# Patient Record
Sex: MALE | Race: WHITE | NOT HISPANIC OR LATINO | Employment: OTHER | URBAN - METROPOLITAN AREA
[De-identification: names, ages, dates, MRNs, and addresses within clinical notes are randomized per-mention and may not be internally consistent; named-entity substitution may affect disease eponyms.]

---

## 2017-08-25 ENCOUNTER — OFFICE VISIT (OUTPATIENT)
Dept: FAMILY MEDICINE | Facility: CLINIC | Age: 52
End: 2017-08-25
Payer: MEDICAID

## 2017-08-25 VITALS
RESPIRATION RATE: 20 BRPM | HEART RATE: 72 BPM | HEIGHT: 70 IN | DIASTOLIC BLOOD PRESSURE: 86 MMHG | WEIGHT: 283 LBS | TEMPERATURE: 97 F | BODY MASS INDEX: 40.52 KG/M2 | SYSTOLIC BLOOD PRESSURE: 134 MMHG

## 2017-08-25 DIAGNOSIS — L51.1: ICD-10-CM

## 2017-08-25 DIAGNOSIS — E11.610 TYPE 2 DIABETES MELLITUS WITH DIABETIC NEUROPATHIC ARTHROPATHY, WITHOUT LONG-TERM CURRENT USE OF INSULIN: ICD-10-CM

## 2017-08-25 DIAGNOSIS — I25.119 ATHEROSCLEROSIS OF NATIVE CORONARY ARTERY OF NATIVE HEART WITH ANGINA PECTORIS: ICD-10-CM

## 2017-08-25 DIAGNOSIS — E11.59 HYPERTENSION ASSOCIATED WITH DIABETES: ICD-10-CM

## 2017-08-25 DIAGNOSIS — N40.0 PROSTATE ENLARGEMENT: ICD-10-CM

## 2017-08-25 DIAGNOSIS — I15.2 HYPERTENSION ASSOCIATED WITH DIABETES: ICD-10-CM

## 2017-08-25 DIAGNOSIS — F41.1 GENERALIZED ANXIETY DISORDER: Primary | ICD-10-CM

## 2017-08-25 DIAGNOSIS — R53.83 FATIGUE, UNSPECIFIED TYPE: ICD-10-CM

## 2017-08-25 DIAGNOSIS — J44.9 CHRONIC OBSTRUCTIVE PULMONARY DISEASE, UNSPECIFIED COPD TYPE: ICD-10-CM

## 2017-08-25 DIAGNOSIS — E66.01 OBESITY, MORBID, BMI 40.0-49.9: ICD-10-CM

## 2017-08-25 DIAGNOSIS — Z00.00 WELL ADULT EXAM: ICD-10-CM

## 2017-08-25 DIAGNOSIS — G47.33 OSA (OBSTRUCTIVE SLEEP APNEA): ICD-10-CM

## 2017-08-25 PROCEDURE — 99386 PREV VISIT NEW AGE 40-64: CPT | Mod: ,,, | Performed by: FAMILY MEDICINE

## 2017-08-25 RX ORDER — METOPROLOL SUCCINATE 100 MG/1
100 TABLET, EXTENDED RELEASE ORAL DAILY
Status: ON HOLD | COMMUNITY
End: 2020-04-27 | Stop reason: HOSPADM

## 2017-08-25 RX ORDER — HYDROXYZINE HYDROCHLORIDE 25 MG/1
25 TABLET, FILM COATED ORAL NIGHTLY
Qty: 30 TABLET | Refills: 2 | Status: SHIPPED | OUTPATIENT
Start: 2017-08-25 | End: 2020-03-12

## 2017-08-25 NOTE — PROGRESS NOTES
Subjective:       Patient ID: Magdaleno Cunningham is a 51 y.o. male.    Chief Complaint: Annual Exam    Status post hospitalization at [facility] for atrial fibrillation ([place]). His long history of coronary artery disease with stents to native vessels unstable angina 2 MIs in the past onset diabetes hyperlipidemia.      Review of Systems   Constitutional: Positive for activity change (decreased), fatigue and unexpected weight change (intentional wt loss 130 #). Negative for appetite change, chills, diaphoresis and fever.   HENT: Negative for congestion, dental problem, drooling, ear discharge, ear pain, facial swelling, hearing loss, mouth sores, nosebleeds, postnasal drip, rhinorrhea, sinus pressure, sneezing, sore throat, tinnitus, trouble swallowing and voice change.    Eyes: Positive for photophobia and visual disturbance (wears bifocals, early cataract). Negative for pain, discharge, redness and itching.        Had fully I examined up to just 2 weeks ago. No retinopathy.   Respiratory: Positive for apnea (hx of fatigue, witnessed apnea, headaches,  ), chest tightness (rare ) and shortness of breath. Negative for cough, choking, wheezing and stridor.    Cardiovascular: Positive for chest pain (rare). Negative for palpitations and leg swelling.   Gastrointestinal: Positive for abdominal pain (intermitant). Negative for abdominal distention, anal bleeding, blood in stool, constipation, diarrhea, nausea, rectal pain and vomiting.   Endocrine: Negative for cold intolerance, heat intolerance, polydipsia, polyphagia and polyuria.   Genitourinary: Negative for decreased urine volume, difficulty urinating, discharge, dysuria, enuresis, flank pain, frequency, genital sores, hematuria, penile pain, penile swelling, scrotal swelling, testicular pain and urgency.   Musculoskeletal: Positive for arthralgias (finger stiffness) and back pain. Negative for gait problem, joint swelling, myalgias, neck pain and neck stiffness.    Skin: Negative for color change, pallor, rash and wound.   Allergic/Immunologic: Negative for environmental allergies, food allergies and immunocompromised state.   Neurological: Positive for light-headedness. Negative for dizziness, tremors, seizures, syncope, facial asymmetry, speech difficulty, weakness, numbness and headaches.   Hematological: Negative for adenopathy. Does not bruise/bleed easily.   Psychiatric/Behavioral: Negative for agitation, behavioral problems, confusion, decreased concentration, dysphoric mood, hallucinations, self-injury, sleep disturbance and suicidal ideas. The patient is not nervous/anxious and is not hyperactive.        Objective:      Physical Exam   Constitutional: He is oriented to person, place, and time. He appears well-developed and well-nourished.  Non-toxic appearance. He does not have a sickly appearance. He does not appear ill. No distress.   HENT:   Head: Normocephalic and atraumatic.   Right Ear: External ear normal.   Left Ear: External ear normal.   Nose: Nose normal.   Mouth/Throat: Oropharynx is clear and moist. No oropharyngeal exudate.   Eyes: Conjunctivae and EOM are normal. Pupils are equal, round, and reactive to light. Right eye exhibits no discharge. Left eye exhibits no discharge. No scleral icterus.   Neck: Normal range of motion. Neck supple. No JVD present. No tracheal deviation present. No thyromegaly present.   Cardiovascular: Normal rate, normal heart sounds and intact distal pulses.  Exam reveals no gallop and no friction rub.    No murmur heard.  Pulmonary/Chest: Effort normal. No stridor. No respiratory distress. He has wheezes (inspiratory). He has no rales. He exhibits no tenderness.   Abdominal: Soft. Bowel sounds are normal. He exhibits no distension and no mass. There is no tenderness. There is no rebound and no guarding. No hernia.   Genitourinary: Rectum normal, prostate normal, testes normal and penis normal. Rectal exam shows guaiac  negative stool. Cremasteric reflex is present. Right testis shows no mass, no swelling and no tenderness. Right testis is descended. Cremasteric reflex is not absent on the right side. Left testis shows no mass, no swelling and no tenderness. Left testis is descended. Cremasteric reflex is not absent on the left side. Circumcised. No phimosis, paraphimosis, hypospadias, penile erythema or penile tenderness. No discharge found.   Musculoskeletal: Normal range of motion. He exhibits no edema, tenderness or deformity.   Lymphadenopathy:     He has no cervical adenopathy.   Neurological: He is alert and oriented to person, place, and time. He has normal reflexes. He displays normal reflexes. No cranial nerve deficit. He exhibits normal muscle tone. Coordination normal.   Skin: Skin is warm and dry. No rash noted. He is not diaphoretic. No erythema. No pallor.   Psychiatric: He has a normal mood and affect. His behavior is normal. Judgment and thought content normal.   Nursing note and vitals reviewed.      Assessment:       1. Generalized anxiety disorder    2. Obesity, morbid, BMI 40.0-49.9    3. Denton-Perez erythema multiforme exudativum    4. ROSAMARIA (obstructive sleep apnea)    5. Chronic obstructive pulmonary disease, unspecified COPD type    6. Hypertension associated with diabetes    7. Type 2 diabetes mellitus with diabetic neuropathic arthropathy, without long-term current use of insulin    8. Atherosclerosis of native coronary artery of native heart with angina pectoris    9. Prostate enlargement    10. Well adult exam    11. Fatigue, unspecified type        Plan:       Magdaleno was seen today for annual exam.    Diagnoses and all orders for this visit:    Generalized anxiety disorder    Obesity, morbid, BMI 40.0-49.9  -     TSH; Future  -     T4; Future  -     TSH  -     T4    Denton-Perez erythema multiforme exudativum    ROSAMARIA (obstructive sleep apnea)  -     Polysomnogram (CPAP will be added if patient  meets diagnostic criteria.); Future  -     TSH; Future  -     T4; Future  -     TSH  -     T4    Chronic obstructive pulmonary disease, unspecified COPD type    Hypertension associated with diabetes  -     Ambulatory referral to Cardiology    Type 2 diabetes mellitus with diabetic neuropathic arthropathy, without long-term current use of insulin  -     Ambulatory referral to Cardiology  -     Microalbumin/creatinine urine ratio  -     Lipid panel; Future  -     Lipid panel    Atherosclerosis of native coronary artery of native heart with angina pectoris  -     Lipid panel; Future  -     Lipid panel    Prostate enlargement  -     PSA, Screening; Future  -     PSA, Screening    Well adult exam  -     Hemoglobin A1c; Future  -     Basic metabolic panel; Future  -     Hepatitis C antibody; Future  -     Hepatic function panel; Future  -     PSA, Screening; Future  -     Lipid panel; Future  -     Hemoglobin A1c  -     Basic metabolic panel  -     Hepatitis C antibody  -     Hepatic function panel  -     PSA, Screening  -     Lipid panel    Fatigue, unspecified type    Other orders  -     hydrOXYzine HCl (ATARAX) 25 MG tablet; Take 1 tablet (25 mg total) by mouth every evening.         Return in about 6 months (around 2/25/2018).

## 2018-11-24 ENCOUNTER — OUTSIDE PLACE OF SERVICE (OUTPATIENT)
Dept: UROLOGY | Facility: CLINIC | Age: 53
End: 2018-11-24
Payer: MEDICAID

## 2018-11-24 ENCOUNTER — TELEPHONE (OUTPATIENT)
Dept: UROLOGY | Facility: CLINIC | Age: 53
End: 2018-11-24

## 2018-11-24 DIAGNOSIS — N32.89 BLADDER MASS: Primary | ICD-10-CM

## 2018-11-24 PROCEDURE — 99204 OFFICE O/P NEW MOD 45 MIN: CPT | Mod: ,,, | Performed by: UROLOGY

## 2018-11-24 NOTE — TELEPHONE ENCOUNTER
"This pt was seen at [facility] today but note was written into [facility] for convenience and making future follow-up easier. It was copied over to [facility] notes.     [facility] Consult Note - [facility]  Staff: MD Lois    Referring provider and please cc:   PCP: Dr.[facility] Russo [facility]: [facility]     Chief Complaint: bladder mass    Subjective:       HPI: Magdaleno Cunningham is a 53 y.o. male presents with     Pt came to ER yesterday with c/o L flank pain, burning with urination, frequency, nocturia, urgency that had been present for about a week. +subjective fevers, +chills. ua was completely negative, culture was negative and he had not had any abx prior. He has never had a uti but he did have painless gross hematuria 4 to 5 years ago that lasted for a few days he thinks associated with trauma? It has not occurred since and was not associated with any kidney stones. On admission his wbc was normal at 9 but his lactic acid was elevated 4.6. He has received abx in patient. He is still having some dysuria when he voids.     A ctap was done which showed thickening of the left side of his bladder suspicious for malignancy. He does have a 50 to 60 pack year hx of smoking and still smokes. +frequency q 1 hour for the past month.     Otherwise denies typical bph symptoms.     He is on eliquis for afib since 2006. He has had a "few MI's" and has 4 stents, most recen placed 2 months ago. His cardiologist is , whom he sees regularly.     Urine history:   11/23/18 Ng, void: neg  12/2017 No cx, void: neg  3/2017  No cx, void: neg    ECOG Status: 0    REVIEW OF SYSTEMS:  General ROS: no fevers, no chills  Psychological ROS: no depression  Endocrine ROS: no heat or cold  Respiratory ROS: + SOB (he says this is unchanged)  Cardiovascular ROS: + CP (he says this is unchanged)  Gastrointestinal ROS: no abdominal pain, no constipation, no diarrhea, no BRBPR  Musculoskeletal ROS: no muscle " pain  Neurological ROS: no headaches  Dermatological ROS: no rashes  HEENT: noglasses, no sinus   ROS: per HPI     PMHx:  Past Medical History:   Diagnosis Date    Atrial fibrillation     COPD (chronic obstructive pulmonary disease)     Coronary artery disease     Diabetes mellitus     Erythema multiforme     AKA Denton Edmondson Syndrome    Hypertension     MI (myocardial infarction)     per pt he has had 4    diabetes since 2006  anxiety  Kidney stones: No  Cataracts:starting    PSHx:  Past Surgical History:   Procedure Laterality Date    CORONARY STENT PLACEMENT     none    Stents/Valves/Foreign Bodies: 4, last one placed 2018  Cardiac Evaluation: , sees regularly  Gastroenterologist: none  Pulmonologist: none, uses cpap    Fam Hx:   malignancies: No  . Gyn malignancies: sister had breast cancer. Parents  - mother a 63 from diabetes. Father  skin cancer that spread a 61  kidney stones: No     Soc Hx:  +tobacco.  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   Cardiovascular: No obvious extremity edema. Warm and well perfused.   GI: palpation of masses. No tenderness. No hepatosplenomegaly to palpation.  Musculoskeletal: normal range of motion of bilateral upper extremities. Normal muscle strength and tone.  Skin: no obvious rashes or lesions. No tightening of skin noted.  Neurologic: CN grossly normal. Normal sensation.   Psychiatric: awake, alert and oriented x 3. Mood and affect normal. Cooperative.    TTP on L flank, not on muscle    :  Inspection of anus normal  No  scrotal rashes, cysts or lesions  Epididymis normal in size bilaterally  Right testicle tender but no swelling or induration. Left testicle normal   Testes normal and size, equal size bilaterally, no masses  Urethral meatus normal without discharge  Penis is not circumcised, easily retractable foreskin    GENEVA: +bogginess, + tenderness. SV not palpable. Normal sphincter tone. No hemhorroids.  No bilateral inguinal hernias noted     LABS REVIEW:    PATHOLOGY REVIEW:  none    RADIOGRAPHIC REVIEW:  ctap w 11/23/18  The visualized lung bases appear clear bilaterally without evidence of mass, infiltrate, or significant pleural effusion. Lower heart appears within normal limits. There is evidence of prominent calcification involving the mitral annulus as identified in the uppermost field of view. Liver appears diminished in density diffusely allowing the intrahepatic vessel to stand out secondary to fatty metamorphosis. There is evidence of mass or cyst identified throughout the liver. The IVC and aorta. Within normal limits. There is no significant amount identified in the stomach. The spleen appears unremarkable. The pancreas maintains uniform attenuation. There is no significant adrenal enlargement. The kidneys perfuse in a symmetric fashion without of some mass or hydronephrosis. Is no significant adenopathy in the retroperitoneum. The bowel superior normal caliber without evidence of wall thickening. No significant mesenteric inflammatory changes. Scans through the lower abdomen are somewhat limited as there is evidence of prominent motion artifact limiting diagnostic evaluation. CT PELVIS: The urinary bladder is filled to capacity demonstrate evidence of a fairly prominent zone of wall thickening about the posterior lateral margin towards left that induces prominent inflammatory changes adjacent perivesicular fat. An underlying malignant process is not excludable as it follows the curve of the urinary. There is no  significant significant adenopathy of the is the pelvic cavity. There is evidence of bilateral groin nodes none of which appear to be of substantial size. Prostate gland appears generous in size. . Perirectal space appears larger while preserved.    cxr 11/23/18  Negative for dz.     Assessment:     Magdaleno Cunningham is a 53 y.o. male with possible bladder mass seen on ct. Negative urine.   Possible prostatitis.     Plan:     Send urine [facility] from today [facility]  Schedule cystoscopy as outpatient on dec 10th.   Urine sample 1 week prior in our clinic, as well as a psa, stat cr and a ctu to further evaluate upper tract dz since he is still c/o left flank pain and bladder mass/thickening is seen on the left side and ct with did not have delayed imaging.     Discharge on po abx x 21 days (doxycycline or cipro).     If I end up identifying tumor, which he is at high risk for bc of his h/o smoking, although he has no blood in his urine, then he would need clearance from  to stop his eliquis and plavix. He just had a stent placed on 9/2018. He would be ok to continue asa 81mg if necessary. He will make f/u on discharge and go ahead and start working on this.     For his procedure on dec 10th, he does not need to stop the blood thinners yet.      Khushboo Abreu MD

## 2018-11-26 ENCOUNTER — TELEPHONE (OUTPATIENT)
Dept: UROLOGY | Facility: CLINIC | Age: 53
End: 2018-11-26

## 2018-11-26 NOTE — TELEPHONE ENCOUNTER
----- Message from Khushboo Abreu MD sent at 11/24/2018  4:15 PM CST -----  Please have images pushed over from [facility] (ct scan)

## 2018-11-29 ENCOUNTER — TELEPHONE (OUTPATIENT)
Dept: UROLOGY | Facility: CLINIC | Age: 53
End: 2018-11-29

## 2018-11-29 NOTE — TELEPHONE ENCOUNTER
Please let pt know that we received his cytology and clearance should he need surgery asleep. However let him know that he does not need to stop blood thinners prior to his outpt cysto on dec 10th, only if we find something and have to put him to sleep    When he returns for urine sample, very important he is taught clean catch and have them repeat a urine cytology, send urine for ua and urine culture as well as fungal culture. Suggest he gives urine in the clinic              Urine cytology 11/25/18 [facility]  Few single degenerate aytpical urothelial cells  No papillary clusters indentified  Budding yeast forms and urine crystals/uric acid crystals    Clearance received from [facility] () on 11/27. Should he need surgery, he can hold asa or plavix for 5 to 7 days prior to procedure and eliquis 3d prior. intermideiate risk with 1-2%risk of death or stroke.

## 2018-12-03 ENCOUNTER — HOSPITAL ENCOUNTER (OUTPATIENT)
Dept: RADIOLOGY | Facility: HOSPITAL | Age: 53
Discharge: HOME | End: 2018-12-03
Attending: UROLOGY
Payer: MEDICAID

## 2018-12-03 DIAGNOSIS — N32.89 BLADDER MASS: ICD-10-CM

## 2018-12-03 PROCEDURE — 74178 CT ABD&PLV WO CNTR FLWD CNTR: CPT | Mod: TC

## 2018-12-03 PROCEDURE — 25500020 PHARM REV CODE 255

## 2018-12-03 PROCEDURE — 74178 CT ABD&PLV WO CNTR FLWD CNTR: CPT | Mod: 26,,, | Performed by: RADIOLOGY

## 2018-12-03 RX ORDER — SODIUM CHLORIDE 9 MG/ML
INJECTION, SOLUTION INTRAVENOUS
Status: DISPENSED
Start: 2018-12-03 | End: 2018-12-03

## 2018-12-03 RX ADMIN — IOHEXOL 100 ML: 350 INJECTION, SOLUTION INTRAVENOUS at 11:12

## 2018-12-03 NOTE — TELEPHONE ENCOUNTER
Yes, please have pt return to clinic for clean catch cytology, ua and culture as requested below    From my previous telephone note:   Please let pt know that we received his cytology and clearance should he need surgery asleep. However let him know that he does not need to stop blood thinners prior to his outpt cysto on dec 10th, only if we find something and have to put him to sleep     When he returns for urine sample, very important he is taught clean catch and have them repeat a urine cytology, send urine for ua and urine culture as well as fungal culture. Suggest he gives urine in the clinic

## 2018-12-03 NOTE — TELEPHONE ENCOUNTER
Spoke with patient informed him of recommendations. Patient verbally voiced understanding. Patient already gave urine sample this morning and urine cytology and fungal culture cannot be added patient will have to come to the clinic for another sample. Please advise

## 2018-12-04 NOTE — TELEPHONE ENCOUNTER
Spoke with patient he come tomorrow to the clinic for repeat urine sample. Patient verbally voiced understanding.

## 2018-12-05 ENCOUNTER — CLINICAL SUPPORT (OUTPATIENT)
Dept: UROLOGY | Facility: CLINIC | Age: 53
End: 2018-12-05
Payer: MEDICAID

## 2018-12-05 ENCOUNTER — APPOINTMENT (OUTPATIENT)
Dept: LAB | Facility: HOSPITAL | Age: 53
End: 2018-12-05
Attending: UROLOGY
Payer: MEDICAID

## 2018-12-05 DIAGNOSIS — R82.998 CELLS AND CASTS IN URINE: Primary | ICD-10-CM

## 2018-12-05 LAB
BACTERIA #/AREA URNS HPF: NORMAL /HPF
BILIRUB UR QL STRIP: NEGATIVE
CLARITY UR: CLEAR
COLOR UR: YELLOW
GLUCOSE UR QL STRIP: ABNORMAL
HGB UR QL STRIP: NEGATIVE
KETONES UR QL STRIP: NEGATIVE
LEUKOCYTE ESTERASE UR QL STRIP: NEGATIVE
MICROSCOPIC COMMENT: NORMAL
NITRITE UR QL STRIP: NEGATIVE
PH UR STRIP: 6 [PH] (ref 5–8)
PROT UR QL STRIP: NEGATIVE
RBC #/AREA URNS HPF: 1 /HPF (ref 0–4)
SP GR UR STRIP: 1.01 (ref 1–1.03)
URN SPEC COLLECT METH UR: ABNORMAL
UROBILINOGEN UR STRIP-ACNC: NEGATIVE EU/DL
WBC #/AREA URNS HPF: 1 /HPF (ref 0–5)
YEAST URNS QL MICRO: NORMAL

## 2018-12-05 PROCEDURE — 81000 URINALYSIS NONAUTO W/SCOPE: CPT

## 2018-12-05 PROCEDURE — 99211 OFF/OP EST MAY X REQ PHY/QHP: CPT | Mod: PBBFAC,PN

## 2018-12-05 PROCEDURE — 99499 UNLISTED E&M SERVICE: CPT | Mod: S$PBB,,, | Performed by: UROLOGY

## 2018-12-05 PROCEDURE — 87102 FUNGUS ISOLATION CULTURE: CPT

## 2018-12-05 PROCEDURE — 87086 URINE CULTURE/COLONY COUNT: CPT

## 2018-12-05 PROCEDURE — 88112 CYTOPATH CELL ENHANCE TECH: CPT | Performed by: PATHOLOGY

## 2018-12-05 PROCEDURE — 99999 PR PBB SHADOW E&M-EST. PATIENT-LVL I: CPT | Mod: PBBFAC,,,

## 2018-12-05 PROCEDURE — 87106 FUNGI IDENTIFICATION YEAST: CPT

## 2018-12-05 NOTE — PROGRESS NOTES
Per  patient in clinic today for a clean catch urine sample. Urine sent for u/a,urine culture, urine cytology and fungal culture.

## 2018-12-07 LAB
BACTERIA UR CULT: NORMAL
BACTERIA UR CULT: NORMAL

## 2018-12-10 ENCOUNTER — HOSPITAL ENCOUNTER (OUTPATIENT)
Facility: AMBULARY SURGERY CENTER | Age: 53
Discharge: HOME | End: 2018-12-10
Attending: UROLOGY | Admitting: UROLOGY
Payer: MEDICAID

## 2018-12-10 DIAGNOSIS — N32.89 BLADDER MASS: ICD-10-CM

## 2018-12-10 DIAGNOSIS — R10.9 FLANK PAIN: ICD-10-CM

## 2018-12-10 DIAGNOSIS — N40.0 BENIGN PROSTATIC HYPERPLASIA, UNSPECIFIED WHETHER LOWER URINARY TRACT SYMPTOMS PRESENT: Primary | ICD-10-CM

## 2018-12-10 LAB
BACTERIA SPEC CULT: NORMAL
BILIRUB SERPL-MCNC: NORMAL MG/DL
BLOOD URINE, POC: NORMAL
CASTS: NORMAL
COLOR, POC UA: NORMAL
CRYSTALS: NORMAL
GLUCOSE UR QL STRIP: 1000
KETONES UR QL STRIP: NORMAL
LEUKOCYTE ESTERASE URINE, POC: NORMAL
NITRITE, POC UA: NORMAL
PH, POC UA: 7
PROTEIN, POC: NORMAL
RBC CELLS COUNTED: NORMAL
SPECIFIC GRAVITY, POC UA: 1.01
UROBILINOGEN, POC UA: NORMAL
WHITE BLOOD CELLS: NORMAL

## 2018-12-10 PROCEDURE — 52000 CYSTOURETHROSCOPY: CPT | Mod: ,,, | Performed by: UROLOGY

## 2018-12-10 PROCEDURE — 76872 US TRANSRECTAL: CPT | Mod: 26,,, | Performed by: UROLOGY

## 2018-12-10 PROCEDURE — 52000 CYSTOURETHROSCOPY: CPT | Performed by: UROLOGY

## 2018-12-10 PROCEDURE — 76872 US TRANSRECTAL: CPT | Performed by: UROLOGY

## 2018-12-10 RX ORDER — LIDOCAINE HYDROCHLORIDE 20 MG/ML
JELLY TOPICAL
Status: DISCONTINUED | OUTPATIENT
Start: 2018-12-10 | End: 2018-12-10 | Stop reason: HOSPADM

## 2018-12-10 RX ORDER — TAMSULOSIN HYDROCHLORIDE 0.4 MG/1
0.4 CAPSULE ORAL
Qty: 90 CAPSULE | Refills: 3 | Status: SHIPPED | OUTPATIENT
Start: 2018-12-10 | End: 2019-10-16 | Stop reason: CLARIF

## 2018-12-10 RX ORDER — LIDOCAINE HYDROCHLORIDE 20 MG/ML
JELLY TOPICAL
Status: DISCONTINUED
Start: 2018-12-10 | End: 2018-12-10 | Stop reason: HOSPADM

## 2018-12-10 NOTE — PLAN OF CARE
Dr Abreu requested rectal ultrasound to be added after cystoscopy was in progress. Patient signed consent after cysto prior to ultrasound.

## 2018-12-10 NOTE — DISCHARGE INSTRUCTIONS
Take Flomax/Tamsulosin 0.4mg NIGHTLY . Side effects include Lightheadedness when standing- be careful when going from sitting to standing because this medicine can cause a drop in blood pressure, stand slowly and usually better to take medicine at night and retrograde ejaculation (you may notice decreased or no ejaculate with orgasm). Why? To help relax prostate so that you will urinate with an improved flow.    FOLLOW UP WITH UROLOGY IN 3 MONTHS TO SEE HOW YOU ARE URINATING    CALL THE HOSPITAL AND ASK TO MAKE AN APPOINTMENT WITH PHYSICAL MEDICINE, DR. ADRIANA VALDEZ FOR YOUR SIDE PAIN.       Cystoscopy    Cystoscopy is a procedure that lets your doctor look directly inside your urethra and bladder. It can be used to:  · Help diagnose a problem with your urethra, bladder, or kidneys.  · Take a sample (biopsy) of bladder or urethral tissue.  · Treat certain problems (such as removing kidney stones).  · Place a stent to bypass an obstruction.  · Take special X-rays of the kidneys.  Based on the findings, your doctor may recommend other tests or treatments.  What is a cystoscope?  A cystoscope is a telescope-like instrument that contains lenses and fiberoptics (small glass wires that make bright light). The cystoscope may be straight and rigid, or flexible to bend around curves in the urethra. The doctor may look directly into the cystoscope, or project the image onto a monitor.  Getting ready  · Ask your doctor if you should stop taking any medicines before the procedure.  · Ask whether you should avoid eating or drinking anything after midnight before the procedure.  · Follow any other instructions your doctor gives you.  Tell your doctor before the exam if you:  · Take any medicines, such as aspirin or blood thinners  · Have allergies to any medicines  · Are pregnant   The procedure  Cystoscopy is done in the doctors office, surgery center, or hospital. The doctor and a nurse are present during the procedure. It  takes only a few minutes, longer if a biopsy, X-ray, or treatment needs to be done.  During the procedure:  · You lie on an exam table on your back, knees bent and legs apart. You are covered with a drape.  · Your urethra and the area around it are washed. Anesthetic jelly may be applied to numb the urethra. Other pain medicine is usually not needed. In some cases, you may be offered a mild sedative to help you relax. If a more extensive procedure is to be done, such as a biopsy or kidney stone removal, general anesthesia may be needed.  · The cystoscope is inserted. A sterile fluid is put into the bladder to expand it. You may feel pressure from this fluid.  · When the procedure is done, the cystoscope is removed.  After the procedure  If you had a sedative, general anesthesia, or spinal anesthesia, you must have someone drive you home. Once youre home:  · Drink plenty of fluids.  · You may have burning or light bleeding when you urinate--this is normal.  · Medicines may be prescribed to ease any discomfort or prevent infection. Take these as directed.  · Call your doctor if you have heavy bleeding or blood clots, burning that lasts more than a day, a fever over 100°F  (38° C), or trouble urinating.  Date Last Reviewed: 1/1/2017 © 2000-2017 [employer]. [address] [place] [address], [place] [address]. All rights reserved. This information is not intended as a substitute for professional medical care. Always follow your healthcare professional's instructions.

## 2018-12-10 NOTE — H&P
"[facility] H&P  Note - [facility]  Staff: MD Lois     Referring provider and please cc:   PCP: Dr[facility] Russo [facility]      Chief Complaint: bladder mass     Subjective:       HPI: Magdaleno Cunningham is a 53 y.o. male presents with      Pt came to ER 11/23/18 with c/o L flank pain, burning with urination, frequency, nocturia, urgency that had been present for about a week. +subjective fevers, +chills. ua was completely negative, culture was negative and he had not had any abx prior. He has never had a uti but he did have painless gross hematuria 4 to 5 years ago that lasted for a few days he thinks associated with trauma? It has not occurred since and was not associated with any kidney stones. On admission his wbc was normal at 9 but his lactic acid was elevated 4.6. He has received abx in patient. He is still having some dysuria when he voids. 11/1     A ctap was done which showed thickening of the left side of his bladder suspicious for malignancy. He does have a 50 to 60 pack year hx of smoking and still smokes. +frequency q 1 hour for the past month.      Otherwise denies typical bph symptoms.      He is on eliquis for afib since 2006. He has had a "few MI's" and has 4 stents, most recen placed 2 months ago. His cardiologist is , whom he sees regularly.     I had him undergo a ctu on 11/24/18 which showed similar findings to ct from 11/23/18     ua void today: 1000 glucose  Urine history:   12/5/18 Multiple org, void: 4+glucose, 1 rbc/ 1 wbc, cytology: negative  12/3/18 Ng, void: tr prot/4+glucose  11/23/18          Ng, void: neg  12/2017           No cx, void: neg  3/2017             No cx, void: neg     ECOG Status: 0     REVIEW OF SYSTEMS:  General ROS: no fevers, no chills  Psychological ROS: no depression  Endocrine ROS: no heat or cold  Respiratory ROS: + SOB (he says this is unchanged)  Cardiovascular ROS: + CP (he says this is " unchanged)  Gastrointestinal ROS: no abdominal pain, no constipation, no diarrhea, no BRBPR  Musculoskeletal ROS: no muscle pain  Neurological ROS: no headaches  Dermatological ROS: no rashes  HEENT: noglasses, no sinus   ROS: per HPI     Past Medical History:   Diagnosis Date    Atrial fibrillation     COPD (chronic obstructive pulmonary disease)     Coronary artery disease     Diabetes mellitus     Erythema multiforme     AKA Denton Edmondson Syndrome    Hypertension     MI (myocardial infarction)     per pt he has had 4        diabetes since 2006  anxiety  Kidney stones: No  Cataracts:starting     Past Surgical History:   Procedure Laterality Date    CORONARY STENT PLACEMENT       `  none     Stents/Valves/Foreign Bodies: 4, last one placed 2018  Cardiac Evaluation: , sees regularly  Gastroenterologist: none  Pulmonologist: none, uses cpap     Fam Hx:   malignancies: No  . Gyn malignancies: sister had breast cancer. Parents  - mother a 63 from diabetes. Father  skin cancer that spread a 61  kidney stones: No      Soc Hx:  +tobacco.  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   Cardiovascular: No obvious extremity edema. Warm and well perfused.   GI: palpation of masses. No tenderness. No hepatosplenomegaly to palpation.  Musculoskeletal: normal range of motion of bilateral upper extremities. Normal muscle strength and tone.  Skin: no obvious rashes or lesions. No tightening of skin noted.  Neurologic: CN  grossly normal. Normal sensation.   Psychiatric: awake, alert and oriented x 3. Mood and affect normal. Cooperative.     TTP on L flank, not on muscle      11/24/18  Inspection of anus normal  No scrotal rashes, cysts or lesions  Epididymis normal in size bilaterally  Right testicle tender but no swelling or induration. Left testicle normal   Testes normal and size, equal size bilaterally, no masses  Urethral meatus normal without discharge  Penis is not circumcised, easily retractable foreskin    GENEVA: +bogginess, + tenderness. SV not palpable. Normal sphincter tone. No hemhorroids.  No bilateral inguinal hernias noted      LABS REVIEW:    BMP  Lab Results   Component Value Date     (L) 07/08/2016    K 4.6 07/08/2016     07/08/2016    CO2 22 (L) 07/08/2016    BUN 12 07/08/2016    CREATININE 1.2 12/03/2018    CALCIUM 9.0 07/08/2016    ANIONGAP 8 07/08/2016    ESTGFRAFRICA >60 12/03/2018    EGFRNONAA >60 12/03/2018          PATHOLOGY REVIEW:  URINE, VOIDED 12/5/18:  - Negative; benign and reactive urothelial cells, benign squamous cells and focal budding yeast and pseudohyphae  consistent with Candida species.  - No dysplastic or malignant cells are present     RADIOGRAPHIC REVIEW:  ctu 12/3/18  1. No obstructive uropathy.  No calcified nephroureterolithiasis.  2. Prostatomegaly necessitating correlation with PSA.  3. Deformity along the bladder base may relate to extrinsic compression from the prostate.  However, a sessile lesion is difficult to exclude in this location and direct visualization may be necessary.    ctap w 11/23/18  The visualized lung bases appear clear bilaterally without evidence of mass, infiltrate, or significant pleural effusion. Lower heart appears within normal limits. There is evidence of prominent calcification involving the mitral annulus as identified in the uppermost field of view. Liver appears diminished in density diffusely allowing the intrahepatic vessel to stand out  secondary to fatty metamorphosis. There is evidence of mass or cyst identified throughout the liver. The IVC and aorta. Within normal limits. There is no significant amount identified in the stomach. The spleen appears unremarkable. The pancreas maintains uniform attenuation. There is no significant adrenal enlargement. The kidneys perfuse in a symmetric fashion without of some mass or hydronephrosis. Is no significant adenopathy in the retroperitoneum. The bowel superior normal caliber without evidence of wall thickening. No significant mesenteric inflammatory changes. Scans through the lower abdomen are somewhat limited as there is evidence of prominent motion artifact limiting diagnostic evaluation. CT PELVIS: The urinary bladder is filled to capacity demonstrate evidence of a fairly prominent zone of wall thickening about the posterior lateral margin towards left that induces prominent inflammatory changes adjacent perivesicular fat. An underlying malignant process is not excludable as it follows the curve of the urinary. There is no significant significant adenopathy of the is the pelvic cavity. There is evidence of bilateral groin nodes none of which appear to be of substantial size. Prostate gland appears generous in size. . Perirectal space appears larger while preserved.     cxr 11/23/18  Negative for dz.      Assessment:      Magdaleno Cunningham is a 53 y.o. male with possible bladder mass seen on ct. Negative urine.   Possible prostatitis.      Plan:      [facility]  Schedule cystoscopy as outpatient on dec 10th.   Urine sample 1 week prior in our clinic, as well as a psa, stat cr and a ctu to further evaluate upper tract dz since he is still c/o left flank pain and bladder mass/thickening is seen on the left side and ct with did not have delayed imaging.      Discharge on po abx x 21 days (doxycycline or cipro).      If I end up identifying tumor, which he is at high risk  for bc of his h/o smoking, although he has no blood in his urine, then he would need clearance from  to stop his eliquis and plavix. He just had a stent placed on 9/2018. He would be ok to continue asa 81mg if necessary. He will make f/u on discharge and go ahead and start working on this.      This patient has been cleared for surgery in ambulatory surgical facility.

## 2018-12-10 NOTE — OP NOTE
[facility] Procedure Note  Date: 12/10/2018      TRANSRECTAL PROSTATIC ULTRASOUND and Cystoscopy    Procedures: Flexible cystourethroscopy and Transrectal Ultrasound    Pre Procedure Diagnosis: bladder mass seen on ct, bph    Post Procedure Diagnosis: same, see below for findings    Surgeon: Khushboo Abreu MD    Indications: Magdaleno Cunningham is a 53 y.o. male with BPH. Current PSA is .42 . Urine from today reviewed. H&P reviewed. The risks and benefits of both procedures were explained and consent was obtained.     Cytoscopic Specimen: none    Cystoscopy was performed   2% lidocaine urojet was used for local analgesia.  The genitalia was prepped and draped in the sterile fashion with betadine.    The flexible scope was advanced into the urethra and into the bladder.  Bilateral ureteral orifice were evaluated and noted to be normal with clear efflux.  The bladder was completely surveyed in a systematic fashion and the cytoscope was retroflexed.  Cystoscopy findings as listed below.         TRANSRECTAL ULTRASOUND was performed   The transrectal ultrasound probe was placed in the rectum. Measurements were taken and recorded and the probe was removed.   The patient tolerated the procedures well without complication.    Findings: (pictures were  uploaded into media)  Cystoscopic Urethra findings - in the prostatic urethra he had multiple polyps in his urethra and around the veru, appeared to be prostattitis but pt w/o complaints of prostatitis.  Cystoscopic Prostate findings - singificant b lobe hypertrophy, no median lobe  Cystoscopic Bladder findings - grade 2 trabeculations, no diverticulum  Other Cysoscopic findings: no bladder mass seen c/w what was seen on ct   No Bladder biopsy:  TRUS volume: 30G  Prostate Ultrasound findings:  · Seminal vesicles/Ejaculatory Ducts: Normal  · Outline/Symmetry of Prostate: WNL  · Central Gland/Transition Zone: Well demarcated  · Peripheral Zone: WNL    Prostate  Measurements:   · Height:  29.1 mm  · Width:  42.6 mm  · Length:  46.3 mm  · Volume: 30G cm3  · Intravesical protrusion: NONE  · PSAD: 0.01    Assessment: Magdaleno Cunningham is a 53 y.o. male with bladder mass seen on ct, not identified on cystoscopy, also not explained by prostate. Urethral polyps that appear b9, likely prostatitis, bph evident on trus but also causing sx.  Plan:  Bladder mass- none seen and no blood in urine  Frequency and diabetes- pt guy needs better control of diabetes, f/u with pcp. No blood in urine. Could also be bph. Continue to monitior for hematuria with h/o smoking.   bph -start flomax and take nightly  Flank pain that is non-urologic -referral to      F/u in 3 months with urology np to see how he is doing on flomax  F/u with me in 6 months. Consider bph treatment in future but recently had stent       Khushboo Abreu MD

## 2018-12-10 NOTE — DISCHARGE SUMMARY
[facility]  Urology  Discharge Note - Short Stay      Patient Name: Magdaleno Cunningham  MRN: [id number]  Discharge Date and Time:  12/10/2018 3:21 PM  Attending Physician: Khushboo Abreu,*   Discharging Provider: Khushboo Abreu MD  Primary Care Physician: Luis E Lu MD    Final Active Diagnoses:    Diagnosis Date Noted POA    Bladder mass [N32.89] 12/10/2018 Yes      Problems Resolved During this Admission:       Final Diagnoses: Same as principal problem.    Hospital Course: Patient was admitted for an outpatient procedure and tolerated the procedure well with no complications.*    Procedure(s) (LRB):  CYSTOSCOPY (N/A)  ULTRASOUND, PROSTATE, FOR VOLUME DETERMINATION     Indwelling Lines/Drains at time of discharge:   Lines/Drains/Airways          None          Discharged Condition: good    Disposition: home    Follow Up:      Patient Instructions:      Ambulatory Referral to Physical Medicine Rehab   Referral Priority: Routine Referral Type: Rehabilitation   Referral Reason: Specialty Services Required   Requested Specialty: Physical Medicine and Rehabilitation   Number of Visits Requested: 1       Medications:  Reconciled Home Medications:      Medication List      START taking these medications    tamsulosin 0.4 mg Cap  Commonly known as:  FLOMAX  Take 1 capsule (0.4 mg total) by mouth after dinner.        CONTINUE taking these medications    ELIQUIS 5 mg Tab  Generic drug:  apixaban  Take 5 mg by mouth 2 (two) times daily.     hydrOXYzine HCl 25 MG tablet  Commonly known as:  ATARAX  Take 1 tablet (25 mg total) by mouth every evening.     ibuprofen 800 MG tablet  Commonly known as:  ADVIL,MOTRIN  Take 1 tablet (800 mg total) by mouth 4 (four) times daily.     lisinopril 10 MG tablet  Take 1 tablet (10 mg total) by mouth once daily.     metFORMIN 500 MG tablet  Commonly known as:  GLUCOPHAGE  Take 500 mg by mouth 2 (two) times daily with meals.     metoprolol succinate 50  MG 24 hr tablet  Commonly known as:  TOPROL-XL  Take 50 mg by mouth once daily.     simvastatin 20 MG tablet  Commonly known as:  ZOCOR  Take 1 tablet (20 mg total) by mouth every evening.            Discharge Procedure Orders (must include Diet, Follow-up, Activity):   Discharge Procedure Orders (must include Diet, Follow-up, Activity)   Ambulatory Referral to Physical Medicine Rehab   Referral Priority: Routine Referral Type: Rehabilitation   Referral Reason: Specialty Services Required   Requested Specialty: Physical Medicine and Rehabilitation   Number of Visits Requested: 1            Khushboo Abreu MD  Urology  [facility]

## 2018-12-10 NOTE — PLAN OF CARE
Pt  ready to go home , stable.PO flds offered,pt refused.. Pt states pain 6/10..Pt devin burning/bledding when urinating. Ambulated to car accompanied .

## 2018-12-11 VITALS
BODY MASS INDEX: 40.52 KG/M2 | WEIGHT: 283 LBS | HEART RATE: 64 BPM | DIASTOLIC BLOOD PRESSURE: 78 MMHG | OXYGEN SATURATION: 97 % | HEIGHT: 70 IN | SYSTOLIC BLOOD PRESSURE: 130 MMHG | RESPIRATION RATE: 20 BRPM | TEMPERATURE: 97 F

## 2019-01-07 LAB — FUNGUS SPEC CULT: NORMAL

## 2019-08-04 ENCOUNTER — HOSPITAL ENCOUNTER (EMERGENCY)
Facility: HOSPITAL | Age: 54
Discharge: HOME | End: 2019-08-04
Attending: EMERGENCY MEDICINE
Payer: MEDICAID

## 2019-08-04 VITALS
DIASTOLIC BLOOD PRESSURE: 80 MMHG | OXYGEN SATURATION: 99 % | TEMPERATURE: 98 F | WEIGHT: 280 LBS | RESPIRATION RATE: 18 BRPM | SYSTOLIC BLOOD PRESSURE: 132 MMHG | HEART RATE: 73 BPM | HEIGHT: 71 IN | BODY MASS INDEX: 39.2 KG/M2

## 2019-08-04 DIAGNOSIS — R07.9 CHEST PAIN: ICD-10-CM

## 2019-08-04 DIAGNOSIS — K85.90 ACUTE PANCREATITIS, UNSPECIFIED COMPLICATION STATUS, UNSPECIFIED PANCREATITIS TYPE: Primary | ICD-10-CM

## 2019-08-04 LAB
ALBUMIN SERPL BCP-MCNC: 4 G/DL (ref 3.5–5.2)
ALP SERPL-CCNC: 57 U/L (ref 55–135)
ALT SERPL W/O P-5'-P-CCNC: 34 U/L (ref 10–44)
ANION GAP SERPL CALC-SCNC: 12 MMOL/L (ref 8–16)
AST SERPL-CCNC: 28 U/L (ref 10–40)
BACTERIA #/AREA URNS HPF: ABNORMAL /HPF
BASOPHILS # BLD AUTO: 0.08 K/UL (ref 0–0.2)
BASOPHILS NFR BLD: 0.8 % (ref 0–1.9)
BILIRUB SERPL-MCNC: 0.4 MG/DL (ref 0.1–1)
BILIRUB UR QL STRIP: NEGATIVE
BUN SERPL-MCNC: 10 MG/DL (ref 6–20)
CALCIUM SERPL-MCNC: 10.1 MG/DL (ref 8.7–10.5)
CHLORIDE SERPL-SCNC: 103 MMOL/L (ref 95–110)
CLARITY UR: CLEAR
CO2 SERPL-SCNC: 21 MMOL/L (ref 23–29)
COLOR UR: YELLOW
CREAT SERPL-MCNC: 1.2 MG/DL (ref 0.5–1.4)
DIFFERENTIAL METHOD: NORMAL
EOSINOPHIL # BLD AUTO: 0.3 K/UL (ref 0–0.5)
EOSINOPHIL NFR BLD: 3.5 % (ref 0–8)
ERYTHROCYTE [DISTWIDTH] IN BLOOD BY AUTOMATED COUNT: 12.4 % (ref 11.5–14.5)
EST. GFR  (AFRICAN AMERICAN): >60 ML/MIN/1.73 M^2
EST. GFR  (NON AFRICAN AMERICAN): >60 ML/MIN/1.73 M^2
GLUCOSE SERPL-MCNC: 147 MG/DL (ref 70–110)
GLUCOSE UR QL STRIP: ABNORMAL
HCT VFR BLD AUTO: 47.9 % (ref 40–54)
HGB BLD-MCNC: 16.3 G/DL (ref 14–18)
HGB UR QL STRIP: ABNORMAL
HYALINE CASTS #/AREA URNS LPF: 0 /LPF
IMM GRANULOCYTES # BLD AUTO: 0.02 K/UL (ref 0–0.04)
KETONES UR QL STRIP: NEGATIVE
LACTATE SERPL-SCNC: 1.7 MMOL/L (ref 0.5–2.2)
LEUKOCYTE ESTERASE UR QL STRIP: NEGATIVE
LIPASE SERPL-CCNC: 633 U/L (ref 4–60)
LYMPHOCYTES # BLD AUTO: 2.7 K/UL (ref 1–4.8)
LYMPHOCYTES NFR BLD: 28.4 % (ref 18–48)
MCH RBC QN AUTO: 30.2 PG (ref 27–31)
MCHC RBC AUTO-ENTMCNC: 34 G/DL (ref 32–36)
MCV RBC AUTO: 89 FL (ref 82–98)
MICROSCOPIC COMMENT: ABNORMAL
MONOCYTES # BLD AUTO: 0.6 K/UL (ref 0.3–1)
MONOCYTES NFR BLD: 6.5 % (ref 4–15)
NEUTROPHILS # BLD AUTO: 5.8 K/UL (ref 1.8–7.7)
NEUTROPHILS NFR BLD: 60.6 % (ref 38–73)
NITRITE UR QL STRIP: NEGATIVE
NRBC BLD-RTO: 0 /100 WBC
PH UR STRIP: 6 [PH] (ref 5–8)
PLATELET # BLD AUTO: 290 K/UL (ref 150–350)
PMV BLD AUTO: 9.5 FL (ref 9.2–12.9)
POTASSIUM SERPL-SCNC: 4.2 MMOL/L (ref 3.5–5.1)
PROT SERPL-MCNC: 8 G/DL (ref 6–8.4)
PROT UR QL STRIP: ABNORMAL
RBC # BLD AUTO: 5.4 M/UL (ref 4.6–6.2)
RBC #/AREA URNS HPF: 1 /HPF (ref 0–4)
SODIUM SERPL-SCNC: 136 MMOL/L (ref 136–145)
SP GR UR STRIP: >=1.03 (ref 1–1.03)
URN SPEC COLLECT METH UR: ABNORMAL
UROBILINOGEN UR STRIP-ACNC: ABNORMAL EU/DL
WBC # BLD AUTO: 9.55 K/UL (ref 3.9–12.7)
WBC #/AREA URNS HPF: 3 /HPF (ref 0–5)

## 2019-08-04 PROCEDURE — 83605 ASSAY OF LACTIC ACID: CPT

## 2019-08-04 PROCEDURE — 99285 EMERGENCY DEPT VISIT HI MDM: CPT | Mod: 25

## 2019-08-04 PROCEDURE — 83690 ASSAY OF LIPASE: CPT

## 2019-08-04 PROCEDURE — 85025 COMPLETE CBC W/AUTO DIFF WBC: CPT

## 2019-08-04 PROCEDURE — 63600175 PHARM REV CODE 636 W HCPCS: Performed by: EMERGENCY MEDICINE

## 2019-08-04 PROCEDURE — 87040 BLOOD CULTURE FOR BACTERIA: CPT | Mod: 59

## 2019-08-04 PROCEDURE — 96374 THER/PROPH/DIAG INJ IV PUSH: CPT | Mod: 59

## 2019-08-04 PROCEDURE — 80053 COMPREHEN METABOLIC PANEL: CPT

## 2019-08-04 PROCEDURE — 25500020 PHARM REV CODE 255: Performed by: EMERGENCY MEDICINE

## 2019-08-04 PROCEDURE — 93005 ELECTROCARDIOGRAM TRACING: CPT

## 2019-08-04 PROCEDURE — 36415 COLL VENOUS BLD VENIPUNCTURE: CPT

## 2019-08-04 PROCEDURE — 81000 URINALYSIS NONAUTO W/SCOPE: CPT

## 2019-08-04 PROCEDURE — 96375 TX/PRO/DX INJ NEW DRUG ADDON: CPT

## 2019-08-04 RX ORDER — ACETAMINOPHEN 10 MG/ML
1000 INJECTION, SOLUTION INTRAVENOUS
Status: COMPLETED | OUTPATIENT
Start: 2019-08-04 | End: 2019-08-04

## 2019-08-04 RX ORDER — MORPHINE SULFATE 2 MG/ML
2 INJECTION, SOLUTION INTRAMUSCULAR; INTRAVENOUS
Status: COMPLETED | OUTPATIENT
Start: 2019-08-04 | End: 2019-08-04

## 2019-08-04 RX ORDER — FENOFIBRATE 145 MG/1
145 TABLET, FILM COATED ORAL DAILY
COMMUNITY
End: 2019-08-04

## 2019-08-04 RX ORDER — NAPROXEN 500 MG/1
500 TABLET ORAL 2 TIMES DAILY PRN
Qty: 30 TABLET | Refills: 0 | Status: SHIPPED | OUTPATIENT
Start: 2019-08-04 | End: 2019-10-16 | Stop reason: CLARIF

## 2019-08-04 RX ORDER — HYDROCODONE BITARTRATE AND ACETAMINOPHEN 5; 325 MG/1; MG/1
1 TABLET ORAL EVERY 6 HOURS PRN
Qty: 12 TABLET | Refills: 0 | Status: SHIPPED | OUTPATIENT
Start: 2019-08-04 | End: 2019-10-16 | Stop reason: CLARIF

## 2019-08-04 RX ORDER — GABAPENTIN 600 MG/1
600 TABLET ORAL SEE ADMIN INSTRUCTIONS
COMMUNITY

## 2019-08-04 RX ORDER — CLONAZEPAM 1 MG/1
1 TABLET ORAL 3 TIMES DAILY PRN
COMMUNITY
End: 2022-08-30 | Stop reason: DRUGHIGH

## 2019-08-04 RX ORDER — ONDANSETRON 4 MG/1
4 TABLET, ORALLY DISINTEGRATING ORAL EVERY 8 HOURS PRN
Qty: 15 TABLET | Refills: 0 | Status: SHIPPED | OUTPATIENT
Start: 2019-08-04 | End: 2019-08-11

## 2019-08-04 RX ORDER — GLIMEPIRIDE 4 MG/1
4 TABLET ORAL
COMMUNITY
End: 2019-10-16 | Stop reason: CLARIF

## 2019-08-04 RX ORDER — NAPROXEN SODIUM 220 MG/1
81 TABLET, FILM COATED ORAL DAILY
Status: ON HOLD | COMMUNITY
End: 2023-12-15 | Stop reason: HOSPADM

## 2019-08-04 RX ORDER — PHENTERMINE HYDROCHLORIDE 37.5 MG/1
37.5 CAPSULE ORAL EVERY MORNING
COMMUNITY
End: 2019-10-16 | Stop reason: CLARIF

## 2019-08-04 RX ADMIN — ACETAMINOPHEN 1000 MG: 10 INJECTION, SOLUTION INTRAVENOUS at 08:08

## 2019-08-04 RX ADMIN — MORPHINE SULFATE 2 MG: 2 INJECTION, SOLUTION INTRAMUSCULAR; INTRAVENOUS at 07:08

## 2019-08-04 RX ADMIN — IOHEXOL 100 ML: 350 INJECTION, SOLUTION INTRAVENOUS at 09:08

## 2019-08-04 RX ADMIN — SODIUM CHLORIDE 1000 ML: 0.9 INJECTION, SOLUTION INTRAVENOUS at 07:08

## 2019-08-04 NOTE — ED PROVIDER NOTES
"Encounter Date: 8/4/2019    SCRIBE #1 NOTE: I, Sanjay Green, am scribing for, and in the presence of, Dr. Villa.       History     Chief Complaint   Patient presents with    Chest Pain     hurts to take a deep breath and SOB for 3 days     Time seen by provider: 6:46 PM on 08/04/2019      Magdaleno Cunningham is a 53 y.o. male with a PMHx of CAD, COPD, HTN, DM, a-fib, and MI who presents to the ED for abdominal pain that started 3 days ago. He states that this pain started suddenly while at rest and gradually worsened since onset. He locates the pain to the epigastric region and describes it as a sharper pain that radiates to the back. The patient admits that it is constant, but worsens with movement. Patient reports that he is having some SOB with this pain secondary to "It hurts when I breathe." He does admit that he has had decreased urinary frequency as well since the onset of the abdominal pain. He denies alcohol use, drug use, fever, chills, chest pain, cough, headache, vomiting, nausea, diarrhea, dysuria, or any other complaint at this time. The patient has a PSHx of coronary stent placement and cystoscopy.    The history is provided by the patient.     Review of patient's allergies indicates:   Allergen Reactions    Pesticide Dermatitis     Past Medical History:   Diagnosis Date    Atrial fibrillation     COPD (chronic obstructive pulmonary disease)     Coronary artery disease     Diabetes mellitus     Erythema multiforme     AKA Denton Edmondson Syndrome    Hypertension     MI (myocardial infarction)     per pt he has had 4      Past Surgical History:   Procedure Laterality Date    CORONARY STENT PLACEMENT      CYSTOSCOPY N/A 12/10/2018    Performed by [facility]o Abreu [facility] at [facility]    ULTRASOUND, PROSTATE, FOR VOLUME DETERMINATION  12/10/2018    Performed by Khushboo Abreu MD at [facility] OR     Family History   Problem Relation Age of Onset    Heart disease Mother     Diabetes " Mother     Hyperlipidemia Father      Social History     Tobacco Use    Smoking status: Current Every Day Smoker     Packs/day: 1.00     Years: 30.00     Pack years: 30.00     Types: Cigarettes    Smokeless tobacco: Former User    Tobacco comment: using electronic cigarette as well   Substance Use Topics    Alcohol use: No    Drug use: Yes     Types: Marijuana     Review of Systems   Constitutional: Negative for chills and fever.   HENT: Negative for congestion and sore throat.    Respiratory: Positive for shortness of breath.    Cardiovascular: Negative for chest pain.   Gastrointestinal: Positive for abdominal pain and nausea.   Genitourinary: Positive for frequency. Negative for dysuria.   Musculoskeletal: Negative for back pain and neck pain.   Skin: Negative for wound.   Neurological: Negative for headaches.   Hematological: Bruises/bleeds easily.       Physical Exam     Initial Vitals [08/04/19 1831]   BP Pulse Resp Temp SpO2   (!) 179/105 (!) 114 18 97.8 °F (36.6 °C) 99 %      MAP       --         Physical Exam    Nursing note and vitals reviewed.  Constitutional: He appears well-developed and well-nourished. He is not diaphoretic. He appears distressed.   HENT:   Head: Normocephalic and atraumatic.   Mouth/Throat: Oropharynx is clear and moist.   Eyes: Conjunctivae are normal. Pupils are equal, round, and reactive to light.   Neck: Normal range of motion. Neck supple.   Cardiovascular: Normal rate, regular rhythm, normal heart sounds and intact distal pulses. Exam reveals no gallop and no friction rub.    No murmur heard.  Pulmonary/Chest: Breath sounds normal. No respiratory distress. He has no wheezes. He has no rhonchi. He has no rales.   Decreased inspiratory secondary to pain   Abdominal: Soft. Bowel sounds are normal. He exhibits no distension. There is tenderness (diffuse). There is guarding and CVA tenderness.   Musculoskeletal: Normal range of motion. He exhibits no edema or tenderness.    Paravertebral tenderness.    Lymphadenopathy:     He has no cervical adenopathy.   Neurological: He is alert and oriented to person, place, and time. He has normal strength.   Skin: Skin is warm and dry.   Psychiatric: He has a normal mood and affect. Thought content normal.         ED Course   Procedures  Labs Reviewed   COMPREHENSIVE METABOLIC PANEL - Abnormal; Notable for the following components:       Result Value    CO2 21 (*)     Glucose 147 (*)     All other components within normal limits   LIPASE - Abnormal; Notable for the following components:    Lipase 633 (*)     All other components within normal limits   URINALYSIS, REFLEX TO URINE CULTURE - Abnormal; Notable for the following components:    Specific Gravity, UA >=1.030 (*)     Protein, UA 2+ (*)     Glucose, UA 2+ (*)     Occult Blood UA Trace (*)     Urobilinogen, UA 2.0-3.0 (*)     All other components within normal limits    Narrative:     Preferred Collection Type->Urine, Clean Catch   URINALYSIS MICROSCOPIC - Abnormal; Notable for the following components:    Bacteria Moderate (*)     All other components within normal limits    Narrative:     Preferred Collection Type->Urine, Clean Catch   CULTURE, BLOOD   CULTURE, BLOOD   CBC W/ AUTO DIFFERENTIAL   LACTIC ACID, PLASMA     EKG Readings: (Independently Interpreted)   Rhythm: Normal Sinus Rhythm. Heart Rate: 82. Ectopy: No Ectopy. Conduction: Normal. ST Segments: Normal ST Segments. T Waves: Normal. Clinical Impression: Normal Sinus Rhythm       Imaging Results          CT Abdomen Pelvis With Contrast (In process)                US Abdomen Limited (In process)                X-Ray Chest AP Portable (In process)                  Medical Decision Making:   History:   Old Medical Records: I decided to obtain old medical records.  Clinical Tests:   Lab Tests: Ordered and Reviewed  Radiological Study: Reviewed and Ordered  Medical Tests: Ordered and Reviewed            Scribe Attestation:   Scribe  #1: I performed the above scribed service and the documentation accurately describes the services I performed. I attest to the accuracy of the note.            ED Course as of Aug 04 2203   Sun Aug 04, 2019   1852 I, Dr. Dwaine Villa, personally performed the services described in this documentation.   All medical record entries made by the scribe were at my direction and in my presence.   I have reviewed the chart and agree that the record is accurate and complete.   Dwaine Villa MD.       [NP]   1918 This is an emergent evaluation of a 53 y.o.male patient with presentation of diffuse abd pain radiating to his back, assoc with decreased urinary output.     Initial differentials include but are not limited to: colitis, viscus perforation, cholelithiasis, cholecystitis, mm spasm, electrolyte abnormality, HENRIQUE, obstructive uropathy, dehydration    Plan: basic labs, lactic acid, lipase, UA, RUQ US, CXR, CT ab/pel, pain medicine, IVF     [NP]   2000 Lipase(!): 633 [NP]   2000 UROBILINOGEN UA(!): 2.0-3.0 [NP]   2000 NITRITE UA: Negative [NP]   2000 Occult Blood UA(!): Trace [NP]   2000 Glucose, UA(!): 2+ [NP]   2000 Protein, UA(!): 2+ [NP]   2000 Bacteria, UA(!): Moderate [NP]   2000 WBC, UA: 3 [NP]   2000 Sodium: 136 [NP]   2000 Potassium: 4.2 [NP]   2000 Chloride: 103 [NP]   2000 CO2(!): 21 [NP]   2000 Glucose(!): 147 [NP]   2000 Creatinine: 1.2 [NP]   2000 BUN, Bld: 10 [NP]   2000 BILIRUBIN TOTAL: 0.4 [NP]   2000 AST: 28 [NP]   2000 ALT: 34 [NP]   2000 Alkaline Phosphatase: 57 [NP]   2000 Patient has pancreatitis.  Will get right upper quadrant ultrasound.  Patient is pending CT abdomen/pelvis.    [NP]      ED Course User Index  [NP] Dwaine Villa MD     CT scan shows acute appendicitis without necrosis or hemorrhage.  There are no other acute intra-abdominal pathologies noted on CT scan per Radiology.  Ultrasound shows enlarged liver, however no other concerning findings.  Patient is on Dulaglutide, which can cause  pancreatitis. Advised to stop taking and f/u with PCP.  Also noted on med rec that fenofibrate is contra-indicated with pancreatitis. Advised d/c in AVS.  Given instructions on clear liquid diet and pain/nausea meds.  F/U with PCP in 1-2 weeks or return for any concerns.      Clinical Impression:       ICD-10-CM ICD-9-CM   1. Acute pancreatitis, unspecified complication status, unspecified pancreatitis type K85.90 577.0   2. Chest pain R07.9 786.50                                Dwaine Villa MD  08/04/19 4561

## 2019-08-10 LAB
BACTERIA BLD CULT: NORMAL
BACTERIA BLD CULT: NORMAL

## 2019-10-16 ENCOUNTER — HOSPITAL ENCOUNTER (EMERGENCY)
Facility: HOSPITAL | Age: 54
Discharge: HOME | End: 2019-10-16
Attending: EMERGENCY MEDICINE
Payer: MEDICAID

## 2019-10-16 VITALS
BODY MASS INDEX: 39.81 KG/M2 | RESPIRATION RATE: 17 BRPM | SYSTOLIC BLOOD PRESSURE: 149 MMHG | DIASTOLIC BLOOD PRESSURE: 77 MMHG | HEART RATE: 77 BPM | TEMPERATURE: 99 F | OXYGEN SATURATION: 97 % | HEIGHT: 71 IN | WEIGHT: 284.38 LBS

## 2019-10-16 DIAGNOSIS — T24.202A SECOND DEGREE BURN OF LEFT LEG, INITIAL ENCOUNTER: Primary | ICD-10-CM

## 2019-10-16 DIAGNOSIS — I10 UNCONTROLLED HYPERTENSION: ICD-10-CM

## 2019-10-16 DIAGNOSIS — L03.113 CELLULITIS OF RIGHT UPPER ARM: ICD-10-CM

## 2019-10-16 LAB — POCT GLUCOSE: 321 MG/DL (ref 70–110)

## 2019-10-16 PROCEDURE — 82962 GLUCOSE BLOOD TEST: CPT

## 2019-10-16 PROCEDURE — 99284 EMERGENCY DEPT VISIT MOD MDM: CPT | Mod: 25

## 2019-10-16 PROCEDURE — 25000003 PHARM REV CODE 250: Performed by: EMERGENCY MEDICINE

## 2019-10-16 RX ORDER — SILVER SULFADIAZINE 10 G/1000G
1 CREAM TOPICAL
Status: COMPLETED | OUTPATIENT
Start: 2019-10-16 | End: 2019-10-16

## 2019-10-16 RX ORDER — FENOFIBRATE 145 MG/1
145 TABLET, FILM COATED ORAL NIGHTLY
Status: ON HOLD | COMMUNITY
End: 2022-08-31

## 2019-10-16 RX ORDER — ROSUVASTATIN CALCIUM 20 MG/1
20 TABLET, COATED ORAL NIGHTLY
Status: ON HOLD | COMMUNITY
End: 2022-08-31

## 2019-10-16 RX ORDER — HYDROCODONE BITARTRATE AND ACETAMINOPHEN 5; 325 MG/1; MG/1
1 TABLET ORAL EVERY 6 HOURS PRN
Qty: 12 TABLET | Refills: 0 | Status: SHIPPED | OUTPATIENT
Start: 2019-10-16 | End: 2020-03-12

## 2019-10-16 RX ORDER — HYDROCODONE BITARTRATE AND ACETAMINOPHEN 5; 325 MG/1; MG/1
2 TABLET ORAL
Status: COMPLETED | OUTPATIENT
Start: 2019-10-16 | End: 2019-10-16

## 2019-10-16 RX ORDER — CLINDAMYCIN HYDROCHLORIDE 150 MG/1
300 CAPSULE ORAL
Status: COMPLETED | OUTPATIENT
Start: 2019-10-16 | End: 2019-10-16

## 2019-10-16 RX ORDER — SILVER SULFADIAZINE 10 G/1000G
CREAM TOPICAL 2 TIMES DAILY
Qty: 30 G | Refills: 0 | Status: SHIPPED | OUTPATIENT
Start: 2019-10-16 | End: 2020-03-12

## 2019-10-16 RX ORDER — CLINDAMYCIN HYDROCHLORIDE 150 MG/1
300 CAPSULE ORAL 4 TIMES DAILY
Qty: 56 CAPSULE | Refills: 0 | Status: SHIPPED | OUTPATIENT
Start: 2019-10-16 | End: 2019-10-23

## 2019-10-16 RX ADMIN — SILVER SULFADIAZINE 1 TUBE: 10 CREAM TOPICAL at 09:10

## 2019-10-16 RX ADMIN — CLINDAMYCIN HYDROCHLORIDE 300 MG: 150 CAPSULE ORAL at 09:10

## 2019-10-16 RX ADMIN — HYDROCODONE BITARTRATE AND ACETAMINOPHEN 2 TABLET: 5; 325 TABLET ORAL at 09:10
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
## 2019-10-17 NOTE — ED NOTES
Pt. Resting comfortably, NADN, rates pain as a 2 on a 0-10 scale, family x1 at bedside, will continue to monitor.

## 2019-10-21 ENCOUNTER — CLINICAL SUPPORT (OUTPATIENT)
Dept: URGENT CARE | Facility: CLINIC | Age: 54
End: 2019-10-21
Payer: MEDICAID

## 2019-10-21 VITALS
DIASTOLIC BLOOD PRESSURE: 83 MMHG | BODY MASS INDEX: 38.49 KG/M2 | HEART RATE: 75 BPM | OXYGEN SATURATION: 96 % | RESPIRATION RATE: 16 BRPM | TEMPERATURE: 97 F | SYSTOLIC BLOOD PRESSURE: 117 MMHG | WEIGHT: 276 LBS

## 2019-10-21 DIAGNOSIS — T24.232A PARTIAL THICKNESS BURN OF LEFT LOWER LEG, INITIAL ENCOUNTER: Primary | ICD-10-CM

## 2019-10-21 PROCEDURE — 99204 OFFICE O/P NEW MOD 45 MIN: CPT | Mod: S$GLB,,, | Performed by: NURSE PRACTITIONER

## 2019-10-21 PROCEDURE — 99204 PR OFFICE/OUTPT VISIT, NEW, LEVL IV, 45-59 MIN: ICD-10-PCS | Mod: S$GLB,,, | Performed by: NURSE PRACTITIONER

## 2019-10-21 RX ORDER — TRAMADOL HYDROCHLORIDE 50 MG/1
50 TABLET ORAL EVERY 6 HOURS
Qty: 20 TABLET | Refills: 0 | Status: SHIPPED | OUTPATIENT
Start: 2019-10-21 | End: 2019-10-26

## 2020-01-06 ENCOUNTER — CLINICAL SUPPORT (OUTPATIENT)
Dept: URGENT CARE | Facility: CLINIC | Age: 55
End: 2020-01-06
Payer: MEDICAID

## 2020-01-06 VITALS
WEIGHT: 266 LBS | SYSTOLIC BLOOD PRESSURE: 126 MMHG | DIASTOLIC BLOOD PRESSURE: 87 MMHG | HEIGHT: 71 IN | OXYGEN SATURATION: 97 % | HEART RATE: 125 BPM | RESPIRATION RATE: 16 BRPM | TEMPERATURE: 100 F | BODY MASS INDEX: 37.24 KG/M2

## 2020-01-06 DIAGNOSIS — E11.9 TYPE 2 DIABETES MELLITUS WITHOUT COMPLICATION, UNSPECIFIED WHETHER LONG TERM INSULIN USE: ICD-10-CM

## 2020-01-06 DIAGNOSIS — J11.1 INFLUENZA-LIKE ILLNESS: ICD-10-CM

## 2020-01-06 DIAGNOSIS — Z20.828 EXPOSURE TO INFLUENZA: ICD-10-CM

## 2020-01-06 DIAGNOSIS — R50.9 FEVER, UNSPECIFIED FEVER CAUSE: Primary | ICD-10-CM

## 2020-01-06 LAB
CTP QC/QA: YES
FLUAV AG NPH QL: NEGATIVE
FLUBV AG NPH QL: NEGATIVE
GLUCOSE SERPL-MCNC: 259 MG/DL (ref 70–110)

## 2020-01-06 PROCEDURE — 82962 GLUCOSE BLOOD TEST: CPT | Mod: ,,, | Performed by: NURSE PRACTITIONER

## 2020-01-06 PROCEDURE — 87804 INFLUENZA ASSAY W/OPTIC: CPT | Mod: QW,,, | Performed by: NURSE PRACTITIONER

## 2020-01-06 PROCEDURE — 99214 OFFICE O/P EST MOD 30 MIN: CPT | Mod: 25,S$GLB,, | Performed by: NURSE PRACTITIONER

## 2020-01-06 PROCEDURE — 99214 PR OFFICE/OUTPT VISIT, EST, LEVL IV, 30-39 MIN: ICD-10-PCS | Mod: 25,S$GLB,, | Performed by: NURSE PRACTITIONER

## 2020-01-06 PROCEDURE — 82962 POCT GLUCOSE, HAND-HELD DEVICE: ICD-10-PCS | Mod: ,,, | Performed by: NURSE PRACTITIONER

## 2020-01-06 PROCEDURE — 87804 POCT INFLUENZA A/B: ICD-10-PCS | Mod: QW,,, | Performed by: NURSE PRACTITIONER

## 2020-01-06 RX ORDER — ACETAMINOPHEN 500 MG
1000 TABLET ORAL
Status: COMPLETED | OUTPATIENT
Start: 2020-01-06 | End: 2020-01-06

## 2020-01-06 RX ORDER — OSELTAMIVIR PHOSPHATE 75 MG/1
75 CAPSULE ORAL 2 TIMES DAILY
Qty: 10 CAPSULE | Refills: 0 | Status: SHIPPED | OUTPATIENT
Start: 2020-01-06 | End: 2020-01-11

## 2020-01-06 RX ADMIN — Medication 1000 MG: at 02:01

## 2020-01-06 NOTE — PATIENT INSTRUCTIONS
The Flu (Influenza)     The virus that causes the flu spreads through the air in droplets when someone who has the flu coughs, sneezes, laughs, or talks.   The flu (influenza) is an infection that affects your respiratory tract. This tract is made up of your mouth, nose, and lungs, and the passages between them. Unlike a cold, the flu can make you very ill. And it can lead to pneumonia, a serious lung infection. The flu can have serious complications and even cause death.  Who is at risk for the flu?  Anyone can get the flu. But you are more likely to become infected if you:  · Have a weakened immune system  · Work in a healthcare setting where you may be exposed to flu germs  · Live or work with someone who has the flu  · Havent had an annual flu shot  How does the flu spread?  The flu is caused by a virus. The virus spreads through the air in droplets when someone who has the flu coughs, sneezes, laughs, or talks. You can become infected when you inhale these viruses directly. You can also become infected when you touch a surface on which the droplets have landed and then transfer the germs to your eyes, nose, or mouth. Touching used tissues, or sharing utensils, drinking glasses, or a toothbrush from an infected person can expose you to flu viruses, too.  What are the symptoms of the flu?  Flu symptoms tend to come on quickly and may last a few days to a few weeks. They include:  · Fever usually higher than 100.4°F  (38°C) and chills  · Sore throat and headache  · Dry cough  · Runny nose  · Tiredness and weakness  · Muscle aches  Who is at risk for flu complications?  For some people, the flu can be very serious. The risk for complications is greater for:  · Children younger than age 5  · Adults ages 65 and older  · People with a chronic illness such as diabetes or heart, kidney, or lung disease  · People who live in a nursing home or long-term care facility   How is the flu treated?  The flu usually gets  better after 7 days or so. In some cases, your healthcare provider may prescribe an antiviral medicine. This may help you get well a little sooner. For the medicine to help, you need to take it as soon as possible (ideally within 48 hours) after your symptoms start. If you develop pneumonia or other serious illness, you may need to stay in the hospital.  Easing flu symptoms  · Drink lots of fluids such as water, juice, and warm soup. A good rule is to drink enough so that you urinate your normal amount.  · Get plenty of rest.  · Ask your healthcare provider what to take for fever and pain.  · Call your provider if your fever is 100.4°F (38°C) or higher, or you become dizzy, lightheaded, or short of breath.  Taking steps to protect others  · Wash your hands often, especially after coughing or sneezing. Or clean your hands with an alcohol-based hand  containing at least 60% alcohol.  · Cough or sneeze into a tissue. Then throw the tissue away and wash your hands. If you dont have a tissue, cough and sneeze into your elbow.  · Stay home until at least 24 hours after you no longer have a fever or chills. Be sure the fever isnt being hidden by fever-reducing medicine.  · Dont share food, utensils, drinking glasses, or a toothbrush with others.  · Ask your healthcare provider if others in your household should get antiviral medicine to help them avoid infection.  How can the flu be prevented?  · One of the best ways to avoid the flu is to get a flu vaccine each year. The virus that causes the flu changes from year to year. For that reason, healthcare providers recommend getting the flu vaccine each year, as soon as it's available in your area. The vaccine is given as a shot. Your healthcare provider can tell you which vaccine is right for you. A nasal spray is also available but is not recommended for the 3622-0483 flu season. The CDC says this is because the nasal spray did not seem to protect against the flu  over the last several flu seasons. In the past, it was meant for people ages 2 to 49.  · Wash your hands often. Frequent handwashing is a proven way to help prevent infection.  · Carry an alcohol-based hand gel containing at least 60% alcohol. Use it when you can't use soap and water. Then wash your hands as soon as you can.  · Avoid touching your eyes, nose, and mouth.  · At home and work, clean phones, computer keyboards, and toys often with disinfectant wipes.  · If possible, avoid close contact with others who have the flu or symptoms of the flu.  Handwashing tips  Handwashing is one of the best ways to prevent many common infections. If you are caring for or visiting someone with the flu, wash your hands each time you enter and leave the room. Follow these steps:  · Use warm water and plenty of soap. Rub your hands together well.  · Clean the whole hand, including under your nails, between your fingers, and up the wrists.  · Wash for at least 15 seconds.  · Rinse, letting the water run down your fingers, not up your wrists.  · Dry your hands well. Use a paper towel to turn off the faucet and open the door.  Using alcohol-based hand   Alcohol-based hand  are also a good choice. Use them when you can't use soap and water. Follow these steps:  · Squeeze about a tablespoon of gel into the palm of one hand.  · Rub your hands together briskly, cleaning the backs of your hands, the palms, between your fingers, and up the wrists.  · Rub until the gel is gone and your hands are completely dry.  Preventing the flu in healthcare settings  The flu is a special concern for people in hospitals and long-term care facilities. To help prevent the spread of flu, many hospitals and nursing homes take these steps:  · Healthcare providers wash their hands or use an alcohol-based hand  before and after treating each patient.  · People with the flu have private rooms and bathrooms or share a room with someone  with the same infection.  · People who are at high risk for the flu but don't have it are encouraged to get the flu and pneumonia vaccines.  · All healthcare workers are encouraged or required to get flu shots.   Date Last Reviewed: 12/1/2016  © 2601-3209 [employer]. [address] [place] [address]. All rights reserved. This information is not intended as a substitute for professional medical care. Always follow your healthcare professional's instructions.

## 2020-01-06 NOTE — PROGRESS NOTES
"Subjective:       Patient ID: Magdaleno Cunningham is a 54 y.o. male.    Vitals:  height is 5' 11" (1.803 m) and weight is 120.7 kg (266 lb). His oral temperature is 100 °F (37.8 °C). His blood pressure is 126/87 and his pulse is 125 (abnormal). His respiration is 16 and oxygen saturation is 97%.     Chief Complaint: Cough    Patient complains of fever, body aches, and cough for 2 days. Reports his son was diagnosed with flu a few days ago.     Cough   This is a new problem. Episode onset: 01/04/2020. The problem has been gradually worsening. The cough is productive of sputum. Associated symptoms include chills, a fever, myalgias and nasal congestion. Pertinent negatives include no chest pain, headaches, rash, sore throat, shortness of breath or wheezing. Associated symptoms comments: dizziness. Treatments tried: Tylenol cold and flu. The treatment provided mild relief. His past medical history is significant for COPD.       Constitution: Positive for chills and fever. Negative for fatigue.   HENT: Positive for congestion. Negative for sore throat.    Neck: Negative for painful lymph nodes.   Cardiovascular: Negative for chest pain and leg swelling.   Eyes: Negative for double vision and blurred vision.   Respiratory: Positive for cough. Negative for shortness of breath and wheezing.    Gastrointestinal: Negative for abdominal pain, nausea, vomiting and diarrhea.   Endocrine: negative.   Genitourinary: Negative for dysuria, frequency and urgency.   Musculoskeletal: Positive for muscle ache. Negative for joint pain, joint swelling and muscle cramps.   Skin: Negative for color change, pale and rash.   Allergic/Immunologic: Negative for seasonal allergies.   Neurological: Negative for dizziness, history of vertigo, light-headedness, passing out and headaches.   Hematologic/Lymphatic: Negative for swollen lymph nodes, easy bruising/bleeding and history of blood clots. Does not bruise/bleed easily. "   Psychiatric/Behavioral: Negative for nervous/anxious, sleep disturbance and depression. The patient is not nervous/anxious.        Objective:      Physical Exam   Constitutional: He is oriented to person, place, and time. He appears well-developed and well-nourished. He is cooperative.  Non-toxic appearance. He does not have a sickly appearance. He does not appear ill. No distress.   HENT:   Head: Normocephalic and atraumatic.   Right Ear: Hearing, tympanic membrane, external ear and ear canal normal.   Left Ear: Hearing, tympanic membrane, external ear and ear canal normal.   Nose: Nose normal. No mucosal edema, rhinorrhea or nasal deformity. No epistaxis. Right sinus exhibits no maxillary sinus tenderness and no frontal sinus tenderness. Left sinus exhibits no maxillary sinus tenderness and no frontal sinus tenderness.   Mouth/Throat: Uvula is midline, oropharynx is clear and moist and mucous membranes are normal. No trismus in the jaw. Normal dentition. No uvula swelling. No posterior oropharyngeal erythema.   Eyes: Conjunctivae and lids are normal. Right eye exhibits no discharge. Left eye exhibits no discharge. No scleral icterus.   Neck: Trachea normal, normal range of motion, full passive range of motion without pain and phonation normal. Neck supple.   Cardiovascular: Regular rhythm, normal heart sounds, intact distal pulses and normal pulses. Tachycardia present.   Pulmonary/Chest: Effort normal and breath sounds normal. No respiratory distress.   Abdominal: Soft. Normal appearance and bowel sounds are normal. He exhibits no distension, no pulsatile midline mass and no mass. There is no tenderness.   Musculoskeletal: Normal range of motion. He exhibits no edema or deformity.   Neurological: He is alert and oriented to person, place, and time. He exhibits normal muscle tone. Coordination normal. GCS eye subscore is 4. GCS verbal subscore is 5. GCS motor subscore is 6.   Skin: Skin is warm, dry, intact, not  diaphoretic and not pale.   Psychiatric: He has a normal mood and affect. His speech is normal and behavior is normal. Judgment and thought content normal. Cognition and memory are normal.   Nursing note and vitals reviewed.        Assessment:       1. Fever, unspecified fever cause    2. Exposure to influenza    3. Influenza-like illness    4. Type 2 diabetes mellitus without complication, unspecified whether long term insulin use        Plan:       CBG = 259, patient reports he has not taken his medication in 2 days.    Will treat as influenza based on history, physical exam, exposure to and prevalence of influenza within the community. Advised to increase fluids, rest and take tylenol or motrin for fever. Follow up with PCP.      Fever, unspecified fever cause  -     POCT Influenza A/B    Exposure to influenza    Influenza-like illness    Type 2 diabetes mellitus without complication, unspecified whether long term insulin use  -     POCT Glucose, Hand-Held Device    Other orders  -     acetaminophen tablet 1,000 mg  -     oseltamivir (TAMIFLU) 75 MG capsule; Take 1 capsule (75 mg total) by mouth 2 (two) times daily. for 5 days  Dispense: 10 capsule; Refill: 0  -     dexchlorphen-phenylephrine-DM (POLYTUSSIN DM) 1-5-10 mg/5 mL Syrp; Take 5 mLs by mouth every 4 (four) hours as needed.  Dispense: 120 mL; Refill: 0

## 2020-03-12 ENCOUNTER — HOSPITAL ENCOUNTER (OUTPATIENT)
Dept: PREADMISSION TESTING | Facility: HOSPITAL | Age: 55
Discharge: HOME | End: 2020-03-12
Attending: INTERNAL MEDICINE
Payer: MEDICAID

## 2020-03-12 ENCOUNTER — HOSPITAL ENCOUNTER (OUTPATIENT)
Dept: RADIOLOGY | Facility: HOSPITAL | Age: 55
Discharge: HOME | End: 2020-03-12
Attending: INTERNAL MEDICINE
Payer: MEDICAID

## 2020-03-12 VITALS — BODY MASS INDEX: 38.49 KG/M2 | HEIGHT: 71 IN | WEIGHT: 274.94 LBS

## 2020-03-12 DIAGNOSIS — Z01.818 PRE-OP TESTING: Primary | ICD-10-CM

## 2020-03-12 DIAGNOSIS — I25.10 CORONARY ATHEROSCLEROSIS OF NATIVE CORONARY ARTERY: ICD-10-CM

## 2020-03-12 LAB
ALBUMIN SERPL BCP-MCNC: 3.7 G/DL (ref 3.5–5.2)
ALP SERPL-CCNC: 55 U/L (ref 55–135)
ALT SERPL W/O P-5'-P-CCNC: 31 U/L (ref 10–44)
ANION GAP SERPL CALC-SCNC: 10 MMOL/L (ref 8–16)
APTT PPP: 29.7 SEC (ref 23.6–33.3)
AST SERPL-CCNC: 28 U/L (ref 10–40)
BASOPHILS # BLD AUTO: 0.1 K/UL (ref 0–0.2)
BASOPHILS NFR BLD: 0.9 % (ref 0–1.9)
BILIRUB SERPL-MCNC: 0.7 MG/DL (ref 0.1–1)
BUN SERPL-MCNC: 16 MG/DL (ref 6–20)
CALCIUM SERPL-MCNC: 9.4 MG/DL (ref 8.7–10.5)
CHLORIDE SERPL-SCNC: 97 MMOL/L (ref 95–110)
CO2 SERPL-SCNC: 24 MMOL/L (ref 23–29)
CREAT SERPL-MCNC: 1.1 MG/DL (ref 0.5–1.4)
DIFFERENTIAL METHOD: NORMAL
EOSINOPHIL # BLD AUTO: 0.2 K/UL (ref 0–0.5)
EOSINOPHIL NFR BLD: 2.2 % (ref 0–8)
ERYTHROCYTE [DISTWIDTH] IN BLOOD BY AUTOMATED COUNT: 12.7 % (ref 11.5–14.5)
EST. GFR  (AFRICAN AMERICAN): >60 ML/MIN/1.73 M^2
EST. GFR  (NON AFRICAN AMERICAN): >60 ML/MIN/1.73 M^2
GLUCOSE SERPL-MCNC: 424 MG/DL (ref 70–110)
HCT VFR BLD AUTO: 46.8 % (ref 40–54)
HGB BLD-MCNC: 16 G/DL (ref 14–18)
IMM GRANULOCYTES # BLD AUTO: 0.03 K/UL (ref 0–0.04)
IMM GRANULOCYTES NFR BLD AUTO: 0.3 % (ref 0–0.5)
INR PPP: 1.1
LYMPHOCYTES # BLD AUTO: 3.8 K/UL (ref 1–4.8)
LYMPHOCYTES NFR BLD: 34.4 % (ref 18–48)
MAGNESIUM SERPL-MCNC: 1.6 MG/DL (ref 1.6–2.6)
MCH RBC QN AUTO: 30.9 PG (ref 27–31)
MCHC RBC AUTO-ENTMCNC: 34.2 G/DL (ref 32–36)
MCV RBC AUTO: 91 FL (ref 82–98)
MONOCYTES # BLD AUTO: 0.7 K/UL (ref 0.3–1)
MONOCYTES NFR BLD: 6 % (ref 4–15)
NEUTROPHILS # BLD AUTO: 6.2 K/UL (ref 1.8–7.7)
NEUTROPHILS NFR BLD: 56.2 % (ref 38–73)
NRBC BLD-RTO: 0 /100 WBC
PLATELET # BLD AUTO: 252 K/UL (ref 150–350)
PMV BLD AUTO: 10 FL (ref 9.2–12.9)
POTASSIUM SERPL-SCNC: 4.1 MMOL/L (ref 3.5–5.1)
PROT SERPL-MCNC: 7 G/DL (ref 6–8.4)
PROTHROMBIN TIME: 13.2 SEC (ref 10.6–14.8)
RBC # BLD AUTO: 5.17 M/UL (ref 4.6–6.2)
SODIUM SERPL-SCNC: 131 MMOL/L (ref 136–145)
WBC # BLD AUTO: 11.06 K/UL (ref 3.9–12.7)

## 2020-03-12 PROCEDURE — 83735 ASSAY OF MAGNESIUM: CPT

## 2020-03-12 PROCEDURE — 36415 COLL VENOUS BLD VENIPUNCTURE: CPT

## 2020-03-12 PROCEDURE — 85025 COMPLETE CBC W/AUTO DIFF WBC: CPT

## 2020-03-12 PROCEDURE — 71046 X-RAY EXAM CHEST 2 VIEWS: CPT | Mod: TC

## 2020-03-12 PROCEDURE — 80053 COMPREHEN METABOLIC PANEL: CPT

## 2020-03-12 PROCEDURE — 85730 THROMBOPLASTIN TIME PARTIAL: CPT

## 2020-03-12 PROCEDURE — 85610 PROTHROMBIN TIME: CPT

## 2020-03-12 RX ORDER — NITROGLYCERIN 0.4 MG/1
0.4 TABLET SUBLINGUAL EVERY 5 MIN PRN
COMMUNITY

## 2020-03-12 RX ORDER — HYDROCHLOROTHIAZIDE 12.5 MG/1
12.5 TABLET ORAL DAILY
Status: ON HOLD | COMMUNITY
End: 2022-08-31

## 2020-03-12 RX ORDER — TAMSULOSIN HYDROCHLORIDE 0.4 MG/1
0.4 CAPSULE ORAL NIGHTLY
COMMUNITY
End: 2020-04-22

## 2020-03-12 RX ORDER — LISINOPRIL 40 MG/1
40 TABLET ORAL DAILY
COMMUNITY
End: 2020-04-14

## 2020-03-12 RX ORDER — TEMAZEPAM 15 MG/1
15 CAPSULE ORAL NIGHTLY
COMMUNITY
End: 2020-03-17 | Stop reason: CLARIF

## 2020-03-12 RX ORDER — SOTALOL HYDROCHLORIDE 80 MG/1
40 TABLET ORAL 2 TIMES DAILY
COMMUNITY
End: 2020-04-14

## 2020-03-12 RX ORDER — RANOLAZINE 500 MG/1
500 TABLET, EXTENDED RELEASE ORAL 2 TIMES DAILY
Status: ON HOLD | COMMUNITY
End: 2020-03-18 | Stop reason: SDUPTHER

## 2020-03-12 NOTE — DISCHARGE INSTRUCTIONS
 Nothing to eat or drink after midnight the night before your procedure.   Do not take any medications the morning of your procedure.   Bring all your medications with you in the original pill bottles from pharmacy.   If you take blood thinners, ask your doctor if you should stop taking them.   Do not take metformin 24 hours prior to your procedure.   Do your Hibiclens wash the night before and morning of your procedure.   If you use a CPAP or BiPAP at home, please bring it with you the day of your procedure.   Make arrangements for someone you know to drive you home after your procedure. Taxi and Uber are not acceptable.     Tuesday 03/17/2020 06:30am

## 2020-03-17 ENCOUNTER — TELEPHONE (OUTPATIENT)
Dept: VASCULAR SURGERY | Facility: CLINIC | Age: 55
End: 2020-03-17

## 2020-03-17 ENCOUNTER — HOSPITAL ENCOUNTER (INPATIENT)
Facility: HOSPITAL | Age: 55
LOS: 1 days | Discharge: HOME | DRG: 303 | End: 2020-03-18
Attending: EMERGENCY MEDICINE | Admitting: INTERNAL MEDICINE
Payer: MEDICAID

## 2020-03-17 DIAGNOSIS — I20.0 UNSTABLE ANGINA PECTORIS: Primary | ICD-10-CM

## 2020-03-17 DIAGNOSIS — E11.9 TYPE 2 DIABETES MELLITUS WITHOUT COMPLICATION, WITHOUT LONG-TERM CURRENT USE OF INSULIN: ICD-10-CM

## 2020-03-17 DIAGNOSIS — R07.9 CHEST PAIN: ICD-10-CM

## 2020-03-17 PROBLEM — I25.10 CORONARY ARTERY DISEASE INVOLVING NATIVE CORONARY ARTERY OF NATIVE HEART WITHOUT ANGINA PECTORIS: Status: ACTIVE | Noted: 2020-03-17

## 2020-03-17 LAB
ALBUMIN SERPL BCP-MCNC: 3.6 G/DL (ref 3.5–5.2)
ALP SERPL-CCNC: 49 U/L (ref 55–135)
ALT SERPL W/O P-5'-P-CCNC: 32 U/L (ref 10–44)
ANION GAP SERPL CALC-SCNC: 8 MMOL/L (ref 8–16)
AST SERPL-CCNC: 23 U/L (ref 10–40)
BASOPHILS # BLD AUTO: 0.08 K/UL (ref 0–0.2)
BASOPHILS NFR BLD: 0.9 % (ref 0–1.9)
BILIRUB SERPL-MCNC: 0.6 MG/DL (ref 0.1–1)
BNP SERPL-MCNC: 27 PG/ML (ref 0–99)
BNP SERPL-MCNC: 27 PG/ML (ref 0–99)
BUN SERPL-MCNC: 12 MG/DL (ref 6–20)
CALCIUM SERPL-MCNC: 8.6 MG/DL (ref 8.7–10.5)
CHLORIDE SERPL-SCNC: 100 MMOL/L (ref 95–110)
CO2 SERPL-SCNC: 23 MMOL/L (ref 23–29)
CREAT SERPL-MCNC: 1 MG/DL (ref 0.5–1.4)
DIFFERENTIAL METHOD: NORMAL
EOSINOPHIL # BLD AUTO: 0.3 K/UL (ref 0–0.5)
EOSINOPHIL NFR BLD: 3 % (ref 0–8)
ERYTHROCYTE [DISTWIDTH] IN BLOOD BY AUTOMATED COUNT: 12.4 % (ref 11.5–14.5)
EST. GFR  (AFRICAN AMERICAN): >60 ML/MIN/1.73 M^2
EST. GFR  (NON AFRICAN AMERICAN): >60 ML/MIN/1.73 M^2
GLUCOSE SERPL-MCNC: 426 MG/DL (ref 70–110)
HCT VFR BLD AUTO: 45.9 % (ref 40–54)
HGB BLD-MCNC: 15.5 G/DL (ref 14–18)
IMM GRANULOCYTES # BLD AUTO: 0.04 K/UL (ref 0–0.04)
IMM GRANULOCYTES NFR BLD AUTO: 0.5 % (ref 0–0.5)
INR PPP: 1.1
LYMPHOCYTES # BLD AUTO: 3.8 K/UL (ref 1–4.8)
LYMPHOCYTES NFR BLD: 44.8 % (ref 18–48)
MAGNESIUM SERPL-MCNC: 1.8 MG/DL (ref 1.6–2.6)
MCH RBC QN AUTO: 30.8 PG (ref 27–31)
MCHC RBC AUTO-ENTMCNC: 33.8 G/DL (ref 32–36)
MCV RBC AUTO: 91 FL (ref 82–98)
MONOCYTES # BLD AUTO: 0.6 K/UL (ref 0.3–1)
MONOCYTES NFR BLD: 7 % (ref 4–15)
NEUTROPHILS # BLD AUTO: 3.8 K/UL (ref 1.8–7.7)
NEUTROPHILS NFR BLD: 43.8 % (ref 38–73)
NRBC BLD-RTO: 0 /100 WBC
PLATELET # BLD AUTO: 233 K/UL (ref 150–350)
PMV BLD AUTO: 10.1 FL (ref 9.2–12.9)
POTASSIUM SERPL-SCNC: 4.1 MMOL/L (ref 3.5–5.1)
PROT SERPL-MCNC: 6.8 G/DL (ref 6–8.4)
PROTHROMBIN TIME: 13.7 SEC (ref 10.6–14.8)
RBC # BLD AUTO: 5.03 M/UL (ref 4.6–6.2)
SODIUM SERPL-SCNC: 131 MMOL/L (ref 136–145)
TROPONIN I SERPL DL<=0.01 NG/ML-MCNC: <0.03 NG/ML
WBC # BLD AUTO: 8.58 K/UL (ref 3.9–12.7)

## 2020-03-17 PROCEDURE — 83735 ASSAY OF MAGNESIUM: CPT

## 2020-03-17 PROCEDURE — 83880 ASSAY OF NATRIURETIC PEPTIDE: CPT

## 2020-03-17 PROCEDURE — 85610 PROTHROMBIN TIME: CPT

## 2020-03-17 PROCEDURE — 80053 COMPREHEN METABOLIC PANEL: CPT

## 2020-03-17 PROCEDURE — 84484 ASSAY OF TROPONIN QUANT: CPT

## 2020-03-17 PROCEDURE — 99285 EMERGENCY DEPT VISIT HI MDM: CPT | Mod: 25

## 2020-03-17 PROCEDURE — 25000003 PHARM REV CODE 250: Performed by: EMERGENCY MEDICINE

## 2020-03-17 PROCEDURE — 21400001 HC TELEMETRY ROOM

## 2020-03-17 PROCEDURE — 93005 ELECTROCARDIOGRAM TRACING: CPT | Performed by: INTERNAL MEDICINE

## 2020-03-17 PROCEDURE — 85025 COMPLETE CBC W/AUTO DIFF WBC: CPT

## 2020-03-17 RX ORDER — ASPIRIN 325 MG
325 TABLET ORAL
Status: COMPLETED | OUTPATIENT
Start: 2020-03-17 | End: 2020-03-17

## 2020-03-17 RX ORDER — POTASSIUM CHLORIDE 7.45 MG/ML
20 INJECTION INTRAVENOUS
Status: DISCONTINUED | OUTPATIENT
Start: 2020-03-18 | End: 2020-03-18 | Stop reason: HOSPADM

## 2020-03-17 RX ORDER — POTASSIUM CHLORIDE 20 MEQ/1
20 TABLET, EXTENDED RELEASE ORAL
Status: DISCONTINUED | OUTPATIENT
Start: 2020-03-18 | End: 2020-03-18 | Stop reason: HOSPADM

## 2020-03-17 RX ORDER — LISINOPRIL 10 MG/1
10 TABLET ORAL DAILY
COMMUNITY
End: 2020-04-14

## 2020-03-17 RX ORDER — GLIMEPIRIDE 4 MG/1
4 TABLET ORAL 2 TIMES DAILY
Status: ON HOLD | COMMUNITY
End: 2022-08-31

## 2020-03-17 RX ORDER — PHENTERMINE HYDROCHLORIDE 37.5 MG/1
37.5 TABLET ORAL
COMMUNITY
End: 2020-03-17

## 2020-03-17 RX ORDER — MAGNESIUM SULFATE HEPTAHYDRATE 40 MG/ML
4 INJECTION, SOLUTION INTRAVENOUS
Status: DISCONTINUED | OUTPATIENT
Start: 2020-03-18 | End: 2020-03-18 | Stop reason: HOSPADM

## 2020-03-17 RX ORDER — POTASSIUM CHLORIDE 7.45 MG/ML
40 INJECTION INTRAVENOUS
Status: DISCONTINUED | OUTPATIENT
Start: 2020-03-18 | End: 2020-03-18 | Stop reason: HOSPADM

## 2020-03-17 RX ORDER — MAGNESIUM SULFATE HEPTAHYDRATE 40 MG/ML
2 INJECTION, SOLUTION INTRAVENOUS
Status: DISCONTINUED | OUTPATIENT
Start: 2020-03-18 | End: 2020-03-18 | Stop reason: HOSPADM

## 2020-03-17 RX ORDER — MAGNESIUM SULFATE 1 G/100ML
1 INJECTION INTRAVENOUS
Status: DISCONTINUED | OUTPATIENT
Start: 2020-03-18 | End: 2020-03-18 | Stop reason: HOSPADM

## 2020-03-17 RX ORDER — POTASSIUM CHLORIDE 20 MEQ/1
40 TABLET, EXTENDED RELEASE ORAL
Status: DISCONTINUED | OUTPATIENT
Start: 2020-03-18 | End: 2020-03-18 | Stop reason: HOSPADM

## 2020-03-17 RX ORDER — METOPROLOL SUCCINATE 100 MG/1
100 TABLET, EXTENDED RELEASE ORAL DAILY
Status: ON HOLD | COMMUNITY
End: 2020-03-18

## 2020-03-17 RX ORDER — LANOLIN ALCOHOL/MO/W.PET/CERES
800 CREAM (GRAM) TOPICAL
Status: DISCONTINUED | OUTPATIENT
Start: 2020-03-18 | End: 2020-03-18 | Stop reason: HOSPADM

## 2020-03-17 RX ADMIN — ASPIRIN 325 MG ORAL TABLET 325 MG: 325 PILL ORAL at 10:03

## 2020-03-17 RX ADMIN — NITROGLYCERIN 1 INCH: 20 OINTMENT TOPICAL at 10:03

## 2020-03-17 NOTE — TELEPHONE ENCOUNTER
----- Message from Wu Garber sent at 3/17/2020  1:14 PM CDT -----  Contact: pt  Type: Needs Medical Advice    Who Called: pt   Symptoms (please be specific):    How long has patient had these symptoms:    Pharmacy name and phone #:    Best Call Back Number: [phone]   Additional Information: pt say Dr. Walters and they did an angiagram  but they released him too earlier so he didn't see Dr Elmore at all.

## 2020-03-18 VITALS
OXYGEN SATURATION: 98 % | SYSTOLIC BLOOD PRESSURE: 154 MMHG | BODY MASS INDEX: 38.8 KG/M2 | RESPIRATION RATE: 22 BRPM | HEART RATE: 70 BPM | WEIGHT: 277.13 LBS | TEMPERATURE: 98 F | DIASTOLIC BLOOD PRESSURE: 86 MMHG | HEIGHT: 71 IN

## 2020-03-18 PROBLEM — I20.0 UNSTABLE ANGINA PECTORIS: Status: RESOLVED | Noted: 2020-03-17 | Resolved: 2020-03-18

## 2020-03-18 LAB
ANION GAP SERPL CALC-SCNC: 8 MMOL/L (ref 8–16)
BASOPHILS # BLD AUTO: 0.1 K/UL (ref 0–0.2)
BASOPHILS NFR BLD: 1 % (ref 0–1.9)
BUN SERPL-MCNC: 12 MG/DL (ref 6–20)
CALCIUM SERPL-MCNC: 8.5 MG/DL (ref 8.7–10.5)
CHLORIDE SERPL-SCNC: 104 MMOL/L (ref 95–110)
CO2 SERPL-SCNC: 24 MMOL/L (ref 23–29)
CREAT SERPL-MCNC: 0.9 MG/DL (ref 0.5–1.4)
DIFFERENTIAL METHOD: ABNORMAL
EOSINOPHIL # BLD AUTO: 0.3 K/UL (ref 0–0.5)
EOSINOPHIL NFR BLD: 3.3 % (ref 0–8)
ERYTHROCYTE [DISTWIDTH] IN BLOOD BY AUTOMATED COUNT: 12.5 % (ref 11.5–14.5)
EST. GFR  (AFRICAN AMERICAN): >60 ML/MIN/1.73 M^2
EST. GFR  (NON AFRICAN AMERICAN): >60 ML/MIN/1.73 M^2
ESTIMATED AVG GLUCOSE: 252 MG/DL (ref 68–131)
GLUCOSE SERPL-MCNC: 179 MG/DL (ref 70–110)
GLUCOSE SERPL-MCNC: 187 MG/DL (ref 70–110)
GLUCOSE SERPL-MCNC: 188 MG/DL (ref 70–110)
GLUCOSE SERPL-MCNC: 220 MG/DL (ref 70–110)
HBA1C MFR BLD HPLC: 10.4 % (ref 4.5–6.2)
HCT VFR BLD AUTO: 45.3 % (ref 40–54)
HGB BLD-MCNC: 15.2 G/DL (ref 14–18)
IMM GRANULOCYTES # BLD AUTO: 0.05 K/UL (ref 0–0.04)
IMM GRANULOCYTES NFR BLD AUTO: 0.5 % (ref 0–0.5)
LYMPHOCYTES # BLD AUTO: 4.5 K/UL (ref 1–4.8)
LYMPHOCYTES NFR BLD: 46 % (ref 18–48)
MCH RBC QN AUTO: 30.8 PG (ref 27–31)
MCHC RBC AUTO-ENTMCNC: 33.6 G/DL (ref 32–36)
MCV RBC AUTO: 92 FL (ref 82–98)
MONOCYTES # BLD AUTO: 0.6 K/UL (ref 0.3–1)
MONOCYTES NFR BLD: 6.6 % (ref 4–15)
NEUTROPHILS # BLD AUTO: 4.1 K/UL (ref 1.8–7.7)
NEUTROPHILS NFR BLD: 42.6 % (ref 38–73)
NRBC BLD-RTO: 0 /100 WBC
PLATELET # BLD AUTO: 233 K/UL (ref 150–350)
PMV BLD AUTO: 10.2 FL (ref 9.2–12.9)
POTASSIUM SERPL-SCNC: 3.8 MMOL/L (ref 3.5–5.1)
RBC # BLD AUTO: 4.94 M/UL (ref 4.6–6.2)
SODIUM SERPL-SCNC: 136 MMOL/L (ref 136–145)
TROPONIN I SERPL DL<=0.01 NG/ML-MCNC: <0.03 NG/ML
TROPONIN I SERPL DL<=0.01 NG/ML-MCNC: <0.03 NG/ML
WBC # BLD AUTO: 9.73 K/UL (ref 3.9–12.7)

## 2020-03-18 PROCEDURE — 84484 ASSAY OF TROPONIN QUANT: CPT

## 2020-03-18 PROCEDURE — 25000003 PHARM REV CODE 250: Performed by: INTERNAL MEDICINE

## 2020-03-18 PROCEDURE — 85025 COMPLETE CBC W/AUTO DIFF WBC: CPT

## 2020-03-18 PROCEDURE — C9113 INJ PANTOPRAZOLE SODIUM, VIA: HCPCS | Performed by: INTERNAL MEDICINE

## 2020-03-18 PROCEDURE — 83036 HEMOGLOBIN GLYCOSYLATED A1C: CPT

## 2020-03-18 PROCEDURE — 36415 COLL VENOUS BLD VENIPUNCTURE: CPT

## 2020-03-18 PROCEDURE — 80048 BASIC METABOLIC PNL TOTAL CA: CPT

## 2020-03-18 PROCEDURE — 63600175 PHARM REV CODE 636 W HCPCS: Performed by: EMERGENCY MEDICINE

## 2020-03-18 PROCEDURE — 63600175 PHARM REV CODE 636 W HCPCS: Performed by: INTERNAL MEDICINE

## 2020-03-18 RX ORDER — SODIUM CHLORIDE, SODIUM LACTATE, POTASSIUM CHLORIDE, CALCIUM CHLORIDE 600; 310; 30; 20 MG/100ML; MG/100ML; MG/100ML; MG/100ML
INJECTION, SOLUTION INTRAVENOUS CONTINUOUS
Status: DISCONTINUED | OUTPATIENT
Start: 2020-03-18 | End: 2020-03-18 | Stop reason: HOSPADM

## 2020-03-18 RX ORDER — RANOLAZINE 500 MG/1
1000 TABLET, EXTENDED RELEASE ORAL 2 TIMES DAILY
Status: DISCONTINUED | OUTPATIENT
Start: 2020-03-18 | End: 2020-03-18 | Stop reason: HOSPADM

## 2020-03-18 RX ORDER — TAMSULOSIN HYDROCHLORIDE 0.4 MG/1
0.4 CAPSULE ORAL DAILY
Status: DISCONTINUED | OUTPATIENT
Start: 2020-03-18 | End: 2020-03-18 | Stop reason: HOSPADM

## 2020-03-18 RX ORDER — SOTALOL HYDROCHLORIDE 80 MG/1
40 TABLET ORAL 2 TIMES DAILY
Status: DISCONTINUED | OUTPATIENT
Start: 2020-03-18 | End: 2020-03-18 | Stop reason: HOSPADM

## 2020-03-18 RX ORDER — MORPHINE SULFATE 4 MG/ML
4 INJECTION, SOLUTION INTRAMUSCULAR; INTRAVENOUS EVERY 4 HOURS PRN
Status: DISCONTINUED | OUTPATIENT
Start: 2020-03-18 | End: 2020-03-18 | Stop reason: HOSPADM

## 2020-03-18 RX ORDER — ROSUVASTATIN CALCIUM 20 MG/1
20 TABLET, COATED ORAL DAILY
Status: DISCONTINUED | OUTPATIENT
Start: 2020-03-18 | End: 2020-03-18 | Stop reason: HOSPADM

## 2020-03-18 RX ORDER — GABAPENTIN 300 MG/1
600 CAPSULE ORAL 3 TIMES DAILY
Status: DISCONTINUED | OUTPATIENT
Start: 2020-03-18 | End: 2020-03-18 | Stop reason: HOSPADM

## 2020-03-18 RX ORDER — ONDANSETRON 4 MG/1
8 TABLET, ORALLY DISINTEGRATING ORAL EVERY 8 HOURS PRN
Status: DISCONTINUED | OUTPATIENT
Start: 2020-03-18 | End: 2020-03-18 | Stop reason: HOSPADM

## 2020-03-18 RX ORDER — ISOSORBIDE MONONITRATE 30 MG/1
30 TABLET, EXTENDED RELEASE ORAL DAILY
Qty: 30 TABLET | Refills: 11 | Status: ON HOLD | OUTPATIENT
Start: 2020-03-19 | End: 2023-06-25 | Stop reason: HOSPADM

## 2020-03-18 RX ORDER — ACETAMINOPHEN 325 MG/1
650 TABLET ORAL EVERY 8 HOURS PRN
Status: DISCONTINUED | OUTPATIENT
Start: 2020-03-18 | End: 2020-03-18 | Stop reason: HOSPADM

## 2020-03-18 RX ORDER — ASPIRIN 81 MG/1
81 TABLET ORAL DAILY
Status: DISCONTINUED | OUTPATIENT
Start: 2020-03-18 | End: 2020-03-18 | Stop reason: HOSPADM

## 2020-03-18 RX ORDER — SODIUM CHLORIDE 0.9 % (FLUSH) 0.9 %
10 SYRINGE (ML) INJECTION
Status: DISCONTINUED | OUTPATIENT
Start: 2020-03-18 | End: 2020-03-18 | Stop reason: HOSPADM

## 2020-03-18 RX ORDER — LISINOPRIL 10 MG/1
10 TABLET ORAL DAILY
Status: DISCONTINUED | OUTPATIENT
Start: 2020-03-18 | End: 2020-03-18 | Stop reason: HOSPADM

## 2020-03-18 RX ORDER — ISOSORBIDE MONONITRATE 30 MG/1
30 TABLET, EXTENDED RELEASE ORAL DAILY
Status: DISCONTINUED | OUTPATIENT
Start: 2020-03-18 | End: 2020-03-18 | Stop reason: HOSPADM

## 2020-03-18 RX ORDER — RANOLAZINE 500 MG/1
500 TABLET, EXTENDED RELEASE ORAL 2 TIMES DAILY
Status: DISCONTINUED | OUTPATIENT
Start: 2020-03-18 | End: 2020-03-18

## 2020-03-18 RX ORDER — CLONAZEPAM 0.5 MG/1
0.5 TABLET ORAL 2 TIMES DAILY
Status: DISCONTINUED | OUTPATIENT
Start: 2020-03-18 | End: 2020-03-18 | Stop reason: HOSPADM

## 2020-03-18 RX ORDER — PANTOPRAZOLE SODIUM 40 MG/10ML
40 INJECTION, POWDER, LYOPHILIZED, FOR SOLUTION INTRAVENOUS DAILY
Status: DISCONTINUED | OUTPATIENT
Start: 2020-03-18 | End: 2020-03-18 | Stop reason: HOSPADM

## 2020-03-18 RX ORDER — METOPROLOL SUCCINATE 50 MG/1
100 TABLET, EXTENDED RELEASE ORAL DAILY
Status: DISCONTINUED | OUTPATIENT
Start: 2020-03-18 | End: 2020-03-18 | Stop reason: HOSPADM

## 2020-03-18 RX ORDER — RANOLAZINE 500 MG/1
1000 TABLET, EXTENDED RELEASE ORAL 2 TIMES DAILY
Qty: 60 TABLET | Refills: 5 | Status: SHIPPED | OUTPATIENT
Start: 2020-03-18 | End: 2020-04-22

## 2020-03-18 RX ADMIN — NITROGLYCERIN 0.5 INCH: 20 OINTMENT TOPICAL at 02:03

## 2020-03-18 RX ADMIN — POTASSIUM CHLORIDE 20 MEQ: 20 TABLET, EXTENDED RELEASE ORAL at 06:03

## 2020-03-18 RX ADMIN — PANTOPRAZOLE SODIUM 40 MG: 40 INJECTION, POWDER, FOR SOLUTION INTRAVENOUS at 09:03

## 2020-03-18 RX ADMIN — HUMAN INSULIN 8 UNITS: 100 INJECTION, SOLUTION SUBCUTANEOUS at 12:03

## 2020-03-18 RX ADMIN — SODIUM CHLORIDE, SODIUM LACTATE, POTASSIUM CHLORIDE, AND CALCIUM CHLORIDE: .6; .31; .03; .02 INJECTION, SOLUTION INTRAVENOUS at 02:03

## 2020-03-18 RX ADMIN — HUMAN INSULIN 2 UNITS: 100 INJECTION, SOLUTION SUBCUTANEOUS at 12:03

## 2020-03-18 RX ADMIN — NITROGLYCERIN 0.5 INCH: 20 OINTMENT TOPICAL at 06:03

## 2020-03-18 NOTE — SUBJECTIVE & OBJECTIVE
Past Medical History:   Diagnosis Date    Anticoagulant long-term use     Atrial fibrillation     BPH (benign prostatic hyperplasia)     Cataract     COPD (chronic obstructive pulmonary disease)     Coronary artery disease     CVD (cardiovascular disease)     Diabetes mellitus     Erythema multiforme     AKA Denton Edmondson Syndrome    Hypertension     MI (myocardial infarction)     per pt he has had 4     ROSAMARIA on CPAP     RLS (restless legs syndrome)     Sleep apnea        Past Surgical History:   Procedure Laterality Date    CORONARY STENT PLACEMENT      CYSTOSCOPY N/A 12/10/2018    Procedure: CYSTOSCOPY;  Surgeon: Khushboo Abreu MD;  Location: [facility];  Service: Urology;  Laterality: N/A;    ULTRASOUND OF PROSTATE FOR VOLUME DETERMINATION  12/10/2018    Procedure: ULTRASOUND, PROSTATE, FOR VOLUME DETERMINATION;  Surgeon: Khushboo Abreu MD;  Location: [facility] OR;  Service: Urology;;       Review of patient's allergies indicates:   Allergen Reactions    Pesticide Dermatitis and Rash       Current Facility-Administered Medications on File Prior to Encounter   Medication    [] 0.9%  NaCl infusion    [DISCONTINUED] 0.9%  NaCl infusion    [DISCONTINUED] diphenhydrAMINE injection    [DISCONTINUED] fentaNYL injection    [DISCONTINUED] heparin (porcine) injection    [DISCONTINUED] iohexoL (OMNIPAQUE 350) injection    [DISCONTINUED] lidocaine (PF) 10 mg/ml (1%) injection    [DISCONTINUED] magnesium oxide tablet 800 mg    [DISCONTINUED] midazolam (VERSED) 1 mg/mL injection    [DISCONTINUED] nitroGLYCERIN injection    [DISCONTINUED] verapamiL injection     Current Outpatient Medications on File Prior to Encounter   Medication Sig    apixaban (ELIQUIS) 5 mg Tab Take 5 mg by mouth 2 (two) times daily.    aspirin (ECOTRIN) 81 MG EC tablet Take 81 mg by mouth once daily.    clonazePAM (KLONOPIN) 1 MG tablet Take 1 mg by mouth 3 (three) times daily as needed for Anxiety.      empagliflozin (JARDIANCE) 10 mg tablet Take 10 mg by mouth once daily.    fenofibrate (TRICOR) 145 MG tablet Take 145 mg by mouth once daily.    gabapentin (NEURONTIN) 600 MG tablet Take 600 mg by mouth 3 (three) times daily.     glimepiride (AMARYL) 4 MG tablet Take 4 mg by mouth 2 (two) times daily.    hydroCHLOROthiazide (HYDRODIURIL) 12.5 MG Tab Take 12.5 mg by mouth once daily.    insulin glargine,hum.rec.anlog (LANTUS SUBQ) Inject 40 Units into the skin 2 (two) times daily.     lisinopriL 10 MG tablet Take 10 mg by mouth once daily.    metFORMIN (GLUCOPHAGE) 1000 MG tablet Take 1,000 mg by mouth 2 (two) times daily with meals.     metoprolol succinate (TOPROL-XL) 100 MG 24 hr tablet Take 100 mg by mouth once daily.    nitroGLYCERIN (NITROSTAT) 0.4 MG SL tablet Place 0.4 mg under the tongue every 5 (five) minutes as needed for Chest pain.    ranolazine (RANEXA) 500 MG Tb12 Take 500 mg by mouth 2 (two) times daily.    rosuvastatin (CRESTOR) 20 MG tablet Take 20 mg by mouth once daily.    sotaloL (SOTALOL AF) 80 MG tablet Take 40 mg by mouth 2 (two) times daily.    tamsulosin (FLOMAX) 0.4 mg Cap Take 0.4 mg by mouth once daily.    lisinopriL (PRINIVIL,ZESTRIL) 40 MG tablet Take 40 mg by mouth once daily.    metoprolol succinate (TOPROL-XL) 50 MG 24 hr tablet Take 50 mg by mouth once daily.    [DISCONTINUED] phentermine (ADIPEX-P) 37.5 mg tablet Take 37.5 mg by mouth before breakfast.    [DISCONTINUED] temazepam (RESTORIL) 15 mg Cap Take 15 mg by mouth every evening.    [DISCONTINUED] ticagrelor (BRILINTA) 90 mg tablet Take 90 mg by mouth 2 (two) times daily.     Family History     Problem Relation (Age of Onset)    Diabetes Mother    Heart disease Mother    Hyperlipidemia Father        Tobacco Use    Smoking status: Current Every Day Smoker     Packs/day: 1.00     Years: 30.00     Pack years: 30.00     Types: Cigarettes    Smokeless tobacco: Former User   Substance and Sexual Activity     Alcohol use: Not Currently    Drug use: Not Currently    Sexual activity: Not on file     Review of Systems   Constitutional: Positive for fatigue.   HENT: Negative.    Respiratory: Positive for shortness of breath. Negative for wheezing.    Cardiovascular: Positive for chest pain.   Gastrointestinal: Negative.    Genitourinary: Negative.    Musculoskeletal: Negative.    Neurological: Negative.    Psychiatric/Behavioral: Negative.      Objective:     Vital Signs (Most Recent):  Temp: 98 °F (36.7 °C) (03/17/20 2118)  Pulse: 78 (03/17/20 2118)  Resp: 16 (03/17/20 2118)  BP: (!) 165/87 (03/17/20 2120)  SpO2: 98 % (03/17/20 2118) Vital Signs (24h Range):  Temp:  [98 °F (36.7 °C)-98.2 °F (36.8 °C)] 98 °F (36.7 °C)  Pulse:  [64-79] 78  Resp:  [16-26] 16  SpO2:  [95 %-100 %] 98 %  BP: (112-165)/(65-87) 165/87     Weight: 125.2 kg (276 lb)  Body mass index is 38.49 kg/m².    Physical Exam   Constitutional: He is oriented to person, place, and time. No distress.   HENT:   Head: Normocephalic and atraumatic.   Eyes: Pupils are equal, round, and reactive to light. Conjunctivae and EOM are normal.   Neck: Normal range of motion. Neck supple.   Cardiovascular: Normal rate. Exam reveals gallop.   S 4   Pulmonary/Chest: Effort normal and breath sounds normal.   Abdominal: Soft. Bowel sounds are normal.   Musculoskeletal: Normal range of motion.   Neurological: He is alert and oriented to person, place, and time.   Skin: Skin is warm. He is diaphoretic.   Psychiatric:   Anxious         CRANIAL NERVES     CN III, IV, VI   Pupils are equal, round, and reactive to light.  Extraocular motions are normal.        Significant Labs:   BMP:   Recent Labs   Lab 03/17/20 2202   *   *   K 4.1      CO2 23   BUN 12   CREATININE 1.0   CALCIUM 8.6*   MG 1.8     CBC:   Recent Labs   Lab 03/17/20 2202   WBC 8.58   HGB 15.5   HCT 45.9        CMP:   Recent Labs   Lab 03/17/20  2202   *   K 4.1      CO2 23    *   BUN 12   CREATININE 1.0   CALCIUM 8.6*   PROT 6.8   ALBUMIN 3.6   BILITOT 0.6   ALKPHOS 49*   AST 23   ALT 32   ANIONGAP 8   EGFRNONAA >60.0     Cardiac Markers:   Recent Labs   Lab 03/17/20  2202   BNP 27  27     Magnesium:   Recent Labs   Lab 03/17/20  2202   MG 1.8     POCT Glucose: No results for input(s): POCTGLUCOSE in the last 48 hours.  TSH: No results for input(s): TSH in the last 4320 hours.    Significant Imaging: I have reviewed all pertinent imaging results/findings within the past 24 hours.

## 2020-03-18 NOTE — ED PROVIDER NOTES
Encounter Date: 3/17/2020       History     Chief Complaint   Patient presents with    Chest Pain     had angiogram today, pt states disharged but then called back and told not suppose to be discharged and needs bypass, dr. becker did angiogram and reji was suppose to be in charge of bypass, pt states cp all day, took nitro 30 minutes ago, no pain at this time     Year old male presented emergency department with chest tightness.  Patient said he had this same pain for the past 5 days.  Patient had this pain multiple times in the past.  Patient had cardiac stents in the past and this morning had a elective angiogram and he believes he was wrong fully discharged.  Patient was told that 1 of his stents was collapsed and then the nurse came and discharged him then he received a phone call saying Dr. Solitario was trying to talk to him after he was discharged.  He was told that he would need a CABG.  Patient states he still has a chest pain so came here after the phone call and the pain got worse.  Patient states nitroglycerin helps him.  Patient could not tell me if anything makes it worse.  Patient describes this pain as pressure in the middle of the chest.  Denies dysuria or hematuria.  Denies weakness or numbness.  Patient's pain does not radiate anywhere.        Review of patient's allergies indicates:   Allergen Reactions    Pesticide Dermatitis and Rash     Past Medical History:   Diagnosis Date    Anticoagulant long-term use     Atrial fibrillation     BPH (benign prostatic hyperplasia)     Cataract     COPD (chronic obstructive pulmonary disease)     Coronary artery disease     CVD (cardiovascular disease)     Diabetes mellitus     Erythema multiforme     AKA Denton Edmondson Syndrome    Hypertension     MI (myocardial infarction)     per pt he has had 4     ROSAMARIA on CPAP     RLS (restless legs syndrome)     Sleep apnea      Past Surgical History:   Procedure Laterality Date    CORONARY STENT  PLACEMENT      CYSTOSCOPY N/A 12/10/2018    Procedure: CYSTOSCOPY;  Surgeon: Khushboo Abreu MD;  Location: [facility] OR;  Service: Urology;  Laterality: N/A;    ULTRASOUND OF PROSTATE FOR VOLUME DETERMINATION  12/10/2018    Procedure: ULTRASOUND, PROSTATE, FOR VOLUME DETERMINATION;  Surgeon: Khushboo Abreu MD;  Location: [facility] OR;  Service: Urology;;     Family History   Problem Relation Age of Onset    Heart disease Mother     Diabetes Mother     Hyperlipidemia Father      Social History     Tobacco Use    Smoking status: Current Every Day Smoker     Packs/day: 1.00     Years: 30.00     Pack years: 30.00     Types: Cigarettes    Smokeless tobacco: Former User   Substance Use Topics    Alcohol use: Not Currently    Drug use: Not Currently     Review of Systems   Constitutional: Negative.    HENT: Negative.    Eyes: Negative.    Respiratory: Negative.    Cardiovascular: Positive for chest pain. Negative for palpitations and leg swelling.   Gastrointestinal: Negative.    Endocrine: Negative.    Genitourinary: Negative.    Musculoskeletal: Negative.    Skin: Negative.    Allergic/Immunologic: Negative.    Neurological: Negative.    Hematological: Negative.    Psychiatric/Behavioral: Negative.    All other systems reviewed and are negative.      Physical Exam     Initial Vitals   BP Pulse Resp Temp SpO2   03/17/20 2120 03/17/20 2118 03/17/20 2118 03/17/20 2118 03/17/20 2118   (!) 165/87 78 16 98 °F (36.7 °C) 98 %      MAP       --                Physical Exam    Nursing note and vitals reviewed.  Constitutional: He appears well-developed and well-nourished.   HENT:   Head: Normocephalic and atraumatic.   Nose: Nose normal.   Eyes: Conjunctivae and EOM are normal.   Neck: Normal range of motion. No tracheal deviation present.   Cardiovascular: Normal rate, regular rhythm, normal heart sounds and intact distal pulses. Exam reveals no friction rub.    No murmur heard.  Pulmonary/Chest: Breath sounds  normal. No accessory muscle usage. No respiratory distress. He has no wheezes. He has no rales.   Abdominal: Soft. He exhibits no distension. There is no tenderness.   Musculoskeletal: Normal range of motion.   Neurological: He is alert and oriented to person, place, and time. He has normal strength.   Skin: Skin is warm and dry. Capillary refill takes less than 2 seconds.   Psychiatric: He has a normal mood and affect. Thought content normal.         ED Course   Procedures  Labs Reviewed   CBC W/ AUTO DIFFERENTIAL   COMPREHENSIVE METABOLIC PANEL   B-TYPE NATRIURETIC PEPTIDE   TROPONIN I   PROTIME-INR   B-TYPE NATRIURETIC PEPTIDE   MAGNESIUM   TROPONIN I     EKG Readings: (Independently Interpreted)   Initial Reading: No STEMI. Rhythm: Normal Sinus Rhythm. Ectopy: No Ectopy. Conduction: Normal.       Imaging Results          X-Ray Chest PA And Lateral (In process)               X-Rays:   Independently Interpreted Readings:   Other Readings:  Chest x-ray does not show any acute findings    Medical Decision Making:   Differential Diagnosis:   54-year-old male presented emergency department with chest pain.  Patient had an angiogram today and was told that he would need angiogram and after he received the phone call started having chest pain.  Patient describes this chest pain as pressure-like pain which was ongoing for 5 days intermittently.  Given patient's coronary artery disease and active chest pain started on nitroglycerin as patient states nitroglycerin helps his pain.  Screening cardiac workup done.  Hospital Medicine consult for evaluation for admission and further management and cardiothoracic surgeon to be consulted in the hospital.  Clinical Tests:   Lab Tests: Reviewed  Radiological Study: Reviewed  Medical Tests: Reviewed                                 Clinical Impression:       ICD-10-CM ICD-9-CM   1. Unstable angina pectoris I20.0 411.1   2. Chest pain R07.9 786.50                                Ousmane  MD Agata  03/17/20 8319       Ousmane Parmar MD  03/17/20 3094

## 2020-03-18 NOTE — ASSESSMENT & PLAN NOTE
Uncontrolled  Noncompliant with diet states that he takes his medication    Plan moderate dose sliding scale and IV fluids

## 2020-03-18 NOTE — CONSULTS
[facility]   Cardiology Note    Consult Requested By: primary  Reason for Consult: chest pain    SUBJECTIVE:     History of Present Illness: 54 year old male w/ PMHx of CAD, HTN, HLP, P. A fib, Type 2 DM who underwent a LHC yesterday with Dr. Walters. Revealed 90% mid circ lesion. He was discharged to home. Plan was for Dr. Villanueva to evaluate films vs have evaluation by CTS for coronary artery bypass.   Last night pt developed crushing chest pain which was relieved by nitro. He states it feels like prior to previous heart attack.   On admit, EKG shows no new changes. Troponins are negative.   This morning he is chest pain free.     Review of patient's allergies indicates:   Allergen Reactions    Pesticide Dermatitis and Rash       Past Medical History:   Diagnosis Date    Anticoagulant long-term use     Atrial fibrillation     BPH (benign prostatic hyperplasia)     Cataract     COPD (chronic obstructive pulmonary disease)     Coronary artery disease     CVD (cardiovascular disease)     Diabetes mellitus     Erythema multiforme     AKA Denton Edmondson Syndrome    Hypertension     MI (myocardial infarction)     per pt he has had 4     ROSAMARIA on CPAP     RLS (restless legs syndrome)     Sleep apnea      Past Surgical History:   Procedure Laterality Date    CORONARY STENT PLACEMENT      CYSTOSCOPY N/A 12/10/2018    Procedure: CYSTOSCOPY;  Surgeon: Khushboo Abreu MD;  Location: [facility] OR;  Service: Urology;  Laterality: N/A;    LEFT HEART CATHETERIZATION Left 3/17/2020    Procedure: CATHETERIZATION, HEART, LEFT;  Surgeon: Tyree Walters MD;  Location: [facility] CATH/EP LAB;  Service: Cardiology;  Laterality: Left;    ULTRASOUND OF PROSTATE FOR VOLUME DETERMINATION  12/10/2018    Procedure: ULTRASOUND, PROSTATE, FOR VOLUME DETERMINATION;  Surgeon: Khushboo Abreu MD;  Location: [facility] OR;  Service: Urology;;     Family History   Problem Relation Age of Onset    Heart disease Mother      Diabetes Mother     Hyperlipidemia Father      Social History     Tobacco Use    Smoking status: Current Every Day Smoker     Packs/day: 1.00     Years: 30.00     Pack years: 30.00     Types: Cigarettes    Smokeless tobacco: Former User   Substance Use Topics    Alcohol use: Not Currently    Drug use: Not Currently       Review of Systems:  Review of Systems   Cardiovascular: Positive for chest pain.   All other systems reviewed and are negative.      OBJECTIVE:     Vital Signs (Most Recent)  Temp: 97.7 °F (36.5 °C) (03/18/20 0719)  Pulse: 68 (03/18/20 0719)  Resp: 20 (03/18/20 0719)  BP: 137/75 (03/18/20 0719)  SpO2: 97 % (03/18/20 0719)    Vital Signs Range (Last 24H):  Temp:  [97.7 °F (36.5 °C)-98 °F (36.7 °C)]   Pulse:  [63-81]   Resp:  [16-29]   BP: (110-165)/(59-87)   SpO2:  [93 %-100 %]     I & O (Last 24H):  No intake or output data in the 24 hours ending 03/18/20 0917    Current Diet:     Current Diet Order   Procedures    Diet NPO        Allergies:  Review of patient's allergies indicates:   Allergen Reactions    Pesticide Dermatitis and Rash       Meds:  Scheduled Meds:   aspirin  81 mg Oral Daily    clonazePAM  0.5 mg Oral BID    gabapentin  600 mg Oral TID    lisinopriL  10 mg Oral Daily    metoprolol succinate  100 mg Oral Daily    nitroGLYCERIN 2% TD oint  0.5 inch Topical (Top) Q6H    pantoprazole  40 mg Intravenous Daily    ranolazine  500 mg Oral BID    rosuvastatin  20 mg Oral Daily    sotaloL  40 mg Oral BID    tamsulosin  0.4 mg Oral Daily     Continuous Infusions:   lactated ringers 75 mL/hr at 03/18/20 0215     PRN Meds:acetaminophen, calcium chloride IVPB, calcium chloride IVPB, calcium chloride IVPB, dextrose 50%, dextrose 50%, insulin regular, magnesium oxide, magnesium sulfate IVPB, magnesium sulfate IVPB, magnesium sulfate IVPB, magnesium sulfate IVPB, morphine, ondansetron, potassium chloride in water, potassium chloride in water, potassium chloride in water,  potassium chloride in water, potassium chloride, potassium chloride, potassium chloride, potassium chloride, sodium chloride 0.9%, sodium phosphate IVPB, sodium phosphate IVPB, sodium phosphate IVPB, sodium phosphate IVPB, sodium phosphate IVPB    Oxygen/Ventilator Data (Last 24H):  (if applicable)        Hemodynamic Parameters (Last 24H):   (if applicable)        Laboratory and Radiology Data:  Recent Results (from the past 24 hour(s))   Cardiac catheterization    Collection Time: 03/17/20  9:18 AM   Result Value Ref Range    Cath EF Quantitative 55 %   CBC auto differential    Collection Time: 03/17/20 10:02 PM   Result Value Ref Range    WBC 8.58 3.90 - 12.70 K/uL    RBC 5.03 4.60 - 6.20 M/uL    Hemoglobin 15.5 14.0 - 18.0 g/dL    Hematocrit 45.9 40.0 - 54.0 %    Mean Corpuscular Volume 91 82 - 98 fL    Mean Corpuscular Hemoglobin 30.8 27.0 - 31.0 pg    Mean Corpuscular Hemoglobin Conc 33.8 32.0 - 36.0 g/dL    RDW 12.4 11.5 - 14.5 %    Platelets 233 150 - 350 K/uL    MPV 10.1 9.2 - 12.9 fL    Immature Granulocytes 0.5 0.0 - 0.5 %    Gran # (ANC) 3.8 1.8 - 7.7 K/uL    Immature Grans (Abs) 0.04 0.00 - 0.04 K/uL    Lymph # 3.8 1.0 - 4.8 K/uL    Mono # 0.6 0.3 - 1.0 K/uL    Eos # 0.3 0.0 - 0.5 K/uL    Baso # 0.08 0.00 - 0.20 K/uL    nRBC 0 0 /100 WBC    Gran% 43.8 38.0 - 73.0 %    Lymph% 44.8 18.0 - 48.0 %    Mono% 7.0 4.0 - 15.0 %    Eosinophil% 3.0 0.0 - 8.0 %    Basophil% 0.9 0.0 - 1.9 %    Differential Method Automated    Comprehensive metabolic panel    Collection Time: 03/17/20 10:02 PM   Result Value Ref Range    Sodium 131 (L) 136 - 145 mmol/L    Potassium 4.1 3.5 - 5.1 mmol/L    Chloride 100 95 - 110 mmol/L    CO2 23 23 - 29 mmol/L    Glucose 426 (H) 70 - 110 mg/dL    BUN, Bld 12 6 - 20 mg/dL    Creatinine 1.0 0.5 - 1.4 mg/dL    Calcium 8.6 (L) 8.7 - 10.5 mg/dL    Total Protein 6.8 6.0 - 8.4 g/dL    Albumin 3.6 3.5 - 5.2 g/dL    Total Bilirubin 0.6 0.1 - 1.0 mg/dL    Alkaline Phosphatase 49 (L) 55 - 135 U/L     AST 23 10 - 40 U/L    ALT 32 10 - 44 U/L    Anion Gap 8 8 - 16 mmol/L    eGFR if African American >60.0 >60 mL/min/1.73 m^2    eGFR if non African American >60.0 >60 mL/min/1.73 m^2   Brain natriuretic peptide    Collection Time: 03/17/20 10:02 PM   Result Value Ref Range    BNP 27 0 - 99 pg/mL   Troponin I    Collection Time: 03/17/20 10:02 PM   Result Value Ref Range    Troponin I <0.030 <=0.040 ng/mL   Protime-INR    Collection Time: 03/17/20 10:02 PM   Result Value Ref Range    PT 13.7 10.6 - 14.8 sec    INR 1.1    B-Type natriuretic peptide (BNP)    Collection Time: 03/17/20 10:02 PM   Result Value Ref Range    BNP 27 0 - 99 pg/mL   Magnesium    Collection Time: 03/17/20 10:02 PM   Result Value Ref Range    Magnesium 1.8 1.6 - 2.6 mg/dL   POCT glucose    Collection Time: 03/18/20  2:01 AM   Result Value Ref Range    POC Glucose 179 (H) 70 - 110   CBC auto differential    Collection Time: 03/18/20  3:23 AM   Result Value Ref Range    WBC 9.73 3.90 - 12.70 K/uL    RBC 4.94 4.60 - 6.20 M/uL    Hemoglobin 15.2 14.0 - 18.0 g/dL    Hematocrit 45.3 40.0 - 54.0 %    Mean Corpuscular Volume 92 82 - 98 fL    Mean Corpuscular Hemoglobin 30.8 27.0 - 31.0 pg    Mean Corpuscular Hemoglobin Conc 33.6 32.0 - 36.0 g/dL    RDW 12.5 11.5 - 14.5 %    Platelets 233 150 - 350 K/uL    MPV 10.2 9.2 - 12.9 fL    Immature Granulocytes 0.5 0.0 - 0.5 %    Gran # (ANC) 4.1 1.8 - 7.7 K/uL    Immature Grans (Abs) 0.05 (H) 0.00 - 0.04 K/uL    Lymph # 4.5 1.0 - 4.8 K/uL    Mono # 0.6 0.3 - 1.0 K/uL    Eos # 0.3 0.0 - 0.5 K/uL    Baso # 0.10 0.00 - 0.20 K/uL    nRBC 0 0 /100 WBC    Gran% 42.6 38.0 - 73.0 %    Lymph% 46.0 18.0 - 48.0 %    Mono% 6.6 4.0 - 15.0 %    Eosinophil% 3.3 0.0 - 8.0 %    Basophil% 1.0 0.0 - 1.9 %    Differential Method Automated    Basic metabolic panel    Collection Time: 03/18/20  3:23 AM   Result Value Ref Range    Sodium 136 136 - 145 mmol/L    Potassium 3.8 3.5 - 5.1 mmol/L    Chloride 104 95 - 110 mmol/L    CO2  24 23 - 29 mmol/L    Glucose 188 (H) 70 - 110 mg/dL    BUN, Bld 12 6 - 20 mg/dL    Creatinine 0.9 0.5 - 1.4 mg/dL    Calcium 8.5 (L) 8.7 - 10.5 mg/dL    Anion Gap 8 8 - 16 mmol/L    eGFR if African American >60.0 >60 mL/min/1.73 m^2    eGFR if non African American >60.0 >60 mL/min/1.73 m^2   Troponin I    Collection Time: 03/18/20  3:23 AM   Result Value Ref Range    Troponin I <0.030 <=0.040 ng/mL   Troponin I    Collection Time: 03/18/20  7:48 AM   Result Value Ref Range    Troponin I <0.030 <=0.040 ng/mL   POCT glucose    Collection Time: 03/18/20  8:07 AM   Result Value Ref Range    POC Glucose 220 (H) 70 - 110     Imaging Results          X-Ray Chest PA And Lateral (Final result)  Result time 03/18/20 06:17:24    Final result by Everton Camacho MD (03/18/20 06:17:24)                 Impression:      No acute cardiac or pulmonary process.      Electronically signed by: Everton Camacho MD  Date:    03/18/2020  Time:    06:17             Narrative:    CLINICAL HISTORY:  ([id number])53 y/o  (1965) M    Chest Pain;  AFib COPD, CAD, CBD, hypertension, mi    TECHNIQUE:  (A#[id number], exam time 3/17/2020 21:43)    XR CHEST PA AND LATERAL [id number]    COMPARISON:  Most recent from 03/12/2020    FINDINGS:  The lungs are clear. Costophrenic angles are seen without effusion. No pneumothorax is identified. The heart is normal in size. Atheromatous calcifications are seen at the aortic arch. Osseous structures appear within normal limits. The visualized upper abdomen is unremarkable.                                12-lead EKG interpretation:  (if applicable)      Current Cardiac Rhythm:   (if applicable)    Physical Exam:   Physical Exam   Constitutional: He is oriented to person, place, and time and well-developed, well-nourished, and in no distress.   HENT:   Head: Normocephalic and atraumatic.   Cardiovascular: Normal rate and regular rhythm.   Pulmonary/Chest: Effort normal and breath sounds normal.    Musculoskeletal: Normal range of motion. He exhibits no edema.   Neurological: He is alert and oriented to person, place, and time.   Skin: Skin is warm and dry.   Nursing note and vitals reviewed.      ASSESSMENT/PLAN:   Assessment:   Unstable angina  - EKG unremarkable, troponins are negative  CAD  [facility] yesterday- re stenosis of mid circ 90%  HTN  HLP  Type 2 DM    Plan:     Plan and films reviewed/discussed with Dr. Botello  Increase ranexa to 1000 mg BID  Imdur 30 mg   Pt will need laser atherectomy with Dr. Villanueva in near future

## 2020-03-18 NOTE — NURSING
Discharge instructions reviewed with pt. Questions answered. Copy of discharge instructions given to pt. Discharge home via ambulatory to vehicle.

## 2020-03-18 NOTE — H&P
[facility]  History & Physical    Patient Name: Magdaleno Cunningham  MRN: [id number]  Admission Date: 3/17/2020  Attending Physician: Kirill Patterson MD  Primary Care Provider: Antonio Del Cid MD         Patient information was obtained from patient and ER records.     Subjective:     Principal Problem:Unstable angina    Chief Complaint:   Chief Complaint   Patient presents with    Chest Pain     had angiogram today, pt states disharged but then called back and told not suppose to be discharged and needs bypass, dr. becker did angiogram and reji was suppose to be in charge of bypass, pt states cp all day, took nitro 30 minutes ago, no pain at this time        HPI:    Chest Pain       had angiogram today, pt states disharged but then called back and told not suppose to be discharged and needs bypass, dr. becker did angiogram and reji was suppose to be in charge of bypass, pt states cp all day, took nitro 30 minutes ago, no pain at this time      Year old male presented emergency department with chest tightness.  Patient said he had this same pain for the past 5 days.  Patient had this pain multiple times in the past.  Patient had cardiac stents in the past and this morning had a elective angiogram and he believes he was wrong fully discharged.  Patient was told that 1 of his stents was collapsed and then the nurse came and discharged him then he received a phone call saying Dr. Solitario was trying to talk to him after he was discharged.  He was told that he would need a CABG.  Patient states he still has a chest pain so came here after the phone call and the pain got worse.  Patient states nitroglycerin helps him.  Patient could not tell me if anything makes it worse.  Patient describes this pain as pressure in the middle of the chest.  Denies dysuria or hematuria.  Denies weakness or numbness.  Patient's pain does not radiate anywhere.        Pt is currently pain free. I spoke to  Dr Vidal who recommends nitropaste and consult to DR Lai CV surgery    Past Medical History:   Diagnosis Date    Anticoagulant long-term use     Atrial fibrillation     BPH (benign prostatic hyperplasia)     Cataract     COPD (chronic obstructive pulmonary disease)     Coronary artery disease     CVD (cardiovascular disease)     Diabetes mellitus     Erythema multiforme     AKA Denton Edmondson Syndrome    Hypertension     MI (myocardial infarction)     per pt he has had 4     ROSAMARIA on CPAP     RLS (restless legs syndrome)     Sleep apnea        Past Surgical History:   Procedure Laterality Date    CORONARY STENT PLACEMENT      CYSTOSCOPY N/A 12/10/2018    Procedure: CYSTOSCOPY;  Surgeon: Khushboo Abreu MD;  Location: [facility] OR;  Service: Urology;  Laterality: N/A;    ULTRASOUND OF PROSTATE FOR VOLUME DETERMINATION  12/10/2018    Procedure: ULTRASOUND, PROSTATE, FOR VOLUME DETERMINATION;  Surgeon: Khushboo Abreu MD;  Location: [facility] OR;  Service: Urology;;       Review of patient's allergies indicates:   Allergen Reactions    Pesticide Dermatitis and Rash       Current Facility-Administered Medications on File Prior to Encounter   Medication    [] 0.9%  NaCl infusion    [DISCONTINUED] 0.9%  NaCl infusion    [DISCONTINUED] diphenhydrAMINE injection    [DISCONTINUED] fentaNYL injection    [DISCONTINUED] heparin (porcine) injection    [DISCONTINUED] iohexoL (OMNIPAQUE 350) injection    [DISCONTINUED] lidocaine (PF) 10 mg/ml (1%) injection    [DISCONTINUED] magnesium oxide tablet 800 mg    [DISCONTINUED] midazolam (VERSED) 1 mg/mL injection    [DISCONTINUED] nitroGLYCERIN injection    [DISCONTINUED] verapamiL injection     Current Outpatient Medications on File Prior to Encounter   Medication Sig    apixaban (ELIQUIS) 5 mg Tab Take 5 mg by mouth 2 (two) times daily.    aspirin (ECOTRIN) 81 MG EC tablet Take 81 mg by mouth once daily.    clonazePAM (KLONOPIN) 1  MG tablet Take 1 mg by mouth 3 (three) times daily as needed for Anxiety.     empagliflozin (JARDIANCE) 10 mg tablet Take 10 mg by mouth once daily.    fenofibrate (TRICOR) 145 MG tablet Take 145 mg by mouth once daily.    gabapentin (NEURONTIN) 600 MG tablet Take 600 mg by mouth 3 (three) times daily.     glimepiride (AMARYL) 4 MG tablet Take 4 mg by mouth 2 (two) times daily.    hydroCHLOROthiazide (HYDRODIURIL) 12.5 MG Tab Take 12.5 mg by mouth once daily.    insulin glargine,hum.rec.anlog (LANTUS SUBQ) Inject 40 Units into the skin 2 (two) times daily.     lisinopriL 10 MG tablet Take 10 mg by mouth once daily.    metFORMIN (GLUCOPHAGE) 1000 MG tablet Take 1,000 mg by mouth 2 (two) times daily with meals.     metoprolol succinate (TOPROL-XL) 100 MG 24 hr tablet Take 100 mg by mouth once daily.    nitroGLYCERIN (NITROSTAT) 0.4 MG SL tablet Place 0.4 mg under the tongue every 5 (five) minutes as needed for Chest pain.    ranolazine (RANEXA) 500 MG Tb12 Take 500 mg by mouth 2 (two) times daily.    rosuvastatin (CRESTOR) 20 MG tablet Take 20 mg by mouth once daily.    sotaloL (SOTALOL AF) 80 MG tablet Take 40 mg by mouth 2 (two) times daily.    tamsulosin (FLOMAX) 0.4 mg Cap Take 0.4 mg by mouth once daily.    lisinopriL (PRINIVIL,ZESTRIL) 40 MG tablet Take 40 mg by mouth once daily.    metoprolol succinate (TOPROL-XL) 50 MG 24 hr tablet Take 50 mg by mouth once daily.    [DISCONTINUED] phentermine (ADIPEX-P) 37.5 mg tablet Take 37.5 mg by mouth before breakfast.    [DISCONTINUED] temazepam (RESTORIL) 15 mg Cap Take 15 mg by mouth every evening.    [DISCONTINUED] ticagrelor (BRILINTA) 90 mg tablet Take 90 mg by mouth 2 (two) times daily.     Family History     Problem Relation (Age of Onset)    Diabetes Mother    Heart disease Mother    Hyperlipidemia Father        Tobacco Use    Smoking status: Current Every Day Smoker     Packs/day: 1.00     Years: 30.00     Pack years: 30.00     Types:  Cigarettes    Smokeless tobacco: Former User   Substance and Sexual Activity    Alcohol use: Not Currently    Drug use: Not Currently    Sexual activity: Not on file     Review of Systems   Constitutional: Positive for fatigue.   HENT: Negative.    Respiratory: Positive for shortness of breath. Negative for wheezing.    Cardiovascular: Positive for chest pain.   Gastrointestinal: Negative.    Genitourinary: Negative.    Musculoskeletal: Negative.    Neurological: Negative.    Psychiatric/Behavioral: Negative.      Objective:     Vital Signs (Most Recent):  Temp: 98 °F (36.7 °C) (03/17/20 2118)  Pulse: 78 (03/17/20 2118)  Resp: 16 (03/17/20 2118)  BP: (!) 165/87 (03/17/20 2120)  SpO2: 98 % (03/17/20 2118) Vital Signs (24h Range):  Temp:  [98 °F (36.7 °C)-98.2 °F (36.8 °C)] 98 °F (36.7 °C)  Pulse:  [64-79] 78  Resp:  [16-26] 16  SpO2:  [95 %-100 %] 98 %  BP: (112-165)/(65-87) 165/87     Weight: 125.2 kg (276 lb)  Body mass index is 38.49 kg/m².    Physical Exam   Constitutional: He is oriented to person, place, and time. No distress.   HENT:   Head: Normocephalic and atraumatic.   Eyes: Pupils are equal, round, and reactive to light. Conjunctivae and EOM are normal.   Neck: Normal range of motion. Neck supple.   Cardiovascular: Normal rate. Exam reveals gallop.   S 4   Pulmonary/Chest: Effort normal and breath sounds normal.   Abdominal: Soft. Bowel sounds are normal.   Musculoskeletal: Normal range of motion.   Neurological: He is alert and oriented to person, place, and time.   Skin: Skin is warm. He is diaphoretic.   Psychiatric:   Anxious         CRANIAL NERVES     CN III, IV, VI   Pupils are equal, round, and reactive to light.  Extraocular motions are normal.        Significant Labs:   BMP:   Recent Labs   Lab 03/17/20 2202   *   *   K 4.1      CO2 23   BUN 12   CREATININE 1.0   CALCIUM 8.6*   MG 1.8     CBC:   Recent Labs   Lab 03/17/20 2202   WBC 8.58   HGB 15.5   HCT 45.9         CMP:   Recent Labs   Lab 03/17/20  2202   *   K 4.1      CO2 23   *   BUN 12   CREATININE 1.0   CALCIUM 8.6*   PROT 6.8   ALBUMIN 3.6   BILITOT 0.6   ALKPHOS 49*   AST 23   ALT 32   ANIONGAP 8   EGFRNONAA >60.0     Cardiac Markers:   Recent Labs   Lab 03/17/20  2202   BNP 27  27     Magnesium:   Recent Labs   Lab 03/17/20  2202   MG 1.8     POCT Glucose: No results for input(s): POCTGLUCOSE in the last 48 hours.  TSH: No results for input(s): TSH in the last 4320 hours.    Significant Imaging: I have reviewed all pertinent imaging results/findings within the past 24 hours.    Assessment/Plan:     * Unstable angina  Pt is pain free at present  Dr Vidal recommends nitropaste and AM consult to the group and DR Lai      ROSAMARIA (obstructive sleep apnea)  BIPAP as indicated      Tobacco abuse  Discussed referral to smoking cessation      Type 2 diabetes mellitus with diabetic arthropathy  Uncontrolled  Noncompliant with diet states that he takes his medication    Plan moderate dose sliding scale and IV fluids        VTE Risk Mitigation (From admission, onward)    None             Kirill Patterson MD  Department of Hospital Medicine   [facility]

## 2020-03-18 NOTE — HPI
Chest Pain       had angiogram today, pt states disharged but then called back and told not suppose to be discharged and needs bypass, dr. becker did angiogram and reji was suppose to be in charge of bypass, pt states cp all day, took nitro 30 minutes ago, no pain at this time      Year old male presented emergency department with chest tightness.  Patient said he had this same pain for the past 5 days.  Patient had this pain multiple times in the past.  Patient had cardiac stents in the past and this morning had a elective angiogram and he believes he was wrong fully discharged.  Patient was told that 1 of his stents was collapsed and then the nurse came and discharged him then he received a phone call saying Dr. Solitario was trying to talk to him after he was discharged.  He was told that he would need a CABG.  Patient states he still has a chest pain so came here after the phone call and the pain got worse.  Patient states nitroglycerin helps him.  Patient could not tell me if anything makes it worse.  Patient describes this pain as pressure in the middle of the chest.  Denies dysuria or hematuria.  Denies weakness or numbness.  Patient's pain does not radiate anywhere.        Pt is currently pain free. I spoke to Dr Vidal who recommends nitropaste and consult to DR Lai CV surgery

## 2020-03-18 NOTE — HOSPITAL COURSE
"Patient feels "Really good" this AM. He'd like to be discharged with outpatient follow-up. He has been seen by Cardiology, his medication adjust (as below). He will see his Cardiologist within a week.  VSS  Lungs: decreased entry without adventitious sounds  Heart: S1S2 reg  Abdo: truncal obesity, soft    "

## 2020-03-18 NOTE — DISCHARGE SUMMARY
"[facility]  Discharge Summary      Patient Name: Magdaleno Cunningham  MRN: [id number]  Admission Date: 3/17/2020  Hospital Length of Stay: 1 days  Discharge Date and Time:  03/18/2020 12:12 PM  Attending Physician: Kirill Blackwell MD   Discharging Provider: Kirill Blackwell MD  Primary Care Provider: Antonio Del Cid MD      HPI:    Chest Pain       had angiogram today, pt states disharged but then called back and told not suppose to be discharged and needs bypass, dr. becker did angiogram and reji was suppose to be in charge of bypass, pt states cp all day, took nitro 30 minutes ago, no pain at this time      Year old male presented emergency department with chest tightness.  Patient said he had this same pain for the past 5 days.  Patient had this pain multiple times in the past.  Patient had cardiac stents in the past and this morning had a elective angiogram and he believes he was wrong fully discharged.  Patient was told that 1 of his stents was collapsed and then the nurse came and discharged him then he received a phone call saying Dr. Solitario was trying to talk to him after he was discharged.  He was told that he would need a CABG.  Patient states he still has a chest pain so came here after the phone call and the pain got worse.  Patient states nitroglycerin helps him.  Patient could not tell me if anything makes it worse.  Patient describes this pain as pressure in the middle of the chest.  Denies dysuria or hematuria.  Denies weakness or numbness.  Patient's pain does not radiate anywhere.        Pt is currently pain free. I spoke to Dr Vidal who recommends nitropaste and consult to DR Lai CV surgery    * No surgery found *      Hospital Course:   Patient feels "Really good" this AM. He'd like to be discharged with outpatient follow-up. He has been seen by Cardiology, his medication adjust (as below). He will see his Cardiologist within a week.  VSS  Lungs: " decreased entry without adventitious sounds  Heart: S1S2 reg  Abdo: truncal obesity, soft       Consults:   Consults (From admission, onward)        Status Ordering Provider     Inpatient consult to Cardiology  Once     Provider:  Tyree Walters MD    Completed MATTHEW PATTERSON     Inpatient consult to Cardiothoracic Surgery  Once     Provider:  Doug Solitario MD    Acknowledged MATTHEW PATTERSON     Inpatient consult to Hospitalist  Once     Provider:  Matthew Patterson MD    Acknowledged MATTHEW PATTERSON          Smoker 1-2 packs per day since 14 years old  As above      ROSAMARIA (obstructive sleep apnea)  BIPAP as indicated      Tobacco abuse  Smoking cessation encouraged strongly      Type 2 diabetes mellitus with diabetic arthropathy  Continue pre-admit regimen  ADA diet      COPD (chronic obstructive pulmonary disease)  Continue pre-admit regimen          Atherosclerosis of native coronary artery of native heart with angina pectoris  Increase Ranexa to 1000 mg bid, start Imdur 30 mg q day; close follow-up by Cardiology        Final Active Diagnoses:    Diagnosis Date Noted POA    Smoker 1-2 packs per day since 14 years old [F17.200] 07/06/2016 Yes    ROSAMARIA (obstructive sleep apnea) [G47.33] 10/28/2014 Yes    Tobacco abuse [Z72.0] 10/28/2014 Yes    Atherosclerosis of native coronary artery of native heart with angina pectoris [I25.119]  Yes    COPD (chronic obstructive pulmonary disease) [J44.9]  Yes    Type 2 diabetes mellitus with diabetic arthropathy [E11.618]  Yes      Problems Resolved During this Admission:    Diagnosis Date Noted Date Resolved POA    PRINCIPAL PROBLEM:  Unstable angina [I20.0] 10/28/2014 03/18/2020 Yes    Unstable angina pectoris [I20.0] 03/17/2020 03/18/2020 Yes       Discharged Condition: good    Disposition: Home or Self Care    Follow Up:  Follow-up Information     Paul Botello MD In 1 week.    Specialty:  Cardiology  Why:  post discharge follow-up  Contact  information:  [address]  [address]  [place] [address]  [phone]                 Patient Instructions:      Diet Cardiac     Diet diabetic     Activity as tolerated       Significant Diagnostic Studies: Labs:   BMP:   Recent Labs   Lab 03/17/20 2202 03/18/20  0323   * 188*   * 136   K 4.1 3.8    104   CO2 23 24   BUN 12 12   CREATININE 1.0 0.9   CALCIUM 8.6* 8.5*   MG 1.8  --     and CBC   Recent Labs   Lab 03/17/20 2202 03/18/20  0323   WBC 8.58 9.73   HGB 15.5 15.2   HCT 45.9 45.3    233       Pending Diagnostic Studies:     None         Medications:  Reconciled Home Medications:      Medication List      START taking these medications    isosorbide mononitrate 30 MG 24 hr tablet  Commonly known as:  IMDUR  Take 1 tablet (30 mg total) by mouth once daily.  Start taking on:  March 19, 2020        CHANGE how you take these medications    ranolazine 500 MG Tb12  Commonly known as:  RANEXA  Take 2 tablets (1,000 mg total) by mouth 2 (two) times daily.  What changed:  how much to take        CONTINUE taking these medications    aspirin 81 MG EC tablet  Commonly known as:  ECOTRIN  Take 81 mg by mouth once daily.     clonazePAM 1 MG tablet  Commonly known as:  KLONOPIN  Take 1 mg by mouth 3 (three) times daily as needed for Anxiety.     ELIQUIS 5 mg Tab  Generic drug:  apixaban  Take 5 mg by mouth 2 (two) times daily.     FLOMAX 0.4 mg Cap  Generic drug:  tamsulosin  Take 0.4 mg by mouth once daily.     gabapentin 600 MG tablet  Commonly known as:  NEURONTIN  Take 600 mg by mouth 3 (three) times daily.     glimepiride 4 MG tablet  Commonly known as:  AMARYL  Take 4 mg by mouth 2 (two) times daily.     hydroCHLOROthiazide 12.5 MG Tab  Commonly known as:  HYDRODIURIL  Take 12.5 mg by mouth once daily.     JARDIANCE 10 mg tablet  Generic drug:  empagliflozin  Take 10 mg by mouth once daily.     LANTUS SUBQ  Inject 40 Units into the skin 2 (two) times daily.     * lisinopriL 40 MG  tablet  Commonly known as:  PRINIVIL,ZESTRIL  Take 40 mg by mouth once daily.     * lisinopriL 10 MG tablet  Take 10 mg by mouth once daily.     metFORMIN 1000 MG tablet  Commonly known as:  GLUCOPHAGE  Take 1,000 mg by mouth 2 (two) times daily with meals.     * metoprolol succinate 50 MG 24 hr tablet  Commonly known as:  TOPROL-XL  Take 50 mg by mouth once daily.     * metoprolol succinate 100 MG 24 hr tablet  Commonly known as:  TOPROL-XL  Take 100 mg by mouth once daily.     nitroGLYCERIN 0.4 MG SL tablet  Commonly known as:  NITROSTAT  Place 0.4 mg under the tongue every 5 (five) minutes as needed for Chest pain.     rosuvastatin 20 MG tablet  Commonly known as:  CRESTOR  Take 20 mg by mouth once daily.     sotalol AF 80 MG tablet  Generic drug:  sotaloL  Take 40 mg by mouth 2 (two) times daily.     TRICOR 145 MG tablet  Generic drug:  fenofibrate  Take 145 mg by mouth once daily.         * This list has 4 medication(s) that are the same as other medications prescribed for you. Read the directions carefully, and ask your doctor or other care provider to review them with you.            STOP taking these medications    phentermine 37.5 mg tablet  Commonly known as:  ADIPEX-P            Indwelling Lines/Drains at time of discharge:   Lines/Drains/Airways     None                 Time spent on the discharge of patient: 32 minutes  Patient was seen and examined on the date of discharge and determined to be suitable for discharge.         Kirill Blackwell MD  Department of Hospital Medicine  [facility]

## 2020-03-18 NOTE — ASSESSMENT & PLAN NOTE
Pt is pain free at present  Dr Vidal recommends nitropaste and AM consult to the group and DR Lai

## 2020-03-18 NOTE — PLAN OF CARE
03/18/20 1213   Final Note   Assessment Type Final Discharge Note   Anticipated Discharge Disposition Home   Post-Acute Status   Discharge Delays None known at this time

## 2020-03-23 ENCOUNTER — TELEPHONE (OUTPATIENT)
Dept: VASCULAR SURGERY | Facility: CLINIC | Age: 55
End: 2020-03-23

## 2020-03-23 NOTE — TELEPHONE ENCOUNTER
----- Message from Priscilla Eli sent at 3/23/2020  9:22 AM CDT -----  Contact: patient  Type:  Sooner Apoointment Request  Caller is requesting a sooner appointment.  Caller declined first available appointment listed below.  Caller will not accept being placed on the waitlist and is requesting a message be sent to doctor.  Name of Caller: Patient  When is the first available appointment?  ?  Symptoms:  F/u hospital within 1 week  Best Call Back Number:  [phone]  Additional Information:  Please call to advise and schedule.  Thanks!

## 2020-03-25 ENCOUNTER — OFFICE VISIT (OUTPATIENT)
Dept: VASCULAR SURGERY | Facility: CLINIC | Age: 55
End: 2020-03-25
Payer: MEDICAID

## 2020-03-25 VITALS
HEART RATE: 68 BPM | DIASTOLIC BLOOD PRESSURE: 86 MMHG | WEIGHT: 276.88 LBS | SYSTOLIC BLOOD PRESSURE: 140 MMHG | HEIGHT: 71 IN | BODY MASS INDEX: 38.76 KG/M2

## 2020-03-25 DIAGNOSIS — I25.10 CORONARY ATHEROSCLEROSIS OF NATIVE CORONARY ARTERY: ICD-10-CM

## 2020-03-25 DIAGNOSIS — I48.0 PAROXYSMAL ATRIAL FIBRILLATION: ICD-10-CM

## 2020-03-25 DIAGNOSIS — I48.20 ATRIAL FIBRILLATION, CHRONIC: ICD-10-CM

## 2020-03-25 DIAGNOSIS — I48.19 PERSISTENT ATRIAL FIBRILLATION: ICD-10-CM

## 2020-03-25 DIAGNOSIS — I25.110 ATHEROSCLEROSIS OF NATIVE CORONARY ARTERY OF NATIVE HEART WITH UNSTABLE ANGINA PECTORIS: Primary | ICD-10-CM

## 2020-03-25 DIAGNOSIS — I25.118 CORONARY ARTERY DISEASE OF NATIVE ARTERY OF NATIVE HEART WITH STABLE ANGINA PECTORIS: ICD-10-CM

## 2020-03-25 PROBLEM — I48.91 ATRIAL FIBRILLATION: Status: ACTIVE | Noted: 2020-03-25

## 2020-03-25 PROCEDURE — 99999 PR PBB SHADOW E&M-EST. PATIENT-LVL III: ICD-10-PCS | Mod: PBBFAC,,, | Performed by: THORACIC SURGERY (CARDIOTHORACIC VASCULAR SURGERY)

## 2020-03-25 PROCEDURE — 99024 POSTOP FOLLOW-UP VISIT: CPT | Mod: ,,, | Performed by: THORACIC SURGERY (CARDIOTHORACIC VASCULAR SURGERY)

## 2020-03-25 PROCEDURE — 99999 PR PBB SHADOW E&M-EST. PATIENT-LVL III: CPT | Mod: PBBFAC,,, | Performed by: THORACIC SURGERY (CARDIOTHORACIC VASCULAR SURGERY)

## 2020-03-25 PROCEDURE — 99024 PR POST-OP FOLLOW-UP VISIT: ICD-10-PCS | Mod: ,,, | Performed by: THORACIC SURGERY (CARDIOTHORACIC VASCULAR SURGERY)

## 2020-03-25 PROCEDURE — 99213 OFFICE O/P EST LOW 20 MIN: CPT | Mod: PBBFAC,PO | Performed by: THORACIC SURGERY (CARDIOTHORACIC VASCULAR SURGERY)

## 2020-03-25 RX ORDER — INSULIN GLARGINE 100 [IU]/ML
80 INJECTION, SOLUTION SUBCUTANEOUS 2 TIMES DAILY
Status: ON HOLD | COMMUNITY
Start: 2020-03-06 | End: 2024-01-04

## 2020-03-25 RX ORDER — CHLORHEXIDINE GLUCONATE ORAL RINSE 1.2 MG/ML
10 SOLUTION DENTAL
Status: CANCELLED | OUTPATIENT
Start: 2020-03-25

## 2020-03-25 RX ORDER — MUPIROCIN 20 MG/G
OINTMENT TOPICAL
Status: CANCELLED | OUTPATIENT
Start: 2020-03-25

## 2020-03-25 RX ORDER — BLOOD SUGAR DIAGNOSTIC
STRIP MISCELLANEOUS
Status: ON HOLD | COMMUNITY
Start: 2020-01-06 | End: 2022-09-02 | Stop reason: HOSPADM

## 2020-03-25 RX ORDER — LIDOCAINE HYDROCHLORIDE 10 MG/ML
1 INJECTION, SOLUTION EPIDURAL; INFILTRATION; INTRACAUDAL; PERINEURAL ONCE
Status: CANCELLED | OUTPATIENT
Start: 2020-03-25 | End: 2020-03-25

## 2020-03-25 NOTE — PROGRESS NOTES
This patient has severe coronary artery disease.  He had recent heart catheterization and coronary artery angiography showing significant circumflex coronary artery disease.  He has had a number of angiograms and previous stenting procedures.  He was referred for consideration of coronary artery bypass grafting.  In addition he has had episodic atrial fibrillation.  His past medical history is pertinent for hypertension and diabetes.  He has had no recent surgeries.  He has anginal on a regular basis.  He has a family history of heart disease and high cholesterol.  Review of systems is pertinent for the regular angina that occurs on a daily basis.      On physical exam vital signs are stable.  Pupils are equal and round and reactive to light.   Neck is supple.    Chest has equal breath sounds.    Heart is in a regular rate and rhythm.  Abdomen is benign.   Perfusion to the legs and feet is satisfactory.      At this point the patient has multi-vessel coronary disease and accelerated angina.  Recommendation is for coronary artery bypass grafting.  The patient does take Eliquis and this needs to be discontinued prior to the procedure.  I explained the procedure and risk and the patient seems to be understanding and agreeable.  I also mention that we would add a modified Maze procedure to the surgery.

## 2020-03-31 ENCOUNTER — TELEPHONE (OUTPATIENT)
Dept: VASCULAR SURGERY | Facility: CLINIC | Age: 55
End: 2020-03-31

## 2020-03-31 NOTE — TELEPHONE ENCOUNTER
----- Message from Raya Mederos sent at 3/31/2020  3:49 PM CDT -----   Type: Needs Medical Advice    Who Called:  pt  Best Call Back Number: [phone]  Additional Information:  Pt is calling about   Going to preop  // calling to  Discuss

## 2020-04-07 PROBLEM — I25.10 CORONARY ATHEROSCLEROSIS OF NATIVE CORONARY ARTERY: Status: ACTIVE | Noted: 2020-04-07

## 2020-04-13 ENCOUNTER — TELEPHONE (OUTPATIENT)
Dept: VASCULAR SURGERY | Facility: CLINIC | Age: 55
End: 2020-04-13

## 2020-04-13 NOTE — TELEPHONE ENCOUNTER
----- Message from Morgan George sent at 4/13/2020  9:07 AM CDT -----  Contact: same  Patient called in and stated he had his open heart surgery at [facility] on 4/7/2020 & patient stated he only has 8 pain pills left & is in a lot of pain.      Patient also stated he was to come back in for a post op in 2 weeks (system would not allow us to schedule).      [email or web address]  [address] [place], [place] [address] [address]  Phone: [phone] Fax: [phone]    Patient call back number is [phone]

## 2020-04-14 ENCOUNTER — TELEPHONE (OUTPATIENT)
Dept: VASCULAR SURGERY | Facility: CLINIC | Age: 55
End: 2020-04-14

## 2020-04-14 ENCOUNTER — OFFICE VISIT (OUTPATIENT)
Dept: VASCULAR SURGERY | Facility: CLINIC | Age: 55
End: 2020-04-14
Payer: MEDICAID

## 2020-04-14 VITALS — DIASTOLIC BLOOD PRESSURE: 95 MMHG | HEART RATE: 80 BPM | SYSTOLIC BLOOD PRESSURE: 142 MMHG

## 2020-04-14 DIAGNOSIS — Z95.1 S/P CABG (CORONARY ARTERY BYPASS GRAFT): ICD-10-CM

## 2020-04-14 PROCEDURE — 99999 PR PBB SHADOW E&M-EST. PATIENT-LVL II: ICD-10-PCS | Mod: PBBFAC,,, | Performed by: THORACIC SURGERY (CARDIOTHORACIC VASCULAR SURGERY)

## 2020-04-14 PROCEDURE — 99024 POSTOP FOLLOW-UP VISIT: CPT | Mod: ,,, | Performed by: THORACIC SURGERY (CARDIOTHORACIC VASCULAR SURGERY)

## 2020-04-14 PROCEDURE — 99999 PR PBB SHADOW E&M-EST. PATIENT-LVL II: CPT | Mod: PBBFAC,,, | Performed by: THORACIC SURGERY (CARDIOTHORACIC VASCULAR SURGERY)

## 2020-04-14 PROCEDURE — 99024 PR POST-OP FOLLOW-UP VISIT: ICD-10-PCS | Mod: ,,, | Performed by: THORACIC SURGERY (CARDIOTHORACIC VASCULAR SURGERY)

## 2020-04-14 PROCEDURE — 99212 OFFICE O/P EST SF 10 MIN: CPT | Mod: PBBFAC,PO | Performed by: THORACIC SURGERY (CARDIOTHORACIC VASCULAR SURGERY)

## 2020-04-14 RX ORDER — KETOROLAC TROMETHAMINE 10 MG/1
10 TABLET, FILM COATED ORAL EVERY 6 HOURS
Qty: 20 TABLET | Refills: 0
Start: 2020-04-14 | End: 2020-04-19

## 2020-04-14 RX ORDER — FUROSEMIDE 40 MG/1
40 TABLET ORAL DAILY
Qty: 5 TABLET | Refills: 0
Start: 2020-04-14 | End: 2020-06-05 | Stop reason: ALTCHOICE

## 2020-04-14 RX ORDER — OXYCODONE AND ACETAMINOPHEN 10; 325 MG/1; MG/1
1 TABLET ORAL EVERY 4 HOURS PRN
Qty: 28 TABLET | Refills: 0
Start: 2020-04-14 | End: 2020-04-22

## 2020-04-14 NOTE — TELEPHONE ENCOUNTER
----- Message from Iris Yao sent at 4/14/2020  8:55 AM CDT -----  Contact: Phyllis lee/ [facility]  Type: Needs Medical Advice  Who Called:  Phyllis    Best Call Back Number: [phone]  Additional Information: Pls call Phyllis regarding pt's meds. He has an appt for 9:15 and they had a question before his visit

## 2020-04-14 NOTE — TELEPHONE ENCOUNTER
Ok to increase Lisinopril to 20 mg until f/u appt with cardiology (Selena) on 4/30 given to Home health per Dr. Solitario.

## 2020-04-14 NOTE — PROGRESS NOTES
This patient is status post coronary artery bypass grafting.  He comes back to the office today in follow-up.  He complains of chest pain and difficulty taking deep breaths.  His surgical bypass was done within the last 7 days.  His medicines are noted and are part of the epic record.  His problem list was reviewed.    On exam vital signs are stable.  His surgical wounds are clean and dry.  His chest tube stitches were removed.  At this point I have encouraged him to continue routine postoperative activity with spirometry and walking on a regular basis.  I have refilled his prescription for Percocet.  I have also given him a prescription for Toradol.  He can see me on an as-needed basis.  But I would anticipate gradual improvement.

## 2020-04-22 ENCOUNTER — HOSPITAL ENCOUNTER (INPATIENT)
Facility: HOSPITAL | Age: 55
LOS: 5 days | Discharge: HOME | DRG: 862 | End: 2020-04-27
Attending: EMERGENCY MEDICINE | Admitting: STUDENT IN AN ORGANIZED HEALTH CARE EDUCATION/TRAINING PROGRAM
Payer: MEDICAID

## 2020-04-22 ENCOUNTER — CLINICAL SUPPORT (OUTPATIENT)
Dept: CARDIOLOGY | Facility: HOSPITAL | Age: 55
DRG: 862 | End: 2020-04-22
Attending: EMERGENCY MEDICINE
Payer: MEDICAID

## 2020-04-22 DIAGNOSIS — Z95.1 S/P CABG (CORONARY ARTERY BYPASS GRAFT): ICD-10-CM

## 2020-04-22 DIAGNOSIS — R78.81 BACTEREMIA DUE TO GRAM-POSITIVE BACTERIA: Primary | ICD-10-CM

## 2020-04-22 DIAGNOSIS — R07.9 CHEST PAIN: ICD-10-CM

## 2020-04-22 DIAGNOSIS — R94.31 ABNORMAL EKG: ICD-10-CM

## 2020-04-22 DIAGNOSIS — R94.31 ABNORMAL ECG: ICD-10-CM

## 2020-04-22 LAB
ALBUMIN SERPL BCP-MCNC: 3.6 G/DL (ref 3.5–5.2)
ALP SERPL-CCNC: 54 U/L (ref 55–135)
ALT SERPL W/O P-5'-P-CCNC: 18 U/L (ref 10–44)
ANION GAP SERPL CALC-SCNC: 10 MMOL/L (ref 8–16)
ANION GAP SERPL CALC-SCNC: 11 MMOL/L (ref 8–16)
AORTIC ROOT ANNULUS: 3.6 CM
AORTIC VALVE CUSP SEPERATION: 2.32 CM
AST SERPL-CCNC: 22 U/L (ref 10–40)
AV INDEX (PROSTH): 1.03
AV MEAN GRADIENT: 5 MMHG
AV PEAK GRADIENT: 8 MMHG
AV VALVE AREA: 3.39 CM2
AV VELOCITY RATIO: 87.58
BACTERIA #/AREA URNS HPF: NEGATIVE /HPF
BASOPHILS # BLD AUTO: 0.1 K/UL (ref 0–0.2)
BASOPHILS NFR BLD: 0.6 % (ref 0–1.9)
BILIRUB SERPL-MCNC: 0.7 MG/DL (ref 0.1–1)
BILIRUB UR QL STRIP: NEGATIVE
BNP SERPL-MCNC: 80 PG/ML (ref 0–99)
BSA FOR ECHO PROCEDURE: 2.48 M2
BUN SERPL-MCNC: 14 MG/DL (ref 6–20)
BUN SERPL-MCNC: 22 MG/DL (ref 6–20)
CALCIUM SERPL-MCNC: 9.1 MG/DL (ref 8.7–10.5)
CALCIUM SERPL-MCNC: 9.1 MG/DL (ref 8.7–10.5)
CHLORIDE SERPL-SCNC: 93 MMOL/L (ref 95–110)
CHLORIDE SERPL-SCNC: 96 MMOL/L (ref 95–110)
CK SERPL-CCNC: 76 U/L (ref 20–200)
CLARITY UR: CLEAR
CO2 SERPL-SCNC: 25 MMOL/L (ref 23–29)
CO2 SERPL-SCNC: 26 MMOL/L (ref 23–29)
COLOR UR: YELLOW
CREAT SERPL-MCNC: 1.3 MG/DL (ref 0.5–1.4)
CREAT SERPL-MCNC: 1.7 MG/DL (ref 0.5–1.4)
CRP SERPL-MCNC: 1.99 MG/DL (ref 0–0.75)
CV ECHO LV RWT: 0.41 CM
DIFFERENTIAL METHOD: ABNORMAL
DOP CALC AO PEAK VEL: 1.39 M/S
DOP CALC AO VTI: 20.18 CM
DOP CALC LVOT AREA: 3.3 CM2
DOP CALC LVOT DIAMETER: 2.05 CM
DOP CALC LVOT PEAK VEL: 121.73 M/S
DOP CALC LVOT STROKE VOLUME: 68.45 CM3
DOP CALCLVOT PEAK VEL VTI: 20.75 CM
E WAVE DECELERATION TIME: 190.62 MSEC
E/A RATIO: 1.12
E/E' RATIO: 7.6 M/S
ECHO LV POSTERIOR WALL: 1.08 CM (ref 0.6–1.1)
EOSINOPHIL # BLD AUTO: 0.4 K/UL (ref 0–0.5)
EOSINOPHIL NFR BLD: 2.5 % (ref 0–8)
ERYTHROCYTE [DISTWIDTH] IN BLOOD BY AUTOMATED COUNT: 12.5 % (ref 11.5–14.5)
EST. GFR  (AFRICAN AMERICAN): 52.4 ML/MIN/1.73 M^2
EST. GFR  (AFRICAN AMERICAN): >60 ML/MIN/1.73 M^2
EST. GFR  (NON AFRICAN AMERICAN): 45.4 ML/MIN/1.73 M^2
EST. GFR  (NON AFRICAN AMERICAN): >60 ML/MIN/1.73 M^2
FERRITIN SERPL-MCNC: 229 NG/ML (ref 20–300)
FRACTIONAL SHORTENING: 24 % (ref 28–44)
GLUCOSE SERPL-MCNC: 104 MG/DL (ref 70–110)
GLUCOSE SERPL-MCNC: 120 MG/DL (ref 70–110)
GLUCOSE SERPL-MCNC: 192 MG/DL (ref 70–110)
GLUCOSE SERPL-MCNC: 313 MG/DL (ref 70–110)
GLUCOSE SERPL-MCNC: 357 MG/DL (ref 70–110)
GLUCOSE UR QL STRIP: ABNORMAL
HCT VFR BLD AUTO: 40.3 % (ref 40–54)
HGB BLD-MCNC: 13.2 G/DL (ref 14–18)
HGB UR QL STRIP: NEGATIVE
HYALINE CASTS #/AREA URNS LPF: 0 /LPF
IMM GRANULOCYTES # BLD AUTO: 0.1 K/UL (ref 0–0.04)
IMM GRANULOCYTES NFR BLD AUTO: 0.6 % (ref 0–0.5)
INTERVENTRICULAR SEPTUM: 1.08 CM (ref 0.6–1.1)
KETONES UR QL STRIP: NEGATIVE
LACTATE SERPL-SCNC: 2.1 MMOL/L (ref 0.5–1.9)
LDH SERPL L TO P-CCNC: 227 U/L (ref 110–260)
LEFT ATRIUM SIZE: 3.72 CM
LEFT INTERNAL DIMENSION IN SYSTOLE: 3.96 CM (ref 2.1–4)
LEFT VENTRICLE DIASTOLIC VOLUME INDEX: 80.6 ML/M2
LEFT VENTRICLE DIASTOLIC VOLUME: 193.1 ML
LEFT VENTRICLE MASS INDEX: 91 G/M2
LEFT VENTRICLE SYSTOLIC VOLUME INDEX: 37.1 ML/M2
LEFT VENTRICLE SYSTOLIC VOLUME: 88.8 ML
LEFT VENTRICULAR INTERNAL DIMENSION IN DIASTOLE: 5.24 CM (ref 3.5–6)
LEFT VENTRICULAR MASS: 218.04 G
LEUKOCYTE ESTERASE UR QL STRIP: NEGATIVE
LV LATERAL E/E' RATIO: 6.91 M/S
LV SEPTAL E/E' RATIO: 8.44 M/S
LYMPHOCYTES # BLD AUTO: 2.9 K/UL (ref 1–4.8)
LYMPHOCYTES NFR BLD: 18.4 % (ref 18–48)
MCH RBC QN AUTO: 30.3 PG (ref 27–31)
MCHC RBC AUTO-ENTMCNC: 32.8 G/DL (ref 32–36)
MCV RBC AUTO: 92 FL (ref 82–98)
MICROSCOPIC COMMENT: ABNORMAL
MONOCYTES # BLD AUTO: 0.7 K/UL (ref 0.3–1)
MONOCYTES NFR BLD: 4.5 % (ref 4–15)
MV PEAK A VEL: 0.68 M/S
MV PEAK E VEL: 0.76 M/S
NEUTROPHILS # BLD AUTO: 11.4 K/UL (ref 1.8–7.7)
NEUTROPHILS NFR BLD: 73.4 % (ref 38–73)
NITRITE UR QL STRIP: NEGATIVE
NRBC BLD-RTO: 0 /100 WBC
PH UR STRIP: 6 [PH] (ref 5–8)
PISA TR MAX VEL: 2.49 M/S
PLATELET # BLD AUTO: 553 K/UL (ref 150–350)
PMV BLD AUTO: 9.5 FL (ref 9.2–12.9)
POTASSIUM SERPL-SCNC: 4.1 MMOL/L (ref 3.5–5.1)
POTASSIUM SERPL-SCNC: 5.5 MMOL/L (ref 3.5–5.1)
PROCALCITONIN SERPL IA-MCNC: 0.09 NG/ML (ref 0–0.5)
PROCALCITONIN SERPL IA-MCNC: 0.1 NG/ML (ref 0–0.5)
PROT SERPL-MCNC: 8.1 G/DL (ref 6–8.4)
PROT UR QL STRIP: ABNORMAL
PV PEAK VELOCITY: 88 CM/S
RA PRESSURE: 8 MMHG
RBC # BLD AUTO: 4.36 M/UL (ref 4.6–6.2)
RBC #/AREA URNS HPF: 1 /HPF (ref 0–4)
SARS-COV-2 RDRP RESP QL NAA+PROBE: NEGATIVE
SODIUM SERPL-SCNC: 129 MMOL/L (ref 136–145)
SODIUM SERPL-SCNC: 132 MMOL/L (ref 136–145)
SP GR UR STRIP: >1.03 (ref 1–1.03)
SQUAMOUS #/AREA URNS HPF: 1 /HPF
TDI LATERAL: 0.11 M/S
TDI SEPTAL: 0.09 M/S
TDI: 0.1 M/S
TR MAX PG: 25 MMHG
TROPONIN I SERPL DL<=0.01 NG/ML-MCNC: <0.03 NG/ML
TV REST PULMONARY ARTERY PRESSURE: 33 MMHG
URN SPEC COLLECT METH UR: ABNORMAL
UROBILINOGEN UR STRIP-ACNC: NEGATIVE EU/DL
WBC # BLD AUTO: 15.51 K/UL (ref 3.9–12.7)
WBC #/AREA URNS HPF: 9 /HPF (ref 0–5)
YEAST URNS QL MICRO: ABNORMAL

## 2020-04-22 PROCEDURE — 25000003 PHARM REV CODE 250: Performed by: EMERGENCY MEDICINE

## 2020-04-22 PROCEDURE — G0378 HOSPITAL OBSERVATION PER HR: HCPCS

## 2020-04-22 PROCEDURE — 81001 URINALYSIS AUTO W/SCOPE: CPT

## 2020-04-22 PROCEDURE — 84484 ASSAY OF TROPONIN QUANT: CPT

## 2020-04-22 PROCEDURE — 80053 COMPREHEN METABOLIC PANEL: CPT

## 2020-04-22 PROCEDURE — 82962 GLUCOSE BLOOD TEST: CPT

## 2020-04-22 PROCEDURE — 87077 CULTURE AEROBIC IDENTIFY: CPT

## 2020-04-22 PROCEDURE — 87186 SC STD MICRODIL/AGAR DIL: CPT

## 2020-04-22 PROCEDURE — 83880 ASSAY OF NATRIURETIC PEPTIDE: CPT

## 2020-04-22 PROCEDURE — 93005 ELECTROCARDIOGRAM TRACING: CPT | Performed by: INTERNAL MEDICINE

## 2020-04-22 PROCEDURE — 36415 COLL VENOUS BLD VENIPUNCTURE: CPT

## 2020-04-22 PROCEDURE — 25000003 PHARM REV CODE 250: Performed by: STUDENT IN AN ORGANIZED HEALTH CARE EDUCATION/TRAINING PROGRAM

## 2020-04-22 PROCEDURE — 96375 TX/PRO/DX INJ NEW DRUG ADDON: CPT

## 2020-04-22 PROCEDURE — 84484 ASSAY OF TROPONIN QUANT: CPT | Mod: 91

## 2020-04-22 PROCEDURE — 82728 ASSAY OF FERRITIN: CPT

## 2020-04-22 PROCEDURE — 87147 CULTURE TYPE IMMUNOLOGIC: CPT

## 2020-04-22 PROCEDURE — 25000003 PHARM REV CODE 250: Performed by: FAMILY MEDICINE

## 2020-04-22 PROCEDURE — 82550 ASSAY OF CK (CPK): CPT

## 2020-04-22 PROCEDURE — U0002 COVID-19 LAB TEST NON-CDC: HCPCS

## 2020-04-22 PROCEDURE — 99285 EMERGENCY DEPT VISIT HI MDM: CPT | Mod: 25

## 2020-04-22 PROCEDURE — 83615 LACTATE (LD) (LDH) ENZYME: CPT

## 2020-04-22 PROCEDURE — 84145 PROCALCITONIN (PCT): CPT

## 2020-04-22 PROCEDURE — 80048 BASIC METABOLIC PNL TOTAL CA: CPT

## 2020-04-22 PROCEDURE — 63600175 PHARM REV CODE 636 W HCPCS: Performed by: EMERGENCY MEDICINE

## 2020-04-22 PROCEDURE — 85025 COMPLETE CBC W/AUTO DIFF WBC: CPT

## 2020-04-22 PROCEDURE — 63600175 PHARM REV CODE 636 W HCPCS: Performed by: STUDENT IN AN ORGANIZED HEALTH CARE EDUCATION/TRAINING PROGRAM

## 2020-04-22 PROCEDURE — 96376 TX/PRO/DX INJ SAME DRUG ADON: CPT

## 2020-04-22 PROCEDURE — 21400001 HC TELEMETRY ROOM

## 2020-04-22 PROCEDURE — 83605 ASSAY OF LACTIC ACID: CPT

## 2020-04-22 PROCEDURE — 87040 BLOOD CULTURE FOR BACTERIA: CPT

## 2020-04-22 PROCEDURE — 25500020 PHARM REV CODE 255: Performed by: EMERGENCY MEDICINE

## 2020-04-22 PROCEDURE — 96374 THER/PROPH/DIAG INJ IV PUSH: CPT

## 2020-04-22 PROCEDURE — 93306 TTE W/DOPPLER COMPLETE: CPT

## 2020-04-22 PROCEDURE — 86140 C-REACTIVE PROTEIN: CPT

## 2020-04-22 RX ORDER — ROSUVASTATIN CALCIUM 20 MG/1
20 TABLET, COATED ORAL NIGHTLY
Status: DISCONTINUED | OUTPATIENT
Start: 2020-04-22 | End: 2020-04-27 | Stop reason: HOSPADM

## 2020-04-22 RX ORDER — SODIUM,POTASSIUM PHOSPHATES 280-250MG
2 POWDER IN PACKET (EA) ORAL
Status: DISCONTINUED | OUTPATIENT
Start: 2020-04-22 | End: 2020-04-27 | Stop reason: HOSPADM

## 2020-04-22 RX ORDER — METHYLPREDNISOLONE SOD SUCC 125 MG
125 VIAL (EA) INJECTION EVERY 6 HOURS
Status: COMPLETED | OUTPATIENT
Start: 2020-04-22 | End: 2020-04-23

## 2020-04-22 RX ORDER — INSULIN ASPART 100 [IU]/ML
1-10 INJECTION, SOLUTION INTRAVENOUS; SUBCUTANEOUS
Status: DISCONTINUED | OUTPATIENT
Start: 2020-04-22 | End: 2020-04-27 | Stop reason: HOSPADM

## 2020-04-22 RX ORDER — SODIUM CHLORIDE 0.9 % (FLUSH) 0.9 %
10 SYRINGE (ML) INJECTION
Status: DISCONTINUED | OUTPATIENT
Start: 2020-04-22 | End: 2020-04-27 | Stop reason: HOSPADM

## 2020-04-22 RX ORDER — MORPHINE SULFATE 4 MG/ML
4 INJECTION, SOLUTION INTRAMUSCULAR; INTRAVENOUS EVERY 4 HOURS PRN
Status: DISCONTINUED | OUTPATIENT
Start: 2020-04-22 | End: 2020-04-22

## 2020-04-22 RX ORDER — ASPIRIN 81 MG/1
81 TABLET ORAL DAILY
Status: DISCONTINUED | OUTPATIENT
Start: 2020-04-23 | End: 2020-04-27 | Stop reason: HOSPADM

## 2020-04-22 RX ORDER — MORPHINE SULFATE 2 MG/ML
2 INJECTION, SOLUTION INTRAMUSCULAR; INTRAVENOUS
Status: DISCONTINUED | OUTPATIENT
Start: 2020-04-22 | End: 2020-04-22

## 2020-04-22 RX ORDER — HYDROCHLOROTHIAZIDE 12.5 MG/1
12.5 TABLET ORAL DAILY
Status: DISCONTINUED | OUTPATIENT
Start: 2020-04-23 | End: 2020-04-23

## 2020-04-22 RX ORDER — POTASSIUM CHLORIDE 20 MEQ/15ML
40 SOLUTION ORAL
Status: DISCONTINUED | OUTPATIENT
Start: 2020-04-22 | End: 2020-04-27 | Stop reason: HOSPADM

## 2020-04-22 RX ORDER — RANOLAZINE 500 MG/1
1000 TABLET, EXTENDED RELEASE ORAL 2 TIMES DAILY
Status: DISCONTINUED | OUTPATIENT
Start: 2020-04-22 | End: 2020-04-23

## 2020-04-22 RX ORDER — MORPHINE SULFATE 2 MG/ML
2 INJECTION, SOLUTION INTRAMUSCULAR; INTRAVENOUS
Status: COMPLETED | OUTPATIENT
Start: 2020-04-22 | End: 2020-04-22

## 2020-04-22 RX ORDER — NITROGLYCERIN 0.4 MG/1
0.4 TABLET SUBLINGUAL EVERY 5 MIN PRN
Status: DISCONTINUED | OUTPATIENT
Start: 2020-04-22 | End: 2020-04-27 | Stop reason: HOSPADM

## 2020-04-22 RX ORDER — ISOSORBIDE MONONITRATE 30 MG/1
30 TABLET, EXTENDED RELEASE ORAL DAILY
Status: DISCONTINUED | OUTPATIENT
Start: 2020-04-23 | End: 2020-04-27 | Stop reason: HOSPADM

## 2020-04-22 RX ORDER — IBUPROFEN 200 MG
16 TABLET ORAL
Status: DISCONTINUED | OUTPATIENT
Start: 2020-04-22 | End: 2020-04-27 | Stop reason: HOSPADM

## 2020-04-22 RX ORDER — LANOLIN ALCOHOL/MO/W.PET/CERES
800 CREAM (GRAM) TOPICAL
Status: DISCONTINUED | OUTPATIENT
Start: 2020-04-22 | End: 2020-04-27 | Stop reason: HOSPADM

## 2020-04-22 RX ORDER — CLONAZEPAM 1 MG/1
1 TABLET ORAL 3 TIMES DAILY PRN
Status: DISCONTINUED | OUTPATIENT
Start: 2020-04-22 | End: 2020-04-27 | Stop reason: HOSPADM

## 2020-04-22 RX ORDER — IBUPROFEN 200 MG
24 TABLET ORAL
Status: DISCONTINUED | OUTPATIENT
Start: 2020-04-22 | End: 2020-04-27 | Stop reason: HOSPADM

## 2020-04-22 RX ORDER — METOPROLOL SUCCINATE 50 MG/1
50 TABLET, EXTENDED RELEASE ORAL DAILY
Status: DISCONTINUED | OUTPATIENT
Start: 2020-04-23 | End: 2020-04-27 | Stop reason: HOSPADM

## 2020-04-22 RX ORDER — GABAPENTIN 300 MG/1
300 CAPSULE ORAL 3 TIMES DAILY
Status: DISCONTINUED | OUTPATIENT
Start: 2020-04-22 | End: 2020-04-27 | Stop reason: HOSPADM

## 2020-04-22 RX ORDER — ONDANSETRON 2 MG/ML
4 INJECTION INTRAMUSCULAR; INTRAVENOUS
Status: DISCONTINUED | OUTPATIENT
Start: 2020-04-22 | End: 2020-04-22

## 2020-04-22 RX ORDER — ACETAMINOPHEN 325 MG/1
650 TABLET ORAL EVERY 4 HOURS PRN
Status: DISCONTINUED | OUTPATIENT
Start: 2020-04-22 | End: 2020-04-27 | Stop reason: HOSPADM

## 2020-04-22 RX ORDER — METHYLPREDNISOLONE SOD SUCC 125 MG
125 VIAL (EA) INJECTION
Status: COMPLETED | OUTPATIENT
Start: 2020-04-22 | End: 2020-04-22

## 2020-04-22 RX ORDER — TAMSULOSIN HYDROCHLORIDE 0.4 MG/1
0.4 CAPSULE ORAL NIGHTLY
Status: DISCONTINUED | OUTPATIENT
Start: 2020-04-22 | End: 2020-04-27 | Stop reason: HOSPADM

## 2020-04-22 RX ORDER — OXYCODONE AND ACETAMINOPHEN 10; 325 MG/1; MG/1
1 TABLET ORAL EVERY 4 HOURS PRN
Status: DISCONTINUED | OUTPATIENT
Start: 2020-04-22 | End: 2020-04-27 | Stop reason: HOSPADM

## 2020-04-22 RX ORDER — LISINOPRIL 20 MG/1
20 TABLET ORAL DAILY
Status: DISCONTINUED | OUTPATIENT
Start: 2020-04-23 | End: 2020-04-23

## 2020-04-22 RX ORDER — TALC
6 POWDER (GRAM) TOPICAL NIGHTLY PRN
Status: DISCONTINUED | OUTPATIENT
Start: 2020-04-22 | End: 2020-04-27 | Stop reason: HOSPADM

## 2020-04-22 RX ORDER — ONDANSETRON 2 MG/ML
4 INJECTION INTRAMUSCULAR; INTRAVENOUS
Status: COMPLETED | OUTPATIENT
Start: 2020-04-22 | End: 2020-04-22

## 2020-04-22 RX ORDER — GLUCAGON 1 MG
1 KIT INJECTION
Status: DISCONTINUED | OUTPATIENT
Start: 2020-04-22 | End: 2020-04-27 | Stop reason: HOSPADM

## 2020-04-22 RX ORDER — ACETAMINOPHEN 325 MG/1
650 TABLET ORAL EVERY 8 HOURS PRN
Status: DISCONTINUED | OUTPATIENT
Start: 2020-04-22 | End: 2020-04-27 | Stop reason: HOSPADM

## 2020-04-22 RX ORDER — MORPHINE SULFATE 2 MG/ML
2 INJECTION, SOLUTION INTRAMUSCULAR; INTRAVENOUS ONCE
Status: COMPLETED | OUTPATIENT
Start: 2020-04-22 | End: 2020-04-22

## 2020-04-22 RX ORDER — AMOXICILLIN 250 MG
1 CAPSULE ORAL 2 TIMES DAILY
Status: DISCONTINUED | OUTPATIENT
Start: 2020-04-22 | End: 2020-04-27 | Stop reason: HOSPADM

## 2020-04-22 RX ORDER — ONDANSETRON 4 MG/1
8 TABLET, ORALLY DISINTEGRATING ORAL EVERY 8 HOURS PRN
Status: DISCONTINUED | OUTPATIENT
Start: 2020-04-22 | End: 2020-04-27 | Stop reason: HOSPADM

## 2020-04-22 RX ORDER — MORPHINE SULFATE 2 MG/ML
2 INJECTION, SOLUTION INTRAMUSCULAR; INTRAVENOUS EVERY 4 HOURS PRN
Status: DISCONTINUED | OUTPATIENT
Start: 2020-04-22 | End: 2020-04-27 | Stop reason: HOSPADM

## 2020-04-22 RX ADMIN — GABAPENTIN 300 MG: 300 CAPSULE ORAL at 09:04

## 2020-04-22 RX ADMIN — OXYCODONE HYDROCHLORIDE AND ACETAMINOPHEN 1 TABLET: 10; 325 TABLET ORAL at 01:04

## 2020-04-22 RX ADMIN — MORPHINE SULFATE 2 MG: 2 INJECTION, SOLUTION INTRAMUSCULAR; INTRAVENOUS at 08:04

## 2020-04-22 RX ADMIN — MORPHINE SULFATE 2 MG: 2 INJECTION, SOLUTION INTRAMUSCULAR; INTRAVENOUS at 07:04

## 2020-04-22 RX ADMIN — ROSUVASTATIN CALCIUM 20 MG: 20 TABLET, FILM COATED ORAL at 09:04

## 2020-04-22 RX ADMIN — ONDANSETRON 4 MG: 2 INJECTION INTRAMUSCULAR; INTRAVENOUS at 07:04

## 2020-04-22 RX ADMIN — IBUPROFEN 600 MG: 400 TABLET, FILM COATED ORAL at 10:04

## 2020-04-22 RX ADMIN — APIXABAN 5 MG: 5 TABLET, FILM COATED ORAL at 09:04

## 2020-04-22 RX ADMIN — METHYLPREDNISOLONE SODIUM SUCCINATE 125 MG: 125 INJECTION, POWDER, FOR SOLUTION INTRAMUSCULAR; INTRAVENOUS at 05:04

## 2020-04-22 RX ADMIN — METHYLPREDNISOLONE SODIUM SUCCINATE 125 MG: 125 INJECTION, POWDER, FOR SOLUTION INTRAMUSCULAR; INTRAVENOUS at 10:04

## 2020-04-22 RX ADMIN — MORPHINE SULFATE 2 MG: 2 INJECTION, SOLUTION INTRAMUSCULAR; INTRAVENOUS at 05:04

## 2020-04-22 RX ADMIN — ACETAMINOPHEN 650 MG: 325 TABLET ORAL at 09:04

## 2020-04-22 RX ADMIN — OXYCODONE HYDROCHLORIDE AND ACETAMINOPHEN 1 TABLET: 10; 325 TABLET ORAL at 09:04

## 2020-04-22 RX ADMIN — SENNOSIDES AND DOCUSATE SODIUM 1 TABLET: 8.6; 5 TABLET ORAL at 01:04

## 2020-04-22 RX ADMIN — RANOLAZINE 1000 MG: 500 TABLET, EXTENDED RELEASE ORAL at 01:04

## 2020-04-22 RX ADMIN — IOHEXOL 100 ML: 350 INJECTION, SOLUTION INTRAVENOUS at 09:04

## 2020-04-22 RX ADMIN — CLONAZEPAM 1 MG: 1 TABLET ORAL at 01:04

## 2020-04-22 RX ADMIN — NITROGLYCERIN 0.5 INCH: 20 OINTMENT TOPICAL at 08:04

## 2020-04-22 RX ADMIN — INSULIN ASPART 5 UNITS: 100 INJECTION, SOLUTION INTRAVENOUS; SUBCUTANEOUS at 09:04

## 2020-04-22 RX ADMIN — TAMSULOSIN HYDROCHLORIDE 0.4 MG: 0.4 CAPSULE ORAL at 09:04

## 2020-04-22 RX ADMIN — MORPHINE SULFATE 2 MG: 2 INJECTION, SOLUTION INTRAMUSCULAR; INTRAVENOUS at 10:04

## 2020-04-22 RX ADMIN — CEFTRIAXONE 1 G: 1 INJECTION, SOLUTION INTRAVENOUS at 10:04

## 2020-04-22 RX ADMIN — RANOLAZINE 1000 MG: 500 TABLET, EXTENDED RELEASE ORAL at 09:04

## 2020-04-22 RX ADMIN — GABAPENTIN 300 MG: 300 CAPSULE ORAL at 02:04

## 2020-04-22 NOTE — ED TRIAGE NOTES
Pt states he began having chest pain last night that continued today. Pt states moving makes the pain worse but it is otherwise constant

## 2020-04-22 NOTE — HPI
55 yo M with PMH of CAD s/p 2vCABG (4/8/20 [place])  c/o bilateral chest pain that started late last night worse with movement and deep breath. He denies associated sob, nausea/vomiting or diaphoresis. He reports chronic cough 2/2 hx of tobacco use (he states that he quit earlier in the month). Denies fever, chills, sick contacts. States that he has been social distancing and self-quarantining.   He states that has been doing well post-op and walked about 2 miles yesterday afternoon without cp or sob.       Spoke with ER physician Dr. Blackwell, who reports that he Spoke with Cardiology (Selena and Rosmery) who ordered Urgent ECHO showing no wall motion abnormality which made them less concerned for acute stemi. 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     In ED:   CTA negative for PE, small effusions vs atelectasis  WBC 15, LA wnl, PCT wnl  Meds: IV morphine 2 mg x 3, Solumedrol, zofran, rocephin

## 2020-04-22 NOTE — H&P
[facility]  History & Physical    Patient Name: Magdaleno Cunningham  MRN: [id number]  Admission Date: 4/22/2020  Attending Physician: Emily Borrego DO   Primary Care Provider: Antonio Del Cid MD         Patient information was obtained from patient, past medical records and ER records.     Subjective:     Principal Problem:Chest pain    Chief Complaint:   Chief Complaint   Patient presents with    Chest Pain        HPI: 53 yo M with PMH of CAD s/p 2vCABG (4/8/20 [place])  c/o bilateral chest pain that started late last night worse with movement and deep breath. He denies associated sob, nausea/vomiting or diaphoresis. He reports chronic cough 2/2 hx of tobacco use (he states that he quit earlier in the month). Denies fever, chills, sick contacts. States that he has been social distancing and self-quarantining.   He states that has been doing well post-op and walked about 2 miles yesterday afternoon without cp or sob.       Spoke with ER physician Dr. Blackwell, who reports that he Spoke with Cardiology (Olga) who ordered Urgent ECHO showing no wall motion abnormality which made them less concerned for acute stemi. 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     In ED:   CTA negative for PE, small effusions vs atelectasis  WBC 15, LA wnl, PCT wnl  Meds: IV morphine 2 mg x 3, Solumedrol, zofran, rocephin     Past Medical History:   Diagnosis Date    Anticoagulant long-term use     Atrial fibrillation     BPH (benign prostatic hyperplasia)     Cataract     COPD (chronic obstructive pulmonary disease)     Coronary artery disease     stents    CVD (cardiovascular disease)     Diabetes mellitus     Erythema multiforme     AKA Denton Edmondson Syndrome    Hypertension     MI (myocardial infarction)     per pt he has had 4     ROSAMARIA on CPAP     RLS (restless legs syndrome)        Past Surgical History:   Procedure Laterality Date    CORONARY  ARTERY BYPASS GRAFT (CABG) N/A 4/7/2020    Procedure: CORONARY ARTERY BYPASS GRAFT (CABG)- Excision of left atrial appendage;  Surgeon: Doug Solitario MD;  Location: [facility] OR;  Service: Cardiothoracic;  Laterality: N/A;    CORONARY STENT PLACEMENT      WILSON MAZE PROCEDURE N/A 4/7/2020    Procedure: WILSON MAZE PROCEDURE- Attempted;  Surgeon: Doug Solitario MD;  Location: [facility] OR;  Service: Cardiothoracic;  Laterality: N/A;    CYSTOSCOPY N/A 12/10/2018    Procedure: CYSTOSCOPY;  Surgeon: Khushboo Abreu MD;  Location: [facility] OR;  Service: Urology;  Laterality: N/A;    ENDOSCOPIC HARVEST OF VEIN Left 4/7/2020    Procedure: HARVEST-VEIN-ENDOVASCULAR;  Surgeon: Doug Solitario MD;  Location: [facility] OR;  Service: Cardiothoracic;  Laterality: Left;    LEFT HEART CATHETERIZATION Left 3/17/2020    Procedure: CATHETERIZATION, HEART, LEFT;  Surgeon: Tyree Walters MD;  Location: [facility] CATH[facility]EP LAB;  Service: Cardiology;  Laterality: Left;    ULTRASOUND OF PROSTATE FOR VOLUME DETERMINATION  12/10/2018    Procedure: ULTRASOUND, PROSTATE, FOR VOLUME DETERMINATION;  Surgeon: Khushboo Abreu MD;  Location: [facility] OR;  Service: Urology;;       Review of patient's allergies indicates:   Allergen Reactions    Pesticide Dermatitis and Rash       No current facility-administered medications on file prior to encounter.      Current Outpatient Medications on File Prior to Encounter   Medication Sig    apixaban (ELIQUIS) 5 mg Tab Take 5 mg by mouth 2 (two) times daily.    aspirin 81 MG Chew Take 81 mg by mouth once daily.     chlorhexidine (PERIDEX) 0.12 % solution Use as directed 10 mLs in the mouth or throat 2 (two) times daily.    clonazePAM (KLONOPIN) 1 MG tablet Take 1 mg by mouth 3 (three) times daily as needed for Anxiety.     empagliflozin (JARDIANCE) 10 mg tablet Take 10 mg by mouth once daily.    fenofibrate (TRICOR) 145 MG tablet Take 145 mg by mouth every evening.     gabapentin  (NEURONTIN) 600 MG tablet Take 600 mg by mouth 3 (three) times daily.     glimepiride (AMARYL) 4 MG tablet Take 4 mg by mouth 2 (two) times daily.    hydroCHLOROthiazide (HYDRODIURIL) 12.5 MG Tab Take 12.5 mg by mouth once daily.    isosorbide mononitrate (IMDUR) 30 MG 24 hr tablet Take 1 tablet (30 mg total) by mouth once daily.    LANTUS SOLOSTAR U-100 INSULIN glargine 100 units/mL (3mL) SubQ pen Inject 30 Units into the skin 2 (two) times daily.     lisinopriL 10 MG tablet Take 10 mg by mouth once daily.     metFORMIN (GLUCOPHAGE) 1000 MG tablet Take 1,000 mg by mouth 2 (two) times daily with meals.     metoprolol succinate (TOPROL-XL) 100 MG 24 hr tablet Take 100 mg by mouth once daily.     rosuvastatin (CRESTOR) 20 MG tablet Take 20 mg by mouth every evening.     sotaloL (BETAPACE) 80 MG tablet Take 80 mg by mouth 2 (two) times daily.    ACCU-CHEK GUIDE Strp USE 1 STRIP TO CHECK BLOOD SUGAR TWICE DAILY    furosemide (LASIX) 40 MG tablet Take 1 tablet (40 mg total) by mouth once daily. for 5 days    nitroGLYCERIN (NITROSTAT) 0.4 MG SL tablet Place 0.4 mg under the tongue every 5 (five) minutes as needed for Chest pain.    [DISCONTINUED] oxyCODONE-acetaminophen (PERCOCET)  mg per tablet Take 1 tablet by mouth every 4 (four) hours as needed.    [DISCONTINUED] ranolazine (RANEXA) 500 MG Tb12 Take 2 tablets (1,000 mg total) by mouth 2 (two) times daily.    [DISCONTINUED] tamsulosin (FLOMAX) 0.4 mg Cap Take 0.4 mg by mouth every evening.      Family History     Problem Relation (Age of Onset)    Diabetes Mother    Heart disease Mother    Hyperlipidemia Father        Tobacco Use    Smoking status: Former Smoker     Packs/day: 1.00     Years: 30.00     Pack years: 30.00     Types: Cigarettes     Last attempt to quit: 2020     Years since quittin.0    Smokeless tobacco: Never Used    Tobacco comment: since 10/2019 down to 3 cig per day   Substance and Sexual Activity    Alcohol use: Not  Currently    Drug use: Not Currently    Sexual activity: Not on file     Review of Systems   Constitutional: Negative for activity change, chills, fatigue and fever.   HENT: Negative for congestion, rhinorrhea and sore throat.    Eyes: Negative for visual disturbance.   Respiratory: Positive for cough. Negative for chest tightness and shortness of breath.    Cardiovascular: Positive for chest pain. Negative for palpitations and leg swelling.   Gastrointestinal: Negative for abdominal pain, constipation, diarrhea, nausea and vomiting.   Genitourinary: Negative for dysuria and hematuria.   Musculoskeletal: Negative for arthralgias and myalgias.   Skin: Negative for rash.   Neurological: Negative for dizziness, light-headedness and headaches.   Psychiatric/Behavioral: Negative for agitation. The patient is not nervous/anxious.         Objective:     Vital Signs (Most Recent):  Temp: 98.1 °F (36.7 °C) (04/22/20 0732)  Pulse: 89 (04/22/20 1131)  Resp: (!) 31 (04/22/20 1131)  BP: 110/65 (04/22/20 1130)  SpO2: 95 % (04/22/20 1131) Vital Signs (24h Range):  Temp:  [98.1 °F (36.7 °C)] 98.1 °F (36.7 °C)  Pulse:  [78-89] 89  Resp:  [24-31] 31  SpO2:  [95 %-100 %] 95 %  BP: (110-193)/() 110/65     Weight: 122.5 kg (270 lb)  Body mass index is 37.66 kg/m².    Physical Exam   Constitutional: He is oriented to person, place, and time. He appears well-developed and well-nourished. No distress.   Obvious discomfort    HENT:   Head: Normocephalic and atraumatic.   Mouth/Throat: Oropharynx is clear and moist.   Eyes: Pupils are equal, round, and reactive to light. Conjunctivae and EOM are normal.   Neck: Normal range of motion. Neck supple. No JVD present.   Cardiovascular: Normal rate, regular rhythm, normal heart sounds and intact distal pulses.   Chest pain not reproducible    Pulmonary/Chest: Effort normal and breath sounds normal. No respiratory distress.   Abdominal: Soft. Bowel sounds are normal. He exhibits no  distension. There is no tenderness. There is no guarding.   Genitourinary:   Genitourinary Comments: No ritter   Musculoskeletal: Normal range of motion. He exhibits no edema, tenderness or deformity.   Neurological: He is alert and oriented to person, place, and time. He has normal reflexes. No cranial nerve deficit.   Skin: Skin is warm and dry. He is not diaphoretic.   Psychiatric: He has a normal mood and affect. His behavior is normal. Judgment and thought content normal.   Nursing note and vitals reviewed.       Significant Labs:   CBC:   Recent Labs   Lab 04/22/20  0746   WBC 15.51*   HGB 13.2*   HCT 40.3   *     CMP:   Recent Labs   Lab 04/22/20  0746   *   K 4.1   CL 96   CO2 26   *   BUN 14   CREATININE 1.3   CALCIUM 9.1   PROT 8.1   ALBUMIN 3.6   BILITOT 0.7   ALKPHOS 54*   AST 22   ALT 18   ANIONGAP 10   EGFRNONAA >60.0     Cardiac Markers:   Recent Labs   Lab 04/22/20  0746   BNP 80     Recent Labs   Lab 04/22/20  0746   TROPONINI <0.030     PCT 0.09     All pertinent labs within the past 24 hours have been reviewed.    Significant Imaging: I have reviewed all pertinent imaging results/findings within the past 24 hours.   ECHO        Normal left ventricular systolic function. The estimated ejection fraction is 55%.  · Normal LV diastolic function.  · Mild tricuspid regurgitation.  · Intermediate central venous pressure (8 mmHg).  · The estimated PA systolic pressure is 33 mmHg.      CTA chest non-coronary  Impression:       1. No evidence of pulmonary embolism to the segmental arterial level. If there is continued clinical concern, consider further evaluation with lower extremity doppler.    2. Cardiomegaly with prominence of the pulmonary vasculature suggestive of congestion.    Atelectasis in lung bases with small left pleural effusion       CXR  Impression:       Interval removal of right IJ catheter, with persistent mid and lower lung zone patchy alveolar opacities either  reflecting atelectasis, infectious or inflammatory consolidation, or some combination thereof.           Assessment/Plan:     Active Hospital Problems    Diagnosis  POA    *Chest pain [R07.9]  Yes    S/P CABG (coronary artery bypass graft) [Z95.1]  Not Applicable    Coronary atherosclerosis of native coronary artery [I25.10]  Yes    Generalized anxiety disorder [F41.1]  Yes    COPD (chronic obstructive pulmonary disease) [J44.9]  Yes    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     Chest pain  CAD S/p 2vCABG (POD 14)  Chest pain  - DDX includes myocardial ischemia, pericarditis, chest wall pain, pleural inflammation and pulmonary infectious causes.  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 Rocephin started in ED. Will dc now that pct wnl.  - Solu-Medrol started in ED will continue  - sliding scale for electrolytes prn  - npo pending cardiology eval.        VTE Risk Mitigation (From admission, onward)         Ordered     apixaban tablet 5 mg  2 times daily      04/22/20 1218     IP VTE HIGH RISK PATIENT  Once      04/22/20 1218                  Emily Borrego DO  Department of Hospital Medicine   [facility]

## 2020-04-22 NOTE — SUBJECTIVE & OBJECTIVE
Past Medical History:   Diagnosis Date    Anticoagulant long-term use     Atrial fibrillation     BPH (benign prostatic hyperplasia)     Cataract     COPD (chronic obstructive pulmonary disease)     Coronary artery disease     stents    CVD (cardiovascular disease)     Diabetes mellitus     Erythema multiforme     AKA Denton Edmondson Syndrome    Hypertension     MI (myocardial infarction)     per pt he has had 4     ROSAMARIA on CPAP     RLS (restless legs syndrome)        Past Surgical History:   Procedure Laterality Date    CORONARY ARTERY BYPASS GRAFT (CABG) N/A 4/7/2020    Procedure: CORONARY ARTERY BYPASS GRAFT (CABG)- Excision of left atrial appendage;  Surgeon: Doug Solitario MD;  Location: [facility];  Service: Cardiothoracic;  Laterality: N/A;    CORONARY STENT PLACEMENT      WILSON MAZE PROCEDURE N/A 4/7/2020    Procedure: WILSON MAZE PROCEDURE- Attempted;  Surgeon: Doug Solitario MD;  Location: [facility] OR;  Service: Cardiothoracic;  Laterality: N/A;    CYSTOSCOPY N/A 12/10/2018    Procedure: CYSTOSCOPY;  Surgeon: Khushboo Abreu MD;  Location: [facility];  Service: Urology;  Laterality: N/A;    ENDOSCOPIC HARVEST OF VEIN Left 4/7/2020    Procedure: HARVEST-VEIN-ENDOVASCULAR;  Surgeon: Doug Solitario MD;  Location: [facility] OR;  Service: Cardiothoracic;  Laterality: Left;    LEFT HEART CATHETERIZATION Left 3/17/2020    Procedure: CATHETERIZATION, HEART, LEFT;  Surgeon: Tyree Walters MD;  Location: [facility] CATH/EP LAB;  Service: Cardiology;  Laterality: Left;    ULTRASOUND OF PROSTATE FOR VOLUME DETERMINATION  12/10/2018    Procedure: ULTRASOUND, PROSTATE, FOR VOLUME DETERMINATION;  Surgeon: Khushboo Abreu MD;  Location: [facility];  Service: Urology;;       Review of patient's allergies indicates:   Allergen Reactions    Pesticide Dermatitis and Rash       No current facility-administered medications on file prior to encounter.      Current Outpatient Medications on File  Prior to Encounter   Medication Sig    apixaban (ELIQUIS) 5 mg Tab Take 5 mg by mouth 2 (two) times daily.    aspirin 81 MG Chew Take 81 mg by mouth once daily.     chlorhexidine (PERIDEX) 0.12 % solution Use as directed 10 mLs in the mouth or throat 2 (two) times daily.    clonazePAM (KLONOPIN) 1 MG tablet Take 1 mg by mouth 3 (three) times daily as needed for Anxiety.     empagliflozin (JARDIANCE) 10 mg tablet Take 10 mg by mouth once daily.    fenofibrate (TRICOR) 145 MG tablet Take 145 mg by mouth every evening.     gabapentin (NEURONTIN) 600 MG tablet Take 600 mg by mouth 3 (three) times daily.     glimepiride (AMARYL) 4 MG tablet Take 4 mg by mouth 2 (two) times daily.    hydroCHLOROthiazide (HYDRODIURIL) 12.5 MG Tab Take 12.5 mg by mouth once daily.    isosorbide mononitrate (IMDUR) 30 MG 24 hr tablet Take 1 tablet (30 mg total) by mouth once daily.    LANTUS SOLOSTAR U-100 INSULIN glargine 100 units/mL (3mL) SubQ pen Inject 30 Units into the skin 2 (two) times daily.     lisinopriL 10 MG tablet Take 10 mg by mouth once daily.     metFORMIN (GLUCOPHAGE) 1000 MG tablet Take 1,000 mg by mouth 2 (two) times daily with meals.     metoprolol succinate (TOPROL-XL) 100 MG 24 hr tablet Take 100 mg by mouth once daily.     rosuvastatin (CRESTOR) 20 MG tablet Take 20 mg by mouth every evening.     sotaloL (BETAPACE) 80 MG tablet Take 80 mg by mouth 2 (two) times daily.    ACCU-CHEK GUIDE Strp USE 1 STRIP TO CHECK BLOOD SUGAR TWICE DAILY    furosemide (LASIX) 40 MG tablet Take 1 tablet (40 mg total) by mouth once daily. for 5 days    nitroGLYCERIN (NITROSTAT) 0.4 MG SL tablet Place 0.4 mg under the tongue every 5 (five) minutes as needed for Chest pain.    [DISCONTINUED] oxyCODONE-acetaminophen (PERCOCET)  mg per tablet Take 1 tablet by mouth every 4 (four) hours as needed.    [DISCONTINUED] ranolazine (RANEXA) 500 MG Tb12 Take 2 tablets (1,000 mg total) by mouth 2 (two) times daily.     [DISCONTINUED] tamsulosin (FLOMAX) 0.4 mg Cap Take 0.4 mg by mouth every evening.      Family History     Problem Relation (Age of Onset)    Diabetes Mother    Heart disease Mother    Hyperlipidemia Father        Tobacco Use    Smoking status: Former Smoker     Packs/day: 1.00     Years: 30.00     Pack years: 30.00     Types: Cigarettes     Last attempt to quit: 2020     Years since quittin.0    Smokeless tobacco: Never Used    Tobacco comment: since 10/2019 down to 3 cig per day   Substance and Sexual Activity    Alcohol use: Not Currently    Drug use: Not Currently    Sexual activity: Not on file     Review of Systems   Constitutional: Negative for activity change, chills, fatigue and fever.   HENT: Negative for congestion, rhinorrhea and sore throat.    Eyes: Negative for visual disturbance.   Respiratory: Positive for cough. Negative for chest tightness and shortness of breath.    Cardiovascular: Positive for chest pain. Negative for palpitations and leg swelling.   Gastrointestinal: Negative for abdominal pain, constipation, diarrhea, nausea and vomiting.   Genitourinary: Negative for dysuria and hematuria.   Musculoskeletal: Negative for arthralgias and myalgias.   Skin: Negative for rash.   Neurological: Negative for dizziness, light-headedness and headaches.   Psychiatric/Behavioral: Negative for agitation. The patient is not nervous/anxious.         Objective:     Vital Signs (Most Recent):  Temp: 98.1 °F (36.7 °C) (20 0732)  Pulse: 89 (20 1131)  Resp: (!) 31 (20 1131)  BP: 110/65 (20 1130)  SpO2: 95 % (20 1131) Vital Signs (24h Range):  Temp:  [98.1 °F (36.7 °C)] 98.1 °F (36.7 °C)  Pulse:  [78-89] 89  Resp:  [24-31] 31  SpO2:  [95 %-100 %] 95 %  BP: (110-193)/() 110/65     Weight: 122.5 kg (270 lb)  Body mass index is 37.66 kg/m².    Physical Exam   Constitutional: He is oriented to person, place, and time. He appears well-developed and well-nourished. No  distress.   Obvious discomfort    HENT:   Head: Normocephalic and atraumatic.   Mouth/Throat: Oropharynx is clear and moist.   Eyes: Pupils are equal, round, and reactive to light. Conjunctivae and EOM are normal.   Neck: Normal range of motion. Neck supple. No JVD present.   Cardiovascular: Normal rate, regular rhythm, normal heart sounds and intact distal pulses.   Chest pain not reproducible    Pulmonary/Chest: Effort normal and breath sounds normal. No respiratory distress.   Abdominal: Soft. Bowel sounds are normal. He exhibits no distension. There is no tenderness. There is no guarding.   Genitourinary:   Genitourinary Comments: No ritter   Musculoskeletal: Normal range of motion. He exhibits no edema, tenderness or deformity.   Neurological: He is alert and oriented to person, place, and time. He has normal reflexes. No cranial nerve deficit.   Skin: Skin is warm and dry. He is not diaphoretic.   Psychiatric: He has a normal mood and affect. His behavior is normal. Judgment and thought content normal.   Nursing note and vitals reviewed.       Significant Labs:   CBC:   Recent Labs   Lab 04/22/20  0746   WBC 15.51*   HGB 13.2*   HCT 40.3   *     CMP:   Recent Labs   Lab 04/22/20  0746   *   K 4.1   CL 96   CO2 26   *   BUN 14   CREATININE 1.3   CALCIUM 9.1   PROT 8.1   ALBUMIN 3.6   BILITOT 0.7   ALKPHOS 54*   AST 22   ALT 18   ANIONGAP 10   EGFRNONAA >60.0     Cardiac Markers:   Recent Labs   Lab 04/22/20  0746   BNP 80     Recent Labs   Lab 04/22/20  0746   TROPONINI <0.030     PCT 0.09     All pertinent labs within the past 24 hours have been reviewed.    Significant Imaging: I have reviewed all pertinent imaging results/findings within the past 24 hours.   ECHO        Normal left ventricular systolic function. The estimated ejection fraction is 55%.  · Normal LV diastolic function.  · Mild tricuspid regurgitation.  · Intermediate central venous pressure (8 mmHg).  · The estimated PA  systolic pressure is 33 mmHg.      CTA chest non-coronary  Impression:       1. No evidence of pulmonary embolism to the segmental arterial level. If there is continued clinical concern, consider further evaluation with lower extremity doppler.    2. Cardiomegaly with prominence of the pulmonary vasculature suggestive of congestion.    Atelectasis in lung bases with small left pleural effusion       CXR  Impression:       Interval removal of right IJ catheter, with persistent mid and lower lung zone patchy alveolar opacities either reflecting atelectasis, infectious or inflammatory consolidation, or some combination thereof.

## 2020-04-22 NOTE — ED PROVIDER NOTES
Encounter Date: 4/22/2020       History     Chief Complaint   Patient presents with    Chest Pain     Patient here with reported chest pain acute onset this morning patient has a history of coronary artery disease status post CABG 2 weeks ago patient reports that he was healing well until he woke this morning with left-sided chest discomfort radiating across his chest into his back pain is much worse with inspiration or laying flat he denies any fever does report chills last night states he was unable to get warm he denies any dysuria urgency or frequency denies any  nausea vomiting patient states that he quit smoking at that time and has had a persistent cough that is nonproductive he denies any changes to his medication after surgery he did take his morning meds and EMS gave him nitroglycerin and aspirin prior to arrival        Review of patient's allergies indicates:   Allergen Reactions    Pesticide Dermatitis and Rash     Past Medical History:   Diagnosis Date    Anticoagulant long-term use     Atrial fibrillation     BPH (benign prostatic hyperplasia)     Cataract     COPD (chronic obstructive pulmonary disease)     Coronary artery disease     stents    CVD (cardiovascular disease)     Diabetes mellitus     Erythema multiforme     AKA Denton Edmondson Syndrome    Hypertension     MI (myocardial infarction)     per pt he has had 4     ROSAMARIA on CPAP     RLS (restless legs syndrome)      Past Surgical History:   Procedure Laterality Date    CORONARY ARTERY BYPASS GRAFT (CABG) N/A 4/7/2020    Procedure: CORONARY ARTERY BYPASS GRAFT (CABG)- Excision of left atrial appendage;  Surgeon: Doug Solitario MD;  Location: [facility] OR;  Service: Cardiothoracic;  Laterality: N/A;    CORONARY STENT PLACEMENT      WILSON MAZE PROCEDURE N/A 4/7/2020    Procedure: WILSON MAZE PROCEDURE- Attempted;  Surgeon: Doug Solitario MD;  Location: [facility] OR;  Service: Cardiothoracic;  Laterality: N/A;    CYSTOSCOPY N/A  12/10/2018    Procedure: CYSTOSCOPY;  Surgeon: Khushboo Abreu MD;  [facility];  Service: Urology;  Laterality: N/A;    ENDOSCOPIC HARVEST OF VEIN Left 4/7/2020    Procedure: HARVEST-VEIN-ENDOVASCULAR;  Surgeon: Doug Solitario MD;  Location: [facility] OR;  Service: Cardiothoracic;  Laterality: Left;    LEFT HEART CATHETERIZATION Left 3/17/2020    Procedure: CATHETERIZATION, HEART, LEFT;  Surgeon: Tyree Walters MD;  Location: [facility] CATH/EP LAB;  Service: Cardiology;  Laterality: Left;    ULTRASOUND OF PROSTATE FOR VOLUME DETERMINATION  12/10/2018    Procedure: ULTRASOUND, PROSTATE, FOR VOLUME DETERMINATION;  Surgeon: Khushboo Abreu MD;  [facility];  Service: Urology;;     Family History   Problem Relation Age of Onset    Heart disease Mother     Diabetes Mother     Hyperlipidemia Father      Social History     Tobacco Use    Smoking status: Current Every Day Smoker     Packs/day: 1.00     Years: 30.00     Pack years: 30.00     Types: Cigarettes    Smokeless tobacco: Never Used    Tobacco comment: since 10/2019 down to 3 cig per day   Substance Use Topics    Alcohol use: Not Currently    Drug use: Not Currently     Review of Systems   Constitutional: Positive for chills. Negative for diaphoresis and fever.   HENT: Negative.    Eyes: Negative.    Respiratory: Positive for cough and shortness of breath.    Cardiovascular: Positive for chest pain. Negative for palpitations and leg swelling.   Gastrointestinal: Negative.    Endocrine: Negative.    Genitourinary: Negative.    Musculoskeletal: Negative.    Skin: Negative.    Allergic/Immunologic: Negative.    Neurological: Negative.    Hematological: Negative.    Psychiatric/Behavioral: Negative.        Physical Exam     Initial Vitals [04/22/20 0732]   BP Pulse Resp Temp SpO2   119/84 78 (!) 24 98.1 °F (36.7 °C) 97 %      MAP       --         Physical Exam    Constitutional: He appears well-developed and well-nourished. He  does not appear ill.   HENT:   Head: Normocephalic and atraumatic.   Eyes: EOM are normal. Pupils are equal, round, and reactive to light.   Neck: Normal range of motion. Neck supple.   Cardiovascular: Normal rate, regular rhythm and normal pulses.   Pulses:       Radial pulses are 2+ on the right side, and 2+ on the left side.        Dorsalis pedis pulses are 2+ on the right side, and 2+ on the left side.   Pulmonary/Chest: Tachypnea noted. He is in respiratory distress. He has decreased breath sounds in the right lower field and the left lower field. He has rhonchi in the right lower field and the left lower field.   Abdominal: Soft. Bowel sounds are normal. There is tenderness in the right upper quadrant. There is no CVA tenderness.   Musculoskeletal: Normal range of motion. He exhibits no edema or tenderness.   Neurological: He is alert and oriented to person, place, and time. He has normal strength. GCS score is 15. GCS eye subscore is 4. GCS verbal subscore is 5. GCS motor subscore is 6.   Skin: Skin is warm and dry. Capillary refill takes less than 2 seconds.   Psychiatric: He has a normal mood and affect. His behavior is normal.         ED Course   Procedures  Labs Reviewed   CULTURE, BLOOD   CULTURE, BLOOD   CBC W/ AUTO DIFFERENTIAL   COMPREHENSIVE METABOLIC PANEL   C-REACTIVE PROTEIN   FERRITIN   LACTATE DEHYDROGENASE   CK   LACTIC ACID, PLASMA   TROPONIN I   SARS-COV-2 RNA AMPLIFICATION, QUAL   B-TYPE NATRIURETIC PEPTIDE   PROCALCITONIN          Imaging Results          X-Ray Chest AP Portable (In process)                  Medical Decision Making:   ED Management:  I discussed Patient's care with Dr. Walters at arrival patient's EKG shows a minimal elevation in 3 and AVF with diffuse nonspecific changes throughout other leads patient's symptoms are very pleuritic given his recent CABG he felt is unlikely that his symptoms were related to coronary artery disease an echocardiogram was obtained and  interpreted by Dr. Botello as showing no evidence of wall motion abnormality or pericardial effusion given these findings and felt treatment for possible infection versus inflammatory response was indicated I have given patient a dose of Solu-Medrol and a dose of Rocephin in the emergency department I have discussed the case with Dr. Borrego who will evaluate patient for admission                                 Clinical Impression:       ICD-10-CM ICD-9-CM   1. Suspected Covid-19 Virus Infection R68.89    2. Chest pain R07.9 786.50                                Roel Blackwell MD  04/22/20 1018       Roel Blackwell MD  04/22/20 1054

## 2020-04-23 PROBLEM — R78.81 BACTEREMIA DUE TO GRAM-POSITIVE BACTERIA: Status: ACTIVE | Noted: 2020-04-23

## 2020-04-23 PROBLEM — N17.9 AKI (ACUTE KIDNEY INJURY): Status: ACTIVE | Noted: 2020-04-23

## 2020-04-23 LAB
ANION GAP SERPL CALC-SCNC: 11 MMOL/L (ref 8–16)
BASOPHILS # BLD AUTO: 0.03 K/UL (ref 0–0.2)
BASOPHILS NFR BLD: 0.2 % (ref 0–1.9)
BUN SERPL-MCNC: 32 MG/DL (ref 6–20)
CALCIUM SERPL-MCNC: 8.5 MG/DL (ref 8.7–10.5)
CHLORIDE SERPL-SCNC: 96 MMOL/L (ref 95–110)
CO2 SERPL-SCNC: 22 MMOL/L (ref 23–29)
CREAT SERPL-MCNC: 1.7 MG/DL (ref 0.5–1.4)
DIFFERENTIAL METHOD: ABNORMAL
EOSINOPHIL # BLD AUTO: 0 K/UL (ref 0–0.5)
EOSINOPHIL NFR BLD: 0 % (ref 0–8)
ERYTHROCYTE [DISTWIDTH] IN BLOOD BY AUTOMATED COUNT: 12.8 % (ref 11.5–14.5)
EST. GFR  (AFRICAN AMERICAN): 52.4 ML/MIN/1.73 M^2
EST. GFR  (NON AFRICAN AMERICAN): 45.4 ML/MIN/1.73 M^2
GLUCOSE SERPL-MCNC: 354 MG/DL (ref 70–110)
GLUCOSE SERPL-MCNC: 383 MG/DL (ref 70–110)
GLUCOSE SERPL-MCNC: 384 MG/DL (ref 70–110)
GLUCOSE SERPL-MCNC: 387 MG/DL (ref 70–110)
GLUCOSE SERPL-MCNC: 419 MG/DL (ref 70–110)
HCT VFR BLD AUTO: 33.5 % (ref 40–54)
HGB BLD-MCNC: 11 G/DL (ref 14–18)
IMM GRANULOCYTES # BLD AUTO: 0.11 K/UL (ref 0–0.04)
IMM GRANULOCYTES NFR BLD AUTO: 0.6 % (ref 0–0.5)
LACTATE SERPL-SCNC: 2.4 MMOL/L (ref 0.5–1.9)
LACTATE SERPL-SCNC: 2.6 MMOL/L (ref 0.5–1.9)
LYMPHOCYTES # BLD AUTO: 2 K/UL (ref 1–4.8)
LYMPHOCYTES NFR BLD: 11.1 % (ref 18–48)
MAGNESIUM SERPL-MCNC: 1.9 MG/DL (ref 1.6–2.6)
MAGNESIUM SERPL-MCNC: 1.9 MG/DL (ref 1.6–2.6)
MCH RBC QN AUTO: 30.6 PG (ref 27–31)
MCHC RBC AUTO-ENTMCNC: 32.8 G/DL (ref 32–36)
MCV RBC AUTO: 93 FL (ref 82–98)
MONOCYTES # BLD AUTO: 0.4 K/UL (ref 0.3–1)
MONOCYTES NFR BLD: 2.3 % (ref 4–15)
NEUTROPHILS # BLD AUTO: 15.6 K/UL (ref 1.8–7.7)
NEUTROPHILS NFR BLD: 85.8 % (ref 38–73)
NRBC BLD-RTO: 0 /100 WBC
PHOSPHATE SERPL-MCNC: 6.8 MG/DL (ref 2.7–4.5)
PLATELET # BLD AUTO: 443 K/UL (ref 150–350)
PMV BLD AUTO: 9.3 FL (ref 9.2–12.9)
POTASSIUM SERPL-SCNC: 4.6 MMOL/L (ref 3.5–5.1)
RBC # BLD AUTO: 3.59 M/UL (ref 4.6–6.2)
SODIUM SERPL-SCNC: 129 MMOL/L (ref 136–145)
TROPONIN I SERPL DL<=0.01 NG/ML-MCNC: <0.03 NG/ML
WBC # BLD AUTO: 18.16 K/UL (ref 3.9–12.7)

## 2020-04-23 PROCEDURE — 82962 GLUCOSE BLOOD TEST: CPT

## 2020-04-23 PROCEDURE — 94761 N-INVAS EAR/PLS OXIMETRY MLT: CPT

## 2020-04-23 PROCEDURE — 87147 CULTURE TYPE IMMUNOLOGIC: CPT

## 2020-04-23 PROCEDURE — 84484 ASSAY OF TROPONIN QUANT: CPT

## 2020-04-23 PROCEDURE — 80048 BASIC METABOLIC PNL TOTAL CA: CPT

## 2020-04-23 PROCEDURE — 83605 ASSAY OF LACTIC ACID: CPT | Mod: 91

## 2020-04-23 PROCEDURE — 36415 COLL VENOUS BLD VENIPUNCTURE: CPT

## 2020-04-23 PROCEDURE — 93005 ELECTROCARDIOGRAM TRACING: CPT | Performed by: INTERNAL MEDICINE

## 2020-04-23 PROCEDURE — 87040 BLOOD CULTURE FOR BACTERIA: CPT

## 2020-04-23 PROCEDURE — 84100 ASSAY OF PHOSPHORUS: CPT

## 2020-04-23 PROCEDURE — 25000003 PHARM REV CODE 250: Performed by: STUDENT IN AN ORGANIZED HEALTH CARE EDUCATION/TRAINING PROGRAM

## 2020-04-23 PROCEDURE — 85025 COMPLETE CBC W/AUTO DIFF WBC: CPT

## 2020-04-23 PROCEDURE — 63600175 PHARM REV CODE 636 W HCPCS: Performed by: FAMILY MEDICINE

## 2020-04-23 PROCEDURE — 63600175 PHARM REV CODE 636 W HCPCS: Performed by: STUDENT IN AN ORGANIZED HEALTH CARE EDUCATION/TRAINING PROGRAM

## 2020-04-23 PROCEDURE — 21400001 HC TELEMETRY ROOM

## 2020-04-23 PROCEDURE — 21000000 HC CCU ICU ROOM CHARGE

## 2020-04-23 PROCEDURE — C9399 UNCLASSIFIED DRUGS OR BIOLOG: HCPCS | Performed by: STUDENT IN AN ORGANIZED HEALTH CARE EDUCATION/TRAINING PROGRAM

## 2020-04-23 PROCEDURE — 83735 ASSAY OF MAGNESIUM: CPT

## 2020-04-23 RX ORDER — SODIUM CHLORIDE 9 MG/ML
INJECTION, SOLUTION INTRAVENOUS CONTINUOUS
Status: ACTIVE | OUTPATIENT
Start: 2020-04-23 | End: 2020-04-24

## 2020-04-23 RX ORDER — SODIUM CHLORIDE 9 MG/ML
INJECTION, SOLUTION INTRAVENOUS CONTINUOUS
Status: DISCONTINUED | OUTPATIENT
Start: 2020-04-23 | End: 2020-04-23

## 2020-04-23 RX ADMIN — METHYLPREDNISOLONE SODIUM SUCCINATE 125 MG: 125 INJECTION, POWDER, FOR SOLUTION INTRAMUSCULAR; INTRAVENOUS at 01:04

## 2020-04-23 RX ADMIN — METHYLPREDNISOLONE SODIUM SUCCINATE 125 MG: 125 INJECTION, POWDER, FOR SOLUTION INTRAMUSCULAR; INTRAVENOUS at 05:04

## 2020-04-23 RX ADMIN — OXYCODONE HYDROCHLORIDE AND ACETAMINOPHEN 1 TABLET: 10; 325 TABLET ORAL at 05:04

## 2020-04-23 RX ADMIN — VANCOMYCIN HYDROCHLORIDE 2000 MG: 1 INJECTION, POWDER, LYOPHILIZED, FOR SOLUTION INTRAVENOUS at 08:04

## 2020-04-23 RX ADMIN — METOPROLOL SUCCINATE 50 MG: 50 TABLET, FILM COATED, EXTENDED RELEASE ORAL at 08:04

## 2020-04-23 RX ADMIN — INSULIN ASPART 10 UNITS: 100 INJECTION, SOLUTION INTRAVENOUS; SUBCUTANEOUS at 04:04

## 2020-04-23 RX ADMIN — OXYCODONE HYDROCHLORIDE AND ACETAMINOPHEN 1 TABLET: 10; 325 TABLET ORAL at 03:04

## 2020-04-23 RX ADMIN — CEFTRIAXONE 1 G: 1 INJECTION, SOLUTION INTRAVENOUS at 03:04

## 2020-04-23 RX ADMIN — APIXABAN 5 MG: 5 TABLET, FILM COATED ORAL at 08:04

## 2020-04-23 RX ADMIN — ISOSORBIDE MONONITRATE 30 MG: 30 TABLET, EXTENDED RELEASE ORAL at 08:04

## 2020-04-23 RX ADMIN — SENNOSIDES AND DOCUSATE SODIUM 1 TABLET: 8.6; 5 TABLET ORAL at 08:04

## 2020-04-23 RX ADMIN — CLONAZEPAM 1 MG: 1 TABLET ORAL at 06:04

## 2020-04-23 RX ADMIN — GABAPENTIN 300 MG: 300 CAPSULE ORAL at 08:04

## 2020-04-23 RX ADMIN — ASPIRIN 81 MG: 81 TABLET, DELAYED RELEASE ORAL at 08:04

## 2020-04-23 RX ADMIN — SODIUM CHLORIDE: 0.9 INJECTION, SOLUTION INTRAVENOUS at 02:04

## 2020-04-23 RX ADMIN — INSULIN DETEMIR 15 UNITS: 100 INJECTION, SOLUTION SUBCUTANEOUS at 12:04

## 2020-04-23 RX ADMIN — INSULIN DETEMIR 25 UNITS: 100 INJECTION, SOLUTION SUBCUTANEOUS at 08:04

## 2020-04-23 RX ADMIN — VANCOMYCIN HYDROCHLORIDE 2000 MG: 1 INJECTION, POWDER, LYOPHILIZED, FOR SOLUTION INTRAVENOUS at 02:04

## 2020-04-23 RX ADMIN — ROSUVASTATIN CALCIUM 20 MG: 20 TABLET, FILM COATED ORAL at 08:04

## 2020-04-23 RX ADMIN — OXYCODONE HYDROCHLORIDE AND ACETAMINOPHEN 1 TABLET: 10; 325 TABLET ORAL at 08:04

## 2020-04-23 RX ADMIN — TAMSULOSIN HYDROCHLORIDE 0.4 MG: 0.4 CAPSULE ORAL at 08:04

## 2020-04-23 RX ADMIN — INSULIN ASPART 10 UNITS: 100 INJECTION, SOLUTION INTRAVENOUS; SUBCUTANEOUS at 08:04

## 2020-04-23 RX ADMIN — INSULIN ASPART 5 UNITS: 100 INJECTION, SOLUTION INTRAVENOUS; SUBCUTANEOUS at 08:04

## 2020-04-23 RX ADMIN — OXYCODONE HYDROCHLORIDE AND ACETAMINOPHEN 1 TABLET: 10; 325 TABLET ORAL at 01:04

## 2020-04-23 RX ADMIN — CLONAZEPAM 1 MG: 1 TABLET ORAL at 08:04

## 2020-04-23 RX ADMIN — GABAPENTIN 300 MG: 300 CAPSULE ORAL at 03:04

## 2020-04-23 NOTE — PROGRESS NOTES
[facility] Medicine  Progress Note    Patient Name: Magdaleno Cunningham  MRN: [id number]  Patient Class: IP- Inpatient   Admission Date: 4/22/2020  Length of Stay: 0 days  Attending Physician: Emily Borrego DO  Primary Care Provider: Antonio Del Cid MD        Subjective:     Principal Problem:Chest pain  Chief Complaint   Patient presents with    Chest Pain     Interval History: afvss, wbc mildy elevated to 18.16 today (steriods). LA elevated to 2.6. bcx x 2 GPC and vanc started overnight.   Patient seen and examined. States that his chest pain has resolved. Denies palpations, sob, cough, fever, chills, n/v/d/c.     Review of Systems   As above  Objective:     Vital Signs (Most Recent):  Temp: 97.3 °F (36.3 °C) (04/23/20 1141)  Pulse: 63 (04/23/20 1141)  Resp: 15 (04/23/20 1141)  BP: (!) 113/58 (04/23/20 1141)  SpO2: (!) 92 % (04/23/20 1141) Vital Signs (24h Range):  Temp:  [97.3 °F (36.3 °C)-98.9 °F (37.2 °C)] 97.3 °F (36.3 °C)  Pulse:  [54-79] 63  Resp:  [10-20] 15  SpO2:  [89 %-94 %] 92 %  BP: ()/(55-69) 113/58     Weight: 120.7 kg (266 lb 1.5 oz)  Body mass index is 37.11 kg/m².    Intake/Output Summary (Last 24 hours) at 4/23/2020 1349  Last data filed at 4/23/2020 1200  Gross per 24 hour   Intake 1460 ml   Output 1575 ml   Net -115 ml      Physical Exam   Constitutional: He is oriented to person, place, and time. He appears well-developed and well-nourished. No distress.   HENT:   Head: Normocephalic and atraumatic.   Mouth/Throat: Oropharynx is clear and moist.   Eyes: Pupils are equal, round, and reactive to light. Conjunctivae and EOM are normal.   Neck: Normal range of motion. Neck supple. No JVD present.   Cardiovascular: Normal rate, regular rhythm, normal heart sounds and intact distal pulses.   Chest pain not reproducible    Pulmonary/Chest: Effort normal and breath sounds normal. No respiratory distress.   Abdominal: Soft. Bowel sounds are normal. He exhibits no  distension. There is no tenderness. There is no guarding.   Genitourinary:   Genitourinary Comments: No ritter   Musculoskeletal: Normal range of motion. He exhibits no edema, tenderness or deformity.   Neurological: He is alert and oriented to person, place, and time. He has normal reflexes. No cranial nerve deficit.   Skin: Skin is warm and dry. He is not diaphoretic.   Psychiatric: He has a normal mood and affect. His behavior is normal. Judgment and thought content normal.   Nursing note and vitals reviewed.      Significant Labs:   Blood Culture:   Recent Labs   Lab 04/22/20 0746 04/22/20  0934   LABBLOO No Growth to date  Gram stain aer bottle: Gram positive cocci   Results called to and read back by:Aleksandar Hoffman RN CARD A.  04/23/2020  01:55 TGC  Gram stain angeliuqe bottle: Gram positive cocci  Positive results previously called to Aleksandar Hoffman RN CARD A.  04/23/2020  01:55 TGC No Growth to date  Gram stain aer bottle: Gram positive cocci   Results called to and read back by:Aleksandar Hoffman RN CARD A.  04/23/2020  01:58 TGC  Gram stain angelique bottle: Gram positive cocci   Positive results previously called to Aleksandar Hoffman RN CARD A.   04/23/2020  01:59 TGC     CBC:   Recent Labs   Lab 04/22/20 0746 04/23/20  0136   WBC 15.51* 18.16*   HGB 13.2* 11.0*   HCT 40.3 33.5*   * 443*     CMP:   Recent Labs   Lab 04/22/20 0746 04/22/20 1951 04/23/20  0136   * 129* 129*   K 4.1 5.5* 4.6   CL 96 93* 96   CO2 26 25 22*   * 313* 354*   BUN 14 22* 32*   CREATININE 1.3 1.7* 1.7*   CALCIUM 9.1 9.1 8.5*   PROT 8.1  --   --    ALBUMIN 3.6  --   --    BILITOT 0.7  --   --    ALKPHOS 54*  --   --    AST 22  --   --    ALT 18  --   --    ANIONGAP 10 11 11   EGFRNONAA >60.0 45.4* 45.4*     Lactic Acid:   Recent Labs   Lab 04/22/20 1951 04/23/20  1101   LACTATE 2.1* 2.6*     Magnesium:   Recent Labs   Lab 04/23/20  0136   MG 1.9  1.9     All pertinent labs within the past 24 hours have been  reviewed.    Significant Imaging: I have reviewed all pertinent imaging results/findings within the past 24 hours.   Cxr:   IMPRESSION:  Interval increase in opacification of the left lung base. (atelectasis versus pneumonia).      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     Chest pain  CAD S/p 2vCABG (POD 14)  Chest pain  - Morphine, oxygen, nitroglycerine SL PRN  - Cardiac monitor  - c/w home: Asa, BB, Statin, imdur, ranolzine, apixaban  - holding home ace and hctz 2/2 henrique  - Trending trop x 3 wnl  - ECHO: no wall motion abnormalities or effusions  - Cardiology consulted in ED:  - cxr: Interval increase in opacification of the left lung base. (atelectasis versus pneumonia)  - s/p Solu-Medrol   - sliding scale for electrolytes prn      GPC Bacteremia  - blood cx x 2 from 4/22 :  + GPC   - Vanc per pharm dosing started on 4/22  - IVF gentle rate    ?Pneumonia vs Atelectasis   - covid negative  - cxr: Interval increase in opacification of the left lung base. (atelectasis versus pneumonia)  - IS  - IV abx: vanc and rocephin    HENRIQUE  -Baseline cr 1.0  -today Bun/cr 32/1.7  -IVF  -Will continue to monitor labs     IDDM2  - bg elevated today  - detemir 15 units bid  - high dose ssi  - accuchecks   - diabetic diet       VTE Risk Mitigation (From admission, onward)         Ordered     apixaban tablet 5 mg  2 times daily      04/22/20  1218     IP VTE HIGH RISK PATIENT  Once      04/22/20 1218                Emily Borrego DO  Department of Hospital Medicine   [facility]

## 2020-04-23 NOTE — CONSULTS
"[facility]   Cardiology Note    Consult Requested By: ER/Hospitalist   Reason for Consult: chest pain  Consults (From admission, onward)        Status Ordering Provider     Inpatient consult to Cardiology  Once     Provider:  Tyree Walters MD    Acknowledged EDMOND BORREGO     Inpatient consult to Hospitalist  Once     Provider:  Edmond Borrego DO    Acknowledged EDMOND BORREGO     Pharmacy to dose Vancomycin consult  Once     Provider:  (Not yet assigned)    Acknowledged BALTAZAR SHAW          SUBJECTIVE:     History of Present Illness: Pt presented to the er after c/o sudden onset of chills and no fever. He also reported substernal chest pain similar to discomfort following bypass. Pain was worse with deep inspiration and laying flat. In the ER, Cardiology was consulted and echo was ordered to r/o pericardial effusion. Trop negative. Na 129 and K 5.5 repeat 4.6. Cr 1.7 which is new and not baseline. Pt is on Lisinopril and HCTZ. bp low normal. He also drinking  2-2.5 gallons of water/day.   Since arrival and steroids, he is feeling "great"! No further chest pain, mild leukocytosis, likely 2/2 steroids, on broad spectrum antibiotics, incisions are w/o erythema or drainage. Afebrile. Cultures one bottle Gram+ cocci. Cxr better than yesterday, mild left pleural effusion Lactate 2.1 CR 1.99 Covid negative     Review of patient's allergies indicates:   Allergen Reactions    Pesticide Dermatitis and Rash       Past Medical History:   Diagnosis Date    Anticoagulant long-term use     Atrial fibrillation     BPH (benign prostatic hyperplasia)     Cataract     COPD (chronic obstructive pulmonary disease)     Coronary artery disease     stents    CVD (cardiovascular disease)     Diabetes mellitus     Erythema multiforme     AKA Denton Edmondson Syndrome    Hypertension     MI (myocardial infarction)     per pt he has had 4     ROSAMARIA on CPAP     RLS (restless legs syndrome)      Past " Surgical History:   Procedure Laterality Date    CORONARY ARTERY BYPASS GRAFT (CABG) N/A 2020    Procedure: CORONARY ARTERY BYPASS GRAFT (CABG)- Excision of left atrial appendage;  Surgeon: Doug Solitario MD;  Location: [facility];  Service: Cardiothoracic;  Laterality: N/A;    CORONARY STENT PLACEMENT      WILSON MAZE PROCEDURE N/A 2020    Procedure: WILSON MAZE PROCEDURE- Attempted;  Surgeon: Doug Solitario MD;  Location: [facility] OR;  Service: Cardiothoracic;  Laterality: N/A;    CYSTOSCOPY N/A 12/10/2018    Procedure: CYSTOSCOPY;  Surgeon: Khushboo Abreu MD;  Location: [facility];  Service: Urology;  Laterality: N/A;    ENDOSCOPIC HARVEST OF VEIN Left 2020    Procedure: HARVEST-VEIN-ENDOVASCULAR;  Surgeon: Doug Solitario MD;  Location: [facility];  Service: Cardiothoracic;  Laterality: Left;    LEFT HEART CATHETERIZATION Left 3/17/2020    Procedure: CATHETERIZATION, HEART, LEFT;  Surgeon: Tyree Walters MD;  Location: [facility] CATH/EP LAB;  Service: Cardiology;  Laterality: Left;    ULTRASOUND OF PROSTATE FOR VOLUME DETERMINATION  12/10/2018    Procedure: ULTRASOUND, PROSTATE, FOR VOLUME DETERMINATION;  Surgeon: Khushboo Abreu MD;  Location: [facility];  Service: Urology;;     Family History   Problem Relation Age of Onset    Heart disease Mother     Diabetes Mother     Hyperlipidemia Father      Social History     Tobacco Use    Smoking status: Former Smoker     Packs/day: 1.00     Years: 30.00     Pack years: 30.00     Types: Cigarettes     Last attempt to quit: 2020     Years since quittin.0    Smokeless tobacco: Never Used    Tobacco comment: since 10/2019 down to 3 cig per day   Substance Use Topics    Alcohol use: Not Currently    Drug use: Not Currently       Review of Systems:  Review of Systems   Constitutional: Positive for chills.   Cardiovascular: Positive for chest pain.   All other systems reviewed and are negative.      OBJECTIVE:     Vital Signs  (Most Recent)  Temp: 97.6 °F (36.4 °C) (04/23/20 0309)  Pulse: (!) 57 (04/23/20 0600)  Resp: 12 (04/23/20 0600)  BP: 105/65 (04/23/20 0600)  SpO2: (!) 93 % (04/23/20 0600)    Vital Signs Range (Last 24H):  Temp:  [97.6 °F (36.4 °C)-98.9 °F (37.2 °C)]   Pulse:  [54-89]   Resp:  [10-31]   BP: ()/()   SpO2:  [89 %-100 %]     I & O (Last 24H):    Intake/Output Summary (Last 24 hours) at 4/23/2020 0743  Last data filed at 4/23/2020 0452  Gross per 24 hour   Intake 500 ml   Output 1000 ml   Net -500 ml       Current Diet:     Current Diet Order   Procedures    Diet NPO        Allergies:  Review of patient's allergies indicates:   Allergen Reactions    Pesticide Dermatitis and Rash       Meds:  Scheduled Meds:   apixaban  5 mg Oral BID    aspirin  81 mg Oral Daily    gabapentin  300 mg Oral TID    isosorbide mononitrate  30 mg Oral Daily    metoprolol succinate  50 mg Oral Daily    rosuvastatin  20 mg Oral QHS    senna-docusate 8.6-50 mg  1 tablet Oral BID    tamsulosin  0.4 mg Oral QHS    vancomycin (VANCOCIN) IVPB  2,000 mg Intravenous Q18H     Continuous Infusions:  PRN Meds:acetaminophen, acetaminophen, clonazePAM, dextrose 50%, dextrose 50%, glucagon (human recombinant), glucose, glucose, insulin aspart U-100, magnesium oxide, magnesium oxide, melatonin, morphine, nitroGLYCERIN, ondansetron, oxyCODONE-acetaminophen, potassium chloride 10%, potassium chloride 10%, potassium, sodium phosphates, potassium, sodium phosphates, potassium, sodium phosphates, promethazine (PHENERGAN) IVPB, sodium chloride 0.9%, Pharmacy to dose Vancomycin consult **AND** vancomycin - pharmacy to dose    Oxygen/Ventilator Data (Last 24H):  (if applicable)        Hemodynamic Parameters (Last 24H):   (if applicable)        Laboratory and Radiology Data:  Recent Results (from the past 24 hour(s))   CBC auto differential    Collection Time: 04/22/20  7:46 AM   Result Value Ref Range    WBC 15.51 (H) 3.90 - 12.70 K/uL    RBC  4.36 (L) 4.60 - 6.20 M/uL    Hemoglobin 13.2 (L) 14.0 - 18.0 g/dL    Hematocrit 40.3 40.0 - 54.0 %    Mean Corpuscular Volume 92 82 - 98 fL    Mean Corpuscular Hemoglobin 30.3 27.0 - 31.0 pg    Mean Corpuscular Hemoglobin Conc 32.8 32.0 - 36.0 g/dL    RDW 12.5 11.5 - 14.5 %    Platelets 553 (H) 150 - 350 K/uL    MPV 9.5 9.2 - 12.9 fL    Immature Granulocytes 0.6 (H) 0.0 - 0.5 %    Gran # (ANC) 11.4 (H) 1.8 - 7.7 K/uL    Immature Grans (Abs) 0.10 (H) 0.00 - 0.04 K/uL    Lymph # 2.9 1.0 - 4.8 K/uL    Mono # 0.7 0.3 - 1.0 K/uL    Eos # 0.4 0.0 - 0.5 K/uL    Baso # 0.10 0.00 - 0.20 K/uL    nRBC 0 0 /100 WBC    Gran% 73.4 (H) 38.0 - 73.0 %    Lymph% 18.4 18.0 - 48.0 %    Mono% 4.5 4.0 - 15.0 %    Eosinophil% 2.5 0.0 - 8.0 %    Basophil% 0.6 0.0 - 1.9 %    Differential Method Automated    Comprehensive metabolic panel    Collection Time: 04/22/20  7:46 AM   Result Value Ref Range    Sodium 132 (L) 136 - 145 mmol/L    Potassium 4.1 3.5 - 5.1 mmol/L    Chloride 96 95 - 110 mmol/L    CO2 26 23 - 29 mmol/L    Glucose 192 (H) 70 - 110 mg/dL    BUN, Bld 14 6 - 20 mg/dL    Creatinine 1.3 0.5 - 1.4 mg/dL    Calcium 9.1 8.7 - 10.5 mg/dL    Total Protein 8.1 6.0 - 8.4 g/dL    Albumin 3.6 3.5 - 5.2 g/dL    Total Bilirubin 0.7 0.1 - 1.0 mg/dL    Alkaline Phosphatase 54 (L) 55 - 135 U/L    AST 22 10 - 40 U/L    ALT 18 10 - 44 U/L    Anion Gap 10 8 - 16 mmol/L    eGFR if African American >60.0 >60 mL/min/1.73 m^2    eGFR if non African American >60.0 >60 mL/min/1.73 m^2   C-Reactive Protein    Collection Time: 04/22/20  7:46 AM   Result Value Ref Range    CRP 1.99 (H) 0.00 - 0.75 mg/dL   Ferritin    Collection Time: 04/22/20  7:46 AM   Result Value Ref Range    Ferritin 229 20.0 - 300.0 ng/mL   Lactate Dehydrogenase    Collection Time: 04/22/20  7:46 AM   Result Value Ref Range     110 - 260 U/L   CK    Collection Time: 04/22/20  7:46 AM   Result Value Ref Range    CPK 76 20 - 200 U/L   Troponin I    Collection Time: 04/22/20   7:46 AM   Result Value Ref Range    Troponin I <0.030 <=0.040 ng/mL   COVID-19 Rapid Screening    Collection Time: 04/22/20  7:46 AM   Result Value Ref Range    SARS-CoV-2 RNA, Amplification, Qual Negative Negative   Brain Natriuretic Peptide    Collection Time: 04/22/20  7:46 AM   Result Value Ref Range    BNP 80 0 - 99 pg/mL   Procalcitonin    Collection Time: 04/22/20  7:46 AM   Result Value Ref Range    Procalcitonin 0.09 0.00 - 0.50 ng/mL   Blood culture x two cultures. Draw prior to antibiotics.    Collection Time: 04/22/20  7:46 AM   Result Value Ref Range    Blood Culture, Routine No Growth to date     Blood Culture, Routine Gram stain aer bottle: Gram positive cocci      Blood Culture, Routine       Results called to and read back by:Aleksandar Hoffman RN CARD A.    Blood Culture, Routine 04/23/2020  01:55 TGC     Blood Culture, Routine Gram stain angelique bottle: Gram positive cocci     Blood Culture, Routine       Positive results previously called to Aleksandar Hoffman RN CARD A.    Blood Culture, Routine 04/23/2020  01:55 TGC    Echo Color Flow Doppler? Yes    Collection Time: 04/22/20  8:24 AM   Result Value Ref Range    BSA 2.48 m2    TDI SEPTAL 0.09 m/s    LV LATERAL E/E' RATIO 6.91 m/s    LV SEPTAL E/E' RATIO 8.44 m/s    Right Atrial Pressure (from IVC) 8 mmHg    AORTIC VALVE CUSP SEPERATION 2.32 cm    TDI LATERAL 0.11 m/s    PV PEAK VELOCITY 88.00 cm/s    LVIDD 5.24 3.5 - 6.0 cm    IVS 1.08 0.6 - 1.1 cm    PW 1.08 0.6 - 1.1 cm    Ao root annulus 3.60 cm    LVIDS 3.96 2.1 - 4.0 cm    FS 24 28 - 44 %    LV mass 218.04 g    LA size 3.72 cm    Left Ventricle Relative Wall Thickness 0.41 cm    AV mean gradient 5 mmHg    AV valve area 3.39 cm2    AV Velocity Ratio 87.58     AV index (prosthetic) 1.03     E/A ratio 1.12     Mean e' 0.10 m/s    E wave decelartion time 190.62 msec    LVOT diameter 2.05 cm    LVOT area 3.3 cm2    LVOT peak carolyn 121.73 m/s    LVOT peak VTI 20.75 cm    Ao peak carolyn 1.39 m/s    Ao VTI 20.18 cm     LVOT stroke volume 68.45 cm3    AV peak gradient 8 mmHg    TV rest pulmonary artery pressure 33 mmHg    E/E' ratio 7.60 m/s    MV Peak E Abisai 0.76 m/s    TR Max Abisai 2.49 m/s    MV Peak A Abisai 0.68 m/s    LV Systolic Volume 88.80 mL    LV Systolic Volume Index 37.1 mL/m2    LV Diastolic Volume 193.10 mL    LV Diastolic Volume Index 80.60 mL/m2    LV Mass Index 91 g/m2    Triscuspid Valve Regurgitation Peak Gradient 25 mmHg   Blood culture x two cultures. Draw prior to antibiotics.    Collection Time: 04/22/20  9:34 AM   Result Value Ref Range    Blood Culture, Routine No Growth to date     Blood Culture, Routine Gram stain aer bottle: Gram positive cocci      Blood Culture, Routine       Results called to and read back by:Aleksandar Hoffman RN CARD A.    Blood Culture, Routine 04/23/2020  01:58 TGC     Blood Culture, Routine Gram stain angelique bottle: Gram positive cocci      Blood Culture, Routine       Positive results previously called to Aleksandar Hoffman RN CARD A.     Blood Culture, Routine 04/23/2020  01:59 TGC    Urinalysis, Reflex to Urine Culture Urine, Clean Catch    Collection Time: 04/22/20  1:30 PM   Result Value Ref Range    Specimen UA Urine, Clean Catch     Color, UA Yellow Yellow, Straw, Iva    Appearance, UA Clear Clear    pH, UA 6.0 5.0 - 8.0    Specific Gravity, UA >1.030 (A) 1.005 - 1.030    Protein, UA Trace (A) Negative    Glucose, UA 4+ (A) Negative    Ketones, UA Negative Negative    Bilirubin (UA) Negative Negative    Occult Blood UA Negative Negative    Nitrite, UA Negative Negative    Urobilinogen, UA Negative Negative EU/dL    Leukocytes, UA Negative Negative   Urinalysis Microscopic    Collection Time: 04/22/20  1:30 PM   Result Value Ref Range    RBC, UA 1 0 - 4 /hpf    WBC, UA 9 (H) 0 - 5 /hpf    Bacteria Negative None-Occ /hpf    Yeast, UA None None    Squam Epithel, UA 1 /hpf    Hyaline Casts, UA 0 0-1/lpf /lpf    Microscopic Comment SEE COMMENT    Troponin I    Collection Time: 04/22/20   1:59 PM   Result Value Ref Range    Troponin I <0.030 <=0.040 ng/mL   POCT glucose    Collection Time: 04/22/20  2:39 PM   Result Value Ref Range    POC Glucose 104 70 - 110   POCT glucose    Collection Time: 04/22/20  4:34 PM   Result Value Ref Range    POC Glucose 120 (H) 70 - 110   Troponin I    Collection Time: 04/22/20  7:51 PM   Result Value Ref Range    Troponin I <0.030 <=0.040 ng/mL   Basic metabolic panel    Collection Time: 04/22/20  7:51 PM   Result Value Ref Range    Sodium 129 (L) 136 - 145 mmol/L    Potassium 5.5 (H) 3.5 - 5.1 mmol/L    Chloride 93 (L) 95 - 110 mmol/L    CO2 25 23 - 29 mmol/L    Glucose 313 (H) 70 - 110 mg/dL    BUN, Bld 22 (H) 6 - 20 mg/dL    Creatinine 1.7 (H) 0.5 - 1.4 mg/dL    Calcium 9.1 8.7 - 10.5 mg/dL    Anion Gap 11 8 - 16 mmol/L    eGFR if African American 52.4 (A) >60 mL/min/1.73 m^2    eGFR if non African American 45.4 (A) >60 mL/min/1.73 m^2   Lactic acid, plasma    Collection Time: 04/22/20  7:51 PM   Result Value Ref Range    Lactate (Lactic Acid) 2.1 (HH) 0.5 - 1.9 mmol/L   POCT glucose    Collection Time: 04/22/20  8:13 PM   Result Value Ref Range    POC Glucose 357 (H) 70 - 110   Troponin I    Collection Time: 04/23/20  1:36 AM   Result Value Ref Range    Troponin I <0.030 <=0.040 ng/mL   Basic Metabolic Panel (BMP)    Collection Time: 04/23/20  1:36 AM   Result Value Ref Range    Sodium 129 (L) 136 - 145 mmol/L    Potassium 4.6 3.5 - 5.1 mmol/L    Chloride 96 95 - 110 mmol/L    CO2 22 (L) 23 - 29 mmol/L    Glucose 354 (H) 70 - 110 mg/dL    BUN, Bld 32 (H) 6 - 20 mg/dL    Creatinine 1.7 (H) 0.5 - 1.4 mg/dL    Calcium 8.5 (L) 8.7 - 10.5 mg/dL    Anion Gap 11 8 - 16 mmol/L    eGFR if African American 52.4 (A) >60 mL/min/1.73 m^2    eGFR if non African American 45.4 (A) >60 mL/min/1.73 m^2   Magnesium    Collection Time: 04/23/20  1:36 AM   Result Value Ref Range    Magnesium 1.9 1.6 - 2.6 mg/dL   Phosphorus    Collection Time: 04/23/20  1:36 AM   Result Value Ref Range     Phosphorus 6.8 (H) 2.7 - 4.5 mg/dL   CBC with Automated Differential    Collection Time: 04/23/20  1:36 AM   Result Value Ref Range    WBC 18.16 (H) 3.90 - 12.70 K/uL    RBC 3.59 (L) 4.60 - 6.20 M/uL    Hemoglobin 11.0 (L) 14.0 - 18.0 g/dL    Hematocrit 33.5 (L) 40.0 - 54.0 %    Mean Corpuscular Volume 93 82 - 98 fL    Mean Corpuscular Hemoglobin 30.6 27.0 - 31.0 pg    Mean Corpuscular Hemoglobin Conc 32.8 32.0 - 36.0 g/dL    RDW 12.8 11.5 - 14.5 %    Platelets 443 (H) 150 - 350 K/uL    MPV 9.3 9.2 - 12.9 fL    Immature Granulocytes 0.6 (H) 0.0 - 0.5 %    Gran # (ANC) 15.6 (H) 1.8 - 7.7 K/uL    Immature Grans (Abs) 0.11 (H) 0.00 - 0.04 K/uL    Lymph # 2.0 1.0 - 4.8 K/uL    Mono # 0.4 0.3 - 1.0 K/uL    Eos # 0.0 0.0 - 0.5 K/uL    Baso # 0.03 0.00 - 0.20 K/uL    nRBC 0 0 /100 WBC    Gran% 85.8 (H) 38.0 - 73.0 %    Lymph% 11.1 (L) 18.0 - 48.0 %    Mono% 2.3 (L) 4.0 - 15.0 %    Eosinophil% 0.0 0.0 - 8.0 %    Basophil% 0.2 0.0 - 1.9 %    Differential Method Automated    Magnesium    Collection Time: 04/23/20  1:36 AM   Result Value Ref Range    Magnesium 1.9 1.6 - 2.6 mg/dL   POCT glucose    Collection Time: 04/23/20  5:21 AM   Result Value Ref Range    POC Glucose 419 (H) 70 - 110     Imaging Results          CTA Chest Non-Coronary - PE Study (Final result)  Result time 04/22/20 09:28:24    Final result by Ailyn Singh MD (04/22/20 09:28:24)                 Impression:      1. No evidence of pulmonary embolism to the segmental arterial level. If there is continued clinical concern, consider further evaluation with lower extremity doppler.    2. Cardiomegaly with prominence of the pulmonary vasculature suggestive of congestion.    Atelectasis in lung bases with small left pleural effusion      Electronically signed by: Ailyn Singh MD  Date:    04/22/2020  Time:    09:28             Narrative:      CMS MANDATED QUALITY DATA - CT RADIATION - 436    All CT scans at this facility utilize dose modulation,  iterative reconstruction, and/or weight based dosing when appropriate to reduce radiation dose to as low as reasonably achievable.    CLINICAL HISTORY:  ([id number] y/o  (1965) M    Dyspnea, cardiac origin suspected;    TECHNIQUE:  (A#[id number], exam time 4/22/2020 9:21)    CTA CHEST NON CORONARY [id number]    Axial CT examination of the chest with attention to the pulmonary arteries was performed using contiguous axial images from the diaphragms to the lung apices following the intravenous administration of non-ionic contrast material. Images were reviewed using lung, mediastinal, and bone windows. The study was performed with thin sections with subsequent 3-D reformats/MIP/reconstructions in multiple planes.    COMPARISON:  Chest x-ray dated 04/22/2020    FINDINGS:  CTA PE evaluation: This is a moderate quality study for the evaluation of pulmonary embolism High quality The pulmonary arteries are normal in appearance without pulmonary emboli noted up to the segmental level, noting the limitations of CT technique for identifying small or isolated subsegmental emboli.    CT Chest:    Visualized neck: Within normal limits.    Lungs: There is dependent atelectasis in the bilateral lower lobes.  The lungs are otherwise clear.    Airway: The trachea and central bronchial tree appear normal.    Pleura: There is a small left pleural effusion.  There is no pneumothorax.    Cardiovascular: The heart is enlarged.  The patient has had a prior CABG.  There is prominence of the pulmonary vasculature suggestive of pulmonary congestion.    Soft tissues: The peripheral soft tissues appear normal.    Musculoskeletal: The visualized osseous structures appear normal.  There are no suspicious osseous lesions.    Esophagus: The esophagus appears grossly normal.    Upper Abdomen: The visualized upper abdominal organs appear normal.                               X-Ray Chest AP Portable (Final result)  Result time 04/22/20 07:51:47     Final result by Andrae Bennett MD (04/22/20 07:51:47)                 Impression:      Interval removal of right IJ catheter, with persistent mid and lower lung zone patchy alveolar opacities either reflecting atelectasis, infectious or inflammatory consolidation, or some combination thereof.      Electronically signed by: Andrae Bennett MD  Date:    04/22/2020  Time:    07:51             Narrative:    EXAMINATION:  XR CHEST AP PORTABLE    CLINICAL HISTORY:  Suspected Covid-19 Virus Infection;    FINDINGS:  Portable chest at 746 compared with 04/10/2020 shows normal cardiomediastinal silhouette with postsurgical changes of CABG. Prior right IJ catheter is been removed.    Patchy mid and lower lung zone airspace opacities bilaterally persist, without significant change since the prior exam.  Lung volumes remain low.  Central pulmonary vasculature is unchanged, remaining within normal limits.  No pleural effusion or pneumothorax.  No new osseous abnormality.                                12-lead EKG interpretation:  (if applicable)  SR with TWI anterior lateral and septal wall   ST inferior wall which is new from 3/2020    Current Cardiac Rhythm:   (if applicable) sr     Physical Exam:   Physical Exam   Constitutional: He is oriented to person, place, and time. He appears well-developed and well-nourished.   HENT:   Head: Normocephalic and atraumatic.   Cardiovascular: Normal rate and regular rhythm.   Murmur heard.  No rub   Pulmonary/Chest: Effort normal. He has decreased breath sounds in the left lower field. He has no wheezes. He has no rales. He exhibits no tenderness.   midsternal scar noted    Abdominal: Soft.   Musculoskeletal: He exhibits edema.   Neurological: He is alert and oriented to person, place, and time.   Skin: Skin is warm and dry. Capillary refill takes less than 2 seconds.       ASSESSMENT/PLAN:   Assessment:   1, s/p 2 vessel CABG --atypical chest pain --Pericarditis Positive blood culture Gram +  Cocci   2. Hyponatremia-iatrogenic --  3. HENRIQUE   4. DM   5. Left pleural effusion    Plan:     Ok to feed today   Hold HCTZ and Lisinopril 2/2 HENRIQUE     Defer antibiotics to hospital med    Recheck ecg

## 2020-04-23 NOTE — PLAN OF CARE
Vital signs, cardiac and lab monitoring. CXR in am. Possible discharge in am . MD consult in am. Increase activity as tolerated. Bed alarm on. Watch for falls. Watch for sign and symptoms of bleeding. Accuchecks per order.Breathing treatments and adjust oxygen as needed. Strict I & O. Daily weights.

## 2020-04-23 NOTE — SUBJECTIVE & OBJECTIVE
Interval History: afvss, wbc mildy elevated to 18.16 today (steriods). LA elevated to 2.6. bcx x 2 GPC and vanc started overnight.   Patient seen and examined. States that his chest pain has resolved. Denies palpations, sob, cough, fever, chills, n/v/d/c.     Review of Systems   As above  Objective:     Vital Signs (Most Recent):  Temp: 97.3 °F (36.3 °C) (04/23/20 1141)  Pulse: 63 (04/23/20 1141)  Resp: 15 (04/23/20 1141)  BP: (!) 113/58 (04/23/20 1141)  SpO2: (!) 92 % (04/23/20 1141) Vital Signs (24h Range):  Temp:  [97.3 °F (36.3 °C)-98.9 °F (37.2 °C)] 97.3 °F (36.3 °C)  Pulse:  [54-79] 63  Resp:  [10-20] 15  SpO2:  [89 %-94 %] 92 %  BP: ()/(55-69) 113/58     Weight: 120.7 kg (266 lb 1.5 oz)  Body mass index is 37.11 kg/m².    Intake/Output Summary (Last 24 hours) at 4/23/2020 1349  Last data filed at 4/23/2020 1200  Gross per 24 hour   Intake 1460 ml   Output 1575 ml   Net -115 ml      Physical Exam   Constitutional: He is oriented to person, place, and time. He appears well-developed and well-nourished. No distress.   HENT:   Head: Normocephalic and atraumatic.   Mouth/Throat: Oropharynx is clear and moist.   Eyes: Pupils are equal, round, and reactive to light. Conjunctivae and EOM are normal.   Neck: Normal range of motion. Neck supple. No JVD present.   Cardiovascular: Normal rate, regular rhythm, normal heart sounds and intact distal pulses.   Chest pain not reproducible    Pulmonary/Chest: Effort normal and breath sounds normal. No respiratory distress.   Abdominal: Soft. Bowel sounds are normal. He exhibits no distension. There is no tenderness. There is no guarding.   Genitourinary:   Genitourinary Comments: No ritter   Musculoskeletal: Normal range of motion. He exhibits no edema, tenderness or deformity.   Neurological: He is alert and oriented to person, place, and time. He has normal reflexes. No cranial nerve deficit.   Skin: Skin is warm and dry. He is not diaphoretic.   Psychiatric: He has a  normal mood and affect. His behavior is normal. Judgment and thought content normal.   Nursing note and vitals reviewed.      Significant Labs:   Blood Culture:   Recent Labs   Lab 04/22/20  0746 04/22/20  0934   LABBLOO No Growth to date  Gram stain aer bottle: Gram positive cocci   Results called to and read back by:Aleksandar Hoffman RN CARD A.  04/23/2020  01:55 TGC  Gram stain angelique bottle: Gram positive cocci  Positive results previously called to Aleksandar Hoffman RN CARD A.  04/23/2020  01:55 TGC No Growth to date  Gram stain aer bottle: Gram positive cocci   Results called to and read back by:Aleksandar Hoffman RN CARD A.  04/23/2020  01:58 TGC  Gram stain angelique bottle: Gram positive cocci   Positive results previously called to Aleksandar Hoffman RN CARD A.   04/23/2020  01:59 TGC     CBC:   Recent Labs   Lab 04/22/20 0746 04/23/20  0136   WBC 15.51* 18.16*   HGB 13.2* 11.0*   HCT 40.3 33.5*   * 443*     CMP:   Recent Labs   Lab 04/22/20 0746 04/22/20 1951 04/23/20  0136   * 129* 129*   K 4.1 5.5* 4.6   CL 96 93* 96   CO2 26 25 22*   * 313* 354*   BUN 14 22* 32*   CREATININE 1.3 1.7* 1.7*   CALCIUM 9.1 9.1 8.5*   PROT 8.1  --   --    ALBUMIN 3.6  --   --    BILITOT 0.7  --   --    ALKPHOS 54*  --   --    AST 22  --   --    ALT 18  --   --    ANIONGAP 10 11 11   EGFRNONAA >60.0 45.4* 45.4*     Lactic Acid:   Recent Labs   Lab 04/22/20 1951 04/23/20  1101   LACTATE 2.1* 2.6*     Magnesium:   Recent Labs   Lab 04/23/20  0136   MG 1.9  1.9     All pertinent labs within the past 24 hours have been reviewed.    Significant Imaging: I have reviewed all pertinent imaging results/findings within the past 24 hours.   Cxr:   IMPRESSION:  Interval increase in opacification of the left lung base. (atelectasis versus pneumonia).

## 2020-04-23 NOTE — PROGRESS NOTES
Pharmacokinetic Initial Assessment: IV Vancomycin    Assessment/Plan:    Initiate intravenous vancomycin with loading dose of 2000 mg once followed by a maintenance dose of vancomycin 2000 mg IV every 18 hours  Desired empiric serum trough concentration is 15 to 20 mcg/mL  Draw vancomycin trough level 30 min prior to fourth dose on 04/25 at approximately 0800   Pharmacy will continue to follow and monitor vancomycin.      Please contact pharmacy at extension [phone] with any questions regarding this assessment.     Thank you for the consult,   Tigre Dhillon       Patient brief summary:  Magdaleno Cunningham is a 54 y.o. male initiated on antimicrobial therapy with IV Vancomycin for treatment of suspected bacteremia    Drug Allergies:   Review of patient's allergies indicates:   Allergen Reactions    Pesticide Dermatitis and Rash       Actual Body Weight:   120.7 kg    Renal Function:   Estimated Creatinine Clearance: 67.2 mL/min (A) (based on SCr of 1.7 mg/dL (H)).,     CBC (last 72 hours):  Recent Labs   Lab Result Units 04/22/20  0746 04/23/20  0136   WBC K/uL 15.51* 18.16*   Hemoglobin g/dL 13.2* 11.0*   Hematocrit % 40.3 33.5*   Platelets K/uL 553* 443*   Gran% % 73.4* 85.8*   Lymph% % 18.4 11.1*   Mono% % 4.5 2.3*   Eosinophil% % 2.5 0.0   Basophil% % 0.6 0.2   Differential Method  Automated Automated       Metabolic Panel (last 72 hours):  Recent Labs   Lab Result Units 04/22/20  0746 04/22/20  1330 04/22/20  1951 04/23/20  0136   Sodium mmol/L 132*  --  129* 129*   Potassium mmol/L 4.1  --  5.5* 4.6   Chloride mmol/L 96  --  93* 96   CO2 mmol/L 26  --  25 22*   Glucose mg/dL 192*  --  313* 354*   Glucose, UA   --  4+*  --   --    BUN, Bld mg/dL 14  --  22* 32*   Creatinine mg/dL 1.3  --  1.7* 1.7*   Albumin g/dL 3.6  --   --   --    Total Bilirubin mg/dL 0.7  --   --   --    Alkaline Phosphatase U/L 54*  --   --   --    AST U/L 22  --   --   --    ALT U/L 18  --   --   --    Magnesium mg/dL  --   --   --  1.9  1.9    Phosphorus mg/dL  --   --   --  6.8*       Drug levels (last 3 results):  No results for input(s): VANCOMYCINRA, VANCOMYCINPE, VANCOMYCINTR in the last 72 hours.    Microbiologic Results:  Microbiology Results (last 7 days)       Procedure Component Value Units Date/Time    Blood culture x two cultures. Draw prior to antibiotics. [[id number]] Collected:  04/22/20 0934    Order Status:  Completed Specimen:  Blood from Peripheral, Antecubital, Left Updated:  04/23/20 0159     Blood Culture, Routine No Growth to date      Gram stain aer bottle: Gram positive cocci       Results called to and read back by:Aleksandar Hoffman RN CARD A.      04/23/2020  01:58 TGC      Gram stain angelique bottle: Gram positive cocci       Positive results previously called to Aleksandar Hoffman RN CARD A.       04/23/2020  01:59 TGC    Narrative:       Aerobic and anaerobic    Blood culture x two cultures. Draw prior to antibiotics. [[id number]] Collected:  04/22/20 0746    Order Status:  Completed Specimen:  Blood from Peripheral, Antecubital, Right Updated:  04/23/20 0156     Blood Culture, Routine No Growth to date      Gram stain aer bottle: Gram positive cocci       Results called to and read back by:Aleksandar Hoffman RN CARD A.      04/23/2020  01:55 TGC      Gram stain angelique bottle: Gram positive cocci      Positive results previously called to Aleksandar Hoffman RN CARD A.      04/23/2020  01:55 TGC    Narrative:       Aerobic and anaerobic

## 2020-04-24 LAB
ALBUMIN SERPL BCP-MCNC: 3.2 G/DL (ref 3.5–5.2)
ALP SERPL-CCNC: 55 U/L (ref 55–135)
ALT SERPL W/O P-5'-P-CCNC: 14 U/L (ref 10–44)
ANION GAP SERPL CALC-SCNC: 11 MMOL/L (ref 8–16)
AST SERPL-CCNC: 14 U/L (ref 10–40)
BASOPHILS # BLD AUTO: 0.02 K/UL (ref 0–0.2)
BASOPHILS NFR BLD: 0.1 % (ref 0–1.9)
BILIRUB SERPL-MCNC: 0.6 MG/DL (ref 0.1–1)
BUN SERPL-MCNC: 36 MG/DL (ref 6–20)
CALCIUM SERPL-MCNC: 8.9 MG/DL (ref 8.7–10.5)
CHLORIDE SERPL-SCNC: 95 MMOL/L (ref 95–110)
CO2 SERPL-SCNC: 26 MMOL/L (ref 23–29)
CREAT SERPL-MCNC: 1.3 MG/DL (ref 0.5–1.4)
DIFFERENTIAL METHOD: ABNORMAL
EOSINOPHIL # BLD AUTO: 0 K/UL (ref 0–0.5)
EOSINOPHIL NFR BLD: 0 % (ref 0–8)
ERYTHROCYTE [DISTWIDTH] IN BLOOD BY AUTOMATED COUNT: 12.5 % (ref 11.5–14.5)
EST. GFR  (AFRICAN AMERICAN): >60 ML/MIN/1.73 M^2
EST. GFR  (NON AFRICAN AMERICAN): >60 ML/MIN/1.73 M^2
GLUCOSE SERPL-MCNC: 166 MG/DL (ref 70–110)
GLUCOSE SERPL-MCNC: 175 MG/DL (ref 70–110)
GLUCOSE SERPL-MCNC: 195 MG/DL (ref 70–110)
GLUCOSE SERPL-MCNC: 196 MG/DL (ref 70–110)
GLUCOSE SERPL-MCNC: 221 MG/DL (ref 70–110)
HCT VFR BLD AUTO: 34 % (ref 40–54)
HGB BLD-MCNC: 11.1 G/DL (ref 14–18)
IMM GRANULOCYTES # BLD AUTO: 0.23 K/UL (ref 0–0.04)
IMM GRANULOCYTES NFR BLD AUTO: 1.1 % (ref 0–0.5)
LACTATE SERPL-SCNC: 1.4 MMOL/L (ref 0.5–1.9)
LYMPHOCYTES # BLD AUTO: 2.2 K/UL (ref 1–4.8)
LYMPHOCYTES NFR BLD: 10.8 % (ref 18–48)
MAGNESIUM SERPL-MCNC: 2.2 MG/DL (ref 1.6–2.6)
MCH RBC QN AUTO: 30 PG (ref 27–31)
MCHC RBC AUTO-ENTMCNC: 32.6 G/DL (ref 32–36)
MCV RBC AUTO: 92 FL (ref 82–98)
MONOCYTES # BLD AUTO: 1.1 K/UL (ref 0.3–1)
MONOCYTES NFR BLD: 5.5 % (ref 4–15)
NEUTROPHILS # BLD AUTO: 17 K/UL (ref 1.8–7.7)
NEUTROPHILS NFR BLD: 82.5 % (ref 38–73)
NRBC BLD-RTO: 0 /100 WBC
PHOSPHATE SERPL-MCNC: 3.5 MG/DL (ref 2.7–4.5)
PLATELET # BLD AUTO: 492 K/UL (ref 150–350)
PMV BLD AUTO: 9.5 FL (ref 9.2–12.9)
POTASSIUM SERPL-SCNC: 4.2 MMOL/L (ref 3.5–5.1)
PROT SERPL-MCNC: 7.5 G/DL (ref 6–8.4)
RBC # BLD AUTO: 3.7 M/UL (ref 4.6–6.2)
SODIUM SERPL-SCNC: 132 MMOL/L (ref 136–145)
WBC # BLD AUTO: 20.55 K/UL (ref 3.9–12.7)

## 2020-04-24 PROCEDURE — 21000000 HC CCU ICU ROOM CHARGE

## 2020-04-24 PROCEDURE — 63600175 PHARM REV CODE 636 W HCPCS: Performed by: FAMILY MEDICINE

## 2020-04-24 PROCEDURE — 85025 COMPLETE CBC W/AUTO DIFF WBC: CPT

## 2020-04-24 PROCEDURE — 83735 ASSAY OF MAGNESIUM: CPT

## 2020-04-24 PROCEDURE — 87040 BLOOD CULTURE FOR BACTERIA: CPT

## 2020-04-24 PROCEDURE — 63600175 PHARM REV CODE 636 W HCPCS: Performed by: STUDENT IN AN ORGANIZED HEALTH CARE EDUCATION/TRAINING PROGRAM

## 2020-04-24 PROCEDURE — 84100 ASSAY OF PHOSPHORUS: CPT

## 2020-04-24 PROCEDURE — 21400001 HC TELEMETRY ROOM

## 2020-04-24 PROCEDURE — 25000003 PHARM REV CODE 250: Performed by: STUDENT IN AN ORGANIZED HEALTH CARE EDUCATION/TRAINING PROGRAM

## 2020-04-24 PROCEDURE — 87147 CULTURE TYPE IMMUNOLOGIC: CPT

## 2020-04-24 PROCEDURE — 83605 ASSAY OF LACTIC ACID: CPT

## 2020-04-24 PROCEDURE — 94761 N-INVAS EAR/PLS OXIMETRY MLT: CPT

## 2020-04-24 PROCEDURE — 80053 COMPREHEN METABOLIC PANEL: CPT

## 2020-04-24 PROCEDURE — 36415 COLL VENOUS BLD VENIPUNCTURE: CPT

## 2020-04-24 PROCEDURE — 93005 ELECTROCARDIOGRAM TRACING: CPT | Performed by: INTERNAL MEDICINE

## 2020-04-24 PROCEDURE — 99900035 HC TECH TIME PER 15 MIN (STAT)

## 2020-04-24 PROCEDURE — 82962 GLUCOSE BLOOD TEST: CPT

## 2020-04-24 RX ADMIN — MELATONIN 6 MG: at 08:04

## 2020-04-24 RX ADMIN — SENNOSIDES AND DOCUSATE SODIUM 1 TABLET: 8.6; 5 TABLET ORAL at 08:04

## 2020-04-24 RX ADMIN — INSULIN ASPART 2 UNITS: 100 INJECTION, SOLUTION INTRAVENOUS; SUBCUTANEOUS at 08:04

## 2020-04-24 RX ADMIN — TAMSULOSIN HYDROCHLORIDE 0.4 MG: 0.4 CAPSULE ORAL at 09:04

## 2020-04-24 RX ADMIN — APIXABAN 5 MG: 5 TABLET, FILM COATED ORAL at 09:04

## 2020-04-24 RX ADMIN — APIXABAN 5 MG: 5 TABLET, FILM COATED ORAL at 08:04

## 2020-04-24 RX ADMIN — CEFTRIAXONE 1 G: 1 INJECTION, SOLUTION INTRAVENOUS at 05:04

## 2020-04-24 RX ADMIN — ASPIRIN 81 MG: 81 TABLET, DELAYED RELEASE ORAL at 08:04

## 2020-04-24 RX ADMIN — ROSUVASTATIN CALCIUM 20 MG: 20 TABLET, FILM COATED ORAL at 09:04

## 2020-04-24 RX ADMIN — OXYCODONE HYDROCHLORIDE AND ACETAMINOPHEN 1 TABLET: 10; 325 TABLET ORAL at 04:04

## 2020-04-24 RX ADMIN — INSULIN DETEMIR 25 UNITS: 100 INJECTION, SOLUTION SUBCUTANEOUS at 08:04

## 2020-04-24 RX ADMIN — GABAPENTIN 300 MG: 300 CAPSULE ORAL at 09:04

## 2020-04-24 RX ADMIN — VANCOMYCIN HYDROCHLORIDE 2000 MG: 1 INJECTION, POWDER, LYOPHILIZED, FOR SOLUTION INTRAVENOUS at 05:04

## 2020-04-24 RX ADMIN — INSULIN ASPART 4 UNITS: 100 INJECTION, SOLUTION INTRAVENOUS; SUBCUTANEOUS at 12:04

## 2020-04-24 RX ADMIN — OXYCODONE HYDROCHLORIDE AND ACETAMINOPHEN 1 TABLET: 10; 325 TABLET ORAL at 12:04

## 2020-04-24 RX ADMIN — ISOSORBIDE MONONITRATE 30 MG: 30 TABLET, EXTENDED RELEASE ORAL at 08:04

## 2020-04-24 RX ADMIN — CLONAZEPAM 1 MG: 1 TABLET ORAL at 08:04

## 2020-04-24 RX ADMIN — OXYCODONE HYDROCHLORIDE AND ACETAMINOPHEN 1 TABLET: 10; 325 TABLET ORAL at 05:04

## 2020-04-24 RX ADMIN — OXYCODONE HYDROCHLORIDE AND ACETAMINOPHEN 1 TABLET: 10; 325 TABLET ORAL at 09:04

## 2020-04-24 RX ADMIN — CEFTRIAXONE 1 G: 1 INJECTION, SOLUTION INTRAVENOUS at 04:04

## 2020-04-24 RX ADMIN — OXYCODONE HYDROCHLORIDE AND ACETAMINOPHEN 1 TABLET: 10; 325 TABLET ORAL at 08:04

## 2020-04-24 RX ADMIN — METOPROLOL SUCCINATE 50 MG: 50 TABLET, FILM COATED, EXTENDED RELEASE ORAL at 08:04

## 2020-04-24 NOTE — PROGRESS NOTES
Consult Note  Infectious Disease    Reason for Consult:      HPI: Magdaleno Cunningham is a  anxious appearing 54 y.o. male with past medical history of CAD, status post CABG x2, hospitalized from 04/07--04/10/2020, postop day 14, IDDM 2, came on 04/22/2020 with complaints of shortness of breath and chest pain, he was found to have  Staph aureus bacteremia hence ID consult.    I came and saw patient.  He has been afebrile since admission.  He continues to have chest pain at the surgical site, 5/10, worse with movement, worse with breathing in deep, some of it is expected but some looks aggravated by his anxiety.  Pain medications help.  Nurses had confined him in bed as he is receiving opiates.  He cannot handle it.  He seems to be claustrophobic.      Antibiotics (From admission, onward)    Start     Stop Route Frequency Ordered    04/23/20 1500  cefTRIAXone (ROCEPHIN) 1 g in dextrose 5 % 50 mL IVPB      -- IV Every 12 hours (non-standard times) 04/23/20 1400    04/23/20 0325  vancomycin - pharmacy to dose  (vancomycin IVPB)      -- IV pharmacy to manage frequency 04/23/20 0226    04/23/20 0300  vancomycin (VANCOCIN) 2,000 mg in dextrose 5 % 500 mL IVPB      -- IV Every 18 hours 04/23/20 0235        Antifungals (From admission, onward)    None        Antivirals (From admission, onward)    None          Review of patient's allergies indicates:   Allergen Reactions    Pesticide Dermatitis and Rash     Past Medical History:   Diagnosis Date    Anticoagulant long-term use     Atrial fibrillation     BPH (benign prostatic hyperplasia)     Cataract     COPD (chronic obstructive pulmonary disease)     Coronary artery disease     stents    CVD (cardiovascular disease)     Diabetes mellitus     Erythema multiforme     AKA Denton Edmondson Syndrome    Hypertension     MI (myocardial infarction)     per pt he has had 4     ROSAMARIA on CPAP     RLS (restless legs syndrome)      Past Surgical History:   Procedure  Laterality Date    CORONARY ARTERY BYPASS GRAFT (CABG) N/A 2020    Procedure: CORONARY ARTERY BYPASS GRAFT (CABG)- Excision of left atrial appendage;  Surgeon: Doug Solitario MD;  Location: [facility] OR;  Service: Cardiothoracic;  Laterality: N/A;    CORONARY STENT PLACEMENT      WILSON MAZE PROCEDURE N/A 2020    Procedure: WILSON MAZE PROCEDURE- Attempted;  Surgeon: Doug Solitario MD;  Location: [facility] OR;  Service: Cardiothoracic;  Laterality: N/A;    CYSTOSCOPY N/A 12/10/2018    Procedure: CYSTOSCOPY;  Surgeon: Khushboo Abreu MD;  Location: [facility] OR;  Service: Urology;  Laterality: N/A;    ENDOSCOPIC HARVEST OF VEIN Left 2020    Procedure: HARVEST-VEIN-ENDOVASCULAR;  Surgeon: Doug Solitario MD;  Location: [facility] OR;  Service: Cardiothoracic;  Laterality: Left;    LEFT HEART CATHETERIZATION Left 3/17/2020    Procedure: CATHETERIZATION, HEART, LEFT;  Surgeon: Tyree Walters MD;  Location: [facility] CATH/EP LAB;  Service: Cardiology;  Laterality: Left;    ULTRASOUND OF PROSTATE FOR VOLUME DETERMINATION  12/10/2018    Procedure: ULTRASOUND, PROSTATE, FOR VOLUME DETERMINATION;  Surgeon: Khushboo Abreu MD;  Location: [facility];  Service: Urology;;     Social History     Socioeconomic History    Marital status:      Spouse name: Not on file    Number of children: Not on file    Years of education: Not on file    Highest education level: Not on file   Occupational History    Not on file   Social Needs    Financial resource strain: Not on file    Food insecurity:     Worry: Not on file     Inability: Not on file    Transportation needs:     Medical: Not on file     Non-medical: Not on file   Tobacco Use    Smoking status: Former Smoker     Packs/day: 1.00     Years: 30.00     Pack years: 30.00     Types: Cigarettes     Last attempt to quit: 2020     Years since quittin.0    Smokeless tobacco: Never Used    Tobacco comment: since 10/2019 down to 3 cig per day    Substance and Sexual Activity    Alcohol use: Not Currently    Drug use: Not Currently    Sexual activity: Not on file   Lifestyle    Physical activity:     Days per week: Not on file     Minutes per session: Not on file    Stress: Not on file   Relationships    Social connections:     Talks on phone: Not on file     Gets together: Not on file     Attends Church service: Not on file     Active member of club or organization: Not on file     Attends meetings of clubs or organizations: Not on file     Relationship status: Not on file   Other Topics Concern    Not on file   Social History Narrative    Not on file     Family History   Problem Relation Age of Onset    Heart disease Mother     Diabetes Mother     Hyperlipidemia Father        Pertinent medications noted:     Review of Systems:   No chills, fever, sweats, weight loss  No change in vision, loss of vision or diplopia  No sinus congestion, purulent nasal discharge, post nasal drip or facial pain  No pain in mouth or throat. No problems with teeth, gums.  Positive for chest pain at surgical site as above mention.  Positive minimal cough, sputum production,   Denies shortness of breath, dyspnea on exertion, pleurisy, hemoptysis at this time  No nausea, vomiting, diarrhea, constipation, blood in stool, or focal abd pain,  No dysphagia, odynophagia  No dysuria, hematuria, strangury, retention, incontinence, nocturia, prostatism,   Nopenile discharge, genital ulcers, risk for STD  No swelling of joints, redness of joints, injuries, or new focal pain  No unusual headaches, dizziness, vertigo, numbness, paresthesias, neuropathy, falls  No anxiety, depression, substance abuse, sleep disturbance  No diabetes, thyroid, hypogonadal conditions  No bleeding, lymphadenopathy, anemia, malignancy, unusual bruising  No new rashes, lesions, or wounds  No TB exposure  No recent/prior steroids  Outdoor activities:  Travel:   Implants:  None  Antibiotic History:   Perioperative    EXAM & DIAGNOSTICS REVIEWED:   Vitals:     Temp:  [97.4 °F (36.3 °C)-97.9 °F (36.6 °C)]   Temp: 97.9 °F (36.6 °C) (04/24/20 1149)  Pulse: 71 (04/24/20 1149)  Resp: 15 (04/24/20 1149)  BP: 115/74 (04/24/20 1149)  SpO2: 95 % (04/24/20 1149)    Intake/Output Summary (Last 24 hours) at 4/24/2020 1436  Last data filed at 4/24/2020 0758  Gross per 24 hour   Intake 600 ml   Output 3200 ml   Net -2600 ml       General:  In NAD. Alert and attentive, cooperative, comfortable  Eyes:  Anicteric, PERRL, EOMI  ENT:  No ulcers, exudates, thrush, nares patent, some caries present  Neck:  supple, no masses or adenopathy appreciated  Lungs: Minimally decreased on the left with some fine crackles but everything is expected postop CABG  Heart:  RRR, no gallop/murmur/rub noted  Abd:  Soft, NT, ND, normal BS, no masses or organomegaly appreciated.  :  Voids/Cruz, urine clear, no flank tenderness  Musc:  Joints without effusion, swelling, erythema, synovitis, muscle wasting.   Skin:  No rashes. No palmar or plantar lesions. No subungual petechiae  Wound: Looks like it is healing great.  I cannot express any pus or drainage  Neuro: Alert, attentive, speech fluent, face symmetric, moves all extremities, no focal weakness. Ambulatory  Psych:  Calm, cooperative  Lymphatic:     No cervical, supraclavicular, axillary, or inguinal nodes  Extrem: No edema, erythema, phlebitis, cellulitis, warm and well perfused  VAD:       Isolation:      Lines/Tubes/Drains:    General Labs reviewed:  Recent Labs   Lab 04/22/20  0746 04/23/20  0136 04/24/20  0515   WBC 15.51* 18.16* 20.55*   HGB 13.2* 11.0* 11.1*   HCT 40.3 33.5* 34.0*   * 443* 492*       Recent Labs   Lab 04/22/20  0746 04/22/20  1951 04/23/20  0136 04/24/20  0515   * 129* 129* 132*   K 4.1 5.5* 4.6 4.2   CL 96 93* 96 95   CO2 26 25 22* 26   BUN 14 22* 32* 36*   CREATININE 1.3 1.7* 1.7* 1.3   CALCIUM 9.1 9.1 8.5* 8.9   PROT 8.1  --   --  7.5   BILITOT 0.7  --    --  0.6   ALKPHOS 54*  --   --  55   ALT 18  --   --  14   AST 22  --   --  14     Micro:  Microbiology Results (last 7 days)     Procedure Component Value Units Date/Time    Blood culture [[id number]] Collected:  04/24/20 0515    Order Status:  Completed Specimen:  Blood Updated:  04/24/20 1317     Blood Culture, Routine No Growth to date    Blood culture [[id number]] Collected:  04/23/20 1101    Order Status:  Completed Specimen:  Blood Updated:  04/24/20 1232     Blood Culture, Routine No Growth to date      No Growth to date    Blood culture x two cultures. Draw prior to antibiotics. [[id number]]  (Abnormal) Collected:  04/22/20 0934    Order Status:  Completed Specimen:  Blood from Peripheral, Antecubital, Left Updated:  04/24/20 0757     Blood Culture, Routine Gram stain aer bottle: Gram positive cocci       Results called to and read back by:Aleksandar Hoffman RN CARD A.      04/23/2020  01:58 TGC      Gram stain angelique bottle: Gram positive cocci       Positive results previously called to Aleksandar Hoffman RN CARD A.       04/23/2020  01:59 TGC      STAPHYLOCOCCUS AUREUS  For susceptibility see order #[id number]      Narrative:       Aerobic and anaerobic    Blood culture x two cultures. Draw prior to antibiotics. [[id number]]  (Abnormal) Collected:  04/22/20 0746    Order Status:  Completed Specimen:  Blood from Peripheral, Antecubital, Right Updated:  04/24/20 0753     Blood Culture, Routine Gram stain aer bottle: Gram positive cocci       Results called to and read back by:Aleksandar Hoffman RN CARD A.      04/23/2020  01:55 TGC      Gram stain angelique bottle: Gram positive cocci      Positive results previously called to Aleksandar Hoffman RN CARD A.      04/23/2020  01:55 TGC      STAPHYLOCOCCUS AUREUS  Susceptibility pending      Narrative:       Aerobic and anaerobic        Imaging Reviewed:   CXRNo significant change of bilateral lung opacities.   CT 04/22/20201. No evidence of pulmonary embolism to the segmental arterial level.  If there is continued clinical concern, consider further evaluation with lower extremity doppler.. Cardiomegaly with prominence of the pulmonary vasculature suggestive of congestion.  Cardiology:  Echocardiogram on 04/22/2020  · Normal left ventricular systolic function. The estimated ejection fraction is 55%.  · Normal LV diastolic function.  · Mild tricuspid regurgitation.  · Intermediate central venous pressure (8 mmHg).  · The estimated PA systolic pressure is 33 mmHg.  IMPRESSION & PLAN     Staphylococcus aureus bacteremia on 04/22/2020  Leukocytosis  UTI with mild pyuria at 9.  Hyponatremia, hyperglycemia, obesity, protein malnutrition albumin 3.2, COVID is negative on 04/20 2nd,    Recommendations:  Continue vancomycin and ceftriaxone  Repeat blood cultures and follow.  Will this be MRSA are MSSA?   Echocardiogram has no mention of vegetations.  If Staph aureus persist will consider Cam

## 2020-04-24 NOTE — PROGRESS NOTES
"[facility]    Cardiology Progress Note    Subjective:     Spoke w/ nursing and patient via phone.     Nursing states pt c/o chest pain left side this AM requiring oxycodone. Spoke to pt, pt states this pain is primarily located around incision site and feels muscular, it is worse w/ certain movements in the bed and is worse w/ palpation. He states he "knows what heart pain feels like and this is not his heart". States this pain has been constant since hospital admission and eases with oxycodone.   He is requesting to go home.   He states his SOB is much improved.   Reviewed labs, white count is rising, kidney function is improved, K improved.   He has remained a febrile.       Objective:  Vital Signs (Most Recent)  Temp: 97.4 °F (36.3 °C) (04/24/20 0757)  Pulse: 78 (04/24/20 0807)  Resp: 14 (04/24/20 0757)  BP: 125/71 (04/24/20 0807)  SpO2: (!) 94 % (04/24/20 0757)    Vital Signs Range (Last 24H):  Temp:  [97.3 °F (36.3 °C)-97.8 °F (36.6 °C)]   Pulse:  [63-85]   Resp:  [13-37]   BP: ()/(56-71)   SpO2:  [91 %-96 %]     I & O (Last 24H):    Intake/Output Summary (Last 24 hours) at 4/24/2020 0845  Last data filed at 4/24/2020 0758  Gross per 24 hour   Intake 1560 ml   Output 3775 ml   Net -2215 ml       Current Diet:     Current Diet Order   Procedures    Diet Cardiac [facility]; Consistent Carbohydrate; Fluid - 1500mL     ADA 2000     Order Specific Question:   Indicate patient location for additional diet options:     Answer:   [facility]     Order Specific Question:   Additional Diet Options:     Answer:   Consistent Carbohydrate     Order Specific Question:   Fluid restriction:     Answer:   Fluid - 1500mL        Allergies:  Review of patient's allergies indicates:   Allergen Reactions    Pesticide Dermatitis and Rash       Meds:  Scheduled Meds:   apixaban  5 mg Oral BID    aspirin  81 mg Oral Daily    cefTRIAXone (ROCEPHIN) IVPB  1 g Intravenous Q12H    gabapentin  300 mg Oral TID    insulin detemir " U-100  25 Units Subcutaneous BID    isosorbide mononitrate  30 mg Oral Daily    metoprolol succinate  50 mg Oral Daily    rosuvastatin  20 mg Oral QHS    senna-docusate 8.6-50 mg  1 tablet Oral BID    tamsulosin  0.4 mg Oral QHS    vancomycin (VANCOCIN) IVPB  2,000 mg Intravenous Q18H     Continuous Infusions:  PRN Meds:acetaminophen, acetaminophen, clonazePAM, dextrose 50%, dextrose 50%, glucagon (human recombinant), glucose, glucose, insulin aspart U-100, magnesium oxide, magnesium oxide, melatonin, morphine, nitroGLYCERIN, ondansetron, oxyCODONE-acetaminophen, potassium chloride 10%, potassium chloride 10%, potassium, sodium phosphates, potassium, sodium phosphates, potassium, sodium phosphates, promethazine (PHENERGAN) IVPB, sodium chloride 0.9%, Pharmacy to dose Vancomycin consult **AND** vancomycin - pharmacy to dose    Lab Results :  Recent Results (from the past 24 hour(s))   Lactic acid, plasma    Collection Time: 04/23/20 11:01 AM   Result Value Ref Range    Lactate (Lactic Acid) 2.6 (HH) 0.5 - 1.9 mmol/L   Blood culture    Collection Time: 04/23/20 11:01 AM   Result Value Ref Range    Blood Culture, Routine No Growth to date    POCT glucose    Collection Time: 04/23/20 12:09 PM   Result Value Ref Range    POC Glucose 383 (H) 70 - 110   Lactic acid, plasma    Collection Time: 04/23/20  3:43 PM   Result Value Ref Range    Lactate (Lactic Acid) 2.4 (HH) 0.5 - 1.9 mmol/L   POCT glucose    Collection Time: 04/23/20  4:11 PM   Result Value Ref Range    POC Glucose 387 (H) 70 - 110   POCT glucose    Collection Time: 04/23/20  8:38 PM   Result Value Ref Range    POC Glucose 384 (H) 70 - 110   Magnesium    Collection Time: 04/24/20  5:15 AM   Result Value Ref Range    Magnesium 2.2 1.6 - 2.6 mg/dL   CBC auto differential    Collection Time: 04/24/20  5:15 AM   Result Value Ref Range    WBC 20.55 (H) 3.90 - 12.70 K/uL    RBC 3.70 (L) 4.60 - 6.20 M/uL    Hemoglobin 11.1 (L) 14.0 - 18.0 g/dL    Hematocrit 34.0  (L) 40.0 - 54.0 %    Mean Corpuscular Volume 92 82 - 98 fL    Mean Corpuscular Hemoglobin 30.0 27.0 - 31.0 pg    Mean Corpuscular Hemoglobin Conc 32.6 32.0 - 36.0 g/dL    RDW 12.5 11.5 - 14.5 %    Platelets 492 (H) 150 - 350 K/uL    MPV 9.5 9.2 - 12.9 fL    Immature Granulocytes 1.1 (H) 0.0 - 0.5 %    Gran # (ANC) 17.0 (H) 1.8 - 7.7 K/uL    Immature Grans (Abs) 0.23 (H) 0.00 - 0.04 K/uL    Lymph # 2.2 1.0 - 4.8 K/uL    Mono # 1.1 (H) 0.3 - 1.0 K/uL    Eos # 0.0 0.0 - 0.5 K/uL    Baso # 0.02 0.00 - 0.20 K/uL    nRBC 0 0 /100 WBC    Gran% 82.5 (H) 38.0 - 73.0 %    Lymph% 10.8 (L) 18.0 - 48.0 %    Mono% 5.5 4.0 - 15.0 %    Eosinophil% 0.0 0.0 - 8.0 %    Basophil% 0.1 0.0 - 1.9 %    Differential Method Automated    Comprehensive metabolic panel    Collection Time: 04/24/20  5:15 AM   Result Value Ref Range    Sodium 132 (L) 136 - 145 mmol/L    Potassium 4.2 3.5 - 5.1 mmol/L    Chloride 95 95 - 110 mmol/L    CO2 26 23 - 29 mmol/L    Glucose 195 (H) 70 - 110 mg/dL    BUN, Bld 36 (H) 6 - 20 mg/dL    Creatinine 1.3 0.5 - 1.4 mg/dL    Calcium 8.9 8.7 - 10.5 mg/dL    Total Protein 7.5 6.0 - 8.4 g/dL    Albumin 3.2 (L) 3.5 - 5.2 g/dL    Total Bilirubin 0.6 0.1 - 1.0 mg/dL    Alkaline Phosphatase 55 55 - 135 U/L    AST 14 10 - 40 U/L    ALT 14 10 - 44 U/L    Anion Gap 11 8 - 16 mmol/L    eGFR if African American >60.0 >60 mL/min/1.73 m^2    eGFR if non African American >60.0 >60 mL/min/1.73 m^2   Phosphorus    Collection Time: 04/24/20  5:15 AM   Result Value Ref Range    Phosphorus 3.5 2.7 - 4.5 mg/dL   POCT glucose    Collection Time: 04/24/20  5:19 AM   Result Value Ref Range    POC Glucose 196 (H) 70 - 110       Diagnostic Results:  Imaging Results          CTA Chest Non-Coronary - PE Study (Final result)  Result time 04/22/20 09:28:24    Final result by Ailyn Singh MD (04/22/20 09:28:24)                 Impression:      1. No evidence of pulmonary embolism to the segmental arterial level. If there is continued  clinical concern, consider further evaluation with lower extremity doppler.    2. Cardiomegaly with prominence of the pulmonary vasculature suggestive of congestion.    Atelectasis in lung bases with small left pleural effusion      Electronically signed by: Ailyn Singh MD  Date:    04/22/2020  Time:    09:28             Narrative:      CMS MANDATED QUALITY DATA - CT RADIATION - 436    All CT scans at this facility utilize dose modulation, iterative reconstruction, and/or weight based dosing when appropriate to reduce radiation dose to as low as reasonably achievable.    CLINICAL HISTORY:  ([id number])55 y/o  (1965) M    Dyspnea, cardiac origin suspected;    TECHNIQUE:  (A#[id number], exam time 4/22/2020 9:21)    CTA CHEST NON CORONARY [id number]    Axial CT examination of the chest with attention to the pulmonary arteries was performed using contiguous axial images from the diaphragms to the lung apices following the intravenous administration of non-ionic contrast material. Images were reviewed using lung, mediastinal, and bone windows. The study was performed with thin sections with subsequent 3-D reformats/MIP/reconstructions in multiple planes.    COMPARISON:  Chest x-ray dated 04/22/2020    FINDINGS:  CTA PE evaluation: This is a moderate quality study for the evaluation of pulmonary embolism High quality The pulmonary arteries are normal in appearance without pulmonary emboli noted up to the segmental level, noting the limitations of CT technique for identifying small or isolated subsegmental emboli.    CT Chest:    Visualized neck: Within normal limits.    Lungs: There is dependent atelectasis in the bilateral lower lobes.  The lungs are otherwise clear.    Airway: The trachea and central bronchial tree appear normal.    Pleura: There is a small left pleural effusion.  There is no pneumothorax.    Cardiovascular: The heart is enlarged.  The patient has had a prior CABG.  There is prominence of the  pulmonary vasculature suggestive of pulmonary congestion.    Soft tissues: The peripheral soft tissues appear normal.    Musculoskeletal: The visualized osseous structures appear normal.  There are no suspicious osseous lesions.    Esophagus: The esophagus appears grossly normal.    Upper Abdomen: The visualized upper abdominal organs appear normal.                               X-Ray Chest AP Portable (Final result)  Result time 04/22/20 07:51:47    Final result by Andrae Bennett MD (04/22/20 07:51:47)                 Impression:      Interval removal of right IJ catheter, with persistent mid and lower lung zone patchy alveolar opacities either reflecting atelectasis, infectious or inflammatory consolidation, or some combination thereof.      Electronically signed by: Andrae Bennett MD  Date:    04/22/2020  Time:    07:51             Narrative:    EXAMINATION:  XR CHEST AP PORTABLE    CLINICAL HISTORY:  Suspected Covid-19 Virus Infection;    FINDINGS:  Portable chest at 746 compared with 04/10/2020 shows normal cardiomediastinal silhouette with postsurgical changes of CABG. Prior right IJ catheter is been removed.    Patchy mid and lower lung zone airspace opacities bilaterally persist, without significant change since the prior exam.  Lung volumes remain low.  Central pulmonary vasculature is unchanged, remaining within normal limits.  No pleural effusion or pneumothorax.  No new osseous abnormality.                                Recent Cardiac Rhythm   (if applicable)      Physical Exam:  Objective    Current Consults:  IP CONSULT TO HOSPITAL MEDICINE  IP CONSULT TO CARDIOLOGY  PHARMACY TO DOSE VANCOMYCIN CONSULT    Assessment/Plan:  Assessment:   1, s/p 2 vessel CABG --atypical chest pain --Pericarditis Positive blood culture Gram + Cocci   2. Hyponatremia-iatrogenic --  3. HENRIQUE   4. DM   5. Left pleural effusion    Plan:   Repeat EKG today  Otherwise no new cardiac recommendations.   Abx tx plan per hospital  medicine.

## 2020-04-24 NOTE — PLAN OF CARE
04/24/20 1029   Discharge Assessment   Assessment Type Discharge Planning Assessment   Confirmed/corrected address and phone number on facesheet? Yes   Assessment information obtained from? Patient   Expected Length of Stay (days) 3   Communicated expected length of stay with patient/caregiver yes   Prior to hospitilization cognitive status: Alert/Oriented   Prior to hospitalization functional status: Assistive Equipment   Current cognitive status: Alert/Oriented   Current Functional Status: Assistive Equipment   Lives With child(tonya), dependent   Able to Return to Prior Arrangements yes   Is patient able to care for self after discharge? Unable to determine at this time (comments)   Who are your caregiver(s) and their phone number(s)? sister   Patient's perception of discharge disposition home or selfcare   Readmission Within the Last 30 Days no previous admission in last 30 days   Patient currently being followed by outpatient case management? No   If yes, name of outpatient case management following: other (comments)   Patient currently receives any other outside agency services? Yes   Name and contact number of agency or person providing outside services umknown   Is it the patient/care giver preference to resume care with the current outside agency? Yes   Equipment Currently Used at Home none   Do you have any problems affording any of your prescribed medications? No   Is the patient taking medications as prescribed? yes   Does the patient have transportation home? Yes   Transportation Anticipated family or friend will provide   Dialysis Name and Scheduled days na   Does the patient receive services at the Coumadin Clinic? No   Discharge Plan A Home Health   Discharge Plan B Home Health   DME Needed Upon Discharge  none   Patient/Family in Agreement with Plan yes     Patient provided information for assessment.

## 2020-04-24 NOTE — PLAN OF CARE
Vital signs, cardiac and lab monitoring. IV hydration and IV antibiotics. CXR in am. Possible discharge in am . MD consult in am. Increase activity as tolerated. Bed alarm on. Watch for falls. Watch for sign and symptoms of bleeding. Blood administration. EKG in am. Npo after midnight. Procedure in am. Accuchecks per order.Breathing treatments and adjust oxygen as needed. Strict I & O. Daily weights.

## 2020-04-24 NOTE — PROGRESS NOTES
"[facility]  Progress Note    Patient Name: Magdaleno Cunningham  MRN: [id number]  Patient Class: IP- Inpatient   Admission Date: 4/22/2020  Length of Stay: 1 days  Attending Physician: Emily Borrego DO  Primary Care Provider: Antonio Del Cid MD        Subjective:     Principal Problem:Chest pain  Chief Complaint   Patient presents with    Chest Pain     Interval history: afvss, wbc increased today (completed course of solumedrol yesterday).  Patient seen and examined. Chest pain improved (reports occasional chest wall muscle aches). Denies any palpations, cough, SOB, N/V/D, headaches, fever or chills. Patient is very eager to return home.       Objective:  /71   Pulse 78   Temp 97.4 °F (36.3 °C) (Oral)   Resp 14   Ht 5' 11" (1.803 m)   Wt 120.7 kg (266 lb 1.5 oz)   SpO2 (!) 94%   BMI 37.11 kg/m²   Temp:  [97.3 °F (36.3 °C)-97.8 °F (36.6 °C)] 97.4 °F (36.3 °C)  Pulse:  [63-85] 78  Resp:  [13-37] 14  SpO2:  [91 %-96 %] 94 %  BP: ()/(56-71) 125/71    Intake/Output Summary (Last 24 hours) at 4/24/2020 1005  Last data filed at 4/24/2020 0758  Gross per 24 hour   Intake 1080 ml   Output 3775 ml   Net -2695 ml     Physical Exam   Constitutional:  Awake, alert and oriented x 3, NAD  HENT: ncat, mmm, perrla, eomi, neck normal rom and supple   Cardiovascular: RRR no mrg, well healing sternotomy incision  Pulmonary/Chest: Effort normal, CTA b/l no wheezes, rales, rhonchi   Abdominal: Soft. +bs, ntnd  Musculoskeletal: Normal range of motion.  no edema, tenderness or deformity.   Neurological:  AAOx3, no focal deficits noted, CN II-XII grossly intact,   Skin: Skin is warm and dry. not diaphoretic.   Psychiatric:  normal mood and affect.  behavior is normal  Nursing note and vitals reviewed.    LABS:   CBC  Recent Labs   Lab 04/22/20  0746 04/23/20  0136 04/24/20  0515   WBC 15.51* 18.16* 20.55*   RBC 4.36* 3.59* 3.70*   HGB 13.2* 11.0* 11.1*   HCT 40.3 33.5* 34.0*   * " 443* 492*   MCV 92 93 92   MCH 30.3 30.6 30.0   MCHC 32.8 32.8 32.6     BMP  Recent Labs   Lab 04/22/20 1951 04/23/20  0136 04/24/20  0515   * 129* 132*   K 5.5* 4.6 4.2   CO2 25 22* 26   CL 93* 96 95   BUN 22* 32* 36*   CREATININE 1.7* 1.7* 1.3   * 354* 195*       Recent Labs   Lab 04/22/20 1951 04/23/20 0136 04/24/20  0515   CALCIUM 9.1 8.5* 8.9   MG  --  1.9  1.9 2.2   PHOS  --  6.8* 3.5     LFT  Recent Labs   Lab 04/22/20  0746 04/24/20  0515   PROT 8.1 7.5   ALBUMIN 3.6 3.2*   BILITOT 0.7 0.6   AST 22 14   ALKPHOS 54* 55   ALT 18 14     Imaging: CXR ordered    Micro:  4/22 blood cultures x2 staph aureus susceptibility pending  4/23 blood cultures no growth today  4/24 blood cultures pending    Assessment/Plan:      Active Hospital Problems    Diagnosis  POA    *Chest pain [R07.9]  Yes    Bacteremia due to Gram-positive bacteria [R78.81]  Yes    HENRIQUE (acute kidney injury) [N17.9]  Yes    S/P CABG (coronary artery bypass graft) [Z95.1]  Not Applicable    Coronary atherosclerosis of native coronary artery [I25.10]  Yes    Generalized anxiety disorder [F41.1]  Yes    COPD (chronic obstructive pulmonary disease) [J44.9]  Yes    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     Chest pain  CAD S/p 2vCABG (POD 16)  Chest pain  - Morphine, oxygen, nitroglycerine SL PRN  - Cardiac monitor  - c/w home: Asa, BB, Statin, imdur, ranolzine, apixaban  - holding home ace and hctz 2/2 henrique  - Trending trop x 3 wnl  - ECHO: no wall motion abnormalities or effusions  - Cardiology consulted in ED:  - cxr: Interval increase in opacification of the left lung base. (atelectasis versus pneumonia)  - s/p Solu-Medrol   - sliding scale for electrolytes prn      GPC Bacteremia  - blood cx x 2 from 4/22 :  Staph aureus:  ID and susceptibility pending  - repeat blood cx  4/23 ngtd  - repeat blood cx 4/24 pending  - Vanc per pharm dosing started on 4/22  - IVF gentle rate  - wbc elevated s/p Solu-Medrol  - will consult ID for outpatient medication recommendations    ?Pneumonia vs Atelectasis   - covid negative  - cxr: Interval increase in opacification of the left lung base. (atelectasis versus pneumonia)  - repeat cxr ordered  - IS  - IV abx: vanc and rocephin    HENRIQUE- improving  -Baseline cr 1.0  -today Bun/cr 361.3  -IVF  -Will continue to monitor labs     IDDM2  - bg improved today  - detemir 25 units bid  - moderate dose ssi  - accuchecks   - diabetic diet       VTE Risk Mitigation (From admission, onward)         Ordered     apixaban tablet 5 mg  2 times daily      04/22/20 1218     IP VTE HIGH RISK PATIENT  Once      04/22/20 1218                Emily Borrego DO  Department of Hospital Medicine   [facility]

## 2020-04-24 NOTE — PHYSICIAN QUERY
PT Name: Magdaleno Cunningham  MR #: [id number]    Physician Query Form - Respiratory Condition Clarification      CDS/: Arron Love               Contact information: [phone]    This form is a permanent document in the medical record.    Query Date: April 24, 2020    By submitting this query, we are merely seeking further clarification of documentation. Please utilize your independent clinical judgment when addressing the question(s) below.    The Medical record contains the following   Supporting Clinical Findings Location in Medical Record   X SpO2 97% on RA on presentation    Sp02 89% on 2 lpm on 4/23 at 03:00    SpO2 91% on 4 lpm on 4/23 at 03:09    Max respiratory rate of 31 on 4/22    Max respiratory rate of 37 on 4/23 Epic vitals per nursing   X Respiratory: Positive for cough and shortness of breath.      4/22 Dr. Blackwell ED note   X Interval History: afvss, wbc mildy elevated to 18.16 today (steriods). LA elevated to 2.6. bcx x 2 GPC and vanc started overnight.   Patient seen and examined. States that his chest pain has resolved. Denies palpations, sob, cough, fever, chills, n/v/d/c.     Pulmonary/Chest: Effort normal and breath sounds normal. No respiratory distress.    COPD    ?Pneumonia vs Atelectasis   - covid negative  - cxr: Interval increase in opacification of the left lung base. (atelectasis versus pneumonia)  - IS  - IV abx: vanc and rocephin    - cxr: Interval increase in opacification of the left lung base. (atelectasis versus pneumonia)  - s/p Solu-Medrol  4/23 Dr. Borrego   X No rub   Pulmonary/Chest: Effort normal. He has decreased breath sounds in the left lower field. He has no wheezes. He has no rales    s/p 2 vessel CABG --atypical chest pain --Pericarditis Positive blood culture Gram + Cocci   Left pleural effusion 4/23 Dr. Walters     Provider, please specify diagnosis or diagnoses associated with above clinical findings.   [   ] Acute Respiratory Failure with Hypoxemia   [   ]  Chronic Respiratory Failure with Hypoxemia   [   ] Acute on Chronic Respiratory Failure with Hypoxemia   [   ] Other Chronic Respiratory Failure   [  x ] Acute Respiratory Insufficiency   [   ] Acute Respiratory Distress   [   ] Hypoxia Only   [   ] Other (please specify): _________________________________   [   ]  Clinically Undetermined       Please document in your progress notes daily for the duration of treatment until resolved and include in your discharge summary.

## 2020-04-24 NOTE — NURSING
Gave patient the ordered IS. Instructed the patient oh how to use it and when. Patient states he understands.   IS = 1500

## 2020-04-25 LAB
ALBUMIN SERPL BCP-MCNC: 2.7 G/DL (ref 3.5–5.2)
ALP SERPL-CCNC: 49 U/L (ref 55–135)
ALT SERPL W/O P-5'-P-CCNC: 14 U/L (ref 10–44)
ANION GAP SERPL CALC-SCNC: 9 MMOL/L (ref 8–16)
AST SERPL-CCNC: 16 U/L (ref 10–40)
BACTERIA BLD CULT: ABNORMAL
BASOPHILS # BLD AUTO: 0.03 K/UL (ref 0–0.2)
BASOPHILS NFR BLD: 0.3 % (ref 0–1.9)
BILIRUB SERPL-MCNC: 0.4 MG/DL (ref 0.1–1)
BUN SERPL-MCNC: 27 MG/DL (ref 6–20)
CALCIUM SERPL-MCNC: 8.3 MG/DL (ref 8.7–10.5)
CHLORIDE SERPL-SCNC: 100 MMOL/L (ref 95–110)
CO2 SERPL-SCNC: 26 MMOL/L (ref 23–29)
CREAT SERPL-MCNC: 1 MG/DL (ref 0.5–1.4)
DIFFERENTIAL METHOD: ABNORMAL
EOSINOPHIL # BLD AUTO: 0.1 K/UL (ref 0–0.5)
EOSINOPHIL NFR BLD: 0.9 % (ref 0–8)
ERYTHROCYTE [DISTWIDTH] IN BLOOD BY AUTOMATED COUNT: 12.8 % (ref 11.5–14.5)
EST. GFR  (AFRICAN AMERICAN): >60 ML/MIN/1.73 M^2
EST. GFR  (NON AFRICAN AMERICAN): >60 ML/MIN/1.73 M^2
GLUCOSE SERPL-MCNC: 115 MG/DL (ref 70–110)
GLUCOSE SERPL-MCNC: 140 MG/DL (ref 70–110)
GLUCOSE SERPL-MCNC: 166 MG/DL (ref 70–110)
GLUCOSE SERPL-MCNC: 174 MG/DL (ref 70–110)
GLUCOSE SERPL-MCNC: 224 MG/DL (ref 70–110)
HCT VFR BLD AUTO: 32.1 % (ref 40–54)
HGB BLD-MCNC: 10.7 G/DL (ref 14–18)
IMM GRANULOCYTES # BLD AUTO: 0.03 K/UL (ref 0–0.04)
IMM GRANULOCYTES NFR BLD AUTO: 0.3 % (ref 0–0.5)
LYMPHOCYTES # BLD AUTO: 3.1 K/UL (ref 1–4.8)
LYMPHOCYTES NFR BLD: 27.8 % (ref 18–48)
MAGNESIUM SERPL-MCNC: 1.7 MG/DL (ref 1.6–2.6)
MCH RBC QN AUTO: 30.4 PG (ref 27–31)
MCHC RBC AUTO-ENTMCNC: 33.3 G/DL (ref 32–36)
MCV RBC AUTO: 91 FL (ref 82–98)
MONOCYTES # BLD AUTO: 0.7 K/UL (ref 0.3–1)
MONOCYTES NFR BLD: 5.9 % (ref 4–15)
NEUTROPHILS # BLD AUTO: 7.2 K/UL (ref 1.8–7.7)
NEUTROPHILS NFR BLD: 64.8 % (ref 38–73)
NRBC BLD-RTO: 0 /100 WBC
PHOSPHATE SERPL-MCNC: 2.5 MG/DL (ref 2.7–4.5)
PLATELET # BLD AUTO: 494 K/UL (ref 150–350)
PMV BLD AUTO: 9.3 FL (ref 9.2–12.9)
POTASSIUM SERPL-SCNC: 4.3 MMOL/L (ref 3.5–5.1)
PROT SERPL-MCNC: 6.6 G/DL (ref 6–8.4)
RBC # BLD AUTO: 3.52 M/UL (ref 4.6–6.2)
SODIUM SERPL-SCNC: 135 MMOL/L (ref 136–145)
WBC # BLD AUTO: 11.11 K/UL (ref 3.9–12.7)

## 2020-04-25 PROCEDURE — 21400001 HC TELEMETRY ROOM

## 2020-04-25 PROCEDURE — 25000003 PHARM REV CODE 250: Performed by: STUDENT IN AN ORGANIZED HEALTH CARE EDUCATION/TRAINING PROGRAM

## 2020-04-25 PROCEDURE — 80053 COMPREHEN METABOLIC PANEL: CPT

## 2020-04-25 PROCEDURE — 21000000 HC CCU ICU ROOM CHARGE

## 2020-04-25 PROCEDURE — 94761 N-INVAS EAR/PLS OXIMETRY MLT: CPT

## 2020-04-25 PROCEDURE — 63600175 PHARM REV CODE 636 W HCPCS: Performed by: INTERNAL MEDICINE

## 2020-04-25 PROCEDURE — 36415 COLL VENOUS BLD VENIPUNCTURE: CPT

## 2020-04-25 PROCEDURE — 85025 COMPLETE CBC W/AUTO DIFF WBC: CPT

## 2020-04-25 PROCEDURE — 83735 ASSAY OF MAGNESIUM: CPT

## 2020-04-25 PROCEDURE — 94799 UNLISTED PULMONARY SVC/PX: CPT

## 2020-04-25 PROCEDURE — 63600175 PHARM REV CODE 636 W HCPCS: Performed by: FAMILY MEDICINE

## 2020-04-25 PROCEDURE — 63600175 PHARM REV CODE 636 W HCPCS: Performed by: STUDENT IN AN ORGANIZED HEALTH CARE EDUCATION/TRAINING PROGRAM

## 2020-04-25 PROCEDURE — 82962 GLUCOSE BLOOD TEST: CPT

## 2020-04-25 PROCEDURE — 87040 BLOOD CULTURE FOR BACTERIA: CPT

## 2020-04-25 PROCEDURE — 99900035 HC TECH TIME PER 15 MIN (STAT)

## 2020-04-25 PROCEDURE — 84100 ASSAY OF PHOSPHORUS: CPT

## 2020-04-25 RX ORDER — CEFAZOLIN SODIUM 2 G/50ML
2 SOLUTION INTRAVENOUS
Status: DISCONTINUED | OUTPATIENT
Start: 2020-04-25 | End: 2020-04-27 | Stop reason: HOSPADM

## 2020-04-25 RX ORDER — LANOLIN ALCOHOL/MO/W.PET/CERES
400 CREAM (GRAM) TOPICAL ONCE
Status: COMPLETED | OUTPATIENT
Start: 2020-04-25 | End: 2020-04-25

## 2020-04-25 RX ADMIN — INSULIN DETEMIR 25 UNITS: 100 INJECTION, SOLUTION SUBCUTANEOUS at 08:04

## 2020-04-25 RX ADMIN — OXYCODONE HYDROCHLORIDE AND ACETAMINOPHEN 1 TABLET: 10; 325 TABLET ORAL at 07:04

## 2020-04-25 RX ADMIN — ROSUVASTATIN CALCIUM 20 MG: 20 TABLET, FILM COATED ORAL at 07:04

## 2020-04-25 RX ADMIN — VANCOMYCIN HYDROCHLORIDE 2000 MG: 1 INJECTION, POWDER, LYOPHILIZED, FOR SOLUTION INTRAVENOUS at 05:04

## 2020-04-25 RX ADMIN — OXYCODONE HYDROCHLORIDE AND ACETAMINOPHEN 1 TABLET: 10; 325 TABLET ORAL at 08:04

## 2020-04-25 RX ADMIN — TAMSULOSIN HYDROCHLORIDE 0.4 MG: 0.4 CAPSULE ORAL at 07:04

## 2020-04-25 RX ADMIN — METOPROLOL SUCCINATE 50 MG: 50 TABLET, FILM COATED, EXTENDED RELEASE ORAL at 08:04

## 2020-04-25 RX ADMIN — GABAPENTIN 300 MG: 300 CAPSULE ORAL at 07:04

## 2020-04-25 RX ADMIN — ASPIRIN 81 MG: 81 TABLET, DELAYED RELEASE ORAL at 08:04

## 2020-04-25 RX ADMIN — CEFAZOLIN SODIUM 2 G: 2 SOLUTION INTRAVENOUS at 01:04

## 2020-04-25 RX ADMIN — ISOSORBIDE MONONITRATE 30 MG: 30 TABLET, EXTENDED RELEASE ORAL at 08:04

## 2020-04-25 RX ADMIN — OXYCODONE HYDROCHLORIDE AND ACETAMINOPHEN 1 TABLET: 10; 325 TABLET ORAL at 01:04

## 2020-04-25 RX ADMIN — INSULIN DETEMIR 25 UNITS: 100 INJECTION, SOLUTION SUBCUTANEOUS at 09:04

## 2020-04-25 RX ADMIN — CLONAZEPAM 1 MG: 1 TABLET ORAL at 07:04

## 2020-04-25 RX ADMIN — CEFAZOLIN SODIUM 2 G: 2 SOLUTION INTRAVENOUS at 11:04

## 2020-04-25 RX ADMIN — CEFTRIAXONE 1 G: 1 INJECTION, SOLUTION INTRAVENOUS at 03:04

## 2020-04-25 RX ADMIN — SENNOSIDES AND DOCUSATE SODIUM 1 TABLET: 8.6; 5 TABLET ORAL at 07:04

## 2020-04-25 RX ADMIN — CLONAZEPAM 1 MG: 1 TABLET ORAL at 08:04

## 2020-04-25 RX ADMIN — MELATONIN 6 MG: at 07:04

## 2020-04-25 RX ADMIN — INSULIN ASPART 2 UNITS: 100 INJECTION, SOLUTION INTRAVENOUS; SUBCUTANEOUS at 09:04

## 2020-04-25 RX ADMIN — OXYCODONE HYDROCHLORIDE AND ACETAMINOPHEN 1 TABLET: 10; 325 TABLET ORAL at 04:04

## 2020-04-25 RX ADMIN — MAGNESIUM OXIDE 800 MG: 400 TABLET ORAL at 06:04

## 2020-04-25 RX ADMIN — GABAPENTIN 300 MG: 300 CAPSULE ORAL at 01:04

## 2020-04-25 RX ADMIN — GABAPENTIN 300 MG: 300 CAPSULE ORAL at 08:04

## 2020-04-25 RX ADMIN — MAGNESIUM OXIDE 400 MG: 400 TABLET ORAL at 09:04

## 2020-04-25 RX ADMIN — APIXABAN 5 MG: 5 TABLET, FILM COATED ORAL at 07:04

## 2020-04-25 RX ADMIN — APIXABAN 5 MG: 5 TABLET, FILM COATED ORAL at 08:04

## 2020-04-25 NOTE — NURSING
04/24/20 , 1725 dose of vancomycin was hung by the day shift but not administered, Jake in pharmacy changed orders to hang dose now and the trough will be rescheduled.

## 2020-04-25 NOTE — PROGRESS NOTES
"[facility]  Progress Note    Patient Name: Magdaleno Cunningham  MRN: [id number]  Patient Class: IP- Inpatient   Admission Date: 4/22/2020  Length of Stay: 2 days  Attending Physician: Emily Borrego DO  Primary Care Provider: Antonoi Del Cid MD        Subjective:     Principal Problem:Chest pain  Chief Complaint   Patient presents with    Chest Pain     Interval history: afvss, wbc wnl. Repeat bcx from 423 & 4/24 + GPC. ID consulted.   Patient seen and examined.  He states that he feels well. Denies any CP, SOB, N/V/D, headaches, fever or chills.         Objective:  /73   Pulse 86   Temp 98.3 °F (36.8 °C) (Oral)   Resp 15   Ht 5' 11" (1.803 m)   Wt 119.5 kg (263 lb 7.2 oz)   SpO2 95%   BMI 36.74 kg/m²   Temp:  [97.5 °F (36.4 °C)-98.3 °F (36.8 °C)] 98.3 °F (36.8 °C)  Pulse:  [70-86] 86  Resp:  [15-19] 15  SpO2:  [95 %-96 %] 95 %  BP: (113-124)/(55-74) 124/73    Intake/Output Summary (Last 24 hours) at 4/25/2020 1050  Last data filed at 4/25/2020 0900  Gross per 24 hour   Intake 410 ml   Output --   Net 410 ml     Physical Exam   Constitutional:  Awake, alert and oriented x 3, NAD  HENT: ncat, mmm, perrla, eomi, neck normal rom and supple   Cardiovascular: RRR no mrg, well healing sternotomy incision  Pulmonary/Chest: Effort normal, CTA b/l no wheezes, rales, rhonchi   Abdominal: Soft. +bs, ntnd  Musculoskeletal: Normal range of motion.  no edema, tenderness or deformity.   Neurological:  AAOx3, no focal deficits noted, CN II-XII grossly intact,   Skin: Skin is warm and dry. not diaphoretic.   Psychiatric:  normal mood and affect.  behavior is normal  Nursing note and vitals reviewed.    LABS:   CBC  Recent Labs   Lab 04/23/20  0136 04/24/20  0515 04/25/20  0356   WBC 18.16* 20.55* 11.11   RBC 3.59* 3.70* 3.52*   HGB 11.0* 11.1* 10.7*   HCT 33.5* 34.0* 32.1*   * 492* 494*   MCV 93 92 91   MCH 30.6 30.0 30.4   MCHC 32.8 32.6 33.3     BMP  Recent Labs   Lab " 04/23/20  0136 04/24/20  0515 04/25/20  0356   * 132* 135*   K 4.6 4.2 4.3   CO2 22* 26 26   CL 96 95 100   BUN 32* 36* 27*   CREATININE 1.7* 1.3 1.0   * 195* 115*       Recent Labs   Lab 04/23/20  0136 04/24/20  0515 04/25/20  0356   CALCIUM 8.5* 8.9 8.3*   MG 1.9  1.9 2.2 1.7   PHOS 6.8* 3.5 2.5*     LFT  Recent Labs   Lab 04/22/20  0746 04/24/20  0515 04/25/20  0356   PROT 8.1 7.5 6.6   ALBUMIN 3.6 3.2* 2.7*   BILITOT 0.7 0.6 0.4   AST 22 14 16   ALKPHOS 54* 55 49*   ALT 18 14 14     Imaging:   CXR 4/24:   No significant change of bilateral lung opacities.      Micro:  4/22 blood cultures x2  Staphylococcus aureus   Antibiotic Interpretation Value   Ceftriaxone Sensitive <=8 mcg/mL   Clindamycin Sensitive <=0.5 mcg/mL   Erythromycin Sensitive <=0.5 mcg/mL   Oxacillin Sensitive <=0.25 mcg/mL   Penicillin Resistant 2 mcg/mL   Trimeth/Sulfa Sensitive <=0.5/9.5 mcg/mL   Tetracycline Sensitive <=4 mcg/mL   Vancomycin Sensitive 1 mcg/mL       4/23 blood cultures GPC  4/24 blood cultures GPC  4/25 blood cultures ordered     Assessment/Plan:      Active Hospital Problems    Diagnosis  POA    *Chest pain [R07.9]  Yes    Bacteremia due to Gram-positive bacteria [R78.81]  Yes    HENRIQUE (acute kidney injury) [N17.9]  Yes    S/P CABG (coronary artery bypass graft) [Z95.1]  Not Applicable    Coronary atherosclerosis of native coronary artery [I25.10]  Yes    Generalized anxiety disorder [F41.1]  Yes    COPD (chronic obstructive pulmonary disease) [J44.9]  Yes    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     Chest pain  CAD S/p 2vCABG (POD 17)  Chest pain  - Morphine, oxygen, nitroglycerine SL PRN  - Cardiac monitor  - c/w home: Asa, BB, Statin, imdur, ranolzine, apixaban  - holding home ace and hctz 2/2 henrique  - Trending trop x 3 wnl  - ECHO: no wall motion  abnormalities or effusions  - Cardiology consulted in ED  - cxr: Interval increase in opacification of the left lung base. (atelectasis versus pneumonia)  - s/p Solu-Medrol   - sliding scale for electrolytes prn      Staph Aureus Bacteremia  - blood cx x 2 from 4/22 :  Staph aureus:  - repeat blood cx 4/23 GPC  - repeat blood cx 4/24 GPC  - blood cx ordered today   - Vanc per pharm dosing started on 4/22  - IVF gentle rate  - wbc wnl  - ID consulted  - TTE no mention of vegetations  - given repeat cx positive: patient will likely need ILAN to eval for vegatations  - will also consult CTS (given recent cabg)    Pneumonia vs Atelectasis   - covid negative  - cxr: Interval increase in opacification of the left lung base. (atelectasis versus pneumonia)  - IS  - IV abx: vanc and rocephin    HENRIQUE- improving  -Baseline cr 1.0  -today Bun/cr 27/1.0  -IVF  -Will continue to monitor labs     IDDM2  - bg improved 115  - detemir 25 units bid  - moderate dose ssi  - accuchecks   - diabetic diet       VTE Risk Mitigation (From admission, onward)         Ordered     apixaban tablet 5 mg  2 times daily      04/22/20 1218     IP VTE HIGH RISK PATIENT  Once      04/22/20 1218                Emily Borrego DO  Department of Hospital Medicine   [facility]

## 2020-04-25 NOTE — PLAN OF CARE
Vital signs, cardiac and lab monitoring. IV antibiotics. CXR in am.  Increase activity as tolerated. Bed alarm on. Watch for falls. Watch for sign and symptoms of bleeding.  Accuchecks per order.Breathing treatments and adjust oxygen as needed. Strict I & O. Daily weights.

## 2020-04-25 NOTE — PROGRESS NOTES
Progress Note  Infectious Disease    Reason for Consult:      HPI: Magdaleno Cunningham is a  anxious appearing 54 y.o. male with past medical history of CAD, status post CABG x2, hospitalized from 04/07--04/10/2020, postop day 14, IDDM 2, came on 04/22/2020 with complaints of shortness of breath and chest pain, he was found to have  Staph aureus bacteremia hence ID consult.    I came and saw patient.  He has been afebrile since admission.  He continues to have chest pain at the surgical site, 5/10, worse with movement, worse with breathing in deep, some of it is expected but some looks aggravated by his anxiety.  Pain medications help.  Nurses had confined him in bed as he is receiving opiates.  He cannot handle it.  He seems to be claustrophobic.    04/25/2020.  No new complaints.  Expected her right anterior chest pain, 1/10, worse with moving, better with opiates, improved compared to when he came in.  No fevers.  He is eager and ready to go home.  I explained that he cannot go home today.    Antibiotics (From admission, onward)    Start     Stop Route Frequency Ordered    04/25/20 2330  vancomycin (VANCOCIN) 2,000 mg in dextrose 5 % 500 mL IVPB      -- IV Every 18 hours 04/25/20 0525    04/23/20 1500  cefTRIAXone (ROCEPHIN) 1 g in dextrose 5 % 50 mL IVPB      -- IV Every 12 hours (non-standard times) 04/23/20 1400    04/23/20 0325  vancomycin - pharmacy to dose  (vancomycin IVPB)      -- IV pharmacy to manage frequency 04/23/20 0226        Antifungals (From admission, onward)    None        Antivirals (From admission, onward)    None        EXAM & DIAGNOSTICS REVIEWED:   Vitals:     Temp:  [97.5 °F (36.4 °C)-98.3 °F (36.8 °C)]   Temp: 98.3 °F (36.8 °C) (04/25/20 0730)  Pulse: 86 (04/25/20 0753)  Resp: 15 (04/25/20 0753)  BP: 124/73 (04/25/20 0730)  SpO2: 95 % (04/25/20 0753)    Intake/Output Summary (Last 24 hours) at 4/25/2020 1204  Last data filed at 4/25/2020 0900  Gross per 24 hour   Intake 410 ml   Output  --   Net 410 ml       General:  In NAD. Alert and attentive, cooperative, comfortable  Eyes:  Anicteric, PERRL, EOMI  ENT:  No ulcers, exudates, thrush, nares patent, some caries present  Neck:  supple, no masses or adenopathy appreciated  Lungs: Minimally decreased on the left with some fine crackles but everything is expected postop CABG  Heart:  RRR, no gallop/murmur/rub noted  Abd:  Soft, NT, ND, normal BS, no masses or organomegaly appreciated.  :  Voids urine clear, no flank tenderness  Musc:  Joints without effusion, swelling, erythema, synovitis, muscle wasting.   Skin:  No rashes. No palmar or plantar lesions. No subungual petechiae  Wound: Looks like it is healing. I cannot express any pus or drainage  Neuro: Alert, attentive, speech fluent, face symmetric, moves all extremities, no focal weakness. Ambulatory  Psych:  Calm, cooperative  Lymphatic:     No cervical, supraclavicular, axillary, or inguinal nodes  Extrem: No edema, erythema, phlebitis, cellulitis, warm and well perfused  VAD:       Isolation:      Lines/Tubes/Drains:    General Labs reviewed:  Recent Labs   Lab 04/23/20  0136 04/24/20  0515 04/25/20  0356   WBC 18.16* 20.55* 11.11   HGB 11.0* 11.1* 10.7*   HCT 33.5* 34.0* 32.1*   * 492* 494*       Recent Labs   Lab 04/22/20  0746  04/23/20  0136 04/24/20  0515 04/25/20  0356   *   < > 129* 132* 135*   K 4.1   < > 4.6 4.2 4.3   CL 96   < > 96 95 100   CO2 26   < > 22* 26 26   BUN 14   < > 32* 36* 27*   CREATININE 1.3   < > 1.7* 1.3 1.0   CALCIUM 9.1   < > 8.5* 8.9 8.3*   PROT 8.1  --   --  7.5 6.6   BILITOT 0.7  --   --  0.6 0.4   ALKPHOS 54*  --   --  55 49*   ALT 18  --   --  14 14   AST 22  --   --  14 16    < > = values in this interval not displayed.     Micro:  Microbiology Results (last 7 days)     Procedure Component Value Units Date/Time    Blood culture [[id number]] Collected:  04/25/20 1142    Order Status:  Sent Specimen:  Blood Updated:  04/25/20 1154    Blood culture  [[id number]] Collected:  04/24/20 0515    Order Status:  Completed Specimen:  Blood Updated:  04/25/20 1014     Blood Culture, Routine Gram stain aer bottle: Gram positive cocci      Positive results previously called    Blood culture x two cultures. Draw prior to antibiotics. [[id number]]  (Abnormal) Collected:  04/22/20 0934    Order Status:  Completed Specimen:  Blood from Peripheral, Antecubital, Left Updated:  04/25/20 0655     Blood Culture, Routine Gram stain aer bottle: Gram positive cocci       Results called to and read back by:Aleksandar Hoffman RN CARD A.      04/23/2020  01:58 TGC      Gram stain angelique bottle: Gram positive cocci       Positive results previously called to Aleksandar Hoffman RN CARD A.       04/23/2020  01:59 TGC      STAPHYLOCOCCUS AUREUS  For susceptibility see order #[id number]      Narrative:       Aerobic and anaerobic    Blood culture x two cultures. Draw prior to antibiotics. [[id number]]  (Abnormal)  (Susceptibility) Collected:  04/22/20 0746    Order Status:  Completed Specimen:  Blood from Peripheral, Antecubital, Right Updated:  04/25/20 0645     Blood Culture, Routine Gram stain aer bottle: Gram positive cocci       Results called to and read back by:Aleksandar Hoffman RN CARD A.      04/23/2020  01:55 TGC      Gram stain angelique bottle: Gram positive cocci      Positive results previously called to Aleksandar Hoffman RN CARD A.      04/23/2020  01:55 TGC      STAPHYLOCOCCUS AUREUS    Narrative:       Aerobic and anaerobic    Blood culture [[id number]] Collected:  04/23/20 1101    Order Status:  Completed Specimen:  Blood Updated:  04/25/20 0229     Blood Culture, Routine Gram stain aer bottle: Gram positive cocci      Results called to and read back by:Shahla Valdes RN-A CARD;  04/25/2020        02:29 CJD        Imaging Reviewed:   CXRNo significant change of bilateral lung opacities.   CT 04/22/20201. No evidence of pulmonary embolism to the segmental arterial level. If there is continued clinical  concern, consider further evaluation with lower extremity doppler.. Cardiomegaly with prominence of the pulmonary vasculature suggestive of congestion.    Cardiology:  Echocardiogram on 04/22/2020  · Normal left ventricular systolic function. The estimated ejection fraction is 55%.  · Normal LV diastolic function.  · Mild tricuspid regurgitation.  · Intermediate central venous pressure (8 mmHg).  · The estimated PA systolic pressure is 33 mmHg.  IMPRESSION & PLAN     Staphylococcus aureus, MSSA bacteremia on 04/22/2020. 24th gpc; Bcx 25th are negative so far. Echocardiogram has no mention of vegetations.  Persistence of chest pain at CABG surgical site, paired with MSSA bacteremia, paired with recent cardiac surgery on 04/07/2020, makes me concerned about possible mediastinitis, sternal infection  Leukocytosis, improved  UTI with mild pyuria at 9.  History of CAD, CABG on 04/07/2020, anxiety, obesity, protein malnutrition albumin 3.2, COVID is negative on 04/20 2nd,    Recommendations:  Change abx to cefazolin 2 g IV q.8 hours.  Can use continuous infusion.  Wait for repeat blood cultures to be negative for 24-48 hours.  Place PICC line on Monday.   8 weeks of IV antibiotics.  Weekly labs x 8 while on IV antibiotics CRP, ESR, CMP, CBC with differential.  He needs to follow in my  ID  clinic within 1 week of discharge.

## 2020-04-25 NOTE — PLAN OF CARE
04/24/20 1950   PRE-TX-O2   O2 Device (Oxygen Therapy) room air   SpO2 96 %   Pulse Oximetry Type Continuous   $ Pulse Oximetry - Multiple Charge Pulse Oximetry - Multiple   Pulse 72   Respiratory Evaluation   $ Care Plan Tech Time 15 min   Evaluation For New Orders

## 2020-04-25 NOTE — PLAN OF CARE
This note also relates to the following rows which could not be included:  Pulse - Cannot attach notes to unvalidated device data       04/25/20 0753   PRE-TX-O2   O2 Device (Oxygen Therapy) room air   SpO2 95 %   Pulse Oximetry Type Continuous   $ Pulse Oximetry - Multiple Charge Pulse Oximetry - Multiple   Resp 15

## 2020-04-25 NOTE — PROGRESS NOTES
Therapy with Vancomycin complete and / or consult / order discontinued by Dr. Lomax on 4/25/20 @ 12:30 pm.  Pharmacy will sign off, please re-consult as needed.  Thank you for allowing us to participate in this patient's care.  Karmen Oreilly 4/25/2020 12:33 PM  Dept of Pharmacy  Ext [phone]

## 2020-04-26 LAB
ALBUMIN SERPL BCP-MCNC: 3.1 G/DL (ref 3.5–5.2)
ALP SERPL-CCNC: 51 U/L (ref 55–135)
ALT SERPL W/O P-5'-P-CCNC: 15 U/L (ref 10–44)
ANION GAP SERPL CALC-SCNC: 8 MMOL/L (ref 8–16)
AST SERPL-CCNC: 15 U/L (ref 10–40)
BASOPHILS # BLD AUTO: 0.04 K/UL (ref 0–0.2)
BASOPHILS NFR BLD: 0.4 % (ref 0–1.9)
BILIRUB SERPL-MCNC: 0.4 MG/DL (ref 0.1–1)
BUN SERPL-MCNC: 17 MG/DL (ref 6–20)
CALCIUM SERPL-MCNC: 8.8 MG/DL (ref 8.7–10.5)
CHLORIDE SERPL-SCNC: 101 MMOL/L (ref 95–110)
CO2 SERPL-SCNC: 25 MMOL/L (ref 23–29)
CREAT SERPL-MCNC: 0.9 MG/DL (ref 0.5–1.4)
CRP SERPL-MCNC: 5.47 MG/DL (ref 0–0.75)
DIFFERENTIAL METHOD: ABNORMAL
EOSINOPHIL # BLD AUTO: 0.2 K/UL (ref 0–0.5)
EOSINOPHIL NFR BLD: 2 % (ref 0–8)
ERYTHROCYTE [DISTWIDTH] IN BLOOD BY AUTOMATED COUNT: 12.7 % (ref 11.5–14.5)
EST. GFR  (AFRICAN AMERICAN): >60 ML/MIN/1.73 M^2
EST. GFR  (NON AFRICAN AMERICAN): >60 ML/MIN/1.73 M^2
GLUCOSE SERPL-MCNC: 143 MG/DL (ref 70–110)
GLUCOSE SERPL-MCNC: 163 MG/DL (ref 70–110)
GLUCOSE SERPL-MCNC: 186 MG/DL (ref 70–110)
GLUCOSE SERPL-MCNC: 202 MG/DL (ref 70–110)
GLUCOSE SERPL-MCNC: 282 MG/DL (ref 70–110)
HCT VFR BLD AUTO: 35.1 % (ref 40–54)
HGB BLD-MCNC: 11.4 G/DL (ref 14–18)
IMM GRANULOCYTES # BLD AUTO: 0.08 K/UL (ref 0–0.04)
IMM GRANULOCYTES NFR BLD AUTO: 0.8 % (ref 0–0.5)
LYMPHOCYTES # BLD AUTO: 2.8 K/UL (ref 1–4.8)
LYMPHOCYTES NFR BLD: 26.2 % (ref 18–48)
MAGNESIUM SERPL-MCNC: 1.8 MG/DL (ref 1.6–2.6)
MCH RBC QN AUTO: 30.2 PG (ref 27–31)
MCHC RBC AUTO-ENTMCNC: 32.5 G/DL (ref 32–36)
MCV RBC AUTO: 93 FL (ref 82–98)
MONOCYTES # BLD AUTO: 0.7 K/UL (ref 0.3–1)
MONOCYTES NFR BLD: 6.4 % (ref 4–15)
NEUTROPHILS # BLD AUTO: 6.9 K/UL (ref 1.8–7.7)
NEUTROPHILS NFR BLD: 64.2 % (ref 38–73)
NRBC BLD-RTO: 0 /100 WBC
PHOSPHATE SERPL-MCNC: 3.4 MG/DL (ref 2.7–4.5)
PLATELET # BLD AUTO: 533 K/UL (ref 150–350)
PMV BLD AUTO: 9 FL (ref 9.2–12.9)
POTASSIUM SERPL-SCNC: 4.6 MMOL/L (ref 3.5–5.1)
PROT SERPL-MCNC: 7.1 G/DL (ref 6–8.4)
RBC # BLD AUTO: 3.78 M/UL (ref 4.6–6.2)
SODIUM SERPL-SCNC: 134 MMOL/L (ref 136–145)
WBC # BLD AUTO: 10.65 K/UL (ref 3.9–12.7)

## 2020-04-26 PROCEDURE — 63600175 PHARM REV CODE 636 W HCPCS: Performed by: STUDENT IN AN ORGANIZED HEALTH CARE EDUCATION/TRAINING PROGRAM

## 2020-04-26 PROCEDURE — 94799 UNLISTED PULMONARY SVC/PX: CPT

## 2020-04-26 PROCEDURE — 84100 ASSAY OF PHOSPHORUS: CPT

## 2020-04-26 PROCEDURE — 63600175 PHARM REV CODE 636 W HCPCS: Performed by: INTERNAL MEDICINE

## 2020-04-26 PROCEDURE — 99900035 HC TECH TIME PER 15 MIN (STAT)

## 2020-04-26 PROCEDURE — 36415 COLL VENOUS BLD VENIPUNCTURE: CPT

## 2020-04-26 PROCEDURE — 25000003 PHARM REV CODE 250: Performed by: STUDENT IN AN ORGANIZED HEALTH CARE EDUCATION/TRAINING PROGRAM

## 2020-04-26 PROCEDURE — 94761 N-INVAS EAR/PLS OXIMETRY MLT: CPT

## 2020-04-26 PROCEDURE — 85025 COMPLETE CBC W/AUTO DIFF WBC: CPT

## 2020-04-26 PROCEDURE — 21000000 HC CCU ICU ROOM CHARGE

## 2020-04-26 PROCEDURE — C9399 UNCLASSIFIED DRUGS OR BIOLOG: HCPCS | Performed by: STUDENT IN AN ORGANIZED HEALTH CARE EDUCATION/TRAINING PROGRAM

## 2020-04-26 PROCEDURE — 80053 COMPREHEN METABOLIC PANEL: CPT

## 2020-04-26 PROCEDURE — 83735 ASSAY OF MAGNESIUM: CPT

## 2020-04-26 PROCEDURE — 86140 C-REACTIVE PROTEIN: CPT

## 2020-04-26 RX ADMIN — CEFAZOLIN SODIUM 2 G: 2 SOLUTION INTRAVENOUS at 06:04

## 2020-04-26 RX ADMIN — MORPHINE SULFATE 2 MG: 2 INJECTION, SOLUTION INTRAMUSCULAR; INTRAVENOUS at 09:04

## 2020-04-26 RX ADMIN — INSULIN ASPART 4 UNITS: 100 INJECTION, SOLUTION INTRAVENOUS; SUBCUTANEOUS at 09:04

## 2020-04-26 RX ADMIN — INSULIN DETEMIR 25 UNITS: 100 INJECTION, SOLUTION SUBCUTANEOUS at 08:04

## 2020-04-26 RX ADMIN — MORPHINE SULFATE 2 MG: 2 INJECTION, SOLUTION INTRAMUSCULAR; INTRAVENOUS at 03:04

## 2020-04-26 RX ADMIN — MELATONIN 6 MG: at 11:04

## 2020-04-26 RX ADMIN — OXYCODONE HYDROCHLORIDE AND ACETAMINOPHEN 1 TABLET: 10; 325 TABLET ORAL at 05:04

## 2020-04-26 RX ADMIN — ROSUVASTATIN CALCIUM 20 MG: 20 TABLET, FILM COATED ORAL at 08:04

## 2020-04-26 RX ADMIN — INSULIN DETEMIR 25 UNITS: 100 INJECTION, SOLUTION SUBCUTANEOUS at 09:04

## 2020-04-26 RX ADMIN — GABAPENTIN 300 MG: 300 CAPSULE ORAL at 08:04

## 2020-04-26 RX ADMIN — METOPROLOL SUCCINATE 50 MG: 50 TABLET, FILM COATED, EXTENDED RELEASE ORAL at 09:04

## 2020-04-26 RX ADMIN — ISOSORBIDE MONONITRATE 30 MG: 30 TABLET, EXTENDED RELEASE ORAL at 09:04

## 2020-04-26 RX ADMIN — SENNOSIDES AND DOCUSATE SODIUM 1 TABLET: 8.6; 5 TABLET ORAL at 09:04

## 2020-04-26 RX ADMIN — GABAPENTIN 300 MG: 300 CAPSULE ORAL at 09:04

## 2020-04-26 RX ADMIN — APIXABAN 5 MG: 5 TABLET, FILM COATED ORAL at 08:04

## 2020-04-26 RX ADMIN — APIXABAN 5 MG: 5 TABLET, FILM COATED ORAL at 09:04

## 2020-04-26 RX ADMIN — MORPHINE SULFATE 2 MG: 2 INJECTION, SOLUTION INTRAMUSCULAR; INTRAVENOUS at 08:04

## 2020-04-26 RX ADMIN — TAMSULOSIN HYDROCHLORIDE 0.4 MG: 0.4 CAPSULE ORAL at 08:04

## 2020-04-26 RX ADMIN — OXYCODONE HYDROCHLORIDE AND ACETAMINOPHEN 1 TABLET: 10; 325 TABLET ORAL at 11:04

## 2020-04-26 RX ADMIN — CEFAZOLIN SODIUM 2 G: 2 SOLUTION INTRAVENOUS at 03:04

## 2020-04-26 RX ADMIN — GABAPENTIN 300 MG: 300 CAPSULE ORAL at 03:04

## 2020-04-26 RX ADMIN — INSULIN ASPART 4 UNITS: 100 INJECTION, SOLUTION INTRAVENOUS; SUBCUTANEOUS at 05:04

## 2020-04-26 RX ADMIN — ASPIRIN 81 MG: 81 TABLET, DELAYED RELEASE ORAL at 09:04

## 2020-04-26 RX ADMIN — OXYCODONE HYDROCHLORIDE AND ACETAMINOPHEN 1 TABLET: 10; 325 TABLET ORAL at 06:04

## 2020-04-26 RX ADMIN — CEFAZOLIN SODIUM 2 G: 2 SOLUTION INTRAVENOUS at 09:04

## 2020-04-26 NOTE — PROGRESS NOTES
Progress Note  Infectious Disease    Reason for Consult:      HPI: Magdaleno Cunningham is a  anxious appearing 54 y.o. male with past medical history of CAD, status post CABG x2, hospitalized from 04/07--04/10/2020, postop day 14, IDDM 2, came on 04/22/2020 with complaints of shortness of breath and chest pain, he was found to have  Staph aureus bacteremia hence ID consult.    I came and saw patient.  He has been afebrile since admission.  He continues to have chest pain at the surgical site, 5/10, worse with movement, worse with breathing in deep, some of it is expected but some looks aggravated by his anxiety.  Pain medications help.  Nurses had confined him in bed as he is receiving opiates.  He cannot handle it.  He seems to be claustrophobic.    04/25/2020.  No new complaints.  Expected her right anterior chest pain, 1/10, worse with moving, better with opiates, improved compared to when he came in.  No fevers.  He is eager and ready to go home.  I explained that he cannot go home today.    04/26/2020.  Afebrile.  WBC is markedly improved since admission Patient started having some drainage from the anterior lower chest.  Blood cultures on 22 nd were positive.  Another blood culture of 24th turned positive with MSSA.  He continues to have some pain to anterior chest, 1/10, worse with moving, better with rest.      Antibiotics (From admission, onward)    Start     Stop Route Frequency Ordered    04/25/20 1500  cefazolin (ANCEF) 2 gram in dextrose 5% 50 mL IVPB (premix)      -- IV Every 8 hours (non-standard times) 04/25/20 1209        Antifungals (From admission, onward)    None        Antivirals (From admission, onward)    None        EXAM & DIAGNOSTICS REVIEWED:   Vitals:     Temp:  [97.8 °F (36.6 °C)-98.4 °F (36.9 °C)]   Temp: 97.8 °F (36.6 °C) (04/26/20 0730)  Pulse: 75 (04/26/20 0854)  Resp: 16 (04/26/20 0854)  BP: 114/70 (04/26/20 0730)  SpO2: 97 % (04/26/20 0854)  No intake or output data in the 24 hours  ending 04/26/20 1232    General:  In NAD. Alert and attentive, cooperative, comfortable  Eyes:  Anicteric, PERRL, EOMI  ENT:  No ulcers, exudates, thrush, nares patent, some caries present  Neck:  supple, no masses or adenopathy appreciated  Lungs: Minimally decreased on the left with some fine crackles but is expected postop CABG  Heart:  RRR, no gallop/murmur/rub noted  Abd:  Soft, NT, ND, normal BS, no masses or organomegaly appreciated.  :  Voids urine clear, no flank tenderness  Musc:  Joints without effusion, swelling, erythema, synovitis, muscle wasting.   Skin:  No rashes. No palmar or plantar lesions. No subungual petechiae  Wound: Looks like it is healing.  Today we can express some liquid from the lower surgical wound, turbid.  Neuro: Alert, attentive, speech fluent, face symmetric, moves all extremities, no focal weakness. Ambulatory  Psych:  Calm, cooperative  Lymphatic:     No cervical, supraclavicular, axillary, or inguinal nodes  Extrem: No edema, erythema, phlebitis, cellulitis, warm and well perfused  VAD:       Isolation:      Lines/Tubes/Drains:    General Labs reviewed:  Recent Labs   Lab 04/24/20  0515 04/25/20  0356 04/26/20  0435   WBC 20.55* 11.11 10.65   HGB 11.1* 10.7* 11.4*   HCT 34.0* 32.1* 35.1*   * 494* 533*       Recent Labs   Lab 04/24/20  0515 04/25/20  0356 04/26/20  0435   * 135* 134*   K 4.2 4.3 4.6   CL 95 100 101   CO2 26 26 25   BUN 36* 27* 17   CREATININE 1.3 1.0 0.9   CALCIUM 8.9 8.3* 8.8   PROT 7.5 6.6 7.1   BILITOT 0.6 0.4 0.4   ALKPHOS 55 49* 51*   ALT 14 14 15   AST 14 16 15     Lab Results   Component Value Date    CRP 5.47 (H) 04/26/2020     Micro:  Microbiology Results (last 7 days)     Procedure Component Value Units Date/Time    Blood culture [[id number]] Collected:  04/25/20 1142    Order Status:  Completed Specimen:  Blood Updated:  04/26/20 1232     Blood Culture, Routine No Growth to date      No Growth to date    Blood culture [[id number]]   (Abnormal) Collected:  04/24/20 0515    Order Status:  Completed Specimen:  Blood Updated:  04/26/20 0736     Blood Culture, Routine Gram stain aer bottle: Gram positive cocci      Positive results previously called      STAPHYLOCOCCUS AUREUS  Refer to culture # [id number] for sensitiviy      Blood culture [[id number]]  (Abnormal) Collected:  04/23/20 1101    Order Status:  Completed Specimen:  Blood Updated:  04/26/20 0735     Blood Culture, Routine Gram stain aer bottle: Gram positive cocci      Results called to and read back by:Shahla Valdes RN-A CARD;  04/25/2020        02:29 CJD      STAPHYLOCOCCUS AUREUS  Refer to culture # [id number] for sensitiviy      Blood culture x two cultures. Draw prior to antibiotics. [[id number]]  (Abnormal) Collected:  04/22/20 0934    Order Status:  Completed Specimen:  Blood from Peripheral, Antecubital, Left Updated:  04/25/20 0655     Blood Culture, Routine Gram stain aer bottle: Gram positive cocci       Results called to and read back by:Aleksandar Hoffman RN CARD A.      04/23/2020  01:58 TGC      Gram stain angelique bottle: Gram positive cocci       Positive results previously called to Aleksandar Hoffman RN CARD A.       04/23/2020  01:59 TGC      STAPHYLOCOCCUS AUREUS  For susceptibility see order #[id number]      Narrative:       Aerobic and anaerobic    Blood culture x two cultures. Draw prior to antibiotics. [[id number]]  (Abnormal)  (Susceptibility) Collected:  04/22/20 0746    Order Status:  Completed Specimen:  Blood from Peripheral, Antecubital, Right Updated:  04/25/20 0645     Blood Culture, Routine Gram stain aer bottle: Gram positive cocci       Results called to and read back by:Aleksandar Hoffman RN CARD A.      04/23/2020  01:55 TGC      Gram stain angelique bottle: Gram positive cocci      Positive results previously called to Aleksandar Hoffman RN CARD A.      04/23/2020  01:55 TGC      STAPHYLOCOCCUS AUREUS    Narrative:       Aerobic and anaerobic        Imaging Reviewed:   CXRNo  significant change of bilateral lung opacities.   CT 04/22/20201. No evidence of pulmonary embolism to the segmental arterial level. If there is continued clinical concern, consider further evaluation with lower extremity doppler.. Cardiomegaly with prominence of the pulmonary vasculature suggestive of congestion.    Cardiology:  Echocardiogram on 04/22/2020  · Normal left ventricular systolic function. The estimated ejection fraction is 55%.  · Normal LV diastolic function.  · Mild tricuspid regurgitation.  · Intermediate central venous pressure (8 mmHg).  · The estimated PA systolic pressure is 33 mmHg.  IMPRESSION & PLAN     Persistent MSSA bacteremia on 04/22/2020, 04/24/2020.  Echocardiogram has no mention of vegetations.  Sternal infection  Leukocytosis, improved  CRP trend 1.99--5.47  UTI with mild pyuria at 9.  History of CAD, CABG on 04/07/2020, anxiety, obesity, protein malnutrition albumin 3.2, COVID is negative on 04/20 2nd,    Recommendations:  Agree with cardiovascular surgeon evaluation, her regarding possible surgical wound, sternum I and D??  cefazolin 2 g IV q.8 hours.  Can use continuous infusion.   Place PICC line on Monday only if blood cultures stay negative.   + 8 weeks of IV antibiotics.  Weekly labs x 8 while on IV antibiotics CRP, ESR, CMP, CBC with differential.  He needs to follow in my  ID  clinic within 1 week of discharge.  I wrote order in chart for  to start working.    I discussed with patient possible placement for him to receive IV antibiotics.  He absolutely refused it.  He would like to go home and receive IV antibiotics at home.  He has family members were nurses and respiratory therapist, he trust them and they will help him get antibiotics.

## 2020-04-26 NOTE — PLAN OF CARE
This note also relates to the following rows which could not be included:  Pulse - Cannot attach notes to unvalidated device data       04/26/20 0854   PRE-TX-O2   O2 Device (Oxygen Therapy) room air   SpO2 97 %   Pulse Oximetry Type Continuous   $ Pulse Oximetry - Multiple Charge Pulse Oximetry - Multiple   Resp 16

## 2020-04-26 NOTE — PLAN OF CARE
"This note also relates to the following rows which could not be included:  Pulse - Cannot attach notes to unvalidated device data       04/25/20 2117   Patient Assessment/Suction   Level of Consciousness (AVPU) alert   Respiratory Effort Normal;Unlabored   Expansion/Accessory Muscles/Retractions no use of accessory muscles   PRE-TX-O2   O2 Device (Oxygen Therapy) room air   Pulse Oximetry Type Continuous   $ Pulse Oximetry - Multiple Charge Pulse Oximetry - Multiple   Resp 20   Incentive Spirometer   $ Incentive Spirometer Charges done with encouragement   Administration (IS) mouthpiece   Number of Repetitions (IS) 10   Level Incentive Spirometer (mL) 1500   Patient Tolerance (IS) good   continue to encourage incentive spirometer/pt. Would not cough "states he does not want his chest to open/c/o drainage from wound.  "

## 2020-04-26 NOTE — PROGRESS NOTES
"[facility] Medicine  Progress Note    Patient Name: Magdaleno Cunningham  MRN: [id number]  Patient Class: IP- Inpatient   Admission Date: 4/22/2020  Length of Stay: 3 days  Attending Physician: Emily Borrego DO  Primary Care Provider: Antonio Del Cid MD        Subjective:     Principal Problem:Chest pain  Chief Complaint   Patient presents with    Chest Pain     Interval history: afvss, wbc wnl. bcx 4/22, 423 & 4/24 MSSA. bcx 4/25 ngtd.  Per nursing, patient was noted to have pus-like discharge from sternotomy incision requiring multiple dressing changes.     Patient seen and examined.  He states that he feels ok. He reports drainage from incision. Denies any CP, SOB, N/V/D, headaches, fever or chills.     Objective:  /70 (BP Location: Right arm, Patient Position: Sitting)   Pulse 75   Temp 97.8 °F (36.6 °C) (Oral)   Resp 16   Ht 5' 11" (1.803 m)   Wt 119.5 kg (263 lb 7.2 oz)   SpO2 97%   BMI 36.74 kg/m²   Temp:  [97.8 °F (36.6 °C)-98.4 °F (36.9 °C)] 97.8 °F (36.6 °C)  Pulse:  [74-81] 75  Resp:  [16-20] 16  SpO2:  [95 %-97 %] 97 %  BP: (106-132)/(57-75) 114/70  No intake or output data in the 24 hours ending 04/26/20 0946  Physical Exam   Constitutional:  Awake, alert and oriented x 3, NAD  HENT: ncat, mmm, perrla, eomi, neck normal rom and supple   Cardiovascular: RRR no mrg, dressing over lower portion of incision c/d/i. Mild erythema noted surrounding incision site.   Pulmonary/Chest: Effort normal, CTA b/l no wheezes, rales, rhonchi   Abdominal: Soft. +bs, ntnd  Musculoskeletal: Normal range of motion.  no edema, tenderness or deformity.   Neurological:  AAOx3, no focal deficits noted,   Psychiatric:  normal mood and affect.  behavior is normal  Nursing note and vitals reviewed.    LABS:   CBC  Recent Labs   Lab 04/24/20  0515 04/25/20  0356 04/26/20  0435   WBC 20.55* 11.11 10.65   RBC 3.70* 3.52* 3.78*   HGB 11.1* 10.7* 11.4*   HCT 34.0* 32.1* 35.1*   * 494* 533* "   MCV 92 91 93   MCH 30.0 30.4 30.2   MCHC 32.6 33.3 32.5     BMP  Recent Labs   Lab 04/24/20  0515 04/25/20  0356 04/26/20  0435   * 135* 134*   K 4.2 4.3 4.6   CO2 26 26 25   CL 95 100 101   BUN 36* 27* 17   CREATININE 1.3 1.0 0.9   * 115* 143*       Recent Labs   Lab 04/24/20  0515 04/25/20  0356 04/26/20  0435   CALCIUM 8.9 8.3* 8.8   MG 2.2 1.7 1.8   PHOS 3.5 2.5* 3.4     LFT  Recent Labs   Lab 04/24/20 0515 04/25/20 0356 04/26/20  0435   PROT 7.5 6.6 7.1   ALBUMIN 3.2* 2.7* 3.1*   BILITOT 0.6 0.4 0.4   AST 14 16 15   ALKPHOS 55 49* 51*   ALT 14 14 15     Imaging:   CXR 4/24:   No significant change of bilateral lung opacities.      Micro:  4/22 blood cultures x2  4/23 blood cultures  4/24 blood cultures   Staphylococcus aureus   Antibiotic Interpretation Value   Ceftriaxone Sensitive <=8 mcg/mL   Clindamycin Sensitive <=0.5 mcg/mL   Erythromycin Sensitive <=0.5 mcg/mL   Oxacillin Sensitive <=0.25 mcg/mL   Penicillin Resistant 2 mcg/mL   Trimeth/Sulfa Sensitive <=0.5/9.5 mcg/mL   Tetracycline Sensitive <=4 mcg/mL   Vancomycin Sensitive 1 mcg/mL     4/25 blood cultures ngtd  4/27 blood cultures ordered to AM draw    Assessment/Plan:      Active Hospital Problems    Diagnosis  POA    *Chest pain [R07.9]  Yes    Bacteremia due to Gram-positive bacteria [R78.81]  Yes    HENRIQUE (acute kidney injury) [N17.9]  Yes    S/P CABG (coronary artery bypass graft) [Z95.1]  Not Applicable    Coronary atherosclerosis of native coronary artery [I25.10]  Yes    Generalized anxiety disorder [F41.1]  Yes    COPD (chronic obstructive pulmonary disease) [J44.9]  Yes    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     Chest pain - resolved  CAD S/p 2vCABG (POD 18)  - Morphine, oxygen, nitroglycerine SL PRN  - c/w home: Asa, BB, Statin, imdur, ranolzine, apixaban  -  holding home ace and hctz 2/2 henrique  - telemetry  - ECHO: no wall motion abnormalities or effusions  - s/p Solu-Medrol   - sliding scale for electrolytes prn  - Cardiology consulted   - CTS consulted  - pus-like discharge from incision site today      Staph Aureus Bacteremia  - blood cx x 2 from 4/22, 4/23, & 4/24 :  MSSA  - blood cx 4/25 ngtd  - repeat bcx from tomorrow am ordered    - TTE no mention of vegetations  - s/p IV vanc (4/23-4/25)  - IV ancef per ID recs started 4/25  - ID consulted, recs noted   - given repeat cx positive: patient will likely need ILAN to eval for vegatations  - CTS consulted given recent cabg    Pneumonia vs Atelectasis   - covid negative  - cxr: Interval increase in opacification of the left lung base. (atelectasis versus pneumonia)  - IS  - IV abx: ancef    HENRIQUE- resolved  -Baseline cr 1.0  -today Bun/cr 17/0.9  -IVF  -Will continue to monitor labs     IDDM2  - bg 143  - detemir 25 units bid  - moderate dose ssi  - accuchecks   - diabetic diet       VTE Risk Mitigation (From admission, onward)         Ordered     apixaban tablet 5 mg  2 times daily      04/22/20 1218     IP VTE HIGH RISK PATIENT  Once      04/22/20 1218                Emily Borrego DO  Department of Hospital Medicine   [facility]

## 2020-04-26 NOTE — CONSULTS
Contacted Kimberly with [employer] , they will start working on this and inform when patient is ready for discharge.

## 2020-04-27 VITALS
BODY MASS INDEX: 36.67 KG/M2 | WEIGHT: 261.94 LBS | OXYGEN SATURATION: 95 % | SYSTOLIC BLOOD PRESSURE: 121 MMHG | HEIGHT: 71 IN | DIASTOLIC BLOOD PRESSURE: 63 MMHG | TEMPERATURE: 99 F | HEART RATE: 89 BPM | RESPIRATION RATE: 18 BRPM

## 2020-04-27 LAB
ALBUMIN SERPL BCP-MCNC: 3.2 G/DL (ref 3.5–5.2)
ALP SERPL-CCNC: 57 U/L (ref 55–135)
ALT SERPL W/O P-5'-P-CCNC: 18 U/L (ref 10–44)
ANION GAP SERPL CALC-SCNC: 7 MMOL/L (ref 8–16)
AST SERPL-CCNC: 25 U/L (ref 10–40)
BACTERIA BLD CULT: ABNORMAL
BASOPHILS # BLD AUTO: 0.06 K/UL (ref 0–0.2)
BASOPHILS NFR BLD: 0.6 % (ref 0–1.9)
BILIRUB SERPL-MCNC: 0.5 MG/DL (ref 0.1–1)
BUN SERPL-MCNC: 16 MG/DL (ref 6–20)
CALCIUM SERPL-MCNC: 9 MG/DL (ref 8.7–10.5)
CHLORIDE SERPL-SCNC: 98 MMOL/L (ref 95–110)
CO2 SERPL-SCNC: 26 MMOL/L (ref 23–29)
CREAT SERPL-MCNC: 0.9 MG/DL (ref 0.5–1.4)
DIFFERENTIAL METHOD: ABNORMAL
EOSINOPHIL # BLD AUTO: 0.4 K/UL (ref 0–0.5)
EOSINOPHIL NFR BLD: 4.1 % (ref 0–8)
ERYTHROCYTE [DISTWIDTH] IN BLOOD BY AUTOMATED COUNT: 12.8 % (ref 11.5–14.5)
EST. GFR  (AFRICAN AMERICAN): >60 ML/MIN/1.73 M^2
EST. GFR  (NON AFRICAN AMERICAN): >60 ML/MIN/1.73 M^2
GLUCOSE SERPL-MCNC: 124 MG/DL (ref 70–110)
GLUCOSE SERPL-MCNC: 126 MG/DL (ref 70–110)
GLUCOSE SERPL-MCNC: 197 MG/DL (ref 70–110)
GLUCOSE SERPL-MCNC: 217 MG/DL (ref 70–110)
HCT VFR BLD AUTO: 37.5 % (ref 40–54)
HGB BLD-MCNC: 12 G/DL (ref 14–18)
IMM GRANULOCYTES # BLD AUTO: 0.13 K/UL (ref 0–0.04)
IMM GRANULOCYTES NFR BLD AUTO: 1.3 % (ref 0–0.5)
LYMPHOCYTES # BLD AUTO: 3 K/UL (ref 1–4.8)
LYMPHOCYTES NFR BLD: 29.6 % (ref 18–48)
MAGNESIUM SERPL-MCNC: 1.8 MG/DL (ref 1.6–2.6)
MCH RBC QN AUTO: 29.7 PG (ref 27–31)
MCHC RBC AUTO-ENTMCNC: 32 G/DL (ref 32–36)
MCV RBC AUTO: 93 FL (ref 82–98)
MONOCYTES # BLD AUTO: 0.7 K/UL (ref 0.3–1)
MONOCYTES NFR BLD: 6.6 % (ref 4–15)
NEUTROPHILS # BLD AUTO: 5.9 K/UL (ref 1.8–7.7)
NEUTROPHILS NFR BLD: 57.8 % (ref 38–73)
NRBC BLD-RTO: 0 /100 WBC
PHOSPHATE SERPL-MCNC: 3.5 MG/DL (ref 2.7–4.5)
PLATELET # BLD AUTO: 556 K/UL (ref 150–350)
PMV BLD AUTO: 8.7 FL (ref 9.2–12.9)
POTASSIUM SERPL-SCNC: 3.7 MMOL/L (ref 3.5–5.1)
PROT SERPL-MCNC: 7.6 G/DL (ref 6–8.4)
RBC # BLD AUTO: 4.04 M/UL (ref 4.6–6.2)
SODIUM SERPL-SCNC: 131 MMOL/L (ref 136–145)
WBC # BLD AUTO: 10.12 K/UL (ref 3.9–12.7)

## 2020-04-27 PROCEDURE — 80053 COMPREHEN METABOLIC PANEL: CPT

## 2020-04-27 PROCEDURE — 84100 ASSAY OF PHOSPHORUS: CPT

## 2020-04-27 PROCEDURE — 83735 ASSAY OF MAGNESIUM: CPT

## 2020-04-27 PROCEDURE — 63700000 PHARM REV CODE 250 ALT 637 W/O HCPCS: Performed by: STUDENT IN AN ORGANIZED HEALTH CARE EDUCATION/TRAINING PROGRAM

## 2020-04-27 PROCEDURE — 63600175 PHARM REV CODE 636 W HCPCS: Performed by: STUDENT IN AN ORGANIZED HEALTH CARE EDUCATION/TRAINING PROGRAM

## 2020-04-27 PROCEDURE — 94761 N-INVAS EAR/PLS OXIMETRY MLT: CPT

## 2020-04-27 PROCEDURE — 85025 COMPLETE CBC W/AUTO DIFF WBC: CPT

## 2020-04-27 PROCEDURE — 25000003 PHARM REV CODE 250: Performed by: STUDENT IN AN ORGANIZED HEALTH CARE EDUCATION/TRAINING PROGRAM

## 2020-04-27 PROCEDURE — 63600175 PHARM REV CODE 636 W HCPCS: Performed by: INTERNAL MEDICINE

## 2020-04-27 PROCEDURE — 36415 COLL VENOUS BLD VENIPUNCTURE: CPT

## 2020-04-27 PROCEDURE — 82962 GLUCOSE BLOOD TEST: CPT

## 2020-04-27 PROCEDURE — 87040 BLOOD CULTURE FOR BACTERIA: CPT

## 2020-04-27 PROCEDURE — 36569 INSJ PICC 5 YR+ W/O IMAGING: CPT

## 2020-04-27 RX ORDER — TAMSULOSIN HYDROCHLORIDE 0.4 MG/1
0.4 CAPSULE ORAL NIGHTLY
Qty: 30 CAPSULE | Refills: 0 | Status: ON HOLD | OUTPATIENT
Start: 2020-04-27 | End: 2022-08-31

## 2020-04-27 RX ORDER — METOPROLOL SUCCINATE 100 MG/1
100 TABLET, EXTENDED RELEASE ORAL DAILY
Qty: 30 TABLET | Refills: 11 | Status: SHIPPED | OUTPATIENT
Start: 2020-04-28 | End: 2022-09-02

## 2020-04-27 RX ORDER — METOPROLOL SUCCINATE 50 MG/1
50 TABLET, EXTENDED RELEASE ORAL DAILY
Qty: 30 TABLET | Refills: 11 | Status: SHIPPED | OUTPATIENT
Start: 2020-04-28 | End: 2020-04-27

## 2020-04-27 RX ORDER — CEFAZOLIN SODIUM 2 G/50ML
2000 SOLUTION INTRAVENOUS EVERY 8 HOURS
Qty: 4500 ML | Refills: 1
Start: 2020-04-27 | End: 2020-05-05

## 2020-04-27 RX ADMIN — ASPIRIN 81 MG: 81 TABLET, DELAYED RELEASE ORAL at 09:04

## 2020-04-27 RX ADMIN — INSULIN DETEMIR 25 UNITS: 100 INJECTION, SOLUTION SUBCUTANEOUS at 09:04

## 2020-04-27 RX ADMIN — MORPHINE SULFATE 2 MG: 2 INJECTION, SOLUTION INTRAMUSCULAR; INTRAVENOUS at 12:04

## 2020-04-27 RX ADMIN — METOPROLOL SUCCINATE 50 MG: 50 TABLET, FILM COATED, EXTENDED RELEASE ORAL at 09:04

## 2020-04-27 RX ADMIN — INSULIN ASPART 2 UNITS: 100 INJECTION, SOLUTION INTRAVENOUS; SUBCUTANEOUS at 12:04

## 2020-04-27 RX ADMIN — CEFAZOLIN SODIUM 2 G: 2 SOLUTION INTRAVENOUS at 06:04

## 2020-04-27 RX ADMIN — CLONAZEPAM 1 MG: 1 TABLET ORAL at 09:04

## 2020-04-27 RX ADMIN — ISOSORBIDE MONONITRATE 30 MG: 30 TABLET, EXTENDED RELEASE ORAL at 09:04

## 2020-04-27 RX ADMIN — POTASSIUM CHLORIDE 40 MEQ: 20 SOLUTION ORAL at 12:04

## 2020-04-27 RX ADMIN — CEFAZOLIN SODIUM 2 G: 2 SOLUTION INTRAVENOUS at 03:04

## 2020-04-27 RX ADMIN — OXYCODONE HYDROCHLORIDE AND ACETAMINOPHEN 1 TABLET: 10; 325 TABLET ORAL at 09:04

## 2020-04-27 RX ADMIN — APIXABAN 5 MG: 5 TABLET, FILM COATED ORAL at 09:04

## 2020-04-27 RX ADMIN — OXYCODONE HYDROCHLORIDE AND ACETAMINOPHEN 1 TABLET: 10; 325 TABLET ORAL at 04:04

## 2020-04-27 NOTE — DISCHARGE SUMMARY
[facility] Medicine  Discharge Summary      Patient Name: Magdaleno Cunningham  MRN: [id number]  Admission Date: 4/22/2020  Hospital Length of Stay: 4 days  Discharge Date and Time:  04/27/2020 2:14 PM  Attending Physician: Emily Borrego DO   Discharging Provider: Emily Borrego DO  Primary Care Provider: Antonio Del Cid MD      HPI:   53 yo M with PMH of CAD s/p 2vCABG (4/8/20 [place])  c/o bilateral chest pain that started late last night worse with movement and deep breath. He denies associated sob, nausea/vomiting or diaphoresis. He reports chronic cough 2/2 hx of tobacco use (he states that he quit earlier in the month). Denies fever, chills, sick contacts. States that he has been social distancing and self-quarantining.   He states that has been doing well post-op and walked about 2 miles yesterday afternoon without cp or sob.       Spoke with ER physician Dr. Blackwell, who reports that he Spoke with Cardiology (Olga) who ordered Urgent ECHO showing no wall motion abnormality which made them less concerned for acute stemi. 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     In ED:   CTA negative for PE, small effusions vs atelectasis  WBC 15, LA wnl, PCT wnl  Meds: IV morphine 2 mg x 3, Solumedrol, zofran, rocephin     * No surgery found *      Hospital Course:   Patient was admitted for chest pain with 3-troponins, negative CTA, chest x-ray unremarkable and Cardiology consulted.  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  Chest pain resolved.  However blood cultures were positive for MSSA bacteremia (+ bcx on 4/22,23,24).  Blood cultures starting 425-.  Id was consulted for antibiotic regimen and CT surgery was consulted given that patient had recent CABG performed.  CT surgery believed infection was superficial and no need for OR I&D.  Id recommended 8 weeks of IV antibiotics.  PICC line was placed and home  "infusion orders placed.  Patient will have close follow-up with Infectious Disease and weekly CBC, CMP, ESR, CRP.  /63 (BP Location: Right arm, Patient Position: Sitting)   Pulse 89   Temp 98.5 °F (36.9 °C) (Oral)   Resp 18   Ht 5' 11" (1.803 m)   Wt 118.8 kg (261 lb 14.5 oz)   SpO2 95%   BMI 36.53 kg/m²   Gen: nad, sitting up in bed side chair  CV: rrr no mrg, sternotomy incision lower portion dressed bandages clean and dry mild erythema no discharge, drainage or fluctuance noted  Resp: CTA B/l  AB: +bs soft round, ntnd  Skin: dry, warm      Consults:   Consults (From admission, onward)        Status Ordering Provider     Inpatient consult to Cardiology  Once     Provider:  Tyree Walters MD    Completed EDMOND BORREGO     Inpatient consult to Cardiothoracic Surgery  Once     Provider:  Doug Solitario MD    Completed EDMOND BORREGO     Inpatient consult to Hospitalist  Once     Provider:  Edmond Borrego DO    Acknowledged EDMOND BORREGO     Inpatient consult to Infectious Diseases  Once     Provider:  Jessy Lomax MD    Acknowledged EDMOND BORREGO     [facility]  Once     Provider:  (Not yet assigned)    Acknowledged EDMOND BORREGO     Inpatient consult to Social Work/Case Management  Once     Provider:  (Not yet assigned)    JESSY Wyatt new Assessment & Plan notes have been filed under this hospital service since the last note was generated.  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 Date: 06/26/21   Order Comments: Weekly x 8 weeks     CBC auto differential   Standing Status: Future Standing Exp. Date: 06/26/21   Order Comments: Weekly x 8 weeks     C-reactive protein   Standing Status: Future Standing Exp. Date: 06/26/21   Order Comments: Weekly x 8 weeks     Sedimentation rate   Standing Status: Future Standing Exp. Date: 06/26/21   Order Comments: Weekly x 8 weeks     Diet Cardiac     Diet diabetic     Notify your health care provider if you experience any of the following:  temperature >100.4     Notify your health care provider if you experience any of the following:  persistent nausea and vomiting or diarrhea     Notify your health care provider if you experience any of the following:  redness, tenderness, or signs of infection (pain, swelling, redness, odor or green/yellow discharge around incision site)     Notify your health care provider if you experience any of the following:  difficulty breathing or increased cough     SUBSEQUENT HOME HEALTH ORDERS   Order Comments: Subsequent Home Health Orders    Current Medications:  Current Facility-Administered Medications:  acetaminophen tablet 650 mg, 650 mg, Oral, Q4H PRN, Emily Borrego DO, 650 mg at 04/22/20 2105  acetaminophen tablet 650 mg, 650 mg, Oral, Q8H PRN, Emily Borrego, DO  apixaban tablet 5 mg, 5 mg, Oral, BID, Emily Borrego DO, 5 mg at 04/27/20 0910  aspirin EC tablet 81 mg, 81 mg, Oral, Daily, Emily Borrego DO, 81 mg at 04/27/20 0910  cefazolin (ANCEF) 2 gram in dextrose 5% 50 mL IVPB (premix), 2 g, Intravenous, Q8H,  Jessy Lomax MD, Last Rate: 100 mL/hr at 04/27/20 0631, 2 g at 04/27/20 0631  clonazePAM tablet 1 mg, 1 mg, Oral, TID PRN, Emily Borrego, DO, 1 mg at 04/27/20 0910  dextrose 50% injection 12.5 g, 12.5 g, Intravenous, PRN, Emilymarla Borrego, DO  dextrose 50% injection 25 g, 25 g, Intravenous, PRN, Emily Borrego, DO  gabapentin capsule 300 mg, 300 mg, Oral, TID, Emily Borrego, DO, 300 mg at 04/26/20 2005  glucagon (human recombinant) injection 1 mg, 1 mg, Intramuscular, PRN, Emily Borrego, DO  glucose chewable tablet 16 g, 16 g, Oral, PRN, Emily Borrego, DO  glucose chewable tablet 24 g, 24 g, Oral, PRN, Emily Borrego, DO  insulin aspart U-100 pen 1-10 Units, 1-10 Units, Subcutaneous, QID (AC + HS) PRN, Emily Borrego, DO, 2 Units at 04/27/20 1250  insulin detemir U-100 pen 25 Units, 25 Units, Subcutaneous, BID, Emily Borrego, DO, 25 Units at 04/27/20 0918  isosorbide mononitrate 24 hr tablet 30 mg, 30 mg, Oral, Daily, Emily Borrego, DO, 30 mg at 04/27/20 0910  magnesium oxide tablet 800 mg, 800 mg, Oral, PRN, Emily Borrego, DO, 800 mg at 04/25/20 0604  magnesium oxide tablet 800 mg, 800 mg, Oral, PRN, Emily Borrego, DO  melatonin tablet 6 mg, 6 mg, Oral, Nightly PRN, Emily Borrego, DO, 6 mg at 04/26/20 2302  metoprolol succinate (TOPROL-XL) 24 hr tablet 50 mg, 50 mg, Oral, Daily, Emily Borrego, DO, 50 mg at 04/27/20 0910  morphine injection 2 mg, 2 mg, Intravenous, Q4H PRN, Emily Borrego, DO, 2 mg at 04/27/20 1244  nitroGLYCERIN SL tablet 0.4 mg, 0.4 mg, Sublingual, Q5 Min PRN, Emily Borrego,   ondansetron disintegrating tablet 8 mg, 8 mg, Oral, Q8H PRN, Emily Borrego DO  oxyCODONE-acetaminophen  mg per tablet 1 tablet, 1 tablet, Oral, Q4H PRN, Emily Borrego DO, 1 tablet at 04/27/20 0918  potassium chloride 10% oral solution 40 mEq, 40 mEq, Oral, PRN, Emily Borrego DO, 40 mEq at 04/27/20 1232  potassium chloride 10% oral solution 40 mEq, 40 mEq, Oral, PRN, Emily Borrego DO  potassium, sodium  phosphates 280-160-250 mg packet 2 packet, 2 packet, Oral, PRN, Emily A. Borrego, DO  potassium, sodium phosphates 280-160-250 mg packet 2 packet, 2 packet, Oral, PRN, Emily A. Borrego, DO  potassium, sodium phosphates 280-160-250 mg packet 2 packet, 2 packet, Oral, PRN, Emily A. Borrego, DO  promethazine (PHENERGAN) 6.25 mg in dextrose 5 % 50 mL IVPB, 6.25 mg, Intravenous, Q6H PRN, Emily A. Borrego, DO  rosuvastatin tablet 20 mg, 20 mg, Oral, QHS, Emily A. Borrego, DO, 20 mg at 04/26/20 2005  senna-docusate 8.6-50 mg per tablet 1 tablet, 1 tablet, Oral, BID, Emily A. Borrego, DO, 1 tablet at 04/26/20 2100  sodium chloride 0.9% flush 10 mL, 10 mL, Intravenous, PRN, Emily A. Borrego, DO  tamsulosin 24 hr capsule 0.4 mg, 0.4 mg, Oral, QHS, Emily A. Borrego, DO, 0.4 mg at 04/26/20 2005        Nursing:   Wound Care Orders:  yes:  Surgical Wound:  Location:  [place]       Diet:   cardiac diet    Activities:   activity as tolerated    Labs:  SN to perform labs:  CBC: Weekly; 8 week(s), CMP: Weekly; 8 week(s), ESR: Weekly; 8 week(s) and CRP: Weekly; 8 week(s)    Referrals/ Consults  Aide to provide assistance with personal care, ADLs, and vital signs.    Home Health Aide:  Nursing Daily and Home Health Aide Daily     Order Specific Question Answer Comments   What Home Health Agency is the patient currently using? [facility]      Activity as tolerated       Significant Diagnostic Studies: Labs: All labs within the past 24 hours have been reviewed  Micro:  4/22 blood cultures x2  4/23 blood cultures  4/24 blood cultures   Staphylococcus aureus         Antibiotic Interpretation Value   Ceftriaxone Sensitive <=8 mcg/mL   Clindamycin Sensitive <=0.5 mcg/mL   Erythromycin Sensitive <=0.5 mcg/mL   Oxacillin Sensitive <=0.25 mcg/mL   Penicillin Resistant 2 mcg/mL   Trimeth/Sulfa Sensitive <=0.5/9.5 mcg/mL   Tetracycline Sensitive <=4 mcg/mL   Vancomycin Sensitive 1 mcg/mL      4/25 blood cultures ngtd  4/27 blood cultures  pending  4/27 wound cx: pending          Pending Diagnostic Studies:     Procedure Component Value Units Date/Time    Lactic Acid, Plasma [[id number]] Collected:  04/22/20 0746    Order Status:  Sent Lab Status:  In process Updated:  04/22/20 4787    Specimen:  Blood          Medications:  Reconciled Home Medications:      Medication List      START taking these medications    ceFAZolin 2 g/50mL Dextrose IVPB 2 gram/50 mL Pgbk  Commonly known as:  ANCEF  Inject 50 mLs (2,000 mg total) into the vein every 8 (eight) hours.        CONTINUE taking these medications    ACCU-CHEK GUIDE Strp  Generic drug:  blood sugar diagnostic  USE 1 STRIP TO CHECK BLOOD SUGAR TWICE DAILY     aspirin 81 MG Chew  Take 81 mg by mouth once daily.     chlorhexidine 0.12 % solution  Commonly known as:  PERIDEX  Use as directed 10 mLs in the mouth or throat 2 (two) times daily.     clonazePAM 1 MG tablet  Commonly known as:  KLONOPIN  Take 1 mg by mouth 3 (three) times daily as needed for Anxiety.     ELIQUIS 5 mg Tab  Generic drug:  apixaban  Take 5 mg by mouth 2 (two) times daily.     furosemide 40 MG tablet  Commonly known as:  LASIX  Take 1 tablet (40 mg total) by mouth once daily. for 5 days     gabapentin 600 MG tablet  Commonly known as:  NEURONTIN  Take 600 mg by mouth 3 (three) times daily.     glimepiride 4 MG tablet  Commonly known as:  AMARYL  Take 4 mg by mouth 2 (two) times daily.     hydroCHLOROthiazide 12.5 MG Tab  Commonly known as:  HYDRODIURIL  Take 12.5 mg by mouth once daily.     isosorbide mononitrate 30 MG 24 hr tablet  Commonly known as:  IMDUR  Take 1 tablet (30 mg total) by mouth once daily.     JARDIANCE 10 mg tablet  Generic drug:  empagliflozin  Take 10 mg by mouth once daily.     LANTUS SOLOSTAR U-100 INSULIN glargine 100 units/mL (3mL) SubQ pen  Generic drug:  insulin  Inject 30 Units into the skin 2 (two) times daily.     lisinopriL 10 MG tablet  Take 10 mg by mouth once daily.     metFORMIN 1000 MG  tablet  Commonly known as:  GLUCOPHAGE  Take 1,000 mg by mouth 2 (two) times daily with meals.     metoprolol succinate 100 MG 24 hr tablet  Commonly known as:  TOPROL-XL  Take 1 tablet (100 mg total) by mouth once daily.  Start taking on:  April 28, 2020     nitroGLYCERIN 0.4 MG SL tablet  Commonly known as:  NITROSTAT  Place 0.4 mg under the tongue every 5 (five) minutes as needed for Chest pain.     rosuvastatin 20 MG tablet  Commonly known as:  CRESTOR  Take 20 mg by mouth every evening.     tamsulosin 0.4 mg Cap  Commonly known as:  FLOMAX  Take 1 capsule (0.4 mg total) by mouth every evening.     TRICOR 145 MG tablet  Generic drug:  fenofibrate  Take 145 mg by mouth every evening.        STOP taking these medications    sotaloL 80 MG tablet  Commonly known as:  BETAPACE            Indwelling Lines/Drains at time of discharge:   Lines/Drains/Airways     picc                 Time spent on the discharge of patient: 34 minutes  Patient was seen and examined on the date of discharge and determined to be suitable for discharge.         Emily Borrego DO  Department of Hospital Medicine  [facility]

## 2020-04-27 NOTE — NURSING
Discharge instructions given to pt. Removed 1PIV, tip intact and cardiac monitor. Pt left with belongings; phone, , 2 bags, walker, shoes, shorts, and shirt. Pt refused wheelchair assistance and left via walker to friends vehicle without incident to return home.

## 2020-04-27 NOTE — PROGRESS NOTES
"[facility] Medicine  Progress Note    Patient Name: Magdaleno Cunningham  MRN: [id number]  Patient Class: IP- Inpatient   Admission Date: 4/22/2020  Length of Stay: 4 days  Attending Physician: Emily Borrego DO  Primary Care Provider: Antonio Del Cid MD        Subjective:     Principal Problem:Chest pain  Chief Complaint   Patient presents with    Chest Pain     Interval history: afvss, wbc wnl. bcx 4/22, 423 & 4/24 MSSA. bcx 4/25 ngtd. bcx and wound cx 4/27 pending  Seen by CTS, Dr. Solitario, no plans for OR for now, medical management.     Patient seen and examined.  He states that he feels "real good". Denies any CP, SOB, N/V/D, headaches, fever or chills.     Objective:  /69 (BP Location: Right arm, Patient Position: Sitting)   Pulse 71   Temp 98 °F (36.7 °C) (Oral)   Resp 20   Ht 5' 11" (1.803 m)   Wt 118.8 kg (261 lb 14.5 oz)   SpO2 98%   BMI 36.53 kg/m²   Temp:  [97.9 °F (36.6 °C)-98.5 °F (36.9 °C)] 98 °F (36.7 °C)  Pulse:  [] 71  Resp:  [16-20] 20  SpO2:  [95 %-98 %] 98 %  BP: ()/(61-70) 127/69    Intake/Output Summary (Last 24 hours) at 4/27/2020 0838  Last data filed at 4/27/2020 0332  Gross per 24 hour   Intake 1180 ml   Output --   Net 1180 ml     Physical Exam   Constitutional:  Awake, alert and oriented x 3, NAD  HENT: ncat, mmm, perrla, eomi, neck normal rom and supple   Cardiovascular: RRR no mrg, dressing over lower portion of incision c/d/i. Mild erythema noted surrounding incision site.   Pulmonary/Chest: Effort normal, CTA b/l no wheezes, rales, rhonchi   Abdominal: Soft. +bs, ntnd  Musculoskeletal: Normal range of motion.  no edema, tenderness or deformity.   Neurological:  AAOx3, no focal deficits noted,   Psychiatric:  normal mood and affect.  behavior is normal  Nursing note and vitals reviewed.    LABS:   CBC  Recent Labs   Lab 04/25/20  0356 04/26/20  0435 04/27/20  0645   WBC 11.11 10.65 10.12   RBC 3.52* 3.78* 4.04*   HGB 10.7* 11.4* 12.0* "   HCT 32.1* 35.1* 37.5*   * 533* 556*   MCV 91 93 93   MCH 30.4 30.2 29.7   MCHC 33.3 32.5 32.0     BMP  Recent Labs   Lab 04/25/20  0356 04/26/20  0435 04/27/20  0645   * 134* 131*   K 4.3 4.6 3.7   CO2 26 25 26    101 98   BUN 27* 17 16   CREATININE 1.0 0.9 0.9   * 143* 126*       Recent Labs   Lab 04/25/20  0356 04/26/20  0435 04/27/20  0645   CALCIUM 8.3* 8.8 9.0   MG 1.7 1.8 1.8   PHOS 2.5* 3.4 3.5     LFT  Recent Labs   Lab 04/25/20 0356 04/26/20  0435 04/27/20  0645   PROT 6.6 7.1 7.6   ALBUMIN 2.7* 3.1* 3.2*   BILITOT 0.4 0.4 0.5   AST 16 15 25   ALKPHOS 49* 51* 57   ALT 14 15 18     Imaging:   XR CHEST 1 VIEW    CLINICAL HISTORY:  Chest pain, staph aureus bacteremia postop.    FINDINGS:  Portable chest radiograph at 07:19 hours compared to 04/24/2020 shows median sternotomy wires and mediastinal surgical clips, with the cardiac silhouette enlarged, stable in size.  The pulmonary vasculature is stable.    The lungs are mildly hypoinflated, with scattered linear and bandlike opacities in both lower lungs reflecting atelectasis, not significantly changed.  There is no new pleural or parenchymal abnormality.   Impression:       No significant interval change.         Micro:  4/22 blood cultures x2  4/23 blood cultures  4/24 blood cultures   Staphylococcus aureus   Antibiotic Interpretation Value   Ceftriaxone Sensitive <=8 mcg/mL   Clindamycin Sensitive <=0.5 mcg/mL   Erythromycin Sensitive <=0.5 mcg/mL   Oxacillin Sensitive <=0.25 mcg/mL   Penicillin Resistant 2 mcg/mL   Trimeth/Sulfa Sensitive <=0.5/9.5 mcg/mL   Tetracycline Sensitive <=4 mcg/mL   Vancomycin Sensitive 1 mcg/mL     4/25 blood cultures ngtd  4/27 blood cultures pending  4/27 wound cx: pending    Assessment/Plan:      Active Hospital Problems    Diagnosis  POA    *Chest pain [R07.9]  Yes    Bacteremia due to Gram-positive bacteria [R78.81]  Yes    HENRIQUE (acute kidney injury) [N17.9]  Yes    S/P CABG (coronary artery  bypass graft) [Z95.1]  Not Applicable    Coronary atherosclerosis of native coronary artery [I25.10]  Yes    Generalized anxiety disorder [F41.1]  Yes    COPD (chronic obstructive pulmonary disease) [J44.9]  Yes    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     CAD S/p 2vCABG (POD 19)  Essential HTN  - Morphine, oxygen, nitroglycerine SL PRN  - c/w home: Asa, BB, Statin, imdur, ranolzine, apixaban  - held home ace and hctz will be reintroduced now that villa has resolved.   - telemetry  - ECHO: no wall motion abnormalities or effusions  - s/p Solu-Medrol   - sliding scale for electrolytes prn  - Cardiology consulted, recs appreciated.    - CTS consulted, recs appreciated      Staph Aureus Bacteremia  - blood cx x 2 from 4/22, 4/23, & 4/24 :  MSSA  - blood cx 4/25 ngtd  - repeat blood and wound cx obtained today.   - TTE no mention of vegetations  - s/p IV vanc (4/23-4/25)  - IV ancef per ID recs started 4/25  - ID consulted, recs noted   - CTS consulted given recent cabg, recommends continued medical management and no OR at this time.   - picc line ordered  - will discuss with ID dc planning and length of outpatient abx.     Pneumonia resolved   Atelectasis   - covid negative  - cxr: Interval increase in opacification of the left lung base. (atelectasis versus pneumonia)  - IS  - IV abx: ancef     IDDM2  - well controlled today  - bg 126  - detemir 25 units bid  - moderate dose ssi  - accuchecks   - diabetic diet       VTE Risk Mitigation (From admission, onward)         Ordered     apixaban tablet 5 mg  2 times daily      04/22/20 1218     IP VTE HIGH RISK PATIENT  Once      04/22/20 1218                Emily Borrego DO  Department of Hospital Medicine   [facility]

## 2020-04-27 NOTE — PROGRESS NOTES
Progress Note  Infectious Disease    Reason for Consult:      HPI: Magdaleno Cunningham is a  anxious appearing 54 y.o. male with past medical history of CAD, status post CABG x2, hospitalized from 04/07--04/10/2020, postop day 14, IDDM 2, came on 04/22/2020 with complaints of shortness of breath and chest pain, he was found to have  Staph aureus bacteremia hence ID consult.    I came and saw patient.  He has been afebrile since admission.  He continues to have chest pain at the surgical site, 5/10, worse with movement, worse with breathing in deep, some of it is expected but some looks aggravated by his anxiety.  Pain medications help.  Nurses had confined him in bed as he is receiving opiates.  He cannot handle it.  He seems to be claustrophobic.    04/25/2020.  No new complaints.  Expected her right anterior chest pain, 1/10, worse with moving, better with opiates, improved compared to when he came in.  No fevers.  He is eager and ready to go home.  I explained that he cannot go home today.    04/26/2020.  Afebrile.  WBC is markedly improved since admission Patient started having some drainage from the anterior lower chest.  Blood cultures on 22 nd were positive.  Another blood culture of 24th turned positive with MSSA.  He continues to have some pain to anterior chest, 1/10, worse with moving, better with rest.    4/27: afebrile. Blood cultures from 4/25 are negative so far. Nothing further could be expressed from incision. Was bacteremic for 3 days. A1c 10% in mid march.     Antibiotics (From admission, onward)    Start     Stop Route Frequency Ordered    04/25/20 1500  cefazolin (ANCEF) 2 gram in dextrose 5% 50 mL IVPB (premix)      -- IV Every 8 hours (non-standard times) 04/25/20 1209        Antifungals (From admission, onward)    None        Antivirals (From admission, onward)    None        EXAM & DIAGNOSTICS REVIEWED:   Vitals:     Temp:  [97.9 °F (36.6 °C)-98.5 °F (36.9 °C)]   Temp: 98.4 °F (36.9 °C)  (04/27/20 1204)  Pulse: 92 (04/27/20 1204)  Resp: (!) 21 (04/27/20 1204)  BP: 128/86 (04/27/20 1204)  SpO2: 98 % (04/27/20 1204)    Intake/Output Summary (Last 24 hours) at 4/27/2020 1438  Last data filed at 4/27/2020 1306  Gross per 24 hour   Intake 820 ml   Output --   Net 820 ml       General:  In NAD. Alert and attentive, cooperative, comfortable  Eyes:  Anicteric  EOMI  ENT:  No ulcers, exudates, thrush, nares patent, some caries present  Neck:  Supple,   Lungs: Minimally decreased on the left with some fine crackles   Heart:  RRR, no gallop/murmur/rub noted  Abd:  Soft, NT, ND, normal BS, no masses or organomegaly appreciated.  :  Voids    Musc:  Joints without effusion, swelling, erythema, synovitis, muscle wasting.   Skin:  No rashes. No palmar or plantar lesions. No subungual petechiae  Wound: Looks like it is healing.  Today I cannot express anything from distal incision  Neuro: Alert, attentive, speech fluent, face symmetric, moves all extremities, no focal weakness. Ambulatory  Psych:  Calm, cooperative  Lymphatic:        Extrem: No edema, erythema, phlebitis, cellulitis, warm and well perfused  VAD:       Isolation:      Lines/Tubes/Drains:    General Labs reviewed:  Recent Labs   Lab 04/25/20  0356 04/26/20  0435 04/27/20  0645   WBC 11.11 10.65 10.12   HGB 10.7* 11.4* 12.0*   HCT 32.1* 35.1* 37.5*   * 533* 556*       Recent Labs   Lab 04/25/20  0356 04/26/20  0435 04/27/20  0645   * 134* 131*   K 4.3 4.6 3.7    101 98   CO2 26 25 26   BUN 27* 17 16   CREATININE 1.0 0.9 0.9   CALCIUM 8.3* 8.8 9.0   PROT 6.6 7.1 7.6   BILITOT 0.4 0.4 0.5   ALKPHOS 49* 51* 57   ALT 14 15 18   AST 16 15 25     Lab Results   Component Value Date    CRP 5.47 (H) 04/26/2020     Micro:  Microbiology Results (last 7 days)     Procedure Component Value Units Date/Time    Blood culture [[id number]] Collected:  04/25/20 1142    Order Status:  Completed Specimen:  Blood Updated:  04/27/20 1232     Blood  Culture, Routine No Growth to date      No Growth to date      No Growth to date    Blood culture [[id number]]  (Abnormal) Collected:  04/24/20 0515    Order Status:  Completed Specimen:  Blood Updated:  04/27/20 0813     Blood Culture, Routine Gram stain aer bottle: Gram positive cocci      Positive results previously called      STAPHYLOCOCCUS AUREUS  For susceptibility see order # [id number]       Comment: Previous comment was modified by [facility] at 08:13 on 04/27/2020 Refer to   culture # [id number] for sensitiviy         Blood culture [[id number]]  (Abnormal) Collected:  04/23/20 1101    Order Status:  Completed Specimen:  Blood Updated:  04/27/20 0812     Blood Culture, Routine Gram stain aer bottle: Gram positive cocci      Results called to and read back by:Shahla Valdes RN-A CARD;  04/25/2020        02:29 CJD      STAPHYLOCOCCUS AUREUS  For susceptibility see order # [id number]       Comment: Previous comment was modified by [facility] at 08:11 on 04/27/2020 Refer to   culture # [id number] for sensitiviy         Blood culture [[id number]] Collected:  04/27/20 0645    Order Status:  Sent Specimen:  Blood Updated:  04/27/20 0712    Culture, Anaerobe [[id number]]     Order Status:  No result Specimen:  Abscess     Aerobic culture [[id number]]     Order Status:  No result Specimen:  Abscess     Blood culture [[id number]]     Order Status:  Canceled Specimen:  Blood     Blood culture x two cultures. Draw prior to antibiotics. [[id number]]  (Abnormal) Collected:  04/22/20 0934    Order Status:  Completed Specimen:  Blood from Peripheral, Antecubital, Left Updated:  04/25/20 0655     Blood Culture, Routine Gram stain aer bottle: Gram positive cocci       Results called to and read back by:Aleksandar Hoffman RN CARD A.      04/23/2020  01:58 TGC      Gram stain angelique bottle: Gram positive cocci       Positive results previously called to Aleksandar Hoffman RN CARD A.       04/23/2020  01:59 TGC      STAPHYLOCOCCUS AUREUS  For susceptibility see  order #[id number]      Narrative:       Aerobic and anaerobic    Blood culture x two cultures. Draw prior to antibiotics. [[id number]]  (Abnormal)  (Susceptibility) Collected:  04/22/20 0746    Order Status:  Completed Specimen:  Blood from Peripheral, Antecubital, Right Updated:  04/25/20 0645     Blood Culture, Routine Gram stain aer bottle: Gram positive cocci       Results called to and read back by:Aleksandar Hoffman RN CARD A.      04/23/2020  01:55 TGC      Gram stain angelique bottle: Gram positive cocci      Positive results previously called to Aleksandar Hoffman RN CARD A.      04/23/2020  01:55 TGC      STAPHYLOCOCCUS AUREUS    Narrative:       Aerobic and anaerobic        Imaging Reviewed:   CXRNo significant change of bilateral lung opacities.   CT 04/22/20201. No evidence of pulmonary embolism to the segmental arterial level. If there is continued clinical concern, consider further evaluation with lower extremity doppler.. Cardiomegaly with prominence of the pulmonary vasculature suggestive of congestion.    Cardiology:  Echocardiogram on 04/22/2020  · Normal left ventricular systolic function. The estimated ejection fraction is 55%.  · Normal LV diastolic function.  · Mild tricuspid regurgitation.  · Intermediate central venous pressure (8 mmHg).  · The estimated PA systolic pressure is 33 mmHg.  eft Ventricle Normal ejection fraction at 55%. Normal wall thickness observed. Normal left ventricular diastolic function.   Aortic Valve Aortic valve sclerosis is moderate.   Mitral Valve Trace regurgitation.   Tricuspid Valve Mild regurgitation. The estimated PA systolic pressure is 33 mmHg.   IVC/SVC Intermediate central venous pressure (8 mm Hg).   Pericardium No pericardial effusion.     ·   IMPRESSION & PLAN     Persistent MSSA bacteremia on 04/22/2020, 4/23/20, 04/24/2020.  Echocardiogram has no mention of vegetations.  Sternal infection  Leukocytosis, improved  CRP trend 1.99--5.47  UTI with mild pyuria at  "9.  History of CAD, CABG on 04/07/2020, anxiety, obesity, protein malnutrition albumin 3.2, COVID is negative on 04/20 2nd,    Recommendations:  Per Dr. Lomax:   cefazolin 2 g IV q.8 hours.  Can use continuous infusion.   Place PICC line    + 8 weeks of IV antibiotics.  Weekly labs x 8 while on IV antibiotics CRP, ESR, CMP, CBC with differential.  He needs to follow in her ID  clinic within 1 week of discharge.  she wrote order in chart for  to start working.    Per Dr. Lomax" I discussed with patient possible placement for him to receive IV antibiotics.  He absolutely refused it.  He would like to go home and receive IV antibiotics at home.  He has family members were nurses and respiratory therapist, he trust them and they will help him get antibiotics."    D/w dr. Borrego  "

## 2020-04-27 NOTE — CONSULTS
This patient had recent coronary artery bypass grafting.  He comes back to the hospital now with chest pain and drainage from the lower aspect of the sternotomy incision.  He is afebrile.  He is in no distress.  His past history is pertinent for diabetes and COPD from smoking.  His medicines are noted and are part of the epic record.  His problem list was reviewed.  He has had no other recent hospitalizations other than the coronary artery bypass procedure.  On exam he is up in a chair in no distress.  Pupils are equal and round reactive to light.  Neck is supple.  Chest is equal breath sounds.  On the lower aspect of the sternotomy incision he has seropurulent drainage on the dressing.  The dressing was put on last night and I changed it today.  There appears to be no obvious sternal instability.  The sternal incision for the most part is completely intact.  I detect no erythema or tenderness along the upper and mid aspect of the incision.  Abdomen is benign.  Perfusion to the legs and feet seems to be satisfactory.  Laboratory work is noted.  At this point I would recommend ongoing antibiotic therapy and observation.  Based on response to therapy further recommendations will be made.  I do not think he needs to go to the operating room at this time because this may be a superficial infection treatable with antibiotic therapy.

## 2020-04-27 NOTE — PROGRESS NOTES
"[facility]  Adult Nutrition   Progress Note (Initial Assessment)     SUMMARY     Recommendations/Interventions:    Recommendation/Intervention: 1. Continue current diet as tolerated, encourage intake. 2. Added Jamel BID to aid in incision healing.  Goals: 1. Diet to be restarted and patient to meet at least 75% of estimated needs via PO intake of meals.  Nutrition Goal Status: new    Dietitian Rounds Brief:  · Seen 2' LOS. Appetite and intake good. Patient is s/p CABG. Admitted due to incision infection. Feels good today. Awaiting PICC placement for IV antibiotics.  · Per MD: "I discussed with patient possible placement for him to receive IV antibiotics.  He absolutely refused it.  He would like to go home and receive IV antibiotics at home.  He has family members were nurses and respiratory therapist, he trust them and they will help him get antibiotics."    Reason for Assessment  Reason For Assessment: length of stay  Diagnosis: surgery/postoperative complications  Relevant Medical History: A-Fib, BPH, COPD, CAD, CVD, DM, HTN, MI, s/p CABG  Interdisciplinary Rounds: attended    Nutrition Risk Screen  Nutrition Risk Screen: no indicators present    Nutrition/Diet History  Food Allergies: NKFA  Factors Affecting Nutritional Intake: None identified at this time    Anthropometrics  Temp: 98 °F (36.7 °C)  Height Method: Stated  Height: 5' 11" (180.3 cm)  Height (inches): 71 in  Weight Method: Bed Scale  Weight: 118.8 kg (261 lb 14.5 oz)  Weight (lb): 261.91 lb  Ideal Body Weight (IBW), Male: 172 lb  % Ideal Body Weight, Male (lb): 154.71 %  BMI (Calculated): 36.5  BMI Grade: 35 - 39.9 - obesity - grade II     Weight History:  Wt Readings from Last 10 Encounters:   04/27/20 118.8 kg (261 lb 14.5 oz)   04/20/20 122.5 kg (270 lb)   04/15/20 126.1 kg (278 lb)   04/13/20 126.3 kg (278 lb 8 oz)   04/11/20 125.9 kg (277 lb 8 oz)   04/09/20 125.3 kg (276 lb 3.8 oz)   03/25/20 125.6 kg (276 lb 14.4 oz)   03/18/20 " 125.7 kg (277 lb 1.9 oz)   03/12/20 124.7 kg (274 lb 14.6 oz)   01/06/20 120.7 kg (266 lb)     Lab/Procedures/Meds: Pertinent Labs Reviewed  Clinical Chemistry:  Recent Labs   Lab 04/25/20  0356 04/26/20  0435 04/27/20  0645   * 134* 131*   K 4.3 4.6 3.7    101 98   CO2 26 25 26   * 143* 126*   BUN 27* 17 16   CREATININE 1.0 0.9 0.9   CALCIUM 8.3* 8.8 9.0   PROT 6.6 7.1 7.6   ALBUMIN 2.7* 3.1* 3.2*   BILITOT 0.4 0.4 0.5   ALKPHOS 49* 51* 57   AST 16 15 25   ALT 14 15 18   ANIONGAP 9 8 7*   ESTGFRAFRICA >60.0 >60.0 >60.0   EGFRNONAA >60.0 >60.0 >60.0   MG 1.7 1.8 1.8   PHOS 2.5* 3.4 3.5     CBC:   Recent Labs   Lab 04/27/20  0645   WBC 10.12   RBC 4.04*   HGB 12.0*   HCT 37.5*   *   MCV 93   MCH 29.7   MCHC 32.0     Cardiac Profile:  Recent Labs   Lab 04/22/20  0746 04/22/20  1359 04/22/20  1951 04/23/20  0136   BNP 80  --   --   --    CPK 76  --   --   --    TROPONINI <0.030 <0.030 <0.030 <0.030     Inflammatory Labs:  Recent Labs   Lab 04/22/20  0746 04/26/20  0435   CRP 1.99* 5.47*     Medications: Pertinent Medications reviewed  Scheduled Meds:   apixaban  5 mg Oral BID    aspirin  81 mg Oral Daily    ceFAZolin (ANCEF) IVPB  2 g Intravenous Q8H    gabapentin  300 mg Oral TID    insulin detemir U-100  25 Units Subcutaneous BID    isosorbide mononitrate  30 mg Oral Daily    metoprolol succinate  50 mg Oral Daily    rosuvastatin  20 mg Oral QHS    senna-docusate 8.6-50 mg  1 tablet Oral BID    tamsulosin  0.4 mg Oral QHS     Continuous Infusions:  PRN Meds:.acetaminophen, acetaminophen, clonazePAM, dextrose 50%, dextrose 50%, glucagon (human recombinant), glucose, glucose, insulin aspart U-100, magnesium oxide, magnesium oxide, melatonin, morphine, nitroGLYCERIN, ondansetron, oxyCODONE-acetaminophen, potassium chloride 10%, potassium chloride 10%, potassium, sodium phosphates, potassium, sodium phosphates, potassium, sodium phosphates, promethazine (PHENERGAN) IVPB, sodium  chloride 0.9%    Estimated/Assessed Needs    Weight Used For Calorie Calculations: 118.8 kg (261 lb 14.5 oz)  Energy Calorie Requirements (kcal): 8560-4464 kcals/day (20-25 kcals/kg )  Energy Need Method: Kcal/kg  Protein Requirements: 117-156 g/day (1.5-2.0 g/kg per IBW and aid in wound healing)  Weight Used For Protein Calculations: 78 kg (172 lb)    Nutrition Prescription Ordered    Current Diet Order: NPO    Evaluation of Received Nutrient/Fluid Intake    Energy Calories Required: not meeting needs  Protein Required: not meeting needs  Fluid Required: meeting needs  Tolerance: (NPO)  % Intake of Estimated Energy Needs: 75 - 100 %  % Meal Intake: 75 - 100 %    Intake/Output Summary (Last 24 hours) at 4/27/2020 1136  Last data filed at 4/27/2020 0332  Gross per 24 hour   Intake 820 ml   Output --   Net 820 ml      Nutrition Risk    Level of Risk/Frequency of Follow-up: moderate - high   Monitor and Evaluation    Food and Nutrient Intake: food and beverage intake, energy intake  Food and Nutrient Adminstration: diet order  Physical Activity and Function: nutrition-related ADLs and IADLs, factors affecting access to physical activity  Anthropometric Measurements: weight change, body mass index, weight  Biochemical Data, Medical Tests and Procedures: electrolyte and renal panel, inflammatory profile, lipid profile, gastrointestinal profile, glucose/endocrine profile  Nutrition-Focused Physical Findings: overall appearance     Nutrition Follow-Up    RD Follow-up?: Yes  Iliana Vitale RD 04/27/2020 11:44 AM

## 2020-04-27 NOTE — PLAN OF CARE
Pt being discharged.    Problem: Wound  Goal: Optimal Wound Healing  Outcome: Met     Problem: Adult Inpatient Plan of Care  Goal: Plan of Care Review  Outcome: Met  Goal: Patient-Specific Goal (Individualization)  Outcome: Met  Goal: Absence of Hospital-Acquired Illness or Injury  Outcome: Met  Goal: Optimal Comfort and Wellbeing  Outcome: Met  Goal: Readiness for Transition of Care  Outcome: Met  Goal: Rounds/Family Conference  Outcome: Met     Problem: Diabetes Comorbidity  Goal: Blood Glucose Level Within Desired Range  Outcome: Met     Problem: Skin Injury Risk Increased  Goal: Skin Health and Integrity  Outcome: Met     Problem: Chest Pain  Goal: Resolution of Chest Pain Symptoms  Outcome: Met     Problem: Fall Injury Risk  Goal: Absence of Fall and Fall-Related Injury  Outcome: Met     Problem: Electrolyte Imbalance (Acute Kidney Injury/Impairment)  Goal: Serum Electrolyte Balance  Outcome: Met     Problem: Fluid Imbalance (Acute Kidney Injury/Impairment)  Goal: Optimal Fluid Balance  Outcome: Met     Problem: Hematologic Alteration (Acute Kidney Injury/Impairment)  Goal: Hemoglobin, Hematocrit and Platelets Within Normal Range  Outcome: Met     Problem: Oral Intake Inadequate (Acute Kidney Injury/Impairment)  Goal: Optimal Nutrition Intake  Outcome: Met     Problem: Renal Function Impairment (Acute Kidney Injury/Impairment)  Goal: Effective Renal Function  Outcome: Met     Problem: Oral Intake Inadequate  Goal: Improved Oral Intake  Outcome: Met     Problem: Infection  Goal: Infection Symptom Resolution  Outcome: Met

## 2020-04-27 NOTE — PLAN OF CARE
04/26/20 2015   Patient Assessment/Suction   Level of Consciousness (AVPU) alert   Respiratory Effort Normal;Unlabored   Expansion/Accessory Muscles/Retractions no use of accessory muscles   All Lung Fields Breath Sounds   (few scattered crackles)   Cough Frequency infrequent   Cough Type nonproductive   PRE-TX-O2   Pulse 79   Incentive Spirometer   $ Incentive Spirometer Charges done with encouragement   Administration (IS) mouthpiece   Number of Repetitions (IS) 10   Level Incentive Spirometer (mL) 1500   Patient Tolerance (IS) good   Respiratory Evaluation   $ Care Plan Tech Time 15 min   Evaluation For   (care plan)   continue to encourage incentive and cough/cough is much stronger and nonproductive today

## 2020-04-27 NOTE — PLAN OF CARE
Problem: Wound  Goal: Optimal Wound Healing  Outcome: Ongoing, Progressing  Intervention: Promote Effective Wound Healing  Flowsheets (Taken 4/27/2020 1144)  Oral Nutrition Promotion: -- (vitamin and mineral dense nutrition supplement)    Added Jamel BID to aid in incision healing.

## 2020-04-27 NOTE — PLAN OF CARE
04/27/20 1421   Final Note   Assessment Type Final Discharge Note   Anticipated Discharge Disposition Home-Health   Post-Acute Status   Post-Acute Authorization Home Health   Home Health Status Set-up Complete   Discharge Delays None known at this time     SW met with pt at bedside to discuss home health. Pt wished to resume his home health with [facility]. Pt choice form signed on this date at 1403. SW faxed over d/c orders and summary. Kimberly with [employer] [phone] has been notified of pt's d/c. No further SW/CM needs.

## 2020-04-27 NOTE — NURSING
Patient refused culture to midsternal wound. Stated that the dressing had just been reapplied and why cant it be cultured in the am during the I and D procedure

## 2020-04-28 NOTE — CARE UPDATE
"Readmission Prevention      DX s/p CABG at [facility]. PNA. Other: MI hx, CAD, CVD, DM, HTN,     Pt was discharged before encounter.  Pt was sent CABG article, PNA article, [place] CS &R list,  covid resources, dc recommendations, smoking cessation information, article on "Heart attack, cardiac arrest, heart failure-what is the difference?" Pt is given contact information to connect with PC for additional assistance as needed.    Sandra JARRELL, RICHARD    Transitions of Care Program  4/28/2020, 3:11 pm      PC contact called pt.  Pt has HH still visiting, wound is healing much better, pt has had no difficulty with medications and has no concerns to voice at this time.    Sandra JARRELL, RICHARD    Transitions of Care Program  5/12/2020, 2:32 pm  "

## 2020-04-29 ENCOUNTER — TELEPHONE (OUTPATIENT)
Dept: SMOKING CESSATION | Facility: CLINIC | Age: 55
End: 2020-04-29

## 2020-04-29 ENCOUNTER — TELEPHONE (OUTPATIENT)
Dept: INFECTIOUS DISEASES | Facility: CLINIC | Age: 55
End: 2020-04-29

## 2020-04-29 NOTE — TELEPHONE ENCOUNTER
Says  said you wanted to change his Ancef to continuous flow.  Is this true?  I did not get a message from you and she says the  is who called her.    LORI Painting

## 2020-04-29 NOTE — TELEPHONE ENCOUNTER
Called patient for follow up referral to help patient stop smoking. Patient stated he has already stopped smoking and will not need our services.

## 2020-04-30 LAB — BACTERIA BLD CULT: NORMAL

## 2020-05-02 LAB — BACTERIA BLD CULT: NORMAL

## 2020-05-04 ENCOUNTER — LAB VISIT (OUTPATIENT)
Dept: LAB | Facility: HOSPITAL | Age: 55
End: 2020-05-04
Attending: INTERNAL MEDICINE
Payer: MEDICAID

## 2020-05-04 DIAGNOSIS — E11.9 DIABETES MELLITUS WITHOUT COMPLICATION: Primary | ICD-10-CM

## 2020-05-04 LAB
ANION GAP SERPL CALC-SCNC: 11 MMOL/L (ref 8–16)
BASOPHILS # BLD AUTO: 0.09 K/UL (ref 0–0.2)
BASOPHILS NFR BLD: 0.8 % (ref 0–1.9)
BUN SERPL-MCNC: 15 MG/DL (ref 6–20)
CALCIUM SERPL-MCNC: 9.3 MG/DL (ref 8.7–10.5)
CHLORIDE SERPL-SCNC: 100 MMOL/L (ref 95–110)
CO2 SERPL-SCNC: 25 MMOL/L (ref 23–29)
CREAT SERPL-MCNC: 1.1 MG/DL (ref 0.5–1.4)
CRP SERPL-MCNC: 11.1 MG/L (ref 0–8.2)
DIFFERENTIAL METHOD: ABNORMAL
EOSINOPHIL # BLD AUTO: 0.5 K/UL (ref 0–0.5)
EOSINOPHIL NFR BLD: 4.4 % (ref 0–8)
ERYTHROCYTE [DISTWIDTH] IN BLOOD BY AUTOMATED COUNT: 12.9 % (ref 11.5–14.5)
ERYTHROCYTE [SEDIMENTATION RATE] IN BLOOD BY WESTERGREN METHOD: 106 MM/HR (ref 0–23)
EST. GFR  (AFRICAN AMERICAN): >60 ML/MIN/1.73 M^2
EST. GFR  (NON AFRICAN AMERICAN): >60 ML/MIN/1.73 M^2
GLUCOSE SERPL-MCNC: 100 MG/DL (ref 70–110)
HCT VFR BLD AUTO: 36.6 % (ref 40–54)
HGB BLD-MCNC: 12 G/DL (ref 14–18)
IMM GRANULOCYTES # BLD AUTO: 0.03 K/UL (ref 0–0.04)
IMM GRANULOCYTES NFR BLD AUTO: 0.3 % (ref 0–0.5)
LYMPHOCYTES # BLD AUTO: 3.5 K/UL (ref 1–4.8)
LYMPHOCYTES NFR BLD: 32.5 % (ref 18–48)
MCH RBC QN AUTO: 30.8 PG (ref 27–31)
MCHC RBC AUTO-ENTMCNC: 32.8 G/DL (ref 32–36)
MCV RBC AUTO: 94 FL (ref 82–98)
MONOCYTES # BLD AUTO: 0.7 K/UL (ref 0.3–1)
MONOCYTES NFR BLD: 6.7 % (ref 4–15)
NEUTROPHILS # BLD AUTO: 6 K/UL (ref 1.8–7.7)
NEUTROPHILS NFR BLD: 55.3 % (ref 38–73)
NRBC BLD-RTO: 0 /100 WBC
PLATELET # BLD AUTO: 329 K/UL (ref 150–350)
PMV BLD AUTO: 9.4 FL (ref 9.2–12.9)
POTASSIUM SERPL-SCNC: 3.9 MMOL/L (ref 3.5–5.1)
RBC # BLD AUTO: 3.9 M/UL (ref 4.6–6.2)
SODIUM SERPL-SCNC: 136 MMOL/L (ref 136–145)
WBC # BLD AUTO: 10.9 K/UL (ref 3.9–12.7)

## 2020-05-04 PROCEDURE — 85025 COMPLETE CBC W/AUTO DIFF WBC: CPT

## 2020-05-04 PROCEDURE — 80048 BASIC METABOLIC PNL TOTAL CA: CPT

## 2020-05-04 PROCEDURE — 85652 RBC SED RATE AUTOMATED: CPT

## 2020-05-04 PROCEDURE — 36415 COLL VENOUS BLD VENIPUNCTURE: CPT

## 2020-05-04 PROCEDURE — 86140 C-REACTIVE PROTEIN: CPT

## 2020-05-05 ENCOUNTER — OFFICE VISIT (OUTPATIENT)
Dept: VASCULAR SURGERY | Facility: CLINIC | Age: 55
End: 2020-05-05
Payer: MEDICAID

## 2020-05-05 ENCOUNTER — HOSPITAL ENCOUNTER (OUTPATIENT)
Dept: RADIOLOGY | Facility: HOSPITAL | Age: 55
Discharge: HOME | End: 2020-05-05
Attending: THORACIC SURGERY (CARDIOTHORACIC VASCULAR SURGERY)
Payer: MEDICAID

## 2020-05-05 ENCOUNTER — TELEPHONE (OUTPATIENT)
Dept: VASCULAR SURGERY | Facility: CLINIC | Age: 55
End: 2020-05-05

## 2020-05-05 DIAGNOSIS — T14.8XXA PAIN ASSOCIATED WITH WOUND: ICD-10-CM

## 2020-05-05 DIAGNOSIS — Z51.89 VISIT FOR WOUND CHECK: Primary | ICD-10-CM

## 2020-05-05 DIAGNOSIS — R06.02 SOB (SHORTNESS OF BREATH): Primary | ICD-10-CM

## 2020-05-05 DIAGNOSIS — Z95.1 S/P CABG (CORONARY ARTERY BYPASS GRAFT): Primary | ICD-10-CM

## 2020-05-05 DIAGNOSIS — R06.02 SOB (SHORTNESS OF BREATH): ICD-10-CM

## 2020-05-05 DIAGNOSIS — R52 PAIN ASSOCIATED WITH WOUND: ICD-10-CM

## 2020-05-05 PROCEDURE — 71046 X-RAY EXAM CHEST 2 VIEWS: CPT | Mod: TC,FY,PO

## 2020-05-05 PROCEDURE — 71046 XR CHEST PA AND LATERAL: ICD-10-PCS | Mod: 26,,, | Performed by: RADIOLOGY

## 2020-05-05 PROCEDURE — 99999 PR PBB SHADOW E&M-EST. PATIENT-LVL I: CPT | Mod: PBBFAC,,, | Performed by: THORACIC SURGERY (CARDIOTHORACIC VASCULAR SURGERY)

## 2020-05-05 PROCEDURE — 99211 OFF/OP EST MAY X REQ PHY/QHP: CPT | Mod: PBBFAC,PO | Performed by: THORACIC SURGERY (CARDIOTHORACIC VASCULAR SURGERY)

## 2020-05-05 PROCEDURE — 71046 X-RAY EXAM CHEST 2 VIEWS: CPT | Mod: 26,,, | Performed by: RADIOLOGY

## 2020-05-05 PROCEDURE — 99999 PR PBB SHADOW E&M-EST. PATIENT-LVL I: ICD-10-PCS | Mod: PBBFAC,,, | Performed by: THORACIC SURGERY (CARDIOTHORACIC VASCULAR SURGERY)

## 2020-05-05 PROCEDURE — 99024 POSTOP FOLLOW-UP VISIT: CPT | Mod: ,,, | Performed by: THORACIC SURGERY (CARDIOTHORACIC VASCULAR SURGERY)

## 2020-05-05 PROCEDURE — 99024 PR POST-OP FOLLOW-UP VISIT: ICD-10-PCS | Mod: ,,, | Performed by: THORACIC SURGERY (CARDIOTHORACIC VASCULAR SURGERY)

## 2020-05-05 RX ORDER — HYDROMORPHONE HYDROCHLORIDE 4 MG/1
4 TABLET ORAL
Qty: 30 TABLET | Refills: 0
Start: 2020-05-05 | End: 2020-06-05 | Stop reason: ALTCHOICE

## 2020-05-05 NOTE — TELEPHONE ENCOUNTER
----- Message from Bella Koromajl sent at 5/5/2020  2:16 PM CDT -----  Contact: [facility]  Type:  Pharmacy Calling to Clarify an RX    Name of Caller:  [facility]  [facility] Name:  [facility]  Prescription Name:  HYDROmorphone (DILAUDID) 4 MG tablet  What do they need to clarify?:  Patient just had script of Perocet  filled 9 day supply on 4/30 quantity of 50  Best Call Back Number:  [phone]  Additional Information:  na

## 2020-05-05 NOTE — PROGRESS NOTES
This patient has had recent coronary artery bypass grafting.  He developed a subcutaneous wound infection treated initially with intravenous antibiotics while in the hospital and now he received the same antibiotic as an outpatient with the help of home health.  He comes back to the office today in follow-up having increased chest pain particularly on the left side of the chest wall.  I asked him has this become worse over the last week or 2 and he states that it is by the same.  He does not have any sternal drainage.  Medicines noted and are part epic record.  His problem list was reviewed.    On exam vital signs are stable.  His sternal wound is intact with no drainage.  He has a slight amount of erythema on the mid aspect of the sternotomy incision but I do not detect any abscess or fluctuance.  Given the degree of discomfort I have ordered a CT scan of the chest.  He should continue his antibiotics.  I would like to see him in 1 week and based on the results of the CT scan make further recommendations.

## 2020-05-06 ENCOUNTER — OFFICE VISIT (OUTPATIENT)
Dept: INFECTIOUS DISEASES | Facility: CLINIC | Age: 55
End: 2020-05-06
Payer: MEDICAID

## 2020-05-06 VITALS
SYSTOLIC BLOOD PRESSURE: 108 MMHG | BODY MASS INDEX: 36.68 KG/M2 | WEIGHT: 262 LBS | OXYGEN SATURATION: 96 % | DIASTOLIC BLOOD PRESSURE: 65 MMHG | HEART RATE: 87 BPM | TEMPERATURE: 98 F | HEIGHT: 71 IN

## 2020-05-06 DIAGNOSIS — B95.61 BACTEREMIA DUE TO METHICILLIN SUSCEPTIBLE STAPHYLOCOCCUS AUREUS (MSSA): Primary | ICD-10-CM

## 2020-05-06 DIAGNOSIS — E11.610 TYPE 2 DIABETES MELLITUS WITH DIABETIC NEUROPATHIC ARTHROPATHY, WITH LONG-TERM CURRENT USE OF INSULIN: ICD-10-CM

## 2020-05-06 DIAGNOSIS — Z79.4 TYPE 2 DIABETES MELLITUS WITH DIABETIC NEUROPATHIC ARTHROPATHY, WITH LONG-TERM CURRENT USE OF INSULIN: ICD-10-CM

## 2020-05-06 DIAGNOSIS — R78.81 BACTEREMIA DUE TO METHICILLIN SUSCEPTIBLE STAPHYLOCOCCUS AUREUS (MSSA): Primary | ICD-10-CM

## 2020-05-06 DIAGNOSIS — E66.01 OBESITY, MORBID, BMI 40.0-49.9: ICD-10-CM

## 2020-05-06 PROCEDURE — 99215 PR OFFICE/OUTPT VISIT, EST, LEVL V, 40-54 MIN: ICD-10-PCS | Mod: S$GLB,,, | Performed by: INTERNAL MEDICINE

## 2020-05-06 PROCEDURE — 99215 OFFICE O/P EST HI 40 MIN: CPT | Mod: S$GLB,,, | Performed by: INTERNAL MEDICINE

## 2020-05-06 NOTE — PROGRESS NOTES
Reason for consult:   Subjective:     Magdaleno Cunningham is a  54 y.o. male with past medical history of CAD, status post CABG x2, hospitalized from 04/07--04/10/2020,  IDDM 2, HTN, D LP, ROSAMARIA, COPD, smoker, medical noncompliance, MEERA, BPH, readmitted on 04/22--04/274/2020 with complaints of shortness of breath and chest pain at surgical site.  He was found to have   MSSA bacteremia on 04/22, 04/23, 04/24.  ECHO showed no vegetation.  CT chest done on admission had ruled out PE and did not find any fluid collection, abscess.  But personal review I could see some stranding at surgical area.  While hospitalized he started producing some fluid from the lower surgical wound which increased for a day then disappeared.  By the time I ordered a culture the fluid had resolved.      Patient comes for a follow-up.  He has no fever no chills.  Appetite is normal.  He continues to have sternal chest pain, improved with Percocet 10 that his cardiologist Dr. Walters had prescribed.  He saw cardiovascular surgeon, Dr. Solitario, who recommended a CT scan of the chest.  He had told the patient that he may have a spot are 2 that may need aspiration, but no needed interventions at this time.  Also cardiovascular surgeon had given him Dilaudid 4 mg as needed.  Patient states that his pain is better controlled by Percocet and Dilaudid.  Anyway, in general he feels that the pain is improved.    His diabetes is better controlled.  Prior to his heart surgery, glucose was ranging between 300-400.  Glucose has been ranging between 125 and 1757.  He is working with primary care physician in managing his diabetes.  He is looking forward to checking hemoglobin A1c again and see how well he is doing.    He has some left thigh pain at the harvest area.  He had complained of it in the past as well.  The area is decreased in size and he never had erythema on the skin.  No drainage.    No issues with left PICC line.  Home Health dresses it.  His  "son who "likes to clean and bleach everything" has been administering antibiotics as instructed by home health.  Patient's sister is a respiratory therapist and has been supervising some of the treatment.  His aunt is a nurse and she has been helping at times.    Patient has not been walking as much as he did write after CABG.  I advised him that he can continue to walk; please do not run or do any strenuous activity at this time while having a PICC line.        Review of patient's allergies indicates:   Allergen Reactions    Pesticide Dermatitis and Rash     Past Medical History:   Diagnosis Date    Anticoagulant long-term use     Atrial fibrillation     BPH (benign prostatic hyperplasia)     Cataract     COPD (chronic obstructive pulmonary disease)     Coronary artery disease     stents    CVD (cardiovascular disease)     Diabetes mellitus     Erythema multiforme     AKA Denton Edmondson Syndrome    Hypertension     MI (myocardial infarction)     per pt he has had 4     ROSAMARIA on CPAP     RLS (restless legs syndrome)      Past Surgical History:   Procedure Laterality Date    CORONARY ARTERY BYPASS GRAFT (CABG) N/A 4/7/2020    Procedure: CORONARY ARTERY BYPASS GRAFT (CABG)- Excision of left atrial appendage;  Surgeon: Doug Solitario MD;  Location: [facility] OR;  Service: Cardiothoracic;  Laterality: N/A;    CORONARY STENT PLACEMENT      WILSON MAZE PROCEDURE N/A 4/7/2020    Procedure: WILSON MAZE PROCEDURE- Attempted;  Surgeon: Doug Solitario MD;  Location: [facility] OR;  Service: Cardiothoracic;  Laterality: N/A;    CYSTOSCOPY N/A 12/10/2018    Procedure: CYSTOSCOPY;  Surgeon: Khushboo Abreu MD;  Location: [facility] OR;  Service: Urology;  Laterality: N/A;    ENDOSCOPIC HARVEST OF VEIN Left 4/7/2020    Procedure: HARVEST-VEIN-ENDOVASCULAR;  Surgeon: Doug Solitario MD;  Location: [facility] OR;  Service: Cardiothoracic;  Laterality: Left;    LEFT HEART CATHETERIZATION Left 3/17/2020    Procedure: " CATHETERIZATION, HEART, LEFT;  Surgeon: Tyree Walters MD;  Location: [facility] CATH/EP LAB;  Service: Cardiology;  Laterality: Left;    ULTRASOUND OF PROSTATE FOR VOLUME DETERMINATION  12/10/2018    Procedure: ULTRASOUND, PROSTATE, FOR VOLUME DETERMINATION;  Surgeon: Khushboo Abreu MD;  Location: [facility] OR;  Service: Urology;;     Social History     Tobacco Use    Smoking status: Former Smoker     Packs/day: 1.00     Years: 30.00     Pack years: 30.00     Types: Cigarettes     Last attempt to quit: 2020     Years since quittin.0    Smokeless tobacco: Never Used    Tobacco comment: since 10/2019 down to 3 cig per day   Substance Use Topics    Alcohol use: Not Currently     Social History     Occupational History    Not on file     Hunting:  No  Fishing:no  Pets:no  Exposure to sick contacts:no  Exposure to TB:noTravel: no    Family History   Problem Relation Age of Onset    Heart disease Mother     Diabetes Mother     Hyperlipidemia Father        Review of Systems   Constitutional: Negative for activity change, appetite change, chills, fatigue and fever.   HENT: Negative for congestion, dental problem, ear discharge, ear pain, hearing loss, mouth sores, postnasal drip, rhinorrhea, sinus pressure, sinus pain, sneezing, sore throat, tinnitus, trouble swallowing and voice change.    Eyes: Negative for photophobia, discharge and redness.   Respiratory: Negative for cough, chest tightness, shortness of breath, wheezing and stridor.    Cardiovascular: Negative for chest pain and leg swelling.   Gastrointestinal: Negative for abdominal distention, abdominal pain, anal bleeding, blood in stool, constipation, diarrhea, nausea, rectal pain and vomiting.   Genitourinary: Negative for difficulty urinating, dysuria, flank pain, frequency, genital sores, hematuria and urgency.   Musculoskeletal: Negative for arthralgias, back pain, joint swelling, myalgias, neck pain and neck stiffness.        Chest pain at  "surgical area.  Worse by pressing, worse with movement, better with rest, better with opiates, no associated fevers.   Skin: Negative for color change, rash and wound.   Allergic/Immunologic: Negative for immunocompromised state.   Neurological: Negative for dizziness, seizures and facial asymmetry.        Decreased sensation/numbness surrounding surgical wound which is expected.   Hematological: Negative for adenopathy. Does not bruise/bleed easily.   Psychiatric/Behavioral: Negative for agitation, confusion, decreased concentration and dysphoric mood. The patient is not nervous/anxious.         Pertinent medications noted:     Objective:      Blood pressure 108/65, pulse 87, temperature 98.2 °F (36.8 °C), temperature source Oral, height 5' 11" (1.803 m), weight 118.8 kg (262 lb), SpO2 96 %.  Body mass index is 36.54 kg/m².  Physical Exam   Constitutional: He is oriented to person, place, and time. He appears well-nourished. No distress.   HENT:   Head: Atraumatic.   Right Ear: External ear normal.   Left Ear: External ear normal.   Nose: Nose normal.   Mouth/Throat: Oropharynx is clear and moist.   Eyes: Pupils are equal, round, and reactive to light. Conjunctivae and EOM are normal. No scleral icterus.   Neck: Normal range of motion. Neck supple. No JVD present.   Cardiovascular: Normal rate, regular rhythm and normal heart sounds.   Pulmonary/Chest: Effort normal and breath sounds normal. He has no wheezes. He has no rales. He exhibits no tenderness.   Abdominal: Soft. Bowel sounds are normal. He exhibits no distension and no mass. There is no tenderness. There is no rebound and no guarding.   Musculoskeletal: Normal range of motion.   Lymphadenopathy:     He has no cervical adenopathy.   Neurological: He is alert and oriented to person, place, and time. No cranial nerve deficit.   Skin: Skin is warm and dry. No rash noted. He is not diaphoretic. No erythema.   Anterior chest surgical wound is clear of " infection.  The lower part has a crust.  I cleaned it off.  There is minimal slough left behind.  This area is barely  3 mm.   Psychiatric: He has a normal mood and affect. His behavior is normal. Thought content normal.       VAD:  Left arm PICC    General Labs reviewed:  Lab Results   Component Value Date    WBC 10.90 05/04/2020    RBC 3.90 (L) 05/04/2020    HGB 12.0 (L) 05/04/2020    HCT 36.6 (L) 05/04/2020    MCV 94 05/04/2020    MCH 30.8 05/04/2020    MCHC 32.8 05/04/2020    RDW 12.9 05/04/2020     05/04/2020    MPV 9.4 05/04/2020    GRAN 6.0 05/04/2020    GRAN 55.3 05/04/2020    LYMPH 3.5 05/04/2020    LYMPH 32.5 05/04/2020    MONO 0.7 05/04/2020    MONO 6.7 05/04/2020    EOS 0.5 05/04/2020    BASO 0.09 05/04/2020    EOSINOPHIL 4.4 05/04/2020    BASOPHIL 0.8 05/04/2020     CMP  Sodium   Date Value Ref Range Status   05/04/2020 136 136 - 145 mmol/L Final     Potassium   Date Value Ref Range Status   05/04/2020 3.9 3.5 - 5.1 mmol/L Final     Chloride   Date Value Ref Range Status   05/04/2020 100 95 - 110 mmol/L Final     CO2   Date Value Ref Range Status   05/04/2020 25 23 - 29 mmol/L Final     Glucose   Date Value Ref Range Status   05/04/2020 100 70 - 110 mg/dL Final     BUN, Bld   Date Value Ref Range Status   05/04/2020 15 6 - 20 mg/dL Final     Creatinine   Date Value Ref Range Status   05/04/2020 1.1 0.5 - 1.4 mg/dL Final     Calcium   Date Value Ref Range Status   05/04/2020 9.3 8.7 - 10.5 mg/dL Final     Total Protein   Date Value Ref Range Status   04/27/2020 7.6 6.0 - 8.4 g/dL Final     Albumin   Date Value Ref Range Status   04/27/2020 3.2 (L) 3.5 - 5.2 g/dL Final     Total Bilirubin   Date Value Ref Range Status   04/27/2020 0.5 0.1 - 1.0 mg/dL Final     Comment:     For infants and newborns, interpretation of results should be based  on gestational age, weight and in agreement with clinical  observations.  Premature Infant recommended reference ranges:  Up to 24 hours.............<8.0  mg/dL  Up to 48 hours............<12.0 mg/dL  3-5 days..................<15.0 mg/dL  6-29 days.................<15.0 mg/dL       Alkaline Phosphatase   Date Value Ref Range Status   04/27/2020 57 55 - 135 U/L Final     AST   Date Value Ref Range Status   04/27/2020 25 10 - 40 U/L Final     ALT   Date Value Ref Range Status   04/27/2020 18 10 - 44 U/L Final     Anion Gap   Date Value Ref Range Status   05/04/2020 11 8 - 16 mmol/L Final     eGFR if    Date Value Ref Range Status   05/04/2020 >60 >60 mL/min/1.73 m^2 Final     eGFR if non    Date Value Ref Range Status   05/04/2020 >60 >60 mL/min/1.73 m^2 Final     Comment:     Calculation used to obtain the estimated glomerular filtration  rate (eGFR) is the CKD-EPI equation.         Ref Range & Units 2d ago 10d ago 2wk ago 3yr ago   CRP 0.0 - 8.2 mg/L 11.1High  NSLB 5.47High  R 1.99High  R 8.2        Micro:  Microbiology Results (last 7 days)     ** No results found for the last 168 hours. **        Blood Culture, [id number]/2020 STAPHYLOCOCCUS AUREUSAbnormal     Resulting Agency [facility]   Susceptibility      Staphylococcus aureus     CULTURE, BLOOD     Ceftriaxone <=8 mcg/mL Sensitive     Clindamycin <=0.5 mcg/mL Sensitive     Erythromycin <=0.5 mcg/mL Sensitive     Oxacillin <=0.25 mcg/mL Sensitive     Penicillin 2 mcg/mL Resistant     Tetracycline <=4 mcg/mL Sensitive     Trimeth/Sulfa <=0.5/9.5 m... Sensitive     Vancomycin 1 mcg/mL Sensitive           Imaging Reviewed:  CT chest    Assessment:       1. Bacteremia due to methicillin susceptible Staphylococcus aureus (MSSA)    2. Type 2 diabetes mellitus with diabetic neuropathic arthropathy, with long-term current use of insulin           Plan:       Bacteremia due to methicillin susceptible Staphylococcus aureus (MSSA)  -     Echo Color Flow Doppler? Yes; Future    Concerning for sternal infection/mediastinitis.  Even if it was not visible on initial CT   -  Will treat with 8 weeks  of cefazolin  6 gram continuous infusion 04/27/2020--06/22/2020.   - Follow markers of inflammation     Type 2 diabetes mellitus  with long-term current use of insulin  -     Hemoglobin A1C; Future; Expected date: 05/06/2020    Left thigh superficial pain, over postsurgical area of vein harvest.  Markedly improved.  Continue to monitor.    PMHx  Of CAD, status post CABG x2,04/07/2020, hospitalized from 04/07--04/10/2020,  IDDM 2, HTN, D LP, ROSAMARIA, COPD, smoker, medical noncompliance, MEERA, BPH,    Follow up in about 3 weeks (around 5/27/2020).  To review ECHO, markers of inflammation, CT scan of the chest ordered by [facility]      This note was created using Dragon voice recognition software that occasionally misinterpreted phrases or words.

## 2020-05-11 ENCOUNTER — LAB VISIT (OUTPATIENT)
Dept: LAB | Facility: HOSPITAL | Age: 55
End: 2020-05-11
Attending: INTERNAL MEDICINE
Payer: MEDICAID

## 2020-05-11 DIAGNOSIS — R78.81 BACTEREMIA: Primary | ICD-10-CM

## 2020-05-11 LAB
ANION GAP SERPL CALC-SCNC: 17 MMOL/L (ref 8–16)
BASOPHILS # BLD AUTO: 0.07 K/UL (ref 0–0.2)
BASOPHILS NFR BLD: 0.9 % (ref 0–1.9)
BUN SERPL-MCNC: 12 MG/DL (ref 6–20)
CALCIUM SERPL-MCNC: 9.6 MG/DL (ref 8.7–10.5)
CHLORIDE SERPL-SCNC: 100 MMOL/L (ref 95–110)
CO2 SERPL-SCNC: 20 MMOL/L (ref 23–29)
CREAT SERPL-MCNC: 1.2 MG/DL (ref 0.5–1.4)
CRP SERPL-MCNC: 5.1 MG/L (ref 0–8.2)
DIFFERENTIAL METHOD: ABNORMAL
EOSINOPHIL # BLD AUTO: 0.3 K/UL (ref 0–0.5)
EOSINOPHIL NFR BLD: 4.6 % (ref 0–8)
ERYTHROCYTE [DISTWIDTH] IN BLOOD BY AUTOMATED COUNT: 13.2 % (ref 11.5–14.5)
ERYTHROCYTE [SEDIMENTATION RATE] IN BLOOD BY WESTERGREN METHOD: 89 MM/HR (ref 0–10)
EST. GFR  (AFRICAN AMERICAN): >60 ML/MIN/1.73 M^2
EST. GFR  (NON AFRICAN AMERICAN): >60 ML/MIN/1.73 M^2
GLUCOSE SERPL-MCNC: 204 MG/DL (ref 70–110)
HCT VFR BLD AUTO: 41.7 % (ref 40–54)
HGB BLD-MCNC: 13.4 G/DL (ref 14–18)
IMM GRANULOCYTES # BLD AUTO: 0.02 K/UL (ref 0–0.04)
IMM GRANULOCYTES NFR BLD AUTO: 0.3 % (ref 0–0.5)
LYMPHOCYTES # BLD AUTO: 2.9 K/UL (ref 1–4.8)
LYMPHOCYTES NFR BLD: 39.2 % (ref 18–48)
MCH RBC QN AUTO: 30 PG (ref 27–31)
MCHC RBC AUTO-ENTMCNC: 32.1 G/DL (ref 32–36)
MCV RBC AUTO: 93 FL (ref 82–98)
MONOCYTES # BLD AUTO: 0.4 K/UL (ref 0.3–1)
MONOCYTES NFR BLD: 5.8 % (ref 4–15)
NEUTROPHILS # BLD AUTO: 3.7 K/UL (ref 1.8–7.7)
NEUTROPHILS NFR BLD: 49.2 % (ref 38–73)
NRBC BLD-RTO: 0 /100 WBC
PLATELET # BLD AUTO: 313 K/UL (ref 150–350)
PMV BLD AUTO: 10.1 FL (ref 9.2–12.9)
POTASSIUM SERPL-SCNC: 3.8 MMOL/L (ref 3.5–5.1)
RBC # BLD AUTO: 4.47 M/UL (ref 4.6–6.2)
SODIUM SERPL-SCNC: 137 MMOL/L (ref 136–145)
WBC # BLD AUTO: 7.43 K/UL (ref 3.9–12.7)

## 2020-05-11 PROCEDURE — 85651 RBC SED RATE NONAUTOMATED: CPT

## 2020-05-11 PROCEDURE — 80048 BASIC METABOLIC PNL TOTAL CA: CPT

## 2020-05-11 PROCEDURE — 85025 COMPLETE CBC W/AUTO DIFF WBC: CPT

## 2020-05-11 PROCEDURE — 36415 COLL VENOUS BLD VENIPUNCTURE: CPT

## 2020-05-11 PROCEDURE — 86140 C-REACTIVE PROTEIN: CPT

## 2020-05-13 ENCOUNTER — OFFICE VISIT (OUTPATIENT)
Dept: VASCULAR SURGERY | Facility: CLINIC | Age: 55
End: 2020-05-13
Payer: MEDICAID

## 2020-05-13 ENCOUNTER — HOSPITAL ENCOUNTER (OUTPATIENT)
Dept: RADIOLOGY | Facility: HOSPITAL | Age: 55
Discharge: HOME | End: 2020-05-13
Attending: THORACIC SURGERY (CARDIOTHORACIC VASCULAR SURGERY)
Payer: MEDICAID

## 2020-05-13 VITALS
DIASTOLIC BLOOD PRESSURE: 80 MMHG | HEIGHT: 71 IN | SYSTOLIC BLOOD PRESSURE: 142 MMHG | BODY MASS INDEX: 36.12 KG/M2 | HEART RATE: 107 BPM

## 2020-05-13 DIAGNOSIS — Z48.89 ENCOUNTER FOR POSTOPERATIVE WOUND CHECK: Primary | ICD-10-CM

## 2020-05-13 DIAGNOSIS — Z95.1 S/P CABG (CORONARY ARTERY BYPASS GRAFT): ICD-10-CM

## 2020-05-13 PROCEDURE — 99024 PR POST-OP FOLLOW-UP VISIT: ICD-10-PCS | Mod: ,,, | Performed by: THORACIC SURGERY (CARDIOTHORACIC VASCULAR SURGERY)

## 2020-05-13 PROCEDURE — 71260 CT THORAX DX C+: CPT | Mod: 26,,, | Performed by: RADIOLOGY

## 2020-05-13 PROCEDURE — 99999 PR PBB SHADOW E&M-EST. PATIENT-LVL IV: ICD-10-PCS | Mod: PBBFAC,,, | Performed by: THORACIC SURGERY (CARDIOTHORACIC VASCULAR SURGERY)

## 2020-05-13 PROCEDURE — 99024 POSTOP FOLLOW-UP VISIT: CPT | Mod: ,,, | Performed by: THORACIC SURGERY (CARDIOTHORACIC VASCULAR SURGERY)

## 2020-05-13 PROCEDURE — 99214 OFFICE O/P EST MOD 30 MIN: CPT | Mod: PBBFAC,25 | Performed by: THORACIC SURGERY (CARDIOTHORACIC VASCULAR SURGERY)

## 2020-05-13 PROCEDURE — 99999 PR PBB SHADOW E&M-EST. PATIENT-LVL IV: CPT | Mod: PBBFAC,,, | Performed by: THORACIC SURGERY (CARDIOTHORACIC VASCULAR SURGERY)

## 2020-05-13 PROCEDURE — 71260 CT CHEST WITH CONTRAST: ICD-10-PCS | Mod: 26,,, | Performed by: RADIOLOGY

## 2020-05-13 PROCEDURE — 25500020 PHARM REV CODE 255

## 2020-05-13 PROCEDURE — 71260 CT THORAX DX C+: CPT | Mod: TC

## 2020-05-13 RX ORDER — PEN NEEDLE, DIABETIC 29 G X1/2"
NEEDLE, DISPOSABLE MISCELLANEOUS
COMMUNITY
Start: 2020-04-01 | End: 2024-03-25

## 2020-05-13 RX ORDER — OXYCODONE AND ACETAMINOPHEN 10; 325 MG/1; MG/1
TABLET ORAL
COMMUNITY
Start: 2020-04-30 | End: 2020-05-20 | Stop reason: SDUPTHER

## 2020-05-13 RX ADMIN — IOHEXOL 75 ML: 350 INJECTION, SOLUTION INTRAVENOUS at 02:05

## 2020-05-13 NOTE — PROGRESS NOTES
This patient is status post coronary artery bypass grafting.  He comes back to the office today in follow-up.  He still has incisional pain at the lower aspect of the sternotomy incision.  He still is on intravenous antibiotics.  He  Complains of chest pain which is persistent since his last visit.  I did see him while he was admitted into the hospital approximately 2 weeks ago with drainage from the lower aspect of the sternotomy incision.  At this point he was placed on intravenous antibiotics.  Since then and since his discharge he has not had any more drainage.  On exam vital signs are stable.  On examination of his sternotomy incision he is extremely sensitive to touch over the lower aspect of the sternotomy incision on the left side.  I do not see any evidence of erythema or fluctuance.  But I am concerned that he persists with the pain and extreme tenderness.  The CT scan which we needed approval for has been approved by Medicaid.  This will be done today and based on the results of this further recommendations can be made.

## 2020-05-19 ENCOUNTER — CLINICAL SUPPORT (OUTPATIENT)
Dept: CARDIOLOGY | Facility: HOSPITAL | Age: 55
End: 2020-05-19
Attending: INTERNAL MEDICINE
Payer: MEDICAID

## 2020-05-19 ENCOUNTER — APPOINTMENT (OUTPATIENT)
Dept: LAB | Facility: HOSPITAL | Age: 55
End: 2020-05-19
Attending: INTERNAL MEDICINE
Payer: MEDICAID

## 2020-05-19 ENCOUNTER — TELEPHONE (OUTPATIENT)
Dept: INFECTIOUS DISEASES | Facility: CLINIC | Age: 55
End: 2020-05-19

## 2020-05-19 DIAGNOSIS — B95.61 BACTEREMIA DUE TO METHICILLIN SUSCEPTIBLE STAPHYLOCOCCUS AUREUS (MSSA): ICD-10-CM

## 2020-05-19 DIAGNOSIS — R78.81 BACTEREMIA DUE TO METHICILLIN SUSCEPTIBLE STAPHYLOCOCCUS AUREUS (MSSA): ICD-10-CM

## 2020-05-19 LAB
ALBUMIN SERPL BCP-MCNC: 3.3 G/DL (ref 3.5–5.2)
ALP SERPL-CCNC: 71 U/L (ref 55–135)
ALT SERPL W/O P-5'-P-CCNC: 15 U/L (ref 10–44)
ANION GAP SERPL CALC-SCNC: 9 MMOL/L (ref 8–16)
AST SERPL-CCNC: 19 U/L (ref 10–40)
BASOPHILS # BLD AUTO: 0.08 K/UL (ref 0–0.2)
BASOPHILS NFR BLD: 1 % (ref 0–1.9)
BILIRUB SERPL-MCNC: 0.2 MG/DL (ref 0.1–1)
BUN SERPL-MCNC: 11 MG/DL (ref 6–20)
CALCIUM SERPL-MCNC: 9.4 MG/DL (ref 8.7–10.5)
CHLORIDE SERPL-SCNC: 104 MMOL/L (ref 95–110)
CO2 SERPL-SCNC: 24 MMOL/L (ref 23–29)
CREAT SERPL-MCNC: 1 MG/DL (ref 0.5–1.4)
CRP SERPL-MCNC: 1.9 MG/L (ref 0–8.2)
DIFFERENTIAL METHOD: ABNORMAL
EOSINOPHIL # BLD AUTO: 0.3 K/UL (ref 0–0.5)
EOSINOPHIL NFR BLD: 4.1 % (ref 0–8)
ERYTHROCYTE [DISTWIDTH] IN BLOOD BY AUTOMATED COUNT: 13.5 % (ref 11.5–14.5)
ERYTHROCYTE [SEDIMENTATION RATE] IN BLOOD BY WESTERGREN METHOD: 55 MM/HR (ref 0–10)
EST. GFR  (AFRICAN AMERICAN): >60 ML/MIN/1.73 M^2
EST. GFR  (NON AFRICAN AMERICAN): >60 ML/MIN/1.73 M^2
GLUCOSE SERPL-MCNC: 221 MG/DL (ref 70–110)
HCT VFR BLD AUTO: 41.5 % (ref 40–54)
HGB BLD-MCNC: 13.3 G/DL (ref 14–18)
IMM GRANULOCYTES # BLD AUTO: 0.02 K/UL (ref 0–0.04)
IMM GRANULOCYTES NFR BLD AUTO: 0.3 % (ref 0–0.5)
LYMPHOCYTES # BLD AUTO: 2.8 K/UL (ref 1–4.8)
LYMPHOCYTES NFR BLD: 36.1 % (ref 18–48)
MCH RBC QN AUTO: 29.6 PG (ref 27–31)
MCHC RBC AUTO-ENTMCNC: 32 G/DL (ref 32–36)
MCV RBC AUTO: 92 FL (ref 82–98)
MONOCYTES # BLD AUTO: 0.5 K/UL (ref 0.3–1)
MONOCYTES NFR BLD: 6.4 % (ref 4–15)
NEUTROPHILS # BLD AUTO: 4.1 K/UL (ref 1.8–7.7)
NEUTROPHILS NFR BLD: 52.1 % (ref 38–73)
NRBC BLD-RTO: 0 /100 WBC
PLATELET # BLD AUTO: 331 K/UL (ref 150–350)
PMV BLD AUTO: 9.4 FL (ref 9.2–12.9)
POTASSIUM SERPL-SCNC: 3.7 MMOL/L (ref 3.5–5.1)
PROT SERPL-MCNC: 7.5 G/DL (ref 6–8.4)
RBC # BLD AUTO: 4.49 M/UL (ref 4.6–6.2)
SODIUM SERPL-SCNC: 137 MMOL/L (ref 136–145)
WBC # BLD AUTO: 7.84 K/UL (ref 3.9–12.7)

## 2020-05-19 PROCEDURE — 86140 C-REACTIVE PROTEIN: CPT

## 2020-05-19 PROCEDURE — 80053 COMPREHEN METABOLIC PANEL: CPT

## 2020-05-19 PROCEDURE — 93306 TTE W/DOPPLER COMPLETE: CPT

## 2020-05-19 PROCEDURE — 85651 RBC SED RATE NONAUTOMATED: CPT

## 2020-05-19 PROCEDURE — 36415 COLL VENOUS BLD VENIPUNCTURE: CPT

## 2020-05-19 PROCEDURE — 85025 COMPLETE CBC W/AUTO DIFF WBC: CPT

## 2020-05-19 NOTE — TELEPHONE ENCOUNTER
Says she went to do his labs and PICC line dressing change and he was not at home.  What would you like her to do?    LORI Painting

## 2020-05-20 ENCOUNTER — OFFICE VISIT (OUTPATIENT)
Dept: VASCULAR SURGERY | Facility: CLINIC | Age: 55
End: 2020-05-20
Payer: MEDICAID

## 2020-05-20 DIAGNOSIS — Z48.89 ENCOUNTER FOR POSTOPERATIVE WOUND CHECK: Primary | ICD-10-CM

## 2020-05-20 LAB
AORTIC ROOT ANNULUS: 4.23 CM
AORTIC VALVE CUSP SEPERATION: 2.46 CM
AV INDEX (PROSTH): 1.28
AV MEAN GRADIENT: 3 MMHG
AV PEAK GRADIENT: 5 MMHG
AV VALVE AREA: 4.05 CM2
AV VELOCITY RATIO: 88.61
CV ECHO LV RWT: 0.64 CM
DOP CALC AO PEAK VEL: 1.12 M/S
DOP CALC AO VTI: 16.74 CM
DOP CALC LVOT AREA: 3.2 CM2
DOP CALC LVOT DIAMETER: 2.01 CM
DOP CALC LVOT PEAK VEL: 99.24 M/S
DOP CALC LVOT STROKE VOLUME: 67.84 CM3
DOP CALCLVOT PEAK VEL VTI: 21.39 CM
E WAVE DECELERATION TIME: 169.9 MSEC
E/A RATIO: 1.03
E/E' RATIO: 7.37 M/S
ECHO LV POSTERIOR WALL: 1.24 CM (ref 0.6–1.1)
FRACTIONAL SHORTENING: 33 % (ref 28–44)
INTERVENTRICULAR SEPTUM: 2.02 CM (ref 0.6–1.1)
LEFT ATRIUM SIZE: 3.51 CM
LEFT INTERNAL DIMENSION IN SYSTOLE: 2.58 CM (ref 2.1–4)
LEFT VENTRICULAR INTERNAL DIMENSION IN DIASTOLE: 3.86 CM (ref 3.5–6)
LEFT VENTRICULAR MASS: 253.06 G
LV LATERAL E/E' RATIO: 5.38 M/S
LV SEPTAL E/E' RATIO: 11.67 M/S
MV PEAK A VEL: 0.68 M/S
MV PEAK E VEL: 0.7 M/S
PV PEAK VELOCITY: 80.67 CM/S
RA PRESSURE: 3 MMHG
RIGHT VENTRICULAR END-DIASTOLIC DIMENSION: 397 CM
TDI LATERAL: 0.13 M/S
TDI SEPTAL: 0.06 M/S
TDI: 0.1 M/S

## 2020-05-20 PROCEDURE — 99999 PR PBB SHADOW E&M-EST. PATIENT-LVL II: CPT | Mod: PBBFAC,,, | Performed by: THORACIC SURGERY (CARDIOTHORACIC VASCULAR SURGERY)

## 2020-05-20 PROCEDURE — 99212 OFFICE O/P EST SF 10 MIN: CPT | Mod: PBBFAC,PO | Performed by: THORACIC SURGERY (CARDIOTHORACIC VASCULAR SURGERY)

## 2020-05-20 PROCEDURE — 99024 PR POST-OP FOLLOW-UP VISIT: ICD-10-PCS | Mod: ,,, | Performed by: THORACIC SURGERY (CARDIOTHORACIC VASCULAR SURGERY)

## 2020-05-20 PROCEDURE — 99024 POSTOP FOLLOW-UP VISIT: CPT | Mod: ,,, | Performed by: THORACIC SURGERY (CARDIOTHORACIC VASCULAR SURGERY)

## 2020-05-20 PROCEDURE — 99999 PR PBB SHADOW E&M-EST. PATIENT-LVL II: ICD-10-PCS | Mod: PBBFAC,,, | Performed by: THORACIC SURGERY (CARDIOTHORACIC VASCULAR SURGERY)

## 2020-05-20 RX ORDER — OXYCODONE AND ACETAMINOPHEN 10; 325 MG/1; MG/1
1 TABLET ORAL EVERY 4 HOURS PRN
Qty: 28 TABLET | Refills: 0 | Status: SHIPPED | OUTPATIENT
Start: 2020-05-20 | End: 2020-05-27 | Stop reason: SDUPTHER

## 2020-05-20 NOTE — PROGRESS NOTES
This patient is status post coronary artery bypass grafting.  He developed postoperative drainage from the lower aspect of the sternotomy incision and was treated with intravenous antibiotics which he continues to take as an outpatient.  He had a CT scan last week showing inflammatory change at the lower aspect of the sternotomy incision but no landon abscess.  He comes back to the office today in follow-up.  He states that there is some improvement and appears that he is able to ambulate without difficulty and is afebrile.  On examination of his sternum the incision itself is intact.  This no evidence of erythema or apparent abscess.  He has an area of fullness just adjacent to the incision on the left side at its distal 3rd.  However this is largely unchanged since his last visit and appears to be somewhat improved.  At this point I would recommend ongoing antibiotic therapy.  He will come back to the office next week for a wound check.  I explained to him that if there is no improvement in certainly if this is tender area persist at some point might have to be explored.

## 2020-05-26 ENCOUNTER — LAB VISIT (OUTPATIENT)
Dept: LAB | Facility: HOSPITAL | Age: 55
End: 2020-05-26
Attending: INTERNAL MEDICINE
Payer: MEDICAID

## 2020-05-26 DIAGNOSIS — R78.81 BACTEREMIA: Primary | ICD-10-CM

## 2020-05-26 LAB
ANION GAP SERPL CALC-SCNC: 12 MMOL/L (ref 8–16)
BASOPHILS # BLD AUTO: 0.07 K/UL (ref 0–0.2)
BASOPHILS NFR BLD: 0.8 % (ref 0–1.9)
BUN SERPL-MCNC: 11 MG/DL (ref 6–20)
CALCIUM SERPL-MCNC: 8.6 MG/DL (ref 8.7–10.5)
CHLORIDE SERPL-SCNC: 105 MMOL/L (ref 95–110)
CO2 SERPL-SCNC: 20 MMOL/L (ref 23–29)
CREAT SERPL-MCNC: 1 MG/DL (ref 0.5–1.4)
CRP SERPL-MCNC: 1.7 MG/L (ref 0–8.2)
DIFFERENTIAL METHOD: ABNORMAL
EOSINOPHIL # BLD AUTO: 0.3 K/UL (ref 0–0.5)
EOSINOPHIL NFR BLD: 3 % (ref 0–8)
ERYTHROCYTE [DISTWIDTH] IN BLOOD BY AUTOMATED COUNT: 14.3 % (ref 11.5–14.5)
ERYTHROCYTE [SEDIMENTATION RATE] IN BLOOD BY WESTERGREN METHOD: 38 MM/HR (ref 0–10)
EST. GFR  (AFRICAN AMERICAN): >60 ML/MIN/1.73 M^2
EST. GFR  (NON AFRICAN AMERICAN): >60 ML/MIN/1.73 M^2
GLUCOSE SERPL-MCNC: 236 MG/DL (ref 70–110)
HCT VFR BLD AUTO: 39.8 % (ref 40–54)
HGB BLD-MCNC: 12.6 G/DL (ref 14–18)
IMM GRANULOCYTES # BLD AUTO: 0.04 K/UL (ref 0–0.04)
IMM GRANULOCYTES NFR BLD AUTO: 0.5 % (ref 0–0.5)
LYMPHOCYTES # BLD AUTO: 3.7 K/UL (ref 1–4.8)
LYMPHOCYTES NFR BLD: 44.8 % (ref 18–48)
MCH RBC QN AUTO: 29.1 PG (ref 27–31)
MCHC RBC AUTO-ENTMCNC: 31.7 G/DL (ref 32–36)
MCV RBC AUTO: 92 FL (ref 82–98)
MONOCYTES # BLD AUTO: 0.5 K/UL (ref 0.3–1)
MONOCYTES NFR BLD: 5.5 % (ref 4–15)
NEUTROPHILS # BLD AUTO: 3.7 K/UL (ref 1.8–7.7)
NEUTROPHILS NFR BLD: 45.4 % (ref 38–73)
NRBC BLD-RTO: 0 /100 WBC
PLATELET # BLD AUTO: 286 K/UL (ref 150–350)
PMV BLD AUTO: 9.6 FL (ref 9.2–12.9)
POTASSIUM SERPL-SCNC: 3.7 MMOL/L (ref 3.5–5.1)
RBC # BLD AUTO: 4.33 M/UL (ref 4.6–6.2)
SODIUM SERPL-SCNC: 137 MMOL/L (ref 136–145)
WBC # BLD AUTO: 8.24 K/UL (ref 3.9–12.7)

## 2020-05-26 PROCEDURE — 85651 RBC SED RATE NONAUTOMATED: CPT

## 2020-05-26 PROCEDURE — 85025 COMPLETE CBC W/AUTO DIFF WBC: CPT

## 2020-05-26 PROCEDURE — 36415 COLL VENOUS BLD VENIPUNCTURE: CPT

## 2020-05-26 PROCEDURE — 80048 BASIC METABOLIC PNL TOTAL CA: CPT

## 2020-05-26 PROCEDURE — 86140 C-REACTIVE PROTEIN: CPT

## 2020-05-27 ENCOUNTER — OFFICE VISIT (OUTPATIENT)
Dept: INFECTIOUS DISEASES | Facility: CLINIC | Age: 55
End: 2020-05-27
Payer: MEDICAID

## 2020-05-27 ENCOUNTER — TELEPHONE (OUTPATIENT)
Dept: INFECTIOUS DISEASES | Facility: CLINIC | Age: 55
End: 2020-05-27

## 2020-05-27 VITALS
BODY MASS INDEX: 37.66 KG/M2 | HEIGHT: 71 IN | HEART RATE: 84 BPM | OXYGEN SATURATION: 100 % | WEIGHT: 269 LBS | DIASTOLIC BLOOD PRESSURE: 93 MMHG | TEMPERATURE: 98 F | SYSTOLIC BLOOD PRESSURE: 124 MMHG

## 2020-05-27 DIAGNOSIS — M86.9 OSTEOMYELITIS OF STERNUM: Primary | ICD-10-CM

## 2020-05-27 PROCEDURE — 99215 PR OFFICE/OUTPT VISIT, EST, LEVL V, 40-54 MIN: ICD-10-PCS | Mod: S$GLB,,, | Performed by: INTERNAL MEDICINE

## 2020-05-27 PROCEDURE — 99215 OFFICE O/P EST HI 40 MIN: CPT | Mod: S$GLB,,, | Performed by: INTERNAL MEDICINE

## 2020-05-27 RX ORDER — OXYCODONE AND ACETAMINOPHEN 10; 325 MG/1; MG/1
1 TABLET ORAL EVERY 4 HOURS PRN
Qty: 28 TABLET | Refills: 0 | Status: SHIPPED | OUTPATIENT
Start: 2020-05-27 | End: 2020-06-03 | Stop reason: SDUPTHER

## 2020-05-27 NOTE — TELEPHONE ENCOUNTER
Dr. Mcgregor, Please review the CT chest. I feel patient may need an InD and cultures. What do you think ?

## 2020-05-27 NOTE — PROGRESS NOTES
Subjective:       Patient ID: Magdaleno Cunningham is a 54 y.o. male.    Chief Complaint:: Bacteremia due to MSSA    HPI     Magdaleno Cunningham is a  54 y.o. male with past medical history of CAD, status post CABG x2, hospitalized from 04/07--04/10/2020,  IDDM 2, HTN, D LP, ROSAMARIA, COPD, smoker, medical noncompliance, MEERA, BPH, readmitted on 04/22--04/274/2020 with complaints of shortness of breath and chest pain at surgical site.  He was found to have   MSSA bacteremia on 04/22, 04/23, 04/24.  ECHO showed no vegetation.  CT chest done on admission had ruled out PE and did not find any fluid collection, abscess.  But personal review I could see some stranding at surgical area.  While hospitalized he started producing some fluid from the lower surgical wound which increased for a day then disappeared.  By the time I ordered a culture the fluid had resolved.        05/06/2020Patient comes for a follow-up.  He has no fever no chills.  Appetite is normal.  He continues to have sternal chest pain, improved with Percocet 10 that his cardiologist Dr. Walters had prescribed.  He saw cardiovascular surgeon, Dr. Solitario, who recommended a CT scan of the chest.  He had told the patient that he may have a spot are 2 that may need aspiration, but no needed interventions at this time.  Also cardiovascular surgeon had given him Dilaudid 4 mg as needed.  Patient states that his pain is better controlled by Percocet and Dilaudid.  Anyway, in general he feels that the pain is improved.     His diabetes is better controlled.  Prior to his heart surgery, glucose was ranging between 300-400.  Glucose has been ranging between 125 and 175-177.  He is working with primary care physician in managing his diabetes.  He is looking forward to checking hemoglobin A1c again and see how well he is doing.     He has some left thigh pain at the harvest area.  He had complained of it in the past as well.  The area is decreased in size and he never had  "erythema on the skin.  No drainage.     No issues with left PICC line.  Home Health dresses it.  His son who "likes to clean and bleach everything" has been administering antibiotics as instructed by home health.  Patient's sister is a respiratory therapist and has been supervising some of the treatment.  His aunt is a nurse and she has been helping at times.     Patient has not been walking as much as he did write after CABG.  I advised him that he can continue to walk; please do not run or do any strenuous activity at this time while having a PICC line.     05/27/2020 Patient comes for a follow-up.   He continues to have pain to anterior lower chest radiating to the left, worse with certain position and pressing, 7/10 controlled with opiates. He tries to alternate with NSAIDS. No drainage. No signs and symptoms of systemic infection. Markers of inflammation have improved.  [facility], who did not recomend any interventions. I asked patient to discuss this severe surgical scar  pain with [facility], so they can rule out malunion.      Review of Systems   Constitutional: Negative for activity change, appetite change, chills, fatigue and fever.   HENT: Negative for congestion, dental problem, ear discharge, ear pain, hearing loss, mouth sores, postnasal drip, rhinorrhea, sinus pressure, sinus pain, sneezing, sore throat, tinnitus, trouble swallowing and voice change.    Eyes: Negative for photophobia, discharge and redness.   Respiratory: Negative for cough, chest tightness, shortness of breath, wheezing and stridor.    Cardiovascular: Negative for chest pain and leg swelling.   Gastrointestinal: Negative for abdominal distention, abdominal pain, anal bleeding, blood in stool, constipation, diarrhea, nausea, rectal pain and vomiting.   Genitourinary: Negative for difficulty urinating, dysuria, flank pain, frequency, genital sores, hematuria and urgency.   Musculoskeletal: Negative for arthralgias, back pain, joint swelling, " "myalgias, neck pain and neck stiffness.   Skin: Negative for color change, rash and wound.   Allergic/Immunologic: Negative for immunocompromised state.   Neurological: Negative for dizziness, seizures and facial asymmetry.   Hematological: Negative for adenopathy. Does not bruise/bleed easily.   Psychiatric/Behavioral: Negative for agitation, confusion, decreased concentration and dysphoric mood. The patient is not nervous/anxious.           Objective:      Blood pressure (!) 124/93, pulse 84, temperature 98.4 °F (36.9 °C), temperature source Oral, height 5' 11" (1.803 m), weight 122 kg (269 lb), SpO2 100 %.  Body mass index is 37.52 kg/m².  Physical Exam   Constitutional: He is oriented to person, place, and time. He appears well-nourished. No distress.   HENT:   Head: Atraumatic.   Right Ear: External ear normal.   Left Ear: External ear normal.   Nose: Nose normal.   Mouth/Throat: Oropharynx is clear and moist.   Eyes: Pupils are equal, round, and reactive to light. Conjunctivae and EOM are normal. No scleral icterus.   Neck: Normal range of motion. Neck supple. No JVD present.   Cardiovascular: Normal rate, regular rhythm and normal heart sounds.   Pulmonary/Chest: Effort normal and breath sounds normal. He has no wheezes. He has no rales. He exhibits no tenderness.   Abdominal: Soft. Bowel sounds are normal. He exhibits no distension and no mass. There is no tenderness. There is no rebound and no guarding.   Musculoskeletal: Normal range of motion.   Lymphadenopathy:     He has no cervical adenopathy.   Neurological: He is alert and oriented to person, place, and time. No cranial nerve deficit.   Skin: Skin is warm and dry. No rash noted. He is not diaphoretic. No erythema.   Psychiatric: He has a normal mood and affect. His behavior is normal. Thought content normal.       VAD: Left PICC oK             Ref Range & Units 1d ago 8d ago 2wk ago   CRP 0.0 - 8.2 mg/L 1.7   1.9   5.1   Resulting Agency  [facility] " [facility]          Ref Range & Units 3wk ago   5/4/20 1mo ago   4/26/20 1mo ago   4/22/20   CRP 0.0 - 8.2 mg/L 11.1   5.47  R   1.99  R   Resulting Agency  [facility]         Lab Results   Component Value Date    CRP 1.7 05/26/2020     Lab Results   Component Value Date    WBC 8.24 05/26/2020    HGB 12.6 (L) 05/26/2020    HCT 39.8 (L) 05/26/2020    MCV 92 05/26/2020     05/26/2020       BMP  Lab Results   Component Value Date     05/26/2020    K 3.7 05/26/2020     05/26/2020    CO2 20 (L) 05/26/2020    BUN 11 05/26/2020    CREATININE 1.0 05/26/2020    CALCIUM 8.6 (L) 05/26/2020    ANIONGAP 12 05/26/2020    ESTGFRAFRICA >60 05/26/2020    EGFRNONAA >60 05/26/2020     CT chest 05/13  1. Prior sternotomy with concern for infection along the distal margin, including early osteomyelitis.  No discrete abscess.  2. Unchanged right pulmonary nodule.  In a low risk patient, no further follow-up is recommended.  In a high-risk patient, 12 month follow-up CT could be considered.  This report was flagged in Epic as abnormal.    ECHO repeat 05/19/2020  · Mild concentric left ventricular hypertrophy.  · Low normal left ventricular systolic function. The estimated ejection fraction is 50-55%.  · Indeterminate left ventricular diastolic function.  · Normal right ventricular systolic function.  · TDS           Assessment:       1. Osteomyelitis of sternum           Plan:       Osteomyelitis of sternum, mediastinitis.  Bacteremia due to methicillin susceptible Staphylococcus aureus (MSSA)   - repeat ECHO = no mention of vegetation   - CT is now showing findings of infection/osteomyelitis, will ask [facility]    - Continue 8 weeks of cefazolin  6 gram continuous infusion  04/27/2020 - 06/22/2020.  -     oxyCODONE-acetaminophen (PERCOCET)  mg per tablet; Take 1 tablet by mouth every 4 (four) hours as needed for Pain.  Dispense: 28 tablet; Refill: 0     Type 2 diabetes mellitus  with long-term current use of  insulin  -     Hemoglobin A1C=10 in march. Patient claims he is controlling  glc bettter      Left thigh superficial pain, over postsurgical area of vein harvest.resolved     PMHx  Of CAD, status post CABG x2,04/07/2020, hospitalized from 04/07--04/10/2020,  IDDM 2, HTN, D LP, ROSAMARIA, COPD, smoker, medical noncompliance, MEERA, BPH,       Follow up in 27 days (on 6/23/2020) for osteomyelitis.                    This note was created using Dragon voice recognition software that occasionally misinterpreted phrases or words.

## 2020-06-02 ENCOUNTER — LAB VISIT (OUTPATIENT)
Dept: LAB | Facility: HOSPITAL | Age: 55
End: 2020-06-02
Attending: INTERNAL MEDICINE
Payer: MEDICAID

## 2020-06-02 DIAGNOSIS — R82.71 BACTERIURIA: Primary | ICD-10-CM

## 2020-06-02 LAB
ANION GAP SERPL CALC-SCNC: 8 MMOL/L (ref 8–16)
BASOPHILS # BLD AUTO: 0.08 K/UL (ref 0–0.2)
BASOPHILS NFR BLD: 1 % (ref 0–1.9)
BUN SERPL-MCNC: 9 MG/DL (ref 6–20)
CALCIUM SERPL-MCNC: 8.5 MG/DL (ref 8.7–10.5)
CHLORIDE SERPL-SCNC: 103 MMOL/L (ref 95–110)
CO2 SERPL-SCNC: 25 MMOL/L (ref 23–29)
CREAT SERPL-MCNC: 0.9 MG/DL (ref 0.5–1.4)
CRP SERPL-MCNC: 4.4 MG/L (ref 0–8.2)
DIFFERENTIAL METHOD: ABNORMAL
EOSINOPHIL # BLD AUTO: 0.2 K/UL (ref 0–0.5)
EOSINOPHIL NFR BLD: 3 % (ref 0–8)
ERYTHROCYTE [DISTWIDTH] IN BLOOD BY AUTOMATED COUNT: 14.4 % (ref 11.5–14.5)
ERYTHROCYTE [SEDIMENTATION RATE] IN BLOOD BY WESTERGREN METHOD: 20 MM/HR (ref 0–10)
EST. GFR  (AFRICAN AMERICAN): >60 ML/MIN/1.73 M^2
EST. GFR  (NON AFRICAN AMERICAN): >60 ML/MIN/1.73 M^2
GLUCOSE SERPL-MCNC: 220 MG/DL (ref 70–110)
HCT VFR BLD AUTO: 42 % (ref 40–54)
HGB BLD-MCNC: 13.5 G/DL (ref 14–18)
IMM GRANULOCYTES # BLD AUTO: 0.02 K/UL (ref 0–0.04)
IMM GRANULOCYTES NFR BLD AUTO: 0.3 % (ref 0–0.5)
LYMPHOCYTES # BLD AUTO: 3.1 K/UL (ref 1–4.8)
LYMPHOCYTES NFR BLD: 40.6 % (ref 18–48)
MCH RBC QN AUTO: 29.9 PG (ref 27–31)
MCHC RBC AUTO-ENTMCNC: 32.1 G/DL (ref 32–36)
MCV RBC AUTO: 93 FL (ref 82–98)
MONOCYTES # BLD AUTO: 0.4 K/UL (ref 0.3–1)
MONOCYTES NFR BLD: 5.7 % (ref 4–15)
NEUTROPHILS # BLD AUTO: 3.8 K/UL (ref 1.8–7.7)
NEUTROPHILS NFR BLD: 49.4 % (ref 38–73)
NRBC BLD-RTO: 0 /100 WBC
PLATELET # BLD AUTO: 250 K/UL (ref 150–350)
PMV BLD AUTO: 9.7 FL (ref 9.2–12.9)
POTASSIUM SERPL-SCNC: 3.7 MMOL/L (ref 3.5–5.1)
RBC # BLD AUTO: 4.51 M/UL (ref 4.6–6.2)
SODIUM SERPL-SCNC: 136 MMOL/L (ref 136–145)
WBC # BLD AUTO: 7.71 K/UL (ref 3.9–12.7)

## 2020-06-02 PROCEDURE — 80048 BASIC METABOLIC PNL TOTAL CA: CPT

## 2020-06-02 PROCEDURE — 85025 COMPLETE CBC W/AUTO DIFF WBC: CPT

## 2020-06-02 PROCEDURE — 85651 RBC SED RATE NONAUTOMATED: CPT

## 2020-06-02 PROCEDURE — 36415 COLL VENOUS BLD VENIPUNCTURE: CPT

## 2020-06-02 PROCEDURE — 86140 C-REACTIVE PROTEIN: CPT

## 2020-06-03 ENCOUNTER — OFFICE VISIT (OUTPATIENT)
Dept: VASCULAR SURGERY | Facility: CLINIC | Age: 55
End: 2020-06-03
Payer: MEDICAID

## 2020-06-03 ENCOUNTER — TELEPHONE (OUTPATIENT)
Dept: VASCULAR SURGERY | Facility: CLINIC | Age: 55
End: 2020-06-03

## 2020-06-03 VITALS
DIASTOLIC BLOOD PRESSURE: 88 MMHG | HEIGHT: 71 IN | HEART RATE: 96 BPM | SYSTOLIC BLOOD PRESSURE: 140 MMHG | BODY MASS INDEX: 37.52 KG/M2

## 2020-06-03 DIAGNOSIS — S21.101A STERNAL WOUND INFECTION: Primary | ICD-10-CM

## 2020-06-03 DIAGNOSIS — M86.9 OSTEOMYELITIS OF STERNUM: ICD-10-CM

## 2020-06-03 DIAGNOSIS — L08.9 STERNAL WOUND INFECTION: Primary | ICD-10-CM

## 2020-06-03 DIAGNOSIS — Z48.89 ENCOUNTER FOR POSTOPERATIVE WOUND CHECK: Primary | ICD-10-CM

## 2020-06-03 DIAGNOSIS — R07.82 INTERCOSTAL PAIN: ICD-10-CM

## 2020-06-03 PROCEDURE — 99024 PR POST-OP FOLLOW-UP VISIT: ICD-10-PCS | Mod: ,,, | Performed by: THORACIC SURGERY (CARDIOTHORACIC VASCULAR SURGERY)

## 2020-06-03 PROCEDURE — 99999 PR PBB SHADOW E&M-EST. PATIENT-LVL III: CPT | Mod: PBBFAC,,, | Performed by: THORACIC SURGERY (CARDIOTHORACIC VASCULAR SURGERY)

## 2020-06-03 PROCEDURE — 99999 PR PBB SHADOW E&M-EST. PATIENT-LVL III: ICD-10-PCS | Mod: PBBFAC,,, | Performed by: THORACIC SURGERY (CARDIOTHORACIC VASCULAR SURGERY)

## 2020-06-03 PROCEDURE — 99024 POSTOP FOLLOW-UP VISIT: CPT | Mod: ,,, | Performed by: THORACIC SURGERY (CARDIOTHORACIC VASCULAR SURGERY)

## 2020-06-03 PROCEDURE — 99213 OFFICE O/P EST LOW 20 MIN: CPT | Mod: PBBFAC,PO | Performed by: THORACIC SURGERY (CARDIOTHORACIC VASCULAR SURGERY)

## 2020-06-03 RX ORDER — OXYCODONE AND ACETAMINOPHEN 10; 325 MG/1; MG/1
1 TABLET ORAL EVERY 4 HOURS PRN
Qty: 28 TABLET | Refills: 0 | Status: SHIPPED | OUTPATIENT
Start: 2020-06-03 | End: 2020-06-07 | Stop reason: SDUPTHER

## 2020-06-03 NOTE — TELEPHONE ENCOUNTER
----- Message from Shahrzad Hart sent at 6/3/2020  2:46 PM CDT -----  Contact: Khushboo  Type: Needs Medical Advice  Who Called:  Khushboo  Symptoms (please be specific):    How long has patient had these symptoms:    Pharmacy name and phone #:  [facility]  Best Call Back Number:   Additional Information: requesting diagnosis code for percocet call back to advise

## 2020-06-03 NOTE — PROGRESS NOTES
This patient is status post coronary artery bypass grafting.  He has persistent pain along the course of his chest incision.  He was readmitted for brief time after the original surgery with drainage from the wound which had been treated with antibiotics.  The wound healed and has not drained since his discharge.  However the pain has persisted and has not improved primarily at the lower aspect of the sternotomy incision and on the left side.  Medicines noted and are part of the epic record.  His problem list was reviewed.  He has no other pertinent family social history at this time.  On exam vital signs are stable.  Has been afebrile.  His pupils are equal and round reactive to light.  Neck is supple.  Chest is equal breath sounds.  He has hypersensitivity along the course of the chest incision particularly at its lower aspect and on the left side adjacent to the incision.  There is no erythema.  The tenderness is quite severe.  Recommendation at this point is for exploration and debridement of the soft tissue and sternum if involved with an infectious process.  The patient seems to be understanding.

## 2020-06-04 DIAGNOSIS — S21.101A STERNAL WOUND INFECTION: Primary | ICD-10-CM

## 2020-06-04 DIAGNOSIS — T81.89XA PROTRUDING STERNAL WIRES: ICD-10-CM

## 2020-06-04 DIAGNOSIS — L08.9 STERNAL WOUND INFECTION: Primary | ICD-10-CM

## 2020-06-04 RX ORDER — LIDOCAINE HYDROCHLORIDE 10 MG/ML
1 INJECTION, SOLUTION EPIDURAL; INFILTRATION; INTRACAUDAL; PERINEURAL ONCE
Status: DISCONTINUED | OUTPATIENT
Start: 2020-06-04 | End: 2020-06-08 | Stop reason: CLARIF

## 2020-06-05 ENCOUNTER — HOSPITAL ENCOUNTER (OUTPATIENT)
Dept: PREADMISSION TESTING | Facility: HOSPITAL | Age: 55
Discharge: HOME | DRG: 909 | End: 2020-06-05
Attending: THORACIC SURGERY (CARDIOTHORACIC VASCULAR SURGERY)
Payer: MEDICAID

## 2020-06-05 VITALS
RESPIRATION RATE: 18 BRPM | HEIGHT: 71 IN | WEIGHT: 262 LBS | TEMPERATURE: 99 F | OXYGEN SATURATION: 98 % | SYSTOLIC BLOOD PRESSURE: 118 MMHG | HEART RATE: 81 BPM | BODY MASS INDEX: 36.68 KG/M2 | DIASTOLIC BLOOD PRESSURE: 75 MMHG

## 2020-06-05 DIAGNOSIS — Z01.818 PRE-OP TESTING: Primary | ICD-10-CM

## 2020-06-05 PROCEDURE — U0003 INFECTIOUS AGENT DETECTION BY NUCLEIC ACID (DNA OR RNA); SEVERE ACUTE RESPIRATORY SYNDROME CORONAVIRUS 2 (SARS-COV-2) (CORONAVIRUS DISEASE [COVID-19]), AMPLIFIED PROBE TECHNIQUE, MAKING USE OF HIGH THROUGHPUT TECHNOLOGIES AS DESCRIBED BY CMS-2020-01-R: HCPCS

## 2020-06-05 NOTE — DISCHARGE INSTRUCTIONS
To confirm, Your doctor has instructed you that surgery is scheduled for:   Monday, June 8, 2020    Pre-Op will call the afternoon prior to surgery between 4:00 and 6:00 PM with the final arrival time.      Please arrive thru entrance on [address]     Do not eat or drink anything after midnight the night before your surgery - THIS INCLUDES  WATER, GUM, MINTS AND CANDY.  YOU MAY BRUSH YOUR TEETH BUT DO NOT SWALLOW     TAKE ONLY THESE MEDICATIONS WITH A SMALL SIP OF WATER THE MORNING OF YOUR PROCEDURE:  GABAPENTIN-ISOSORBIDE-METOPROLOL      ONLY if you are diabetic, check your sugar in the morning before your procedure.       Do not take any diabetic medicines or insulin the morning of surgery .     PLEASE NOTE:  The surgery schedule has many variables which may affect the time of your surgery case.  Family members should be available if your surgery time changes.  Plan to be here the day of your procedure between 4-6 hours.      DO NOT TAKE THESE MEDICATIONS 5-7 DAYS PRIOR to your procedure or per your surgeon's request: ASPIRIN, ALEVE, ADVIL, IBUPROFEN,  TIFFANIE SELTZER, BC , FISH OIL , VITAMIN E, HERBALS  (May take Tylenol)      ONLY if you are prescribed any types of blood thinners such as:  Aspirin, Coumadin, Plavix, Pradaxa, Xarelto, Aggrenox, Effient, Eliquis, Savasya, Brilinta, or any other, ask your surgeon whether you should stop taking them and how long before surgery you should stop.  You may also need to verify with the prescribing physician if it is ok to stop your medication.                                                        IMPORTANT INSTRUCTIONS      · Do not smoke, vape or drink alcoholic beverages 24 hours prior to your procedure.  · Shower the night before AND the morning of your procedure with a Chlorhexidine wash such as Hibiclens or Dial antibacterial soap from the neck down.   ·  Do not get it on your face or in your eyes.  You may use your own shampoo and face wash. This helps your skin to  be as bacteria free as possible.   ·  DO NOT remove hair from the surgery site.  Do not shave the incision site unless you are given specific instructions to do so.    · Sleep in a bed with clean sheets.  Do not sleep with a pet in the bed.    · Please leave all jewelry, piercing's and valuables at home.   · If your doctor has scheduled you for an overnight stay, bring a small overnight bag with any personal items you need.    · Make arrangements in advance for transportation home by a responsible adult.      · You must make arrangements for transportation, TAXI'S, UBER'S OR LYFTS ARE NOT ALLOWED.        If you have any questions about these instructions, call Pre-Op Admit  Nursing at [phone] or the Pre-Op Day Surgery Unit at [phone].

## 2020-06-06 LAB — SARS-COV-2 RNA RESP QL NAA+PROBE: NOT DETECTED

## 2020-06-08 ENCOUNTER — ANESTHESIA EVENT (OUTPATIENT)
Dept: SURGERY | Facility: HOSPITAL | Age: 55
DRG: 909 | End: 2020-06-08
Payer: MEDICAID

## 2020-06-08 ENCOUNTER — ANESTHESIA (OUTPATIENT)
Dept: SURGERY | Facility: HOSPITAL | Age: 55
DRG: 909 | End: 2020-06-08
Payer: MEDICAID

## 2020-06-08 ENCOUNTER — HOSPITAL ENCOUNTER (INPATIENT)
Facility: HOSPITAL | Age: 55
LOS: 1 days | Discharge: HOME | DRG: 909 | End: 2020-06-08
Attending: THORACIC SURGERY (CARDIOTHORACIC VASCULAR SURGERY) | Admitting: THORACIC SURGERY (CARDIOTHORACIC VASCULAR SURGERY)
Payer: MEDICAID

## 2020-06-08 VITALS
OXYGEN SATURATION: 94 % | TEMPERATURE: 98 F | SYSTOLIC BLOOD PRESSURE: 149 MMHG | RESPIRATION RATE: 20 BRPM | HEIGHT: 71 IN | WEIGHT: 264 LBS | HEART RATE: 65 BPM | BODY MASS INDEX: 36.96 KG/M2 | DIASTOLIC BLOOD PRESSURE: 88 MMHG

## 2020-06-08 DIAGNOSIS — Z01.818 PRE-OP TESTING: ICD-10-CM

## 2020-06-08 DIAGNOSIS — S21.101A STERNAL WOUND INFECTION: ICD-10-CM

## 2020-06-08 DIAGNOSIS — L08.9 STERNAL WOUND INFECTION: ICD-10-CM

## 2020-06-08 DIAGNOSIS — T81.89XA PROTRUDING STERNAL WIRES: ICD-10-CM

## 2020-06-08 LAB
GLUCOSE SERPL-MCNC: 107 MG/DL (ref 70–110)
GLUCOSE SERPL-MCNC: 169 MG/DL (ref 70–110)
MRSA SCREEN BY PCR: NEGATIVE

## 2020-06-08 PROCEDURE — 87070 CULTURE OTHR SPECIMN AEROBIC: CPT | Mod: 59

## 2020-06-08 PROCEDURE — 25000003 PHARM REV CODE 250: Performed by: STUDENT IN AN ORGANIZED HEALTH CARE EDUCATION/TRAINING PROGRAM

## 2020-06-08 PROCEDURE — 36000706: Performed by: THORACIC SURGERY (CARDIOTHORACIC VASCULAR SURGERY)

## 2020-06-08 PROCEDURE — S0028 INJECTION, FAMOTIDINE, 20 MG: HCPCS | Performed by: NURSE ANESTHETIST, CERTIFIED REGISTERED

## 2020-06-08 PROCEDURE — 71000039 HC RECOVERY, EACH ADD'L HOUR: Performed by: THORACIC SURGERY (CARDIOTHORACIC VASCULAR SURGERY)

## 2020-06-08 PROCEDURE — 37000009 HC ANESTHESIA EA ADD 15 MINS: Performed by: THORACIC SURGERY (CARDIOTHORACIC VASCULAR SURGERY)

## 2020-06-08 PROCEDURE — 27202105 HC BIS BILATERAL SENSOR: Performed by: ANESTHESIOLOGY

## 2020-06-08 PROCEDURE — 21627 STERNAL DEBRIDEMENT: CPT | Mod: 51,,, | Performed by: THORACIC SURGERY (CARDIOTHORACIC VASCULAR SURGERY)

## 2020-06-08 PROCEDURE — 63600175 PHARM REV CODE 636 W HCPCS: Performed by: NURSE ANESTHETIST, CERTIFIED REGISTERED

## 2020-06-08 PROCEDURE — 25000003 PHARM REV CODE 250: Performed by: NURSE ANESTHETIST, CERTIFIED REGISTERED

## 2020-06-08 PROCEDURE — 27000671 HC TUBING MICROBORE EXT: Performed by: ANESTHESIOLOGY

## 2020-06-08 PROCEDURE — 15734 PR MUSCLE-SKIN FLAP,TRUNK: ICD-10-PCS | Mod: ,,, | Performed by: THORACIC SURGERY (CARDIOTHORACIC VASCULAR SURGERY)

## 2020-06-08 PROCEDURE — 15734 MUSCLE-SKIN GRAFT TRUNK: CPT | Mod: ,,, | Performed by: THORACIC SURGERY (CARDIOTHORACIC VASCULAR SURGERY)

## 2020-06-08 PROCEDURE — 27201107 HC STYLET, STANDARD: Performed by: ANESTHESIOLOGY

## 2020-06-08 PROCEDURE — 71000015 HC POSTOP RECOV 1ST HR: Performed by: THORACIC SURGERY (CARDIOTHORACIC VASCULAR SURGERY)

## 2020-06-08 PROCEDURE — 27000653 HC CATH, IV CATHLN: Performed by: ANESTHESIOLOGY

## 2020-06-08 PROCEDURE — 37000008 HC ANESTHESIA 1ST 15 MINUTES: Performed by: THORACIC SURGERY (CARDIOTHORACIC VASCULAR SURGERY)

## 2020-06-08 PROCEDURE — 87641 MR-STAPH DNA AMP PROBE: CPT

## 2020-06-08 PROCEDURE — 87070 CULTURE OTHR SPECIMN AEROBIC: CPT

## 2020-06-08 PROCEDURE — 27000673 HC TUBING BLOOD Y: Performed by: ANESTHESIOLOGY

## 2020-06-08 PROCEDURE — 87075 CULTR BACTERIA EXCEPT BLOOD: CPT | Mod: 59

## 2020-06-08 PROCEDURE — 25000003 PHARM REV CODE 250: Performed by: ANESTHESIOLOGY

## 2020-06-08 PROCEDURE — 21627 PR STERNAL DEBRIDEMENT: ICD-10-PCS | Mod: 51,,, | Performed by: THORACIC SURGERY (CARDIOTHORACIC VASCULAR SURGERY)

## 2020-06-08 PROCEDURE — 87205 SMEAR GRAM STAIN: CPT

## 2020-06-08 PROCEDURE — 27201423 OPTIME MED/SURG SUP & DEVICES STERILE SUPPLY: Performed by: THORACIC SURGERY (CARDIOTHORACIC VASCULAR SURGERY)

## 2020-06-08 PROCEDURE — 36000707: Performed by: THORACIC SURGERY (CARDIOTHORACIC VASCULAR SURGERY)

## 2020-06-08 PROCEDURE — 71000033 HC RECOVERY, INTIAL HOUR: Performed by: THORACIC SURGERY (CARDIOTHORACIC VASCULAR SURGERY)

## 2020-06-08 PROCEDURE — 12000002 HC ACUTE/MED SURGE SEMI-PRIVATE ROOM

## 2020-06-08 PROCEDURE — 63600175 PHARM REV CODE 636 W HCPCS: Performed by: STUDENT IN AN ORGANIZED HEALTH CARE EDUCATION/TRAINING PROGRAM

## 2020-06-08 RX ORDER — PROPOFOL 10 MG/ML
VIAL (ML) INTRAVENOUS
Status: DISCONTINUED | OUTPATIENT
Start: 2020-06-08 | End: 2020-06-08

## 2020-06-08 RX ORDER — MIDAZOLAM HYDROCHLORIDE 5 MG/ML
INJECTION INTRAMUSCULAR; INTRAVENOUS
Status: DISCONTINUED | OUTPATIENT
Start: 2020-06-08 | End: 2020-06-08

## 2020-06-08 RX ORDER — SUCCINYLCHOLINE CHLORIDE 20 MG/ML
INJECTION INTRAMUSCULAR; INTRAVENOUS
Status: DISCONTINUED | OUTPATIENT
Start: 2020-06-08 | End: 2020-06-08

## 2020-06-08 RX ORDER — ONDANSETRON 2 MG/ML
INJECTION INTRAMUSCULAR; INTRAVENOUS
Status: DISCONTINUED | OUTPATIENT
Start: 2020-06-08 | End: 2020-06-08

## 2020-06-08 RX ORDER — ROCURONIUM BROMIDE 10 MG/ML
INJECTION, SOLUTION INTRAVENOUS
Status: DISCONTINUED | OUTPATIENT
Start: 2020-06-08 | End: 2020-06-08

## 2020-06-08 RX ORDER — LIDOCAINE HYDROCHLORIDE 20 MG/ML
INJECTION, SOLUTION EPIDURAL; INFILTRATION; INTRACAUDAL; PERINEURAL
Status: DISCONTINUED | OUTPATIENT
Start: 2020-06-08 | End: 2020-06-08

## 2020-06-08 RX ORDER — DIPHENHYDRAMINE HYDROCHLORIDE 50 MG/ML
12.5 INJECTION INTRAMUSCULAR; INTRAVENOUS
Status: DISCONTINUED | OUTPATIENT
Start: 2020-06-08 | End: 2020-06-08 | Stop reason: HOSPADM

## 2020-06-08 RX ORDER — FAMOTIDINE 10 MG/ML
INJECTION INTRAVENOUS
Status: DISCONTINUED | OUTPATIENT
Start: 2020-06-08 | End: 2020-06-08

## 2020-06-08 RX ORDER — HYDROMORPHONE HYDROCHLORIDE 1 MG/ML
0.2 INJECTION, SOLUTION INTRAMUSCULAR; INTRAVENOUS; SUBCUTANEOUS
Status: DISCONTINUED | OUTPATIENT
Start: 2020-06-08 | End: 2020-06-08 | Stop reason: HOSPADM

## 2020-06-08 RX ORDER — FENTANYL CITRATE 50 UG/ML
INJECTION, SOLUTION INTRAMUSCULAR; INTRAVENOUS
Status: DISCONTINUED | OUTPATIENT
Start: 2020-06-08 | End: 2020-06-08

## 2020-06-08 RX ORDER — CEFAZOLIN SODIUM 2 G/50ML
2 SOLUTION INTRAVENOUS
Status: DISCONTINUED | OUTPATIENT
Start: 2020-06-08 | End: 2020-06-08 | Stop reason: HOSPADM

## 2020-06-08 RX ORDER — ONDANSETRON 2 MG/ML
4 INJECTION INTRAMUSCULAR; INTRAVENOUS DAILY PRN
Status: DISCONTINUED | OUTPATIENT
Start: 2020-06-08 | End: 2020-06-08 | Stop reason: HOSPADM

## 2020-06-08 RX ORDER — SODIUM CHLORIDE 0.9 % (FLUSH) 0.9 %
10 SYRINGE (ML) INJECTION
Status: DISCONTINUED | OUTPATIENT
Start: 2020-06-08 | End: 2020-06-08 | Stop reason: HOSPADM

## 2020-06-08 RX ORDER — MEPERIDINE HYDROCHLORIDE 50 MG/ML
12.5 INJECTION INTRAMUSCULAR; INTRAVENOUS; SUBCUTANEOUS EVERY 10 MIN PRN
Status: DISCONTINUED | OUTPATIENT
Start: 2020-06-08 | End: 2020-06-08 | Stop reason: HOSPADM

## 2020-06-08 RX ORDER — OXYCODONE AND ACETAMINOPHEN 10; 325 MG/1; MG/1
1 TABLET ORAL 4 TIMES DAILY PRN
Qty: 28 TABLET | Refills: 0
Start: 2020-06-08 | End: 2021-07-07

## 2020-06-08 RX ORDER — SODIUM CHLORIDE, SODIUM LACTATE, POTASSIUM CHLORIDE, CALCIUM CHLORIDE 600; 310; 30; 20 MG/100ML; MG/100ML; MG/100ML; MG/100ML
INJECTION, SOLUTION INTRAVENOUS CONTINUOUS PRN
Status: DISCONTINUED | OUTPATIENT
Start: 2020-06-08 | End: 2020-06-08

## 2020-06-08 RX ORDER — ACETAMINOPHEN 500 MG
1000 TABLET ORAL
Status: COMPLETED | OUTPATIENT
Start: 2020-06-08 | End: 2020-06-08

## 2020-06-08 RX ORDER — GABAPENTIN 100 MG/1
100 CAPSULE ORAL ONCE
Status: DISCONTINUED | OUTPATIENT
Start: 2020-06-08 | End: 2020-06-08 | Stop reason: HOSPADM

## 2020-06-08 RX ORDER — OXYCODONE HYDROCHLORIDE 5 MG/1
5 TABLET ORAL
Status: DISCONTINUED | OUTPATIENT
Start: 2020-06-08 | End: 2020-06-08 | Stop reason: HOSPADM

## 2020-06-08 RX ORDER — GABAPENTIN 100 MG/1
100 CAPSULE ORAL ONCE
Status: DISCONTINUED | OUTPATIENT
Start: 2020-06-08 | End: 2020-06-08

## 2020-06-08 RX ADMIN — HYDROMORPHONE HYDROCHLORIDE 0.2 MG: 1 INJECTION, SOLUTION INTRAMUSCULAR; INTRAVENOUS; SUBCUTANEOUS at 03:06

## 2020-06-08 RX ADMIN — MIDAZOLAM HYDROCHLORIDE 0.5 MG: 5 INJECTION, SOLUTION INTRAMUSCULAR; INTRAVENOUS at 09:06

## 2020-06-08 RX ADMIN — ROCURONIUM BROMIDE 35 MG: 10 INJECTION, SOLUTION INTRAVENOUS at 01:06

## 2020-06-08 RX ADMIN — FENTANYL CITRATE 50 MCG: 50 INJECTION INTRAMUSCULAR; INTRAVENOUS at 01:06

## 2020-06-08 RX ADMIN — ROCURONIUM BROMIDE 10 MG: 10 INJECTION, SOLUTION INTRAVENOUS at 02:06

## 2020-06-08 RX ADMIN — FENTANYL CITRATE 50 MCG: 50 INJECTION INTRAMUSCULAR; INTRAVENOUS at 08:06

## 2020-06-08 RX ADMIN — OXYCODONE HYDROCHLORIDE 5 MG: 5 TABLET ORAL at 02:06

## 2020-06-08 RX ADMIN — SUGAMMADEX 200 MG: 100 INJECTION, SOLUTION INTRAVENOUS at 02:06

## 2020-06-08 RX ADMIN — ONDANSETRON 4 MG: 2 INJECTION INTRAMUSCULAR; INTRAVENOUS at 01:06

## 2020-06-08 RX ADMIN — LIDOCAINE HYDROCHLORIDE 100 MG: 20 INJECTION, SOLUTION EPIDURAL; INFILTRATION; INTRACAUDAL; PERINEURAL at 01:06

## 2020-06-08 RX ADMIN — PROPOFOL 150 MG: 10 INJECTION, EMULSION INTRAVENOUS at 01:06

## 2020-06-08 RX ADMIN — SODIUM CHLORIDE, SODIUM LACTATE, POTASSIUM CHLORIDE, AND CALCIUM CHLORIDE: .6; .31; .03; .02 INJECTION, SOLUTION INTRAVENOUS at 08:06

## 2020-06-08 RX ADMIN — HYDROMORPHONE HYDROCHLORIDE 0.2 MG: 1 INJECTION, SOLUTION INTRAMUSCULAR; INTRAVENOUS; SUBCUTANEOUS at 02:06

## 2020-06-08 RX ADMIN — ROCURONIUM BROMIDE 5 MG: 10 INJECTION, SOLUTION INTRAVENOUS at 01:06

## 2020-06-08 RX ADMIN — FAMOTIDINE 20 MG: 10 INJECTION, SOLUTION INTRAVENOUS at 08:06

## 2020-06-08 RX ADMIN — SUCCINYLCHOLINE CHLORIDE 160 MG: 20 INJECTION, SOLUTION INTRAMUSCULAR; INTRAVENOUS at 01:06

## 2020-06-08 RX ADMIN — ACETAMINOPHEN 1000 MG: 500 TABLET, FILM COATED ORAL at 09:06

## 2020-06-08 RX ADMIN — MIDAZOLAM HYDROCHLORIDE 1.5 MG: 5 INJECTION, SOLUTION INTRAMUSCULAR; INTRAVENOUS at 01:06

## 2020-06-08 NOTE — ANESTHESIA PROCEDURE NOTES
Intubation  Performed by: Bienvenido James CRNA  Authorized by: Magdy Garcia MD     Intubation:     Induction:  Intravenous    Intubated:  Postinduction    Attempts:  1    Attempted By:  CRNA    Method of Intubation:  Direct    Blade:  Coates 2    Laryngeal View Grade: Grade IIA - cords partially seen      Difficult Airway Encountered?: No      Complications:  None    Airway Device:  Oral endotracheal tube    Airway Device Size:  7.5    Style/Cuff Inflation:  Cuffed    Inflation Amount (mL):  6    Tube secured:  22    Secured at:  The lips    Placement Verified By:  Capnometry    Complicating Factors:  None    Findings Post-Intubation:  BS equal bilateral and atraumatic/condition of teeth unchanged

## 2020-06-08 NOTE — DISCHARGE INSTRUCTIONS
Remove dressing in 48 hours then ok to shower, no tub baths/ swimming pools  Notify MD for any signs/symtoms of infection- redness, warmth, drainage, or fever >100.4

## 2020-06-08 NOTE — ANESTHESIA PREPROCEDURE EVALUATION
06/08/2020  Magdaleno Cunningham is a 54 y.o., male.  Patient Active Problem List   Diagnosis    Atherosclerosis of native coronary artery of native heart with angina pectoris    COPD (chronic obstructive pulmonary disease)    Type 2 diabetes mellitus with diabetic arthropathy    Hypertension associated with diabetes    Tobacco abuse    ROSAMARIA (obstructive sleep apnea)    Diabetic ulcer of left foot associated with type 2 diabetes mellitus    Smoker 1-2 packs per day since 14 years old    Type 2 diabetes mellitus without complication    Medical non-compliance    Left foot pain    Obesity, Class II, BMI 35-39.9, with comorbidity    Generalized anxiety disorder    Denton-Perez erythema multiforme exudativum    Allergic reaction to chemical substance    Obesity, morbid, BMI 40.0-49.9    Prostate enlargement    Fatigue    Bladder mass    Coronary artery disease involving native coronary artery of native heart without angina pectoris    Coronary artery disease of native artery of native heart with stable angina pectoris    Atrial fibrillation    Coronary atherosclerosis of native coronary artery    S/P CABG (coronary artery bypass graft)    Chest pain    Bacteremia due to Gram-positive bacteria    HENRIQUE (acute kidney injury)    Protruding sternal wires       Past Surgical History:   Procedure Laterality Date    CORONARY ARTERY BYPASS GRAFT (CABG) N/A 4/7/2020    Procedure: CORONARY ARTERY BYPASS GRAFT (CABG)- Excision of left atrial appendage;  Surgeon: Doug Solitario MD;  Location: [facility] OR;  Service: Cardiothoracic;  Laterality: N/A;    CORONARY STENT PLACEMENT      WILSON MAZE PROCEDURE N/A 4/7/2020    Procedure: WILSON MAZE PROCEDURE- Attempted;  Surgeon: Doug Solitario MD;  [facility] OR;  Service: Cardiothoracic;  Laterality: N/A;    CYSTOSCOPY N/A 12/10/2018     Procedure: CYSTOSCOPY;  Surgeon: Khushboo Abreu MD;  Location: [facility] OR;  Service: Urology;  Laterality: N/A;    ENDOSCOPIC HARVEST OF VEIN Left 4/7/2020    Procedure: HARVEST-VEIN-ENDOVASCULAR;  Surgeon: Doug Solitario MD;  Location: [facility] OR;  Service: Cardiothoracic;  Laterality: Left;    LEFT HEART CATHETERIZATION Left 3/17/2020    Procedure: CATHETERIZATION, HEART, LEFT;  Surgeon: Tyree Walters MD;  Location: [facility] CATH/EP LAB;  Service: Cardiology;  Laterality: Left;    ULTRASOUND OF PROSTATE FOR VOLUME DETERMINATION  12/10/2018    Procedure: ULTRASOUND, PROSTATE, FOR VOLUME DETERMINATION;  Surgeon: Khushboo Abreu MD;  Location: [facility] OR;  Service: Urology;;        Tobacco Use:  The patient  reports that he quit smoking about 2 months ago. His smoking use included cigarettes. He has a 30.00 pack-year smoking history. He has never used smokeless tobacco.     Results for orders placed or performed during the hospital encounter of 06/06/20   EKG 12-lead    Collection Time: 06/06/20  7:31 PM    Narrative    Test Reason : R07.9,    Vent. Rate : 080 BPM     Atrial Rate : 080 BPM     P-R Int : 164 ms          QRS Dur : 092 ms      QT Int : 410 ms       P-R-T Axes : 037 -03 095 degrees     QTc Int : 472 ms    Normal sinus rhythm  Possible Left atrial enlargement  Nonspecific ST and T wave abnormality  Prolonged QT  Abnormal ECG  When compared with ECG of 24-APR-2020 10:06,  T wave inversion no longer evident in Anterior leads  Confirmed by Kenny Grimes MD ([id number]) on 6/7/2020 6:37:58 PM    Referred By: [email or web address]   SELF           Confirmed By:Kenny Grimes MD             Lab Results   Component Value Date    WBC 8.38 06/06/2020    HGB 13.9 (L) 06/06/2020    HCT 41.7 06/06/2020    MCV 89 06/06/2020     06/06/2020     BMP  Lab Results   Component Value Date     (L) 06/06/2020    K 3.6 06/06/2020     06/06/2020    CO2 21 (L) 06/06/2020    BUN 12 06/06/2020    CREATININE 1.0  06/06/2020    CALCIUM 8.9 06/06/2020    ANIONGAP 11 06/06/2020    ESTGFRAFRICA >60.0 06/06/2020    EGFRNONAA >60.0 06/06/2020     Dx: Bacteremia due to methicillin susceptible Staphylococcus aureus (MSSA) [R78.81 (ICD-10-CM)]   Comments: MSSA bacteremia c 3 days: 04/22-04/23--04/24. First echo = No vegetation---please repeat and make sure no vegetation are showing up   Staff     Performing Sonographer   Kaity Perez    Vitals     Height Weight BMI (Calculated) BSA (Calculated - sq m) BP             Conclusion     · Mild concentric left ventricular hypertrophy.  · Low normal left ventricular systolic function. The estimated ejection fraction is 50-55%.  · Indeterminate left ventricular diastolic function.  · Normal right ventricular systolic function.  · TDS            Pre-op Assessment    I have reviewed the Patient Summary Reports.     I have reviewed the Nursing Notes. I have reviewed the NPO Status.   I have reviewed the Medications.     Review of Systems  Anesthesia Hx:  No problems with previous Anesthesia  History of prior surgery of interest to airway management or planning: heart surgery. Denies Family Hx of Anesthesia complications.   Denies Personal Hx of Anesthesia complications.   Social:  No Alcohol Use, Former Smoker    Hematology/Oncology:  Hematology Normal   Oncology Normal   Hematology Comments: ELiquis Therapy    EENT/Dental:  EENT/Dental Normal Eyes: Visual Impairment Catarract    Cardiovascular:   Hypertension Past MI CAD  Dysrhythmias atrial fibrillation Angina ECG has been reviewed. 3/17/2020  CATHETERIZATION, HEART, LEFT  Conclusion        · The left ventricular systolic function is normal.  · LV end diastolic pressure is normal.  · The ejection fraction is calculated to be 55%.  · Prox Cx to Mid Cx lesion , 90% stenosed.  · Single vessel coronary artery disease.     Pulmonary:   COPD Sleep Apnea, CPAP    Education provided regarding risk of obstructive sleep apnea     Renal/:   Chronic  Renal Disease BPH    Neurological:   Restless Leg Syndrome    Endocrine:   Diabetes, poorly controlled, type 2, using insulin    Dermatological:   Denton-Perez erythema multiforme exudativum   Psych:   Psychiatric History anxiety          Physical Exam  General:  Well nourished, Obesity    Airway/Jaw/Neck:  Airway Findings: Mouth Opening: Normal Tongue: Normal  General Airway Assessment: Adult  Mallampati: III  Improves to II with phonation.  TM Distance: Normal, at least 6 cm  Jaw/Neck Findings:  Neck ROM: Normal ROM     Eyes/Ears/Nose:  Eyes/Ears/Nose Findings:    Dental:  Dental Findings: In tact   Chest/Lungs:  Chest/Lungs Findings: Normal Respiratory Rate, Clear to auscultation     Heart/Vascular:  Heart Findings: Rate: Normal  Rhythm: Regular Rhythm        Mental Status:  Mental Status Findings:  Cooperative, Alert and Oriented         Anesthesia Plan  Type of Anesthesia, risks & benefits discussed:  Anesthesia Type:  general  Patient's Preference: General  Intra-op Monitoring Plan: standard ASA monitors  Intra-op Monitoring Plan Comments:   Post Op Pain Control Plan: multimodal analgesia, IV/PO Opioids PRN and per primary service following discharge from PACU  Post Op Pain Control Plan Comments:   Induction:   IV  Beta Blocker:  Patient is on a Beta-Blocker and has received one dose within the past 24 hours (No further documentation required).       Informed Consent: Patient understands risks and agrees with Anesthesia plan.  Questions answered. Anesthesia consent signed with patient.  ASA Score: 4     Day of Surgery Review of History & Physical:    H&P update referred to the surgeon.     Anesthesia Plan Notes: The patient has been educated and understands the risk for dental injury      GETA use LTA  Zofran 4mg iv  Pepcid 20mg iv   Pre Op Meds: [id number] po & Neurontin 100mg po   ROSAMARIA Precautions extubate in sitting position with oral airway in place         Ready For Surgery From Anesthesia  Perspective.

## 2020-06-08 NOTE — INTERVAL H&P NOTE
The patient has been examined and the H&P has been reviewed:    I concur with the findings and no changes have occurred since H&P was written.    Anesthesia/Surgery risks, benefits and alternative options discussed and understood by patient/family.          Active Hospital Problems    Diagnosis  POA    Protruding sternal wires [T81.89XA]  Yes      Resolved Hospital Problems   No resolved problems to display.

## 2020-06-08 NOTE — ANESTHESIA POSTPROCEDURE EVALUATION
Anesthesia Post Evaluation    Patient: Magdaleno Cunningham    Procedure(s) Performed: Procedure(s) (LRB):  REMOVAL, STERNAL WIRE (N/A)    Final Anesthesia Type: general    Patient location during evaluation: PACU  Patient participation: Yes- Able to Participate  Level of consciousness: awake and alert  Post-procedure vital signs: reviewed and stable  Pain management: adequate  Airway patency: patent  ROSAMARIA mitigation strategies: Multimodal analgesia, Extubation while patient is awake, Verification of full reversal of neuromuscular block and Extubation and recovery carried out in lateral, semiupright, or other nonsupine position  PONV status at discharge: No PONV  Anesthetic complications: no      Cardiovascular status: hemodynamically stable  Respiratory status: unassisted, spontaneous ventilation and room air  Hydration status: euvolemic  Follow-up not needed.          Vitals Value Taken Time   /82 6/8/2020  4:00 PM   Temp 36.6 °C (97.9 °F) 6/8/2020  4:00 PM   Pulse 74 6/8/2020  4:01 PM   Resp 17 6/8/2020  4:00 PM   SpO2 95 % 6/8/2020  4:01 PM   Vitals shown include unvalidated device data.      Event Time     Out of Recovery 16:05:46          Pain/Breonna Score: Pain Rating Prior to Med Admin: 4 (6/8/2020  3:48 PM)  Breonna Score: 10 (6/8/2020  4:00 PM)

## 2020-06-08 NOTE — TRANSFER OF CARE
"Anesthesia Transfer of Care Note    Patient: Magdaleno Cunningham    Procedure(s) Performed: Procedure(s) (LRB):  REMOVAL, STERNAL WIRE (N/A)    Patient location: PACU    Anesthesia Type: general    Transport from OR: Transported from OR on 2-3 L/min O2 by NC with adequate spontaneous ventilation    Post pain: adequate analgesia    Post assessment: no apparent anesthetic complications    Post vital signs: stable    Level of consciousness: awake    Nausea/Vomiting: no nausea/vomiting    Complications: none    Transfer of care protocol was followed      Last vitals:   Visit Vitals  BP (!) 152/96 (BP Location: Right arm, Patient Position: Lying)   Pulse 80   Temp 36.7 °C (98.1 °F) (Oral)   Resp 16   Ht 5' 11" (1.803 m)   Wt 119.7 kg (264 lb)   SpO2 99%   BMI 36.82 kg/m²     "

## 2020-06-08 NOTE — OP NOTE
This is Dr. Solitario dictating with date of service being 8th June 2020.  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  Postoperative diagnosis:  Same.  Operation:  Exploration of lower aspect of sternotomy incision with removal of 3 sternal wires and debridement of the lower sternum with pectoralis major advancement flap coverage of the lower sternum.  Operation in detail:  The patient was prepped and draped in usual sterile fashion.  The lower aspect of the sternotomy incision was reopened longitudinally down into the subcutaneous tissue.  The cautery was used to incise down to the chest wall.  There were is 3 visible sternal wires that were visualized and then removed.  The sternal edges at the very lower aspect of the sternotomy incision had  but the upper portion of the sternum appeared to be intact without any evidence of infection or now union or instability.  The subcutaneous tissue and tissue down to the level of the sternum was debrided sharply.  The sternal edges were freshened particularly at the xiphoid process which was excised with the cautery.  Tissue which was excised was sent for culture.  However there was no evident pus or abscess adjacent to are above the sternum.  At this point the pectoralis major muscles were mobilized on either side by using the cautery to elevate the muscle off of the sternum and adjacent cartilage is for approximately 3-4 cm laterally on both sides.  This allowed the muscle and fascia to be reapproximated in the middle with interrupted 0 Monocryl suture.  Once the muscle and fascial were reapproximated over the sternum the skin and subcutaneous tissue were closed with a combination of interrupted 2 0 vertical mattress nylon sutures and staples.  Overall patient tolerated the procedure well and there appeared to be no major obvious intraoperative complications.  Estimated blood loss was minimal.  The  patient brought to the recovery area in satisfactory condition.

## 2020-06-09 ENCOUNTER — LAB VISIT (OUTPATIENT)
Dept: LAB | Facility: HOSPITAL | Age: 55
End: 2020-06-09
Attending: INTERNAL MEDICINE
Payer: MEDICAID

## 2020-06-09 DIAGNOSIS — R78.81 BACTEREMIA: Primary | ICD-10-CM

## 2020-06-09 LAB
ANION GAP SERPL CALC-SCNC: 10 MMOL/L (ref 8–16)
BASOPHILS # BLD AUTO: 0.07 K/UL (ref 0–0.2)
BASOPHILS NFR BLD: 0.7 % (ref 0–1.9)
BUN SERPL-MCNC: 5 MG/DL (ref 6–20)
CALCIUM SERPL-MCNC: 9 MG/DL (ref 8.7–10.5)
CHLORIDE SERPL-SCNC: 100 MMOL/L (ref 95–110)
CO2 SERPL-SCNC: 25 MMOL/L (ref 23–29)
CREAT SERPL-MCNC: 0.9 MG/DL (ref 0.5–1.4)
CRP SERPL-MCNC: 26.6 MG/L (ref 0–8.2)
DIFFERENTIAL METHOD: NORMAL
EOSINOPHIL # BLD AUTO: 0.2 K/UL (ref 0–0.5)
EOSINOPHIL NFR BLD: 1.8 % (ref 0–8)
ERYTHROCYTE [DISTWIDTH] IN BLOOD BY AUTOMATED COUNT: 14.2 % (ref 11.5–14.5)
ERYTHROCYTE [SEDIMENTATION RATE] IN BLOOD BY WESTERGREN METHOD: 19 MM/HR (ref 0–10)
EST. GFR  (AFRICAN AMERICAN): >60 ML/MIN/1.73 M^2
EST. GFR  (NON AFRICAN AMERICAN): >60 ML/MIN/1.73 M^2
GLUCOSE SERPL-MCNC: 188 MG/DL (ref 70–110)
HCT VFR BLD AUTO: 43.8 % (ref 40–54)
HGB BLD-MCNC: 14.1 G/DL (ref 14–18)
IMM GRANULOCYTES # BLD AUTO: 0.03 K/UL (ref 0–0.04)
IMM GRANULOCYTES NFR BLD AUTO: 0.3 % (ref 0–0.5)
LYMPHOCYTES # BLD AUTO: 2.9 K/UL (ref 1–4.8)
LYMPHOCYTES NFR BLD: 28 % (ref 18–48)
MCH RBC QN AUTO: 29.8 PG (ref 27–31)
MCHC RBC AUTO-ENTMCNC: 32.2 G/DL (ref 32–36)
MCV RBC AUTO: 93 FL (ref 82–98)
MONOCYTES # BLD AUTO: 0.6 K/UL (ref 0.3–1)
MONOCYTES NFR BLD: 5.9 % (ref 4–15)
NEUTROPHILS # BLD AUTO: 6.5 K/UL (ref 1.8–7.7)
NEUTROPHILS NFR BLD: 63.3 % (ref 38–73)
NRBC BLD-RTO: 0 /100 WBC
PLATELET # BLD AUTO: 266 K/UL (ref 150–350)
PMV BLD AUTO: 9.5 FL (ref 9.2–12.9)
POTASSIUM SERPL-SCNC: 3.9 MMOL/L (ref 3.5–5.1)
RBC # BLD AUTO: 4.73 M/UL (ref 4.6–6.2)
SODIUM SERPL-SCNC: 135 MMOL/L (ref 136–145)
WBC # BLD AUTO: 10.23 K/UL (ref 3.9–12.7)

## 2020-06-09 PROCEDURE — 80048 BASIC METABOLIC PNL TOTAL CA: CPT

## 2020-06-09 PROCEDURE — 86140 C-REACTIVE PROTEIN: CPT

## 2020-06-09 PROCEDURE — 85025 COMPLETE CBC W/AUTO DIFF WBC: CPT

## 2020-06-09 PROCEDURE — 36415 COLL VENOUS BLD VENIPUNCTURE: CPT

## 2020-06-09 PROCEDURE — 85651 RBC SED RATE NONAUTOMATED: CPT

## 2020-06-10 ENCOUNTER — TELEPHONE (OUTPATIENT)
Dept: VASCULAR SURGERY | Facility: CLINIC | Age: 55
End: 2020-06-10

## 2020-06-10 ENCOUNTER — TELEPHONE (OUTPATIENT)
Dept: INFECTIOUS DISEASES | Facility: CLINIC | Age: 55
End: 2020-06-10

## 2020-06-10 NOTE — TELEPHONE ENCOUNTER
CRP reviewed.  It is elevated.  I reviewed chart.  Patient had a surgical intervention by cardiovascular surgeon, that explains the elevation in CRP.

## 2020-06-10 NOTE — TELEPHONE ENCOUNTER
----- Message from Priscilla Eli sent at 6/10/2020  2:52 PM CDT -----  Contact: Patient  Type: Needs Medical Advice  Who Called:  patient  Best Call Back Number: [phone]  Additional Information: Patient needs f/u apt. 2 wks .  Please call to advise. Thanks!

## 2020-06-10 NOTE — TELEPHONE ENCOUNTER
----- Message from Priscilla Eli sent at 6/10/2020  3:47 PM CDT -----  [facility]  Type: Needs Medical Advice  Who Called:  barbara  Zoltan Call Back Number: [phone]  Additional Information: Barbara states there are no orders to cover sutures and she has more questions for the nurse.  Please call to advise.  Thanks!

## 2020-06-11 NOTE — TELEPHONE ENCOUNTER
toradol 10 mg #5 tab - one tab a day for 5 days and Medrol dose pack called to [facility] per Dr. Solitario.

## 2020-06-12 ENCOUNTER — TELEPHONE (OUTPATIENT)
Dept: VASCULAR SURGERY | Facility: CLINIC | Age: 55
End: 2020-06-12

## 2020-06-12 LAB
BACTERIA SPEC AEROBE CULT: NO GROWTH
BACTERIA SPEC ANAEROBE CULT: NORMAL
BACTERIA SPEC ANAEROBE CULT: NORMAL
BACTERIA TISS AEROBE CULT: NO GROWTH
GRAM STN SPEC: NORMAL
GRAM STN SPEC: NORMAL

## 2020-06-12 NOTE — TELEPHONE ENCOUNTER
----- Message from Priscilla Eli sent at 6/11/2020  4:32 PM CDT -----  Contact: pharmacy  Type: Needs Medical Advice  Who Called:  pharmacy  Best Call Back Number:   [facility] [place] [facility] [address]  [address] [id number]  Phone: [phone] Fax: [phone]  Additional Information: patient needs clarification for Toroidal sent message to osiel.  Thanks!

## 2020-06-16 ENCOUNTER — LAB VISIT (OUTPATIENT)
Dept: LAB | Facility: HOSPITAL | Age: 55
End: 2020-06-16
Attending: INTERNAL MEDICINE
Payer: MEDICAID

## 2020-06-16 DIAGNOSIS — R78.81 BACTEREMIA: Primary | ICD-10-CM

## 2020-06-16 LAB
ANION GAP SERPL CALC-SCNC: 9 MMOL/L (ref 8–16)
BASOPHILS # BLD AUTO: 0.1 K/UL (ref 0–0.2)
BASOPHILS NFR BLD: 0.9 % (ref 0–1.9)
BUN SERPL-MCNC: 15 MG/DL (ref 6–20)
CALCIUM SERPL-MCNC: 8.8 MG/DL (ref 8.7–10.5)
CHLORIDE SERPL-SCNC: 97 MMOL/L (ref 95–110)
CO2 SERPL-SCNC: 27 MMOL/L (ref 23–29)
CREAT SERPL-MCNC: 1 MG/DL (ref 0.5–1.4)
CRP SERPL-MCNC: 2.6 MG/L (ref 0–8.2)
DIFFERENTIAL METHOD: ABNORMAL
EOSINOPHIL # BLD AUTO: 0.2 K/UL (ref 0–0.5)
EOSINOPHIL NFR BLD: 1.4 % (ref 0–8)
ERYTHROCYTE [DISTWIDTH] IN BLOOD BY AUTOMATED COUNT: 14.2 % (ref 11.5–14.5)
ERYTHROCYTE [SEDIMENTATION RATE] IN BLOOD BY WESTERGREN METHOD: 12 MM/HR (ref 0–10)
EST. GFR  (AFRICAN AMERICAN): >60 ML/MIN/1.73 M^2
EST. GFR  (NON AFRICAN AMERICAN): >60 ML/MIN/1.73 M^2
GLUCOSE SERPL-MCNC: 194 MG/DL (ref 70–110)
HCT VFR BLD AUTO: 47 % (ref 40–54)
HGB BLD-MCNC: 15.3 G/DL (ref 14–18)
IMM GRANULOCYTES # BLD AUTO: 0.04 K/UL (ref 0–0.04)
IMM GRANULOCYTES NFR BLD AUTO: 0.4 % (ref 0–0.5)
LYMPHOCYTES # BLD AUTO: 3.4 K/UL (ref 1–4.8)
LYMPHOCYTES NFR BLD: 30.7 % (ref 18–48)
MCH RBC QN AUTO: 30.1 PG (ref 27–31)
MCHC RBC AUTO-ENTMCNC: 32.6 G/DL (ref 32–36)
MCV RBC AUTO: 92 FL (ref 82–98)
MONOCYTES # BLD AUTO: 0.6 K/UL (ref 0.3–1)
MONOCYTES NFR BLD: 5.1 % (ref 4–15)
NEUTROPHILS # BLD AUTO: 6.8 K/UL (ref 1.8–7.7)
NEUTROPHILS NFR BLD: 61.5 % (ref 38–73)
NRBC BLD-RTO: 0 /100 WBC
PLATELET # BLD AUTO: 374 K/UL (ref 150–350)
PLATELET BLD QL SMEAR: ABNORMAL
PMV BLD AUTO: 9.7 FL (ref 9.2–12.9)
POTASSIUM SERPL-SCNC: 4.2 MMOL/L (ref 3.5–5.1)
RBC # BLD AUTO: 5.09 M/UL (ref 4.6–6.2)
SODIUM SERPL-SCNC: 133 MMOL/L (ref 136–145)
WBC # BLD AUTO: 10.97 K/UL (ref 3.9–12.7)

## 2020-06-16 PROCEDURE — 86140 C-REACTIVE PROTEIN: CPT

## 2020-06-16 PROCEDURE — 85025 COMPLETE CBC W/AUTO DIFF WBC: CPT

## 2020-06-16 PROCEDURE — 80048 BASIC METABOLIC PNL TOTAL CA: CPT

## 2020-06-16 PROCEDURE — 85651 RBC SED RATE NONAUTOMATED: CPT

## 2020-06-16 PROCEDURE — 36415 COLL VENOUS BLD VENIPUNCTURE: CPT

## 2020-06-17 ENCOUNTER — OFFICE VISIT (OUTPATIENT)
Dept: VASCULAR SURGERY | Facility: CLINIC | Age: 55
End: 2020-06-17
Payer: MEDICAID

## 2020-06-17 DIAGNOSIS — Z48.89 ENCOUNTER FOR POSTOPERATIVE WOUND CHECK: Primary | ICD-10-CM

## 2020-06-17 PROCEDURE — 99024 POSTOP FOLLOW-UP VISIT: CPT | Mod: ,,, | Performed by: THORACIC SURGERY (CARDIOTHORACIC VASCULAR SURGERY)

## 2020-06-17 PROCEDURE — 99024 PR POST-OP FOLLOW-UP VISIT: ICD-10-PCS | Mod: ,,, | Performed by: THORACIC SURGERY (CARDIOTHORACIC VASCULAR SURGERY)

## 2020-06-17 NOTE — PROGRESS NOTES
Subjective:       Patient ID: Magdaleno Cunningham is a 54 y.o. male patient of  Dr. Solitario who underwent sternal exploration and removal of 3 sternal wires on 6/8/20 at [facility]. He had a CABG on 4/7/20 and during his recovery called 911 for recurring chest pain. He was readmitted on 4/22/20 and treated for bacteremia secondary to operative site infection. During exploration there was no evidence of infection, non-union or instability. Debridement and advancement of muscle and fascia was done to reapproximate deep tissue closure. He is here today for first post-op visit. He is complaining of soreness around the incision. He denies complaints of chest pain or shortness of breath. He is requesting a refill on Percocet due to incisional pain. He also has a PICC line in place and receiving Ancef        Objective:     GEN: obese white male displaying moderate anxiety secondary to pain  HEART: RRR. No murmur  CHEST: Lungs clear in all fields. Sternal incision is well approximated. Mild erythema surrounding the retention sutures. No drainage. Skin staples in place. Sternum is stable.     Assessment:       S/P sternal exploration and removal of sternal wires    Plan:       1.Removed the retention sutures but left in skin staples for another week.   2. Refilled Oxycodone 10/325mg #28 with no refill.  3. Recommended supplement with Ibuprofen for pain control  4. Return to clinic in one week for staple removal

## 2020-06-22 ENCOUNTER — TELEPHONE (OUTPATIENT)
Dept: INFECTIOUS DISEASES | Facility: CLINIC | Age: 55
End: 2020-06-22

## 2020-06-22 ENCOUNTER — LAB VISIT (OUTPATIENT)
Dept: LAB | Facility: HOSPITAL | Age: 55
End: 2020-06-22
Attending: INTERNAL MEDICINE
Payer: MEDICAID

## 2020-06-22 DIAGNOSIS — R78.81 BACTEREMIA: Primary | ICD-10-CM

## 2020-06-22 LAB
ANION GAP SERPL CALC-SCNC: 8 MMOL/L (ref 8–16)
BASOPHILS # BLD AUTO: 0.1 K/UL (ref 0–0.2)
BASOPHILS NFR BLD: 1.1 % (ref 0–1.9)
BUN SERPL-MCNC: 16 MG/DL (ref 6–20)
CALCIUM SERPL-MCNC: 8.8 MG/DL (ref 8.7–10.5)
CHLORIDE SERPL-SCNC: 101 MMOL/L (ref 95–110)
CO2 SERPL-SCNC: 26 MMOL/L (ref 23–29)
CREAT SERPL-MCNC: 1 MG/DL (ref 0.5–1.4)
CRP SERPL-MCNC: 2.7 MG/L (ref 0–8.2)
DIFFERENTIAL METHOD: ABNORMAL
EOSINOPHIL # BLD AUTO: 0.2 K/UL (ref 0–0.5)
EOSINOPHIL NFR BLD: 2 % (ref 0–8)
ERYTHROCYTE [DISTWIDTH] IN BLOOD BY AUTOMATED COUNT: 14 % (ref 11.5–14.5)
ERYTHROCYTE [SEDIMENTATION RATE] IN BLOOD BY WESTERGREN METHOD: 16 MM/HR (ref 0–10)
EST. GFR  (AFRICAN AMERICAN): >60 ML/MIN/1.73 M^2
EST. GFR  (NON AFRICAN AMERICAN): >60 ML/MIN/1.73 M^2
GLUCOSE SERPL-MCNC: 151 MG/DL (ref 70–110)
HCT VFR BLD AUTO: 42.7 % (ref 40–54)
HGB BLD-MCNC: 14.2 G/DL (ref 14–18)
IMM GRANULOCYTES # BLD AUTO: 0.05 K/UL (ref 0–0.04)
IMM GRANULOCYTES NFR BLD AUTO: 0.5 % (ref 0–0.5)
LYMPHOCYTES # BLD AUTO: 3.3 K/UL (ref 1–4.8)
LYMPHOCYTES NFR BLD: 34.7 % (ref 18–48)
MCH RBC QN AUTO: 29.8 PG (ref 27–31)
MCHC RBC AUTO-ENTMCNC: 33.3 G/DL (ref 32–36)
MCV RBC AUTO: 90 FL (ref 82–98)
MONOCYTES # BLD AUTO: 0.6 K/UL (ref 0.3–1)
MONOCYTES NFR BLD: 6 % (ref 4–15)
NEUTROPHILS # BLD AUTO: 5.2 K/UL (ref 1.8–7.7)
NEUTROPHILS NFR BLD: 55.7 % (ref 38–73)
NRBC BLD-RTO: 0 /100 WBC
PLATELET # BLD AUTO: 312 K/UL (ref 150–350)
PMV BLD AUTO: 9.9 FL (ref 9.2–12.9)
POTASSIUM SERPL-SCNC: 4.3 MMOL/L (ref 3.5–5.1)
RBC # BLD AUTO: 4.76 M/UL (ref 4.6–6.2)
SODIUM SERPL-SCNC: 135 MMOL/L (ref 136–145)
WBC # BLD AUTO: 9.39 K/UL (ref 3.9–12.7)

## 2020-06-22 PROCEDURE — 86140 C-REACTIVE PROTEIN: CPT

## 2020-06-22 PROCEDURE — 85025 COMPLETE CBC W/AUTO DIFF WBC: CPT

## 2020-06-22 PROCEDURE — 36415 COLL VENOUS BLD VENIPUNCTURE: CPT

## 2020-06-22 PROCEDURE — 85651 RBC SED RATE NONAUTOMATED: CPT

## 2020-06-22 PROCEDURE — 80048 BASIC METABOLIC PNL TOTAL CA: CPT

## 2020-06-23 ENCOUNTER — OFFICE VISIT (OUTPATIENT)
Dept: INFECTIOUS DISEASES | Facility: CLINIC | Age: 55
End: 2020-06-23
Payer: MEDICAID

## 2020-06-23 VITALS
OXYGEN SATURATION: 97 % | DIASTOLIC BLOOD PRESSURE: 98 MMHG | BODY MASS INDEX: 37.38 KG/M2 | SYSTOLIC BLOOD PRESSURE: 142 MMHG | HEART RATE: 81 BPM | TEMPERATURE: 97 F | WEIGHT: 268 LBS

## 2020-06-23 DIAGNOSIS — M86.9 OSTEOMYELITIS OF STERNUM: Primary | ICD-10-CM

## 2020-06-23 DIAGNOSIS — B95.61 BACTEREMIA DUE TO METHICILLIN SUSCEPTIBLE STAPHYLOCOCCUS AUREUS (MSSA): ICD-10-CM

## 2020-06-23 DIAGNOSIS — R78.81 BACTEREMIA DUE TO METHICILLIN SUSCEPTIBLE STAPHYLOCOCCUS AUREUS (MSSA): ICD-10-CM

## 2020-06-23 PROCEDURE — 99215 PR OFFICE/OUTPT VISIT, EST, LEVL V, 40-54 MIN: ICD-10-PCS | Mod: S$GLB,,, | Performed by: INTERNAL MEDICINE

## 2020-06-23 PROCEDURE — 99215 OFFICE O/P EST HI 40 MIN: CPT | Mod: S$GLB,,, | Performed by: INTERNAL MEDICINE

## 2020-06-23 NOTE — PROGRESS NOTES
"  Reason for consult:   Subjective:      Patient ID: Magdaleno Cunningham is a 54 y.o. male.    Chief Complaint:: Follow-up (bacteremia to get picc line removed )    In-hospital information 54 y.o. male with past medical history of CAD, status post CABG x2, hospitalized from 04/07--04/10/2020,  IDDM 2, HTN, D LP, ROSAMARIA, COPD, smoker, medical noncompliance, MEERA, BPH, readmitted on 04/22--04/274/2020 with complaints of shortness of breath and chest pain at surgical site.  He was found to have   MSSA bacteremia on 04/22, 04/23, 04/24.  ECHO showed no vegetation.  CT chest done on admission had ruled out PE and did not find any fluid collection, abscess.  But, by personal review of CT, I could see some stranding at surgical area.  While hospitalized he started producing some fluid from the lower surgical wound which increased for a day then disappeared.  By the time I ordered a culture the fluid had resolved.   Clinic follow-up on 06/23/2020  I saw patient on 05/06/2020.  At the time I had repeated and EC H of oa which showed no endocarditis.  Cardiovascular surgeon meanwhile took the patient to OR on 06/08/2020 for "Chronic sternal wound pain with suspicion of sternal malunion and possible infection" intraoperatively there was no past.  After the surgery CRP was elevated, which is expected.  Cultures are negative.    Patient has been doing great.  No fever no chills.  Anterior chest pain is markedly improved.  He has staples in place and has an appointment with surgeon today.  He is looking forward to having those staples taken out.       Review of Systems   Constitutional: Negative for activity change, appetite change, chills, fatigue and fever.   HENT: Negative for congestion, dental problem, ear discharge, ear pain, hearing loss, mouth sores, postnasal drip, rhinorrhea, sinus pressure, sinus pain, sneezing, sore throat, tinnitus, trouble swallowing and voice change.    Eyes: Negative for photophobia, discharge and " redness.   Respiratory: Negative for cough, chest tightness, shortness of breath, wheezing and stridor.    Cardiovascular: Negative for chest pain and leg swelling.   Gastrointestinal: Negative for abdominal distention, abdominal pain, anal bleeding, blood in stool, constipation, diarrhea, nausea, rectal pain and vomiting.   Genitourinary: Negative for difficulty urinating, dysuria, flank pain, frequency, genital sores, hematuria and urgency.   Musculoskeletal: Negative for arthralgias, back pain, joint swelling, myalgias, neck pain and neck stiffness.   Skin: Negative for color change, rash and wound.   Allergic/Immunologic: Negative for immunocompromised state.   Neurological: Negative for dizziness, seizures and facial asymmetry.   Hematological: Negative for adenopathy. Does not bruise/bleed easily.   Psychiatric/Behavioral: Negative for agitation, confusion, decreased concentration and dysphoric mood. The patient is not nervous/anxious.         Pertinent medications noted:     Objective:      Blood pressure (!) 142/98, pulse 81, temperature 97.1 °F (36.2 °C), weight 121.6 kg (268 lb), SpO2 97 %.  Body mass index is 37.38 kg/m².  Physical Exam  Constitutional:       General: He is not in acute distress.     Appearance: He is not diaphoretic.   HENT:      Head: Atraumatic.      Right Ear: External ear normal.      Left Ear: External ear normal.      Nose: Nose normal.   Eyes:      General: No scleral icterus.     Conjunctiva/sclera: Conjunctivae normal.      Pupils: Pupils are equal, round, and reactive to light.   Neck:      Musculoskeletal: Normal range of motion and neck supple.      Vascular: No JVD.   Cardiovascular:      Rate and Rhythm: Normal rate and regular rhythm.      Heart sounds: Normal heart sounds.   Pulmonary:      Effort: Pulmonary effort is normal.      Breath sounds: Normal breath sounds. No wheezing or rales.   Chest:      Chest wall: No tenderness.   Abdominal:      General: Bowel sounds are  normal. There is no distension.      Palpations: Abdomen is soft. There is no mass.      Tenderness: There is no abdominal tenderness. There is no guarding or rebound.   Musculoskeletal: Normal range of motion.   Lymphadenopathy:      Cervical: No cervical adenopathy.   Skin:     General: Skin is warm and dry.      Findings: No erythema or rash.      Comments: Anterior chest wall is healing well.  Staples in place.   Neurological:      Mental Status: He is alert and oriented to person, place, and time.      Cranial Nerves: No cranial nerve deficit.   Psychiatric:         Behavior: Behavior normal.         Thought Content: Thought content normal.         VAD:   Left upper arm PICC line is discontinued by myself today on 06/23/2020    General Labs reviewed:  Lab Results   Component Value Date    WBC 9.39 06/22/2020    RBC 4.76 06/22/2020    HGB 14.2 06/22/2020    HCT 42.7 06/22/2020    MCV 90 06/22/2020    MCH 29.8 06/22/2020    MCHC 33.3 06/22/2020    RDW 14.0 06/22/2020     06/22/2020    MPV 9.9 06/22/2020    GRAN 5.2 06/22/2020    GRAN 55.7 06/22/2020    LYMPH 3.3 06/22/2020    LYMPH 34.7 06/22/2020    MONO 0.6 06/22/2020    MONO 6.0 06/22/2020    EOS 0.2 06/22/2020    BASO 0.10 06/22/2020    EOSINOPHIL 2.0 06/22/2020    BASOPHIL 1.1 06/22/2020     CMP  Sodium   Date Value Ref Range Status   06/22/2020 135 (L) 136 - 145 mmol/L Final     Potassium   Date Value Ref Range Status   06/22/2020 4.3 3.5 - 5.1 mmol/L Final     Chloride   Date Value Ref Range Status   06/22/2020 101 95 - 110 mmol/L Final     CO2   Date Value Ref Range Status   06/22/2020 26 23 - 29 mmol/L Final     Glucose   Date Value Ref Range Status   06/22/2020 151 (H) 70 - 110 mg/dL Final     BUN, Bld   Date Value Ref Range Status   06/22/2020 16 6 - 20 mg/dL Final     Creatinine   Date Value Ref Range Status   06/22/2020 1.0 0.5 - 1.4 mg/dL Final     Calcium   Date Value Ref Range Status   06/22/2020 8.8 8.7 - 10.5 mg/dL Final     Total Protein    Date Value Ref Range Status   06/06/2020 6.9 6.0 - 8.4 g/dL Final     Albumin   Date Value Ref Range Status   06/06/2020 3.4 (L) 3.5 - 5.2 g/dL Final     Total Bilirubin   Date Value Ref Range Status   06/06/2020 0.4 0.1 - 1.0 mg/dL Final     Comment:     For infants and newborns, interpretation of results should be based  on gestational age, weight and in agreement with clinical  observations.  Premature Infant recommended reference ranges:  Up to 24 hours.............<8.0 mg/dL  Up to 48 hours............<12.0 mg/dL  3-5 days..................<15.0 mg/dL  6-29 days.................<15.0 mg/dL       Alkaline Phosphatase   Date Value Ref Range Status   06/06/2020 57 55 - 135 U/L Final     AST   Date Value Ref Range Status   06/06/2020 17 10 - 40 U/L Final     ALT   Date Value Ref Range Status   06/06/2020 13 10 - 44 U/L Final     Anion Gap   Date Value Ref Range Status   06/22/2020 8 8 - 16 mmol/L Final     eGFR if    Date Value Ref Range Status   06/22/2020 >60 >60 mL/min/1.73 m^2 Final     eGFR if non    Date Value Ref Range Status   06/22/2020 >60 >60 mL/min/1.73 m^2 Final     Comment:     Calculation used to obtain the estimated glomerular filtration  rate (eGFR) is the CKD-EPI equation.          Micro:  Microbiology Results (last 7 days)     ** No results found for the last 168 hours. **        C-REACTIVE PROTEIN  Order: [id number]  Status:  Final result   Visible to patient:  No (not released) Next appt:  None Dx:  Bacteremia   Ref Range & Units 1d ago 7d ago 2wk ago 3wk ago 4wk ago   CRP 0.0 - 8.2 mg/L 2.7  2.6  26.6High   4.4  1.7    Resulting Agency  [facility] NS[facility]               Imaging Reviewed:  As above    Assessment:       1. Osteomyelitis of sternum    2. Bacteremia due to methicillin susceptible Staphylococcus aureus (MSSA)           Plan:       Osteomyelitis of sternum    Bacteremia due to methicillin susceptible Staphylococcus aureus (MSSA)      No  follow-ups on file.      Patient has completed treatment  Discontinue PICC line   Discontinue cefazolin.  Follow-up with PCP about other medical conditions.      This note was created using Dragon voice recognition software that occasionally misinterpreted phrases or words.

## 2020-06-24 ENCOUNTER — HOSPITAL ENCOUNTER (OUTPATIENT)
Dept: RADIOLOGY | Facility: HOSPITAL | Age: 55
Discharge: HOME | End: 2020-06-24
Attending: INTERNAL MEDICINE
Payer: MEDICAID

## 2020-06-24 DIAGNOSIS — J18.9 UNRESOLVED PNEUMONIA: Primary | ICD-10-CM

## 2020-06-24 DIAGNOSIS — J18.9 UNRESOLVED PNEUMONIA: ICD-10-CM

## 2020-06-24 PROCEDURE — 71046 X-RAY EXAM CHEST 2 VIEWS: CPT | Mod: TC

## 2020-10-19 ENCOUNTER — HOSPITAL ENCOUNTER (OUTPATIENT)
Dept: RADIOLOGY | Facility: HOSPITAL | Age: 55
Discharge: HOME | End: 2020-10-19
Attending: INTERNAL MEDICINE
Payer: MEDICAID

## 2020-10-19 DIAGNOSIS — R07.89 OTHER CHEST PAIN: Primary | ICD-10-CM

## 2020-10-19 DIAGNOSIS — R07.89 OTHER CHEST PAIN: ICD-10-CM

## 2020-10-19 PROCEDURE — 71046 X-RAY EXAM CHEST 2 VIEWS: CPT | Mod: TC

## 2021-07-03 ENCOUNTER — HOSPITAL ENCOUNTER (OUTPATIENT)
Facility: HOSPITAL | Age: 56
Discharge: HOME | End: 2021-07-04
Attending: EMERGENCY MEDICINE | Admitting: INTERNAL MEDICINE
Payer: MEDICAID

## 2021-07-03 DIAGNOSIS — R07.9 CHEST PAIN: ICD-10-CM

## 2021-07-03 PROCEDURE — 63600175 PHARM REV CODE 636 W HCPCS: Performed by: EMERGENCY MEDICINE

## 2021-07-03 PROCEDURE — 80053 COMPREHEN METABOLIC PANEL: CPT | Performed by: EMERGENCY MEDICINE

## 2021-07-03 PROCEDURE — 93010 EKG 12-LEAD: ICD-10-PCS | Mod: ,,, | Performed by: GENERAL PRACTICE

## 2021-07-03 PROCEDURE — 99285 EMERGENCY DEPT VISIT HI MDM: CPT | Mod: 25

## 2021-07-03 PROCEDURE — 93005 ELECTROCARDIOGRAM TRACING: CPT

## 2021-07-03 PROCEDURE — 93010 ELECTROCARDIOGRAM REPORT: CPT | Mod: ,,, | Performed by: GENERAL PRACTICE

## 2021-07-03 PROCEDURE — 84484 ASSAY OF TROPONIN QUANT: CPT | Performed by: EMERGENCY MEDICINE

## 2021-07-03 PROCEDURE — 36415 COLL VENOUS BLD VENIPUNCTURE: CPT | Performed by: EMERGENCY MEDICINE

## 2021-07-03 PROCEDURE — 85025 COMPLETE CBC W/AUTO DIFF WBC: CPT | Performed by: EMERGENCY MEDICINE

## 2021-07-03 PROCEDURE — 83880 ASSAY OF NATRIURETIC PEPTIDE: CPT | Performed by: EMERGENCY MEDICINE

## 2021-07-03 PROCEDURE — 96374 THER/PROPH/DIAG INJ IV PUSH: CPT

## 2021-07-03 PROCEDURE — 25000003 PHARM REV CODE 250: Performed by: EMERGENCY MEDICINE

## 2021-07-03 PROCEDURE — U0002 COVID-19 LAB TEST NON-CDC: HCPCS | Performed by: EMERGENCY MEDICINE

## 2021-07-03 RX ORDER — MORPHINE SULFATE 2 MG/ML
2 INJECTION, SOLUTION INTRAMUSCULAR; INTRAVENOUS
Status: COMPLETED | OUTPATIENT
Start: 2021-07-03 | End: 2021-07-03

## 2021-07-03 RX ORDER — ASPIRIN 325 MG
325 TABLET ORAL
Status: COMPLETED | OUTPATIENT
Start: 2021-07-03 | End: 2021-07-03

## 2021-07-03 RX ADMIN — ASPIRIN 325 MG ORAL TABLET 325 MG: 325 PILL ORAL at 11:07

## 2021-07-03 RX ADMIN — MORPHINE SULFATE 2 MG: 2 INJECTION, SOLUTION INTRAMUSCULAR; INTRAVENOUS at 11:07

## 2021-07-04 VITALS
DIASTOLIC BLOOD PRESSURE: 73 MMHG | BODY MASS INDEX: 41.76 KG/M2 | RESPIRATION RATE: 16 BRPM | WEIGHT: 298.31 LBS | HEIGHT: 71 IN | HEART RATE: 60 BPM | TEMPERATURE: 98 F | SYSTOLIC BLOOD PRESSURE: 108 MMHG | OXYGEN SATURATION: 96 %

## 2021-07-04 LAB
ALBUMIN SERPL BCP-MCNC: 3.5 G/DL (ref 3.5–5.2)
ALBUMIN SERPL BCP-MCNC: 3.5 G/DL (ref 3.5–5.2)
ALP SERPL-CCNC: 51 U/L (ref 55–135)
ALP SERPL-CCNC: 52 U/L (ref 55–135)
ALT SERPL W/O P-5'-P-CCNC: 32 U/L (ref 10–44)
ALT SERPL W/O P-5'-P-CCNC: 33 U/L (ref 10–44)
ANION GAP SERPL CALC-SCNC: 13 MMOL/L (ref 8–16)
ANION GAP SERPL CALC-SCNC: 8 MMOL/L (ref 8–16)
AST SERPL-CCNC: 22 U/L (ref 10–40)
AST SERPL-CCNC: 22 U/L (ref 10–40)
BACTERIA #/AREA URNS HPF: ABNORMAL /HPF
BASOPHILS # BLD AUTO: 0.07 K/UL (ref 0–0.2)
BASOPHILS # BLD AUTO: 0.08 K/UL (ref 0–0.2)
BASOPHILS NFR BLD: 0.7 % (ref 0–1.9)
BASOPHILS NFR BLD: 0.7 % (ref 0–1.9)
BILIRUB SERPL-MCNC: 0.4 MG/DL (ref 0.1–1)
BILIRUB SERPL-MCNC: 0.4 MG/DL (ref 0.1–1)
BILIRUB UR QL STRIP: NEGATIVE
BNP SERPL-MCNC: 12 PG/ML (ref 0–99)
BUN SERPL-MCNC: 13 MG/DL (ref 6–20)
BUN SERPL-MCNC: 14 MG/DL (ref 6–20)
CALCIUM SERPL-MCNC: 8.7 MG/DL (ref 8.7–10.5)
CALCIUM SERPL-MCNC: 9.1 MG/DL (ref 8.7–10.5)
CHLORIDE SERPL-SCNC: 102 MMOL/L (ref 95–110)
CHLORIDE SERPL-SCNC: 102 MMOL/L (ref 95–110)
CLARITY UR: CLEAR
CO2 SERPL-SCNC: 19 MMOL/L (ref 23–29)
CO2 SERPL-SCNC: 24 MMOL/L (ref 23–29)
COLOR UR: YELLOW
CREAT SERPL-MCNC: 1.1 MG/DL (ref 0.5–1.4)
CREAT SERPL-MCNC: 1.2 MG/DL (ref 0.5–1.4)
CRP SERPL-MCNC: 2.6 MG/L (ref 0–8.2)
DIFFERENTIAL METHOD: ABNORMAL
DIFFERENTIAL METHOD: ABNORMAL
EOSINOPHIL # BLD AUTO: 0.3 K/UL (ref 0–0.5)
EOSINOPHIL # BLD AUTO: 0.3 K/UL (ref 0–0.5)
EOSINOPHIL NFR BLD: 2.9 % (ref 0–8)
EOSINOPHIL NFR BLD: 3.1 % (ref 0–8)
ERYTHROCYTE [DISTWIDTH] IN BLOOD BY AUTOMATED COUNT: 12.5 % (ref 11.5–14.5)
ERYTHROCYTE [DISTWIDTH] IN BLOOD BY AUTOMATED COUNT: 12.6 % (ref 11.5–14.5)
ERYTHROCYTE [SEDIMENTATION RATE] IN BLOOD BY WESTERGREN METHOD: 8 MM/HR (ref 0–10)
EST. GFR  (AFRICAN AMERICAN): >60 ML/MIN/1.73 M^2
EST. GFR  (AFRICAN AMERICAN): >60 ML/MIN/1.73 M^2
EST. GFR  (NON AFRICAN AMERICAN): >60 ML/MIN/1.73 M^2
EST. GFR  (NON AFRICAN AMERICAN): >60 ML/MIN/1.73 M^2
GLUCOSE SERPL-MCNC: 137 MG/DL (ref 70–110)
GLUCOSE SERPL-MCNC: 243 MG/DL (ref 70–110)
GLUCOSE UR QL STRIP: ABNORMAL
HCT VFR BLD AUTO: 44.5 % (ref 40–54)
HCT VFR BLD AUTO: 47.4 % (ref 40–54)
HGB BLD-MCNC: 15.5 G/DL (ref 14–18)
HGB BLD-MCNC: 15.7 G/DL (ref 14–18)
HGB UR QL STRIP: ABNORMAL
IMM GRANULOCYTES # BLD AUTO: 0.06 K/UL (ref 0–0.04)
IMM GRANULOCYTES # BLD AUTO: 0.06 K/UL (ref 0–0.04)
IMM GRANULOCYTES NFR BLD AUTO: 0.5 % (ref 0–0.5)
IMM GRANULOCYTES NFR BLD AUTO: 0.6 % (ref 0–0.5)
KETONES UR QL STRIP: NEGATIVE
LEUKOCYTE ESTERASE UR QL STRIP: NEGATIVE
LYMPHOCYTES # BLD AUTO: 4.6 K/UL (ref 1–4.8)
LYMPHOCYTES # BLD AUTO: 5.4 K/UL (ref 1–4.8)
LYMPHOCYTES NFR BLD: 45 % (ref 18–48)
LYMPHOCYTES NFR BLD: 48.5 % (ref 18–48)
MAGNESIUM SERPL-MCNC: 1.8 MG/DL (ref 1.6–2.6)
MCH RBC QN AUTO: 31.8 PG (ref 27–31)
MCH RBC QN AUTO: 33.1 PG (ref 27–31)
MCHC RBC AUTO-ENTMCNC: 33.1 G/DL (ref 32–36)
MCHC RBC AUTO-ENTMCNC: 34.8 G/DL (ref 32–36)
MCV RBC AUTO: 95 FL (ref 82–98)
MCV RBC AUTO: 96 FL (ref 82–98)
MICROSCOPIC COMMENT: ABNORMAL
MONOCYTES # BLD AUTO: 0.7 K/UL (ref 0.3–1)
MONOCYTES # BLD AUTO: 0.8 K/UL (ref 0.3–1)
MONOCYTES NFR BLD: 6.5 % (ref 4–15)
MONOCYTES NFR BLD: 7.5 % (ref 4–15)
NEUTROPHILS # BLD AUTO: 4.5 K/UL (ref 1.8–7.7)
NEUTROPHILS # BLD AUTO: 4.5 K/UL (ref 1.8–7.7)
NEUTROPHILS NFR BLD: 40.7 % (ref 38–73)
NEUTROPHILS NFR BLD: 43.3 % (ref 38–73)
NITRITE UR QL STRIP: NEGATIVE
NRBC BLD-RTO: 0 /100 WBC
NRBC BLD-RTO: 0 /100 WBC
PH UR STRIP: 6 [PH] (ref 5–8)
PLATELET # BLD AUTO: 191 K/UL (ref 150–450)
PLATELET # BLD AUTO: 202 K/UL (ref 150–450)
PMV BLD AUTO: 10.1 FL (ref 9.2–12.9)
PMV BLD AUTO: 10.2 FL (ref 9.2–12.9)
POCT GLUCOSE: 146 MG/DL (ref 70–110)
POCT GLUCOSE: 182 MG/DL (ref 70–110)
POTASSIUM SERPL-SCNC: 3.6 MMOL/L (ref 3.5–5.1)
POTASSIUM SERPL-SCNC: 3.7 MMOL/L (ref 3.5–5.1)
PROT SERPL-MCNC: 6.4 G/DL (ref 6–8.4)
PROT SERPL-MCNC: 6.5 G/DL (ref 6–8.4)
PROT UR QL STRIP: NEGATIVE
RBC # BLD AUTO: 4.68 M/UL (ref 4.6–6.2)
RBC # BLD AUTO: 4.94 M/UL (ref 4.6–6.2)
RBC #/AREA URNS HPF: 2 /HPF (ref 0–4)
SARS-COV-2 RDRP RESP QL NAA+PROBE: NEGATIVE
SODIUM SERPL-SCNC: 134 MMOL/L (ref 136–145)
SODIUM SERPL-SCNC: 134 MMOL/L (ref 136–145)
SP GR UR STRIP: 1.02 (ref 1–1.03)
SQUAMOUS #/AREA URNS HPF: 10 /HPF
TROPONIN I SERPL DL<=0.01 NG/ML-MCNC: <0.006 NG/ML (ref 0–0.03)
TSH SERPL DL<=0.005 MIU/L-ACNC: 1.29 UIU/ML (ref 0.4–4)
URN SPEC COLLECT METH UR: ABNORMAL
UROBILINOGEN UR STRIP-ACNC: NEGATIVE EU/DL
WBC # BLD AUTO: 10.3 K/UL (ref 3.9–12.7)
WBC # BLD AUTO: 11.03 K/UL (ref 3.9–12.7)
WBC #/AREA URNS HPF: 12 /HPF (ref 0–5)
YEAST URNS QL MICRO: ABNORMAL

## 2021-07-04 PROCEDURE — 85025 COMPLETE CBC W/AUTO DIFF WBC: CPT | Performed by: NURSE PRACTITIONER

## 2021-07-04 PROCEDURE — 86140 C-REACTIVE PROTEIN: CPT | Performed by: NURSE PRACTITIONER

## 2021-07-04 PROCEDURE — 25000003 PHARM REV CODE 250: Performed by: NURSE PRACTITIONER

## 2021-07-04 PROCEDURE — 84443 ASSAY THYROID STIM HORMONE: CPT | Performed by: NURSE PRACTITIONER

## 2021-07-04 PROCEDURE — 85651 RBC SED RATE NONAUTOMATED: CPT | Performed by: INTERNAL MEDICINE

## 2021-07-04 PROCEDURE — 80053 COMPREHEN METABOLIC PANEL: CPT | Performed by: NURSE PRACTITIONER

## 2021-07-04 PROCEDURE — 25500020 PHARM REV CODE 255

## 2021-07-04 PROCEDURE — 36415 COLL VENOUS BLD VENIPUNCTURE: CPT | Performed by: NURSE PRACTITIONER

## 2021-07-04 PROCEDURE — 25000003 PHARM REV CODE 250: Performed by: INTERNAL MEDICINE

## 2021-07-04 PROCEDURE — G0378 HOSPITAL OBSERVATION PER HR: HCPCS

## 2021-07-04 PROCEDURE — 99900035 HC TECH TIME PER 15 MIN (STAT)

## 2021-07-04 PROCEDURE — 81000 URINALYSIS NONAUTO W/SCOPE: CPT | Performed by: NURSE PRACTITIONER

## 2021-07-04 PROCEDURE — 94761 N-INVAS EAR/PLS OXIMETRY MLT: CPT

## 2021-07-04 PROCEDURE — 84484 ASSAY OF TROPONIN QUANT: CPT | Performed by: EMERGENCY MEDICINE

## 2021-07-04 PROCEDURE — 36415 COLL VENOUS BLD VENIPUNCTURE: CPT | Performed by: INTERNAL MEDICINE

## 2021-07-04 PROCEDURE — 84484 ASSAY OF TROPONIN QUANT: CPT | Mod: 91 | Performed by: NURSE PRACTITIONER

## 2021-07-04 PROCEDURE — 87086 URINE CULTURE/COLONY COUNT: CPT | Performed by: NURSE PRACTITIONER

## 2021-07-04 PROCEDURE — 83735 ASSAY OF MAGNESIUM: CPT | Performed by: NURSE PRACTITIONER

## 2021-07-04 RX ORDER — ONDANSETRON 2 MG/ML
4 INJECTION INTRAMUSCULAR; INTRAVENOUS EVERY 8 HOURS PRN
Status: DISCONTINUED | OUTPATIENT
Start: 2021-07-04 | End: 2021-07-04 | Stop reason: HOSPADM

## 2021-07-04 RX ORDER — GLUCAGON 1 MG
1 KIT INJECTION
Status: DISCONTINUED | OUTPATIENT
Start: 2021-07-04 | End: 2021-07-04 | Stop reason: HOSPADM

## 2021-07-04 RX ORDER — TAMSULOSIN HYDROCHLORIDE 0.4 MG/1
0.4 CAPSULE ORAL NIGHTLY
Status: DISCONTINUED | OUTPATIENT
Start: 2021-07-04 | End: 2021-07-04

## 2021-07-04 RX ORDER — LANOLIN ALCOHOL/MO/W.PET/CERES
800 CREAM (GRAM) TOPICAL
Status: DISCONTINUED | OUTPATIENT
Start: 2021-07-04 | End: 2021-07-04 | Stop reason: HOSPADM

## 2021-07-04 RX ORDER — GABAPENTIN 300 MG/1
600 CAPSULE ORAL 3 TIMES DAILY
Status: DISCONTINUED | OUTPATIENT
Start: 2021-07-04 | End: 2021-07-04 | Stop reason: HOSPADM

## 2021-07-04 RX ORDER — PRAVASTATIN SODIUM 40 MG/1
80 TABLET ORAL NIGHTLY
Status: DISCONTINUED | OUTPATIENT
Start: 2021-07-04 | End: 2021-07-04 | Stop reason: HOSPADM

## 2021-07-04 RX ORDER — PRAVASTATIN SODIUM 40 MG/1
80 TABLET ORAL NIGHTLY
Status: DISCONTINUED | OUTPATIENT
Start: 2021-07-04 | End: 2021-07-04

## 2021-07-04 RX ORDER — LISINOPRIL 10 MG/1
10 TABLET ORAL DAILY
Status: DISCONTINUED | OUTPATIENT
Start: 2021-07-04 | End: 2021-07-04 | Stop reason: HOSPADM

## 2021-07-04 RX ORDER — ISOSORBIDE MONONITRATE 30 MG/1
30 TABLET, EXTENDED RELEASE ORAL DAILY
Status: DISCONTINUED | OUTPATIENT
Start: 2021-07-04 | End: 2021-07-04 | Stop reason: HOSPADM

## 2021-07-04 RX ORDER — IBUPROFEN 200 MG
16 TABLET ORAL
Status: DISCONTINUED | OUTPATIENT
Start: 2021-07-04 | End: 2021-07-04 | Stop reason: HOSPADM

## 2021-07-04 RX ORDER — TAMSULOSIN HYDROCHLORIDE 0.4 MG/1
0.4 CAPSULE ORAL NIGHTLY
Status: DISCONTINUED | OUTPATIENT
Start: 2021-07-04 | End: 2021-07-04 | Stop reason: HOSPADM

## 2021-07-04 RX ORDER — AMOXICILLIN 250 MG
1 CAPSULE ORAL 2 TIMES DAILY
Status: DISCONTINUED | OUTPATIENT
Start: 2021-07-04 | End: 2021-07-04 | Stop reason: HOSPADM

## 2021-07-04 RX ORDER — HYDROCODONE BITARTRATE AND ACETAMINOPHEN 5; 325 MG/1; MG/1
1 TABLET ORAL EVERY 6 HOURS PRN
Qty: 15 TABLET | Refills: 0 | Status: ON HOLD | OUTPATIENT
Start: 2021-07-04 | End: 2022-08-31

## 2021-07-04 RX ORDER — SODIUM CHLORIDE 0.9 % (FLUSH) 0.9 %
10 SYRINGE (ML) INJECTION
Status: DISCONTINUED | OUTPATIENT
Start: 2021-07-04 | End: 2021-07-04 | Stop reason: HOSPADM

## 2021-07-04 RX ORDER — TALC
6 POWDER (GRAM) TOPICAL NIGHTLY PRN
Status: DISCONTINUED | OUTPATIENT
Start: 2021-07-04 | End: 2021-07-04 | Stop reason: HOSPADM

## 2021-07-04 RX ORDER — HYDROCODONE BITARTRATE AND ACETAMINOPHEN 10; 325 MG/1; MG/1
1 TABLET ORAL EVERY 6 HOURS PRN
Status: DISCONTINUED | OUTPATIENT
Start: 2021-07-04 | End: 2021-07-04 | Stop reason: HOSPADM

## 2021-07-04 RX ORDER — ACETAMINOPHEN 325 MG/1
650 TABLET ORAL EVERY 4 HOURS PRN
Status: DISCONTINUED | OUTPATIENT
Start: 2021-07-04 | End: 2021-07-04 | Stop reason: HOSPADM

## 2021-07-04 RX ORDER — HYDROCHLOROTHIAZIDE 12.5 MG/1
12.5 TABLET ORAL DAILY
Status: DISCONTINUED | OUTPATIENT
Start: 2021-07-04 | End: 2021-07-04 | Stop reason: HOSPADM

## 2021-07-04 RX ORDER — NAPROXEN SODIUM 220 MG/1
81 TABLET, FILM COATED ORAL DAILY
Status: DISCONTINUED | OUTPATIENT
Start: 2021-07-04 | End: 2021-07-04 | Stop reason: HOSPADM

## 2021-07-04 RX ORDER — FENOFIBRATE 145 MG/1
145 TABLET, FILM COATED ORAL NIGHTLY
Status: DISCONTINUED | OUTPATIENT
Start: 2021-07-04 | End: 2021-07-04 | Stop reason: SDUPTHER

## 2021-07-04 RX ORDER — INSULIN ASPART 100 [IU]/ML
1-10 INJECTION, SOLUTION INTRAVENOUS; SUBCUTANEOUS
Status: DISCONTINUED | OUTPATIENT
Start: 2021-07-04 | End: 2021-07-04 | Stop reason: HOSPADM

## 2021-07-04 RX ORDER — IBUPROFEN 200 MG
24 TABLET ORAL
Status: DISCONTINUED | OUTPATIENT
Start: 2021-07-04 | End: 2021-07-04 | Stop reason: HOSPADM

## 2021-07-04 RX ORDER — HYDROCODONE BITARTRATE AND ACETAMINOPHEN 5; 325 MG/1; MG/1
1 TABLET ORAL EVERY 6 HOURS PRN
Status: DISCONTINUED | OUTPATIENT
Start: 2021-07-04 | End: 2021-07-04 | Stop reason: HOSPADM

## 2021-07-04 RX ORDER — CLONAZEPAM 0.5 MG/1
1 TABLET ORAL 2 TIMES DAILY PRN
Status: DISCONTINUED | OUTPATIENT
Start: 2021-07-04 | End: 2021-07-04 | Stop reason: HOSPADM

## 2021-07-04 RX ORDER — IPRATROPIUM BROMIDE AND ALBUTEROL SULFATE 2.5; .5 MG/3ML; MG/3ML
3 SOLUTION RESPIRATORY (INHALATION) EVERY 6 HOURS PRN
Status: DISCONTINUED | OUTPATIENT
Start: 2021-07-04 | End: 2021-07-04 | Stop reason: HOSPADM

## 2021-07-04 RX ORDER — FENOFIBRATE 145 MG/1
145 TABLET, FILM COATED ORAL NIGHTLY
Status: DISCONTINUED | OUTPATIENT
Start: 2021-07-04 | End: 2021-07-04 | Stop reason: HOSPADM

## 2021-07-04 RX ADMIN — IOHEXOL 100 ML: 350 INJECTION, SOLUTION INTRAVENOUS at 01:07

## 2021-07-04 RX ADMIN — HYDROCHLOROTHIAZIDE 12.5 MG: 12.5 TABLET ORAL at 09:07

## 2021-07-04 RX ADMIN — GABAPENTIN 600 MG: 300 CAPSULE ORAL at 09:07

## 2021-07-04 RX ADMIN — HYDROCODONE BITARTATE AND ACETAMINOPHEN 1 TABLET: 5; 325 TABLET ORAL at 03:07

## 2021-07-04 RX ADMIN — ASPIRIN 81 MG CHEWABLE TABLET 81 MG: 81 TABLET CHEWABLE at 09:07

## 2021-07-04 RX ADMIN — Medication 6 MG: at 03:07

## 2021-07-04 RX ADMIN — LISINOPRIL 10 MG: 10 TABLET ORAL at 09:07

## 2021-07-04 RX ADMIN — CLONAZEPAM 1 MG: 0.5 TABLET ORAL at 12:07

## 2021-07-04 RX ADMIN — GABAPENTIN 600 MG: 300 CAPSULE ORAL at 04:07

## 2021-07-04 RX ADMIN — ISOSORBIDE MONONITRATE 30 MG: 30 TABLET, EXTENDED RELEASE ORAL at 09:07

## 2021-07-04 RX ADMIN — APIXABAN 5 MG: 2.5 TABLET, FILM COATED ORAL at 09:07

## 2021-07-05 LAB
BACTERIA UR CULT: NORMAL
BACTERIA UR CULT: NORMAL

## 2021-07-06 ENCOUNTER — TELEPHONE (OUTPATIENT)
Dept: VASCULAR SURGERY | Facility: CLINIC | Age: 56
End: 2021-07-06

## 2021-07-07 ENCOUNTER — OFFICE VISIT (OUTPATIENT)
Dept: VASCULAR SURGERY | Facility: CLINIC | Age: 56
End: 2021-07-07
Payer: MEDICAID

## 2021-07-07 VITALS
BODY MASS INDEX: 41.6 KG/M2 | SYSTOLIC BLOOD PRESSURE: 142 MMHG | HEIGHT: 71 IN | DIASTOLIC BLOOD PRESSURE: 88 MMHG | HEART RATE: 70 BPM

## 2021-07-07 DIAGNOSIS — R07.89 PAIN, CHEST WALL: Primary | ICD-10-CM

## 2021-07-07 PROCEDURE — 99024 POSTOP FOLLOW-UP VISIT: CPT | Mod: ,,, | Performed by: THORACIC SURGERY (CARDIOTHORACIC VASCULAR SURGERY)

## 2021-07-07 PROCEDURE — 99214 OFFICE O/P EST MOD 30 MIN: CPT | Mod: PBBFAC,PO | Performed by: THORACIC SURGERY (CARDIOTHORACIC VASCULAR SURGERY)

## 2021-07-07 PROCEDURE — 99999 PR PBB SHADOW E&M-EST. PATIENT-LVL IV: ICD-10-PCS | Mod: PBBFAC,,, | Performed by: THORACIC SURGERY (CARDIOTHORACIC VASCULAR SURGERY)

## 2021-07-07 PROCEDURE — 99999 PR PBB SHADOW E&M-EST. PATIENT-LVL IV: CPT | Mod: PBBFAC,,, | Performed by: THORACIC SURGERY (CARDIOTHORACIC VASCULAR SURGERY)

## 2021-07-07 PROCEDURE — 99024 PR POST-OP FOLLOW-UP VISIT: ICD-10-PCS | Mod: ,,, | Performed by: THORACIC SURGERY (CARDIOTHORACIC VASCULAR SURGERY)

## 2021-07-07 RX ORDER — SIMVASTATIN 10 MG/1
10 TABLET, FILM COATED ORAL DAILY
COMMUNITY
Start: 2021-03-08 | End: 2022-01-09

## 2021-07-07 RX ORDER — FUROSEMIDE 20 MG/1
20 TABLET ORAL DAILY PRN
Status: ON HOLD | COMMUNITY
Start: 2021-06-05 | End: 2022-09-02 | Stop reason: HOSPADM

## 2021-07-07 RX ORDER — OXYCODONE AND ACETAMINOPHEN 10; 325 MG/1; MG/1
1 TABLET ORAL EVERY 6 HOURS PRN
Qty: 45 TABLET | Refills: 0
Start: 2021-07-07 | End: 2022-01-09

## 2021-07-07 RX ORDER — VALACYCLOVIR HYDROCHLORIDE 500 MG/1
500 TABLET, FILM COATED ORAL 3 TIMES DAILY
Status: ON HOLD | COMMUNITY
Start: 2021-02-13 | End: 2022-01-11 | Stop reason: HOSPADM

## 2021-07-07 RX ORDER — UMECLIDINIUM BROMIDE AND VILANTEROL TRIFENATATE 62.5; 25 UG/1; UG/1
1 POWDER RESPIRATORY (INHALATION) DAILY
Status: ON HOLD | COMMUNITY
Start: 2021-05-28 | End: 2022-08-31

## 2021-07-07 RX ORDER — EMPAGLIFLOZIN 25 MG/1
25 TABLET, FILM COATED ORAL DAILY
Status: ON HOLD | COMMUNITY
Start: 2021-06-25 | End: 2024-01-04

## 2021-07-07 RX ORDER — RANOLAZINE 500 MG/1
500 TABLET, EXTENDED RELEASE ORAL 2 TIMES DAILY
Status: ON HOLD | COMMUNITY
Start: 2021-06-25 | End: 2022-08-31

## 2021-07-07 RX ORDER — GABAPENTIN 800 MG/1
TABLET ORAL
COMMUNITY
Start: 2021-06-28 | End: 2022-01-09

## 2021-07-07 RX ORDER — ICOSAPENT ETHYL 1000 MG/1
2 CAPSULE ORAL 2 TIMES DAILY
Status: ON HOLD | COMMUNITY
Start: 2021-06-24 | End: 2022-09-02 | Stop reason: HOSPADM

## 2021-07-07 RX ORDER — SOTALOL HYDROCHLORIDE 80 MG/1
80 TABLET ORAL 2 TIMES DAILY
Status: ON HOLD | COMMUNITY
Start: 2021-06-28 | End: 2022-01-11 | Stop reason: HOSPADM

## 2021-08-12 DIAGNOSIS — E11.9 DIABETES MELLITUS WITHOUT COMPLICATION: Primary | ICD-10-CM

## 2021-08-18 ENCOUNTER — HOSPITAL ENCOUNTER (OUTPATIENT)
Dept: RADIOLOGY | Facility: HOSPITAL | Age: 56
Discharge: HOME | End: 2021-08-18
Attending: PODIATRIST
Payer: MEDICAID

## 2021-08-18 DIAGNOSIS — E11.9 DIABETES MELLITUS WITHOUT COMPLICATION: ICD-10-CM

## 2021-08-18 PROCEDURE — 73630 X-RAY EXAM OF FOOT: CPT | Mod: TC,50

## 2022-01-09 ENCOUNTER — HOSPITAL ENCOUNTER (OUTPATIENT)
Facility: HOSPITAL | Age: 57
Discharge: HOME | End: 2022-01-11
Attending: EMERGENCY MEDICINE | Admitting: STUDENT IN AN ORGANIZED HEALTH CARE EDUCATION/TRAINING PROGRAM
Payer: MEDICAID

## 2022-01-09 DIAGNOSIS — R73.09 ELEVATED GLUCOSE: ICD-10-CM

## 2022-01-09 DIAGNOSIS — Z95.1 S/P CABG (CORONARY ARTERY BYPASS GRAFT): ICD-10-CM

## 2022-01-09 DIAGNOSIS — R07.9 CHEST PAIN: Primary | ICD-10-CM

## 2022-01-09 DIAGNOSIS — G45.9 TIA (TRANSIENT ISCHEMIC ATTACK): ICD-10-CM

## 2022-01-09 DIAGNOSIS — I25.118 CORONARY ARTERY DISEASE OF NATIVE ARTERY OF NATIVE HEART WITH STABLE ANGINA PECTORIS: ICD-10-CM

## 2022-01-09 DIAGNOSIS — I25.10 ATHEROSCLEROSIS OF NATIVE CORONARY ARTERY OF NATIVE HEART WITHOUT ANGINA PECTORIS: ICD-10-CM

## 2022-01-09 DIAGNOSIS — R20.0 RIGHT SIDED NUMBNESS: ICD-10-CM

## 2022-01-09 PROBLEM — R78.81 BACTEREMIA DUE TO GRAM-POSITIVE BACTERIA: Status: RESOLVED | Noted: 2020-04-23 | Resolved: 2022-01-09

## 2022-01-09 PROBLEM — B35.1 ONYCHOMYCOSIS: Status: ACTIVE | Noted: 2021-11-11

## 2022-01-09 PROBLEM — G62.9 POLYNEUROPATHY: Status: ACTIVE | Noted: 2022-01-09

## 2022-01-09 PROBLEM — E66.9 OBESITY (BMI 30-39.9): Status: ACTIVE | Noted: 2022-01-09

## 2022-01-09 PROBLEM — L60.0 INGROWING TOENAIL: Status: ACTIVE | Noted: 2021-11-11

## 2022-01-09 PROBLEM — R53.83 FATIGUE: Status: RESOLVED | Noted: 2017-08-25 | Resolved: 2022-01-09

## 2022-01-09 PROBLEM — N17.9 AKI (ACUTE KIDNEY INJURY): Status: RESOLVED | Noted: 2020-04-23 | Resolved: 2022-01-09

## 2022-01-09 PROBLEM — E66.01 OBESITY, MORBID, BMI 40.0-49.9: Status: RESOLVED | Noted: 2017-08-25 | Resolved: 2022-01-09

## 2022-01-09 LAB
ALBUMIN SERPL BCP-MCNC: 3.8 G/DL (ref 3.5–5.2)
ALP SERPL-CCNC: 58 U/L (ref 55–135)
ALT SERPL W/O P-5'-P-CCNC: 56 U/L (ref 10–44)
ANION GAP SERPL CALC-SCNC: 17 MMOL/L (ref 8–16)
AST SERPL-CCNC: 43 U/L (ref 10–40)
B-OH-BUTYR BLD STRIP-SCNC: 0.2 MMOL/L (ref 0–0.5)
BASOPHILS # BLD AUTO: 0.1 K/UL (ref 0–0.2)
BASOPHILS NFR BLD: 0.9 % (ref 0–1.9)
BILIRUB SERPL-MCNC: 0.3 MG/DL (ref 0.1–1)
BNP SERPL-MCNC: <10 PG/ML (ref 0–99)
BUN SERPL-MCNC: 16 MG/DL (ref 6–20)
CALCIUM SERPL-MCNC: 9.5 MG/DL (ref 8.7–10.5)
CHLORIDE SERPL-SCNC: 99 MMOL/L (ref 95–110)
CO2 SERPL-SCNC: 15 MMOL/L (ref 23–29)
CREAT SERPL-MCNC: 1.4 MG/DL (ref 0.5–1.4)
DIFFERENTIAL METHOD: ABNORMAL
EOSINOPHIL # BLD AUTO: 0.2 K/UL (ref 0–0.5)
EOSINOPHIL NFR BLD: 2.1 % (ref 0–8)
ERYTHROCYTE [DISTWIDTH] IN BLOOD BY AUTOMATED COUNT: 12.9 % (ref 11.5–14.5)
EST. GFR  (AFRICAN AMERICAN): >60 ML/MIN/1.73 M^2
EST. GFR  (NON AFRICAN AMERICAN): 57 ML/MIN/1.73 M^2
GLUCOSE SERPL-MCNC: 467 MG/DL (ref 70–110)
HCT VFR BLD AUTO: 46.9 % (ref 40–54)
HGB BLD-MCNC: 16.2 G/DL (ref 14–18)
IMM GRANULOCYTES # BLD AUTO: 0.04 K/UL (ref 0–0.04)
IMM GRANULOCYTES NFR BLD AUTO: 0.4 % (ref 0–0.5)
LACTATE SERPL-SCNC: 1.8 MMOL/L (ref 0.5–2.2)
LYMPHOCYTES # BLD AUTO: 4.1 K/UL (ref 1–4.8)
LYMPHOCYTES NFR BLD: 38.4 % (ref 18–48)
MCH RBC QN AUTO: 32.3 PG (ref 27–31)
MCHC RBC AUTO-ENTMCNC: 34.5 G/DL (ref 32–36)
MCV RBC AUTO: 94 FL (ref 82–98)
MONOCYTES # BLD AUTO: 0.7 K/UL (ref 0.3–1)
MONOCYTES NFR BLD: 6.4 % (ref 4–15)
NEUTROPHILS # BLD AUTO: 5.5 K/UL (ref 1.8–7.7)
NEUTROPHILS NFR BLD: 51.8 % (ref 38–73)
NRBC BLD-RTO: 0 /100 WBC
PLATELET # BLD AUTO: 223 K/UL (ref 150–450)
PMV BLD AUTO: 10.3 FL (ref 9.2–12.9)
POCT GLUCOSE: 236 MG/DL (ref 70–110)
POCT GLUCOSE: 367 MG/DL (ref 70–110)
POTASSIUM SERPL-SCNC: 4.9 MMOL/L (ref 3.5–5.1)
PROT SERPL-MCNC: 7.4 G/DL (ref 6–8.4)
RBC # BLD AUTO: 5.01 M/UL (ref 4.6–6.2)
SARS-COV-2 RDRP RESP QL NAA+PROBE: NEGATIVE
SODIUM SERPL-SCNC: 131 MMOL/L (ref 136–145)
TROPONIN I SERPL DL<=0.01 NG/ML-MCNC: 0.01 NG/ML (ref 0–0.03)
WBC # BLD AUTO: 10.55 K/UL (ref 3.9–12.7)

## 2022-01-09 PROCEDURE — 84484 ASSAY OF TROPONIN QUANT: CPT | Mod: 91 | Performed by: STUDENT IN AN ORGANIZED HEALTH CARE EDUCATION/TRAINING PROGRAM

## 2022-01-09 PROCEDURE — 93005 ELECTROCARDIOGRAM TRACING: CPT

## 2022-01-09 PROCEDURE — C9399 UNCLASSIFIED DRUGS OR BIOLOG: HCPCS | Performed by: STUDENT IN AN ORGANIZED HEALTH CARE EDUCATION/TRAINING PROGRAM

## 2022-01-09 PROCEDURE — 99285 EMERGENCY DEPT VISIT HI MDM: CPT | Mod: 25

## 2022-01-09 PROCEDURE — 94660 CPAP INITIATION&MGMT: CPT

## 2022-01-09 PROCEDURE — 84484 ASSAY OF TROPONIN QUANT: CPT | Performed by: PHYSICIAN ASSISTANT

## 2022-01-09 PROCEDURE — U0002 COVID-19 LAB TEST NON-CDC: HCPCS | Performed by: PHYSICIAN ASSISTANT

## 2022-01-09 PROCEDURE — 25000003 PHARM REV CODE 250: Performed by: STUDENT IN AN ORGANIZED HEALTH CARE EDUCATION/TRAINING PROGRAM

## 2022-01-09 PROCEDURE — 96374 THER/PROPH/DIAG INJ IV PUSH: CPT

## 2022-01-09 PROCEDURE — S4991 NICOTINE PATCH NONLEGEND: HCPCS | Performed by: STUDENT IN AN ORGANIZED HEALTH CARE EDUCATION/TRAINING PROGRAM

## 2022-01-09 PROCEDURE — 93010 ELECTROCARDIOGRAM REPORT: CPT | Mod: ,,, | Performed by: INTERNAL MEDICINE

## 2022-01-09 PROCEDURE — G0378 HOSPITAL OBSERVATION PER HR: HCPCS

## 2022-01-09 PROCEDURE — 83880 ASSAY OF NATRIURETIC PEPTIDE: CPT | Performed by: STUDENT IN AN ORGANIZED HEALTH CARE EDUCATION/TRAINING PROGRAM

## 2022-01-09 PROCEDURE — 85025 COMPLETE CBC W/AUTO DIFF WBC: CPT | Performed by: PHYSICIAN ASSISTANT

## 2022-01-09 PROCEDURE — 83605 ASSAY OF LACTIC ACID: CPT | Performed by: STUDENT IN AN ORGANIZED HEALTH CARE EDUCATION/TRAINING PROGRAM

## 2022-01-09 PROCEDURE — 25000003 PHARM REV CODE 250: Performed by: PHYSICIAN ASSISTANT

## 2022-01-09 PROCEDURE — 80053 COMPREHEN METABOLIC PANEL: CPT | Performed by: PHYSICIAN ASSISTANT

## 2022-01-09 PROCEDURE — 82010 KETONE BODYS QUAN: CPT | Performed by: PHYSICIAN ASSISTANT

## 2022-01-09 PROCEDURE — 99900035 HC TECH TIME PER 15 MIN (STAT)

## 2022-01-09 PROCEDURE — 63600175 PHARM REV CODE 636 W HCPCS: Performed by: PHYSICIAN ASSISTANT

## 2022-01-09 PROCEDURE — 94761 N-INVAS EAR/PLS OXIMETRY MLT: CPT

## 2022-01-09 PROCEDURE — 82962 GLUCOSE BLOOD TEST: CPT

## 2022-01-09 PROCEDURE — 93010 EKG 12-LEAD: ICD-10-PCS | Mod: ,,, | Performed by: INTERNAL MEDICINE

## 2022-01-09 PROCEDURE — 36415 COLL VENOUS BLD VENIPUNCTURE: CPT | Performed by: STUDENT IN AN ORGANIZED HEALTH CARE EDUCATION/TRAINING PROGRAM

## 2022-01-09 PROCEDURE — 96372 THER/PROPH/DIAG INJ SC/IM: CPT | Mod: 59 | Performed by: EMERGENCY MEDICINE

## 2022-01-09 PROCEDURE — 36415 COLL VENOUS BLD VENIPUNCTURE: CPT | Performed by: PHYSICIAN ASSISTANT

## 2022-01-09 PROCEDURE — 27000190 HC CPAP FULL FACE MASK W/VALVE

## 2022-01-09 PROCEDURE — 63600175 PHARM REV CODE 636 W HCPCS: Performed by: STUDENT IN AN ORGANIZED HEALTH CARE EDUCATION/TRAINING PROGRAM

## 2022-01-09 RX ORDER — ESZOPICLONE 1 MG/1
TABLET, FILM COATED ORAL
Status: ON HOLD | COMMUNITY
End: 2022-08-31

## 2022-01-09 RX ORDER — NAPROXEN SODIUM 220 MG/1
81 TABLET, FILM COATED ORAL DAILY
Status: DISCONTINUED | OUTPATIENT
Start: 2022-01-10 | End: 2022-01-11 | Stop reason: HOSPADM

## 2022-01-09 RX ORDER — SODIUM CHLORIDE 0.9 % (FLUSH) 0.9 %
10 SYRINGE (ML) INJECTION
Status: DISCONTINUED | OUTPATIENT
Start: 2022-01-09 | End: 2022-01-11 | Stop reason: HOSPADM

## 2022-01-09 RX ORDER — GLUCAGON 1 MG
1 KIT INJECTION
Status: DISCONTINUED | OUTPATIENT
Start: 2022-01-09 | End: 2022-01-11 | Stop reason: HOSPADM

## 2022-01-09 RX ORDER — IBUPROFEN 200 MG
16 TABLET ORAL
Status: DISCONTINUED | OUTPATIENT
Start: 2022-01-09 | End: 2022-01-11 | Stop reason: HOSPADM

## 2022-01-09 RX ORDER — CLONAZEPAM 0.5 MG/1
1 TABLET ORAL 3 TIMES DAILY PRN
Status: DISCONTINUED | OUTPATIENT
Start: 2022-01-09 | End: 2022-01-11 | Stop reason: HOSPADM

## 2022-01-09 RX ORDER — FLUTICASONE FUROATE AND VILANTEROL 100; 25 UG/1; UG/1
1 POWDER RESPIRATORY (INHALATION) DAILY
Status: DISCONTINUED | OUTPATIENT
Start: 2022-01-10 | End: 2022-01-11 | Stop reason: HOSPADM

## 2022-01-09 RX ORDER — MECOBAL/LEVOMEFOLAT CA/B6 PHOS 2-3-35 MG
1 TABLET ORAL 2 TIMES DAILY
Status: ON HOLD | COMMUNITY
Start: 2021-11-22 | End: 2022-08-31

## 2022-01-09 RX ORDER — SODIUM,POTASSIUM PHOSPHATES 280-250MG
2 POWDER IN PACKET (EA) ORAL
Status: DISCONTINUED | OUTPATIENT
Start: 2022-01-09 | End: 2022-01-11 | Stop reason: HOSPADM

## 2022-01-09 RX ORDER — IBUPROFEN 200 MG
1 TABLET ORAL DAILY
Status: DISCONTINUED | OUTPATIENT
Start: 2022-01-09 | End: 2022-01-11 | Stop reason: HOSPADM

## 2022-01-09 RX ORDER — HYDROCODONE BITARTRATE AND ACETAMINOPHEN 5; 325 MG/1; MG/1
1 TABLET ORAL EVERY 6 HOURS PRN
Status: DISCONTINUED | OUTPATIENT
Start: 2022-01-09 | End: 2022-01-11 | Stop reason: HOSPADM

## 2022-01-09 RX ORDER — INFLUENZA A VIRUS A/NEBRASKA/14/2019 (H1N1) ANTIGEN (MDCK CELL DERIVED, PROPIOLACTONE INACTIVATED), INFLUENZA A VIRUS A/DELAWARE/39/2019 (H3N2) ANTIGEN (MDCK CELL DERIVED, PROPIOLACTONE INACTIVATED), INFLUENZA B VIRUS B/SINGAPORE/INFTT-16-0610/2016 ANTIGEN (MDCK CELL DERIVED, PROPIOLACTONE INACTIVATED), INFLUENZA B VIRUS B/DARWIN/7/2019 ANTIGEN (MDCK CELL DERIVED, PROPIOLACTONE INACTIVATED) 15; 15; 15; 15 UG/.5ML; UG/.5ML; UG/.5ML; UG/.5ML
INJECTION, SUSPENSION INTRAMUSCULAR
Status: ON HOLD | COMMUNITY
End: 2022-09-02 | Stop reason: HOSPADM

## 2022-01-09 RX ORDER — IBUPROFEN 200 MG
24 TABLET ORAL
Status: DISCONTINUED | OUTPATIENT
Start: 2022-01-09 | End: 2022-01-11 | Stop reason: HOSPADM

## 2022-01-09 RX ORDER — ATORVASTATIN CALCIUM 40 MG/1
40 TABLET, FILM COATED ORAL DAILY
Status: DISCONTINUED | OUTPATIENT
Start: 2022-01-10 | End: 2022-01-11

## 2022-01-09 RX ORDER — INSULIN ASPART 100 [IU]/ML
1-10 INJECTION, SOLUTION INTRAVENOUS; SUBCUTANEOUS
Status: DISCONTINUED | OUTPATIENT
Start: 2022-01-09 | End: 2022-01-11 | Stop reason: HOSPADM

## 2022-01-09 RX ORDER — FENOFIBRATE 145 MG/1
145 TABLET, FILM COATED ORAL NIGHTLY
Status: DISCONTINUED | OUTPATIENT
Start: 2022-01-09 | End: 2022-01-11 | Stop reason: HOSPADM

## 2022-01-09 RX ORDER — METHOCARBAMOL 500 MG/1
500 TABLET, FILM COATED ORAL NIGHTLY PRN
Status: ON HOLD | COMMUNITY
Start: 2021-12-11 | End: 2022-09-02 | Stop reason: HOSPADM

## 2022-01-09 RX ORDER — LISINOPRIL 10 MG/1
10 TABLET ORAL DAILY
Status: DISCONTINUED | OUTPATIENT
Start: 2022-01-10 | End: 2022-01-11 | Stop reason: HOSPADM

## 2022-01-09 RX ORDER — AMITRIPTYLINE HYDROCHLORIDE 25 MG/1
TABLET, FILM COATED ORAL
Status: ON HOLD | COMMUNITY
End: 2022-08-31

## 2022-01-09 RX ORDER — ISOSORBIDE MONONITRATE 30 MG/1
30 TABLET, EXTENDED RELEASE ORAL DAILY
Status: DISCONTINUED | OUTPATIENT
Start: 2022-01-10 | End: 2022-01-11 | Stop reason: HOSPADM

## 2022-01-09 RX ORDER — SODIUM CHLORIDE 9 MG/ML
INJECTION, SOLUTION INTRAVENOUS
Status: COMPLETED | OUTPATIENT
Start: 2022-01-09 | End: 2022-01-09

## 2022-01-09 RX ORDER — RANOLAZINE 500 MG/1
500 TABLET, EXTENDED RELEASE ORAL 2 TIMES DAILY
Status: DISCONTINUED | OUTPATIENT
Start: 2022-01-09 | End: 2022-01-11 | Stop reason: HOSPADM

## 2022-01-09 RX ORDER — IPRATROPIUM BROMIDE AND ALBUTEROL SULFATE 2.5; .5 MG/3ML; MG/3ML
3 SOLUTION RESPIRATORY (INHALATION) EVERY 6 HOURS PRN
Status: DISCONTINUED | OUTPATIENT
Start: 2022-01-09 | End: 2022-01-11 | Stop reason: HOSPADM

## 2022-01-09 RX ORDER — HYDROCHLOROTHIAZIDE 12.5 MG/1
12.5 TABLET ORAL DAILY
Status: DISCONTINUED | OUTPATIENT
Start: 2022-01-10 | End: 2022-01-11 | Stop reason: HOSPADM

## 2022-01-09 RX ORDER — TAMSULOSIN HYDROCHLORIDE 0.4 MG/1
0.4 CAPSULE ORAL NIGHTLY
Status: DISCONTINUED | OUTPATIENT
Start: 2022-01-09 | End: 2022-01-11 | Stop reason: HOSPADM

## 2022-01-09 RX ORDER — NALOXONE HCL 0.4 MG/ML
0.02 VIAL (ML) INJECTION
Status: DISCONTINUED | OUTPATIENT
Start: 2022-01-09 | End: 2022-01-11 | Stop reason: HOSPADM

## 2022-01-09 RX ORDER — AMITRIPTYLINE HYDROCHLORIDE 25 MG/1
25 TABLET, FILM COATED ORAL NIGHTLY PRN
Status: DISCONTINUED | OUTPATIENT
Start: 2022-01-09 | End: 2022-01-11 | Stop reason: HOSPADM

## 2022-01-09 RX ORDER — NITROGLYCERIN 0.4 MG/1
0.4 TABLET SUBLINGUAL EVERY 5 MIN PRN
Status: DISCONTINUED | OUTPATIENT
Start: 2022-01-09 | End: 2022-01-11 | Stop reason: HOSPADM

## 2022-01-09 RX ORDER — METOPROLOL SUCCINATE 50 MG/1
100 TABLET, EXTENDED RELEASE ORAL DAILY
Status: DISCONTINUED | OUTPATIENT
Start: 2022-01-10 | End: 2022-01-11 | Stop reason: HOSPADM

## 2022-01-09 RX ORDER — GABAPENTIN 300 MG/1
600 CAPSULE ORAL 3 TIMES DAILY
Status: DISCONTINUED | OUTPATIENT
Start: 2022-01-09 | End: 2022-01-11 | Stop reason: HOSPADM

## 2022-01-09 RX ORDER — SOTALOL HYDROCHLORIDE 80 MG/1
80 TABLET ORAL 2 TIMES DAILY
Status: CANCELLED | OUTPATIENT
Start: 2022-01-09

## 2022-01-09 RX ORDER — ACETAMINOPHEN 325 MG/1
650 TABLET ORAL EVERY 8 HOURS PRN
Status: DISCONTINUED | OUTPATIENT
Start: 2022-01-09 | End: 2022-01-11 | Stop reason: HOSPADM

## 2022-01-09 RX ORDER — TALC
6 POWDER (GRAM) TOPICAL NIGHTLY PRN
Status: DISCONTINUED | OUTPATIENT
Start: 2022-01-09 | End: 2022-01-11 | Stop reason: HOSPADM

## 2022-01-09 RX ORDER — PNEUMOCOCCAL VACCINE POLYVALENT 25; 25; 25; 25; 25; 25; 25; 25; 25; 25; 25; 25; 25; 25; 25; 25; 25; 25; 25; 25; 25; 25; 25 UG/.5ML; UG/.5ML; UG/.5ML; UG/.5ML; UG/.5ML; UG/.5ML; UG/.5ML; UG/.5ML; UG/.5ML; UG/.5ML; UG/.5ML; UG/.5ML; UG/.5ML; UG/.5ML; UG/.5ML; UG/.5ML; UG/.5ML; UG/.5ML; UG/.5ML; UG/.5ML; UG/.5ML; UG/.5ML; UG/.5ML
INJECTION, SOLUTION INTRAMUSCULAR; SUBCUTANEOUS
Status: ON HOLD | COMMUNITY
End: 2022-09-02 | Stop reason: HOSPADM

## 2022-01-09 RX ADMIN — Medication 1 PATCH: at 05:01

## 2022-01-09 RX ADMIN — APIXABAN 5 MG: 2.5 TABLET, FILM COATED ORAL at 08:01

## 2022-01-09 RX ADMIN — INSULIN HUMAN 10 UNITS: 100 INJECTION, SOLUTION PARENTERAL at 02:01

## 2022-01-09 RX ADMIN — SODIUM CHLORIDE: 0.9 INJECTION, SOLUTION INTRAVENOUS at 02:01

## 2022-01-09 RX ADMIN — RANOLAZINE 500 MG: 500 TABLET, FILM COATED, EXTENDED RELEASE ORAL at 08:01

## 2022-01-09 RX ADMIN — GABAPENTIN 600 MG: 300 CAPSULE ORAL at 08:01

## 2022-01-09 RX ADMIN — FENOFIBRATE 145 MG: 145 TABLET, FILM COATED ORAL at 08:01

## 2022-01-09 RX ADMIN — INSULIN DETEMIR 80 UNITS: 100 INJECTION, SOLUTION SUBCUTANEOUS at 08:01

## 2022-01-09 RX ADMIN — INSULIN ASPART 5 UNITS: 100 INJECTION, SOLUTION INTRAVENOUS; SUBCUTANEOUS at 08:01

## 2022-01-09 NOTE — SUBJECTIVE & OBJECTIVE
Past Medical History:   Diagnosis Date    Anticoagulant long-term use     Atrial fibrillation 2018    BPH (benign prostatic hyperplasia)     Cataract     COPD (chronic obstructive pulmonary disease)     Coronary artery disease     stents    CVD (cardiovascular disease)     Diabetes mellitus     Erythema multiforme     AKA Denton Edmondson Syndrome    Hypertension     Infected incision     MI (myocardial infarction)     per pt he has had 4     ROSAMARIA on CPAP     RLS (restless legs syndrome)        Past Surgical History:   Procedure Laterality Date    CORONARY ARTERY BYPASS GRAFT (CABG) N/A 4/7/2020    Procedure: CORONARY ARTERY BYPASS GRAFT (CABG)- Excision of left atrial appendage;  Surgeon: Doug Solitario MD;  Location: [facility] OR;  Service: Cardiothoracic;  Laterality: N/A;    CORONARY STENT PLACEMENT      WILSON MAZE PROCEDURE N/A 4/7/2020    Procedure: WILSON MAZE PROCEDURE- Attempted;  Surgeon: Doug Solitario MD;  Location: [facility] OR;  Service: Cardiothoracic;  Laterality: N/A;    CYSTOSCOPY N/A 12/10/2018    Procedure: CYSTOSCOPY;  Surgeon: Khushboo Abreu MD;  Location: [facility] OR;  Service: Urology;  Laterality: N/A;    ENDOSCOPIC HARVEST OF VEIN Left 4/7/2020    Procedure: HARVEST-VEIN-ENDOVASCULAR;  Surgeon: Doug Solitario MD;  Location: [facility] OR;  Service: Cardiothoracic;  Laterality: Left;    LEFT HEART CATHETERIZATION Left 3/17/2020    Procedure: CATHETERIZATION, HEART, LEFT;  Surgeon: Tyree Walters MD;  Location: [facility] CATH/EP LAB;  Service: Cardiology;  Laterality: Left;    STERNAL WIRES REMOVAL N/A 6/8/2020    Procedure: REMOVAL, STERNAL WIRE;  Surgeon: Doug Solitario MD;  Location: [facility] OR;  Service: Peripheral Vascular;  Laterality: N/A;    ULTRASOUND OF PROSTATE FOR VOLUME DETERMINATION  12/10/2018    Procedure: ULTRASOUND, PROSTATE, FOR VOLUME DETERMINATION;  Surgeon: Khushboo Abreu MD;  Location: [facility] OR;  Service: Urology;;       Review of  patient's allergies indicates:   Allergen Reactions    Pesticide Dermatitis and Rash       No current facility-administered medications on file prior to encounter.     Current Outpatient Medications on File Prior to Encounter   Medication Sig    ACCU-CHEK GUIDE Strp USE 1 STRIP TO CHECK BLOOD SUGAR TWICE DAILY    amitriptyline (ELAVIL) 25 MG tablet amitriptyline 25 mg tablet   TAKE 1 TO 2 TABLETS BY MOUTH AT BEDTIME AS NEEDED NEUROPATHIC PAIN RELIEF AND SLEEP AID    ANORO ELLIPTA 62.5-25 mcg/actuation DsDv Inhale 1 puff into the lungs once daily.    apixaban (ELIQUIS) 5 mg Tab Take 5 mg by mouth 2 (two) times daily.    aspirin 81 MG Chew Take 81 mg by mouth once daily.     clonazePAM (KLONOPIN) 1 MG tablet Take 1 mg by mouth 3 (three) times daily as needed for Anxiety.     COVID-19 vacc,mRNA,Pfizer,,PF, (COMIRNATY, PF,) 30 mcg/0.3 mL SusR Pfizer-Composeright COVID-19 Vaccine (PF) 30 mcg/0.3 mL IM susp (purple)    eszopiclone (LUNESTA) 1 MG Tab eszopiclone 1 mg tablet   TAKE ONE TABLET BY MOUTH AT BEDTIME AS NEEDED. STOP restoril    fenofibrate (TRICOR) 145 MG tablet Take 145 mg by mouth every evening.     flu vac qs 2020-21,4 yr up, CD (FLUCELVAX QUAD 3385-3295) 60 mcg (15 mcg x 4)/0.5 mL Susp Flucelvax Quad [id number] 60 mcg (15 mcg x 4)/0.5 mL intramuscular susp    FOLTANX 3-35-2 mg Tab Take 1 tablet by mouth 2 (two) times daily.    furosemide (LASIX) 20 MG tablet Take 20 mg by mouth daily as needed.    gabapentin (NEURONTIN) 600 MG tablet Take 600 mg by mouth 3 (three) times daily.     glimepiride (AMARYL) 4 MG tablet Take 4 mg by mouth 2 (two) times daily.    hydroCHLOROthiazide (HYDRODIURIL) 12.5 MG Tab Take 12.5 mg by mouth once daily.    HYDROcodone-acetaminophen (NORCO) 5-325 mg per tablet Take 1 tablet by mouth every 6 (six) hours as needed for Pain.    icosapent ethyL (VASCEPA) 1 gram Cap Take 2 capsules by mouth 2 (two) times daily.    isosorbide mononitrate (IMDUR) 30 MG 24 hr tablet Take 1  "tablet (30 mg total) by mouth once daily.    JARDIANCE 25 mg tablet Take 25 mg by mouth once daily.    LANTUS SOLOSTAR U-100 INSULIN glargine 100 units/mL (3mL) SubQ pen Inject 80 Units into the skin 2 (two) times daily. Changed to 80 units BID 2months ago    lisinopriL 10 MG tablet Take 10 mg by mouth once daily.     metFORMIN (GLUCOPHAGE) 1000 MG tablet Take 1,000 mg by mouth 2 (two) times daily with meals.     methocarbamoL (ROBAXIN) 500 MG Tab Take 500 mg by mouth nightly as needed.    metoprolol succinate (TOPROL-XL) 100 MG 24 hr tablet Take 1 tablet (100 mg total) by mouth once daily.    nitroGLYCERIN (NITROSTAT) 0.4 MG SL tablet Place 0.4 mg under the tongue every 5 (five) minutes as needed for Chest pain.    pen needle, diabetic 31 gauge x 1/4" Ndle USE 1 TWICE DAILY FOR BLOOD SUGAR GREATER THAN 200    pneumococcal 23-lily ps (PNEUMOVAX-23) 25 mcg/0.5 mL vaccine Pneumovax-23 25 mcg/0.5 mL injection syringe    ranolazine (RANEXA) 500 MG Tb12 Take 500 mg by mouth 2 (two) times daily.    rosuvastatin (CRESTOR) 20 MG tablet Take 20 mg by mouth every evening.     sotaloL (BETAPACE) 80 MG tablet Take 80 mg by mouth 2 (two) times daily.    tamsulosin (FLOMAX) 0.4 mg Cap Take 1 capsule (0.4 mg total) by mouth every evening.    valACYclovir (VALTREX) 500 MG tablet Take 500 mg by mouth 3 (three) times daily.    [DISCONTINUED] gabapentin (NEURONTIN) 800 MG tablet Take by mouth.    [DISCONTINUED] oxyCODONE-acetaminophen (PERCOCET)  mg per tablet Take 1 tablet by mouth every 6 (six) hours as needed for Pain.    [DISCONTINUED] simvastatin (ZOCOR) 10 MG tablet Take 10 mg by mouth once daily.     Family History     Problem Relation (Age of Onset)    Cancer Father    Diabetes Mother    Heart disease Mother    Hyperlipidemia Father        Tobacco Use    Smoking status: Current Every Day Smoker     Packs/day: 0.30     Years: 30.00     Pack years: 9.00     Types: Cigarettes     Last attempt to quit: " 2020     Years since quittin.7    Smokeless tobacco: Never Used    Tobacco comment: just under a pack a day   Substance and Sexual Activity    Alcohol use: Not Currently    Drug use: Yes     Frequency: 7.0 times per week     Types: Marijuana     Comment: nightly to help him sleep last done at 2200on 7/3/21    Sexual activity: Not Currently     Review of Systems   Constitutional: Positive for activity change. Negative for appetite change and diaphoresis.   HENT: Negative.    Eyes: Negative.    Respiratory: Negative for cough and shortness of breath.    Cardiovascular: Positive for chest pain. Negative for palpitations and leg swelling.   Gastrointestinal: Negative.    Endocrine: Negative.    Genitourinary: Negative.    Musculoskeletal: Negative.    Skin: Negative.    Allergic/Immunologic: Negative.    Neurological: Positive for numbness.   Hematological: Negative.    Psychiatric/Behavioral: Negative.      Objective:     Vital Signs (Most Recent):  Temp: 97.8 °F (36.6 °C) (22 1215)  Pulse: 78 (22 1505)  Resp: 20 (22 1215)  BP: 115/72 (22 1505)  SpO2: 98 % (22 1505) Vital Signs (24h Range):  Temp:  [97.8 °F (36.6 °C)] 97.8 °F (36.6 °C)  Pulse:  [72-89] 78  Resp:  [20] 20  SpO2:  [95 %-99 %] 98 %  BP: (113-145)/(56-85) 115/72     Weight: 127 kg (280 lb)  Body mass index is 39.05 kg/m².    Physical Exam  Vitals and nursing note reviewed.   Constitutional:       Appearance: He is obese. He is ill-appearing.   HENT:      Head: Normocephalic and atraumatic.      Right Ear: External ear normal.      Left Ear: External ear normal.      Nose: Nose normal.      Mouth/Throat:      Mouth: Mucous membranes are moist.      Pharynx: Oropharynx is clear.   Eyes:      Extraocular Movements: Extraocular movements intact.      Conjunctiva/sclera: Conjunctivae normal.   Neck:      Vascular: No carotid bruit.   Cardiovascular:      Rate and Rhythm: Normal rate and regular rhythm.      Pulses:  Normal pulses.      Heart sounds: Normal heart sounds.   Pulmonary:      Effort: Pulmonary effort is normal. No respiratory distress.      Breath sounds: Normal breath sounds.   Abdominal:      General: Bowel sounds are normal. There is no distension.      Tenderness: There is no abdominal tenderness.   Musculoskeletal:         General: Normal range of motion.      Cervical back: Normal range of motion and neck supple.      Right lower leg: No edema.      Left lower leg: No edema.   Skin:     General: Skin is warm and dry.      Comments: Tattoos. Mid-sternal scar.   Neurological:      General: No focal deficit present.      Mental Status: He is oriented to person, place, and time. Mental status is at baseline.   Psychiatric:         Mood and Affect: Mood normal.         Behavior: Behavior normal.             Significant Labs: All pertinent labs within the past 24 hours have been reviewed.    Significant Imaging: I have reviewed all pertinent imaging results/findings within the past 24 hours.

## 2022-01-09 NOTE — H&P
"[facility]  [facility] Medicine  History & Physical    Patient Name: Magdaleno Cunningham  MRN: [id number]  Patient Class: OP- Observation  Admission Date: 1/9/2022  Attending Physician: Andrea Sims MD  Primary Care Provider: Antonio Del Cid MD         Patient information was obtained from patient, past medical records and ER records.     Subjective:     Principal Problem:<principal problem not specified>    Chief Complaint:   Chief Complaint   Patient presents with    Chest Pain     X 1 week  / SL NTG " all week "  rt. Arm numbness     Weakness        HPI: Magdaleno Cunningham is a 56 year old male with a past medical history of CAD s/p CABG, obesity, DM, HTN, HLD, ROSAMARIA, COPd, Afib, tobacco use who presents with multiple days of mid-sternal intermittent chest pain occurring at rest radiating to the left arm now associated with right upper and lower extremity numbness for one day. He denies chest pain at this time and states that the numbness is improving. He states that he has been out of Eliquis for multiple days. He states he is still smoking over a pack per day. The chest pain has not been relieved with nitroglycerin. He was not stroke activated in the ED, yet Tele-Neurology was contacted. Hospital Medicine was consulted for admission.      Past Medical History:   Diagnosis Date    Anticoagulant long-term use     Atrial fibrillation 2018    BPH (benign prostatic hyperplasia)     Cataract     COPD (chronic obstructive pulmonary disease)     Coronary artery disease     stents    CVD (cardiovascular disease)     Diabetes mellitus     Erythema multiforme     AKA Denton Edmondson Syndrome    Hypertension     Infected incision     MI (myocardial infarction)     per pt he has had 4     ROSAMARIA on CPAP     RLS (restless legs syndrome)        Past Surgical History:   Procedure Laterality Date    CORONARY ARTERY BYPASS GRAFT (CABG) N/A 4/7/2020    Procedure: CORONARY ARTERY BYPASS GRAFT (CABG)- " Excision of left atrial appendage;  Surgeon: Doug Solitario MD;  Location: [facility] OR;  Service: Cardiothoracic;  Laterality: N/A;    CORONARY STENT PLACEMENT      WILSON MAZE PROCEDURE N/A 4/7/2020    Procedure: WILSON MAZE PROCEDURE- Attempted;  Surgeon: Doug Solitario MD;  Location: [facility] OR;  Service: Cardiothoracic;  Laterality: N/A;    CYSTOSCOPY N/A 12/10/2018    Procedure: CYSTOSCOPY;  Surgeon: Khushboo Abreu MD;  Location: [facility];  Service: Urology;  Laterality: N/A;    ENDOSCOPIC HARVEST OF VEIN Left 4/7/2020    Procedure: HARVEST-VEIN-ENDOVASCULAR;  Surgeon: Doug Solitario MD;  Location: [facility] OR;  Service: Cardiothoracic;  Laterality: Left;    LEFT HEART CATHETERIZATION Left 3/17/2020    Procedure: CATHETERIZATION, HEART, LEFT;  Surgeon: Tyree Walters MD;  Location: [facility] CATH/EP LAB;  Service: Cardiology;  Laterality: Left;    STERNAL WIRES REMOVAL N/A 6/8/2020    Procedure: REMOVAL, STERNAL WIRE;  Surgeon: Doug Solitario MD;  Location: [facility] OR;  Service: Peripheral Vascular;  Laterality: N/A;    ULTRASOUND OF PROSTATE FOR VOLUME DETERMINATION  12/10/2018    Procedure: ULTRASOUND, PROSTATE, FOR VOLUME DETERMINATION;  Surgeon: Khushboo Abreu MD;  Location: [facility];  Service: Urology;;       Review of patient's allergies indicates:   Allergen Reactions    Pesticide Dermatitis and Rash       No current facility-administered medications on file prior to encounter.     Current Outpatient Medications on File Prior to Encounter   Medication Sig    ACCU-CHEK GUIDE Strp USE 1 STRIP TO CHECK BLOOD SUGAR TWICE DAILY    amitriptyline (ELAVIL) 25 MG tablet amitriptyline 25 mg tablet   TAKE 1 TO 2 TABLETS BY MOUTH AT BEDTIME AS NEEDED NEUROPATHIC PAIN RELIEF AND SLEEP AID    ANORO ELLIPTA 62.5-25 mcg/actuation DsDv Inhale 1 puff into the lungs once daily.    apixaban (ELIQUIS) 5 mg Tab Take 5 mg by mouth 2 (two) times daily.    aspirin 81 MG Chew Take 81 mg by mouth  once daily.     clonazePAM (KLONOPIN) 1 MG tablet Take 1 mg by mouth 3 (three) times daily as needed for Anxiety.     COVID-19 vacc,mRNA,Pfizer,,PF, (COMIRNATY, PF,) 30 mcg/0.3 mL SusR Pfizer-Fewzion COVID-19 Vaccine (PF) 30 mcg/0.3 mL IM susp (purple)    eszopiclone (LUNESTA) 1 MG Tab eszopiclone 1 mg tablet   TAKE ONE TABLET BY MOUTH AT BEDTIME AS NEEDED. STOP restoril    fenofibrate (TRICOR) 145 MG tablet Take 145 mg by mouth every evening.     flu vac qs 2020-21,4 yr up, CD (FLUCELVAX QUAD 6838-1081) 60 mcg (15 mcg x 4)/0.5 mL Susp Flucelvax Quad 6485-8236 60 mcg (15 mcg x 4)/0.5 mL intramuscular susp    FOLTANX 3-35-2 mg Tab Take 1 tablet by mouth 2 (two) times daily.    furosemide (LASIX) 20 MG tablet Take 20 mg by mouth daily as needed.    gabapentin (NEURONTIN) 600 MG tablet Take 600 mg by mouth 3 (three) times daily.     glimepiride (AMARYL) 4 MG tablet Take 4 mg by mouth 2 (two) times daily.    hydroCHLOROthiazide (HYDRODIURIL) 12.5 MG Tab Take 12.5 mg by mouth once daily.    HYDROcodone-acetaminophen (NORCO) 5-325 mg per tablet Take 1 tablet by mouth every 6 (six) hours as needed for Pain.    icosapent ethyL (VASCEPA) 1 gram Cap Take 2 capsules by mouth 2 (two) times daily.    isosorbide mononitrate (IMDUR) 30 MG 24 hr tablet Take 1 tablet (30 mg total) by mouth once daily.    JARDIANCE 25 mg tablet Take 25 mg by mouth once daily.    LANTUS SOLOSTAR U-100 INSULIN glargine 100 units/mL (3mL) SubQ pen Inject 80 Units into the skin 2 (two) times daily. Changed to 80 units BID 2months ago    lisinopriL 10 MG tablet Take 10 mg by mouth once daily.     metFORMIN (GLUCOPHAGE) 1000 MG tablet Take 1,000 mg by mouth 2 (two) times daily with meals.     methocarbamoL (ROBAXIN) 500 MG Tab Take 500 mg by mouth nightly as needed.    metoprolol succinate (TOPROL-XL) 100 MG 24 hr tablet Take 1 tablet (100 mg total) by mouth once daily.    nitroGLYCERIN (NITROSTAT) 0.4 MG SL tablet Place 0.4 mg under  "the tongue every 5 (five) minutes as needed for Chest pain.    pen needle, diabetic 31 gauge x 1/4" Ndle USE 1 TWICE DAILY FOR BLOOD SUGAR GREATER THAN 200    pneumococcal 23-lily ps (PNEUMOVAX-23) 25 mcg/0.5 mL vaccine Pneumovax-23 25 mcg/0.5 mL injection syringe    ranolazine (RANEXA) 500 MG Tb12 Take 500 mg by mouth 2 (two) times daily.    rosuvastatin (CRESTOR) 20 MG tablet Take 20 mg by mouth every evening.     sotaloL (BETAPACE) 80 MG tablet Take 80 mg by mouth 2 (two) times daily.    tamsulosin (FLOMAX) 0.4 mg Cap Take 1 capsule (0.4 mg total) by mouth every evening.    valACYclovir (VALTREX) 500 MG tablet Take 500 mg by mouth 3 (three) times daily.    [DISCONTINUED] gabapentin (NEURONTIN) 800 MG tablet Take by mouth.    [DISCONTINUED] oxyCODONE-acetaminophen (PERCOCET)  mg per tablet Take 1 tablet by mouth every 6 (six) hours as needed for Pain.    [DISCONTINUED] simvastatin (ZOCOR) 10 MG tablet Take 10 mg by mouth once daily.     Family History     Problem Relation (Age of Onset)    Cancer Father    Diabetes Mother    Heart disease Mother    Hyperlipidemia Father        Tobacco Use    Smoking status: Current Every Day Smoker     Packs/day: 0.30     Years: 30.00     Pack years: 9.00     Types: Cigarettes     Last attempt to quit: 2020     Years since quittin.7    Smokeless tobacco: Never Used    Tobacco comment: just under a pack a day   Substance and Sexual Activity    Alcohol use: Not Currently    Drug use: Yes     Frequency: 7.0 times per week     Types: Marijuana     Comment: nightly to help him sleep last done at 2200on 7/3/21    Sexual activity: Not Currently     Review of Systems   Constitutional: Positive for activity change. Negative for appetite change and diaphoresis.   HENT: Negative.    Eyes: Negative.    Respiratory: Negative for cough and shortness of breath.    Cardiovascular: Positive for chest pain. Negative for palpitations and leg swelling.   Gastrointestinal: " Negative.    Endocrine: Negative.    Genitourinary: Negative.    Musculoskeletal: Negative.    Skin: Negative.    Allergic/Immunologic: Negative.    Neurological: Positive for numbness.   Hematological: Negative.    Psychiatric/Behavioral: Negative.      Objective:     Vital Signs (Most Recent):  Temp: 97.8 °F (36.6 °C) (01/09/22 1215)  Pulse: 78 (01/09/22 1505)  Resp: 20 (01/09/22 1215)  BP: 115/72 (01/09/22 1505)  SpO2: 98 % (01/09/22 1505) Vital Signs (24h Range):  Temp:  [97.8 °F (36.6 °C)] 97.8 °F (36.6 °C)  Pulse:  [72-89] 78  Resp:  [20] 20  SpO2:  [95 %-99 %] 98 %  BP: (113-145)/(56-85) 115/72     Weight: 127 kg (280 lb)  Body mass index is 39.05 kg/m².    Physical Exam  Vitals and nursing note reviewed.   Constitutional:       Appearance: He is obese. He is ill-appearing.   HENT:      Head: Normocephalic and atraumatic.      Right Ear: External ear normal.      Left Ear: External ear normal.      Nose: Nose normal.      Mouth/Throat:      Mouth: Mucous membranes are moist.      Pharynx: Oropharynx is clear.   Eyes:      Extraocular Movements: Extraocular movements intact.      Conjunctiva/sclera: Conjunctivae normal.   Neck:      Vascular: No carotid bruit.   Cardiovascular:      Rate and Rhythm: Normal rate and regular rhythm.      Pulses: Normal pulses.      Heart sounds: Normal heart sounds.   Pulmonary:      Effort: Pulmonary effort is normal. No respiratory distress.      Breath sounds: Normal breath sounds.   Abdominal:      General: Bowel sounds are normal. There is no distension.      Tenderness: There is no abdominal tenderness.   Musculoskeletal:         General: Normal range of motion.      Cervical back: Normal range of motion and neck supple.      Right lower leg: No edema.      Left lower leg: No edema.   Skin:     General: Skin is warm and dry.      Comments: Tattoos. Mid-sternal scar.   Neurological:      General: No focal deficit present.      Mental Status: He is oriented to person, place,  and time. Mental status is at baseline.   Psychiatric:         Mood and Affect: Mood normal.         Behavior: Behavior normal.             Significant Labs: All pertinent labs within the past 24 hours have been reviewed.    Significant Imaging: I have reviewed all pertinent imaging results/findings within the past 24 hours.    Assessment/Plan:     Obesity (BMI 30-39.9)  Body mass index is 39.05 kg/m². Morbid obesity complicates all aspects of disease management from diagnostic modalities to treatment. Weight loss encouraged and health benefits explained to patient.        Polyneuropathy  -Gabapentin      Chest pain  HEART score of 4.  -Cardiology consulted  -Trend troponin  -Telemetry  -Continue ASA and Eliquis  -Lisinopril and metoprolol  -Statin      Atrial fibrillation  -Metoprolol  -Eliquis  -Telemetry      Coronary artery disease of native artery of native heart with stable angina pectoris  -ASA and statin  -Telemetry      Prostate enlargement  -Flomax      Generalized anxiety disorder  -Klonopin PRN      Smoker 1-2 packs per day since 14 years old  Dangers of cigarette smoking were reviewed with patient in detail for 10 minutes and patient was encouraged to quit. Nicotine replacement options were discussed.        ROSAMARIA (obstructive sleep apnea)  -CPAP QHs    Hypertension associated with diabetes  -Lisinopril  -Metoprolol  -Imdur      Type 2 diabetes mellitus with diabetic arthropathy  -Detemir 80 BID  -SSI  -AC HS glucose checks  -Hold home medications  -Diabetic diet  -Hypoglycemic precautions      COPD (chronic obstructive pulmonary disease)  -Duoneb PRN  -Breo        VTE Risk Mitigation (From admission, onward)    None             Andrea Sims MD  Department of Hospital Medicine   [facility] - Emergency Dept

## 2022-01-09 NOTE — ED NOTES
"Placed call for stroke telemedicine via [facility] center ([phone]); "Dr. Hightower will be consulted" per same.  "

## 2022-01-09 NOTE — HPI
Magdaleno Cunningham is a 56 year old male with a past medical history of CAD s/p CABG, obesity, DM, HTN, HLD, ROSAMARIA, COPd, Afib, tobacco use who presents with multiple days of mid-sternal intermittent chest pain occurring at rest radiating to the left arm now associated with right upper and lower extremity numbness for one day. He denies chest pain at this time and states that the numbness is improving. He states that he has been out of Eliquis for multiple days. He states he is still smoking over a pack per day. The chest pain has not been relieved with nitroglycerin. He was not stroke activated in the ED, yet Tele-Neurology was contacted. Hospital Medicine was consulted for admission.

## 2022-01-09 NOTE — ASSESSMENT & PLAN NOTE
HEART score of 4.  -Cardiology consulted  -Trend troponin  -Telemetry  -Continue ASA and Eliquis  -Lisinopril and metoprolol  -Statin

## 2022-01-09 NOTE — PLAN OF CARE
"56 year old male with PMHx of COPD, DM, HTN, A-Fib, MI, CVD, CAD, and ROSAMARIA on CPAP presents with right arm numbness since 3 am. He is out of TPA window and symptoms not consistent with large vessel occlusion. We decided to cancel stroke alert as he is not candidate of acute intervention.     Per ER note"He states the CP starts in the right side of the chest and radiates down the right arm. "  CT brain did not show any acute intracranial occlusion.   Neurology consult.   He is already on Eliquis.         Current Facility-Administered Medications:     nicotine 21 mg/24 hr 1 patch, 1 patch, Transdermal, Daily, Andrea Sims MD    Current Outpatient Medications:     ACCU-CHEK GUIDE Strp, USE 1 STRIP TO CHECK BLOOD SUGAR TWICE DAILY, Disp: , Rfl:     amitriptyline (ELAVIL) 25 MG tablet, amitriptyline 25 mg tablet  TAKE 1 TO 2 TABLETS BY MOUTH AT BEDTIME AS NEEDED NEUROPATHIC PAIN RELIEF AND SLEEP AID, Disp: , Rfl:     ANORO ELLIPTA 62.5-25 mcg/actuation DsDv, Inhale 1 puff into the lungs once daily., Disp: , Rfl:     apixaban (ELIQUIS) 5 mg Tab, Take 5 mg by mouth 2 (two) times daily., Disp: , Rfl:     aspirin 81 MG Chew, Take 81 mg by mouth once daily. , Disp: , Rfl:     clonazePAM (KLONOPIN) 1 MG tablet, Take 1 mg by mouth 3 (three) times daily as needed for Anxiety. , Disp: , Rfl:     COVID-19 vacc,mRNA,Pfizer,,PF, (COMIRNATY, PF,) 30 mcg/0.3 mL SusR, Pfizer-BioNTech COVID-19 Vaccine (PF) 30 mcg/0.3 mL IM susp (purple), Disp: , Rfl:     eszopiclone (LUNESTA) 1 MG Tab, eszopiclone 1 mg tablet  TAKE ONE TABLET BY MOUTH AT BEDTIME AS NEEDED. STOP restoril, Disp: , Rfl:     fenofibrate (TRICOR) 145 MG tablet, Take 145 mg by mouth every evening. , Disp: , Rfl:     flu vac qs 2020-21,4 yr up, CD (FLUCELVAX QUAD 9110-4808) 60 mcg (15 mcg x 4)/0.5 mL Susp, Flucelvax Quad 1147-1449 60 mcg (15 mcg x 4)/0.5 mL intramuscular susp, Disp: , Rfl:     FOLTANX 3-35-2 mg Tab, Take 1 tablet by mouth 2 (two) times daily., " "Disp: , Rfl:     furosemide (LASIX) 20 MG tablet, Take 20 mg by mouth daily as needed., Disp: , Rfl:     gabapentin (NEURONTIN) 600 MG tablet, Take 600 mg by mouth 3 (three) times daily. , Disp: , Rfl:     glimepiride (AMARYL) 4 MG tablet, Take 4 mg by mouth 2 (two) times daily., Disp: , Rfl:     hydroCHLOROthiazide (HYDRODIURIL) 12.5 MG Tab, Take 12.5 mg by mouth once daily., Disp: , Rfl:     HYDROcodone-acetaminophen (NORCO) 5-325 mg per tablet, Take 1 tablet by mouth every 6 (six) hours as needed for Pain., Disp: 15 tablet, Rfl: 0    icosapent ethyL (VASCEPA) 1 gram Cap, Take 2 capsules by mouth 2 (two) times daily., Disp: , Rfl:     isosorbide mononitrate (IMDUR) 30 MG 24 hr tablet, Take 1 tablet (30 mg total) by mouth once daily., Disp: 30 tablet, Rfl: 11    JARDIANCE 25 mg tablet, Take 25 mg by mouth once daily., Disp: , Rfl:     LANTUS SOLOSTAR U-100 INSULIN glargine 100 units/mL (3mL) SubQ pen, Inject 80 Units into the skin 2 (two) times daily. Changed to 80 units BID 2months ago, Disp: , Rfl:     lisinopriL 10 MG tablet, Take 10 mg by mouth once daily. , Disp: , Rfl:     metFORMIN (GLUCOPHAGE) 1000 MG tablet, Take 1,000 mg by mouth 2 (two) times daily with meals. , Disp: , Rfl:     methocarbamoL (ROBAXIN) 500 MG Tab, Take 500 mg by mouth nightly as needed., Disp: , Rfl:     metoprolol succinate (TOPROL-XL) 100 MG 24 hr tablet, Take 1 tablet (100 mg total) by mouth once daily., Disp: 30 tablet, Rfl: 11    nitroGLYCERIN (NITROSTAT) 0.4 MG SL tablet, Place 0.4 mg under the tongue every 5 (five) minutes as needed for Chest pain., Disp: , Rfl:     pen needle, diabetic 31 gauge x 1/4" Ndle, USE 1 TWICE DAILY FOR BLOOD SUGAR GREATER THAN 200, Disp: , Rfl:     pneumococcal 23-lily ps (PNEUMOVAX-23) 25 mcg/0.5 mL vaccine, Pneumovax-23 25 mcg/0.5 mL injection syringe, Disp: , Rfl:     ranolazine (RANEXA) 500 MG Tb12, Take 500 mg by mouth 2 (two) times daily., Disp: , Rfl:     rosuvastatin (CRESTOR) " 20 MG tablet, Take 20 mg by mouth every evening. , Disp: , Rfl:     sotaloL (BETAPACE) 80 MG tablet, Take 80 mg by mouth 2 (two) times daily., Disp: , Rfl:     tamsulosin (FLOMAX) 0.4 mg Cap, Take 1 capsule (0.4 mg total) by mouth every evening., Disp: 30 capsule, Rfl: 0    valACYclovir (VALTREX) 500 MG tablet, Take 500 mg by mouth 3 (three) times daily., Disp: , Rfl:     Sherif Hightower MD  Tele-stroke Neurologist

## 2022-01-09 NOTE — ED PROVIDER NOTES
"Encounter Date: 1/9/2022    SCRIBE #1 NOTE: Damion ROGERS, liz scribing for, and in the presence of, Jennifer San PA-C .       History     Chief Complaint   Patient presents with    Chest Pain     X 1 week  / SL NTG " all week "  rt. Arm numbness     Weakness     Time seen by provider: 12:24 PM on 01/09/2022    Magdaleno Cunningham is a 56 y.o. male who presents to the ED with an onset of chest pain for one week. He states he has been taking a dose of NTG daily for the past week due to constant CP which is not present at this time. Patient notes he last took NTG this morning with mild relief of pain. He states the CP starts in the right side of the chest and radiates down the right arm. right arm numbness since this morning (past 8 hours). Patient states his right arm feels "asleep." CP is "sharp" and worsens upon exertion. Patient also states he has been short of breath for the past week. The patient denies any other symptoms at this time. PMHx of COPD, DM, HTN, A-Fib, MI, CVD, CAD, and ROSAMARIA on CPAP. PSHx of CABG. Patient smokes "a couple" packs of cigarettes daily.       The history is provided by the patient.     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    Hypertension     Infected incision     MI (myocardial infarction)     per pt he has had 4     ROSAMARIA on CPAP     RLS (restless legs syndrome)      Past Surgical History:   Procedure Laterality Date    CORONARY ARTERY BYPASS GRAFT (CABG) N/A 4/7/2020    Procedure: CORONARY ARTERY BYPASS GRAFT (CABG)- Excision of left atrial appendage;  " Surgeon: Doug Solitario MD;  Location: [facility] OR;  Service: Cardiothoracic;  Laterality: N/A;    CORONARY STENT PLACEMENT      WILSON MAZE PROCEDURE N/A 2020    Procedure: WILSON MAZE PROCEDURE- Attempted;  Surgeon: Doug Solitario MD;  Location: [facility] OR;  Service: Cardiothoracic;  Laterality: N/A;    CYSTOSCOPY N/A 12/10/2018    Procedure: CYSTOSCOPY;  Surgeon: Khushboo Abreu MD;  Location: [facility];  Service: Urology;  Laterality: N/A;    ENDOSCOPIC HARVEST OF VEIN Left 2020    Procedure: HARVEST-VEIN-ENDOVASCULAR;  Surgeon: Doug Solitario MD;  Location: [facility] OR;  Service: Cardiothoracic;  Laterality: Left;    LEFT HEART CATHETERIZATION Left 3/17/2020    Procedure: CATHETERIZATION, HEART, LEFT;  Surgeon: Tyree Walters MD;  Location: [facility] CATH/EP LAB;  Service: Cardiology;  Laterality: Left;    STERNAL WIRES REMOVAL N/A 2020    Procedure: REMOVAL, STERNAL WIRE;  Surgeon: Doug Solitario MD;  Location: [facility] OR;  Service: Peripheral Vascular;  Laterality: N/A;    ULTRASOUND OF PROSTATE FOR VOLUME DETERMINATION  12/10/2018    Procedure: ULTRASOUND, PROSTATE, FOR VOLUME DETERMINATION;  Surgeon: Khushboo Abreu MD;  Location: [facility];  Service: Urology;;     Family History   Problem Relation Age of Onset    Heart disease Mother     Diabetes Mother     Hyperlipidemia Father     Cancer Father      Social History     Tobacco Use    Smoking status: Current Every Day Smoker     Packs/day: 0.30     Years: 30.00     Pack years: 9.00     Types: Cigarettes     Last attempt to quit: 2020     Years since quittin.7    Smokeless tobacco: Never Used    Tobacco comment: just under a pack a day   Substance Use Topics    Alcohol use: Not Currently    Drug use: Yes     Frequency: 7.0 times per week     Types: Marijuana     Comment: nightly to help him sleep last done at 2200on 7/3/21     Review of Systems   Constitutional: Negative for activity change, appetite  change, chills and fever.   HENT: Negative for congestion, rhinorrhea and sore throat.    Eyes: Negative for redness and visual disturbance.   Respiratory: Positive for shortness of breath. Negative for cough and chest tightness.    Cardiovascular: Positive for chest pain.   Gastrointestinal: Negative for abdominal pain, diarrhea, nausea and vomiting.   Genitourinary: Negative for dysuria and frequency.   Musculoskeletal: Positive for myalgias. Negative for back pain, neck pain and neck stiffness.   Skin: Negative for rash.   Neurological: Positive for numbness. Negative for dizziness, syncope and headaches.       Physical Exam     Initial Vitals [01/09/22 1215]   BP Pulse Resp Temp SpO2   (!) 145/85 89 20 97.8 °F (36.6 °C) 99 %      MAP       --         Physical Exam    Nursing note and vitals reviewed.  Constitutional: He appears well-developed and well-nourished. He is cooperative.  Non-toxic appearance. He does not have a sickly appearance.   HENT:   Head: Normocephalic and atraumatic.   Right Ear: External ear normal.   Left Ear: External ear normal.   Nose: Nose normal.   Eyes: Conjunctivae and lids are normal. Pupils are equal, round, and reactive to light.   Cardiovascular: Normal rate, regular rhythm and normal heart sounds. Exam reveals no gallop and no friction rub.    No murmur heard.  Pulmonary/Chest: Effort normal and breath sounds normal. He has no wheezes. He has no rhonchi. He has no rales. He exhibits tenderness.   Tenderness to chest wall      Neurological: He is alert and oriented to person, place, and time. He has normal strength. No cranial nerve deficit.   Subjective decreased sensation to right upper and lower extremity. No facial droop. No weakness.    Skin: Skin is warm, dry and intact. No rash noted.         ED Course   Procedures  Labs Reviewed   CBC W/ AUTO DIFFERENTIAL - Abnormal; Notable for the following components:       Result Value    MCH 32.3 (*)     All other components within  normal limits   COMPREHENSIVE METABOLIC PANEL - Abnormal; Notable for the following components:    Sodium 131 (*)     CO2 15 (*)     Glucose 467 (*)     AST 43 (*)     ALT 56 (*)     Anion Gap 17 (*)     eGFR if non  57 (*)     All other components within normal limits    Narrative:      Glu critical result(s) called and verbal readback obtained from   Hector Lucas by TC2 01/09/2022 13:45   POCT GLUCOSE - Abnormal; Notable for the following components:    POCT Glucose 236 (*)     All other components within normal limits   TROPONIN I   BETA - HYDROXYBUTYRATE, SERUM   SARS-COV-2 RNA AMPLIFICATION, QUAL          Imaging Results          CT Head Without Contrast (Final result)  Result time 01/09/22 14:42:21    Final result by Elias Lua MD (01/09/22 14:42:21)                 Impression:      Mild cortical atrophy without acute intracranial abnormality.      Electronically signed by: Elias Lua  Date:    01/09/2022  Time:    14:42             Narrative:    EXAMINATION:  CT HEAD WITHOUT CONTRAST    CLINICAL HISTORY:  Neuro deficit, acute, stroke suspected;    TECHNIQUE:  Low dose axial images were obtained through the head.  Coronal and sagittal reformations were also performed. Contrast was not administered.    COMPARISON:  None.    FINDINGS:  There is no acute hemorrhage or infarction.  There is mild cortical atrophy.  There is a normal pattern of gray-white matter differentiation.    No extra-axial fluid collections.  Ventricles are normal in size, shape and configuration.  The basal cisterns are patent.    The imaged paranasal sinuses and ethmoid air cells are well aerated.    The mastoid air cells and middle ears are normally pneumatized.                               X-Ray Chest PA And Lateral (Final result)  Result time 01/09/22 12:55:56    Final result by Elias Lua MD (01/09/22 12:55:56)                 Impression:      1. No acute chest disease.  2. Prior  CABG.      Electronically signed by: Elias Lua  Date:    01/09/2022  Time:    12:55             Narrative:    EXAMINATION:  XR CHEST PA AND LATERAL    CLINICAL HISTORY:  Chest Pain;    TECHNIQUE:  PA and lateral views of the chest were performed.    COMPARISON:  07/03/2021.    FINDINGS:  The lungs are clear, with normal appearance of pulmonary vasculature and no pleural effusion or pneumothorax.    The cardiac silhouette is normal in size.  Prior CABG.  The hilar and mediastinal contours are unremarkable.    Bones are intact.                                 Medications   amitriptyline tablet 25 mg (has no administration in time range)   albuterol-ipratropium 2.5 mg-0.5 mg/3 mL nebulizer solution 3 mL (has no administration in time range)   fluticasone furoate-vilanteroL 100-25 mcg/dose diskus inhaler 1 puff (has no administration in time range)   apixaban tablet 5 mg (5 mg Oral Given 1/9/22 2029)   aspirin chewable tablet 81 mg (has no administration in time range)   clonazePAM tablet 1 mg (has no administration in time range)   fenofibrate tablet 145 mg (145 mg Oral Given 1/9/22 2029)   gabapentin capsule 600 mg (600 mg Oral Given 1/9/22 2029)   hydroCHLOROthiazide tablet 12.5 mg (has no administration in time range)   HYDROcodone-acetaminophen 5-325 mg per tablet 1 tablet (has no administration in time range)   isosorbide mononitrate 24 hr tablet 30 mg (has no administration in time range)   insulin detemir U-100 pen 80 Units (80 Units Subcutaneous Given 1/9/22 2036)   nitroGLYCERIN SL tablet 0.4 mg (has no administration in time range)   metoprolol succinate (TOPROL-XL) 24 hr tablet 100 mg (has no administration in time range)   lisinopriL tablet 10 mg (has no administration in time range)   ranolazine 12 hr tablet 500 mg (500 mg Oral Given 1/9/22 2029)   atorvastatin tablet 40 mg (has no administration in time range)   tamsulosin 24 hr capsule 0.4 mg (0.4 mg Oral Not Given 1/9/22 2029)   sodium chloride  0.9% flush 10 mL (has no administration in time range)   potassium bicarbonate disintegrating tablet 50 mEq (has no administration in time range)   potassium bicarbonate disintegrating tablet 35 mEq (has no administration in time range)   potassium bicarbonate disintegrating tablet 60 mEq (has no administration in time range)   potassium, sodium phosphates 280-160-250 mg packet 2 packet (has no administration in time range)   potassium, sodium phosphates 280-160-250 mg packet 2 packet (has no administration in time range)   potassium, sodium phosphates 280-160-250 mg packet 2 packet (has no administration in time range)   glucose chewable tablet 16 g (has no administration in time range)   glucose chewable tablet 24 g (has no administration in time range)   dextrose 50% injection 12.5 g (has no administration in time range)   dextrose 50% injection 25 g (has no administration in time range)   glucagon (human recombinant) injection 1 mg (has no administration in time range)   acetaminophen tablet 650 mg (has no administration in time range)   melatonin tablet 6 mg (has no administration in time range)   naloxone 0.4 mg/mL injection 0.02 mg (has no administration in time range)   insulin aspart U-100 pen 1-10 Units (5 Units Subcutaneous Given 1/9/22 2036)   nicotine 21 mg/24 hr 1 patch (1 patch Transdermal Patch Applied 1/9/22 1734)   0.9%  NaCl infusion ( Intravenous New Bag 1/9/22 1401)   insulin regular injection 10 Units (10 Units Intravenous Given 1/9/22 1403)     Medical Decision Making:   History:   Old Medical Records: I decided to obtain old medical records.  Clinical Tests:   Lab Tests: Reviewed and Ordered  Radiological Study: Ordered and Reviewed  Medical Tests: Ordered and Reviewed       APC / Resident Notes:   Emergent evaluation of a 56 year old male who presents with chest pain radiating down right arm with right sided body numbness. He reports chronic chest wall tenderness to palpation since his CABG  "2 years ago. This is different than his current chest pain. He reports mild shortness of breath. No LE swelling. EKG shows no acute changes. Labs, including troponin reviewed. He is noted to have elevated glucose. Patient reports taking 80 units of his long acting insulin this AM and 20 units of the short acting. He reports BS yesterday was "High like 250". CT head is unremarkable. Dr. Lora spoke with neurology who recommends further work up by inpatient team as deemed necessary. Patient was given IV inuslin. He will be placed in observation with further management by hospital medicine. Case discussed with Dr. Bethany Meza Attestation:   Scribe #1: I performed the above scribed service and the documentation accurately describes the services I performed. I attest to the accuracy of the note.              I, Jennifer San PA-C, personally performed the services described in this documentation. All medical record entries made by the scribe were at my direction and in my presence.  I have reviewed the chart and agree that the record reflects my personal performance and is accurate and complete. Jennifer San PA-C.  1:51 PM 01/09/2022      Clinical Impression:   Final diagnoses:  [R07.9] Chest pain (Primary)  [R20.0] Right sided numbness  [R73.09] Elevated glucose          ED Disposition Condition    Observation               Jennifer San PA-C  01/09/22 2039    "

## 2022-01-09 NOTE — ASSESSMENT & PLAN NOTE
Body mass index is 39.05 kg/m². Morbid obesity complicates all aspects of disease management from diagnostic modalities to treatment. Weight loss encouraged and health benefits explained to patient.

## 2022-01-09 NOTE — ASSESSMENT & PLAN NOTE
-Detemir 80 BID  -SSI  -AC HS glucose checks  -Hold home medications  -Diabetic diet  -Hypoglycemic precautions

## 2022-01-10 PROBLEM — R20.2 NUMBNESS AND TINGLING OF RIGHT ARM AND LEG: Status: ACTIVE | Noted: 2022-01-10

## 2022-01-10 PROBLEM — R20.0 NUMBNESS AND TINGLING OF RIGHT ARM AND LEG: Status: ACTIVE | Noted: 2022-01-10

## 2022-01-10 LAB
ALBUMIN SERPL BCP-MCNC: 3.2 G/DL (ref 3.5–5.2)
ALP SERPL-CCNC: 51 U/L (ref 55–135)
ALT SERPL W/O P-5'-P-CCNC: 45 U/L (ref 10–44)
ANION GAP SERPL CALC-SCNC: 13 MMOL/L (ref 8–16)
AST SERPL-CCNC: 26 U/L (ref 10–40)
BASOPHILS # BLD AUTO: 0.12 K/UL (ref 0–0.2)
BASOPHILS NFR BLD: 1.1 % (ref 0–1.9)
BILIRUB SERPL-MCNC: 0.4 MG/DL (ref 0.1–1)
BUN SERPL-MCNC: 16 MG/DL (ref 6–20)
CALCIUM SERPL-MCNC: 8.9 MG/DL (ref 8.7–10.5)
CHLORIDE SERPL-SCNC: 103 MMOL/L (ref 95–110)
CHOLEST SERPL-MCNC: 113 MG/DL (ref 120–199)
CHOLEST/HDLC SERPL: 4.7 {RATIO} (ref 2–5)
CO2 SERPL-SCNC: 19 MMOL/L (ref 23–29)
CREAT SERPL-MCNC: 1.1 MG/DL (ref 0.5–1.4)
DIFFERENTIAL METHOD: ABNORMAL
EOSINOPHIL # BLD AUTO: 0.3 K/UL (ref 0–0.5)
EOSINOPHIL NFR BLD: 2.4 % (ref 0–8)
ERYTHROCYTE [DISTWIDTH] IN BLOOD BY AUTOMATED COUNT: 12.7 % (ref 11.5–14.5)
EST. GFR  (AFRICAN AMERICAN): >60 ML/MIN/1.73 M^2
EST. GFR  (NON AFRICAN AMERICAN): >60 ML/MIN/1.73 M^2
GLUCOSE SERPL-MCNC: 165 MG/DL (ref 70–110)
HCT VFR BLD AUTO: 43.4 % (ref 40–54)
HDLC SERPL-MCNC: 24 MG/DL (ref 40–75)
HDLC SERPL: 21.2 % (ref 20–50)
HGB BLD-MCNC: 15 G/DL (ref 14–18)
IMM GRANULOCYTES # BLD AUTO: 0.07 K/UL (ref 0–0.04)
IMM GRANULOCYTES NFR BLD AUTO: 0.7 % (ref 0–0.5)
LDLC SERPL CALC-MCNC: 23.2 MG/DL (ref 63–159)
LYMPHOCYTES # BLD AUTO: 4.8 K/UL (ref 1–4.8)
LYMPHOCYTES NFR BLD: 45.5 % (ref 18–48)
MAGNESIUM SERPL-MCNC: 2 MG/DL (ref 1.6–2.6)
MCH RBC QN AUTO: 31.6 PG (ref 27–31)
MCHC RBC AUTO-ENTMCNC: 34.6 G/DL (ref 32–36)
MCV RBC AUTO: 91 FL (ref 82–98)
MONOCYTES # BLD AUTO: 0.7 K/UL (ref 0.3–1)
MONOCYTES NFR BLD: 6.4 % (ref 4–15)
NEUTROPHILS # BLD AUTO: 4.7 K/UL (ref 1.8–7.7)
NEUTROPHILS NFR BLD: 43.9 % (ref 38–73)
NONHDLC SERPL-MCNC: 89 MG/DL
NRBC BLD-RTO: 0 /100 WBC
PHOSPHATE SERPL-MCNC: 4.1 MG/DL (ref 2.7–4.5)
PLATELET # BLD AUTO: 201 K/UL (ref 150–450)
PMV BLD AUTO: 10.1 FL (ref 9.2–12.9)
POCT GLUCOSE: 167 MG/DL (ref 70–110)
POCT GLUCOSE: 189 MG/DL (ref 70–110)
POCT GLUCOSE: 285 MG/DL (ref 70–110)
POCT GLUCOSE: 370 MG/DL (ref 70–110)
POTASSIUM SERPL-SCNC: 3.9 MMOL/L (ref 3.5–5.1)
PROT SERPL-MCNC: 6.3 G/DL (ref 6–8.4)
RBC # BLD AUTO: 4.75 M/UL (ref 4.6–6.2)
SODIUM SERPL-SCNC: 135 MMOL/L (ref 136–145)
TRIGL SERPL-MCNC: 329 MG/DL (ref 30–150)
WBC # BLD AUTO: 10.59 K/UL (ref 3.9–12.7)

## 2022-01-10 PROCEDURE — G0378 HOSPITAL OBSERVATION PER HR: HCPCS

## 2022-01-10 PROCEDURE — 83735 ASSAY OF MAGNESIUM: CPT | Performed by: STUDENT IN AN ORGANIZED HEALTH CARE EDUCATION/TRAINING PROGRAM

## 2022-01-10 PROCEDURE — 84100 ASSAY OF PHOSPHORUS: CPT | Performed by: STUDENT IN AN ORGANIZED HEALTH CARE EDUCATION/TRAINING PROGRAM

## 2022-01-10 PROCEDURE — 94761 N-INVAS EAR/PLS OXIMETRY MLT: CPT

## 2022-01-10 PROCEDURE — 25000242 PHARM REV CODE 250 ALT 637 W/ HCPCS: Performed by: STUDENT IN AN ORGANIZED HEALTH CARE EDUCATION/TRAINING PROGRAM

## 2022-01-10 PROCEDURE — 25000003 PHARM REV CODE 250: Performed by: STUDENT IN AN ORGANIZED HEALTH CARE EDUCATION/TRAINING PROGRAM

## 2022-01-10 PROCEDURE — 63600175 PHARM REV CODE 636 W HCPCS: Performed by: STUDENT IN AN ORGANIZED HEALTH CARE EDUCATION/TRAINING PROGRAM

## 2022-01-10 PROCEDURE — 96372 THER/PROPH/DIAG INJ SC/IM: CPT | Mod: 59 | Performed by: EMERGENCY MEDICINE

## 2022-01-10 PROCEDURE — C9399 UNCLASSIFIED DRUGS OR BIOLOG: HCPCS | Performed by: STUDENT IN AN ORGANIZED HEALTH CARE EDUCATION/TRAINING PROGRAM

## 2022-01-10 PROCEDURE — 85025 COMPLETE CBC W/AUTO DIFF WBC: CPT | Performed by: STUDENT IN AN ORGANIZED HEALTH CARE EDUCATION/TRAINING PROGRAM

## 2022-01-10 PROCEDURE — 99214 PR OFFICE/OUTPT VISIT, EST, LEVL IV, 30-39 MIN: ICD-10-PCS | Mod: ,,, | Performed by: INTERNAL MEDICINE

## 2022-01-10 PROCEDURE — 36415 COLL VENOUS BLD VENIPUNCTURE: CPT | Performed by: INTERNAL MEDICINE

## 2022-01-10 PROCEDURE — 99900035 HC TECH TIME PER 15 MIN (STAT)

## 2022-01-10 PROCEDURE — 80061 LIPID PANEL: CPT | Performed by: INTERNAL MEDICINE

## 2022-01-10 PROCEDURE — 80053 COMPREHEN METABOLIC PANEL: CPT | Performed by: STUDENT IN AN ORGANIZED HEALTH CARE EDUCATION/TRAINING PROGRAM

## 2022-01-10 PROCEDURE — 99214 OFFICE O/P EST MOD 30 MIN: CPT | Mod: ,,, | Performed by: INTERNAL MEDICINE

## 2022-01-10 PROCEDURE — 94660 CPAP INITIATION&MGMT: CPT

## 2022-01-10 PROCEDURE — S4991 NICOTINE PATCH NONLEGEND: HCPCS | Performed by: STUDENT IN AN ORGANIZED HEALTH CARE EDUCATION/TRAINING PROGRAM

## 2022-01-10 PROCEDURE — 94640 AIRWAY INHALATION TREATMENT: CPT

## 2022-01-10 PROCEDURE — 25500020 PHARM REV CODE 255

## 2022-01-10 PROCEDURE — 36415 COLL VENOUS BLD VENIPUNCTURE: CPT | Performed by: STUDENT IN AN ORGANIZED HEALTH CARE EDUCATION/TRAINING PROGRAM

## 2022-01-10 RX ADMIN — RANOLAZINE 500 MG: 500 TABLET, FILM COATED, EXTENDED RELEASE ORAL at 08:01

## 2022-01-10 RX ADMIN — INSULIN DETEMIR 80 UNITS: 100 INJECTION, SOLUTION SUBCUTANEOUS at 08:01

## 2022-01-10 RX ADMIN — APIXABAN 5 MG: 2.5 TABLET, FILM COATED ORAL at 08:01

## 2022-01-10 RX ADMIN — FLUTICASONE FUROATE AND VILANTEROL TRIFENATATE 1 PUFF: 100; 25 POWDER RESPIRATORY (INHALATION) at 07:01

## 2022-01-10 RX ADMIN — GABAPENTIN 600 MG: 300 CAPSULE ORAL at 08:01

## 2022-01-10 RX ADMIN — FENOFIBRATE 145 MG: 145 TABLET, FILM COATED ORAL at 08:01

## 2022-01-10 RX ADMIN — IOHEXOL 100 ML: 350 INJECTION, SOLUTION INTRAVENOUS at 03:01

## 2022-01-10 RX ADMIN — ATORVASTATIN CALCIUM 40 MG: 40 TABLET, FILM COATED ORAL at 08:01

## 2022-01-10 RX ADMIN — Medication 1 PATCH: at 08:01

## 2022-01-10 RX ADMIN — TAMSULOSIN HYDROCHLORIDE 0.4 MG: 0.4 CAPSULE ORAL at 08:01

## 2022-01-10 RX ADMIN — MELATONIN TAB 3 MG 6 MG: 3 TAB at 08:01

## 2022-01-10 RX ADMIN — ASPIRIN 81 MG CHEWABLE TABLET 81 MG: 81 TABLET CHEWABLE at 08:01

## 2022-01-10 RX ADMIN — INSULIN ASPART 6 UNITS: 100 INJECTION, SOLUTION INTRAVENOUS; SUBCUTANEOUS at 04:01

## 2022-01-10 RX ADMIN — HYDROCHLOROTHIAZIDE 12.5 MG: 12.5 TABLET ORAL at 08:01

## 2022-01-10 RX ADMIN — GABAPENTIN 600 MG: 300 CAPSULE ORAL at 02:01

## 2022-01-10 RX ADMIN — INSULIN ASPART 2 UNITS: 100 INJECTION, SOLUTION INTRAVENOUS; SUBCUTANEOUS at 06:01

## 2022-01-10 RX ADMIN — ISOSORBIDE MONONITRATE 30 MG: 30 TABLET, EXTENDED RELEASE ORAL at 08:01

## 2022-01-10 RX ADMIN — METOPROLOL SUCCINATE 100 MG: 50 TABLET, EXTENDED RELEASE ORAL at 08:01

## 2022-01-10 RX ADMIN — LISINOPRIL 10 MG: 10 TABLET ORAL at 08:01

## 2022-01-10 RX ADMIN — INSULIN ASPART 5 UNITS: 100 INJECTION, SOLUTION INTRAVENOUS; SUBCUTANEOUS at 08:01

## 2022-01-10 NOTE — CONSULTS
[facility]  Department of Cardiology  Consult Note      PATIENT NAME: Magdaleno Cunningham  MRN: [id number]  TODAY'S DATE: 01/10/2022  ADMIT DATE: 1/9/2022                          CONSULT REQUESTED BY: Andrea Sims MD    SUBJECTIVE     PRINCIPAL PROBLEM: <principal problem not specified>      REASON FOR CONSULT:  Chest pain     HPI:  [age] year old male with a past medical history of CAD status post CABG in 2020 by  who presented due to week-long history of chest pain with activity relieved with rest and nitro.  This is exactly how his previous heart attacks presented in the past.  He had his 1st heart attack in 2006 after a month long of feeling similar chest pain relieved with nitro, this was resulted with PCI.  He is normally followed by Dr. Walters outpatient.  He also noticed right arm and leg numbness and weakness, and is currently waiting for MRI results.  He is currently chest pain free.  He has been smoking since 13 years old and would finally like to quit.  His troponins and EKG have been negative.  He has not had any cardiac testing since before his bypass surgery.  He has a history of InStent restenosis 2 years post PCI which then led to his bypass surgery.     From H and P:  Magdaleno Cunningham is a 56 year old male with a past medical history of CAD s/p CABG, obesity, DM, HTN, HLD, ROSAMARIA, COPd, Afib, tobacco use who presents with multiple days of mid-sternal intermittent chest pain occurring at rest radiating to the left arm now associated with right upper and lower extremity numbness for one day. He denies chest pain at this time and states that the numbness is improving. He states that he has been out of Eliquis for multiple days. He states he is still smoking over a pack per day. The chest pain has not been relieved with nitroglycerin. He was not stroke activated in the ED, yet Tele-Neurology was contacted. Hospital Medicine was consulted for admission.         Review of  patient's allergies indicates:   Allergen Reactions    Pesticide Dermatitis and Rash       Past Medical History:   Diagnosis Date    Anticoagulant long-term use     Atrial fibrillation 2018    BPH (benign prostatic hyperplasia)     Cataract     COPD (chronic obstructive pulmonary disease)     Coronary artery disease     stents    CVD (cardiovascular disease)     Diabetes mellitus     Erythema multiforme     AKA Denton Edmondson Syndrome    Hypertension     Infected incision     MI (myocardial infarction)     per pt he has had 4     ROSAMARIA on CPAP     RLS (restless legs syndrome)      Past Surgical History:   Procedure Laterality Date    CORONARY ARTERY BYPASS GRAFT (CABG) N/A 4/7/2020    Procedure: CORONARY ARTERY BYPASS GRAFT (CABG)- Excision of left atrial appendage;  Surgeon: Doug Solitario MD;  Location: [facility] OR;  Service: Cardiothoracic;  Laterality: N/A;    CORONARY STENT PLACEMENT      WILSON MAZE PROCEDURE N/A 4/7/2020    Procedure: WILSON MAZE PROCEDURE- Attempted;  Surgeon: Doug Solitario MD;  Location: [facility] OR;  Service: Cardiothoracic;  Laterality: N/A;    CYSTOSCOPY N/A 12/10/2018    Procedure: CYSTOSCOPY;  Surgeon: Khushboo Abreu MD;  Location: [facility] OR;  Service: Urology;  Laterality: N/A;    ENDOSCOPIC HARVEST OF VEIN Left 4/7/2020    Procedure: HARVEST-VEIN-ENDOVASCULAR;  Surgeon: Doug Solitario MD;  Location: [facility] OR;  Service: Cardiothoracic;  Laterality: Left;    LEFT HEART CATHETERIZATION Left 3/17/2020    Procedure: CATHETERIZATION, HEART, LEFT;  Surgeon: Tyree Walters MD;  Location: [facility] CATH/EP LAB;  Service: Cardiology;  Laterality: Left;    STERNAL WIRES REMOVAL N/A 6/8/2020    Procedure: REMOVAL, STERNAL WIRE;  Surgeon: Doug Solitario MD;  Location: [facility] OR;  Service: Peripheral Vascular;  Laterality: N/A;    ULTRASOUND OF PROSTATE FOR VOLUME DETERMINATION  12/10/2018    Procedure: ULTRASOUND, PROSTATE, FOR VOLUME DETERMINATION;  Surgeon:  Khushboo Abreu MD;  Location: [facility] OR;  Service: Urology;;     Social History     Tobacco Use    Smoking status: Current Every Day Smoker     Packs/day: 0.30     Years: 30.00     Pack years: 9.00     Types: Cigarettes     Last attempt to quit: 2020     Years since quittin.7    Smokeless tobacco: Never Used    Tobacco comment: just under a pack a day   Substance Use Topics    Alcohol use: Not Currently    Drug use: Yes     Frequency: 7.0 times per week     Types: Marijuana     Comment: nightly to help him sleep last done at 2200on 7/3/21        REVIEW OF SYSTEMS  CONSTITUTIONAL: Negative for chills, fatigue and fever.   EYES: No double vision, No blurred vision  NEURO: No headaches, No dizziness  RESPIRATORY: Negative for cough, shortness of breath and wheezing.    CARDIOVASCULAR: Negative for chest pain. Negative for palpitations and leg swelling.   GI: Negative for abdominal pain, No melena, diarrhea, nausea and vomiting.   : Negative for dysuria and frequency, Negative for hematuria  SKIN: Negative for bruising, Negative for edema or discoloration noted.   PSYCHIATRIC: Negative for depression, Negative for anxiety, Negative for memory loss  MUSCULOSKELETAL: Negative for neck pain, Negative for muscle weakness, Negative for back pain     OBJECTIVE     VITAL SIGNS (Most Recent)  Temp: 96.3 °F (35.7 °C) (01/10/22 1139)  Pulse: 76 (01/10/22 1139)  Resp: 16 (01/10/22 1139)  BP: 112/66 (01/10/22 1139)  SpO2: 97 % (01/10/22 1139)    VENTILATION STATUS  Resp: 16 (01/10/22 1139)  SpO2: 97 % (01/10/22 1139)  Oxygen Concentration (%):  [0.21] 0.21    I & O (Last 24H):No intake or output data in the 24 hours ending 01/10/22 1147    WEIGHTS  Wt Readings from Last 3 Encounters:   22 1740 127 kg (280 lb)   22 1215 127 kg (280 lb)   21 0211 135.3 kg (298 lb 4.5 oz)   21 2257 127 kg (280 lb)   20 0935 120.2 kg (265 lb)       PHYSICAL EXAM  GENERAL: well built, well nourished,  well-developed in no apparent distress alert and oriented.   HEENT: Normocephalic. Pupils normal and conjunctivae normal.  Mucous membranes normal, no cyanosis or icterus, trachea central,no pallor or icterus is noted..   NECK: No JVD. No bruit..   CARDIAC: Regular rate and rhythm. S1 is normal.S2 is normal.No gallops, clicks or murmurs noted at this time.  CHEST ANATOMY: sternotomy scar with keloid, sensitive to touch   LUNGS: Clear to auscultation. No wheezing or rhonchi..   ABDOMEN: Soft no masses or organomegaly.  No abdomen pulsations or bruits.  Normal bowel sounds. No pulsations and no masses felt, No guarding or rebound.   URINARY: No ritter catheter   EXTREMITIES: No cyanosis, clubbing or edema noted at this time., no calf tenderness bilaterally.   PERIPHERAL VASCULAR SYSTEM: Good palpable distal pulses.   CENTRAL NERVOUS SYSTEM: No focal motor or sensory deficits noted.   SKIN: Skin without lesions, moist, well perfused.   MUSCLE STRENGTH & TONE: No noteable weakness, atrophy or abnormal movement.     HOME MEDICATIONS:  No current facility-administered medications on file prior to encounter.     Current Outpatient Medications on File Prior to Encounter   Medication Sig Dispense Refill    ACCU-CHEK GUIDE Strp USE 1 STRIP TO CHECK BLOOD SUGAR TWICE DAILY      amitriptyline (ELAVIL) 25 MG tablet amitriptyline 25 mg tablet   TAKE 1 TO 2 TABLETS BY MOUTH AT BEDTIME AS NEEDED NEUROPATHIC PAIN RELIEF AND SLEEP AID      ANORO ELLIPTA 62.5-25 mcg/actuation DsDv Inhale 1 puff into the lungs once daily.      apixaban (ELIQUIS) 5 mg Tab Take 5 mg by mouth 2 (two) times daily.      aspirin 81 MG Chew Take 81 mg by mouth once daily.       clonazePAM (KLONOPIN) 1 MG tablet Take 1 mg by mouth 3 (three) times daily as needed for Anxiety.       COVID-19 vacc,mRNA,Pfizer,,PF, (COMIRNATY, PF,) 30 mcg/0.3 mL SusR Pfizer-BioNTech COVID-19 Vaccine (PF) 30 mcg/0.3 mL IM susp (purple)      eszopiclone (LUNESTA) 1 MG Tab  "eszopiclone 1 mg tablet   TAKE ONE TABLET BY MOUTH AT BEDTIME AS NEEDED. STOP restoril      fenofibrate (TRICOR) 145 MG tablet Take 145 mg by mouth every evening.       flu vac qs 2020-21,4 yr up, CD (FLUCELVAX QUAD 0025-2440) 60 mcg (15 mcg x 4)/0.5 mL Susp Flucelvax Quad 6809-6242 60 mcg (15 mcg x 4)/0.5 mL intramuscular susp      FOLTANX 3-35-2 mg Tab Take 1 tablet by mouth 2 (two) times daily.      furosemide (LASIX) 20 MG tablet Take 20 mg by mouth daily as needed.      gabapentin (NEURONTIN) 600 MG tablet Take 600 mg by mouth 3 (three) times daily.       glimepiride (AMARYL) 4 MG tablet Take 4 mg by mouth 2 (two) times daily.      hydroCHLOROthiazide (HYDRODIURIL) 12.5 MG Tab Take 12.5 mg by mouth once daily.      HYDROcodone-acetaminophen (NORCO) 5-325 mg per tablet Take 1 tablet by mouth every 6 (six) hours as needed for Pain. 15 tablet 0    icosapent ethyL (VASCEPA) 1 gram Cap Take 2 capsules by mouth 2 (two) times daily.      isosorbide mononitrate (IMDUR) 30 MG 24 hr tablet Take 1 tablet (30 mg total) by mouth once daily. 30 tablet 11    JARDIANCE 25 mg tablet Take 25 mg by mouth once daily.      LANTUS SOLOSTAR U-100 INSULIN glargine 100 units/mL (3mL) SubQ pen Inject 80 Units into the skin 2 (two) times daily. Changed to 80 units BID 2months ago      lisinopriL 10 MG tablet Take 10 mg by mouth once daily.       metFORMIN (GLUCOPHAGE) 1000 MG tablet Take 1,000 mg by mouth 2 (two) times daily with meals.       methocarbamoL (ROBAXIN) 500 MG Tab Take 500 mg by mouth nightly as needed.      metoprolol succinate (TOPROL-XL) 100 MG 24 hr tablet Take 1 tablet (100 mg total) by mouth once daily. 30 tablet 11    nitroGLYCERIN (NITROSTAT) 0.4 MG SL tablet Place 0.4 mg under the tongue every 5 (five) minutes as needed for Chest pain.      pen needle, diabetic 31 gauge x 1/4" Ndle USE 1 TWICE DAILY FOR BLOOD SUGAR GREATER THAN 200      pneumococcal 23-lily ps (PNEUMOVAX-23) 25 mcg/0.5 mL vaccine " Pneumovax-23 25 mcg/0.5 mL injection syringe      ranolazine (RANEXA) 500 MG Tb12 Take 500 mg by mouth 2 (two) times daily.      rosuvastatin (CRESTOR) 20 MG tablet Take 20 mg by mouth every evening.       sotaloL (BETAPACE) 80 MG tablet Take 80 mg by mouth 2 (two) times daily.      tamsulosin (FLOMAX) 0.4 mg Cap Take 1 capsule (0.4 mg total) by mouth every evening. 30 capsule 0    valACYclovir (VALTREX) 500 MG tablet Take 500 mg by mouth 3 (three) times daily.         SCHEDULED MEDS:   apixaban  5 mg Oral BID    aspirin  81 mg Oral Daily    atorvastatin  40 mg Oral Daily    fenofibrate  145 mg Oral QHS    fluticasone furoate-vilanteroL  1 puff Inhalation Daily    gabapentin  600 mg Oral TID    hydroCHLOROthiazide  12.5 mg Oral Daily    insulin detemir U-100  80 Units Subcutaneous BID    isosorbide mononitrate  30 mg Oral Daily    lisinopriL  10 mg Oral Daily    metoprolol succinate  100 mg Oral Daily    nicotine  1 patch Transdermal Daily    ranolazine  500 mg Oral BID    tamsulosin  0.4 mg Oral QHS       CONTINUOUS INFUSIONS:    PRN MEDS:acetaminophen, albuterol-ipratropium, amitriptyline, clonazePAM, dextrose 50%, dextrose 50%, glucagon (human recombinant), glucose, glucose, HYDROcodone-acetaminophen, insulin aspart U-100, melatonin, naloxone, nitroGLYCERIN, potassium bicarbonate, potassium bicarbonate, potassium bicarbonate, potassium, sodium phosphates, potassium, sodium phosphates, potassium, sodium phosphates, sodium chloride 0.9%    LABS AND DIAGNOSTICS     CBC LAST 3 DAYS  Recent Labs   Lab 01/09/22  1130 01/10/22  0437   WBC 10.55 10.59   RBC 5.01 4.75   HGB 16.2 15.0   HCT 46.9 43.4   MCV 94 91   MCH 32.3* 31.6*   MCHC 34.5 34.6   RDW 12.9 12.7    201   MPV 10.3 10.1   GRAN 51.8  5.5 43.9  4.7   LYMPH 38.4  4.1 45.5  4.8   MONO 6.4  0.7 6.4  0.7   BASO 0.10 0.12   NRBC 0 0       COAGULATION LAST 3 DAYS  No results for input(s): LABPT, INR, APTT in the last 168  hours.    CHEMISTRY LAST 3 DAYS  Recent Labs   Lab 01/09/22  1130 01/10/22  0437   * 135*   K 4.9 3.9   CL 99 103   CO2 15* 19*   ANIONGAP 17* 13   BUN 16 16   CREATININE 1.4 1.1   * 165*   CALCIUM 9.5 8.9   MG  --  2.0   ALBUMIN 3.8 3.2*   PROT 7.4 6.3   ALKPHOS 58 51*   ALT 56* 45*   AST 43* 26   BILITOT 0.3 0.4       CARDIAC PROFILE LAST 3 DAYS  Recent Labs   Lab 01/09/22  1130 01/09/22  1738 01/09/22  1739 01/09/22  2137   BNP  --   --  <10  --    TROPONINI 0.007 0.011  --  0.008       ENDOCRINE LAST 3 DAYS  No results for input(s): TSH, PROCAL in the last 168 hours.    LAST ARTERIAL BLOOD GAS  ABG  No results for input(s): PH, PO2, PCO2, HCO3, BE in the last 168 hours.    LAST 7 DAYS MICROBIOLOGY   Microbiology Results (last 7 days)     ** No results found for the last 168 hours. **          MOST RECENT IMAGING  CT Head Without Contrast  Narrative: EXAMINATION:  CT HEAD WITHOUT CONTRAST    CLINICAL HISTORY:  Neuro deficit, acute, stroke suspected;    TECHNIQUE:  Low dose axial images were obtained through the head.  Coronal and sagittal reformations were also performed. Contrast was not administered.    COMPARISON:  None.    FINDINGS:  There is no acute hemorrhage or infarction.  There is mild cortical atrophy.  There is a normal pattern of gray-white matter differentiation.    No extra-axial fluid collections.  Ventricles are normal in size, shape and configuration.  The basal cisterns are patent.    The imaged paranasal sinuses and ethmoid air cells are well aerated.    The mastoid air cells and middle ears are normally pneumatized.  Impression: Mild cortical atrophy without acute intracranial abnormality.    Electronically signed by: Elias Lua  Date:    01/09/2022  Time:    14:42  X-Ray Chest PA And Lateral  Narrative: EXAMINATION:  XR CHEST PA AND LATERAL    CLINICAL HISTORY:  Chest Pain;    TECHNIQUE:  PA and lateral views of the chest were  performed.    COMPARISON:  07/03/2021.    FINDINGS:  The lungs are clear, with normal appearance of pulmonary vasculature and no pleural effusion or pneumothorax.    The cardiac silhouette is normal in size.  Prior CABG.  The hilar and mediastinal contours are unremarkable.    Bones are intact.  Impression: 1. No acute chest disease.  2. Prior CABG.    Electronically signed by: Elias Lua  Date:    01/09/2022  Time:    12:55      ECHOCARDIOGRAM RESULTS (last 5)  Results for orders placed in visit on 05/19/20    Echo Color Flow Doppler? Yes    Interpretation Summary  · Mild concentric left ventricular hypertrophy.  · Low normal left ventricular systolic function. The estimated ejection fraction is 50-55%.  · Indeterminate left ventricular diastolic function.  · Normal right ventricular systolic function.  · TDS      Results for orders placed during the hospital encounter of 04/22/20    Echo Color Flow Doppler? Yes    Interpretation Summary  · Normal left ventricular systolic function. The estimated ejection fraction is 55%.  · Normal LV diastolic function.  · Mild tricuspid regurgitation.  · Intermediate central venous pressure (8 mmHg).  · The estimated PA systolic pressure is 33 mmHg.      CURRENT/PREVIOUS VISIT EKG  Results for orders placed or performed during the hospital encounter of 01/09/22   EKG 12-lead    Collection Time: 01/09/22 12:29 PM    Narrative    Test Reason : R07.9,    Vent. Rate : 083 BPM     Atrial Rate : 083 BPM     P-R Int : 164 ms          QRS Dur : 108 ms      QT Int : 402 ms       P-R-T Axes : 040 -22 073 degrees     QTc Int : 472 ms    Normal sinus rhythm  Possible Left atrial enlargement  Borderline Abnormal ECG    Referred By: [email or web address]   SELF           Confirmed By:            ASSESSMENT/PLAN:     Active Hospital Problems    Diagnosis    Numbness and tingling of right arm and leg    Polyneuropathy    Obesity (BMI 30-39.9)    Chest pain    Atrial fibrillation    Coronary artery  disease of native artery of native heart with stable angina pectoris    Prostate enlargement    Generalized anxiety disorder    Smoker 1-2 packs per day since 14 years old    ROSAMARIA (obstructive sleep apnea)    Type 2 diabetes mellitus with diabetic arthropathy    Hypertension associated with diabetes    COPD (chronic obstructive pulmonary disease)       ASSESSMENT & PLAN:   1. CAD s/p CABG in 2020   - currently chest pain free, troponins have been negative   - NPO after midnight for MPI in AM   - continue Imdur    2. Atiral Fib   - currently in NSR   - continue eliquis and Toprol 100 mg daily     3. Tobacco abuse  - patient wants to quit smoking  - will see if we can start cessation while in patient     4. Essential HTN   - well controlled  - continue HCTZ, lisinopril      Mansi Drew PA-C  Department of Cardiology  Date of Service: 01/10/2022        I have personally interviewed and examined the patient, I have reviewed the Physician Assistant's history and physical, assessment, and plan. I agree with the findings and plan.      REYNOLD Hardy M.D.  Department of Cardiology  Date of Service: 01/10/2022  11:47 AM

## 2022-01-10 NOTE — PROGRESS NOTES
[facility] Hospital Medicine  Progress Note    Patient Name: Magdaleno Cunningham  MRN: [id number]  Patient Class: OP- Observation   Admission Date: 1/9/2022  Length of Stay: 0 days  Attending Physician: Andrea Sims MD  Primary Care Provider: Antonio Del Cid MD        Subjective:     Principal Problem:<principal problem not specified>        HPI:  Magdaleno Cunningham is a 56 year old male with a past medical history of CAD s/p CABG, obesity, DM, HTN, HLD, ROSAMARIA, COPd, Afib, tobacco use who presents with multiple days of mid-sternal intermittent chest pain occurring at rest radiating to the left arm now associated with right upper and lower extremity numbness for one day. He denies chest pain at this time and states that the numbness is improving. He states that he has been out of Eliquis for multiple days. He states he is still smoking over a pack per day. The chest pain has not been relieved with nitroglycerin. He was not stroke activated in the ED, yet Tele-Neurology was contacted. Hospital Medicine was consulted for admission.      Overview/Hospital Course:  Magdaleno Cunningham is a 56 year old male with a past medical history of CAD s/p CABG, obesity, DM, HTN, HLD, ROSAMARIA, COPd, Afib, tobacco use who presents with multiple days of mid-sternal intermittent chest pain occurring at rest radiating to the left arm now associated with right upper and lower extremity numbness for one day. Cardiology was consulted for inpatient stress test evaluation and Neurology was consulted for TIA evaluation.       Interval History: NAEON; Cardiology and Neurology consulted.    Review of Systems   Constitutional: Positive for activity change. Negative for appetite change and diaphoresis.   HENT: Negative.    Eyes: Negative.    Respiratory: Negative for cough and shortness of breath.    Cardiovascular: Positive for chest pain. Negative for palpitations and leg swelling.   Gastrointestinal: Negative.    Endocrine:  Negative.    Genitourinary: Negative.    Musculoskeletal: Negative.    Skin: Negative.    Allergic/Immunologic: Negative.    Neurological: Positive for numbness.   Hematological: Negative.    Psychiatric/Behavioral: Negative.      Objective:     Vital Signs (Most Recent):  Temp: 97.5 °F (36.4 °C) (01/10/22 0718)  Pulse: 76 (01/10/22 0725)  Resp: 20 (01/10/22 0725)  BP: 126/62 (01/10/22 0718)  SpO2: 97 % (01/10/22 0725) Vital Signs (24h Range):  Temp:  [96.3 °F (35.7 °C)-97.8 °F (36.6 °C)] 97.5 °F (36.4 °C)  Pulse:  [70-89] 76  Resp:  [14-20] 20  SpO2:  [95 %-99 %] 97 %  BP: (111-145)/(55-85) 126/62     Weight: 127 kg (280 lb)  Body mass index is 39.05 kg/m².  No intake or output data in the 24 hours ending 01/10/22 0743   Physical Exam  Vitals and nursing note reviewed.   Constitutional:       Appearance: He is obese. He is ill-appearing.   HENT:      Head: Normocephalic and atraumatic.      Right Ear: External ear normal.      Left Ear: External ear normal.      Nose: Nose normal.      Mouth/Throat:      Mouth: Mucous membranes are moist.      Pharynx: Oropharynx is clear.   Eyes:      Extraocular Movements: Extraocular movements intact.      Conjunctiva/sclera: Conjunctivae normal.   Neck:      Vascular: No carotid bruit.   Cardiovascular:      Rate and Rhythm: Normal rate and regular rhythm.      Pulses: Normal pulses.      Heart sounds: Normal heart sounds.   Pulmonary:      Effort: Pulmonary effort is normal. No respiratory distress.      Breath sounds: Normal breath sounds.   Abdominal:      General: Bowel sounds are normal. There is no distension.      Tenderness: There is no abdominal tenderness.   Musculoskeletal:         General: Normal range of motion.      Cervical back: Normal range of motion and neck supple.      Right lower leg: No edema.      Left lower leg: No edema.   Skin:     General: Skin is warm and dry.      Comments: Tattoos. Mid-sternal scar.   Neurological:      General: No focal deficit  present.      Mental Status: He is oriented to person, place, and time. Mental status is at baseline.   Psychiatric:         Mood and Affect: Mood normal.         Behavior: Behavior normal.         Significant Labs: All pertinent labs within the past 24 hours have been reviewed.    Significant Imaging: I have reviewed all pertinent imaging results/findings within the past 24 hours.      Assessment/Plan:      Numbness and tingling of right arm and leg  -Neurology consulted  -Will order MRI of brain  -Continue Eliquis and ASA      Obesity (BMI 30-39.9)  Body mass index is 39.05 kg/m². Morbid obesity complicates all aspects of disease management from diagnostic modalities to treatment. Weight loss encouraged and health benefits explained to patient.        Polyneuropathy  -Gabapentin      Chest pain  HEART score of 4.  -Cardiology consulted  -Trend troponin  -Telemetry  -Continue ASA and Eliquis  -Lisinopril and metoprolol  -Statin      Atrial fibrillation  -Metoprolol  -Eliquis  -Telemetry      Coronary artery disease of native artery of native heart with stable angina pectoris  -ASA and statin  -Telemetry      Prostate enlargement  -Flomax      Generalized anxiety disorder  -Klonopin PRN      Smoker 1-2 packs per day since 14 years old  Dangers of cigarette smoking were reviewed with patient in detail for 10 minutes and patient was encouraged to quit. Nicotine replacement options were discussed.        ROSAMARIA (obstructive sleep apnea)  -CPAP QHS    Hypertension associated with diabetes  -Lisinopril  -Metoprolol  -Imdur      Type 2 diabetes mellitus with diabetic arthropathy  -Detemir 80 BID  -SSI  -AC HS glucose checks  -Hold home medications  -Diabetic diet  -Hypoglycemic precautions      COPD (chronic obstructive pulmonary disease)  -Duoneb PRN  -Breo        VTE Risk Mitigation (From admission, onward)         Ordered     apixaban tablet 5 mg  2 times daily         01/09/22 1659     IP VTE HIGH RISK PATIENT  Once          01/09/22 1659     Place sequential compression device  Until discontinued         01/09/22 1659     Reason for No Pharmacological VTE Prophylaxis  Once        Question:  Reasons:  Answer:  Already adequately anticoagulated on oral Anticoagulants    01/09/22 1659                Discharge Planning   PEDRO: 1/11/2022     Code Status: Full Code   Is the patient medically ready for discharge?:     Reason for patient still in hospital (select all that apply): Patient trending condition, Laboratory test, Treatment, Imaging and Consult recommendations                     Andrea Sims MD  Department of Hospital Medicine   [facility]

## 2022-01-10 NOTE — CONSULTS
[facility]  Neurology  Consult Note        PATIENT NAME: Magdaleno Cunningham  MRN: [id number]  CSN: [id number]      TODAY'S DATE: 01/10/2022  ADMIT DATE: 1/9/2022                            CONSULTING PROVIDER: Thanh Mcmillan MD  CONSULT REQUESTED BY: Andrea Sims MD      Reason for consult: R/o CVA     CHIEF COMPLAINT:  Chest pain, right-sided numbness/weakness  History obtained from chart review and the patient.    HPI per EMR: Magdaleno Cunningham is a 56 y.o. male with a history of CAD s/p CABG, obesity, DM, HTN, HLD, ROSAMARIA, COPd, Afib, tobacco use who presents with multiple days of mid-sternal intermittent chest pain occurring at rest radiating to the left arm now associated with right upper and lower extremity numbness for one day. He denies chest pain at this time and states that the numbness is improving. He states that he has been out of Eliquis for multiple days. He states he is still smoking over a pack per day. The chest pain has not been relieved with nitroglycerin. He was not stroke activated in the ED, yet Tele-Neurology was contacted. Hospital Medicine was consulted for admission.    Neurology consult:  Patient was seen and examined by me this morning.  He states that he has been having chest pain with intermittent right upper and lower extremity numbness/weakness for a day.  He states he has been taking nitroglycerin for his chest pain.  Patient states that he has a history of AFib and is on Eliquis however he has ran out of this medication for the past 5 days.  He also endorses of headache mostly frontal, ranging from 5-8/10 in intensity last 1-2 weeks.  He denies any associated nausea, vomiting however endorses of intermittent blurred vision with the headache.  He denies any dizziness, lightheadedness, gait instability her bowel bladder abnormalities.    Currently he states that he does not have a headache.  He states his right-sided numbness and weakness have been improving  as well.    PREVIOUS MEDICAL HISTORY:  Past Medical History:   Diagnosis Date    Anticoagulant long-term use     Atrial fibrillation 2018    BPH (benign prostatic hyperplasia)     Cataract     COPD (chronic obstructive pulmonary disease)     Coronary artery disease     stents    CVD (cardiovascular disease)     Diabetes mellitus     Erythema multiforme     AKA Denton Edmondson Syndrome    Hypertension     Infected incision     MI (myocardial infarction)     per pt he has had 4     ROSAMARIA on CPAP     RLS (restless legs syndrome)      PREVIOUS SURGICAL HISTORY:  Past Surgical History:   Procedure Laterality Date    CORONARY ARTERY BYPASS GRAFT (CABG) N/A 4/7/2020    Procedure: CORONARY ARTERY BYPASS GRAFT (CABG)- Excision of left atrial appendage;  Surgeon: Doug Solitario MD;  Location: [facility] OR;  Service: Cardiothoracic;  Laterality: N/A;    CORONARY STENT PLACEMENT      WILSON MAZE PROCEDURE N/A 4/7/2020    Procedure: WILSON MAZE PROCEDURE- Attempted;  Surgeon: Doug Solitario MD;  Location: [facility] OR;  Service: Cardiothoracic;  Laterality: N/A;    CYSTOSCOPY N/A 12/10/2018    Procedure: CYSTOSCOPY;  Surgeon: Khushboo Abreu MD;  [facility];  Service: Urology;  Laterality: N/A;    ENDOSCOPIC HARVEST OF VEIN Left 4/7/2020    Procedure: HARVEST-VEIN-ENDOVASCULAR;  Surgeon: Doug Solitario MD;  Location: [facility] OR;  Service: Cardiothoracic;  Laterality: Left;    LEFT HEART CATHETERIZATION Left 3/17/2020    Procedure: CATHETERIZATION, HEART, LEFT;  Surgeon: Tyree Walters MD;  Location: [facility] CATH/EP LAB;  Service: Cardiology;  Laterality: Left;    STERNAL WIRES REMOVAL N/A 6/8/2020    Procedure: REMOVAL, STERNAL WIRE;  Surgeon: Doug Solitario MD;  Location: [facility] OR;  Service: Peripheral Vascular;  Laterality: N/A;    ULTRASOUND OF PROSTATE FOR VOLUME DETERMINATION  12/10/2018    Procedure: ULTRASOUND, PROSTATE, FOR VOLUME DETERMINATION;  Surgeon: Khushboo Abreu  MD;  [facility];  Service: Urology;;     FAMILY MEDICAL HISTORY:  Family History   Problem Relation Age of Onset    Heart disease Mother     Diabetes Mother     Hyperlipidemia Father     Cancer Father      SOCIAL HISTORY:  Social History     Tobacco Use    Smoking status: Current Every Day Smoker     Packs/day: 0.30     Years: 30.00     Pack years: 9.00     Types: Cigarettes     Last attempt to quit: 2020     Years since quittin.7    Smokeless tobacco: Never Used    Tobacco comment: just under a pack a day   Substance Use Topics    Alcohol use: Not Currently    Drug use: Yes     Frequency: 7.0 times per week     Types: Marijuana     Comment: nightly to help him sleep last done at 2200on 7/3/21     ALLERGIES:  Review of patient's allergies indicates:   Allergen Reactions    Pesticide Dermatitis and Rash     HOME MEDICATIONS:  Prior to Admission medications    Medication Sig Start Date End Date Taking? Authorizing Provider   ACCU-CHEK GUIDE Strp USE 1 STRIP TO CHECK BLOOD SUGAR TWICE DAILY 20   Historical Provider   amitriptyline (ELAVIL) 25 MG tablet amitriptyline 25 mg tablet   TAKE 1 TO 2 TABLETS BY MOUTH AT BEDTIME AS NEEDED NEUROPATHIC PAIN RELIEF AND SLEEP AID    Historical Provider   ANORO ELLIPTA 62.5-25 mcg/actuation DsDv Inhale 1 puff into the lungs once daily. 21   Historical Provider   apixaban (ELIQUIS) 5 mg Tab Take 5 mg by mouth 2 (two) times daily.    Historical Provider   aspirin 81 MG Chew Take 81 mg by mouth once daily.     Historical Provider   clonazePAM (KLONOPIN) 1 MG tablet Take 1 mg by mouth 3 (three) times daily as needed for Anxiety.     Historical Provider   COVID-19 vacc,mRNA,Pfizer,,PF, (COMIRNATY, PF,) 30 mcg/0.3 mL SusR Pfizer-BioNTech COVID-19 Vaccine (PF) 30 mcg/0.3 mL IM susp (purple)    Historical Provider   eszopiclone (LUNESTA) 1 MG Tab eszopiclone 1 mg tablet   TAKE ONE TABLET BY MOUTH AT BEDTIME AS NEEDED. STOP restoril    Historical Provider    fenofibrate (TRICOR) 145 MG tablet Take 145 mg by mouth every evening.     Historical Provider   flu vac qs 2020-21,4 yr up, CD (FLUCELVAX QUAD [id number]) 60 mcg (15 mcg x 4)/0.5 mL Susp Flucelvax Quad [id number] 60 mcg (15 mcg x 4)/0.5 mL intramuscular susp    Historical Provider   FOLTANX 3-35-2 mg Tab Take 1 tablet by mouth 2 (two) times daily. 11/22/21   Historical Provider   furosemide (LASIX) 20 MG tablet Take 20 mg by mouth daily as needed. 6/5/21   Historical Provider   gabapentin (NEURONTIN) 600 MG tablet Take 600 mg by mouth 3 (three) times daily.     Historical Provider   glimepiride (AMARYL) 4 MG tablet Take 4 mg by mouth 2 (two) times daily.    Historical Provider   hydroCHLOROthiazide (HYDRODIURIL) 12.5 MG Tab Take 12.5 mg by mouth once daily.    Historical Provider   HYDROcodone-acetaminophen (NORCO) 5-325 mg per tablet Take 1 tablet by mouth every 6 (six) hours as needed for Pain. 7/4/21   Rosie Randhawa NP   icosapent ethyL (VASCEPA) 1 gram Cap Take 2 capsules by mouth 2 (two) times daily. 6/24/21   Historical Provider   isosorbide mononitrate (IMDUR) 30 MG 24 hr tablet Take 1 tablet (30 mg total) by mouth once daily. 3/19/20 3/19/21  Kirill Blackwell MD   JARDIANCE 25 mg tablet Take 25 mg by mouth once daily. 6/25/21   Historical Provider   LANTUS SOLOSTAR U-100 INSULIN glargine 100 units/mL (3mL) SubQ pen Inject 80 Units into the skin 2 (two) times daily. Changed to 80 units BID 2months ago 3/6/20   Historical Provider   lisinopriL 10 MG tablet Take 10 mg by mouth once daily.  4/14/20   Doug Solitario MD   metFORMIN (GLUCOPHAGE) 1000 MG tablet Take 1,000 mg by mouth 2 (two) times daily with meals.     Historical Provider   methocarbamoL (ROBAXIN) 500 MG Tab Take 500 mg by mouth nightly as needed. 12/11/21   Historical Provider   metoprolol succinate (TOPROL-XL) 100 MG 24 hr tablet Take 1 tablet (100 mg total) by mouth once daily. 4/28/20 4/28/21  Emily Borrego,    nitroGLYCERIN  "(NITROSTAT) 0.4 MG SL tablet Place 0.4 mg under the tongue every 5 (five) minutes as needed for Chest pain.    Historical Provider   pen needle, diabetic 31 gauge x 1/4" Ndle USE 1 TWICE DAILY FOR BLOOD SUGAR GREATER THAN 200 4/1/20   Historical Provider   pneumococcal 23-lily ps (PNEUMOVAX-23) 25 mcg/0.5 mL vaccine Pneumovax-23 25 mcg/0.5 mL injection syringe    Historical Provider   ranolazine (RANEXA) 500 MG Tb12 Take 500 mg by mouth 2 (two) times daily. 6/25/21   Historical Provider   rosuvastatin (CRESTOR) 20 MG tablet Take 20 mg by mouth every evening.     Historical Provider   sotaloL (BETAPACE) 80 MG tablet Take 80 mg by mouth 2 (two) times daily. 6/28/21   Historical Provider   tamsulosin (FLOMAX) 0.4 mg Cap Take 1 capsule (0.4 mg total) by mouth every evening. 4/27/20   Emily Borrego DO   valACYclovir (VALTREX) 500 MG tablet Take 500 mg by mouth 3 (three) times daily. 2/13/21   Historical Provider     CURRENT SCHEDULED MEDICATIONS:   apixaban  5 mg Oral BID    aspirin  81 mg Oral Daily    atorvastatin  40 mg Oral Daily    fenofibrate  145 mg Oral QHS    fluticasone furoate-vilanteroL  1 puff Inhalation Daily    gabapentin  600 mg Oral TID    hydroCHLOROthiazide  12.5 mg Oral Daily    insulin detemir U-100  80 Units Subcutaneous BID    isosorbide mononitrate  30 mg Oral Daily    lisinopriL  10 mg Oral Daily    metoprolol succinate  100 mg Oral Daily    nicotine  1 patch Transdermal Daily    ranolazine  500 mg Oral BID    tamsulosin  0.4 mg Oral QHS     CURRENT INFUSIONS:    CURRENT PRN MEDICATIONS:  acetaminophen, albuterol-ipratropium, amitriptyline, clonazePAM, dextrose 50%, dextrose 50%, glucagon (human recombinant), glucose, glucose, HYDROcodone-acetaminophen, insulin aspart U-100, melatonin, naloxone, nitroGLYCERIN, potassium bicarbonate, potassium bicarbonate, potassium bicarbonate, potassium, sodium phosphates, potassium, sodium phosphates, potassium, sodium phosphates, sodium chloride " "0.9%    REVIEW OF SYSTEMS:  Please refer to the HPI for all pertinent positive and negative findings. A comprehensive review of all other systems was negative.       PHYSICAL EXAM:  Patient Vitals for the past 24 hrs:   BP Temp Temp src Pulse Resp SpO2 Height Weight   01/10/22 1139 112/66 96.3 °F (35.7 °C) -- 76 16 97 % -- --   01/10/22 0725 -- -- -- 76 20 97 % -- --   01/10/22 0718 126/62 97.5 °F (36.4 °C) -- 73 16 97 % -- --   01/10/22 0400 (!) 111/55 97.8 °F (36.6 °C) Oral 71 18 97 % -- --   01/09/22 2320 137/74 96.3 °F (35.7 °C) Oral 73 18 98 % -- --   01/09/22 2144 -- -- -- 74 -- 97 % -- --   01/09/22 1941 118/60 96.4 °F (35.8 °C) Oral 70 18 97 % -- --   01/09/22 1844 -- -- -- 82 -- 97 % -- --   01/09/22 1740 -- -- -- -- -- -- 5' 11" (1.803 m) 127 kg (280 lb)   01/09/22 1644 (!) 128/58 97.1 °F (36.2 °C) Oral 73 14 96 % -- --   01/09/22 1532 124/62 -- -- 70 -- 95 % -- --   01/09/22 1505 115/72 -- -- 78 -- 98 % -- --   01/09/22 1432 118/63 -- -- 74 -- 97 % -- --   01/09/22 1405 124/61 -- -- 72 -- 98 % -- --       GENERAL APPEARANCE: Alert, well-developed, well-nourished male in no acute distress.  HEENT: Normocephalic and atraumatic. PERRL. Oropharynx unremarkable.  PULM: Normal respiratory effort. No accessory muscle use.  CV: RRR.  ABDOMEN: Soft, nontender.  EXTREMITIES: No obvious signs of vascular compromise. Pulses present. No cyanosis, clubbing or edema.  SKIN: Clear; no rashes, lesions or skin breaks in exposed areas.    NEURO:  MENTAL STATUS: Patient awake and oriented to time, place, and person, recent/remote memory normal, attention span/concentration normal, speech fluent without paraphasic errors, good comprehension with appropriate thought content and fund of knowledge appropriate for patient's level of education.  Affect euthymic.    CRANIAL NERVES:  CN I: Not tested.  CN II: Fundoscopic exam deferred.  CN III, IV, VI: Pupils equal, round and reactive to light.  Extraocular movements full and " intact.  CN V: Facial sensation normal.  CN VII: Facial asymmetry absent.  CN VIII: Hearing grossly normal and equal bilaterally.  No skew deviation or pathologic nystagmus.  CN IX, X: Palate elevates symmetrically. Speech/articulation is clear without dysarthria.  CN XI: Shoulder shrug and chin rotation equal with good strength.  CN XII: Tongue protrusion midline.    MOTOR:  Bulk normal. Tone normal and symmetric throughout.  Abnormal movements absent.  Tremor: none present.  Strength 5/5 throughout except/unless specified below:.  4+/5 in right lower extremity  REFLEXES:  DTRs 2+ throughout.  Plantar response equivocal bilaterally.  SENSATION:grossly intact throughout.  COORDINATION: normal finger-to-nose.  STATION: normal.  GAIT: normal.      NIHSS:  1a      Level of Consciousness (alert, drowsy, etc.):   0=alert; keenly responsive  1b.     Level of Consciousness Questions (month, age): 0= able to answer both questions  1c.     Level of Consciousness Commands (open, close eyes, make fist, let go):  0=Answers both tasks correctly  2.      Best Gaze (eyes open - patient follows examiner's finger or face):      0=normal  3.      Visual (introduce visual stimulus/threat to patient's visual field quadrants):  0=No visual loss  4.      Facial Palsy (show teeth, raise eyebrows and squeeze eyes shut):        0=Normal symmetric movement  5a.     Motor Arm - Left (elevate extremity 90 degrees and score drift/movement):       0=No drift, limb holds 90 (or 45) degrees for full 10 seconds  5b.     Motor Arm - Right (elevate extremity 90 degrees and score drift/movement):      0=No drift, limb holds 90 (or 45) degrees for full 10 seconds  6a.     Motor Leg - Left (elevate extremity 30 degrees and score drift/movement):       0=No drift, limb holds 90 (or 45) degrees for full 10 seconds  6b      Motor Leg - Right (elevate extremity 30 degrees and score drift/movement):      0=No drift, limb holds 90 (or 45) degrees for full 10  seconds  7.      Limb Ataxia (finger-nose, heel down shin):      0=Absent  8.      Sensory (pin prick to face, arm, trunk, and leg - compare side to side):        0=Normal; no sensory loss  9.      Best Language (name items, describe a picture and read sentences):      0=No aphasia, normal  10.     Dysarthria (evaluate speech clarity by patient repeating listed words): 0=Normal  11.     Extinction and Inattention (use prior testing to identify neglect or double simultaneous stimuli testing):      0=No abnormality          NIH Stroke Scale Total:         0      Labs:  Recent Labs   Lab 01/09/22  1130 01/10/22  0437   * 135*   K 4.9 3.9   CL 99 103   CO2 15* 19*   BUN 16 16   CREATININE 1.4 1.1   * 165*   CALCIUM 9.5 8.9   PHOS  --  4.1   MG  --  2.0     Recent Labs   Lab 01/09/22  1130 01/10/22  0437   WBC 10.55 10.59   HGB 16.2 15.0   HCT 46.9 43.4    201     Recent Labs   Lab 01/09/22  1130 01/10/22  0437   ALBUMIN 3.8 3.2*   PROT 7.4 6.3   BILITOT 0.3 0.4   ALKPHOS 58 51*   ALT 56* 45*   AST 43* 26     Lab Results   Component Value Date    INR 1.3 04/07/2020     Lab Results   Component Value Date    TRIG 192 (H) 10/28/2014    HDL 33 (L) 10/28/2014    CHOLHDL 20.0 10/28/2014     Lab Results   Component Value Date    HGBA1C 10.4 (H) 03/18/2020     No results found for: PROTEINCSF, GLUCCSF, WBCCSF    Imaging:  I have reviewed and interpreted the pertinent imaging and lab results.      MRI Brain Without Contrast  Narrative: EXAMINATION:  MRI BRAIN WITHOUT CONTRAST    CLINICAL HISTORY:  Transient ischemic attack (TIA);    TECHNIQUE:  Multiplanar multisequence MR imaging of the brain was performed without contrast.    COMPARISON:  CT scan dated January 9, 2022    FINDINGS:  There a few nonspecific foci of FLAIR hyperintensity in the corona radiata and centrum semiovale regions.  These are of uncertain significance foci such as these are not infrequently seen in asymptomatic individuals.  The largest  is perhaps 5 mm in the left frontal region.  This also could relate to early microvascular changes.  The brain and ventricles otherwise appear normal.  There is no evidence of mass effect or midline shift.  No abnormal extra-axial collections are seen.  There is no evidence of restricted diffusion.  The midline structures are unremarkable.  Flow voids are seen in the expected locations of the carotid and vertebrobasilar systems.  Impression: A few nonspecific foci of T2 and FLAIR hyperintensity are noted in the corona radiata and centrum semiovale regions.  These are nonspecific but early microvascular changes cannot be excluded.    Otherwise unremarkable MRI of the brain without gadolinium demonstrating no evidence of restricted diffusion, mass effect or abnormal extra-axial collection    Electronically signed by: Virgilio Barry MD  Date:    01/10/2022  Time:    12:10         ASSESSMENT & PLAN:    Chest pain  Right-sided numbness and weakness  R/o CVA  Atrial fibrillation      Workup  · CTH: No acute intracranial abnormality   · CTA head and neck:  Pending  · MRI brain:  No acute intracranial abnormality.  · ECHO:  pending   · LDL: pending   · HbA1c: pending     Plan  · Admitted for further stroke workup  · Continue with home medications including Eliquis, Aspirin 81 mg and Crestor 20mg  · Permissive BP to 220 systolic for 48 hrs from symptom onset and after that normalize BP  · Pending CT angiogram head and neck and echocardiogram.  · Chest pain workup per primary team and Cardiology  · PT OT  · Speech therapy  · DVT prophylaxis with chemo/SCD prophylaxis  · Extensively discussed lifestyle modifications as prophylactic measures for stroke prevention including, adequate blood pressure management, healthy diet and regular exercise.      Thank you kindly for including us in the care of this patient. Please do not hesitate to contact us with any questions.      Critical Care:  54 minutes of critical care time has been  spent evaluating with the patient. Time includes chart review not limited to diagnostic imaging, labs, and vitals, patient assessment, discussion with family and nursing, current order evaluations, and new order entries.       Thanh Mcmillan MD  Neurology/vascular Neurology  Date of Service: 01/10/2022  1:47 PM        Please note: This note was transcribed using voice recognition software. Because of this technology there are often uinintended grammatical, spelling, and other transcription errors. Please disregard these errors.

## 2022-01-10 NOTE — HOSPITAL COURSE
Magdaleno Cunningham is a 56 year old male with a past medical history of CAD s/p CABG, obesity, DM, HTN, HLD, ROSAMARIA, COPD, Afib, tobacco use who presented with multiple days of mid-sternal intermittent chest pain occurring at rest radiating to the left arm now associated with right upper and lower extremity numbness for one day. Cardiology was consulted for inpatient stress test evaluation and Neurology was consulted for TIA evaluation. MRI brain and CTA head and neck showed no acute pathology. Inpatient stress test was also negative. He was discharged 1/11/2022 and will follow up with PCP and Neurology. He was counseled to stop smoking and was prescribed Chantix on discharge.

## 2022-01-10 NOTE — PLAN OF CARE
[facility]  Initial Discharge Assessment       Primary Care Provider: Antonio Del Cid MD    Admission Diagnosis: Elevated glucose [R73.09]  Chest pain [R07.9]  Right sided numbness [R20.0]    Admission Date: 1/9/2022  Expected Discharge Date:      Pt confirmed home address and that he lives with his son Magdaleno Lindsay. Pt denied HH and has a home CPAP. Pt changed PCP to Dr. Mclaughlin, updated in Epic. Pts pharmacy of choice is [facility] . Pt has Medicaid insurance coverage. Pt has the assistance from his sister Marilu [phone]. Pts discharge plan is home with family. CM following.     Discharge Barriers Identified: None    Payor: MEDICAID / Plan: [facility]) / Product Type: Managed Medicaid /     Extended Emergency Contact Information  Primary Emergency Contact: MARILU BAY  Mobile Phone: [phone]  Relation: Sister    Discharge Plan A: Home with family  Discharge Plan B: Home      [place] [id number] [facility] [address] [facility] [facility]  [address] [facility] [address]  Phone: [phone] Fax: [phone]    [facility] [address] [facility] [address]  Phone: [phone] Fax: [phone]    [facility] [address]  [address] [facility] [address]  Phone: [phone] Fax: [phone]      Initial Assessment (most recent)     Adult Discharge Assessment - 01/10/22 0925        Discharge Assessment    Assessment Type Discharge Planning Assessment     Confirmed/corrected address, phone number and insurance Yes     Confirmed Demographics Correct on Facesheet     Source of Information patient     Does patient/caregiver understand observation status Yes     Communicated PEDRO with patient/caregiver Yes     Reason For Admission Elevated glucose     Lives With child(tonya), adult     Facility Arrived From: home      Do you expect to return to your current living situation? Yes     Do you have help at home or someone to help you manage your care at home? Yes     Who are your caregiver(s) and their phone number(s)? yong Lindsay     Prior to hospitilization cognitive status: Alert/Oriented     Current cognitive status: Alert/Oriented     Walking or Climbing Stairs Difficulty none     Dressing/Bathing Difficulty none     Home Layout Able to live on 1st floor     Equipment Currently Used at Home CPAP     Readmission within 30 days? No     Patient currently being followed by outpatient case management? No     Do you currently have service(s) that help you manage your care at home? No     Do you take prescription medications? Yes     Do you have prescription coverage? Yes     Do you have any problems affording any of your prescribed medications? No     Is the patient taking medications as prescribed? yes     Who is going to help you get home at discharge? Stefano Dolan     How do you get to doctors appointments? car, drives self     Are you on dialysis? No     Do you take coumadin? No     Discharge Plan A Home with family     Discharge Plan B Home     DME Needed Upon Discharge  none     Discharge Plan discussed with: Patient     Discharge Barriers Identified None        Relationship/Environment    Name(s) of Who Lives With Patient Stefano Dolan

## 2022-01-10 NOTE — CARE UPDATE
01/10/22 0725   Patient Assessment/Suction   Level of Consciousness (AVPU) alert   Respiratory Effort Normal;Unlabored   Expansion/Accessory Muscles/Retractions expansion symmetric;no retractions;no use of accessory muscles   All Lung Fields Breath Sounds clear;diminished   Rhythm/Pattern, Respiratory depth regular;pattern regular;unlabored   Cough Frequency infrequent   Cough Type good;nonproductive   PRE-TX-O2   O2 Device (Oxygen Therapy) room air   SpO2 97 %   Pulse Oximetry Type Intermittent   $ Pulse Oximetry - Multiple Charge Pulse Oximetry - Multiple   Pulse 76   Resp 20   Positioning Sitting in chair   Inhaler   $ Inhaler Charges MDI (Metered Dose Inahler) Treatment   Daily Review of Necessity (Inhaler) completed   Respiratory Treatment Status (Inhaler) given   Treatment Route (Inhaler) mouthpiece   Patient Position (Inhaler) HOB elevated   Post Treatment Assessment (Inhaler) breath sounds unchanged   Signs of Intolerance (Inhaler) none   Breath Sounds Post-Respiratory Treatment   Throughout All Fields Post-Treatment All Fields   Throughout All Fields Post-Treatment no change   Post-treatment Heart Rate (beats/min) 78   Post-treatment Resp Rate (breaths/min) 18   Skin Integrity   $ Wound Care Tech Time 15 min   Area Observed Bridge of nose   Skin Appearance without discoloration   Preset CPAP/BiPAP Settings   Mode Of Delivery Standby   Will continue to monitor

## 2022-01-11 VITALS
BODY MASS INDEX: 39.2 KG/M2 | DIASTOLIC BLOOD PRESSURE: 59 MMHG | SYSTOLIC BLOOD PRESSURE: 120 MMHG | HEIGHT: 71 IN | OXYGEN SATURATION: 97 % | HEART RATE: 80 BPM | TEMPERATURE: 97 F | RESPIRATION RATE: 18 BRPM | WEIGHT: 280 LBS

## 2022-01-11 PROBLEM — R20.0 NUMBNESS AND TINGLING OF RIGHT ARM AND LEG: Status: RESOLVED | Noted: 2022-01-10 | Resolved: 2022-01-11

## 2022-01-11 PROBLEM — R07.9 CHEST PAIN: Status: RESOLVED | Noted: 2020-04-22 | Resolved: 2022-01-11

## 2022-01-11 PROBLEM — R20.2 NUMBNESS AND TINGLING OF RIGHT ARM AND LEG: Status: RESOLVED | Noted: 2022-01-10 | Resolved: 2022-01-11

## 2022-01-11 LAB
ALBUMIN SERPL BCP-MCNC: 3.2 G/DL (ref 3.5–5.2)
ALP SERPL-CCNC: 49 U/L (ref 55–135)
ALT SERPL W/O P-5'-P-CCNC: 38 U/L (ref 10–44)
ANION GAP SERPL CALC-SCNC: 11 MMOL/L (ref 8–16)
AORTIC ROOT ANNULUS: 3.76 CM
AORTIC VALVE CUSP SEPERATION: 2.17 CM
AST SERPL-CCNC: 27 U/L (ref 10–40)
AV INDEX (PROSTH): 0.73
AV MEAN GRADIENT: 3 MMHG
AV PEAK GRADIENT: 5 MMHG
AV VALVE AREA: 4.07 CM2
AV VELOCITY RATIO: 0.64
BASOPHILS # BLD AUTO: 0.11 K/UL (ref 0–0.2)
BASOPHILS NFR BLD: 1.2 % (ref 0–1.9)
BILIRUB SERPL-MCNC: 0.3 MG/DL (ref 0.1–1)
BSA FOR ECHO PROCEDURE: 2.52 M2
BUN SERPL-MCNC: 15 MG/DL (ref 6–20)
CALCIUM SERPL-MCNC: 8.7 MG/DL (ref 8.7–10.5)
CHLORIDE SERPL-SCNC: 102 MMOL/L (ref 95–110)
CO2 SERPL-SCNC: 21 MMOL/L (ref 23–29)
CREAT SERPL-MCNC: 1.2 MG/DL (ref 0.5–1.4)
CV ECHO LV RWT: 0.48 CM
CV PHARM DOSE: 0.4 MG
CV STRESS BASE HR: 66 BPM
DIASTOLIC BLOOD PRESSURE: 72 MMHG
DIFFERENTIAL METHOD: ABNORMAL
DOP CALC AO PEAK VEL: 1.16 M/S
DOP CALC AO VTI: 24.46 CM
DOP CALC LVOT AREA: 5.6 CM2
DOP CALC LVOT DIAMETER: 2.67 CM
DOP CALC LVOT PEAK VEL: 0.74 M/S
DOP CALC LVOT STROKE VOLUME: 99.61 CM3
DOP CALC MV VTI: 23.54 CM
DOP CALCLVOT PEAK VEL VTI: 17.8 CM
E WAVE DECELERATION TIME: 155.04 MSEC
E/A RATIO: 1.3
E/E' RATIO: 7 M/S
ECHO LV POSTERIOR WALL: 1.14 CM (ref 0.6–1.1)
EJECTION FRACTION: 54 %
EOSINOPHIL # BLD AUTO: 0.2 K/UL (ref 0–0.5)
EOSINOPHIL NFR BLD: 2.1 % (ref 0–8)
ERYTHROCYTE [DISTWIDTH] IN BLOOD BY AUTOMATED COUNT: 12.8 % (ref 11.5–14.5)
EST. GFR  (AFRICAN AMERICAN): >60 ML/MIN/1.73 M^2
EST. GFR  (NON AFRICAN AMERICAN): >60 ML/MIN/1.73 M^2
ESTIMATED AVG GLUCOSE: 232 MG/DL (ref 68–131)
FRACTIONAL SHORTENING: 28 % (ref 28–44)
GLUCOSE SERPL-MCNC: 226 MG/DL (ref 70–110)
HBA1C MFR BLD: 9.7 % (ref 4–5.6)
HCT VFR BLD AUTO: 41 % (ref 40–54)
HGB BLD-MCNC: 14.3 G/DL (ref 14–18)
IMM GRANULOCYTES # BLD AUTO: 0.05 K/UL (ref 0–0.04)
IMM GRANULOCYTES NFR BLD AUTO: 0.5 % (ref 0–0.5)
INTERVENTRICULAR SEPTUM: 1.12 CM (ref 0.6–1.1)
IVRT: 114.18 MSEC
LA MAJOR: 4.9 CM
LA WIDTH: 3.56 CM
LEFT ATRIUM SIZE: 4.51 CM
LEFT ATRIUM VOLUME INDEX MOD: 22.2 ML/M2
LEFT ATRIUM VOLUME MOD: 53.86 CM3
LEFT INTERNAL DIMENSION IN SYSTOLE: 3.44 CM (ref 2.1–4)
LEFT VENTRICLE DIASTOLIC VOLUME INDEX: 44.2 ML/M2
LEFT VENTRICLE DIASTOLIC VOLUME: 107.41 ML
LEFT VENTRICLE MASS INDEX: 83 G/M2
LEFT VENTRICLE SYSTOLIC VOLUME INDEX: 20.1 ML/M2
LEFT VENTRICLE SYSTOLIC VOLUME: 48.95 ML
LEFT VENTRICULAR INTERNAL DIMENSION IN DIASTOLE: 4.8 CM (ref 3.5–6)
LEFT VENTRICULAR MASS: 201.36 G
LV LATERAL E/E' RATIO: 5.69 M/S
LV SEPTAL E/E' RATIO: 9.1 M/S
LYMPHOCYTES # BLD AUTO: 3.4 K/UL (ref 1–4.8)
LYMPHOCYTES NFR BLD: 37.4 % (ref 18–48)
MAGNESIUM SERPL-MCNC: 2.1 MG/DL (ref 1.6–2.6)
MCH RBC QN AUTO: 31.9 PG (ref 27–31)
MCHC RBC AUTO-ENTMCNC: 34.9 G/DL (ref 32–36)
MCV RBC AUTO: 92 FL (ref 82–98)
MONOCYTES # BLD AUTO: 0.6 K/UL (ref 0.3–1)
MONOCYTES NFR BLD: 6.4 % (ref 4–15)
MV A" WAVE DURATION": 11.04 MSEC
MV MEAN GRADIENT: 1 MMHG
MV PEAK A VEL: 0.7 M/S
MV PEAK E VEL: 0.91 M/S
MV PEAK GRADIENT: 5 MMHG
MV STENOSIS PRESSURE HALF TIME: 44.96 MS
MV VALVE AREA BY CONTINUITY EQUATION: 4.23 CM2
MV VALVE AREA P 1/2 METHOD: 4.89 CM2
NEUTROPHILS # BLD AUTO: 4.8 K/UL (ref 1.8–7.7)
NEUTROPHILS NFR BLD: 52.4 % (ref 38–73)
NRBC BLD-RTO: 0 /100 WBC
OHS CV CPX 85 PERCENT MAX PREDICTED HEART RATE MALE: 141
OHS CV CPX MAX PREDICTED HEART RATE: 166
OHS CV CPX PATIENT IS FEMALE: 0
OHS CV CPX PATIENT IS MALE: 1
OHS CV CPX PEAK DIASTOLIC BLOOD PRESSURE: 71 MMHG
OHS CV CPX PEAK HEAR RATE: 88 BPM
OHS CV CPX PEAK RATE PRESSURE PRODUCT: NORMAL
OHS CV CPX PEAK SYSTOLIC BLOOD PRESSURE: 128 MMHG
OHS CV CPX PERCENT MAX PREDICTED HEART RATE ACHIEVED: 53
OHS CV CPX RATE PRESSURE PRODUCT PRESENTING: 7656
PHOSPHATE SERPL-MCNC: 4 MG/DL (ref 2.7–4.5)
PISA MRMAX VEL: 0.05 M/S
PISA TR MAX VEL: 2.64 M/S
PLATELET # BLD AUTO: 193 K/UL (ref 150–450)
PMV BLD AUTO: 9.8 FL (ref 9.2–12.9)
POCT GLUCOSE: 297 MG/DL (ref 70–110)
POTASSIUM SERPL-SCNC: 4 MMOL/L (ref 3.5–5.1)
PROT SERPL-MCNC: 6.1 G/DL (ref 6–8.4)
PULM VEIN S/D RATIO: 0.77
PV PEAK D VEL: 0.56 M/S
PV PEAK S VEL: 0.43 M/S
PV PEAK VELOCITY: 0.65 CM/S
RA MAJOR: 4.53 CM
RA PRESSURE: 3 MMHG
RA WIDTH: 3 CM
RBC # BLD AUTO: 4.48 M/UL (ref 4.6–6.2)
RIGHT VENTRICULAR END-DIASTOLIC DIMENSION: 4.11 CM
RV TISSUE DOPPLER FREE WALL SYSTOLIC VELOCITY 1 (APICAL 4 CHAMBER VIEW): 9.65 CM/S
SODIUM SERPL-SCNC: 134 MMOL/L (ref 136–145)
SYSTOLIC BLOOD PRESSURE: 116 MMHG
TDI LATERAL: 0.16 M/S
TDI SEPTAL: 0.1 M/S
TDI: 0.13 M/S
TR MAX PG: 28 MMHG
TRICUSPID ANNULAR PLANE SYSTOLIC EXCURSION: 2.59 CM
TV REST PULMONARY ARTERY PRESSURE: 31 MMHG
WBC # BLD AUTO: 9.2 K/UL (ref 3.9–12.7)

## 2022-01-11 PROCEDURE — 83036 HEMOGLOBIN GLYCOSYLATED A1C: CPT | Performed by: INTERNAL MEDICINE

## 2022-01-11 PROCEDURE — 63600175 PHARM REV CODE 636 W HCPCS: Performed by: STUDENT IN AN ORGANIZED HEALTH CARE EDUCATION/TRAINING PROGRAM

## 2022-01-11 PROCEDURE — 96372 THER/PROPH/DIAG INJ SC/IM: CPT | Mod: 59 | Performed by: EMERGENCY MEDICINE

## 2022-01-11 PROCEDURE — G0378 HOSPITAL OBSERVATION PER HR: HCPCS

## 2022-01-11 PROCEDURE — 99226 PR SUBSEQUENT OBSERVATION CARE,LEVEL III: ICD-10-PCS | Mod: 25,,, | Performed by: INTERNAL MEDICINE

## 2022-01-11 PROCEDURE — 36415 COLL VENOUS BLD VENIPUNCTURE: CPT | Performed by: STUDENT IN AN ORGANIZED HEALTH CARE EDUCATION/TRAINING PROGRAM

## 2022-01-11 PROCEDURE — 25000003 PHARM REV CODE 250: Performed by: STUDENT IN AN ORGANIZED HEALTH CARE EDUCATION/TRAINING PROGRAM

## 2022-01-11 PROCEDURE — 94640 AIRWAY INHALATION TREATMENT: CPT

## 2022-01-11 PROCEDURE — 84100 ASSAY OF PHOSPHORUS: CPT | Performed by: STUDENT IN AN ORGANIZED HEALTH CARE EDUCATION/TRAINING PROGRAM

## 2022-01-11 PROCEDURE — 94761 N-INVAS EAR/PLS OXIMETRY MLT: CPT

## 2022-01-11 PROCEDURE — 99900035 HC TECH TIME PER 15 MIN (STAT)

## 2022-01-11 PROCEDURE — 99226 PR SUBSEQUENT OBSERVATION CARE,LEVEL III: CPT | Mod: 25,,, | Performed by: INTERNAL MEDICINE

## 2022-01-11 PROCEDURE — 85025 COMPLETE CBC W/AUTO DIFF WBC: CPT | Performed by: STUDENT IN AN ORGANIZED HEALTH CARE EDUCATION/TRAINING PROGRAM

## 2022-01-11 PROCEDURE — 80053 COMPREHEN METABOLIC PANEL: CPT | Performed by: STUDENT IN AN ORGANIZED HEALTH CARE EDUCATION/TRAINING PROGRAM

## 2022-01-11 PROCEDURE — S4991 NICOTINE PATCH NONLEGEND: HCPCS | Performed by: STUDENT IN AN ORGANIZED HEALTH CARE EDUCATION/TRAINING PROGRAM

## 2022-01-11 PROCEDURE — 83735 ASSAY OF MAGNESIUM: CPT | Performed by: STUDENT IN AN ORGANIZED HEALTH CARE EDUCATION/TRAINING PROGRAM

## 2022-01-11 RX ORDER — VARENICLINE TARTRATE 0.5 (11)-1
KIT ORAL
Qty: 1 EACH | Refills: 0 | Status: ON HOLD | OUTPATIENT
Start: 2022-01-11 | End: 2022-08-31

## 2022-01-11 RX ORDER — ATORVASTATIN CALCIUM 20 MG/1
20 TABLET, FILM COATED ORAL DAILY
Status: DISCONTINUED | OUTPATIENT
Start: 2022-01-12 | End: 2022-01-11 | Stop reason: HOSPADM

## 2022-01-11 RX ORDER — REGADENOSON 0.08 MG/ML
0.4 INJECTION, SOLUTION INTRAVENOUS ONCE
Status: COMPLETED | OUTPATIENT
Start: 2022-01-11 | End: 2022-01-11

## 2022-01-11 RX ADMIN — ISOSORBIDE MONONITRATE 30 MG: 30 TABLET, EXTENDED RELEASE ORAL at 09:01

## 2022-01-11 RX ADMIN — APIXABAN 5 MG: 2.5 TABLET, FILM COATED ORAL at 09:01

## 2022-01-11 RX ADMIN — Medication 1 PATCH: at 09:01

## 2022-01-11 RX ADMIN — FLUTICASONE FUROATE AND VILANTEROL TRIFENATATE 1 PUFF: 100; 25 POWDER RESPIRATORY (INHALATION) at 07:01

## 2022-01-11 RX ADMIN — GABAPENTIN 600 MG: 300 CAPSULE ORAL at 03:01

## 2022-01-11 RX ADMIN — METOPROLOL SUCCINATE 100 MG: 50 TABLET, EXTENDED RELEASE ORAL at 09:01

## 2022-01-11 RX ADMIN — HYDROCHLOROTHIAZIDE 12.5 MG: 12.5 TABLET ORAL at 09:01

## 2022-01-11 RX ADMIN — REGADENOSON 0.4 MG: 0.08 INJECTION, SOLUTION INTRAVENOUS at 08:01

## 2022-01-11 RX ADMIN — RANOLAZINE 500 MG: 500 TABLET, FILM COATED, EXTENDED RELEASE ORAL at 09:01

## 2022-01-11 RX ADMIN — INSULIN DETEMIR 80 UNITS: 100 INJECTION, SOLUTION SUBCUTANEOUS at 09:01

## 2022-01-11 RX ADMIN — INSULIN ASPART 6 UNITS: 100 INJECTION, SOLUTION INTRAVENOUS; SUBCUTANEOUS at 11:01

## 2022-01-11 RX ADMIN — ATORVASTATIN CALCIUM 40 MG: 40 TABLET, FILM COATED ORAL at 09:01

## 2022-01-11 RX ADMIN — GABAPENTIN 600 MG: 300 CAPSULE ORAL at 09:01

## 2022-01-11 RX ADMIN — ASPIRIN 81 MG CHEWABLE TABLET 81 MG: 81 TABLET CHEWABLE at 09:01

## 2022-01-11 RX ADMIN — LISINOPRIL 10 MG: 10 TABLET ORAL at 09:01

## 2022-01-11 NOTE — ASSESSMENT & PLAN NOTE
Improved. MRI and CTA head and neck unremarkable for acute pathology.  -Neurology consulted  -ASA, statin and fibrate

## 2022-01-11 NOTE — ASSESSMENT & PLAN NOTE
Dangers of cigarette smoking were reviewed with patient in detail for 10 minutes and patient was encouraged to quit. Nicotine replacement options were discussed.  -Chantix

## 2022-01-11 NOTE — PROGRESS NOTES
[facility]  Department of Cardiology  Consult Note      PATIENT NAME: Magdaleno Cunningham  MRN: [id number]  TODAY'S DATE: 01/11/2022  ADMIT DATE: 1/9/2022                          CONSULT REQUESTED BY: Andrea Sims MD    SUBJECTIVE     PRINCIPAL PROBLEM: <principal problem not specified>      REASON FOR CONSULT:  Chest pain     Will history 01/11/2022:  Chest pain is more localized in the parasternal border it is a is reproducible from chest wall.  Patient is completed Lexiscan Myoview    HPI:  [age] year old male with a past medical history of CAD status post CABG in 2020 by  who presented due to week-long history of chest pain with activity relieved with rest and nitro.  This is exactly how his previous heart attacks presented in the past.  He had his 1st heart attack in 2006 after a month long of feeling similar chest pain relieved with nitro, this was resulted with PCI.  He is normally followed by Dr. Walters outpatient.  He also noticed right arm and leg numbness and weakness, and is currently waiting for MRI results.  He is currently chest pain free.  He has been smoking since 13 years old and would finally like to quit.  His troponins and EKG have been negative.  He has not had any cardiac testing since before his bypass surgery.  He has a history of InStent restenosis 2 years post PCI which then led to his bypass surgery.     From H and P:  Magdaleno Cunningham is a 56 year old male with a past medical history of CAD s/p CABG, obesity, DM, HTN, HLD, ROSAMARIA, COPd, Afib, tobacco use who presents with multiple days of mid-sternal intermittent chest pain occurring at rest radiating to the left arm now associated with right upper and lower extremity numbness for one day. He denies chest pain at this time and states that the numbness is improving. He states that he has been out of Eliquis for multiple days. He states he is still smoking over a pack per day. The chest pain has not been  relieved with nitroglycerin. He was not stroke activated in the ED, yet Tele-Neurology was contacted. Hospital Medicine was consulted for admission.         Review of patient's allergies indicates:   Allergen Reactions    Pesticide Dermatitis and Rash       Past Medical History:   Diagnosis Date    Anticoagulant long-term use     Atrial fibrillation 2018    BPH (benign prostatic hyperplasia)     Cataract     COPD (chronic obstructive pulmonary disease)     Coronary artery disease     stents    CVD (cardiovascular disease)     Diabetes mellitus     Erythema multiforme     AKA Denton Edmondson Syndrome    Hypertension     Infected incision     MI (myocardial infarction)     per pt he has had 4     ROSAMARIA on CPAP     RLS (restless legs syndrome)      Past Surgical History:   Procedure Laterality Date    CORONARY ARTERY BYPASS GRAFT (CABG) N/A 4/7/2020    Procedure: CORONARY ARTERY BYPASS GRAFT (CABG)- Excision of left atrial appendage;  Surgeon: Doug Solitario MD;  Location: [facility] OR;  Service: Cardiothoracic;  Laterality: N/A;    CORONARY STENT PLACEMENT      WILSON MAZE PROCEDURE N/A 4/7/2020    Procedure: WILSON MAZE PROCEDURE- Attempted;  Surgeon: Doug Solitario MD;  Location: [facility] OR;  Service: Cardiothoracic;  Laterality: N/A;    CYSTOSCOPY N/A 12/10/2018    Procedure: CYSTOSCOPY;  Surgeon: Khushboo Abreu MD;  Location: [facility] OR;  Service: Urology;  Laterality: N/A;    ENDOSCOPIC HARVEST OF VEIN Left 4/7/2020    Procedure: HARVEST-VEIN-ENDOVASCULAR;  Surgeon: Doug Solitario MD;  Location: [facility] OR;  Service: Cardiothoracic;  Laterality: Left;    LEFT HEART CATHETERIZATION Left 3/17/2020    Procedure: CATHETERIZATION, HEART, LEFT;  Surgeon: Tyree Walters MD;  Location: [facility] CATH/EP LAB;  Service: Cardiology;  Laterality: Left;    STERNAL WIRES REMOVAL N/A 6/8/2020    Procedure: REMOVAL, STERNAL WIRE;  Surgeon: Doug Solitario MD;  Location: [facility] OR;  Service:  Peripheral Vascular;  Laterality: N/A;    ULTRASOUND OF PROSTATE FOR VOLUME DETERMINATION  12/10/2018    Procedure: ULTRASOUND, PROSTATE, FOR VOLUME DETERMINATION;  Surgeon: Khushboo Abreu MD;  Location: [facility] OR;  Service: Urology;;     Social History     Tobacco Use    Smoking status: Current Every Day Smoker     Packs/day: 0.30     Years: 30.00     Pack years: 9.00     Types: Cigarettes     Last attempt to quit: 2020     Years since quittin.7    Smokeless tobacco: Never Used    Tobacco comment: just under a pack a day   Substance Use Topics    Alcohol use: Not Currently    Drug use: Yes     Frequency: 7.0 times per week     Types: Marijuana     Comment: nightly to help him sleep last done at 2200on 7/3/21        Review of Systems     Constitutional: Negative for chills, fatigue and fever.   Eyes: No double vision, No blurred vision  Neuro: No headaches, No dizziness  Respiratory: Negative for cough, shortness of breath and wheezing.    Cardiovascular: Positive for chest pain. Negative for palpitations and leg swelling.   Gastrointestinal: Negative for abdominal pain, No melena, diarrhea, nausea and vomiting.   Genitourinary: Negative for dysuria and frequency, Negative for hematuria  Skin: Negative for bruising, Negative for edema or discoloration noted.         OBJECTIVE     VITAL SIGNS (Most Recent)  Temp: 97.4 °F (36.3 °C) (22 0408)  Pulse: 68 (22 0809)  Resp: 18 (22 0755)  BP: 116/72 (22 0809)  SpO2: 97 % (22 0755)    VENTILATION STATUS  Resp: 18 (22 0755)  SpO2: 97 % (22 0755)       I & O (Last 24H):No intake or output data in the 24 hours ending 22 0850    WEIGHTS  Wt Readings from Last 3 Encounters:   22 1740 127 kg (280 lb)   22 1215 127 kg (280 lb)   21 0211 135.3 kg (298 lb 4.5 oz)   21 2257 127 kg (280 lb)   20 0935 120.2 kg (265 lb)     Physical examination:      Constitutional:  Well-built  well-nourished in no apparent distress, alert and oriented    Neck: no carotid bruit, no JVD, no masses    Lungs: diminished breath sounds bibasilar, rhonchi bilaterally  Chest Wall: no tenderness  CARDIAC:  regular rate and rhythm, S1, S2 normal, no murmur, click, rub or gallop  Abdomen: soft, non-tender; bowel sounds normal; no masses,  no organomegaly, no guarding or rebound noted  Extremities: Extremities normal, atraumatic, no cyanosis, clubbing, or edema  Skin: Skin color, texture, turgor normal. No rashes or lesions  Neuro: Alert and responsive.  Moving all extremities      HOME MEDICATIONS:  No current facility-administered medications on file prior to encounter.     Current Outpatient Medications on File Prior to Encounter   Medication Sig Dispense Refill    ACCU-CHEK GUIDE Strp USE 1 STRIP TO CHECK BLOOD SUGAR TWICE DAILY      amitriptyline (ELAVIL) 25 MG tablet amitriptyline 25 mg tablet   TAKE 1 TO 2 TABLETS BY MOUTH AT BEDTIME AS NEEDED NEUROPATHIC PAIN RELIEF AND SLEEP AID      ANORO ELLIPTA 62.5-25 mcg/actuation DsDv Inhale 1 puff into the lungs once daily.      apixaban (ELIQUIS) 5 mg Tab Take 5 mg by mouth 2 (two) times daily.      aspirin 81 MG Chew Take 81 mg by mouth once daily.       clonazePAM (KLONOPIN) 1 MG tablet Take 1 mg by mouth 3 (three) times daily as needed for Anxiety.       COVID-19 vacc,mRNA,Pfizer,,PF, (COMIRNATY, PF,) 30 mcg/0.3 mL SusR Pfizer[employer] COVID-19 Vaccine (PF) 30 mcg/0.3 mL IM susp (purple)      eszopiclone (LUNESTA) 1 MG Tab eszopiclone 1 mg tablet   TAKE ONE TABLET BY MOUTH AT BEDTIME AS NEEDED. STOP restoril      fenofibrate (TRICOR) 145 MG tablet Take 145 mg by mouth every evening.       flu vac qs 2020-21,4 yr up, CD (FLUCELVAX QUAD [id number]) 60 mcg (15 mcg x 4)/0.5 mL Susp Flucelvax Quad [id number] 60 mcg (15 mcg x 4)/0.5 mL intramuscular susp      FOLTANX 3-35-2 mg Tab Take 1 tablet by mouth 2 (two) times daily.      furosemide (LASIX) 20 MG tablet  "Take 20 mg by mouth daily as needed.      gabapentin (NEURONTIN) 600 MG tablet Take 600 mg by mouth 3 (three) times daily.       glimepiride (AMARYL) 4 MG tablet Take 4 mg by mouth 2 (two) times daily.      hydroCHLOROthiazide (HYDRODIURIL) 12.5 MG Tab Take 12.5 mg by mouth once daily.      HYDROcodone-acetaminophen (NORCO) 5-325 mg per tablet Take 1 tablet by mouth every 6 (six) hours as needed for Pain. 15 tablet 0    icosapent ethyL (VASCEPA) 1 gram Cap Take 2 capsules by mouth 2 (two) times daily.      isosorbide mononitrate (IMDUR) 30 MG 24 hr tablet Take 1 tablet (30 mg total) by mouth once daily. 30 tablet 11    JARDIANCE 25 mg tablet Take 25 mg by mouth once daily.      LANTUS SOLOSTAR U-100 INSULIN glargine 100 units/mL (3mL) SubQ pen Inject 80 Units into the skin 2 (two) times daily. Changed to 80 units BID 2months ago      lisinopriL 10 MG tablet Take 10 mg by mouth once daily.       metFORMIN (GLUCOPHAGE) 1000 MG tablet Take 1,000 mg by mouth 2 (two) times daily with meals.       methocarbamoL (ROBAXIN) 500 MG Tab Take 500 mg by mouth nightly as needed.      metoprolol succinate (TOPROL-XL) 100 MG 24 hr tablet Take 1 tablet (100 mg total) by mouth once daily. 30 tablet 11    nitroGLYCERIN (NITROSTAT) 0.4 MG SL tablet Place 0.4 mg under the tongue every 5 (five) minutes as needed for Chest pain.      pen needle, diabetic 31 gauge x 1/4" Ndle USE 1 TWICE DAILY FOR BLOOD SUGAR GREATER THAN 200      pneumococcal 23-lily ps (PNEUMOVAX-23) 25 mcg/0.5 mL vaccine Pneumovax-23 25 mcg/0.5 mL injection syringe      ranolazine (RANEXA) 500 MG Tb12 Take 500 mg by mouth 2 (two) times daily.      rosuvastatin (CRESTOR) 20 MG tablet Take 20 mg by mouth every evening.       sotaloL (BETAPACE) 80 MG tablet Take 80 mg by mouth 2 (two) times daily.      tamsulosin (FLOMAX) 0.4 mg Cap Take 1 capsule (0.4 mg total) by mouth every evening. 30 capsule 0    valACYclovir (VALTREX) 500 MG tablet Take 500 mg by " mouth 3 (three) times daily.         SCHEDULED MEDS:   apixaban  5 mg Oral BID    aspirin  81 mg Oral Daily    atorvastatin  40 mg Oral Daily    fenofibrate  145 mg Oral QHS    fluticasone furoate-vilanteroL  1 puff Inhalation Daily    gabapentin  600 mg Oral TID    hydroCHLOROthiazide  12.5 mg Oral Daily    insulin detemir U-100  80 Units Subcutaneous BID    isosorbide mononitrate  30 mg Oral Daily    lisinopriL  10 mg Oral Daily    metoprolol succinate  100 mg Oral Daily    nicotine  1 patch Transdermal Daily    ranolazine  500 mg Oral BID    tamsulosin  0.4 mg Oral QHS       CONTINUOUS INFUSIONS:    PRN MEDS:acetaminophen, albuterol-ipratropium, amitriptyline, clonazePAM, dextrose 50%, dextrose 50%, glucagon (human recombinant), glucose, glucose, HYDROcodone-acetaminophen, insulin aspart U-100, melatonin, naloxone, nitroGLYCERIN, potassium bicarbonate, potassium bicarbonate, potassium bicarbonate, potassium, sodium phosphates, potassium, sodium phosphates, potassium, sodium phosphates, sodium chloride 0.9%    LABS AND DIAGNOSTICS     CBC LAST 3 DAYS  Recent Labs   Lab 01/09/22  1130 01/09/22  1130 01/10/22  0437 01/11/22  0523   WBC 10.55  --  10.59 9.20   RBC 5.01  --  4.75 4.48*   HGB 16.2  --  15.0 14.3   HCT 46.9  --  43.4 41.0   MCV 94  --  91 92   MCH 32.3*  --  31.6* 31.9*   MCHC 34.5  --  34.6 34.9   RDW 12.9  --  12.7 12.8     --  201 193   MPV 10.3  --  10.1 9.8   GRAN 51.8  5.5   < > 43.9  4.7 52.4  4.8   LYMPH 38.4  4.1   < > 45.5  4.8 37.4  3.4   MONO 6.4  0.7   < > 6.4  0.7 6.4  0.6   BASO 0.10  --  0.12 0.11   NRBC 0  --  0 0    < > = values in this interval not displayed.       COAGULATION LAST 3 DAYS  No results for input(s): LABPT, INR, APTT in the last 168 hours.    CHEMISTRY LAST 3 DAYS  Recent Labs   Lab 01/09/22  1130 01/10/22  0437 01/11/22  0523   * 135* 134*   K 4.9 3.9 4.0   CL 99 103 102   CO2 15* 19* 21*   ANIONGAP 17* 13 11   BUN 16 16 15    CREATININE 1.4 1.1 1.2   * 165* 226*   CALCIUM 9.5 8.9 8.7   MG  --  2.0 2.1   ALBUMIN 3.8 3.2* 3.2*   PROT 7.4 6.3 6.1   ALKPHOS 58 51* 49*   ALT 56* 45* 38   AST 43* 26 27   BILITOT 0.3 0.4 0.3       CARDIAC PROFILE LAST 3 DAYS  Recent Labs   Lab 01/09/22  1130 01/09/22  1738 01/09/22  1739 01/09/22  2137   BNP  --   --  <10  --    TROPONINI 0.007 0.011  --  0.008       ENDOCRINE LAST 3 DAYS  No results for input(s): TSH, PROCAL in the last 168 hours.    LAST ARTERIAL BLOOD GAS  ABG  No results for input(s): PH, PO2, PCO2, HCO3, BE in the last 168 hours.    LAST 7 DAYS MICROBIOLOGY   Microbiology Results (last 7 days)     ** No results found for the last 168 hours. **          MOST RECENT IMAGING  CTA Head and Neck (xpd)  Narrative: EXAMINATION:  CTA HEAD AND NECK (XPD)    CLINICAL HISTORY:  Transient ischemic attack (TIA);    TECHNIQUE:  Non contrast low dose axial images were obtained through the head.  CT angiogram was performed from the level of the clarisse to the top of the head following the IV administration of 100mL of Omnipaque 350.   Sagittal and coronal reconstructions and maximum intensity projection reconstructions were performed. Arterial stenosis percentages are based on NASCET measurement criteria.    COMPARISON:  MRI of the brain performed the same day    FINDINGS:  Noncontrast images through the brain demonstrate no evidence of acute hemorrhage.    Neck: There is some ground-glass opacity in the left upper lung field.  Whether this relates to areas of atelectasis or airspace disease is uncertain.  Partially visualized is evidence of prior median sternotomy with coronary artery calcifications.  Calcification is seen in the aortic arch and there is some tortuosity to the right brachiocephalic artery with no significant narrowing of the right subclavian artery.  The right vertebral artery appears dominant and is visualized to the level the basilar artery.  The right common carotid artery  demonstrates no significant stenosis and there is some plaque formation in the right carotid bifurcation with mild narrowing of the proximal right internal carotid artery measuring less than 25% of the diameter measurements are according to an AS CT criteria.  Wrist or tortuosity the distal cervical internal carotid artery and there is plaque formation in the right supraclinoid artery.    The left common carotid artery origin demonstrates no significant stenosis.  The left carotid bifurcation demonstrates moderate plaque formation with a residual lumen of approximately 5.5 mm suggesting a less than 25% luminal diameter narrowing.  There is tortuosity to the more distal left internal carotid artery but no evidence of a significant stenosis.  The left subclavian artery origin is unremarkable there is no evidence of significant left subclavian artery stenosis.  Left vertebral artery is slightly smaller than the right vertebral artery is seen basilar artery incidental finding neck is cervical spondylosis.    Head: There is a 40-50% luminal diameter narrowing of the right cavernous carotid artery with lesser plaque and narrowing seen in the right supraclinoid artery.  Mild plaque formation is seen in the right A1 and M1 segments with no evidence of significant focal stenosis or patent aneurysm in the right anterior cerebral or middle cerebral artery distributions.  The left distal internal carotid artery extends into prominent calcific plaque formation within the cavernous and immediate supraclinoid artery with and a approximately 40% diameter narrowing.  There is additional plaque with mild narrowing in the supraclinoid carotid artery with mild plaque formation is seen in the A1 and M1 segments on the left as well as within the middle and anterior cerebral artery branches.  There is no evidence of a hemodynamically significant stenosis or patient years and.    The posterior circulation demonstrates mild to moderate  areas of atherosclerosis and approximately 50% luminal diameter narrowing of the distal right vertebral artery.  The right anterior inferior cerebellar artery as well as the right superior cerebral artery appear patent.  The P1 segment is is small on right and a small right posterior communicating artery is visualized.  The right posterior cerebral circulation is unremarkable.  The distal left vertebral artery demonstrates a mild 20-30% luminal diameter narrowing.  The left anterior inferior cerebellar artery is seen.  There is mild atherosclerosis in the basilar artery which appears patent.  The left superior cerebellar artery and left posterior communicating artery are patent.  Left P1 segment is small and a small left posterior communicating artery is visualized.  The left posterior cerebral circulation is unremarkable.  Delayed images demonstrate no evidence of abnormal intracranial enhancement.  Impression: Cervical and intracranial atherosclerosis with the most significant luminal diameter narrowing being demonstrated in the distal right vertebral artery measuring approximately 50% of the luminal diameter as well as the right cavernous carotid artery with a narrowing in the 40-50% range.    More mild diffuse intracranial atherosclerosis and evidence of mild carotid bifurcation stenosis.    No evidence of patent aneurysm.    There is    Electronically signed by: Virgilio Barry MD  Date:    01/10/2022  Time:    16:26  MRI Brain Without Contrast  Narrative: EXAMINATION:  MRI BRAIN WITHOUT CONTRAST    CLINICAL HISTORY:  Transient ischemic attack (TIA);    TECHNIQUE:  Multiplanar multisequence MR imaging of the brain was performed without contrast.    COMPARISON:  CT scan dated January 9, 2022    FINDINGS:  There a few nonspecific foci of FLAIR hyperintensity in the corona radiata and centrum semiovale regions.  These are of uncertain significance foci such as these are not infrequently seen in asymptomatic  individuals.  The largest is perhaps 5 mm in the left frontal region.  This also could relate to early microvascular changes.  The brain and ventricles otherwise appear normal.  There is no evidence of mass effect or midline shift.  No abnormal extra-axial collections are seen.  There is no evidence of restricted diffusion.  The midline structures are unremarkable.  Flow voids are seen in the expected locations of the carotid and vertebrobasilar systems.  Impression: A few nonspecific foci of T2 and FLAIR hyperintensity are noted in the corona radiata and centrum semiovale regions.  These are nonspecific but early microvascular changes cannot be excluded.    Otherwise unremarkable MRI of the brain without gadolinium demonstrating no evidence of restricted diffusion, mass effect or abnormal extra-axial collection    Electronically signed by: Virgilio Barry MD  Date:    01/10/2022  Time:    12:10    [phone] 1/11/2022 Routine     Narrative & Impression  EXAMINATION:  NM MYOCARDIAL PERFUSION SPECT MULTI PHARM     CLINICAL HISTORY:  CAD monitoring, CABG > 5yrs or PCI > 2 yrs;  Atherosclerotic heart disease of native coronary artery without angina pectoris     TECHNIQUE:  SPECT images in short, vertical and horizontal long axis were acquired 30 minutes after the injection of 12.5 mCi of Tc-99m tetrofosmin at rest and 33 mCi during a cardiac stress. The clinical stress and ECG portion of the study is to be read separately.     COMPARISON:  None.     FINDINGS:  The quality of the study is somewhat compromised by GI activity adjacent to the inferior wall.     Stress SPECT images demonstrate no reversible defects.     The gated post-stress images reveal normal wall motion and normal wall thickness with an estimated LVEF of 56 %. The LV cavity is not dilated with an end-diastolic volume of 135 ml and an end-systolic volume of 60 ml.     Impression:     1.  Scintigraphically negative for ischemia or infarct.  2. Global  left ventricular systolic function is at the lower limit normal range with an LV ejection fraction of 56 % and no evidence of LV dilatation. Wall motion is normal.        Electronically signed by: Amari Gillette  Date:                                            01/11/2022  Time:                                           12:38             Exam Ended: 01/11/22            Pharmacologic stress test did not show any EKG changes to suggest ischemia.  Stable hemodynamics noted during the study    ECHOCARDIOGRAM RESULTS (last 5)  Results for orders placed in visit on 05/19/20    Echo Color Flow Doppler? Yes    Interpretation Summary  · Mild concentric left ventricular hypertrophy.  · Low normal left ventricular systolic function. The estimated ejection fraction is 50-55%.  · Indeterminate left ventricular diastolic function.  · Normal right ventricular systolic function.  · TDS      Results for orders placed during the hospital encounter of 04/22/20    Echo Color Flow Doppler? Yes    Interpretation Summary  · Normal left ventricular systolic function. The estimated ejection fraction is 55%.  · Normal LV diastolic function.  · Mild tricuspid regurgitation.  · Intermediate central venous pressure (8 mmHg).  · The estimated PA systolic pressure is 33 mmHg.      CURRENT/PREVIOUS VISIT EKG  Results for orders placed or performed during the hospital encounter of 01/09/22   EKG 12-lead    Collection Time: 01/09/22 12:29 PM    Narrative    Test Reason : R07.9,    Vent. Rate : 083 BPM     Atrial Rate : 083 BPM     P-R Int : 164 ms          QRS Dur : 108 ms      QT Int : 402 ms       P-R-T Axes : 040 -22 073 degrees     QTc Int : 472 ms    Normal sinus rhythm  Possible Left atrial enlargement  Borderline Abnormal ECG    Referred By: [email or web address]   SELF           Confirmed By:            ASSESSMENT/PLAN:     Active Hospital Problems    Diagnosis    Numbness and tingling of right arm and leg    Polyneuropathy    Obesity (BMI 30-39.9)     Chest pain    Atrial fibrillation    Coronary artery disease of native artery of native heart with stable angina pectoris    Prostate enlargement    Generalized anxiety disorder    Smoker 1-2 packs per day since 14 years old    ROSAMARIA (obstructive sleep apnea)    Type 2 diabetes mellitus with diabetic arthropathy    Hypertension associated with diabetes    COPD (chronic obstructive pulmonary disease)       ASSESSMENT & PLAN:   1. CAD s/p CABG in 2020   - currently chest pain free, troponins have been negative   - NPO after midnight for MPI in AM   - continue Imdur    2. Atiral Fib   - currently in NSR   - continue eliquis and Toprol 100 mg daily     3. Tobacco abuse  - patient wants to quit smoking  - will see if we can start cessation while in patient     4. Essential HTN   - well controlled  - continue HCTZ, lisinopril    5. Chest wall pain  -  considered a Lidoderm patch for symptom relief.  If patient remains stable he can be discharged with outpatient follow-up    Qamar Grimes MD  Department of Cardiology  Date of Service: 01/11/2022

## 2022-01-11 NOTE — CARE UPDATE
01/11/22 0755   Patient Assessment/Suction   Level of Consciousness (AVPU) alert   Respiratory Effort Normal;Unlabored   Expansion/Accessory Muscles/Retractions no use of accessory muscles;no retractions;expansion symmetric   All Lung Fields Breath Sounds clear;equal bilaterally   Rhythm/Pattern, Respiratory unlabored;pattern regular;depth regular   Cough Frequency no cough   PRE-TX-O2   O2 Device (Oxygen Therapy) room air   SpO2 97 %   Pulse Oximetry Type Intermittent   $ Pulse Oximetry - Multiple Charge Pulse Oximetry - Multiple   Pulse 70   Resp 18   Positioning HOB elevated 45 degrees   Aerosol Therapy   $ Aerosol Therapy Charges PRN treatment not required   Respiratory Treatment Status (SVN) PRN treatment not required   Inhaler   $ Inhaler Charges MDI (Metered Dose Inahler) Treatment   Respiratory Treatment Status (Inhaler) given   Treatment Route (Inhaler) mouthpiece   Patient Position (Inhaler) HOB elevated   Post Treatment Assessment (Inhaler) breath sounds unchanged   Signs of Intolerance (Inhaler) none   Breath Sounds Post-Respiratory Treatment   Throughout All Fields Post-Treatment All Fields   Throughout All Fields Post-Treatment no change   Post-treatment Heart Rate (beats/min) 70   Post-treatment Resp Rate (breaths/min) 18

## 2022-01-11 NOTE — SUBJECTIVE & OBJECTIVE
Interval History: inpatient stress test pending.    Review of Systems   Constitutional: Negative for activity change, appetite change and diaphoresis.   HENT: Negative.    Eyes: Negative.    Respiratory: Negative for cough and shortness of breath.    Cardiovascular: Negative for chest pain, palpitations and leg swelling.   Gastrointestinal: Negative.    Endocrine: Negative.    Genitourinary: Negative.    Musculoskeletal: Negative.    Skin: Negative.    Allergic/Immunologic: Negative.    Neurological: Negative for numbness.   Hematological: Negative.    Psychiatric/Behavioral: Negative.      Objective:     Vital Signs (Most Recent):  Temp: 97.4 °F (36.3 °C) (01/11/22 0408)  Pulse: 66 (01/11/22 0408)  Resp: 18 (01/11/22 0408)  BP: (!) 94/54 (01/11/22 0408)  SpO2: 95 % (01/11/22 0408) Vital Signs (24h Range):  Temp:  [96.3 °F (35.7 °C)-98.5 °F (36.9 °C)] 97.4 °F (36.3 °C)  Pulse:  [66-81] 66  Resp:  [16-20] 18  SpO2:  [95 %-97 %] 95 %  BP: ()/(54-66) 94/54     Weight: 127 kg (280 lb)  Body mass index is 39.05 kg/m².  No intake or output data in the 24 hours ending 01/11/22 0748   Physical Exam  Vitals and nursing note reviewed.   Constitutional:       Appearance: He is obese. He is ill-appearing.   HENT:      Head: Normocephalic and atraumatic.      Right Ear: External ear normal.      Left Ear: External ear normal.      Nose: Nose normal.      Mouth/Throat:      Mouth: Mucous membranes are moist.      Pharynx: Oropharynx is clear.   Eyes:      Extraocular Movements: Extraocular movements intact.      Conjunctiva/sclera: Conjunctivae normal.   Neck:      Vascular: No carotid bruit.   Cardiovascular:      Rate and Rhythm: Normal rate and regular rhythm.      Pulses: Normal pulses.      Heart sounds: Normal heart sounds.   Pulmonary:      Effort: Pulmonary effort is normal. No respiratory distress.      Breath sounds: Normal breath sounds.   Abdominal:      General: Bowel sounds are normal. There is no distension.       Tenderness: There is no abdominal tenderness.   Musculoskeletal:         General: Normal range of motion.      Cervical back: Normal range of motion and neck supple.      Right lower leg: No edema.      Left lower leg: No edema.   Skin:     General: Skin is warm and dry.      Comments: Tattoos. Mid-sternal scar.   Neurological:      General: No focal deficit present.      Mental Status: He is oriented to person, place, and time. Mental status is at baseline.   Psychiatric:         Mood and Affect: Mood normal.         Behavior: Behavior normal.         Significant Labs: All pertinent labs within the past 24 hours have been reviewed.    Significant Imaging: I have reviewed all pertinent imaging results/findings within the past 24 hours.

## 2022-01-11 NOTE — PLAN OF CARE
Patient is clear for discharge from case management standpoint.  PCP follow up scheduled.  CM faxed face sheet to [facility] at [phone], Indra Wheeler () for neuro follow up in 1 week.         01/11/22 1357   Final Note   Assessment Type Final Discharge Note   Anticipated Discharge Disposition Home   What phone number can be called within the next 1-3 days to see how you are doing after discharge? [phone]   Hospital Resources/Appts/Education Provided Appointments scheduled and added to AVS   Post-Acute Status   Discharge Delays None known at this time

## 2022-01-11 NOTE — PROGRESS NOTES
[facility] Hospital Medicine  Progress Note    Patient Name: Magdaleno Cunningham  MRN: [id number]  Patient Class: OP- Observation   Admission Date: 1/9/2022  Length of Stay: 0 days  Attending Physician: Andrea Sims MD  Primary Care Provider: Venkata Mclaughlin MD        Subjective:     Principal Problem:<principal problem not specified>        HPI:  Magdaleno Cunningham is a 56 year old male with a past medical history of CAD s/p CABG, obesity, DM, HTN, HLD, ROSAMARIA, COPd, Afib, tobacco use who presents with multiple days of mid-sternal intermittent chest pain occurring at rest radiating to the left arm now associated with right upper and lower extremity numbness for one day. He denies chest pain at this time and states that the numbness is improving. He states that he has been out of Eliquis for multiple days. He states he is still smoking over a pack per day. The chest pain has not been relieved with nitroglycerin. He was not stroke activated in the ED, yet Tele-Neurology was contacted. Hospital Medicine was consulted for admission.      Overview/Hospital Course:  Magdaleno Cunningham is a 56 year old male with a past medical history of CAD s/p CABG, obesity, DM, HTN, HLD, ROSAMARIA, COPD, Afib, tobacco use who presents with multiple days of mid-sternal intermittent chest pain occurring at rest radiating to the left arm now associated with right upper and lower extremity numbness for one day. Cardiology was consulted for inpatient stress test evaluation and Neurology was consulted for TIA evaluation. MRI brain CT head and neck showed no acute pathology. Inpatient stress test is currently pending. TTE has been ordered as well.      Interval History: inpatient stress test pending.    Review of Systems   Constitutional: Negative for activity change, appetite change and diaphoresis.   HENT: Negative.    Eyes: Negative.    Respiratory: Negative for cough and shortness of breath.    Cardiovascular: Negative for  chest pain, palpitations and leg swelling.   Gastrointestinal: Negative.    Endocrine: Negative.    Genitourinary: Negative.    Musculoskeletal: Negative.    Skin: Negative.    Allergic/Immunologic: Negative.    Neurological: Negative for numbness.   Hematological: Negative.    Psychiatric/Behavioral: Negative.      Objective:     Vital Signs (Most Recent):  Temp: 97.4 °F (36.3 °C) (01/11/22 0408)  Pulse: 66 (01/11/22 0408)  Resp: 18 (01/11/22 0408)  BP: (!) 94/54 (01/11/22 0408)  SpO2: 95 % (01/11/22 0408) Vital Signs (24h Range):  Temp:  [96.3 °F (35.7 °C)-98.5 °F (36.9 °C)] 97.4 °F (36.3 °C)  Pulse:  [66-81] 66  Resp:  [16-20] 18  SpO2:  [95 %-97 %] 95 %  BP: ()/(54-66) 94/54     Weight: 127 kg (280 lb)  Body mass index is 39.05 kg/m².  No intake or output data in the 24 hours ending 01/11/22 0748   Physical Exam  Vitals and nursing note reviewed.   Constitutional:       Appearance: He is obese. He is ill-appearing.   HENT:      Head: Normocephalic and atraumatic.      Right Ear: External ear normal.      Left Ear: External ear normal.      Nose: Nose normal.      Mouth/Throat:      Mouth: Mucous membranes are moist.      Pharynx: Oropharynx is clear.   Eyes:      Extraocular Movements: Extraocular movements intact.      Conjunctiva/sclera: Conjunctivae normal.   Neck:      Vascular: No carotid bruit.   Cardiovascular:      Rate and Rhythm: Normal rate and regular rhythm.      Pulses: Normal pulses.      Heart sounds: Normal heart sounds.   Pulmonary:      Effort: Pulmonary effort is normal. No respiratory distress.      Breath sounds: Normal breath sounds.   Abdominal:      General: Bowel sounds are normal. There is no distension.      Tenderness: There is no abdominal tenderness.   Musculoskeletal:         General: Normal range of motion.      Cervical back: Normal range of motion and neck supple.      Right lower leg: No edema.      Left lower leg: No edema.   Skin:     General: Skin is warm and dry.       Comments: Tattoos. Mid-sternal scar.   Neurological:      General: No focal deficit present.      Mental Status: He is oriented to person, place, and time. Mental status is at baseline.   Psychiatric:         Mood and Affect: Mood normal.         Behavior: Behavior normal.         Significant Labs: All pertinent labs within the past 24 hours have been reviewed.    Significant Imaging: I have reviewed all pertinent imaging results/findings within the past 24 hours.      Assessment/Plan:      Numbness and tingling of right arm and leg  Improved. MRI and CTA head and neck unremarkable for acute pathology.  -Neurology consulted  -ASA, statin and fibrate      Obesity (BMI 30-39.9)  Body mass index is 39.05 kg/m². Morbid obesity complicates all aspects of disease management from diagnostic modalities to treatment. Weight loss encouraged and health benefits explained to patient.        Polyneuropathy  -Gabapentin      Chest pain  HEART score of 4. Troponin and EKG unremarkable.  -Cardiology consulted  -Follow up stress test  -Telemetry  -Continue ASA and Eliquis  -Lisinopril and metoprolol  -Statin      Atrial fibrillation  -Metoprolol  -Eliquis  -Telemetry      Coronary artery disease of native artery of native heart with stable angina pectoris  -ASA and statin  -Telemetry      Prostate enlargement  -Flomax      Generalized anxiety disorder  -Klonopin PRN      Smoker 1-2 packs per day since 14 years old  Dangers of cigarette smoking were reviewed with patient in detail for 10 minutes and patient was encouraged to quit. Nicotine replacement options were discussed.        ROSAMARIA (obstructive sleep apnea)  -CPAP QHS    Hypertension associated with diabetes  -Lisinopril  -Metoprolol  -Imdur      Type 2 diabetes mellitus with diabetic arthropathy  -Detemir 80 BID  -SSI  -AC HS glucose checks  -Hold home medications  -Diabetic diet  -Hypoglycemic precautions      COPD (chronic obstructive pulmonary disease)  -Duoneb  PRN  -Breo        VTE Risk Mitigation (From admission, onward)         Ordered     apixaban tablet 5 mg  2 times daily         01/09/22 1659     IP VTE HIGH RISK PATIENT  Once         01/09/22 1659     Place sequential compression device  Until discontinued         01/09/22 1659     Reason for No Pharmacological VTE Prophylaxis  Once        Question:  Reasons:  Answer:  Already adequately anticoagulated on oral Anticoagulants    01/09/22 1659                Discharge Planning   PEDRO: 1/11/2022     Code Status: Full Code   Is the patient medically ready for discharge?:     Reason for patient still in hospital (select all that apply): Imaging, Consult recommendations and Pending disposition  Discharge Plan A: Home with family                  Andrea Sims MD  Department of Hospital Medicine   [facility]

## 2022-01-11 NOTE — PLAN OF CARE
Patient alert and oriented resting in bed. NAD. Denies pain or SOB. VSS. Urinal at bedside. Normal SR. Room air. Bed alarm active. Plan of care reviewed with patient. Verbalizes understanding.Call light in reach. Pt free from fall or injury. Will monitor.

## 2022-01-11 NOTE — PROGRESS NOTES
[facility]  Progress Note  Date: 2022 1:28 PM            Patient Name: Magdaleno Cunningham   MRN: [id number]   : 1965    AGE: 56 y.o.    LOS: 0 days Hospital Day: 3  Admit date: 2022 12:18 PM         HPI per EMR: Magdaleno Cunningham is a 56 y.o. male with a history of CAD s/p CABG, obesity, DM, HTN, HLD, ROSAMARIA, COPd, Afib, tobacco use who presents with multiple days of mid-sternal intermittent chest pain occurring at rest radiating to the left arm now associated with right upper and lower extremity numbness for one day. He denies chest pain at this time and states that the numbness is improving. He states that he has been out of Eliquis for multiple days. He states he is still smoking over a pack per day. The chest pain has not been relieved with nitroglycerin. He was not stroke activated in the ED, yet Tele-Neurology was contacted. Hospital Medicine was consulted for admission.     Neurology consult:  Patient was seen and examined by me this morning.  He states that he has been having chest pain with intermittent right upper and lower extremity numbness/weakness for a day.  He states he has been taking nitroglycerin for his chest pain.  Patient states that he has a history of AFib and is on Eliquis however he has ran out of this medication for the past 5 days.  He also endorses of headache mostly frontal, ranging from 5-8/10 in intensity last 1-2 weeks.  He denies any associated nausea, vomiting however endorses of intermittent blurred vision with the headache.  He denies any dizziness, lightheadedness, gait instability her bowel bladder abnormalities.     Currently he states that he does not have a headache.  He states his right-sided numbness and weakness have been improving as well.    2022: No acute events overnight. Patient was seen and examined by me this morning. Neuro exam is normal today. He states that his symptoms have significantly improved.       "  Vitals:  Patient Vitals for the past 24 hrs:   BP Temp Temp src Pulse Resp SpO2 Height Weight   01/11/22 1151 (!) 120/59 97 °F (36.1 °C) Oral 80 18 97 % -- --   01/11/22 0809 116/72 98.5 °F (36.9 °C) -- 68 -- -- 5' 11" (1.803 m) 127 kg (280 lb)   01/11/22 0755 -- -- -- 70 18 97 % -- --   01/11/22 0753 -- -- -- 70 18 97 % -- --   01/11/22 0408 (!) 94/54 97.4 °F (36.3 °C) Oral 66 18 95 % -- --   01/10/22 2352 (!) 117/59 98.4 °F (36.9 °C) Oral 70 20 95 % -- --   01/10/22 1921 120/60 97 °F (36.1 °C) Oral 70 16 96 % -- --   01/10/22 1858 -- -- -- 81 -- 97 % -- --   01/10/22 1616 (!) 98/56 98.5 °F (36.9 °C) -- 71 16 97 % -- --     PHYSICAL EXAM:     GENERAL APPEARANCE: Well-developed, well-nourished male in no acute distress.  HEENT: Normocephalic and atraumatic. PERRL. Oropharynx unremarkable.  PULM: Comfortable on room air.  CV: RRR.  ABDOMEN: Soft, nontender.  EXTREMITIES: No signs of vascular compromise. Pulses present. No cyanosis, clubbing or edema.  SKIN: Clear; no rashes, lesions or skin breaks in exposed areas.      NEURO:   MENTAL STATUS: Patient awake and oriented to time, place, and person. Affect normal.  CRANIAL NERVES II-XII: Pupils equal, round and reactive to light. Extraocular movements full and intact. No facial asymmetry.  MOTOR: Normal bulk. Tone normal and symmetrical throughout.  No abnormal movements. No tremor.   Strength 5/5 throughout unless specified below.  REFLEXES: DTRs 2+; normal and symmetric throughout.   SENSATION: Sensation grossly intact to fine touch.  COORDINATION: Finger-to-nose normal for age and symmetric.  STATION: Romberg deferred.  GAIT: Deferred.    CURRENT SCHEDULED MEDICATIONS:   apixaban  5 mg Oral BID    aspirin  81 mg Oral Daily    atorvastatin  40 mg Oral Daily    fenofibrate  145 mg Oral QHS    fluticasone furoate-vilanteroL  1 puff Inhalation Daily    gabapentin  600 mg Oral TID    hydroCHLOROthiazide  12.5 mg Oral Daily    insulin detemir U-100  80 Units " Subcutaneous BID    isosorbide mononitrate  30 mg Oral Daily    lisinopriL  10 mg Oral Daily    metoprolol succinate  100 mg Oral Daily    nicotine  1 patch Transdermal Daily    ranolazine  500 mg Oral BID    tamsulosin  0.4 mg Oral QHS     CURRENT INFUSIONS:    DATA:  Recent Labs   Lab 01/09/22  1130 01/10/22  0437 01/11/22  0523   * 135* 134*   K 4.9 3.9 4.0   CL 99 103 102   CO2 15* 19* 21*   BUN 16 16 15   CREATININE 1.4 1.1 1.2   * 165* 226*   CALCIUM 9.5 8.9 8.7   PHOS  --  4.1 4.0   MG  --  2.0 2.1   AST 43* 26 27   ALT 56* 45* 38     Recent Labs   Lab 01/09/22  1130 01/10/22  0437 01/11/22  0523   WBC 10.55 10.59 9.20   HGB 16.2 15.0 14.3   HCT 46.9 43.4 41.0    201 193     No results found for: PROTEINCSF, GLUCCSF, WBCCSF, RBCCSF  Hemoglobin A1C   Date Value Ref Range Status   03/18/2020 10.4 (H) 4.5 - 6.2 % Final     Comment:     According to ADA guidelines, hemoglobin A1C <7.0% represents  optimal control in non-pregnant diabetic patients.  Different  metrics may apply to specific populations.   Standards of Medical Care in Diabetes - 2016.  For the purpose of screening for the presence of diabetes:  <5.7%     Consistent with the absence of diabetes  5.7-6.4%  Consistent with increasing risk for diabetes   (prediabetes)  >or=6.5%  Consistent with diabetes  Currently no consensus exists for use of hemoglobin A1C  for diagnosis of diabetes for children.     07/07/2016 5.9 4.5 - 6.2 % Final     Comment:     According to ADA guidelines, hemoglobin A1C <7.0% represents  optimal control in non-pregnant diabetic patients.  Different  metrics may apply to specific populations.   Standards of Medical Care in Diabetes - 2016.  For the purpose of screening for the presence of diabetes:  <5.7%     Consistent with the absence of diabetes  5.7-6.4%  Consistent with increasing risk for diabetes   (prediabetes)  >or=6.5%  Consistent with diabetes  Currently no consensus exists for use of  hemoglobin A1C  for diagnosis of diabetes for children.     10/28/2014 5.9 4.5 - 6.2 % Final            I have personally reviewed and interpreted the pertinent imaging and lab results.  Imaging Results          CT Head Without Contrast (Final result)  Result time 01/09/22 14:42:21    Final result by Elias Lua MD (01/09/22 14:42:21)                 Impression:      Mild cortical atrophy without acute intracranial abnormality.      Electronically signed by: Elias Lua  Date:    01/09/2022  Time:    14:42             Narrative:    EXAMINATION:  CT HEAD WITHOUT CONTRAST    CLINICAL HISTORY:  Neuro deficit, acute, stroke suspected;    TECHNIQUE:  Low dose axial images were obtained through the head.  Coronal and sagittal reformations were also performed. Contrast was not administered.    COMPARISON:  None.    FINDINGS:  There is no acute hemorrhage or infarction.  There is mild cortical atrophy.  There is a normal pattern of gray-white matter differentiation.    No extra-axial fluid collections.  Ventricles are normal in size, shape and configuration.  The basal cisterns are patent.    The imaged paranasal sinuses and ethmoid air cells are well aerated.    The mastoid air cells and middle ears are normally pneumatized.                               X-Ray Chest PA And Lateral (Final result)  Result time 01/09/22 12:55:56    Final result by Elias Lua MD (01/09/22 12:55:56)                 Impression:      1. No acute chest disease.  2. Prior CABG.      Electronically signed by: Elias Lua  Date:    01/09/2022  Time:    12:55             Narrative:    EXAMINATION:  XR CHEST PA AND LATERAL    CLINICAL HISTORY:  Chest Pain;    TECHNIQUE:  PA and lateral views of the chest were performed.    COMPARISON:  07/03/2021.    FINDINGS:  The lungs are clear, with normal appearance of pulmonary vasculature and no pleural effusion or pneumothorax.    The cardiac silhouette is normal in size.  Prior CABG.  The  hilar and mediastinal contours are unremarkable.    Bones are intact.                                        ASSESSMENT AND PLAN:    Chest pain  Right-sided numbness and weakness  R/o CVA  Atrial fibrillation        Workup  · CTH: No acute intracranial abnormality   · CTA head and neck:  No ELVO. Cervical and intracranial atherosclerosis with the most significant luminal diameter narrowing being demonstrated in the distal right vertebral artery measuring approximately 50% of the luminal diameter as well as the right cavernous carotid artery with a narrowing in the 40-50% range  · MRI brain:  No acute intracranial abnormality.  · ECHO:  pending read  · LDL: 23.2   · HbA1c: pending      Plan  · Continue with home medications including Eliquis, Aspirin 81 mg and Lipitor 20mg( LDL already at goal)  · Permissive BP to 220 systolic for 48 hrs from symptom onset and after that normalize BP  · Chest pain workup per primary team and Cardiology. Stress test done  · PT OT and  Speech therapy evaluate and treat  · DVT prophylaxis with chemo/SCD prophylaxis  · Extensively discussed lifestyle modifications as prophylactic measures for stroke prevention including      37 minutes of care time has been spent evaluating with the patient. Time includes chart review not limited to diagnostic imaging, labs, and vitals, patient assessment, discussion with family and nursing, current order evaluations, and new order entries.      Thanh Mcmillan MD  Neurology/vascular Neurology  Date of Service: 01/11/2022  1:28 PM    Please note: This note was transcribed using voice recognition software. Because of this technology there are often uinintended grammatical, spelling, and other transcription errors. Please disregard these errors.

## 2022-01-11 NOTE — DISCHARGE SUMMARY
[facility] Hospital Medicine  Discharge Summary      Patient Name: Magdaleno Cunningham  MRN: [id number]  Patient Class: OP- Observation  Admission Date: 1/9/2022  Hospital Length of Stay: 0 days  Discharge Date and Time: No discharge date for patient encounter.  Attending Physician: Andrea Sims MD   Discharging Provider: Andrea Sims MD  Primary Care Provider: Venkata Mclaughlin MD      HPI:   Magdaleno Cunningham is a 56 year old male with a past medical history of CAD s/p CABG, obesity, DM, HTN, HLD, ROSAMARIA, COPd, Afib, tobacco use who presents with multiple days of mid-sternal intermittent chest pain occurring at rest radiating to the left arm now associated with right upper and lower extremity numbness for one day. He denies chest pain at this time and states that the numbness is improving. He states that he has been out of Eliquis for multiple days. He states he is still smoking over a pack per day. The chest pain has not been relieved with nitroglycerin. He was not stroke activated in the ED, yet Tele-Neurology was contacted. Hospital Medicine was consulted for admission.      * No surgery found *      Hospital Course:   Magdaleno Cunningham is a 56 year old male with a past medical history of CAD s/p CABG, obesity, DM, HTN, HLD, ROSAMARIA, COPD, Afib, tobacco use who presented with multiple days of mid-sternal intermittent chest pain occurring at rest radiating to the left arm now associated with right upper and lower extremity numbness for one day. Cardiology was consulted for inpatient stress test evaluation and Neurology was consulted for TIA evaluation. MRI brain and CTA head and neck showed no acute pathology. Inpatient stress test was also negative. He was discharged 1/11/2022 and will follow up with PCP and Neurology. He was counseled to stop smoking and was prescribed Chantix on discharge.       Goals of Care Treatment Preferences:  Code Status: Full Code      Consults:   Consults (From  admission, onward)        Status Ordering Provider     Inpatient consult to Neurology  Once        Provider:  Thanh Mcmillan MD    Completed NABIL PAYNE     Inpatient consult to Cardiology  Once        Provider:  Ermias Hardy MD    Completed NABIL PAYNE.          * Coronary artery disease of native artery of native heart with stable angina pectoris  -ASA and statin  -Telemetry      Obesity (BMI 30-39.9)  Body mass index is 39.05 kg/m². Morbid obesity complicates all aspects of disease management from diagnostic modalities to treatment. Weight loss encouraged and health benefits explained to patient.        Polyneuropathy  -Gabapentin      Atrial fibrillation  -Metoprolol  -Eliquis  -Telemetry      Prostate enlargement  -Flomax      Generalized anxiety disorder  -Klonopin PRN      Smoker 1-2 packs per day since 14 years old  Dangers of cigarette smoking were reviewed with patient in detail for 10 minutes and patient was encouraged to quit. Nicotine replacement options were discussed.  -Chantix    ROSAMARIA (obstructive sleep apnea)  -CPAP QHS    Hypertension associated with diabetes  -Lisinopril  -Metoprolol  -Imdur      Type 2 diabetes mellitus with diabetic arthropathy  -Detemir 80 BID  -SSI  -AC HS glucose checks  -Hold home medications  -Diabetic diet  -Hypoglycemic precautions      COPD (chronic obstructive pulmonary disease)  -Duoneb PRN  -Breo        Final Active Diagnoses:    Diagnosis Date Noted POA    PRINCIPAL PROBLEM:  Coronary artery disease of native artery of native heart with stable angina pectoris [I25.118] 03/25/2020 Yes    Polyneuropathy [G62.9] 01/09/2022 Yes    Obesity (BMI 30-39.9) [E66.9] 01/09/2022 Yes    Atrial fibrillation [I48.91] 03/25/2020 Yes    Prostate enlargement [N40.0] 08/25/2017 Yes    Generalized anxiety disorder [F41.1] 07/07/2016 Yes    Smoker 1-2 packs per day since 14 years old [F17.200] 07/06/2016 Yes    ROSAMARIA (obstructive sleep apnea) [G47.33] 10/28/2014 Yes     Type 2 diabetes mellitus with diabetic arthropathy [E11.618]  Yes    Hypertension associated with diabetes [E11.59, I15.2]  Yes    COPD (chronic obstructive pulmonary disease) [J44.9]  Yes      Problems Resolved During this Admission:    Diagnosis Date Noted Date Resolved POA    Numbness and tingling of right arm and leg [R20.0, R20.2] 01/10/2022 01/11/2022 Yes    Chest pain [R07.9] 04/22/2020 01/11/2022 Yes       Discharged Condition: stable    Disposition: Home or Self Care    Follow Up:   Follow-up Information     Venkata Mclaughlin MD In 2 weeks.    Specialty: Internal Medicine  Contact information:  [address] [place] [address] [address] [place] [id number]  [phone]             Thanh Mcmillan MD In 1 week.    Specialty: Neurology  Contact information:  [address]  [place] [id number]  [phone]                       Patient Instructions:      Diet Cardiac     Diet diabetic     Notify your health care provider if you experience any of the following:  temperature >100.4     Notify your health care provider if you experience any of the following:  persistent nausea and vomiting or diarrhea     Notify your health care provider if you experience any of the following:  redness, tenderness, or signs of infection (pain, swelling, redness, odor or green/yellow discharge around incision site)     Notify your health care provider if you experience any of the following:  difficulty breathing or increased cough     Notify your health care provider if you experience any of the following:  severe persistent headache     Notify your health care provider if you experience any of the following:  persistent dizziness, light-headedness, or visual disturbances     Notify your health care provider if you experience any of the following:  increased confusion or weakness     Activity as tolerated       Significant Diagnostic Studies: Labs:   CMP   Recent Labs   Lab 01/10/22  0437 01/11/22  0523   * 134*   K 3.9 4.0     102   CO2 19* 21*   * 226*   BUN 16 15   CREATININE 1.1 1.2   CALCIUM 8.9 8.7   PROT 6.3 6.1   ALBUMIN 3.2* 3.2*   BILITOT 0.4 0.3   ALKPHOS 51* 49*   AST 26 27   ALT 45* 38   ANIONGAP 13 11   ESTGFRAFRICA >60 >60   EGFRNONAA >60 >60   , CBC   Recent Labs   Lab 01/10/22  0437 01/10/22  0437 01/11/22  0523   WBC 10.59  --  9.20   HGB 15.0  --  14.3   HCT 43.4   < > 41.0     --  193    < > = values in this interval not displayed.   , Lipid Panel   Lab Results   Component Value Date    CHOL 113 (L) 01/10/2022    HDL 24 (L) 01/10/2022    LDLCALC 23.2 (L) 01/10/2022    TRIG 329 (H) 01/10/2022    CHOLHDL 21.2 01/10/2022   , Troponin   Recent Labs   Lab 01/09/22  1130 01/09/22  1738 01/09/22  2137   TROPONINI 0.007 0.011 0.008   , A1C: No results for input(s): HGBA1C in the last 4320 hours. and All labs within the past 24 hours have been reviewed    Pending Diagnostic Studies:     Procedure Component Value Units Date/Time    Echo [[id number]]  (Abnormal) Resulted: 01/11/22 0947    Order Status: Sent Lab Status: In process Updated: 01/11/22 0947     AV mean gradient 3 mmHg      Ao peak abisai 1.16 m/s      Ao VTI 24.46 cm      IVRT 114.18 msec      IVS 1.12 cm      LA size 4.51 cm      Left Atrium Major Axis 4.90 cm      LVIDd 4.80 cm      LVIDs 3.44 cm      LVOT diameter 2.67 cm      LVOT peak VTI 17.80 cm      Posterior Wall 1.14 cm      MV Peak A Abisai 0.70 m/s      E wave deceleration time 155.04 msec      MV Peak E Abisai 0.91 m/s      PV Peak D Abisai 0.56 m/s      PV Peak S Abisai 0.43 m/s      RA Major Axis 4.53 cm      RA Width 3.00 cm      RVDD 4.11 cm      TAPSE 2.59 cm      TR Max Abisai 2.64 m/s      LA WIDTH 3.56 cm      Ao root annulus 3.76 cm      AORTIC VALVE CUSP SEPERATION 2.17 cm      PV PEAK VELOCITY 0.65 cm/s      MV stenosis pressure 1/2 time 44.96 ms      LV Diastolic Volume 107.41 mL      LV Systolic Volume 48.95 mL      LVOT peak abisai 0.74 m/s      TDI LATERAL 0.16 m/s      TDI SEPTAL 0.10 m/s      Mr  "max carolyn 0.05 m/s      LA volume (mod) 53.86 cm3      MV "A" wave duration 11.04 msec      MV mean gradient 1 mmHg      MV peak gradient 5 mmHg      RV S' 9.65 cm/s      MV VTI 23.54 cm      LV LATERAL E/E' RATIO 5.69 m/s      LV SEPTAL E/E' RATIO 9.10 m/s      FS 28 %      LV mass 201.36 g      Left Ventricle Relative Wall Thickness 0.48 cm      AV valve area 4.07 cm2      AV Velocity Ratio 0.64     AV index (prosthetic) 0.73     MV valve area p 1/2 method 4.89 cm2      MV valve area by continuity eq 4.23 cm2      E/A ratio 1.30     Mean e' 0.13 m/s      Pulm vein S/D ratio 0.77     LVOT area 5.6 cm2      LVOT stroke volume 99.61 cm3      AV peak gradient 5 mmHg      E/E' ratio 7.00 m/s      LV Systolic Volume Index 20.1 mL/m2      LV Diastolic Volume Index 44.20 mL/m2      LV Mass Index 83 g/m2      Triscuspid Valve Regurgitation Peak Gradient 28 mmHg      LA Volume Index (Mod) 22.2 mL/m2      BSA 2.52 m2     Narrative:      · Concentric remodeling and       Impression:          Hemoglobin A1c [[id number]] Collected: 01/10/22 2837    Order Status: Sent Lab Status: In process Updated: 01/10/22 1432    Specimen: Blood          Medications:  Reconciled Home Medications:      Medication List      START taking these medications    CHANTIX STARTING MONTH BOX 0.5 mg (11)- 1 mg (42) tablet  Generic drug: varenicline  Take one 0.5mg tab by mouth once daily X3 days,then increase to one 0.5mg tab twice daily X4 days,then increase to one 1mg tab twice daily        CHANGE how you take these medications    gabapentin 600 MG tablet  Commonly known as: NEURONTIN  Take 600 mg by mouth 3 (three) times daily.  What changed: Another medication with the same name was removed. Continue taking this medication, and follow the directions you see here.        CONTINUE taking these medications    ACCU-CHEK GUIDE TEST STRIPS Strp  Generic drug: blood sugar diagnostic  USE 1 STRIP TO CHECK BLOOD SUGAR TWICE DAILY     amitriptyline 25 MG " tablet  Commonly known as: ELAVIL  amitriptyline 25 mg tablet   TAKE 1 TO 2 TABLETS BY MOUTH AT BEDTIME AS NEEDED NEUROPATHIC PAIN RELIEF AND SLEEP AID     ANORO ELLIPTA 62.5-25 mcg/actuation Dsdv  Generic drug: umeclidinium-vilanteroL  Inhale 1 puff into the lungs once daily.     apixaban 5 mg Tab  Commonly known as: ELIQUIS  Take 5 mg by mouth 2 (two) times daily.     aspirin 81 MG Chew  Take 81 mg by mouth once daily.     clonazePAM 1 MG tablet  Commonly known as: KlonoPIN  Take 1 mg by mouth 3 (three) times daily as needed for Anxiety.     COMIRNATY (PF) 30 mcg/0.3 mL Susr  Generic drug: COVID-19 vacc,mRNA(Pfizer)(PF)  Pfizer-BioNTech COVID-19 Vaccine (PF) 30 mcg/0.3 mL IM susp (purple)     eszopiclone 1 MG Tab  Commonly known as: LUNESTA  eszopiclone 1 mg tablet   TAKE ONE TABLET BY MOUTH AT BEDTIME AS NEEDED. STOP restoril     fenofibrate 145 MG tablet  Commonly known as: TRICOR  Take 145 mg by mouth every evening.     FLUCELVAX QUAD 5804-5795 60 mcg (15 mcg x 4)/0.5 mL Susp  Generic drug: flu vac qs 2020-21(4 yr up) CD  Flucelvax Quad 1101-5484 60 mcg (15 mcg x 4)/0.5 mL intramuscular susp     FOLTANX 3-35-2 mg Tab  Generic drug: mecobal-levomefolat Ca-B6 phos  Take 1 tablet by mouth 2 (two) times daily.     furosemide 20 MG tablet  Commonly known as: LASIX  Take 20 mg by mouth daily as needed.     glimepiride 4 MG tablet  Commonly known as: AMARYL  Take 4 mg by mouth 2 (two) times daily.     hydroCHLOROthiazide 12.5 MG Tab  Commonly known as: HYDRODIURIL  Take 12.5 mg by mouth once daily.     HYDROcodone-acetaminophen 5-325 mg per tablet  Commonly known as: NORCO  Take 1 tablet by mouth every 6 (six) hours as needed for Pain.     icosapent ethyL 1 gram Cap  Commonly known as: VASCEPA  Take 2 capsules by mouth 2 (two) times daily.     isosorbide mononitrate 30 MG 24 hr tablet  Commonly known as: IMDUR  Take 1 tablet (30 mg total) by mouth once daily.     JARDIANCE 25 mg tablet  Generic drug:  "empagliflozin  Take 25 mg by mouth once daily.     LANTUS SOLOSTAR U-100 INSULIN glargine 100 units/mL (3mL) SubQ pen  Generic drug: insulin  Inject 80 Units into the skin 2 (two) times daily. Changed to 80 units BID 2months ago     lisinopriL 10 MG tablet  Take 10 mg by mouth once daily.     metFORMIN 1000 MG tablet  Commonly known as: GLUCOPHAGE  Take 1,000 mg by mouth 2 (two) times daily with meals.     methocarbamoL 500 MG Tab  Commonly known as: ROBAXIN  Take 500 mg by mouth nightly as needed.     metoprolol succinate 100 MG 24 hr tablet  Commonly known as: TOPROL-XL  Take 1 tablet (100 mg total) by mouth once daily.     nitroGLYCERIN 0.4 MG SL tablet  Commonly known as: NITROSTAT  Place 0.4 mg under the tongue every 5 (five) minutes as needed for Chest pain.     pen needle, diabetic 31 gauge x 1/4" Ndle  USE 1 TWICE DAILY FOR BLOOD SUGAR GREATER THAN 200     PNEUMOVAX-23 25 mcg/0.5 mL vaccine  Generic drug: pneumococcal 23-lily ps  Pneumovax-23 25 mcg/0.5 mL injection syringe     ranolazine 500 MG Tb12  Commonly known as: RANEXA  Take 500 mg by mouth 2 (two) times daily.     rosuvastatin 20 MG tablet  Commonly known as: CRESTOR  Take 20 mg by mouth every evening.     tamsulosin 0.4 mg Cap  Commonly known as: FLOMAX  Take 1 capsule (0.4 mg total) by mouth every evening.        STOP taking these medications    oxyCODONE-acetaminophen  mg per tablet  Commonly known as: PERCOCET     simvastatin 10 MG tablet  Commonly known as: ZOCOR     sotaloL 80 MG tablet  Commonly known as: BETAPACE     valACYclovir 500 MG tablet  Commonly known as: VALTREX            Indwelling Lines/Drains at time of discharge:   Lines/Drains/Airways     None                 Time spent on the discharge of patient: 35 minutes         Andrea Sims MD  Department of Hospital Medicine  [facility]  "

## 2022-01-11 NOTE — ASSESSMENT & PLAN NOTE
HEART score of 4. Troponin and EKG unremarkable.  -Cardiology consulted  -Follow up stress test  -Telemetry  -Continue ASA and Eliquis  -Lisinopril and metoprolol  -Statin

## 2022-01-11 NOTE — PROGRESS NOTES
Pain is reproducible with palpation.   Awaiting myoview but I believe this is musculoskeletal pain. If negative he can be discharged home from cardiac standpoint

## 2022-01-11 NOTE — NURSING
IV removed. Tele removed. Orders reviewed. Prescription sent to pharmacy. Pt ride here. Pt ambulated to car. Pt d/c home

## 2022-01-20 ENCOUNTER — TELEPHONE (OUTPATIENT)
Dept: MEDSURG UNIT | Facility: HOSPITAL | Age: 57
End: 2022-01-20
Payer: MEDICAID

## 2022-08-30 ENCOUNTER — HOSPITAL ENCOUNTER (INPATIENT)
Facility: HOSPITAL | Age: 57
LOS: 3 days | Discharge: HOME | DRG: 247 | End: 2022-09-02
Attending: EMERGENCY MEDICINE | Admitting: INTERNAL MEDICINE
Payer: MEDICAID

## 2022-08-30 DIAGNOSIS — R10.9 ABDOMINAL PAIN: ICD-10-CM

## 2022-08-30 DIAGNOSIS — I21.4 NSTEMI (NON-ST ELEVATED MYOCARDIAL INFARCTION): ICD-10-CM

## 2022-08-30 DIAGNOSIS — R07.9 CHEST PAIN: ICD-10-CM

## 2022-08-30 DIAGNOSIS — R07.89 OTHER CHEST PAIN: ICD-10-CM

## 2022-08-30 LAB
ALBUMIN SERPL BCP-MCNC: 4 G/DL (ref 3.5–5.2)
ALBUMIN SERPL BCP-MCNC: 4 G/DL (ref 3.5–5.2)
ALP SERPL-CCNC: 53 U/L (ref 55–135)
ALP SERPL-CCNC: 53 U/L (ref 55–135)
ALT SERPL W/O P-5'-P-CCNC: 45 U/L (ref 10–44)
ALT SERPL W/O P-5'-P-CCNC: 45 U/L (ref 10–44)
ANION GAP SERPL CALC-SCNC: 16 MMOL/L (ref 8–16)
ANION GAP SERPL CALC-SCNC: 16 MMOL/L (ref 8–16)
AST SERPL-CCNC: 39 U/L (ref 10–40)
AST SERPL-CCNC: 39 U/L (ref 10–40)
BASOPHILS # BLD AUTO: 0.1 K/UL (ref 0–0.2)
BASOPHILS # BLD AUTO: 0.1 K/UL (ref 0–0.2)
BASOPHILS NFR BLD: 0.8 % (ref 0–1.9)
BASOPHILS NFR BLD: 0.8 % (ref 0–1.9)
BILIRUB SERPL-MCNC: 0.6 MG/DL (ref 0.1–1)
BILIRUB SERPL-MCNC: 0.6 MG/DL (ref 0.1–1)
BNP SERPL-MCNC: 18 PG/ML (ref 0–99)
BNP SERPL-MCNC: 18 PG/ML (ref 0–99)
BUN SERPL-MCNC: 16 MG/DL (ref 6–20)
BUN SERPL-MCNC: 16 MG/DL (ref 6–20)
CALCIUM SERPL-MCNC: 9.6 MG/DL (ref 8.7–10.5)
CALCIUM SERPL-MCNC: 9.6 MG/DL (ref 8.7–10.5)
CHLORIDE SERPL-SCNC: 98 MMOL/L (ref 95–110)
CHLORIDE SERPL-SCNC: 98 MMOL/L (ref 95–110)
CO2 SERPL-SCNC: 24 MMOL/L (ref 23–29)
CO2 SERPL-SCNC: 24 MMOL/L (ref 23–29)
CREAT SERPL-MCNC: 1.3 MG/DL (ref 0.5–1.4)
CREAT SERPL-MCNC: 1.3 MG/DL (ref 0.5–1.4)
DIFFERENTIAL METHOD: ABNORMAL
DIFFERENTIAL METHOD: ABNORMAL
EOSINOPHIL # BLD AUTO: 0.4 K/UL (ref 0–0.5)
EOSINOPHIL # BLD AUTO: 0.4 K/UL (ref 0–0.5)
EOSINOPHIL NFR BLD: 2.9 % (ref 0–8)
EOSINOPHIL NFR BLD: 2.9 % (ref 0–8)
ERYTHROCYTE [DISTWIDTH] IN BLOOD BY AUTOMATED COUNT: 12.7 % (ref 11.5–14.5)
ERYTHROCYTE [DISTWIDTH] IN BLOOD BY AUTOMATED COUNT: 12.7 % (ref 11.5–14.5)
EST. GFR  (NO RACE VARIABLE): >60 ML/MIN/1.73 M^2
EST. GFR  (NO RACE VARIABLE): >60 ML/MIN/1.73 M^2
GLUCOSE SERPL-MCNC: 275 MG/DL (ref 70–110)
GLUCOSE SERPL-MCNC: 275 MG/DL (ref 70–110)
HCT VFR BLD AUTO: 51.9 % (ref 40–54)
HCT VFR BLD AUTO: 51.9 % (ref 40–54)
HGB BLD-MCNC: 17.7 G/DL (ref 14–18)
HGB BLD-MCNC: 17.7 G/DL (ref 14–18)
IMM GRANULOCYTES # BLD AUTO: 0.05 K/UL (ref 0–0.04)
IMM GRANULOCYTES # BLD AUTO: 0.05 K/UL (ref 0–0.04)
IMM GRANULOCYTES NFR BLD AUTO: 0.4 % (ref 0–0.5)
IMM GRANULOCYTES NFR BLD AUTO: 0.4 % (ref 0–0.5)
INR PPP: 1.1
LYMPHOCYTES # BLD AUTO: 5.1 K/UL (ref 1–4.8)
LYMPHOCYTES # BLD AUTO: 5.1 K/UL (ref 1–4.8)
LYMPHOCYTES NFR BLD: 41.6 % (ref 18–48)
LYMPHOCYTES NFR BLD: 41.6 % (ref 18–48)
MAGNESIUM SERPL-MCNC: 1.8 MG/DL (ref 1.6–2.6)
MCH RBC QN AUTO: 31.8 PG (ref 27–31)
MCH RBC QN AUTO: 31.8 PG (ref 27–31)
MCHC RBC AUTO-ENTMCNC: 34.1 G/DL (ref 32–36)
MCHC RBC AUTO-ENTMCNC: 34.1 G/DL (ref 32–36)
MCV RBC AUTO: 93 FL (ref 82–98)
MCV RBC AUTO: 93 FL (ref 82–98)
MONOCYTES # BLD AUTO: 0.7 K/UL (ref 0.3–1)
MONOCYTES # BLD AUTO: 0.7 K/UL (ref 0.3–1)
MONOCYTES NFR BLD: 5.3 % (ref 4–15)
MONOCYTES NFR BLD: 5.3 % (ref 4–15)
NEUTROPHILS # BLD AUTO: 6 K/UL (ref 1.8–7.7)
NEUTROPHILS # BLD AUTO: 6 K/UL (ref 1.8–7.7)
NEUTROPHILS NFR BLD: 49 % (ref 38–73)
NEUTROPHILS NFR BLD: 49 % (ref 38–73)
NRBC BLD-RTO: 0 /100 WBC
NRBC BLD-RTO: 0 /100 WBC
PLATELET # BLD AUTO: 244 K/UL (ref 150–450)
PLATELET # BLD AUTO: 244 K/UL (ref 150–450)
PMV BLD AUTO: 9.8 FL (ref 9.2–12.9)
PMV BLD AUTO: 9.8 FL (ref 9.2–12.9)
POTASSIUM SERPL-SCNC: 4.7 MMOL/L (ref 3.5–5.1)
POTASSIUM SERPL-SCNC: 4.7 MMOL/L (ref 3.5–5.1)
PROT SERPL-MCNC: 7.4 G/DL (ref 6–8.4)
PROT SERPL-MCNC: 7.4 G/DL (ref 6–8.4)
PROTHROMBIN TIME: 13.2 SEC (ref 11.4–13.7)
RBC # BLD AUTO: 5.57 M/UL (ref 4.6–6.2)
RBC # BLD AUTO: 5.57 M/UL (ref 4.6–6.2)
SARS-COV-2 RDRP RESP QL NAA+PROBE: NEGATIVE
SODIUM SERPL-SCNC: 138 MMOL/L (ref 136–145)
SODIUM SERPL-SCNC: 138 MMOL/L (ref 136–145)
TROPONIN I SERPL DL<=0.01 NG/ML-MCNC: <0.03 NG/ML
TROPONIN I SERPL DL<=0.01 NG/ML-MCNC: <0.03 NG/ML
WBC # BLD AUTO: 12.25 K/UL (ref 3.9–12.7)
WBC # BLD AUTO: 12.25 K/UL (ref 3.9–12.7)

## 2022-08-30 PROCEDURE — 25000003 PHARM REV CODE 250: Performed by: EMERGENCY MEDICINE

## 2022-08-30 PROCEDURE — 63600175 PHARM REV CODE 636 W HCPCS: Performed by: EMERGENCY MEDICINE

## 2022-08-30 PROCEDURE — U0002 COVID-19 LAB TEST NON-CDC: HCPCS | Performed by: EMERGENCY MEDICINE

## 2022-08-30 PROCEDURE — 83735 ASSAY OF MAGNESIUM: CPT | Performed by: EMERGENCY MEDICINE

## 2022-08-30 PROCEDURE — 83880 ASSAY OF NATRIURETIC PEPTIDE: CPT | Performed by: NURSE PRACTITIONER

## 2022-08-30 PROCEDURE — 93005 ELECTROCARDIOGRAM TRACING: CPT | Performed by: INTERNAL MEDICINE

## 2022-08-30 PROCEDURE — 21400001 HC TELEMETRY ROOM

## 2022-08-30 PROCEDURE — 85610 PROTHROMBIN TIME: CPT | Performed by: NURSE PRACTITIONER

## 2022-08-30 PROCEDURE — 99285 EMERGENCY DEPT VISIT HI MDM: CPT | Mod: 25

## 2022-08-30 PROCEDURE — 93010 ELECTROCARDIOGRAM REPORT: CPT | Mod: ,,, | Performed by: INTERNAL MEDICINE

## 2022-08-30 PROCEDURE — 80053 COMPREHEN METABOLIC PANEL: CPT | Performed by: NURSE PRACTITIONER

## 2022-08-30 PROCEDURE — 96374 THER/PROPH/DIAG INJ IV PUSH: CPT

## 2022-08-30 PROCEDURE — 84484 ASSAY OF TROPONIN QUANT: CPT | Performed by: NURSE PRACTITIONER

## 2022-08-30 PROCEDURE — 85025 COMPLETE CBC W/AUTO DIFF WBC: CPT | Performed by: NURSE PRACTITIONER

## 2022-08-30 PROCEDURE — 93010 EKG 12-LEAD: ICD-10-PCS | Mod: ,,, | Performed by: INTERNAL MEDICINE

## 2022-08-30 RX ORDER — PIOGLITAZONEHYDROCHLORIDE 15 MG/1
15 TABLET ORAL DAILY
COMMUNITY
Start: 2022-08-02 | End: 2023-12-14

## 2022-08-30 RX ORDER — OXYCODONE AND ACETAMINOPHEN 10; 325 MG/1; MG/1
1 TABLET ORAL 2 TIMES DAILY PRN
COMMUNITY
Start: 2022-08-13

## 2022-08-30 RX ORDER — GABAPENTIN 800 MG/1
800 TABLET ORAL NIGHTLY
Status: ON HOLD | COMMUNITY
Start: 2022-08-19 | End: 2022-09-02 | Stop reason: HOSPADM

## 2022-08-30 RX ORDER — INSULIN LISPRO 100 [IU]/ML
30 INJECTION, SOLUTION INTRAVENOUS; SUBCUTANEOUS
Status: ON HOLD | COMMUNITY
Start: 2022-05-26 | End: 2022-08-31

## 2022-08-30 RX ORDER — SIMVASTATIN 10 MG/1
10 TABLET, FILM COATED ORAL DAILY
Status: ON HOLD | COMMUNITY
Start: 2022-08-17 | End: 2024-01-04 | Stop reason: HOSPADM

## 2022-08-30 RX ORDER — LISINOPRIL 20 MG/1
10 TABLET ORAL 2 TIMES DAILY
Status: ON HOLD | COMMUNITY
Start: 2022-08-02 | End: 2023-09-11 | Stop reason: HOSPADM

## 2022-08-30 RX ORDER — CYCLOBENZAPRINE HCL 10 MG
10 TABLET ORAL NIGHTLY
Status: ON HOLD | COMMUNITY
Start: 2022-08-15 | End: 2024-01-04 | Stop reason: HOSPADM

## 2022-08-30 RX ORDER — CLONAZEPAM 1 MG/1
1 TABLET ORAL 2 TIMES DAILY
Status: ON HOLD | COMMUNITY
End: 2024-01-04 | Stop reason: HOSPADM

## 2022-08-30 RX ORDER — MORPHINE SULFATE 2 MG/ML
2 INJECTION, SOLUTION INTRAMUSCULAR; INTRAVENOUS
Status: COMPLETED | OUTPATIENT
Start: 2022-08-30 | End: 2022-08-30

## 2022-08-30 RX ORDER — ASPIRIN 325 MG
325 TABLET ORAL
Status: COMPLETED | OUTPATIENT
Start: 2022-08-30 | End: 2022-08-30

## 2022-08-30 RX ORDER — NAPROXEN SODIUM 220 MG/1
324 TABLET, FILM COATED ORAL ONCE
Status: DISCONTINUED | OUTPATIENT
Start: 2022-08-30 | End: 2022-09-02 | Stop reason: HOSPADM

## 2022-08-30 RX ORDER — METOPROLOL SUCCINATE 100 MG/1
100 TABLET, EXTENDED RELEASE ORAL 2 TIMES DAILY
Status: ON HOLD | COMMUNITY
End: 2024-01-04 | Stop reason: HOSPADM

## 2022-08-30 RX ORDER — RIMEGEPANT SULFATE 75 MG/75MG
1 TABLET, ORALLY DISINTEGRATING ORAL
COMMUNITY
Start: 2022-03-24 | End: 2023-12-14

## 2022-08-30 RX ADMIN — MORPHINE SULFATE 2 MG: 2 INJECTION, SOLUTION INTRAMUSCULAR; INTRAVENOUS at 10:08

## 2022-08-30 RX ADMIN — ASPIRIN 325 MG ORAL TABLET 325 MG: 325 PILL ORAL at 08:08

## 2022-08-30 NOTE — Clinical Note
180 ml of contrast were injected throughout the case. 30 mL of contrast was the total wasted during the case. 210 mL was the total amount used during the case.

## 2022-08-30 NOTE — Clinical Note
The catheter was inserted into the and was inserted over the wire into the mid   circumflex. The catheter was unable to access the area..Unable to cross lesion

## 2022-08-30 NOTE — Clinical Note
The catheter was inserted into the proximal   circumflex. The catheter was unable to access the area..UNABLE TO ADVANCE STENT

## 2022-08-30 NOTE — Clinical Note
MD aware eliquis was taken yesterday morning and lovenox was given last night prior to beginning procedure, elects to continue with [facility]

## 2022-08-30 NOTE — Clinical Note
The site was marked. The groin was prepped. The site was prepped with ChloraPrep. The site was clipped. The patient was draped. The patient was positioned supine. The patient was secured using safety straps.

## 2022-08-30 NOTE — Clinical Note
The catheter was repositioned into the ostial SVG graft. The angiography was performed via power injection. The injected amount was 6 mL contrast at 3 mL/s. The PSI from the power injection was 300.

## 2022-08-30 NOTE — Clinical Note
The catheter was inserted into the and was inserted over the wire into the ostium   left main. An angiography was performed of the left coronary arteries. Multiple views were taken. The angiography was performed via power injection. The injected amount was 8 mL contrast at 4 mL/s. The PSI from the power injection was 400.

## 2022-08-30 NOTE — Clinical Note
The catheter was inserted into the and was inserted over the wire into the left ventricle ostium   right coronary artery. Hemodynamics were performed.  and Pullback was recorded.  An angiography was performed of the right coronary arteries. The angiography was performed via power injection. The injected amount was 6 mL contrast at 3 mL/s. The PSI from the power injection was 300.

## 2022-08-30 NOTE — Clinical Note
The closure device was deployed in the right femoral artery. Render Post-Care Instructions In Note?: no Extraction Method: 18 gauge needle and comedo extractor Render Number Of Lesions Treated: yes Detail Level: Detailed Post-Care Instructions: I reviewed with the patient in detail post-care instructions. Patient is to keep the treatment areas dry overnight, and then apply bacitracin twice daily until healed. Patient may apply hydrogen peroxide soaks to remove any crusting. Prep Text (Optional): Prior to removal the treatment areas were prepped in the usual fashion. Acne Type: Milial cysts Consent was obtained and risks were reviewed including but not limited to scarring, infection, bleeding, scabbing, incomplete removal, and allergy to anesthesia.

## 2022-08-30 NOTE — Clinical Note
6FR ANGIOSEAL INSERTED; UNABLE TO DEPLOY; DEVICE REMOVED; 6FR PINNACLE SHEATH INSERTED RIGHT FEMORAL ARTERY

## 2022-08-30 NOTE — Clinical Note
The catheter was repositioned into the left subclavian vein and left subclavian artery. An angiography was performed of the graft. The angiography was performed via power injection.

## 2022-08-30 NOTE — Clinical Note
The catheter was inserted into the and was inserted over the wire into the ostium   left main. An angiography was performed of the left coronary arteries. The angiography was performed via power injection. The injected amount was 8 mL contrast at 4 mL/s. The PSI from the power injection was 400.

## 2022-08-30 NOTE — Clinical Note
The catheter was inserted into the and was inserted over the wire into the ostial LIMA graft. An angiography was performed of the graft. Multiple views were taken. The angiography was performed via power injection. The injected amount was 8 mL contrast at 4 mL/s. The PSI from the power injection was 400.

## 2022-08-31 ENCOUNTER — HOSPITAL ENCOUNTER (OUTPATIENT)
Dept: RADIOLOGY | Facility: HOSPITAL | Age: 57
Discharge: HOME | DRG: 247 | End: 2022-08-31
Attending: INTERNAL MEDICINE
Payer: MEDICAID

## 2022-08-31 ENCOUNTER — CLINICAL SUPPORT (OUTPATIENT)
Dept: CARDIOLOGY | Facility: HOSPITAL | Age: 57
DRG: 247 | End: 2022-08-31
Attending: INTERNAL MEDICINE
Payer: MEDICAID

## 2022-08-31 PROBLEM — R10.11 RUQ PAIN: Status: ACTIVE | Noted: 2022-08-31

## 2022-08-31 PROBLEM — R07.89 OTHER CHEST PAIN: Status: ACTIVE | Noted: 2020-04-22

## 2022-08-31 LAB
CV PHARM DOSE: 0.4 MG
CV STRESS BASE HR: 78 BPM
DIASTOLIC BLOOD PRESSURE: 83 MMHG
ESTIMATED AVG GLUCOSE: 206 MG/DL (ref 68–131)
GLUCOSE SERPL-MCNC: 196 MG/DL (ref 70–110)
GLUCOSE SERPL-MCNC: 264 MG/DL (ref 70–110)
GLUCOSE SERPL-MCNC: 429 MG/DL (ref 70–110)
HBA1C MFR BLD: 8.8 % (ref 4.5–6.2)
OHS CV CPX 1 MINUTE RECOVERY HEART RATE: 121 BPM
OHS CV CPX 85 PERCENT MAX PREDICTED HEART RATE MALE: 141
OHS CV CPX MAX PREDICTED HEART RATE: 166
OHS CV CPX PATIENT IS FEMALE: 0
OHS CV CPX PATIENT IS MALE: 1
OHS CV CPX PEAK DIASTOLIC BLOOD PRESSURE: 86 MMHG
OHS CV CPX PEAK HEAR RATE: 124 BPM
OHS CV CPX PEAK RATE PRESSURE PRODUCT: NORMAL
OHS CV CPX PEAK SYSTOLIC BLOOD PRESSURE: 129 MMHG
OHS CV CPX PERCENT MAX PREDICTED HEART RATE ACHIEVED: 75
OHS CV CPX RATE PRESSURE PRODUCT PRESENTING: 8268
SYSTOLIC BLOOD PRESSURE: 106 MMHG
TROPONIN I SERPL DL<=0.01 NG/ML-MCNC: <0.03 NG/ML

## 2022-08-31 PROCEDURE — 96372 THER/PROPH/DIAG INJ SC/IM: CPT | Performed by: INTERNAL MEDICINE

## 2022-08-31 PROCEDURE — 96372 THER/PROPH/DIAG INJ SC/IM: CPT | Performed by: STUDENT IN AN ORGANIZED HEALTH CARE EDUCATION/TRAINING PROGRAM

## 2022-08-31 PROCEDURE — G0378 HOSPITAL OBSERVATION PER HR: HCPCS

## 2022-08-31 PROCEDURE — 63600175 PHARM REV CODE 636 W HCPCS: Performed by: INTERNAL MEDICINE

## 2022-08-31 PROCEDURE — 63600175 PHARM REV CODE 636 W HCPCS: Performed by: STUDENT IN AN ORGANIZED HEALTH CARE EDUCATION/TRAINING PROGRAM

## 2022-08-31 PROCEDURE — 25000003 PHARM REV CODE 250: Performed by: STUDENT IN AN ORGANIZED HEALTH CARE EDUCATION/TRAINING PROGRAM

## 2022-08-31 PROCEDURE — 93018 CV STRESS TEST I&R ONLY: CPT | Mod: ,,, | Performed by: INTERNAL MEDICINE

## 2022-08-31 PROCEDURE — 21400001 HC TELEMETRY ROOM

## 2022-08-31 PROCEDURE — A9502 TC99M TETROFOSMIN: HCPCS

## 2022-08-31 PROCEDURE — 93018 NUCLEAR STRESS TEST (CUPID ONLY): ICD-10-PCS | Mod: ,,, | Performed by: INTERNAL MEDICINE

## 2022-08-31 PROCEDURE — 36415 COLL VENOUS BLD VENIPUNCTURE: CPT | Performed by: INTERNAL MEDICINE

## 2022-08-31 PROCEDURE — 93017 CV STRESS TEST TRACING ONLY: CPT

## 2022-08-31 PROCEDURE — 84484 ASSAY OF TROPONIN QUANT: CPT | Mod: 91 | Performed by: INTERNAL MEDICINE

## 2022-08-31 PROCEDURE — 36415 COLL VENOUS BLD VENIPUNCTURE: CPT | Performed by: STUDENT IN AN ORGANIZED HEALTH CARE EDUCATION/TRAINING PROGRAM

## 2022-08-31 PROCEDURE — 93005 ELECTROCARDIOGRAM TRACING: CPT | Performed by: INTERNAL MEDICINE

## 2022-08-31 PROCEDURE — 25000003 PHARM REV CODE 250: Performed by: INTERNAL MEDICINE

## 2022-08-31 PROCEDURE — 83036 HEMOGLOBIN GLYCOSYLATED A1C: CPT | Performed by: STUDENT IN AN ORGANIZED HEALTH CARE EDUCATION/TRAINING PROGRAM

## 2022-08-31 PROCEDURE — 93016 NUCLEAR STRESS TEST (CUPID ONLY): ICD-10-PCS | Mod: ,,, | Performed by: INTERNAL MEDICINE

## 2022-08-31 PROCEDURE — 93010 ELECTROCARDIOGRAM REPORT: CPT | Mod: ,,, | Performed by: INTERNAL MEDICINE

## 2022-08-31 PROCEDURE — 84484 ASSAY OF TROPONIN QUANT: CPT | Performed by: INTERNAL MEDICINE

## 2022-08-31 PROCEDURE — 93016 CV STRESS TEST SUPVJ ONLY: CPT | Mod: ,,, | Performed by: INTERNAL MEDICINE

## 2022-08-31 PROCEDURE — 93010 EKG 12-LEAD: ICD-10-PCS | Mod: ,,, | Performed by: INTERNAL MEDICINE

## 2022-08-31 PROCEDURE — 96376 TX/PRO/DX INJ SAME DRUG ADON: CPT

## 2022-08-31 RX ORDER — GLUCAGON 1 MG
1 KIT INJECTION
Status: DISCONTINUED | OUTPATIENT
Start: 2022-08-31 | End: 2022-09-02 | Stop reason: HOSPADM

## 2022-08-31 RX ORDER — MORPHINE SULFATE 4 MG/ML
4 INJECTION, SOLUTION INTRAMUSCULAR; INTRAVENOUS
Status: DISCONTINUED | OUTPATIENT
Start: 2022-08-31 | End: 2022-09-02 | Stop reason: HOSPADM

## 2022-08-31 RX ORDER — NITROGLYCERIN 0.4 MG/1
0.4 TABLET SUBLINGUAL EVERY 5 MIN PRN
Status: DISCONTINUED | OUTPATIENT
Start: 2022-08-31 | End: 2022-09-02 | Stop reason: HOSPADM

## 2022-08-31 RX ORDER — CLONAZEPAM 0.5 MG/1
1 TABLET ORAL 2 TIMES DAILY
Status: DISCONTINUED | OUTPATIENT
Start: 2022-08-31 | End: 2022-09-02 | Stop reason: HOSPADM

## 2022-08-31 RX ORDER — CYCLOBENZAPRINE HCL 5 MG
10 TABLET ORAL DAILY
Status: DISCONTINUED | OUTPATIENT
Start: 2022-08-31 | End: 2022-09-02 | Stop reason: HOSPADM

## 2022-08-31 RX ORDER — INSULIN ASPART 100 [IU]/ML
1-10 INJECTION, SOLUTION INTRAVENOUS; SUBCUTANEOUS
Status: DISCONTINUED | OUTPATIENT
Start: 2022-08-31 | End: 2022-09-02 | Stop reason: HOSPADM

## 2022-08-31 RX ORDER — ATORVASTATIN CALCIUM 40 MG/1
40 TABLET, FILM COATED ORAL NIGHTLY
Status: DISCONTINUED | OUTPATIENT
Start: 2022-08-31 | End: 2022-09-02 | Stop reason: HOSPADM

## 2022-08-31 RX ORDER — METOPROLOL SUCCINATE 50 MG/1
100 TABLET, EXTENDED RELEASE ORAL 2 TIMES DAILY
Status: DISCONTINUED | OUTPATIENT
Start: 2022-08-31 | End: 2022-09-02 | Stop reason: HOSPADM

## 2022-08-31 RX ORDER — LISINOPRIL 10 MG/1
10 TABLET ORAL 2 TIMES DAILY
Status: DISCONTINUED | OUTPATIENT
Start: 2022-08-31 | End: 2022-09-02 | Stop reason: HOSPADM

## 2022-08-31 RX ORDER — OXYCODONE AND ACETAMINOPHEN 10; 325 MG/1; MG/1
1 TABLET ORAL 2 TIMES DAILY PRN
Status: DISCONTINUED | OUTPATIENT
Start: 2022-08-31 | End: 2022-09-02 | Stop reason: HOSPADM

## 2022-08-31 RX ORDER — GABAPENTIN 300 MG/1
600 CAPSULE ORAL 2 TIMES DAILY
Status: DISCONTINUED | OUTPATIENT
Start: 2022-08-31 | End: 2022-09-02 | Stop reason: HOSPADM

## 2022-08-31 RX ORDER — IBUPROFEN 200 MG
24 TABLET ORAL
Status: DISCONTINUED | OUTPATIENT
Start: 2022-08-31 | End: 2022-09-02 | Stop reason: HOSPADM

## 2022-08-31 RX ORDER — ACETAMINOPHEN 325 MG/1
650 TABLET ORAL EVERY 8 HOURS PRN
Status: DISCONTINUED | OUTPATIENT
Start: 2022-08-31 | End: 2022-09-02 | Stop reason: HOSPADM

## 2022-08-31 RX ORDER — METHOCARBAMOL 500 MG/1
500 TABLET, FILM COATED ORAL NIGHTLY PRN
Status: DISCONTINUED | OUTPATIENT
Start: 2022-08-31 | End: 2022-09-02 | Stop reason: HOSPADM

## 2022-08-31 RX ORDER — REGADENOSON 0.08 MG/ML
0.4 INJECTION, SOLUTION INTRAVENOUS
Status: COMPLETED | OUTPATIENT
Start: 2022-08-31 | End: 2022-08-31

## 2022-08-31 RX ORDER — TALC
6 POWDER (GRAM) TOPICAL NIGHTLY PRN
Status: DISCONTINUED | OUTPATIENT
Start: 2022-08-31 | End: 2022-09-02 | Stop reason: HOSPADM

## 2022-08-31 RX ORDER — IBUPROFEN 200 MG
16 TABLET ORAL
Status: DISCONTINUED | OUTPATIENT
Start: 2022-08-31 | End: 2022-09-02 | Stop reason: HOSPADM

## 2022-08-31 RX ORDER — ENOXAPARIN SODIUM 100 MG/ML
40 INJECTION SUBCUTANEOUS EVERY 24 HOURS
Status: DISCONTINUED | OUTPATIENT
Start: 2022-08-31 | End: 2022-09-01

## 2022-08-31 RX ORDER — MORPHINE SULFATE 2 MG/ML
2 INJECTION, SOLUTION INTRAMUSCULAR; INTRAVENOUS
Status: DISCONTINUED | OUTPATIENT
Start: 2022-08-31 | End: 2022-09-02 | Stop reason: HOSPADM

## 2022-08-31 RX ORDER — NAPROXEN SODIUM 220 MG/1
81 TABLET, FILM COATED ORAL DAILY
Status: DISCONTINUED | OUTPATIENT
Start: 2022-08-31 | End: 2022-09-02 | Stop reason: HOSPADM

## 2022-08-31 RX ADMIN — GABAPENTIN 600 MG: 300 CAPSULE ORAL at 11:08

## 2022-08-31 RX ADMIN — ENOXAPARIN SODIUM 40 MG: 40 INJECTION SUBCUTANEOUS at 05:08

## 2022-08-31 RX ADMIN — ATORVASTATIN CALCIUM 40 MG: 40 TABLET, FILM COATED ORAL at 09:08

## 2022-08-31 RX ADMIN — ASPIRIN 81 MG CHEWABLE TABLET 81 MG: 81 TABLET CHEWABLE at 11:08

## 2022-08-31 RX ADMIN — CYCLOBENZAPRINE HYDROCHLORIDE 10 MG: 5 TABLET, FILM COATED ORAL at 11:08

## 2022-08-31 RX ADMIN — REGADENOSON 0.4 MG: 0.08 INJECTION, SOLUTION INTRAVENOUS at 10:08

## 2022-08-31 RX ADMIN — INSULIN ASPART 6 UNITS: 100 INJECTION, SOLUTION INTRAVENOUS; SUBCUTANEOUS at 05:08

## 2022-08-31 RX ADMIN — INSULIN ASPART 2 UNITS: 100 INJECTION, SOLUTION INTRAVENOUS; SUBCUTANEOUS at 11:08

## 2022-08-31 RX ADMIN — CLONAZEPAM 1 MG: 0.5 TABLET ORAL at 09:08

## 2022-08-31 RX ADMIN — INSULIN DETEMIR 40 UNITS: 100 INJECTION, SOLUTION SUBCUTANEOUS at 09:08

## 2022-08-31 RX ADMIN — LISINOPRIL 10 MG: 10 TABLET ORAL at 09:08

## 2022-08-31 RX ADMIN — MORPHINE SULFATE 4 MG: 4 INJECTION, SOLUTION INTRAMUSCULAR; INTRAVENOUS at 04:08

## 2022-08-31 RX ADMIN — CLONAZEPAM 1 MG: 0.5 TABLET ORAL at 11:08

## 2022-08-31 RX ADMIN — METOPROLOL SUCCINATE 100 MG: 50 TABLET, FILM COATED, EXTENDED RELEASE ORAL at 11:08

## 2022-08-31 RX ADMIN — LISINOPRIL 10 MG: 10 TABLET ORAL at 11:08

## 2022-08-31 RX ADMIN — GABAPENTIN 600 MG: 300 CAPSULE ORAL at 09:08

## 2022-08-31 RX ADMIN — INSULIN ASPART 5 UNITS: 100 INJECTION, SOLUTION INTRAVENOUS; SUBCUTANEOUS at 09:08

## 2022-08-31 RX ADMIN — INSULIN DETEMIR 40 UNITS: 100 INJECTION, SOLUTION SUBCUTANEOUS at 11:08

## 2022-08-31 NOTE — ED NOTES
Pt resting eyes closed, supine with hob 35degrees. Side rails up x2, bed locked and low, call light in reach

## 2022-08-31 NOTE — ASSESSMENT & PLAN NOTE
Worsening chest pain  STress test needs to be done over 2 days  Cardiology consulted  Patient feels he really needs angio as this is how he has presented in the past and stress has been negative in the past.   Statin, ASA,   Holding eliquis for possible cath pending stress

## 2022-08-31 NOTE — ED PROVIDER NOTES
Encounter Date: 8/30/2022       History     Chief Complaint   Patient presents with    Chest Pain     CP x1 month, taking 2 Nitros per day. Became SOB 2 days ago. Left sided CP, non-radiating, Nitro makes the pain better. Not doing anything exerting. Took 81mg ASA today. Uses CPAP at night.      Patient presents the emergency department with reported onset of chest pain this evening patient reports history of coronary disease status post CABG 2 years ago states over the last month he has been having escalating episodes of chest pain this evening he took 2 sublingual nitroglycerines with resolution of the chest pain he states most recently is been evaluated for abdominal pain was found to have an abnormality in his gallbladder states he had ultrasound performed last week for further evaluation this abnormality but he has not received the results at this time he is having no chest pain he does report some continued abdominal pain in his upper abdomen patient states the chest pain is typical of his previous heart pain associated with coronary disease      Review of patient's allergies indicates:   Allergen Reactions    Pesticide Dermatitis and Rash     Past Medical History:   Diagnosis Date    Anticoagulant long-term use     Atrial fibrillation 2018    BPH (benign prostatic hyperplasia)     Cataract     COPD (chronic obstructive pulmonary disease)     Coronary artery disease     stents    CVD (cardiovascular disease)     Diabetes mellitus     Erythema multiforme     AKA Denton Edmondson Syndrome    Hypertension     Infected incision     MI (myocardial infarction)     per pt he has had 4     ROSAMARIA on CPAP     RLS (restless legs syndrome)      Past Surgical History:   Procedure Laterality Date    CORONARY ARTERY BYPASS GRAFT (CABG) N/A 4/7/2020    Procedure: CORONARY ARTERY BYPASS GRAFT (CABG)- Excision of left atrial appendage;  Surgeon: Doug Solitario MD;  Location: [facility];  Service: Cardiothoracic;  Laterality:  N/A;    CORONARY STENT PLACEMENT      WILSON MAZE PROCEDURE N/A 2020    Procedure: WILSON MAZE PROCEDURE- Attempted;  Surgeon: Doug Solitario MD;  Location: [facility] OR;  Service: Cardiothoracic;  Laterality: N/A;    CYSTOSCOPY N/A 12/10/2018    Procedure: CYSTOSCOPY;  Surgeon: Khushboo Abreu MD;  Location: [facility] OR;  Service: Urology;  Laterality: N/A;    ENDOSCOPIC HARVEST OF VEIN Left 2020    Procedure: HARVEST-VEIN-ENDOVASCULAR;  Surgeon: Doug Solitario MD;  Location: [facility] OR;  Service: Cardiothoracic;  Laterality: Left;    LEFT HEART CATHETERIZATION Left 3/17/2020    Procedure: CATHETERIZATION, HEART, LEFT;  Surgeon: Tyree Walters MD;  Location: [facility] CATH/EP LAB;  Service: Cardiology;  Laterality: Left;    STERNAL WIRES REMOVAL N/A 2020    Procedure: REMOVAL, STERNAL WIRE;  Surgeon: Doug Solitario MD;  Location: [facility] OR;  Service: Peripheral Vascular;  Laterality: N/A;    ULTRASOUND OF PROSTATE FOR VOLUME DETERMINATION  12/10/2018    Procedure: ULTRASOUND, PROSTATE, FOR VOLUME DETERMINATION;  Surgeon: Khushboo Abreu MD;  Location: [facility] OR;  Service: Urology;;     Family History   Problem Relation Age of Onset    Heart disease Mother     Diabetes Mother     Hyperlipidemia Father     Cancer Father      Social History     Tobacco Use    Smoking status: Every Day     Packs/day: 0.30     Years: 30.00     Pack years: 9.00     Types: Cigarettes     Last attempt to quit: 2020     Years since quittin.4    Smokeless tobacco: Never    Tobacco comments:     just under a pack a day   Substance Use Topics    Alcohol use: Not Currently    Drug use: Yes     Frequency: 1.0 times per week     Types: Marijuana     Review of Systems   Constitutional:  Positive for appetite change and diaphoresis. Negative for chills and fever.   Respiratory:  Positive for shortness of breath. Negative for cough.    Cardiovascular:  Positive for chest pain. Negative for leg swelling.    Gastrointestinal:  Positive for abdominal pain and nausea.   All other systems reviewed and are negative.    Physical Exam     Initial Vitals [08/30/22 1926]   BP Pulse Resp Temp SpO2   (!) 162/83 70 (!) 24 98.3 °F (36.8 °C) 98 %      MAP       --         Physical Exam    Constitutional: He appears well-developed and well-nourished. He is Obese . No distress.   HENT:   Head: Normocephalic and atraumatic.   Right Ear: External ear normal.   Left Ear: External ear normal.   Mouth/Throat: Oropharynx is clear and moist.   Eyes: Pupils are equal, round, and reactive to light.   Neck: Neck supple.   Normal range of motion.  Cardiovascular:  Normal rate, regular rhythm, S1 normal, S2 normal and intact distal pulses.           Abdominal: Abdomen is soft. Bowel sounds are normal. There is abdominal tenderness in the right upper quadrant, epigastric area and left upper quadrant.   Musculoskeletal:         General: Normal range of motion.      Cervical back: Normal range of motion and neck supple.     Neurological: He is alert and oriented to person, place, and time. GCS eye subscore is 4. GCS verbal subscore is 5. GCS motor subscore is 6.   Skin: Skin is warm and dry. No rash noted.   Psychiatric: He has a normal mood and affect. His behavior is normal.       ED Course   Procedures  Labs Reviewed   CBC W/ AUTO DIFFERENTIAL - Abnormal; Notable for the following components:       Result Value    MCH 31.8 (*)     Immature Grans (Abs) 0.05 (*)     Lymph # 5.1 (*)     All other components within normal limits   COMPREHENSIVE METABOLIC PANEL - Abnormal; Notable for the following components:    Glucose 275 (*)     Alkaline Phosphatase 53 (*)     ALT 45 (*)     All other components within normal limits   CBC W/ AUTO DIFFERENTIAL - Abnormal; Notable for the following components:    MCH 31.8 (*)     Immature Grans (Abs) 0.05 (*)     Lymph # 5.1 (*)     All other components within normal limits   COMPREHENSIVE METABOLIC PANEL -  Abnormal; Notable for the following components:    Glucose 275 (*)     Alkaline Phosphatase 53 (*)     ALT 45 (*)     All other components within normal limits   B-TYPE NATRIURETIC PEPTIDE   TROPONIN I   PROTIME-INR   TROPONIN I   B-TYPE NATRIURETIC PEPTIDE   SARS-COV-2 RNA AMPLIFICATION, QUAL   MAGNESIUM          Imaging Results              US Abdomen Limited (Gallbladder) (Final result)  Result time 08/30/22 23:23:58      Final result by Sadi Garcia MD (08/30/22 23:23:58)                   Narrative:    EXAM: Right upper quadrant abdominal ultrasound.  INDICATION: Abdominal pain, history of abnormal gallbladder ultrasound  COMPARISON: None available.    FINDINGS:  Pancreas: Pancreas is not well-visualized on this exam.    Aorta: No aorta is not well-visualized on this exam.    Liver: The liver is diffusely echogenic with poor visualization of portal triads. Liver is enlarged measuring approximately 21.1 cm in craniocaudal dimension. No visualized focal hepatic mass.    Gallbladder: The gallbladder is contracted limiting evaluation. No gallbladder wall thickening or other abnormality. The sonographer reports a negative Bradley sign.    Biliary: No intrahepatic biliary ductal dilatation. The common duct is nondilated, measuring 3 mm at the tamela hepatis.    Right kidney: The right kidney measures 12.7 cm without hydronephrosis or shadowing calculi.    IMPRESSION:    Hepatic steatosis and hepatomegaly.    The gallbladder is contracted limiting evaluation, however no abnormality is identified.    Electronically signed by:  Sadi Garcia MD  8/30/2022 11:23 PM CDT Workstation: [id number]                                     X-Ray Chest AP Portable (In process)                      Medications   aspirin chewable tablet 324 mg (324 mg Oral Not Given 8/30/22 2045)   aspirin tablet 325 mg (325 mg Oral Given 8/30/22 2004)   morphine injection 2 mg (2 mg Intravenous Given 8/30/22 2217)     Medical Decision Making:    ED Management:  Laboratory evaluation reviewed ultrasound of the gallbladder reveals no significant abnormalities but the gallbladder was contracted limiting the study discussed patient's findings with the hospitalist who will evaluate patient in the ER                    Clinical Impression:   Final diagnoses:  [R07.9] Chest pain  [R10.9] Abdominal pain        ED Disposition Condition    Admit                 Roel Blackwell MD  08/31/22 0011

## 2022-08-31 NOTE — ASSESSMENT & PLAN NOTE
Patient's FSGs are controlled on current medication regimen.  Last A1c reviewed-   Lab Results   Component Value Date    HGBA1C 8.8 (H) 08/31/2022     Most recent fingerstick glucose reviewed- No results for input(s): POCTGLUCOSE in the last 24 hours.  Current correctional scale  Medium  Maintain anti-hyperglycemic dose as follows-   Antihyperglycemics (From admission, onward)    Start     Stop Route Frequency Ordered    08/31/22 0930  insulin detemir U-100 pen 40 Units         -- SubQ 2 times daily 08/31/22 0822 08/31/22 0922  insulin aspart U-100 pen 1-10 Units         -- SubQ Before meals & nightly PRN 08/31/22 0822        Hold Oral hypoglycemics while patient is in the hospital.

## 2022-08-31 NOTE — PROGRESS NOTES
"[facility] Medicine  Progress Note    Patient Name: Magdaleno Cunningham  MRN: [id number]  Patient Class: OP- Observation   Admission Date: 8/30/2022  Length of Stay: 0 days  Attending Physician: Aquiles Murray MD  Primary Care Provider: Venkata Mclaughlin MD        Subjective:     Principal Problem:<principal problem not specified>        HPI:  56 year old male with history of PAF, CAD, COPD, ROSAMARIA, BPH and has had 3/3 COVID vaccinations presented to ED complaining 1 month history of worsening central chest discomfort: aching tightness, band like across center of chest should to shoulder "Just like when I needed my bypass". Associated with SOB. Ranks 8-9/10 worsening. No orthopnea, pnd or pedal edema. Also has had RUQ pain. No N/V. No change in BM    In ED: US: contracted GB. Labs reviewed and noted below: normal CBC; normal electrolytes and renal function transaminases and bilirubin normal alk phos mildly elevated; BNP and troponin are normal. CXR reviewed: NAPD. EKG reviewed: NSR no acute segments.    Discussed with ED MD: because of patient's strong CAD history will order myoview. HIDA scan may be done later if myoview is normal as outpatient. Discussed with patient who expressed understanding. Serial biomarkers. Continue home regimen. Patient would like his Cardiologist to attend      Overview/Hospital Course:  No notes on file    Interval History: No acute events overnight, he is highly concerned about his symptoms and feels he needs an angiogram. The second part of the stress test will be done tomorrow. He has no active chest pain, no nausea, arm pain, or neck pain.     Review of Systems   All other systems reviewed and are negative.  Objective:     Vital Signs (Most Recent):  Temp: 98.2 °F (36.8 °C) (08/31/22 0740)  Pulse: 73 (08/31/22 1103)  Resp: 18 (08/31/22 0740)  BP: 131/83 (08/31/22 1103)  SpO2: 97 % (08/31/22 0740) Vital Signs (24h Range):  Temp:  [98.1 °F (36.7 °C)-98.5 °F (36.9 " °C)] 98.2 °F (36.8 °C)  Pulse:  [58-73] 73  Resp:  [14-24] 18  SpO2:  [95 %-98 %] 97 %  BP: (111-162)/(60-87) 131/83     Weight: 123.6 kg (272 lb 9.6 oz)  Body mass index is 38.02 kg/m².    Intake/Output Summary (Last 24 hours) at 8/31/2022 1616  Last data filed at 8/31/2022 1300  Gross per 24 hour   Intake 420 ml   Output --   Net 420 ml      Physical Exam  Vitals reviewed.   Constitutional:       Appearance: Normal appearance.   HENT:      Head: Normocephalic and atraumatic.      Nose: Nose normal.      Mouth/Throat:      Mouth: Mucous membranes are moist.   Eyes:      Pupils: Pupils are equal, round, and reactive to light.   Cardiovascular:      Rate and Rhythm: Normal rate and regular rhythm.      Pulses: Normal pulses.      Heart sounds: Normal heart sounds. No murmur heard.    No friction rub. No gallop.   Pulmonary:      Effort: Pulmonary effort is normal. No respiratory distress.      Breath sounds: Normal breath sounds.   Abdominal:      General: Abdomen is flat. Bowel sounds are normal. There is no distension.      Palpations: Abdomen is soft.      Tenderness: There is no abdominal tenderness.   Musculoskeletal:         General: No swelling. Normal range of motion.      Cervical back: Normal range of motion and neck supple.   Skin:     General: Skin is warm and dry.   Neurological:      General: No focal deficit present.      Mental Status: He is alert and oriented to person, place, and time.   Psychiatric:         Mood and Affect: Mood normal.         Behavior: Behavior normal.         Thought Content: Thought content normal.         Judgment: Judgment normal.       Significant Labs: All pertinent labs within the past 24 hours have been reviewed.    Significant Imaging: I have reviewed all pertinent imaging results/findings within the past 24 hours.      Assessment/Plan:      RUQ pain    because of patient's strong CAD history will order myoview. HIDA scan may be done later if myoview is normal as  outpatient. Discussed with patient who expressed understanding. Serial biomarkers. Continue home regimen. Patient would like his Cardiologist to attend    Other chest pain  because of patient's strong CAD history will order myoview. HIDA scan may be done later if myoview is normal as outpatient. Discussed with patient who expressed understanding. Serial biomarkers. Continue home regimen. Patient would like his Cardiologist to attend      Atrial fibrillation  Continue home regimen  Holding his eliquis given possible cath      Generalized anxiety disorder  Continue home regimen      Type 2 diabetes mellitus with diabetic arthropathy  Patient's FSGs are controlled on current medication regimen.  Last A1c reviewed-   Lab Results   Component Value Date    HGBA1C 8.8 (H) 08/31/2022     Most recent fingerstick glucose reviewed- No results for input(s): POCTGLUCOSE in the last 24 hours.  Current correctional scale  Medium  Maintain anti-hyperglycemic dose as follows-   Antihyperglycemics (From admission, onward)    Start     Stop Route Frequency Ordered    08/31/22 0930  insulin detemir U-100 pen 40 Units         -- SubQ 2 times daily 08/31/22 0822    08/31/22 0922  insulin aspart U-100 pen 1-10 Units         -- SubQ Before meals & nightly PRN 08/31/22 0822        Hold Oral hypoglycemics while patient is in the hospital.    Atherosclerosis of native coronary artery of native heart with angina pectoris  Worsening chest pain  STress test needs to be done over 2 days  Cardiology consulted  Patient feels he really needs angio as this is how he has presented in the past and stress has been negative in the past.   Statin, ASA,   Holding eliquis for possible cath pending stress        VTE Risk Mitigation (From admission, onward)         Ordered     enoxaparin injection 40 mg  Daily         08/31/22 0510     IP VTE HIGH RISK PATIENT  Once         08/31/22 0510     Place sequential compression device  Until discontinued          08/31/22 0510                Discharge Planning   PEDRO: 8/31/2022     Code Status: Full Code   Is the patient medically ready for discharge?:     Reason for patient still in hospital (select all that apply): Treatment  Discharge Plan A: Home                  Aquiles Murray MD  Department of Hospital Medicine   [facility]

## 2022-08-31 NOTE — CONSULTS
[facility]   Cardiology Note    Consult Requested By   Reason for Consult: chest pain    SUBJECTIVE:     History of Present Illness: The pt is 57 y/o M with pmh CAD s/p bypass 4/2020, Paf, HTN, HLP, DM, ROSAMARIA, and BPH. The pt presented to ER with c/o chest pain that has progressively worsened over the last month. The pt states he does get some relief from nitro and has been taking 1-2 nitro per day for the past few weeks. He did have some associated SOB yesterday with worsening chest pain. He states this pain is mid sternal/RUQ. The pt denies n/v/d.     This am, the pt is sitting in chair with no c/o chest pain at this time. He denies SOB, dizziness, weakness, or palpitations.       Review of patient's allergies indicates:   Allergen Reactions    Pesticide Dermatitis and Rash       Past Medical History:   Diagnosis Date    Anticoagulant long-term use     Atrial fibrillation 2018    BPH (benign prostatic hyperplasia)     Cataract     COPD (chronic obstructive pulmonary disease)     Coronary artery disease     stents    CVD (cardiovascular disease)     Diabetes mellitus     Erythema multiforme     AKA Denton Edmondson Syndrome    Hypertension     Infected incision     MI (myocardial infarction)     per pt he has had 4     ROSAMARIA on CPAP     RLS (restless legs syndrome)      Past Surgical History:   Procedure Laterality Date    CORONARY ARTERY BYPASS GRAFT (CABG) N/A 4/7/2020    Procedure: CORONARY ARTERY BYPASS GRAFT (CABG)- Excision of left atrial appendage;  Surgeon: Doug Solitario MD;  Location: [facility] OR;  Service: Cardiothoracic;  Laterality: N/A;    CORONARY STENT PLACEMENT      WILSON MAZE PROCEDURE N/A 4/7/2020    Procedure: WILSON MAZE PROCEDURE- Attempted;  Surgeon: Doug Solitario MD;  Location: [facility] OR;  Service: Cardiothoracic;  Laterality: N/A;    CYSTOSCOPY N/A 12/10/2018    Procedure: CYSTOSCOPY;  Surgeon: Khushboo Abreu MD;  Location: [facility] OR;  Service: Urology;  Laterality:  N/A;    ENDOSCOPIC HARVEST OF VEIN Left 2020    Procedure: HARVEST-VEIN-ENDOVASCULAR;  Surgeon: Doug Solitario MD;  Location: [facility] OR;  Service: Cardiothoracic;  Laterality: Left;    LEFT HEART CATHETERIZATION Left 3/17/2020    Procedure: CATHETERIZATION, HEART, LEFT;  Surgeon: Tyree Walters MD;  Location: [facility] CATH/EP LAB;  Service: Cardiology;  Laterality: Left;    STERNAL WIRES REMOVAL N/A 2020    Procedure: REMOVAL, STERNAL WIRE;  Surgeon: Doug Solitario MD;  Location: [facility] OR;  Service: Peripheral Vascular;  Laterality: N/A;    ULTRASOUND OF PROSTATE FOR VOLUME DETERMINATION  12/10/2018    Procedure: ULTRASOUND, PROSTATE, FOR VOLUME DETERMINATION;  Surgeon: Khushboo Abreu MD;  Location: [facility] OR;  Service: Urology;;     Family History   Problem Relation Age of Onset    Heart disease Mother     Diabetes Mother     Hyperlipidemia Father     Cancer Father      Social History     Tobacco Use    Smoking status: Every Day     Packs/day: 0.30     Years: 30.00     Pack years: 9.00     Types: Cigarettes     Last attempt to quit: 2020     Years since quittin.4    Smokeless tobacco: Never    Tobacco comments:     just under a pack a day   Substance Use Topics    Alcohol use: Not Currently    Drug use: Yes     Frequency: 1.0 times per week     Types: Marijuana       Review of Systems:  Review of Systems   Constitutional:  Negative for chills, diaphoresis, fever, malaise/fatigue and weight loss.   Respiratory:  Positive for shortness of breath.    Cardiovascular:  Negative for chest pain, palpitations, orthopnea, claudication, leg swelling and PND.   Gastrointestinal:  Negative for abdominal pain, heartburn, nausea and vomiting.   Neurological:  Negative for weakness.     OBJECTIVE:     Vital Signs (Most Recent)  Temp: 98.2 °F (36.8 °C) (22)  Pulse: 73 (22)  Resp: 18 (22)  BP: 131/83 (22)  SpO2: 97 % (22)    Vital Signs Range  (Last 24H):  Temp:  [98.1 °F (36.7 °C)-98.5 °F (36.9 °C)]   Pulse:  [58-73]   Resp:  [14-24]   BP: (111-162)/(60-87)   SpO2:  [95 %-98 %]     I & O (Last 24H):    Intake/Output Summary (Last 24 hours) at 8/31/2022 0841  Last data filed at 8/31/2022 0748  Gross per 24 hour   Intake 300 ml   Output --   Net 300 ml       Current Diet:     Current Diet Order   Procedures    Diet NPO        Allergies:  Review of patient's allergies indicates:   Allergen Reactions    Pesticide Dermatitis and Rash       Meds:  Scheduled Meds:   aspirin  324 mg Oral Once    aspirin  81 mg Oral Daily    atorvastatin  40 mg Oral QHS    clonazePAM  1 mg Oral BID    cyclobenzaprine  10 mg Oral Daily    enoxaparin  40 mg Subcutaneous Daily    gabapentin  600 mg Oral BID    insulin detemir U-100  40 Units Subcutaneous BID    lisinopriL  10 mg Oral BID    metoprolol succinate  100 mg Oral BID     Continuous Infusions:  PRN Meds:acetaminophen, dextrose 50%, dextrose 50%, glucagon (human recombinant), glucose, glucose, insulin aspart U-100, melatonin, methocarbamoL, morphine, morphine, nitroGLYCERIN    Oxygen/Ventilator Data (Last 24H):  (if applicable)        Hemodynamic Parameters (Last 24H):   (if applicable)        Laboratory and Radiology Data:  Recent Results (from the past 24 hour(s))   Protime-INR    Collection Time: 08/30/22  7:31 PM   Result Value Ref Range    PT 13.2 11.4 - 13.7 sec    INR 1.1    CBC auto differential    Collection Time: 08/30/22  7:32 PM   Result Value Ref Range    WBC 12.25 3.90 - 12.70 K/uL    RBC 5.57 4.60 - 6.20 M/uL    Hemoglobin 17.7 14.0 - 18.0 g/dL    Hematocrit 51.9 40.0 - 54.0 %    MCV 93 82 - 98 fL    MCH 31.8 (H) 27.0 - 31.0 pg    MCHC 34.1 32.0 - 36.0 g/dL    RDW 12.7 11.5 - 14.5 %    Platelets 244 150 - 450 K/uL    MPV 9.8 9.2 - 12.9 fL    Immature Granulocytes 0.4 0.0 - 0.5 %    Gran # (ANC) 6.0 1.8 - 7.7 K/uL    Immature Grans (Abs) 0.05 (H) 0.00 - 0.04 K/uL    Lymph # 5.1 (H) 1.0 - 4.8 K/uL    Mono # 0.7  0.3 - 1.0 K/uL    Eos # 0.4 0.0 - 0.5 K/uL    Baso # 0.10 0.00 - 0.20 K/uL    nRBC 0 0 /100 WBC    Gran % 49.0 38.0 - 73.0 %    Lymph % 41.6 18.0 - 48.0 %    Mono % 5.3 4.0 - 15.0 %    Eosinophil % 2.9 0.0 - 8.0 %    Basophil % 0.8 0.0 - 1.9 %    Differential Method Automated    Comprehensive metabolic panel    Collection Time: 08/30/22  7:32 PM   Result Value Ref Range    Sodium 138 136 - 145 mmol/L    Potassium 4.7 3.5 - 5.1 mmol/L    Chloride 98 95 - 110 mmol/L    CO2 24 23 - 29 mmol/L    Glucose 275 (H) 70 - 110 mg/dL    BUN 16 6 - 20 mg/dL    Creatinine 1.3 0.5 - 1.4 mg/dL    Calcium 9.6 8.7 - 10.5 mg/dL    Total Protein 7.4 6.0 - 8.4 g/dL    Albumin 4.0 3.5 - 5.2 g/dL    Total Bilirubin 0.6 0.1 - 1.0 mg/dL    Alkaline Phosphatase 53 (L) 55 - 135 U/L    AST 39 10 - 40 U/L    ALT 45 (H) 10 - 44 U/L    Anion Gap 16 8 - 16 mmol/L    eGFR >60.0 >60 mL/min/1.73 m^2   Brain natriuretic peptide    Collection Time: 08/30/22  7:32 PM   Result Value Ref Range    BNP 18 0 - 99 pg/mL   Troponin I    Collection Time: 08/30/22  7:32 PM   Result Value Ref Range    Troponin I <0.030 <=0.040 ng/mL   CBC auto differential    Collection Time: 08/30/22  7:32 PM   Result Value Ref Range    WBC 12.25 3.90 - 12.70 K/uL    RBC 5.57 4.60 - 6.20 M/uL    Hemoglobin 17.7 14.0 - 18.0 g/dL    Hematocrit 51.9 40.0 - 54.0 %    MCV 93 82 - 98 fL    MCH 31.8 (H) 27.0 - 31.0 pg    MCHC 34.1 32.0 - 36.0 g/dL    RDW 12.7 11.5 - 14.5 %    Platelets 244 150 - 450 K/uL    MPV 9.8 9.2 - 12.9 fL    Immature Granulocytes 0.4 0.0 - 0.5 %    Gran # (ANC) 6.0 1.8 - 7.7 K/uL    Immature Grans (Abs) 0.05 (H) 0.00 - 0.04 K/uL    Lymph # 5.1 (H) 1.0 - 4.8 K/uL    Mono # 0.7 0.3 - 1.0 K/uL    Eos # 0.4 0.0 - 0.5 K/uL    Baso # 0.10 0.00 - 0.20 K/uL    nRBC 0 0 /100 WBC    Gran % 49.0 38.0 - 73.0 %    Lymph % 41.6 18.0 - 48.0 %    Mono % 5.3 4.0 - 15.0 %    Eosinophil % 2.9 0.0 - 8.0 %    Basophil % 0.8 0.0 - 1.9 %    Differential Method Automated     Comprehensive metabolic panel    Collection Time: 08/30/22  7:32 PM   Result Value Ref Range    Sodium 138 136 - 145 mmol/L    Potassium 4.7 3.5 - 5.1 mmol/L    Chloride 98 95 - 110 mmol/L    CO2 24 23 - 29 mmol/L    Glucose 275 (H) 70 - 110 mg/dL    BUN 16 6 - 20 mg/dL    Creatinine 1.3 0.5 - 1.4 mg/dL    Calcium 9.6 8.7 - 10.5 mg/dL    Total Protein 7.4 6.0 - 8.4 g/dL    Albumin 4.0 3.5 - 5.2 g/dL    Total Bilirubin 0.6 0.1 - 1.0 mg/dL    Alkaline Phosphatase 53 (L) 55 - 135 U/L    AST 39 10 - 40 U/L    ALT 45 (H) 10 - 44 U/L    Anion Gap 16 8 - 16 mmol/L    eGFR >60.0 >60 mL/min/1.73 m^2   Troponin I #1    Collection Time: 08/30/22  7:32 PM   Result Value Ref Range    Troponin I <0.030 <=0.040 ng/mL   B-Type natriuretic peptide (BNP)    Collection Time: 08/30/22  7:32 PM   Result Value Ref Range    BNP 18 0 - 99 pg/mL   Magnesium    Collection Time: 08/30/22  7:32 PM   Result Value Ref Range    Magnesium 1.8 1.6 - 2.6 mg/dL   COVID-19 Rapid Screening    Collection Time: 08/30/22  7:35 PM   Result Value Ref Range    SARS-CoV-2 RNA, Amplification, Qual Negative Negative   Troponin I    Collection Time: 08/31/22  5:26 AM   Result Value Ref Range    Troponin I <0.030 <=0.040 ng/mL     Imaging Results              US Abdomen Limited (Gallbladder) (Final result)  Result time 08/30/22 23:23:58      Final result by Sadi Garcia MD (08/30/22 23:23:58)                   Narrative:    EXAM: Right upper quadrant abdominal ultrasound.  INDICATION: Abdominal pain, history of abnormal gallbladder ultrasound  COMPARISON: None available.    FINDINGS:  Pancreas: Pancreas is not well-visualized on this exam.    Aorta: No aorta is not well-visualized on this exam.    Liver: The liver is diffusely echogenic with poor visualization of portal triads. Liver is enlarged measuring approximately 21.1 cm in craniocaudal dimension. No visualized focal hepatic mass.    Gallbladder: The gallbladder is contracted limiting evaluation.  No gallbladder wall thickening or other abnormality. The sonographer reports a negative Bradley sign.    Biliary: No intrahepatic biliary ductal dilatation. The common duct is nondilated, measuring 3 mm at the tamela hepatis.    Right kidney: The right kidney measures 12.7 cm without hydronephrosis or shadowing calculi.    IMPRESSION:    Hepatic steatosis and hepatomegaly.    The gallbladder is contracted limiting evaluation, however no abnormality is identified.    Electronically signed by:  Sadi Garcia MD  8/30/2022 11:23 PM CDT Workstation: [id number]                                     X-Ray Chest AP Portable (Final result)  Result time 08/31/22 07:48:01      Final result by Hermes Borrego MD (08/31/22 07:48:01)                   Narrative:    Reason: Chest Pain    FINDINGS:    Portable chest radiograph with comparison chest x-ray January 9, 2022 show normal cardiomediastinal silhouette.  Lungs are clear. Pulmonary vasculature is normal. No acute osseous abnormality.    IMPRESSION:    No acute cardiopulmonary abnormality.    Electronically signed by:  Hermes Borrego DO  8/31/2022 7:48 AM CDT Workstation: [id number]                                    12-lead EKG interpretation:  SR 75 normal axis WNL     Current Cardiac Rhythm:   SR     Physical Exam:   Physical Exam  Cardiovascular:      Rate and Rhythm: Normal rate and regular rhythm.      Heart sounds: Murmur heard.      Comments: 1/6 ELVIA   Pulmonary:      Effort: Pulmonary effort is normal. No respiratory distress.      Breath sounds: Normal breath sounds.   Abdominal:      General: Abdomen is flat. Bowel sounds are normal.      Palpations: Abdomen is soft.   Musculoskeletal:         General: Normal range of motion.      Cervical back: Normal range of motion.   Skin:     General: Skin is warm and dry.   Neurological:      Mental Status: He is alert and oriented to person, place, and time.   Psychiatric:         Mood and Affect: Mood normal.          Behavior: Behavior normal.       ASSESSMENT/PLAN:     The pt has not had an ischemic w/up since bypass in 2020. 8/2021 echo , EF normal mild LVH   H/H 17.7/51.9  Creatinine 1.3  BNP 18      Chest pain--improved--trop neg, no acute EKG changes  Stress test today  VSS  Cont ASA, statin   Continue Isosorbide    RUQ pain--abd u/s fatty liver, hepatomegaly, contracted gallbladder  Outpt w/up    pAF--SR at this time, continue Eliquis/metoprolol     DM--continue home regimen    HTN--continue lisinopril        If stress test is neg, pt may be discharged home , and follow up next week with  to discuss further intervention.     Thank you for your consult

## 2022-08-31 NOTE — PLAN OF CARE
Attempted to complete discharge planning assessment; however, patient was not in room.  Will re-attempt at later time.

## 2022-08-31 NOTE — PLAN OF CARE
[facility]  Initial Discharge Assessment       Primary Care Provider: Venkata Mclaughlin MD    Admission Diagnosis: Chest pain [R07.9]    Admission Date: 8/30/2022  Expected Discharge Date: 8/31/2022    Discharge Barriers Identified: None    Payor: MEDICAID / Plan: [employer] [place] / Product Type: Managed Medicaid /     Extended Emergency Contact Information  Primary Emergency Contact: TIAGO BAY  Mobile Phone: [phone]  Relation: Sister  Secondary Emergency Contact: Magdaleno Chi  Home Phone: [phone]  Mobile Phone: [phone]  Relation: Son   needed? No    Discharge Plan A: Home  Discharge Plan B: Home      [facility] [address] [place], [place] - [address]  [address]  [facility] [address]  Phone: [phone] Fax: [phone]    [facility] - [place], [place] - [id number] [facility] [address]  Phone: [phone] Fax: [phone]    [facility] - [facility] [address] [facility]  [address] [facility] [address]  Phone: [phone] Fax: [phone]      Initial Assessment (most recent)       Adult Discharge Assessment - 08/31/22 1358          Discharge Assessment    Assessment Type Discharge Planning Assessment     Source of Information health record     Prior to hospitilization cognitive status: Alert/Oriented     Current cognitive status: Alert/Oriented     Walking or Climbing Stairs Difficulty none     Dressing/Bathing Difficulty none     Equipment Currently Used at Home none     Readmission within 30 days? No     Patient currently being followed by outpatient case management? No     Do you currently have service(s) that help you manage your care at home? No     Do you take prescription medications? Yes     Do you have prescription coverage? Yes     Coverage [place] Medicaid, [employer]     Do you have any problems affording  any of your prescribed medications? No     Discharge Plan A Home     Discharge Plan B Home     DME Needed Upon Discharge  none     Discharge Barriers Identified None

## 2022-08-31 NOTE — ASSESSMENT & PLAN NOTE
because of patient's strong CAD history will order myoview. HIDA scan may be done later if myoview is normal as outpatient. Discussed with patient who expressed understanding. Serial biomarkers. Continue home regimen. Patient would like his Cardiologist to attend

## 2022-08-31 NOTE — HPI
"56 year old male with history of PAF, CAD, COPD, ROSAMARIA, BPH and has had 3/3 COVID vaccinations presented to ED complaining 1 month history of worsening central chest discomfort: aching tightness, band like across center of chest should to shoulder "Just like when I needed my bypass". Associated with SOB. Ranks 8-9/10 worsening. No orthopnea, pnd or pedal edema. Also has had RUQ pain. No N/V. No change in BM    In ED: US: contracted GB. Labs reviewed and noted below: normal CBC; normal electrolytes and renal function transaminases and bilirubin normal alk phos mildly elevated; BNP and troponin are normal. CXR reviewed: NAPD. EKG reviewed: NSR no acute segments.    Discussed with ED MD: because of patient's strong CAD history will order myoview. HIDA scan may be done later if myoview is normal as outpatient. Discussed with patient who expressed understanding. Serial biomarkers. Continue home regimen. Patient would like his Cardiologist to attend  "

## 2022-08-31 NOTE — H&P
"[facility] - Emergency Dept  Hospital Medicine  History & Physical    Patient Name: Magdaleno Cunningham  MRN: [id number]  Patient Class: OP- Observation  Admission Date: 8/30/2022  Attending Physician: Kirill Blackwell MD  Primary Care Provider: Venkata Mclaughlin MD         Patient information was obtained from patient, past medical records, ER records and ED MD.     Subjective:     Principal Problem:<principal problem not specified>    Chief Complaint:   Chief Complaint   Patient presents with    Chest Pain     CP x1 month, taking 2 Nitros per day. Became SOB 2 days ago. Left sided CP, non-radiating, Nitro makes the pain better. Not doing anything exerting. Took 81mg ASA today. Uses CPAP at night.         HPI: 56 year old male with history of PAF, CAD, COPD, ROSAMARIA, BPH and has had 3/3 COVID vaccinations presented to ED complaining 1 month history of worsening central chest discomfort: aching tightness, band like across center of chest should to shoulder "Just like when I needed my bypass". Associated with SOB. Ranks 8-9/10 worsening. No orthopnea, pnd or pedal edema. Also has had RUQ pain. No N/V. No change in BM    In ED: US: contracted GB. Labs reviewed and noted below: normal CBC; normal electrolytes and renal function transaminases and bilirubin normal alk phos mildly elevated; BNP and troponin are normal. CXR reviewed: NAPD. EKG reviewed: NSR no acute segments.    Discussed with ED MD: because of patient's strong CAD history will order myoview. HIDA scan may be done later if myoview is normal as outpatient. Discussed with patient who expressed understanding. Serial biomarkers. Continue home regimen. Patient would like his Cardiologist to attend      Past Medical History:   Diagnosis Date    Anticoagulant long-term use     Atrial fibrillation 2018    BPH (benign prostatic hyperplasia)     Cataract     COPD (chronic obstructive pulmonary disease)     Coronary artery disease     stents    CVD " (cardiovascular disease)     Diabetes mellitus     Erythema multiforme     AKA Denton Edmondson Syndrome    Hypertension     Infected incision     MI (myocardial infarction)     per pt he has had 4     ROSAMARIA on CPAP     RLS (restless legs syndrome)        Past Surgical History:   Procedure Laterality Date    CORONARY ARTERY BYPASS GRAFT (CABG) N/A 4/7/2020    Procedure: CORONARY ARTERY BYPASS GRAFT (CABG)- Excision of left atrial appendage;  Surgeon: Doug Solitario MD;  Location: [facility] OR;  Service: Cardiothoracic;  Laterality: N/A;    CORONARY STENT PLACEMENT      WILSON MAZE PROCEDURE N/A 4/7/2020    Procedure: WILSON MAZE PROCEDURE- Attempted;  Surgeon: Doug Solitario MD;  Location: [facility] OR;  Service: Cardiothoracic;  Laterality: N/A;    CYSTOSCOPY N/A 12/10/2018    Procedure: CYSTOSCOPY;  Surgeon: Khushboo Abreu MD;  Location: [facility] OR;  Service: Urology;  Laterality: N/A;    ENDOSCOPIC HARVEST OF VEIN Left 4/7/2020    Procedure: HARVEST-VEIN-ENDOVASCULAR;  Surgeon: Doug Solitario MD;  Location: [facility] OR;  Service: Cardiothoracic;  Laterality: Left;    LEFT HEART CATHETERIZATION Left 3/17/2020    Procedure: CATHETERIZATION, HEART, LEFT;  Surgeon: Tyree Walters MD;  Location: [facility] CATH/EP LAB;  Service: Cardiology;  Laterality: Left;    STERNAL WIRES REMOVAL N/A 6/8/2020    Procedure: REMOVAL, STERNAL WIRE;  Surgeon: Doug Solitario MD;  Location: [facility] OR;  Service: Peripheral Vascular;  Laterality: N/A;    ULTRASOUND OF PROSTATE FOR VOLUME DETERMINATION  12/10/2018    Procedure: ULTRASOUND, PROSTATE, FOR VOLUME DETERMINATION;  Surgeon: Khushboo Abreu MD;  Location: [facility];  Service: Urology;;       Review of patient's allergies indicates:   Allergen Reactions    Pesticide Dermatitis and Rash       No current facility-administered medications on file prior to encounter.     Current Outpatient Medications on File Prior to Encounter   Medication Sig    apixaban  (ELIQUIS) 5 mg Tab Take 5 mg by mouth 2 (two) times daily.    aspirin 81 MG Chew Take 81 mg by mouth once daily.     clonazePAM (KLONOPIN) 1 MG tablet Take 1 mg by mouth 2 (two) times a day.    cyclobenzaprine (FLEXERIL) 10 MG tablet Take 10 mg by mouth once daily.    furosemide (LASIX) 20 MG tablet Take 20 mg by mouth daily as needed.    gabapentin (NEURONTIN) 600 MG tablet Take 600 mg by mouth 3 (three) times daily.     gabapentin (NEURONTIN) 800 MG tablet Take 800 mg by mouth every evening.    icosapent ethyL (VASCEPA) 1 gram Cap Take 2 capsules by mouth 2 (two) times daily.    isosorbide mononitrate (IMDUR) 30 MG 24 hr tablet Take 1 tablet (30 mg total) by mouth once daily.    JARDIANCE 25 mg tablet Take 25 mg by mouth once daily.    LANTUS SOLOSTAR U-100 INSULIN glargine 100 units/mL (3mL) SubQ pen Inject 80 Units into the skin 2 (two) times daily. Changed to 80 units BID 2months ago    lisinopriL (PRINIVIL,ZESTRIL) 20 MG tablet Take 10 mg by mouth 2 (two) times daily.    metFORMIN (GLUCOPHAGE) 1000 MG tablet Take 1,000 mg by mouth 2 (two) times daily with meals.     methocarbamoL (ROBAXIN) 500 MG Tab Take 500 mg by mouth nightly as needed.    metoprolol succinate (TOPROL-XL) 100 MG 24 hr tablet Take 100 mg by mouth 2 (two) times daily.    nitroGLYCERIN (NITROSTAT) 0.4 MG SL tablet Place 0.4 mg under the tongue every 5 (five) minutes as needed for Chest pain.    oxyCODONE-acetaminophen (PERCOCET)  mg per tablet Take 1-2 tablets by mouth daily as needed.    pioglitazone (ACTOS) 15 MG tablet Take 15 mg by mouth once daily.    rimegepant (NURTEC) 75 mg odt Take 1 tablet by mouth as needed.    simvastatin (ZOCOR) 10 MG tablet Take 10 mg by mouth once daily.    ACCU-CHEK GUIDE Strp USE 1 STRIP TO CHECK BLOOD SUGAR TWICE DAILY    amitriptyline (ELAVIL) 25 MG tablet amitriptyline 25 mg tablet   TAKE 1 TO 2 TABLETS BY MOUTH AT BEDTIME AS NEEDED NEUROPATHIC PAIN RELIEF AND SLEEP AID    ANORO  "ELLIPTA 62.5-25 mcg/actuation DsDv Inhale 1 puff into the lungs once daily.    COVID-19 vacc,mRNA,Pfizer,,PF, (COMIRNATY, PF,) 30 mcg/0.3 mL SusR Pfizer-BioNTech COVID-19 Vaccine (PF) 30 mcg/0.3 mL IM susp (purple)    eszopiclone (LUNESTA) 1 MG Tab eszopiclone 1 mg tablet   TAKE ONE TABLET BY MOUTH AT BEDTIME AS NEEDED. STOP restoril    fenofibrate (TRICOR) 145 MG tablet Take 145 mg by mouth every evening.     flu vac qs 2020-21,4 yr up, CD (FLUCELVAX QUAD 2469-7988) 60 mcg (15 mcg x 4)/0.5 mL Susp Flucelvax Quad 7770-9845 60 mcg (15 mcg x 4)/0.5 mL intramuscular susp    FOLTANX 3-35-2 mg Tab Take 1 tablet by mouth 2 (two) times daily.    glimepiride (AMARYL) 4 MG tablet Take 4 mg by mouth 2 (two) times daily.    hydroCHLOROthiazide (HYDRODIURIL) 12.5 MG Tab Take 12.5 mg by mouth once daily.    HYDROcodone-acetaminophen (NORCO) 5-325 mg per tablet Take 1 tablet by mouth every 6 (six) hours as needed for Pain.    insulin lispro 100 unit/mL pen Inject 30 Units into the skin 3 (three) times daily before meals.    lisinopriL 10 MG tablet Take 10 mg by mouth once daily.     pen needle, diabetic 31 gauge x 1/4" Ndle USE 1 TWICE DAILY FOR BLOOD SUGAR GREATER THAN 200    pneumococcal 23-lily ps (PNEUMOVAX-23) 25 mcg/0.5 mL vaccine Pneumovax-23 25 mcg/0.5 mL injection syringe    ranolazine (RANEXA) 500 MG Tb12 Take 500 mg by mouth 2 (two) times daily.    rosuvastatin (CRESTOR) 20 MG tablet Take 20 mg by mouth every evening.     tamsulosin (FLOMAX) 0.4 mg Cap Take 1 capsule (0.4 mg total) by mouth every evening.    varenicline (CHANTIX STARTING MONTH BOX) 0.5 mg (11)- 1 mg (42) tablet Take one 0.5mg tab by mouth once daily X3 days,then increase to one 0.5mg tab twice daily X4 days,then increase to one 1mg tab twice daily     Family History       Problem Relation (Age of Onset)    Cancer Father    Diabetes Mother    Heart disease Mother    Hyperlipidemia Father          Tobacco Use    Smoking status: Every Day "     Packs/day: 0.30     Years: 30.00     Pack years: 9.00     Types: Cigarettes     Last attempt to quit: 2020     Years since quittin.4    Smokeless tobacco: Never    Tobacco comments:     just under a pack a day   Substance and Sexual Activity    Alcohol use: Not Currently    Drug use: Yes     Frequency: 1.0 times per week     Types: Marijuana    Sexual activity: Not Currently     Review of Systems   Constitutional:  Positive for fatigue.   HENT: Negative.     Eyes: Negative.    Respiratory:  Positive for chest tightness and shortness of breath.    Cardiovascular:  Positive for chest pain.   Gastrointestinal: Negative.    Endocrine: Negative.    Genitourinary: Negative.    Musculoskeletal: Negative.    Skin: Negative.    Allergic/Immunologic: Negative.    Neurological: Negative.    Hematological: Negative.    All other systems reviewed and are negative.  Objective:     Vital Signs (Most Recent):  Temp: 98.3 °F (36.8 °C) (22)  Pulse: 70 (22 0030)  Resp: 18 (22)  BP: 114/69 (220)  SpO2: 98 % (220) Vital Signs (24h Range):  Temp:  [98.3 °F (36.8 °C)] 98.3 °F (36.8 °C)  Pulse:  [60-71] 70  Resp:  [14-24] 18  SpO2:  [95 %-98 %] 98 %  BP: (114-162)/(62-83) 114/69     Weight: 127.5 kg (281 lb)  Body mass index is 39.19 kg/m².    Physical Exam  Vitals and nursing note reviewed.   Constitutional:       Appearance: He is well-developed. He is obese.   HENT:      Head: Normocephalic and atraumatic.      Right Ear: External ear normal.      Left Ear: External ear normal.      Nose: Nose normal.   Eyes:      Conjunctiva/sclera: Conjunctivae normal.      Pupils: Pupils are equal, round, and reactive to light.   Cardiovascular:      Rate and Rhythm: Normal rate and regular rhythm.      Heart sounds: Normal heart sounds.   Pulmonary:      Effort: Pulmonary effort is normal.      Breath sounds: Normal breath sounds.   Abdominal:      General: Bowel sounds are normal.       Palpations: Abdomen is soft.   Musculoskeletal:         General: Normal range of motion.      Cervical back: Normal range of motion and neck supple.   Skin:     General: Skin is warm and dry.      Capillary Refill: Capillary refill takes less than 2 seconds.   Neurological:      Mental Status: He is alert and oriented to person, place, and time.   Psychiatric:         Behavior: Behavior normal.         Thought Content: Thought content normal.         Judgment: Judgment normal.         CRANIAL NERVES     CN III, IV, VI   Pupils are equal, round, and reactive to light.     Significant Labs: All pertinent labs within the past 24 hours have been reviewed.  CBC:   Recent Labs   Lab 08/30/22 1932   WBC 12.25  12.25   HGB 17.7  17.7   HCT 51.9  51.9     244     CMP:   Recent Labs   Lab 08/30/22 1932     138   K 4.7  4.7   CL 98  98   CO2 24  24   *  275*   BUN 16  16   CREATININE 1.3  1.3   CALCIUM 9.6  9.6   PROT 7.4  7.4   ALBUMIN 4.0  4.0   BILITOT 0.6  0.6   ALKPHOS 53*  53*   AST 39  39   ALT 45*  45*   ANIONGAP 16  16     Cardiac Markers:   Recent Labs   Lab 08/30/22 1932   BNP 18  18     Troponin:   Recent Labs   Lab 08/30/22 1932   TROPONINI <0.030  <0.030       Significant Imaging: I have reviewed all pertinent imaging results/findings within the past 24 hours.    Assessment/Plan:     RUQ pain    because of patient's strong CAD history will order myoview. HIDA scan may be done later if myoview is normal as outpatient. Discussed with patient who expressed understanding. Serial biomarkers. Continue home regimen. Patient would like his Cardiologist to attend    Other chest pain  because of patient's strong CAD history will order myoview. HIDA scan may be done later if myoview is normal as outpatient. Discussed with patient who expressed understanding. Serial biomarkers. Continue home regimen. Patient would like his Cardiologist to attend        VTE Risk Mitigation (From  admission, onward)    None             Kirill Blackwell MD  Department of Hospital Medicine   [facility] - Emergency Dept

## 2022-08-31 NOTE — SUBJECTIVE & OBJECTIVE
Past Medical History:   Diagnosis Date    Anticoagulant long-term use     Atrial fibrillation 2018    BPH (benign prostatic hyperplasia)     Cataract     COPD (chronic obstructive pulmonary disease)     Coronary artery disease     stents    CVD (cardiovascular disease)     Diabetes mellitus     Erythema multiforme     AKA Denton Edmondson Syndrome    Hypertension     Infected incision     MI (myocardial infarction)     per pt he has had 4     ROSAMARIA on CPAP     RLS (restless legs syndrome)        Past Surgical History:   Procedure Laterality Date    CORONARY ARTERY BYPASS GRAFT (CABG) N/A 4/7/2020    Procedure: CORONARY ARTERY BYPASS GRAFT (CABG)- Excision of left atrial appendage;  Surgeon: Doug Solitario MD;  Location: [facility] OR;  Service: Cardiothoracic;  Laterality: N/A;    CORONARY STENT PLACEMENT      WILSON MAZE PROCEDURE N/A 4/7/2020    Procedure: WILSON MAZE PROCEDURE- Attempted;  Surgeon: Doug Solitario MD;  Location: [facility] OR;  Service: Cardiothoracic;  Laterality: N/A;    CYSTOSCOPY N/A 12/10/2018    Procedure: CYSTOSCOPY;  Surgeon: Khushboo Abreu MD;  Location: [facility] OR;  Service: Urology;  Laterality: N/A;    ENDOSCOPIC HARVEST OF VEIN Left 4/7/2020    Procedure: HARVEST-VEIN-ENDOVASCULAR;  Surgeon: Doug Solitario MD;  Location: [facility] OR;  Service: Cardiothoracic;  Laterality: Left;    LEFT HEART CATHETERIZATION Left 3/17/2020    Procedure: CATHETERIZATION, HEART, LEFT;  Surgeon: Tyree Walters MD;  Location: [facility] CATH/EP LAB;  Service: Cardiology;  Laterality: Left;    STERNAL WIRES REMOVAL N/A 6/8/2020    Procedure: REMOVAL, STERNAL WIRE;  Surgeon: Doug Solitario MD;  Location: [facility] OR;  Service: Peripheral Vascular;  Laterality: N/A;    ULTRASOUND OF PROSTATE FOR VOLUME DETERMINATION  12/10/2018    Procedure: ULTRASOUND, PROSTATE, FOR VOLUME DETERMINATION;  Surgeon: Khushboo Abreu MD;  Location: [facility] OR;  Service: Urology;;       Review of patient's allergies  indicates:   Allergen Reactions    Pesticide Dermatitis and Rash       No current facility-administered medications on file prior to encounter.     Current Outpatient Medications on File Prior to Encounter   Medication Sig    apixaban (ELIQUIS) 5 mg Tab Take 5 mg by mouth 2 (two) times daily.    aspirin 81 MG Chew Take 81 mg by mouth once daily.     clonazePAM (KLONOPIN) 1 MG tablet Take 1 mg by mouth 2 (two) times a day.    cyclobenzaprine (FLEXERIL) 10 MG tablet Take 10 mg by mouth once daily.    furosemide (LASIX) 20 MG tablet Take 20 mg by mouth daily as needed.    gabapentin (NEURONTIN) 600 MG tablet Take 600 mg by mouth 3 (three) times daily.     gabapentin (NEURONTIN) 800 MG tablet Take 800 mg by mouth every evening.    icosapent ethyL (VASCEPA) 1 gram Cap Take 2 capsules by mouth 2 (two) times daily.    isosorbide mononitrate (IMDUR) 30 MG 24 hr tablet Take 1 tablet (30 mg total) by mouth once daily.    JARDIANCE 25 mg tablet Take 25 mg by mouth once daily.    LANTUS SOLOSTAR U-100 INSULIN glargine 100 units/mL (3mL) SubQ pen Inject 80 Units into the skin 2 (two) times daily. Changed to 80 units BID 2months ago    lisinopriL (PRINIVIL,ZESTRIL) 20 MG tablet Take 10 mg by mouth 2 (two) times daily.    metFORMIN (GLUCOPHAGE) 1000 MG tablet Take 1,000 mg by mouth 2 (two) times daily with meals.     methocarbamoL (ROBAXIN) 500 MG Tab Take 500 mg by mouth nightly as needed.    metoprolol succinate (TOPROL-XL) 100 MG 24 hr tablet Take 100 mg by mouth 2 (two) times daily.    nitroGLYCERIN (NITROSTAT) 0.4 MG SL tablet Place 0.4 mg under the tongue every 5 (five) minutes as needed for Chest pain.    oxyCODONE-acetaminophen (PERCOCET)  mg per tablet Take 1-2 tablets by mouth daily as needed.    pioglitazone (ACTOS) 15 MG tablet Take 15 mg by mouth once daily.    rimegepant (NURTEC) 75 mg odt Take 1 tablet by mouth as needed.    simvastatin (ZOCOR) 10 MG tablet Take 10 mg by mouth once daily.    ACCU-CHEK GUIDE  "Strp USE 1 STRIP TO CHECK BLOOD SUGAR TWICE DAILY    amitriptyline (ELAVIL) 25 MG tablet amitriptyline 25 mg tablet   TAKE 1 TO 2 TABLETS BY MOUTH AT BEDTIME AS NEEDED NEUROPATHIC PAIN RELIEF AND SLEEP AID    ANORO ELLIPTA 62.5-25 mcg/actuation DsDv Inhale 1 puff into the lungs once daily.    COVID-19 vacc,mRNA,Pfizer,,PF, (COMIRNATY, PF,) 30 mcg/0.3 mL SusR Pfizer-BioNTech COVID-19 Vaccine (PF) 30 mcg/0.3 mL IM susp (purple)    eszopiclone (LUNESTA) 1 MG Tab eszopiclone 1 mg tablet   TAKE ONE TABLET BY MOUTH AT BEDTIME AS NEEDED. STOP restoril    fenofibrate (TRICOR) 145 MG tablet Take 145 mg by mouth every evening.     flu vac qs 2020-21,4 yr up, CD (FLUCELVAX QUAD 1290-9129) 60 mcg (15 mcg x 4)/0.5 mL Susp Flucelvax Quad 7602-1702 60 mcg (15 mcg x 4)/0.5 mL intramuscular susp    FOLTANX 3-35-2 mg Tab Take 1 tablet by mouth 2 (two) times daily.    glimepiride (AMARYL) 4 MG tablet Take 4 mg by mouth 2 (two) times daily.    hydroCHLOROthiazide (HYDRODIURIL) 12.5 MG Tab Take 12.5 mg by mouth once daily.    HYDROcodone-acetaminophen (NORCO) 5-325 mg per tablet Take 1 tablet by mouth every 6 (six) hours as needed for Pain.    insulin lispro 100 unit/mL pen Inject 30 Units into the skin 3 (three) times daily before meals.    lisinopriL 10 MG tablet Take 10 mg by mouth once daily.     pen needle, diabetic 31 gauge x 1/4" Ndle USE 1 TWICE DAILY FOR BLOOD SUGAR GREATER THAN 200    pneumococcal 23-lily ps (PNEUMOVAX-23) 25 mcg/0.5 mL vaccine Pneumovax-23 25 mcg/0.5 mL injection syringe    ranolazine (RANEXA) 500 MG Tb12 Take 500 mg by mouth 2 (two) times daily.    rosuvastatin (CRESTOR) 20 MG tablet Take 20 mg by mouth every evening.     tamsulosin (FLOMAX) 0.4 mg Cap Take 1 capsule (0.4 mg total) by mouth every evening.    varenicline (CHANTIX STARTING MONTH BOX) 0.5 mg (11)- 1 mg (42) tablet Take one 0.5mg tab by mouth once daily X3 days,then increase to one 0.5mg tab twice daily X4 days,then increase to one 1mg tab twice " daily     Family History       Problem Relation (Age of Onset)    Cancer Father    Diabetes Mother    Heart disease Mother    Hyperlipidemia Father          Tobacco Use    Smoking status: Every Day     Packs/day: 0.30     Years: 30.00     Pack years: 9.00     Types: Cigarettes     Last attempt to quit: 2020     Years since quittin.4    Smokeless tobacco: Never    Tobacco comments:     just under a pack a day   Substance and Sexual Activity    Alcohol use: Not Currently    Drug use: Yes     Frequency: 1.0 times per week     Types: Marijuana    Sexual activity: Not Currently     Review of Systems   Constitutional:  Positive for fatigue.   HENT: Negative.     Eyes: Negative.    Respiratory:  Positive for chest tightness and shortness of breath.    Cardiovascular:  Positive for chest pain.   Gastrointestinal: Negative.    Endocrine: Negative.    Genitourinary: Negative.    Musculoskeletal: Negative.    Skin: Negative.    Allergic/Immunologic: Negative.    Neurological: Negative.    Hematological: Negative.    All other systems reviewed and are negative.  Objective:     Vital Signs (Most Recent):  Temp: 98.3 °F (36.8 °C) (22 1926)  Pulse: 70 (22 0030)  Resp: 18 (22 0030)  BP: 114/69 (22 0030)  SpO2: 98 % (22 0030) Vital Signs (24h Range):  Temp:  [98.3 °F (36.8 °C)] 98.3 °F (36.8 °C)  Pulse:  [60-71] 70  Resp:  [14-24] 18  SpO2:  [95 %-98 %] 98 %  BP: (114-162)/(62-83) 114/69     Weight: 127.5 kg (281 lb)  Body mass index is 39.19 kg/m².    Physical Exam  Vitals and nursing note reviewed.   Constitutional:       Appearance: He is well-developed. He is obese.   HENT:      Head: Normocephalic and atraumatic.      Right Ear: External ear normal.      Left Ear: External ear normal.      Nose: Nose normal.   Eyes:      Conjunctiva/sclera: Conjunctivae normal.      Pupils: Pupils are equal, round, and reactive to light.   Cardiovascular:      Rate and Rhythm: Normal rate and regular  rhythm.      Heart sounds: Normal heart sounds.   Pulmonary:      Effort: Pulmonary effort is normal.      Breath sounds: Normal breath sounds.   Abdominal:      General: Bowel sounds are normal.      Palpations: Abdomen is soft.   Musculoskeletal:         General: Normal range of motion.      Cervical back: Normal range of motion and neck supple.   Skin:     General: Skin is warm and dry.      Capillary Refill: Capillary refill takes less than 2 seconds.   Neurological:      Mental Status: He is alert and oriented to person, place, and time.   Psychiatric:         Behavior: Behavior normal.         Thought Content: Thought content normal.         Judgment: Judgment normal.         CRANIAL NERVES     CN III, IV, VI   Pupils are equal, round, and reactive to light.     Significant Labs: All pertinent labs within the past 24 hours have been reviewed.  CBC:   Recent Labs   Lab 08/30/22 1932   WBC 12.25  12.25   HGB 17.7  17.7   HCT 51.9  51.9     244     CMP:   Recent Labs   Lab 08/30/22 1932     138   K 4.7  4.7   CL 98  98   CO2 24  24   *  275*   BUN 16  16   CREATININE 1.3  1.3   CALCIUM 9.6  9.6   PROT 7.4  7.4   ALBUMIN 4.0  4.0   BILITOT 0.6  0.6   ALKPHOS 53*  53*   AST 39  39   ALT 45*  45*   ANIONGAP 16  16     Cardiac Markers:   Recent Labs   Lab 08/30/22 1932   BNP 18  18     Troponin:   Recent Labs   Lab 08/30/22 1932   TROPONINI <0.030  <0.030       Significant Imaging: I have reviewed all pertinent imaging results/findings within the past 24 hours.

## 2022-08-31 NOTE — PROGRESS NOTES
@  14:45 PER RADIOLOGIST, FURTHER EVALUATION IS NECESSARY, RESTING INJ. AND IMAGES WILL BE OBTAINED TOMORROW, 09/01/2022.      NO RESTRICTIONS FROM NUCLEAR MEDICNINE.

## 2022-08-31 NOTE — SUBJECTIVE & OBJECTIVE
Interval History: No acute events overnight, he is highly concerned about his symptoms and feels he needs an angiogram. The second part of the stress test will be done tomorrow. He has no active chest pain, no nausea, arm pain, or neck pain.     Review of Systems   All other systems reviewed and are negative.  Objective:     Vital Signs (Most Recent):  Temp: 98.2 °F (36.8 °C) (08/31/22 0740)  Pulse: 73 (08/31/22 1103)  Resp: 18 (08/31/22 0740)  BP: 131/83 (08/31/22 1103)  SpO2: 97 % (08/31/22 0740) Vital Signs (24h Range):  Temp:  [98.1 °F (36.7 °C)-98.5 °F (36.9 °C)] 98.2 °F (36.8 °C)  Pulse:  [58-73] 73  Resp:  [14-24] 18  SpO2:  [95 %-98 %] 97 %  BP: (111-162)/(60-87) 131/83     Weight: 123.6 kg (272 lb 9.6 oz)  Body mass index is 38.02 kg/m².    Intake/Output Summary (Last 24 hours) at 8/31/2022 1616  Last data filed at 8/31/2022 1300  Gross per 24 hour   Intake 420 ml   Output --   Net 420 ml      Physical Exam  Vitals reviewed.   Constitutional:       Appearance: Normal appearance.   HENT:      Head: Normocephalic and atraumatic.      Nose: Nose normal.      Mouth/Throat:      Mouth: Mucous membranes are moist.   Eyes:      Pupils: Pupils are equal, round, and reactive to light.   Cardiovascular:      Rate and Rhythm: Normal rate and regular rhythm.      Pulses: Normal pulses.      Heart sounds: Normal heart sounds. No murmur heard.    No friction rub. No gallop.   Pulmonary:      Effort: Pulmonary effort is normal. No respiratory distress.      Breath sounds: Normal breath sounds.   Abdominal:      General: Abdomen is flat. Bowel sounds are normal. There is no distension.      Palpations: Abdomen is soft.      Tenderness: There is no abdominal tenderness.   Musculoskeletal:         General: No swelling. Normal range of motion.      Cervical back: Normal range of motion and neck supple.   Skin:     General: Skin is warm and dry.   Neurological:      General: No focal deficit present.      Mental Status: He is  alert and oriented to person, place, and time.   Psychiatric:         Mood and Affect: Mood normal.         Behavior: Behavior normal.         Thought Content: Thought content normal.         Judgment: Judgment normal.       Significant Labs: All pertinent labs within the past 24 hours have been reviewed.    Significant Imaging: I have reviewed all pertinent imaging results/findings within the past 24 hours.

## 2022-08-31 NOTE — PROGRESS NOTES
@  10:22 PATIENT INJECTED WITH MYOVIEW FOR STRESS IMAGES.    LEXISCAN STRESS    RELEASE NPO STATUS FROM [facility] MEDICINE.

## 2022-09-01 ENCOUNTER — HOSPITAL ENCOUNTER (OUTPATIENT)
Dept: RADIOLOGY | Facility: HOSPITAL | Age: 57
Discharge: HOME | DRG: 247 | End: 2022-09-01
Attending: INTERNAL MEDICINE
Payer: MEDICAID

## 2022-09-01 LAB
ANION GAP SERPL CALC-SCNC: 12 MMOL/L (ref 8–16)
BASOPHILS # BLD AUTO: 0.08 K/UL (ref 0–0.2)
BASOPHILS NFR BLD: 0.8 % (ref 0–1.9)
BUN SERPL-MCNC: 20 MG/DL (ref 6–20)
CALCIUM SERPL-MCNC: 8.9 MG/DL (ref 8.7–10.5)
CHLORIDE SERPL-SCNC: 100 MMOL/L (ref 95–110)
CO2 SERPL-SCNC: 22 MMOL/L (ref 23–29)
CREAT SERPL-MCNC: 1.1 MG/DL (ref 0.5–1.4)
DIFFERENTIAL METHOD: ABNORMAL
EOSINOPHIL # BLD AUTO: 0.3 K/UL (ref 0–0.5)
EOSINOPHIL NFR BLD: 3.4 % (ref 0–8)
ERYTHROCYTE [DISTWIDTH] IN BLOOD BY AUTOMATED COUNT: 12.6 % (ref 11.5–14.5)
EST. GFR  (NO RACE VARIABLE): >60 ML/MIN/1.73 M^2
GLUCOSE SERPL-MCNC: 153 MG/DL (ref 70–110)
GLUCOSE SERPL-MCNC: 199 MG/DL (ref 70–110)
GLUCOSE SERPL-MCNC: 247 MG/DL (ref 70–110)
GLUCOSE SERPL-MCNC: 251 MG/DL (ref 70–110)
GLUCOSE SERPL-MCNC: 346 MG/DL (ref 70–110)
HCT VFR BLD AUTO: 46.9 % (ref 40–54)
HGB BLD-MCNC: 16.2 G/DL (ref 14–18)
IMM GRANULOCYTES # BLD AUTO: 0.06 K/UL (ref 0–0.04)
IMM GRANULOCYTES NFR BLD AUTO: 0.6 % (ref 0–0.5)
LYMPHOCYTES # BLD AUTO: 3.7 K/UL (ref 1–4.8)
LYMPHOCYTES NFR BLD: 38.7 % (ref 18–48)
MCH RBC QN AUTO: 32 PG (ref 27–31)
MCHC RBC AUTO-ENTMCNC: 34.5 G/DL (ref 32–36)
MCV RBC AUTO: 93 FL (ref 82–98)
MONOCYTES # BLD AUTO: 0.6 K/UL (ref 0.3–1)
MONOCYTES NFR BLD: 6.5 % (ref 4–15)
NEUTROPHILS # BLD AUTO: 4.7 K/UL (ref 1.8–7.7)
NEUTROPHILS NFR BLD: 50 % (ref 38–73)
NRBC BLD-RTO: 0 /100 WBC
PLATELET # BLD AUTO: 187 K/UL (ref 150–450)
PMV BLD AUTO: 9.7 FL (ref 9.2–12.9)
POC ACTIVATED CLOTTING TIME K: 248 SEC (ref 74–137)
POTASSIUM SERPL-SCNC: 3.9 MMOL/L (ref 3.5–5.1)
RBC # BLD AUTO: 5.07 M/UL (ref 4.6–6.2)
SAMPLE: ABNORMAL
SODIUM SERPL-SCNC: 134 MMOL/L (ref 136–145)
TROPONIN I SERPL DL<=0.01 NG/ML-MCNC: 0.35 NG/ML
TROPONIN I SERPL DL<=0.01 NG/ML-MCNC: <0.03 NG/ML
WBC # BLD AUTO: 9.49 K/UL (ref 3.9–12.7)

## 2022-09-01 PROCEDURE — 63600175 PHARM REV CODE 636 W HCPCS: Performed by: INTERNAL MEDICINE

## 2022-09-01 PROCEDURE — C1725 CATH, TRANSLUMIN NON-LASER: HCPCS | Performed by: INTERNAL MEDICINE

## 2022-09-01 PROCEDURE — 36415 COLL VENOUS BLD VENIPUNCTURE: CPT | Performed by: INTERNAL MEDICINE

## 2022-09-01 PROCEDURE — 82962 GLUCOSE BLOOD TEST: CPT

## 2022-09-01 PROCEDURE — 80048 BASIC METABOLIC PNL TOTAL CA: CPT | Performed by: INTERNAL MEDICINE

## 2022-09-01 PROCEDURE — 85025 COMPLETE CBC W/AUTO DIFF WBC: CPT | Performed by: INTERNAL MEDICINE

## 2022-09-01 PROCEDURE — 25000003 PHARM REV CODE 250: Performed by: STUDENT IN AN ORGANIZED HEALTH CARE EDUCATION/TRAINING PROGRAM

## 2022-09-01 PROCEDURE — 25500020 PHARM REV CODE 255: Performed by: INTERNAL MEDICINE

## 2022-09-01 PROCEDURE — 84484 ASSAY OF TROPONIN QUANT: CPT | Mod: 91 | Performed by: INTERNAL MEDICINE

## 2022-09-01 PROCEDURE — 92978 ENDOLUMINL IVUS OCT C 1ST: CPT | Mod: LC | Performed by: INTERNAL MEDICINE

## 2022-09-01 PROCEDURE — 93010 ELECTROCARDIOGRAM REPORT: CPT | Mod: ,,, | Performed by: INTERNAL MEDICINE

## 2022-09-01 PROCEDURE — C1887 CATHETER, GUIDING: HCPCS | Performed by: INTERNAL MEDICINE

## 2022-09-01 PROCEDURE — C1760 CLOSURE DEV, VASC: HCPCS | Performed by: INTERNAL MEDICINE

## 2022-09-01 PROCEDURE — 20000000 HC ICU ROOM

## 2022-09-01 PROCEDURE — 25000003 PHARM REV CODE 250: Performed by: INTERNAL MEDICINE

## 2022-09-01 PROCEDURE — C9600 PERC DRUG-EL COR STENT SING: HCPCS | Mod: LC | Performed by: INTERNAL MEDICINE

## 2022-09-01 PROCEDURE — 93459 L HRT ART/GRFT ANGIO: CPT | Mod: XU | Performed by: INTERNAL MEDICINE

## 2022-09-01 PROCEDURE — 99153 MOD SED SAME PHYS/QHP EA: CPT | Performed by: INTERNAL MEDICINE

## 2022-09-01 PROCEDURE — C1874 STENT, COATED/COV W/DEL SYS: HCPCS | Performed by: INTERNAL MEDICINE

## 2022-09-01 PROCEDURE — 25000242 PHARM REV CODE 250 ALT 637 W/ HCPCS: Performed by: INTERNAL MEDICINE

## 2022-09-01 PROCEDURE — 93010 EKG 12-LEAD: ICD-10-PCS | Mod: ,,, | Performed by: INTERNAL MEDICINE

## 2022-09-01 PROCEDURE — 25000242 PHARM REV CODE 250 ALT 637 W/ HCPCS: Performed by: STUDENT IN AN ORGANIZED HEALTH CARE EDUCATION/TRAINING PROGRAM

## 2022-09-01 PROCEDURE — C1894 INTRO/SHEATH, NON-LASER: HCPCS | Performed by: INTERNAL MEDICINE

## 2022-09-01 PROCEDURE — C1753 CATH, INTRAVAS ULTRASOUND: HCPCS | Performed by: INTERNAL MEDICINE

## 2022-09-01 PROCEDURE — 27201423 OPTIME MED/SURG SUP & DEVICES STERILE SUPPLY: Performed by: INTERNAL MEDICINE

## 2022-09-01 PROCEDURE — 93005 ELECTROCARDIOGRAM TRACING: CPT | Performed by: INTERNAL MEDICINE

## 2022-09-01 PROCEDURE — 99152 MOD SED SAME PHYS/QHP 5/>YRS: CPT | Performed by: INTERNAL MEDICINE

## 2022-09-01 PROCEDURE — 25000003 PHARM REV CODE 250

## 2022-09-01 PROCEDURE — C1769 GUIDE WIRE: HCPCS | Performed by: INTERNAL MEDICINE

## 2022-09-01 DEVICE — STENT RONYX30022UX RESOLUTE ONYX 3.00X22
Type: IMPLANTABLE DEVICE | Site: CHEST | Status: FUNCTIONAL
Brand: RESOLUTE ONYX™

## 2022-09-01 DEVICE — IMPLANTABLE DEVICE: Type: IMPLANTABLE DEVICE | Site: CHEST | Status: FUNCTIONAL

## 2022-09-01 RX ORDER — SODIUM CHLORIDE 9 MG/ML
INJECTION, SOLUTION INTRAVENOUS CONTINUOUS
Status: CANCELLED | OUTPATIENT
Start: 2022-09-01 | End: 2022-09-01

## 2022-09-01 RX ORDER — HEPARIN SODIUM 10000 [USP'U]/ML
INJECTION, SOLUTION INTRAVENOUS; SUBCUTANEOUS
Status: DISCONTINUED | OUTPATIENT
Start: 2022-09-01 | End: 2022-09-01 | Stop reason: HOSPADM

## 2022-09-01 RX ORDER — DIPHENHYDRAMINE HCL 25 MG
50 CAPSULE ORAL ONCE
Status: CANCELLED | OUTPATIENT
Start: 2022-09-01 | End: 2022-09-01

## 2022-09-01 RX ORDER — NITROGLYCERIN 20 MG/100ML
INJECTION INTRAVENOUS
Status: DISCONTINUED | OUTPATIENT
Start: 2022-09-01 | End: 2022-09-01

## 2022-09-01 RX ORDER — IODIXANOL 320 MG/ML
INJECTION, SOLUTION INTRAVASCULAR
Status: DISCONTINUED | OUTPATIENT
Start: 2022-09-01 | End: 2022-09-01 | Stop reason: HOSPADM

## 2022-09-01 RX ORDER — LIDOCAINE HYDROCHLORIDE 10 MG/ML
INJECTION, SOLUTION EPIDURAL; INFILTRATION; INTRACAUDAL; PERINEURAL
Status: DISCONTINUED | OUTPATIENT
Start: 2022-09-01 | End: 2022-09-01 | Stop reason: HOSPADM

## 2022-09-01 RX ORDER — SODIUM CHLORIDE 9 MG/ML
75 INJECTION, SOLUTION INTRAVENOUS CONTINUOUS
Status: ACTIVE | OUTPATIENT
Start: 2022-09-01 | End: 2022-09-01

## 2022-09-01 RX ORDER — FENTANYL CITRATE 50 UG/ML
INJECTION, SOLUTION INTRAMUSCULAR; INTRAVENOUS
Status: DISCONTINUED | OUTPATIENT
Start: 2022-09-01 | End: 2022-09-01 | Stop reason: HOSPADM

## 2022-09-01 RX ORDER — MIDAZOLAM HYDROCHLORIDE 1 MG/ML
INJECTION INTRAMUSCULAR; INTRAVENOUS
Status: DISCONTINUED | OUTPATIENT
Start: 2022-09-01 | End: 2022-09-01 | Stop reason: HOSPADM

## 2022-09-01 RX ORDER — MUPIROCIN 20 MG/G
OINTMENT TOPICAL 2 TIMES DAILY
Status: DISCONTINUED | OUTPATIENT
Start: 2022-09-01 | End: 2022-09-02 | Stop reason: HOSPADM

## 2022-09-01 RX ORDER — CHLORHEXIDINE GLUCONATE ORAL RINSE 1.2 MG/ML
15 SOLUTION DENTAL 2 TIMES DAILY
Status: DISCONTINUED | OUTPATIENT
Start: 2022-09-01 | End: 2022-09-02 | Stop reason: HOSPADM

## 2022-09-01 RX ADMIN — METOPROLOL SUCCINATE 100 MG: 50 TABLET, FILM COATED, EXTENDED RELEASE ORAL at 12:09

## 2022-09-01 RX ADMIN — INSULIN ASPART 4 UNITS: 100 INJECTION, SOLUTION INTRAVENOUS; SUBCUTANEOUS at 08:09

## 2022-09-01 RX ADMIN — INSULIN ASPART 2 UNITS: 100 INJECTION, SOLUTION INTRAVENOUS; SUBCUTANEOUS at 12:09

## 2022-09-01 RX ADMIN — CLONAZEPAM 1 MG: 0.5 TABLET ORAL at 12:09

## 2022-09-01 RX ADMIN — GABAPENTIN 600 MG: 300 CAPSULE ORAL at 12:09

## 2022-09-01 RX ADMIN — SODIUM CHLORIDE 75 ML/HR: 0.9 INJECTION, SOLUTION INTRAVENOUS at 11:09

## 2022-09-01 RX ADMIN — CYCLOBENZAPRINE HYDROCHLORIDE 10 MG: 5 TABLET, FILM COATED ORAL at 12:09

## 2022-09-01 RX ADMIN — ATORVASTATIN CALCIUM 40 MG: 40 TABLET, FILM COATED ORAL at 08:09

## 2022-09-01 RX ADMIN — NITROGLYCERIN 0.4 MG: 0.4 TABLET SUBLINGUAL at 01:09

## 2022-09-01 RX ADMIN — LISINOPRIL 10 MG: 10 TABLET ORAL at 12:09

## 2022-09-01 RX ADMIN — CHLORHEXIDINE GLUCONATE 15 ML: 1.2 RINSE ORAL at 09:09

## 2022-09-01 RX ADMIN — OXYCODONE HYDROCHLORIDE AND ACETAMINOPHEN 1 TABLET: 10; 325 TABLET ORAL at 03:09

## 2022-09-01 RX ADMIN — MUPIROCIN 1 G: 20 OINTMENT TOPICAL at 08:09

## 2022-09-01 RX ADMIN — OXYCODONE HYDROCHLORIDE AND ACETAMINOPHEN 1 TABLET: 10; 325 TABLET ORAL at 08:09

## 2022-09-01 RX ADMIN — INSULIN DETEMIR 40 UNITS: 100 INJECTION, SOLUTION SUBCUTANEOUS at 08:09

## 2022-09-01 RX ADMIN — ASPIRIN 81 MG CHEWABLE TABLET 81 MG: 81 TABLET CHEWABLE at 09:09

## 2022-09-01 RX ADMIN — CLONAZEPAM 1 MG: 0.5 TABLET ORAL at 08:09

## 2022-09-01 RX ADMIN — METOPROLOL SUCCINATE 100 MG: 50 TABLET, FILM COATED, EXTENDED RELEASE ORAL at 08:09

## 2022-09-01 RX ADMIN — LISINOPRIL 10 MG: 10 TABLET ORAL at 08:09

## 2022-09-01 RX ADMIN — GABAPENTIN 600 MG: 300 CAPSULE ORAL at 08:09

## 2022-09-01 NOTE — NURSING
Patient complained of chest pain 5/10, one sublingual nitro given, pressure 105/73, pulse 85 , map 86, o2 95%, pain now 2/10, nasal cannula applied at 2L. Patient resting.

## 2022-09-01 NOTE — PROGRESS NOTES
@  07:00 PATIENT INJECTED WITH MYOVIEW  IV FOR RESTING IMAGES.  G.I.        @  07:30 OBTAINED RESTING IMAGES.      @  08:00  NM MYOPERFUSION 2-DAY PROTOCOL COMPLETED.    REMAIN NPO STATUS--AS ORDERED BY PHYSICIAN

## 2022-09-01 NOTE — PROGRESS NOTES
[facility]    Cardiology Progress Note    Subjective:  Patient seen and examined this AM.  Yesterday evening patient had an episode of mid sternal 5/20 chest pain. EKG was obtained and there were slight ST changes  in the anterior leads but no significant ST elevation. Troponins remain negative. Earlier this morning he had another episode of midsternal chest pain. Stress test results this morning show large reversible defect in the anterolateral wall suspicious for ischemia.   Vitals reviewed.  Labs reviewed.  Tele reviewed. SR.       Objective:  Vital Signs (Most Recent)  Temp: 98.3 °F (36.8 °C) (09/01/22 0300)  Pulse: 68 (09/01/22 0300)  Resp: 18 (09/01/22 0300)  BP: 106/60 (09/01/22 0300)  SpO2: 97 % (09/01/22 0300)    Vital Signs Range (Last 24H):  Temp:  [98.3 °F (36.8 °C)-98.8 °F (37.1 °C)]   Pulse:  [68-73]   Resp:  [18]   BP: (105-131)/(60-83)   SpO2:  [95 %-97 %]     I & O (Last 24H):    Intake/Output Summary (Last 24 hours) at 9/1/2022 0858  Last data filed at 8/31/2022 1300  Gross per 24 hour   Intake 120 ml   Output --   Net 120 ml       Current Diet:     Current Diet Order   Procedures    Diet diabetic [facility]; 1800 Calorie; Cardiac (Low Na/Chol); Standard Tray     Order Specific Question:   Indicate patient location for additional diet options:     Answer:   [facility]     Order Specific Question:   Total calories:     Answer:   1800 Calorie     Order Specific Question:   Additional Diet Options:     Answer:   Cardiac (Low Na/Chol)     Order Specific Question:   Tray type:     Answer:   Standard Tray        Allergies:  Review of patient's allergies indicates:   Allergen Reactions    Pesticide Dermatitis and Rash       Meds:  Scheduled Meds:   aspirin  324 mg Oral Once    aspirin  81 mg Oral Daily    atorvastatin  40 mg Oral QHS    clonazePAM  1 mg Oral BID    cyclobenzaprine  10 mg Oral Daily    enoxaparin  40 mg Subcutaneous Daily    gabapentin  600 mg Oral BID    insulin detemir U-100  40 Units  Subcutaneous BID    lisinopriL  10 mg Oral BID    metoprolol succinate  100 mg Oral BID     Continuous Infusions:  PRN Meds:acetaminophen, dextrose 50%, dextrose 50%, glucagon (human recombinant), glucose, glucose, insulin aspart U-100, melatonin, methocarbamoL, morphine, morphine, nitroGLYCERIN, oxyCODONE-acetaminophen    Lab Results :  Recent Results (from the past 24 hour(s))   Nuclear Stress Test    Collection Time: 08/31/22 10:43 AM   Result Value Ref Range    85% Max Predicted      Max Predicted      [facility] CV CPX PATIENT IS MALE 1.0     [facility] CV CPX PATIENT IS FEMALE 0.0     HR at rest 78 bpm    Systolic blood pressure 106 mmHg    Diastolic blood pressure 83 mmHg    RPP 8,268     Peak .0 bpm    Peak Systolic  mmHg    Peak Diatolic BP 86 mmHg    Peak RPP 15,996     % Max HR Achieved 75     1 Minute Recovery  bpm    dose 0.4 mg   POCT glucose    Collection Time: 08/31/22 10:45 AM   Result Value Ref Range    POC Glucose 196 (H) 70 - 110   Troponin I    Collection Time: 08/31/22 12:47 PM   Result Value Ref Range    Troponin I <0.030 <=0.040 ng/mL   POCT glucose    Collection Time: 08/31/22  4:12 PM   Result Value Ref Range    POC Glucose 264 (H) 70 - 110   Troponin I    Collection Time: 08/31/22  7:40 PM   Result Value Ref Range    Troponin I <0.030 <=0.040 ng/mL   POCT glucose    Collection Time: 08/31/22  9:11 PM   Result Value Ref Range    POC Glucose 429 (H) 70 - 110   CBC auto differential    Collection Time: 09/01/22  4:19 AM   Result Value Ref Range    WBC 9.49 3.90 - 12.70 K/uL    RBC 5.07 4.60 - 6.20 M/uL    Hemoglobin 16.2 14.0 - 18.0 g/dL    Hematocrit 46.9 40.0 - 54.0 %    MCV 93 82 - 98 fL    MCH 32.0 (H) 27.0 - 31.0 pg    MCHC 34.5 32.0 - 36.0 g/dL    RDW 12.6 11.5 - 14.5 %    Platelets 187 150 - 450 K/uL    MPV 9.7 9.2 - 12.9 fL    Immature Granulocytes 0.6 (H) 0.0 - 0.5 %    Gran # (ANC) 4.7 1.8 - 7.7 K/uL    Immature Grans (Abs) 0.06 (H) 0.00 - 0.04 K/uL    Lymph # 3.7  1.0 - 4.8 K/uL    Mono # 0.6 0.3 - 1.0 K/uL    Eos # 0.3 0.0 - 0.5 K/uL    Baso # 0.08 0.00 - 0.20 K/uL    nRBC 0 0 /100 WBC    Gran % 50.0 38.0 - 73.0 %    Lymph % 38.7 18.0 - 48.0 %    Mono % 6.5 4.0 - 15.0 %    Eosinophil % 3.4 0.0 - 8.0 %    Basophil % 0.8 0.0 - 1.9 %    Differential Method Automated    Basic metabolic panel    Collection Time: 09/01/22  4:19 AM   Result Value Ref Range    Sodium 134 (L) 136 - 145 mmol/L    Potassium 3.9 3.5 - 5.1 mmol/L    Chloride 100 95 - 110 mmol/L    CO2 22 (L) 23 - 29 mmol/L    Glucose 251 (H) 70 - 110 mg/dL    BUN 20 6 - 20 mg/dL    Creatinine 1.1 0.5 - 1.4 mg/dL    Calcium 8.9 8.7 - 10.5 mg/dL    Anion Gap 12 8 - 16 mmol/L    eGFR >60.0 >60 mL/min/1.73 m^2   POCT glucose    Collection Time: 09/01/22  5:26 AM   Result Value Ref Range    POC Glucose 247 (H) 70 - 110       Diagnostic Results:  Imaging Results              US Abdomen Limited (Gallbladder) (Final result)  Result time 08/30/22 23:23:58      Final result by Sadi Garcia MD (08/30/22 23:23:58)                   Narrative:    EXAM: Right upper quadrant abdominal ultrasound.  INDICATION: Abdominal pain, history of abnormal gallbladder ultrasound  COMPARISON: None available.    FINDINGS:  Pancreas: Pancreas is not well-visualized on this exam.    Aorta: No aorta is not well-visualized on this exam.    Liver: The liver is diffusely echogenic with poor visualization of portal triads. Liver is enlarged measuring approximately 21.1 cm in craniocaudal dimension. No visualized focal hepatic mass.    Gallbladder: The gallbladder is contracted limiting evaluation. No gallbladder wall thickening or other abnormality. The sonographer reports a negative Bradley sign.    Biliary: No intrahepatic biliary ductal dilatation. The common duct is nondilated, measuring 3 mm at the tamela hepatis.    Right kidney: The right kidney measures 12.7 cm without hydronephrosis or shadowing calculi.    IMPRESSION:    Hepatic steatosis  "and hepatomegaly.    The gallbladder is contracted limiting evaluation, however no abnormality is identified.    Electronically signed by:  Sadi Garcia MD  8/30/2022 11:23 PM CDT Workstation: [id number]                                     X-Ray Chest AP Portable (Final result)  Result time 08/31/22 07:48:01      Final result by Hermes Borrego MD (08/31/22 07:48:01)                   Narrative:    Reason: Chest Pain    FINDINGS:    Portable chest radiograph with comparison chest x-ray January 9, 2022 show normal cardiomediastinal silhouette.  Lungs are clear. Pulmonary vasculature is normal. No acute osseous abnormality.    IMPRESSION:    No acute cardiopulmonary abnormality.    Electronically signed by:  Hermes Borrego DO  8/31/2022 7:48 AM CDT Workstation: [id number]                                    Recent Cardiac Rhythm   (if applicable)      Physical Exam:  Objective:  General Appearance:  Comfortable, well-appearing and in no acute distress.    Vital signs: (most recent): Blood pressure 106/60, pulse 68, temperature 98.3 °F (36.8 °C), temperature source Oral, resp. rate 18, height 5' 11" (1.803 m), weight 123.6 kg (272 lb 9.6 oz), SpO2 97 %.  Vital signs are normal.    Output: Producing urine.    Lungs:  Normal effort and normal respiratory rate.  Breath sounds clear to auscultation.  He is not in respiratory distress.    Heart: Normal rate.  Regular rhythm.  No murmur.   Extremities: There is no dependent edema.    Neurological: Patient is alert and oriented to person, place and time.    Skin:  Warm and dry.  No rash.     Current Consults:  IP CONSULT TO HOSPITAL MEDICINE  IP CONSULT TO CARDIOLOGY    Assessment/Plan:  Assessment:   Angina - stress test was abnormal today and patient continues to experience chest pain  CAD s/p CABG x2 2020 s/p LAD and CX stents  HTN  Paroxysmal Atrial Fibrillation - SR, on eliquis and toprol   DM  HLP      Plan:    Plan for left heart cath today  "

## 2022-09-01 NOTE — NURSING
Pt verbalized midsternal chest pain rated 5/10 sharp pressure non-radiating at 1630. EKG obtained 1839, morphine administered at 1646, Dr. Walters notified via telephone at 1654. MD verbal order to trend troponin's, monitor EKG changes, pt currently denies further chest pain at this time, appears relaxed, safety maintained, pt instructed to notify staff if chest pain /discomfort re-occurs.

## 2022-09-01 NOTE — PROGRESS NOTES
"[facility] Medicine Progress Note  Patient Name: Magdaleno Cunningham MRN: [id number]   Patient Class: OP- Observation  Length of Stay: 0   Admission Date: 8/30/2022  7:22 PM Attending Physician: Sherif Villalobos MD   Primary Care Provider: Venkata Mclaughlin MD Face-to-Face encounter date: 09/01/2022   Chief Complaint: Chest Pain (CP x1 month, taking 2 Nitros per day. Became SOB 2 days ago. Left sided CP, non-radiating, Nitro makes the pain better. Not doing anything exerting. Took 81mg ASA today. Uses CPAP at night. )      Subjective:    Interval History   Patient is doing well after [facility]  Denies chest pain, shortness of breath, palpitations, abdominal pain, nausea/vomiting.   No concerns/issues overnight reported by the patient or the nursing staff.  Reviewed the labs and discussed the plan of care.   No family present at bedside.     Review of Systems   All other Review of Systems were found to be negative expect for that mentioned already in HPI.     Objective:   Physical Exam  /70   Pulse (!) 58   Temp 98.6 °F (37 °C) (Oral)   Resp 17   Ht 5' 11" (1.803 m)   Wt 123.6 kg (272 lb 9.6 oz)   SpO2 (!) 94%   BMI 38.02 kg/m²   Constitutional: No distress.   HENT: Atraumatic.   Cardiovascular: Normal rate, regular rhythm and normal heart sounds.   Pulmonary/Chest: Effort normal. Clear to auscultation bilaterally. No wheezes.   Abdominal: Soft. Bowel sounds are normal. Exhibits no distension and no mass. No tenderness  Neurological: Alert.   Skin: Skin is warm and dry.     Labs and Imaging    Significant Labs: All pertinent labs within the past 24 hours have been reviewed.    Significant Imaging: I have reviewed all pertinent imaging results/findings within the past 24 hours.    I have reviewed the Vitals, labs and imaging as above.     Assessment & Plan:   Magdaleno Cunningham is a 56 y.o. male admitted for    Coronary artery disease s/p [facility] - Successful stenting of the midportion of " the circumflex system with ballooning of the proximal and distal portion  DM type II  Essential hypertension  Atrial fibrillation  Obesity  COPD  Tobacco abuse    Plan  Cardiology following  Continue monitoring in ICU  Aspirin, statin, ticagrelor   Possible discharge in 24-48 hours    Core measures:  - Code status: full code  - Diet: Cardiac  VTE Risk Mitigation (From admission, onward)           Ordered     enoxaparin injection 40 mg  Daily         08/31/22 0510     IP VTE HIGH RISK PATIENT  Once         08/31/22 0510     Place sequential compression device  Until discontinued         08/31/22 0510                    Discharge Planning:   Discharge Planning   PEDRO: 09/03    Code Status: Full Code   Is the patient medically ready for discharge?: No    Reason for patient still in hospital (select all that apply): Patient trending condition  Discharge Plan A: Home with assistance from family    Above encounter included review of the medical records, interviewing and examining the patient face-to-face, discussion with family and other health care providers, ordering and interpreting lab/test results and formulating a plan of care.     Medical Decision Making:  [] Low Complexity  [] Moderate Complexity  [x] High Complexity    Sherif Villalobos MD  [facility] Hospitalist  09/01/2022

## 2022-09-02 VITALS
BODY MASS INDEX: 38.17 KG/M2 | OXYGEN SATURATION: 96 % | HEIGHT: 71 IN | SYSTOLIC BLOOD PRESSURE: 121 MMHG | HEART RATE: 58 BPM | DIASTOLIC BLOOD PRESSURE: 66 MMHG | TEMPERATURE: 98 F | WEIGHT: 272.63 LBS | RESPIRATION RATE: 16 BRPM

## 2022-09-02 PROBLEM — R07.9 CHEST PAIN: Status: ACTIVE | Noted: 2022-09-02

## 2022-09-02 LAB
ANION GAP SERPL CALC-SCNC: 8 MMOL/L (ref 8–16)
BASOPHILS # BLD AUTO: 0.05 K/UL (ref 0–0.2)
BASOPHILS NFR BLD: 0.6 % (ref 0–1.9)
BUN SERPL-MCNC: 15 MG/DL (ref 6–20)
CALCIUM SERPL-MCNC: 8.5 MG/DL (ref 8.7–10.5)
CHLORIDE SERPL-SCNC: 103 MMOL/L (ref 95–110)
CO2 SERPL-SCNC: 24 MMOL/L (ref 23–29)
CREAT SERPL-MCNC: 1 MG/DL (ref 0.5–1.4)
DIFFERENTIAL METHOD: ABNORMAL
EOSINOPHIL # BLD AUTO: 0.3 K/UL (ref 0–0.5)
EOSINOPHIL NFR BLD: 3.7 % (ref 0–8)
ERYTHROCYTE [DISTWIDTH] IN BLOOD BY AUTOMATED COUNT: 12.4 % (ref 11.5–14.5)
EST. GFR  (NO RACE VARIABLE): >60 ML/MIN/1.73 M^2
GLUCOSE SERPL-MCNC: 213 MG/DL (ref 70–110)
GLUCOSE SERPL-MCNC: 244 MG/DL (ref 70–110)
GLUCOSE SERPL-MCNC: 359 MG/DL (ref 70–110)
HCT VFR BLD AUTO: 47.5 % (ref 40–54)
HGB BLD-MCNC: 16.3 G/DL (ref 14–18)
IMM GRANULOCYTES # BLD AUTO: 0.05 K/UL (ref 0–0.04)
IMM GRANULOCYTES NFR BLD AUTO: 0.6 % (ref 0–0.5)
LYMPHOCYTES # BLD AUTO: 3.4 K/UL (ref 1–4.8)
LYMPHOCYTES NFR BLD: 42.3 % (ref 18–48)
MAGNESIUM SERPL-MCNC: 1.9 MG/DL (ref 1.6–2.6)
MCH RBC QN AUTO: 31.7 PG (ref 27–31)
MCHC RBC AUTO-ENTMCNC: 34.3 G/DL (ref 32–36)
MCV RBC AUTO: 92 FL (ref 82–98)
MONOCYTES # BLD AUTO: 0.5 K/UL (ref 0.3–1)
MONOCYTES NFR BLD: 5.7 % (ref 4–15)
NEUTROPHILS # BLD AUTO: 3.8 K/UL (ref 1.8–7.7)
NEUTROPHILS NFR BLD: 47.1 % (ref 38–73)
NRBC BLD-RTO: 0 /100 WBC
PLATELET # BLD AUTO: 198 K/UL (ref 150–450)
PMV BLD AUTO: 9.6 FL (ref 9.2–12.9)
POTASSIUM SERPL-SCNC: 4.1 MMOL/L (ref 3.5–5.1)
RBC # BLD AUTO: 5.15 M/UL (ref 4.6–6.2)
SODIUM SERPL-SCNC: 135 MMOL/L (ref 136–145)
WBC # BLD AUTO: 8.06 K/UL (ref 3.9–12.7)

## 2022-09-02 PROCEDURE — 36415 COLL VENOUS BLD VENIPUNCTURE: CPT | Performed by: INTERNAL MEDICINE

## 2022-09-02 PROCEDURE — 25000003 PHARM REV CODE 250

## 2022-09-02 PROCEDURE — 80048 BASIC METABOLIC PNL TOTAL CA: CPT | Performed by: INTERNAL MEDICINE

## 2022-09-02 PROCEDURE — 25000003 PHARM REV CODE 250: Performed by: INTERNAL MEDICINE

## 2022-09-02 PROCEDURE — 83735 ASSAY OF MAGNESIUM: CPT | Performed by: INTERNAL MEDICINE

## 2022-09-02 PROCEDURE — 85025 COMPLETE CBC W/AUTO DIFF WBC: CPT | Performed by: INTERNAL MEDICINE

## 2022-09-02 RX ORDER — TAMSULOSIN HYDROCHLORIDE 0.4 MG/1
0.4 CAPSULE ORAL NIGHTLY
Qty: 30 CAPSULE | Refills: 0 | Status: SHIPPED | OUTPATIENT
Start: 2022-09-02 | End: 2023-09-08

## 2022-09-02 RX ADMIN — CHLORHEXIDINE GLUCONATE 15 ML: 1.2 RINSE ORAL at 09:09

## 2022-09-02 RX ADMIN — ASPIRIN 81 MG CHEWABLE TABLET 81 MG: 81 TABLET CHEWABLE at 09:09

## 2022-09-02 RX ADMIN — METOPROLOL SUCCINATE 100 MG: 50 TABLET, FILM COATED, EXTENDED RELEASE ORAL at 09:09

## 2022-09-02 RX ADMIN — OXYCODONE HYDROCHLORIDE AND ACETAMINOPHEN 1 TABLET: 10; 325 TABLET ORAL at 02:09

## 2022-09-02 RX ADMIN — INSULIN ASPART 4 UNITS: 100 INJECTION, SOLUTION INTRAVENOUS; SUBCUTANEOUS at 12:09

## 2022-09-02 RX ADMIN — CLONAZEPAM 1 MG: 0.5 TABLET ORAL at 09:09

## 2022-09-02 RX ADMIN — TICAGRELOR 90 MG: 90 TABLET ORAL at 09:09

## 2022-09-02 RX ADMIN — LISINOPRIL 10 MG: 10 TABLET ORAL at 09:09

## 2022-09-02 RX ADMIN — GABAPENTIN 600 MG: 300 CAPSULE ORAL at 09:09

## 2022-09-02 RX ADMIN — INSULIN DETEMIR 40 UNITS: 100 INJECTION, SOLUTION SUBCUTANEOUS at 09:09

## 2022-09-02 RX ADMIN — INSULIN ASPART 10 UNITS: 100 INJECTION, SOLUTION INTRAVENOUS; SUBCUTANEOUS at 09:09

## 2022-09-02 NOTE — PROGRESS NOTES
[facility]    Cardiology Progress Note    Subjective:  Patient seen and examined this AM.  Yesterday evening patient had an episode of mid sternal 5/20 chest pain. EKG was obtained and there were slight ST changes  in the anterior leads but no significant ST elevation. Troponins remain negative. Earlier this morning he had another episode of midsternal chest pain. Stress test results this morning show large reversible defect in the anterolateral wall suspicious for ischemia.   Vitals reviewed.  Labs reviewed.  Tele reviewed. SR.     9/2/2022 mild trop bump expected post [facility]  Denies chest pain, pressure, SOB  Uneventful night  Labs reviewed H/H stable creat 1  Blood pressure controlled      Objective:  Vital Signs (Most Recent)  Temp: 98 °F (36.7 °C) (09/02/22 0701)  Pulse: 62 (09/02/22 0900)  Resp: 16 (09/02/22 0900)  BP: 113/75 (09/02/22 0900)  SpO2: 97 % (09/02/22 0900)    Vital Signs Range (Last 24H):  Temp:  [97.9 °F (36.6 °C)-98.6 °F (37 °C)]   Pulse:  [56-78]   Resp:  [11-28]   BP: ()/()   SpO2:  [86 %-99 %]     I & O (Last 24H):    Intake/Output Summary (Last 24 hours) at 9/2/2022 1000  Last data filed at 9/2/2022 0952  Gross per 24 hour   Intake 1887.22 ml   Output 3325 ml   Net -1437.78 ml         Current Diet:     Current Diet Order   Procedures    Diet diabetic [facility]; 1800 Calorie; Cardiac (Low Na/Chol); Standard Tray     Order Specific Question:   Indicate patient location for additional diet options:     Answer:   [facility]     Order Specific Question:   Total calories:     Answer:   1800 Calorie     Order Specific Question:   Additional Diet Options:     Answer:   Cardiac (Low Na/Chol)     Order Specific Question:   Tray type:     Answer:   Standard Tray        Allergies:  Review of patient's allergies indicates:   Allergen Reactions    Pesticide Dermatitis and Rash       Meds:  Scheduled Meds:   aspirin  324 mg Oral Once    aspirin  81 mg Oral Daily    atorvastatin  40 mg Oral QHS     chlorhexidine  15 mL Mouth/Throat BID    clonazePAM  1 mg Oral BID    cyclobenzaprine  10 mg Oral Daily    gabapentin  600 mg Oral BID    insulin detemir U-100  40 Units Subcutaneous BID    lisinopriL  10 mg Oral BID    metoprolol succinate  100 mg Oral BID    mupirocin   Nasal BID    ticagrelor  90 mg Oral BID     Continuous Infusions:  PRN Meds:acetaminophen, dextrose 50%, dextrose 50%, glucagon (human recombinant), glucose, glucose, insulin aspart U-100, melatonin, methocarbamoL, morphine, morphine, nitroGLYCERIN, oxyCODONE-acetaminophen    Lab Results :  Recent Results (from the past 24 hour(s))   ISTAT ACT-K    Collection Time: 09/01/22 10:09 AM   Result Value Ref Range    POC ACTIVATED CLOTTING TIME K 248 (H) 74 - 137 sec    Sample ARTERIAL    Troponin I    Collection Time: 09/01/22 11:20 AM   Result Value Ref Range    Troponin I <0.030 <=0.040 ng/mL   POCT glucose    Collection Time: 09/01/22 12:39 PM   Result Value Ref Range    POC Glucose 199 (H) 70 - 110   POCT glucose    Collection Time: 09/01/22  5:04 PM   Result Value Ref Range    POC Glucose 153 (H) 70 - 110   Troponin I    Collection Time: 09/01/22  6:04 PM   Result Value Ref Range    Troponin I 0.350 (H) <=0.040 ng/mL   POCT glucose    Collection Time: 09/01/22  8:16 PM   Result Value Ref Range    POC Glucose 346 (H) 70 - 110   CBC auto differential    Collection Time: 09/02/22  4:48 AM   Result Value Ref Range    WBC 8.06 3.90 - 12.70 K/uL    RBC 5.15 4.60 - 6.20 M/uL    Hemoglobin 16.3 14.0 - 18.0 g/dL    Hematocrit 47.5 40.0 - 54.0 %    MCV 92 82 - 98 fL    MCH 31.7 (H) 27.0 - 31.0 pg    MCHC 34.3 32.0 - 36.0 g/dL    RDW 12.4 11.5 - 14.5 %    Platelets 198 150 - 450 K/uL    MPV 9.6 9.2 - 12.9 fL    Immature Granulocytes 0.6 (H) 0.0 - 0.5 %    Gran # (ANC) 3.8 1.8 - 7.7 K/uL    Immature Grans (Abs) 0.05 (H) 0.00 - 0.04 K/uL    Lymph # 3.4 1.0 - 4.8 K/uL    Mono # 0.5 0.3 - 1.0 K/uL    Eos # 0.3 0.0 - 0.5 K/uL    Baso # 0.05 0.00 - 0.20 K/uL    nRBC  0 0 /100 WBC    Gran % 47.1 38.0 - 73.0 %    Lymph % 42.3 18.0 - 48.0 %    Mono % 5.7 4.0 - 15.0 %    Eosinophil % 3.7 0.0 - 8.0 %    Basophil % 0.6 0.0 - 1.9 %    Differential Method Automated    Basic metabolic panel    Collection Time: 09/02/22  4:48 AM   Result Value Ref Range    Sodium 135 (L) 136 - 145 mmol/L    Potassium 4.1 3.5 - 5.1 mmol/L    Chloride 103 95 - 110 mmol/L    CO2 24 23 - 29 mmol/L    Glucose 213 (H) 70 - 110 mg/dL    BUN 15 6 - 20 mg/dL    Creatinine 1.0 0.5 - 1.4 mg/dL    Calcium 8.5 (L) 8.7 - 10.5 mg/dL    Anion Gap 8 8 - 16 mmol/L    eGFR >60.0 >60 mL/min/1.73 m^2   Magnesium    Collection Time: 09/02/22  4:48 AM   Result Value Ref Range    Magnesium 1.9 1.6 - 2.6 mg/dL   POCT glucose    Collection Time: 09/02/22  9:37 AM   Result Value Ref Range    POC Glucose 359 (H) 70 - 110       Diagnostic Results:  Imaging Results              US Abdomen Limited (Gallbladder) (Final result)  Result time 08/30/22 23:23:58      Final result by Sadi Garcia MD (08/30/22 23:23:58)                   Narrative:    EXAM: Right upper quadrant abdominal ultrasound.  INDICATION: Abdominal pain, history of abnormal gallbladder ultrasound  COMPARISON: None available.    FINDINGS:  Pancreas: Pancreas is not well-visualized on this exam.    Aorta: No aorta is not well-visualized on this exam.    Liver: The liver is diffusely echogenic with poor visualization of portal triads. Liver is enlarged measuring approximately 21.1 cm in craniocaudal dimension. No visualized focal hepatic mass.    Gallbladder: The gallbladder is contracted limiting evaluation. No gallbladder wall thickening or other abnormality. The sonographer reports a negative Bradley sign.    Biliary: No intrahepatic biliary ductal dilatation. The common duct is nondilated, measuring 3 mm at the tamela hepatis.    Right kidney: The right kidney measures 12.7 cm without hydronephrosis or shadowing calculi.    IMPRESSION:    Hepatic steatosis and  "hepatomegaly.    The gallbladder is contracted limiting evaluation, however no abnormality is identified.    Electronically signed by:  Sadi Garcia MD  8/30/2022 11:23 PM CDT Workstation: [id number]                                     X-Ray Chest AP Portable (Final result)  Result time 08/31/22 07:48:01      Final result by Hermes Borrego MD (08/31/22 07:48:01)                   Narrative:    Reason: Chest Pain    FINDINGS:    Portable chest radiograph with comparison chest x-ray January 9, 2022 show normal cardiomediastinal silhouette.  Lungs are clear. Pulmonary vasculature is normal. No acute osseous abnormality.    IMPRESSION:    No acute cardiopulmonary abnormality.    Electronically signed by:  Hermes Borrego DO  8/31/2022 7:48 AM CDT Workstation: [id number]                                    Recent Cardiac Rhythm   (if applicable)      Physical Exam:  Objective:  General Appearance:  Comfortable, well-appearing and in no acute distress.    Vital signs: (most recent): Blood pressure 113/75, pulse 62, temperature 98 °F (36.7 °C), temperature source Oral, resp. rate 16, height 5' 11" (1.803 m), weight 123.6 kg (272 lb 9.6 oz), SpO2 97 %.  Vital signs are normal.    Output: Producing urine.    Lungs:  Normal effort and normal respiratory rate.  Breath sounds clear to auscultation.  He is not in respiratory distress.    Heart: Normal rate.  Regular rhythm.  No murmur.   Extremities: There is no dependent edema.    Neurological: Patient is alert and oriented to person, place and time.    Skin:  Warm and dry.  No rash.     Current Consults:  IP CONSULT TO HOSPITAL MEDICINE  IP CONSULT TO CARDIOLOGY  IP CONSULT TO REGISTERED DIETITIAN/NUTRITIONIST    Assessment/Plan:  Assessment:   Angina - stress test was abnormal today and patient continues to experience chest pain  CAD s/p CABG x2 2020 s/p LAD and CX stents  HTN  Paroxysmal Atrial Fibrillation - SR, on eliquis and toprol   DM  HLP      Plan:    [facility] done " yesterday with Dr Botello with intervention  The estimated blood loss was none.    50% mid LAD stenosis    Proximal mid and distal areas of in stent stenosis of the circumflex with a 95% in stent stenosis of the midportion    Successful stenting of the midportion of the circumflex system with ballooning of the proximal and distal portion.    Nonfunctioning SVG to the OM    Functioning LIMA to the LAD which is a very small graft to the LAD    Of note there was a distal in stent stenosis that still had significant residual that was not amenable at the time to stenting.  This can be addressed at a later time    This was an extremely difficult case with difficulty in passing balloons and stents     Mild trop bump post LHC  CV  stable ok to discharge from CV standpoint  ASA, Brilinta, Statin, ACEi, BB  Followup with Dr botello next week

## 2022-09-02 NOTE — PLAN OF CARE
09/02/22 1142   Final Note   Assessment Type Final Discharge Note   Anticipated Discharge Disposition Home   What phone number can be called within the next 1-3 days to see how you are doing after discharge? [phone]   Hospital Resources/Appts/Education Provided Appointments scheduled and added to AVS   Post-Acute Status   Coverage [place] Medicaid   Discharge Delays None known at this time     Pt is discharging home and has no identified CM needs. Pt is clear to discharge from a CM standpoint.

## 2022-09-02 NOTE — DISCHARGE SUMMARY
[facility]  Discharge Summary  Patient Name: Magdaleno Cunningham MRN: [id number]   Patient Class: IP- Inpatient  Length of Stay: 1   Admission Date: 8/30/2022  7:22 PM Attending Physician: Sherif Villalobos MD   Primary Care Provider: Venkata Mclaughlin MD Face-to-Face encounter date: 09/02/2022   Chief Complaint: Chest Pain   Date of Discharge: 9/2/2022  Discharge Disposition:Home or Self Care  Home or Self Care  Condition: Stable       Reason for Hospitalization   Angina  CAD s/p CABG x2 2020 s/p LAD and CX stents  HTN  Paroxysmal Atrial Fibrillation - SR, on eliquis and toprol   DM  HLP    Patient Active Problem List   Diagnosis    Atherosclerosis of native coronary artery of native heart with angina pectoris    COPD (chronic obstructive pulmonary disease)    Type 2 diabetes mellitus with diabetic arthropathy    Hypertension associated with diabetes    ROSAMARIA (obstructive sleep apnea)    Diabetic ulcer of left foot associated with type 2 diabetes mellitus    Smoker 1-2 packs per day since 14 years old    Medical non-compliance    Left foot pain    Generalized anxiety disorder    Denton-Perez erythema multiforme exudativum    Allergic reaction to chemical substance    Prostate enlargement    Bladder mass    Coronary artery disease of native artery of native heart with stable angina pectoris    Atrial fibrillation    S/P CABG (coronary artery bypass graft)    Other chest pain    Protruding sternal wires    Ingrowing toenail    Onychomycosis    Polyneuropathy    Obesity (BMI 30-39.9)    RUQ pain    Chest pain       Brief History of Present Illness    Magdaleno Cunningham is a 56 y.o. male who  has a past medical history of Anticoagulant long-term use, Atrial fibrillation (2018), BPH (benign prostatic hyperplasia), Cataract, COPD (chronic obstructive pulmonary disease), Coronary artery disease, CVD (cardiovascular disease), Diabetes mellitus, Erythema multiforme, Hypertension, Infected incision, MI  (myocardial infarction), ROSAMARIA on CPAP, and RLS (restless legs syndrome).. The patient presented to [facility] on 8/30/2022 with a primary complaint of Chest Pain (CP x1 month, taking 2 Nitros per day. Became SOB 2 days ago. Left sided CP, non-radiating, Nitro makes the pain better. Not doing anything exerting. Took 81mg ASA today. Uses CPAP at night. )  .     For the full HPI please refer to the History & Physical from this admission.    Hospital Course By Problem with Pertinent Findings     Patient was observed in the hospital and monitored with EKG and serial troponin.  Patient then had stress test which did show reversible ischemia. He underwent LHC. Result as followed      50% mid LAD stenosis    Proximal mid and distal areas of in stent stenosis of the circumflex with a 95% in stent stenosis of the midportion    Successful stenting of the midportion of the circumflex system with ballooning of the proximal and distal portion.    Nonfunctioning SVG to the OM    Functioning LIMA to the LAD which is a very small graft to the LAD    Of note there was a distal in stent stenosis that still had significant residual that was not amenable at the time to stenting.  This can be addressed at a later time    This was an extremely difficult case with difficulty in passing balloons and stents     Intervention     The patient had 3 areas of in stent stenosis in the circumflex with a nonfunctioning SVG to this area.  Initially I attempted to balloon the proximal portion with a 3 0 balloon, and in the midportion.  I then attempted to pass an IVS however I could not get it past the proximal portion.  A 325 balloon was then attempted and multiple inflations were performed..  I then was able to pass a 3 0 x 22 stent after multiple attempts and using several caryl wires.  Upon inflation of this I did lose the branch of the distal circumflex..  I then attempted to stent the distal portion however this would not pass at  "this point the procedure was terminated with the midportion of the in stent stenosis open and free-flowing with good flow to the distal portion.  This was an extremely difficult case with 3 areas of in stent stenosis.  If the patient continues to have pain we can reassess the distal in stent stenosis and tried to aggressively fix this  The procedure log was documented by Documenter: RT Aditya and verified by Paul Botello MD.        The patient was discharged in the care of family, with discharge instructions were reviewed in written and verbal form. All pertinent questions were discussed and prescriptions were provided. The patient will follow up in 1 week or sooner as needed with the PCP and Cardiologist as discussed, and the patient is on board with the plan.      Patient was seen and examined on the date of discharge and determined to be suitable for discharge.    Physical Exam  /66   Pulse (!) 58   Temp 98 °F (36.7 °C) (Oral)   Resp 16   Ht 5' 11" (1.803 m)   Wt 123.6 kg (272 lb 9.6 oz)   SpO2 96%   BMI 38.02 kg/m²   Vitals reviewed.    Constitutional: No distress.   HENT: Atraumatic.   Cardiovascular: Normal rate, regular rhythm and normal heart sounds.   Pulmonary/Chest: Effort normal. Clear to auscultation bilaterally. No wheezes.   Abdominal: Soft. Bowel sounds are normal. Exhibits no distension and no mass. No tenderness  Neurological: Alert.   Skin: Skin is warm and dry.     Following labs were Reviewed   Recent Labs   Lab 09/02/22  0448   WBC 8.06   HGB 16.3   HCT 47.5      CALCIUM 8.5*   *   K 4.1   CO2 24      BUN 15   CREATININE 1.0     No results found for: POCTGLUCOSE     All labs within the past 24 hours have been reviewed    Microbiology Results (last 7 days)       ** No results found for the last 168 hours. **          NM Myocardial Perfusion Spect Multi Exer   Final Result      US Abdomen Limited (Gallbladder)   Final Result      X-Ray Chest AP Portable "   Final Result          No results found for this or any previous visit.      Consultants and Procedures   Consultants:  Consults (From admission, onward)          Status Ordering Provider     Inpatient consult to Registered Dietitian/Nutritionist  Once        Provider:  (Not yet assigned)    Acknowledged ABBE JOSHUA     Inpatient consult to Cardiology  Once        Provider:  Tyree Walters MD    Completed MATTHEW BLACKWELL     Inpatient consult to Hospitalist  Once        Provider:  Matthew Blackwell MD    Acknowledged MARNIE BARROSO            Procedures: [facility] see baove    Discharge Information:   Diet:  Resume cardiac diet    Physical Activity:  Activity as tolerated    Instructions:  1. Take all medications as prescribed  2. Keep all follow-up appointments  3. Return to the hospital or call your primary care physicians if any worsening symptoms such as chest pain, shortness of breath occur.    Follow-Up Appointments:  Please call your primary care physician to schedule an appointment in 1 week time.       Follow-up Information       Venkata Mclaughlin MD Follow up on 9/8/2022.    Specialty: Internal Medicine  Why: Hospital follow up at 10:30am  Contact information:  [address] [place] [address] Dr [address]ravin [place] [id number]  [phone]               Venkata Mcalughlin MD Follow up in 1 week(s).    Specialty: Internal Medicine  Contact information:  [address] [place] [address] Dr Pravin [place] [id number]  [phone]                               Pending laboratory work/Tests to be performed/followed by the Primary care Physician: none    The patient was discharged in the care of her parents//wife/family/caregiver, with discharge instructions were reviewed in written and verbal form. All pertinent questions were discussed and prescriptions were provided. The importance of making follow up appointments and compliance of medications has been stressed repeatedly. The patient will follow up in 1 week or sooner as  "needed with the PCP, and the patient is on board with the plan. Upon discharge, patient needs to be on following medications.    Discharge Medications:     Medication List        START taking these medications      ticagrelor 90 mg tablet  Commonly known as: BRILINTA  Take 1 tablet (90 mg total) by mouth 2 (two) times daily.            CHANGE how you take these medications      gabapentin 600 MG tablet  Commonly known as: NEURONTIN  What changed: Another medication with the same name was removed. Continue taking this medication, and follow the directions you see here.     lisinopriL 20 MG tablet  Commonly known as: PRINIVIL,ZESTRIL  What changed: Another medication with the same name was removed. Continue taking this medication, and follow the directions you see here.     metoprolol succinate 100 MG 24 hr tablet  Commonly known as: TOPROL-XL  What changed: Another medication with the same name was removed. Continue taking this medication, and follow the directions you see here.            CONTINUE taking these medications      apixaban 5 mg Tab  Commonly known as: ELIQUIS     aspirin 81 MG Chew     clonazePAM 1 MG tablet  Commonly known as: KlonoPIN     cyclobenzaprine 10 MG tablet  Commonly known as: FLEXERIL     isosorbide mononitrate 30 MG 24 hr tablet  Commonly known as: IMDUR  Take 1 tablet (30 mg total) by mouth once daily.     JARDIANCE 25 mg tablet  Generic drug: empagliflozin     LANTUS SOLOSTAR U-100 INSULIN glargine 100 units/mL SubQ pen  Generic drug: insulin     metFORMIN 1000 MG tablet  Commonly known as: GLUCOPHAGE     nitroGLYCERIN 0.4 MG SL tablet  Commonly known as: NITROSTAT     NURTEC 75 mg odt  Generic drug: rimegepant     oxyCODONE-acetaminophen  mg per tablet  Commonly known as: PERCOCET     pen needle, diabetic 31 gauge x 1/4" Ndle     pioglitazone 15 MG tablet  Commonly known as: ACTOS     simvastatin 10 MG tablet  Commonly known as: ZOCOR     tamsulosin 0.4 mg Cap  Commonly known as: " FLOMAX  Take 1 capsule (0.4 mg total) by mouth every evening.            STOP taking these medications      ACCU-CHEK GUIDE TEST STRIPS Strp  Generic drug: blood sugar diagnostic     amitriptyline 25 MG tablet  Commonly known as: ELAVIL     ANORO ELLIPTA 62.5-25 mcg/actuation Dsdv  Generic drug: umeclidinium-vilanteroL     CHANTIX STARTING MONTH BOX 0.5 mg (11)- 1 mg (42) tablet  Generic drug: varenicline     COMIRNATY (PF) 30 mcg/0.3 mL Susr  Generic drug: COVID-19 vacc,mRNA(Pfizer)(PF)     eszopiclone 1 MG Tab  Commonly known as: LUNESTA     fenofibrate 145 MG tablet  Commonly known as: TRICOR     FLUCELVAX QUAD 0479-7176 60 mcg (15 mcg x 4)/0.5 mL Susp  Generic drug: flu vac qs 2020-21(4 yr up) CD     FOLTANX 3-35-2 mg Tab  Generic drug: mecobal-levomefolat Ca-B6 phos     furosemide 20 MG tablet  Commonly known as: LASIX     glimepiride 4 MG tablet  Commonly known as: AMARYL     hydroCHLOROthiazide 12.5 MG Tab  Commonly known as: HYDRODIURIL     HYDROcodone-acetaminophen 5-325 mg per tablet  Commonly known as: NORCO     icosapent ethyL 1 gram Cap  Commonly known as: VASCEPA     insulin lispro 100 unit/mL pen     methocarbamoL 500 MG Tab  Commonly known as: ROBAXIN     PNEUMOVAX-23 25 mcg/0.5 mL vaccine  Generic drug: pneumococcal 23-lily ps     ranolazine 500 MG Tb12  Commonly known as: RANEXA     rosuvastatin 20 MG tablet  Commonly known as: CRESTOR               Where to Get Your Medications        These medications were sent to [facility] [place] - [address] [facility]  [address] [place] [address]      Phone: [phone]   tamsulosin 0.4 mg Cap  ticagrelor 90 mg tablet           I spent 45 minutes preparing the discharge including reviewing records from previous encounters, preparation of discharge summary, assessing and final examination of the patient, discharge medicine reconciliation, discussing plan of care, follow up and education and prescriptions.       Sherif Villalobos  [facility]  Hospitalist  09/02/2022

## 2022-09-02 NOTE — CONSULTS
Met w/ pt at bedside. Educated on carb choices and serving sizes of 1 choice. Pairing carbohydrates with a fat, fiber, or protein to prevent increase in blood glucose. Choosing whole grains over refined grains. Opting for diet soda versus regular. Pt receptive to education + handout provided.

## 2022-09-02 NOTE — PLAN OF CARE
The patient is sitting at edge of bed, watching television. Denies complaints at this time and asking about his discharge. He is in good spirits. Ate 100% of breakfast.    Voiding clear yellow urine per urinal.    He is saturating well on room air. No c/o chest pain or shortness of breath. The cath site to right groin is clean dry and intact, no induration or pain.    Clinical alarms reviewed at start of shift, monitor strip printed and placed in chart - remains in sinus rhythm.

## 2023-04-22 ENCOUNTER — HOSPITAL ENCOUNTER (EMERGENCY)
Facility: HOSPITAL | Age: 58
Discharge: HOME | End: 2023-04-22
Attending: EMERGENCY MEDICINE
Payer: MEDICAID

## 2023-04-22 VITALS
SYSTOLIC BLOOD PRESSURE: 136 MMHG | RESPIRATION RATE: 20 BRPM | HEART RATE: 89 BPM | WEIGHT: 270 LBS | OXYGEN SATURATION: 97 % | DIASTOLIC BLOOD PRESSURE: 74 MMHG | BODY MASS INDEX: 37.8 KG/M2 | HEIGHT: 71 IN

## 2023-04-22 DIAGNOSIS — R51.9 NONINTRACTABLE HEADACHE, UNSPECIFIED CHRONICITY PATTERN, UNSPECIFIED HEADACHE TYPE: ICD-10-CM

## 2023-04-22 DIAGNOSIS — H60.501 ACUTE OTITIS EXTERNA OF RIGHT EAR, UNSPECIFIED TYPE: Primary | ICD-10-CM

## 2023-04-22 DIAGNOSIS — J32.9 SINUSITIS, UNSPECIFIED CHRONICITY, UNSPECIFIED LOCATION: ICD-10-CM

## 2023-04-22 PROCEDURE — 99283 EMERGENCY DEPT VISIT LOW MDM: CPT | Mod: 25

## 2023-04-22 PROCEDURE — 25000003 PHARM REV CODE 250: Performed by: PHYSICIAN ASSISTANT

## 2023-04-22 RX ORDER — BUTALBITAL, ACETAMINOPHEN AND CAFFEINE 50; 325; 40 MG/1; MG/1; MG/1
1 TABLET ORAL
Status: COMPLETED | OUTPATIENT
Start: 2023-04-22 | End: 2023-04-22

## 2023-04-22 RX ORDER — CETIRIZINE HYDROCHLORIDE 10 MG/1
10 TABLET ORAL DAILY
Qty: 30 TABLET | Refills: 0 | Status: SHIPPED | OUTPATIENT
Start: 2023-04-22 | End: 2023-04-22 | Stop reason: SDUPTHER

## 2023-04-22 RX ORDER — CETIRIZINE HYDROCHLORIDE 10 MG/1
10 TABLET ORAL DAILY
Qty: 30 TABLET | Refills: 0 | Status: ON HOLD | OUTPATIENT
Start: 2023-04-22 | End: 2023-12-15 | Stop reason: HOSPADM

## 2023-04-22 RX ORDER — NEOMYCIN SULFATE, POLYMYXIN B SULFATE AND HYDROCORTISONE 10; 3.5; 1 MG/ML; MG/ML; [USP'U]/ML
3 SUSPENSION/ DROPS AURICULAR (OTIC) 3 TIMES DAILY
Qty: 5 ML | Refills: 0 | Status: SHIPPED | OUTPATIENT
Start: 2023-04-22 | End: 2023-04-29

## 2023-04-22 RX ORDER — KETOROLAC TROMETHAMINE 30 MG/ML
30 INJECTION, SOLUTION INTRAMUSCULAR; INTRAVENOUS
Status: DISCONTINUED | OUTPATIENT
Start: 2023-04-22 | End: 2023-04-22

## 2023-04-22 RX ORDER — CETIRIZINE HYDROCHLORIDE 10 MG/1
10 TABLET ORAL
Status: COMPLETED | OUTPATIENT
Start: 2023-04-22 | End: 2023-04-22

## 2023-04-22 RX ADMIN — BUTALBITAL, ACETAMINOPHEN, AND CAFFEINE 1 TABLET: 50; 325; 40 TABLET ORAL at 02:04

## 2023-04-22 RX ADMIN — CETIRIZINE HYDROCHLORIDE 10 MG: 10 TABLET, FILM COATED ORAL at 02:04

## 2023-04-23 NOTE — ED PROVIDER NOTES
Encounter Date: 4/22/2023       History     Chief Complaint   Patient presents with    Otalgia     Rt. Ear / drainage from rt. Ear     Headache     X 4 days      Magdaleno Cunningham is a 57 y.o. male presenting for evaluation of bloody drainage from right ear for the past few days with associated sinus congestion/pressure and headache.  No fever, no chills.  He hasn't taken any medication for his symptoms.  No numbness, tingling or weakness.  No nausea or vomiting.  He has a past medical history of Anticoagulant long-term use, Atrial fibrillation (2018), BPH (benign prostatic hyperplasia), Cataract, COPD (chronic obstructive pulmonary disease), Coronary artery disease, CVD (cardiovascular disease), Diabetes mellitus, Erythema multiforme, Hypertension, Infected incision, MI (myocardial infarction), ROSAMARIA on CPAP, and RLS (restless legs syndrome).        Review of patient's allergies indicates:   Allergen Reactions    Pesticide Dermatitis and Rash     Past Medical History:   Diagnosis Date    Anticoagulant long-term use     Atrial fibrillation 2018    BPH (benign prostatic hyperplasia)     Cataract     COPD (chronic obstructive pulmonary disease)     Coronary artery disease     stents    CVD (cardiovascular disease)     Diabetes mellitus     Erythema multiforme     AKA Denton Edmondson Syndrome    Hypertension     Infected incision     MI (myocardial infarction)     per pt he has had 4     ROSAMARIA on CPAP     RLS (restless legs syndrome)      Past Surgical History:   Procedure Laterality Date    CORONARY ANGIOGRAPHY INCLUDING BYPASS GRAFTS WITH CATHETERIZATION OF LEFT HEART N/A 9/1/2022    Procedure: ANGIOGRAM, CORONARY, INCLUDING BYPASS GRAFT, WITH LEFT HEART CATHETERIZATION;  Surgeon: Paul Botello MD;  Location: [facility] CATH/EP LAB;  Service: Cardiology;  Laterality: N/A;    CORONARY ARTERY BYPASS GRAFT (CABG) N/A 4/7/2020    Procedure: CORONARY ARTERY BYPASS GRAFT (CABG)- Excision of left atrial appendage;  Surgeon:  Doug Solitario MD;  Location: [facility] OR;  Service: Cardiothoracic;  Laterality: N/A;    CORONARY STENT PLACEMENT      WILSON MAZE PROCEDURE N/A 4/7/2020    Procedure: WILSON MAZE PROCEDURE- Attempted;  Surgeon: Doug Solitario MD;  Location: [facility] OR;  Service: Cardiothoracic;  Laterality: N/A;    CYSTOSCOPY N/A 12/10/2018    Procedure: CYSTOSCOPY;  Surgeon: Khushboo Abreu MD;  Location: [facility];  Service: Urology;  Laterality: N/A;    ENDOSCOPIC HARVEST OF VEIN Left 4/7/2020    Procedure: HARVEST-VEIN-ENDOVASCULAR;  Surgeon: Doug Solitario MD;  Location: [facility] OR;  Service: Cardiothoracic;  Laterality: Left;    LEFT HEART CATHETERIZATION Left 3/17/2020    Procedure: CATHETERIZATION, HEART, LEFT;  Surgeon: Tyree Walters MD;  Location: [facility] CATH/EP LAB;  Service: Cardiology;  Laterality: Left;    STERNAL WIRES REMOVAL N/A 6/8/2020    Procedure: REMOVAL, STERNAL WIRE;  Surgeon: Doug Solitario MD;  Location: [facility] OR;  Service: Peripheral Vascular;  Laterality: N/A;    ULTRASOUND OF PROSTATE FOR VOLUME DETERMINATION  12/10/2018    Procedure: ULTRASOUND, PROSTATE, FOR VOLUME DETERMINATION;  Surgeon: Khushboo Abreu MD;  Location: [facility];  Service: Urology;;     Family History   Problem Relation Age of Onset    Heart disease Mother     Diabetes Mother     Hyperlipidemia Father     Cancer Father      Social History     Tobacco Use    Smoking status: Every Day     Packs/day: 0.30     Years: 30.00     Pack years: 9.00     Types: Cigarettes     Last attempt to quit: 4/4/2020     Years since quitting: 3.0    Smokeless tobacco: Never    Tobacco comments:     just under a pack a day   Substance Use Topics    Alcohol use: Not Currently    Drug use: Yes     Frequency: 1.0 times per week     Types: Marijuana     Review of Systems   Constitutional:  Negative for activity change, appetite change, fatigue and fever.   HENT:  Positive for congestion, ear discharge, ear pain and sinus pressure.  Negative for rhinorrhea and sore throat.    Eyes:  Negative for visual disturbance.   Respiratory:  Negative for cough, chest tightness, shortness of breath and wheezing.    Cardiovascular:  Negative for chest pain and palpitations.   Gastrointestinal:  Negative for diarrhea, nausea and vomiting.   Musculoskeletal:  Negative for arthralgias and myalgias.   Skin:  Negative for rash.   Neurological:  Positive for headaches. Negative for weakness and light-headedness.     Physical Exam     Initial Vitals [04/22/23 1350]   BP Pulse Resp Temp SpO2   136/74 89 20 -- 97 %      MAP       --         Physical Exam    Nursing note and vitals reviewed.  Constitutional: He appears well-developed and well-nourished. He is not diaphoretic. No distress.   HENT:   Head: Normocephalic and atraumatic.   Left Ear: External ear normal.   Nose: Nose normal.   Mouth/Throat: Oropharynx is clear and moist.   Mild erythema, swelling and scabbing noted to right ear canal without exudate.  No erythema, bulging or purulence noted to bilateral TMs.     Eyes: Conjunctivae and EOM are normal. Pupils are equal, round, and reactive to light.   Neck: Neck supple.   Normal range of motion.  Cardiovascular:  Normal rate, regular rhythm, normal heart sounds and intact distal pulses.     Exam reveals no gallop and no friction rub.       No murmur heard.  Pulmonary/Chest: Breath sounds normal. No respiratory distress. He has no wheezes. He has no rhonchi. He has no rales.   Musculoskeletal:         General: No tenderness or edema. Normal range of motion.      Cervical back: Normal range of motion and neck supple.     Neurological: He is alert and oriented to person, place, and time. He has normal strength. No cranial nerve deficit or sensory deficit. GCS score is 15. GCS eye subscore is 4. GCS verbal subscore is 5. GCS motor subscore is 6.   No focal neurological deficits noted.  Cranial nerves III-XII grossly intact.  5/5 strength and sensation to  bilateral upper and lower extremities.    Skin: Skin is warm and dry. No rash and no abscess noted. No erythema.   Psychiatric: He has a normal mood and affect.       ED Course   Procedures  Labs Reviewed - No data to display       Imaging Results    None          Medications   cetirizine tablet 10 mg (10 mg Oral Given 4/22/23 1422)   butalbital-acetaminophen-caffeine -40 mg per tablet 1 tablet (1 tablet Oral Given 4/22/23 1421)     Medical Decision Making:   History:   Old Medical Records: I decided to obtain old medical records.  Old Records Summarized: records from clinic visits and records from previous admission(s).  Differential Diagnosis:   Otitis externa   Otitis media   Sinusitis   Headache   ED Management:  Pt emergently evaluated here in the ED.  Will treat for right otitis externa with antibiotic otic drops.  The headache and sinus pressure is likely related to sinusitis.  He is given a dose of Fioricet here in the emergency department and [facility].  He will be discharged home with similar prescriptions.  He voices understanding and is agreeable to the plan.  He is given specific return precautions.  Low suspicion for acute intracranial process and no need for CT imaging at this time.  Low suspicion for bacterial infection and no need for oral antibiotics here today.                        Clinical Impression:   Final diagnoses:  [H60.501] Acute otitis externa of right ear, unspecified type (Primary)  [J32.9] Sinusitis, unspecified chronicity, unspecified location  [R51.9] Nonintractable headache, unspecified chronicity pattern, unspecified headache type        ED Disposition Condition    Discharge Stable          ED Prescriptions       Medication Sig Dispense Start Date End Date Auth. Provider    neomycin-polymyxin-hydrocortisone (CORTISPORIN) 3.5-10,000-1 mg/mL-unit/mL-% otic suspension Place 3 drops into the right ear 3 (three) times daily. for 7 days 5 mL 4/22/2023 4/29/2023 Dasha Rothman,  MADELIN    cetirizine (ZYRTEC) 10 MG tablet  (Status: Discontinued) Take 1 tablet (10 mg total) by mouth once daily. 30 tablet 4/22/2023 4/22/2023 Dasha Rothman PA-C    cetirizine (ZYRTEC) 10 MG tablet Take 1 tablet (10 mg total) by mouth once daily. 30 tablet 4/22/2023 4/21/2024 Dasha Rothman PA-C          Follow-up Information       Follow up With Specialties Details Why Contact Info    [facility] Emergency Dept Emergency Medicine  As needed, If symptoms worsen [address] [place] [address] [id number] [phone]    Venkata Mclaughlin MD Internal Medicine  As needed [address] [place] [address] Dr [address] [place] [id number]  [phone]               Dasha Rothman PA-C  04/22/23 1928

## 2023-06-07 ENCOUNTER — HOSPITAL ENCOUNTER (EMERGENCY)
Facility: HOSPITAL | Age: 58
Discharge: HOME | End: 2023-06-08
Attending: STUDENT IN AN ORGANIZED HEALTH CARE EDUCATION/TRAINING PROGRAM
Payer: MEDICAID

## 2023-06-07 DIAGNOSIS — M79.605 LEFT LEG PAIN: ICD-10-CM

## 2023-06-07 DIAGNOSIS — M54.41 ACUTE MIDLINE LOW BACK PAIN WITH BILATERAL SCIATICA: ICD-10-CM

## 2023-06-07 DIAGNOSIS — S22.32XA CLOSED FRACTURE OF ONE RIB OF LEFT SIDE, INITIAL ENCOUNTER: ICD-10-CM

## 2023-06-07 DIAGNOSIS — M54.9 UPPER BACK PAIN: ICD-10-CM
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
## 2023-06-07 DIAGNOSIS — M54.42 ACUTE MIDLINE LOW BACK PAIN WITH BILATERAL SCIATICA: ICD-10-CM

## 2023-06-07 LAB
ALBUMIN SERPL BCP-MCNC: 4 G/DL (ref 3.5–5.2)
ALP SERPL-CCNC: 47 U/L (ref 55–135)
ALT SERPL W/O P-5'-P-CCNC: 36 U/L (ref 10–44)
ANION GAP SERPL CALC-SCNC: 9 MMOL/L (ref 8–16)
AST SERPL-CCNC: 39 U/L (ref 10–40)
BASOPHILS # BLD AUTO: 0.09 K/UL (ref 0–0.2)
BASOPHILS NFR BLD: 0.9 % (ref 0–1.9)
BILIRUB SERPL-MCNC: 0.6 MG/DL (ref 0.1–1)
BUN SERPL-MCNC: 14 MG/DL (ref 6–20)
CALCIUM SERPL-MCNC: 9.3 MG/DL (ref 8.7–10.5)
CHLORIDE SERPL-SCNC: 101 MMOL/L (ref 95–110)
CO2 SERPL-SCNC: 23 MMOL/L (ref 23–29)
CREAT SERPL-MCNC: 1.5 MG/DL (ref 0.5–1.4)
CREAT SERPL-MCNC: 1.6 MG/DL (ref 0.5–1.4)
DIFFERENTIAL METHOD: ABNORMAL
EOSINOPHIL # BLD AUTO: 0.3 K/UL (ref 0–0.5)
EOSINOPHIL NFR BLD: 3.4 % (ref 0–8)
ERYTHROCYTE [DISTWIDTH] IN BLOOD BY AUTOMATED COUNT: 13.1 % (ref 11.5–14.5)
EST. GFR  (NO RACE VARIABLE): 54.6 ML/MIN/1.73 M^2
GLUCOSE SERPL-MCNC: 347 MG/DL (ref 70–110)
HCT VFR BLD AUTO: 45 % (ref 40–54)
HGB BLD-MCNC: 15.4 G/DL (ref 14–18)
IMM GRANULOCYTES # BLD AUTO: 0.04 K/UL (ref 0–0.04)
IMM GRANULOCYTES NFR BLD AUTO: 0.4 % (ref 0–0.5)
LYMPHOCYTES # BLD AUTO: 4 K/UL (ref 1–4.8)
LYMPHOCYTES NFR BLD: 41.5 % (ref 18–48)
MCH RBC QN AUTO: 31.8 PG (ref 27–31)
MCHC RBC AUTO-ENTMCNC: 34.2 G/DL (ref 32–36)
MCV RBC AUTO: 93 FL (ref 82–98)
MONOCYTES # BLD AUTO: 0.7 K/UL (ref 0.3–1)
MONOCYTES NFR BLD: 7.2 % (ref 4–15)
NEUTROPHILS # BLD AUTO: 4.4 K/UL (ref 1.8–7.7)
NEUTROPHILS NFR BLD: 46.6 % (ref 38–73)
NRBC BLD-RTO: 0 /100 WBC
PLATELET # BLD AUTO: 202 K/UL (ref 150–450)
PMV BLD AUTO: 10 FL (ref 9.2–12.9)
POTASSIUM SERPL-SCNC: 3.9 MMOL/L (ref 3.5–5.1)
PROT SERPL-MCNC: 7.2 G/DL (ref 6–8.4)
RBC # BLD AUTO: 4.84 M/UL (ref 4.6–6.2)
SAMPLE: ABNORMAL
SODIUM SERPL-SCNC: 133 MMOL/L (ref 136–145)
WBC # BLD AUTO: 9.54 K/UL (ref 3.9–12.7)

## 2023-06-07 PROCEDURE — 80053 COMPREHEN METABOLIC PANEL: CPT | Performed by: STUDENT IN AN ORGANIZED HEALTH CARE EDUCATION/TRAINING PROGRAM

## 2023-06-07 PROCEDURE — 25500020 PHARM REV CODE 255: Performed by: STUDENT IN AN ORGANIZED HEALTH CARE EDUCATION/TRAINING PROGRAM

## 2023-06-07 PROCEDURE — 99285 EMERGENCY DEPT VISIT HI MDM: CPT | Mod: 25

## 2023-06-07 PROCEDURE — 85025 COMPLETE CBC W/AUTO DIFF WBC: CPT | Performed by: STUDENT IN AN ORGANIZED HEALTH CARE EDUCATION/TRAINING PROGRAM

## 2023-06-07 PROCEDURE — 63600175 PHARM REV CODE 636 W HCPCS: Performed by: STUDENT IN AN ORGANIZED HEALTH CARE EDUCATION/TRAINING PROGRAM

## 2023-06-07 PROCEDURE — 96374 THER/PROPH/DIAG INJ IV PUSH: CPT

## 2023-06-07 PROCEDURE — 96375 TX/PRO/DX INJ NEW DRUG ADDON: CPT | Mod: 59

## 2023-06-07 PROCEDURE — 25000003 PHARM REV CODE 250: Performed by: STUDENT IN AN ORGANIZED HEALTH CARE EDUCATION/TRAINING PROGRAM

## 2023-06-07 RX ORDER — ONDANSETRON 2 MG/ML
4 INJECTION INTRAMUSCULAR; INTRAVENOUS
Status: COMPLETED | OUTPATIENT
Start: 2023-06-07 | End: 2023-06-07

## 2023-06-07 RX ORDER — HYDROMORPHONE HYDROCHLORIDE 1 MG/ML
1 INJECTION, SOLUTION INTRAMUSCULAR; INTRAVENOUS; SUBCUTANEOUS
Status: COMPLETED | OUTPATIENT
Start: 2023-06-07 | End: 2023-06-07

## 2023-06-07 RX ADMIN — HYDROMORPHONE HYDROCHLORIDE 1 MG: 1 INJECTION, SOLUTION INTRAMUSCULAR; INTRAVENOUS; SUBCUTANEOUS at 10:06

## 2023-06-07 RX ADMIN — IOHEXOL 100 ML: 350 INJECTION, SOLUTION INTRAVENOUS at 11:06

## 2023-06-07 RX ADMIN — SODIUM CHLORIDE 1000 ML: 0.9 INJECTION, SOLUTION INTRAVENOUS at 11:06

## 2023-06-07 RX ADMIN — ONDANSETRON 4 MG: 2 INJECTION INTRAMUSCULAR; INTRAVENOUS at 10:06

## 2023-06-08 VITALS
DIASTOLIC BLOOD PRESSURE: 58 MMHG | TEMPERATURE: 98 F | WEIGHT: 264 LBS | RESPIRATION RATE: 17 BRPM | BODY MASS INDEX: 36.82 KG/M2 | HEART RATE: 75 BPM | SYSTOLIC BLOOD PRESSURE: 122 MMHG | OXYGEN SATURATION: 96 %

## 2023-06-08 RX ORDER — HYDROCODONE BITARTRATE AND ACETAMINOPHEN 5; 325 MG/1; MG/1
1 TABLET ORAL
Status: DISCONTINUED | OUTPATIENT
Start: 2023-06-08 | End: 2023-06-08 | Stop reason: HOSPADM

## 2023-06-08 RX ORDER — HYDROCODONE BITARTRATE AND ACETAMINOPHEN 5; 325 MG/1; MG/1
1 TABLET ORAL EVERY 6 HOURS PRN
Qty: 6 TABLET | Refills: 0 | Status: ON HOLD | OUTPATIENT
Start: 2023-06-08 | End: 2023-12-15 | Stop reason: HOSPADM
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
## 2023-06-08 NOTE — DISCHARGE INSTRUCTIONS

## 2023-06-23 ENCOUNTER — HOSPITAL ENCOUNTER (OUTPATIENT)
Facility: HOSPITAL | Age: 58
Discharge: HOME | End: 2023-06-25
Attending: EMERGENCY MEDICINE | Admitting: HOSPITALIST
Payer: MEDICAID

## 2023-06-23 DIAGNOSIS — R07.9 CHEST PAIN, UNSPECIFIED TYPE: Primary | ICD-10-CM

## 2023-06-23 DIAGNOSIS — L02.412 ABSCESS OF LEFT AXILLA: ICD-10-CM

## 2023-06-23 DIAGNOSIS — I25.10 CAD (CORONARY ARTERY DISEASE): ICD-10-CM

## 2023-06-23 DIAGNOSIS — R07.9 CHEST PAIN: ICD-10-CM

## 2023-06-23 LAB
ALBUMIN SERPL BCP-MCNC: 3.8 G/DL (ref 3.5–5.2)
ALP SERPL-CCNC: 56 U/L (ref 55–135)
ALT SERPL W/O P-5'-P-CCNC: 29 U/L (ref 10–44)
ANION GAP SERPL CALC-SCNC: 11 MMOL/L (ref 8–16)
AST SERPL-CCNC: 23 U/L (ref 10–40)
BASOPHILS # BLD AUTO: 0.08 K/UL (ref 0–0.2)
BASOPHILS NFR BLD: 0.7 % (ref 0–1.9)
BILIRUB SERPL-MCNC: 0.8 MG/DL (ref 0.1–1)
BNP SERPL-MCNC: 33 PG/ML (ref 0–99)
BUN SERPL-MCNC: 10 MG/DL (ref 6–20)
CALCIUM SERPL-MCNC: 9.2 MG/DL (ref 8.7–10.5)
CHLORIDE SERPL-SCNC: 99 MMOL/L (ref 95–110)
CO2 SERPL-SCNC: 25 MMOL/L (ref 23–29)
CREAT SERPL-MCNC: 1.1 MG/DL (ref 0.5–1.4)
DIFFERENTIAL METHOD: ABNORMAL
EOSINOPHIL # BLD AUTO: 0.2 K/UL (ref 0–0.5)
EOSINOPHIL NFR BLD: 1.7 % (ref 0–8)
ERYTHROCYTE [DISTWIDTH] IN BLOOD BY AUTOMATED COUNT: 13.2 % (ref 11.5–14.5)
EST. GFR  (NO RACE VARIABLE): >60 ML/MIN/1.73 M^2
GLUCOSE SERPL-MCNC: 238 MG/DL (ref 70–110)
HCT VFR BLD AUTO: 47.5 % (ref 40–54)
HGB BLD-MCNC: 16.4 G/DL (ref 14–18)
IMM GRANULOCYTES # BLD AUTO: 0.05 K/UL (ref 0–0.04)
IMM GRANULOCYTES NFR BLD AUTO: 0.4 % (ref 0–0.5)
LYMPHOCYTES # BLD AUTO: 2.7 K/UL (ref 1–4.8)
LYMPHOCYTES NFR BLD: 23.5 % (ref 18–48)
MAGNESIUM SERPL-MCNC: 1.6 MG/DL (ref 1.6–2.6)
MCH RBC QN AUTO: 32 PG (ref 27–31)
MCHC RBC AUTO-ENTMCNC: 34.5 G/DL (ref 32–36)
MCV RBC AUTO: 93 FL (ref 82–98)
MONOCYTES # BLD AUTO: 0.7 K/UL (ref 0.3–1)
MONOCYTES NFR BLD: 6.1 % (ref 4–15)
NEUTROPHILS # BLD AUTO: 7.9 K/UL (ref 1.8–7.7)
NEUTROPHILS NFR BLD: 67.6 % (ref 38–73)
NRBC BLD-RTO: 0 /100 WBC
PLATELET # BLD AUTO: 193 K/UL (ref 150–450)
PMV BLD AUTO: 10 FL (ref 9.2–12.9)
POTASSIUM SERPL-SCNC: 3.8 MMOL/L (ref 3.5–5.1)
PROT SERPL-MCNC: 7.1 G/DL (ref 6–8.4)
RBC # BLD AUTO: 5.13 M/UL (ref 4.6–6.2)
SODIUM SERPL-SCNC: 135 MMOL/L (ref 136–145)
TROPONIN I SERPL HS-MCNC: 8.8 PG/ML (ref 0–14.9)
TROPONIN I SERPL HS-MCNC: 8.9 PG/ML (ref 0–14.9)
WBC # BLD AUTO: 11.64 K/UL (ref 3.9–12.7)

## 2023-06-23 PROCEDURE — 85025 COMPLETE CBC W/AUTO DIFF WBC: CPT | Performed by: NURSE PRACTITIONER

## 2023-06-23 PROCEDURE — G0378 HOSPITAL OBSERVATION PER HR: HCPCS

## 2023-06-23 PROCEDURE — 93010 EKG 12-LEAD: ICD-10-PCS | Mod: ,,, | Performed by: SPECIALIST

## 2023-06-23 PROCEDURE — 83880 ASSAY OF NATRIURETIC PEPTIDE: CPT | Performed by: NURSE PRACTITIONER

## 2023-06-23 PROCEDURE — 93010 ELECTROCARDIOGRAM REPORT: CPT | Mod: ,,, | Performed by: SPECIALIST

## 2023-06-23 PROCEDURE — 84484 ASSAY OF TROPONIN QUANT: CPT | Performed by: NURSE PRACTITIONER

## 2023-06-23 PROCEDURE — 93005 ELECTROCARDIOGRAM TRACING: CPT | Performed by: SPECIALIST

## 2023-06-23 PROCEDURE — 99285 EMERGENCY DEPT VISIT HI MDM: CPT | Mod: 25

## 2023-06-23 PROCEDURE — 80053 COMPREHEN METABOLIC PANEL: CPT | Performed by: NURSE PRACTITIONER

## 2023-06-23 PROCEDURE — 83735 ASSAY OF MAGNESIUM: CPT | Performed by: NURSE PRACTITIONER

## 2023-06-23 RX ORDER — GLUCAGON 1 MG
1 KIT INJECTION
Status: DISCONTINUED | OUTPATIENT
Start: 2023-06-23 | End: 2023-06-25 | Stop reason: HOSPADM

## 2023-06-23 RX ORDER — DOXYCYCLINE 100 MG/1
100 CAPSULE ORAL EVERY 12 HOURS
Status: DISCONTINUED | OUTPATIENT
Start: 2023-06-24 | End: 2023-06-25 | Stop reason: HOSPADM

## 2023-06-23 RX ORDER — IBUPROFEN 200 MG
24 TABLET ORAL
Status: DISCONTINUED | OUTPATIENT
Start: 2023-06-23 | End: 2023-06-25 | Stop reason: HOSPADM

## 2023-06-23 RX ORDER — IBUPROFEN 200 MG
16 TABLET ORAL
Status: DISCONTINUED | OUTPATIENT
Start: 2023-06-23 | End: 2023-06-25 | Stop reason: HOSPADM

## 2023-06-24 PROBLEM — E66.812 CLASS 2 OBESITY DUE TO EXCESS CALORIES WITH BODY MASS INDEX (BMI) OF 38.0 TO 38.9 IN ADULT: Status: ACTIVE | Noted: 2022-01-09

## 2023-06-24 PROBLEM — I25.10 CORONARY ARTERY DISEASE INVOLVING NATIVE CORONARY ARTERY OF NATIVE HEART: Status: ACTIVE | Noted: 2020-03-25

## 2023-06-24 PROBLEM — E66.09 CLASS 2 OBESITY DUE TO EXCESS CALORIES WITH BODY MASS INDEX (BMI) OF 38.0 TO 38.9 IN ADULT: Status: ACTIVE | Noted: 2022-01-09

## 2023-06-24 LAB
GLUCOSE SERPL-MCNC: 207 MG/DL (ref 70–110)
GLUCOSE SERPL-MCNC: 243 MG/DL (ref 70–110)
GLUCOSE SERPL-MCNC: 303 MG/DL (ref 70–110)
GLUCOSE SERPL-MCNC: 348 MG/DL (ref 70–110)
GLUCOSE SERPL-MCNC: 405 MG/DL (ref 70–110)

## 2023-06-24 PROCEDURE — 25000003 PHARM REV CODE 250: Performed by: NURSE PRACTITIONER

## 2023-06-24 PROCEDURE — 93005 ELECTROCARDIOGRAM TRACING: CPT | Performed by: SPECIALIST

## 2023-06-24 PROCEDURE — 25000003 PHARM REV CODE 250: Performed by: HOSPITALIST

## 2023-06-24 PROCEDURE — G0378 HOSPITAL OBSERVATION PER HR: HCPCS

## 2023-06-24 PROCEDURE — 93010 ELECTROCARDIOGRAM REPORT: CPT | Mod: ,,, | Performed by: SPECIALIST

## 2023-06-24 PROCEDURE — 96372 THER/PROPH/DIAG INJ SC/IM: CPT | Performed by: HOSPITALIST

## 2023-06-24 PROCEDURE — 25000003 PHARM REV CODE 250: Performed by: INTERNAL MEDICINE

## 2023-06-24 PROCEDURE — 82962 GLUCOSE BLOOD TEST: CPT

## 2023-06-24 PROCEDURE — 94761 N-INVAS EAR/PLS OXIMETRY MLT: CPT

## 2023-06-24 PROCEDURE — 93010 EKG 12-LEAD: ICD-10-PCS | Mod: ,,, | Performed by: SPECIALIST

## 2023-06-24 PROCEDURE — 63600175 PHARM REV CODE 636 W HCPCS: Performed by: HOSPITALIST

## 2023-06-24 RX ORDER — ISOSORBIDE MONONITRATE 60 MG/1
60 TABLET, EXTENDED RELEASE ORAL DAILY
Status: DISCONTINUED | OUTPATIENT
Start: 2023-06-25 | End: 2023-06-25 | Stop reason: HOSPADM

## 2023-06-24 RX ORDER — PRAVASTATIN SODIUM 20 MG/1
20 TABLET ORAL NIGHTLY
Status: DISCONTINUED | OUTPATIENT
Start: 2023-06-24 | End: 2023-06-25 | Stop reason: HOSPADM

## 2023-06-24 RX ORDER — SODIUM CHLORIDE 0.9 % (FLUSH) 0.9 %
10 SYRINGE (ML) INJECTION
Status: DISCONTINUED | OUTPATIENT
Start: 2023-06-24 | End: 2023-06-25 | Stop reason: HOSPADM

## 2023-06-24 RX ORDER — POLYETHYLENE GLYCOL 3350 17 G/17G
17 POWDER, FOR SOLUTION ORAL DAILY
Status: DISCONTINUED | OUTPATIENT
Start: 2023-06-24 | End: 2023-06-25 | Stop reason: HOSPADM

## 2023-06-24 RX ORDER — ACETAMINOPHEN 325 MG/1
650 TABLET ORAL EVERY 4 HOURS PRN
Status: DISCONTINUED | OUTPATIENT
Start: 2023-06-24 | End: 2023-06-25 | Stop reason: HOSPADM

## 2023-06-24 RX ORDER — ONDANSETRON 4 MG/1
8 TABLET, ORALLY DISINTEGRATING ORAL EVERY 8 HOURS PRN
Status: DISCONTINUED | OUTPATIENT
Start: 2023-06-24 | End: 2023-06-25 | Stop reason: HOSPADM

## 2023-06-24 RX ORDER — CETIRIZINE HYDROCHLORIDE 10 MG/1
10 TABLET ORAL DAILY
Status: DISCONTINUED | OUTPATIENT
Start: 2023-06-24 | End: 2023-06-25 | Stop reason: HOSPADM

## 2023-06-24 RX ORDER — HYDROCODONE BITARTRATE AND ACETAMINOPHEN 5; 325 MG/1; MG/1
1 TABLET ORAL EVERY 4 HOURS PRN
Status: DISCONTINUED | OUTPATIENT
Start: 2023-06-24 | End: 2023-06-25 | Stop reason: HOSPADM

## 2023-06-24 RX ORDER — TALC
6 POWDER (GRAM) TOPICAL NIGHTLY PRN
Status: DISCONTINUED | OUTPATIENT
Start: 2023-06-24 | End: 2023-06-25 | Stop reason: HOSPADM

## 2023-06-24 RX ORDER — METOPROLOL SUCCINATE 50 MG/1
100 TABLET, EXTENDED RELEASE ORAL 2 TIMES DAILY
Status: DISCONTINUED | OUTPATIENT
Start: 2023-06-24 | End: 2023-06-25 | Stop reason: HOSPADM

## 2023-06-24 RX ORDER — ONDANSETRON 2 MG/ML
4 INJECTION INTRAMUSCULAR; INTRAVENOUS EVERY 8 HOURS PRN
Status: DISCONTINUED | OUTPATIENT
Start: 2023-06-24 | End: 2023-06-25 | Stop reason: HOSPADM

## 2023-06-24 RX ORDER — INSULIN ASPART 100 [IU]/ML
1-10 INJECTION, SOLUTION INTRAVENOUS; SUBCUTANEOUS
Status: DISCONTINUED | OUTPATIENT
Start: 2023-06-24 | End: 2023-06-25 | Stop reason: HOSPADM

## 2023-06-24 RX ORDER — RANOLAZINE 500 MG/1
500 TABLET, EXTENDED RELEASE ORAL 2 TIMES DAILY
Status: DISCONTINUED | OUTPATIENT
Start: 2023-06-24 | End: 2023-06-25 | Stop reason: HOSPADM

## 2023-06-24 RX ORDER — LISINOPRIL 10 MG/1
10 TABLET ORAL 2 TIMES DAILY
Status: DISCONTINUED | OUTPATIENT
Start: 2023-06-24 | End: 2023-06-25 | Stop reason: HOSPADM

## 2023-06-24 RX ORDER — NAPROXEN SODIUM 220 MG/1
81 TABLET, FILM COATED ORAL DAILY
Status: DISCONTINUED | OUTPATIENT
Start: 2023-06-24 | End: 2023-06-25 | Stop reason: HOSPADM

## 2023-06-24 RX ORDER — HYDROCODONE BITARTRATE AND ACETAMINOPHEN 10; 325 MG/1; MG/1
1 TABLET ORAL EVERY 4 HOURS PRN
Status: DISCONTINUED | OUTPATIENT
Start: 2023-06-24 | End: 2023-06-25 | Stop reason: HOSPADM

## 2023-06-24 RX ORDER — LOPERAMIDE HYDROCHLORIDE 2 MG/1
2 CAPSULE ORAL
Status: DISCONTINUED | OUTPATIENT
Start: 2023-06-24 | End: 2023-06-25 | Stop reason: HOSPADM

## 2023-06-24 RX ORDER — GABAPENTIN 300 MG/1
600 CAPSULE ORAL 3 TIMES DAILY
Status: DISCONTINUED | OUTPATIENT
Start: 2023-06-24 | End: 2023-06-25 | Stop reason: HOSPADM

## 2023-06-24 RX ORDER — ISOSORBIDE MONONITRATE 30 MG/1
30 TABLET, EXTENDED RELEASE ORAL DAILY
Status: DISCONTINUED | OUTPATIENT
Start: 2023-06-24 | End: 2023-06-24

## 2023-06-24 RX ADMIN — LISINOPRIL 10 MG: 10 TABLET ORAL at 08:06

## 2023-06-24 RX ADMIN — RANOLAZINE 500 MG: 500 TABLET, EXTENDED RELEASE ORAL at 10:06

## 2023-06-24 RX ADMIN — LISINOPRIL 10 MG: 10 TABLET ORAL at 10:06

## 2023-06-24 RX ADMIN — PRAVASTATIN SODIUM 20 MG: 20 TABLET ORAL at 10:06

## 2023-06-24 RX ADMIN — NITROGLYCERIN 1 INCH: 20 OINTMENT TOPICAL at 02:06

## 2023-06-24 RX ADMIN — ONDANSETRON 8 MG: 4 TABLET, ORALLY DISINTEGRATING ORAL at 10:06

## 2023-06-24 RX ADMIN — CETIRIZINE HYDROCHLORIDE 10 MG: 10 TABLET, FILM COATED ORAL at 09:06

## 2023-06-24 RX ADMIN — DOXYCYCLINE HYCLATE 100 MG: 100 CAPSULE ORAL at 10:06

## 2023-06-24 RX ADMIN — DOXYCYCLINE HYCLATE 100 MG: 100 CAPSULE ORAL at 09:06

## 2023-06-24 RX ADMIN — Medication 6 MG: at 10:06

## 2023-06-24 RX ADMIN — INSULIN DETEMIR 40 UNITS: 100 INJECTION, SOLUTION SUBCUTANEOUS at 10:06

## 2023-06-24 RX ADMIN — APIXABAN 5 MG: 5 TABLET, FILM COATED ORAL at 10:06

## 2023-06-24 RX ADMIN — GABAPENTIN 600 MG: 300 CAPSULE ORAL at 08:06

## 2023-06-24 RX ADMIN — METOPROLOL SUCCINATE 100 MG: 50 TABLET, FILM COATED, EXTENDED RELEASE ORAL at 10:06

## 2023-06-24 RX ADMIN — NITROGLYCERIN 1 INCH: 20 OINTMENT TOPICAL at 07:06

## 2023-06-24 RX ADMIN — APIXABAN 5 MG: 5 TABLET, FILM COATED ORAL at 08:06

## 2023-06-24 RX ADMIN — ISOSORBIDE MONONITRATE 30 MG: 30 TABLET, EXTENDED RELEASE ORAL at 09:06

## 2023-06-24 RX ADMIN — GABAPENTIN 600 MG: 300 CAPSULE ORAL at 02:06

## 2023-06-24 RX ADMIN — GABAPENTIN 600 MG: 300 CAPSULE ORAL at 10:06

## 2023-06-24 RX ADMIN — METOPROLOL SUCCINATE 100 MG: 50 TABLET, FILM COATED, EXTENDED RELEASE ORAL at 08:06

## 2023-06-24 RX ADMIN — INSULIN ASPART 10 UNITS: 100 INJECTION, SOLUTION INTRAVENOUS; SUBCUTANEOUS at 04:06

## 2023-06-24 RX ADMIN — HYDROCODONE BITARTRATE AND ACETAMINOPHEN 1 TABLET: 10; 325 TABLET ORAL at 10:06

## 2023-06-24 RX ADMIN — INSULIN ASPART 4 UNITS: 100 INJECTION, SOLUTION INTRAVENOUS; SUBCUTANEOUS at 10:06

## 2023-06-24 RX ADMIN — INSULIN DETEMIR 40 UNITS: 100 INJECTION, SOLUTION SUBCUTANEOUS at 02:06

## 2023-06-24 NOTE — SUBJECTIVE & OBJECTIVE
Past Medical History:   Diagnosis Date    Anticoagulant long-term use     Atrial fibrillation 2018    BPH (benign prostatic hyperplasia)     Cataract     COPD (chronic obstructive pulmonary disease)     Coronary artery disease     stents    CVD (cardiovascular disease)     Diabetes mellitus     Erythema multiforme     AKA Denton Edmondson Syndrome    Hypertension     Infected incision     MI (myocardial infarction)     per pt he has had 4     ROSAMARIA on CPAP     RLS (restless legs syndrome)        Past Surgical History:   Procedure Laterality Date    CORONARY ANGIOGRAPHY INCLUDING BYPASS GRAFTS WITH CATHETERIZATION OF LEFT HEART N/A 9/1/2022    Procedure: ANGIOGRAM, CORONARY, INCLUDING BYPASS GRAFT, WITH LEFT HEART CATHETERIZATION;  Surgeon: Paul Botello MD;  Location: [facility] CATH/EP LAB;  Service: Cardiology;  Laterality: N/A;    CORONARY ARTERY BYPASS GRAFT (CABG) N/A 4/7/2020    Procedure: CORONARY ARTERY BYPASS GRAFT (CABG)- Excision of left atrial appendage;  Surgeon: Doug Solitario MD;  Location: [facility];  Service: Cardiothoracic;  Laterality: N/A;    CORONARY STENT PLACEMENT      WILSON MAZE PROCEDURE N/A 4/7/2020    Procedure: WILSON MAZE PROCEDURE- Attempted;  Surgeon: Doug Solitario MD;  Location: [facility];  Service: Cardiothoracic;  Laterality: N/A;    CYSTOSCOPY N/A 12/10/2018    Procedure: CYSTOSCOPY;  Surgeon: Khushboo Abreu MD;  Location: [facility] OR;  Service: Urology;  Laterality: N/A;    ENDOSCOPIC HARVEST OF VEIN Left 4/7/2020    Procedure: HARVEST-VEIN-ENDOVASCULAR;  Surgeon: Doug Solitario MD;  Location: [facility] OR;  Service: Cardiothoracic;  Laterality: Left;    LEFT HEART CATHETERIZATION Left 3/17/2020    Procedure: CATHETERIZATION, HEART, LEFT;  Surgeon: Tyree Walters MD;  Location: [facility] CATH/EP LAB;  Service: Cardiology;  Laterality: Left;    STERNAL WIRES REMOVAL N/A 6/8/2020    Procedure: REMOVAL, STERNAL WIRE;  Surgeon: Doug Solitario MD;  Location: [facility] OR;  Service:  "Peripheral Vascular;  Laterality: N/A;    ULTRASOUND OF PROSTATE FOR VOLUME DETERMINATION  12/10/2018    Procedure: ULTRASOUND, PROSTATE, FOR VOLUME DETERMINATION;  Surgeon: Khushboo Abreu MD;  Location: [facility] OR;  Service: Urology;;       Review of patient's allergies indicates:   Allergen Reactions    Pesticide Dermatitis and Rash       No current facility-administered medications on file prior to encounter.     Current Outpatient Medications on File Prior to Encounter   Medication Sig    apixaban (ELIQUIS) 5 mg Tab Take 5 mg by mouth 2 (two) times daily.    aspirin 81 MG Chew Take 81 mg by mouth once daily.     cetirizine (ZYRTEC) 10 MG tablet Take 1 tablet (10 mg total) by mouth once daily.    clonazePAM (KLONOPIN) 1 MG tablet Take 1 mg by mouth 2 (two) times a day.    cyclobenzaprine (FLEXERIL) 10 MG tablet Take 10 mg by mouth once daily.    gabapentin (NEURONTIN) 600 MG tablet Take 600 mg by mouth 3 (three) times daily.     HYDROcodone-acetaminophen (NORCO) 5-325 mg per tablet Take 1 tablet by mouth every 6 (six) hours as needed for Pain.    isosorbide mononitrate (IMDUR) 30 MG 24 hr tablet Take 1 tablet (30 mg total) by mouth once daily.    JARDIANCE 25 mg tablet Take 25 mg by mouth once daily.    LANTUS SOLOSTAR U-100 INSULIN glargine 100 units/mL (3mL) SubQ pen Inject 80 Units into the skin 2 (two) times daily. Changed to 80 units BID 2months ago    lisinopriL (PRINIVIL,ZESTRIL) 20 MG tablet Take 10 mg by mouth 2 (two) times daily.    metFORMIN (GLUCOPHAGE) 1000 MG tablet Take 1,000 mg by mouth 2 (two) times daily with meals.     metoprolol succinate (TOPROL-XL) 100 MG 24 hr tablet Take 100 mg by mouth 2 (two) times daily.    nitroGLYCERIN (NITROSTAT) 0.4 MG SL tablet Place 0.4 mg under the tongue every 5 (five) minutes as needed for Chest pain.    oxyCODONE-acetaminophen (PERCOCET)  mg per tablet Take 1-2 tablets by mouth daily as needed.    pen needle, diabetic 31 gauge x 1/4" Ndle USE 1 " TWICE DAILY FOR BLOOD SUGAR GREATER THAN 200    pioglitazone (ACTOS) 15 MG tablet Take 15 mg by mouth once daily.    rimegepant (NURTEC) 75 mg odt Take 1 tablet by mouth as needed.    simvastatin (ZOCOR) 10 MG tablet Take 10 mg by mouth once daily.    tamsulosin (FLOMAX) 0.4 mg Cap Take 1 capsule (0.4 mg total) by mouth every evening.    ticagrelor (BRILINTA) 90 mg tablet Take 1 tablet (90 mg total) by mouth 2 (two) times daily.     Family History       Problem Relation (Age of Onset)    Cancer Father    Diabetes Mother    Heart disease Mother    Hyperlipidemia Father          Tobacco Use    Smoking status: Every Day     Packs/day: 0.30     Years: 30.00     Pack years: 9.00     Types: Cigarettes     Last attempt to quit: 4/4/2020     Years since quitting: 3.2    Smokeless tobacco: Never    Tobacco comments:     just under a pack a day   Substance and Sexual Activity    Alcohol use: Not Currently    Drug use: Yes     Frequency: 1.0 times per week     Types: Marijuana    Sexual activity: Not Currently     Review of Systems   HENT:  Negative for congestion and sore throat.    Eyes:  Negative for pain, discharge, redness and itching.   Respiratory:  Positive for shortness of breath. Negative for cough and wheezing.    Cardiovascular:  Positive for chest pain. Negative for palpitations and leg swelling.   Gastrointestinal:  Negative for abdominal distention and abdominal pain.   Genitourinary:  Negative for decreased urine volume, difficulty urinating, dysuria, flank pain, frequency, hematuria and urgency.   Musculoskeletal:  Negative for back pain, gait problem, joint swelling, myalgias and neck pain.   Skin:  Negative for rash.        Left axilla abscess   Neurological:  Negative for dizziness, syncope, speech difficulty, weakness, light-headedness, numbness and headaches.   Hematological:  Does not bruise/bleed easily.   Psychiatric/Behavioral:  Negative for agitation, confusion, hallucinations, self-injury, sleep  disturbance and suicidal ideas. The patient is not nervous/anxious and is not hyperactive.    Objective:     Vital Signs (Most Recent):  Temp: 99.3 °F (37.4 °C) (06/23/23 1823)  Pulse: 78 (06/23/23 2233)  Resp: 17 (06/23/23 2213)  BP: 128/67 (06/23/23 2233)  SpO2: 95 % (06/23/23 2233) Vital Signs (24h Range):  Temp:  [99.3 °F (37.4 °C)] 99.3 °F (37.4 °C)  Pulse:  [78-92] 78  Resp:  [17-19] 17  SpO2:  [95 %-97 %] 95 %  BP: (128-158)/(67-92) 128/67     Weight: 117.9 kg (260 lb)  Body mass index is 38.4 kg/m².     Physical Exam  Vitals and nursing note reviewed.   Constitutional:       General: He is not in acute distress.     Appearance: Normal appearance. He is not toxic-appearing.   HENT:      Head: Normocephalic.      Right Ear: Tympanic membrane, ear canal and external ear normal. There is no impacted cerumen.      Left Ear: Tympanic membrane, ear canal and external ear normal. There is no impacted cerumen.      Nose: Nose normal. No congestion or rhinorrhea.      Mouth/Throat:      Mouth: Mucous membranes are moist.      Pharynx: Oropharynx is clear. No oropharyngeal exudate or posterior oropharyngeal erythema.   Eyes:      General: No scleral icterus.        Right eye: No discharge.         Left eye: No discharge.      Extraocular Movements: Extraocular movements intact.      Conjunctiva/sclera: Conjunctivae normal.      Pupils: Pupils are equal, round, and reactive to light.   Neck:      Vascular: No carotid bruit.   Cardiovascular:      Rate and Rhythm: Normal rate and regular rhythm.      Pulses: Normal pulses.      Heart sounds: No murmur heard.    No friction rub. No gallop.   Pulmonary:      Effort: Pulmonary effort is normal. No respiratory distress.      Breath sounds: Normal breath sounds. No stridor. No wheezing, rhonchi or rales.   Chest:      Chest wall: No tenderness.   Abdominal:      General: Abdomen is flat. Bowel sounds are normal. There is no distension.      Palpations: Abdomen is soft. There  is no mass.      Tenderness: There is no abdominal tenderness. There is no right CVA tenderness, left CVA tenderness, guarding or rebound.      Hernia: No hernia is present.   Genitourinary:     Rectum: Normal.   Musculoskeletal:         General: No swelling, tenderness, deformity or signs of injury. Normal range of motion.      Cervical back: Normal range of motion and neck supple. No rigidity or tenderness.      Right lower leg: No edema.      Left lower leg: No edema.   Lymphadenopathy:      Cervical: No cervical adenopathy.   Skin:     General: Skin is warm and dry.      Capillary Refill: Capillary refill takes 2 to 3 seconds.      Coloration: Skin is not jaundiced or pale.      Findings: No bruising, erythema, lesion or rash.      Comments: Left axilla abscess self adamaris and nurse express more pus.    Neurological:      General: No focal deficit present.      Mental Status: He is alert and oriented to person, place, and time. Mental status is at baseline.      Cranial Nerves: No cranial nerve deficit.      Sensory: No sensory deficit.      Motor: No weakness.   Psychiatric:         Mood and Affect: Mood normal.         Behavior: Behavior normal.         Thought Content: Thought content normal.         Judgment: Judgment normal.            CRANIAL NERVES     CN III, IV, VI   Pupils are equal, round, and reactive to light.     Significant Labs: All pertinent labs within the past 24 hours have been reviewed.  Recent Lab Results         06/23/23 2052        Albumin 3.8       Alkaline Phosphatase 56       ALT 29       Anion Gap 11       AST 23       Baso # 0.08       Basophil % 0.7       BILIRUBIN TOTAL 0.8  Comment: For infants and newborns, interpretation of results should be based  on gestational age, weight and in agreement with clinical  observations.    Premature Infant recommended reference ranges:  Up to 24 hours.............<8.0 mg/dL  Up to 48 hours............<12.0 mg/dL  3-5 days..................<15.0  mg/dL  6-29 days.................<15.0 mg/dL         BNP 33  Comment: Values of less than 100 pg/ml are consistent with non-CHF populations.       BUN 10       Calcium 9.2       Chloride 99       CO2 25       Creatinine 1.1       Differential Method Automated       eGFR >60.0       Eos # 0.2       Eosinophil % 1.7       Glucose 238       Gran # (ANC) 7.9       Gran % 67.6       Hematocrit 47.5       Hemoglobin 16.4       Immature Grans (Abs) 0.05  Comment: Mild elevation in immature granulocytes is non specific and   can be seen in a variety of conditions including stress response,   acute inflammation, trauma and pregnancy. Correlation with other   laboratory and clinical findings is essential.         Immature Granulocytes 0.4       Lymph # 2.7       Lymph % 23.5       Magnesium 1.6       MCH 32.0       MCHC 34.5       MCV 93       Mono # 0.7       Mono % 6.1       MPV 10.0       nRBC 0       Platelets 193       Potassium 3.8       PROTEIN TOTAL 7.1       RBC 5.13       RDW 13.2       Sodium 135       Troponin I High Sensitivity 8.9  Comment: Troponin results differ between methods. Do not use   results between Troponin methods interchangeably.    Alkaline Phospatase levels above 400 U/L may   cause false positive results.    Access hsTnI should not be used for patients taking   Asfotase ilana (Strensiq).         WBC 11.64               Significant Imaging: I have reviewed all pertinent imaging results/findings within the past 24 hours.

## 2023-06-24 NOTE — PLAN OF CARE
[facility]  Initial Discharge Assessment       Primary Care Provider: Venkata Mclaughlin MD    Admission Diagnosis: Chest pain [R07.9]    Admission Date: 6/23/2023  Expected Discharge Date: TBD       Payor: MEDICAID / [facility] / Product Type: Managed Medicaid /     Extended Emergency Contact Information  Primary Emergency Contact: TIAGO BAY  Mobile Phone: [phone]  Relation: Sister  Secondary Emergency Contact: Magdaleno Chi  Home Phone: [phone]  Mobile Phone: [phone]  Relation: Son   needed? No    Discharge Plan A: Home  Discharge Plan B: Home      [facility] [address] [place], [place] - [address]  [address] [facility] [place] [facility] [place] [address]  Phone: [phone] Fax: [phone]    [facility]. [facility] [address] [address]  Phone: [phone] Fax: [phone]    [facility] [address]  [address]  [facility] [address]  Phone: [phone] Fax: [phone]      Initial Assessment (most recent)       Adult Discharge Assessment - 06/24/23 1534          Discharge Assessment    Assessment Type Discharge Planning Assessment     Confirmed/corrected address, phone number and insurance Yes     Confirmed Demographics Correct on Facesheet     Source of Information health record;patient     Communicated PEDRO with patient/caregiver Date not available/Unable to determine     Reason For Admission Chest pain     People in Home alone     Facility Arrived From: home     Do you expect to return to your current living situation? Yes     Do you have help at home or someone to help you manage your care at home? No     Prior to hospitilization cognitive status: Alert/Oriented     Current cognitive status: Alert/Oriented     Equipment Currently Used at Home none     Readmission within 30 days? No      Patient currently being followed by outpatient case management? No     Do you currently have service(s) that help you manage your care at home? No     Do you take prescription medications? Yes     Do you have prescription coverage? Yes     Coverage Payor:  MEDICAID - [facility] ([facility])     Do you have any problems affording any of your prescribed medications? No     Is the patient taking medications as prescribed? yes     Are you on dialysis? No     Do you take coumadin? No     Discharge Plan A Home     Discharge Plan B Home     DME Needed Upon Discharge  none

## 2023-06-24 NOTE — NURSING
Pt running a low grade fever, denies aches and pains. Stated he does not feel like he has a fever. Refused tylenol. MD notified.
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
## 2023-06-24 NOTE — ED PROVIDER NOTES
"Encounter Date: 6/23/2023       History     Chief Complaint   Patient presents with    Abscess    Chest Pain     Pt has abscess under his left arm pit, pt states he had to "eat" nitro Wednesday, Thursday and two today. He is concerned the staff from the abscess is affecting his heart since he hasn't had chest pains since his double bypass 3 years ago.      Magdaleno Cunningham is a 57 year old male with pmh atrial fibrillation, BPH, COPD, CAD, CVD, DM, HTN, MI, CABG presenting to the ED with a 3 day history of intermittent chest pain. He states the pain has occurred both at rest and with exertion and has been relieved with nitroglycerin each time. He had two episodes today with associated shortness of breath. He also reports an abscess to the left axilla that has been draining purulent fluid. He has had no fever, leg swelling.     Review of patient's allergies indicates:   Allergen Reactions    Pesticide Dermatitis and Rash     Past Medical History:   Diagnosis Date    Anticoagulant long-term use     Atrial fibrillation 2018    BPH (benign prostatic hyperplasia)     Cataract     COPD (chronic obstructive pulmonary disease)     Coronary artery disease     stents    CVD (cardiovascular disease)     Diabetes mellitus     Erythema multiforme     AKA Denton Edmondson Syndrome    Hypertension     Infected incision     MI (myocardial infarction)     per pt he has had 4     ROSAMARIA on CPAP     RLS (restless legs syndrome)      Past Surgical History:   Procedure Laterality Date    CORONARY ANGIOGRAPHY INCLUDING BYPASS GRAFTS WITH CATHETERIZATION OF LEFT HEART N/A 9/1/2022    Procedure: ANGIOGRAM, CORONARY, INCLUDING BYPASS GRAFT, WITH LEFT HEART CATHETERIZATION;  Surgeon: Paul Botello MD;  Location: [facility] CATH/EP LAB;  Service: Cardiology;  Laterality: N/A;    CORONARY ARTERY BYPASS GRAFT (CABG) N/A 4/7/2020    Procedure: CORONARY ARTERY BYPASS GRAFT (CABG)- Excision of left atrial appendage;  Surgeon: Doug Solitario MD; "  Location: [facility] OR;  Service: Cardiothoracic;  Laterality: N/A;    CORONARY STENT PLACEMENT      WILSON MAZE PROCEDURE N/A 4/7/2020    Procedure: WILSON MAZE PROCEDURE- Attempted;  Surgeon: Doug Solitario MD;  Location: [facility] OR;  Service: Cardiothoracic;  Laterality: N/A;    CYSTOSCOPY N/A 12/10/2018    Procedure: CYSTOSCOPY;  Surgeon: Khushboo Abreu MD;  Location: [facility] OR;  Service: Urology;  Laterality: N/A;    ENDOSCOPIC HARVEST OF VEIN Left 4/7/2020    Procedure: HARVEST-VEIN-ENDOVASCULAR;  Surgeon: Doug Solitario MD;  Location: [facility] OR;  Service: Cardiothoracic;  Laterality: Left;    LEFT HEART CATHETERIZATION Left 3/17/2020    Procedure: CATHETERIZATION, HEART, LEFT;  Surgeon: Tyree Walters MD;  Location: [facility] CATH/EP LAB;  Service: Cardiology;  Laterality: Left;    STERNAL WIRES REMOVAL N/A 6/8/2020    Procedure: REMOVAL, STERNAL WIRE;  Surgeon: Doug Solitario MD;  Location: [facility] OR;  Service: Peripheral Vascular;  Laterality: N/A;    ULTRASOUND OF PROSTATE FOR VOLUME DETERMINATION  12/10/2018    Procedure: ULTRASOUND, PROSTATE, FOR VOLUME DETERMINATION;  Surgeon: Khushboo Abreu MD;  Location: [facility];  Service: Urology;;     Family History   Problem Relation Age of Onset    Heart disease Mother     Diabetes Mother     Hyperlipidemia Father     Cancer Father      Social History     Tobacco Use    Smoking status: Every Day     Packs/day: 0.30     Years: 30.00     Pack years: 9.00     Types: Cigarettes     Last attempt to quit: 4/4/2020     Years since quitting: 3.2    Smokeless tobacco: Never    Tobacco comments:     just under a pack a day   Substance Use Topics    Alcohol use: Not Currently    Drug use: Yes     Frequency: 1.0 times per week     Types: Marijuana     Review of Systems   Constitutional:  Negative for fever.   HENT:  Negative for sore throat.    Respiratory:  Positive for shortness of breath.    Cardiovascular:  Positive for chest pain. Negative for leg  swelling.   Gastrointestinal:  Negative for nausea.   Genitourinary:  Negative for dysuria.   Musculoskeletal:  Negative for back pain.   Skin:  Positive for wound (left axilla). Negative for rash.   Neurological:  Negative for weakness.   Hematological:  Does not bruise/bleed easily.     Physical Exam     Initial Vitals [06/23/23 1823]   BP Pulse Resp Temp SpO2   (!) 158/92 92 18 99.3 °F (37.4 °C) 97 %      MAP       --         Physical Exam    Nursing note and vitals reviewed.  Constitutional: He appears well-developed and well-nourished. He is not diaphoretic. No distress.   HENT:   Head: Normocephalic and atraumatic.   Mouth/Throat: Oropharynx is clear and moist.   Eyes: Conjunctivae are normal.   Neck: Neck supple.   Cardiovascular:  Normal rate, regular rhythm, normal heart sounds and intact distal pulses.     Exam reveals no gallop and no friction rub.       No murmur heard.  Pulmonary/Chest: Breath sounds normal. He has no wheezes. He has no rhonchi. He has no rales.   Healed midsternal scar    Abdominal: Abdomen is soft. He exhibits no distension. There is no abdominal tenderness.   Musculoskeletal:         General: Normal range of motion.      Cervical back: Neck supple.     Neurological: He is alert and oriented to person, place, and time.   Skin: Abscess noted. No rash noted. No erythema.            ED Course   Procedures  Labs Reviewed   CBC W/ AUTO DIFFERENTIAL - Abnormal; Notable for the following components:       Result Value    MCH 32.0 (*)     Gran # (ANC) 7.9 (*)     Immature Grans (Abs) 0.05 (*)     All other components within normal limits   COMPREHENSIVE METABOLIC PANEL - Abnormal; Notable for the following components:    Sodium 135 (*)     Glucose 238 (*)     All other components within normal limits   MAGNESIUM   TROPONIN I HIGH SENSITIVITY   B-TYPE NATRIURETIC PEPTIDE   TROPONIN I HIGH SENSITIVITY          Imaging Results              X-Ray Chest AP Portable (In process)                       Medications - No data to display     Additional MDM:   Heart Score:    History:          Highly suspicious.  ECG:             Normal  Age:               45-65 years  Risk factors: >= 3 risk factors or history of atherosclerotic disease  Troponin:       Less than or equal to normal limit  Final Score: 5    APC / Resident Notes:   MDM    Patient presents for emergent evaluation of acute chest pain that poses a threat to life and/or bodily function.    In the ED patient found to have acute chest pain, left axillary abscess.    I ordered labs and personally reviewed them.  Labs significant for the followin23 20:52  WBC: 11.64  RBC: 5.13  Hemoglobin: 16.4  Hematocrit: 47.5  23 20:52  Sodium: 135 (L)  Potassium: 3.8  Chloride: 99  CO2: 25  Anion Gap: 11  BUN: 10  Creatinine: 1.1  eGFR: >60.0  Glucose: 238 (H)  Calcium: 9.2  Magnesium: 1.6  Alkaline Phosphatase: 56  PROTEIN TOTAL: 7.1  Albumin: 3.8  BILIRUBIN TOTAL: 0.8  AST: 23  ALT: 29  BNP: 33  Troponin I High Sensitivity: 8.9      I ordered EKG and personally reviewed it.  EKG significant for normal sinus rhythm. Possible left atrial enlargement, right axis deviation, cannot rule out anterior infarct.        Admit MDM  I discussed the patient presentation labs, ekg, X-rays, CT findings with the consultant for hospital medicine (speciality).    Patient was managed in the ED with PO doxycycline for abscess.      The patient has a heart score of 5 with prior history of CABG. He is a high risk chest pain patient that will require admission for further cardiac workup to r/o ACS. He will be unabl to have outpatient stress test in the next 48 hours due to ED evaluation on Friday night.   He also has an abscess to the left axilla that was spontaneously draining on my initial evaluation. I was able to compress the abscess with large amount of purulent drainage. Will give doxycycline since he has had some elevated creatinines in the past and will defer to  [facility] medicine to continue or alter.                       Clinical Impression:   Final diagnoses:  [R07.9] Chest pain, unspecified type (Primary)  [L02.412] Abscess of left axilla               Whitney Harris NP  06/23/23 1674

## 2023-06-24 NOTE — ASSESSMENT & PLAN NOTE
Chest pain X 3 days  Continuous cardiac monitoring  O2 sat monitoring  O2 BNC as needed to maintain O2 sats >92  Chest pain relieved by Sl NTG   Hx CABG, coronary stents  Consult CV   NTG paste  Labs reviewed: BNP, Trop, Mg, CBC, CMP  EKG pending

## 2023-06-24 NOTE — HPI
58 yo presents with c/o chest pain. Reports having chest pain X 3 days. Reports SOB is associated with chest pain. Pain does not radiate. No nausea, vomiting, or diaphoresis associated with pain. Patient reports he took 1 SL NTG on Wednesday, 1 on Thursday, and 2 on today. No peripheral edema. Patients reports no chest pain or SOB at this time. Patient reports he has had 4 coronary stents, 3 angioplasty, and CABG. Patient c/o abscess to left axilla and concerned about staph.

## 2023-06-25 ENCOUNTER — CLINICAL SUPPORT (OUTPATIENT)
Dept: CARDIOLOGY | Facility: HOSPITAL | Age: 58
End: 2023-06-25
Attending: INTERNAL MEDICINE
Payer: MEDICAID

## 2023-06-25 VITALS — HEIGHT: 69 IN | WEIGHT: 261 LBS | BODY MASS INDEX: 38.66 KG/M2

## 2023-06-25 VITALS
WEIGHT: 261 LBS | SYSTOLIC BLOOD PRESSURE: 107 MMHG | OXYGEN SATURATION: 97 % | BODY MASS INDEX: 38.66 KG/M2 | TEMPERATURE: 98 F | HEART RATE: 70 BPM | HEIGHT: 69 IN | RESPIRATION RATE: 16 BRPM | DIASTOLIC BLOOD PRESSURE: 58 MMHG

## 2023-06-25 PROBLEM — R07.9 CHEST PAIN: Status: RESOLVED | Noted: 2022-09-02 | Resolved: 2023-06-25

## 2023-06-25 LAB
AORTIC ROOT ANNULUS: 3.7 CM
AORTIC VALVE CUSP SEPERATION: 2.2 CM
AV INDEX (PROSTH): 0.72
AV MEAN GRADIENT: 5 MMHG
AV PEAK GRADIENT: 9 MMHG
AV VALVE AREA: 3 CM2
AV VELOCITY RATIO: 0.76
BSA FOR ECHO PROCEDURE: 2.4 M2
CV ECHO LV RWT: 0.48 CM
DOP CALC AO PEAK VEL: 1.53 M/S
DOP CALC AO VTI: 30.3 CM
DOP CALC LVOT AREA: 4.2 CM2
DOP CALC LVOT DIAMETER: 2.3 CM
DOP CALC LVOT PEAK VEL: 1.17 M/S
DOP CALC LVOT STROKE VOLUME: 90.94 CM3
DOP CALC MV VTI: 30.8 CM
DOP CALCLVOT PEAK VEL VTI: 21.9 CM
E WAVE DECELERATION TIME: 206 MSEC
E/A RATIO: 0.81
E/E' RATIO: 6.25 M/S
ECHO LV POSTERIOR WALL: 1.26 CM (ref 0.6–1.1)
EJECTION FRACTION: 60 %
FRACTIONAL SHORTENING: 26 % (ref 28–44)
GLUCOSE SERPL-MCNC: 302 MG/DL (ref 70–110)
GLUCOSE SERPL-MCNC: 317 MG/DL (ref 70–110)
INTERVENTRICULAR SEPTUM: 0.99 CM (ref 0.6–1.1)
LA MAJOR: 4.6 CM
LA MINOR: 3.6 CM
LA WIDTH: 3 CM
LEFT ATRIUM SIZE: 4.4 CM
LEFT ATRIUM VOLUME INDEX MOD: 20 ML/M2
LEFT ATRIUM VOLUME INDEX: 19.6 ML/M2
LEFT ATRIUM VOLUME MOD: 46.2 CM3
LEFT ATRIUM VOLUME: 45.32 CM3
LEFT INTERNAL DIMENSION IN SYSTOLE: 3.89 CM (ref 2.1–4)
LEFT VENTRICLE DIASTOLIC VOLUME INDEX: 85.71 ML/M2
LEFT VENTRICLE DIASTOLIC VOLUME: 198 ML
LEFT VENTRICLE MASS INDEX: 100 G/M2
LEFT VENTRICLE SYSTOLIC VOLUME INDEX: 40.5 ML/M2
LEFT VENTRICLE SYSTOLIC VOLUME: 93.6 ML
LEFT VENTRICULAR INTERNAL DIMENSION IN DIASTOLE: 5.26 CM (ref 3.5–6)
LEFT VENTRICULAR MASS: 231.92 G
LV LATERAL E/E' RATIO: 5 M/S
LV SEPTAL E/E' RATIO: 8.33 M/S
LVOT MG: 3 MMHG
LVOT MV: 0.76 CM/S
MV MEAN GRADIENT: 2 MMHG
MV PEAK A VEL: 0.93 M/S
MV PEAK E VEL: 0.75 M/S
MV PEAK GRADIENT: 5 MMHG
MV STENOSIS PRESSURE HALF TIME: 60 MS
MV VALVE AREA BY CONTINUITY EQUATION: 2.95 CM2
MV VALVE AREA P 1/2 METHOD: 3.67 CM2
OHS LV EJECTION FRACTION SIMPSONS BIPLANE MOD: 5 %
PISA MRMAX VEL: 4.79 M/S
PISA TR MAX VEL: 1.99 M/S
PV MV: 0.77 M/S
PV PEAK VELOCITY: 0.95 CM/S
RA MAJOR: 4.17 CM
RA WIDTH: 2.75 CM
RIGHT VENTRICULAR END-DIASTOLIC DIMENSION: 2.61 CM
RIGHT VENTRICULAR LENGTH IN DIASTOLE (APICAL 4-CHAMBER VIEW): 64.4 CM
RV MID DIAMA: 1.91 CM
TDI LATERAL: 0.15 M/S
TDI SEPTAL: 0.09 M/S
TDI: 0.12 M/S
TR MAX PG: 16 MMHG

## 2023-06-25 PROCEDURE — 25000003 PHARM REV CODE 250: Performed by: NURSE PRACTITIONER

## 2023-06-25 PROCEDURE — G0378 HOSPITAL OBSERVATION PER HR: HCPCS

## 2023-06-25 PROCEDURE — 94761 N-INVAS EAR/PLS OXIMETRY MLT: CPT

## 2023-06-25 PROCEDURE — 93306 TTE W/DOPPLER COMPLETE: CPT

## 2023-06-25 PROCEDURE — 25000003 PHARM REV CODE 250: Performed by: INTERNAL MEDICINE

## 2023-06-25 RX ORDER — METHOCARBAMOL 500 MG/1
500 TABLET, FILM COATED ORAL NIGHTLY
COMMUNITY
Start: 2023-06-19

## 2023-06-25 RX ORDER — UMECLIDINIUM BROMIDE AND VILANTEROL TRIFENATATE 62.5; 25 UG/1; UG/1
1 POWDER RESPIRATORY (INHALATION) DAILY
Status: ON HOLD | COMMUNITY
End: 2023-06-25 | Stop reason: SDUPTHER

## 2023-06-25 RX ORDER — DOXYCYCLINE 100 MG/1
100 CAPSULE ORAL EVERY 12 HOURS
Qty: 10 CAPSULE | Refills: 0 | Status: SHIPPED | OUTPATIENT
Start: 2023-06-25 | End: 2023-06-30

## 2023-06-25 RX ORDER — EMPAGLIFLOZIN AND METFORMIN HYDROCHLORIDE 12.5; 1 MG/1; MG/1
2 TABLET ORAL DAILY
Status: ON HOLD | COMMUNITY
End: 2023-06-25 | Stop reason: HOSPADM

## 2023-06-25 RX ORDER — RANOLAZINE 500 MG/1
500 TABLET, EXTENDED RELEASE ORAL 2 TIMES DAILY
Qty: 60 TABLET | Refills: 2 | Status: ON HOLD | OUTPATIENT
Start: 2023-06-25 | End: 2023-12-15 | Stop reason: HOSPADM

## 2023-06-25 RX ORDER — ISOSORBIDE MONONITRATE 60 MG/1
60 TABLET, EXTENDED RELEASE ORAL DAILY
Qty: 30 TABLET | Refills: 2 | Status: ON HOLD | OUTPATIENT
Start: 2023-06-26 | End: 2023-12-15 | Stop reason: HOSPADM

## 2023-06-25 RX ORDER — FUROSEMIDE 20 MG/1
20 TABLET ORAL 2 TIMES DAILY
COMMUNITY
End: 2023-12-14

## 2023-06-25 RX ORDER — UMECLIDINIUM BROMIDE AND VILANTEROL TRIFENATATE 62.5; 25 UG/1; UG/1
1 POWDER RESPIRATORY (INHALATION) DAILY
Qty: 60 EACH | Refills: 2 | Status: SHIPPED | OUTPATIENT
Start: 2023-06-25 | End: 2023-09-08

## 2023-06-25 RX ORDER — PANTOPRAZOLE SODIUM 40 MG/1
1 TABLET, DELAYED RELEASE ORAL 2 TIMES DAILY
COMMUNITY
Start: 2023-05-05 | End: 2023-12-14

## 2023-06-25 RX ADMIN — CETIRIZINE HYDROCHLORIDE 10 MG: 10 TABLET, FILM COATED ORAL at 08:06

## 2023-06-25 RX ADMIN — ASPIRIN 81 MG 81 MG: 81 TABLET ORAL at 08:06

## 2023-06-25 RX ADMIN — ISOSORBIDE MONONITRATE 60 MG: 60 TABLET, EXTENDED RELEASE ORAL at 08:06

## 2023-06-25 RX ADMIN — GABAPENTIN 600 MG: 300 CAPSULE ORAL at 08:06

## 2023-06-25 RX ADMIN — APIXABAN 5 MG: 5 TABLET, FILM COATED ORAL at 08:06

## 2023-06-25 RX ADMIN — INSULIN ASPART 8 UNITS: 100 INJECTION, SOLUTION INTRAVENOUS; SUBCUTANEOUS at 12:06

## 2023-06-25 RX ADMIN — INSULIN DETEMIR 40 UNITS: 100 INJECTION, SOLUTION SUBCUTANEOUS at 08:06

## 2023-06-25 RX ADMIN — METOPROLOL SUCCINATE 100 MG: 50 TABLET, FILM COATED, EXTENDED RELEASE ORAL at 08:06

## 2023-06-25 RX ADMIN — DOXYCYCLINE HYCLATE 100 MG: 100 CAPSULE ORAL at 08:06

## 2023-06-25 RX ADMIN — RANOLAZINE 500 MG: 500 TABLET, EXTENDED RELEASE ORAL at 08:06

## 2023-06-25 RX ADMIN — INSULIN ASPART 8 UNITS: 100 INJECTION, SOLUTION INTRAVENOUS; SUBCUTANEOUS at 08:06

## 2023-06-25 RX ADMIN — LISINOPRIL 10 MG: 10 TABLET ORAL at 09:06

## 2023-06-25 NOTE — ASSESSMENT & PLAN NOTE
Chronic, controlled.  Latest blood pressure and vitals reviewed-     Temp:  [98.2 °F (36.8 °C)-99.6 °F (37.6 °C)]   Pulse:  [78-97]   Resp:  [17-24]   BP: (104-145)/(56-88)   SpO2:  [95 %-98 %] .   Home meds for hypertension were reviewed and noted below.   Hypertension Medications             isosorbide mononitrate (IMDUR) 30 MG 24 hr tablet Take 1 tablet (30 mg total) by mouth once daily.    lisinopriL (PRINIVIL,ZESTRIL) 20 MG tablet Take 10 mg by mouth 2 (two) times daily.    metoprolol succinate (TOPROL-XL) 100 MG 24 hr tablet Take 100 mg by mouth 2 (two) times daily.          While in the hospital, will manage blood pressure as follows; Continue home antihypertensive regimen

## 2023-06-25 NOTE — HOSPITAL COURSE
The abscess in the left axilla was spontaneously draining.  It did not have to be punctured with scalpel.  He was put on doxycycline.  Regarding the chest pain: troponin high-sensitivity levels were below 10.  Dr. Allan De Jesus in cardiology saw the patient and recommended echocardiogram to assess function and to look for wall-motion abnormalities.  He advised against going forward with stress testing, the rationale being that a coronary angiogram in the setting of an active skin infection could be dangerous.  The echocardiogram was done, showing good systolic function and lack of any wall-motion abnormalities.  He did not have further chest pain.  In accordance with Dr. De Jesus's instructions, patient was discharged home and told to follow up in his office for stress testing after resolution of the infection.  Additionally, Ranexa was prescribed.  Patient was kept on beta blocker, ACE inhibitor, and ISMN, as well as aspirin and lipid-lowering therapy.  He expressed a desire to quit smoking, and, in the past, he had tried nicotine replacement therapy and cessation programs.  However, he had relapsed after all that.  He declined referral to cessation clinic.  Doxycycline was prescribed for the abscess.    Physical Exam  Constitutional:       General: He is not in acute distress.     Appearance: He is obese. He is not diaphoretic.   HENT:      Mouth/Throat:      Mouth: Mucous membranes are moist.   Eyes:      General: No scleral icterus.        Right eye: No discharge.         Left eye: No discharge.   Neck:      Vascular: No JVD.   Cardiovascular:      Rate and Rhythm: Normal rate and regular rhythm.   Pulmonary:      Effort: Pulmonary effort is normal.      Breath sounds: Normal breath sounds.

## 2023-06-25 NOTE — ASSESSMENT & PLAN NOTE
Cardiologist consulting with us about patient's chest discomfort.  Patient will get echocardiogram.  Cardiologist recommends against coronary angiography at this time while there's a skin infection.  The plan is outpatient stress testing after the infection is gone.

## 2023-06-25 NOTE — PROGRESS NOTES
[facility] Medicine  Progress Note    Patient Name: Magdaleno Cunningham  MRN: [id number]  Patient Class: OP- Observation   Admission Date: 6/23/2023  Length of Stay: 0 days  Attending Physician: Cheikh Curtis MD  Primary Care Provider: Venkata Mclaughlin MD        Subjective:     Principal Problem:Chest pain        HPI:  56 yo presents with c/o chest pain. Reports having chest pain X 3 days. Reports SOB is associated with chest pain. Pain does not radiate. No nausea, vomiting, or diaphoresis associated with pain. Patient reports he took 1 SL NTG on Wednesday, 1 on Thursday, and 2 on today. No peripheral edema. Patients reports no chest pain or SOB at this time. Patient reports he has had 4 coronary stents, 3 angioplasty, and CABG. Patient c/o abscess to left axilla and concerned about staph.       Overview/Hospital Course:  No notes on file    Interval History:  No chest pain currently.  Cardiologist saw him today and recommended echocardiogram.  Patient is on antibiotic for the axillary abscess.    Review of Systems   Constitutional:  Negative for chills and fever.   Respiratory:  Negative for cough, shortness of breath and wheezing.    Cardiovascular:  Negative for chest pain and leg swelling.   Gastrointestinal:  Negative for abdominal pain and nausea.   Objective:     Vital Signs (Most Recent):  Temp: 98.8 °F (37.1 °C) (06/24/23 1935)  Pulse: 82 (06/24/23 1935)  Resp: 18 (06/24/23 1935)  BP: (!) 124/58 (06/24/23 1935)  SpO2: 98 % (06/24/23 1935) Vital Signs (24h Range):  Temp:  [98.2 °F (36.8 °C)-99.6 °F (37.6 °C)] 98.8 °F (37.1 °C)  Pulse:  [78-97] 82  Resp:  [17-24] 18  SpO2:  [95 %-98 %] 98 %  BP: (104-145)/(56-88) 124/58     Weight: 117.9 kg (260 lb)  Body mass index is 38.4 kg/m².    Intake/Output Summary (Last 24 hours) at 6/24/2023 2042  Last data filed at 6/24/2023 1841  Gross per 24 hour   Intake 480 ml   Output --   Net 480 ml         Physical Exam  Vitals reviewed.    Constitutional:       General: He is not in acute distress.     Appearance: He is obese. He is not diaphoretic.   HENT:      Mouth/Throat:      Mouth: Mucous membranes are moist.   Eyes:      General: No scleral icterus.        Right eye: No discharge.         Left eye: No discharge.   Neck:      Vascular: No JVD.   Cardiovascular:      Rate and Rhythm: Normal rate and regular rhythm.   Pulmonary:      Effort: Pulmonary effort is normal.      Breath sounds: Normal breath sounds.   Abdominal:      General: Bowel sounds are normal. There is no distension.      Palpations: Abdomen is soft.      Tenderness: There is no abdominal tenderness.   Skin:     General: Skin is warm.      Findings: Abscess (left axilla.   spontaneously draining.) present. No rash.   Neurological:      Mental Status: He is alert.           Significant Labs: All pertinent labs within the past 24 hours have been reviewed.    Significant Imaging: I have reviewed all pertinent imaging results/findings within the past 24 hours.      Assessment/Plan:      * Chest pain  Cardiologist consulting with us about patient's chest discomfort.  Patient will get echocardiogram.  Cardiologist recommends against coronary angiography at this time while there's a skin infection.  The plan is outpatient stress testing after the infection is gone.  For now, continue beta blocker and long-acting nitrate, aspirin, and statin.  Ranexa started today.        Class 2 obesity due to excess calories with body mass index (BMI) of 38.0 to 38.9 in adult  Body mass index is 38.4 kg/m². Morbid obesity complicates all aspects of disease management from diagnostic modalities to treatment. Weight loss encouraged and health benefits explained to patient.     Coronary artery disease involving native coronary artery of native heart  Cardiologist consulting with us about patient's chest discomfort.  Patient will get echocardiogram.  Cardiologist recommends against coronary angiography  at this time while there's a skin infection.  The plan is outpatient stress testing after the infection is gone.      Hypertension associated with diabetes  Chronic, controlled.  Latest blood pressure and vitals reviewed-     Temp:  [98.2 °F (36.8 °C)-99.6 °F (37.6 °C)]   Pulse:  [78-97]   Resp:  [17-24]   BP: (104-145)/(56-88)   SpO2:  [95 %-98 %] .   Home meds for hypertension were reviewed and noted below.   Hypertension Medications             isosorbide mononitrate (IMDUR) 30 MG 24 hr tablet Take 1 tablet (30 mg total) by mouth once daily.    lisinopriL (PRINIVIL,ZESTRIL) 20 MG tablet Take 10 mg by mouth 2 (two) times daily.    metoprolol succinate (TOPROL-XL) 100 MG 24 hr tablet Take 100 mg by mouth 2 (two) times daily.          While in the hospital, will manage blood pressure as follows; Continue home antihypertensive regimen        Type 2 diabetes mellitus with diabetic arthropathy  Patient's FSGs are uncontrolled due to hyperglycemia on current medication regimen.  Last A1c reviewed-   Lab Results   Component Value Date    HGBA1C 8.8 (H) 08/31/2022      Latest Reference Range & Units 06/24/23 05:40 06/24/23 08:00 06/24/23 10:59 06/24/23 16:18 06/24/23 20:08   POC Glucose 70 - 110  207 (H) 243 (H) 303 (H) 405 (H) 348 (H)     Current correctional scale  Medium  Maintain anti-hyperglycemic dose as follows-   Antihyperglycemics (From admission, onward)    Start     Stop Route Frequency Ordered    06/24/23 1317  insulin aspart U-100 pen 1-10 Units         -- SubQ Before meals & nightly PRN 06/24/23 1217    06/24/23 1230  insulin detemir U-100 (Levemir) pen 40 Units         -- SubQ 2 times daily 06/24/23 1217        Hold Oral hypoglycemics while patient is in the hospital.      VTE Risk Mitigation (From admission, onward)         Ordered     apixaban tablet 5 mg  2 times daily         06/24/23 0626     IP VTE HIGH RISK PATIENT  Once         06/24/23 0626                Discharge Planning   PEDRO:      Code Status:  Full Code   Is the patient medically ready for discharge?:     Reason for patient still in hospital (select all that apply): Patient trending condition, Imaging and Consult recommendations  Discharge Plan A: Home                  Cheikh Curtis MD  Department of Hospital Medicine   formerly [facility]

## 2023-06-25 NOTE — SUBJECTIVE & OBJECTIVE
Interval History:  No chest pain currently.  Cardiologist saw him today and recommended echocardiogram.  Patient is on antibiotic for the axillary abscess.    Review of Systems   Constitutional:  Negative for chills and fever.   Respiratory:  Negative for cough, shortness of breath and wheezing.    Cardiovascular:  Negative for chest pain and leg swelling.   Gastrointestinal:  Negative for abdominal pain and nausea.   Objective:     Vital Signs (Most Recent):  Temp: 98.8 °F (37.1 °C) (06/24/23 1935)  Pulse: 82 (06/24/23 1935)  Resp: 18 (06/24/23 1935)  BP: (!) 124/58 (06/24/23 1935)  SpO2: 98 % (06/24/23 1935) Vital Signs (24h Range):  Temp:  [98.2 °F (36.8 °C)-99.6 °F (37.6 °C)] 98.8 °F (37.1 °C)  Pulse:  [78-97] 82  Resp:  [17-24] 18  SpO2:  [95 %-98 %] 98 %  BP: (104-145)/(56-88) 124/58     Weight: 117.9 kg (260 lb)  Body mass index is 38.4 kg/m².    Intake/Output Summary (Last 24 hours) at 6/24/2023 2042  Last data filed at 6/24/2023 1841  Gross per 24 hour   Intake 480 ml   Output --   Net 480 ml         Physical Exam  Vitals reviewed.   Constitutional:       General: He is not in acute distress.     Appearance: He is obese. He is not diaphoretic.   HENT:      Mouth/Throat:      Mouth: Mucous membranes are moist.   Eyes:      General: No scleral icterus.        Right eye: No discharge.         Left eye: No discharge.   Neck:      Vascular: No JVD.   Cardiovascular:      Rate and Rhythm: Normal rate and regular rhythm.   Pulmonary:      Effort: Pulmonary effort is normal.      Breath sounds: Normal breath sounds.   Abdominal:      General: Bowel sounds are normal. There is no distension.      Palpations: Abdomen is soft.      Tenderness: There is no abdominal tenderness.   Skin:     General: Skin is warm.      Findings: Abscess (left axilla.   spontaneously draining.) present. No rash.   Neurological:      Mental Status: He is alert.           Significant Labs: All pertinent labs within the past 24 hours have  been reviewed.    Significant Imaging: I have reviewed all pertinent imaging results/findings within the past 24 hours.

## 2023-06-25 NOTE — ASSESSMENT & PLAN NOTE
Patient's FSGs are uncontrolled due to hyperglycemia on current medication regimen.  Last A1c reviewed-   Lab Results   Component Value Date    HGBA1C 8.8 (H) 08/31/2022      Latest Reference Range & Units 06/24/23 05:40 06/24/23 08:00 06/24/23 10:59 06/24/23 16:18 06/24/23 20:08   POC Glucose 70 - 110  207 (H) 243 (H) 303 (H) 405 (H) 348 (H)     Current correctional scale  Medium  Maintain anti-hyperglycemic dose as follows-   Antihyperglycemics (From admission, onward)    Start     Stop Route Frequency Ordered    06/24/23 1317  insulin aspart U-100 pen 1-10 Units         -- SubQ Before meals & nightly PRN 06/24/23 1217    06/24/23 1230  insulin detemir U-100 (Levemir) pen 40 Units         -- SubQ 2 times daily 06/24/23 1217        Hold Oral hypoglycemics while patient is in the hospital.

## 2023-06-25 NOTE — DISCHARGE SUMMARY
[facility] Medicine  Discharge Summary      Patient Name: Magdaleno Cunningham  MRN: [id number]  TARA: [id number]  Patient Class: OP- Observation  Admission Date: 6/23/2023  Hospital Length of Stay: 0 days  Discharge Date and Time: 6/25/2023  3:01 PM  Attending Physician: No att. providers found   Discharging Provider: Cheikh Curtis MD  Primary Care Provider: Venkata Mclaughlin MD    Primary Care Team: Networked reference to record PCT     HPI:   58 yo presents with c/o chest pain. Reports having chest pain X 3 days. Reports SOB is associated with chest pain. Pain does not radiate. No nausea, vomiting, or diaphoresis associated with pain. Patient reports he took 1 SL NTG on Wednesday, 1 on Thursday, and 2 on today. No peripheral edema. Patients reports no chest pain or SOB at this time. Patient reports he has had 4 coronary stents, 3 angioplasty, and CABG. Patient c/o abscess to left axilla and concerned about staph.       * No surgery found *      Hospital Course:   The abscess in the left axilla was spontaneously draining.  It did not have to be punctured with scalpel.  He was put on doxycycline.  Regarding the chest pain: troponin high-sensitivity levels were below 10.  Dr. Allan De Jesus in cardiology saw the patient and recommended echocardiogram to assess function and to look for wall-motion abnormalities.  He advised against going forward with stress testing, the rationale being that a coronary angiogram in the setting of an active skin infection could be dangerous.  The echocardiogram was done, showing good systolic function and lack of any wall-motion abnormalities.  He did not have further chest pain.  In accordance with Dr. De Jesus's instructions, patient was discharged home and told to follow up in his office for stress testing after resolution of the infection.  Additionally, Ranexa was prescribed.  Patient was kept on beta blocker, ACE inhibitor, and ISMN, as well as aspirin and  lipid-lowering therapy.  He expressed a desire to quit smoking, and, in the past, he had tried nicotine replacement therapy and cessation programs.  However, he had relapsed after all that.  He declined referral to cessation clinic.  Doxycycline was prescribed for the abscess.    Physical Exam  Constitutional:       General: He is not in acute distress.     Appearance: He is obese. He is not diaphoretic.   HENT:      Mouth/Throat:      Mouth: Mucous membranes are moist.   Eyes:      General: No scleral icterus.        Right eye: No discharge.         Left eye: No discharge.   Neck:      Vascular: No JVD.   Cardiovascular:      Rate and Rhythm: Normal rate and regular rhythm.   Pulmonary:      Effort: Pulmonary effort is normal.      Breath sounds: Normal breath sounds.        Goals of Care Treatment Preferences:  Code Status: Full Code      Consults:   Consults (From admission, onward)        Status Ordering Provider     IP consult to case management  Once        Provider:  (Not yet assigned)    Completed COLLIN DAMON     Inpatient consult to Cardiology  Once        Provider:  (Not yet assigned)    Completed JEFF SOLER            Final Active Diagnoses:    Diagnosis Date Noted POA    Class 2 obesity due to excess calories with body mass index (BMI) of 38.0 to 38.9 in adult [E66.09, Z68.38] 01/09/2022 Not Applicable    Coronary artery disease involving native coronary artery of native heart [I25.10] 03/25/2020 Yes    Hypertension associated with diabetes [E11.59, I15.2]  Yes    Type 2 diabetes mellitus with diabetic arthropathy [E11.618]  Yes      Problems Resolved During this Admission:    Diagnosis Date Noted Date Resolved POA    PRINCIPAL PROBLEM:  Chest pain [R07.9] 09/02/2022 06/25/2023 Yes       Discharged Condition: good    Disposition: Home or Self Care    Follow Up:   Follow-up Information     Allan De Jesus Jr, MD. Schedule an appointment as soon as possible for a visit.    Specialty:  Cardiology  Contact information:  [address]  [address]  [facility]  [place] [id number]  [phone]                       Patient Instructions:      Diet Adult Regular     Order Specific Question Answer Comments   Additional restrictions: Diabetic 1800    Additional restrictions: Low Chol/Sat Fat      Activity as tolerated       Significant Diagnostic Studies:   No major abnormalities.    Pending Diagnostic Studies:     None         Medications:  Reconciled Home Medications:      Medication List      START taking these medications    doxycycline 100 MG Cap  Commonly known as: VIBRAMYCIN  Take 1 capsule (100 mg total) by mouth every 12 (twelve) hours. for 5 days     ranolazine 500 MG Tb12  Commonly known as: RANEXA  Take 1 tablet (500 mg total) by mouth 2 (two) times daily.        CHANGE how you take these medications    isosorbide mononitrate 60 MG 24 hr tablet  Commonly known as: IMDUR  Take 1 tablet (60 mg total) by mouth once daily.  Start taking on: June 26, 2023  What changed:   · medication strength  · how much to take        CONTINUE taking these medications    ANORO ELLIPTA 62.5-25 mcg/actuation Dsdv  Generic drug: umeclidinium-vilanteroL  Inhale 1 puff into the lungs once daily.     apixaban 5 mg Tab  Commonly known as: ELIQUIS  Take 5 mg by mouth 2 (two) times daily.     aspirin 81 MG Chew  Take 81 mg by mouth once daily.     cetirizine 10 MG tablet  Commonly known as: ZYRTEC  Take 1 tablet (10 mg total) by mouth once daily.     clonazePAM 1 MG tablet  Commonly known as: KlonoPIN  Take 1 mg by mouth 2 (two) times daily.     cyclobenzaprine 10 MG tablet  Commonly known as: FLEXERIL  Take 10 mg by mouth once daily.     furosemide 20 MG tablet  Commonly known as: LASIX  Take 20 mg by mouth 2 (two) times daily.     gabapentin 600 MG tablet  Commonly known as: NEURONTIN  Take 600 mg by mouth 3 (three) times daily.     HYDROcodone-acetaminophen 5-325 mg per tablet  Commonly known as:  "NORCO  Take 1 tablet by mouth every 6 (six) hours as needed for Pain.     JARDIANCE 25 mg tablet  Generic drug: empagliflozin  Take 25 mg by mouth once daily.     LANTUS SOLOSTAR U-100 INSULIN glargine 100 units/mL SubQ pen  Generic drug: insulin  Inject 80 Units into the skin 2 (two) times daily. Changed to 80 units BID 2months ago     lisinopriL 20 MG tablet  Commonly known as: PRINIVIL,ZESTRIL  Take 10 mg by mouth 2 (two) times daily.     metFORMIN 1000 MG tablet  Commonly known as: GLUCOPHAGE  Take 1,000 mg by mouth 2 (two) times daily with meals.     methocarbamoL 500 MG Tab  Commonly known as: ROBAXIN  Take 500 mg by mouth every evening.     metoprolol succinate 100 MG 24 hr tablet  Commonly known as: TOPROL-XL  Take 100 mg by mouth 2 (two) times daily.     nitroGLYCERIN 0.4 MG SL tablet  Commonly known as: NITROSTAT  Place 0.4 mg under the tongue every 5 (five) minutes as needed for Chest pain.     NURTEC 75 mg odt  Generic drug: rimegepant  Take 1 tablet by mouth as needed.     oxyCODONE-acetaminophen  mg per tablet  Commonly known as: PERCOCET  Take 1-2 tablets by mouth daily as needed.     pantoprazole 40 MG tablet  Commonly known as: PROTONIX  Take 1 tablet by mouth 2 (two) times a day.     pen needle, diabetic 31 gauge x 1/4" Ndle  USE 1 TWICE DAILY FOR BLOOD SUGAR GREATER THAN 200     pioglitazone 15 MG tablet  Commonly known as: ACTOS  Take 15 mg by mouth once daily.     simvastatin 10 MG tablet  Commonly known as: ZOCOR  Take 10 mg by mouth once daily.     tamsulosin 0.4 mg Cap  Commonly known as: FLOMAX  Take 1 capsule (0.4 mg total) by mouth every evening.        STOP taking these medications    SYNJARDY 12.5-1,000 mg Tab  Generic drug: empagliflozin-metformin            Indwelling Lines/Drains at time of discharge:   Lines/Drains/Airways     None                 Time spent on the discharge of patient: 25 minutes         Cheikh Curtis MD  Department of Hospital Medicine  [facility] " [facility]

## 2023-06-25 NOTE — PLAN OF CARE
06/24/23 2134   Patient Assessment/Suction   Level of Consciousness (AVPU) alert   Respiratory Effort Unlabored   PRE-TX-O2   Device (Oxygen Therapy) room air   SpO2 (!) 94 %   Pulse Oximetry Type Intermittent   $ Pulse Oximetry - Multiple Charge Pulse Oximetry - Multiple   Pulse 78   Resp 18

## 2023-06-25 NOTE — ASSESSMENT & PLAN NOTE
Cardiologist consulting with us about patient's chest discomfort.  Patient will get echocardiogram.  Cardiologist recommends against coronary angiography at this time while there's a skin infection.  The plan is outpatient stress testing after the infection is gone.  For now, continue beta blocker and long-acting nitrate, aspirin, and statin.  Ranexa started today.

## 2023-06-25 NOTE — PLAN OF CARE
Patient to be discharged home with no needs.       06/25/23 1435   Final Note   Assessment Type Final Discharge Note   Anticipated Discharge Disposition Home   What phone number can be called within the next 1-3 days to see how you are doing after discharge? [phone]

## 2023-06-25 NOTE — PHARMACY MED REC
"      Admission Medication History     The home medication history was taken by Lorraine Valle.    You may go to "Admission" then "Reconcile Home Medications" tabs to review and/or act upon these items.     The home medication list has been updated by the Pharmacy department.   Please read ALL comments highlighted in yellow.   Please address this information as you see fit.    Feel free to contact us if you have any questions or require assistance.        Medications listed below were obtained from: Patient/family and Analytic software- [employer]  No current facility-administered medications on file prior to encounter.     Current Outpatient Medications on File Prior to Encounter   Medication Sig Dispense Refill    aspirin 81 MG Chew Take 81 mg by mouth once daily.       cetirizine (ZYRTEC) 10 MG tablet Take 1 tablet (10 mg total) by mouth once daily. 30 tablet 0    clonazePAM (KLONOPIN) 1 MG tablet Take 1 mg by mouth 2 (two) times daily.      cyclobenzaprine (FLEXERIL) 10 MG tablet Take 10 mg by mouth once daily.      gabapentin (NEURONTIN) 600 MG tablet Take 600 mg by mouth 3 (three) times daily.       HYDROcodone-acetaminophen (NORCO) 5-325 mg per tablet Take 1 tablet by mouth every 6 (six) hours as needed for Pain. 6 tablet 0    JARDIANCE 25 mg tablet Take 25 mg by mouth once daily.      LANTUS SOLOSTAR U-100 INSULIN glargine 100 units/mL (3mL) SubQ pen Inject 80 Units into the skin 2 (two) times daily. Changed to 80 units BID 2months ago      lisinopriL (PRINIVIL,ZESTRIL) 20 MG tablet Take 10 mg by mouth 2 (two) times daily.      metFORMIN (GLUCOPHAGE) 1000 MG tablet Take 1,000 mg by mouth 2 (two) times daily with meals.       methocarbamoL (ROBAXIN) 500 MG Tab Take 500 mg by mouth every evening.      metoprolol succinate (TOPROL-XL) 100 MG 24 hr tablet Take 100 mg by mouth 2 (two) times daily.      nitroGLYCERIN (NITROSTAT) 0.4 MG SL tablet Place 0.4 mg under the tongue every 5 (five) minutes as needed for " "Chest pain.      pioglitazone (ACTOS) 15 MG tablet Take 15 mg by mouth once daily.      tamsulosin (FLOMAX) 0.4 mg Cap Take 1 capsule (0.4 mg total) by mouth every evening. 30 capsule 0    umeclidinium-vilanteroL (ANORO ELLIPTA) 62.5-25 mcg/actuation DsDv Inhale 1 puff into the lungs once daily.      apixaban (ELIQUIS) 5 mg Tab Take 5 mg by mouth 2 (two) times daily.      empagliflozin-metformin (SYNJARDY) 12.5-1,000 mg Tab Take 2 tablets by mouth once daily.      furosemide (LASIX) 20 MG tablet Take 20 mg by mouth 2 (two) times daily.      isosorbide mononitrate (IMDUR) 30 MG 24 hr tablet Take 1 tablet (30 mg total) by mouth once daily. 30 tablet 11    oxyCODONE-acetaminophen (PERCOCET)  mg per tablet Take 1-2 tablets by mouth daily as needed.      pantoprazole (PROTONIX) 40 MG tablet Take 1 tablet by mouth 2 (two) times a day.      pen needle, diabetic 31 gauge x 1/4" Ndle USE 1 TWICE DAILY FOR BLOOD SUGAR GREATER THAN 200      rimegepant (NURTEC) 75 mg odt Take 1 tablet by mouth as needed.      simvastatin (ZOCOR) 10 MG tablet Take 10 mg by mouth once daily.      [DISCONTINUED] ticagrelor (BRILINTA) 90 mg tablet Take 1 tablet (90 mg total) by mouth 2 (two) times daily. 60 tablet 0       Potential issues to be addressed PRIOR TO DISCHARGE  Patient reported not taking the following medications: (Nasvf72il and Brilinta 90m ). These medications remain on the home medication list. Please address accordingly.     Lorraine Valle  [id number]            .        "

## 2023-08-31 ENCOUNTER — HOSPITAL ENCOUNTER (OUTPATIENT)
Facility: HOSPITAL | Age: 58
Discharge: HOME | End: 2023-09-03
Attending: EMERGENCY MEDICINE | Admitting: STUDENT IN AN ORGANIZED HEALTH CARE EDUCATION/TRAINING PROGRAM
Payer: MEDICAID

## 2023-08-31 DIAGNOSIS — A41.9 SEPSIS WITHOUT ACUTE ORGAN DYSFUNCTION, DUE TO UNSPECIFIED ORGANISM: ICD-10-CM

## 2023-08-31 DIAGNOSIS — A41.9 SEPSIS: ICD-10-CM

## 2023-08-31 DIAGNOSIS — L03.317 ABSCESS AND CELLULITIS OF GLUTEAL REGION: Primary | ICD-10-CM

## 2023-08-31 DIAGNOSIS — L02.31 ABSCESS AND CELLULITIS OF GLUTEAL REGION: Primary | ICD-10-CM

## 2023-08-31 DIAGNOSIS — Z01.818 PRE-OP EXAM: ICD-10-CM

## 2023-08-31 DIAGNOSIS — R07.9 CHEST PAIN: ICD-10-CM

## 2023-08-31 LAB
ALBUMIN SERPL BCP-MCNC: 3.4 G/DL (ref 3.5–5.2)
ALP SERPL-CCNC: 76 U/L (ref 55–135)
ALT SERPL W/O P-5'-P-CCNC: 20 U/L (ref 10–44)
ANION GAP SERPL CALC-SCNC: 16 MMOL/L (ref 8–16)
AST SERPL-CCNC: 19 U/L (ref 10–40)
BACTERIA #/AREA URNS HPF: ABNORMAL /HPF
BASOPHILS # BLD AUTO: 0.11 K/UL (ref 0–0.2)
BASOPHILS NFR BLD: 0.7 % (ref 0–1.9)
BILIRUB SERPL-MCNC: 0.7 MG/DL (ref 0.1–1)
BILIRUB UR QL STRIP: NEGATIVE
BUN SERPL-MCNC: 16 MG/DL (ref 6–20)
CALCIUM SERPL-MCNC: 9.8 MG/DL (ref 8.7–10.5)
CHLORIDE SERPL-SCNC: 94 MMOL/L (ref 95–110)
CLARITY UR: CLEAR
CO2 SERPL-SCNC: 19 MMOL/L (ref 23–29)
COLOR UR: YELLOW
CREAT SERPL-MCNC: 1.7 MG/DL (ref 0.5–1.4)
DIFFERENTIAL METHOD: ABNORMAL
EOSINOPHIL # BLD AUTO: 0.2 K/UL (ref 0–0.5)
EOSINOPHIL NFR BLD: 1.2 % (ref 0–8)
ERYTHROCYTE [DISTWIDTH] IN BLOOD BY AUTOMATED COUNT: 12.5 % (ref 11.5–14.5)
EST. GFR  (NO RACE VARIABLE): 47 ML/MIN/1.73 M^2
GLUCOSE SERPL-MCNC: 289 MG/DL (ref 70–110)
GLUCOSE UR QL STRIP: ABNORMAL
HCT VFR BLD AUTO: 45.9 % (ref 40–54)
HGB BLD-MCNC: 16 G/DL (ref 14–18)
HGB UR QL STRIP: ABNORMAL
IMM GRANULOCYTES # BLD AUTO: 0.07 K/UL (ref 0–0.04)
IMM GRANULOCYTES NFR BLD AUTO: 0.5 % (ref 0–0.5)
KETONES UR QL STRIP: ABNORMAL
LACTATE SERPL-SCNC: 2.2 MMOL/L (ref 0.5–2.2)
LACTATE SERPL-SCNC: 2.4 MMOL/L (ref 0.5–2.2)
LEUKOCYTE ESTERASE UR QL STRIP: NEGATIVE
LYMPHOCYTES # BLD AUTO: 2.5 K/UL (ref 1–4.8)
LYMPHOCYTES NFR BLD: 16.1 % (ref 18–48)
MCH RBC QN AUTO: 31.4 PG (ref 27–31)
MCHC RBC AUTO-ENTMCNC: 34.9 G/DL (ref 32–36)
MCV RBC AUTO: 90 FL (ref 82–98)
MICROSCOPIC COMMENT: ABNORMAL
MONOCYTES # BLD AUTO: 1 K/UL (ref 0.3–1)
MONOCYTES NFR BLD: 6.2 % (ref 4–15)
NEUTROPHILS # BLD AUTO: 11.6 K/UL (ref 1.8–7.7)
NEUTROPHILS NFR BLD: 75.3 % (ref 38–73)
NITRITE UR QL STRIP: NEGATIVE
NRBC BLD-RTO: 0 /100 WBC
PH UR STRIP: 6 [PH] (ref 5–8)
PLATELET # BLD AUTO: 242 K/UL (ref 150–450)
PMV BLD AUTO: 9.5 FL (ref 9.2–12.9)
POTASSIUM SERPL-SCNC: 4.1 MMOL/L (ref 3.5–5.1)
PROT SERPL-MCNC: 8.2 G/DL (ref 6–8.4)
PROT UR QL STRIP: ABNORMAL
RBC # BLD AUTO: 5.1 M/UL (ref 4.6–6.2)
RBC #/AREA URNS HPF: 3 /HPF (ref 0–4)
SARS-COV-2 RDRP RESP QL NAA+PROBE: NEGATIVE
SODIUM SERPL-SCNC: 129 MMOL/L (ref 136–145)
SP GR UR STRIP: >1.03 (ref 1–1.03)
URN SPEC COLLECT METH UR: ABNORMAL
UROBILINOGEN UR STRIP-ACNC: ABNORMAL EU/DL
WBC # BLD AUTO: 15.35 K/UL (ref 3.9–12.7)
WBC #/AREA URNS HPF: 1 /HPF (ref 0–5)
YEAST URNS QL MICRO: ABNORMAL

## 2023-08-31 PROCEDURE — G0378 HOSPITAL OBSERVATION PER HR: HCPCS

## 2023-08-31 PROCEDURE — 25000003 PHARM REV CODE 250: Performed by: EMERGENCY MEDICINE

## 2023-08-31 PROCEDURE — 63600175 PHARM REV CODE 636 W HCPCS: Performed by: EMERGENCY MEDICINE

## 2023-08-31 PROCEDURE — 96361 HYDRATE IV INFUSION ADD-ON: CPT

## 2023-08-31 PROCEDURE — 87040 BLOOD CULTURE FOR BACTERIA: CPT | Mod: 59 | Performed by: NURSE PRACTITIONER

## 2023-08-31 PROCEDURE — 36415 COLL VENOUS BLD VENIPUNCTURE: CPT | Performed by: PHYSICIAN ASSISTANT

## 2023-08-31 PROCEDURE — 96375 TX/PRO/DX INJ NEW DRUG ADDON: CPT

## 2023-08-31 PROCEDURE — 83605 ASSAY OF LACTIC ACID: CPT | Performed by: PHYSICIAN ASSISTANT

## 2023-08-31 PROCEDURE — 80053 COMPREHEN METABOLIC PANEL: CPT | Performed by: PHYSICIAN ASSISTANT

## 2023-08-31 PROCEDURE — 36415 COLL VENOUS BLD VENIPUNCTURE: CPT | Performed by: EMERGENCY MEDICINE

## 2023-08-31 PROCEDURE — 96375 TX/PRO/DX INJ NEW DRUG ADDON: CPT | Mod: 59

## 2023-08-31 PROCEDURE — 93010 ELECTROCARDIOGRAM REPORT: CPT | Mod: ,,, | Performed by: GENERAL PRACTICE

## 2023-08-31 PROCEDURE — 81000 URINALYSIS NONAUTO W/SCOPE: CPT | Performed by: PHYSICIAN ASSISTANT

## 2023-08-31 PROCEDURE — 99285 EMERGENCY DEPT VISIT HI MDM: CPT | Mod: 25

## 2023-08-31 PROCEDURE — 93010 EKG 12-LEAD: ICD-10-PCS | Mod: ,,, | Performed by: GENERAL PRACTICE

## 2023-08-31 PROCEDURE — 83605 ASSAY OF LACTIC ACID: CPT | Mod: 91 | Performed by: EMERGENCY MEDICINE

## 2023-08-31 PROCEDURE — 93005 ELECTROCARDIOGRAM TRACING: CPT

## 2023-08-31 PROCEDURE — 25000003 PHARM REV CODE 250: Performed by: NURSE PRACTITIONER

## 2023-08-31 PROCEDURE — 25500020 PHARM REV CODE 255

## 2023-08-31 PROCEDURE — 85025 COMPLETE CBC W/AUTO DIFF WBC: CPT | Performed by: PHYSICIAN ASSISTANT

## 2023-08-31 PROCEDURE — 96365 THER/PROPH/DIAG IV INF INIT: CPT | Mod: 59

## 2023-08-31 PROCEDURE — U0002 COVID-19 LAB TEST NON-CDC: HCPCS | Performed by: PHYSICIAN ASSISTANT

## 2023-08-31 RX ORDER — PANTOPRAZOLE SODIUM 40 MG/1
40 TABLET, DELAYED RELEASE ORAL 2 TIMES DAILY
Status: DISCONTINUED | OUTPATIENT
Start: 2023-08-31 | End: 2023-09-03 | Stop reason: HOSPADM

## 2023-08-31 RX ORDER — ONDANSETRON 2 MG/ML
4 INJECTION INTRAMUSCULAR; INTRAVENOUS EVERY 6 HOURS PRN
Status: DISCONTINUED | OUTPATIENT
Start: 2023-09-01 | End: 2023-09-03 | Stop reason: HOSPADM

## 2023-08-31 RX ORDER — ISOSORBIDE MONONITRATE 60 MG/1
60 TABLET, EXTENDED RELEASE ORAL DAILY
Status: DISCONTINUED | OUTPATIENT
Start: 2023-09-01 | End: 2023-09-03 | Stop reason: HOSPADM

## 2023-08-31 RX ORDER — IBUPROFEN 200 MG
16 TABLET ORAL
Status: DISCONTINUED | OUTPATIENT
Start: 2023-09-01 | End: 2023-09-03 | Stop reason: HOSPADM

## 2023-08-31 RX ORDER — METOPROLOL SUCCINATE 50 MG/1
100 TABLET, EXTENDED RELEASE ORAL 2 TIMES DAILY
Status: DISCONTINUED | OUTPATIENT
Start: 2023-09-01 | End: 2023-09-01

## 2023-08-31 RX ORDER — GLUCAGON 1 MG
1 KIT INJECTION
Status: DISCONTINUED | OUTPATIENT
Start: 2023-09-01 | End: 2023-09-03 | Stop reason: HOSPADM

## 2023-08-31 RX ORDER — RANOLAZINE 500 MG/1
500 TABLET, EXTENDED RELEASE ORAL 2 TIMES DAILY
Status: DISCONTINUED | OUTPATIENT
Start: 2023-09-01 | End: 2023-09-03 | Stop reason: HOSPADM

## 2023-08-31 RX ORDER — LANOLIN ALCOHOL/MO/W.PET/CERES
800 CREAM (GRAM) TOPICAL
Status: DISCONTINUED | OUTPATIENT
Start: 2023-09-01 | End: 2023-09-03 | Stop reason: HOSPADM

## 2023-08-31 RX ORDER — POLYETHYLENE GLYCOL 3350 17 G/17G
17 POWDER, FOR SOLUTION ORAL 2 TIMES DAILY PRN
Status: DISCONTINUED | OUTPATIENT
Start: 2023-09-01 | End: 2023-09-03 | Stop reason: HOSPADM

## 2023-08-31 RX ORDER — INSULIN ASPART 100 [IU]/ML
0-10 INJECTION, SOLUTION INTRAVENOUS; SUBCUTANEOUS
Status: DISCONTINUED | OUTPATIENT
Start: 2023-09-01 | End: 2023-09-03 | Stop reason: HOSPADM

## 2023-08-31 RX ORDER — OXYCODONE AND ACETAMINOPHEN 10; 325 MG/1; MG/1
1 TABLET ORAL EVERY 4 HOURS PRN
Status: DISCONTINUED | OUTPATIENT
Start: 2023-08-31 | End: 2023-09-03 | Stop reason: HOSPADM

## 2023-08-31 RX ORDER — TAMSULOSIN HYDROCHLORIDE 0.4 MG/1
0.4 CAPSULE ORAL NIGHTLY
Status: DISCONTINUED | OUTPATIENT
Start: 2023-09-01 | End: 2023-09-03 | Stop reason: HOSPADM

## 2023-08-31 RX ORDER — MAG HYDROX/ALUMINUM HYD/SIMETH 200-200-20
30 SUSPENSION, ORAL (FINAL DOSE FORM) ORAL 4 TIMES DAILY PRN
Status: DISCONTINUED | OUTPATIENT
Start: 2023-09-01 | End: 2023-09-03 | Stop reason: HOSPADM

## 2023-08-31 RX ORDER — TALC
6 POWDER (GRAM) TOPICAL NIGHTLY PRN
Status: DISCONTINUED | OUTPATIENT
Start: 2023-09-01 | End: 2023-09-03 | Stop reason: HOSPADM

## 2023-08-31 RX ORDER — NITROGLYCERIN 0.4 MG/1
0.4 TABLET SUBLINGUAL EVERY 5 MIN PRN
Status: DISCONTINUED | OUTPATIENT
Start: 2023-08-31 | End: 2023-09-03 | Stop reason: HOSPADM

## 2023-08-31 RX ORDER — GABAPENTIN 300 MG/1
600 CAPSULE ORAL 2 TIMES DAILY
Status: DISCONTINUED | OUTPATIENT
Start: 2023-09-01 | End: 2023-09-01

## 2023-08-31 RX ORDER — ACETAMINOPHEN 325 MG/1
650 TABLET ORAL EVERY 8 HOURS PRN
Status: DISCONTINUED | OUTPATIENT
Start: 2023-09-01 | End: 2023-09-03 | Stop reason: HOSPADM

## 2023-08-31 RX ORDER — HYDROMORPHONE HYDROCHLORIDE 1 MG/ML
0.5 INJECTION, SOLUTION INTRAMUSCULAR; INTRAVENOUS; SUBCUTANEOUS EVERY 4 HOURS PRN
Status: DISCONTINUED | OUTPATIENT
Start: 2023-09-01 | End: 2023-09-03 | Stop reason: HOSPADM

## 2023-08-31 RX ORDER — IBUPROFEN 200 MG
24 TABLET ORAL
Status: DISCONTINUED | OUTPATIENT
Start: 2023-09-01 | End: 2023-09-03 | Stop reason: HOSPADM

## 2023-08-31 RX ORDER — PRAVASTATIN SODIUM 10 MG/1
20 TABLET ORAL DAILY
Status: DISCONTINUED | OUTPATIENT
Start: 2023-09-01 | End: 2023-09-03 | Stop reason: HOSPADM

## 2023-08-31 RX ORDER — MORPHINE SULFATE 4 MG/ML
4 INJECTION, SOLUTION INTRAMUSCULAR; INTRAVENOUS
Status: COMPLETED | OUTPATIENT
Start: 2023-08-31 | End: 2023-08-31

## 2023-08-31 RX ORDER — HYDROMORPHONE HYDROCHLORIDE 1 MG/ML
1 INJECTION, SOLUTION INTRAMUSCULAR; INTRAVENOUS; SUBCUTANEOUS
Status: COMPLETED | OUTPATIENT
Start: 2023-08-31 | End: 2023-08-31

## 2023-08-31 RX ORDER — IPRATROPIUM BROMIDE AND ALBUTEROL SULFATE 2.5; .5 MG/3ML; MG/3ML
3 SOLUTION RESPIRATORY (INHALATION) EVERY 6 HOURS PRN
Status: DISCONTINUED | OUTPATIENT
Start: 2023-09-01 | End: 2023-09-03 | Stop reason: HOSPADM

## 2023-08-31 RX ORDER — CLONAZEPAM 0.5 MG/1
1 TABLET ORAL 2 TIMES DAILY PRN
Status: DISCONTINUED | OUTPATIENT
Start: 2023-08-31 | End: 2023-09-03 | Stop reason: HOSPADM

## 2023-08-31 RX ORDER — AMOXICILLIN 250 MG
1 CAPSULE ORAL 2 TIMES DAILY
Status: DISCONTINUED | OUTPATIENT
Start: 2023-08-31 | End: 2023-09-03 | Stop reason: HOSPADM

## 2023-08-31 RX ORDER — METHOCARBAMOL 500 MG/1
500 TABLET, FILM COATED ORAL NIGHTLY
Status: DISCONTINUED | OUTPATIENT
Start: 2023-09-01 | End: 2023-09-01

## 2023-08-31 RX ORDER — CETIRIZINE HYDROCHLORIDE 10 MG/1
10 TABLET ORAL DAILY
Status: DISCONTINUED | OUTPATIENT
Start: 2023-09-01 | End: 2023-09-03 | Stop reason: HOSPADM

## 2023-08-31 RX ORDER — SODIUM CHLORIDE 0.9 % (FLUSH) 0.9 %
10 SYRINGE (ML) INJECTION EVERY 12 HOURS PRN
Status: DISCONTINUED | OUTPATIENT
Start: 2023-09-01 | End: 2023-09-03 | Stop reason: HOSPADM

## 2023-08-31 RX ORDER — NALOXONE HCL 0.4 MG/ML
0.02 VIAL (ML) INJECTION
Status: DISCONTINUED | OUTPATIENT
Start: 2023-09-01 | End: 2023-09-03 | Stop reason: HOSPADM

## 2023-08-31 RX ADMIN — SODIUM CHLORIDE, POTASSIUM CHLORIDE, SODIUM LACTATE AND CALCIUM CHLORIDE 2000 ML: 600; 310; 30; 20 INJECTION, SOLUTION INTRAVENOUS at 08:08

## 2023-08-31 RX ADMIN — IOHEXOL 100 ML: 350 INJECTION, SOLUTION INTRAVENOUS at 09:08

## 2023-08-31 RX ADMIN — HYDROMORPHONE HYDROCHLORIDE 1 MG: 0.5 INJECTION, SOLUTION INTRAMUSCULAR; INTRAVENOUS; SUBCUTANEOUS at 11:08

## 2023-08-31 RX ADMIN — MORPHINE SULFATE 4 MG: 4 INJECTION, SOLUTION INTRAMUSCULAR; INTRAVENOUS at 08:08

## 2023-08-31 RX ADMIN — SENNOSIDES AND DOCUSATE SODIUM 1 TABLET: 8.6; 5 TABLET ORAL at 11:08

## 2023-08-31 RX ADMIN — PANTOPRAZOLE SODIUM 40 MG: 40 TABLET, DELAYED RELEASE ORAL at 11:08

## 2023-08-31 RX ADMIN — PIPERACILLIN AND TAZOBACTAM 4.5 G: 4; .5 INJECTION, POWDER, LYOPHILIZED, FOR SOLUTION INTRAVENOUS; PARENTERAL at 08:08

## 2023-08-31 NOTE — FIRST PROVIDER EVALUATION
Emergency Department TeleTriage Encounter Note      CHIEF COMPLAINT    Chief Complaint   Patient presents with    Abscess     Abscess near rectum says he thinks there are 2 of them, states he has a fever and headaches body aches and was exposed to covid        VITAL SIGNS   Initial Vitals   BP Pulse Resp Temp SpO2   08/31/23 1707 08/31/23 1707 08/31/23 1707 08/31/23 1709 08/31/23 1707   124/62 110 20 99 °F (37.2 °C) (!) 94 %      MAP       --                   ALLERGIES    Review of patient's allergies indicates:   Allergen Reactions    Pesticide Dermatitis and Rash       PROVIDER TRIAGE NOTE  This is a teletriage evaluation of a 57 y.o. male presenting to the ED complaining of abscess. Patient reports abscess between scrotum and rectum for 2-3 days. Another abscess developed today. He reports fever. He also reports loss of taste. He denies testicular pain or swelling.    Patient is alert and oriented. He speaks in complete sentences. He is sitting upright in the chair in no distress.     Initial orders will be placed and care will be transferred to an alternate provider when patient is roomed for a full evaluation. Any additional orders and the final disposition will be determined by that provider.         ORDERS  Labs Reviewed   CBC W/ AUTO DIFFERENTIAL   COMPREHENSIVE METABOLIC PANEL   SARS-COV-2 RNA AMPLIFICATION, QUAL   URINALYSIS, REFLEX TO URINE CULTURE   LACTIC ACID, PLASMA       ED Orders (720h ago, onward)      Start Ordered     Status Ordering Provider    08/31/23 1722 08/31/23 1721  Insert Saline lock IV  Once         Ordered CHET GREENBERG    08/31/23 1722 08/31/23 1721  COVID-19 Rapid Screening  STAT         Ordered CHET GREENBERG    08/31/23 1722 08/31/23 1721  Urinalysis, Reflex to Urine Culture Urine, Clean Catch  STAT         Ordered CHET GREENBERG    08/31/23 1722 08/31/23 1721  Lactic acid, plasma  STAT         Pending Collection CHET GREENBERG    08/31/23 1721 08/31/23 1721  CBC auto  differential  STAT         Pending Collection CHET GREENBERG.    08/31/23 1721 08/31/23 1721  Comprehensive metabolic panel  STAT         Pending Collection CHET GREENBERG.              Virtual Visit Note: The provider triage portion of this emergency department evaluation and documentation was performed via [employer], a HIPAA-compliant telemedicine application, in concert with a tele-presenter in the room. A face to face patient evaluation with one of my colleagues will occur once the patient is placed in an emergency department room.      DISCLAIMER: This note was prepared with [employer] voice recognition transcription software. Garbled syntax, mangled pronouns, and other bizarre constructions may be attributed to that software system.

## 2023-09-01 ENCOUNTER — ANESTHESIA (OUTPATIENT)
Dept: SURGERY | Facility: HOSPITAL | Age: 58
End: 2023-09-01
Payer: MEDICAID

## 2023-09-01 ENCOUNTER — ANESTHESIA EVENT (OUTPATIENT)
Dept: SURGERY | Facility: HOSPITAL | Age: 58
End: 2023-09-01
Payer: MEDICAID

## 2023-09-01 PROBLEM — L03.317 ABSCESS AND CELLULITIS OF GLUTEAL REGION: Status: ACTIVE | Noted: 2023-09-01

## 2023-09-01 PROBLEM — E78.5 HLD (HYPERLIPIDEMIA): Status: ACTIVE | Noted: 2023-09-01

## 2023-09-01 PROBLEM — A41.9 SEPSIS: Status: ACTIVE | Noted: 2023-09-01

## 2023-09-01 PROBLEM — L02.31 ABSCESS AND CELLULITIS OF GLUTEAL REGION: Status: ACTIVE | Noted: 2023-09-01

## 2023-09-01 PROBLEM — R07.89 OTHER CHEST PAIN: Status: RESOLVED | Noted: 2020-04-22 | Resolved: 2023-09-01

## 2023-09-01 PROBLEM — E66.812 CLASS 2 OBESITY DUE TO EXCESS CALORIES WITH BODY MASS INDEX (BMI) OF 38.0 TO 38.9 IN ADULT: Status: RESOLVED | Noted: 2022-01-09 | Resolved: 2023-09-01

## 2023-09-01 PROBLEM — N17.9 AKI (ACUTE KIDNEY INJURY): Status: RESOLVED | Noted: 2020-04-23 | Resolved: 2023-09-01

## 2023-09-01 PROBLEM — E66.09 CLASS 2 OBESITY DUE TO EXCESS CALORIES WITH BODY MASS INDEX (BMI) OF 38.0 TO 38.9 IN ADULT: Status: RESOLVED | Noted: 2022-01-09 | Resolved: 2023-09-01

## 2023-09-01 PROBLEM — E87.1 HYPONATREMIA: Status: ACTIVE | Noted: 2023-09-01

## 2023-09-01 PROBLEM — R10.11 RUQ PAIN: Status: RESOLVED | Noted: 2022-08-31 | Resolved: 2023-09-01

## 2023-09-01 PROBLEM — E66.9 OBESITY (BMI 30-39.9): Status: ACTIVE | Noted: 2023-09-01

## 2023-09-01 LAB
ALBUMIN SERPL BCP-MCNC: 2.8 G/DL (ref 3.5–5.2)
ALP SERPL-CCNC: 67 U/L (ref 55–135)
ALT SERPL W/O P-5'-P-CCNC: 16 U/L (ref 10–44)
ANION GAP SERPL CALC-SCNC: 13 MMOL/L (ref 8–16)
AST SERPL-CCNC: 17 U/L (ref 10–40)
BASOPHILS # BLD AUTO: 0.09 K/UL (ref 0–0.2)
BASOPHILS NFR BLD: 0.6 % (ref 0–1.9)
BILIRUB SERPL-MCNC: 0.8 MG/DL (ref 0.1–1)
BNP SERPL-MCNC: 43 PG/ML (ref 0–99)
BUN SERPL-MCNC: 13 MG/DL (ref 6–20)
CALCIUM SERPL-MCNC: 8.7 MG/DL (ref 8.7–10.5)
CHLORIDE SERPL-SCNC: 92 MMOL/L (ref 95–110)
CO2 SERPL-SCNC: 22 MMOL/L (ref 23–29)
CREAT SERPL-MCNC: 1.4 MG/DL (ref 0.5–1.4)
DIFFERENTIAL METHOD: ABNORMAL
EOSINOPHIL # BLD AUTO: 0.1 K/UL (ref 0–0.5)
EOSINOPHIL NFR BLD: 0.5 % (ref 0–8)
ERYTHROCYTE [DISTWIDTH] IN BLOOD BY AUTOMATED COUNT: 12.4 % (ref 11.5–14.5)
EST. GFR  (NO RACE VARIABLE): 59 ML/MIN/1.73 M^2
GLUCOSE SERPL-MCNC: 211 MG/DL (ref 70–110)
HCT VFR BLD AUTO: 39.4 % (ref 40–54)
HGB BLD-MCNC: 13.4 G/DL (ref 14–18)
IMM GRANULOCYTES # BLD AUTO: 0.08 K/UL (ref 0–0.04)
IMM GRANULOCYTES NFR BLD AUTO: 0.5 % (ref 0–0.5)
LACTATE SERPL-SCNC: 1.6 MMOL/L (ref 0.5–2.2)
LYMPHOCYTES # BLD AUTO: 2.2 K/UL (ref 1–4.8)
LYMPHOCYTES NFR BLD: 14.6 % (ref 18–48)
MAGNESIUM SERPL-MCNC: 1.7 MG/DL (ref 1.6–2.6)
MCH RBC QN AUTO: 30.7 PG (ref 27–31)
MCHC RBC AUTO-ENTMCNC: 34 G/DL (ref 32–36)
MCV RBC AUTO: 90 FL (ref 82–98)
MONOCYTES # BLD AUTO: 1.4 K/UL (ref 0.3–1)
MONOCYTES NFR BLD: 8.9 % (ref 4–15)
NEUTROPHILS # BLD AUTO: 11.5 K/UL (ref 1.8–7.7)
NEUTROPHILS NFR BLD: 74.9 % (ref 38–73)
NRBC BLD-RTO: 0 /100 WBC
OSMOLALITY SERPL: 274 MOSM/KG (ref 280–300)
PLATELET # BLD AUTO: 193 K/UL (ref 150–450)
PMV BLD AUTO: 9.3 FL (ref 9.2–12.9)
POCT GLUCOSE: 129 MG/DL (ref 70–110)
POCT GLUCOSE: 168 MG/DL (ref 70–110)
POCT GLUCOSE: 229 MG/DL (ref 70–110)
POCT GLUCOSE: 236 MG/DL (ref 70–110)
POTASSIUM SERPL-SCNC: 4.4 MMOL/L (ref 3.5–5.1)
PROT SERPL-MCNC: 6.7 G/DL (ref 6–8.4)
RBC # BLD AUTO: 4.36 M/UL (ref 4.6–6.2)
SODIUM SERPL-SCNC: 127 MMOL/L (ref 136–145)
TSH SERPL DL<=0.005 MIU/L-ACNC: 0.57 UIU/ML (ref 0.4–4)
VANCOMYCIN SERPL-MCNC: 5.8 UG/ML
WBC # BLD AUTO: 15.36 K/UL (ref 3.9–12.7)

## 2023-09-01 PROCEDURE — 37000009 HC ANESTHESIA EA ADD 15 MINS: Performed by: SURGERY

## 2023-09-01 PROCEDURE — 83605 ASSAY OF LACTIC ACID: CPT | Performed by: STUDENT IN AN ORGANIZED HEALTH CARE EDUCATION/TRAINING PROGRAM

## 2023-09-01 PROCEDURE — 99223 PR INITIAL HOSPITAL CARE,LEVL III: ICD-10-PCS | Mod: ,,, | Performed by: SURGERY

## 2023-09-01 PROCEDURE — 25000003 PHARM REV CODE 250: Performed by: ANESTHESIOLOGY

## 2023-09-01 PROCEDURE — 71000039 HC RECOVERY, EACH ADD'L HOUR: Performed by: SURGERY

## 2023-09-01 PROCEDURE — 94799 UNLISTED PULMONARY SVC/PX: CPT

## 2023-09-01 PROCEDURE — 46040 I&D ISCHIORCT&/PERIRCT ABSC: CPT | Mod: ,,, | Performed by: SURGERY

## 2023-09-01 PROCEDURE — 25000003 PHARM REV CODE 250: Performed by: NURSE PRACTITIONER

## 2023-09-01 PROCEDURE — 25000003 PHARM REV CODE 250: Performed by: STUDENT IN AN ORGANIZED HEALTH CARE EDUCATION/TRAINING PROGRAM

## 2023-09-01 PROCEDURE — 87147 CULTURE TYPE IMMUNOLOGIC: CPT | Mod: 59 | Performed by: SURGERY

## 2023-09-01 PROCEDURE — 94761 N-INVAS EAR/PLS OXIMETRY MLT: CPT

## 2023-09-01 PROCEDURE — 99223 1ST HOSP IP/OBS HIGH 75: CPT | Mod: ,,, | Performed by: SURGERY

## 2023-09-01 PROCEDURE — 83880 ASSAY OF NATRIURETIC PEPTIDE: CPT | Performed by: NURSE PRACTITIONER

## 2023-09-01 PROCEDURE — 63600175 PHARM REV CODE 636 W HCPCS: Performed by: SURGERY

## 2023-09-01 PROCEDURE — 63600175 PHARM REV CODE 636 W HCPCS: Performed by: STUDENT IN AN ORGANIZED HEALTH CARE EDUCATION/TRAINING PROGRAM

## 2023-09-01 PROCEDURE — 80053 COMPREHEN METABOLIC PANEL: CPT | Performed by: NURSE PRACTITIONER

## 2023-09-01 PROCEDURE — 63600175 PHARM REV CODE 636 W HCPCS: Performed by: NURSE PRACTITIONER

## 2023-09-01 PROCEDURE — 25000003 PHARM REV CODE 250: Performed by: SURGERY

## 2023-09-01 PROCEDURE — 46040 PR I&D PERIRECTAL ABSCESS: ICD-10-PCS | Mod: ,,, | Performed by: SURGERY

## 2023-09-01 PROCEDURE — 83930 ASSAY OF BLOOD OSMOLALITY: CPT | Performed by: STUDENT IN AN ORGANIZED HEALTH CARE EDUCATION/TRAINING PROGRAM

## 2023-09-01 PROCEDURE — 80202 ASSAY OF VANCOMYCIN: CPT | Performed by: SURGERY

## 2023-09-01 PROCEDURE — 36415 COLL VENOUS BLD VENIPUNCTURE: CPT | Performed by: SURGERY

## 2023-09-01 PROCEDURE — 36000704 HC OR TIME LEV I 1ST 15 MIN: Performed by: SURGERY

## 2023-09-01 PROCEDURE — 84443 ASSAY THYROID STIM HORMONE: CPT | Performed by: STUDENT IN AN ORGANIZED HEALTH CARE EDUCATION/TRAINING PROGRAM

## 2023-09-01 PROCEDURE — D9220A PRA ANESTHESIA: Mod: ANES,,, | Performed by: ANESTHESIOLOGY

## 2023-09-01 PROCEDURE — 96367 TX/PROPH/DG ADDL SEQ IV INF: CPT | Mod: 59

## 2023-09-01 PROCEDURE — 96376 TX/PRO/DX INJ SAME DRUG ADON: CPT | Mod: 59

## 2023-09-01 PROCEDURE — 36415 COLL VENOUS BLD VENIPUNCTURE: CPT | Performed by: STUDENT IN AN ORGANIZED HEALTH CARE EDUCATION/TRAINING PROGRAM

## 2023-09-01 PROCEDURE — G0378 HOSPITAL OBSERVATION PER HR: HCPCS

## 2023-09-01 PROCEDURE — D9220A PRA ANESTHESIA: Mod: CRNA,,, | Performed by: NURSE ANESTHETIST, CERTIFIED REGISTERED

## 2023-09-01 PROCEDURE — 87070 CULTURE OTHR SPECIMN AEROBIC: CPT | Performed by: SURGERY

## 2023-09-01 PROCEDURE — 85025 COMPLETE CBC W/AUTO DIFF WBC: CPT | Performed by: NURSE PRACTITIONER

## 2023-09-01 PROCEDURE — 71000033 HC RECOVERY, INTIAL HOUR: Performed by: SURGERY

## 2023-09-01 PROCEDURE — 63600175 PHARM REV CODE 636 W HCPCS: Performed by: NURSE ANESTHETIST, CERTIFIED REGISTERED

## 2023-09-01 PROCEDURE — D9220A PRA ANESTHESIA: ICD-10-PCS | Mod: CRNA,,, | Performed by: NURSE ANESTHETIST, CERTIFIED REGISTERED

## 2023-09-01 PROCEDURE — 96366 THER/PROPH/DIAG IV INF ADDON: CPT | Mod: 59

## 2023-09-01 PROCEDURE — 37000008 HC ANESTHESIA 1ST 15 MINUTES: Performed by: SURGERY

## 2023-09-01 PROCEDURE — 99406 BEHAV CHNG SMOKING 3-10 MIN: CPT

## 2023-09-01 PROCEDURE — D9220A PRA ANESTHESIA: ICD-10-PCS | Mod: ANES,,, | Performed by: ANESTHESIOLOGY

## 2023-09-01 PROCEDURE — 25000003 PHARM REV CODE 250: Performed by: NURSE ANESTHETIST, CERTIFIED REGISTERED

## 2023-09-01 PROCEDURE — 36000705 HC OR TIME LEV I EA ADD 15 MIN: Performed by: SURGERY

## 2023-09-01 PROCEDURE — 99900035 HC TECH TIME PER 15 MIN (STAT)

## 2023-09-01 PROCEDURE — 87075 CULTR BACTERIA EXCEPT BLOOD: CPT | Performed by: SURGERY

## 2023-09-01 PROCEDURE — 83735 ASSAY OF MAGNESIUM: CPT | Performed by: NURSE PRACTITIONER

## 2023-09-01 PROCEDURE — 11000001 HC ACUTE MED/SURG PRIVATE ROOM

## 2023-09-01 RX ORDER — ONDANSETRON HYDROCHLORIDE 2 MG/ML
INJECTION, SOLUTION INTRAMUSCULAR; INTRAVENOUS
Status: DISCONTINUED | OUTPATIENT
Start: 2023-09-01 | End: 2023-09-01

## 2023-09-01 RX ORDER — METOCLOPRAMIDE HYDROCHLORIDE 5 MG/ML
10 INJECTION INTRAMUSCULAR; INTRAVENOUS EVERY 10 MIN PRN
Status: DISCONTINUED | OUTPATIENT
Start: 2023-09-01 | End: 2023-09-01 | Stop reason: HOSPADM

## 2023-09-01 RX ORDER — KETAMINE HYDROCHLORIDE 10 MG/ML
INJECTION, SOLUTION INTRAMUSCULAR; INTRAVENOUS
Status: DISCONTINUED | OUTPATIENT
Start: 2023-09-01 | End: 2023-09-01

## 2023-09-01 RX ORDER — PHENYLEPHRINE HYDROCHLORIDE 10 MG/ML
INJECTION INTRAVENOUS
Status: DISCONTINUED | OUTPATIENT
Start: 2023-09-01 | End: 2023-09-01

## 2023-09-01 RX ORDER — METOPROLOL TARTRATE 50 MG/1
100 TABLET ORAL 2 TIMES DAILY
Status: DISCONTINUED | OUTPATIENT
Start: 2023-09-01 | End: 2023-09-03 | Stop reason: HOSPADM

## 2023-09-01 RX ORDER — ROCURONIUM BROMIDE 10 MG/ML
INJECTION, SOLUTION INTRAVENOUS
Status: DISCONTINUED | OUTPATIENT
Start: 2023-09-01 | End: 2023-09-01

## 2023-09-01 RX ORDER — FENTANYL CITRATE 50 UG/ML
INJECTION, SOLUTION INTRAMUSCULAR; INTRAVENOUS
Status: DISCONTINUED | OUTPATIENT
Start: 2023-09-01 | End: 2023-09-01

## 2023-09-01 RX ORDER — LISINOPRIL 10 MG/1
20 TABLET ORAL DAILY
Status: DISCONTINUED | OUTPATIENT
Start: 2023-09-01 | End: 2023-09-03 | Stop reason: HOSPADM

## 2023-09-01 RX ORDER — LIDOCAINE HYDROCHLORIDE 20 MG/ML
INJECTION INTRAVENOUS
Status: DISCONTINUED | OUTPATIENT
Start: 2023-09-01 | End: 2023-09-01

## 2023-09-01 RX ORDER — BUPIVACAINE HYDROCHLORIDE 2.5 MG/ML
INJECTION, SOLUTION EPIDURAL; INFILTRATION; INTRACAUDAL
Status: DISCONTINUED | OUTPATIENT
Start: 2023-09-01 | End: 2023-09-01 | Stop reason: HOSPADM

## 2023-09-01 RX ORDER — NAPROXEN SODIUM 220 MG/1
81 TABLET, FILM COATED ORAL DAILY
Status: DISCONTINUED | OUTPATIENT
Start: 2023-09-01 | End: 2023-09-03 | Stop reason: HOSPADM

## 2023-09-01 RX ORDER — MIDAZOLAM HYDROCHLORIDE 1 MG/ML
INJECTION INTRAMUSCULAR; INTRAVENOUS
Status: DISCONTINUED | OUTPATIENT
Start: 2023-09-01 | End: 2023-09-01

## 2023-09-01 RX ORDER — MORPHINE SULFATE 2 MG/ML
4 INJECTION, SOLUTION INTRAMUSCULAR; INTRAVENOUS EVERY 4 HOURS PRN
Status: DISCONTINUED | OUTPATIENT
Start: 2023-09-01 | End: 2023-09-03 | Stop reason: HOSPADM

## 2023-09-01 RX ORDER — PROPOFOL 10 MG/ML
VIAL (ML) INTRAVENOUS
Status: DISCONTINUED | OUTPATIENT
Start: 2023-09-01 | End: 2023-09-01

## 2023-09-01 RX ORDER — FENTANYL CITRATE 50 UG/ML
25 INJECTION, SOLUTION INTRAMUSCULAR; INTRAVENOUS EVERY 5 MIN PRN
Status: DISCONTINUED | OUTPATIENT
Start: 2023-09-01 | End: 2023-09-01 | Stop reason: HOSPADM

## 2023-09-01 RX ORDER — GABAPENTIN 300 MG/1
600 CAPSULE ORAL 3 TIMES DAILY
Status: DISCONTINUED | OUTPATIENT
Start: 2023-09-01 | End: 2023-09-03 | Stop reason: HOSPADM

## 2023-09-01 RX ORDER — OXYCODONE HYDROCHLORIDE 5 MG/1
5 TABLET ORAL ONCE AS NEEDED
Status: COMPLETED | OUTPATIENT
Start: 2023-09-01 | End: 2023-09-01

## 2023-09-01 RX ORDER — ACETAMINOPHEN 10 MG/ML
INJECTION, SOLUTION INTRAVENOUS
Status: DISCONTINUED | OUTPATIENT
Start: 2023-09-01 | End: 2023-09-01

## 2023-09-01 RX ORDER — DEXAMETHASONE SODIUM PHOSPHATE 4 MG/ML
INJECTION, SOLUTION INTRA-ARTICULAR; INTRALESIONAL; INTRAMUSCULAR; INTRAVENOUS; SOFT TISSUE
Status: DISCONTINUED | OUTPATIENT
Start: 2023-09-01 | End: 2023-09-01

## 2023-09-01 RX ORDER — SUCCINYLCHOLINE CHLORIDE 20 MG/ML
INJECTION INTRAMUSCULAR; INTRAVENOUS
Status: DISCONTINUED | OUTPATIENT
Start: 2023-09-01 | End: 2023-09-01

## 2023-09-01 RX ORDER — METHOCARBAMOL 500 MG/1
500 TABLET, FILM COATED ORAL NIGHTLY PRN
Status: DISCONTINUED | OUTPATIENT
Start: 2023-09-01 | End: 2023-09-03 | Stop reason: HOSPADM

## 2023-09-01 RX ADMIN — PHENYLEPHRINE HYDROCHLORIDE 100 MCG: 10 INJECTION INTRAVENOUS at 12:09

## 2023-09-01 RX ADMIN — OXYCODONE AND ACETAMINOPHEN 1 TABLET: 10; 325 TABLET ORAL at 06:09

## 2023-09-01 RX ADMIN — PANTOPRAZOLE SODIUM 40 MG: 40 TABLET, DELAYED RELEASE ORAL at 08:09

## 2023-09-01 RX ADMIN — APIXABAN 5 MG: 2.5 TABLET, FILM COATED ORAL at 08:09

## 2023-09-01 RX ADMIN — Medication 800 MG: at 05:09

## 2023-09-01 RX ADMIN — CETIRIZINE HYDROCHLORIDE 10 MG: 10 TABLET, FILM COATED ORAL at 08:09

## 2023-09-01 RX ADMIN — DEXTROSE 4.5 G: 50 INJECTION, SOLUTION INTRAVENOUS at 12:09

## 2023-09-01 RX ADMIN — METOPROLOL TARTRATE 100 MG: 50 TABLET, FILM COATED ORAL at 08:09

## 2023-09-01 RX ADMIN — FENTANYL CITRATE 100 MCG: 50 INJECTION, SOLUTION INTRAMUSCULAR; INTRAVENOUS at 12:09

## 2023-09-01 RX ADMIN — ACETAMINOPHEN 1000 MG: 10 INJECTION, SOLUTION INTRAVENOUS at 12:09

## 2023-09-01 RX ADMIN — OXYCODONE HYDROCHLORIDE 5 MG: 5 TABLET ORAL at 01:09

## 2023-09-01 RX ADMIN — MORPHINE SULFATE 4 MG: 2 INJECTION, SOLUTION INTRAMUSCULAR; INTRAVENOUS at 07:09

## 2023-09-01 RX ADMIN — PRAVASTATIN SODIUM 20 MG: 10 TABLET ORAL at 08:09

## 2023-09-01 RX ADMIN — LIDOCAINE HYDROCHLORIDE 75 MG: 20 INJECTION, SOLUTION INTRAVENOUS at 12:09

## 2023-09-01 RX ADMIN — INSULIN DETEMIR 80 UNITS: 100 INJECTION, SOLUTION SUBCUTANEOUS at 08:09

## 2023-09-01 RX ADMIN — SODIUM CHLORIDE, SODIUM GLUCONATE, SODIUM ACETATE, POTASSIUM CHLORIDE, MAGNESIUM CHLORIDE, SODIUM PHOSPHATE, DIBASIC, AND POTASSIUM PHOSPHATE: .53; .5; .37; .037; .03; .012; .00082 INJECTION, SOLUTION INTRAVENOUS at 12:09

## 2023-09-01 RX ADMIN — SODIUM CHLORIDE, SODIUM GLUCONATE, SODIUM ACETATE, POTASSIUM CHLORIDE, MAGNESIUM CHLORIDE, SODIUM PHOSPHATE, DIBASIC, AND POTASSIUM PHOSPHATE: .53; .5; .37; .037; .03; .012; .00082 INJECTION, SOLUTION INTRAVENOUS at 11:09

## 2023-09-01 RX ADMIN — MIDAZOLAM HYDROCHLORIDE 2 MG: 1 INJECTION, SOLUTION INTRAMUSCULAR; INTRAVENOUS at 11:09

## 2023-09-01 RX ADMIN — KETAMINE HYDROCHLORIDE 25 MG: 10 INJECTION, SOLUTION INTRAMUSCULAR; INTRAVENOUS at 12:09

## 2023-09-01 RX ADMIN — SODIUM CHLORIDE 1000 ML: 0.9 INJECTION, SOLUTION INTRAVENOUS at 11:09

## 2023-09-01 RX ADMIN — SENNOSIDES AND DOCUSATE SODIUM 1 TABLET: 8.6; 5 TABLET ORAL at 08:09

## 2023-09-01 RX ADMIN — ROCURONIUM BROMIDE 5 MG: 10 INJECTION, SOLUTION INTRAVENOUS at 12:09

## 2023-09-01 RX ADMIN — ISOSORBIDE MONONITRATE 60 MG: 60 TABLET, EXTENDED RELEASE ORAL at 08:09

## 2023-09-01 RX ADMIN — PROPOFOL 150 MG: 10 INJECTION, EMULSION INTRAVENOUS at 12:09

## 2023-09-01 RX ADMIN — INSULIN ASPART 2 UNITS: 100 INJECTION, SOLUTION INTRAVENOUS; SUBCUTANEOUS at 08:09

## 2023-09-01 RX ADMIN — RANOLAZINE 500 MG: 500 TABLET, EXTENDED RELEASE ORAL at 08:09

## 2023-09-01 RX ADMIN — SUCCINYLCHOLINE CHLORIDE 120 MG: 20 INJECTION, SOLUTION INTRAMUSCULAR; INTRAVENOUS at 12:09

## 2023-09-01 RX ADMIN — VANCOMYCIN HYDROCHLORIDE 1250 MG: 1.25 INJECTION, POWDER, LYOPHILIZED, FOR SOLUTION INTRAVENOUS at 05:09

## 2023-09-01 RX ADMIN — GABAPENTIN 600 MG: 300 CAPSULE ORAL at 03:09

## 2023-09-01 RX ADMIN — GABAPENTIN 600 MG: 300 CAPSULE ORAL at 08:09

## 2023-09-01 RX ADMIN — ONDANSETRON 4 MG: 2 INJECTION INTRAMUSCULAR; INTRAVENOUS at 12:09

## 2023-09-01 RX ADMIN — METOPROLOL SUCCINATE 100 MG: 50 TABLET, EXTENDED RELEASE ORAL at 08:09

## 2023-09-01 RX ADMIN — VANCOMYCIN HYDROCHLORIDE 2000 MG: 1 INJECTION, POWDER, LYOPHILIZED, FOR SOLUTION INTRAVENOUS at 12:09

## 2023-09-01 RX ADMIN — CLONAZEPAM 1 MG: 0.5 TABLET ORAL at 08:09

## 2023-09-01 RX ADMIN — TAMSULOSIN HYDROCHLORIDE 0.4 MG: 0.4 CAPSULE ORAL at 08:09

## 2023-09-01 RX ADMIN — DEXAMETHASONE SODIUM PHOSPHATE 4 MG: 4 INJECTION, SOLUTION INTRA-ARTICULAR; INTRALESIONAL; INTRAMUSCULAR; INTRAVENOUS; SOFT TISSUE at 12:09

## 2023-09-01 RX ADMIN — PIPERACILLIN AND TAZOBACTAM 4.5 G: 4; .5 INJECTION, POWDER, LYOPHILIZED, FOR SOLUTION INTRAVENOUS; PARENTERAL at 03:09

## 2023-09-01 RX ADMIN — ACETAMINOPHEN 650 MG: 325 TABLET, FILM COATED ORAL at 03:09

## 2023-09-01 NOTE — RESPIRATORY THERAPY
02 saturation 97% on room air.  PRN tx not required at this time.  Waiting on family to bring patient's inhaler from home.

## 2023-09-01 NOTE — ASSESSMENT & PLAN NOTE
Admit to med/tele   Consult general surgery for probable I&D in AM - called per ED MD  NPO   Hold stacy      This patient does have evidence of infective focus  My overall impression is sepsis.  Source: Skin and Soft Tissue (location gluteal folds)  Antibiotics given-   Antibiotics (72h ago, onward)    Start     Stop Route Frequency Ordered    09/01/23 0430  piperacillin-tazobactam (ZOSYN) 4.5 g in dextrose 5 % in water (D5W) 100 mL IVPB (MB+)         -- IV Every 8 hours (non-standard times) 08/31/23 2316    09/01/23 0014  vancomycin - pharmacy to dose  (vancomycin IVPB (PEDS and ADULTS))        See Hyperspace for full Linked Orders Report.    -- IV pharmacy to manage frequency 08/31/23 2316        Latest lactate reviewed-  Recent Labs   Lab 08/31/23  1738 08/31/23  2247   LACTATE 2.2 2.4*     Organ dysfunction indicated by Acute kidney injury    Fluid challenge Not needed - patient is not hypotensive      Post- resuscitation assessment No - Post resuscitation assessment not needed       Will Not start Pressors- Levophed for MAP of 65  Source control achieved by: abx and probable I&D in AM

## 2023-09-01 NOTE — ASSESSMENT & PLAN NOTE
Patient with Paroxysmal (<7 days) atrial fibrillation which is controlled currently with Beta Blocker. Patient is currently in sinus rhythm.[id number] Score: 2. HASBLED Score: 1. Anticoagulation not indicated due to surgical contraindication, resume home eliquis when able.

## 2023-09-01 NOTE — SUBJECTIVE & OBJECTIVE
"No current facility-administered medications on file prior to encounter.     Current Outpatient Medications on File Prior to Encounter   Medication Sig    apixaban (ELIQUIS) 5 mg Tab Take 5 mg by mouth 2 (two) times daily.    aspirin 81 MG Chew Take 81 mg by mouth once daily.     cetirizine (ZYRTEC) 10 MG tablet Take 1 tablet (10 mg total) by mouth once daily.    clonazePAM (KLONOPIN) 1 MG tablet Take 1 mg by mouth 2 (two) times daily.    cyclobenzaprine (FLEXERIL) 10 MG tablet Take 10 mg by mouth once daily.    furosemide (LASIX) 20 MG tablet Take 20 mg by mouth 2 (two) times daily.    gabapentin (NEURONTIN) 600 MG tablet Take 600 mg by mouth 3 (three) times daily.     HYDROcodone-acetaminophen (NORCO) 5-325 mg per tablet Take 1 tablet by mouth every 6 (six) hours as needed for Pain.    isosorbide mononitrate (IMDUR) 60 MG 24 hr tablet Take 1 tablet (60 mg total) by mouth once daily.    JARDIANCE 25 mg tablet Take 25 mg by mouth once daily.    LANTUS SOLOSTAR U-100 INSULIN glargine 100 units/mL (3mL) SubQ pen Inject 80 Units into the skin 2 (two) times daily. Changed to 80 units BID 2months ago    lisinopriL (PRINIVIL,ZESTRIL) 20 MG tablet Take 10 mg by mouth 2 (two) times daily.    metFORMIN (GLUCOPHAGE) 1000 MG tablet Take 1,000 mg by mouth 2 (two) times daily with meals.     methocarbamoL (ROBAXIN) 500 MG Tab Take 500 mg by mouth every evening.    metoprolol succinate (TOPROL-XL) 100 MG 24 hr tablet Take 100 mg by mouth 2 (two) times daily.    nitroGLYCERIN (NITROSTAT) 0.4 MG SL tablet Place 0.4 mg under the tongue every 5 (five) minutes as needed for Chest pain.    oxyCODONE-acetaminophen (PERCOCET)  mg per tablet Take 1-2 tablets by mouth daily as needed.    pantoprazole (PROTONIX) 40 MG tablet Take 1 tablet by mouth 2 (two) times a day.    pen needle, diabetic 31 gauge x 1/4" Ndle USE 1 TWICE DAILY FOR BLOOD SUGAR GREATER THAN 200    pioglitazone (ACTOS) 15 MG tablet Take 15 mg by mouth once daily.    " ranolazine (RANEXA) 500 MG Tb12 Take 1 tablet (500 mg total) by mouth 2 (two) times daily.    rimegepant (NURTEC) 75 mg odt Take 1 tablet by mouth as needed.    simvastatin (ZOCOR) 10 MG tablet Take 10 mg by mouth once daily.    tamsulosin (FLOMAX) 0.4 mg Cap Take 1 capsule (0.4 mg total) by mouth every evening.    umeclidinium-vilanteroL (ANORO ELLIPTA) 62.5-25 mcg/actuation DsDv Inhale 1 puff into the lungs once daily.       Review of patient's allergies indicates:   Allergen Reactions    Pesticide Dermatitis and Rash       Past Medical History:   Diagnosis Date    Anticoagulant long-term use     Atrial fibrillation 2018    BPH (benign prostatic hyperplasia)     Cataract     COPD (chronic obstructive pulmonary disease)     Coronary artery disease     stents    CVD (cardiovascular disease)     Diabetes mellitus     Erythema multiforme     AKA Denton Edmondson Syndrome    Hypertension     Infected incision     MI (myocardial infarction)     per pt he has had 4     ROSAMARIA on CPAP     RLS (restless legs syndrome)      Past Surgical History:   Procedure Laterality Date    CORONARY ANGIOGRAPHY INCLUDING BYPASS GRAFTS WITH CATHETERIZATION OF LEFT HEART N/A 9/1/2022    Procedure: ANGIOGRAM, CORONARY, INCLUDING BYPASS GRAFT, WITH LEFT HEART CATHETERIZATION;  Surgeon: Paul Botello MD;  Location: [facility] CATH/EP LAB;  Service: Cardiology;  Laterality: N/A;    CORONARY ARTERY BYPASS GRAFT (CABG) N/A 4/7/2020    Procedure: CORONARY ARTERY BYPASS GRAFT (CABG)- Excision of left atrial appendage;  Surgeon: Doug Solitario MD;  Location: [facility] OR;  Service: Cardiothoracic;  Laterality: N/A;    CORONARY STENT PLACEMENT      WILSON MAZE PROCEDURE N/A 4/7/2020    Procedure: WILSON MAZE PROCEDURE- Attempted;  Surgeon: Doug Solitario MD;  Location: [facility] OR;  Service: Cardiothoracic;  Laterality: N/A;    CYSTOSCOPY N/A 12/10/2018    Procedure: CYSTOSCOPY;  Surgeon: Khushboo Abreu MD;  Location: [facility] OR;  Service: Urology;   Laterality: N/A;    ENDOSCOPIC HARVEST OF VEIN Left 4/7/2020    Procedure: HARVEST-VEIN-ENDOVASCULAR;  Surgeon: Doug Solitario MD;  Location: [facility] OR;  Service: Cardiothoracic;  Laterality: Left;    LEFT HEART CATHETERIZATION Left 3/17/2020    Procedure: CATHETERIZATION, HEART, LEFT;  Surgeon: Tyree Walters MD;  Location: [facility] CATH/EP LAB;  Service: Cardiology;  Laterality: Left;    STERNAL WIRES REMOVAL N/A 6/8/2020    Procedure: REMOVAL, STERNAL WIRE;  Surgeon: Doug Solitario MD;  Location: [facility] OR;  Service: Peripheral Vascular;  Laterality: N/A;    ULTRASOUND OF PROSTATE FOR VOLUME DETERMINATION  12/10/2018    Procedure: ULTRASOUND, PROSTATE, FOR VOLUME DETERMINATION;  Surgeon: Khushboo Abreu MD;  Location: [facility] OR;  Service: Urology;;     Family History       Problem Relation (Age of Onset)    Cancer Father    Diabetes Mother    Heart disease Mother    Hyperlipidemia Father          Tobacco Use    Smoking status: Every Day     Current packs/day: 0.00     Average packs/day: 0.3 packs/day for 30.0 years (9.0 ttl pk-yrs)     Types: Cigarettes     Start date: 4/4/1990     Last attempt to quit: 4/4/2020     Years since quitting: 3.4    Smokeless tobacco: Never    Tobacco comments:     just under a pack a day   Substance and Sexual Activity    Alcohol use: Not Currently    Drug use: Yes     Frequency: 1.0 times per week     Types: Marijuana    Sexual activity: Not Currently     Review of Systems   Constitutional:  Positive for chills, diaphoresis and fever. Negative for fatigue.   HENT:  Negative for congestion, ear pain, sore throat and trouble swallowing.    Eyes:  Negative for pain, discharge and visual disturbance.   Respiratory:  Negative for cough, chest tightness, shortness of breath and wheezing.    Cardiovascular:  Negative for chest pain, palpitations and leg swelling.   Gastrointestinal:  Positive for rectal pain. Negative for abdominal distention, abdominal pain, blood in stool,  constipation, diarrhea, nausea and vomiting.   Endocrine: Negative for polydipsia, polyphagia and polyuria.   Genitourinary:  Negative for dysuria, flank pain, frequency and urgency.   Musculoskeletal:  Negative for back pain, joint swelling, neck pain and neck stiffness.   Skin:  Positive for wound. Negative for rash.   Allergic/Immunologic: Negative for immunocompromised state.   Neurological:  Positive for weakness and headaches. Negative for dizziness, syncope, speech difficulty, light-headedness and numbness.   Hematological:  Negative for adenopathy.   Psychiatric/Behavioral:  Negative for confusion and suicidal ideas. The patient is not nervous/anxious.    All other systems reviewed and are negative.    Objective:     Vital Signs (Most Recent):  Temp: 98.5 °F (36.9 °C) (09/01/23 1004)  Pulse: 75 (09/01/23 1010)  Resp: 16 (09/01/23 1004)  BP: (!) 102/59 (09/01/23 1010)  SpO2: 96 % (09/01/23 1010) Vital Signs (24h Range):  Temp:  [98.2 °F (36.8 °C)-100.6 °F (38.1 °C)] 98.5 °F (36.9 °C)  Pulse:  [] 75  Resp:  [14-20] 16  SpO2:  [93 %-97 %] 96 %  BP: ()/(50-85) 102/59     Weight: 117.9 kg (260 lb)  Body mass index is 36.26 kg/m².     Physical Exam  Vitals and nursing note reviewed.   Constitutional:       Appearance: He is well-developed. He is obese.   HENT:      Head: Normocephalic and atraumatic.   Eyes:      Conjunctiva/sclera: Conjunctivae normal.      Pupils: Pupils are equal, round, and reactive to light.   Cardiovascular:      Rate and Rhythm: Normal rate and regular rhythm.      Heart sounds: Murmur heard.   Pulmonary:      Effort: Pulmonary effort is normal.      Breath sounds: Normal breath sounds.   Abdominal:      General: Bowel sounds are normal.      Palpations: Abdomen is soft.   Genitourinary:     Comments: Area of induration and erythema to the rt and left gluteal folds. Rt worse than left. See photos  Musculoskeletal:         General: Normal range of motion.      Cervical back:  Normal range of motion and neck supple.   Skin:     General: Skin is warm and dry.      Capillary Refill: Capillary refill takes less than 2 seconds.      Findings: Erythema present.   Neurological:      Mental Status: He is alert and oriented to person, place, and time.   Psychiatric:         Behavior: Behavior normal.         Thought Content: Thought content normal.         Judgment: Judgment normal.            I have reviewed all pertinent lab results within the past 24 hours.  CBC:   Recent Labs   Lab 09/01/23 0251   WBC 15.36*   RBC 4.36*   HGB 13.4*   HCT 39.4*      MCV 90   MCH 30.7   MCHC 34.0     BMP:   Recent Labs   Lab 09/01/23 0251   *   *   K 4.4   CL 92*   CO2 22*   BUN 13   CREATININE 1.4   CALCIUM 8.7   MG 1.7       Significant Diagnostics:  I have reviewed all pertinent imaging results/findings within the past 24 hours.  CT: I have reviewed all pertinent results/findings within the past 24 hours and my personal findings are:  kuldeep anal abscess which appears to have some fluid slightly left of midline

## 2023-09-01 NOTE — PROGRESS NOTES
"Patient is on Zosyn 3.375 g IV Q6h over 30 minutes (intermittent infusion). Due to the patient's current diagnosis, zosyn via extended infusion provides better clinical outcomes regarding decreased mortality and decreased length of stay in comparison to intermittent infusion. Zosyn dose will be adjusted to Zosyn 4.5 g IV Q8h over 4 hours. Per pharmacy protocol, Zosyn can be adjusted automatically. Providers can override adjustment by re-ordering intermittent infusion with "dispense as written" in the administration instructions.     Virgilio Chavira  [phone]    "

## 2023-09-01 NOTE — HOSPITAL COURSE
Magdaleno Cunningham is a 57 year old male with a past medical history of obesity, DM, HTN, CAD, COPD, Afib, ROSAMARIA, BPH, tobacco use, THC use and HLD who presented with severe sepsis secondary to a perineal abscess. He was placed on vancomycin.  Wound and blood cultures were collected.. Surgery was consulted and took the patient for I and D 9/1. His course was complicated by HENRIQUE and hyponatremia. The HENRIQUE improved with IV fluids.  Hyponatremia improved.  Wound cultures grew Streptococcus group F .  He was discharged home on a course of Augmentin after being cleared by surgeon.  He will follow-up with Dr. Spears.  Patient expressed understanding of discharge plan and felt ready for discharge home.

## 2023-09-01 NOTE — ANESTHESIA PROCEDURE NOTES
Intubation    Date/Time: 9/1/2023 12:01 PM    Performed by: Sanjay Diaz CRNA  Authorized by: Sunil Alvarez MD    Intubation:     Induction:  Intravenous    Intubated:  Postinduction    Mask Ventilation:  Not attempted    Attempts:  1    Attempted By:  CRNA    Method of Intubation:  Video laryngoscopy    Blade:  Huynh 3    Laryngeal View Grade: Grade I - full view of cords      Difficult Airway Encountered?: No      Complications:  None    Airway Device:  Oral endotracheal tube    Airway Device Size:  7.5    Style/Cuff Inflation:  Cuffed (inflated to minimal occlusive pressure)    Tube secured:  22    Secured at:  The lips    Placement Verified By:  Capnometry    Complicating Factors:  None    Findings Post-Intubation:  BS equal bilateral

## 2023-09-01 NOTE — CONSULTS
"Pharmacokinetic Initial Assessment: IV Vancomycin    Assessment/Plan:    Initiate intravenous vancomycin with dose of 2000 mg once with subsequent doses when random concentrations are less than 20 mcg/mL  Desired empiric serum trough concentration is 15 to 20 mcg/mL  Draw vancomycin random level on 09/01/23 at 1630.  Pharmacy will continue to follow and monitor vancomycin.      Please contact pharmacy at extension [phone] with any questions regarding this assessment.     Thank you for the consult,   Virgilio Murrayuyen       Patient brief summary:  Magdaleno Cunningham is a 57 y.o. male initiated on antimicrobial therapy with IV Vancomycin for treatment of suspected skin & soft tissue infection    Drug Allergies:   Review of patient's allergies indicates:   Allergen Reactions    Pesticide Dermatitis and Rash       Actual Body Weight:   117.9kg    Renal Function:   Estimated Creatinine Clearance: 64.1 mL/min (A) (based on SCr of 1.7 mg/dL (H)).,     CBC (last 72 hours):  Recent Labs   Lab Result Units 08/31/23  1738   WBC K/uL 15.35*   Hemoglobin g/dL 16.0   Hematocrit % 45.9   Platelets K/uL 242   Gran % % 75.3*   Lymph % % 16.1*   Mono % % 6.2   Eosinophil % % 1.2   Basophil % % 0.7   Differential Method  Automated       Metabolic Panel (last 72 hours):  Recent Labs   Lab Result Units 08/31/23  1738 08/31/23  1813   Sodium mmol/L 129*  --    Potassium mmol/L 4.1  --    Chloride mmol/L 94*  --    CO2 mmol/L 19*  --    Glucose mg/dL 289*  --    Glucose, UA   --  4+*   BUN mg/dL 16  --    Creatinine mg/dL 1.7*  --    Albumin g/dL 3.4*  --    Total Bilirubin mg/dL 0.7  --    Alkaline Phosphatase U/L 76  --    AST U/L 19  --    ALT U/L 20  --        Drug levels (last 3 results):  No results for input(s): "VANCOMYCINRA", "VANCORANDOM", "VANCOMYCINPE", "VANCOPEAK", "VANCOMYCINTR", "VANCOTROUGH" in the last 72 hours.    Microbiologic Results:  Microbiology Results (last 7 days)       Procedure Component Value Units Date/Time    " Blood culture (site 1) [[id number]] Collected: 08/31/23 2322    Order Status: Sent Specimen: Blood from Antecubital, Left Arm Updated: 08/31/23 2326    Blood culture (site 2) [[id number]] Collected: 08/31/23 2322    Order Status: Sent Specimen: Blood from Antecubital, Right Arm Updated: 08/31/23 2326

## 2023-09-01 NOTE — PROGRESS NOTES
"[facility]  Progress Note    Patient Name: Magdaleno Cunningham  MRN: [id number]  Patient Class: IP- Inpatient   Admission Date: 8/31/2023  Length of Stay: 0 days  Attending Physician: Andrea Sims MD  Primary Care Provider: Venkata Mclaughlin MD        Subjective:     Principal Problem:Sepsis        HPI:  Mr. Cunningham is a 57 year old male with a history of IDDM2, obesity BMI 37, afib on eliquis, COPD, ROSAMARIA on cpap, HTN, CAD s/p stents and CABG, and MSSA bacteremia  who presents today with complaints of abscess on his gluteal folds right worse than left. It is severe. It is associated with subjective fever, sweats/chills, fatigue, weakness, and pain. He denies chest pain, SOB, N/V/D, dizziness, or LOC. Symptoms began about 4 days ago on the right, he tried soaking it and "popping" it. Symptoms continued to worsen and he noted pain and induration on the left gluteal fold prompting his presentation. In the ED, , temp 99, WBC 15K, lactate 2.4, creatinine 1.7 with baseline 1.1-1.3, corrected sodium for glucose 132. CT abd/pelvis: " Induration in the gluteal fold just posterior and lateral to the anal rectal verge with small subcutaneous fluid collection in the perineum to the left of midline measuring 3.7 x 1.9 x 1.7 cm compatible with small subcutaneous abscess. No subcutaneous gas to suggest Chrissie's gangrene". Last dose of eliquis was 8/31/23 AM. ED MD discussed cased with general surgery, Dr. Spears who agrees to see patient in AM. Hospital medicine is consulted for admission for further work up and treatment.       Overview/Hospital Course:  Magdaleno Cunningham is a 57 year old female with a past medical history of obesity, DM, HTN, CAD, COPD, Afib, ROSAMARIA, BPH, tobacco use, THC use and HLD who presented with severe sepsis secondary to a perineal abscess. He is on vancomycin. Wound and blood cultures are pending. Surgery was consulted and took the patient for I and D " "9/1. His course has been complicated by HENRIQUE and hyponatremia. The HENRIQUE improved with IV fluids. Workup of the hyponatremia is pending.       Interval History: see "Hospital Course"    Review of Systems   Skin:  Positive for rash and wound.     Objective:     Vital Signs (Most Recent):  Temp: 97.8 °F (36.6 °C) (09/01/23 1441)  Pulse: 75 (09/01/23 1441)  Resp: 15 (09/01/23 1441)  BP: (!) 92/49 (09/01/23 1441)  SpO2: 97 % (09/01/23 1441) Vital Signs (24h Range):  Temp:  [97.2 °F (36.2 °C)-100.6 °F (38.1 °C)] 97.8 °F (36.6 °C)  Pulse:  [] 75  Resp:  [12-21] 15  SpO2:  [93 %-100 %] 97 %  BP: ()/(43-85) 92/49     Weight: 117.9 kg (260 lb)  Body mass index is 36.26 kg/m².    Intake/Output Summary (Last 24 hours) at 9/1/2023 1548  Last data filed at 9/1/2023 1446  Gross per 24 hour   Intake 2596.91 ml   Output 1550 ml   Net 1046.91 ml         Physical Exam  Vitals and nursing note reviewed.   Constitutional:       Appearance: He is obese.   HENT:      Head: Normocephalic and atraumatic.      Right Ear: External ear normal.      Left Ear: External ear normal.      Nose: Nose normal.      Mouth/Throat:      Mouth: Mucous membranes are moist.      Pharynx: Oropharynx is clear.   Eyes:      Extraocular Movements: Extraocular movements intact.      Conjunctiva/sclera: Conjunctivae normal.   Cardiovascular:      Rate and Rhythm: Normal rate and regular rhythm.      Pulses: Normal pulses.      Heart sounds: Normal heart sounds.   Pulmonary:      Breath sounds: Normal breath sounds.   Abdominal:      General: Bowel sounds are normal. There is no distension.      Palpations: Abdomen is soft.      Tenderness: There is no abdominal tenderness.   Genitourinary:     Comments: Wound packed.  Musculoskeletal:         General: Normal range of motion.      Cervical back: Normal range of motion and neck supple.      Right lower leg: No edema.      Left lower leg: No edema.   Skin:     General: Skin is warm and dry.   Neurological: "      Comments: Lethargic post-operatively.             Significant Labs: All pertinent labs within the past 24 hours have been reviewed.    Significant Imaging: I have reviewed all pertinent imaging results/findings within the past 24 hours.      Assessment/Plan:      * Sepsis  -Vancomycin   -Follow up wound and blood cultures    Hyponatremia  Worsened with NS.  -Follow up workup  -Trend sodium    Abscess and cellulitis of gluteal region  -Vancomycin  -Follow up wound and blood culture      Coronary artery disease involving native coronary artery of native heart  -Continue ASA and statin      HLD (hyperlipidemia)  -Continue statin and ASA    Obesity (BMI 30-39.9)  Body mass index is 36.26 kg/m². Morbid obesity complicates all aspects of disease management from diagnostic modalities to treatment.          Atrial fibrillation  Patient with Paroxysmal (<7 days) atrial fibrillation which is controlled currently with Beta Blocker. Patient is currently in sinus rhythm.[id number] Score: 2. HASBLED Score: 1. Anticoagulation not indicated due to surgical contraindication, resume home eliquis when able.    Prostate enlargement  -Continue Flomax      Smoker 1-2 packs per day since 14 years old  -Patient to be counseled on cessation      ROSAMARIA (obstructive sleep apnea)  -CPAP QHS    Hypertension associated with diabetes  -Continue lisinopril, Imdur and metoprolol  -Continue to monitor    Type 2 diabetes mellitus with diabetic arthropathy  Patient's FSGs are uncontrolled due to hyperglycemia on current medication regimen.  Last A1c reviewed-   Lab Results   Component Value Date    HGBA1C 8.8 (H) 08/31/2022     Most recent fingerstick glucose reviewed-   Recent Labs   Lab 09/01/23  0758 09/01/23  1322   POCTGLUCOSE 168* 129*     Current correctional scale  Medium  Maintain anti-hyperglycemic dose as follows-   Antihyperglycemics (From admission, onward)    Start     Stop Route Frequency Ordered    09/01/23 0900  insulin detemir  U-100 (Levemir) pen 80 Units         -- SubQ 2 times daily 08/31/23 2311 09/01/23 0007  insulin aspart U-100 pen 0-10 Units         -- SubQ Before meals & nightly PRN 08/31/23 2311        Hold Oral hypoglycemics while patient is in the hospital.    COPD (chronic obstructive pulmonary disease)  -Continue home inhalers      VTE Risk Mitigation (From admission, onward)         Ordered     apixaban tablet 5 mg  2 times daily         09/01/23 1151     IP VTE HIGH RISK PATIENT  Once         08/31/23 2311     Place sequential compression device  Until discontinued         08/31/23 2311                Discharge Planning   PEDRO: 9/3/2023     Code Status: Full Code   Is the patient medically ready for discharge?:     Reason for patient still in hospital (select all that apply): Patient new problem, Patient trending condition, Laboratory test and Treatment                     Andrea Sims MD  Department of Hospital Medicine   [facility]/Surg

## 2023-09-01 NOTE — ASSESSMENT & PLAN NOTE
"Patient's FSGs are uncontrolled due to hyperglycemia on current medication regimen.  Last A1c reviewed-   Lab Results   Component Value Date    HGBA1C 8.8 (H) 08/31/2022     Most recent fingerstick glucose reviewed- No results for input(s): "POCTGLUCOSE" in the last 24 hours.  Current correctional scale  Medium  Maintain anti-hyperglycemic dose as follows-   Antihyperglycemics (From admission, onward)    Start     Stop Route Frequency Ordered    09/01/23 0900  insulin detemir U-100 (Levemir) pen 80 Units         -- SubQ 2 times daily 08/31/23 2311 09/01/23 0007  insulin aspart U-100 pen 0-10 Units         -- SubQ Before meals & nightly PRN 08/31/23 2311        Hold Oral hypoglycemics while patient is in the hospital.  "

## 2023-09-01 NOTE — PROGRESS NOTES
Pharmacokinetic Assessment Follow Up: IV Vancomycin    Vancomycin serum concentration assessment(s):    The random level was drawn correctly and can be used to guide therapy at this time. The measurement is below the desired definitive target range of 15 to 20 mcg/mL.    Vancomycin Regimen Plan:    Change regimen to Vancomycin 1250 mg IV every 12 hours with next serum trough concentration measured at approximately prior to 3rd dose on 9/2/23    Drug levels (last 3 results):  Recent Labs   Lab Result Units 09/01/23  1646   Vancomycin, Random ug/mL 5.8       Pharmacy will continue to follow and monitor vancomycin.    Please contact pharmacy at extension [phone] for questions regarding this assessment.    Thank you for the consult,   Shwetha Frausto       Patient brief summary:  Magdaleno Cunningham is a 57 y.o. male initiated on antimicrobial therapy with IV Vancomycin for treatment of skin & soft tissue infection      Drug Allergies:   Review of patient's allergies indicates:   Allergen Reactions    Pesticide Dermatitis and Rash       Actual Body Weight:   117.9 kg    Renal Function:   Estimated Creatinine Clearance: 77.8 mL/min (based on SCr of 1.4 mg/dL).,     Dialysis Method (if applicable):  N/A    CBC (last 72 hours):  Recent Labs   Lab Result Units 08/31/23 1738 09/01/23  0251   WBC K/uL 15.35* 15.36*   Hemoglobin g/dL 16.0 13.4*   Hematocrit % 45.9 39.4*   Platelets K/uL 242 193   Gran % % 75.3* 74.9*   Lymph % % 16.1* 14.6*   Mono % % 6.2 8.9   Eosinophil % % 1.2 0.5   Basophil % % 0.7 0.6   Differential Method  Automated Automated       Metabolic Panel (last 72 hours):  Recent Labs   Lab Result Units 08/31/23 1738 08/31/23  1813 09/01/23  0251   Sodium mmol/L 129*  --  127*   Potassium mmol/L 4.1  --  4.4   Chloride mmol/L 94*  --  92*   CO2 mmol/L 19*  --  22*   Glucose mg/dL 289*  --  211*   Glucose, UA   --  4+*  --    BUN mg/dL 16  --  13   Creatinine mg/dL 1.7*  --  1.4   Albumin g/dL 3.4*  --  2.8*    Total Bilirubin mg/dL 0.7  --  0.8   Alkaline Phosphatase U/L 76  --  67   AST U/L 19  --  17   ALT U/L 20  --  16   Magnesium mg/dL  --   --  1.7       Vancomycin Administrations:  vancomycin given in the last 96 hours                     vancomycin (VANCOCIN) 2,000 mg in dextrose 5 % (D5W) 500 mL IVPB (mg) 2,000 mg New Bag 09/01/23 0021                    Microbiologic Results:  Microbiology Results (last 7 days)       Procedure Component Value Units Date/Time    Blood culture (site 1) [[id number]] Collected: 08/31/23 2322    Order Status: Completed Specimen: Blood from Antecubital, Left Arm Updated: 09/01/23 1517     Blood Culture, Routine No Growth to date    Narrative:      Site # 1, aerobic and anaerobic    Blood culture (site 2) [[id number]] Collected: 08/31/23 2322    Order Status: Completed Specimen: Blood from Antecubital, Right Arm Updated: 09/01/23 1517     Blood Culture, Routine No Growth to date    Narrative:      Site # 2, aerobic only    Culture, Anaerobe [[id number]] Collected: 09/01/23 1300    Order Status: Sent Specimen: Abscess from Perianal Updated: 09/01/23 1348    Aerobic culture [[id number]] Collected: 09/01/23 1300    Order Status: Sent Specimen: Abscess from Perianal Updated: 09/01/23 1347    Culture, Anaerobe [[id number]] Collected: 09/01/23 1254    Order Status: Sent Specimen: Abscess from Perianal Updated: 09/01/23 1347    Aerobic culture [[id number]] Collected: 09/01/23 1254    Order Status: Sent Specimen: Abscess from Perianal Updated: 09/01/23 1347

## 2023-09-01 NOTE — SUBJECTIVE & OBJECTIVE
"Interval History: see "Hospital Course"    Review of Systems   Skin:  Positive for rash and wound.     Objective:     Vital Signs (Most Recent):  Temp: 97.8 °F (36.6 °C) (09/01/23 1441)  Pulse: 75 (09/01/23 1441)  Resp: 15 (09/01/23 1441)  BP: (!) 92/49 (09/01/23 1441)  SpO2: 97 % (09/01/23 1441) Vital Signs (24h Range):  Temp:  [97.2 °F (36.2 °C)-100.6 °F (38.1 °C)] 97.8 °F (36.6 °C)  Pulse:  [] 75  Resp:  [12-21] 15  SpO2:  [93 %-100 %] 97 %  BP: ()/(43-85) 92/49     Weight: 117.9 kg (260 lb)  Body mass index is 36.26 kg/m².    Intake/Output Summary (Last 24 hours) at 9/1/2023 1548  Last data filed at 9/1/2023 1446  Gross per 24 hour   Intake 2596.91 ml   Output 1550 ml   Net 1046.91 ml         Physical Exam  Vitals and nursing note reviewed.   Constitutional:       Appearance: He is obese.   HENT:      Head: Normocephalic and atraumatic.      Right Ear: External ear normal.      Left Ear: External ear normal.      Nose: Nose normal.      Mouth/Throat:      Mouth: Mucous membranes are moist.      Pharynx: Oropharynx is clear.   Eyes:      Extraocular Movements: Extraocular movements intact.      Conjunctiva/sclera: Conjunctivae normal.   Cardiovascular:      Rate and Rhythm: Normal rate and regular rhythm.      Pulses: Normal pulses.      Heart sounds: Normal heart sounds.   Pulmonary:      Breath sounds: Normal breath sounds.   Abdominal:      General: Bowel sounds are normal. There is no distension.      Palpations: Abdomen is soft.      Tenderness: There is no abdominal tenderness.   Genitourinary:     Comments: Wound packed.  Musculoskeletal:         General: Normal range of motion.      Cervical back: Normal range of motion and neck supple.      Right lower leg: No edema.      Left lower leg: No edema.   Skin:     General: Skin is warm and dry.   Neurological:      Comments: Lethargic post-operatively.             Significant Labs: All pertinent labs within the past 24 hours have been " reviewed.    Significant Imaging: I have reviewed all pertinent imaging results/findings within the past 24 hours.

## 2023-09-01 NOTE — PLAN OF CARE
VSS. NAD. Resp E/U. Calm and cooperative. Speech appropriate. Tolerating clear liquids. Denies nausea. Pain controlled. IV patent. No swelling or redness. Packing intact. Due to void. Tele connected and monitoring room notified. Family notified of patient status. All safety measures taken. Bed in low position. Bedrails up x2. Bed locked. Cleared by Anesthesia.

## 2023-09-01 NOTE — PLAN OF CARE
POC reviewed with pt, verbalized understanding. Pt AAO x 4, able to make needs known. Meds given per MAR. Safety maintained with side rails up x3, bed locked and in lowest positioned, call light in reach. Pt educated to call for assistance with ambulation, verbalized understanding. Pt remains free of falls. No further needs expressed at this time. Will continue to monitor.     Problem: Adult Inpatient Plan of Care  Goal: Plan of Care Review  Outcome: Ongoing, Progressing  Goal: Patient-Specific Goal (Individualized)  Outcome: Ongoing, Progressing  Goal: Absence of Hospital-Acquired Illness or Injury  Outcome: Ongoing, Progressing  Goal: Optimal Comfort and Wellbeing  Outcome: Ongoing, Progressing  Goal: Readiness for Transition of Care  Outcome: Ongoing, Progressing     Problem: Diabetes Comorbidity  Goal: Blood Glucose Level Within Targeted Range  Outcome: Ongoing, Progressing     Problem: Skin or Soft Tissue Infection  Goal: Absence of Infection Signs and Symptoms  Outcome: Ongoing, Progressing     Problem: Adjustment to Illness (Sepsis/Septic Shock)  Goal: Optimal Coping  Outcome: Ongoing, Progressing     Problem: Bleeding (Sepsis/Septic Shock)  Goal: Absence of Bleeding  Outcome: Ongoing, Progressing     Problem: Glycemic Control Impaired (Sepsis/Septic Shock)  Goal: Blood Glucose Level Within Desired Range  Outcome: Ongoing, Progressing     Problem: Infection Progression (Sepsis/Septic Shock)  Goal: Absence of Infection Signs and Symptoms  Outcome: Ongoing, Progressing     Problem: Nutrition Impaired (Sepsis/Septic Shock)  Goal: Optimal Nutrition Intake  Outcome: Ongoing, Progressing     Problem: Fluid and Electrolyte Imbalance (Acute Kidney Injury/Impairment)  Goal: Fluid and Electrolyte Balance  Outcome: Ongoing, Progressing     Problem: Oral Intake Inadequate (Acute Kidney Injury/Impairment)  Goal: Optimal Nutrition Intake  Outcome: Ongoing, Progressing     Problem: Renal Function Impairment (Acute Kidney  Injury/Impairment)  Goal: Effective Renal Function  Outcome: Ongoing, Progressing

## 2023-09-01 NOTE — ASSESSMENT & PLAN NOTE
Patient with acute kidney injury/acute renal failure likely due to pre-renal azotemia due to IVVD HENRIQUE is currently stable. Baseline creatinine 1.1-1.3 - Labs reviewed- Renal function/electrolytes with Estimated Creatinine Clearance: 77.8 mL/min (based on SCr of 1.4 mg/dL). according to latest data. Monitor urine output and serial BMP and adjust therapy as needed. Avoid nephrotoxins and renally dose meds for GFR listed above.    Daily wt/accurate I&O  Hold nephrotoxic meds  Renally dose all meds  Monitor chemistries daily

## 2023-09-01 NOTE — HPI
"Mr. Cunningham is a 57 year old male with a history of IDDM2, obesity BMI 37, afib on eliquis, COPD, ROSAMARIA on cpap, HTN, CAD s/p stents and CABG, and MSSA bacteremia  who presents today with complaints of abscess on his gluteal folds right worse than left. It is severe. It is associated with subjective fever, sweats/chills, fatigue, weakness, and pain. He denies chest pain, SOB, N/V/D, dizziness, or LOC. Symptoms began about 4 days ago on the right, he tried soaking it and "popping" it. Symptoms continued to worsen and he noted pain and induration on the left gluteal fold prompting his presentation. In the ED, , temp 99, WBC 15K, lactate 2.4, creatinine 1.7 with baseline 1.1-1.3, corrected sodium for glucose 132. CT abd/pelvis: " Induration in the gluteal fold just posterior and lateral to the anal rectal verge with small subcutaneous fluid collection in the perineum to the left of midline measuring 3.7 x 1.9 x 1.7 cm compatible with small subcutaneous abscess. No subcutaneous gas to suggest Chrissie's gangrene". Last dose of eliquis was 8/31/23 AM. ED MD discussed cased with general surgery, Dr. Spears who agrees to see patient in AM. Hospital medicine is consulted for admission for further work up and treatment.   "

## 2023-09-01 NOTE — ASSESSMENT & PLAN NOTE
Patient with Paroxysmal (<7 days) atrial fibrillation which is controlled currently with Beta Blocker. Patient is currently in sinus rhythm.[id number] Score: 2. HASBLED Score: 1. Anticoagulation not indicated due to surgical contraindication, resume home eliquis when able.

## 2023-09-01 NOTE — ASSESSMENT & PLAN NOTE
Patient with acute kidney injury/acute renal failure likely due to pre-renal azotemia due to IVVD HENRIQUE is currently stable. Baseline creatinine 1.1-1.3 - Labs reviewed- Renal function/electrolytes with Estimated Creatinine Clearance: 65.6 mL/min (A) (based on SCr of 1.7 mg/dL (H)). according to latest data. Monitor urine output and serial BMP and adjust therapy as needed. Avoid nephrotoxins and renally dose meds for GFR listed above.    Daily wt/accurate I&O  Hold nephrotoxic meds  Renally dose all meds  Monitor chemistries daily

## 2023-09-01 NOTE — TRANSFER OF CARE
"Anesthesia Transfer of Care Note    Patient: Magdaleno Cunningham    Procedure(s) Performed: Procedure(s) (LRB):  INCISION, ABSCESS, PERIRECTAL (Bilateral)    Patient location: PACU    Anesthesia Type: general    Transport from OR: Transported from OR on 2-3 L/min O2 by NC with adequate spontaneous ventilation    Post pain: adequate analgesia    Post assessment: no apparent anesthetic complications and tolerated procedure well    Post vital signs: stable    Level of consciousness: awake, alert and oriented    Nausea/Vomiting: no nausea/vomiting    Complications: none    Transfer of care protocol was followed      Last vitals:   Visit Vitals  BP (!) 102/59 (BP Location: Left arm, Patient Position: Lying)   Pulse 75   Temp 36.9 °C (98.5 °F) (Oral)   Resp 16   Ht 5' 11" (1.803 m)   Wt 117.9 kg (260 lb)   SpO2 96%   BMI 36.26 kg/m²     "

## 2023-09-01 NOTE — HPI
57-year-old male with past medical history AFib on anticoagulation, COPD, CAD, diabetes, hypertension presenting with perineal abscess.  Patient states symptoms started approximately 3 days ago, and he also developed pain on the contralateral buttock today.  No fevers, no difficulty defecating, no vomiting, no other symptoms at this time    At my interview pain remains.

## 2023-09-01 NOTE — CONSULTS
[facility]  General Surgery  Consult Note    Patient Name: Magdaleno Cunningham  MRN: [id number]  Code Status: Full Code  Admission Date: 8/31/2023  Hospital Length of Stay: 0 days  Attending Physician: Andrea Sims MD  Primary Care Provider: Venkata Mclaughlin MD    Patient information was obtained from patient and ER records.     Inpatient consult to General surgery  Consult performed by: Brayan Spears MD  Consult ordered by: Andrea Sims MD  Reason for consult: kuldeep anal abscess  Assessment/Recommendations: I and D        Subjective:     Principal Problem: Sepsis    History of Present Illness:    57-year-old male with past medical history AFib on anticoagulation, COPD, CAD, diabetes, hypertension presenting with perineal abscess.  Patient states symptoms started approximately 3 days ago, and he also developed pain on the contralateral buttock today.  No fevers, no difficulty defecating, no vomiting, no other symptoms at this time    At my interview pain remains.        No current facility-administered medications on file prior to encounter.     Current Outpatient Medications on File Prior to Encounter   Medication Sig    apixaban (ELIQUIS) 5 mg Tab Take 5 mg by mouth 2 (two) times daily.    aspirin 81 MG Chew Take 81 mg by mouth once daily.     cetirizine (ZYRTEC) 10 MG tablet Take 1 tablet (10 mg total) by mouth once daily.    clonazePAM (KLONOPIN) 1 MG tablet Take 1 mg by mouth 2 (two) times daily.    cyclobenzaprine (FLEXERIL) 10 MG tablet Take 10 mg by mouth once daily.    furosemide (LASIX) 20 MG tablet Take 20 mg by mouth 2 (two) times daily.    gabapentin (NEURONTIN) 600 MG tablet Take 600 mg by mouth 3 (three) times daily.     HYDROcodone-acetaminophen (NORCO) 5-325 mg per tablet Take 1 tablet by mouth every 6 (six) hours as needed for Pain.    isosorbide mononitrate (IMDUR) 60 MG 24 hr tablet Take 1 tablet (60 mg total) by mouth once daily.    JARDIANCE 25  "mg tablet Take 25 mg by mouth once daily.    LANTUS SOLOSTAR U-100 INSULIN glargine 100 units/mL (3mL) SubQ pen Inject 80 Units into the skin 2 (two) times daily. Changed to 80 units BID 2months ago    lisinopriL (PRINIVIL,ZESTRIL) 20 MG tablet Take 10 mg by mouth 2 (two) times daily.    metFORMIN (GLUCOPHAGE) 1000 MG tablet Take 1,000 mg by mouth 2 (two) times daily with meals.     methocarbamoL (ROBAXIN) 500 MG Tab Take 500 mg by mouth every evening.    metoprolol succinate (TOPROL-XL) 100 MG 24 hr tablet Take 100 mg by mouth 2 (two) times daily.    nitroGLYCERIN (NITROSTAT) 0.4 MG SL tablet Place 0.4 mg under the tongue every 5 (five) minutes as needed for Chest pain.    oxyCODONE-acetaminophen (PERCOCET)  mg per tablet Take 1-2 tablets by mouth daily as needed.    pantoprazole (PROTONIX) 40 MG tablet Take 1 tablet by mouth 2 (two) times a day.    pen needle, diabetic 31 gauge x 1/4" Ndle USE 1 TWICE DAILY FOR BLOOD SUGAR GREATER THAN 200    pioglitazone (ACTOS) 15 MG tablet Take 15 mg by mouth once daily.    ranolazine (RANEXA) 500 MG Tb12 Take 1 tablet (500 mg total) by mouth 2 (two) times daily.    rimegepant (NURTEC) 75 mg odt Take 1 tablet by mouth as needed.    simvastatin (ZOCOR) 10 MG tablet Take 10 mg by mouth once daily.    tamsulosin (FLOMAX) 0.4 mg Cap Take 1 capsule (0.4 mg total) by mouth every evening.    umeclidinium-vilanteroL (ANORO ELLIPTA) 62.5-25 mcg/actuation DsDv Inhale 1 puff into the lungs once daily.       Review of patient's allergies indicates:   Allergen Reactions    Pesticide Dermatitis and Rash       Past Medical History:   Diagnosis Date    Anticoagulant long-term use     Atrial fibrillation 2018    BPH (benign prostatic hyperplasia)     Cataract     COPD (chronic obstructive pulmonary disease)     Coronary artery disease     stents    CVD (cardiovascular disease)     Diabetes mellitus     Erythema multiforme     AKA Denton Edmondson Syndrome    " Hypertension     Infected incision     MI (myocardial infarction)     per pt he has had 4     ROSAMARIA on CPAP     RLS (restless legs syndrome)      Past Surgical History:   Procedure Laterality Date    CORONARY ANGIOGRAPHY INCLUDING BYPASS GRAFTS WITH CATHETERIZATION OF LEFT HEART N/A 9/1/2022    Procedure: ANGIOGRAM, CORONARY, INCLUDING BYPASS GRAFT, WITH LEFT HEART CATHETERIZATION;  Surgeon: Paul Botello MD;  Location: [facility] CATH/EP LAB;  Service: Cardiology;  Laterality: N/A;    CORONARY ARTERY BYPASS GRAFT (CABG) N/A 4/7/2020    Procedure: CORONARY ARTERY BYPASS GRAFT (CABG)- Excision of left atrial appendage;  Surgeon: Doug Solitario MD;  Location: [facility];  Service: Cardiothoracic;  Laterality: N/A;    CORONARY STENT PLACEMENT      WILSON MAZE PROCEDURE N/A 4/7/2020    Procedure: WILSON MAZE PROCEDURE- Attempted;  Surgeon: Doug Solitario MD;  Location: [facility];  Service: Cardiothoracic;  Laterality: N/A;    CYSTOSCOPY N/A 12/10/2018    Procedure: CYSTOSCOPY;  Surgeon: Khushboo Abreu MD;  Location: [facility];  Service: Urology;  Laterality: N/A;    ENDOSCOPIC HARVEST OF VEIN Left 4/7/2020    Procedure: HARVEST-VEIN-ENDOVASCULAR;  Surgeon: Doug Solitario MD;  Location: [facility];  Service: Cardiothoracic;  Laterality: Left;    LEFT HEART CATHETERIZATION Left 3/17/2020    Procedure: CATHETERIZATION, HEART, LEFT;  Surgeon: Tyree Walters MD;  Location: [facility] CATH/EP LAB;  Service: Cardiology;  Laterality: Left;    STERNAL WIRES REMOVAL N/A 6/8/2020    Procedure: REMOVAL, STERNAL WIRE;  Surgeon: Doug Solitario MD;  [facility] [facility];  Service: Peripheral Vascular;  Laterality: N/A;    ULTRASOUND OF PROSTATE FOR VOLUME DETERMINATION  12/10/2018    Procedure: ULTRASOUND, PROSTATE, FOR VOLUME DETERMINATION;  Surgeon: Khushboo Abreu MD;  Location: [facility];  Service: Urology;;     Family History       Problem Relation (Age of Onset)    Cancer Father    Diabetes Mother     Heart disease Mother    Hyperlipidemia Father          Tobacco Use    Smoking status: Every Day     Current packs/day: 0.00     Average packs/day: 0.3 packs/day for 30.0 years (9.0 ttl pk-yrs)     Types: Cigarettes     Start date: 4/4/1990     Last attempt to quit: 4/4/2020     Years since quitting: 3.4    Smokeless tobacco: Never    Tobacco comments:     just under a pack a day   Substance and Sexual Activity    Alcohol use: Not Currently    Drug use: Yes     Frequency: 1.0 times per week     Types: Marijuana    Sexual activity: Not Currently     Review of Systems   Constitutional:  Positive for chills, diaphoresis and fever. Negative for fatigue.   HENT:  Negative for congestion, ear pain, sore throat and trouble swallowing.    Eyes:  Negative for pain, discharge and visual disturbance.   Respiratory:  Negative for cough, chest tightness, shortness of breath and wheezing.    Cardiovascular:  Negative for chest pain, palpitations and leg swelling.   Gastrointestinal:  Positive for rectal pain. Negative for abdominal distention, abdominal pain, blood in stool, constipation, diarrhea, nausea and vomiting.   Endocrine: Negative for polydipsia, polyphagia and polyuria.   Genitourinary:  Negative for dysuria, flank pain, frequency and urgency.   Musculoskeletal:  Negative for back pain, joint swelling, neck pain and neck stiffness.   Skin:  Positive for wound. Negative for rash.   Allergic/Immunologic: Negative for immunocompromised state.   Neurological:  Positive for weakness and headaches. Negative for dizziness, syncope, speech difficulty, light-headedness and numbness.   Hematological:  Negative for adenopathy.   Psychiatric/Behavioral:  Negative for confusion and suicidal ideas. The patient is not nervous/anxious.    All other systems reviewed and are negative.    Objective:     Vital Signs (Most Recent):  Temp: 98.5 °F (36.9 °C) (09/01/23 1004)  Pulse: 75 (09/01/23 1010)  Resp: 16 (09/01/23 1004)  BP: (!)  102/59 (09/01/23 1010)  SpO2: 96 % (09/01/23 1010) Vital Signs (24h Range):  Temp:  [98.2 °F (36.8 °C)-100.6 °F (38.1 °C)] 98.5 °F (36.9 °C)  Pulse:  [] 75  Resp:  [14-20] 16  SpO2:  [93 %-97 %] 96 %  BP: ()/(50-85) 102/59     Weight: 117.9 kg (260 lb)  Body mass index is 36.26 kg/m².     Physical Exam  Vitals and nursing note reviewed.   Constitutional:       Appearance: He is well-developed. He is obese.   HENT:      Head: Normocephalic and atraumatic.   Eyes:      Conjunctiva/sclera: Conjunctivae normal.      Pupils: Pupils are equal, round, and reactive to light.   Cardiovascular:      Rate and Rhythm: Normal rate and regular rhythm.      Heart sounds: Murmur heard.   Pulmonary:      Effort: Pulmonary effort is normal.      Breath sounds: Normal breath sounds.   Abdominal:      General: Bowel sounds are normal.      Palpations: Abdomen is soft.   Genitourinary:     Comments: Area of induration and erythema to the rt and left gluteal folds. Rt worse than left. See photos  Musculoskeletal:         General: Normal range of motion.      Cervical back: Normal range of motion and neck supple.   Skin:     General: Skin is warm and dry.      Capillary Refill: Capillary refill takes less than 2 seconds.      Findings: Erythema present.   Neurological:      Mental Status: He is alert and oriented to person, place, and time.   Psychiatric:         Behavior: Behavior normal.         Thought Content: Thought content normal.         Judgment: Judgment normal.            I have reviewed all pertinent lab results within the past 24 hours.  CBC:   Recent Labs   Lab 09/01/23  0251   WBC 15.36*   RBC 4.36*   HGB 13.4*   HCT 39.4*      MCV 90   MCH 30.7   MCHC 34.0     BMP:   Recent Labs   Lab 09/01/23  0251   *   *   K 4.4   CL 92*   CO2 22*   BUN 13   CREATININE 1.4   CALCIUM 8.7   MG 1.7       Significant Diagnostics:  I have reviewed all pertinent imaging results/findings within the past 24  hours.  CT: I have reviewed all pertinent results/findings within the past 24 hours and my personal findings are:  kuldeep anal abscess which appears to have some fluid slightly left of midline      Assessment/Plan:     Abscess and cellulitis of gluteal region  I and D  Continue antibiotics  Will need wound care  Spoke with er physician about the case      VTE Risk Mitigation (From admission, onward)         Ordered     IP VTE HIGH RISK PATIENT  Once         08/31/23 2311     Place sequential compression device  Until discontinued         08/31/23 2311                Thank you for your consult. I will follow-up with patient. Please contact us if you have any additional questions.    Brayan Spears MD  General Surgery  [facility]

## 2023-09-01 NOTE — H&P
"[facility]  History & Physical    Patient Name: Magdaleno Cunningham  MRN: [id number]  Patient Class: OP- Observation  Admission Date: 8/31/2023  Attending Physician: Dr. Sims  Primary Care Provider: Venkata Mclaughlin MD         Patient information was obtained from patient, past medical records and ER records.     Subjective:     Principal Problem:Sepsis    Chief Complaint:   Chief Complaint   Patient presents with    Abscess     Abscess near rectum says he thinks there are 2 of them, states he has a fever and headaches body aches and was exposed to covid         HPI: Mr. Cunningham is a 57 year old male with a history of IDDM2, obesity BMI 37, afib on eliquis, COPD, ROSAMARIA on cpap, HTN, CAD s/p stents and CABG, and MSSA bacteremia  who presents today with complaints of abscess on his gluteal folds right worse than left. It is severe. It is associated with subjective fever, sweats/chills, fatigue, weakness, and pain. He denies chest pain, SOB, N/V/D, dizziness, or LOC. Symptoms began about 4 days ago on the right, he tried soaking it and "popping" it. Symptoms continued to worsen and he noted pain and induration on the left gluteal fold prompting his presentation. In the ED, , temp 99, WBC 15K, lactate 2.4, creatinine 1.7 with baseline 1.1-1.3, corrected sodium for glucose 132. CT abd/pelvis: " Induration in the gluteal fold just posterior and lateral to the anal rectal verge with small subcutaneous fluid collection in the perineum to the left of midline measuring 3.7 x 1.9 x 1.7 cm compatible with small subcutaneous abscess. No subcutaneous gas to suggest Chrissie's gangrene". Last dose of eliquis was 8/31/23 AM. ED MD discussed cased with general surgery, Dr. Spears who agrees to see patient in AM. Hospital medicine is consulted for admission for further work up and treatment.       Past Medical History:   Diagnosis Date    Anticoagulant long-term use     Atrial " fibrillation 2018    BPH (benign prostatic hyperplasia)     Cataract     COPD (chronic obstructive pulmonary disease)     Coronary artery disease     stents    CVD (cardiovascular disease)     Diabetes mellitus     Erythema multiforme     AKA Denton Edmondson Syndrome    Hypertension     Infected incision     MI (myocardial infarction)     per pt he has had 4     ROSAMARIA on CPAP     RLS (restless legs syndrome)        Past Surgical History:   Procedure Laterality Date    CORONARY ANGIOGRAPHY INCLUDING BYPASS GRAFTS WITH CATHETERIZATION OF LEFT HEART N/A 9/1/2022    Procedure: ANGIOGRAM, CORONARY, INCLUDING BYPASS GRAFT, WITH LEFT HEART CATHETERIZATION;  Surgeon: Paul Botello MD;  Location: [facility] CATH/EP LAB;  Service: Cardiology;  Laterality: N/A;    CORONARY ARTERY BYPASS GRAFT (CABG) N/A 4/7/2020    Procedure: CORONARY ARTERY BYPASS GRAFT (CABG)- Excision of left atrial appendage;  Surgeon: Doug Solitario MD;  Location: [facility] OR;  Service: Cardiothoracic;  Laterality: N/A;    CORONARY STENT PLACEMENT      WILSON MAZE PROCEDURE N/A 4/7/2020    Procedure: WILSON MAZE PROCEDURE- Attempted;  Surgeon: Doug Solitario MD;  Location: [facility] OR;  Service: Cardiothoracic;  Laterality: N/A;    CYSTOSCOPY N/A 12/10/2018    Procedure: CYSTOSCOPY;  Surgeon: Khushboo Abreu MD;  Location: [facility] OR;  Service: Urology;  Laterality: N/A;    ENDOSCOPIC HARVEST OF VEIN Left 4/7/2020    Procedure: HARVEST-VEIN-ENDOVASCULAR;  Surgeon: Doug Solitario MD;  Location: [facility] OR;  Service: Cardiothoracic;  Laterality: Left;    LEFT HEART CATHETERIZATION Left 3/17/2020    Procedure: CATHETERIZATION, HEART, LEFT;  Surgeon: Tyree Walters MD;  Location: [facility] CATH/EP LAB;  Service: Cardiology;  Laterality: Left;    STERNAL WIRES REMOVAL N/A 6/8/2020    Procedure: REMOVAL, STERNAL WIRE;  Surgeon: Doug Solitario MD;  Location: [facility] OR;  Service: Peripheral Vascular;  Laterality: N/A;    ULTRASOUND OF  PROSTATE FOR VOLUME DETERMINATION  12/10/2018    Procedure: ULTRASOUND, PROSTATE, FOR VOLUME DETERMINATION;  Surgeon: Khushboo Abreu MD;  [facility];  Service: Urology;;       Review of patient's allergies indicates:   Allergen Reactions    Pesticide Dermatitis and Rash       No current facility-administered medications on file prior to encounter.     Current Outpatient Medications on File Prior to Encounter   Medication Sig    apixaban (ELIQUIS) 5 mg Tab Take 5 mg by mouth 2 (two) times daily.    aspirin 81 MG Chew Take 81 mg by mouth once daily.     cetirizine (ZYRTEC) 10 MG tablet Take 1 tablet (10 mg total) by mouth once daily.    clonazePAM (KLONOPIN) 1 MG tablet Take 1 mg by mouth 2 (two) times daily.    cyclobenzaprine (FLEXERIL) 10 MG tablet Take 10 mg by mouth once daily.    furosemide (LASIX) 20 MG tablet Take 20 mg by mouth 2 (two) times daily.    gabapentin (NEURONTIN) 600 MG tablet Take 600 mg by mouth 3 (three) times daily.     HYDROcodone-acetaminophen (NORCO) 5-325 mg per tablet Take 1 tablet by mouth every 6 (six) hours as needed for Pain.    isosorbide mononitrate (IMDUR) 60 MG 24 hr tablet Take 1 tablet (60 mg total) by mouth once daily.    JARDIANCE 25 mg tablet Take 25 mg by mouth once daily.    LANTUS SOLOSTAR U-100 INSULIN glargine 100 units/mL (3mL) SubQ pen Inject 80 Units into the skin 2 (two) times daily. Changed to 80 units BID 2months ago    lisinopriL (PRINIVIL,ZESTRIL) 20 MG tablet Take 10 mg by mouth 2 (two) times daily.    metFORMIN (GLUCOPHAGE) 1000 MG tablet Take 1,000 mg by mouth 2 (two) times daily with meals.     methocarbamoL (ROBAXIN) 500 MG Tab Take 500 mg by mouth every evening.    metoprolol succinate (TOPROL-XL) 100 MG 24 hr tablet Take 100 mg by mouth 2 (two) times daily.    nitroGLYCERIN (NITROSTAT) 0.4 MG SL tablet Place 0.4 mg under the tongue every 5 (five) minutes as needed for Chest pain.    oxyCODONE-acetaminophen (PERCOCET)  " mg per tablet Take 1-2 tablets by mouth daily as needed.    pantoprazole (PROTONIX) 40 MG tablet Take 1 tablet by mouth 2 (two) times a day.    pen needle, diabetic 31 gauge x 1/4" Ndle USE 1 TWICE DAILY FOR BLOOD SUGAR GREATER THAN 200    pioglitazone (ACTOS) 15 MG tablet Take 15 mg by mouth once daily.    ranolazine (RANEXA) 500 MG Tb12 Take 1 tablet (500 mg total) by mouth 2 (two) times daily.    rimegepant (NURTEC) 75 mg odt Take 1 tablet by mouth as needed.    simvastatin (ZOCOR) 10 MG tablet Take 10 mg by mouth once daily.    tamsulosin (FLOMAX) 0.4 mg Cap Take 1 capsule (0.4 mg total) by mouth every evening.    umeclidinium-vilanteroL (ANORO ELLIPTA) 62.5-25 mcg/actuation DsDv Inhale 1 puff into the lungs once daily.     Family History       Problem Relation (Age of Onset)    Cancer Father    Diabetes Mother    Heart disease Mother    Hyperlipidemia Father          Tobacco Use    Smoking status: Every Day     Current packs/day: 0.00     Average packs/day: 0.3 packs/day for 30.0 years (9.0 ttl pk-yrs)     Types: Cigarettes     Start date: 4/4/1990     Last attempt to quit: 4/4/2020     Years since quitting: 3.4    Smokeless tobacco: Never    Tobacco comments:     just under a pack a day   Substance and Sexual Activity    Alcohol use: Not Currently    Drug use: Yes     Frequency: 1.0 times per week     Types: Marijuana    Sexual activity: Not Currently     Review of Systems   Constitutional:  Positive for chills, diaphoresis and fever. Negative for fatigue.   HENT:  Negative for congestion, ear pain, sore throat and trouble swallowing.    Eyes:  Negative for pain, discharge and visual disturbance.   Respiratory:  Negative for cough, chest tightness, shortness of breath and wheezing.    Cardiovascular:  Negative for chest pain, palpitations and leg swelling.   Gastrointestinal:  Positive for rectal pain. Negative for abdominal distention, abdominal pain, blood in stool, constipation, " diarrhea, nausea and vomiting.   Endocrine: Negative for polydipsia, polyphagia and polyuria.   Genitourinary:  Negative for dysuria, flank pain, frequency and urgency.   Musculoskeletal:  Negative for back pain, joint swelling, neck pain and neck stiffness.   Skin:  Positive for wound. Negative for rash.   Allergic/Immunologic: Negative for immunocompromised state.   Neurological:  Positive for weakness and headaches. Negative for dizziness, syncope, speech difficulty, light-headedness and numbness.   Hematological:  Negative for adenopathy.   Psychiatric/Behavioral:  Negative for confusion and suicidal ideas. The patient is not nervous/anxious.    All other systems reviewed and are negative.    Objective:     Vital Signs (Most Recent):  Temp: (!) 100.6 °F (38.1 °C) (09/01/23 0036)  Pulse: (!) 111 (09/01/23 0104)  Resp: 20 (09/01/23 0104)  BP: (!) 175/85 (09/01/23 0036)  SpO2: 96 % (09/01/23 0104) Vital Signs (24h Range):  Temp:  [99 °F (37.2 °C)-100.6 °F (38.1 °C)] 100.6 °F (38.1 °C)  Pulse:  [] 111  Resp:  [14-20] 20  SpO2:  [93 %-97 %] 96 %  BP: (124-175)/(62-85) 175/85     Weight: 123 kg (271 lb 2.7 oz)  Body mass index is 37.82 kg/m².     Physical Exam  Vitals and nursing note reviewed.   Constitutional:       Appearance: He is well-developed. He is obese.   HENT:      Head: Normocephalic and atraumatic.   Eyes:      Conjunctiva/sclera: Conjunctivae normal.      Pupils: Pupils are equal, round, and reactive to light.   Cardiovascular:      Rate and Rhythm: Normal rate and regular rhythm.      Heart sounds: Murmur heard.   Pulmonary:      Effort: Pulmonary effort is normal.      Breath sounds: Normal breath sounds.   Abdominal:      General: Bowel sounds are normal.      Palpations: Abdomen is soft.   Genitourinary:     Comments: Area of induration and erythema to the rt and left gluteal folds. Rt worse than left. See photos  Musculoskeletal:         General: Normal range of motion.      Cervical back:  Normal range of motion and neck supple.   Skin:     General: Skin is warm and dry.      Capillary Refill: Capillary refill takes less than 2 seconds.      Findings: Erythema present.   Neurological:      Mental Status: He is alert and oriented to person, place, and time.   Psychiatric:         Behavior: Behavior normal.         Thought Content: Thought content normal.         Judgment: Judgment normal.              CRANIAL NERVES     CN III, IV, VI   Pupils are equal, round, and reactive to light.       Significant Labs: All pertinent labs within the past 24 hours have been reviewed.  CBC:   Recent Labs   Lab 08/31/23  1738   WBC 15.35*   HGB 16.0   HCT 45.9        CMP:   Recent Labs   Lab 08/31/23  1738   *   K 4.1   CL 94*   CO2 19*   *   BUN 16   CREATININE 1.7*   CALCIUM 9.8   PROT 8.2   ALBUMIN 3.4*   BILITOT 0.7   ALKPHOS 76   AST 19   ALT 20   ANIONGAP 16     Lactic Acid:   Recent Labs   Lab 08/31/23  1738 08/31/23  2247   LACTATE 2.2 2.4*     Urine Studies:   Recent Labs   Lab 08/31/23  1813   COLORU Yellow   APPEARANCEUA Clear   PHUR 6.0   SPECGRAV >1.030*   PROTEINUA Trace*   GLUCUA 4+*   KETONESU Trace*   BILIRUBINUA Negative   OCCULTUA Trace*   NITRITE Negative   UROBILINOGEN 2.0-3.0*   LEUKOCYTESUR Negative   RBCUA 3   WBCUA 1   BACTERIA None       Significant Imaging: I have reviewed all pertinent imaging results/findings within the past 24 hours.  EKG: I have reviewed all pertinent results/findings within the past 24 hours and my personal findings are: NSR, nonspecific ST and T wave abnormalities, not acute ischemic changes  X-Ray Chest 1 View    Result Date: 9/1/2023  EXAMINATION: XR CHEST 1 VIEW CLINICAL HISTORY: pre op; TECHNIQUE: Single frontal view of the chest was performed. COMPARISON: None FINDINGS: The heart mediastinal contours appear stable in size and prominence of the perihilar lung markings and vasculature raises the question of early cardiac decompensation pulmonary  edema.  Interstitial pneumonitis or pneumonia could have a similar appearance.  Configuration with median sternotomy.     See above Electronically signed by: Christopher Bales Date:    09/01/2023 Time:    00:58    CT Pelvis With Contrast    Result Date: 8/31/2023  EXAMINATION: CT PELVIS WITH  CONTRAST CLINICAL HISTORY: Anal/rectal abscess; TECHNIQUE: Axial CT images of the pelvis were obtained following the administration of 100 cc of Omnipaque 350 contrast via right antecubital vein. COMPARISON: CT abdomen and pelvis, 08/04/2019. FINDINGS: There is a area of induration in the Patito rectal gluteal fold region bilaterally.  A somewhat septated fluid collection on the left gluteal fold measures 3.7 x 1.9 by 1.7 cm.  No extension into the rectal or anal wall.  Pelvic floor and GI  tract appear unremarkable. Bony structures are intact.  The abdominal wall is intact.  Spondylosis in the lower lumbar spine.  No lytic or blastic changes evident. Anterior inguinal and scrotal region appears normal.  No subcu gas.     Induration in the gluteal fold just posterior and lateral to the anal rectal verge with small subcutaneous fluid collection in the perineum to the left of midline measuring 3.7 x 1.9 x 1.7 cm compatible with small subcutaneous abscess. No communication with the anal rectal verge. No subcutaneous gas to suggest Chrissie's gangrene. Electronically signed by: Christopher Bales Date:    08/31/2023 Time:    22:24               Assessment/Plan:     * Sepsis  Admit to med/tele   Consult general surgery for probable I&D in AM - called per ED MD  NPO   Hold eliquis      This patient does have evidence of infective focus  My overall impression is sepsis.  Source: Skin and Soft Tissue (location gluteal folds)  Antibiotics given-   Antibiotics (72h ago, onward)    Start     Stop Route Frequency Ordered    09/01/23 0430  piperacillin-tazobactam (ZOSYN) 4.5 g in dextrose 5 % in water (D5W) 100 mL IVPB (MB+)         -- IV Every  8 hours (non-standard times) 08/31/23 2316    09/01/23 0014  vancomycin - pharmacy to dose  (vancomycin IVPB (PEDS and ADULTS))        See Hyperspace for full Linked Orders Report.    -- IV pharmacy to manage frequency 08/31/23 2316        Latest lactate reviewed-  Recent Labs   Lab 08/31/23  1738 08/31/23  2247   LACTATE 2.2 2.4*     Organ dysfunction indicated by Acute kidney injury    Fluid challenge Not needed - patient is not hypotensive      Post- resuscitation assessment No - Post resuscitation assessment not needed       Will Not start Pressors- Levophed for MAP of 65  Source control achieved by: abx and probable I&D in AM    Abscess and cellulitis of gluteal region  Zosyn/vanc  gen surgery consulted for probable I&D  Pain control  NPO      HENRIQUE (acute kidney injury)  Patient with acute kidney injury/acute renal failure likely due to pre-renal azotemia due to IVVD HENRIQUE is currently stable. Baseline creatinine 1.1-1.3 - Labs reviewed- Renal function/electrolytes with Estimated Creatinine Clearance: 65.6 mL/min (A) (based on SCr of 1.7 mg/dL (H)). according to latest data. Monitor urine output and serial BMP and adjust therapy as needed. Avoid nephrotoxins and renally dose meds for GFR listed above.    Daily wt/accurate I&O  Hold nephrotoxic meds  Renally dose all meds  Monitor chemistries daily    Atrial fibrillation  Patient with Paroxysmal (<7 days) atrial fibrillation which is controlled currently with Beta Blocker. Patient is currently in sinus rhythm.[id number] Score: 2. HASBLED Score: 1. Anticoagulation not indicated due to surgical contraindication, resume home eliquis when able.    ROSAMARIA (obstructive sleep apnea)  cpap qHS per home settings    Hypertension associated with diabetes  Continue appropriate home antihypertensives  Holding lisinopril for HENRIQUE    Type 2 diabetes mellitus with diabetic arthropathy  Patient's FSGs are uncontrolled due to hyperglycemia on current medication regimen.  Last A1c  "reviewed-   Lab Results   Component Value Date    HGBA1C 8.8 (H) 08/31/2022     Most recent fingerstick glucose reviewed- No results for input(s): "POCTGLUCOSE" in the last 24 hours.  Current correctional scale  Medium  Maintain anti-hyperglycemic dose as follows-   Antihyperglycemics (From admission, onward)    Start     Stop Route Frequency Ordered    09/01/23 0900  insulin detemir U-100 (Levemir) pen 80 Units         -- SubQ 2 times daily 08/31/23 2311 09/01/23 0007  insulin aspart U-100 pen 0-10 Units         -- SubQ Before meals & nightly PRN 08/31/23 2311        Hold Oral hypoglycemics while patient is in the hospital.    COPD (chronic obstructive pulmonary disease)  Continue home controller, PRN RENETTA      VTE Risk Mitigation (From admission, onward)         Ordered     IP VTE HIGH RISK PATIENT  Once         08/31/23 2311     Place sequential compression device  Until discontinued         08/31/23 2311                    Susu Khan NP  Department of Hospital Medicine  [facility]ly [facility] - Med/Surg  "

## 2023-09-01 NOTE — CARE UPDATE
09/01/23 0810   Tobacco Cessation Intervention   Do you use any type of tobacco product? Yes   Are you interested in quitting use of tobacco products? Not interested   Are you interested in Nicotine Replacement for symptom relief? No   $ Smoking Cessation Charges Smoking Cessation - Intermediate (CTTS)     Patient not interested in smoking cessation at this time,  nicotine patch not needed.

## 2023-09-01 NOTE — ASSESSMENT & PLAN NOTE
Body mass index is 36.26 kg/m². Morbid obesity complicates all aspects of disease management from diagnostic modalities to treatment.

## 2023-09-01 NOTE — ANESTHESIA POSTPROCEDURE EVALUATION
Anesthesia Post Evaluation    Patient: Magdaleno Cunningham    Procedure(s) Performed: Procedure(s) (LRB):  INCISION, ABSCESS, PERIRECTAL (Bilateral)    Final Anesthesia Type: general      Patient location during evaluation: PACU  Patient participation: Yes- Able to Participate  Level of consciousness: awake and alert and oriented  Post-procedure vital signs: reviewed and stable  Pain management: adequate  Airway patency: patent  ROSAMARIA mitigation strategies: Multimodal analgesia and Extubation while patient is awake  PONV status at discharge: No PONV  Anesthetic complications: no      Cardiovascular status: blood pressure returned to baseline  Respiratory status: unassisted, spontaneous ventilation and room air  Hydration status: euvolemic  Follow-up not needed.          Vitals Value Taken Time   BP 94/52 09/01/23 1320   Temp 36.6 09/01/23 1323   Pulse 82 09/01/23 1322   Resp 22 09/01/23 1322   SpO2 94 % 09/01/23 1322   Vitals shown include unvalidated device data.      No case tracking events are documented in the log.      Pain/Breonna Score: Pain Rating Prior to Med Admin: 4 (9/1/2023  3:15 AM)  Pain Rating Post Med Admin: 3 (9/1/2023  4:15 AM)

## 2023-09-01 NOTE — ASSESSMENT & PLAN NOTE
Patient's FSGs are uncontrolled due to hyperglycemia on current medication regimen.  Last A1c reviewed-   Lab Results   Component Value Date    HGBA1C 8.8 (H) 08/31/2022     Most recent fingerstick glucose reviewed-   Recent Labs   Lab 09/01/23  0758 09/01/23  1322   POCTGLUCOSE 168* 129*     Current correctional scale  Medium  Maintain anti-hyperglycemic dose as follows-   Antihyperglycemics (From admission, onward)    Start     Stop Route Frequency Ordered    09/01/23 0900  insulin detemir U-100 (Levemir) pen 80 Units         -- SubQ 2 times daily 08/31/23 2311    09/01/23 0007  insulin aspart U-100 pen 0-10 Units         -- SubQ Before meals & nightly PRN 08/31/23 2311        Hold Oral hypoglycemics while patient is in the hospital.

## 2023-09-01 NOTE — ANESTHESIA PREPROCEDURE EVALUATION
09/01/2023  Magdaleno Cunningham is a 57 y.o., male.      Pre-op Assessment    I have reviewed the NPO Status.   I have reviewed the Medications.     Review of Systems  Anesthesia Hx:  No problems with previous Anesthesia    Cardiovascular:   Hypertension Past MI CAD  CABG/stent  Angina ECG has been reviewed.    Pulmonary:   COPD Sleep Apnea Non compliant with CPAP   Education provided regarding risk of obstructive sleep apnea     Renal/:   Chronic Renal Disease    Neurological:   Neuromuscular Disease,   Peripheral Neuropathy    Endocrine:   Diabetes, type 2  Obesity / BMI > 30  Dermatological:   Patito-rectal abscess now with sepsis   Psych:   Psychiatric History          Physical Exam  General: Well nourished    Airway:  Mallampati: II   Mouth Opening: Normal  TM Distance: Normal  Tongue: Normal  Neck ROM: Normal ROM    Dental:  Intact    Chest/Lungs:  Clear to auscultation, Normal Respiratory Rate        Anesthesia Plan  Type of Anesthesia, risks & benefits discussed:    Anesthesia Type: Gen ETT  Intra-op Monitoring Plan: Standard ASA Monitors  Post Op Pain Control Plan: multimodal analgesia and IV/PO Opioids PRN  Induction:  IV  Airway Plan: Direct, Post-Induction  Informed Consent: Informed consent signed with the Patient and all parties understand the risks and agree with anesthesia plan.  All questions answered.   ASA Score: 4  Anesthesia Plan Notes: Receiving one liter fluid bolus In pre-op    Ready For Surgery From Anesthesia Perspective.     .

## 2023-09-01 NOTE — ASSESSMENT & PLAN NOTE
Exercise for a Healthier Heart     Exercise with a friend. When activity is fun, you're more likely to stick with it.   You may wonder how you can improve the health of your heart. If you’re thinking about exercise, you’re on the right track. You don’t need to become an athlete. But you do need a certain amount of brisk exercise to help strengthen your heart. If you have been diagnosed with a heart condition, your healthcare provider may advise exercise to help stabilize your condition. To help make exercise a habit, choose safe, fun activities.   Before you start  Check with your healthcare provider before starting an exercise program. This is especially important if you have not been active for a while. It's also important if you have a long-term (chronic) health problem such as heart disease, diabetes, or obesity. Or if you are at high risk for having these problems.   Why exercise?  Exercising regularly offers many healthy rewards. It can help you do all of the following:  Improve your blood cholesterol level to help prevent further heart trouble  Lower your blood pressure to help prevent a stroke or heart attack  Control diabetes, or reduce your risk of getting this disease  Improve your heart and lung function  Reach and stay at a healthy weight  Make your muscles stronger so you can stay active  Prevent falls and fractures by slowing the loss of bone mass (osteoporosis)  Manage stress better  Reduce your blood pressure  Improve your sense of self and your body image  Exercise tips    Ease into your routine. Set small goals. Then build on them. If you are not sure what your activity level should be, talk with your healthcare provider first before starting an exercise routine.  Exercise on most days. Aim for a total of 150 minutes (2 hours and 30 minutes) or more of moderate-intensity aerobic activity each week. Or 75 minutes (1 hour and 15 minutes) or more of vigorous-intensity aerobic activity each week. Or  -Continue lisinopril, Imdur and metoprolol  -Continue to monitor   try for a combination of both. Moderate activity means that you breathe heavier and your heart rate increases but you can still talk. Think about doing 40 minutes of moderate exercise, 3 to 4 times a week. For best results, activity should last for about 40 minutes to lower blood pressure and cholesterol. It's OK to work up to the 40-minute period over time. Examples of moderate-intensity activity are walking 1 mile in 15 minutes. Or doing 30 to 45 minutes of yard work.  Step up your daily activity level.  Along with your exercise program, try being more active the whole day. Walk instead of drive. Or park further away so that you take more steps each day. Do more household tasks or yard work. You may not be able to meet the advised mount of physical activity. But doing some moderate- or vigorous-intensity aerobic activity can help reduce your risk for heart disease. Your healthcare provider can help you figure out what is best for you.  Choose 1 or more activities you enjoy.  Walking is one of the easiest things you can do. You can also try swimming, riding a bike, dancing, or taking an exercise class.    When to call your healthcare provider  Call your healthcare provider if you have any of these:   Chest pain or feel dizzy or lightheaded  Burning, tightness, pressure, or heaviness in your chest, neck, shoulders, back, or arms  Abnormal shortness of breath  More joint or muscle pain  A very fast or irregular heartbeat (palpitations)  [employer] last reviewed this educational content on 7/1/2019  © 4154-3601 [employer]. [address] [place] [address], [place] [address]. All rights reserved. This information is not intended as a substitute for professional medical care. Always follow your healthcare professional's instructions.

## 2023-09-01 NOTE — OP NOTE
Incision and drain of perineal abscess  Complex, deep, extending to kuldeep rectal area     Procedure Note    Date of procedure:   09/01/2023    Indications:  57-year-old male who presents with abscess to the peritoneal area.  Appears to be perianal abscess.  Redness and pain much worse and is here for I and D. of note wound has opened and started draining the pain and redness persists.    Pre-operative Diagnosis:  Perianal abscess    Post-operative Diagnosis: Same+ perirectal abscess    Surgeon: Brayan Spears MD    Assistants: none    Anesthesia: General endotracheal anesthesia    ASA Class: 4    Procedure Details   The patient was seen in the Holding Room. The risks, benefits, complications, treatment options, and expected outcomes were discussed with the patient. The possibilities of reaction to medication, pulmonary aspiration, perforation of viscus, bleeding, recurrent infection, the need for additional procedures, failure to diagnose a condition, and creating a complication requiring transfusion or operation were discussed with the patient. The patient concurred with the proposed plan, giving informed consent. The site of surgery properly noted/marked. The patient was taken to Operating Room 7 identified as Magdaleno Cunningham and the procedure verified as incision and drainage of perianal abscess. A Time Out was held and the above information confirmed.    Full general anesthesia was induced with orotracheal intubation. The patient was prepped and draped in a supine position. Appropriate antibiotics were given intravenously. Arms were out, legs in stirrups.    There was extensive redness cellulitis around the anus.  This was mainly on the anterior side of the anus near the scrotum.  The erythema did not involve the scrotum.  There was already an open draining area at the upper left perianal area.  This was opened further using a scalpel.  This exposed an underlying abscess that tracked approximally 2 cm to  the surrounding buttock subcutaneous tissue.  This also tracked superiorly to the perirectal tissues.  This was bluntly dissected.  Further tracking across the midline was found to the right perianal tissues.  A radial incision was created over this area as well.  There was murky appearing fluid tracking superiorly to of the perirectal tissues.  This was bluntly opened and cleared.  This was the extent of the tracking of the perianal abscess.  The wounds were cultured.  No further cavities were found.  The area was then irrigated and hemostasis was achieved with the cautery.  Packing was placed into the wounds. Dressings were placed.      Instrument, sponge, and needle counts were correct prior to wound closure and at the conclusion of the case.     Findings:  kuldeep anal and kuldeep rectal abscess    Estimated Blood Loss: 50.0 cc    Drains: packing kuldeep anal area    Total IV Fluids: see anesthesia    Specimens: abscess cavity left and right    Implants: none    Complications:  None; patient tolerated the procedure well.    Disposition: PACU - hemodynamically stable.    Condition: stable    Attending Attestation: I was present and scrubbed for the entire procedure.

## 2023-09-01 NOTE — CARE UPDATE
09/01/23 0104   Patient Assessment/Suction   Level of Consciousness (AVPU) alert   Respiratory Effort Unlabored   PRE-TX-O2   Device (Oxygen Therapy) room air   SpO2 96 %   Pulse Oximetry Type Intermittent   $ Pulse Oximetry - Multiple Charge Pulse Oximetry - Multiple   Pulse (!) 111   Resp 20   Aerosol Therapy   $ Aerosol Therapy Charges PRN treatment not required

## 2023-09-01 NOTE — ED PROVIDER NOTES
Encounter Date: 8/31/2023       History     Chief Complaint   Patient presents with    Abscess     Abscess near rectum says he thinks there are 2 of them, states he has a fever and headaches body aches and was exposed to covid      57-year-old male with past medical history AFib on anticoagulation, COPD, CAD, diabetes, hypertension presenting with perineal abscess.  Patient states symptoms started approximately 3 days ago, and he also developed pain on the contralateral buttock today.  No fevers, no difficulty defecating, no vomiting, no other symptoms at this time.    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    Hypertension     Infected incision     MI (myocardial infarction)     per pt he has had 4     ROSAMARIA on CPAP     RLS (restless legs syndrome)      Past Surgical History:   Procedure Laterality Date    CORONARY ANGIOGRAPHY INCLUDING BYPASS GRAFTS WITH CATHETERIZATION OF LEFT HEART N/A 9/1/2022    Procedure: ANGIOGRAM, CORONARY, INCLUDING BYPASS GRAFT, WITH LEFT HEART CATHETERIZATION;  Surgeon: Paul Botello MD;  Location: [facility] CATH/EP LAB;  Service: Cardiology;  Laterality: N/A;    CORONARY ARTERY BYPASS GRAFT (CABG) N/A 4/7/2020    Procedure: CORONARY ARTERY BYPASS GRAFT (CABG)- Excision of left atrial appendage;  Surgeon: Doug Solitario MD;  Location: [facility] OR;  Service: Cardiothoracic;  Laterality: N/A;    CORONARY STENT PLACEMENT      WILSON MAZE PROCEDURE N/A 4/7/2020    Procedure: WILSON MAZE PROCEDURE- Attempted;  Surgeon: Doug Solitario MD;  Location: [facility] OR;  Service:  Cardiothoracic;  Laterality: N/A;    CYSTOSCOPY N/A 12/10/2018    Procedure: CYSTOSCOPY;  Surgeon: Khushboo Abreu MD;  [facility];  Service: Urology;  Laterality: N/A;    ENDOSCOPIC HARVEST OF VEIN Left 4/7/2020    Procedure: HARVEST-VEIN-ENDOVASCULAR;  Surgeon: Doug Solitario MD;  Location: [facility] OR;  Service: Cardiothoracic;  Laterality: Left;    LEFT HEART CATHETERIZATION Left 3/17/2020    Procedure: CATHETERIZATION, HEART, LEFT;  Surgeon: Tyree Walters MD;  Location: [facility] CATH/EP LAB;  Service: Cardiology;  Laterality: Left;    STERNAL WIRES REMOVAL N/A 6/8/2020    Procedure: REMOVAL, STERNAL WIRE;  Surgeon: Doug Solitario MD;  Location: [facility] OR;  Service: Peripheral Vascular;  Laterality: N/A;    ULTRASOUND OF PROSTATE FOR VOLUME DETERMINATION  12/10/2018    Procedure: ULTRASOUND, PROSTATE, FOR VOLUME DETERMINATION;  Surgeon: Khushboo Abreu MD;  [facility];  Service: Urology;;     Family History   Problem Relation Age of Onset    Heart disease Mother     Diabetes Mother     Hyperlipidemia Father     Cancer Father      Social History     Tobacco Use    Smoking status: Every Day     Current packs/day: 0.00     Average packs/day: 0.3 packs/day for 30.0 years (9.0 ttl pk-yrs)     Types: Cigarettes     Start date: 4/4/1990     Last attempt to quit: 4/4/2020     Years since quitting: 3.4    Smokeless tobacco: Never    Tobacco comments:     just under a pack a day   Substance Use Topics    Alcohol use: Not Currently    Drug use: Yes     Frequency: 1.0 times per week     Types: Marijuana     Review of Systems   All other systems reviewed and are negative.      Physical Exam     Initial Vitals   BP Pulse Resp Temp SpO2   08/31/23 1707 08/31/23 1707 08/31/23 1707 08/31/23 1709 08/31/23 1707   124/62 110 20 99 °F (37.2 °C) (!) 94 %      MAP       --                Physical Exam    Vitals reviewed.  Constitutional: No distress.   Chronically ill-appearing,  obese, uncomfortable appearing   HENT:   Head: Normocephalic.   Nose: Nose normal.   Cardiovascular:            Tachycardic, regular   Pulmonary/Chest: No stridor. No respiratory distress.   Abdominal: He exhibits no distension.   Genitourinary:    Genitourinary Comments: Tender area of fluctuance and induration to left perineal area, no induration next to rectal area, induration on contralateral buttock, no fluctuance       Neurological: He is alert.   Skin: Skin is warm and dry.   Psychiatric: He has a normal mood and affect.       ED Course   Procedures  Labs Reviewed   CBC W/ AUTO DIFFERENTIAL - Abnormal; Notable for the following components:       Result Value    WBC 15.35 (*)     MCH 31.4 (*)     Gran # (ANC) 11.6 (*)     Immature Grans (Abs) 0.07 (*)     Gran % 75.3 (*)     Lymph % 16.1 (*)     All other components within normal limits   COMPREHENSIVE METABOLIC PANEL - Abnormal; Notable for the following components:    Sodium 129 (*)     Chloride 94 (*)     CO2 19 (*)     Glucose 289 (*)     Creatinine 1.7 (*)     Albumin 3.4 (*)     eGFR 47 (*)     All other components within normal limits   URINALYSIS, REFLEX TO URINE CULTURE - Abnormal; Notable for the following components:    Specific Gravity, UA >1.030 (*)     Protein, UA Trace (*)     Glucose, UA 4+ (*)     Ketones, UA Trace (*)     Occult Blood UA Trace (*)     Urobilinogen, UA 2.0-3.0 (*)     All other components within normal limits    Narrative:     Specimen Source->Urine   URINALYSIS MICROSCOPIC - Abnormal; Notable for the following components:    Yeast, UA Rare (*)     All other components within normal limits    Narrative:     Specimen Source->Urine   LACTIC ACID, PLASMA - Abnormal; Notable for the following components:    Lactate (Lactic Acid) 2.4 (*)     All other components within normal limits   CULTURE, BLOOD   CULTURE, BLOOD   SARS-COV-2 RNA AMPLIFICATION, QUAL   LACTIC ACID, PLASMA          Imaging Results              X-Ray Chest 1 View  (Final result)  Result time 09/01/23 00:58:11      Final result by Christopher Bales MD (09/01/23 00:58:11)                   Impression:      See above      Electronically signed by: Christopher Bales  Date:    09/01/2023  Time:    00:58               Narrative:    EXAMINATION:  XR CHEST 1 VIEW    CLINICAL HISTORY:  pre op;    TECHNIQUE:  Single frontal view of the chest was performed.    COMPARISON:  None    FINDINGS:  The heart mediastinal contours appear stable in size and prominence of the perihilar lung markings and vasculature raises the question of early cardiac decompensation pulmonary edema.  Interstitial pneumonitis or pneumonia could have a similar appearance.  Configuration with median sternotomy.                                       CT Pelvis With Contrast (Final result)  Result time 08/31/23 22:24:56      Final result by Christopher Bales MD (08/31/23 22:24:56)                   Impression:      Induration in the gluteal fold just posterior and lateral to the anal rectal verge with small subcutaneous fluid collection in the perineum to the left of midline measuring 3.7 x 1.9 x 1.7 cm compatible with small subcutaneous abscess.    No communication with the anal rectal verge.    No subcutaneous gas to suggest Chirssie's gangrene.      Electronically signed by: Christopher Bales  Date:    08/31/2023  Time:    22:24               Narrative:    EXAMINATION:  CT PELVIS WITH  CONTRAST    CLINICAL HISTORY:  Anal/rectal abscess;    TECHNIQUE:  Axial CT images of the pelvis were obtained following the administration of 100 cc of Omnipaque 350 contrast via right antecubital vein.    COMPARISON:  CT abdomen and pelvis, 08/04/2019.    FINDINGS:  There is a area of induration in the Patito rectal gluteal fold region bilaterally.  A somewhat septated fluid collection on the left gluteal fold measures 3.7 x 1.9 by 1.7 cm.  No extension into the rectal or anal wall.  Pelvic floor and GI  tract appear  unremarkable.    Bony structures are intact.  The abdominal wall is intact.  Spondylosis in the lower lumbar spine.  No lytic or blastic changes evident.    Anterior inguinal and scrotal region appears normal.  No subcu gas.                                       Medications   cetirizine tablet 10 mg (has no administration in time range)   clonazePAM tablet 1 mg (has no administration in time range)   gabapentin capsule 600 mg (has no administration in time range)   isosorbide mononitrate 24 hr tablet 60 mg (has no administration in time range)   insulin detemir U-100 (Levemir) pen 80 Units (has no administration in time range)   methocarbamoL tablet 500 mg (has no administration in time range)   metoprolol succinate (TOPROL-XL) 24 hr tablet 100 mg (has no administration in time range)   pantoprazole EC tablet 40 mg (40 mg Oral Given 8/31/23 2315)   oxyCODONE-acetaminophen  mg per tablet 1 tablet (has no administration in time range)   nitroGLYCERIN SL tablet 0.4 mg (has no administration in time range)   ranolazine 12 hr tablet 500 mg (has no administration in time range)   pravastatin tablet 20 mg (has no administration in time range)   tamsulosin 24 hr capsule 0.4 mg (has no administration in time range)   umeclidinium-vilanteroL 62.5-25 mcg/actuation DsDv 1 puff (has no administration in time range)   sodium chloride 0.9% flush 10 mL (has no administration in time range)   melatonin tablet 6 mg (has no administration in time range)   ondansetron injection 4 mg (has no administration in time range)   polyethylene glycol packet 17 g (has no administration in time range)   senna-docusate 8.6-50 mg per tablet 1 tablet (1 tablet Oral Given 8/31/23 2315)   acetaminophen tablet 650 mg (has no administration in time range)   aluminum-magnesium hydroxide-simethicone 200-200-20 mg/5 mL suspension 30 mL (has no administration in time range)   naloxone 0.4 mg/mL injection 0.02 mg (has no administration in time range)    potassium bicarbonate disintegrating tablet 50 mEq (has no administration in time range)   potassium bicarbonate disintegrating tablet 35 mEq (has no administration in time range)   potassium bicarbonate disintegrating tablet 60 mEq (has no administration in time range)   magnesium oxide tablet 800 mg (has no administration in time range)   magnesium oxide tablet 800 mg (has no administration in time range)   glucose chewable tablet 16 g (has no administration in time range)   glucose chewable tablet 24 g (has no administration in time range)   glucagon (human recombinant) injection 1 mg (has no administration in time range)   HYDROmorphone injection 0.5 mg (has no administration in time range)   insulin aspart U-100 pen 0-10 Units (has no administration in time range)   albuterol-ipratropium 2.5 mg-0.5 mg/3 mL nebulizer solution 3 mL (has no administration in time range)   dextrose 10% bolus 125 mL 125 mL (has no administration in time range)   dextrose 10% bolus 250 mL 250 mL (has no administration in time range)   vancomycin - pharmacy to dose (has no administration in time range)   piperacillin-tazobactam (ZOSYN) 4.5 g in dextrose 5 % in water (D5W) 100 mL IVPB (MB+) (has no administration in time range)   vancomycin (VANCOCIN) 2,000 mg in dextrose 5 % (D5W) 500 mL IVPB (2,000 mg Intravenous New Bag 9/1/23 0021)   lactated ringers bolus 2,000 mL (0 mLs Intravenous Stopped 8/31/23 2230)   piperacillin-tazobactam (ZOSYN) 4.5 g in dextrose 5 % in water (D5W) 100 mL IVPB (MB+) (0 g Intravenous Stopped 8/31/23 2108)   morphine injection 4 mg (4 mg Intravenous Given 8/31/23 2039)   iohexoL (OMNIPAQUE 350) 350 mg iodine/mL injection (100 mLs Intravenous Given 8/31/23 2148)   HYDROmorphone injection 1 mg (1 mg Intravenous Given 8/31/23 2326)     Medical Decision Making  A/P:  Patient meets sepsis criteria with fever, tachycardia, HENRIQUE on labs, elevated white blood cell count.  CT scan without evidence of perirectal  abscess.  Discussed case with Dr. Spears of surgery, agreed that patient should be evaluated by their team prior to any attempts at drainage considering the sensitive area, patient's extreme discomfort, and anticoagulation.  Discussed case with hospitalist team, patient given IV fluids, pain control, antibiotics, will admit to medicine for further management.  Do not suspect Chrissie's gangrene or other deep space infection at this time.    Amount and/or Complexity of Data Reviewed  Independent Historian: friend  External Data Reviewed: labs and notes.  Labs: ordered. Decision-making details documented in ED Course.  Radiology: ordered. Decision-making details documented in ED Course.  Discussion of management or test interpretation with external provider(s): See above    Risk  Prescription drug management.  Parenteral controlled substances.  Decision regarding hospitalization.  Minor surgery with identified risk factors.    Critical Care  Total time providing critical care: 30 minutes                             Clinical Impression:   Final diagnoses:  [A41.9] Sepsis        ED Disposition Condition    Observation                 Lan Arthur MD  09/01/23 0133

## 2023-09-01 NOTE — SUBJECTIVE & OBJECTIVE
Past Medical History:   Diagnosis Date    Anticoagulant long-term use     Atrial fibrillation 2018    BPH (benign prostatic hyperplasia)     Cataract     COPD (chronic obstructive pulmonary disease)     Coronary artery disease     stents    CVD (cardiovascular disease)     Diabetes mellitus     Erythema multiforme     AKA Denton Edmondson Syndrome    Hypertension     Infected incision     MI (myocardial infarction)     per pt he has had 4     ROSAMARIA on CPAP     RLS (restless legs syndrome)        Past Surgical History:   Procedure Laterality Date    CORONARY ANGIOGRAPHY INCLUDING BYPASS GRAFTS WITH CATHETERIZATION OF LEFT HEART N/A 9/1/2022    Procedure: ANGIOGRAM, CORONARY, INCLUDING BYPASS GRAFT, WITH LEFT HEART CATHETERIZATION;  Surgeon: Palu Botello MD;  Location: [facility] CATH/EP LAB;  Service: Cardiology;  Laterality: N/A;    CORONARY ARTERY BYPASS GRAFT (CABG) N/A 4/7/2020    Procedure: CORONARY ARTERY BYPASS GRAFT (CABG)- Excision of left atrial appendage;  Surgeon: Doug Solitario MD;  Location: [facility];  Service: Cardiothoracic;  Laterality: N/A;    CORONARY STENT PLACEMENT      WILSON MAZE PROCEDURE N/A 4/7/2020    Procedure: WILSON MAZE PROCEDURE- Attempted;  Surgeon: Doug Solitario MD;  Location: [facility];  Service: Cardiothoracic;  Laterality: N/A;    CYSTOSCOPY N/A 12/10/2018    Procedure: CYSTOSCOPY;  Surgeon: Khushboo Abreu MD;  Location: [facility] OR;  Service: Urology;  Laterality: N/A;    ENDOSCOPIC HARVEST OF VEIN Left 4/7/2020    Procedure: HARVEST-VEIN-ENDOVASCULAR;  Surgeon: Doug Solitario MD;  Location: [facility] OR;  Service: Cardiothoracic;  Laterality: Left;    LEFT HEART CATHETERIZATION Left 3/17/2020    Procedure: CATHETERIZATION, HEART, LEFT;  Surgeon: Tyree Walters MD;  Location: [facility] CATH/EP LAB;  Service: Cardiology;  Laterality: Left;    STERNAL WIRES REMOVAL N/A 6/8/2020    Procedure: REMOVAL, STERNAL WIRE;  Surgeon: Doug Solitario MD;  Location: [facility] OR;  Service:  Peripheral Vascular;  Laterality: N/A;    ULTRASOUND OF PROSTATE FOR VOLUME DETERMINATION  12/10/2018    Procedure: ULTRASOUND, PROSTATE, FOR VOLUME DETERMINATION;  Surgeon: Khushboo Abreu MD;  Location: [facility] OR;  Service: Urology;;       Review of patient's allergies indicates:   Allergen Reactions    Pesticide Dermatitis and Rash       No current facility-administered medications on file prior to encounter.     Current Outpatient Medications on File Prior to Encounter   Medication Sig    apixaban (ELIQUIS) 5 mg Tab Take 5 mg by mouth 2 (two) times daily.    aspirin 81 MG Chew Take 81 mg by mouth once daily.     cetirizine (ZYRTEC) 10 MG tablet Take 1 tablet (10 mg total) by mouth once daily.    clonazePAM (KLONOPIN) 1 MG tablet Take 1 mg by mouth 2 (two) times daily.    cyclobenzaprine (FLEXERIL) 10 MG tablet Take 10 mg by mouth once daily.    furosemide (LASIX) 20 MG tablet Take 20 mg by mouth 2 (two) times daily.    gabapentin (NEURONTIN) 600 MG tablet Take 600 mg by mouth 3 (three) times daily.     HYDROcodone-acetaminophen (NORCO) 5-325 mg per tablet Take 1 tablet by mouth every 6 (six) hours as needed for Pain.    isosorbide mononitrate (IMDUR) 60 MG 24 hr tablet Take 1 tablet (60 mg total) by mouth once daily.    JARDIANCE 25 mg tablet Take 25 mg by mouth once daily.    LANTUS SOLOSTAR U-100 INSULIN glargine 100 units/mL (3mL) SubQ pen Inject 80 Units into the skin 2 (two) times daily. Changed to 80 units BID 2months ago    lisinopriL (PRINIVIL,ZESTRIL) 20 MG tablet Take 10 mg by mouth 2 (two) times daily.    metFORMIN (GLUCOPHAGE) 1000 MG tablet Take 1,000 mg by mouth 2 (two) times daily with meals.     methocarbamoL (ROBAXIN) 500 MG Tab Take 500 mg by mouth every evening.    metoprolol succinate (TOPROL-XL) 100 MG 24 hr tablet Take 100 mg by mouth 2 (two) times daily.    nitroGLYCERIN (NITROSTAT) 0.4 MG SL tablet Place 0.4 mg under the tongue every 5 (five) minutes as needed for Chest pain.     "oxyCODONE-acetaminophen (PERCOCET)  mg per tablet Take 1-2 tablets by mouth daily as needed.    pantoprazole (PROTONIX) 40 MG tablet Take 1 tablet by mouth 2 (two) times a day.    pen needle, diabetic 31 gauge x 1/4" Ndle USE 1 TWICE DAILY FOR BLOOD SUGAR GREATER THAN 200    pioglitazone (ACTOS) 15 MG tablet Take 15 mg by mouth once daily.    ranolazine (RANEXA) 500 MG Tb12 Take 1 tablet (500 mg total) by mouth 2 (two) times daily.    rimegepant (NURTEC) 75 mg odt Take 1 tablet by mouth as needed.    simvastatin (ZOCOR) 10 MG tablet Take 10 mg by mouth once daily.    tamsulosin (FLOMAX) 0.4 mg Cap Take 1 capsule (0.4 mg total) by mouth every evening.    umeclidinium-vilanteroL (ANORO ELLIPTA) 62.5-25 mcg/actuation DsDv Inhale 1 puff into the lungs once daily.     Family History       Problem Relation (Age of Onset)    Cancer Father    Diabetes Mother    Heart disease Mother    Hyperlipidemia Father          Tobacco Use    Smoking status: Every Day     Current packs/day: 0.00     Average packs/day: 0.3 packs/day for 30.0 years (9.0 ttl pk-yrs)     Types: Cigarettes     Start date: 4/4/1990     Last attempt to quit: 4/4/2020     Years since quitting: 3.4    Smokeless tobacco: Never    Tobacco comments:     just under a pack a day   Substance and Sexual Activity    Alcohol use: Not Currently    Drug use: Yes     Frequency: 1.0 times per week     Types: Marijuana    Sexual activity: Not Currently     Review of Systems   Constitutional:  Positive for chills, diaphoresis and fever. Negative for fatigue.   HENT:  Negative for congestion, ear pain, sore throat and trouble swallowing.    Eyes:  Negative for pain, discharge and visual disturbance.   Respiratory:  Negative for cough, chest tightness, shortness of breath and wheezing.    Cardiovascular:  Negative for chest pain, palpitations and leg swelling.   Gastrointestinal:  Positive for rectal pain. Negative for abdominal distention, abdominal pain, blood in stool, " constipation, diarrhea, nausea and vomiting.   Endocrine: Negative for polydipsia, polyphagia and polyuria.   Genitourinary:  Negative for dysuria, flank pain, frequency and urgency.   Musculoskeletal:  Negative for back pain, joint swelling, neck pain and neck stiffness.   Skin:  Positive for wound. Negative for rash.   Allergic/Immunologic: Negative for immunocompromised state.   Neurological:  Positive for weakness and headaches. Negative for dizziness, syncope, speech difficulty, light-headedness and numbness.   Hematological:  Negative for adenopathy.   Psychiatric/Behavioral:  Negative for confusion and suicidal ideas. The patient is not nervous/anxious.    All other systems reviewed and are negative.    Objective:     Vital Signs (Most Recent):  Temp: (!) 100.6 °F (38.1 °C) (09/01/23 0036)  Pulse: (!) 111 (09/01/23 0104)  Resp: 20 (09/01/23 0104)  BP: (!) 175/85 (09/01/23 0036)  SpO2: 96 % (09/01/23 0104) Vital Signs (24h Range):  Temp:  [99 °F (37.2 °C)-100.6 °F (38.1 °C)] 100.6 °F (38.1 °C)  Pulse:  [] 111  Resp:  [14-20] 20  SpO2:  [93 %-97 %] 96 %  BP: (124-175)/(62-85) 175/85     Weight: 123 kg (271 lb 2.7 oz)  Body mass index is 37.82 kg/m².     Physical Exam  Vitals and nursing note reviewed.   Constitutional:       Appearance: He is well-developed. He is obese.   HENT:      Head: Normocephalic and atraumatic.   Eyes:      Conjunctiva/sclera: Conjunctivae normal.      Pupils: Pupils are equal, round, and reactive to light.   Cardiovascular:      Rate and Rhythm: Normal rate and regular rhythm.      Heart sounds: Murmur heard.   Pulmonary:      Effort: Pulmonary effort is normal.      Breath sounds: Normal breath sounds.   Abdominal:      General: Bowel sounds are normal.      Palpations: Abdomen is soft.   Genitourinary:     Comments: Area of induration and erythema to the rt and left gluteal folds. Rt worse than left. See photos  Musculoskeletal:         General: Normal range of motion.       Cervical back: Normal range of motion and neck supple.   Skin:     General: Skin is warm and dry.      Capillary Refill: Capillary refill takes less than 2 seconds.      Findings: Erythema present.   Neurological:      Mental Status: He is alert and oriented to person, place, and time.   Psychiatric:         Behavior: Behavior normal.         Thought Content: Thought content normal.         Judgment: Judgment normal.              CRANIAL NERVES     CN III, IV, VI   Pupils are equal, round, and reactive to light.       Significant Labs: All pertinent labs within the past 24 hours have been reviewed.  CBC:   Recent Labs   Lab 08/31/23  1738   WBC 15.35*   HGB 16.0   HCT 45.9        CMP:   Recent Labs   Lab 08/31/23  1738   *   K 4.1   CL 94*   CO2 19*   *   BUN 16   CREATININE 1.7*   CALCIUM 9.8   PROT 8.2   ALBUMIN 3.4*   BILITOT 0.7   ALKPHOS 76   AST 19   ALT 20   ANIONGAP 16     Lactic Acid:   Recent Labs   Lab 08/31/23  1738 08/31/23  2247   LACTATE 2.2 2.4*     Urine Studies:   Recent Labs   Lab 08/31/23  1813   COLORU Yellow   APPEARANCEUA Clear   PHUR 6.0   SPECGRAV >1.030*   PROTEINUA Trace*   GLUCUA 4+*   KETONESU Trace*   BILIRUBINUA Negative   OCCULTUA Trace*   NITRITE Negative   UROBILINOGEN 2.0-3.0*   LEUKOCYTESUR Negative   RBCUA 3   WBCUA 1   BACTERIA None       Significant Imaging: I have reviewed all pertinent imaging results/findings within the past 24 hours.  EKG: I have reviewed all pertinent results/findings within the past 24 hours and my personal findings are: NSR, nonspecific ST and T wave abnormalities, not acute ischemic changes  X-Ray Chest 1 View    Result Date: 9/1/2023  EXAMINATION: XR CHEST 1 VIEW CLINICAL HISTORY: pre op; TECHNIQUE: Single frontal view of the chest was performed. COMPARISON: None FINDINGS: The heart mediastinal contours appear stable in size and prominence of the perihilar lung markings and vasculature raises the question of early cardiac  decompensation pulmonary edema.  Interstitial pneumonitis or pneumonia could have a similar appearance.  Configuration with median sternotomy.     See above Electronically signed by: Christopher Bales Date:    09/01/2023 Time:    00:58    CT Pelvis With Contrast    Result Date: 8/31/2023  EXAMINATION: CT PELVIS WITH  CONTRAST CLINICAL HISTORY: Anal/rectal abscess; TECHNIQUE: Axial CT images of the pelvis were obtained following the administration of 100 cc of Omnipaque 350 contrast via right antecubital vein. COMPARISON: CT abdomen and pelvis, 08/04/2019. FINDINGS: There is a area of induration in the Patito rectal gluteal fold region bilaterally.  A somewhat septated fluid collection on the left gluteal fold measures 3.7 x 1.9 by 1.7 cm.  No extension into the rectal or anal wall.  Pelvic floor and GI  tract appear unremarkable. Bony structures are intact.  The abdominal wall is intact.  Spondylosis in the lower lumbar spine.  No lytic or blastic changes evident. Anterior inguinal and scrotal region appears normal.  No subcu gas.     Induration in the gluteal fold just posterior and lateral to the anal rectal verge with small subcutaneous fluid collection in the perineum to the left of midline measuring 3.7 x 1.9 x 1.7 cm compatible with small subcutaneous abscess. No communication with the anal rectal verge. No subcutaneous gas to suggest Chrissie's gangrene. Electronically signed by: Christopher Bales Date:    08/31/2023 Time:    22:24

## 2023-09-01 NOTE — PLAN OF CARE
Problem: Adult Inpatient Plan of Care  Goal: Plan of Care Review  Outcome: Ongoing, Progressing     Problem: Adult Inpatient Plan of Care  Goal: Patient-Specific Goal (Individualized)  Outcome: Ongoing, Progressing     Problem: Adult Inpatient Plan of Care  Goal: Absence of Hospital-Acquired Illness or Injury  Outcome: Ongoing, Progressing     Problem: Adult Inpatient Plan of Care  Goal: Optimal Comfort and Wellbeing  Outcome: Ongoing, Progressing     Problem: Diabetes Comorbidity  Goal: Blood Glucose Level Within Targeted Range  Outcome: Ongoing, Progressing     Problem: Glycemic Control Impaired (Sepsis/Septic Shock)  Goal: Blood Glucose Level Within Desired Range  Outcome: Ongoing, Progressing     Problem: Skin Injury Risk Increased  Goal: Skin Health and Integrity  Outcome: Ongoing, Progressing

## 2023-09-01 NOTE — PLAN OF CARE
Pt in surgery, will follow up for DC assessment.      09/01/23 4734   Discharge Assessment   Assessment Type Discharge Planning Assessment

## 2023-09-02 LAB
ALBUMIN SERPL BCP-MCNC: 2.5 G/DL (ref 3.5–5.2)
ALP SERPL-CCNC: 61 U/L (ref 55–135)
ALT SERPL W/O P-5'-P-CCNC: 16 U/L (ref 10–44)
ANION GAP SERPL CALC-SCNC: 10 MMOL/L (ref 8–16)
AST SERPL-CCNC: 18 U/L (ref 10–40)
BASOPHILS # BLD AUTO: 0.05 K/UL (ref 0–0.2)
BASOPHILS NFR BLD: 0.4 % (ref 0–1.9)
BILIRUB SERPL-MCNC: 0.3 MG/DL (ref 0.1–1)
BUN SERPL-MCNC: 14 MG/DL (ref 6–20)
CALCIUM SERPL-MCNC: 8 MG/DL (ref 8.7–10.5)
CHLORIDE SERPL-SCNC: 98 MMOL/L (ref 95–110)
CO2 SERPL-SCNC: 22 MMOL/L (ref 23–29)
CREAT SERPL-MCNC: 1.1 MG/DL (ref 0.5–1.4)
CREAT UR-MCNC: 46.9 MG/DL (ref 23–375)
DIFFERENTIAL METHOD: ABNORMAL
EOSINOPHIL # BLD AUTO: 0.1 K/UL (ref 0–0.5)
EOSINOPHIL NFR BLD: 0.9 % (ref 0–8)
ERYTHROCYTE [DISTWIDTH] IN BLOOD BY AUTOMATED COUNT: 12.4 % (ref 11.5–14.5)
EST. GFR  (NO RACE VARIABLE): >60 ML/MIN/1.73 M^2
GLUCOSE SERPL-MCNC: 237 MG/DL (ref 70–110)
HCT VFR BLD AUTO: 35.8 % (ref 40–54)
HGB BLD-MCNC: 11.9 G/DL (ref 14–18)
IMM GRANULOCYTES # BLD AUTO: 0.1 K/UL (ref 0–0.04)
IMM GRANULOCYTES NFR BLD AUTO: 0.8 % (ref 0–0.5)
LYMPHOCYTES # BLD AUTO: 2 K/UL (ref 1–4.8)
LYMPHOCYTES NFR BLD: 15.7 % (ref 18–48)
MAGNESIUM SERPL-MCNC: 2.2 MG/DL (ref 1.6–2.6)
MCH RBC QN AUTO: 30.4 PG (ref 27–31)
MCHC RBC AUTO-ENTMCNC: 33.2 G/DL (ref 32–36)
MCV RBC AUTO: 91 FL (ref 82–98)
MONOCYTES # BLD AUTO: 0.7 K/UL (ref 0.3–1)
MONOCYTES NFR BLD: 5.2 % (ref 4–15)
NEUTROPHILS # BLD AUTO: 9.6 K/UL (ref 1.8–7.7)
NEUTROPHILS NFR BLD: 77 % (ref 38–73)
NRBC BLD-RTO: 0 /100 WBC
OSMOLALITY UR: 440 MOSM/KG (ref 50–1200)
PHOSPHATE SERPL-MCNC: 2.5 MG/DL (ref 2.7–4.5)
PLATELET # BLD AUTO: 207 K/UL (ref 150–450)
PMV BLD AUTO: 9.7 FL (ref 9.2–12.9)
POCT GLUCOSE: 207 MG/DL (ref 70–110)
POCT GLUCOSE: 217 MG/DL (ref 70–110)
POCT GLUCOSE: 240 MG/DL (ref 70–110)
POCT GLUCOSE: 245 MG/DL (ref 70–110)
POTASSIUM SERPL-SCNC: 4 MMOL/L (ref 3.5–5.1)
PROT SERPL-MCNC: 6.2 G/DL (ref 6–8.4)
RBC # BLD AUTO: 3.92 M/UL (ref 4.6–6.2)
SODIUM SERPL-SCNC: 130 MMOL/L (ref 136–145)
SODIUM UR-SCNC: 20 MMOL/L (ref 20–250)
VANCOMYCIN TROUGH SERPL-MCNC: 11.2 UG/ML (ref 10–22)
WBC # BLD AUTO: 12.52 K/UL (ref 3.9–12.7)

## 2023-09-02 PROCEDURE — 84300 ASSAY OF URINE SODIUM: CPT | Performed by: SURGERY

## 2023-09-02 PROCEDURE — 25000003 PHARM REV CODE 250: Performed by: STUDENT IN AN ORGANIZED HEALTH CARE EDUCATION/TRAINING PROGRAM

## 2023-09-02 PROCEDURE — 83935 ASSAY OF URINE OSMOLALITY: CPT | Performed by: SURGERY

## 2023-09-02 PROCEDURE — 63600175 PHARM REV CODE 636 W HCPCS: Performed by: SURGERY

## 2023-09-02 PROCEDURE — 36415 COLL VENOUS BLD VENIPUNCTURE: CPT | Performed by: SURGERY

## 2023-09-02 PROCEDURE — 80053 COMPREHEN METABOLIC PANEL: CPT | Performed by: SURGERY

## 2023-09-02 PROCEDURE — 82570 ASSAY OF URINE CREATININE: CPT | Performed by: SURGERY

## 2023-09-02 PROCEDURE — 84100 ASSAY OF PHOSPHORUS: CPT | Performed by: SURGERY

## 2023-09-02 PROCEDURE — 96366 THER/PROPH/DIAG IV INF ADDON: CPT

## 2023-09-02 PROCEDURE — 25000003 PHARM REV CODE 250: Performed by: SURGERY

## 2023-09-02 PROCEDURE — 25000003 PHARM REV CODE 250: Performed by: HOSPITALIST

## 2023-09-02 PROCEDURE — 85025 COMPLETE CBC W/AUTO DIFF WBC: CPT | Performed by: SURGERY

## 2023-09-02 PROCEDURE — 83735 ASSAY OF MAGNESIUM: CPT | Performed by: SURGERY

## 2023-09-02 PROCEDURE — G0378 HOSPITAL OBSERVATION PER HR: HCPCS

## 2023-09-02 PROCEDURE — 94761 N-INVAS EAR/PLS OXIMETRY MLT: CPT

## 2023-09-02 PROCEDURE — 36415 COLL VENOUS BLD VENIPUNCTURE: CPT | Performed by: STUDENT IN AN ORGANIZED HEALTH CARE EDUCATION/TRAINING PROGRAM

## 2023-09-02 PROCEDURE — 63600175 PHARM REV CODE 636 W HCPCS: Performed by: STUDENT IN AN ORGANIZED HEALTH CARE EDUCATION/TRAINING PROGRAM

## 2023-09-02 PROCEDURE — 96376 TX/PRO/DX INJ SAME DRUG ADON: CPT

## 2023-09-02 PROCEDURE — 11000001 HC ACUTE MED/SURG PRIVATE ROOM

## 2023-09-02 PROCEDURE — 63600175 PHARM REV CODE 636 W HCPCS: Performed by: HOSPITALIST

## 2023-09-02 PROCEDURE — 94799 UNLISTED PULMONARY SVC/PX: CPT

## 2023-09-02 PROCEDURE — 99900035 HC TECH TIME PER 15 MIN (STAT)

## 2023-09-02 PROCEDURE — 80202 ASSAY OF VANCOMYCIN: CPT | Performed by: STUDENT IN AN ORGANIZED HEALTH CARE EDUCATION/TRAINING PROGRAM

## 2023-09-02 RX ORDER — SODIUM,POTASSIUM PHOSPHATES 280-250MG
2 POWDER IN PACKET (EA) ORAL
Status: DISCONTINUED | OUTPATIENT
Start: 2023-09-02 | End: 2023-09-03 | Stop reason: HOSPADM

## 2023-09-02 RX ADMIN — INSULIN ASPART 4 UNITS: 100 INJECTION, SOLUTION INTRAVENOUS; SUBCUTANEOUS at 08:09

## 2023-09-02 RX ADMIN — POTASSIUM & SODIUM PHOSPHATES POWDER PACK 280-160-250 MG 2 PACKET: 280-160-250 PACK at 11:09

## 2023-09-02 RX ADMIN — SENNOSIDES AND DOCUSATE SODIUM 1 TABLET: 8.6; 5 TABLET ORAL at 08:09

## 2023-09-02 RX ADMIN — RANOLAZINE 500 MG: 500 TABLET, EXTENDED RELEASE ORAL at 08:09

## 2023-09-02 RX ADMIN — ISOSORBIDE MONONITRATE 60 MG: 60 TABLET, EXTENDED RELEASE ORAL at 08:09

## 2023-09-02 RX ADMIN — INSULIN DETEMIR 80 UNITS: 100 INJECTION, SOLUTION SUBCUTANEOUS at 08:09

## 2023-09-02 RX ADMIN — POTASSIUM & SODIUM PHOSPHATES POWDER PACK 280-160-250 MG 2 PACKET: 280-160-250 PACK at 02:09

## 2023-09-02 RX ADMIN — GABAPENTIN 600 MG: 300 CAPSULE ORAL at 08:09

## 2023-09-02 RX ADMIN — PRAVASTATIN SODIUM 20 MG: 10 TABLET ORAL at 08:09

## 2023-09-02 RX ADMIN — VANCOMYCIN HYDROCHLORIDE 1250 MG: 1.25 INJECTION, POWDER, LYOPHILIZED, FOR SOLUTION INTRAVENOUS at 06:09

## 2023-09-02 RX ADMIN — CETIRIZINE HYDROCHLORIDE 10 MG: 10 TABLET, FILM COATED ORAL at 08:09

## 2023-09-02 RX ADMIN — OXYCODONE AND ACETAMINOPHEN 1 TABLET: 10; 325 TABLET ORAL at 02:09

## 2023-09-02 RX ADMIN — MELATONIN TAB 3 MG 6 MG: 3 TAB at 10:09

## 2023-09-02 RX ADMIN — LISINOPRIL 20 MG: 10 TABLET ORAL at 08:09

## 2023-09-02 RX ADMIN — INSULIN ASPART 2 UNITS: 100 INJECTION, SOLUTION INTRAVENOUS; SUBCUTANEOUS at 08:09

## 2023-09-02 RX ADMIN — MORPHINE SULFATE 4 MG: 2 INJECTION, SOLUTION INTRAMUSCULAR; INTRAVENOUS at 03:09

## 2023-09-02 RX ADMIN — VANCOMYCIN HYDROCHLORIDE 1500 MG: 1.5 INJECTION, POWDER, LYOPHILIZED, FOR SOLUTION INTRAVENOUS at 08:09

## 2023-09-02 RX ADMIN — HYDROMORPHONE HYDROCHLORIDE 0.5 MG: 0.5 INJECTION, SOLUTION INTRAMUSCULAR; INTRAVENOUS; SUBCUTANEOUS at 10:09

## 2023-09-02 RX ADMIN — ASPIRIN 81 MG CHEWABLE TABLET 81 MG: 81 TABLET CHEWABLE at 08:09

## 2023-09-02 RX ADMIN — APIXABAN 5 MG: 2.5 TABLET, FILM COATED ORAL at 08:09

## 2023-09-02 RX ADMIN — PANTOPRAZOLE SODIUM 40 MG: 40 TABLET, DELAYED RELEASE ORAL at 08:09

## 2023-09-02 RX ADMIN — CLONAZEPAM 1 MG: 0.5 TABLET ORAL at 02:09

## 2023-09-02 RX ADMIN — INSULIN ASPART 4 UNITS: 100 INJECTION, SOLUTION INTRAVENOUS; SUBCUTANEOUS at 11:09

## 2023-09-02 RX ADMIN — INSULIN ASPART 4 UNITS: 100 INJECTION, SOLUTION INTRAVENOUS; SUBCUTANEOUS at 05:09

## 2023-09-02 RX ADMIN — GABAPENTIN 600 MG: 300 CAPSULE ORAL at 02:09

## 2023-09-02 RX ADMIN — TAMSULOSIN HYDROCHLORIDE 0.4 MG: 0.4 CAPSULE ORAL at 08:09

## 2023-09-02 RX ADMIN — METOPROLOL TARTRATE 100 MG: 50 TABLET, FILM COATED ORAL at 08:09

## 2023-09-02 RX ADMIN — HYDROMORPHONE HYDROCHLORIDE 0.5 MG: 0.5 INJECTION, SOLUTION INTRAMUSCULAR; INTRAVENOUS; SUBCUTANEOUS at 11:09

## 2023-09-02 RX ADMIN — HYDROMORPHONE HYDROCHLORIDE 0.5 MG: 0.5 INJECTION, SOLUTION INTRAMUSCULAR; INTRAVENOUS; SUBCUTANEOUS at 12:09

## 2023-09-02 NOTE — SUBJECTIVE & OBJECTIVE
Interval History:  Patient seen and examined.  Underwent I&D yesterday with Dr. Spears.  Packing is in place.  Reports pain is controlled at this time.  Review of Systems   Skin:  Positive for wound.     Objective:     Vital Signs (Most Recent):  Temp: 97.8 °F (36.6 °C) (09/02/23 0734)  Pulse: 70 (09/02/23 0734)  Resp: 16 (09/02/23 1122)  BP: (!) 121/59 (09/02/23 0734)  SpO2: (!) 93 % (09/02/23 0734) Vital Signs (24h Range):  Temp:  [96.8 °F (36 °C)-97.8 °F (36.6 °C)] 97.8 °F (36.6 °C)  Pulse:  [70-90] 70  Resp:  [12-21] 16  SpO2:  [93 %-100 %] 93 %  BP: ()/(43-60) 121/59     Weight: 117.9 kg (260 lb)  Body mass index is 36.26 kg/m².    Intake/Output Summary (Last 24 hours) at 9/2/2023 1125  Last data filed at 9/1/2023 1721  Gross per 24 hour   Intake 2240.19 ml   Output 0 ml   Net 2240.19 ml           Physical Exam  Vitals and nursing note reviewed.   Constitutional:       Appearance: He is obese.   HENT:      Head: Normocephalic and atraumatic.      Right Ear: External ear normal.      Left Ear: External ear normal.      Nose: Nose normal.      Mouth/Throat:      Mouth: Mucous membranes are moist.      Pharynx: Oropharynx is clear.   Eyes:      Extraocular Movements: Extraocular movements intact.      Conjunctiva/sclera: Conjunctivae normal.   Cardiovascular:      Rate and Rhythm: Normal rate and regular rhythm.      Pulses: Normal pulses.      Heart sounds: Normal heart sounds.   Pulmonary:      Breath sounds: Normal breath sounds.   Abdominal:      General: Bowel sounds are normal. There is no distension.      Palpations: Abdomen is soft.      Tenderness: There is no abdominal tenderness.   Genitourinary:     Comments: Wound packed.  Musculoskeletal:         General: Normal range of motion.      Cervical back: Normal range of motion and neck supple.      Right lower leg: No edema.      Left lower leg: No edema.   Skin:     General: Skin is warm and dry.   Neurological:      General: No focal deficit  present.      Mental Status: Mental status is at baseline.             Significant Labs: All pertinent labs within the past 24 hours have been reviewed.    Significant Imaging: I have reviewed all pertinent imaging results/findings within the past 24 hours.

## 2023-09-02 NOTE — PROGRESS NOTES
"[facility] Medicine  Progress Note    Patient Name: Magdaleno Cunningham  MRN: [id number]  Patient Class: IP- Inpatient   Admission Date: 8/31/2023  Length of Stay: 1 days  Attending Physician: Trinity Garcia MD  Primary Care Provider: Venkata Mclaughlin MD        Subjective:     Principal Problem:Sepsis        HPI:  Mr. Cunningham is a 57 year old male with a history of IDDM2, obesity BMI 37, afib on eliquis, COPD, ROSAMARIA on cpap, HTN, CAD s/p stents and CABG, and MSSA bacteremia  who presents today with complaints of abscess on his gluteal folds right worse than left. It is severe. It is associated with subjective fever, sweats/chills, fatigue, weakness, and pain. He denies chest pain, SOB, N/V/D, dizziness, or LOC. Symptoms began about 4 days ago on the right, he tried soaking it and "popping" it. Symptoms continued to worsen and he noted pain and induration on the left gluteal fold prompting his presentation. In the ED, , temp 99, WBC 15K, lactate 2.4, creatinine 1.7 with baseline 1.1-1.3, corrected sodium for glucose 132. CT abd/pelvis: " Induration in the gluteal fold just posterior and lateral to the anal rectal verge with small subcutaneous fluid collection in the perineum to the left of midline measuring 3.7 x 1.9 x 1.7 cm compatible with small subcutaneous abscess. No subcutaneous gas to suggest Chrissie's gangrene". Last dose of eliquis was 8/31/23 AM. ED MD discussed cased with general surgery, Dr. Spears who agrees to see patient in AM. Hospital medicine is consulted for admission for further work up and treatment.       Overview/Hospital Course:  Magdaleno Cunningham is a 57 year old female with a past medical history of obesity, DM, HTN, CAD, COPD, Afib, ROSAMARIA, BPH, tobacco use, THC use and HLD who presented with severe sepsis secondary to a perineal abscess. He is on vancomycin. Wound and blood cultures are pending. Surgery was consulted and took the patient for I and D " 9/1. His course has been complicated by HENRIQUE and hyponatremia. The HENRIQUE improved with IV fluids. Workup of the hyponatremia is pending.       Interval History:  Patient seen and examined.  Underwent I&D yesterday with Dr. Spears.  Packing is in place.  Reports pain is controlled at this time.  Review of Systems   Skin:  Positive for wound.     Objective:     Vital Signs (Most Recent):  Temp: 97.8 °F (36.6 °C) (09/02/23 0734)  Pulse: 70 (09/02/23 0734)  Resp: 16 (09/02/23 1122)  BP: (!) 121/59 (09/02/23 0734)  SpO2: (!) 93 % (09/02/23 0734) Vital Signs (24h Range):  Temp:  [96.8 °F (36 °C)-97.8 °F (36.6 °C)] 97.8 °F (36.6 °C)  Pulse:  [70-90] 70  Resp:  [12-21] 16  SpO2:  [93 %-100 %] 93 %  BP: ()/(43-60) 121/59     Weight: 117.9 kg (260 lb)  Body mass index is 36.26 kg/m².    Intake/Output Summary (Last 24 hours) at 9/2/2023 1125  Last data filed at 9/1/2023 1721  Gross per 24 hour   Intake 2240.19 ml   Output 0 ml   Net 2240.19 ml           Physical Exam  Vitals and nursing note reviewed.   Constitutional:       Appearance: He is obese.   HENT:      Head: Normocephalic and atraumatic.      Right Ear: External ear normal.      Left Ear: External ear normal.      Nose: Nose normal.      Mouth/Throat:      Mouth: Mucous membranes are moist.      Pharynx: Oropharynx is clear.   Eyes:      Extraocular Movements: Extraocular movements intact.      Conjunctiva/sclera: Conjunctivae normal.   Cardiovascular:      Rate and Rhythm: Normal rate and regular rhythm.      Pulses: Normal pulses.      Heart sounds: Normal heart sounds.   Pulmonary:      Breath sounds: Normal breath sounds.   Abdominal:      General: Bowel sounds are normal. There is no distension.      Palpations: Abdomen is soft.      Tenderness: There is no abdominal tenderness.   Genitourinary:     Comments: Wound packed.  Musculoskeletal:         General: Normal range of motion.      Cervical back: Normal range of motion and neck supple.      Right lower  leg: No edema.      Left lower leg: No edema.   Skin:     General: Skin is warm and dry.   Neurological:      General: No focal deficit present.      Mental Status: Mental status is at baseline.             Significant Labs: All pertinent labs within the past 24 hours have been reviewed.    Significant Imaging: I have reviewed all pertinent imaging results/findings within the past 24 hours.      Assessment/Plan:      * Sepsis  -Vancomycin   -Follow up wound and blood cultures    Hyponatremia  Improving  -Trend sodium    HLD (hyperlipidemia)  -Continue statin and ASA    Obesity (BMI 30-39.9)  Body mass index is 36.26 kg/m². Morbid obesity complicates all aspects of disease management from diagnostic modalities to treatment.          Abscess and cellulitis of gluteal region  -Vancomycin  -Follow up wound and blood culture      Atrial fibrillation  Patient with Paroxysmal (<7 days) atrial fibrillation which is controlled currently with Beta Blocker. Patient is currently in sinus rhythm.[id number] Score: 2. HASBLED Score: 1. Anticoagulation not indicated due to surgical contraindication, resume home eliquis when able.    Coronary artery disease involving native coronary artery of native heart  -Continue ASA and statin      Prostate enlargement  -Continue Flomax      Smoker 1-2 packs per day since 14 years old  -Patient to be counseled on cessation      ROSAMARIA (obstructive sleep apnea)  -CPAP QHS    Hypertension associated with diabetes  -Continue lisinopril, Imdur and metoprolol  -Continue to monitor    Type 2 diabetes mellitus with diabetic arthropathy  Patient's FSGs are uncontrolled due to hyperglycemia on current medication regimen.  Last A1c reviewed-   Lab Results   Component Value Date    HGBA1C 8.8 (H) 08/31/2022     Most recent fingerstick glucose reviewed-   Recent Labs   Lab 09/01/23  0758 09/01/23  1322   POCTGLUCOSE 168* 129*     Current correctional scale  Medium  Maintain anti-hyperglycemic dose as follows-    Antihyperglycemics (From admission, onward)    Start     Stop Route Frequency Ordered    09/01/23 0900  insulin detemir U-100 (Levemir) pen 80 Units         -- SubQ 2 times daily 08/31/23 2311 09/01/23 0007  insulin aspart U-100 pen 0-10 Units         -- SubQ Before meals & nightly PRN 08/31/23 2311        Hold Oral hypoglycemics while patient is in the hospital.    COPD (chronic obstructive pulmonary disease)  -Continue home inhalers      VTE Risk Mitigation (From admission, onward)         Ordered     apixaban tablet 5 mg  2 times daily         09/01/23 1151     IP VTE HIGH RISK PATIENT  Once         08/31/23 2311     Place sequential compression device  Until discontinued         08/31/23 2311                Discharge Planning   PEDRO: 9/3/2023     Code Status: Full Code   Is the patient medically ready for discharge?:     Reason for patient still in hospital (select all that apply): Patient trending condition and Treatment                     Trinity Garcia MD  Department of Hospital Medicine   [facility]/Surg

## 2023-09-02 NOTE — CARE UPDATE
09/01/23 1959   Patient Assessment/Suction   Level of Consciousness (AVPU) alert   Respiratory Effort Unlabored   Rhythm/Pattern, Respiratory pattern regular   Cough Frequency frequent   Cough Type congested;nonproductive   PRE-TX-O2   Device (Oxygen Therapy) room air   SpO2 97 %   Pulse Oximetry Type Intermittent   $ Pulse Oximetry - Multiple Charge Pulse Oximetry - Multiple   Pulse 79   Resp 18   Aerosol Therapy   $ Aerosol Therapy Charges PRN treatment not required   Respiratory Treatment Status (SVN) PRN treatment not required   Incentive Spirometer   $ Incentive Spirometer Charges done with encouragement   Administration (IS) instruction provided, follow-up   Number of Repetitions (IS) 5   Level Incentive Spirometer (mL) 1250   Patient Tolerance (IS) fair   Preset CPAP/BiPAP Settings   $ Is patient using? No/refused

## 2023-09-02 NOTE — PROGRESS NOTES
Patient seen and examined.  No significant changes.  Pain has improved.      Vitals are stable   Afebrile    Leukocytosis has resolved     No significant changes from surgical perspective.  Would keep at least another night for antibiotics.  Will reassess tomorrow.    Following

## 2023-09-02 NOTE — ASSESSMENT & PLAN NOTE
Patient with Paroxysmal (<7 days) atrial fibrillation which is controlled currently with Beta Blocker. Patient is currently in sinus rhythm.[id number] Score: 2. HASBLED Score: 1. Anticoagulation not indicated due to surgical contraindication, resume home eliquis when able.

## 2023-09-02 NOTE — PROGRESS NOTES
Pharmacokinetic Assessment Follow Up: IV Vancomycin    Vancomycin serum concentration assessment(s):    The trough level was drawn correctly and can be used to guide therapy at this time. The measurement is below the desired definitive target range of 15 to 20 mcg/mL.    Vancomycin Regimen Plan:    Change regimen to Vancomycin 1500 mg IV every 12 hours with next serum trough concentration measured at approximately 1830 prior to 3rd dose on 9/3/23    Drug levels (last 3 results):  Recent Labs   Lab Result Units 09/01/23  1646 09/02/23  1738   Vancomycin, Random ug/mL 5.8  --    Vancomycin-Trough ug/mL  --  11.2       Pharmacy will continue to follow and monitor vancomycin.    Please contact pharmacy at extension [phone] for questions regarding this assessment.    Thank you for the consult,   Shwetha Frausto       Patient brief summary:  Magdaleno Cunningham is a 57 y.o. male initiated on antimicrobial therapy with IV Vancomycin for treatment of skin & soft tissue infection      Drug Allergies:   Review of patient's allergies indicates:   Allergen Reactions    Pesticide Dermatitis and Rash       Actual Body Weight:   117.9 kg    Renal Function:   Estimated Creatinine Clearance: 99.1 mL/min (based on SCr of 1.1 mg/dL).,     Dialysis Method (if applicable):  N/A    CBC (last 72 hours):  Recent Labs   Lab Result Units 08/31/23 1738 09/01/23  0251 09/02/23  0432   WBC K/uL 15.35* 15.36* 12.52   Hemoglobin g/dL 16.0 13.4* 11.9*   Hematocrit % 45.9 39.4* 35.8*   Platelets K/uL 242 193 207   Gran % % 75.3* 74.9* 77.0*   Lymph % % 16.1* 14.6* 15.7*   Mono % % 6.2 8.9 5.2   Eosinophil % % 1.2 0.5 0.9   Basophil % % 0.7 0.6 0.4   Differential Method  Automated Automated Automated       Metabolic Panel (last 72 hours):  Recent Labs   Lab Result Units 08/31/23  1738 08/31/23  1813 09/01/23  0251 09/02/23  0010 09/02/23  0432   Sodium mmol/L 129*  --  127*  --  130*   Sodium, Urine mmol/L  --   --   --  20  --    Potassium mmol/L 4.1   --  4.4  --  4.0   Chloride mmol/L 94*  --  92*  --  98   CO2 mmol/L 19*  --  22*  --  22*   Glucose mg/dL 289*  --  211*  --  237*   Glucose, UA   --  4+*  --   --   --    BUN mg/dL 16  --  13  --  14   Creatinine mg/dL 1.7*  --  1.4  --  1.1   Creatinine, Urine mg/dL  --   --   --  46.9  --    Albumin g/dL 3.4*  --  2.8*  --  2.5*   Total Bilirubin mg/dL 0.7  --  0.8  --  0.3   Alkaline Phosphatase U/L 76  --  67  --  61   AST U/L 19  --  17  --  18   ALT U/L 20  --  16  --  16   Magnesium mg/dL  --   --  1.7  --  2.2   Phosphorus mg/dL  --   --   --   --  2.5*       Vancomycin Administrations:  vancomycin given in the last 96 hours                     vancomycin 1,250 mg in dextrose 5 % (D5W) 250 mL IVPB (Vial-Mate) (mg) 1,250 mg New Bag 09/02/23 0603     1,250 mg New Bag 09/01/23 1758    vancomycin (VANCOCIN) 2,000 mg in dextrose 5 % (D5W) 500 mL IVPB (mg) 2,000 mg New Bag 09/01/23 0021                    Microbiologic Results:  Microbiology Results (last 7 days)       Procedure Component Value Units Date/Time    Blood culture (site 2) [[id number]] Collected: 08/31/23 2322    Order Status: Completed Specimen: Blood from Antecubital, Right Arm Updated: 09/02/23 1032     Blood Culture, Routine No Growth to date      No Growth to date    Narrative:      Site # 2, aerobic only    Blood culture (site 1) [[id number]] Collected: 08/31/23 2322    Order Status: Completed Specimen: Blood from Antecubital, Left Arm Updated: 09/02/23 1032     Blood Culture, Routine No Growth to date      No Growth to date    Narrative:      Site # 1, aerobic and anaerobic    Culture, Anaerobe [[id number]] Collected: 09/01/23 1254    Order Status: Completed Specimen: Abscess from Perianal Updated: 09/02/23 0819     Anaerobic Culture Culture in progress    Aerobic culture [[id number]] Collected: 09/01/23 1254    Order Status: Completed Specimen: Abscess from Perianal Updated: 09/02/23 0819     Aerobic Bacterial Culture No growth    Culture,  Anaerobe [[id number]] Collected: 09/01/23 1300    Order Status: Completed Specimen: Abscess from Perianal Updated: 09/02/23 0818     Anaerobic Culture Culture in progress    Aerobic culture [[id number]] Collected: 09/01/23 1300    Order Status: Completed Specimen: Abscess from Perianal Updated: 09/02/23 0818     Aerobic Bacterial Culture No growth

## 2023-09-03 VITALS
BODY MASS INDEX: 36.4 KG/M2 | RESPIRATION RATE: 17 BRPM | SYSTOLIC BLOOD PRESSURE: 115 MMHG | WEIGHT: 260 LBS | HEART RATE: 72 BPM | OXYGEN SATURATION: 94 % | TEMPERATURE: 99 F | DIASTOLIC BLOOD PRESSURE: 56 MMHG | HEIGHT: 71 IN

## 2023-09-03 LAB
ALBUMIN SERPL BCP-MCNC: 2.4 G/DL (ref 3.5–5.2)
ALP SERPL-CCNC: 54 U/L (ref 55–135)
ALT SERPL W/O P-5'-P-CCNC: 15 U/L (ref 10–44)
ANION GAP SERPL CALC-SCNC: 13 MMOL/L (ref 8–16)
AST SERPL-CCNC: 19 U/L (ref 10–40)
BACTERIA SPEC AEROBE CULT: ABNORMAL
BACTERIA SPEC AEROBE CULT: ABNORMAL
BASOPHILS # BLD AUTO: 0.07 K/UL (ref 0–0.2)
BASOPHILS NFR BLD: 0.6 % (ref 0–1.9)
BILIRUB SERPL-MCNC: 0.3 MG/DL (ref 0.1–1)
BUN SERPL-MCNC: 13 MG/DL (ref 6–20)
CALCIUM SERPL-MCNC: 8.2 MG/DL (ref 8.7–10.5)
CHLORIDE SERPL-SCNC: 100 MMOL/L (ref 95–110)
CO2 SERPL-SCNC: 23 MMOL/L (ref 23–29)
CREAT SERPL-MCNC: 1.1 MG/DL (ref 0.5–1.4)
DIFFERENTIAL METHOD: ABNORMAL
EOSINOPHIL # BLD AUTO: 0.5 K/UL (ref 0–0.5)
EOSINOPHIL NFR BLD: 4 % (ref 0–8)
ERYTHROCYTE [DISTWIDTH] IN BLOOD BY AUTOMATED COUNT: 12.6 % (ref 11.5–14.5)
EST. GFR  (NO RACE VARIABLE): >60 ML/MIN/1.73 M^2
GLUCOSE SERPL-MCNC: 129 MG/DL (ref 70–110)
HCT VFR BLD AUTO: 35.8 % (ref 40–54)
HGB BLD-MCNC: 11.6 G/DL (ref 14–18)
IMM GRANULOCYTES # BLD AUTO: 0.05 K/UL (ref 0–0.04)
IMM GRANULOCYTES NFR BLD AUTO: 0.4 % (ref 0–0.5)
LYMPHOCYTES # BLD AUTO: 3.5 K/UL (ref 1–4.8)
LYMPHOCYTES NFR BLD: 30.2 % (ref 18–48)
MAGNESIUM SERPL-MCNC: 1.9 MG/DL (ref 1.6–2.6)
MCH RBC QN AUTO: 29.9 PG (ref 27–31)
MCHC RBC AUTO-ENTMCNC: 32.4 G/DL (ref 32–36)
MCV RBC AUTO: 92 FL (ref 82–98)
MONOCYTES # BLD AUTO: 0.6 K/UL (ref 0.3–1)
MONOCYTES NFR BLD: 5.2 % (ref 4–15)
NEUTROPHILS # BLD AUTO: 6.8 K/UL (ref 1.8–7.7)
NEUTROPHILS NFR BLD: 59.6 % (ref 38–73)
NRBC BLD-RTO: 0 /100 WBC
PHOSPHATE SERPL-MCNC: 3.1 MG/DL (ref 2.7–4.5)
PLATELET # BLD AUTO: 209 K/UL (ref 150–450)
PMV BLD AUTO: 9.3 FL (ref 9.2–12.9)
POTASSIUM SERPL-SCNC: 3.4 MMOL/L (ref 3.5–5.1)
PROT SERPL-MCNC: 6 G/DL (ref 6–8.4)
RBC # BLD AUTO: 3.88 M/UL (ref 4.6–6.2)
SODIUM SERPL-SCNC: 136 MMOL/L (ref 136–145)
WBC # BLD AUTO: 11.41 K/UL (ref 3.9–12.7)

## 2023-09-03 PROCEDURE — 99900035 HC TECH TIME PER 15 MIN (STAT)

## 2023-09-03 PROCEDURE — 94761 N-INVAS EAR/PLS OXIMETRY MLT: CPT

## 2023-09-03 PROCEDURE — 25000003 PHARM REV CODE 250: Performed by: SURGERY

## 2023-09-03 PROCEDURE — 96376 TX/PRO/DX INJ SAME DRUG ADON: CPT

## 2023-09-03 PROCEDURE — 85025 COMPLETE CBC W/AUTO DIFF WBC: CPT | Performed by: SURGERY

## 2023-09-03 PROCEDURE — 36415 COLL VENOUS BLD VENIPUNCTURE: CPT | Performed by: SURGERY

## 2023-09-03 PROCEDURE — 84100 ASSAY OF PHOSPHORUS: CPT | Performed by: SURGERY

## 2023-09-03 PROCEDURE — 96372 THER/PROPH/DIAG INJ SC/IM: CPT | Performed by: SURGERY

## 2023-09-03 PROCEDURE — 83735 ASSAY OF MAGNESIUM: CPT | Performed by: SURGERY

## 2023-09-03 PROCEDURE — 96366 THER/PROPH/DIAG IV INF ADDON: CPT

## 2023-09-03 PROCEDURE — G0378 HOSPITAL OBSERVATION PER HR: HCPCS

## 2023-09-03 PROCEDURE — 80053 COMPREHEN METABOLIC PANEL: CPT | Performed by: SURGERY

## 2023-09-03 PROCEDURE — 63600175 PHARM REV CODE 636 W HCPCS: Performed by: HOSPITALIST

## 2023-09-03 PROCEDURE — 25000003 PHARM REV CODE 250: Performed by: HOSPITALIST

## 2023-09-03 PROCEDURE — 63600175 PHARM REV CODE 636 W HCPCS: Performed by: SURGERY

## 2023-09-03 RX ORDER — AMOXICILLIN AND CLAVULANATE POTASSIUM 875; 125 MG/1; MG/1
1 TABLET, FILM COATED ORAL EVERY 12 HOURS
Qty: 10 TABLET | Refills: 0 | Status: ON HOLD | OUTPATIENT
Start: 2023-09-03 | End: 2023-09-11

## 2023-09-03 RX ORDER — OXYCODONE AND ACETAMINOPHEN 5; 325 MG/1; MG/1
1 TABLET ORAL EVERY 6 HOURS PRN
Qty: 12 TABLET | Refills: 0 | Status: ON HOLD | OUTPATIENT
Start: 2023-09-03 | End: 2023-09-11 | Stop reason: HOSPADM

## 2023-09-03 RX ORDER — ENOXAPARIN SODIUM 150 MG/ML
1 INJECTION SUBCUTANEOUS EVERY 12 HOURS
Status: DISCONTINUED | OUTPATIENT
Start: 2023-09-03 | End: 2023-09-03 | Stop reason: HOSPADM

## 2023-09-03 RX ADMIN — ASPIRIN 81 MG CHEWABLE TABLET 81 MG: 81 TABLET CHEWABLE at 09:09

## 2023-09-03 RX ADMIN — RANOLAZINE 500 MG: 500 TABLET, EXTENDED RELEASE ORAL at 09:09

## 2023-09-03 RX ADMIN — CETIRIZINE HYDROCHLORIDE 10 MG: 10 TABLET, FILM COATED ORAL at 09:09

## 2023-09-03 RX ADMIN — POTASSIUM BICARBONATE 35 MEQ: 391 TABLET, EFFERVESCENT ORAL at 03:09

## 2023-09-03 RX ADMIN — HYDROMORPHONE HYDROCHLORIDE 0.5 MG: 0.5 INJECTION, SOLUTION INTRAMUSCULAR; INTRAVENOUS; SUBCUTANEOUS at 04:09

## 2023-09-03 RX ADMIN — ISOSORBIDE MONONITRATE 60 MG: 60 TABLET, EXTENDED RELEASE ORAL at 09:09

## 2023-09-03 RX ADMIN — PRAVASTATIN SODIUM 20 MG: 10 TABLET ORAL at 09:09

## 2023-09-03 RX ADMIN — SENNOSIDES AND DOCUSATE SODIUM 1 TABLET: 8.6; 5 TABLET ORAL at 09:09

## 2023-09-03 RX ADMIN — VANCOMYCIN HYDROCHLORIDE 1500 MG: 1.5 INJECTION, POWDER, LYOPHILIZED, FOR SOLUTION INTRAVENOUS at 07:09

## 2023-09-03 RX ADMIN — PANTOPRAZOLE SODIUM 40 MG: 40 TABLET, DELAYED RELEASE ORAL at 09:09

## 2023-09-03 RX ADMIN — POTASSIUM BICARBONATE 35 MEQ: 391 TABLET, EFFERVESCENT ORAL at 09:09

## 2023-09-03 RX ADMIN — GABAPENTIN 600 MG: 300 CAPSULE ORAL at 09:09

## 2023-09-03 RX ADMIN — INSULIN DETEMIR 80 UNITS: 100 INJECTION, SOLUTION SUBCUTANEOUS at 09:09

## 2023-09-03 RX ADMIN — APIXABAN 5 MG: 2.5 TABLET, FILM COATED ORAL at 09:09

## 2023-09-03 RX ADMIN — MORPHINE SULFATE 4 MG: 2 INJECTION, SOLUTION INTRAMUSCULAR; INTRAVENOUS at 09:09

## 2023-09-03 RX ADMIN — METOPROLOL TARTRATE 100 MG: 50 TABLET, FILM COATED ORAL at 09:09

## 2023-09-03 RX ADMIN — LISINOPRIL 20 MG: 10 TABLET ORAL at 09:09

## 2023-09-03 NOTE — SUBJECTIVE & OBJECTIVE
Interval History:  Patient seen and examined.  Reports feeling worse today.  Reports myalgias and chills.  T-max 98.6°.  White blood cell count 11.41.  Continue IV antibiotics.  Review of Systems   Constitutional:  Positive for chills. Negative for fever.   Musculoskeletal:  Positive for myalgias.   Skin:  Positive for wound.     Objective:     Vital Signs (Most Recent):  Temp: 98.5 °F (36.9 °C) (09/03/23 1101)  Pulse: 72 (09/03/23 1101)  Resp: 17 (09/03/23 1101)  BP: (!) 115/56 (09/03/23 1101)  SpO2: (!) 94 % (09/03/23 1101) Vital Signs (24h Range):  Temp:  [97.6 °F (36.4 °C)-98.6 °F (37 °C)] 98.5 °F (36.9 °C)  Pulse:  [67-80] 72  Resp:  [16-20] 17  SpO2:  [94 %-98 %] 94 %  BP: ()/(55-65) 115/56     Weight: 117.9 kg (260 lb)  Body mass index is 36.26 kg/m².    Intake/Output Summary (Last 24 hours) at 9/3/2023 1111  Last data filed at 9/3/2023 0525  Gross per 24 hour   Intake 465.79 ml   Output --   Net 465.79 ml           Physical Exam  Vitals and nursing note reviewed.   Constitutional:       Appearance: He is obese.   HENT:      Head: Normocephalic and atraumatic.      Right Ear: External ear normal.      Left Ear: External ear normal.      Nose: Nose normal.      Mouth/Throat:      Mouth: Mucous membranes are moist.      Pharynx: Oropharynx is clear.   Eyes:      Extraocular Movements: Extraocular movements intact.      Conjunctiva/sclera: Conjunctivae normal.   Cardiovascular:      Rate and Rhythm: Normal rate and regular rhythm.      Pulses: Normal pulses.      Heart sounds: Normal heart sounds.   Pulmonary:      Breath sounds: Normal breath sounds.   Abdominal:      General: Bowel sounds are normal. There is no distension.      Palpations: Abdomen is soft.      Tenderness: There is no abdominal tenderness.   Genitourinary:     Comments: Wound not examined today  Musculoskeletal:         General: Normal range of motion.      Cervical back: Normal range of motion and neck supple.      Right lower leg: No  edema.      Left lower leg: No edema.   Skin:     General: Skin is warm and dry.   Neurological:      General: No focal deficit present.      Mental Status: Mental status is at baseline.             Significant Labs: All pertinent labs within the past 24 hours have been reviewed.    Significant Imaging: I have reviewed all pertinent imaging results/findings within the past 24 hours.

## 2023-09-03 NOTE — NURSING
Notified by monitor tech that pt did 21 beats of VT on telemetry, rate up to 152. /67, HR 76, RR 18, sats 98 on RA, pt denies any palpitations, CP, SOB, etc. Was walking around room. Notified Hospitalist.

## 2023-09-03 NOTE — PROGRESS NOTES
Patient seen and examined.  Feeling better and this afternoon.  He was feeling a little worse this morning which he attributes to morphine overnight and not sleeping well.    Vitals are stable   Afebrile   Wound with mostly serosanguineous drainage.  Pain is improving around the periwound    Labs reviewed, no leukocytosis     Overall, continues to improve.  Patient is requesting to go home and feels well.  Okay from a surgical perspective.  Follow-up with Tory in 1-2 weeks.  DC on p.o. antibiotics.

## 2023-09-03 NOTE — ASSESSMENT & PLAN NOTE
Patient with Paroxysmal (<7 days) atrial fibrillation which is controlled currently with Beta Blocker. Patient is currently in sinus rhythm.[id number] Score: 2. HASBLED Score: 1. Anticoagulation not indicated due to surgical contraindication, resume home eliquis when able.  Sent message to surgeon to confirm okay to restart Eliquis.

## 2023-09-03 NOTE — PLAN OF CARE
[facility], [facility] - Med/Surg  Initial Discharge Assessment       Primary Care Provider: Venkata Mclaughlin MD    Admission Diagnosis: Sepsis [A41.9]    Admission Date: 8/31/2023  Expected Discharge Date: 9/3/2023    Transition of Care Barriers: (P) None    Payor: MEDICAID / Plan: [employer] [facility] ([employer]) / Product Type: Managed Medicaid /     Extended Emergency Contact Information  Primary Emergency Contact: TIAGO BAY  Mobile Phone: [phone]  Relation: Sister  Secondary Emergency Contact: Magdaleno Chi  Home Phone: [phone]  Mobile Phone: [phone]  Relation: Son   needed? No    Discharge Plan A: (P) Home with family  Discharge Plan B: (P) Home      [facility] [address] [facility]  [address] [facility] [address]  Phone: [phone] Fax: [phone]    [facility] [address] [address] [facility] [address]  Phone: [phone] Fax: [phone]    [facility] [address] [facility]  [address] [facility] [address]  Phone: [phone] Fax: [phone]      Initial Assessment (most recent)       Adult Discharge Assessment - 09/03/23 1453          Discharge Assessment    Assessment Type Discharge Planning Assessment (P)      Confirmed/corrected address, phone number and insurance Yes (P)      Confirmed Demographics Correct on Facesheet (P)      Source of Information patient (P)      Communicated PEDRO with patient/caregiver Yes (P)      People in Home child(tonya), adult (P)      Facility Arrived From: home (P)      Do you expect to return to your current living situation? Yes (P)      Do you have help at home or someone to help you manage your care at home? Yes (P)      Who are your caregiver(s) and their phone number(s)? son (P)      Prior to hospitilization cognitive status: Alert/Oriented (P)      Current  cognitive status: Alert/Oriented (P)      Equipment Currently Used at Home none (P)      Readmission within 30 days? No (P)      Patient currently being followed by outpatient case management? No (P)      Do you currently have service(s) that help you manage your care at home? No (P)      Do you take prescription medications? Yes (P)      Do you have prescription coverage? Yes (P)      Do you have any problems affording any of your prescribed medications? No (P)      Is the patient taking medications as prescribed? yes (P)      Who is going to help you get home at discharge? son (P)      How do you get to doctors appointments? car, drives self (P)      Are you on dialysis? No (P)      Do you take coumadin? No (P)      DME Needed Upon Discharge  none (P)      Discharge Plan discussed with: Patient (P)      Transition of Care Barriers None (P)      Discharge Plan A Home with family (P)      Discharge Plan B Home (P)

## 2023-09-03 NOTE — PROGRESS NOTES
"[facility]  Progress Note    Patient Name: Magdaleno Cunningham  MRN: [id number]  Patient Class: IP- Inpatient   Admission Date: 8/31/2023  Length of Stay: 2 days  Attending Physician: Trinity Garcia MD  Primary Care Provider: Venkata Mclaughlin MD        Subjective:     Principal Problem:Sepsis        HPI:  Mr. Cunningham is a 57 year old male with a history of IDDM2, obesity BMI 37, afib on eliquis, COPD, ROSAMARIA on cpap, HTN, CAD s/p stents and CABG, and MSSA bacteremia  who presents today with complaints of abscess on his gluteal folds right worse than left. It is severe. It is associated with subjective fever, sweats/chills, fatigue, weakness, and pain. He denies chest pain, SOB, N/V/D, dizziness, or LOC. Symptoms began about 4 days ago on the right, he tried soaking it and "popping" it. Symptoms continued to worsen and he noted pain and induration on the left gluteal fold prompting his presentation. In the ED, , temp 99, WBC 15K, lactate 2.4, creatinine 1.7 with baseline 1.1-1.3, corrected sodium for glucose 132. CT abd/pelvis: " Induration in the gluteal fold just posterior and lateral to the anal rectal verge with small subcutaneous fluid collection in the perineum to the left of midline measuring 3.7 x 1.9 x 1.7 cm compatible with small subcutaneous abscess. No subcutaneous gas to suggest Chrissie's gangrene". Last dose of eliquis was 8/31/23 AM. ED MD discussed cased with general surgery, Dr. Spears who agrees to see patient in AM. Hospital medicine is consulted for admission for further work up and treatment.       Overview/Hospital Course:  Magdaleno Cunningham is a 57 year old female with a past medical history of obesity, DM, HTN, CAD, COPD, Afib, ROSAMARIA, BPH, tobacco use, THC use and HLD who presented with severe sepsis secondary to a perineal abscess. He is on vancomycin. Wound and blood cultures are pending. Surgery was consulted and took the patient for I and D " 9/1. His course has been complicated by HENRIQUE and hyponatremia. The HENRIQUE improved with IV fluids. Workup of the hyponatremia is pending.       Interval History:  Patient seen and examined.  Reports feeling worse today.  Reports myalgias and chills.  T-max 98.6°.  White blood cell count 11.41.  Continue IV antibiotics.  Review of Systems   Constitutional:  Positive for chills. Negative for fever.   Musculoskeletal:  Positive for myalgias.   Skin:  Positive for wound.     Objective:     Vital Signs (Most Recent):  Temp: 98.5 °F (36.9 °C) (09/03/23 1101)  Pulse: 72 (09/03/23 1101)  Resp: 17 (09/03/23 1101)  BP: (!) 115/56 (09/03/23 1101)  SpO2: (!) 94 % (09/03/23 1101) Vital Signs (24h Range):  Temp:  [97.6 °F (36.4 °C)-98.6 °F (37 °C)] 98.5 °F (36.9 °C)  Pulse:  [67-80] 72  Resp:  [16-20] 17  SpO2:  [94 %-98 %] 94 %  BP: ()/(55-65) 115/56     Weight: 117.9 kg (260 lb)  Body mass index is 36.26 kg/m².    Intake/Output Summary (Last 24 hours) at 9/3/2023 1111  Last data filed at 9/3/2023 0525  Gross per 24 hour   Intake 465.79 ml   Output --   Net 465.79 ml           Physical Exam  Vitals and nursing note reviewed.   Constitutional:       Appearance: He is obese.   HENT:      Head: Normocephalic and atraumatic.      Right Ear: External ear normal.      Left Ear: External ear normal.      Nose: Nose normal.      Mouth/Throat:      Mouth: Mucous membranes are moist.      Pharynx: Oropharynx is clear.   Eyes:      Extraocular Movements: Extraocular movements intact.      Conjunctiva/sclera: Conjunctivae normal.   Cardiovascular:      Rate and Rhythm: Normal rate and regular rhythm.      Pulses: Normal pulses.      Heart sounds: Normal heart sounds.   Pulmonary:      Breath sounds: Normal breath sounds.   Abdominal:      General: Bowel sounds are normal. There is no distension.      Palpations: Abdomen is soft.      Tenderness: There is no abdominal tenderness.   Genitourinary:     Comments: Wound not examined  today  Musculoskeletal:         General: Normal range of motion.      Cervical back: Normal range of motion and neck supple.      Right lower leg: No edema.      Left lower leg: No edema.   Skin:     General: Skin is warm and dry.   Neurological:      General: No focal deficit present.      Mental Status: Mental status is at baseline.             Significant Labs: All pertinent labs within the past 24 hours have been reviewed.    Significant Imaging: I have reviewed all pertinent imaging results/findings within the past 24 hours.      Assessment/Plan:      * Sepsis  -Vancomycin   -Follow up wound and blood cultures    Hyponatremia  Improving  -Trend sodium    HLD (hyperlipidemia)  -Continue statin and ASA    Obesity (BMI 30-39.9)  Body mass index is 36.26 kg/m². Morbid obesity complicates all aspects of disease management from diagnostic modalities to treatment.          Abscess and cellulitis of gluteal region  -Vancomycin  -Follow up wound and blood culture - no growth so far      Atrial fibrillation  Patient with Paroxysmal (<7 days) atrial fibrillation which is controlled currently with Beta Blocker. Patient is currently in sinus rhythm.[id number] Score: 2. HASBLED Score: 1. Anticoagulation not indicated due to surgical contraindication, resume home eliquis when able.  Sent message to surgeon to confirm okay to restart Eliquis.    Coronary artery disease involving native coronary artery of native heart  -Continue ASA and statin      Prostate enlargement  -Continue Flomax      Smoker 1-2 packs per day since 14 years old  -Patient to be counseled on cessation      ROSAMARIA (obstructive sleep apnea)  -CPAP QHS    Hypertension associated with diabetes  -Continue lisinopril, Imdur and metoprolol  -Continue to monitor    Type 2 diabetes mellitus with diabetic arthropathy  Patient's FSGs are uncontrolled due to hyperglycemia on current medication regimen.  Last A1c reviewed-   Lab Results   Component Value Date    HGBA1C  8.8 (H) 08/31/2022     Most recent fingerstick glucose reviewed-   Recent Labs   Lab 09/01/23  0758 09/01/23  1322   POCTGLUCOSE 168* 129*     Current correctional scale  Medium  Maintain anti-hyperglycemic dose as follows-   Antihyperglycemics (From admission, onward)    Start     Stop Route Frequency Ordered    09/01/23 0900  insulin detemir U-100 (Levemir) pen 80 Units         -- SubQ 2 times daily 08/31/23 2311    09/01/23 0007  insulin aspart U-100 pen 0-10 Units         -- SubQ Before meals & nightly PRN 08/31/23 2311        Hold Oral hypoglycemics while patient is in the hospital.    COPD (chronic obstructive pulmonary disease)  -Continue home inhalers      VTE Risk Mitigation (From admission, onward)         Ordered     apixaban tablet 5 mg  2 times daily         09/01/23 1151     IP VTE HIGH RISK PATIENT  Once         08/31/23 2311     Place sequential compression device  Until discontinued         08/31/23 2311                Discharge Planning   PEDRO: 9/3/2023     Code Status: Full Code   Is the patient medically ready for discharge?:     Reason for patient still in hospital (select all that apply): Patient trending condition and Treatment                     Trinity Garcia MD  Department of Hospital Medicine   [facility]/Surg

## 2023-09-03 NOTE — CARE UPDATE
09/02/23 2011   Patient Assessment/Suction   Level of Consciousness (AVPU) alert   Respiratory Effort Normal;Unlabored   Expansion/Accessory Muscles/Retractions no retractions   All Lung Fields Breath Sounds Anterior:;Lateral:   YONG Breath Sounds clear   LLL Breath Sounds diminished   RUL Breath Sounds clear   RML Breath Sounds diminished   RLL Breath Sounds diminished   Rhythm/Pattern, Respiratory pattern regular   Cough Frequency infrequent   Cough Type nonproductive;congested   PRE-TX-O2   Device (Oxygen Therapy) room air   SpO2 98 %   Pulse Oximetry Type Intermittent   $ Pulse Oximetry - Multiple Charge Pulse Oximetry - Multiple   Pulse 77   Resp 18   Aerosol Therapy   $ Aerosol Therapy Charges PRN treatment not required   Respiratory Treatment Status (SVN) PRN treatment not required   Incentive Spirometer   $ Incentive Spirometer Charges done with encouragement   Administration (IS) instruction provided, follow-up   Number of Repetitions (IS) 5   Level Incentive Spirometer (mL) 1500   Patient Tolerance (IS) fair

## 2023-09-03 NOTE — RESPIRATORY THERAPY
02 saturation 97% on room air.  Patient receiving Anoro mdi (HOME) med x 1 puff now and qday.  PRN tx not required at this time.

## 2023-09-03 NOTE — PLAN OF CARE
Pt clear for DC from CM standpoint. Discharging home.     09/03/23 1456   Final Note   Assessment Type Final Discharge Note   Anticipated Discharge Disposition Home

## 2023-09-04 LAB
BACTERIA SPEC ANAEROBE CULT: NORMAL
BACTERIA SPEC ANAEROBE CULT: NORMAL

## 2023-09-04 NOTE — DISCHARGE SUMMARY
"[facility] Medicine  Discharge Summary      Patient Name: Magdaleno Cunningham  MRN: [id number]  TARA: [id number]  Patient Class: IP- Inpatient  Admission Date: 8/31/2023  Hospital Length of Stay: 2 days  Discharge Date and Time: 9/3/2023  5:50 PM  Attending Physician: No att. providers found   Discharging Provider: Trinity Garcia MD  Primary Care Provider: Venkata Mclaughlin MD    Primary Care Team: Networked reference to record PCT     HPI:   Mr. Cunningham is a 57 year old male with a history of IDDM2, obesity BMI 37, afib on eliquis, COPD, ROSAMARIA on cpap, HTN, CAD s/p stents and CABG, and MSSA bacteremia  who presents today with complaints of abscess on his gluteal folds right worse than left. It is severe. It is associated with subjective fever, sweats/chills, fatigue, weakness, and pain. He denies chest pain, SOB, N/V/D, dizziness, or LOC. Symptoms began about 4 days ago on the right, he tried soaking it and "popping" it. Symptoms continued to worsen and he noted pain and induration on the left gluteal fold prompting his presentation. In the ED, , temp 99, WBC 15K, lactate 2.4, creatinine 1.7 with baseline 1.1-1.3, corrected sodium for glucose 132. CT abd/pelvis: " Induration in the gluteal fold just posterior and lateral to the anal rectal verge with small subcutaneous fluid collection in the perineum to the left of midline measuring 3.7 x 1.9 x 1.7 cm compatible with small subcutaneous abscess. No subcutaneous gas to suggest Chrissie's gangrene". Last dose of eliquis was 8/31/23 AM. ED MD discussed cased with general surgery, Dr. Spears who agrees to see patient in AM. Hospital medicine is consulted for admission for further work up and treatment.       Procedure(s) (LRB):  INCISION, ABSCESS, PERIRECTAL (Bilateral)      Hospital Course:   Magdaleno Cunningham is a 57 year old male with a past medical history of obesity, DM, HTN, CAD, COPD, Afib, ROSAMARIA, BPH, tobacco use, THC use and " HLD who presented with severe sepsis secondary to a perineal abscess. He was placed on vancomycin.  Wound and blood cultures were collected.. Surgery was consulted and took the patient for I and D 9/1. His course was complicated by HENRIQUE and hyponatremia. The HENRIQUE improved with IV fluids.  Hyponatremia improved.  Wound cultures grew Streptococcus group F .  He was discharged home on a course of Augmentin after being cleared by surgeon.  He will follow-up with Dr. Spears.  Patient expressed understanding of discharge plan and felt ready for discharge home.       Goals of Care Treatment Preferences:  Code Status: Full Code      Consults:   Consults (From admission, onward)        Status Ordering Provider     Pharmacy to dose Vancomycin consult  Once        Provider:  (Not yet assigned)   See [facility] for full Linked Orders Report.    Completed KALEIGH DAVIS     Inpatient consult to General surgery  Once        Provider:  Ramesh Foote MD    Completed NABIL PAYNE          No new Assessment & Plan notes have been filed under this hospital service since the last note was generated.  Service: Hospital Medicine    Final Active Diagnoses:    Diagnosis Date Noted POA    PRINCIPAL PROBLEM:  Sepsis [A41.9] 09/01/2023 Yes    Abscess and cellulitis of gluteal region [L02.31, L03.317] 09/01/2023 Yes    Obesity (BMI 30-39.9) [E66.9] 09/01/2023 Yes    HLD (hyperlipidemia) [E78.5] 09/01/2023 Yes    Hyponatremia [E87.1] 09/01/2023 Yes    Atrial fibrillation [I48.91] 03/25/2020 Yes    Coronary artery disease involving native coronary artery of native heart [I25.10] 03/25/2020 Yes    Prostate enlargement [N40.0] 08/25/2017 Yes    Smoker 1-2 packs per day since 14 years old [F17.200] 07/06/2016 Yes    ROSAMARIA (obstructive sleep apnea) [G47.33] 10/28/2014 Yes    Type 2 diabetes mellitus with diabetic arthropathy [E11.618]  Yes    Hypertension associated with diabetes [E11.59, I15.2]  Yes    COPD (chronic obstructive  pulmonary disease) [J44.9]  Yes      Problems Resolved During this Admission:    Diagnosis Date Noted Date Resolved POA    HENRIQUE (acute kidney injury) [N17.9] 04/23/2020 09/01/2023 Yes       Discharged Condition: good    Disposition: Home or Self Care    Follow Up:   Follow-up Information     Brayan Spears MD. Go on 9/14/2023.    Specialties: General Surgery, Bariatrics, Surgery  Why: post op appt at 10:45  Contact information:  [address]  [address]  [place] [id number]  [phone]             Venkata Mclaughlin MD. Schedule an appointment as soon as possible for a visit in 1 week(s).    Specialty: Internal Medicine  Why: call to schedule hospital follow up within one week of discharge  Contact information:  [address] [place] [address] [address]  [place] [id number]  [phone]                       Patient Instructions:      Notify your health care provider if you experience any of the following:  temperature >100.4     Notify your health care provider if you experience any of the following:  persistent nausea and vomiting or diarrhea     Notify your health care provider if you experience any of the following:  severe uncontrolled pain     Notify your health care provider if you experience any of the following:  redness, tenderness, or signs of infection (pain, swelling, redness, odor or green/yellow discharge around incision site)     Notify your health care provider if you experience any of the following:  severe persistent headache     Activity as tolerated       Significant Diagnostic Studies: Labs:   CMP   Recent Labs   Lab 09/03/23  0500      K 3.4*      CO2 23   *   BUN 13   CREATININE 1.1   CALCIUM 8.2*   PROT 6.0   ALBUMIN 2.4*   BILITOT 0.3   ALKPHOS 54*   AST 19   ALT 15   ANIONGAP 13    and CBC   Recent Labs   Lab 09/03/23  0500   WBC 11.41   HGB 11.6*   HCT 35.8*        Radiology Results (last 7 days)    Procedure Component Value Units Date/Time   X-Ray Chest 1 View  [[id number]] Resulted: 09/01/23 0058   Order Status: Completed Updated: 09/01/23 0100   Narrative:     EXAMINATION:   XR CHEST 1 VIEW     CLINICAL HISTORY:   pre op;     TECHNIQUE:   Single frontal view of the chest was performed.     COMPARISON:   None     FINDINGS:   The heart mediastinal contours appear stable in size and prominence of the perihilar lung markings and vasculature raises the question of early cardiac decompensation pulmonary edema.  Interstitial pneumonitis or pneumonia could have a similar appearance.  Configuration with median sternotomy.    Impression:       See above       Electronically signed by: Christopher Bales   Date: 09/01/2023   Time: 00:58   CT Pelvis With Contrast [[id number]] Resulted: 08/31/23 2224   Order Status: Completed Updated: 08/31/23 2227   Narrative:     EXAMINATION:   CT PELVIS WITH  CONTRAST     CLINICAL HISTORY:   Anal/rectal abscess;     TECHNIQUE:   Axial CT images of the pelvis were obtained following the administration of 100 cc of Omnipaque 350 contrast via right antecubital vein.     COMPARISON:   CT abdomen and pelvis, 08/04/2019.     FINDINGS:   There is a area of induration in the Patito rectal gluteal fold region bilaterally.  A somewhat septated fluid collection on the left gluteal fold measures 3.7 x 1.9 by 1.7 cm.  No extension into the rectal or anal wall.  Pelvic floor and GI  tract appear unremarkable.     Bony structures are intact.  The abdominal wall is intact.  Spondylosis in the lower lumbar spine.  No lytic or blastic changes evident.     Anterior inguinal and scrotal region appears normal.  No subcu gas.    Impression:       Induration in the gluteal fold just posterior and lateral to the anal rectal verge with small subcutaneous fluid collection in the perineum to the left of midline measuring 3.7 x 1.9 x 1.7 cm compatible with small subcutaneous abscess.     No communication with the anal rectal verge.     No subcutaneous gas to suggest Chrissie's  gangrene.       Electronically signed by: Christopher Bales   Date: 08/31/2023   Time: 22:24   Microbiology Results (Last 365 Days)    Procedure Component Value Units Date/Time   Culture, Anaerobe [[id number]] Collected: 09/01/23 1300   Order Status: Completed Specimen: Abscess from Perianal Updated: 09/04/23 0750    Anaerobic Culture No anaerobes isolated   Aerobic culture [[id number]] (Abnormal) Collected: 09/01/23 1300   Order Status: Completed Specimen: Abscess from Perianal Updated: 09/03/23 1134    Aerobic Bacterial Culture STREPTOCOCCUS GROUP F   Many   Beta-hemolytic streptococci are routinely susceptible to   penicillins,cephalosporins and carbapenems.    Abnormal    Culture, Anaerobe [[id number]] Collected: 09/01/23 1254   Order Status: Completed Specimen: Abscess from Perianal Updated: 09/04/23 0750    Anaerobic Culture No anaerobes isolated   Aerobic culture [[id number]] (Abnormal) Collected: 09/01/23 1254   Order Status: Completed Specimen: Abscess from Perianal Updated: 09/03/23 1132    Aerobic Bacterial Culture STREPTOCOCCUS GROUP F   Moderate   Beta-hemolytic streptococci are routinely susceptible to   penicillins,cephalosporins and carbapenems.    Abnormal    Blood culture (site 1) [[id number]] Collected: 08/31/23 2322   Order Status: Completed Specimen: Blood from Antecubital, Left Arm Updated: 09/04/23 1032    Blood Culture, Routine No Growth to date     No Growth to date     No Growth to date     No Growth to date   Narrative:     Site # 1, aerobic and anaerobic   Blood culture (site 2) [[id number]] Collected: 08/31/23 2322   Order Status: Completed Specimen: Blood from Antecubital, Right Arm Updated: 09/04/23 1032    Blood Culture, Routine No Growth to date     No Growth to date     No Growth to date     No Growth to date   Narrative:     Site # 2, aerobic only         Pending Diagnostic Studies:     None         Medications:  Reconciled Home Medications:      Medication List      START taking these  medications    amoxicillin-clavulanate 875-125mg 875-125 mg per tablet  Commonly known as: AUGMENTIN  Take 1 tablet by mouth every 12 (twelve) hours. for 5 days        CHANGE how you take these medications    * oxyCODONE-acetaminophen  mg per tablet  Commonly known as: PERCOCET  Take 1-2 tablets by mouth daily as needed.  What changed: Another medication with the same name was added. Make sure you understand how and when to take each.     * oxyCODONE-acetaminophen 5-325 mg per tablet  Commonly known as: PERCOCET  Take 1 tablet by mouth every 6 (six) hours as needed for Pain.  What changed: You were already taking a medication with the same name, and this prescription was added. Make sure you understand how and when to take each.         * This list has 2 medication(s) that are the same as other medications prescribed for you. Read the directions carefully, and ask your doctor or other care provider to review them with you.            CONTINUE taking these medications    ANORO ELLIPTA 62.5-25 mcg/actuation Dsdv  Generic drug: umeclidinium-vilanteroL  Inhale 1 puff into the lungs once daily.     apixaban 5 mg Tab  Commonly known as: ELIQUIS  Take 5 mg by mouth 2 (two) times daily.     aspirin 81 MG Chew  Take 81 mg by mouth once daily.     cetirizine 10 MG tablet  Commonly known as: ZYRTEC  Take 1 tablet (10 mg total) by mouth once daily.     clonazePAM 1 MG tablet  Commonly known as: KlonoPIN  Take 1 mg by mouth 2 (two) times daily.     cyclobenzaprine 10 MG tablet  Commonly known as: FLEXERIL  Take 10 mg by mouth once daily.     furosemide 20 MG tablet  Commonly known as: LASIX  Take 20 mg by mouth 2 (two) times daily.     gabapentin 600 MG tablet  Commonly known as: NEURONTIN  Take 600 mg by mouth 3 (three) times daily.     HYDROcodone-acetaminophen 5-325 mg per tablet  Commonly known as: NORCO  Take 1 tablet by mouth every 6 (six) hours as needed for Pain.     isosorbide mononitrate 60 MG 24 hr  "tablet  Commonly known as: IMDUR  Take 1 tablet (60 mg total) by mouth once daily.     JARDIANCE 25 mg tablet  Generic drug: empagliflozin  Take 25 mg by mouth once daily.     LANTUS SOLOSTAR U-100 INSULIN glargine 100 units/mL SubQ pen  Generic drug: insulin  Inject 80 Units into the skin 2 (two) times daily. Changed to 80 units BID 2months ago     lisinopriL 20 MG tablet  Commonly known as: PRINIVIL,ZESTRIL  Take 10 mg by mouth 2 (two) times daily.     metFORMIN 1000 MG tablet  Commonly known as: GLUCOPHAGE  Take 1,000 mg by mouth 2 (two) times daily with meals.     methocarbamoL 500 MG Tab  Commonly known as: ROBAXIN  Take 500 mg by mouth every evening.     metoprolol succinate 100 MG 24 hr tablet  Commonly known as: TOPROL-XL  Take 100 mg by mouth 2 (two) times daily.     nitroGLYCERIN 0.4 MG SL tablet  Commonly known as: NITROSTAT  Place 0.4 mg under the tongue every 5 (five) minutes as needed for Chest pain.     NURTEC 75 mg odt  Generic drug: rimegepant  Take 1 tablet by mouth as needed.     pantoprazole 40 MG tablet  Commonly known as: PROTONIX  Take 1 tablet by mouth 2 (two) times a day.     pen needle, diabetic 31 gauge x 1/4" Ndle  USE 1 TWICE DAILY FOR BLOOD SUGAR GREATER THAN 200     pioglitazone 15 MG tablet  Commonly known as: ACTOS  Take 15 mg by mouth once daily.     ranolazine 500 MG Tb12  Commonly known as: RANEXA  Take 1 tablet (500 mg total) by mouth 2 (two) times daily.     simvastatin 10 MG tablet  Commonly known as: ZOCOR  Take 10 mg by mouth once daily.     tamsulosin 0.4 mg Cap  Commonly known as: FLOMAX  Take 1 capsule (0.4 mg total) by mouth every evening.            Indwelling Lines/Drains at time of discharge:   Lines/Drains/Airways     None                 Time spent on the discharge of patient: 35 minutes  Discharge plan discussed with surgery Dr. Qamar Garcia MD  Department of Hospital Medicine  [facility]/Surg  " Quality 226: Preventive Care And Screening: Tobacco Use: Screening And Cessation Intervention: Patient screened for tobacco use and is an ex/non-smoker Detail Level: Zone Quality 110: Preventive Care And Screening: Influenza Immunization: Influenza Immunization previously received during influenza season Quality 431: Preventive Care And Screening: Unhealthy Alcohol Use - Screening: Patient not identified as an unhealthy alcohol user when screened for unhealthy alcohol use using a systematic screening method

## 2023-09-06 ENCOUNTER — PATIENT OUTREACH (OUTPATIENT)
Dept: ADMINISTRATIVE | Facility: CLINIC | Age: 58
End: 2023-09-06
Payer: MEDICAID

## 2023-09-06 LAB
BACTERIA BLD CULT: NORMAL
BACTERIA BLD CULT: NORMAL

## 2023-09-06 NOTE — PROGRESS NOTES
C3 nurse spoke with Magdaleno uCnningham  for a TCC post hospital discharge follow up call. The patient has a scheduled HOSFU appointment with Venkata Mclaughlin MD on 0914/23 @ 1000.        Patient provided appt information not reflected in appt desk.

## 2023-09-07 ENCOUNTER — HOSPITAL ENCOUNTER (INPATIENT)
Facility: HOSPITAL | Age: 58
LOS: 4 days | Discharge: HOME | DRG: 603 | End: 2023-09-11
Attending: EMERGENCY MEDICINE | Admitting: INTERNAL MEDICINE
Payer: MEDICAID

## 2023-09-07 DIAGNOSIS — R07.9 CHEST PAIN: ICD-10-CM

## 2023-09-07 DIAGNOSIS — L03.317 CELLULITIS OF GLUTEAL REGION: ICD-10-CM

## 2023-09-07 LAB
ALBUMIN SERPL BCP-MCNC: 3.2 G/DL (ref 3.5–5.2)
ALP SERPL-CCNC: 64 U/L (ref 55–135)
ALT SERPL W/O P-5'-P-CCNC: 30 U/L (ref 10–44)
ANION GAP SERPL CALC-SCNC: 12 MMOL/L (ref 8–16)
AST SERPL-CCNC: 39 U/L (ref 10–40)
BASOPHILS # BLD AUTO: 0.08 K/UL (ref 0–0.2)
BASOPHILS NFR BLD: 0.8 % (ref 0–1.9)
BILIRUB SERPL-MCNC: 0.3 MG/DL (ref 0.1–1)
BUN SERPL-MCNC: 6 MG/DL (ref 6–20)
CALCIUM SERPL-MCNC: 10.1 MG/DL (ref 8.7–10.5)
CHLORIDE SERPL-SCNC: 97 MMOL/L (ref 95–110)
CO2 SERPL-SCNC: 25 MMOL/L (ref 23–29)
CREAT SERPL-MCNC: 1.2 MG/DL (ref 0.5–1.4)
DIFFERENTIAL METHOD: ABNORMAL
EOSINOPHIL # BLD AUTO: 0.4 K/UL (ref 0–0.5)
EOSINOPHIL NFR BLD: 4.3 % (ref 0–8)
ERYTHROCYTE [DISTWIDTH] IN BLOOD BY AUTOMATED COUNT: 12.8 % (ref 11.5–14.5)
EST. GFR  (NO RACE VARIABLE): >60 ML/MIN/1.73 M^2
GLUCOSE SERPL-MCNC: 236 MG/DL (ref 70–110)
HCT VFR BLD AUTO: 41.1 % (ref 40–54)
HGB BLD-MCNC: 13.9 G/DL (ref 14–18)
IMM GRANULOCYTES # BLD AUTO: 0.18 K/UL (ref 0–0.04)
IMM GRANULOCYTES NFR BLD AUTO: 1.8 % (ref 0–0.5)
LACTATE SERPL-SCNC: 2.8 MMOL/L (ref 0.5–2.2)
LYMPHOCYTES # BLD AUTO: 3.8 K/UL (ref 1–4.8)
LYMPHOCYTES NFR BLD: 37.9 % (ref 18–48)
MCH RBC QN AUTO: 31 PG (ref 27–31)
MCHC RBC AUTO-ENTMCNC: 33.8 G/DL (ref 32–36)
MCV RBC AUTO: 92 FL (ref 82–98)
MONOCYTES # BLD AUTO: 0.6 K/UL (ref 0.3–1)
MONOCYTES NFR BLD: 5.5 % (ref 4–15)
NEUTROPHILS # BLD AUTO: 5 K/UL (ref 1.8–7.7)
NEUTROPHILS NFR BLD: 49.7 % (ref 38–73)
NRBC BLD-RTO: 0 /100 WBC
PLATELET # BLD AUTO: 365 K/UL (ref 150–450)
PLATELET BLD QL SMEAR: ABNORMAL
PMV BLD AUTO: 8.8 FL (ref 9.2–12.9)
POTASSIUM SERPL-SCNC: 5 MMOL/L (ref 3.5–5.1)
PROT SERPL-MCNC: 7.9 G/DL (ref 6–8.4)
RBC # BLD AUTO: 4.48 M/UL (ref 4.6–6.2)
SODIUM SERPL-SCNC: 134 MMOL/L (ref 136–145)
WBC # BLD AUTO: 10.05 K/UL (ref 3.9–12.7)

## 2023-09-07 PROCEDURE — 80053 COMPREHEN METABOLIC PANEL: CPT | Performed by: NURSE PRACTITIONER

## 2023-09-07 PROCEDURE — 11000001 HC ACUTE MED/SURG PRIVATE ROOM

## 2023-09-07 PROCEDURE — 25000003 PHARM REV CODE 250: Performed by: NURSE PRACTITIONER

## 2023-09-07 PROCEDURE — 85025 COMPLETE CBC W/AUTO DIFF WBC: CPT | Performed by: NURSE PRACTITIONER

## 2023-09-07 PROCEDURE — 96361 HYDRATE IV INFUSION ADD-ON: CPT

## 2023-09-07 PROCEDURE — 25500020 PHARM REV CODE 255

## 2023-09-07 PROCEDURE — 83605 ASSAY OF LACTIC ACID: CPT | Performed by: NURSE PRACTITIONER

## 2023-09-07 PROCEDURE — 99285 EMERGENCY DEPT VISIT HI MDM: CPT | Mod: 25

## 2023-09-07 PROCEDURE — 36415 COLL VENOUS BLD VENIPUNCTURE: CPT | Performed by: NURSE PRACTITIONER

## 2023-09-07 RX ADMIN — SODIUM CHLORIDE 1000 ML: 9 INJECTION, SOLUTION INTRAVENOUS at 10:09

## 2023-09-07 RX ADMIN — IOHEXOL 100 ML: 350 INJECTION, SOLUTION INTRAVENOUS at 11:09

## 2023-09-08 PROBLEM — E44.0 MALNUTRITION OF MODERATE DEGREE: Status: ACTIVE | Noted: 2023-09-08

## 2023-09-08 LAB
ALBUMIN SERPL BCP-MCNC: 2.6 G/DL (ref 3.5–5.2)
ALP SERPL-CCNC: 53 U/L (ref 55–135)
ALT SERPL W/O P-5'-P-CCNC: 25 U/L (ref 10–44)
ANION GAP SERPL CALC-SCNC: 11 MMOL/L (ref 8–16)
AST SERPL-CCNC: 33 U/L (ref 10–40)
BASOPHILS # BLD AUTO: 0.07 K/UL (ref 0–0.2)
BASOPHILS NFR BLD: 0.7 % (ref 0–1.9)
BILIRUB SERPL-MCNC: 0.3 MG/DL (ref 0.1–1)
BUN SERPL-MCNC: 5 MG/DL (ref 6–20)
CALCIUM SERPL-MCNC: 8.5 MG/DL (ref 8.7–10.5)
CHLORIDE SERPL-SCNC: 100 MMOL/L (ref 95–110)
CO2 SERPL-SCNC: 23 MMOL/L (ref 23–29)
CREAT SERPL-MCNC: 1 MG/DL (ref 0.5–1.4)
DIFFERENTIAL METHOD: ABNORMAL
EOSINOPHIL # BLD AUTO: 0.4 K/UL (ref 0–0.5)
EOSINOPHIL NFR BLD: 4.6 % (ref 0–8)
ERYTHROCYTE [DISTWIDTH] IN BLOOD BY AUTOMATED COUNT: 12.8 % (ref 11.5–14.5)
EST. GFR  (NO RACE VARIABLE): >60 ML/MIN/1.73 M^2
GLUCOSE SERPL-MCNC: 157 MG/DL (ref 70–110)
HCT VFR BLD AUTO: 36.4 % (ref 40–54)
HGB BLD-MCNC: 11.9 G/DL (ref 14–18)
IMM GRANULOCYTES # BLD AUTO: 0.14 K/UL (ref 0–0.04)
IMM GRANULOCYTES NFR BLD AUTO: 1.5 % (ref 0–0.5)
LACTATE SERPL-SCNC: 1.2 MMOL/L (ref 0.5–2.2)
LACTATE SERPL-SCNC: 1.3 MMOL/L (ref 0.5–2.2)
LACTATE SERPL-SCNC: 1.7 MMOL/L (ref 0.5–2.2)
LACTATE SERPL-SCNC: 1.9 MMOL/L (ref 0.5–2.2)
LACTATE SERPL-SCNC: 1.9 MMOL/L (ref 0.5–2.2)
LACTATE SERPL-SCNC: 2 MMOL/L (ref 0.5–2.2)
LYMPHOCYTES # BLD AUTO: 4 K/UL (ref 1–4.8)
LYMPHOCYTES NFR BLD: 41.4 % (ref 18–48)
MAGNESIUM SERPL-MCNC: 1.7 MG/DL (ref 1.6–2.6)
MCH RBC QN AUTO: 30.1 PG (ref 27–31)
MCHC RBC AUTO-ENTMCNC: 32.7 G/DL (ref 32–36)
MCV RBC AUTO: 92 FL (ref 82–98)
MONOCYTES # BLD AUTO: 0.7 K/UL (ref 0.3–1)
MONOCYTES NFR BLD: 7.1 % (ref 4–15)
NEUTROPHILS # BLD AUTO: 4.3 K/UL (ref 1.8–7.7)
NEUTROPHILS NFR BLD: 44.7 % (ref 38–73)
NRBC BLD-RTO: 0 /100 WBC
PHOSPHATE SERPL-MCNC: 4 MG/DL (ref 2.7–4.5)
PLATELET # BLD AUTO: 290 K/UL (ref 150–450)
PMV BLD AUTO: 8.8 FL (ref 9.2–12.9)
POCT GLUCOSE: 127 MG/DL (ref 70–110)
POCT GLUCOSE: 148 MG/DL (ref 70–110)
POCT GLUCOSE: 160 MG/DL (ref 70–110)
POCT GLUCOSE: 228 MG/DL (ref 70–110)
POTASSIUM SERPL-SCNC: 3.9 MMOL/L (ref 3.5–5.1)
PROT SERPL-MCNC: 6.2 G/DL (ref 6–8.4)
RBC # BLD AUTO: 3.95 M/UL (ref 4.6–6.2)
SODIUM SERPL-SCNC: 134 MMOL/L (ref 136–145)
WBC # BLD AUTO: 9.57 K/UL (ref 3.9–12.7)

## 2023-09-08 PROCEDURE — 96375 TX/PRO/DX INJ NEW DRUG ADDON: CPT

## 2023-09-08 PROCEDURE — 83735 ASSAY OF MAGNESIUM: CPT | Performed by: NURSE PRACTITIONER

## 2023-09-08 PROCEDURE — 63600175 PHARM REV CODE 636 W HCPCS: Performed by: NURSE PRACTITIONER

## 2023-09-08 PROCEDURE — 25000003 PHARM REV CODE 250: Performed by: NURSE PRACTITIONER

## 2023-09-08 PROCEDURE — 84100 ASSAY OF PHOSPHORUS: CPT | Performed by: NURSE PRACTITIONER

## 2023-09-08 PROCEDURE — 80053 COMPREHEN METABOLIC PANEL: CPT | Performed by: NURSE PRACTITIONER

## 2023-09-08 PROCEDURE — 82962 GLUCOSE BLOOD TEST: CPT

## 2023-09-08 PROCEDURE — 25000003 PHARM REV CODE 250: Performed by: EMERGENCY MEDICINE

## 2023-09-08 PROCEDURE — G0378 HOSPITAL OBSERVATION PER HR: HCPCS

## 2023-09-08 PROCEDURE — 96367 TX/PROPH/DG ADDL SEQ IV INF: CPT

## 2023-09-08 PROCEDURE — 96376 TX/PRO/DX INJ SAME DRUG ADON: CPT

## 2023-09-08 PROCEDURE — 63600175 PHARM REV CODE 636 W HCPCS: Performed by: EMERGENCY MEDICINE

## 2023-09-08 PROCEDURE — 85025 COMPLETE CBC W/AUTO DIFF WBC: CPT | Performed by: NURSE PRACTITIONER

## 2023-09-08 PROCEDURE — 96366 THER/PROPH/DIAG IV INF ADDON: CPT

## 2023-09-08 PROCEDURE — 96372 THER/PROPH/DIAG INJ SC/IM: CPT | Performed by: NURSE PRACTITIONER

## 2023-09-08 PROCEDURE — 99024 POSTOP FOLLOW-UP VISIT: CPT | Mod: ,,, | Performed by: SURGERY

## 2023-09-08 PROCEDURE — 36415 COLL VENOUS BLD VENIPUNCTURE: CPT | Performed by: NURSE PRACTITIONER

## 2023-09-08 PROCEDURE — 87040 BLOOD CULTURE FOR BACTERIA: CPT | Mod: 59 | Performed by: EMERGENCY MEDICINE

## 2023-09-08 PROCEDURE — 83605 ASSAY OF LACTIC ACID: CPT | Mod: 91 | Performed by: NURSE PRACTITIONER

## 2023-09-08 PROCEDURE — 96365 THER/PROPH/DIAG IV INF INIT: CPT

## 2023-09-08 PROCEDURE — 99024 PR POST-OP FOLLOW-UP VISIT: ICD-10-PCS | Mod: ,,, | Performed by: SURGERY

## 2023-09-08 PROCEDURE — 11000001 HC ACUTE MED/SURG PRIVATE ROOM

## 2023-09-08 PROCEDURE — 94761 N-INVAS EAR/PLS OXIMETRY MLT: CPT

## 2023-09-08 RX ORDER — TAMSULOSIN HYDROCHLORIDE 0.4 MG/1
0.4 CAPSULE ORAL NIGHTLY
Status: DISCONTINUED | OUTPATIENT
Start: 2023-09-08 | End: 2023-09-11 | Stop reason: HOSPADM

## 2023-09-08 RX ORDER — PRAVASTATIN SODIUM 10 MG/1
20 TABLET ORAL DAILY
Status: DISCONTINUED | OUTPATIENT
Start: 2023-09-08 | End: 2023-09-11 | Stop reason: HOSPADM

## 2023-09-08 RX ORDER — MORPHINE SULFATE 4 MG/ML
4 INJECTION, SOLUTION INTRAMUSCULAR; INTRAVENOUS EVERY 4 HOURS PRN
Status: DISCONTINUED | OUTPATIENT
Start: 2023-09-08 | End: 2023-09-11 | Stop reason: HOSPADM

## 2023-09-08 RX ORDER — SODIUM,POTASSIUM PHOSPHATES 280-250MG
2 POWDER IN PACKET (EA) ORAL
Status: DISCONTINUED | OUTPATIENT
Start: 2023-09-08 | End: 2023-09-11 | Stop reason: HOSPADM

## 2023-09-08 RX ORDER — SODIUM CHLORIDE 0.9 % (FLUSH) 0.9 %
3 SYRINGE (ML) INJECTION EVERY 12 HOURS PRN
Status: DISCONTINUED | OUTPATIENT
Start: 2023-09-08 | End: 2023-09-11 | Stop reason: HOSPADM

## 2023-09-08 RX ORDER — LANOLIN ALCOHOL/MO/W.PET/CERES
800 CREAM (GRAM) TOPICAL
Status: DISCONTINUED | OUTPATIENT
Start: 2023-09-08 | End: 2023-09-11 | Stop reason: HOSPADM

## 2023-09-08 RX ORDER — GABAPENTIN 300 MG/1
600 CAPSULE ORAL 2 TIMES DAILY
Status: DISCONTINUED | OUTPATIENT
Start: 2023-09-08 | End: 2023-09-11 | Stop reason: HOSPADM

## 2023-09-08 RX ORDER — ISOSORBIDE MONONITRATE 60 MG/1
60 TABLET, EXTENDED RELEASE ORAL DAILY
Status: DISCONTINUED | OUTPATIENT
Start: 2023-09-08 | End: 2023-09-11 | Stop reason: HOSPADM

## 2023-09-08 RX ORDER — MORPHINE SULFATE 4 MG/ML
4 INJECTION, SOLUTION INTRAMUSCULAR; INTRAVENOUS
Status: COMPLETED | OUTPATIENT
Start: 2023-09-08 | End: 2023-09-08

## 2023-09-08 RX ORDER — IBUPROFEN 200 MG
24 TABLET ORAL
Status: DISCONTINUED | OUTPATIENT
Start: 2023-09-08 | End: 2023-09-11 | Stop reason: HOSPADM

## 2023-09-08 RX ORDER — NAPROXEN SODIUM 220 MG/1
81 TABLET, FILM COATED ORAL DAILY
Status: DISCONTINUED | OUTPATIENT
Start: 2023-09-08 | End: 2023-09-11 | Stop reason: HOSPADM

## 2023-09-08 RX ORDER — NALOXONE HCL 0.4 MG/ML
0.02 VIAL (ML) INJECTION
Status: DISCONTINUED | OUTPATIENT
Start: 2023-09-08 | End: 2023-09-11 | Stop reason: HOSPADM

## 2023-09-08 RX ORDER — PANTOPRAZOLE SODIUM 40 MG/1
40 TABLET, DELAYED RELEASE ORAL 2 TIMES DAILY
Status: DISCONTINUED | OUTPATIENT
Start: 2023-09-08 | End: 2023-09-11 | Stop reason: HOSPADM

## 2023-09-08 RX ORDER — SODIUM CHLORIDE 9 MG/ML
INJECTION, SOLUTION INTRAVENOUS CONTINUOUS
Status: DISCONTINUED | OUTPATIENT
Start: 2023-09-08 | End: 2023-09-11 | Stop reason: HOSPADM

## 2023-09-08 RX ORDER — METHOCARBAMOL 500 MG/1
500 TABLET, FILM COATED ORAL NIGHTLY
Status: DISCONTINUED | OUTPATIENT
Start: 2023-09-08 | End: 2023-09-11 | Stop reason: HOSPADM

## 2023-09-08 RX ORDER — CETIRIZINE HYDROCHLORIDE 10 MG/1
10 TABLET ORAL DAILY
Status: DISCONTINUED | OUTPATIENT
Start: 2023-09-08 | End: 2023-09-11 | Stop reason: HOSPADM

## 2023-09-08 RX ORDER — ACETAMINOPHEN 325 MG/1
650 TABLET ORAL EVERY 4 HOURS PRN
Status: DISCONTINUED | OUTPATIENT
Start: 2023-09-08 | End: 2023-09-11 | Stop reason: HOSPADM

## 2023-09-08 RX ORDER — MAG HYDROX/ALUMINUM HYD/SIMETH 200-200-20
30 SUSPENSION, ORAL (FINAL DOSE FORM) ORAL 4 TIMES DAILY PRN
Status: DISCONTINUED | OUTPATIENT
Start: 2023-09-08 | End: 2023-09-11 | Stop reason: HOSPADM

## 2023-09-08 RX ORDER — LISINOPRIL 10 MG/1
10 TABLET ORAL 2 TIMES DAILY
Status: DISCONTINUED | OUTPATIENT
Start: 2023-09-08 | End: 2023-09-11 | Stop reason: HOSPADM

## 2023-09-08 RX ORDER — HYDROCODONE BITARTRATE AND ACETAMINOPHEN 5; 325 MG/1; MG/1
1 TABLET ORAL EVERY 6 HOURS PRN
Status: DISCONTINUED | OUTPATIENT
Start: 2023-09-08 | End: 2023-09-11 | Stop reason: HOSPADM

## 2023-09-08 RX ORDER — ACETAMINOPHEN 325 MG/1
650 TABLET ORAL EVERY 8 HOURS PRN
Status: DISCONTINUED | OUTPATIENT
Start: 2023-09-08 | End: 2023-09-11 | Stop reason: HOSPADM

## 2023-09-08 RX ORDER — TALC
9 POWDER (GRAM) TOPICAL NIGHTLY PRN
Status: DISCONTINUED | OUTPATIENT
Start: 2023-09-08 | End: 2023-09-11 | Stop reason: HOSPADM

## 2023-09-08 RX ORDER — CLONAZEPAM 0.5 MG/1
1 TABLET ORAL 2 TIMES DAILY
Status: DISCONTINUED | OUTPATIENT
Start: 2023-09-08 | End: 2023-09-11 | Stop reason: HOSPADM

## 2023-09-08 RX ORDER — AMOXICILLIN 250 MG
1 CAPSULE ORAL 2 TIMES DAILY
Status: DISCONTINUED | OUTPATIENT
Start: 2023-09-08 | End: 2023-09-11 | Stop reason: HOSPADM

## 2023-09-08 RX ORDER — METOPROLOL SUCCINATE 50 MG/1
100 TABLET, EXTENDED RELEASE ORAL 2 TIMES DAILY
Status: DISCONTINUED | OUTPATIENT
Start: 2023-09-08 | End: 2023-09-11 | Stop reason: HOSPADM

## 2023-09-08 RX ORDER — GLUCAGON 1 MG
1 KIT INJECTION
Status: DISCONTINUED | OUTPATIENT
Start: 2023-09-08 | End: 2023-09-11 | Stop reason: HOSPADM

## 2023-09-08 RX ORDER — IBUPROFEN 200 MG
16 TABLET ORAL
Status: DISCONTINUED | OUTPATIENT
Start: 2023-09-08 | End: 2023-09-11 | Stop reason: HOSPADM

## 2023-09-08 RX ORDER — SIMETHICONE 80 MG
1 TABLET,CHEWABLE ORAL 4 TIMES DAILY PRN
Status: DISCONTINUED | OUTPATIENT
Start: 2023-09-08 | End: 2023-09-11 | Stop reason: HOSPADM

## 2023-09-08 RX ORDER — ONDANSETRON 2 MG/ML
8 INJECTION INTRAMUSCULAR; INTRAVENOUS EVERY 6 HOURS PRN
Status: DISCONTINUED | OUTPATIENT
Start: 2023-09-08 | End: 2023-09-11 | Stop reason: HOSPADM

## 2023-09-08 RX ORDER — FUROSEMIDE 20 MG/1
20 TABLET ORAL 2 TIMES DAILY
Status: DISCONTINUED | OUTPATIENT
Start: 2023-09-08 | End: 2023-09-11 | Stop reason: HOSPADM

## 2023-09-08 RX ORDER — INSULIN ASPART 100 [IU]/ML
0-10 INJECTION, SOLUTION INTRAVENOUS; SUBCUTANEOUS
Status: DISCONTINUED | OUTPATIENT
Start: 2023-09-08 | End: 2023-09-11 | Stop reason: HOSPADM

## 2023-09-08 RX ORDER — RANOLAZINE 500 MG/1
500 TABLET, EXTENDED RELEASE ORAL 2 TIMES DAILY
Status: DISCONTINUED | OUTPATIENT
Start: 2023-09-08 | End: 2023-09-11 | Stop reason: HOSPADM

## 2023-09-08 RX ADMIN — HYDROCODONE BITARTRATE AND ACETAMINOPHEN 1 TABLET: 5; 325 TABLET ORAL at 05:09

## 2023-09-08 RX ADMIN — ASPIRIN 81 MG CHEWABLE TABLET 81 MG: 81 TABLET CHEWABLE at 09:09

## 2023-09-08 RX ADMIN — SENNOSIDES AND DOCUSATE SODIUM 1 TABLET: 8.6; 5 TABLET ORAL at 09:09

## 2023-09-08 RX ADMIN — ISOSORBIDE MONONITRATE 60 MG: 60 TABLET, EXTENDED RELEASE ORAL at 09:09

## 2023-09-08 RX ADMIN — RANOLAZINE 500 MG: 500 TABLET, FILM COATED, EXTENDED RELEASE ORAL at 09:09

## 2023-09-08 RX ADMIN — MORPHINE SULFATE 4 MG: 4 INJECTION INTRAVENOUS at 12:09

## 2023-09-08 RX ADMIN — ACETAMINOPHEN 650 MG: 325 TABLET, FILM COATED ORAL at 02:09

## 2023-09-08 RX ADMIN — CETIRIZINE HYDROCHLORIDE 10 MG: 10 TABLET, FILM COATED ORAL at 09:09

## 2023-09-08 RX ADMIN — CLONAZEPAM 1 MG: 0.5 TABLET ORAL at 09:09

## 2023-09-08 RX ADMIN — PIPERACILLIN AND TAZOBACTAM 4.5 G: 4; .5 INJECTION, POWDER, LYOPHILIZED, FOR SOLUTION INTRAVENOUS; PARENTERAL at 05:09

## 2023-09-08 RX ADMIN — MORPHINE SULFATE 4 MG: 4 INJECTION, SOLUTION INTRAMUSCULAR; INTRAVENOUS at 07:09

## 2023-09-08 RX ADMIN — SODIUM CHLORIDE: 9 INJECTION, SOLUTION INTRAVENOUS at 02:09

## 2023-09-08 RX ADMIN — INSULIN DETEMIR 80 UNITS: 100 INJECTION, SOLUTION SUBCUTANEOUS at 09:09

## 2023-09-08 RX ADMIN — VANCOMYCIN HYDROCHLORIDE 1750 MG: 1 INJECTION, POWDER, LYOPHILIZED, FOR SOLUTION INTRAVENOUS at 12:09

## 2023-09-08 RX ADMIN — APIXABAN 5 MG: 2.5 TABLET, FILM COATED ORAL at 09:09

## 2023-09-08 RX ADMIN — GABAPENTIN 600 MG: 300 CAPSULE ORAL at 09:09

## 2023-09-08 RX ADMIN — METHOCARBAMOL 500 MG: 500 TABLET ORAL at 02:09

## 2023-09-08 RX ADMIN — TAMSULOSIN HYDROCHLORIDE 0.4 MG: 0.4 CAPSULE ORAL at 02:09

## 2023-09-08 RX ADMIN — PRAVASTATIN SODIUM 20 MG: 10 TABLET ORAL at 09:09

## 2023-09-08 RX ADMIN — INSULIN ASPART 2 UNITS: 100 INJECTION, SOLUTION INTRAVENOUS; SUBCUTANEOUS at 09:09

## 2023-09-08 RX ADMIN — INSULIN ASPART 2 UNITS: 100 INJECTION, SOLUTION INTRAVENOUS; SUBCUTANEOUS at 05:09

## 2023-09-08 RX ADMIN — LISINOPRIL 10 MG: 10 TABLET ORAL at 09:09

## 2023-09-08 RX ADMIN — PANTOPRAZOLE SODIUM 40 MG: 40 TABLET, DELAYED RELEASE ORAL at 09:09

## 2023-09-08 RX ADMIN — METOPROLOL SUCCINATE 100 MG: 50 TABLET, EXTENDED RELEASE ORAL at 09:09

## 2023-09-08 RX ADMIN — TAMSULOSIN HYDROCHLORIDE 0.4 MG: 0.4 CAPSULE ORAL at 09:09

## 2023-09-08 RX ADMIN — MORPHINE SULFATE 4 MG: 4 INJECTION, SOLUTION INTRAMUSCULAR; INTRAVENOUS at 12:09

## 2023-09-08 RX ADMIN — FUROSEMIDE 20 MG: 20 TABLET ORAL at 09:09

## 2023-09-08 RX ADMIN — PIPERACILLIN AND TAZOBACTAM 4.5 G: 4; .5 INJECTION, POWDER, LYOPHILIZED, FOR SOLUTION INTRAVENOUS; PARENTERAL at 12:09

## 2023-09-08 RX ADMIN — MELATONIN TAB 3 MG 9 MG: 3 TAB at 02:09

## 2023-09-08 RX ADMIN — PANTOPRAZOLE SODIUM 40 MG: 40 TABLET, DELAYED RELEASE ORAL at 02:09

## 2023-09-08 RX ADMIN — METHOCARBAMOL 500 MG: 500 TABLET ORAL at 09:09

## 2023-09-08 RX ADMIN — PIPERACILLIN AND TAZOBACTAM 4.5 G: 4; .5 INJECTION, POWDER, LYOPHILIZED, FOR SOLUTION INTRAVENOUS; PARENTERAL at 07:09

## 2023-09-08 RX ADMIN — VANCOMYCIN HYDROCHLORIDE 1500 MG: 1.5 INJECTION, POWDER, LYOPHILIZED, FOR SOLUTION INTRAVENOUS at 11:09

## 2023-09-08 RX ADMIN — MORPHINE SULFATE 4 MG: 4 INJECTION, SOLUTION INTRAMUSCULAR; INTRAVENOUS at 11:09

## 2023-09-08 NOTE — ASSESSMENT & PLAN NOTE
Status post incision and drainage.  His pain is very difficult to control and he should be admitted simply for observation for pain control and wound care.  I do not see any further need for incision debridement of wound.  It appears to be slowly healing without any clear signs of progressing infection

## 2023-09-08 NOTE — ASSESSMENT & PLAN NOTE
Chronic, controlled.  Latest blood pressure and vitals reviewed-     Temp:  [98 °F (36.7 °C)]   Pulse:  [61-75]   Resp:  [16-20]   BP: (143-180)/(70-88)   SpO2:  [94 %-98 %] .   Home meds for hypertension were reviewed and noted below-  Hypertension Medications             furosemide (LASIX) 20 MG tablet Take 20 mg by mouth 2 (two) times daily.    isosorbide mononitrate (IMDUR) 60 MG 24 hr tablet Take 1 tablet (60 mg total) by mouth once daily.    lisinopriL (PRINIVIL,ZESTRIL) 20 MG tablet Take 10 mg by mouth 2 (two) times daily.    metoprolol succinate (TOPROL-XL) 100 MG 24 hr tablet Take 100 mg by mouth 2 (two) times daily.    nitroGLYCERIN (NITROSTAT) 0.4 MG SL tablet Place 0.4 mg under the tongue every 5 (five) minutes as needed for Chest pain.          While in the hospital, will manage blood pressure as follows; Continue home antihypertensive regimen    Will utilize p.r.n. blood pressure medication only if patient's blood pressure greater than 180/110 and he develops symptoms such as worsening chest pain or shortness of breath.

## 2023-09-08 NOTE — SUBJECTIVE & OBJECTIVE
No current facility-administered medications on file prior to encounter.     Current Outpatient Medications on File Prior to Encounter   Medication Sig    apixaban (ELIQUIS) 5 mg Tab Take 5 mg by mouth 2 (two) times daily.    aspirin 81 MG Chew Take 81 mg by mouth once daily.     clonazePAM (KLONOPIN) 1 MG tablet Take 1 mg by mouth 2 (two) times daily.    cyclobenzaprine (FLEXERIL) 10 MG tablet Take 10 mg by mouth once daily.    furosemide (LASIX) 20 MG tablet Take 20 mg by mouth 2 (two) times daily.    gabapentin (NEURONTIN) 600 MG tablet Take 600 mg by mouth 3 (three) times daily.     JARDIANCE 25 mg tablet Take 25 mg by mouth once daily.    LANTUS SOLOSTAR U-100 INSULIN glargine 100 units/mL (3mL) SubQ pen Inject 80 Units into the skin 2 (two) times daily.    lisinopriL (PRINIVIL,ZESTRIL) 20 MG tablet Take 10 mg by mouth 2 (two) times daily.    metFORMIN (GLUCOPHAGE) 1000 MG tablet Take 1,000 mg by mouth 2 (two) times daily with meals.     methocarbamoL (ROBAXIN) 500 MG Tab Take 500 mg by mouth every evening.    metoprolol succinate (TOPROL-XL) 100 MG 24 hr tablet Take 100 mg by mouth 2 (two) times daily.    oxyCODONE-acetaminophen (PERCOCET)  mg per tablet Take 1-2 tablets by mouth daily as needed for Pain.    pantoprazole (PROTONIX) 40 MG tablet Take 1 tablet by mouth 2 (two) times a day.    pioglitazone (ACTOS) 15 MG tablet Take 15 mg by mouth once daily.    rimegepant (NURTEC) 75 mg odt Take 1 tablet by mouth as needed for Migraine.    simvastatin (ZOCOR) 10 MG tablet Take 10 mg by mouth once daily.    amoxicillin-clavulanate 875-125mg (AUGMENTIN) 875-125 mg per tablet Take 1 tablet by mouth every 12 (twelve) hours. for 5 days (Patient not taking: Reported on 9/8/2023)    cetirizine (ZYRTEC) 10 MG tablet Take 1 tablet (10 mg total) by mouth once daily. (Patient not taking: Reported on 9/8/2023)    HYDROcodone-acetaminophen (NORCO) 5-325 mg per tablet Take 1 tablet by mouth every 6 (six) hours as needed  "for Pain. (Patient not taking: Reported on 9/8/2023)    isosorbide mononitrate (IMDUR) 60 MG 24 hr tablet Take 1 tablet (60 mg total) by mouth once daily. (Patient not taking: Reported on 9/8/2023)    nitroGLYCERIN (NITROSTAT) 0.4 MG SL tablet Place 0.4 mg under the tongue every 5 (five) minutes as needed for Chest pain.    oxyCODONE-acetaminophen (PERCOCET) 5-325 mg per tablet Take 1 tablet by mouth every 6 (six) hours as needed for Pain. (Patient not taking: Reported on 9/8/2023)    pen needle, diabetic 31 gauge x 1/4" Ndle USE 1 TWICE DAILY FOR BLOOD SUGAR GREATER THAN 200    ranolazine (RANEXA) 500 MG Tb12 Take 1 tablet (500 mg total) by mouth 2 (two) times daily. (Patient not taking: Reported on 9/8/2023)    [DISCONTINUED] tamsulosin (FLOMAX) 0.4 mg Cap Take 1 capsule (0.4 mg total) by mouth every evening.    [DISCONTINUED] umeclidinium-vilanteroL (ANORO ELLIPTA) 62.5-25 mcg/actuation DsDv Inhale 1 puff into the lungs once daily. (Patient not taking: Reported on 9/6/2023)       Review of patient's allergies indicates:   Allergen Reactions    Pesticide Dermatitis and Rash       Past Medical History:   Diagnosis Date    Anticoagulant long-term use     Atrial fibrillation 2018    BPH (benign prostatic hyperplasia)     Cataract     COPD (chronic obstructive pulmonary disease)     Coronary artery disease     stents    CVD (cardiovascular disease)     Diabetes mellitus     Erythema multiforme     AKA Denton Edmondson Syndrome    Hypertension     Infected incision     MI (myocardial infarction)     per pt he has had 4     ROSAMARIA on CPAP     RLS (restless legs syndrome)      Past Surgical History:   Procedure Laterality Date    CORONARY ANGIOGRAPHY INCLUDING BYPASS GRAFTS WITH CATHETERIZATION OF LEFT HEART N/A 9/1/2022    Procedure: ANGIOGRAM, CORONARY, INCLUDING BYPASS GRAFT, WITH LEFT HEART CATHETERIZATION;  Surgeon: Paul Botello MD;  Location: [facility] CATH/EP LAB;  Service: Cardiology;  Laterality: N/A;    CORONARY " ARTERY BYPASS GRAFT (CABG) N/A 4/7/2020    Procedure: CORONARY ARTERY BYPASS GRAFT (CABG)- Excision of left atrial appendage;  Surgeon: Doug Solitario MD;  Location: [facility] OR;  Service: Cardiothoracic;  Laterality: N/A;    CORONARY STENT PLACEMENT      WILSON MAZE PROCEDURE N/A 4/7/2020    Procedure: WILSON MAZE PROCEDURE- Attempted;  Surgeon: Doug Solitario MD;  Location: [facility] OR;  Service: Cardiothoracic;  Laterality: N/A;    CYSTOSCOPY N/A 12/10/2018    Procedure: CYSTOSCOPY;  Surgeon: Khushboo Abreu MD;  Location: [facility];  Service: Urology;  Laterality: N/A;    ENDOSCOPIC HARVEST OF VEIN Left 4/7/2020    Procedure: HARVEST-VEIN-ENDOVASCULAR;  Surgeon: Doug Solitario MD;  Location: [facility];  Service: Cardiothoracic;  Laterality: Left;    INCISION OF PERIRECTAL ABSCESS Bilateral 9/1/2023    Procedure: INCISION, ABSCESS, PERIRECTAL;  Surgeon: Brayan Spears MD;  Location: [facility] OR;  Service: General;  Laterality: Bilateral;  stirrups    LEFT HEART CATHETERIZATION Left 3/17/2020    Procedure: CATHETERIZATION, HEART, LEFT;  Surgeon: Tyree Walters MD;  Location: [facility] CATH/EP LAB;  Service: Cardiology;  Laterality: Left;    STERNAL WIRES REMOVAL N/A 6/8/2020    Procedure: REMOVAL, STERNAL WIRE;  Surgeon: Doug Solitario MD;  Location: [facility] OR;  Service: Peripheral Vascular;  Laterality: N/A;    ULTRASOUND OF PROSTATE FOR VOLUME DETERMINATION  12/10/2018    Procedure: ULTRASOUND, PROSTATE, FOR VOLUME DETERMINATION;  Surgeon: Khushboo Abreu MD;  Location: [facility] OR;  Service: Urology;;     Family History       Problem Relation (Age of Onset)    Cancer Father    Diabetes Mother    Heart disease Mother    Hyperlipidemia Father          Tobacco Use    Smoking status: Every Day     Current packs/day: 0.00     Average packs/day: 0.3 packs/day for 30.0 years (9.0 ttl pk-yrs)     Types: Cigarettes     Start date: 4/4/1990     Last attempt to quit: 4/4/2020     Years since quitting: 3.4     Smokeless tobacco: Never    Tobacco comments:     just under a pack a day   Substance and Sexual Activity    Alcohol use: Not Currently    Drug use: Yes     Frequency: 1.0 times per week     Types: Marijuana    Sexual activity: Not Currently     Review of Systems   Constitutional:  Negative for chills and fever.   HENT:  Negative for congestion and sore throat.    Eyes:  Negative for visual disturbance.   Respiratory:  Negative for cough and shortness of breath.    Cardiovascular:  Negative for chest pain and palpitations.   Gastrointestinal:  Positive for abdominal pain. Negative for constipation, diarrhea, nausea and vomiting.   Endocrine: Negative for cold intolerance and heat intolerance.   Genitourinary:  Negative for dysuria and hematuria.   Musculoskeletal:  Positive for arthralgias. Negative for myalgias.   Skin:  Positive for color change and wound. Negative for rash.   Neurological:  Negative for tremors and seizures.   Hematological:  Negative for adenopathy. Does not bruise/bleed easily.   All other systems reviewed and are negative.    Objective:     Vital Signs (Most Recent):  Temp: 97.5 °F (36.4 °C) (09/08/23 0734)  Pulse: 64 (09/08/23 1500)  Resp: 18 (09/08/23 1259)  BP: (!) 109/56 (09/08/23 1500)  SpO2: 96 % (09/08/23 1500) Vital Signs (24h Range):  Temp:  [97.5 °F (36.4 °C)-98 °F (36.7 °C)] 97.5 °F (36.4 °C)  Pulse:  [52-75] 64  Resp:  [16-20] 18  SpO2:  [93 %-98 %] 96 %  BP: (109-180)/(56-88) 109/56     Weight: 117.9 kg (260 lb)  Body mass index is 36.26 kg/m².     Physical Exam  Vitals and nursing note reviewed.   Constitutional:       General: He is awake.      Appearance: Normal appearance. He is well-developed. He is morbidly obese. He is not ill-appearing.   HENT:      Head: Normocephalic and atraumatic.      Nose: Nose normal. No septal deviation.   Eyes:      Conjunctiva/sclera: Conjunctivae normal.      Pupils: Pupils are equal, round, and reactive to light.   Neck:      Thyroid: No  thyroid mass.      Vascular: No JVD.      Trachea: No tracheal tenderness or tracheal deviation.   Cardiovascular:      Rate and Rhythm: Normal rate and regular rhythm.      Heart sounds: Normal heart sounds, S1 normal and S2 normal. No murmur heard.     No friction rub. No gallop.   Pulmonary:      Effort: Pulmonary effort is normal.      Breath sounds: Normal breath sounds. No decreased breath sounds, wheezing, rhonchi or rales.   Abdominal:      General: Bowel sounds are normal. There is no distension.      Palpations: Abdomen is soft. There is no hepatomegaly, splenomegaly or mass.      Tenderness: There is no abdominal tenderness.   Genitourinary:      Skin:     General: Skin is warm and dry.      Capillary Refill: Capillary refill takes less than 2 seconds.      Findings: Wound present. No erythema or rash.      Comments: There is an open wound with granulation tissue within the opening.  Very mild surrounding redness but no clear erythema.  No obvious drainage or other signs of infection   Neurological:      General: No focal deficit present.      Mental Status: He is alert and oriented to person, place, and time.      Cranial Nerves: No cranial nerve deficit.      Sensory: No sensory deficit.   Psychiatric:         Mood and Affect: Mood normal.         Behavior: Behavior normal. Behavior is cooperative.            I have reviewed all pertinent lab results within the past 24 hours.  CBC:   Recent Labs   Lab 09/08/23  0450   WBC 9.57   RBC 3.95*   HGB 11.9*   HCT 36.4*      MCV 92   MCH 30.1   MCHC 32.7     BMP:   Recent Labs   Lab 09/08/23  0450   *   *   K 3.9      CO2 23   BUN 5*   CREATININE 1.0   CALCIUM 8.5*   MG 1.7       Significant Diagnostics:  I have reviewed all pertinent imaging results/findings within the past 24 hours.

## 2023-09-08 NOTE — HPI
57-year-old with recent I and D of perianal abscess.  He was discharge after the I&D and returns in severe pain.  He reports things have been healing well the pain is just difficult to control.  Denies any fevers or chills at home.

## 2023-09-08 NOTE — ASSESSMENT & PLAN NOTE
Nutrition consulted. Most recent weight and BMI monitored-     Measurements:  Wt Readings from Last 1 Encounters:   09/07/23 117.9 kg (260 lb)   Body mass index is 36.26 kg/m².    Patient has been screened and assessed by RD.    Malnutrition Type:  Context:    Level:      Malnutrition Characteristic Summary:       Interventions/Recommendations (treatment strategy):

## 2023-09-08 NOTE — ASSESSMENT & PLAN NOTE
Patient with Paroxysmal (<7 days) atrial fibrillation which is controlled currently with Beta Blocker. Patient is currently in sinus rhythm.[id number] Score: 2. Anticoagulation indicated. Anticoagulation done with eliquis.

## 2023-09-08 NOTE — ASSESSMENT & PLAN NOTE
Body mass index is 36.26 kg/m². Morbid obesity complicates all aspects of disease management from diagnostic modalities to treatment. Weight loss encouraged and health benefits explained to patient.

## 2023-09-08 NOTE — ED NOTES
Pt resting quietly w/ eyes closed, arouses easily to sound. Resp even and unlabored on RA. Pt reports pain as a 7/10 but prefers to hold off on pain meds at this time and will take tylenol for the discomfort and melatonin for sleep. Pt has no other complaints at this time and denies any further needs. Meds will be given.

## 2023-09-08 NOTE — PLAN OF CARE
[facility] - ED  Initial Discharge Assessment       Primary Care Provider: Venkata Mclaughlin MD    Admission Diagnosis: Cellulitis of gluteal region [L03.317]    Admission Date: 9/7/2023  Expected Discharge Date:     Transition of Care Barriers: Underinsured    Payor: MEDICAID / Plan: [employer] [facility] ([employer]) / Product Type: Managed Medicaid /     Extended Emergency Contact Information  Primary Emergency Contact: TIAGO BAY  Mobile Phone: [phone]  Relation: Sister  Secondary Emergency Contact: Magdaleno Chi  Home Phone: [phone]  Mobile Phone: [phone]  Relation: Son   needed? No    Discharge Plan A: Home with family  Discharge Plan B: Home with family      [facility] [address]  [address] [facility] [address]  Phone: [phone] Fax: [phone]    [facility]. [facility] [address] [address] [facility] [address]  Phone: [phone] Fax: [phone]    [facility] [address]  [address] [facility] [address]  Phone: [phone] Fax: [phone]    DC assessment completed with pt, information on facesheet verified. Lives at listed address with adult son who will drive him home. PCP is Dr. Mclaughlin, pharm is [facility]. Denies coumadin, HH, HD, DME. Independent at baseline. Insurance verified. Denies POA. Recent admission x 1 week ago. Planning to DC home when medically clear.    Initial Assessment (most recent)       Adult Discharge Assessment - 09/08/23 1329          Discharge Assessment    Assessment Type Discharge Planning Assessment     Confirmed/corrected address, phone number and insurance Yes     Confirmed Demographics Correct on Facesheet     Source of Information patient     Does patient/caregiver understand observation status Yes     Communicated PEDRO with patient/caregiver Yes      People in Home child(tonya), adult     Facility Arrived From: home     Do you expect to return to your current living situation? Yes     Do you have help at home or someone to help you manage your care at home? No     Prior to hospitilization cognitive status: Alert/Oriented     Current cognitive status: Alert/Oriented     Equipment Currently Used at Home none     Readmission within 30 days? Yes     Patient currently being followed by outpatient case management? No     Do you currently have service(s) that help you manage your care at home? No     Do you take prescription medications? Yes     Do you have prescription coverage? Yes     Do you have any problems affording any of your prescribed medications? No     Is the patient taking medications as prescribed? yes     Who is going to help you get home at discharge? son     How do you get to doctors appointments? car, drives self;family or friend will provide     Are you on dialysis? No     Do you take coumadin? No     DME Needed Upon Discharge  none     Discharge Plan discussed with: Patient     Transition of Care Barriers Underinsured     Discharge Plan A Home with family     Discharge Plan B Home with family

## 2023-09-08 NOTE — PHARMACY MED REC
"              .  Admission Medication History     The home medication history was taken by Marques Jones.    You may go to "Admission" then "Reconcile Home Medications" tabs to review and/or act upon these items.     The home medication list has been updated by the Pharmacy department.   Please read ALL comments highlighted in yellow.   Please address this information as you see fit.    Feel free to contact us if you have any questions or require assistance.      The medications listed below were removed from the home medication list. Please reorder if appropriate:  Patient reports no longer taking the following medication(s):  Tamsulosin 0.4 mg      Medications listed below were obtained from: Patient/family and Analytic software- [employer]  No current facility-administered medications on file prior to encounter.     Current Outpatient Medications on File Prior to Encounter   Medication Sig Dispense Refill    apixaban (ELIQUIS) 5 mg Tab Take 5 mg by mouth 2 (two) times daily.      aspirin 81 MG Chew Take 81 mg by mouth once daily.       clonazePAM (KLONOPIN) 1 MG tablet Take 1 mg by mouth 2 (two) times daily.      cyclobenzaprine (FLEXERIL) 10 MG tablet Take 10 mg by mouth once daily.      furosemide (LASIX) 20 MG tablet Take 20 mg by mouth 2 (two) times daily.      gabapentin (NEURONTIN) 600 MG tablet Take 600 mg by mouth 3 (three) times daily.       JARDIANCE 25 mg tablet Take 25 mg by mouth once daily.      LANTUS SOLOSTAR U-100 INSULIN glargine 100 units/mL (3mL) SubQ pen Inject 80 Units into the skin 2 (two) times daily.      lisinopriL (PRINIVIL,ZESTRIL) 20 MG tablet Take 10 mg by mouth 2 (two) times daily.      metFORMIN (GLUCOPHAGE) 1000 MG tablet Take 1,000 mg by mouth 2 (two) times daily with meals.       methocarbamoL (ROBAXIN) 500 MG Tab Take 500 mg by mouth every evening.      metoprolol succinate (TOPROL-XL) 100 MG 24 hr tablet Take 100 mg by mouth 2 (two) times daily.      oxyCODONE-acetaminophen " "(PERCOCET)  mg per tablet Take 1-2 tablets by mouth daily as needed for Pain.      pantoprazole (PROTONIX) 40 MG tablet Take 1 tablet by mouth 2 (two) times a day.      pioglitazone (ACTOS) 15 MG tablet Take 15 mg by mouth once daily.      rimegepant (NURTEC) 75 mg odt Take 1 tablet by mouth as needed for Migraine.      simvastatin (ZOCOR) 10 MG tablet Take 10 mg by mouth once daily.      amoxicillin-clavulanate 875-125mg (AUGMENTIN) 875-125 mg per tablet Take 1 tablet by mouth every 12 (twelve) hours. for 5 days (Patient not taking: Reported on 9/8/2023) 10 tablet 0    cetirizine (ZYRTEC) 10 MG tablet Take 1 tablet (10 mg total) by mouth once daily. (Patient not taking: Reported on 9/8/2023) 30 tablet 0    HYDROcodone-acetaminophen (NORCO) 5-325 mg per tablet Take 1 tablet by mouth every 6 (six) hours as needed for Pain. (Patient not taking: Reported on 9/8/2023) 6 tablet 0    isosorbide mononitrate (IMDUR) 60 MG 24 hr tablet Take 1 tablet (60 mg total) by mouth once daily. (Patient not taking: Reported on 9/8/2023) 30 tablet 2    nitroGLYCERIN (NITROSTAT) 0.4 MG SL tablet Place 0.4 mg under the tongue every 5 (five) minutes as needed for Chest pain.      oxyCODONE-acetaminophen (PERCOCET) 5-325 mg per tablet Take 1 tablet by mouth every 6 (six) hours as needed for Pain. (Patient not taking: Reported on 9/8/2023) 12 tablet 0    pen needle, diabetic 31 gauge x 1/4" Ndle USE 1 TWICE DAILY FOR BLOOD SUGAR GREATER THAN 200      ranolazine (RANEXA) 500 MG Tb12 Take 1 tablet (500 mg total) by mouth 2 (two) times daily. (Patient not taking: Reported on 9/8/2023) 60 tablet 2    [DISCONTINUED] tamsulosin (FLOMAX) 0.4 mg Cap Take 1 capsule (0.4 mg total) by mouth every evening. 30 capsule 0    [DISCONTINUED] umeclidinium-vilanteroL (ANORO ELLIPTA) 62.5-25 mcg/actuation DsDv Inhale 1 puff into the lungs once daily. (Patient not taking: Reported on 9/6/2023) 60 each 2             Marques Jones  EXT 1922           "

## 2023-09-08 NOTE — ASSESSMENT & PLAN NOTE
"  Patient's FSGs are controlled on current medication regimen.  Last A1c reviewed-   Lab Results   Component Value Date    HGBA1C 8.8 (H) 08/31/2022     Most recent fingerstick glucose reviewed- No results for input(s): "POCTGLUCOSE" in the last 24 hours.  Current correctional scale  Medium  Maintain anti-hyperglycemic dose as follows-   Antihyperglycemics (From admission, onward)    Start     Stop Route Frequency Ordered    09/08/23 0900  insulin detemir U-100 (Levemir) pen 80 Units         -- SubQ 2 times daily 09/08/23 0045    09/08/23 0143  insulin aspart U-100 pen 0-10 Units         -- SubQ Before meals & nightly PRN 09/08/23 0045        Hold Oral hypoglycemics while patient is in the hospital.    "

## 2023-09-08 NOTE — H&P
[facility] Medicine  History & Physical    Patient Name: Magdaleno Cunningham  MRN: [id number]  Patient Class: OP- Observation  Admission Date: 9/7/2023  Attending Physician: Trinity Garcia MD   Primary Care Provider: Venkata Mclaughlin MD         Patient information was obtained from patient, past medical records and ER records.     Subjective:     Principal Problem:Cellulitis of gluteal region    Chief Complaint:   Chief Complaint   Patient presents with    Generalized Body Aches     Mostly from waist down following abscess surgery.  Hard to walk         HPI: Hector Cunningham is a 57 year old male with a history of IDDM2, obesity BMI 37, Afib on eliquis, COPD, ROSAMARIA on CPAP, HTN, CAD s/p stents and CABG, and MSSA bacteremia who presents today with a compliant of body aches, chills and fever. He was recently admitted for a perirectal abscess and cellulitis that required surgical drainage. He stated that he didn't want to stay in the hospital, and begged to be discharged, which he was after surgical clearance. He states that since he's been home, he has had uncontrolled pain, fever, and chills. He states the pain from his buttock abscess site is wrapping around his abdomen. He reports tenderness to the I&D site with yellow drainage. He denies all other complaint.      ED work up included a CBC which was unremarkable. CMP showed mild hyponatremia and moderate malnutrition. Lactic acid level normal at 1.9. CT of the abdomen and pelvis with contrast revealed cellulitis of the right gluteal fold with no abscess. He was initiated on Vancomycin and Zosyn. Hospital Medicine consulted for admission and further management.      Past Medical History:   Diagnosis Date    Anticoagulant long-term use     Atrial fibrillation 2018    BPH (benign prostatic hyperplasia)     Cataract     COPD (chronic obstructive pulmonary disease)     Coronary artery disease     stents    CVD (cardiovascular disease)      Diabetes mellitus     Erythema multiforme     AKA Denton Edmondson Syndrome    Hypertension     Infected incision     MI (myocardial infarction)     per pt he has had 4     ROSAMARIA on CPAP     RLS (restless legs syndrome)        Past Surgical History:   Procedure Laterality Date    CORONARY ANGIOGRAPHY INCLUDING BYPASS GRAFTS WITH CATHETERIZATION OF LEFT HEART N/A 9/1/2022    Procedure: ANGIOGRAM, CORONARY, INCLUDING BYPASS GRAFT, WITH LEFT HEART CATHETERIZATION;  Surgeon: Paul Botello MD;  Location: [facility] CATH/EP LAB;  Service: Cardiology;  Laterality: N/A;    CORONARY ARTERY BYPASS GRAFT (CABG) N/A 4/7/2020    Procedure: CORONARY ARTERY BYPASS GRAFT (CABG)- Excision of left atrial appendage;  Surgeon: Doug Solitario MD;  Location: [facility] OR;  Service: Cardiothoracic;  Laterality: N/A;    CORONARY STENT PLACEMENT      WILSON MAZE PROCEDURE N/A 4/7/2020    Procedure: WILSON MAZE PROCEDURE- Attempted;  Surgeon: Doug Solitario MD;  Location: [facility];  Service: Cardiothoracic;  Laterality: N/A;    CYSTOSCOPY N/A 12/10/2018    Procedure: CYSTOSCOPY;  Surgeon: Khushboo Abreu MD;  Location: [facility] OR;  Service: Urology;  Laterality: N/A;    ENDOSCOPIC HARVEST OF VEIN Left 4/7/2020    Procedure: HARVEST-VEIN-ENDOVASCULAR;  Surgeon: Doug Solitario MD;  Location: [facility] OR;  Service: Cardiothoracic;  Laterality: Left;    INCISION OF PERIRECTAL ABSCESS Bilateral 9/1/2023    Procedure: INCISION, ABSCESS, PERIRECTAL;  Surgeon: Brayan Spears MD;  Location: [facility] OR;  Service: General;  Laterality: Bilateral;  stirrups    LEFT HEART CATHETERIZATION Left 3/17/2020    Procedure: CATHETERIZATION, HEART, LEFT;  Surgeon: Tyree Walters MD;  Location: [facility] CATH/EP LAB;  Service: Cardiology;  Laterality: Left;    STERNAL WIRES REMOVAL N/A 6/8/2020    Procedure: REMOVAL, STERNAL WIRE;  Surgeon: Doug Solitario MD;  Location: [facility] OR;  Service: Peripheral Vascular;  Laterality: N/A;    ULTRASOUND  OF PROSTATE FOR VOLUME DETERMINATION  12/10/2018    Procedure: ULTRASOUND, PROSTATE, FOR VOLUME DETERMINATION;  Surgeon: Khushboo Abreu MD;  Location: [facility] OR;  Service: Urology;;       Review of patient's allergies indicates:   Allergen Reactions    Pesticide Dermatitis and Rash       No current facility-administered medications on file prior to encounter.     Current Outpatient Medications on File Prior to Encounter   Medication Sig    amoxicillin-clavulanate 875-125mg (AUGMENTIN) 875-125 mg per tablet Take 1 tablet by mouth every 12 (twelve) hours. for 5 days    apixaban (ELIQUIS) 5 mg Tab Take 5 mg by mouth 2 (two) times daily.    aspirin 81 MG Chew Take 81 mg by mouth once daily.     cetirizine (ZYRTEC) 10 MG tablet Take 1 tablet (10 mg total) by mouth once daily.    clonazePAM (KLONOPIN) 1 MG tablet Take 1 mg by mouth 2 (two) times daily.    cyclobenzaprine (FLEXERIL) 10 MG tablet Take 10 mg by mouth once daily.    furosemide (LASIX) 20 MG tablet Take 20 mg by mouth 2 (two) times daily.    gabapentin (NEURONTIN) 600 MG tablet Take 600 mg by mouth 3 (three) times daily.     HYDROcodone-acetaminophen (NORCO) 5-325 mg per tablet Take 1 tablet by mouth every 6 (six) hours as needed for Pain.    isosorbide mononitrate (IMDUR) 60 MG 24 hr tablet Take 1 tablet (60 mg total) by mouth once daily.    JARDIANCE 25 mg tablet Take 25 mg by mouth once daily.    LANTUS SOLOSTAR U-100 INSULIN glargine 100 units/mL (3mL) SubQ pen Inject 80 Units into the skin 2 (two) times daily. Changed to 80 units BID 2months ago    lisinopriL (PRINIVIL,ZESTRIL) 20 MG tablet Take 10 mg by mouth 2 (two) times daily.    metFORMIN (GLUCOPHAGE) 1000 MG tablet Take 1,000 mg by mouth 2 (two) times daily with meals.     methocarbamoL (ROBAXIN) 500 MG Tab Take 500 mg by mouth every evening.    metoprolol succinate (TOPROL-XL) 100 MG 24 hr tablet Take 100 mg by mouth 2 (two) times daily.    nitroGLYCERIN (NITROSTAT) 0.4 MG  "SL tablet Place 0.4 mg under the tongue every 5 (five) minutes as needed for Chest pain.    oxyCODONE-acetaminophen (PERCOCET)  mg per tablet Take 1-2 tablets by mouth daily as needed.    oxyCODONE-acetaminophen (PERCOCET) 5-325 mg per tablet Take 1 tablet by mouth every 6 (six) hours as needed for Pain.    pantoprazole (PROTONIX) 40 MG tablet Take 1 tablet by mouth 2 (two) times a day.    pen needle, diabetic 31 gauge x 1/4" Ndle USE 1 TWICE DAILY FOR BLOOD SUGAR GREATER THAN 200    pioglitazone (ACTOS) 15 MG tablet Take 15 mg by mouth once daily.    ranolazine (RANEXA) 500 MG Tb12 Take 1 tablet (500 mg total) by mouth 2 (two) times daily.    rimegepant (NURTEC) 75 mg odt Take 1 tablet by mouth as needed.    simvastatin (ZOCOR) 10 MG tablet Take 10 mg by mouth once daily.    tamsulosin (FLOMAX) 0.4 mg Cap Take 1 capsule (0.4 mg total) by mouth every evening.    [DISCONTINUED] umeclidinium-vilanteroL (ANORO ELLIPTA) 62.5-25 mcg/actuation DsDv Inhale 1 puff into the lungs once daily. (Patient not taking: Reported on 9/6/2023)     Family History       Problem Relation (Age of Onset)    Cancer Father    Diabetes Mother    Heart disease Mother    Hyperlipidemia Father          Tobacco Use    Smoking status: Every Day     Current packs/day: 0.00     Average packs/day: 0.3 packs/day for 30.0 years (9.0 ttl pk-yrs)     Types: Cigarettes     Start date: 4/4/1990     Last attempt to quit: 4/4/2020     Years since quitting: 3.4    Smokeless tobacco: Never    Tobacco comments:     just under a pack a day   Substance and Sexual Activity    Alcohol use: Not Currently    Drug use: Yes     Frequency: 1.0 times per week     Types: Marijuana    Sexual activity: Not Currently     Review of Systems   Constitutional:  Positive for chills and fever.   HENT:  Negative for congestion and sore throat.    Eyes:  Negative for visual disturbance.   Respiratory:  Negative for cough and shortness of breath.  "   Cardiovascular:  Negative for chest pain and palpitations.   Gastrointestinal:  Positive for abdominal pain. Negative for constipation, diarrhea, nausea and vomiting.   Endocrine: Negative for cold intolerance and heat intolerance.   Genitourinary:  Negative for dysuria and hematuria.   Musculoskeletal:  Positive for arthralgias. Negative for myalgias.   Skin:  Positive for color change and wound. Negative for rash.   Neurological:  Negative for tremors and seizures.   Hematological:  Negative for adenopathy. Does not bruise/bleed easily.   All other systems reviewed and are negative.    Objective:     Vital Signs (Most Recent):  Temp: 98 °F (36.7 °C) (09/07/23 2059)  Pulse: 61 (09/08/23 0133)  Resp: 16 (09/08/23 0133)  BP: (!) 143/72 (09/08/23 0133)  SpO2: 95 % (09/08/23 0133) Vital Signs (24h Range):  Temp:  [98 °F (36.7 °C)] 98 °F (36.7 °C)  Pulse:  [61-75] 61  Resp:  [16-20] 16  SpO2:  [94 %-98 %] 95 %  BP: (143-180)/(70-88) 143/72     Weight: 117.9 kg (260 lb)  Body mass index is 36.26 kg/m².     Physical Exam  Vitals and nursing note reviewed.   Constitutional:       General: He is awake.      Appearance: Normal appearance. He is well-developed. He is morbidly obese. He is not ill-appearing.   HENT:      Head: Normocephalic and atraumatic.      Nose: Nose normal. No septal deviation.   Eyes:      Conjunctiva/sclera: Conjunctivae normal.      Pupils: Pupils are equal, round, and reactive to light.   Neck:      Thyroid: No thyroid mass.      Vascular: No JVD.      Trachea: No tracheal tenderness or tracheal deviation.   Cardiovascular:      Rate and Rhythm: Normal rate and regular rhythm.      Heart sounds: Normal heart sounds, S1 normal and S2 normal. No murmur heard.     No friction rub. No gallop.   Pulmonary:      Effort: Pulmonary effort is normal.      Breath sounds: Normal breath sounds. No decreased breath sounds, wheezing, rhonchi or rales.   Abdominal:      General: Bowel sounds are normal. There is  no distension.      Palpations: Abdomen is soft. There is no hepatomegaly, splenomegaly or mass.      Tenderness: There is no abdominal tenderness.   Genitourinary:      Skin:     General: Skin is warm and dry.      Capillary Refill: Capillary refill takes less than 2 seconds.      Findings: Erythema and wound present. No rash.   Neurological:      General: No focal deficit present.      Mental Status: He is alert and oriented to person, place, and time.      Cranial Nerves: No cranial nerve deficit.      Sensory: No sensory deficit.   Psychiatric:         Mood and Affect: Mood normal.         Behavior: Behavior normal. Behavior is cooperative.              CRANIAL NERVES     CN III, IV, VI   Pupils are equal, round, and reactive to light.       Significant Labs: All pertinent labs within the past 24 hours have been reviewed.  CBC:   Recent Labs   Lab 09/07/23 2125   WBC 10.05   HGB 13.9*   HCT 41.1        CMP:   Recent Labs   Lab 09/07/23 2125   *   K 5.0   CL 97   CO2 25   *   BUN 6   CREATININE 1.2   CALCIUM 10.1   PROT 7.9   ALBUMIN 3.2*   BILITOT 0.3   ALKPHOS 64   AST 39   ALT 30   ANIONGAP 12     Lactic Acid:   Recent Labs   Lab 09/07/23 2125 09/08/23  0110   LACTATE 2.8* 1.9  1.9       Significant Imaging: I have reviewed all pertinent imaging results/findings within the past 24 hours.  Imaging Results              CT Abdomen Pelvis With Contrast (Final result)  Result time 09/07/23 23:44:02      Final result by Jae Persaud DO (09/07/23 23:44:02)                   Impression:      1. Cellulitis of the right medial gluteal fold with a suspected small focus of soft tissue gas which may be postoperative.  No rim enhancing fluid collection or perianal abscess.  2. Mild nonspecific mesenteric fat stranding which may be related to mild enteritis.  3. Hepatic steatosis and hepatomegaly.      Electronically signed by: Jae Persaud  Date:    09/07/2023  Time:    23:44                Narrative:    EXAMINATION:  CT ABDOMEN PELVIS WITH CONTRAST    CLINICAL HISTORY:  Abdominal abscess/infection suspected;    TECHNIQUE:  Axial CT images with sagittal and coronal reformats were obtained of the abdomen and pelvis from the hemidiaphragms through the symphysis pubis after the administration of 100mL Omnipaque 350.    COMPARISON:  CT chest, abdomen, and pelvis from 06/07/2023.    FINDINGS:  Lung Bases: Clear.    Heart: Heart size is normal.  No pericardial effusion.    Liver: The liver is enlarged and demonstrates diffuse hypoattenuation compatible with hepatic steatosis.  There are no focal hepatic lesions.  The portal vasculature is patent.    Biliary tract: No intrahepatic or extrahepatic biliary ductal dilatation.    Gallbladder: No radiodense gallstone. No wall thickening or pericholecystic fluid.    Pancreas: Normal. No pancreatic ductal dilatation.    Spleen: Normal size without focal lesion.    Adrenals: Unremarkable.    Kidneys and urinary collecting systems: Normal.  No hydronephrosis or urolithiasis.    Lymph nodes: None enlarged.    Stomach and bowel: The stomach is normal.  Loops of small and large bowel are normal in caliber without evidence for inflammation or obstruction.  The appendix is normal.    Peritoneum and mesentery: There is minimal mesenteric fat stranding in the left mesentery adjacent to several loops of small bowel which do not demonstrate wall thickening (series 2, image 84).  No abdominal fluid collection.    Vasculature: There is mild atherosclerosis.  There is no aneurysm.    Urinary bladder: No wall thickening.    Reproductive organs: The prostate is not enlarged.    Body wall: There is fat stranding in the right medial gluteal fold with a small focus suspected soft tissue gas which may be postoperative there is no evidence of a rim enhancing fluid collection to suggest a perianal abscess.    Musculoskeletal: No aggressive osseous lesion.                                         Assessment/Plan:     * Cellulitis of gluteal region    Admit to obs  IV antibotics-vancomycin and zosyn  Pain meds PRN    Malnutrition of moderate degree  Nutrition consulted. Most recent weight and BMI monitored-     Measurements:  Wt Readings from Last 1 Encounters:   09/07/23 117.9 kg (260 lb)   Body mass index is 36.26 kg/m².    Patient has been screened and assessed by RD.    Malnutrition Type:  Context:    Level:      Malnutrition Characteristic Summary:       Interventions/Recommendations (treatment strategy):       Hyponatremia  IV fluid hydration  Recent Labs   Lab 09/07/23 2125   *       HLD (hyperlipidemia)     Patient is chronically on statin.will continue for now. Monitor clinically. Last LDL was   Lab Results   Component Value Date    LDLCALC 23.2 (L) 01/10/2022          Obesity (BMI 30-39.9)  Body mass index is 36.26 kg/m². Morbid obesity complicates all aspects of disease management from diagnostic modalities to treatment. Weight loss encouraged and health benefits explained to patient.         S/P CABG (coronary artery bypass graft)    Noted, on tele    Atrial fibrillation  Patient with Paroxysmal (<7 days) atrial fibrillation which is controlled currently with Beta Blocker. Patient is currently in sinus rhythm.[id number] Score: 2. Anticoagulation indicated. Anticoagulation done with eliquis.    Coronary artery disease involving native coronary artery of native heart    Patient with known CAD s/p CABG, which is controlled Will continue ASA and Statin and monitor for S/Sx of angina/ACS. Continue to monitor on telemetry.       ROSAMARIA (obstructive sleep apnea)    Noted, chronic  CPAP prn    Hypertension associated with diabetes    Chronic, controlled.  Latest blood pressure and vitals reviewed-     Temp:  [98 °F (36.7 °C)]   Pulse:  [61-75]   Resp:  [16-20]   BP: (143-180)/(70-88)   SpO2:  [94 %-98 %] .   Home meds for hypertension were reviewed and noted below-  Hypertension Medications   "           furosemide (LASIX) 20 MG tablet Take 20 mg by mouth 2 (two) times daily.    isosorbide mononitrate (IMDUR) 60 MG 24 hr tablet Take 1 tablet (60 mg total) by mouth once daily.    lisinopriL (PRINIVIL,ZESTRIL) 20 MG tablet Take 10 mg by mouth 2 (two) times daily.    metoprolol succinate (TOPROL-XL) 100 MG 24 hr tablet Take 100 mg by mouth 2 (two) times daily.    nitroGLYCERIN (NITROSTAT) 0.4 MG SL tablet Place 0.4 mg under the tongue every 5 (five) minutes as needed for Chest pain.          While in the hospital, will manage blood pressure as follows; Continue home antihypertensive regimen    Will utilize p.r.n. blood pressure medication only if patient's blood pressure greater than 180/110 and he develops symptoms such as worsening chest pain or shortness of breath.      Type 2 diabetes mellitus with diabetic arthropathy    Patient's FSGs are controlled on current medication regimen.  Last A1c reviewed-   Lab Results   Component Value Date    HGBA1C 8.8 (H) 08/31/2022     Most recent fingerstick glucose reviewed- No results for input(s): "POCTGLUCOSE" in the last 24 hours.  Current correctional scale  Medium  Maintain anti-hyperglycemic dose as follows-   Antihyperglycemics (From admission, onward)    Start     Stop Route Frequency Ordered    09/08/23 0900  insulin detemir U-100 (Levemir) pen 80 Units         -- SubQ 2 times daily 09/08/23 0045    09/08/23 0143  insulin aspart U-100 pen 0-10 Units         -- SubQ Before meals & nightly PRN 09/08/23 0045        Hold Oral hypoglycemics while patient is in the hospital.        VTE Risk Mitigation (From admission, onward)         Ordered     apixaban tablet 5 mg  2 times daily         09/08/23 0045     IP VTE HIGH RISK PATIENT  Once         09/08/23 0045     Place sequential compression device  Until discontinued         09/08/23 0045     Reason for No Pharmacological VTE Prophylaxis  Once        Question:  Reasons:  Answer:  Already adequately anticoagulated " on oral Anticoagulants    09/08/23 0045                     Nancy Iniguez FNP  Department of Hospital Medicine  [facility]

## 2023-09-08 NOTE — PROGRESS NOTES
"Pharmacokinetic Initial Assessment: IV Vancomycin    Assessment/Plan:    Initiate intravenous vancomycin with loading dose of 1750 mg once followed by a maintenance dose of vancomycin 1500mg IV every 12 hours  Desired empiric serum trough concentration is 15 to 20 mcg/mL  Draw vancomycin trough level 60 min prior to fourth dose on 9/9 at approximately 1130  Pharmacy will continue to follow and monitor vancomycin.      Please contact pharmacy at extension [phone] with any questions regarding this assessment.     Thank you for the consult,   Paul Velázquez       Patient brief summary:  Magdaleno Cunningham is a 57 y.o. male initiated on antimicrobial therapy with IV Vancomycin for treatment of suspected skin & soft tissue infection    Drug Allergies:   Review of patient's allergies indicates:   Allergen Reactions    Pesticide Dermatitis and Rash       Actual Body Weight:   117.9 kg    Renal Function:   Estimated Creatinine Clearance: 90.8 mL/min (based on SCr of 1.2 mg/dL).,     Dialysis Method (if applicable):  N/A    CBC (last 72 hours):  Recent Labs   Lab Result Units 09/07/23  2125   WBC K/uL 10.05   Hemoglobin g/dL 13.9*   Hematocrit % 41.1   Platelets K/uL 365   Gran % % 49.7   Lymph % % 37.9   Mono % % 5.5   Eosinophil % % 4.3   Basophil % % 0.8   Differential Method  Automated       Metabolic Panel (last 72 hours):  Recent Labs   Lab Result Units 09/07/23  2125   Sodium mmol/L 134*   Potassium mmol/L 5.0   Chloride mmol/L 97   CO2 mmol/L 25   Glucose mg/dL 236*   BUN mg/dL 6   Creatinine mg/dL 1.2   Albumin g/dL 3.2*   Total Bilirubin mg/dL 0.3   Alkaline Phosphatase U/L 64   AST U/L 39   ALT U/L 30       Drug levels (last 3 results):  No results for input(s): "VANCOMYCINRA", "VANCORANDOM", "VANCOMYCINPE", "VANCOPEAK", "VANCOMYCINTR", "VANCOTROUGH" in the last 72 hours.    Microbiologic Results:  Microbiology Results (last 7 days)       Procedure Component Value Units Date/Time    Blood culture #1 **CANNOT BE ORDERED " STAT** [[id number]] Collected: 09/08/23 0014    Order Status: Sent Specimen: Blood from Antecubital, Right Hand Updated: 09/08/23 0014    Blood culture #2 **CANNOT BE ORDERED STAT** [[id number]] Collected: 09/08/23 0014    Order Status: Sent Specimen: Blood from Antecubital, Left Arm Updated: 09/08/23 0014

## 2023-09-08 NOTE — ASSESSMENT & PLAN NOTE
Patient with known CAD s/p CABG, which is controlled Will continue ASA and Statin and monitor for S/Sx of angina/ACS. Continue to monitor on telemetry.

## 2023-09-08 NOTE — ASSESSMENT & PLAN NOTE
Patient is chronically on statin.will continue for now. Monitor clinically. Last LDL was   Lab Results   Component Value Date    LDLCALC 23.2 (L) 01/10/2022

## 2023-09-08 NOTE — HPI
Hector Cunningham is a 57 year old male with a history of IDDM2, obesity BMI 37, Afib on eliquis, COPD, ROSAMARIA on CPAP, HTN, CAD s/p stents and CABG, and MSSA bacteremia who presents today with a compliant of body aches, chills and fever. He was recently admitted for a perirectal abscess and cellulitis that required surgical drainage. He stated that he didn't want to stay in the hospital, and begged to be discharged, which he was after surgical clearance. He states that since he's been home, he has had uncontrolled pain, fever, and chills. He states the pain from his buttock abscess site is wrapping around his abdomen. He reports tenderness to the I&D site with yellow drainage. He denies all other complaint.      ED work up included a CBC which was unremarkable. CMP showed mild hyponatremia and moderate malnutrition. Lactic acid level normal at 1.9. CT of the abdomen and pelvis with contrast revealed cellulitis of the right gluteal fold with no abscess. He was initiated on Vancomycin and Zosyn. Hospital Medicine consulted for admission and further management.

## 2023-09-08 NOTE — SUBJECTIVE & OBJECTIVE
Past Medical History:   Diagnosis Date    Anticoagulant long-term use     Atrial fibrillation 2018    BPH (benign prostatic hyperplasia)     Cataract     COPD (chronic obstructive pulmonary disease)     Coronary artery disease     stents    CVD (cardiovascular disease)     Diabetes mellitus     Erythema multiforme     AKA Denton Edmondson Syndrome    Hypertension     Infected incision     MI (myocardial infarction)     per pt he has had 4     ROSAMARIA on CPAP     RLS (restless legs syndrome)        Past Surgical History:   Procedure Laterality Date    CORONARY ANGIOGRAPHY INCLUDING BYPASS GRAFTS WITH CATHETERIZATION OF LEFT HEART N/A 9/1/2022    Procedure: ANGIOGRAM, CORONARY, INCLUDING BYPASS GRAFT, WITH LEFT HEART CATHETERIZATION;  Surgeon: Paul Botello MD;  Location: [facility] CATH/EP LAB;  Service: Cardiology;  Laterality: N/A;    CORONARY ARTERY BYPASS GRAFT (CABG) N/A 4/7/2020    Procedure: CORONARY ARTERY BYPASS GRAFT (CABG)- Excision of left atrial appendage;  Surgeon: Doug Solitario MD;  [facility] OR;  Service: Cardiothoracic;  Laterality: N/A;    CORONARY STENT PLACEMENT      WILSON MAZE PROCEDURE N/A 4/7/2020    Procedure: WILSON MAZE PROCEDURE- Attempted;  Surgeon: Doug Solitario MD;  Location[facility] OR;  Service: Cardiothoracic;  Laterality: N/A;    CYSTOSCOPY N/A 12/10/2018    Procedure: CYSTOSCOPY;  Surgeon: Khushboo Abreu MD;  Location: [facility] OR;  Service: Urology;  Laterality: N/A;    ENDOSCOPIC HARVEST OF VEIN Left 4/7/2020    Procedure: HARVEST-VEIN-ENDOVASCULAR;  Surgeon: Doug Solitario MD;  [facility];  Service: Cardiothoracic;  Laterality: Left;    INCISION OF PERIRECTAL ABSCESS Bilateral 9/1/2023    Procedure: INCISION, ABSCESS, PERIRECTAL;  Surgeon: Brayan Spears MD;  Location: [facility] OR;  Service: General;  Laterality: Bilateral;  stirrups    LEFT HEART CATHETERIZATION Left 3/17/2020    Procedure: CATHETERIZATION, HEART, LEFT;  Surgeon: Tyree Walters MD;   Location: [facility] CATH/EP LAB;  Service: Cardiology;  Laterality: Left;    STERNAL WIRES REMOVAL N/A 6/8/2020    Procedure: REMOVAL, STERNAL WIRE;  Surgeon: Doug Solitario MD;  Location: [facility] OR;  Service: Peripheral Vascular;  Laterality: N/A;    ULTRASOUND OF PROSTATE FOR VOLUME DETERMINATION  12/10/2018    Procedure: ULTRASOUND, PROSTATE, FOR VOLUME DETERMINATION;  Surgeon: Khushboo Abreu MD;  Location: [facility] OR;  Service: Urology;;       Review of patient's allergies indicates:   Allergen Reactions    Pesticide Dermatitis and Rash       No current facility-administered medications on file prior to encounter.     Current Outpatient Medications on File Prior to Encounter   Medication Sig    amoxicillin-clavulanate 875-125mg (AUGMENTIN) 875-125 mg per tablet Take 1 tablet by mouth every 12 (twelve) hours. for 5 days    apixaban (ELIQUIS) 5 mg Tab Take 5 mg by mouth 2 (two) times daily.    aspirin 81 MG Chew Take 81 mg by mouth once daily.     cetirizine (ZYRTEC) 10 MG tablet Take 1 tablet (10 mg total) by mouth once daily.    clonazePAM (KLONOPIN) 1 MG tablet Take 1 mg by mouth 2 (two) times daily.    cyclobenzaprine (FLEXERIL) 10 MG tablet Take 10 mg by mouth once daily.    furosemide (LASIX) 20 MG tablet Take 20 mg by mouth 2 (two) times daily.    gabapentin (NEURONTIN) 600 MG tablet Take 600 mg by mouth 3 (three) times daily.     HYDROcodone-acetaminophen (NORCO) 5-325 mg per tablet Take 1 tablet by mouth every 6 (six) hours as needed for Pain.    isosorbide mononitrate (IMDUR) 60 MG 24 hr tablet Take 1 tablet (60 mg total) by mouth once daily.    JARDIANCE 25 mg tablet Take 25 mg by mouth once daily.    LANTUS SOLOSTAR U-100 INSULIN glargine 100 units/mL (3mL) SubQ pen Inject 80 Units into the skin 2 (two) times daily. Changed to 80 units BID 2months ago    lisinopriL (PRINIVIL,ZESTRIL) 20 MG tablet Take 10 mg by mouth 2 (two) times daily.    metFORMIN (GLUCOPHAGE) 1000 MG tablet Take 1,000 mg by  "mouth 2 (two) times daily with meals.     methocarbamoL (ROBAXIN) 500 MG Tab Take 500 mg by mouth every evening.    metoprolol succinate (TOPROL-XL) 100 MG 24 hr tablet Take 100 mg by mouth 2 (two) times daily.    nitroGLYCERIN (NITROSTAT) 0.4 MG SL tablet Place 0.4 mg under the tongue every 5 (five) minutes as needed for Chest pain.    oxyCODONE-acetaminophen (PERCOCET)  mg per tablet Take 1-2 tablets by mouth daily as needed.    oxyCODONE-acetaminophen (PERCOCET) 5-325 mg per tablet Take 1 tablet by mouth every 6 (six) hours as needed for Pain.    pantoprazole (PROTONIX) 40 MG tablet Take 1 tablet by mouth 2 (two) times a day.    pen needle, diabetic 31 gauge x 1/4" Ndle USE 1 TWICE DAILY FOR BLOOD SUGAR GREATER THAN 200    pioglitazone (ACTOS) 15 MG tablet Take 15 mg by mouth once daily.    ranolazine (RANEXA) 500 MG Tb12 Take 1 tablet (500 mg total) by mouth 2 (two) times daily.    rimegepant (NURTEC) 75 mg odt Take 1 tablet by mouth as needed.    simvastatin (ZOCOR) 10 MG tablet Take 10 mg by mouth once daily.    tamsulosin (FLOMAX) 0.4 mg Cap Take 1 capsule (0.4 mg total) by mouth every evening.    [DISCONTINUED] umeclidinium-vilanteroL (ANORO ELLIPTA) 62.5-25 mcg/actuation DsDv Inhale 1 puff into the lungs once daily. (Patient not taking: Reported on 9/6/2023)     Family History       Problem Relation (Age of Onset)    Cancer Father    Diabetes Mother    Heart disease Mother    Hyperlipidemia Father          Tobacco Use    Smoking status: Every Day     Current packs/day: 0.00     Average packs/day: 0.3 packs/day for 30.0 years (9.0 ttl pk-yrs)     Types: Cigarettes     Start date: 4/4/1990     Last attempt to quit: 4/4/2020     Years since quitting: 3.4    Smokeless tobacco: Never    Tobacco comments:     just under a pack a day   Substance and Sexual Activity    Alcohol use: Not Currently    Drug use: Yes     Frequency: 1.0 times per week     Types: Marijuana    Sexual activity: Not Currently "     Review of Systems   Constitutional:  Positive for chills and fever.   HENT:  Negative for congestion and sore throat.    Eyes:  Negative for visual disturbance.   Respiratory:  Negative for cough and shortness of breath.    Cardiovascular:  Negative for chest pain and palpitations.   Gastrointestinal:  Positive for abdominal pain. Negative for constipation, diarrhea, nausea and vomiting.   Endocrine: Negative for cold intolerance and heat intolerance.   Genitourinary:  Negative for dysuria and hematuria.   Musculoskeletal:  Positive for arthralgias. Negative for myalgias.   Skin:  Positive for color change and wound. Negative for rash.   Neurological:  Negative for tremors and seizures.   Hematological:  Negative for adenopathy. Does not bruise/bleed easily.   All other systems reviewed and are negative.    Objective:     Vital Signs (Most Recent):  Temp: 98 °F (36.7 °C) (09/07/23 2059)  Pulse: 61 (09/08/23 0133)  Resp: 16 (09/08/23 0133)  BP: (!) 143/72 (09/08/23 0133)  SpO2: 95 % (09/08/23 0133) Vital Signs (24h Range):  Temp:  [98 °F (36.7 °C)] 98 °F (36.7 °C)  Pulse:  [61-75] 61  Resp:  [16-20] 16  SpO2:  [94 %-98 %] 95 %  BP: (143-180)/(70-88) 143/72     Weight: 117.9 kg (260 lb)  Body mass index is 36.26 kg/m².     Physical Exam  Vitals and nursing note reviewed.   Constitutional:       General: He is awake.      Appearance: Normal appearance. He is well-developed. He is morbidly obese. He is not ill-appearing.   HENT:      Head: Normocephalic and atraumatic.      Nose: Nose normal. No septal deviation.   Eyes:      Conjunctiva/sclera: Conjunctivae normal.      Pupils: Pupils are equal, round, and reactive to light.   Neck:      Thyroid: No thyroid mass.      Vascular: No JVD.      Trachea: No tracheal tenderness or tracheal deviation.   Cardiovascular:      Rate and Rhythm: Normal rate and regular rhythm.      Heart sounds: Normal heart sounds, S1 normal and S2 normal. No murmur heard.     No friction  rub. No gallop.   Pulmonary:      Effort: Pulmonary effort is normal.      Breath sounds: Normal breath sounds. No decreased breath sounds, wheezing, rhonchi or rales.   Abdominal:      General: Bowel sounds are normal. There is no distension.      Palpations: Abdomen is soft. There is no hepatomegaly, splenomegaly or mass.      Tenderness: There is no abdominal tenderness.   Genitourinary:      Skin:     General: Skin is warm and dry.      Capillary Refill: Capillary refill takes less than 2 seconds.      Findings: Erythema and wound present. No rash.   Neurological:      General: No focal deficit present.      Mental Status: He is alert and oriented to person, place, and time.      Cranial Nerves: No cranial nerve deficit.      Sensory: No sensory deficit.   Psychiatric:         Mood and Affect: Mood normal.         Behavior: Behavior normal. Behavior is cooperative.              CRANIAL NERVES     CN III, IV, VI   Pupils are equal, round, and reactive to light.       Significant Labs: All pertinent labs within the past 24 hours have been reviewed.  CBC:   Recent Labs   Lab 09/07/23 2125   WBC 10.05   HGB 13.9*   HCT 41.1        CMP:   Recent Labs   Lab 09/07/23 2125   *   K 5.0   CL 97   CO2 25   *   BUN 6   CREATININE 1.2   CALCIUM 10.1   PROT 7.9   ALBUMIN 3.2*   BILITOT 0.3   ALKPHOS 64   AST 39   ALT 30   ANIONGAP 12     Lactic Acid:   Recent Labs   Lab 09/07/23 2125 09/08/23  0110   LACTATE 2.8* 1.9  1.9       Significant Imaging: I have reviewed all pertinent imaging results/findings within the past 24 hours.  Imaging Results              CT Abdomen Pelvis With Contrast (Final result)  Result time 09/07/23 23:44:02      Final result by Jae Persaud DO (09/07/23 23:44:02)                   Impression:      1. Cellulitis of the right medial gluteal fold with a suspected small focus of soft tissue gas which may be postoperative.  No rim enhancing fluid collection or perianal  abscess.  2. Mild nonspecific mesenteric fat stranding which may be related to mild enteritis.  3. Hepatic steatosis and hepatomegaly.      Electronically signed by: Jae Persaud  Date:    09/07/2023  Time:    23:44               Narrative:    EXAMINATION:  CT ABDOMEN PELVIS WITH CONTRAST    CLINICAL HISTORY:  Abdominal abscess/infection suspected;    TECHNIQUE:  Axial CT images with sagittal and coronal reformats were obtained of the abdomen and pelvis from the hemidiaphragms through the symphysis pubis after the administration of 100mL Omnipaque 350.    COMPARISON:  CT chest, abdomen, and pelvis from 06/07/2023.    FINDINGS:  Lung Bases: Clear.    Heart: Heart size is normal.  No pericardial effusion.    Liver: The liver is enlarged and demonstrates diffuse hypoattenuation compatible with hepatic steatosis.  There are no focal hepatic lesions.  The portal vasculature is patent.    Biliary tract: No intrahepatic or extrahepatic biliary ductal dilatation.    Gallbladder: No radiodense gallstone. No wall thickening or pericholecystic fluid.    Pancreas: Normal. No pancreatic ductal dilatation.    Spleen: Normal size without focal lesion.    Adrenals: Unremarkable.    Kidneys and urinary collecting systems: Normal.  No hydronephrosis or urolithiasis.    Lymph nodes: None enlarged.    Stomach and bowel: The stomach is normal.  Loops of small and large bowel are normal in caliber without evidence for inflammation or obstruction.  The appendix is normal.    Peritoneum and mesentery: There is minimal mesenteric fat stranding in the left mesentery adjacent to several loops of small bowel which do not demonstrate wall thickening (series 2, image 84).  No abdominal fluid collection.    Vasculature: There is mild atherosclerosis.  There is no aneurysm.    Urinary bladder: No wall thickening.    Reproductive organs: The prostate is not enlarged.    Body wall: There is fat stranding in the right medial gluteal fold with a  small focus suspected soft tissue gas which may be postoperative there is no evidence of a rim enhancing fluid collection to suggest a perianal abscess.    Musculoskeletal: No aggressive osseous lesion.

## 2023-09-08 NOTE — FIRST PROVIDER EVALUATION
Emergency Department TeleTriage Encounter Note      CHIEF COMPLAINT    Chief Complaint   Patient presents with    Generalized Body Aches     Mostly from waist down following abscess surgery.  Hard to walk        VITAL SIGNS   Initial Vitals [09/07/23 2059]   BP Pulse Resp Temp SpO2   (!) 180/88 75 20 98 °F (36.7 °C) 98 %      MAP       --            ALLERGIES    Review of patient's allergies indicates:   Allergen Reactions    Pesticide Dermatitis and Rash       PROVIDER TRIAGE NOTE  This is a teletriage evaluation of a 57 y.o. male presenting to the ED complaining of rectal pain. Recent admission and surgery for perirectal abscess. DC'd on 9/2. Completed vancomycin tonight but states the area seems to be worsening. Pt reports subjective fever.     Initial orders will be placed and care will be transferred to an alternate provider when patient is roomed for a full evaluation. Any additional orders and the final disposition will be determined by that provider.         ORDERS  Labs Reviewed   CBC W/ AUTO DIFFERENTIAL   COMPREHENSIVE METABOLIC PANEL   LACTIC ACID, PLASMA       ED Orders (720h ago, onward)      Start Ordered     Status Ordering Provider    09/07/23 2115 09/07/23 2106  sodium chloride 0.9% bolus 1,000 mL 1,000 mL  ED 1 Time         Ordered KRISTIAN HILL.    09/07/23 2106 09/07/23 2106  CBC auto differential  STAT         Ordered KRISTIAN HILL    09/07/23 2106 09/07/23 2106  Comprehensive metabolic panel  STAT         Ordered KRISTIAN HILL.    09/07/23 2106 09/07/23 2106  Insert Saline lock IV  Once         Ordered KRISTIAN HILL    09/07/23 2106 09/07/23 2106  Lactic acid, plasma  STAT         Ordered KRISTIAN HILL              Virtual Visit Note: The provider triage portion of this emergency department evaluation and documentation was performed via [employer], a HIPAA-compliant telemedicine application, in concert with a tele-presenter in the  room. A face to face patient evaluation with one of my colleagues will occur once the patient is placed in an emergency department room.      DISCLAIMER: This note was prepared with [employer] voice recognition transcription software. Garbled syntax, mangled pronouns, and other bizarre constructions may be attributed to that software system.

## 2023-09-08 NOTE — CONSULTS
[facility]  General Surgery  Consult Note    Patient Name: Magdaleno Cunningham  MRN: [id number]  Code Status: Full Code  Admission Date: 9/7/2023  Hospital Length of Stay: 0 days  Attending Physician: Trinity Garcia MD  Primary Care Provider: Venkata Mclaughlin MD    Patient information was obtained from patient and ER records.     Inpatient consult to General Surgery  Consult performed by: Brayan Spears MD  Consult ordered by: Trinity Garcia MD  Reason for consult: abscess  Assessment/Recommendations: Pain control antibiotics  Already open and drained        Subjective:     Principal Problem: Cellulitis of gluteal region    History of Present Illness: 57-year-old with recent I and D of perianal abscess.  He was discharge after the I&D and returns in severe pain.  He reports things have been healing well the pain is just difficult to control.  Denies any fevers or chills at home.      No current facility-administered medications on file prior to encounter.     Current Outpatient Medications on File Prior to Encounter   Medication Sig    apixaban (ELIQUIS) 5 mg Tab Take 5 mg by mouth 2 (two) times daily.    aspirin 81 MG Chew Take 81 mg by mouth once daily.     clonazePAM (KLONOPIN) 1 MG tablet Take 1 mg by mouth 2 (two) times daily.    cyclobenzaprine (FLEXERIL) 10 MG tablet Take 10 mg by mouth once daily.    furosemide (LASIX) 20 MG tablet Take 20 mg by mouth 2 (two) times daily.    gabapentin (NEURONTIN) 600 MG tablet Take 600 mg by mouth 3 (three) times daily.     JARDIANCE 25 mg tablet Take 25 mg by mouth once daily.    LANTUS SOLOSTAR U-100 INSULIN glargine 100 units/mL (3mL) SubQ pen Inject 80 Units into the skin 2 (two) times daily.    lisinopriL (PRINIVIL,ZESTRIL) 20 MG tablet Take 10 mg by mouth 2 (two) times daily.    metFORMIN (GLUCOPHAGE) 1000 MG tablet Take 1,000 mg by mouth 2 (two) times daily with meals.     methocarbamoL (ROBAXIN) 500 MG Tab Take 500 mg by mouth  "every evening.    metoprolol succinate (TOPROL-XL) 100 MG 24 hr tablet Take 100 mg by mouth 2 (two) times daily.    oxyCODONE-acetaminophen (PERCOCET)  mg per tablet Take 1-2 tablets by mouth daily as needed for Pain.    pantoprazole (PROTONIX) 40 MG tablet Take 1 tablet by mouth 2 (two) times a day.    pioglitazone (ACTOS) 15 MG tablet Take 15 mg by mouth once daily.    rimegepant (NURTEC) 75 mg odt Take 1 tablet by mouth as needed for Migraine.    simvastatin (ZOCOR) 10 MG tablet Take 10 mg by mouth once daily.    amoxicillin-clavulanate 875-125mg (AUGMENTIN) 875-125 mg per tablet Take 1 tablet by mouth every 12 (twelve) hours. for 5 days (Patient not taking: Reported on 9/8/2023)    cetirizine (ZYRTEC) 10 MG tablet Take 1 tablet (10 mg total) by mouth once daily. (Patient not taking: Reported on 9/8/2023)    HYDROcodone-acetaminophen (NORCO) 5-325 mg per tablet Take 1 tablet by mouth every 6 (six) hours as needed for Pain. (Patient not taking: Reported on 9/8/2023)    isosorbide mononitrate (IMDUR) 60 MG 24 hr tablet Take 1 tablet (60 mg total) by mouth once daily. (Patient not taking: Reported on 9/8/2023)    nitroGLYCERIN (NITROSTAT) 0.4 MG SL tablet Place 0.4 mg under the tongue every 5 (five) minutes as needed for Chest pain.    oxyCODONE-acetaminophen (PERCOCET) 5-325 mg per tablet Take 1 tablet by mouth every 6 (six) hours as needed for Pain. (Patient not taking: Reported on 9/8/2023)    pen needle, diabetic 31 gauge x 1/4" Ndle USE 1 TWICE DAILY FOR BLOOD SUGAR GREATER THAN 200    ranolazine (RANEXA) 500 MG Tb12 Take 1 tablet (500 mg total) by mouth 2 (two) times daily. (Patient not taking: Reported on 9/8/2023)    [DISCONTINUED] tamsulosin (FLOMAX) 0.4 mg Cap Take 1 capsule (0.4 mg total) by mouth every evening.    [DISCONTINUED] umeclidinium-vilanteroL (ANORO ELLIPTA) 62.5-25 mcg/actuation DsDv Inhale 1 puff into the lungs once daily. (Patient not taking: Reported on 9/6/2023) "       Review of patient's allergies indicates:   Allergen Reactions    Pesticide Dermatitis and Rash       Past Medical History:   Diagnosis Date    Anticoagulant long-term use     Atrial fibrillation 2018    BPH (benign prostatic hyperplasia)     Cataract     COPD (chronic obstructive pulmonary disease)     Coronary artery disease     stents    CVD (cardiovascular disease)     Diabetes mellitus     Erythema multiforme     AKA Denton Edmondson Syndrome    Hypertension     Infected incision     MI (myocardial infarction)     per pt he has had 4     ROSAMARIA on CPAP     RLS (restless legs syndrome)      Past Surgical History:   Procedure Laterality Date    CORONARY ANGIOGRAPHY INCLUDING BYPASS GRAFTS WITH CATHETERIZATION OF LEFT HEART N/A 9/1/2022    Procedure: ANGIOGRAM, CORONARY, INCLUDING BYPASS GRAFT, WITH LEFT HEART CATHETERIZATION;  Surgeon: Paul Botello MD;  Location: [facility] CATH/EP LAB;  Service: Cardiology;  Laterality: N/A;    CORONARY ARTERY BYPASS GRAFT (CABG) N/A 4/7/2020    Procedure: CORONARY ARTERY BYPASS GRAFT (CABG)- Excision of left atrial appendage;  Surgeon: Doug Solitario MD;  Location: [facility] OR;  Service: Cardiothoracic;  Laterality: N/A;    CORONARY STENT PLACEMENT      WILSON MAZE PROCEDURE N/A 4/7/2020    Procedure: WILSON MAZE PROCEDURE- Attempted;  Surgeon: Doug Solitario MD;  Location: [facility] OR;  Service: Cardiothoracic;  Laterality: N/A;    CYSTOSCOPY N/A 12/10/2018    Procedure: CYSTOSCOPY;  Surgeon: Khushboo Abreu MD;  Location: [facility] OR;  Service: Urology;  Laterality: N/A;    ENDOSCOPIC HARVEST OF VEIN Left 4/7/2020    Procedure: HARVEST-VEIN-ENDOVASCULAR;  Surgeon: Doug Solitario MD;  Location: [facility] OR;  Service: Cardiothoracic;  Laterality: Left;    INCISION OF PERIRECTAL ABSCESS Bilateral 9/1/2023    Procedure: INCISION, ABSCESS, PERIRECTAL;  Surgeon: Brayan Spears MD;  Location: [facility] OR;  Service: General;  Laterality: Bilateral;   maryrups    LEFT HEART CATHETERIZATION Left 3/17/2020    Procedure: CATHETERIZATION, HEART, LEFT;  Surgeon: Tyree Walters MD;  Location: [facility] CATH/EP LAB;  Service: Cardiology;  Laterality: Left;    STERNAL WIRES REMOVAL N/A 6/8/2020    Procedure: REMOVAL, STERNAL WIRE;  Surgeon: Doug Solitario MD;  Location: [facility] OR;  Service: Peripheral Vascular;  Laterality: N/A;    ULTRASOUND OF PROSTATE FOR VOLUME DETERMINATION  12/10/2018    Procedure: ULTRASOUND, PROSTATE, FOR VOLUME DETERMINATION;  Surgeon: Khushboo Abreu MD;  Location: [facility] OR;  Service: Urology;;     Family History       Problem Relation (Age of Onset)    Cancer Father    Diabetes Mother    Heart disease Mother    Hyperlipidemia Father          Tobacco Use    Smoking status: Every Day     Current packs/day: 0.00     Average packs/day: 0.3 packs/day for 30.0 years (9.0 ttl pk-yrs)     Types: Cigarettes     Start date: 4/4/1990     Last attempt to quit: 4/4/2020     Years since quitting: 3.4    Smokeless tobacco: Never    Tobacco comments:     just under a pack a day   Substance and Sexual Activity    Alcohol use: Not Currently    Drug use: Yes     Frequency: 1.0 times per week     Types: Marijuana    Sexual activity: Not Currently     Review of Systems   Constitutional:  Negative for chills and fever.   HENT:  Negative for congestion and sore throat.    Eyes:  Negative for visual disturbance.   Respiratory:  Negative for cough and shortness of breath.    Cardiovascular:  Negative for chest pain and palpitations.   Gastrointestinal:  Positive for abdominal pain. Negative for constipation, diarrhea, nausea and vomiting.   Endocrine: Negative for cold intolerance and heat intolerance.   Genitourinary:  Negative for dysuria and hematuria.   Musculoskeletal:  Positive for arthralgias. Negative for myalgias.   Skin:  Positive for color change and wound. Negative for rash.   Neurological:  Negative for tremors and seizures.    Hematological:  Negative for adenopathy. Does not bruise/bleed easily.   All other systems reviewed and are negative.    Objective:     Vital Signs (Most Recent):  Temp: 97.5 °F (36.4 °C) (09/08/23 0734)  Pulse: 64 (09/08/23 1500)  Resp: 18 (09/08/23 1259)  BP: (!) 109/56 (09/08/23 1500)  SpO2: 96 % (09/08/23 1500) Vital Signs (24h Range):  Temp:  [97.5 °F (36.4 °C)-98 °F (36.7 °C)] 97.5 °F (36.4 °C)  Pulse:  [52-75] 64  Resp:  [16-20] 18  SpO2:  [93 %-98 %] 96 %  BP: (109-180)/(56-88) 109/56     Weight: 117.9 kg (260 lb)  Body mass index is 36.26 kg/m².     Physical Exam  Vitals and nursing note reviewed.   Constitutional:       General: He is awake.      Appearance: Normal appearance. He is well-developed. He is morbidly obese. He is not ill-appearing.   HENT:      Head: Normocephalic and atraumatic.      Nose: Nose normal. No septal deviation.   Eyes:      Conjunctiva/sclera: Conjunctivae normal.      Pupils: Pupils are equal, round, and reactive to light.   Neck:      Thyroid: No thyroid mass.      Vascular: No JVD.      Trachea: No tracheal tenderness or tracheal deviation.   Cardiovascular:      Rate and Rhythm: Normal rate and regular rhythm.      Heart sounds: Normal heart sounds, S1 normal and S2 normal. No murmur heard.     No friction rub. No gallop.   Pulmonary:      Effort: Pulmonary effort is normal.      Breath sounds: Normal breath sounds. No decreased breath sounds, wheezing, rhonchi or rales.   Abdominal:      General: Bowel sounds are normal. There is no distension.      Palpations: Abdomen is soft. There is no hepatomegaly, splenomegaly or mass.      Tenderness: There is no abdominal tenderness.   Genitourinary:      Skin:     General: Skin is warm and dry.      Capillary Refill: Capillary refill takes less than 2 seconds.      Findings: Wound present. No erythema or rash.      Comments: There is an open wound with granulation tissue within the opening.  Very mild surrounding redness but no  clear erythema.  No obvious drainage or other signs of infection   Neurological:      General: No focal deficit present.      Mental Status: He is alert and oriented to person, place, and time.      Cranial Nerves: No cranial nerve deficit.      Sensory: No sensory deficit.   Psychiatric:         Mood and Affect: Mood normal.         Behavior: Behavior normal. Behavior is cooperative.            I have reviewed all pertinent lab results within the past 24 hours.  CBC:   Recent Labs   Lab 09/08/23 0450   WBC 9.57   RBC 3.95*   HGB 11.9*   HCT 36.4*      MCV 92   MCH 30.1   MCHC 32.7     BMP:   Recent Labs   Lab 09/08/23 0450   *   *   K 3.9      CO2 23   BUN 5*   CREATININE 1.0   CALCIUM 8.5*   MG 1.7       Significant Diagnostics:  I have reviewed all pertinent imaging results/findings within the past 24 hours.      Assessment/Plan:     * Cellulitis of gluteal region  Status post incision and drainage.  His pain is very difficult to control and he should be admitted simply for observation for pain control and wound care.  I do not see any further need for incision debridement of wound.  It appears to be slowly healing without any clear signs of progressing infection      VTE Risk Mitigation (From admission, onward)         Ordered     apixaban tablet 5 mg  2 times daily         09/08/23 0045     IP VTE HIGH RISK PATIENT  Once         09/08/23 0045     Place sequential compression device  Until discontinued         09/08/23 0045     Reason for No Pharmacological VTE Prophylaxis  Once        Question:  Reasons:  Answer:  Already adequately anticoagulated on oral Anticoagulants    09/08/23 0045                Thank you for your consult. I will follow-up with patient. Please contact us if you have any additional questions.    Brayan Spears MD  General Surgery  [facility]

## 2023-09-08 NOTE — ED NOTES
Pt reports that he's been having a lot of swelling and pain to the incision site on anus that increased today. Pt reports he completed antibiotics and pain meds however the pain is significant and having yellow drainage from site. Pt reports he has had some fever on and off for the last few days.

## 2023-09-08 NOTE — ED PROVIDER NOTES
Encounter Date: 9/7/2023       History     Chief Complaint   Patient presents with    Generalized Body Aches     Mostly from waist down following abscess surgery.  Hard to walk      57-year-old male with a past medical history of COPD, CAD, diabetes mellitus, hypertension, and atrial fibrillation presents with multiple complaints.  The patient reports that he was recently admitted here for a perirectal abscess, and had drainage under anesthesia with IV antibiotics for several days because of a soft tissue infection.  He reports that he was discharged 2 days ago because he did not want to stay in the hospital anymore and begged them to discharge him.  He reports since being home he is had continued pain, body aches, and fever/chills.  He reports that he has been taking his p.o. antibiotics.  He reports his symptoms are severe.  He reports that his pain from his buttocks radiates around to his abdomen and waste.  There are no alleviating or aggravating factors.      Review of patient's allergies indicates:   Allergen Reactions    Pesticide Dermatitis and Rash     Past Medical History:   Diagnosis Date    Anticoagulant long-term use     Atrial fibrillation 2018    BPH (benign prostatic hyperplasia)     Cataract     COPD (chronic obstructive pulmonary disease)     Coronary artery disease     stents    CVD (cardiovascular disease)     Diabetes mellitus     Erythema multiforme     AKA Denton Edmondson Syndrome    Hypertension     Infected incision     MI (myocardial infarction)     per pt he has had 4     ROSAMARIA on CPAP     RLS (restless legs syndrome)      Past Surgical History:   Procedure Laterality Date    CORONARY ANGIOGRAPHY INCLUDING BYPASS GRAFTS WITH CATHETERIZATION OF LEFT HEART N/A 9/1/2022    Procedure: ANGIOGRAM, CORONARY, INCLUDING BYPASS GRAFT, WITH LEFT HEART CATHETERIZATION;  Surgeon: Paul Botello MD;  Location: [facility] CATH/EP LAB;  Service: Cardiology;  Laterality: N/A;    CORONARY ARTERY BYPASS GRAFT  (CABG) N/A 4/7/2020    Procedure: CORONARY ARTERY BYPASS GRAFT (CABG)- Excision of left atrial appendage;  Surgeon: Doug Solitario MD;  Location: [facility] OR;  Service: Cardiothoracic;  Laterality: N/A;    CORONARY STENT PLACEMENT      WILSON MAZE PROCEDURE N/A 4/7/2020    Procedure: WILSON MAZE PROCEDURE- Attempted;  Surgeon: Doug Solitario MD;  Location: [facility] OR;  Service: Cardiothoracic;  Laterality: N/A;    CYSTOSCOPY N/A 12/10/2018    Procedure: CYSTOSCOPY;  Surgeon: Khushboo Abreu MD;  Location: [facility];  Service: Urology;  Laterality: N/A;    ENDOSCOPIC HARVEST OF VEIN Left 4/7/2020    Procedure: HARVEST-VEIN-ENDOVASCULAR;  Surgeon: Doug Solitario MD;  Location: [facility] OR;  Service: Cardiothoracic;  Laterality: Left;    INCISION OF PERIRECTAL ABSCESS Bilateral 9/1/2023    Procedure: INCISION, ABSCESS, PERIRECTAL;  Surgeon: Brayan Spears MD;  Location[facility];  Service: General;  Laterality: Bilateral;  stirrups    LEFT HEART CATHETERIZATION Left 3/17/2020    Procedure: CATHETERIZATION, HEART, LEFT;  Surgeon: Tyree Walters MD;  Location: [facility] CATH/EP LAB;  Service: Cardiology;  Laterality: Left;    STERNAL WIRES REMOVAL N/A 6/8/2020    Procedure: REMOVAL, STERNAL WIRE;  Surgeon: Doug Solitario MD;  Location: [facility] OR;  Service: Peripheral Vascular;  Laterality: N/A;    ULTRASOUND OF PROSTATE FOR VOLUME DETERMINATION  12/10/2018    Procedure: ULTRASOUND, PROSTATE, FOR VOLUME DETERMINATION;  Surgeon: Khushboo Abreu MD;  Location: [facility] OR;  Service: Urology;;     Family History   Problem Relation Age of Onset    Heart disease Mother     Diabetes Mother     Hyperlipidemia Father     Cancer Father      Social History     Tobacco Use    Smoking status: Every Day     Current packs/day: 0.00     Average packs/day: 0.3 packs/day for 30.0 years (9.0 ttl pk-yrs)     Types: Cigarettes     Start date: 4/4/1990     Last attempt to quit: 4/4/2020     Years since quitting: 3.4     Smokeless tobacco: Never    Tobacco comments:     just under a pack a day   Substance Use Topics    Alcohol use: Not Currently    Drug use: Yes     Frequency: 1.0 times per week     Types: Marijuana     Review of Systems   Constitutional:  Positive for chills and fever. Negative for diaphoresis and fatigue.   HENT:  Negative for congestion and rhinorrhea.    Respiratory:  Negative for cough and shortness of breath.    Cardiovascular:  Negative for chest pain.   Gastrointestinal:  Positive for abdominal pain. Negative for diarrhea, nausea and vomiting.   Genitourinary:  Negative for dysuria, frequency and testicular pain.   Musculoskeletal:  Positive for myalgias. Negative for gait problem.   Skin:  Positive for wound. Negative for color change.   Neurological:  Negative for dizziness and numbness.   Psychiatric/Behavioral:  Negative for agitation and confusion.        Physical Exam     Initial Vitals [09/07/23 2059]   BP Pulse Resp Temp SpO2   (!) 180/88 75 20 98 °F (36.7 °C) 98 %      MAP       --         Physical Exam    Nursing note and vitals reviewed.  Constitutional: He appears well-developed and well-nourished.   HENT:   Head: Normocephalic and atraumatic.   Eyes: EOM are normal. Pupils are equal, round, and reactive to light.   Neck: Neck supple.   Cardiovascular:  Normal rate, regular rhythm and normal heart sounds.           Pulmonary/Chest: Breath sounds normal.   Abdominal: Abdomen is soft. Bowel sounds are normal. There is abdominal tenderness.   Generalized abdominal tenderness noted.  No rebound or guarding noted.   Musculoskeletal:         General: Normal range of motion.      Cervical back: Neck supple.     Neurological: He is alert and oriented to person, place, and time.   Skin: Skin is warm and dry.   Wound noted to the right buttock, with surrounding redness noted.  Purulent drainage noted from wound.   Psychiatric: He has a normal mood and affect.         ED Course   Procedures  Labs Reviewed    CBC W/ AUTO DIFFERENTIAL - Abnormal; Notable for the following components:       Result Value    RBC 4.48 (*)     Hemoglobin 13.9 (*)     MPV 8.8 (*)     Immature Granulocytes 1.8 (*)     Immature Grans (Abs) 0.18 (*)     All other components within normal limits   COMPREHENSIVE METABOLIC PANEL - Abnormal; Notable for the following components:    Sodium 134 (*)     Glucose 236 (*)     Albumin 3.2 (*)     All other components within normal limits   LACTIC ACID, PLASMA - Abnormal; Notable for the following components:    Lactate (Lactic Acid) 2.8 (*)     All other components within normal limits   CULTURE, BLOOD   CULTURE, BLOOD   LACTIC ACID, PLASMA   LACTIC ACID, PLASMA   MAGNESIUM   PHOSPHORUS   CBC W/ AUTO DIFFERENTIAL   COMPREHENSIVE METABOLIC PANEL   LACTIC ACID, PLASMA          Imaging Results              CT Abdomen Pelvis With Contrast (Final result)  Result time 09/07/23 23:44:02      Final result by Jae Persaud DO (09/07/23 23:44:02)                   Impression:      1. Cellulitis of the right medial gluteal fold with a suspected small focus of soft tissue gas which may be postoperative.  No rim enhancing fluid collection or perianal abscess.  2. Mild nonspecific mesenteric fat stranding which may be related to mild enteritis.  3. Hepatic steatosis and hepatomegaly.      Electronically signed by: Jae Persaud  Date:    09/07/2023  Time:    23:44               Narrative:    EXAMINATION:  CT ABDOMEN PELVIS WITH CONTRAST    CLINICAL HISTORY:  Abdominal abscess/infection suspected;    TECHNIQUE:  Axial CT images with sagittal and coronal reformats were obtained of the abdomen and pelvis from the hemidiaphragms through the symphysis pubis after the administration of 100mL Omnipaque 350.    COMPARISON:  CT chest, abdomen, and pelvis from 06/07/2023.    FINDINGS:  Lung Bases: Clear.    Heart: Heart size is normal.  No pericardial effusion.    Liver: The liver is enlarged and demonstrates diffuse  hypoattenuation compatible with hepatic steatosis.  There are no focal hepatic lesions.  The portal vasculature is patent.    Biliary tract: No intrahepatic or extrahepatic biliary ductal dilatation.    Gallbladder: No radiodense gallstone. No wall thickening or pericholecystic fluid.    Pancreas: Normal. No pancreatic ductal dilatation.    Spleen: Normal size without focal lesion.    Adrenals: Unremarkable.    Kidneys and urinary collecting systems: Normal.  No hydronephrosis or urolithiasis.    Lymph nodes: None enlarged.    Stomach and bowel: The stomach is normal.  Loops of small and large bowel are normal in caliber without evidence for inflammation or obstruction.  The appendix is normal.    Peritoneum and mesentery: There is minimal mesenteric fat stranding in the left mesentery adjacent to several loops of small bowel which do not demonstrate wall thickening (series 2, image 84).  No abdominal fluid collection.    Vasculature: There is mild atherosclerosis.  There is no aneurysm.    Urinary bladder: No wall thickening.    Reproductive organs: The prostate is not enlarged.    Body wall: There is fat stranding in the right medial gluteal fold with a small focus suspected soft tissue gas which may be postoperative there is no evidence of a rim enhancing fluid collection to suggest a perianal abscess.    Musculoskeletal: No aggressive osseous lesion.                                       Medications   apixaban tablet 5 mg (has no administration in time range)   aspirin chewable tablet 81 mg (has no administration in time range)   cetirizine tablet 10 mg (has no administration in time range)   clonazePAM tablet 1 mg (has no administration in time range)   furosemide tablet 20 mg (has no administration in time range)   gabapentin capsule 600 mg (has no administration in time range)   isosorbide mononitrate 24 hr tablet 60 mg (has no administration in time range)   insulin detemir U-100 (Levemir) pen 80 Units (has  no administration in time range)   lisinopriL tablet 10 mg (has no administration in time range)   methocarbamoL tablet 500 mg (500 mg Oral Given 9/8/23 0228)   metoprolol succinate (TOPROL-XL) 24 hr tablet 100 mg (has no administration in time range)   pantoprazole EC tablet 40 mg (40 mg Oral Given 9/8/23 0228)   ranolazine 12 hr tablet 500 mg (has no administration in time range)   pravastatin tablet 20 mg (has no administration in time range)   tamsulosin 24 hr capsule 0.4 mg (0.4 mg Oral Given 9/8/23 0228)   sodium chloride 0.9% flush 3 mL (has no administration in time range)   melatonin tablet 9 mg (9 mg Oral Given 9/8/23 0227)   ondansetron injection 8 mg (has no administration in time range)   acetaminophen tablet 650 mg (has no administration in time range)   simethicone chewable tablet 80 mg (has no administration in time range)   aluminum-magnesium hydroxide-simethicone 200-200-20 mg/5 mL suspension 30 mL (has no administration in time range)   acetaminophen tablet 650 mg (650 mg Oral Given 9/8/23 0228)   naloxone 0.4 mg/mL injection 0.02 mg (has no administration in time range)   potassium bicarbonate disintegrating tablet 50 mEq (has no administration in time range)   potassium bicarbonate disintegrating tablet 35 mEq (has no administration in time range)   potassium bicarbonate disintegrating tablet 60 mEq (has no administration in time range)   magnesium oxide tablet 800 mg (has no administration in time range)   magnesium oxide tablet 800 mg (has no administration in time range)   potassium, sodium phosphates 280-160-250 mg packet 2 packet (has no administration in time range)   potassium, sodium phosphates 280-160-250 mg packet 2 packet (has no administration in time range)   potassium, sodium phosphates 280-160-250 mg packet 2 packet (has no administration in time range)   glucose chewable tablet 16 g (has no administration in time range)   glucose chewable tablet 24 g (has no administration in  time range)   glucagon (human recombinant) injection 1 mg (has no administration in time range)   0.9%  NaCl infusion ( Intravenous New Bag 9/8/23 0236)   morphine injection 4 mg (has no administration in time range)   HYDROcodone-acetaminophen 5-325 mg per tablet 1 tablet (has no administration in time range)   insulin aspart U-100 pen 0-10 Units (has no administration in time range)   senna-docusate 8.6-50 mg per tablet 1 tablet (1 tablet Oral Not Given 9/8/23 0100)   dextrose 10% bolus 125 mL 125 mL (has no administration in time range)   dextrose 10% bolus 250 mL 250 mL (has no administration in time range)   vancomycin - pharmacy to dose (has no administration in time range)   piperacillin-tazobactam (ZOSYN) 4.5 g in dextrose 5 % in water (D5W) 100 mL IVPB (MB+) (has no administration in time range)   vancomycin 1,500 mg in dextrose 5 % (D5W) 250 mL IVPB (Vial-Mate) (1,500 mg Intravenous Trough Due As Scheduled Before Dose 9/9/23 1130)   sodium chloride 0.9% bolus 1,000 mL 1,000 mL (0 mLs Intravenous Stopped 9/7/23 2351)   vancomycin (VANCOCIN) 1,750 mg in dextrose 5 % (D5W) 500 mL IVPB (0 mg Intravenous Stopped 9/8/23 0228)   piperacillin-tazobactam (ZOSYN) 4.5 g in dextrose 5 % in water (D5W) 100 mL IVPB (MB+) (0 g Intravenous Stopped 9/8/23 0051)   iohexoL (OMNIPAQUE 350) 350 mg iodine/mL injection (100 mLs Intravenous Given 9/7/23 2325)   morphine injection 4 mg (4 mg Intravenous Given 9/8/23 0025)     Medical Decision Making  Amount and/or Complexity of Data Reviewed  Labs: ordered.  Radiology: ordered.    Risk  Prescription drug management.                          Medical Decision Making:   Initial Assessment:   57-year-old male presented with multiple complaints.  Differential Diagnosis:   Initial differential diagnosis included but not limited to intra-abdominal abscess, cellulitis, and sepsis.  Clinical Tests:   Lab Tests: Ordered and Reviewed  Radiological Study: Ordered and Reviewed  ED  Management:  The patient was emergently evaluated in the emergency department, his evaluation was significant for a middle-age male with an area of infection noted to the right buttock.  The patient does have abdominal tenderness noted.  The patient's labs were significant for an elevated lactic acid level but a normal white blood cell count.  The patient was treated with IV fluids, IV vancomycin, IV Zosyn, and IV morphine here in the emergency department.  The patient's CT scan does continue to show cellulitis to the right buttock, that is likely the etiology of his symptoms.  I will admit the patient to the hospitalist service for further care.  The case was discussed with the APC on call for the hospitalist service.  She has accepted the patient for admission.  Other:   I have discussed this case with another health care provider.      Clinical Impression:   Final diagnoses:  [L03.317] Cellulitis of gluteal region        ED Disposition Condition    Observation                 Tony Angela MD  09/08/23 0252

## 2023-09-09 LAB
ALBUMIN SERPL BCP-MCNC: 2.7 G/DL (ref 3.5–5.2)
ALP SERPL-CCNC: 54 U/L (ref 55–135)
ALT SERPL W/O P-5'-P-CCNC: 32 U/L (ref 10–44)
ANION GAP SERPL CALC-SCNC: 9 MMOL/L (ref 8–16)
AST SERPL-CCNC: 51 U/L (ref 10–40)
BASOPHILS # BLD AUTO: 0.06 K/UL (ref 0–0.2)
BASOPHILS NFR BLD: 0.7 % (ref 0–1.9)
BILIRUB SERPL-MCNC: 0.2 MG/DL (ref 0.1–1)
BUN SERPL-MCNC: 7 MG/DL (ref 6–20)
CALCIUM SERPL-MCNC: 8.5 MG/DL (ref 8.7–10.5)
CHLORIDE SERPL-SCNC: 102 MMOL/L (ref 95–110)
CO2 SERPL-SCNC: 25 MMOL/L (ref 23–29)
CREAT SERPL-MCNC: 1 MG/DL (ref 0.5–1.4)
DIFFERENTIAL METHOD: ABNORMAL
EOSINOPHIL # BLD AUTO: 0.4 K/UL (ref 0–0.5)
EOSINOPHIL NFR BLD: 4.7 % (ref 0–8)
ERYTHROCYTE [DISTWIDTH] IN BLOOD BY AUTOMATED COUNT: 13 % (ref 11.5–14.5)
EST. GFR  (NO RACE VARIABLE): >60 ML/MIN/1.73 M^2
GLUCOSE SERPL-MCNC: 153 MG/DL (ref 70–110)
HCT VFR BLD AUTO: 37.1 % (ref 40–54)
HGB BLD-MCNC: 12.3 G/DL (ref 14–18)
IMM GRANULOCYTES # BLD AUTO: 0.13 K/UL (ref 0–0.04)
IMM GRANULOCYTES NFR BLD AUTO: 1.6 % (ref 0–0.5)
LYMPHOCYTES # BLD AUTO: 3 K/UL (ref 1–4.8)
LYMPHOCYTES NFR BLD: 36.5 % (ref 18–48)
MAGNESIUM SERPL-MCNC: 1.9 MG/DL (ref 1.6–2.6)
MCH RBC QN AUTO: 31.1 PG (ref 27–31)
MCHC RBC AUTO-ENTMCNC: 33.2 G/DL (ref 32–36)
MCV RBC AUTO: 94 FL (ref 82–98)
MONOCYTES # BLD AUTO: 0.3 K/UL (ref 0.3–1)
MONOCYTES NFR BLD: 4 % (ref 4–15)
NEUTROPHILS # BLD AUTO: 4.3 K/UL (ref 1.8–7.7)
NEUTROPHILS NFR BLD: 52.5 % (ref 38–73)
NRBC BLD-RTO: 0 /100 WBC
PHOSPHATE SERPL-MCNC: 3.6 MG/DL (ref 2.7–4.5)
PLATELET # BLD AUTO: 315 K/UL (ref 150–450)
PMV BLD AUTO: 9.1 FL (ref 9.2–12.9)
POCT GLUCOSE: 223 MG/DL (ref 70–110)
POCT GLUCOSE: 239 MG/DL (ref 70–110)
POTASSIUM SERPL-SCNC: 4.1 MMOL/L (ref 3.5–5.1)
PROT SERPL-MCNC: 6.5 G/DL (ref 6–8.4)
RBC # BLD AUTO: 3.96 M/UL (ref 4.6–6.2)
SODIUM SERPL-SCNC: 136 MMOL/L (ref 136–145)
VANCOMYCIN TROUGH SERPL-MCNC: 15.5 UG/ML (ref 10–22)
WBC # BLD AUTO: 8.22 K/UL (ref 3.9–12.7)

## 2023-09-09 PROCEDURE — 96366 THER/PROPH/DIAG IV INF ADDON: CPT

## 2023-09-09 PROCEDURE — 99900031 HC PATIENT EDUCATION (STAT)

## 2023-09-09 PROCEDURE — 25000003 PHARM REV CODE 250: Performed by: NURSE PRACTITIONER

## 2023-09-09 PROCEDURE — 83735 ASSAY OF MAGNESIUM: CPT | Performed by: NURSE PRACTITIONER

## 2023-09-09 PROCEDURE — 63600175 PHARM REV CODE 636 W HCPCS: Performed by: NURSE PRACTITIONER

## 2023-09-09 PROCEDURE — 84100 ASSAY OF PHOSPHORUS: CPT | Performed by: NURSE PRACTITIONER

## 2023-09-09 PROCEDURE — 94761 N-INVAS EAR/PLS OXIMETRY MLT: CPT

## 2023-09-09 PROCEDURE — 36415 COLL VENOUS BLD VENIPUNCTURE: CPT | Performed by: NURSE PRACTITIONER

## 2023-09-09 PROCEDURE — 85025 COMPLETE CBC W/AUTO DIFF WBC: CPT | Performed by: NURSE PRACTITIONER

## 2023-09-09 PROCEDURE — 36415 COLL VENOUS BLD VENIPUNCTURE: CPT | Performed by: HOSPITALIST

## 2023-09-09 PROCEDURE — 11000001 HC ACUTE MED/SURG PRIVATE ROOM

## 2023-09-09 PROCEDURE — G0378 HOSPITAL OBSERVATION PER HR: HCPCS

## 2023-09-09 PROCEDURE — 80053 COMPREHEN METABOLIC PANEL: CPT | Performed by: NURSE PRACTITIONER

## 2023-09-09 PROCEDURE — 96376 TX/PRO/DX INJ SAME DRUG ADON: CPT

## 2023-09-09 PROCEDURE — 80202 ASSAY OF VANCOMYCIN: CPT | Performed by: HOSPITALIST

## 2023-09-09 RX ADMIN — MORPHINE SULFATE 4 MG: 4 INJECTION, SOLUTION INTRAMUSCULAR; INTRAVENOUS at 04:09

## 2023-09-09 RX ADMIN — CLONAZEPAM 1 MG: 0.5 TABLET ORAL at 09:09

## 2023-09-09 RX ADMIN — TAMSULOSIN HYDROCHLORIDE 0.4 MG: 0.4 CAPSULE ORAL at 08:09

## 2023-09-09 RX ADMIN — CLONAZEPAM 1 MG: 0.5 TABLET ORAL at 08:09

## 2023-09-09 RX ADMIN — SENNOSIDES AND DOCUSATE SODIUM 1 TABLET: 8.6; 5 TABLET ORAL at 09:09

## 2023-09-09 RX ADMIN — APIXABAN 5 MG: 2.5 TABLET, FILM COATED ORAL at 08:09

## 2023-09-09 RX ADMIN — PANTOPRAZOLE SODIUM 40 MG: 40 TABLET, DELAYED RELEASE ORAL at 09:09

## 2023-09-09 RX ADMIN — PIPERACILLIN AND TAZOBACTAM 4.5 G: 4; .5 INJECTION, POWDER, LYOPHILIZED, FOR SOLUTION INTRAVENOUS; PARENTERAL at 09:09

## 2023-09-09 RX ADMIN — MORPHINE SULFATE 4 MG: 4 INJECTION, SOLUTION INTRAMUSCULAR; INTRAVENOUS at 12:09

## 2023-09-09 RX ADMIN — FUROSEMIDE 20 MG: 20 TABLET ORAL at 09:09

## 2023-09-09 RX ADMIN — FUROSEMIDE 20 MG: 20 TABLET ORAL at 08:09

## 2023-09-09 RX ADMIN — PRAVASTATIN SODIUM 20 MG: 10 TABLET ORAL at 09:09

## 2023-09-09 RX ADMIN — RANOLAZINE 500 MG: 500 TABLET, FILM COATED, EXTENDED RELEASE ORAL at 09:09

## 2023-09-09 RX ADMIN — APIXABAN 5 MG: 2.5 TABLET, FILM COATED ORAL at 09:09

## 2023-09-09 RX ADMIN — VANCOMYCIN HYDROCHLORIDE 1500 MG: 1.5 INJECTION, POWDER, LYOPHILIZED, FOR SOLUTION INTRAVENOUS at 12:09

## 2023-09-09 RX ADMIN — INSULIN DETEMIR 80 UNITS: 100 INJECTION, SOLUTION SUBCUTANEOUS at 09:09

## 2023-09-09 RX ADMIN — PIPERACILLIN AND TAZOBACTAM 4.5 G: 4; .5 INJECTION, POWDER, LYOPHILIZED, FOR SOLUTION INTRAVENOUS; PARENTERAL at 04:09

## 2023-09-09 RX ADMIN — PIPERACILLIN AND TAZOBACTAM 4.5 G: 4; .5 INJECTION, POWDER, LYOPHILIZED, FOR SOLUTION INTRAVENOUS; PARENTERAL at 12:09

## 2023-09-09 RX ADMIN — INSULIN ASPART 4 UNITS: 100 INJECTION, SOLUTION INTRAVENOUS; SUBCUTANEOUS at 04:09

## 2023-09-09 RX ADMIN — INSULIN DETEMIR 80 UNITS: 100 INJECTION, SOLUTION SUBCUTANEOUS at 08:09

## 2023-09-09 RX ADMIN — VANCOMYCIN HYDROCHLORIDE 1500 MG: 1.5 INJECTION, POWDER, LYOPHILIZED, FOR SOLUTION INTRAVENOUS at 01:09

## 2023-09-09 RX ADMIN — CETIRIZINE HYDROCHLORIDE 10 MG: 10 TABLET, FILM COATED ORAL at 09:09

## 2023-09-09 RX ADMIN — METOPROLOL SUCCINATE 100 MG: 50 TABLET, EXTENDED RELEASE ORAL at 08:09

## 2023-09-09 RX ADMIN — MORPHINE SULFATE 4 MG: 4 INJECTION, SOLUTION INTRAMUSCULAR; INTRAVENOUS at 10:09

## 2023-09-09 RX ADMIN — HYDROCODONE BITARTRATE AND ACETAMINOPHEN 1 TABLET: 5; 325 TABLET ORAL at 12:09

## 2023-09-09 RX ADMIN — HYDROCODONE BITARTRATE AND ACETAMINOPHEN 1 TABLET: 5; 325 TABLET ORAL at 11:09

## 2023-09-09 RX ADMIN — LISINOPRIL 10 MG: 10 TABLET ORAL at 09:09

## 2023-09-09 RX ADMIN — PANTOPRAZOLE SODIUM 40 MG: 40 TABLET, DELAYED RELEASE ORAL at 08:09

## 2023-09-09 RX ADMIN — ASPIRIN 81 MG CHEWABLE TABLET 81 MG: 81 TABLET CHEWABLE at 09:09

## 2023-09-09 RX ADMIN — METHOCARBAMOL 500 MG: 500 TABLET ORAL at 08:09

## 2023-09-09 RX ADMIN — PIPERACILLIN AND TAZOBACTAM 4.5 G: 4; .5 INJECTION, POWDER, LYOPHILIZED, FOR SOLUTION INTRAVENOUS; PARENTERAL at 11:09

## 2023-09-09 RX ADMIN — GABAPENTIN 600 MG: 300 CAPSULE ORAL at 08:09

## 2023-09-09 RX ADMIN — GABAPENTIN 600 MG: 300 CAPSULE ORAL at 09:09

## 2023-09-09 NOTE — SUBJECTIVE & OBJECTIVE
Interval History:  Patient continuing to experience severe right gluteal pain status post recent incision and drainage of perirectal abscess.  Dr. Spears following.  Currently afebrile.  Having great difficulty sitting on his buttock.    Review of Systems   Constitutional:  Positive for chills and fever.   HENT:  Negative for congestion and sore throat.    Eyes:  Negative for visual disturbance.   Respiratory:  Negative for cough and shortness of breath.    Cardiovascular:  Negative for chest pain and palpitations.   Gastrointestinal:  Positive for abdominal pain. Negative for constipation, diarrhea, nausea and vomiting.   Endocrine: Negative for cold intolerance and heat intolerance.   Genitourinary:  Negative for dysuria and hematuria.   Musculoskeletal:  Positive for arthralgias. Negative for myalgias.   Skin:  Positive for color change and wound. Negative for rash.   Neurological:  Negative for tremors and seizures.   Hematological:  Negative for adenopathy. Does not bruise/bleed easily.   All other systems reviewed and are negative.    Objective:     Vital Signs (Most Recent):  Temp: 97.1 °F (36.2 °C) (09/09/23 1113)  Pulse: (!) 57 (09/09/23 1113)  Resp: 18 (09/09/23 1223)  BP: (!) 151/76 (09/09/23 1113)  SpO2: 98 % (09/09/23 1113) Vital Signs (24h Range):  Temp:  [96.3 °F (35.7 °C)-98 °F (36.7 °C)] 97.1 °F (36.2 °C)  Pulse:  [56-65] 57  Resp:  [16-18] 18  SpO2:  [95 %-98 %] 98 %  BP: (101-151)/(55-76) 151/76     Weight: 117.9 kg (260 lb)  Body mass index is 36.26 kg/m².    Intake/Output Summary (Last 24 hours) at 9/9/2023 1238  Last data filed at 9/9/2023 0501  Gross per 24 hour   Intake 952.19 ml   Output 2 ml   Net 950.19 ml         Physical Exam  Vitals and nursing note reviewed.   Constitutional:       General: He is awake.      Appearance: Normal appearance. He is well-developed. He is morbidly obese. He is not ill-appearing.   HENT:      Head: Normocephalic and atraumatic.      Nose: Nose normal. No  septal deviation.   Eyes:      Conjunctiva/sclera: Conjunctivae normal.      Pupils: Pupils are equal, round, and reactive to light.   Neck:      Thyroid: No thyroid mass.      Vascular: No JVD.      Trachea: No tracheal tenderness or tracheal deviation.   Cardiovascular:      Rate and Rhythm: Normal rate and regular rhythm.      Heart sounds: Normal heart sounds, S1 normal and S2 normal. No murmur heard.     No friction rub. No gallop.   Pulmonary:      Effort: Pulmonary effort is normal.      Breath sounds: Normal breath sounds. No decreased breath sounds, wheezing, rhonchi or rales.   Abdominal:      General: Bowel sounds are normal. There is no distension.      Palpations: Abdomen is soft. There is no hepatomegaly, splenomegaly or mass.      Tenderness: There is no abdominal tenderness.   Genitourinary:      Skin:     General: Skin is warm and dry.      Capillary Refill: Capillary refill takes less than 2 seconds.      Findings: Erythema and wound present. No rash.   Neurological:      General: No focal deficit present.      Mental Status: He is alert and oriented to person, place, and time.      Cranial Nerves: No cranial nerve deficit.      Sensory: No sensory deficit.   Psychiatric:         Mood and Affect: Mood normal.         Behavior: Behavior normal. Behavior is cooperative.             Significant Labs: All pertinent labs within the past 24 hours have been reviewed.  CBC:   Recent Labs   Lab 09/07/23 2125 09/08/23  0450 09/09/23  0403   WBC 10.05 9.57 8.22   HGB 13.9* 11.9* 12.3*   HCT 41.1 36.4* 37.1*    290 315     CMP:   Recent Labs   Lab 09/07/23 2125 09/08/23  0450 09/09/23  0403   * 134* 136   K 5.0 3.9 4.1   CL 97 100 102   CO2 25 23 25   * 157* 153*   BUN 6 5* 7   CREATININE 1.2 1.0 1.0   CALCIUM 10.1 8.5* 8.5*   PROT 7.9 6.2 6.5   ALBUMIN 3.2* 2.6* 2.7*   BILITOT 0.3 0.3 0.2   ALKPHOS 64 53* 54*   AST 39 33 51*   ALT 30 25 32   ANIONGAP 12 11 9     Microbiology Results  (last 7 days)       Procedure Component Value Units Date/Time    Blood culture #1 **CANNOT BE ORDERED STAT** [[id number]] Collected: 09/08/23 0014    Order Status: Completed Specimen: Blood from Antecubital, Right Hand Updated: 09/09/23 1032     Blood Culture, Routine No Growth to date      No Growth to date    Blood culture #2 **CANNOT BE ORDERED STAT** [[id number]] Collected: 09/08/23 0014    Order Status: Completed Specimen: Blood from Antecubital, Left Arm Updated: 09/09/23 1032     Blood Culture, Routine No Growth to date      No Growth to date          Significant Imaging:   CT abdomen and pelvis with contrast:  1. Cellulitis of the right medial gluteal fold with a suspected small focus of soft tissue gas which may be postoperative.  No rim enhancing fluid collection or perianal abscess.  2. Mild nonspecific mesenteric fat stranding which may be related to mild enteritis.  3. Hepatic steatosis and hepatomegaly.

## 2023-09-09 NOTE — PROGRESS NOTES
Pharmacokinetic Assessment Follow Up: IV Vancomycin    Vancomycin serum concentration assessment(s):    The trough level was drawn correctly and can be used to guide therapy at this time. The measurement is within the desired definitive target range of 15 to 20 mcg/mL.    Vancomycin Regimen Plan:    Continue regimen to Vancomycin 1500 mg IV every 12 hours with next serum trough concentration measured at 2330 prior to fourth dose on 9/10/23    Drug levels (last 3 results):  Recent Labs   Lab Result Units 09/09/23  1129   Vancomycin-Trough ug/mL 15.5       Pharmacy will continue to follow and monitor vancomycin.    Please contact pharmacy at extension [phone] for questions regarding this assessment.    Thank you for the consult,   Raya Chavira       Patient brief summary:  Magdaleno Cunningham is a 57 y.o. male initiated on antimicrobial therapy with IV Vancomycin for treatment of skin & soft tissue infection      Drug Allergies:   Review of patient's allergies indicates:   Allergen Reactions    Pesticide Dermatitis and Rash       Actual Body Weight:   117.9 kg    Renal Function:   Estimated Creatinine Clearance: 109 mL/min (based on SCr of 1 mg/dL).,     Dialysis Method (if applicable):  N/A    CBC (last 72 hours):  Recent Labs   Lab Result Units 09/07/23 2125 09/08/23 0450 09/09/23  0403   WBC K/uL 10.05 9.57 8.22   Hemoglobin g/dL 13.9* 11.9* 12.3*   Hematocrit % 41.1 36.4* 37.1*   Platelets K/uL 365 290 315   Gran % % 49.7 44.7 52.5   Lymph % % 37.9 41.4 36.5   Mono % % 5.5 7.1 4.0   Eosinophil % % 4.3 4.6 4.7   Basophil % % 0.8 0.7 0.7   Differential Method  Automated Automated Automated       Metabolic Panel (last 72 hours):  Recent Labs   Lab Result Units 09/07/23 2125 09/08/23 0450 09/09/23  0403   Sodium mmol/L 134* 134* 136   Potassium mmol/L 5.0 3.9 4.1   Chloride mmol/L 97 100 102   CO2 mmol/L 25 23 25   Glucose mg/dL 236* 157* 153*   BUN mg/dL 6 5* 7   Creatinine mg/dL 1.2 1.0 1.0   Albumin g/dL 3.2* 2.6*  2.7*   Total Bilirubin mg/dL 0.3 0.3 0.2   Alkaline Phosphatase U/L 64 53* 54*   AST U/L 39 33 51*   ALT U/L 30 25 32   Magnesium mg/dL  --  1.7 1.9   Phosphorus mg/dL  --  4.0 3.6       Vancomycin Administrations:  vancomycin given in the last 96 hours                     vancomycin 1,500 mg in dextrose 5 % (D5W) 250 mL IVPB (Vial-Mate) ()  Restarted 09/09/23 0120     1,500 mg New Bag  0036      Restarted 09/08/23 1255     1,500 mg New Bag  1141    vancomycin (VANCOCIN) 1,750 mg in dextrose 5 % (D5W) 500 mL IVPB (mg) 1,750 mg New Bag 09/08/23 0028                    Microbiologic Results:  Microbiology Results (last 7 days)       Procedure Component Value Units Date/Time    Blood culture #1 **CANNOT BE ORDERED STAT** [[id number]] Collected: 09/08/23 0014    Order Status: Completed Specimen: Blood from Antecubital, Right Hand Updated: 09/09/23 1032     Blood Culture, Routine No Growth to date      No Growth to date    Blood culture #2 **CANNOT BE ORDERED STAT** [[id number]] Collected: 09/08/23 0014    Order Status: Completed Specimen: Blood from Antecubital, Left Arm Updated: 09/09/23 1032     Blood Culture, Routine No Growth to date      No Growth to date

## 2023-09-09 NOTE — NURSING
Nurses Note -- 4 Eyes      9/8/2023   10:05 PM      Skin assessed during: Daily Assessment      [] No Altered Skin Integrity Present    []Prevention Measures Documented      [x] Yes- Altered Skin Integrity Present or Discovered   [x] LDA Added if Not in Epic (Describe Wound)   [x] New Altered Skin Integrity was Present on Admit and Documented in LDA   [x] Wound Image Taken    Wound Care Consulted? Yes    Attending Nurse:  Karina Crawford LPN    Second RN/Staff Member:   Roberto Emmanuel RN

## 2023-09-09 NOTE — PLAN OF CARE
Plan of care reviewed with patient. Patient verbalized complete understanding. Afebrile throughout shift. Antibiotics administered as scheduled. Wound cleaned, repacked and dressed. All fall precautions maintained. Bed in lowest position, locked, call light within reach. Side rails up x's 2. Slip resistant socks maintained.

## 2023-09-09 NOTE — PROGRESS NOTES
[facility] Medicine  Progress Note    Patient Name: Magdaleno Cunningham  MRN: [id number]  Patient Class: OP- Observation   Admission Date: 9/7/2023  Length of Stay: 0 days  Attending Physician: Sourav Owens MD  Primary Care Provider: Venkata Mclaughlin MD        Subjective:     Principal Problem:Cellulitis of gluteal region        HPI:  Hector Cunningham is a 57 year old male with a history of IDDM2, obesity BMI 37, Afib on eliquis, COPD, ROSAMARIA on CPAP, HTN, CAD s/p stents and CABG, and MSSA bacteremia who presents today with a compliant of body aches, chills and fever. He was recently admitted for a perirectal abscess and cellulitis that required surgical drainage. He stated that he didn't want to stay in the hospital, and begged to be discharged, which he was after surgical clearance. He states that since he's been home, he has had uncontrolled pain, fever, and chills. He states the pain from his buttock abscess site is wrapping around his abdomen. He reports tenderness to the I&D site with yellow drainage. He denies all other complaint.      ED work up included a CBC which was unremarkable. CMP showed mild hyponatremia and moderate malnutrition. Lactic acid level normal at 1.9. CT of the abdomen and pelvis with contrast revealed cellulitis of the right gluteal fold with no abscess. He was initiated on Vancomycin and Zosyn. Hospital Medicine consulted for admission and further management.      Overview/Hospital Course:  No notes on file    Interval History:  Patient continuing to experience severe right gluteal pain status post recent incision and drainage of perirectal abscess.  Dr. Spears following.  Currently afebrile.  Having great difficulty sitting on his buttock.    Review of Systems   Constitutional:  Positive for chills and fever.   HENT:  Negative for congestion and sore throat.    Eyes:  Negative for visual disturbance.   Respiratory:  Negative for cough and shortness of breath.     Cardiovascular:  Negative for chest pain and palpitations.   Gastrointestinal:  Positive for abdominal pain. Negative for constipation, diarrhea, nausea and vomiting.   Endocrine: Negative for cold intolerance and heat intolerance.   Genitourinary:  Negative for dysuria and hematuria.   Musculoskeletal:  Positive for arthralgias. Negative for myalgias.   Skin:  Positive for color change and wound. Negative for rash.   Neurological:  Negative for tremors and seizures.   Hematological:  Negative for adenopathy. Does not bruise/bleed easily.   All other systems reviewed and are negative.    Objective:     Vital Signs (Most Recent):  Temp: 97.1 °F (36.2 °C) (09/09/23 1113)  Pulse: (!) 57 (09/09/23 1113)  Resp: 18 (09/09/23 1223)  BP: (!) 151/76 (09/09/23 1113)  SpO2: 98 % (09/09/23 1113) Vital Signs (24h Range):  Temp:  [96.3 °F (35.7 °C)-98 °F (36.7 °C)] 97.1 °F (36.2 °C)  Pulse:  [56-65] 57  Resp:  [16-18] 18  SpO2:  [95 %-98 %] 98 %  BP: (101-151)/(55-76) 151/76     Weight: 117.9 kg (260 lb)  Body mass index is 36.26 kg/m².    Intake/Output Summary (Last 24 hours) at 9/9/2023 1238  Last data filed at 9/9/2023 0501  Gross per 24 hour   Intake 952.19 ml   Output 2 ml   Net 950.19 ml         Physical Exam  Vitals and nursing note reviewed.   Constitutional:       General: He is awake.      Appearance: Normal appearance. He is well-developed. He is morbidly obese. He is not ill-appearing.   HENT:      Head: Normocephalic and atraumatic.      Nose: Nose normal. No septal deviation.   Eyes:      Conjunctiva/sclera: Conjunctivae normal.      Pupils: Pupils are equal, round, and reactive to light.   Neck:      Thyroid: No thyroid mass.      Vascular: No JVD.      Trachea: No tracheal tenderness or tracheal deviation.   Cardiovascular:      Rate and Rhythm: Normal rate and regular rhythm.      Heart sounds: Normal heart sounds, S1 normal and S2 normal. No murmur heard.     No friction rub. No gallop.   Pulmonary:       Effort: Pulmonary effort is normal.      Breath sounds: Normal breath sounds. No decreased breath sounds, wheezing, rhonchi or rales.   Abdominal:      General: Bowel sounds are normal. There is no distension.      Palpations: Abdomen is soft. There is no hepatomegaly, splenomegaly or mass.      Tenderness: There is no abdominal tenderness.   Genitourinary:      Skin:     General: Skin is warm and dry.      Capillary Refill: Capillary refill takes less than 2 seconds.      Findings: Erythema and wound present. No rash.   Neurological:      General: No focal deficit present.      Mental Status: He is alert and oriented to person, place, and time.      Cranial Nerves: No cranial nerve deficit.      Sensory: No sensory deficit.   Psychiatric:         Mood and Affect: Mood normal.         Behavior: Behavior normal. Behavior is cooperative.             Significant Labs: All pertinent labs within the past 24 hours have been reviewed.  CBC:   Recent Labs   Lab 09/07/23 2125 09/08/23 0450 09/09/23  0403   WBC 10.05 9.57 8.22   HGB 13.9* 11.9* 12.3*   HCT 41.1 36.4* 37.1*    290 315     CMP:   Recent Labs   Lab 09/07/23 2125 09/08/23 0450 09/09/23  0403   * 134* 136   K 5.0 3.9 4.1   CL 97 100 102   CO2 25 23 25   * 157* 153*   BUN 6 5* 7   CREATININE 1.2 1.0 1.0   CALCIUM 10.1 8.5* 8.5*   PROT 7.9 6.2 6.5   ALBUMIN 3.2* 2.6* 2.7*   BILITOT 0.3 0.3 0.2   ALKPHOS 64 53* 54*   AST 39 33 51*   ALT 30 25 32   ANIONGAP 12 11 9     Microbiology Results (last 7 days)       Procedure Component Value Units Date/Time    Blood culture #1 **CANNOT BE ORDERED STAT** [[id number]] Collected: 09/08/23 0014    Order Status: Completed Specimen: Blood from Antecubital, Right Hand Updated: 09/09/23 1032     Blood Culture, Routine No Growth to date      No Growth to date    Blood culture #2 **CANNOT BE ORDERED STAT** [[id number]] Collected: 09/08/23 0014    Order Status: Completed Specimen: Blood from Antecubital, Left Arm  Updated: 09/09/23 1032     Blood Culture, Routine No Growth to date      No Growth to date          Significant Imaging:   CT abdomen and pelvis with contrast:  1. Cellulitis of the right medial gluteal fold with a suspected small focus of soft tissue gas which may be postoperative.  No rim enhancing fluid collection or perianal abscess.  2. Mild nonspecific mesenteric fat stranding which may be related to mild enteritis.  3. Hepatic steatosis and hepatomegaly.      Assessment/Plan:      * Cellulitis of gluteal region  Follow General surgery recommendations.  IV antibotics-vancomycin and zosyn  Pain meds PRN    Malnutrition of moderate degree  Nutrition consulted. Most recent weight and BMI monitored-     Measurements:  Wt Readings from Last 1 Encounters:   09/08/23 117.9 kg (260 lb)   Body mass index is 36.26 kg/m².    Patient has been screened and assessed by RD.    Malnutrition Type:  Context:    Level:      Malnutrition Characteristic Summary:       Interventions/Recommendations (treatment strategy):       Hyponatremia  IV fluid hydration  Recent Labs   Lab 09/09/23  0403          HLD (hyperlipidemia)     Patient is chronically on statin.will continue for now. Monitor clinically. Last LDL was   Lab Results   Component Value Date    LDLCALC 23.2 (L) 01/10/2022          Obesity (BMI 30-39.9)  Body mass index is 36.26 kg/m². Morbid obesity complicates all aspects of disease management from diagnostic modalities to treatment. Weight loss encouraged and health benefits explained to patient.         S/P CABG (coronary artery bypass graft)    Noted, on tele    Atrial fibrillation  Patient with Paroxysmal (<7 days) atrial fibrillation which is controlled currently with Beta Blocker. Patient is currently in sinus rhythm.[id number] Score: 2. Anticoagulation indicated. Anticoagulation done with eliquis.    Coronary artery disease involving native coronary artery of native heart    Patient with known CAD s/p CABG,  which is controlled Will continue ASA and Statin and monitor for S/Sx of angina/ACS. Continue to monitor on telemetry.       ROSAMARIA (obstructive sleep apnea)    Noted, chronic  CPAP prn    Hypertension associated with diabetes    Chronic, controlled.  Latest blood pressure and vitals reviewed-     Temp:  [96.3 °F (35.7 °C)-98 °F (36.7 °C)]   Pulse:  [56-65]   Resp:  [16-18]   BP: (101-151)/(55-76)   SpO2:  [95 %-98 %] .   Home meds for hypertension were reviewed and noted below-  Hypertension Medications             furosemide (LASIX) 20 MG tablet Take 20 mg by mouth 2 (two) times daily.    isosorbide mononitrate (IMDUR) 60 MG 24 hr tablet Take 1 tablet (60 mg total) by mouth once daily.    lisinopriL (PRINIVIL,ZESTRIL) 20 MG tablet Take 10 mg by mouth 2 (two) times daily.    metoprolol succinate (TOPROL-XL) 100 MG 24 hr tablet Take 100 mg by mouth 2 (two) times daily.    nitroGLYCERIN (NITROSTAT) 0.4 MG SL tablet Place 0.4 mg under the tongue every 5 (five) minutes as needed for Chest pain.          While in the hospital, will manage blood pressure as follows; Continue home antihypertensive regimen    Will utilize p.r.n. blood pressure medication only if patient's blood pressure greater than 180/110 and he develops symptoms such as worsening chest pain or shortness of breath.      Type 2 diabetes mellitus with diabetic arthropathy    Patient's FSGs are controlled on current medication regimen.  Last A1c reviewed-   Lab Results   Component Value Date    HGBA1C 8.8 (H) 08/31/2022     Most recent fingerstick glucose reviewed-   Recent Labs   Lab 09/08/23  1708 09/08/23  2111   POCTGLUCOSE 160* 228*     Current correctional scale  Medium  Maintain anti-hyperglycemic dose as follows-   Antihyperglycemics (From admission, onward)    Start     Stop Route Frequency Ordered    09/08/23 0900  insulin detemir U-100 (Levemir) pen 80 Units         -- SubQ 2 times daily 09/08/23 0045    09/08/23 0143  insulin aspart U-100 pen 0-10  Units         -- SubQ Before meals & nightly PRN 09/08/23 0045        Hold Oral hypoglycemics while patient is in the hospital.        VTE Risk Mitigation (From admission, onward)         Ordered     apixaban tablet 5 mg  2 times daily         09/08/23 0045     IP VTE HIGH RISK PATIENT  Once         09/08/23 0045     Place sequential compression device  Until discontinued         09/08/23 0045     Reason for No Pharmacological VTE Prophylaxis  Once        Question:  Reasons:  Answer:  Already adequately anticoagulated on oral Anticoagulants    09/08/23 0045                Discharge Planning   PEDRO: 9/10/2023     Code Status: Full Code   Is the patient medically ready for discharge?:     Reason for patient still in hospital (select all that apply): Patient trending condition and Consult recommendations  Discharge Plan A: Home with family                  Sourav Owens MD  Department of Hospital Medicine   [facility]/Surg

## 2023-09-09 NOTE — CARE UPDATE
09/09/23 0811   Patient Assessment/Suction   Level of Consciousness (AVPU) alert   Respiratory Effort Normal;Unlabored   Expansion/Accessory Muscles/Retractions no use of accessory muscles;no retractions;expansion symmetric   Rhythm/Pattern, Respiratory unlabored;pattern regular;depth regular   Cough Frequency no cough   PRE-TX-O2   Device (Oxygen Therapy) room air   SpO2 96 %   Pulse Oximetry Type Intermittent   $ Pulse Oximetry - Multiple Charge Pulse Oximetry - Multiple   Pulse (!) 57   Resp 16   Positioning HOB elevated 45 degrees   Education   $ Education 15 min

## 2023-09-09 NOTE — ASSESSMENT & PLAN NOTE
Patient with Paroxysmal (<7 days) atrial fibrillation which is controlled currently with Beta Blocker. Patient is currently in sinus rhythm.[id number] Score: 2. Anticoagulation indicated. Anticoagulation done with eliquis.

## 2023-09-09 NOTE — ASSESSMENT & PLAN NOTE
Chronic, controlled.  Latest blood pressure and vitals reviewed-     Temp:  [96.3 °F (35.7 °C)-98 °F (36.7 °C)]   Pulse:  [56-65]   Resp:  [16-18]   BP: (101-151)/(55-76)   SpO2:  [95 %-98 %] .   Home meds for hypertension were reviewed and noted below-  Hypertension Medications             furosemide (LASIX) 20 MG tablet Take 20 mg by mouth 2 (two) times daily.    isosorbide mononitrate (IMDUR) 60 MG 24 hr tablet Take 1 tablet (60 mg total) by mouth once daily.    lisinopriL (PRINIVIL,ZESTRIL) 20 MG tablet Take 10 mg by mouth 2 (two) times daily.    metoprolol succinate (TOPROL-XL) 100 MG 24 hr tablet Take 100 mg by mouth 2 (two) times daily.    nitroGLYCERIN (NITROSTAT) 0.4 MG SL tablet Place 0.4 mg under the tongue every 5 (five) minutes as needed for Chest pain.          While in the hospital, will manage blood pressure as follows; Continue home antihypertensive regimen    Will utilize p.r.n. blood pressure medication only if patient's blood pressure greater than 180/110 and he develops symptoms such as worsening chest pain or shortness of breath.

## 2023-09-09 NOTE — PROGRESS NOTES
Doing well   Continue admission for pain control  Vitals labs reviewed   Dressing in place over wound - changed this am    Ap   I and d kuldeep rectal abscess  Wound care   Antibiotics   Pain control

## 2023-09-09 NOTE — ASSESSMENT & PLAN NOTE
Patient's FSGs are controlled on current medication regimen.  Last A1c reviewed-   Lab Results   Component Value Date    HGBA1C 8.8 (H) 08/31/2022     Most recent fingerstick glucose reviewed-   Recent Labs   Lab 09/08/23  1708 09/08/23  2111   POCTGLUCOSE 160* 228*     Current correctional scale  Medium  Maintain anti-hyperglycemic dose as follows-   Antihyperglycemics (From admission, onward)    Start     Stop Route Frequency Ordered    09/08/23 0900  insulin detemir U-100 (Levemir) pen 80 Units         -- SubQ 2 times daily 09/08/23 0045    09/08/23 0143  insulin aspart U-100 pen 0-10 Units         -- SubQ Before meals & nightly PRN 09/08/23 0045        Hold Oral hypoglycemics while patient is in the hospital.

## 2023-09-10 LAB
ALBUMIN SERPL BCP-MCNC: 2.8 G/DL (ref 3.5–5.2)
ALP SERPL-CCNC: 54 U/L (ref 55–135)
ALT SERPL W/O P-5'-P-CCNC: 31 U/L (ref 10–44)
ANION GAP SERPL CALC-SCNC: 8 MMOL/L (ref 8–16)
AST SERPL-CCNC: 42 U/L (ref 10–40)
BASOPHILS # BLD AUTO: 0.05 K/UL (ref 0–0.2)
BASOPHILS NFR BLD: 0.6 % (ref 0–1.9)
BILIRUB SERPL-MCNC: 0.2 MG/DL (ref 0.1–1)
BUN SERPL-MCNC: 8 MG/DL (ref 6–20)
CALCIUM SERPL-MCNC: 8.3 MG/DL (ref 8.7–10.5)
CHLORIDE SERPL-SCNC: 101 MMOL/L (ref 95–110)
CO2 SERPL-SCNC: 26 MMOL/L (ref 23–29)
CREAT SERPL-MCNC: 1 MG/DL (ref 0.5–1.4)
DIFFERENTIAL METHOD: ABNORMAL
EOSINOPHIL # BLD AUTO: 0.2 K/UL (ref 0–0.5)
EOSINOPHIL NFR BLD: 2.3 % (ref 0–8)
ERYTHROCYTE [DISTWIDTH] IN BLOOD BY AUTOMATED COUNT: 13 % (ref 11.5–14.5)
EST. GFR  (NO RACE VARIABLE): >60 ML/MIN/1.73 M^2
GLUCOSE SERPL-MCNC: 101 MG/DL (ref 70–110)
HCT VFR BLD AUTO: 36.8 % (ref 40–54)
HGB BLD-MCNC: 12.4 G/DL (ref 14–18)
IMM GRANULOCYTES # BLD AUTO: 0.07 K/UL (ref 0–0.04)
IMM GRANULOCYTES NFR BLD AUTO: 0.8 % (ref 0–0.5)
LYMPHOCYTES # BLD AUTO: 3.4 K/UL (ref 1–4.8)
LYMPHOCYTES NFR BLD: 39.7 % (ref 18–48)
MAGNESIUM SERPL-MCNC: 1.9 MG/DL (ref 1.6–2.6)
MCH RBC QN AUTO: 31 PG (ref 27–31)
MCHC RBC AUTO-ENTMCNC: 33.7 G/DL (ref 32–36)
MCV RBC AUTO: 92 FL (ref 82–98)
MONOCYTES # BLD AUTO: 0.4 K/UL (ref 0.3–1)
MONOCYTES NFR BLD: 4.9 % (ref 4–15)
NEUTROPHILS # BLD AUTO: 4.5 K/UL (ref 1.8–7.7)
NEUTROPHILS NFR BLD: 51.7 % (ref 38–73)
NRBC BLD-RTO: 0 /100 WBC
PHOSPHATE SERPL-MCNC: 2.8 MG/DL (ref 2.7–4.5)
PLATELET # BLD AUTO: 308 K/UL (ref 150–450)
PMV BLD AUTO: 9 FL (ref 9.2–12.9)
POCT GLUCOSE: 133 MG/DL (ref 70–110)
POCT GLUCOSE: 217 MG/DL (ref 70–110)
POTASSIUM SERPL-SCNC: 3.9 MMOL/L (ref 3.5–5.1)
PROT SERPL-MCNC: 6.7 G/DL (ref 6–8.4)
RBC # BLD AUTO: 4 M/UL (ref 4.6–6.2)
SODIUM SERPL-SCNC: 135 MMOL/L (ref 136–145)
WBC # BLD AUTO: 8.63 K/UL (ref 3.9–12.7)

## 2023-09-10 PROCEDURE — 63600175 PHARM REV CODE 636 W HCPCS: Performed by: NURSE PRACTITIONER

## 2023-09-10 PROCEDURE — 80053 COMPREHEN METABOLIC PANEL: CPT | Performed by: NURSE PRACTITIONER

## 2023-09-10 PROCEDURE — 84100 ASSAY OF PHOSPHORUS: CPT | Performed by: NURSE PRACTITIONER

## 2023-09-10 PROCEDURE — 99900031 HC PATIENT EDUCATION (STAT)

## 2023-09-10 PROCEDURE — 25000003 PHARM REV CODE 250: Performed by: NURSE PRACTITIONER

## 2023-09-10 PROCEDURE — 96366 THER/PROPH/DIAG IV INF ADDON: CPT

## 2023-09-10 PROCEDURE — 36415 COLL VENOUS BLD VENIPUNCTURE: CPT | Performed by: INTERNAL MEDICINE

## 2023-09-10 PROCEDURE — 83735 ASSAY OF MAGNESIUM: CPT | Performed by: NURSE PRACTITIONER

## 2023-09-10 PROCEDURE — 36415 COLL VENOUS BLD VENIPUNCTURE: CPT | Performed by: NURSE PRACTITIONER

## 2023-09-10 PROCEDURE — 96376 TX/PRO/DX INJ SAME DRUG ADON: CPT

## 2023-09-10 PROCEDURE — 94761 N-INVAS EAR/PLS OXIMETRY MLT: CPT

## 2023-09-10 PROCEDURE — 94799 UNLISTED PULMONARY SVC/PX: CPT

## 2023-09-10 PROCEDURE — 80202 ASSAY OF VANCOMYCIN: CPT | Performed by: INTERNAL MEDICINE

## 2023-09-10 PROCEDURE — 11000001 HC ACUTE MED/SURG PRIVATE ROOM

## 2023-09-10 PROCEDURE — 85025 COMPLETE CBC W/AUTO DIFF WBC: CPT | Performed by: NURSE PRACTITIONER

## 2023-09-10 RX ADMIN — PIPERACILLIN AND TAZOBACTAM 4.5 G: 4; .5 INJECTION, POWDER, LYOPHILIZED, FOR SOLUTION INTRAVENOUS; PARENTERAL at 11:09

## 2023-09-10 RX ADMIN — APIXABAN 5 MG: 2.5 TABLET, FILM COATED ORAL at 09:09

## 2023-09-10 RX ADMIN — ASPIRIN 81 MG CHEWABLE TABLET 81 MG: 81 TABLET CHEWABLE at 08:09

## 2023-09-10 RX ADMIN — PIPERACILLIN AND TAZOBACTAM 4.5 G: 4; .5 INJECTION, POWDER, LYOPHILIZED, FOR SOLUTION INTRAVENOUS; PARENTERAL at 04:09

## 2023-09-10 RX ADMIN — RANOLAZINE 500 MG: 500 TABLET, FILM COATED, EXTENDED RELEASE ORAL at 08:09

## 2023-09-10 RX ADMIN — GABAPENTIN 600 MG: 300 CAPSULE ORAL at 08:09

## 2023-09-10 RX ADMIN — RANOLAZINE 500 MG: 500 TABLET, FILM COATED, EXTENDED RELEASE ORAL at 09:09

## 2023-09-10 RX ADMIN — SENNOSIDES AND DOCUSATE SODIUM 1 TABLET: 8.6; 5 TABLET ORAL at 08:09

## 2023-09-10 RX ADMIN — PANTOPRAZOLE SODIUM 40 MG: 40 TABLET, DELAYED RELEASE ORAL at 09:09

## 2023-09-10 RX ADMIN — ISOSORBIDE MONONITRATE 60 MG: 60 TABLET, EXTENDED RELEASE ORAL at 08:09

## 2023-09-10 RX ADMIN — METHOCARBAMOL 500 MG: 500 TABLET ORAL at 09:09

## 2023-09-10 RX ADMIN — CLONAZEPAM 1 MG: 0.5 TABLET ORAL at 08:09

## 2023-09-10 RX ADMIN — TAMSULOSIN HYDROCHLORIDE 0.4 MG: 0.4 CAPSULE ORAL at 09:09

## 2023-09-10 RX ADMIN — PIPERACILLIN AND TAZOBACTAM 4.5 G: 4; .5 INJECTION, POWDER, LYOPHILIZED, FOR SOLUTION INTRAVENOUS; PARENTERAL at 08:09

## 2023-09-10 RX ADMIN — MORPHINE SULFATE 4 MG: 4 INJECTION, SOLUTION INTRAMUSCULAR; INTRAVENOUS at 02:09

## 2023-09-10 RX ADMIN — MORPHINE SULFATE 4 MG: 4 INJECTION, SOLUTION INTRAMUSCULAR; INTRAVENOUS at 08:09

## 2023-09-10 RX ADMIN — VANCOMYCIN HYDROCHLORIDE 1500 MG: 1.5 INJECTION, POWDER, LYOPHILIZED, FOR SOLUTION INTRAVENOUS at 12:09

## 2023-09-10 RX ADMIN — INSULIN DETEMIR 80 UNITS: 100 INJECTION, SOLUTION SUBCUTANEOUS at 08:09

## 2023-09-10 RX ADMIN — GABAPENTIN 600 MG: 300 CAPSULE ORAL at 09:09

## 2023-09-10 RX ADMIN — MORPHINE SULFATE 4 MG: 4 INJECTION, SOLUTION INTRAMUSCULAR; INTRAVENOUS at 09:09

## 2023-09-10 RX ADMIN — INSULIN ASPART 2 UNITS: 100 INJECTION, SOLUTION INTRAVENOUS; SUBCUTANEOUS at 09:09

## 2023-09-10 RX ADMIN — INSULIN DETEMIR 80 UNITS: 100 INJECTION, SOLUTION SUBCUTANEOUS at 09:09

## 2023-09-10 RX ADMIN — MORPHINE SULFATE 4 MG: 4 INJECTION, SOLUTION INTRAMUSCULAR; INTRAVENOUS at 03:09

## 2023-09-10 RX ADMIN — PANTOPRAZOLE SODIUM 40 MG: 40 TABLET, DELAYED RELEASE ORAL at 08:09

## 2023-09-10 RX ADMIN — APIXABAN 5 MG: 2.5 TABLET, FILM COATED ORAL at 08:09

## 2023-09-10 RX ADMIN — CETIRIZINE HYDROCHLORIDE 10 MG: 10 TABLET, FILM COATED ORAL at 08:09

## 2023-09-10 RX ADMIN — CLONAZEPAM 1 MG: 0.5 TABLET ORAL at 09:09

## 2023-09-10 RX ADMIN — PRAVASTATIN SODIUM 20 MG: 10 TABLET ORAL at 08:09

## 2023-09-10 NOTE — SUBJECTIVE & OBJECTIVE
Interval History:  Patient continuing to experience significant right gluteal pain status post recent incision and drainage of perirectal abscess.  Compared to yesterday, reports some improvement in pain.  Dr. Spears following.  Currently afebrile.  Having great difficulty sitting on his buttock.    Review of Systems   Constitutional:  Positive for chills and fever.   HENT:  Negative for congestion and sore throat.    Eyes:  Negative for visual disturbance.   Respiratory:  Negative for cough and shortness of breath.    Cardiovascular:  Negative for chest pain and palpitations.   Gastrointestinal:  Positive for abdominal pain. Negative for constipation, diarrhea, nausea and vomiting.   Endocrine: Negative for cold intolerance and heat intolerance.   Genitourinary:  Negative for dysuria and hematuria.   Musculoskeletal:  Positive for arthralgias. Negative for myalgias.   Skin:  Positive for color change and wound. Negative for rash.   Neurological:  Negative for tremors and seizures.   Hematological:  Negative for adenopathy. Does not bruise/bleed easily.   All other systems reviewed and are negative.    Objective:     Vital Signs (Most Recent):  Temp: 97.1 °F (36.2 °C) (09/10/23 0727)  Pulse: 63 (09/10/23 0748)  Resp: 16 (09/10/23 0847)  BP: 103/63 (09/10/23 0727)  SpO2: 96 % (09/10/23 0748) Vital Signs (24h Range):  Temp:  [96.7 °F (35.9 °C)-98.5 °F (36.9 °C)] 97.1 °F (36.2 °C)  Pulse:  [56-67] 63  Resp:  [16-20] 16  SpO2:  [92 %-98 %] 96 %  BP: ()/(52-76) 103/63     Weight: 117.9 kg (260 lb)  Body mass index is 36.26 kg/m².    Intake/Output Summary (Last 24 hours) at 9/10/2023 1003  Last data filed at 9/9/2023 1616  Gross per 24 hour   Intake 226.4 ml   Output 200 ml   Net 26.4 ml           Physical Exam  Vitals and nursing note reviewed.   Constitutional:       General: He is awake.      Appearance: Normal appearance. He is well-developed. He is morbidly obese. He is not ill-appearing.   HENT:      Head:  Normocephalic and atraumatic.      Nose: Nose normal. No septal deviation.   Eyes:      Conjunctiva/sclera: Conjunctivae normal.      Pupils: Pupils are equal, round, and reactive to light.   Neck:      Thyroid: No thyroid mass.      Vascular: No JVD.      Trachea: No tracheal tenderness or tracheal deviation.   Cardiovascular:      Rate and Rhythm: Normal rate and regular rhythm.      Heart sounds: Normal heart sounds, S1 normal and S2 normal. No murmur heard.     No friction rub. No gallop.   Pulmonary:      Effort: Pulmonary effort is normal.      Breath sounds: Normal breath sounds. No decreased breath sounds, wheezing, rhonchi or rales.   Abdominal:      General: Bowel sounds are normal. There is no distension.      Palpations: Abdomen is soft. There is no hepatomegaly, splenomegaly or mass.      Tenderness: There is no abdominal tenderness.   Genitourinary:      Skin:     General: Skin is warm and dry.      Capillary Refill: Capillary refill takes less than 2 seconds.      Findings: Erythema and wound present. No rash.   Neurological:      General: No focal deficit present.      Mental Status: He is alert and oriented to person, place, and time.      Cranial Nerves: No cranial nerve deficit.      Sensory: No sensory deficit.   Psychiatric:         Mood and Affect: Mood normal.         Behavior: Behavior normal. Behavior is cooperative.             Significant Labs: All pertinent labs within the past 24 hours have been reviewed.  CBC:   Recent Labs   Lab 09/09/23  0403 09/10/23  0403   WBC 8.22 8.63   HGB 12.3* 12.4*   HCT 37.1* 36.8*    308       CMP:   Recent Labs   Lab 09/09/23  0403 09/10/23  0403    135*   K 4.1 3.9    101   CO2 25 26   * 101   BUN 7 8   CREATININE 1.0 1.0   CALCIUM 8.5* 8.3*   PROT 6.5 6.7   ALBUMIN 2.7* 2.8*   BILITOT 0.2 0.2   ALKPHOS 54* 54*   AST 51* 42*   ALT 32 31   ANIONGAP 9 8       Microbiology Results (last 7 days)       Procedure Component Value Units  Date/Time    Blood culture #1 **CANNOT BE ORDERED STAT** [[id number]] Collected: 09/08/23 0014    Order Status: Completed Specimen: Blood from Antecubital, Right Hand Updated: 09/09/23 1032     Blood Culture, Routine No Growth to date      No Growth to date    Blood culture #2 **CANNOT BE ORDERED STAT** [[id number]] Collected: 09/08/23 0014    Order Status: Completed Specimen: Blood from Antecubital, Left Arm Updated: 09/09/23 1032     Blood Culture, Routine No Growth to date      No Growth to date          Significant Imaging:   CT abdomen and pelvis with contrast:  1. Cellulitis of the right medial gluteal fold with a suspected small focus of soft tissue gas which may be postoperative.  No rim enhancing fluid collection or perianal abscess.  2. Mild nonspecific mesenteric fat stranding which may be related to mild enteritis.  3. Hepatic steatosis and hepatomegaly.

## 2023-09-10 NOTE — CARE UPDATE
09/10/23 0748   Patient Assessment/Suction   Level of Consciousness (AVPU) alert   Respiratory Effort Normal;Unlabored   Expansion/Accessory Muscles/Retractions no use of accessory muscles;no retractions;expansion symmetric   Rhythm/Pattern, Respiratory unlabored;pattern regular;depth regular;no shortness of breath reported   Cough Frequency no cough   PRE-TX-O2   Device (Oxygen Therapy) room air   SpO2 96 %   Pulse Oximetry Type Intermittent   $ Pulse Oximetry - Multiple Charge Pulse Oximetry - Multiple   Pulse 63   Resp 18   Positioning Sitting in chair   Education   $ Education 15 min

## 2023-09-10 NOTE — PROGRESS NOTES
[facility] Medicine  Progress Note    Patient Name: Magdaleno Cunningham  MRN: [id number]  Patient Class: OP- Observation   Admission Date: 9/7/2023  Length of Stay: 0 days  Attending Physician: Sourav Owens MD  Primary Care Provider: Venkata Mclaughlin MD        Subjective:     Principal Problem:Cellulitis of gluteal region        HPI:  Hector Cunningham is a 57 year old male with a history of IDDM2, obesity BMI 37, Afib on eliquis, COPD, ROSAMARIA on CPAP, HTN, CAD s/p stents and CABG, and MSSA bacteremia who presents today with a compliant of body aches, chills and fever. He was recently admitted for a perirectal abscess and cellulitis that required surgical drainage. He stated that he didn't want to stay in the hospital, and begged to be discharged, which he was after surgical clearance. He states that since he's been home, he has had uncontrolled pain, fever, and chills. He states the pain from his buttock abscess site is wrapping around his abdomen. He reports tenderness to the I&D site with yellow drainage. He denies all other complaint.      ED work up included a CBC which was unremarkable. CMP showed mild hyponatremia and moderate malnutrition. Lactic acid level normal at 1.9. CT of the abdomen and pelvis with contrast revealed cellulitis of the right gluteal fold with no abscess. He was initiated on Vancomycin and Zosyn. Hospital Medicine consulted for admission and further management.      Overview/Hospital Course:  No notes on file    Interval History:  Patient continuing to experience significant right gluteal pain status post recent incision and drainage of perirectal abscess.  Compared to yesterday, reports some improvement in pain.  Dr. Spears following.  Currently afebrile.  Having great difficulty sitting on his buttock.    Review of Systems   Constitutional:  Positive for chills and fever.   HENT:  Negative for congestion and sore throat.    Eyes:  Negative for visual  disturbance.   Respiratory:  Negative for cough and shortness of breath.    Cardiovascular:  Negative for chest pain and palpitations.   Gastrointestinal:  Positive for abdominal pain. Negative for constipation, diarrhea, nausea and vomiting.   Endocrine: Negative for cold intolerance and heat intolerance.   Genitourinary:  Negative for dysuria and hematuria.   Musculoskeletal:  Positive for arthralgias. Negative for myalgias.   Skin:  Positive for color change and wound. Negative for rash.   Neurological:  Negative for tremors and seizures.   Hematological:  Negative for adenopathy. Does not bruise/bleed easily.   All other systems reviewed and are negative.    Objective:     Vital Signs (Most Recent):  Temp: 97.1 °F (36.2 °C) (09/10/23 0727)  Pulse: 63 (09/10/23 0748)  Resp: 16 (09/10/23 0847)  BP: 103/63 (09/10/23 0727)  SpO2: 96 % (09/10/23 0748) Vital Signs (24h Range):  Temp:  [96.7 °F (35.9 °C)-98.5 °F (36.9 °C)] 97.1 °F (36.2 °C)  Pulse:  [56-67] 63  Resp:  [16-20] 16  SpO2:  [92 %-98 %] 96 %  BP: ()/(52-76) 103/63     Weight: 117.9 kg (260 lb)  Body mass index is 36.26 kg/m².    Intake/Output Summary (Last 24 hours) at 9/10/2023 1003  Last data filed at 9/9/2023 1616  Gross per 24 hour   Intake 226.4 ml   Output 200 ml   Net 26.4 ml           Physical Exam  Vitals and nursing note reviewed.   Constitutional:       General: He is awake.      Appearance: Normal appearance. He is well-developed. He is morbidly obese. He is not ill-appearing.   HENT:      Head: Normocephalic and atraumatic.      Nose: Nose normal. No septal deviation.   Eyes:      Conjunctiva/sclera: Conjunctivae normal.      Pupils: Pupils are equal, round, and reactive to light.   Neck:      Thyroid: No thyroid mass.      Vascular: No JVD.      Trachea: No tracheal tenderness or tracheal deviation.   Cardiovascular:      Rate and Rhythm: Normal rate and regular rhythm.      Heart sounds: Normal heart sounds, S1 normal and S2 normal. No  murmur heard.     No friction rub. No gallop.   Pulmonary:      Effort: Pulmonary effort is normal.      Breath sounds: Normal breath sounds. No decreased breath sounds, wheezing, rhonchi or rales.   Abdominal:      General: Bowel sounds are normal. There is no distension.      Palpations: Abdomen is soft. There is no hepatomegaly, splenomegaly or mass.      Tenderness: There is no abdominal tenderness.   Genitourinary:      Skin:     General: Skin is warm and dry.      Capillary Refill: Capillary refill takes less than 2 seconds.      Findings: Erythema and wound present. No rash.   Neurological:      General: No focal deficit present.      Mental Status: He is alert and oriented to person, place, and time.      Cranial Nerves: No cranial nerve deficit.      Sensory: No sensory deficit.   Psychiatric:         Mood and Affect: Mood normal.         Behavior: Behavior normal. Behavior is cooperative.             Significant Labs: All pertinent labs within the past 24 hours have been reviewed.  CBC:   Recent Labs   Lab 09/09/23  0403 09/10/23  0403   WBC 8.22 8.63   HGB 12.3* 12.4*   HCT 37.1* 36.8*    308       CMP:   Recent Labs   Lab 09/09/23  0403 09/10/23  0403    135*   K 4.1 3.9    101   CO2 25 26   * 101   BUN 7 8   CREATININE 1.0 1.0   CALCIUM 8.5* 8.3*   PROT 6.5 6.7   ALBUMIN 2.7* 2.8*   BILITOT 0.2 0.2   ALKPHOS 54* 54*   AST 51* 42*   ALT 32 31   ANIONGAP 9 8       Microbiology Results (last 7 days)       Procedure Component Value Units Date/Time    Blood culture #1 **CANNOT BE ORDERED STAT** [[id number]] Collected: 09/08/23 0014    Order Status: Completed Specimen: Blood from Antecubital, Right Hand Updated: 09/09/23 1032     Blood Culture, Routine No Growth to date      No Growth to date    Blood culture #2 **CANNOT BE ORDERED STAT** [[id number]] Collected: 09/08/23 0014    Order Status: Completed Specimen: Blood from Antecubital, Left Arm Updated: 09/09/23 1032     Blood Culture,  Routine No Growth to date      No Growth to date          Significant Imaging:   CT abdomen and pelvis with contrast:  1. Cellulitis of the right medial gluteal fold with a suspected small focus of soft tissue gas which may be postoperative.  No rim enhancing fluid collection or perianal abscess.  2. Mild nonspecific mesenteric fat stranding which may be related to mild enteritis.  3. Hepatic steatosis and hepatomegaly.      Assessment/Plan:      * Cellulitis of gluteal region  Follow General surgery recommendations.  IV antibotics-vancomycin and zosyn  Pain meds PRN    Malnutrition of moderate degree  Nutrition consulted. Most recent weight and BMI monitored-     Measurements:  Wt Readings from Last 1 Encounters:   09/08/23 117.9 kg (260 lb)   Body mass index is 36.26 kg/m².    Patient has been screened and assessed by RD.    Malnutrition Type:  Context:    Level:      Malnutrition Characteristic Summary:       Interventions/Recommendations (treatment strategy):       Hyponatremia  IV fluid hydration  Recent Labs   Lab 09/09/23  0403          HLD (hyperlipidemia)     Patient is chronically on statin.will continue for now. Monitor clinically. Last LDL was   Lab Results   Component Value Date    LDLCALC 23.2 (L) 01/10/2022          Obesity (BMI 30-39.9)  Body mass index is 36.26 kg/m². Morbid obesity complicates all aspects of disease management from diagnostic modalities to treatment. Weight loss encouraged and health benefits explained to patient.         S/P CABG (coronary artery bypass graft)    Noted, on tele    Atrial fibrillation  Patient with Paroxysmal (<7 days) atrial fibrillation which is controlled currently with Beta Blocker. Patient is currently in sinus rhythm.[id number] Score: 2. Anticoagulation indicated. Anticoagulation done with eliquis.    Coronary artery disease involving native coronary artery of native heart    Patient with known CAD s/p CABG, which is controlled Will continue ASA and  Statin and monitor for S/Sx of angina/ACS. Continue to monitor on telemetry.       ROSAMARIA (obstructive sleep apnea)    Noted, chronic  CPAP prn    Hypertension associated with diabetes    Chronic, controlled.  Latest blood pressure and vitals reviewed-     Temp:  [96.3 °F (35.7 °C)-98 °F (36.7 °C)]   Pulse:  [56-65]   Resp:  [16-18]   BP: (101-151)/(55-76)   SpO2:  [95 %-98 %] .   Home meds for hypertension were reviewed and noted below-  Hypertension Medications             furosemide (LASIX) 20 MG tablet Take 20 mg by mouth 2 (two) times daily.    isosorbide mononitrate (IMDUR) 60 MG 24 hr tablet Take 1 tablet (60 mg total) by mouth once daily.    lisinopriL (PRINIVIL,ZESTRIL) 20 MG tablet Take 10 mg by mouth 2 (two) times daily.    metoprolol succinate (TOPROL-XL) 100 MG 24 hr tablet Take 100 mg by mouth 2 (two) times daily.    nitroGLYCERIN (NITROSTAT) 0.4 MG SL tablet Place 0.4 mg under the tongue every 5 (five) minutes as needed for Chest pain.          While in the hospital, will manage blood pressure as follows; Continue home antihypertensive regimen    Will utilize p.r.n. blood pressure medication only if patient's blood pressure greater than 180/110 and he develops symptoms such as worsening chest pain or shortness of breath.      Type 2 diabetes mellitus with diabetic arthropathy    Patient's FSGs are controlled on current medication regimen.  Last A1c reviewed-   Lab Results   Component Value Date    HGBA1C 8.8 (H) 08/31/2022     Most recent fingerstick glucose reviewed-   Recent Labs   Lab 09/08/23  1708 09/08/23  2111   POCTGLUCOSE 160* 228*     Current correctional scale  Medium  Maintain anti-hyperglycemic dose as follows-   Antihyperglycemics (From admission, onward)    Start     Stop Route Frequency Ordered    09/08/23 0900  insulin detemir U-100 (Levemir) pen 80 Units         -- SubQ 2 times daily 09/08/23 0045    09/08/23 0143  insulin aspart U-100 pen 0-10 Units         -- SubQ Before meals &  nightly PRN 09/08/23 0045        Hold Oral hypoglycemics while patient is in the hospital.      Still in significant pain, not ready for discharge.  VTE Risk Mitigation (From admission, onward)         Ordered     apixaban tablet 5 mg  2 times daily         09/08/23 0045     IP VTE HIGH RISK PATIENT  Once         09/08/23 0045     Place sequential compression device  Until discontinued         09/08/23 0045     Reason for No Pharmacological VTE Prophylaxis  Once        Question:  Reasons:  Answer:  Already adequately anticoagulated on oral Anticoagulants    09/08/23 0045                Discharge Planning   PEDRO: 9/10/2023     Code Status: Full Code   Is the patient medically ready for discharge?:     Reason for patient still in hospital (select all that apply): Patient trending condition and Consult recommendations  Discharge Plan A: Home with family                  Sourav Owens MD  Department of Hospital Medicine   [facility]/Surg

## 2023-09-10 NOTE — PLAN OF CARE
Plan of care reviewed with patient. Patient verbalized complete understanding. Afebrile throughout shift. Antibiotics, pain control and wound care, new pic uploaded. Ambulating in [place] w/o difficulty. All fall precautions maintained. Bed in lowest position, locked, call light within reach. Side rails up x's 2. Slip resistant socks maintained.

## 2023-09-10 NOTE — PROGRESS NOTES
[facility]/Surg  General Surgery  Progress Note    Subjective:     History of Present Illness:  57-year-old with recent I and D of perianal abscess.  He was discharge after the I&D and returns in severe pain.  He reports things have been healing well the pain is just difficult to control.  Denies any fevers or chills at home.      Post-Op Info:  * No surgery found *         Interval History: still with pain  Otherwise doing ok    Medications:  Continuous Infusions:   sodium chloride 0.9% Stopped (09/08/23 4954)     Scheduled Meds:   apixaban  5 mg Oral BID    aspirin  81 mg Oral Daily    cetirizine  10 mg Oral Daily    clonazePAM  1 mg Oral BID    furosemide  20 mg Oral BID    gabapentin  600 mg Oral BID    insulin detemir U-100  80 Units Subcutaneous BID    isosorbide mononitrate  60 mg Oral Daily    lisinopriL  10 mg Oral BID    methocarbamoL  500 mg Oral QHS    metoprolol succinate  100 mg Oral BID    pantoprazole  40 mg Oral BID    piperacillin-tazobactam (Zosyn) IV (PEDS and ADULTS) (extended infusion is not appropriate)  4.5 g Intravenous Q8H    pravastatin  20 mg Oral Daily    ranolazine  500 mg Oral BID    senna-docusate 8.6-50 mg  1 tablet Oral BID    tamsulosin  0.4 mg Oral QHS    vancomycin (VANCOCIN) IV (PEDS and ADULTS)  1,500 mg Intravenous Q12H     PRN Meds:acetaminophen, acetaminophen, aluminum-magnesium hydroxide-simethicone, dextrose 10%, dextrose 10%, glucagon (human recombinant), glucose, glucose, HYDROcodone-acetaminophen, insulin aspart U-100, magnesium oxide, magnesium oxide, melatonin, morphine, naloxone, ondansetron, potassium bicarbonate, potassium bicarbonate, potassium bicarbonate, potassium, sodium phosphates, potassium, sodium phosphates, potassium, sodium phosphates, simethicone, sodium chloride 0.9%, Pharmacy to dose Vancomycin consult **AND** vancomycin - pharmacy to dose     Review of patient's allergies indicates:   Allergen Reactions     Pesticide Dermatitis and Rash     Objective:     Vital Signs (Most Recent):  Temp: 97.1 °F (36.2 °C) (09/10/23 0727)  Pulse: 63 (09/10/23 0748)  Resp: 16 (09/10/23 0847)  BP: 103/63 (09/10/23 0727)  SpO2: 96 % (09/10/23 0748) Vital Signs (24h Range):  Temp:  [96.7 °F (35.9 °C)-98.5 °F (36.9 °C)] 97.1 °F (36.2 °C)  Pulse:  [56-67] 63  Resp:  [16-20] 16  SpO2:  [92 %-98 %] 96 %  BP: ()/(52-76) 103/63     Weight: 117.9 kg (260 lb)  Body mass index is 36.26 kg/m².    Intake/Output - Last 3 Shifts         09/08 0700 09/09 0659 09/09 0700  09/10 0659 09/10 0700  09/11 0659    P.O.       I.V. (mL/kg) 1141.2 (9.7)      IV Piggyback 695.1 226.4     Total Intake(mL/kg) 1836.2 (15.6) 226.4 (1.9)     Urine (mL/kg/hr) 1002 (0.4) 200 (0.1)     Stool 0      Total Output 1002 200     Net +834.2 +26.4            Urine Occurrence  10 x              Physical Exam  Genitourinary:     Comments:   Wounds healing well   No obvious erythema         Significant Labs:  I have reviewed all pertinent lab results within the past 24 hours.  CBC:   Recent Labs   Lab 09/10/23  0403   WBC 8.63   RBC 4.00*   HGB 12.4*   HCT 36.8*      MCV 92   MCH 31.0   MCHC 33.7     BMP:   Recent Labs   Lab 09/10/23  0403      *   K 3.9      CO2 26   BUN 8   CREATININE 1.0   CALCIUM 8.3*   MG 1.9       Significant Diagnostics:  I have reviewed all pertinent imaging results/findings within the past 24 hours.    Assessment/Plan:     * Cellulitis of gluteal region  Antibiotics  Wound care  If we can get his pain controlled would be ok to jennifer Spears MD  General Surgery  [facility]/Surg

## 2023-09-10 NOTE — SUBJECTIVE & OBJECTIVE
Interval History: still with pain  Otherwise doing ok    Medications:  Continuous Infusions:   sodium chloride 0.9% Stopped (09/08/23 1744)     Scheduled Meds:   apixaban  5 mg Oral BID    aspirin  81 mg Oral Daily    cetirizine  10 mg Oral Daily    clonazePAM  1 mg Oral BID    furosemide  20 mg Oral BID    gabapentin  600 mg Oral BID    insulin detemir U-100  80 Units Subcutaneous BID    isosorbide mononitrate  60 mg Oral Daily    lisinopriL  10 mg Oral BID    methocarbamoL  500 mg Oral QHS    metoprolol succinate  100 mg Oral BID    pantoprazole  40 mg Oral BID    piperacillin-tazobactam (Zosyn) IV (PEDS and ADULTS) (extended infusion is not appropriate)  4.5 g Intravenous Q8H    pravastatin  20 mg Oral Daily    ranolazine  500 mg Oral BID    senna-docusate 8.6-50 mg  1 tablet Oral BID    tamsulosin  0.4 mg Oral QHS    vancomycin (VANCOCIN) IV (PEDS and ADULTS)  1,500 mg Intravenous Q12H     PRN Meds:acetaminophen, acetaminophen, aluminum-magnesium hydroxide-simethicone, dextrose 10%, dextrose 10%, glucagon (human recombinant), glucose, glucose, HYDROcodone-acetaminophen, insulin aspart U-100, magnesium oxide, magnesium oxide, melatonin, morphine, naloxone, ondansetron, potassium bicarbonate, potassium bicarbonate, potassium bicarbonate, potassium, sodium phosphates, potassium, sodium phosphates, potassium, sodium phosphates, simethicone, sodium chloride 0.9%, Pharmacy to dose Vancomycin consult **AND** vancomycin - pharmacy to dose     Review of patient's allergies indicates:   Allergen Reactions    Pesticide Dermatitis and Rash     Objective:     Vital Signs (Most Recent):  Temp: 97.1 °F (36.2 °C) (09/10/23 0727)  Pulse: 63 (09/10/23 0748)  Resp: 16 (09/10/23 0847)  BP: 103/63 (09/10/23 0727)  SpO2: 96 % (09/10/23 0748) Vital Signs (24h Range):  Temp:  [96.7 °F (35.9 °C)-98.5 °F (36.9 °C)] 97.1 °F (36.2 °C)  Pulse:  [56-67] 63  Resp:  [16-20] 16  SpO2:  [92 %-98 %] 96 %  BP: ()/(52-76) 103/63     Weight:  117.9 kg (260 lb)  Body mass index is 36.26 kg/m².    Intake/Output - Last 3 Shifts         09/08 0700 09/09 0659 09/09 0700  09/10 0659 09/10 0700  09/11 0659    P.O.       I.V. (mL/kg) 1141.2 (9.7)      IV Piggyback 695.1 226.4     Total Intake(mL/kg) 1836.2 (15.6) 226.4 (1.9)     Urine (mL/kg/hr) 1002 (0.4) 200 (0.1)     Stool 0      Total Output 1002 200     Net +834.2 +26.4            Urine Occurrence  10 x              Physical Exam  Genitourinary:     Comments:   Wounds healing well   No obvious erythema         Significant Labs:  I have reviewed all pertinent lab results within the past 24 hours.  CBC:   Recent Labs   Lab 09/10/23  0403   WBC 8.63   RBC 4.00*   HGB 12.4*   HCT 36.8*      MCV 92   MCH 31.0   MCHC 33.7     BMP:   Recent Labs   Lab 09/10/23  0403      *   K 3.9      CO2 26   BUN 8   CREATININE 1.0   CALCIUM 8.3*   MG 1.9       Significant Diagnostics:  I have reviewed all pertinent imaging results/findings within the past 24 hours.

## 2023-09-11 VITALS
TEMPERATURE: 98 F | RESPIRATION RATE: 20 BRPM | HEIGHT: 71 IN | HEART RATE: 73 BPM | WEIGHT: 260 LBS | BODY MASS INDEX: 36.4 KG/M2 | SYSTOLIC BLOOD PRESSURE: 98 MMHG | DIASTOLIC BLOOD PRESSURE: 53 MMHG | OXYGEN SATURATION: 96 %

## 2023-09-11 LAB
ALBUMIN SERPL BCP-MCNC: 2.7 G/DL (ref 3.5–5.2)
ALP SERPL-CCNC: 50 U/L (ref 55–135)
ALT SERPL W/O P-5'-P-CCNC: 27 U/L (ref 10–44)
ANION GAP SERPL CALC-SCNC: 9 MMOL/L (ref 8–16)
AST SERPL-CCNC: 29 U/L (ref 10–40)
BASOPHILS # BLD AUTO: 0.02 K/UL (ref 0–0.2)
BASOPHILS NFR BLD: 0.2 % (ref 0–1.9)
BILIRUB SERPL-MCNC: 0.2 MG/DL (ref 0.1–1)
BUN SERPL-MCNC: 8 MG/DL (ref 6–20)
CALCIUM SERPL-MCNC: 8.1 MG/DL (ref 8.7–10.5)
CHLORIDE SERPL-SCNC: 102 MMOL/L (ref 95–110)
CO2 SERPL-SCNC: 24 MMOL/L (ref 23–29)
CREAT SERPL-MCNC: 1.1 MG/DL (ref 0.5–1.4)
DIFFERENTIAL METHOD: ABNORMAL
EOSINOPHIL # BLD AUTO: 0.2 K/UL (ref 0–0.5)
EOSINOPHIL NFR BLD: 1.9 % (ref 0–8)
ERYTHROCYTE [DISTWIDTH] IN BLOOD BY AUTOMATED COUNT: 13.1 % (ref 11.5–14.5)
EST. GFR  (NO RACE VARIABLE): >60 ML/MIN/1.73 M^2
GLUCOSE SERPL-MCNC: 165 MG/DL (ref 70–110)
HCT VFR BLD AUTO: 35.8 % (ref 40–54)
HGB BLD-MCNC: 12 G/DL (ref 14–18)
IMM GRANULOCYTES # BLD AUTO: 0.07 K/UL (ref 0–0.04)
IMM GRANULOCYTES NFR BLD AUTO: 0.9 % (ref 0–0.5)
LYMPHOCYTES # BLD AUTO: 2.8 K/UL (ref 1–4.8)
LYMPHOCYTES NFR BLD: 34.3 % (ref 18–48)
MAGNESIUM SERPL-MCNC: 1.7 MG/DL (ref 1.6–2.6)
MCH RBC QN AUTO: 30.9 PG (ref 27–31)
MCHC RBC AUTO-ENTMCNC: 33.5 G/DL (ref 32–36)
MCV RBC AUTO: 92 FL (ref 82–98)
MONOCYTES # BLD AUTO: 0.3 K/UL (ref 0.3–1)
MONOCYTES NFR BLD: 3.7 % (ref 4–15)
NEUTROPHILS # BLD AUTO: 4.7 K/UL (ref 1.8–7.7)
NEUTROPHILS NFR BLD: 59 % (ref 38–73)
NRBC BLD-RTO: 0 /100 WBC
PHOSPHATE SERPL-MCNC: 2.1 MG/DL (ref 2.7–4.5)
PLATELET # BLD AUTO: 307 K/UL (ref 150–450)
PMV BLD AUTO: 9 FL (ref 9.2–12.9)
POCT GLUCOSE: 205 MG/DL (ref 70–110)
POTASSIUM SERPL-SCNC: 4 MMOL/L (ref 3.5–5.1)
PROT SERPL-MCNC: 6.2 G/DL (ref 6–8.4)
RBC # BLD AUTO: 3.88 M/UL (ref 4.6–6.2)
SODIUM SERPL-SCNC: 135 MMOL/L (ref 136–145)
VANCOMYCIN TROUGH SERPL-MCNC: 18.2 UG/ML (ref 10–22)
WBC # BLD AUTO: 8.04 K/UL (ref 3.9–12.7)

## 2023-09-11 PROCEDURE — 36415 COLL VENOUS BLD VENIPUNCTURE: CPT | Performed by: NURSE PRACTITIONER

## 2023-09-11 PROCEDURE — 94640 AIRWAY INHALATION TREATMENT: CPT

## 2023-09-11 PROCEDURE — 94761 N-INVAS EAR/PLS OXIMETRY MLT: CPT

## 2023-09-11 PROCEDURE — 85025 COMPLETE CBC W/AUTO DIFF WBC: CPT | Performed by: NURSE PRACTITIONER

## 2023-09-11 PROCEDURE — 83735 ASSAY OF MAGNESIUM: CPT | Performed by: NURSE PRACTITIONER

## 2023-09-11 PROCEDURE — 25000003 PHARM REV CODE 250: Performed by: NURSE PRACTITIONER

## 2023-09-11 PROCEDURE — 63600175 PHARM REV CODE 636 W HCPCS: Performed by: NURSE PRACTITIONER

## 2023-09-11 PROCEDURE — 99900035 HC TECH TIME PER 15 MIN (STAT)

## 2023-09-11 PROCEDURE — 84100 ASSAY OF PHOSPHORUS: CPT | Performed by: NURSE PRACTITIONER

## 2023-09-11 PROCEDURE — 80053 COMPREHEN METABOLIC PANEL: CPT | Performed by: NURSE PRACTITIONER

## 2023-09-11 RX ORDER — L. ACIDOPHILUS/L.BULGARICUS 100MM CELL
1 GRANULES IN PACKET (EA) ORAL 2 TIMES DAILY
Qty: 30 PACKET | Refills: 0 | Status: SHIPPED | OUTPATIENT
Start: 2023-09-11

## 2023-09-11 RX ORDER — AMOXICILLIN AND CLAVULANATE POTASSIUM 875; 125 MG/1; MG/1
1 TABLET, FILM COATED ORAL EVERY 12 HOURS
Qty: 20 TABLET | Refills: 0 | Status: SHIPPED | OUTPATIENT
Start: 2023-09-11 | End: 2023-09-18 | Stop reason: SDUPTHER

## 2023-09-11 RX ADMIN — APIXABAN 5 MG: 2.5 TABLET, FILM COATED ORAL at 11:09

## 2023-09-11 RX ADMIN — CLONAZEPAM 1 MG: 0.5 TABLET ORAL at 08:09

## 2023-09-11 RX ADMIN — GABAPENTIN 600 MG: 300 CAPSULE ORAL at 08:09

## 2023-09-11 RX ADMIN — INSULIN DETEMIR 80 UNITS: 100 INJECTION, SOLUTION SUBCUTANEOUS at 08:09

## 2023-09-11 RX ADMIN — HYDROCODONE BITARTRATE AND ACETAMINOPHEN 1 TABLET: 5; 325 TABLET ORAL at 10:09

## 2023-09-11 RX ADMIN — CETIRIZINE HYDROCHLORIDE 10 MG: 10 TABLET, FILM COATED ORAL at 08:09

## 2023-09-11 RX ADMIN — POTASSIUM & SODIUM PHOSPHATES POWDER PACK 280-160-250 MG 2 PACKET: 280-160-250 PACK at 06:09

## 2023-09-11 RX ADMIN — VANCOMYCIN HYDROCHLORIDE 1250 MG: 1.25 INJECTION, POWDER, LYOPHILIZED, FOR SOLUTION INTRAVENOUS at 12:09

## 2023-09-11 RX ADMIN — PRAVASTATIN SODIUM 20 MG: 10 TABLET ORAL at 11:09

## 2023-09-11 RX ADMIN — MORPHINE SULFATE 4 MG: 4 INJECTION, SOLUTION INTRAMUSCULAR; INTRAVENOUS at 07:09

## 2023-09-11 RX ADMIN — RANOLAZINE 500 MG: 500 TABLET, FILM COATED, EXTENDED RELEASE ORAL at 08:09

## 2023-09-11 RX ADMIN — ASPIRIN 81 MG CHEWABLE TABLET 81 MG: 81 TABLET CHEWABLE at 08:09

## 2023-09-11 RX ADMIN — SODIUM CHLORIDE: 9 INJECTION, SOLUTION INTRAVENOUS at 12:09

## 2023-09-11 RX ADMIN — PIPERACILLIN AND TAZOBACTAM 4.5 G: 4; .5 INJECTION, POWDER, LYOPHILIZED, FOR SOLUTION INTRAVENOUS; PARENTERAL at 08:09

## 2023-09-11 RX ADMIN — PANTOPRAZOLE SODIUM 40 MG: 40 TABLET, DELAYED RELEASE ORAL at 08:09

## 2023-09-11 RX ADMIN — MORPHINE SULFATE 4 MG: 4 INJECTION, SOLUTION INTRAMUSCULAR; INTRAVENOUS at 02:09

## 2023-09-11 RX ADMIN — INSULIN ASPART 4 UNITS: 100 INJECTION, SOLUTION INTRAVENOUS; SUBCUTANEOUS at 11:09

## 2023-09-11 NOTE — DISCHARGE SUMMARY
[facility] Medicine  Discharge Summary      Patient Name: Magdaleno Cunningham  MRN: [id number]  [facility]: [id number]  Patient Class: IP- Inpatient  Admission Date: 9/7/2023  Hospital Length of Stay: 1 days  Discharge Date and Time:  09/11/2023 11:37 AM  Attending Physician: Sourav Owens MD   Discharging Provider: Sourav Owens MD  Primary Care Provider: Venkata Mclaughlin MD    Primary Care Team: Networked reference to record PCT     HPI:   Hector Cunningham is a 57 year old male with a history of IDDM2, obesity BMI 37, Afib on eliquis, COPD, ROSAMARIA on CPAP, HTN, CAD s/p stents and CABG, and MSSA bacteremia who presents today with a compliant of body aches, chills and fever. He was recently admitted for a perirectal abscess and cellulitis that required surgical drainage. He stated that he didn't want to stay in the hospital, and begged to be discharged, which he was after surgical clearance. He states that since he's been home, he has had uncontrolled pain, fever, and chills. He states the pain from his buttock abscess site is wrapping around his abdomen. He reports tenderness to the I&D site with yellow drainage. He denies all other complaint.      ED work up included a CBC which was unremarkable. CMP showed mild hyponatremia and moderate malnutrition. Lactic acid level normal at 1.9. CT of the abdomen and pelvis with contrast revealed cellulitis of the right gluteal fold with no abscess. He was initiated on Vancomycin and Zosyn. Hospital Medicine consulted for admission and further management.      * No surgery found *      Hospital Course:   Patient was admitted to Hospital Medicine where patient was closely followed by general surgeon.  Nursing provided wound care.  Patient received intravenous antibiotic therapy and pain control provided.  Microbiology results closely followed which remained negative.  Patient reports improved pain and willing to follow-up at outpatient clinic for wound  packing.  Patient counseled regarding importance of compliance with antibiotic therapy.  Patient will continue close follow-up with primary care physician and general surgeon upon discharge next week.  Patient is anxious to go home this afternoon.    Goals of Care Treatment Preferences:  Code Status: Full Code      Consults:   Consults (From admission, onward)          Status Ordering Provider     Inpatient consult to General Surgery  Once        Provider:  Brayan Spears MD    Completed RON MUNIZ     Pharmacy to dose Vancomycin consult  Once        Provider:  (Not yet assigned)   See [facility] for full Linked Orders Report.    Acknowledged BRIANA SORENSEN          Microbiology Results (last 7 days)       Procedure Component Value Units Date/Time    Blood culture #1 **CANNOT BE ORDERED STAT** [[id number]] Collected: 09/08/23 0014    Order Status: Completed Specimen: Blood from Antecubital, Right Hand Updated: 09/11/23 1032     Blood Culture, Routine No Growth to date      No Growth to date      No Growth to date      No Growth to date    Blood culture #2 **CANNOT BE ORDERED STAT** [[id number]] Collected: 09/08/23 0014    Order Status: Completed Specimen: Blood from Antecubital, Left Arm Updated: 09/11/23 1032     Blood Culture, Routine No Growth to date      No Growth to date      No Growth to date      No Growth to date          CT abdomen and pelvis with contrast:  1. Cellulitis of the right medial gluteal fold with a suspected small focus of soft tissue gas which may be postoperative.  No rim enhancing fluid collection or perianal abscess.  2. Mild nonspecific mesenteric fat stranding which may be related to mild enteritis.  3. Hepatic steatosis and hepatomegaly.    Final Active Diagnoses:    Diagnosis Date Noted POA    PRINCIPAL PROBLEM:  Cellulitis of gluteal region [L03.317] 09/01/2023 Yes    Malnutrition of moderate degree [E44.0] 09/08/2023 Yes    Obesity (BMI 30-39.9) [E66.9] 09/01/2023 Yes    HLD  (hyperlipidemia) [E78.5] 09/01/2023 Yes    Hyponatremia [E87.1] 09/01/2023 Yes    S/P CABG (coronary artery bypass graft) [Z95.1] 04/14/2020 Not Applicable    Coronary artery disease involving native coronary artery of native heart [I25.10] 03/25/2020 Yes    Atrial fibrillation [I48.91] 03/25/2020 Yes    ROSAMARIA (obstructive sleep apnea) [G47.33] 10/28/2014 Yes    Type 2 diabetes mellitus with diabetic arthropathy [E11.618]  Yes    Hypertension associated with diabetes [E11.59, I15.2]  Yes      Problems Resolved During this Admission:       Discharged Condition: good    Disposition: Home or Self Care    Follow Up:   Follow-up Information       Venkata Mclaughlin MD Follow up in 1 week(s).    Specialty: Internal Medicine  Contact information:  [address] [place] [address]    [address]  [place] [address]  [phone]               Brayan Spears MD Follow up in 1 week(s).    Specialties: General Surgery, Bariatrics, Surgery  Contact information:  [address] [facility]  [address]  [place] [address]  [phone]                           Patient Instructions:      Ambulatory referral/consult to Wound Clinic   Standing Status: Future   Referral Priority: Routine Referral Type: Consultation   Referral Reason: Specialty Services Required   Requested Specialty: Wound Care   Number of Visits Requested: 1     Diet Cardiac     Diet diabetic     Notify your health care provider if you experience any of the following:  temperature >100.4     Notify your health care provider if you experience any of the following:  persistent nausea and vomiting or diarrhea     Notify your health care provider if you experience any of the following:  severe uncontrolled pain     Notify your health care provider if you experience any of the following:  redness, tenderness, or signs of infection (pain, swelling, redness, odor or green/yellow discharge around incision site)     Notify your health care provider if you experience any of the following:  difficulty  breathing or increased cough     Notify your health care provider if you experience any of the following:  severe persistent headache     Notify your health care provider if you experience any of the following:  worsening rash     Notify your health care provider if you experience any of the following:  persistent dizziness, light-headedness, or visual disturbances     Notify your health care provider if you experience any of the following:  increased confusion or weakness     Change dressing (specify)   Order Comments: Dressing change: Outpatient wound clinic follow-up as directed.     Activity as tolerated   Order Comments: Fall precautions       Significant Diagnostic Studies: Labs:   CMP   Recent Labs   Lab 09/10/23  0403 09/11/23 0404   * 135*   K 3.9 4.0    102   CO2 26 24    165*   BUN 8 8   CREATININE 1.0 1.1   CALCIUM 8.3* 8.1*   PROT 6.7 6.2   ALBUMIN 2.8* 2.7*   BILITOT 0.2 0.2   ALKPHOS 54* 50*   AST 42* 29   ALT 31 27   ANIONGAP 8 9    and CBC   Recent Labs   Lab 09/10/23  0403 09/11/23  0404   WBC 8.63 8.04   HGB 12.4* 12.0*   HCT 36.8* 35.8*    307       Pending Diagnostic Studies:       None           Medications:  Reconciled Home Medications:      Medication List        START taking these medications      lactobacillus acidophilus & bulgar 100 million cell packet  Commonly known as: LACTINEX  Take 1 packet (1 each total) by mouth 2 (two) times daily.            CHANGE how you take these medications      oxyCODONE-acetaminophen  mg per tablet  Commonly known as: PERCOCET  Take 1-2 tablets by mouth daily as needed for Pain.  What changed: Another medication with the same name was removed. Continue taking this medication, and follow the directions you see here.            CONTINUE taking these medications      amoxicillin-clavulanate 875-125mg 875-125 mg per tablet  Commonly known as: AUGMENTIN  Take 1 tablet by mouth every 12 (twelve) hours. for 10 days     apixaban 5  "mg Tab  Commonly known as: ELIQUIS  Take 5 mg by mouth 2 (two) times daily.     aspirin 81 MG Chew  Take 81 mg by mouth once daily.     clonazePAM 1 MG tablet  Commonly known as: KlonoPIN  Take 1 mg by mouth 2 (two) times daily.     cyclobenzaprine 10 MG tablet  Commonly known as: FLEXERIL  Take 10 mg by mouth once daily.     furosemide 20 MG tablet  Commonly known as: LASIX  Take 20 mg by mouth 2 (two) times daily.     gabapentin 600 MG tablet  Commonly known as: NEURONTIN  Take 600 mg by mouth 3 (three) times daily.     JARDIANCE 25 mg tablet  Generic drug: empagliflozin  Take 25 mg by mouth once daily.     LANTUS SOLOSTAR U-100 INSULIN glargine 100 units/mL SubQ pen  Generic drug: insulin  Inject 80 Units into the skin 2 (two) times daily.     metFORMIN 1000 MG tablet  Commonly known as: GLUCOPHAGE  Take 1,000 mg by mouth 2 (two) times daily with meals.     methocarbamoL 500 MG Tab  Commonly known as: ROBAXIN  Take 500 mg by mouth every evening.     metoprolol succinate 100 MG 24 hr tablet  Commonly known as: TOPROL-XL  Take 100 mg by mouth 2 (two) times daily.     nitroGLYCERIN 0.4 MG SL tablet  Commonly known as: NITROSTAT  Place 0.4 mg under the tongue every 5 (five) minutes as needed for Chest pain.     NURTEC 75 mg odt  Generic drug: rimegepant  Take 1 tablet by mouth as needed for Migraine.     pantoprazole 40 MG tablet  Commonly known as: PROTONIX  Take 1 tablet by mouth 2 (two) times a day.     pen needle, diabetic 31 gauge x 1/4" Ndle  USE 1 TWICE DAILY FOR BLOOD SUGAR GREATER THAN 200     pioglitazone 15 MG tablet  Commonly known as: ACTOS  Take 15 mg by mouth once daily.     simvastatin 10 MG tablet  Commonly known as: ZOCOR  Take 10 mg by mouth once daily.            STOP taking these medications      lisinopriL 20 MG tablet  Commonly known as: PRINIVIL,ZESTRIL            ASK your doctor about these medications      cetirizine 10 MG tablet  Commonly known as: ZYRTEC  Take 1 tablet (10 mg total) by " mouth once daily.     HYDROcodone-acetaminophen 5-325 mg per tablet  Commonly known as: NORCO  Take 1 tablet by mouth every 6 (six) hours as needed for Pain.     isosorbide mononitrate 60 MG 24 hr tablet  Commonly known as: IMDUR  Take 1 tablet (60 mg total) by mouth once daily.     ranolazine 500 MG Tb12  Commonly known as: RANEXA  Take 1 tablet (500 mg total) by mouth 2 (two) times daily.              Indwelling Lines/Drains at time of discharge:   Lines/Drains/Airways       None                   Time spent on the discharge of patient: 34 minutes         Sourav Owens MD  Department of Hospital Medicine  [facility]/Surg

## 2023-09-11 NOTE — SUBJECTIVE & OBJECTIVE
Interval History: still having pain    Medications:  Continuous Infusions:   sodium chloride 0.9% 100 mL/hr at 09/11/23 0038     Scheduled Meds:   apixaban  5 mg Oral BID    aspirin  81 mg Oral Daily    cetirizine  10 mg Oral Daily    clonazePAM  1 mg Oral BID    furosemide  20 mg Oral BID    gabapentin  600 mg Oral BID    insulin detemir U-100  80 Units Subcutaneous BID    isosorbide mononitrate  60 mg Oral Daily    lisinopriL  10 mg Oral BID    methocarbamoL  500 mg Oral QHS    metoprolol succinate  100 mg Oral BID    pantoprazole  40 mg Oral BID    piperacillin-tazobactam (Zosyn) IV (PEDS and ADULTS) (extended infusion is not appropriate)  4.5 g Intravenous Q8H    pravastatin  20 mg Oral Daily    ranolazine  500 mg Oral BID    senna-docusate 8.6-50 mg  1 tablet Oral BID    tamsulosin  0.4 mg Oral QHS    umeclidinium-vilanteroL  1 puff Oral Daily    vancomycin (VANCOCIN) IV (PEDS and ADULTS)  1,250 mg Intravenous Q12H     PRN Meds:acetaminophen, acetaminophen, aluminum-magnesium hydroxide-simethicone, dextrose 10%, dextrose 10%, glucagon (human recombinant), glucose, glucose, HYDROcodone-acetaminophen, insulin aspart U-100, magnesium oxide, magnesium oxide, melatonin, morphine, naloxone, ondansetron, potassium bicarbonate, potassium bicarbonate, potassium bicarbonate, potassium, sodium phosphates, potassium, sodium phosphates, potassium, sodium phosphates, simethicone, sodium chloride 0.9%, Pharmacy to dose Vancomycin consult **AND** vancomycin - pharmacy to dose     Review of patient's allergies indicates:   Allergen Reactions    Pesticide Dermatitis and Rash     Objective:     Vital Signs (Most Recent):  Temp: 97.6 °F (36.4 °C) (09/11/23 0711)  Pulse: 73 (09/11/23 0723)  Resp: 20 (09/11/23 1014)  BP: (!) 98/53 (09/11/23 0711)  SpO2: 96 % (09/11/23 0723) Vital Signs (24h Range):  Temp:  [97.1 °F (36.2 °C)-98 °F (36.7 °C)] 97.6 °F (36.4 °C)  Pulse:  [60-82] 73  Resp:  [16-20] 20  SpO2:  [94 %-97 %] 96 %  BP:  ()/(53-71) 98/53     Weight: 117.9 kg (260 lb)  Body mass index is 36.26 kg/m².    Intake/Output - Last 3 Shifts         09/09 0700  09/10 0659 09/10 0700  09/11 0659 09/11 0700  09/12 0659    P.O.  360     I.V. (mL/kg)       IV Piggyback 226.4 761.3     Total Intake(mL/kg) 226.4 (1.9) 1121.3 (9.5)     Urine (mL/kg/hr) 200 (0.1) 1400 (0.5)     Stool       Total Output 200 1400     Net +26.4 -278.7            Urine Occurrence 10 x 5 x     Stool Occurrence  0 x              Physical Exam     Significant Labs:  I have reviewed all pertinent lab results within the past 24 hours.  CBC:   Recent Labs   Lab 09/11/23  0404   WBC 8.04   RBC 3.88*   HGB 12.0*   HCT 35.8*      MCV 92   MCH 30.9   MCHC 33.5     BMP:   Recent Labs   Lab 09/11/23  0404   *   *   K 4.0      CO2 24   BUN 8   CREATININE 1.1   CALCIUM 8.1*   MG 1.7       Significant Diagnostics:  I have reviewed all pertinent imaging results/findings within the past 24 hours.

## 2023-09-11 NOTE — CONSULTS
At bedside to assess and pack wound. Patient reports the nurse has already packed the wound and he did not want to have this area packed again at this time. Verified with patients nurse Rosales that wound care had indeed been completed. Patient will follow up outpatient with Dr. Villalobos for wound care. Verbally explained to patients son how to pack the wounds and son verbalized understanding. Patient is being discharged.

## 2023-09-11 NOTE — NURSING
AVS reviewed with patient. All questions answered, no needs voiced. IV removed with catheter intact. Discharged to care of son.

## 2023-09-11 NOTE — DISCHARGE INSTRUCTIONS
Please keep a log of daily blood pressure readings.  Your blood pressure medication lisinopril has been held due to low normal blood pressure.  Please discuss with primary care physician next week.    Wound care: Follow up outpatient with Dr. Villalobos for wound care.  Daily packing with gauze after shower.  Additional orders as per Dr. Villalobos

## 2023-09-11 NOTE — SUBJECTIVE & OBJECTIVE
Interval History:  Patient continuing to experience significant right gluteal pain status post recent incision and drainage of perirectal abscess.  Compared to yesterday, reports some improvement in pain.  Dr. Spears following.  Currently afebrile.  Having great difficulty sitting on his buttock.    Review of Systems   Constitutional:  Positive for chills and fever.   HENT:  Negative for congestion and sore throat.    Eyes:  Negative for visual disturbance.   Respiratory:  Negative for cough and shortness of breath.    Cardiovascular:  Negative for chest pain and palpitations.   Gastrointestinal:  Positive for abdominal pain. Negative for constipation, diarrhea, nausea and vomiting.   Endocrine: Negative for cold intolerance and heat intolerance.   Genitourinary:  Negative for dysuria and hematuria.   Musculoskeletal:  Positive for arthralgias. Negative for myalgias.   Skin:  Positive for color change and wound. Negative for rash.   Neurological:  Negative for tremors and seizures.   Hematological:  Negative for adenopathy. Does not bruise/bleed easily.   All other systems reviewed and are negative.    Objective:     Vital Signs (Most Recent):  Temp: 97.6 °F (36.4 °C) (09/11/23 0711)  Pulse: 73 (09/11/23 0723)  Resp: 18 (09/11/23 0723)  BP: (!) 98/53 (09/11/23 0711)  SpO2: 96 % (09/11/23 0723) Vital Signs (24h Range):  Temp:  [96.9 °F (36.1 °C)-98 °F (36.7 °C)] 97.6 °F (36.4 °C)  Pulse:  [56-82] 73  Resp:  [16-20] 18  SpO2:  [94 %-97 %] 96 %  BP: ()/(53-74) 98/53     Weight: 117.9 kg (260 lb)  Body mass index is 36.26 kg/m².    Intake/Output Summary (Last 24 hours) at 9/11/2023 1001  Last data filed at 9/10/2023 1820  Gross per 24 hour   Intake 1121.27 ml   Output 1400 ml   Net -278.73 ml           Physical Exam  Vitals and nursing note reviewed.   Constitutional:       General: He is awake.      Appearance: Normal appearance. He is well-developed. He is morbidly obese. He is not ill-appearing.   HENT:       Head: Normocephalic and atraumatic.      Nose: Nose normal. No septal deviation.   Eyes:      Conjunctiva/sclera: Conjunctivae normal.      Pupils: Pupils are equal, round, and reactive to light.   Neck:      Thyroid: No thyroid mass.      Vascular: No JVD.      Trachea: No tracheal tenderness or tracheal deviation.   Cardiovascular:      Rate and Rhythm: Normal rate and regular rhythm.      Heart sounds: Normal heart sounds, S1 normal and S2 normal. No murmur heard.     No friction rub. No gallop.   Pulmonary:      Effort: Pulmonary effort is normal.      Breath sounds: Normal breath sounds. No decreased breath sounds, wheezing, rhonchi or rales.   Abdominal:      General: Bowel sounds are normal. There is no distension.      Palpations: Abdomen is soft. There is no hepatomegaly, splenomegaly or mass.      Tenderness: There is no abdominal tenderness.   Genitourinary:      Skin:     General: Skin is warm and dry.      Capillary Refill: Capillary refill takes less than 2 seconds.      Findings: Erythema and wound present. No rash.   Neurological:      General: No focal deficit present.      Mental Status: He is alert and oriented to person, place, and time.      Cranial Nerves: No cranial nerve deficit.      Sensory: No sensory deficit.   Psychiatric:         Mood and Affect: Mood normal.         Behavior: Behavior normal. Behavior is cooperative.             Significant Labs: All pertinent labs within the past 24 hours have been reviewed.  CBC:   Recent Labs   Lab 09/10/23  0403 09/11/23  0404   WBC 8.63 8.04   HGB 12.4* 12.0*   HCT 36.8* 35.8*    307       CMP:   Recent Labs   Lab 09/10/23  0403 09/11/23  0404   * 135*   K 3.9 4.0    102   CO2 26 24    165*   BUN 8 8   CREATININE 1.0 1.1   CALCIUM 8.3* 8.1*   PROT 6.7 6.2   ALBUMIN 2.8* 2.7*   BILITOT 0.2 0.2   ALKPHOS 54* 50*   AST 42* 29   ALT 31 27   ANIONGAP 8 9       Microbiology Results (last 7 days)       Procedure Component Value  Units Date/Time    Blood culture #1 **CANNOT BE ORDERED STAT** [[id number]] Collected: 09/08/23 0014    Order Status: Completed Specimen: Blood from Antecubital, Right Hand Updated: 09/10/23 1032     Blood Culture, Routine No Growth to date      No Growth to date      No Growth to date    Blood culture #2 **CANNOT BE ORDERED STAT** [[id number]] Collected: 09/08/23 0014    Order Status: Completed Specimen: Blood from Antecubital, Left Arm Updated: 09/10/23 1032     Blood Culture, Routine No Growth to date      No Growth to date      No Growth to date          Significant Imaging:   CT abdomen and pelvis with contrast:  1. Cellulitis of the right medial gluteal fold with a suspected small focus of soft tissue gas which may be postoperative.  No rim enhancing fluid collection or perianal abscess.  2. Mild nonspecific mesenteric fat stranding which may be related to mild enteritis.  3. Hepatic steatosis and hepatomegaly.

## 2023-09-11 NOTE — PROGRESS NOTES
[facility]/Surg  General Surgery  Progress Note    Subjective:     History of Present Illness:  57-year-old with recent I and D of perianal abscess.  He was discharge after the I&D and returns in severe pain.  He reports things have been healing well the pain is just difficult to control.  Denies any fevers or chills at home.      Post-Op Info:  * No surgery found *         Interval History: still having pain    Medications:  Continuous Infusions:   sodium chloride 0.9% 100 mL/hr at 09/11/23 0038     Scheduled Meds:   apixaban  5 mg Oral BID    aspirin  81 mg Oral Daily    cetirizine  10 mg Oral Daily    clonazePAM  1 mg Oral BID    furosemide  20 mg Oral BID    gabapentin  600 mg Oral BID    insulin detemir U-100  80 Units Subcutaneous BID    isosorbide mononitrate  60 mg Oral Daily    lisinopriL  10 mg Oral BID    methocarbamoL  500 mg Oral QHS    metoprolol succinate  100 mg Oral BID    pantoprazole  40 mg Oral BID    piperacillin-tazobactam (Zosyn) IV (PEDS and ADULTS) (extended infusion is not appropriate)  4.5 g Intravenous Q8H    pravastatin  20 mg Oral Daily    ranolazine  500 mg Oral BID    senna-docusate 8.6-50 mg  1 tablet Oral BID    tamsulosin  0.4 mg Oral QHS    umeclidinium-vilanteroL  1 puff Oral Daily    vancomycin (VANCOCIN) IV (PEDS and ADULTS)  1,250 mg Intravenous Q12H     PRN Meds:acetaminophen, acetaminophen, aluminum-magnesium hydroxide-simethicone, dextrose 10%, dextrose 10%, glucagon (human recombinant), glucose, glucose, HYDROcodone-acetaminophen, insulin aspart U-100, magnesium oxide, magnesium oxide, melatonin, morphine, naloxone, ondansetron, potassium bicarbonate, potassium bicarbonate, potassium bicarbonate, potassium, sodium phosphates, potassium, sodium phosphates, potassium, sodium phosphates, simethicone, sodium chloride 0.9%, Pharmacy to dose Vancomycin consult **AND** vancomycin - pharmacy to dose     Review of patient's allergies indicates:    Allergen Reactions    Pesticide Dermatitis and Rash     Objective:     Vital Signs (Most Recent):  Temp: 97.6 °F (36.4 °C) (09/11/23 0711)  Pulse: 73 (09/11/23 0723)  Resp: 20 (09/11/23 1014)  BP: (!) 98/53 (09/11/23 0711)  SpO2: 96 % (09/11/23 0723) Vital Signs (24h Range):  Temp:  [97.1 °F (36.2 °C)-98 °F (36.7 °C)] 97.6 °F (36.4 °C)  Pulse:  [60-82] 73  Resp:  [16-20] 20  SpO2:  [94 %-97 %] 96 %  BP: ()/(53-71) 98/53     Weight: 117.9 kg (260 lb)  Body mass index is 36.26 kg/m².    Intake/Output - Last 3 Shifts         09/09 0700  09/10 0659 09/10 0700  09/11 0659 09/11 0700  09/12 0659    P.O.  360     I.V. (mL/kg)       IV Piggyback 226.4 761.3     Total Intake(mL/kg) 226.4 (1.9) 1121.3 (9.5)     Urine (mL/kg/hr) 200 (0.1) 1400 (0.5)     Stool       Total Output 200 1400     Net +26.4 -278.7            Urine Occurrence 10 x 5 x     Stool Occurrence  0 x              Physical Exam     Significant Labs:  I have reviewed all pertinent lab results within the past 24 hours.  CBC:   Recent Labs   Lab 09/11/23  0404   WBC 8.04   RBC 3.88*   HGB 12.0*   HCT 35.8*      MCV 92   MCH 30.9   MCHC 33.5     BMP:   Recent Labs   Lab 09/11/23 0404   *   *   K 4.0      CO2 24   BUN 8   CREATININE 1.1   CALCIUM 8.1*   MG 1.7       Significant Diagnostics:  I have reviewed all pertinent imaging results/findings within the past 24 hours.    Assessment/Plan:     * Cellulitis of gluteal region  Antibiotics  Wound care  If we can get his pain controlled would be ok to jennifer Spears MD  General Surgery  [facility]/Surg

## 2023-09-11 NOTE — PROGRESS NOTES
Pharmacokinetic Assessment Follow Up: IV Vancomycin    Vancomycin serum concentration assessment(s):    The trough level was drawn correctly and can be used to guide therapy at this time. The measurement is within the desired definitive target range of 15 to 20 mcg/mL.    Vancomycin Regimen Plan:    Change regimen to Vancomycin 1250 mg IV every 12 hours with next serum trough concentration measured at 1130 prior to 4th dose on 9/12    Drug levels (last 3 results):  Recent Labs   Lab Result Units 09/09/23  1129 09/10/23  2337   Vancomycin-Trough ug/mL 15.5 18.2       Pharmacy will continue to follow and monitor vancomycin.    Please contact pharmacy at extension [phone] for questions regarding this assessment.    Thank you for the consult,   Paul Velázquez       Patient brief summary:  Magdaleno Cunningham is a 57 y.o. male initiated on antimicrobial therapy with IV Vancomycin for treatment of skin & soft tissue infection      Drug Allergies:   Review of patient's allergies indicates:   Allergen Reactions    Pesticide Dermatitis and Rash       Actual Body Weight:   117.9 kg    Renal Function:   Estimated Creatinine Clearance: 109 mL/min (based on SCr of 1 mg/dL).,     Dialysis Method (if applicable):  N/A    CBC (last 72 hours):  Recent Labs   Lab Result Units 09/08/23  0450 09/09/23  0403 09/10/23  0403   WBC K/uL 9.57 8.22 8.63   Hemoglobin g/dL 11.9* 12.3* 12.4*   Hematocrit % 36.4* 37.1* 36.8*   Platelets K/uL 290 315 308   Gran % % 44.7 52.5 51.7   Lymph % % 41.4 36.5 39.7   Mono % % 7.1 4.0 4.9   Eosinophil % % 4.6 4.7 2.3   Basophil % % 0.7 0.7 0.6   Differential Method  Automated Automated Automated       Metabolic Panel (last 72 hours):  Recent Labs   Lab Result Units 09/08/23  0450 09/09/23  0403 09/10/23  0403   Sodium mmol/L 134* 136 135*   Potassium mmol/L 3.9 4.1 3.9   Chloride mmol/L 100 102 101   CO2 mmol/L 23 25 26   Glucose mg/dL 157* 153* 101   BUN mg/dL 5* 7 8   Creatinine mg/dL 1.0 1.0 1.0   Albumin g/dL  2.6* 2.7* 2.8*   Total Bilirubin mg/dL 0.3 0.2 0.2   Alkaline Phosphatase U/L 53* 54* 54*   AST U/L 33 51* 42*   ALT U/L 25 32 31   Magnesium mg/dL 1.7 1.9 1.9   Phosphorus mg/dL 4.0 3.6 2.8       Vancomycin Administrations:  vancomycin given in the last 96 hours                     vancomycin 1,500 mg in dextrose 5 % (D5W) 250 mL IVPB (Vial-Mate) (mg) 1,500 mg New Bag 09/10/23 1219     1,500 mg New Bag  0044     1,500 mg New Bag 09/09/23 1302      Restarted  0120     1,500 mg New Bag  0036      Restarted 09/08/23 1255     1,500 mg New Bag  1141    vancomycin (VANCOCIN) 1,750 mg in dextrose 5 % (D5W) 500 mL IVPB (mg) 1,750 mg New Bag 09/08/23 0028                    Microbiologic Results:  Microbiology Results (last 7 days)       Procedure Component Value Units Date/Time    Blood culture #1 **CANNOT BE ORDERED STAT** [[id number]] Collected: 09/08/23 0014    Order Status: Completed Specimen: Blood from Antecubital, Right Hand Updated: 09/10/23 1032     Blood Culture, Routine No Growth to date      No Growth to date      No Growth to date    Blood culture #2 **CANNOT BE ORDERED STAT** [[id number]] Collected: 09/08/23 0014    Order Status: Completed Specimen: Blood from Antecubital, Left Arm Updated: 09/10/23 1032     Blood Culture, Routine No Growth to date      No Growth to date      No Growth to date

## 2023-09-11 NOTE — PLAN OF CARE
Patient cleared for discharge from case management standpoint.    PCP office closed for lunch.  [facility] to contact patient to schedule hospital follow up.       09/11/23 1211   Final Note   Assessment Type Final Discharge Note   Anticipated Discharge Disposition Home   What phone number can be called within the next 1-3 days to see how you are doing after discharge? [phone]   Hospital Resources/Appts/Education Provided Provided patient/caregiver with written discharge plan information;Provided education on problems/symptoms using teachback;Appointments scheduled and added to AVS

## 2023-09-13 LAB
BACTERIA BLD CULT: NORMAL
BACTERIA BLD CULT: NORMAL

## 2023-09-18 ENCOUNTER — OFFICE VISIT (OUTPATIENT)
Dept: WOUND CARE | Facility: HOSPITAL | Age: 58
End: 2023-09-18
Attending: FAMILY MEDICINE
Payer: MEDICAID

## 2023-09-18 VITALS
DIASTOLIC BLOOD PRESSURE: 97 MMHG | HEART RATE: 81 BPM | RESPIRATION RATE: 20 BRPM | SYSTOLIC BLOOD PRESSURE: 136 MMHG | TEMPERATURE: 99 F

## 2023-09-18 DIAGNOSIS — R52 ACUTE PAIN: ICD-10-CM

## 2023-09-18 DIAGNOSIS — L02.215 ABSCESS, PERINEUM: Primary | ICD-10-CM

## 2023-09-18 DIAGNOSIS — A49.1 STREPTOCOCCAL INFECTION: ICD-10-CM

## 2023-09-18 DIAGNOSIS — S31.809D OPEN WOUND OF BUTTOCK WITH COMPLICATION, UNSPECIFIED LATERALITY, SUBSEQUENT ENCOUNTER: ICD-10-CM

## 2023-09-18 PROBLEM — S31.809A OPEN WOUND OF BUTTOCK WITH COMPLICATION: Status: ACTIVE | Noted: 2023-09-18

## 2023-09-18 PROCEDURE — 3080F DIAST BP >= 90 MM HG: CPT | Mod: CPTII,,, | Performed by: FAMILY MEDICINE

## 2023-09-18 PROCEDURE — 99214 OFFICE O/P EST MOD 30 MIN: CPT | Performed by: FAMILY MEDICINE

## 2023-09-18 PROCEDURE — 4010F ACE/ARB THERAPY RXD/TAKEN: CPT | Mod: CPTII,,, | Performed by: FAMILY MEDICINE

## 2023-09-18 PROCEDURE — 99214 PR OFFICE/OUTPT VISIT, EST, LEVL IV, 30-39 MIN: ICD-10-PCS | Mod: ,,, | Performed by: FAMILY MEDICINE

## 2023-09-18 PROCEDURE — 3075F PR MOST RECENT SYSTOLIC BLOOD PRESS GE 130-139MM HG: ICD-10-PCS | Mod: CPTII,,, | Performed by: FAMILY MEDICINE

## 2023-09-18 PROCEDURE — 4010F PR ACE/ARB THEARPY RXD/TAKEN: ICD-10-PCS | Mod: CPTII,,, | Performed by: FAMILY MEDICINE

## 2023-09-18 PROCEDURE — 1159F PR MEDICATION LIST DOCUMENTED IN MEDICAL RECORD: ICD-10-PCS | Mod: CPTII,,, | Performed by: FAMILY MEDICINE

## 2023-09-18 PROCEDURE — 1111F PR DISCHARGE MEDS RECONCILED W/ CURRENT OUTPATIENT MED LIST: ICD-10-PCS | Mod: CPTII,,, | Performed by: FAMILY MEDICINE

## 2023-09-18 PROCEDURE — 3080F PR MOST RECENT DIASTOLIC BLOOD PRESSURE >= 90 MM HG: ICD-10-PCS | Mod: CPTII,,, | Performed by: FAMILY MEDICINE

## 2023-09-18 PROCEDURE — 1111F DSCHRG MED/CURRENT MED MERGE: CPT | Mod: CPTII,,, | Performed by: FAMILY MEDICINE

## 2023-09-18 PROCEDURE — 99214 OFFICE O/P EST MOD 30 MIN: CPT | Mod: ,,, | Performed by: FAMILY MEDICINE

## 2023-09-18 PROCEDURE — 1159F MED LIST DOCD IN RCRD: CPT | Mod: CPTII,,, | Performed by: FAMILY MEDICINE

## 2023-09-18 PROCEDURE — 3075F SYST BP GE 130 - 139MM HG: CPT | Mod: CPTII,,, | Performed by: FAMILY MEDICINE

## 2023-09-18 RX ORDER — IBUPROFEN 800 MG/1
800 TABLET ORAL 3 TIMES DAILY
Qty: 42 TABLET | Refills: 1 | Status: SHIPPED | OUTPATIENT
Start: 2023-09-18 | End: 2023-10-16

## 2023-09-18 RX ORDER — AMOXICILLIN AND CLAVULANATE POTASSIUM 875; 125 MG/1; MG/1
1 TABLET, FILM COATED ORAL EVERY 12 HOURS
Qty: 20 TABLET | Refills: 0 | Status: SHIPPED | OUTPATIENT
Start: 2023-09-18 | End: 2023-09-28

## 2023-09-18 NOTE — PROGRESS NOTES
Wound Care Progress Note    Subjective:       Patient ID: Magdaleno Cunningham is a 57 y.o. male.    Chief Complaint: No chief complaint on file.    HPI  Pt seen in clinic for a FU visit from the hospital for post I and D of 2 abscesses in the perineum area. Wounds are doing well, pt has some pain and it is controlled with ibuprofen 800 mg. Pt has new complaints today.  Review of Systems   Skin:  Positive for wound.        Open wounds of the perineum x 2   All other systems reviewed and are negative.      Objective:        Physical Exam  Vitals and nursing note reviewed. Exam conducted with a chaperone present.   Constitutional:       General: He is not in acute distress.     Appearance: Normal appearance. He is not ill-appearing, toxic-appearing or diaphoretic.   Skin:     General: Skin is warm and dry.      Comments: Open surgical wounds of the perineum x 2 from I and of abscess. Pt wounds are improved from hospital, see wound care assessment documentation in chart review scanned under the media tab   Neurological:      General: No focal deficit present.      Mental Status: He is alert and oriented to person, place, and time.   Psychiatric:         Mood and Affect: Mood normal.         Behavior: Behavior normal.         Thought Content: Thought content normal.         Judgment: Judgment normal.       There were no vitals filed for this visit.    Assessment:           ICD-10-CM ICD-9-CM   1. Abscess, perineum  L02.215 682.2   2. Open wound of buttock with complication, unspecified laterality, subsequent encounter  S31.809D V58.89     877.1   3. Acute pain  R52 338.19   4. Streptococcal infection  A49.1 041.00                Plan:                  Diagnoses and all orders for this visit:    Abscess, perineum    Open wound of buttock with complication, unspecified laterality, subsequent encounter    Acute pain    Streptococcal infection    Other orders  -     amoxicillin-clavulanate 875-125mg (AUGMENTIN)  875-125 mg per tablet; Take 1 tablet by mouth every 12 (twelve) hours. for 10 days  -     ibuprofen (ADVIL,MOTRIN) 800 MG tablet; Take 1 tablet (800 mg total) by mouth 3 (three) times daily.      See wound care instructions provided separately

## 2023-12-04 PROBLEM — A41.9 SEPSIS: Status: RESOLVED | Noted: 2023-09-01 | Resolved: 2023-12-04

## 2023-12-14 ENCOUNTER — HOSPITAL ENCOUNTER (OUTPATIENT)
Facility: HOSPITAL | Age: 58
Discharge: HOME | End: 2023-12-15
Attending: STUDENT IN AN ORGANIZED HEALTH CARE EDUCATION/TRAINING PROGRAM | Admitting: STUDENT IN AN ORGANIZED HEALTH CARE EDUCATION/TRAINING PROGRAM
Payer: MEDICAID

## 2023-12-14 DIAGNOSIS — I10 HYPERTENSION, UNSPECIFIED TYPE: Primary | ICD-10-CM

## 2023-12-14 DIAGNOSIS — R07.9 CHEST PAIN: ICD-10-CM

## 2023-12-14 PROBLEM — E83.42 HYPOMAGNESEMIA: Status: ACTIVE | Noted: 2023-12-14

## 2023-12-14 LAB
ALBUMIN SERPL BCP-MCNC: 4 G/DL (ref 3.5–5.2)
ALP SERPL-CCNC: 44 U/L (ref 55–135)
ALT SERPL W/O P-5'-P-CCNC: 20 U/L (ref 10–44)
ANION GAP SERPL CALC-SCNC: 7 MMOL/L (ref 8–16)
APTT PPP: 25.9 SEC (ref 21–32)
AST SERPL-CCNC: 16 U/L (ref 10–40)
BASOPHILS # BLD AUTO: 0.06 K/UL (ref 0–0.2)
BASOPHILS NFR BLD: 0.6 % (ref 0–1.9)
BILIRUB SERPL-MCNC: 0.5 MG/DL (ref 0.1–1)
BUN SERPL-MCNC: 10 MG/DL (ref 6–20)
CALCIUM SERPL-MCNC: 8.8 MG/DL (ref 8.7–10.5)
CHLORIDE SERPL-SCNC: 102 MMOL/L (ref 95–110)
CHOLEST SERPL-MCNC: 147 MG/DL (ref 120–199)
CHOLEST/HDLC SERPL: 4.6 {RATIO} (ref 2–5)
CO2 SERPL-SCNC: 24 MMOL/L (ref 23–29)
CREAT SERPL-MCNC: 0.8 MG/DL (ref 0.5–1.4)
DIFFERENTIAL METHOD: ABNORMAL
EOSINOPHIL # BLD AUTO: 0.2 K/UL (ref 0–0.5)
EOSINOPHIL NFR BLD: 2.2 % (ref 0–8)
ERYTHROCYTE [DISTWIDTH] IN BLOOD BY AUTOMATED COUNT: 12.9 % (ref 11.5–14.5)
EST. GFR  (NO RACE VARIABLE): >60 ML/MIN/1.73 M^2
GLUCOSE SERPL-MCNC: 162 MG/DL (ref 70–110)
GLUCOSE SERPL-MCNC: 164 MG/DL (ref 70–110)
HCT VFR BLD AUTO: 43.4 % (ref 40–54)
HDLC SERPL-MCNC: 32 MG/DL (ref 40–75)
HDLC SERPL: 21.8 % (ref 20–50)
HGB BLD-MCNC: 15.4 G/DL (ref 14–18)
IMM GRANULOCYTES # BLD AUTO: 0.02 K/UL (ref 0–0.04)
IMM GRANULOCYTES NFR BLD AUTO: 0.2 % (ref 0–0.5)
LDLC SERPL CALC-MCNC: 78.6 MG/DL (ref 63–159)
LIPASE SERPL-CCNC: 29 U/L (ref 4–60)
LYMPHOCYTES # BLD AUTO: 3.8 K/UL (ref 1–4.8)
LYMPHOCYTES NFR BLD: 40.7 % (ref 18–48)
MAGNESIUM SERPL-MCNC: 1.4 MG/DL (ref 1.6–2.6)
MCH RBC QN AUTO: 32 PG (ref 27–31)
MCHC RBC AUTO-ENTMCNC: 35.5 G/DL (ref 32–36)
MCV RBC AUTO: 90 FL (ref 82–98)
MONOCYTES # BLD AUTO: 0.5 K/UL (ref 0.3–1)
MONOCYTES NFR BLD: 5.4 % (ref 4–15)
NEUTROPHILS # BLD AUTO: 4.8 K/UL (ref 1.8–7.7)
NEUTROPHILS NFR BLD: 50.9 % (ref 38–73)
NONHDLC SERPL-MCNC: 115 MG/DL
NRBC BLD-RTO: 0 /100 WBC
PLATELET # BLD AUTO: 182 K/UL (ref 150–450)
PMV BLD AUTO: 9.7 FL (ref 9.2–12.9)
POTASSIUM SERPL-SCNC: 3.8 MMOL/L (ref 3.5–5.1)
PROT SERPL-MCNC: 6.8 G/DL (ref 6–8.4)
RBC # BLD AUTO: 4.81 M/UL (ref 4.6–6.2)
SODIUM SERPL-SCNC: 133 MMOL/L (ref 136–145)
TRIGL SERPL-MCNC: 182 MG/DL (ref 30–150)
TROPONIN I SERPL HS-MCNC: 12.8 PG/ML (ref 0–14.9)
TSH SERPL DL<=0.005 MIU/L-ACNC: 1.92 UIU/ML (ref 0.34–5.6)
WBC # BLD AUTO: 9.38 K/UL (ref 3.9–12.7)

## 2023-12-14 PROCEDURE — 25000003 PHARM REV CODE 250: Performed by: EMERGENCY MEDICINE

## 2023-12-14 PROCEDURE — 25000003 PHARM REV CODE 250: Performed by: NURSE PRACTITIONER

## 2023-12-14 PROCEDURE — 82962 GLUCOSE BLOOD TEST: CPT

## 2023-12-14 PROCEDURE — 84484 ASSAY OF TROPONIN QUANT: CPT | Performed by: PHYSICIAN ASSISTANT

## 2023-12-14 PROCEDURE — 83735 ASSAY OF MAGNESIUM: CPT | Performed by: PHYSICIAN ASSISTANT

## 2023-12-14 PROCEDURE — 96366 THER/PROPH/DIAG IV INF ADDON: CPT

## 2023-12-14 PROCEDURE — 80053 COMPREHEN METABOLIC PANEL: CPT | Performed by: PHYSICIAN ASSISTANT

## 2023-12-14 PROCEDURE — 99285 EMERGENCY DEPT VISIT HI MDM: CPT | Mod: 25

## 2023-12-14 PROCEDURE — G0378 HOSPITAL OBSERVATION PER HR: HCPCS

## 2023-12-14 PROCEDURE — 96375 TX/PRO/DX INJ NEW DRUG ADDON: CPT

## 2023-12-14 PROCEDURE — 63600175 PHARM REV CODE 636 W HCPCS

## 2023-12-14 PROCEDURE — 93010 ELECTROCARDIOGRAM REPORT: CPT | Mod: ,,, | Performed by: GENERAL PRACTICE

## 2023-12-14 PROCEDURE — 93005 ELECTROCARDIOGRAM TRACING: CPT | Performed by: GENERAL PRACTICE

## 2023-12-14 PROCEDURE — 96365 THER/PROPH/DIAG IV INF INIT: CPT

## 2023-12-14 PROCEDURE — 83690 ASSAY OF LIPASE: CPT | Performed by: PHYSICIAN ASSISTANT

## 2023-12-14 PROCEDURE — 84443 ASSAY THYROID STIM HORMONE: CPT

## 2023-12-14 PROCEDURE — 85730 THROMBOPLASTIN TIME PARTIAL: CPT | Performed by: PHYSICIAN ASSISTANT

## 2023-12-14 PROCEDURE — 80061 LIPID PANEL: CPT

## 2023-12-14 PROCEDURE — 25000003 PHARM REV CODE 250

## 2023-12-14 PROCEDURE — 93010 EKG 12-LEAD: ICD-10-PCS | Mod: ,,, | Performed by: GENERAL PRACTICE

## 2023-12-14 PROCEDURE — 85025 COMPLETE CBC W/AUTO DIFF WBC: CPT | Performed by: PHYSICIAN ASSISTANT

## 2023-12-14 PROCEDURE — 83036 HEMOGLOBIN GLYCOSYLATED A1C: CPT

## 2023-12-14 RX ORDER — MORPHINE SULFATE 2 MG/ML
2 INJECTION, SOLUTION INTRAMUSCULAR; INTRAVENOUS EVERY 4 HOURS PRN
Status: DISCONTINUED | OUTPATIENT
Start: 2023-12-14 | End: 2023-12-15 | Stop reason: HOSPADM

## 2023-12-14 RX ORDER — IBUPROFEN 200 MG
16 TABLET ORAL
Status: DISCONTINUED | OUTPATIENT
Start: 2023-12-14 | End: 2023-12-15 | Stop reason: HOSPADM

## 2023-12-14 RX ORDER — CYCLOBENZAPRINE HCL 10 MG
10 TABLET ORAL NIGHTLY
Status: DISCONTINUED | OUTPATIENT
Start: 2023-12-14 | End: 2023-12-15 | Stop reason: HOSPADM

## 2023-12-14 RX ORDER — ACETAMINOPHEN 325 MG/1
650 TABLET ORAL EVERY 8 HOURS PRN
Status: DISCONTINUED | OUTPATIENT
Start: 2023-12-14 | End: 2023-12-15 | Stop reason: HOSPADM

## 2023-12-14 RX ORDER — ASPIRIN 325 MG
325 TABLET, DELAYED RELEASE (ENTERIC COATED) ORAL DAILY
Status: DISCONTINUED | OUTPATIENT
Start: 2023-12-15 | End: 2023-12-14

## 2023-12-14 RX ORDER — GABAPENTIN 300 MG/1
600 CAPSULE ORAL DAILY
Status: DISCONTINUED | OUTPATIENT
Start: 2023-12-15 | End: 2023-12-15 | Stop reason: HOSPADM

## 2023-12-14 RX ORDER — LISINOPRIL 20 MG/1
20 TABLET ORAL DAILY
Status: ON HOLD | COMMUNITY
End: 2024-01-04 | Stop reason: HOSPADM

## 2023-12-14 RX ORDER — GLUCAGON 1 MG
1 KIT INJECTION
Status: DISCONTINUED | OUTPATIENT
Start: 2023-12-14 | End: 2023-12-14

## 2023-12-14 RX ORDER — METOPROLOL SUCCINATE 50 MG/1
100 TABLET, EXTENDED RELEASE ORAL 2 TIMES DAILY
Status: DISCONTINUED | OUTPATIENT
Start: 2023-12-14 | End: 2023-12-15 | Stop reason: HOSPADM

## 2023-12-14 RX ORDER — NAPROXEN SODIUM 220 MG/1
81 TABLET, FILM COATED ORAL DAILY
Status: DISCONTINUED | OUTPATIENT
Start: 2023-12-15 | End: 2023-12-15

## 2023-12-14 RX ORDER — IBUPROFEN 200 MG
16 TABLET ORAL
Status: DISCONTINUED | OUTPATIENT
Start: 2023-12-14 | End: 2023-12-14

## 2023-12-14 RX ORDER — SODIUM CHLORIDE 0.9 % (FLUSH) 0.9 %
2 SYRINGE (ML) INJECTION
Status: DISCONTINUED | OUTPATIENT
Start: 2023-12-14 | End: 2023-12-15 | Stop reason: HOSPADM

## 2023-12-14 RX ORDER — SODIUM,POTASSIUM PHOSPHATES 280-250MG
2 POWDER IN PACKET (EA) ORAL
Status: DISCONTINUED | OUTPATIENT
Start: 2023-12-14 | End: 2023-12-15 | Stop reason: HOSPADM

## 2023-12-14 RX ORDER — LISINOPRIL 20 MG/1
20 TABLET ORAL DAILY
Status: DISCONTINUED | OUTPATIENT
Start: 2023-12-15 | End: 2023-12-15 | Stop reason: HOSPADM

## 2023-12-14 RX ORDER — INSULIN ASPART 100 [IU]/ML
0-10 INJECTION, SOLUTION INTRAVENOUS; SUBCUTANEOUS
Status: DISCONTINUED | OUTPATIENT
Start: 2023-12-14 | End: 2023-12-15 | Stop reason: HOSPADM

## 2023-12-14 RX ORDER — MAGNESIUM SULFATE HEPTAHYDRATE 40 MG/ML
4 INJECTION, SOLUTION INTRAVENOUS ONCE
Status: COMPLETED | OUTPATIENT
Start: 2023-12-14 | End: 2023-12-14

## 2023-12-14 RX ORDER — TALC
6 POWDER (GRAM) TOPICAL NIGHTLY PRN
Status: DISCONTINUED | OUTPATIENT
Start: 2023-12-14 | End: 2023-12-15 | Stop reason: HOSPADM

## 2023-12-14 RX ORDER — GABAPENTIN 300 MG/1
1200 CAPSULE ORAL NIGHTLY
Status: DISCONTINUED | OUTPATIENT
Start: 2023-12-14 | End: 2023-12-15 | Stop reason: HOSPADM

## 2023-12-14 RX ORDER — METHOCARBAMOL 500 MG/1
500 TABLET, FILM COATED ORAL NIGHTLY PRN
Status: DISCONTINUED | OUTPATIENT
Start: 2023-12-15 | End: 2023-12-15 | Stop reason: HOSPADM

## 2023-12-14 RX ORDER — PRAVASTATIN SODIUM 20 MG/1
20 TABLET ORAL DAILY
Status: DISCONTINUED | OUTPATIENT
Start: 2023-12-15 | End: 2023-12-15 | Stop reason: HOSPADM

## 2023-12-14 RX ORDER — MORPHINE SULFATE 4 MG/ML
4 INJECTION, SOLUTION INTRAMUSCULAR; INTRAVENOUS EVERY 4 HOURS PRN
Status: DISCONTINUED | OUTPATIENT
Start: 2023-12-14 | End: 2023-12-15 | Stop reason: HOSPADM

## 2023-12-14 RX ORDER — LANOLIN ALCOHOL/MO/W.PET/CERES
800 CREAM (GRAM) TOPICAL
Status: DISCONTINUED | OUTPATIENT
Start: 2023-12-14 | End: 2023-12-15 | Stop reason: HOSPADM

## 2023-12-14 RX ORDER — ONDANSETRON 2 MG/ML
4 INJECTION INTRAMUSCULAR; INTRAVENOUS EVERY 8 HOURS PRN
Status: DISCONTINUED | OUTPATIENT
Start: 2023-12-14 | End: 2023-12-15 | Stop reason: HOSPADM

## 2023-12-14 RX ORDER — ASPIRIN 325 MG
325 TABLET, DELAYED RELEASE (ENTERIC COATED) ORAL
Status: COMPLETED | OUTPATIENT
Start: 2023-12-14 | End: 2023-12-14

## 2023-12-14 RX ORDER — GLUCAGON 1 MG
1 KIT INJECTION
Status: DISCONTINUED | OUTPATIENT
Start: 2023-12-14 | End: 2023-12-15 | Stop reason: HOSPADM

## 2023-12-14 RX ORDER — METHOCARBAMOL 500 MG/1
500 TABLET, FILM COATED ORAL NIGHTLY
Status: DISCONTINUED | OUTPATIENT
Start: 2023-12-14 | End: 2023-12-14

## 2023-12-14 RX ORDER — IBUPROFEN 200 MG
24 TABLET ORAL
Status: DISCONTINUED | OUTPATIENT
Start: 2023-12-14 | End: 2023-12-15 | Stop reason: HOSPADM

## 2023-12-14 RX ORDER — PROMETHAZINE HYDROCHLORIDE 25 MG/1
25 TABLET ORAL EVERY 6 HOURS PRN
Status: DISCONTINUED | OUTPATIENT
Start: 2023-12-14 | End: 2023-12-15 | Stop reason: HOSPADM

## 2023-12-14 RX ORDER — IBUPROFEN 200 MG
24 TABLET ORAL
Status: DISCONTINUED | OUTPATIENT
Start: 2023-12-14 | End: 2023-12-14

## 2023-12-14 RX ADMIN — NITROGLYCERIN 0.5 INCH: 20 OINTMENT TOPICAL at 08:12

## 2023-12-14 RX ADMIN — ONDANSETRON 4 MG: 2 INJECTION INTRAMUSCULAR; INTRAVENOUS at 09:12

## 2023-12-14 RX ADMIN — GABAPENTIN 1200 MG: 300 CAPSULE ORAL at 11:12

## 2023-12-14 RX ADMIN — ASPIRIN 325 MG: 325 TABLET, COATED ORAL at 07:12

## 2023-12-14 RX ADMIN — POTASSIUM BICARBONATE 50 MEQ: 977.5 TABLET, EFFERVESCENT ORAL at 09:12

## 2023-12-14 RX ADMIN — METOPROLOL SUCCINATE 100 MG: 50 TABLET, EXTENDED RELEASE ORAL at 11:12

## 2023-12-14 RX ADMIN — MAGNESIUM SULFATE HEPTAHYDRATE 4 G: 40 INJECTION, SOLUTION INTRAVENOUS at 09:12

## 2023-12-14 RX ADMIN — MORPHINE SULFATE 4 MG: 4 INJECTION, SOLUTION INTRAMUSCULAR; INTRAVENOUS at 09:12

## 2023-12-15 ENCOUNTER — CLINICAL SUPPORT (OUTPATIENT)
Dept: SMOKING CESSATION | Facility: CLINIC | Age: 58
End: 2023-12-15
Payer: COMMERCIAL

## 2023-12-15 ENCOUNTER — CLINICAL SUPPORT (OUTPATIENT)
Dept: CARDIOLOGY | Facility: HOSPITAL | Age: 58
End: 2023-12-15
Attending: STUDENT IN AN ORGANIZED HEALTH CARE EDUCATION/TRAINING PROGRAM
Payer: MEDICAID

## 2023-12-15 VITALS
OXYGEN SATURATION: 97 % | SYSTOLIC BLOOD PRESSURE: 151 MMHG | WEIGHT: 271.81 LBS | RESPIRATION RATE: 18 BRPM | HEIGHT: 71 IN | HEART RATE: 67 BPM | TEMPERATURE: 97 F | DIASTOLIC BLOOD PRESSURE: 72 MMHG | BODY MASS INDEX: 38.05 KG/M2

## 2023-12-15 VITALS — HEIGHT: 71 IN | WEIGHT: 271.81 LBS | BODY MASS INDEX: 38.05 KG/M2

## 2023-12-15 DIAGNOSIS — F17.200 NICOTINE DEPENDENCE: Primary | ICD-10-CM

## 2023-12-15 LAB
ANION GAP SERPL CALC-SCNC: 7 MMOL/L (ref 8–16)
BASOPHILS # BLD AUTO: 0.07 K/UL (ref 0–0.2)
BASOPHILS NFR BLD: 0.8 % (ref 0–1.9)
BUN SERPL-MCNC: 12 MG/DL (ref 6–20)
CALCIUM SERPL-MCNC: 8 MG/DL (ref 8.7–10.5)
CHLORIDE SERPL-SCNC: 101 MMOL/L (ref 95–110)
CO2 SERPL-SCNC: 24 MMOL/L (ref 23–29)
CREAT SERPL-MCNC: 0.8 MG/DL (ref 0.5–1.4)
DIFFERENTIAL METHOD: ABNORMAL
EOSINOPHIL # BLD AUTO: 0.2 K/UL (ref 0–0.5)
EOSINOPHIL NFR BLD: 2.8 % (ref 0–8)
ERYTHROCYTE [DISTWIDTH] IN BLOOD BY AUTOMATED COUNT: 13.1 % (ref 11.5–14.5)
EST. GFR  (NO RACE VARIABLE): >60 ML/MIN/1.73 M^2
ESTIMATED AVG GLUCOSE: 192 MG/DL (ref 68–131)
ESTIMATED AVG GLUCOSE: 192 MG/DL (ref 68–131)
GLUCOSE SERPL-MCNC: 151 MG/DL (ref 70–110)
GLUCOSE SERPL-MCNC: 235 MG/DL (ref 70–110)
GLUCOSE SERPL-MCNC: 243 MG/DL (ref 70–110)
GLUCOSE SERPL-MCNC: 302 MG/DL (ref 70–110)
HBA1C MFR BLD: 8.3 % (ref 4.5–6.2)
HBA1C MFR BLD: 8.3 % (ref 4.5–6.2)
HCT VFR BLD AUTO: 42 % (ref 40–54)
HGB BLD-MCNC: 14.4 G/DL (ref 14–18)
IMM GRANULOCYTES # BLD AUTO: 0.02 K/UL (ref 0–0.04)
IMM GRANULOCYTES NFR BLD AUTO: 0.2 % (ref 0–0.5)
LYMPHOCYTES # BLD AUTO: 3.8 K/UL (ref 1–4.8)
LYMPHOCYTES NFR BLD: 44 % (ref 18–48)
MAGNESIUM SERPL-MCNC: 2.1 MG/DL (ref 1.6–2.6)
MCH RBC QN AUTO: 31.7 PG (ref 27–31)
MCHC RBC AUTO-ENTMCNC: 34.3 G/DL (ref 32–36)
MCV RBC AUTO: 93 FL (ref 82–98)
MONOCYTES # BLD AUTO: 0.5 K/UL (ref 0.3–1)
MONOCYTES NFR BLD: 5.3 % (ref 4–15)
NEUTROPHILS # BLD AUTO: 4 K/UL (ref 1.8–7.7)
NEUTROPHILS NFR BLD: 46.9 % (ref 38–73)
NRBC BLD-RTO: 0 /100 WBC
PLATELET # BLD AUTO: 185 K/UL (ref 150–450)
PMV BLD AUTO: 10.3 FL (ref 9.2–12.9)
POTASSIUM SERPL-SCNC: 4 MMOL/L (ref 3.5–5.1)
RBC # BLD AUTO: 4.54 M/UL (ref 4.6–6.2)
SODIUM SERPL-SCNC: 132 MMOL/L (ref 136–145)
TROPONIN I SERPL HS-MCNC: 10.4 PG/ML (ref 0–14.9)
WBC # BLD AUTO: 8.53 K/UL (ref 3.9–12.7)

## 2023-12-15 PROCEDURE — 93005 ELECTROCARDIOGRAM TRACING: CPT | Performed by: INTERNAL MEDICINE

## 2023-12-15 PROCEDURE — 63600175 PHARM REV CODE 636 W HCPCS

## 2023-12-15 PROCEDURE — 93010 EKG 12-LEAD: ICD-10-PCS | Mod: ,,, | Performed by: INTERNAL MEDICINE

## 2023-12-15 PROCEDURE — 96376 TX/PRO/DX INJ SAME DRUG ADON: CPT | Mod: 59

## 2023-12-15 PROCEDURE — 25000003 PHARM REV CODE 250

## 2023-12-15 PROCEDURE — 80048 BASIC METABOLIC PNL TOTAL CA: CPT

## 2023-12-15 PROCEDURE — 85025 COMPLETE CBC W/AUTO DIFF WBC: CPT

## 2023-12-15 PROCEDURE — 84484 ASSAY OF TROPONIN QUANT: CPT

## 2023-12-15 PROCEDURE — 99406 BEHAV CHNG SMOKING 3-10 MIN: CPT

## 2023-12-15 PROCEDURE — G0378 HOSPITAL OBSERVATION PER HR: HCPCS

## 2023-12-15 PROCEDURE — 93005 ELECTROCARDIOGRAM TRACING: CPT | Performed by: GENERAL PRACTICE

## 2023-12-15 PROCEDURE — 93010 ELECTROCARDIOGRAM REPORT: CPT | Mod: ,,, | Performed by: INTERNAL MEDICINE

## 2023-12-15 PROCEDURE — 93010 EKG 12-LEAD: ICD-10-PCS | Mod: ,,, | Performed by: GENERAL PRACTICE

## 2023-12-15 PROCEDURE — 99407 BEHAV CHNG SMOKING > 10 MIN: CPT | Mod: S$GLB,,, | Performed by: INTERNAL MEDICINE

## 2023-12-15 PROCEDURE — 99407 PR TOBACCO USE CESSATION INTENSIVE >10 MINUTES: ICD-10-PCS | Mod: S$GLB,,, | Performed by: INTERNAL MEDICINE

## 2023-12-15 PROCEDURE — 93306 TTE W/DOPPLER COMPLETE: CPT

## 2023-12-15 PROCEDURE — 83735 ASSAY OF MAGNESIUM: CPT | Performed by: STUDENT IN AN ORGANIZED HEALTH CARE EDUCATION/TRAINING PROGRAM

## 2023-12-15 PROCEDURE — 93010 ELECTROCARDIOGRAM REPORT: CPT | Mod: ,,, | Performed by: GENERAL PRACTICE

## 2023-12-15 PROCEDURE — 96372 THER/PROPH/DIAG INJ SC/IM: CPT

## 2023-12-15 RX ORDER — ISOSORBIDE MONONITRATE 30 MG/1
30 TABLET, EXTENDED RELEASE ORAL DAILY
Qty: 30 TABLET | Refills: 11 | Status: ON HOLD | OUTPATIENT
Start: 2023-12-16 | End: 2024-01-04

## 2023-12-15 RX ORDER — CLOPIDOGREL BISULFATE 75 MG/1
75 TABLET ORAL DAILY
Status: DISCONTINUED | OUTPATIENT
Start: 2023-12-15 | End: 2023-12-15

## 2023-12-15 RX ORDER — RANOLAZINE 1000 MG/1
1000 TABLET, EXTENDED RELEASE ORAL 2 TIMES DAILY
Qty: 60 TABLET | Refills: 11 | Status: SHIPPED | OUTPATIENT
Start: 2023-12-15 | End: 2024-12-14

## 2023-12-15 RX ORDER — ISOSORBIDE MONONITRATE 30 MG/1
30 TABLET, EXTENDED RELEASE ORAL DAILY
Status: DISCONTINUED | OUTPATIENT
Start: 2023-12-15 | End: 2023-12-15 | Stop reason: HOSPADM

## 2023-12-15 RX ORDER — RANOLAZINE 500 MG/1
1000 TABLET, EXTENDED RELEASE ORAL 2 TIMES DAILY
Status: DISCONTINUED | OUTPATIENT
Start: 2023-12-15 | End: 2023-12-15 | Stop reason: HOSPADM

## 2023-12-15 RX ORDER — RANOLAZINE 500 MG/1
500 TABLET, EXTENDED RELEASE ORAL 2 TIMES DAILY
Status: DISCONTINUED | OUTPATIENT
Start: 2023-12-15 | End: 2023-12-15

## 2023-12-15 RX ADMIN — ISOSORBIDE MONONITRATE 30 MG: 30 TABLET, EXTENDED RELEASE ORAL at 09:12

## 2023-12-15 RX ADMIN — APIXABAN 5 MG: 5 TABLET, FILM COATED ORAL at 09:12

## 2023-12-15 RX ADMIN — INSULIN ASPART 8 UNITS: 100 INJECTION, SOLUTION INTRAVENOUS; SUBCUTANEOUS at 12:12

## 2023-12-15 RX ADMIN — NITROGLYCERIN 1 INCH: 20 OINTMENT TOPICAL at 12:12

## 2023-12-15 RX ADMIN — MORPHINE SULFATE 4 MG: 4 INJECTION, SOLUTION INTRAMUSCULAR; INTRAVENOUS at 11:12

## 2023-12-15 RX ADMIN — NITROGLYCERIN 1 INCH: 20 OINTMENT TOPICAL at 11:12

## 2023-12-15 RX ADMIN — GABAPENTIN 600 MG: 300 CAPSULE ORAL at 09:12

## 2023-12-15 RX ADMIN — METOPROLOL SUCCINATE 100 MG: 50 TABLET, EXTENDED RELEASE ORAL at 09:12

## 2023-12-15 RX ADMIN — LISINOPRIL 20 MG: 20 TABLET ORAL at 09:12

## 2023-12-15 RX ADMIN — RANOLAZINE 1000 MG: 500 TABLET, FILM COATED, EXTENDED RELEASE ORAL at 09:12

## 2023-12-15 RX ADMIN — TICAGRELOR 90 MG: 90 TABLET ORAL at 09:12

## 2023-12-15 RX ADMIN — MORPHINE SULFATE 4 MG: 4 INJECTION, SOLUTION INTRAMUSCULAR; INTRAVENOUS at 01:12

## 2023-12-15 RX ADMIN — CYCLOBENZAPRINE 10 MG: 10 TABLET, FILM COATED ORAL at 12:12

## 2023-12-15 RX ADMIN — INSULIN DETEMIR 80 UNITS: 100 INJECTION, SOLUTION SUBCUTANEOUS at 12:12

## 2023-12-15 RX ADMIN — MORPHINE SULFATE 4 MG: 4 INJECTION, SOLUTION INTRAMUSCULAR; INTRAVENOUS at 07:12

## 2023-12-15 RX ADMIN — INSULIN DETEMIR 80 UNITS: 100 INJECTION, SOLUTION SUBCUTANEOUS at 10:12

## 2023-12-15 NOTE — H&P (VIEW-ONLY)
[facility]   Cardiology Note    Consult Requested By: ANI  Reason for Consult: chest pain    SUBJECTIVE:     History of Present Illness: pmh--CAD last PCI stent to mid circ, balloon to prox and distal circ, h/o CABG, DM HTN. pAF--eliquis, DM last Aic 8.3 but has been as high as 11. Last cath 2022 mid LAD 50% , one stent to circ successfully placed, balloon prox and distal portion, non-functioning SVG to this area, unable to pass stent to distal portion at this time, patent LIMA to LAD--narrow, The pt was previously seen by  and . The pt started having non-exertional chest pain, sharp on occasion and also some instances of pressure that occasionally radiated to left arm. No associated n/v/diaphoresis. Yesterday he had to take 3 nitro over the course of the day. The pt is currently lying in bed with no c/o chest pain. He denies any SOB more than his baseline of some WRIGHT. The pt reports these symptoms are consistent with need for cardiac intervention in the past. He has no swelling to LE. Trop are neg, EKG shows no acute ischemic changes. VSS H/H and kidney function are WNL., mag 1.4--replaced. SR tele.     Review of patient's allergies indicates:   Allergen Reactions    Pesticide Dermatitis and Rash       Past Medical History:   Diagnosis Date    Anticoagulant long-term use     Atrial fibrillation 2018    BPH (benign prostatic hyperplasia)     Cataract     COPD (chronic obstructive pulmonary disease)     Coronary artery disease     stents    CVD (cardiovascular disease)     Diabetes mellitus     Erythema multiforme     AKA Denton Edmondson Syndrome    Hypertension     Infected incision     MI (myocardial infarction)     per pt he has had 4     ROSAMARIA on CPAP     RLS (restless legs syndrome)      Past Surgical History:   Procedure Laterality Date    CORONARY ANGIOGRAPHY INCLUDING BYPASS GRAFTS WITH CATHETERIZATION OF LEFT HEART N/A 9/1/2022    Procedure: ANGIOGRAM, CORONARY, INCLUDING BYPASS  GRAFT, WITH LEFT HEART CATHETERIZATION;  Surgeon: Paul Botello MD;  Location: [facility] CATH/EP LAB;  Service: Cardiology;  Laterality: N/A;    CORONARY ARTERY BYPASS GRAFT (CABG) N/A 4/7/2020    Procedure: CORONARY ARTERY BYPASS GRAFT (CABG)- Excision of left atrial appendage;  Surgeon: Doug Solitario MD;  Location: [facility];  Service: Cardiothoracic;  Laterality: N/A;    CORONARY STENT PLACEMENT      WILSON MAZE PROCEDURE N/A 4/7/2020    Procedure: WILSON MAZE PROCEDURE- Attempted;  Surgeon: Doug Solitario MD;  Location: [facility] OR;  Service: Cardiothoracic;  Laterality: N/A;    CYSTOSCOPY N/A 12/10/2018    Procedure: CYSTOSCOPY;  Surgeon: Khushboo Abreu MD;  Location: [facility];  Service: Urology;  Laterality: N/A;    ENDOSCOPIC HARVEST OF VEIN Left 4/7/2020    Procedure: HARVEST-VEIN-ENDOVASCULAR;  Surgeon: Doug Solitario MD;  Location: [facility];  Service: Cardiothoracic;  Laterality: Left;    INCISION OF PERIRECTAL ABSCESS Bilateral 9/1/2023    Procedure: INCISION, ABSCESS, PERIRECTAL;  Surgeon: Brayan Spears MD;  Location: [facility] OR;  Service: General;  Laterality: Bilateral;  stirrups    LEFT HEART CATHETERIZATION Left 3/17/2020    Procedure: CATHETERIZATION, HEART, LEFT;  Surgeon: Tyree Walters MD;  Location: [facility] CATH/EP LAB;  Service: Cardiology;  Laterality: Left;    STERNAL WIRES REMOVAL N/A 6/8/2020    Procedure: REMOVAL, STERNAL WIRE;  Surgeon: Doug Solitario MD;  Location: [facility] OR;  Service: Peripheral Vascular;  Laterality: N/A;    ULTRASOUND OF PROSTATE FOR VOLUME DETERMINATION  12/10/2018    Procedure: ULTRASOUND, PROSTATE, FOR VOLUME DETERMINATION;  Surgeon: Khushboo Abreu MD;  Location: [facility];  Service: Urology;;     Family History   Problem Relation Age of Onset    Heart disease Mother     Diabetes Mother     Hyperlipidemia Father     Cancer Father      Social History     Tobacco Use    Smoking status: Every Day     Current packs/day: 0.00     Average  packs/day: 0.3 packs/day for 30.0 years (9.0 ttl pk-yrs)     Types: Cigarettes     Start date: 4/4/1990     Last attempt to quit: 4/4/2020     Years since quitting: 3.6    Smokeless tobacco: Never    Tobacco comments:     just under a pack a day   Substance Use Topics    Alcohol use: Not Currently    Drug use: Yes     Frequency: 1.0 times per week     Types: Marijuana       Review of Systems:  Review of Systems   Constitutional:  Negative for chills and fever.   Respiratory:  Negative for shortness of breath.    Cardiovascular:  Positive for chest pain. Negative for palpitations, orthopnea, claudication, leg swelling and PND.   Gastrointestinal:  Negative for heartburn and nausea.       OBJECTIVE:     Vital Signs (Most Recent)  Temp: 97.3 °F (36.3 °C) (12/15/23 0726)  Pulse: (!) 57 (12/15/23 0819)  Resp: 16 (12/15/23 0819)  BP: (!) 142/88 (12/15/23 0726)  SpO2: 95 % (12/15/23 0819)    Vital Signs Range (Last 24H):  Temp:  [97.3 °F (36.3 °C)-98.3 °F (36.8 °C)]   Pulse:  [57-71]   Resp:  [12-23]   BP: (107-168)/(60-91)   SpO2:  [92 %-97 %]     I & O (Last 24H):    Intake/Output Summary (Last 24 hours) at 12/15/2023 0834  Last data filed at 12/14/2023 2359  Gross per 24 hour   Intake 240 ml   Output --   Net 240 ml       Current Diet:     Current Diet Order   Procedures    Diet NPO        Allergies:  Review of patient's allergies indicates:   Allergen Reactions    Pesticide Dermatitis and Rash       Meds:  Scheduled Meds:   aspirin  81 mg Oral Daily    cyclobenzaprine  10 mg Oral QHS    gabapentin  1,200 mg Oral QHS    gabapentin  600 mg Oral Daily    insulin detemir U-100  80 Units Subcutaneous BID    lisinopriL  20 mg Oral Daily    metoprolol succinate  100 mg Oral BID    pravastatin  20 mg Oral Daily     Continuous Infusions:  PRN Meds:acetaminophen, acetaminophen, dextrose 50%, dextrose 50%, glucagon (human recombinant), glucose, glucose, insulin aspart U-100, magnesium oxide, magnesium oxide, melatonin,  methocarbamoL, morphine, morphine, nitroGLYCERIN 2% TD oint, ondansetron, potassium bicarbonate, potassium bicarbonate, potassium bicarbonate, potassium, sodium phosphates, potassium, sodium phosphates, potassium, sodium phosphates, promethazine, sodium chloride 0.9%    Oxygen/Ventilator Data (Last 24H):  (if applicable)            Hemodynamic Parameters (Last 24H):   (if applicable)        Laboratory and Radiology Data:  Recent Results (from the past 24 hour(s))   Comprehensive metabolic panel    Collection Time: 12/14/23  7:29 PM   Result Value Ref Range    Sodium 133 (L) 136 - 145 mmol/L    Potassium 3.8 3.5 - 5.1 mmol/L    Chloride 102 95 - 110 mmol/L    CO2 24 23 - 29 mmol/L    Glucose 162 (H) 70 - 110 mg/dL    BUN 10 6 - 20 mg/dL    Creatinine 0.8 0.5 - 1.4 mg/dL    Calcium 8.8 8.7 - 10.5 mg/dL    Total Protein 6.8 6.0 - 8.4 g/dL    Albumin 4.0 3.5 - 5.2 g/dL    Total Bilirubin 0.5 0.1 - 1.0 mg/dL    Alkaline Phosphatase 44 (L) 55 - 135 U/L    AST 16 10 - 40 U/L    ALT 20 10 - 44 U/L    eGFR >60.0 >60 mL/min/1.73 m^2    Anion Gap 7 (L) 8 - 16 mmol/L   CBC auto differential    Collection Time: 12/14/23  7:29 PM   Result Value Ref Range    WBC 9.38 3.90 - 12.70 K/uL    RBC 4.81 4.60 - 6.20 M/uL    Hemoglobin 15.4 14.0 - 18.0 g/dL    Hematocrit 43.4 40.0 - 54.0 %    MCV 90 82 - 98 fL    MCH 32.0 (H) 27.0 - 31.0 pg    MCHC 35.5 32.0 - 36.0 g/dL    RDW 12.9 11.5 - 14.5 %    Platelets 182 150 - 450 K/uL    MPV 9.7 9.2 - 12.9 fL    Immature Granulocytes 0.2 0.0 - 0.5 %    Gran # (ANC) 4.8 1.8 - 7.7 K/uL    Immature Grans (Abs) 0.02 0.00 - 0.04 K/uL    Lymph # 3.8 1.0 - 4.8 K/uL    Mono # 0.5 0.3 - 1.0 K/uL    Eos # 0.2 0.0 - 0.5 K/uL    Baso # 0.06 0.00 - 0.20 K/uL    nRBC 0 0 /100 WBC    Gran % 50.9 38.0 - 73.0 %    Lymph % 40.7 18.0 - 48.0 %    Mono % 5.4 4.0 - 15.0 %    Eosinophil % 2.2 0.0 - 8.0 %    Basophil % 0.6 0.0 - 1.9 %    Differential Method Automated    Magnesium    Collection Time: 12/14/23  7:29 PM    Result Value Ref Range    Magnesium 1.4 (L) 1.6 - 2.6 mg/dL   Troponin I High Sensitivity    Collection Time: 12/14/23  7:29 PM   Result Value Ref Range    Troponin I High Sensitivity 12.8 0.0 - 14.9 pg/mL   Lipase    Collection Time: 12/14/23  7:29 PM   Result Value Ref Range    Lipase 29 4 - 60 U/L   APTT    Collection Time: 12/14/23  7:29 PM   Result Value Ref Range    aPTT 25.9 21.0 - 32.0 sec   POCT glucose    Collection Time: 12/14/23  9:42 PM   Result Value Ref Range    POC Glucose 164 (H) 70 - 110   Hemoglobin A1c if not done in past 3 months    Collection Time: 12/14/23  9:50 PM   Result Value Ref Range    Hemoglobin A1C 8.3 (H) 4.5 - 6.2 %    Estimated Avg Glucose 192 (H) 68 - 131 mg/dL   TSH    Collection Time: 12/14/23  9:50 PM   Result Value Ref Range    TSH 1.921 0.340 - 5.600 uIU/mL   Hemoglobin A1c    Collection Time: 12/14/23  9:50 PM   Result Value Ref Range    Hemoglobin A1C 8.3 (H) 4.5 - 6.2 %    Estimated Avg Glucose 192 (H) 68 - 131 mg/dL   Lipid panel    Collection Time: 12/14/23  9:50 PM   Result Value Ref Range    Cholesterol 147 120 - 199 mg/dL    Triglycerides 182 (H) 30 - 150 mg/dL    HDL 32 (L) 40 - 75 mg/dL    LDL Cholesterol 78.6 63.0 - 159.0 mg/dL    HDL/Cholesterol Ratio 21.8 20.0 - 50.0 %    Total Cholesterol/HDL Ratio 4.6 2.0 - 5.0    Non-HDL Cholesterol 115 mg/dL   CBC auto differential    Collection Time: 12/15/23  3:47 AM   Result Value Ref Range    WBC 8.53 3.90 - 12.70 K/uL    RBC 4.54 (L) 4.60 - 6.20 M/uL    Hemoglobin 14.4 14.0 - 18.0 g/dL    Hematocrit 42.0 40.0 - 54.0 %    MCV 93 82 - 98 fL    MCH 31.7 (H) 27.0 - 31.0 pg    MCHC 34.3 32.0 - 36.0 g/dL    RDW 13.1 11.5 - 14.5 %    Platelets 185 150 - 450 K/uL    MPV 10.3 9.2 - 12.9 fL    Immature Granulocytes 0.2 0.0 - 0.5 %    Gran # (ANC) 4.0 1.8 - 7.7 K/uL    Immature Grans (Abs) 0.02 0.00 - 0.04 K/uL    Lymph # 3.8 1.0 - 4.8 K/uL    Mono # 0.5 0.3 - 1.0 K/uL    Eos # 0.2 0.0 - 0.5 K/uL    Baso # 0.07 0.00 - 0.20 K/uL     nRBC 0 0 /100 WBC    Gran % 46.9 38.0 - 73.0 %    Lymph % 44.0 18.0 - 48.0 %    Mono % 5.3 4.0 - 15.0 %    Eosinophil % 2.8 0.0 - 8.0 %    Basophil % 0.8 0.0 - 1.9 %    Differential Method Automated    Basic metabolic panel    Collection Time: 12/15/23  3:47 AM   Result Value Ref Range    Sodium 132 (L) 136 - 145 mmol/L    Potassium 4.0 3.5 - 5.1 mmol/L    Chloride 101 95 - 110 mmol/L    CO2 24 23 - 29 mmol/L    Glucose 235 (H) 70 - 110 mg/dL    BUN 12 6 - 20 mg/dL    Creatinine 0.8 0.5 - 1.4 mg/dL    Calcium 8.0 (L) 8.7 - 10.5 mg/dL    Anion Gap 7 (L) 8 - 16 mmol/L    eGFR >60.0 >60 mL/min/1.73 m^2   Troponin I High Sensitivity    Collection Time: 12/15/23  3:47 AM   Result Value Ref Range    Troponin I High Sensitivity 10.4 0.0 - 14.9 pg/mL   Magnesium    Collection Time: 12/15/23  3:47 AM   Result Value Ref Range    Magnesium 2.1 1.6 - 2.6 mg/dL     Imaging Results              X-Ray Chest AP Portable (Final result)  Result time 12/15/23 06:15:29      Final result by Everton Camacho MD (12/15/23 06:15:29)                   Narrative:    CLINICAL HISTORY:  58 years (1965) Male Chest pain    TECHNIQUE:  Portable AP radiograph the chest. One view.    COMPARISON:  Radiograph from August 31, 2023.    FINDINGS:  The lungs are clear. Costophrenic angles are seen without effusion. No pneumothorax is identified. The heart is top normal in size. Median sternotomy wires are unchanged. The mediastinum is within normal limits. Osseous structures appear within normal limits. The visualized upper abdomen is unremarkable.    IMPRESSION:  No acute cardiac or pulmonary process.                  .            Electronically signed by:  Everton Camacho MD  12/15/2023 06:15 AM [facility] Workstation: [id number]                                    12-lead EKG interpretation:  (if applicable)      Current Cardiac Rhythm:   (if applicable)    Physical Exam:   Physical Exam  Cardiovascular:      Rate and Rhythm: Normal rate and  regular rhythm.      Pulses: Normal pulses.   Pulmonary:      Effort: Pulmonary effort is normal. No respiratory distress.   Skin:     General: Skin is warm and dry.   Neurological:      Mental Status: He is alert and oriented to person, place, and time.   Psychiatric:         Mood and Affect: Mood normal.         Behavior: Behavior normal.         ASSESSMENT/PLAN:   Assessment:   Chest pain  Hyomagnesemia --corrected  DM  HLP  pAF --CHADVASC 2, on metoprolol /Eliquis, SR today   HTN  Obesity   ROSAMARIA    Plan: Discussed stress test with pt. Pt is not agreeable as he states he has never had an abnormal stress. The pt would like to proceed with LHC in the near future due to having similar symptoms as he has had in the past when he needed an intervention.   Trop neg/no acute ST seg changes  Chest pain free this am  Will treat medically , continue BB  SR/SB 50-60 range  Restart brillinta--taking at home  Continue ranexa-home dose 500-increasa to 1000 mg   Start isosorbide, pt stopped taking med  Schedule for LHC with  next week.   If p remains stable , no further chest pain , may be discharged home this afternoon  Thank you for your consult.

## 2023-12-15 NOTE — HOSPITAL COURSE
Cardiology consulted.  Troponins were negative and no acute ST changes were seen on EKG.  Brilinta was restarted and Imdur was added.  Ranexa increased.  Cardiology plans for a left heart catheterization next week.  Patient discharged home on December 15th.

## 2023-12-15 NOTE — ASSESSMENT & PLAN NOTE
Patient has Abnormal Magnesium: hypomagnesemia with a magnesium of 1.4.   Will continue to monitor electrolytes closely.   4 g Magnesium sulfate ordered.  Repeat labs to be done after interventions completed.

## 2023-12-15 NOTE — PROGRESS NOTES
12/15/23 1000   Tobacco Cessation Intervention   Do you use any type of tobacco product? Yes   Are you interested in quitting use of tobacco products? Thinking about quitting   Are you interested in Nicotine Replacement for symptom relief? Yes   $ Smoking Cessation Charges Smoking Cessation - Intermediate (Non-CTTS)

## 2023-12-15 NOTE — DISCHARGE SUMMARY
[facility]  Discharge Summary      Patient Name: Magdaleno Cunningham  MRN: [id number]  TARA: [id number]  Patient Class: OP- Observation  Admission Date: 12/14/2023  Hospital Length of Stay: 0 days  Discharge Date and Time:  12/15/2023 4:39 PM  Attending Physician: Christopher Hastings MD   Discharging Provider: Christopher Hastings MD  Primary Care Provider: Venkata Mclaughlin MD    Primary Care Team: Networked reference to record PCT     HPI:   57 y/o male with history of COPD, CAD s/p CABG, multiple stents and last heart cath 2022, DM, HTN, ROSAMARIA, Atrial fibrillation, several myocardial infarctions, and HLD presents to the ED for pressure like chest pain that has been intermittent for the past week. Patient states that this chest pain is pressure like, occasionally sharp, radiates down his left arm and is associated with diaphoresis and SOB. Patient states that throughout the week he has been able to take 1 nitroglycerin and the pain subsides however he has had 3 episodes of chest pain today since 1000 and has had to take NTG 3 times. Patient says that this is how he feels every time that he has a cardiac event and he knew he needed to come to the ED. Patient denies any sough, fever, chills, sick contacts, nausea, vomiting, or dizziness. Patient has had increased headaches this past week and states that they have been on the top and back of his head instead of behind his eyes like they normally are. Code status was discussed and patient was placed as a full code at this time.     ED: Vitals showed HR 71, /91, RR 19 saturating at 97% on RA. Labs were significant for troponin 12.8, magnesium 1.4, and glucose 162.     * No surgery found *      Hospital Course:   Cardiology consulted.  Troponins were negative and no acute ST changes were seen on EKG.  Brilinta was restarted and Imdur was added.  Ranexa increased.  Cardiology plans for a left heart catheterization next week.  Patient discharged home  on December 15th.     Goals of Care Treatment Preferences:  Code Status: Full Code      Consults:   Consults (From admission, onward)          Status Ordering Provider     Inpatient consult to Cardiology  Once        Provider:  Paul Botello MD    Completed ELROY GILES            No new Assessment & Plan notes have been filed under this hospital service since the last note was generated.  Service: Hospital Medicine    Final Active Diagnoses:    Diagnosis Date Noted POA    PRINCIPAL PROBLEM:  Chest pain [R07.9] 10/27/2014 Yes    Hypomagnesemia [E83.42] 12/14/2023 Yes    HLD (hyperlipidemia) [E78.5] 09/01/2023 Yes    Atrial fibrillation [I48.91] 03/25/2020 Yes    ROSAMARIA (obstructive sleep apnea) [G47.33] 10/28/2014 Yes    Type 2 diabetes mellitus with diabetic arthropathy [E11.618]  Yes    Hypertension associated with diabetes [E11.59, I15.2]  Yes      Problems Resolved During this Admission:       Discharged Condition: good    Disposition: Home or Self Care    Follow Up:    Patient Instructions:   No discharge procedures on file.    Significant Diagnostic Studies: N/A    Pending Diagnostic Studies:       Procedure Component Value Units Date/Time    Echo [[id number]] Resulted: 12/15/23 1055    Order Status: Sent Lab Status: In process Updated: 12/15/23 1055     BSA 2.49 m2            Medications:  Reconciled Home Medications:      Medication List        START taking these medications      ticagrelor 90 mg tablet  Commonly known as: BRILINTA  Take 1 tablet (90 mg total) by mouth 2 (two) times daily.            CHANGE how you take these medications      isosorbide mononitrate 30 MG 24 hr tablet  Commonly known as: IMDUR  Take 1 tablet (30 mg total) by mouth once daily.  Start taking on: December 16, 2023  What changed:   medication strength  how much to take     ranolazine 1,000 mg Tb12  Commonly known as: RANEXA  Take 1 tablet (1,000 mg total) by mouth 2 (two) times daily.  What changed:   medication strength  how  "much to take            CONTINUE taking these medications      apixaban 5 mg Tab  Commonly known as: ELIQUIS  Take 5 mg by mouth 2 (two) times daily.     clonazePAM 1 MG tablet  Commonly known as: KlonoPIN  Take 1 mg by mouth 2 (two) times daily.     cyclobenzaprine 10 MG tablet  Commonly known as: FLEXERIL  Take 10 mg by mouth every evening.     gabapentin 600 MG tablet  Commonly known as: NEURONTIN  Take 600 mg by mouth As instructed (neuropathic pain). Takes 1 tablet in the morning and 2 tablets at bedtime     JARDIANCE 25 mg tablet  Generic drug: empagliflozin  Take 25 mg by mouth once daily.     lactobacillus acidophilus & bulgar 100 million cell packet  Commonly known as: LACTINEX  Take 1 packet (1 each total) by mouth 2 (two) times daily.     LANTUS SOLOSTAR U-100 INSULIN glargine 100 units/mL SubQ pen  Generic drug: insulin  Inject 80 Units into the skin 2 (two) times daily.     lisinopriL 20 MG tablet  Commonly known as: PRINIVIL,ZESTRIL  Take 20 mg by mouth once daily.     metFORMIN 1000 MG tablet  Commonly known as: GLUCOPHAGE  Take 1,000 mg by mouth 2 (two) times daily with meals.     methocarbamoL 500 MG Tab  Commonly known as: ROBAXIN  Take 500 mg by mouth every evening.     metoprolol succinate 100 MG 24 hr tablet  Commonly known as: TOPROL-XL  Take 100 mg by mouth 2 (two) times daily.     nitroGLYCERIN 0.4 MG SL tablet  Commonly known as: NITROSTAT  Place 0.4 mg under the tongue every 5 (five) minutes as needed for Chest pain.     oxyCODONE-acetaminophen  mg per tablet  Commonly known as: PERCOCET  Take 1 tablet by mouth 2 (two) times daily as needed for Pain.     pen needle, diabetic 31 gauge x 1/4" Ndle  USE 1 TWICE DAILY FOR BLOOD SUGAR GREATER THAN 200     simvastatin 10 MG tablet  Commonly known as: ZOCOR  Take 10 mg by mouth once daily.            STOP taking these medications      aspirin 81 MG Chew     cetirizine 10 MG tablet  Commonly known as: ZYRTEC     HYDROcodone-acetaminophen " 5-325 mg per tablet  Commonly known as: NORCO              Indwelling Lines/Drains at time of discharge:   Lines/Drains/Airways       None                   Time spent on the discharge of patient: 40 minutes         Christopher Hastings MD  Department of Hospital Medicine  [facility]

## 2023-12-15 NOTE — NURSING
Nurses Note -- 4 Eyes      12/15/2023   12:50 AM      Skin assessed during: Admit      [x] No Altered Skin Integrity Present    [x]Prevention Measures Documented      [] Yes- Altered Skin Integrity Present or Discovered   [] LDA Added if Not in Epic (Describe Wound)   [] New Altered Skin Integrity was Present on Admit and Documented in LDA   [] Wound Image Taken    Wound Care Consulted? No    Attending Nurse:  Theresa Carcamo RN/Staff Member:   [id number]

## 2023-12-15 NOTE — PLAN OF CARE
Problem: Adult Inpatient Plan of Care  Goal: Plan of Care Review  Outcome: Ongoing, Progressing  Goal: Optimal Comfort and Wellbeing  Outcome: Ongoing, Progressing     Problem: Diabetes Comorbidity  Goal: Blood Glucose Level Within Targeted Range  Outcome: Ongoing, Progressing     Problem: Fall Injury Risk  Goal: Absence of Fall and Fall-Related Injury  Outcome: Ongoing, Progressing     Problem: Chest Pain  Goal: Resolution of Chest Pain Symptoms  Outcome: Ongoing, Progressing

## 2023-12-15 NOTE — ASSESSMENT & PLAN NOTE
Patient states that he has not worn his CPAP in a while and is unsure of his settings   - CPAP ordered for while he is inpatient

## 2023-12-15 NOTE — ASSESSMENT & PLAN NOTE
Patient with atrial fibrillation which is controlled currently with Beta Blocker. Patient is currently in sinus rhythm.[id number] Score: 2. Anticoagulation not indicated due to possible heart cath procedure tomorrow  .

## 2023-12-15 NOTE — NURSING
Patient arrived to unit via wheelchair from ED. Patient ambulated to bed without difficulty. Patient oriented to room, bed in lowest position, wheels locked, bed rails up x2, bed alarm initiated, call light and bedside table within reach. Safety measures addressed.

## 2023-12-15 NOTE — PLAN OF CARE
[facility]  Initial Discharge Assessment       Primary Care Provider: Venkata Mclaughlin MD    Admission Diagnosis: Chest pain [R07.9]    Admission Date: 12/14/2023    DC assessment completed with patient at bedside.  Information verified as correct on facesheet.  Patient denies HPOA.  Denies HH/HD/Coumadin.  Has CPAP at home.  Patient independent in ADL's.  DC plan is home. Patient's son, Magdaleno, to provide transport on discharge.  Per patient, he has already called Dotty with Dr. Botello's office to be scheduled for next Friday 12/22 regarding possible heart cath.        Transition of Care Barriers: None    Payor: MEDICAID / Plan: [employer] [facility] / Product Type: Managed Medicaid /     Extended Emergency Contact Information  Primary Emergency Contact: TIAGO BAY  Mobile Phone: [phone]  Relation: Sister  Secondary Emergency Contact: Magdaleno Chi  Home Phone: [phone]  Mobile Phone: [phone]  Relation: Son   needed? No    Discharge Plan A: Home with family  Discharge Plan B: Home with family      [facility] - [place] - [address]  [address] [place] [address]  Phone: [phone] Fax: [phone]      Initial Assessment (most recent)       Adult Discharge Assessment - 12/15/23 1020          Discharge Assessment    Assessment Type Discharge Planning Assessment     Confirmed/corrected address, phone number and insurance Yes     Confirmed Demographics Correct on Facesheet     Source of Information patient     Communicated PEDRO with patient/caregiver Yes     Reason For Admission chest pain     People in Home child(tonya), adult     Facility Arrived From: home     Do you expect to return to your current living situation? Yes     Do you have help at home or someone to help you manage your care at home? Yes     Current cognitive status: Alert/Oriented     Equipment Currently Used at Home CPAP     Readmission within 30  days? No     Patient currently being followed by outpatient case management? No     Do you currently have service(s) that help you manage your care at home? No     Do you take prescription medications? Yes     Do you have prescription coverage? Yes     Do you have any problems affording any of your prescribed medications? No     Is the patient taking medications as prescribed? yes     Who is going to help you get home at discharge? son- Magdaleno     How do you get to doctors appointments? car, drives self     Are you on dialysis? No     Do you take coumadin? No     Discharge Plan A Home with family     Discharge Plan B Home with family     DME Needed Upon Discharge  none     Discharge Plan discussed with: Patient     Transition of Care Barriers None

## 2023-12-15 NOTE — CONSULTS
[facility]   Cardiology Note    Consult Requested By: ANI  Reason for Consult: chest pain    SUBJECTIVE:     History of Present Illness: pmh--CAD last PCI stent to mid circ, balloon to prox and distal circ, h/o CABG, DM HTN. pAF--eliquis, DM last Aic 8.3 but has been as high as 11. Last cath 2022 mid LAD 50% , one stent to circ successfully placed, balloon prox and distal portion, non-functioning SVG to this area, unable to pass stent to distal portion at this time, patent LIMA to LAD--narrow, The pt was previously seen by  and . The pt started having non-exertional chest pain, sharp on occasion and also some instances of pressure that occasionally radiated to left arm. No associated n/v/diaphoresis. Yesterday he had to take 3 nitro over the course of the day. The pt is currently lying in bed with no c/o chest pain. He denies any SOB more than his baseline of some WRIGHT. The pt reports these symptoms are consistent with need for cardiac intervention in the past. He has no swelling to LE. Trop are neg, EKG shows no acute ischemic changes. VSS H/H and kidney function are WNL., mag 1.4--replaced. SR tele.     Review of patient's allergies indicates:   Allergen Reactions    Pesticide Dermatitis and Rash       Past Medical History:   Diagnosis Date    Anticoagulant long-term use     Atrial fibrillation 2018    BPH (benign prostatic hyperplasia)     Cataract     COPD (chronic obstructive pulmonary disease)     Coronary artery disease     stents    CVD (cardiovascular disease)     Diabetes mellitus     Erythema multiforme     AKA Denton Edmondson Syndrome    Hypertension     Infected incision     MI (myocardial infarction)     per pt he has had 4     ROSAMARIA on CPAP     RLS (restless legs syndrome)      Past Surgical History:   Procedure Laterality Date    CORONARY ANGIOGRAPHY INCLUDING BYPASS GRAFTS WITH CATHETERIZATION OF LEFT HEART N/A 9/1/2022    Procedure: ANGIOGRAM, CORONARY, INCLUDING BYPASS  GRAFT, WITH LEFT HEART CATHETERIZATION;  Surgeon: Paul Botello MD;  Location: [facility] CATH/EP LAB;  Service: Cardiology;  Laterality: N/A;    CORONARY ARTERY BYPASS GRAFT (CABG) N/A 4/7/2020    Procedure: CORONARY ARTERY BYPASS GRAFT (CABG)- Excision of left atrial appendage;  Surgeon: Doug Solitario MD;  Location: [facility];  Service: Cardiothoracic;  Laterality: N/A;    CORONARY STENT PLACEMENT      WILSON MAZE PROCEDURE N/A 4/7/2020    Procedure: WILSON MAZE PROCEDURE- Attempted;  Surgeon: Doug Solitario MD;  Location: [facility] OR;  Service: Cardiothoracic;  Laterality: N/A;    CYSTOSCOPY N/A 12/10/2018    Procedure: CYSTOSCOPY;  Surgeon: Khushboo Abreu MD;  Location: [facility];  Service: Urology;  Laterality: N/A;    ENDOSCOPIC HARVEST OF VEIN Left 4/7/2020    Procedure: HARVEST-VEIN-ENDOVASCULAR;  Surgeon: Doug Solitario MD;  Location: [facility];  Service: Cardiothoracic;  Laterality: Left;    INCISION OF PERIRECTAL ABSCESS Bilateral 9/1/2023    Procedure: INCISION, ABSCESS, PERIRECTAL;  Surgeon: Brayan Spears MD;  Location: [facility] OR;  Service: General;  Laterality: Bilateral;  stirrups    LEFT HEART CATHETERIZATION Left 3/17/2020    Procedure: CATHETERIZATION, HEART, LEFT;  Surgeon: Tyree Walters MD;  Location: [facility] CATH/EP LAB;  Service: Cardiology;  Laterality: Left;    STERNAL WIRES REMOVAL N/A 6/8/2020    Procedure: REMOVAL, STERNAL WIRE;  Surgeon: Doug Solitario MD;  Location: [facility] OR;  Service: Peripheral Vascular;  Laterality: N/A;    ULTRASOUND OF PROSTATE FOR VOLUME DETERMINATION  12/10/2018    Procedure: ULTRASOUND, PROSTATE, FOR VOLUME DETERMINATION;  Surgeon: Khushboo Abreu MD;  Location: [facility];  Service: Urology;;     Family History   Problem Relation Age of Onset    Heart disease Mother     Diabetes Mother     Hyperlipidemia Father     Cancer Father      Social History     Tobacco Use    Smoking status: Every Day     Current packs/day: 0.00     Average  packs/day: 0.3 packs/day for 30.0 years (9.0 ttl pk-yrs)     Types: Cigarettes     Start date: 4/4/1990     Last attempt to quit: 4/4/2020     Years since quitting: 3.6    Smokeless tobacco: Never    Tobacco comments:     just under a pack a day   Substance Use Topics    Alcohol use: Not Currently    Drug use: Yes     Frequency: 1.0 times per week     Types: Marijuana       Review of Systems:  Review of Systems   Constitutional:  Negative for chills and fever.   Respiratory:  Negative for shortness of breath.    Cardiovascular:  Positive for chest pain. Negative for palpitations, orthopnea, claudication, leg swelling and PND.   Gastrointestinal:  Negative for heartburn and nausea.       OBJECTIVE:     Vital Signs (Most Recent)  Temp: 97.3 °F (36.3 °C) (12/15/23 0726)  Pulse: (!) 57 (12/15/23 0819)  Resp: 16 (12/15/23 0819)  BP: (!) 142/88 (12/15/23 0726)  SpO2: 95 % (12/15/23 0819)    Vital Signs Range (Last 24H):  Temp:  [97.3 °F (36.3 °C)-98.3 °F (36.8 °C)]   Pulse:  [57-71]   Resp:  [12-23]   BP: (107-168)/(60-91)   SpO2:  [92 %-97 %]     I & O (Last 24H):    Intake/Output Summary (Last 24 hours) at 12/15/2023 0834  Last data filed at 12/14/2023 2359  Gross per 24 hour   Intake 240 ml   Output --   Net 240 ml       Current Diet:     Current Diet Order   Procedures    Diet NPO        Allergies:  Review of patient's allergies indicates:   Allergen Reactions    Pesticide Dermatitis and Rash       Meds:  Scheduled Meds:   aspirin  81 mg Oral Daily    cyclobenzaprine  10 mg Oral QHS    gabapentin  1,200 mg Oral QHS    gabapentin  600 mg Oral Daily    insulin detemir U-100  80 Units Subcutaneous BID    lisinopriL  20 mg Oral Daily    metoprolol succinate  100 mg Oral BID    pravastatin  20 mg Oral Daily     Continuous Infusions:  PRN Meds:acetaminophen, acetaminophen, dextrose 50%, dextrose 50%, glucagon (human recombinant), glucose, glucose, insulin aspart U-100, magnesium oxide, magnesium oxide, melatonin,  methocarbamoL, morphine, morphine, nitroGLYCERIN 2% TD oint, ondansetron, potassium bicarbonate, potassium bicarbonate, potassium bicarbonate, potassium, sodium phosphates, potassium, sodium phosphates, potassium, sodium phosphates, promethazine, sodium chloride 0.9%    Oxygen/Ventilator Data (Last 24H):  (if applicable)            Hemodynamic Parameters (Last 24H):   (if applicable)        Laboratory and Radiology Data:  Recent Results (from the past 24 hour(s))   Comprehensive metabolic panel    Collection Time: 12/14/23  7:29 PM   Result Value Ref Range    Sodium 133 (L) 136 - 145 mmol/L    Potassium 3.8 3.5 - 5.1 mmol/L    Chloride 102 95 - 110 mmol/L    CO2 24 23 - 29 mmol/L    Glucose 162 (H) 70 - 110 mg/dL    BUN 10 6 - 20 mg/dL    Creatinine 0.8 0.5 - 1.4 mg/dL    Calcium 8.8 8.7 - 10.5 mg/dL    Total Protein 6.8 6.0 - 8.4 g/dL    Albumin 4.0 3.5 - 5.2 g/dL    Total Bilirubin 0.5 0.1 - 1.0 mg/dL    Alkaline Phosphatase 44 (L) 55 - 135 U/L    AST 16 10 - 40 U/L    ALT 20 10 - 44 U/L    eGFR >60.0 >60 mL/min/1.73 m^2    Anion Gap 7 (L) 8 - 16 mmol/L   CBC auto differential    Collection Time: 12/14/23  7:29 PM   Result Value Ref Range    WBC 9.38 3.90 - 12.70 K/uL    RBC 4.81 4.60 - 6.20 M/uL    Hemoglobin 15.4 14.0 - 18.0 g/dL    Hematocrit 43.4 40.0 - 54.0 %    MCV 90 82 - 98 fL    MCH 32.0 (H) 27.0 - 31.0 pg    MCHC 35.5 32.0 - 36.0 g/dL    RDW 12.9 11.5 - 14.5 %    Platelets 182 150 - 450 K/uL    MPV 9.7 9.2 - 12.9 fL    Immature Granulocytes 0.2 0.0 - 0.5 %    Gran # (ANC) 4.8 1.8 - 7.7 K/uL    Immature Grans (Abs) 0.02 0.00 - 0.04 K/uL    Lymph # 3.8 1.0 - 4.8 K/uL    Mono # 0.5 0.3 - 1.0 K/uL    Eos # 0.2 0.0 - 0.5 K/uL    Baso # 0.06 0.00 - 0.20 K/uL    nRBC 0 0 /100 WBC    Gran % 50.9 38.0 - 73.0 %    Lymph % 40.7 18.0 - 48.0 %    Mono % 5.4 4.0 - 15.0 %    Eosinophil % 2.2 0.0 - 8.0 %    Basophil % 0.6 0.0 - 1.9 %    Differential Method Automated    Magnesium    Collection Time: 12/14/23  7:29 PM    Result Value Ref Range    Magnesium 1.4 (L) 1.6 - 2.6 mg/dL   Troponin I High Sensitivity    Collection Time: 12/14/23  7:29 PM   Result Value Ref Range    Troponin I High Sensitivity 12.8 0.0 - 14.9 pg/mL   Lipase    Collection Time: 12/14/23  7:29 PM   Result Value Ref Range    Lipase 29 4 - 60 U/L   APTT    Collection Time: 12/14/23  7:29 PM   Result Value Ref Range    aPTT 25.9 21.0 - 32.0 sec   POCT glucose    Collection Time: 12/14/23  9:42 PM   Result Value Ref Range    POC Glucose 164 (H) 70 - 110   Hemoglobin A1c if not done in past 3 months    Collection Time: 12/14/23  9:50 PM   Result Value Ref Range    Hemoglobin A1C 8.3 (H) 4.5 - 6.2 %    Estimated Avg Glucose 192 (H) 68 - 131 mg/dL   TSH    Collection Time: 12/14/23  9:50 PM   Result Value Ref Range    TSH 1.921 0.340 - 5.600 uIU/mL   Hemoglobin A1c    Collection Time: 12/14/23  9:50 PM   Result Value Ref Range    Hemoglobin A1C 8.3 (H) 4.5 - 6.2 %    Estimated Avg Glucose 192 (H) 68 - 131 mg/dL   Lipid panel    Collection Time: 12/14/23  9:50 PM   Result Value Ref Range    Cholesterol 147 120 - 199 mg/dL    Triglycerides 182 (H) 30 - 150 mg/dL    HDL 32 (L) 40 - 75 mg/dL    LDL Cholesterol 78.6 63.0 - 159.0 mg/dL    HDL/Cholesterol Ratio 21.8 20.0 - 50.0 %    Total Cholesterol/HDL Ratio 4.6 2.0 - 5.0    Non-HDL Cholesterol 115 mg/dL   CBC auto differential    Collection Time: 12/15/23  3:47 AM   Result Value Ref Range    WBC 8.53 3.90 - 12.70 K/uL    RBC 4.54 (L) 4.60 - 6.20 M/uL    Hemoglobin 14.4 14.0 - 18.0 g/dL    Hematocrit 42.0 40.0 - 54.0 %    MCV 93 82 - 98 fL    MCH 31.7 (H) 27.0 - 31.0 pg    MCHC 34.3 32.0 - 36.0 g/dL    RDW 13.1 11.5 - 14.5 %    Platelets 185 150 - 450 K/uL    MPV 10.3 9.2 - 12.9 fL    Immature Granulocytes 0.2 0.0 - 0.5 %    Gran # (ANC) 4.0 1.8 - 7.7 K/uL    Immature Grans (Abs) 0.02 0.00 - 0.04 K/uL    Lymph # 3.8 1.0 - 4.8 K/uL    Mono # 0.5 0.3 - 1.0 K/uL    Eos # 0.2 0.0 - 0.5 K/uL    Baso # 0.07 0.00 - 0.20 K/uL     nRBC 0 0 /100 WBC    Gran % 46.9 38.0 - 73.0 %    Lymph % 44.0 18.0 - 48.0 %    Mono % 5.3 4.0 - 15.0 %    Eosinophil % 2.8 0.0 - 8.0 %    Basophil % 0.8 0.0 - 1.9 %    Differential Method Automated    Basic metabolic panel    Collection Time: 12/15/23  3:47 AM   Result Value Ref Range    Sodium 132 (L) 136 - 145 mmol/L    Potassium 4.0 3.5 - 5.1 mmol/L    Chloride 101 95 - 110 mmol/L    CO2 24 23 - 29 mmol/L    Glucose 235 (H) 70 - 110 mg/dL    BUN 12 6 - 20 mg/dL    Creatinine 0.8 0.5 - 1.4 mg/dL    Calcium 8.0 (L) 8.7 - 10.5 mg/dL    Anion Gap 7 (L) 8 - 16 mmol/L    eGFR >60.0 >60 mL/min/1.73 m^2   Troponin I High Sensitivity    Collection Time: 12/15/23  3:47 AM   Result Value Ref Range    Troponin I High Sensitivity 10.4 0.0 - 14.9 pg/mL   Magnesium    Collection Time: 12/15/23  3:47 AM   Result Value Ref Range    Magnesium 2.1 1.6 - 2.6 mg/dL     Imaging Results              X-Ray Chest AP Portable (Final result)  Result time 12/15/23 06:15:29      Final result by Everton Camacho MD (12/15/23 06:15:29)                   Narrative:    CLINICAL HISTORY:  58 years (1965) Male Chest pain    TECHNIQUE:  Portable AP radiograph the chest. One view.    COMPARISON:  Radiograph from August 31, 2023.    FINDINGS:  The lungs are clear. Costophrenic angles are seen without effusion. No pneumothorax is identified. The heart is top normal in size. Median sternotomy wires are unchanged. The mediastinum is within normal limits. Osseous structures appear within normal limits. The visualized upper abdomen is unremarkable.    IMPRESSION:  No acute cardiac or pulmonary process.                  .            Electronically signed by:  Everton Camacho MD  12/15/2023 06:15 AM [facility] Workstation: [id number]                                    12-lead EKG interpretation:  (if applicable)      Current Cardiac Rhythm:   (if applicable)    Physical Exam:   Physical Exam  Cardiovascular:      Rate and Rhythm: Normal rate and  regular rhythm.      Pulses: Normal pulses.   Pulmonary:      Effort: Pulmonary effort is normal. No respiratory distress.   Skin:     General: Skin is warm and dry.   Neurological:      Mental Status: He is alert and oriented to person, place, and time.   Psychiatric:         Mood and Affect: Mood normal.         Behavior: Behavior normal.         ASSESSMENT/PLAN:   Assessment:   Chest pain  Hyomagnesemia --corrected  DM  HLP  pAF --CHADVASC 2, on metoprolol /Eliquis, SR today   HTN  Obesity   ROSAMARIA    Plan: Discussed stress test with pt. Pt is not agreeable as he states he has never had an abnormal stress. The pt would like to proceed with LHC in the near future due to having similar symptoms as he has had in the past when he needed an intervention.   Trop neg/no acute ST seg changes  Chest pain free this am  Will treat medically , continue BB  SR/SB 50-60 range  Restart brillinta--taking at home  Continue ranexa-home dose 500-increasa to 1000 mg   Start isosorbide, pt stopped taking med  Schedule for LHC with  next week.   If p remains stable , no further chest pain , may be discharged home this afternoon  Thank you for your consult.

## 2023-12-15 NOTE — H&P
"  [facility] - Emergency Dept  Hospital Medicine  History & Physical    Patient Name: Magdaleno Cunningham  MRN: [id number]  Patient Class: OP- Observation  Admission Date: 12/14/2023  Attending Physician:  Dr. Hastings  Primary Care Provider: Venkata Mclaughlin MD         Patient information was obtained from patient and ER records.     Subjective:     Principal Problem:Chest pain    Chief Complaint:   Chief Complaint   Patient presents with    Chest Pain     Pt reports that he has been having chest pain for about a week and usually after "eating" one nitro, he is fine.  Today pt says he "ate" three nitros throughout the day starting at around 10 AM and is still feeling tightness        HPI: 59 y/o male with history of COPD, CAD s/p CABG, multiple stents and last heart cath 2022, DM, HTN, ROSAMARIA, Atrial fibrillation, several myocardial infarctions, and HLD presents to the ED for pressure like chest pain that has been intermittent for the past week. Patient states that this chest pain is pressure like, occasionally sharp, radiates down his left arm and is associated with diaphoresis and SOB. Patient states that throughout the week he has been able to take 1 nitroglycerin and the pain subsides however he has had 3 episodes of chest pain today since 1000 and has had to take NTG 3 times. Patient says that this is how he feels every time that he has a cardiac event and he knew he needed to come to the ED. Patient denies any sough, fever, chills, sick contacts, nausea, vomiting, or dizziness. Patient has had increased headaches this past week and states that they have been on the top and back of his head instead of behind his eyes like they normally are. Code status was discussed and patient was placed as a full code at this time.     ED: Vitals showed HR 71, /91, RR 19 saturating at 97% on RA. Labs were significant for troponin 12.8, magnesium 1.4, and glucose 162.     Past Medical History:   Diagnosis Date "    Anticoagulant long-term use     Atrial fibrillation 2018    BPH (benign prostatic hyperplasia)     Cataract     COPD (chronic obstructive pulmonary disease)     Coronary artery disease     stents    CVD (cardiovascular disease)     Diabetes mellitus     Erythema multiforme     AKA Denton Edmondson Syndrome    Hypertension     Infected incision     MI (myocardial infarction)     per pt he has had 4     ROSAMARIA on CPAP     RLS (restless legs syndrome)        Past Surgical History:   Procedure Laterality Date    CORONARY ANGIOGRAPHY INCLUDING BYPASS GRAFTS WITH CATHETERIZATION OF LEFT HEART N/A 9/1/2022    Procedure: ANGIOGRAM, CORONARY, INCLUDING BYPASS GRAFT, WITH LEFT HEART CATHETERIZATION;  Surgeon: Paul oBtello MD;  Location: [facility] CATH/EP LAB;  Service: Cardiology;  Laterality: N/A;    CORONARY ARTERY BYPASS GRAFT (CABG) N/A 4/7/2020    Procedure: CORONARY ARTERY BYPASS GRAFT (CABG)- Excision of left atrial appendage;  Surgeon: Doug Solitario MD;  Location: [facility] OR;  Service: Cardiothoracic;  Laterality: N/A;    CORONARY STENT PLACEMENT      WILSON MAZE PROCEDURE N/A 4/7/2020    Procedure: WILSON MAZE PROCEDURE- Attempted;  Surgeon: Doug Solitario MD;  Location: [facility] OR;  Service: Cardiothoracic;  Laterality: N/A;    CYSTOSCOPY N/A 12/10/2018    Procedure: CYSTOSCOPY;  Surgeon: Khushboo Abreu MD;  Location: [facility] OR;  Service: Urology;  Laterality: N/A;    ENDOSCOPIC HARVEST OF VEIN Left 4/7/2020    Procedure: HARVEST-VEIN-ENDOVASCULAR;  Surgeon: Doug Solitario MD;  Location: [facility] OR;  Service: Cardiothoracic;  Laterality: Left;    INCISION OF PERIRECTAL ABSCESS Bilateral 9/1/2023    Procedure: INCISION, ABSCESS, PERIRECTAL;  Surgeon: Brayan Spears MD;  Location: [facility] OR;  Service: General;  Laterality: Bilateral;  stirrups    LEFT HEART CATHETERIZATION Left 3/17/2020    Procedure: CATHETERIZATION, HEART, LEFT;  Surgeon: Tyree Walters MD;  Location: [facility] CATH/EP LAB;  Service:  Cardiology;  Laterality: Left;    STERNAL WIRES REMOVAL N/A 6/8/2020    Procedure: REMOVAL, STERNAL WIRE;  Surgeon: Doug Solitario MD;  Location: [facility] OR;  Service: Peripheral Vascular;  Laterality: N/A;    ULTRASOUND OF PROSTATE FOR VOLUME DETERMINATION  12/10/2018    Procedure: ULTRASOUND, PROSTATE, FOR VOLUME DETERMINATION;  Surgeon: Khushboo Abreu MD;  Location: [facility] OR;  Service: Urology;;       Review of patient's allergies indicates:   Allergen Reactions    Pesticide Dermatitis and Rash       No current facility-administered medications on file prior to encounter.     Current Outpatient Medications on File Prior to Encounter   Medication Sig    apixaban (ELIQUIS) 5 mg Tab Take 5 mg by mouth 2 (two) times daily.    aspirin 81 MG Chew Take 81 mg by mouth once daily.     cetirizine (ZYRTEC) 10 MG tablet Take 1 tablet (10 mg total) by mouth once daily. (Patient not taking: Reported on 9/8/2023)    clonazePAM (KLONOPIN) 1 MG tablet Take 1 mg by mouth 2 (two) times daily.    cyclobenzaprine (FLEXERIL) 10 MG tablet Take 10 mg by mouth once daily.    furosemide (LASIX) 20 MG tablet Take 20 mg by mouth 2 (two) times daily.    gabapentin (NEURONTIN) 600 MG tablet Take 600 mg by mouth 3 (three) times daily.     HYDROcodone-acetaminophen (NORCO) 5-325 mg per tablet Take 1 tablet by mouth every 6 (six) hours as needed for Pain. (Patient not taking: Reported on 9/8/2023)    isosorbide mononitrate (IMDUR) 60 MG 24 hr tablet Take 1 tablet (60 mg total) by mouth once daily. (Patient not taking: Reported on 9/8/2023)    JARDIANCE 25 mg tablet Take 25 mg by mouth once daily.    lactobacillus acidophilus & bulgar (LACTINEX) 100 million cell packet Take 1 packet (1 each total) by mouth 2 (two) times daily.    LANTUS SOLOSTAR U-100 INSULIN glargine 100 units/mL (3mL) SubQ pen Inject 80 Units into the skin 2 (two) times daily.    metFORMIN (GLUCOPHAGE) 1000 MG tablet Take 1,000 mg by mouth 2 (two) times daily with  "meals.     methocarbamoL (ROBAXIN) 500 MG Tab Take 500 mg by mouth every evening.    metoprolol succinate (TOPROL-XL) 100 MG 24 hr tablet Take 100 mg by mouth 2 (two) times daily.    nitroGLYCERIN (NITROSTAT) 0.4 MG SL tablet Place 0.4 mg under the tongue every 5 (five) minutes as needed for Chest pain.    oxyCODONE-acetaminophen (PERCOCET)  mg per tablet Take 1-2 tablets by mouth daily as needed for Pain.    pantoprazole (PROTONIX) 40 MG tablet Take 1 tablet by mouth 2 (two) times a day.    pen needle, diabetic 31 gauge x 1/4" Ndle USE 1 TWICE DAILY FOR BLOOD SUGAR GREATER THAN 200    pioglitazone (ACTOS) 15 MG tablet Take 15 mg by mouth once daily.    ranolazine (RANEXA) 500 MG Tb12 Take 1 tablet (500 mg total) by mouth 2 (two) times daily. (Patient not taking: Reported on 9/8/2023)    rimegepant (NURTEC) 75 mg odt Take 1 tablet by mouth as needed for Migraine.    simvastatin (ZOCOR) 10 MG tablet Take 10 mg by mouth once daily.     Family History       Problem Relation (Age of Onset)    Cancer Father    Diabetes Mother    Heart disease Mother    Hyperlipidemia Father          Tobacco Use    Smoking status: Every Day     Current packs/day: 0.00     Average packs/day: 0.3 packs/day for 30.0 years (9.0 ttl pk-yrs)     Types: Cigarettes     Start date: 4/4/1990     Last attempt to quit: 4/4/2020     Years since quitting: 3.6    Smokeless tobacco: Never    Tobacco comments:     just under a pack a day   Substance and Sexual Activity    Alcohol use: Not Currently    Drug use: Yes     Frequency: 1.0 times per week     Types: Marijuana    Sexual activity: Not Currently     Review of Systems   Constitutional:  Positive for diaphoresis. Negative for activity change, appetite change, chills, fatigue and fever.   HENT:  Negative for congestion, ear discharge, ear pain, postnasal drip, rhinorrhea, sinus pressure and sore throat.    Eyes:  Negative for pain, discharge, redness and itching.   Respiratory:  Positive for " chest tightness and shortness of breath. Negative for cough.    Cardiovascular:  Positive for chest pain (pressure). Negative for leg swelling.   Gastrointestinal:  Negative for abdominal pain, constipation, diarrhea, nausea and vomiting.   Genitourinary:  Negative for dysuria, frequency, hematuria and urgency.   Musculoskeletal:  Negative for back pain.   Skin:  Negative for color change, pallor and rash.   Neurological:  Positive for headaches. Negative for dizziness, syncope, facial asymmetry, weakness and light-headedness.   Psychiatric/Behavioral:  Negative for behavioral problems, confusion and hallucinations.      Objective:     Vital Signs (Most Recent):  Temp: 98 °F (36.7 °C) (12/14/23 1849)  Pulse: 67 (12/14/23 2100)  Resp: 14 (12/14/23 2030)  BP: (!) 150/74 (12/14/23 2100)  SpO2: 97 % (12/14/23 2100) Vital Signs (24h Range):  Temp:  [98 °F (36.7 °C)] 98 °F (36.7 °C)  Pulse:  [64-71] 67  Resp:  [12-19] 14  SpO2:  [97 %] 97 %  BP: (149-168)/(74-91) 150/74     Weight: 122.5 kg (270 lb)  Body mass index is 37.66 kg/m².     Physical Exam  Vitals and nursing note reviewed.   Constitutional:       General: He is not in acute distress.     Appearance: Normal appearance. He is obese. He is not ill-appearing, toxic-appearing or diaphoretic.   HENT:      Head: Normocephalic and atraumatic.      Nose: Nose normal.      Mouth/Throat:      Mouth: Mucous membranes are moist.   Eyes:      Extraocular Movements: Extraocular movements intact.   Cardiovascular:      Rate and Rhythm: Normal rate and regular rhythm.      Heart sounds: Normal heart sounds. No murmur heard.  Pulmonary:      Effort: Pulmonary effort is normal. No respiratory distress.      Breath sounds: Normal breath sounds. No wheezing.   Chest:      Comments: Midline surgical scar   Abdominal:      General: Bowel sounds are normal.      Palpations: Abdomen is soft. There is no mass.      Tenderness: There is no abdominal tenderness. There is no guarding.       Hernia: No hernia is present.      Comments: protuberant   Musculoskeletal:         General: No swelling, tenderness or deformity. Normal range of motion.      Cervical back: Normal range of motion. No rigidity or tenderness.   Skin:     General: Skin is warm and dry.      Coloration: Skin is not jaundiced.      Findings: No bruising or erythema.   Neurological:      General: No focal deficit present.      Mental Status: He is alert and oriented to person, place, and time. Mental status is at baseline.   Psychiatric:         Mood and Affect: Mood normal.         Behavior: Behavior normal.                Significant Labs: All pertinent labs within the past 24 hours have been reviewed.  CBC:   Recent Labs   Lab 12/14/23 1929   WBC 9.38   HGB 15.4   HCT 43.4        CMP:   Recent Labs   Lab 12/14/23 1929   *   K 3.8      CO2 24   *   BUN 10   CREATININE 0.8   CALCIUM 8.8   PROT 6.8   ALBUMIN 4.0   BILITOT 0.5   ALKPHOS 44*   AST 16   ALT 20   ANIONGAP 7*     Coagulation:   Recent Labs   Lab 12/14/23 1929   APTT 25.9     Lipase:   Recent Labs   Lab 12/14/23 1929   LIPASE 29     Magnesium:   Recent Labs   Lab 12/14/23 1929   MG 1.4*     Troponin:   Recent Labs   Lab 12/14/23 1929   TROPONINIHS 12.8       Significant Imaging: I have reviewed all pertinent imaging results/findings within the past 24 hours.  Assessment/Plan:     * Chest pain  Patient presents to the ED for increasingly more frequent episodes of pressure like chest pain associated with SOB and diaphoresis over the past week  Patient had 3 episodes of this chest pain that radiated to his LUE today prior to coming to the ED    Patient has extensive cardiac history and states that this pain is the same as he has had in the past   - Patient never has elevated troponin or ECG changes    ECG: NSR with no acute changes    Troponin: 12.8  - repeat troponin pending     Patient received the following in the ED with symptom improvement  from 8/10 pain to 4/10 pain   -   - Nitroglycerin paste     IV Morphine ordered PRN for pain   Nitroglycerin paste ordered PRN    ECHO ordered   Cardiology consulted   - NPO at midnight       Hypomagnesemia  Patient has Abnormal Magnesium: hypomagnesemia with a magnesium of 1.4.   Will continue to monitor electrolytes closely.   4 g Magnesium sulfate ordered.  Repeat labs to be done after interventions completed.     HLD (hyperlipidemia)  Continue home statin       Atrial fibrillation  Patient with atrial fibrillation which is controlled currently with Beta Blocker. Patient is currently in sinus rhythm.[id number] Score: 2. Anticoagulation not indicated due to possible heart cath procedure tomorrow  .    ROSAMARIA (obstructive sleep apnea)  Patient states that he has not worn his CPAP in a while and is unsure of his settings   - CPAP ordered for while he is inpatient       Hypertension associated with diabetes  Continue home regimen as tolerated  Continue to monitor vitals       Type 2 diabetes mellitus with diabetic arthropathy  Patient placed on Diabetic diet   - NPO at midnight  Home insulin regimen ordered   Hold all oral glucose lowering medications  Sliding scale insulin ordered   Continue to monitor blood glucose with POC glucose         VTE Risk Mitigation (From admission, onward)           Ordered     IP VTE HIGH RISK PATIENT  Once         12/14/23 2121     Place sequential compression device  Until discontinued         12/14/23 2121                         On 12/14/2023, patient should be placed in hospital observation services under my care in collaboration with Dr. Hastings.           Vanesa Lucas PA-C  Department of Hospital Medicine  [facility] - Emergency Dept

## 2023-12-15 NOTE — ASSESSMENT & PLAN NOTE
Patient presents to the ED for increasingly more frequent episodes of pressure like chest pain associated with SOB and diaphoresis over the past week  Patient had 3 episodes of this chest pain that radiated to his LUE today prior to coming to the ED    Patient has extensive cardiac history and states that this pain is the same as he has had in the past   - Patient never has elevated troponin or ECG changes    ECG: NSR with no acute changes    Troponin: 12.8  - repeat troponin pending     Patient received the following in the ED with symptom improvement from 8/10 pain to 4/10 pain   -   - Nitroglycerin paste     IV Morphine ordered PRN for pain   Nitroglycerin paste ordered PRN    ECHO ordered   Cardiology consulted   - NPO at midnight

## 2023-12-15 NOTE — SUBJECTIVE & OBJECTIVE
Past Medical History:   Diagnosis Date    Anticoagulant long-term use     Atrial fibrillation 2018    BPH (benign prostatic hyperplasia)     Cataract     COPD (chronic obstructive pulmonary disease)     Coronary artery disease     stents    CVD (cardiovascular disease)     Diabetes mellitus     Erythema multiforme     AKA Denton Edmondson Syndrome    Hypertension     Infected incision     MI (myocardial infarction)     per pt he has had 4     ROSAMARIA on CPAP     RLS (restless legs syndrome)        Past Surgical History:   Procedure Laterality Date    CORONARY ANGIOGRAPHY INCLUDING BYPASS GRAFTS WITH CATHETERIZATION OF LEFT HEART N/A 9/1/2022    Procedure: ANGIOGRAM, CORONARY, INCLUDING BYPASS GRAFT, WITH LEFT HEART CATHETERIZATION;  Surgeon: Paul Botello MD;  Location: [facility] CATH/EP LAB;  Service: Cardiology;  Laterality: N/A;    CORONARY ARTERY BYPASS GRAFT (CABG) N/A 4/7/2020    Procedure: CORONARY ARTERY BYPASS GRAFT (CABG)- Excision of left atrial appendage;  Surgeon: Doug Solitario MD;  Location: [facility] OR;  Service: Cardiothoracic;  Laterality: N/A;    CORONARY STENT PLACEMENT      WILSON MAZE PROCEDURE N/A 4/7/2020    Procedure: WILSON MAZE PROCEDURE- Attempted;  Surgeon: Doug Solitario MD;  Location: [facility] OR;  Service: Cardiothoracic;  Laterality: N/A;    CYSTOSCOPY N/A 12/10/2018    Procedure: CYSTOSCOPY;  Surgeon: Khushboo Abreu MD;  Location: [facility] OR;  Service: Urology;  Laterality: N/A;    ENDOSCOPIC HARVEST OF VEIN Left 4/7/2020    Procedure: HARVEST-VEIN-ENDOVASCULAR;  Surgeon: Doug Solitario MD;  Location: [facility] OR;  Service: Cardiothoracic;  Laterality: Left;    INCISION OF PERIRECTAL ABSCESS Bilateral 9/1/2023    Procedure: INCISION, ABSCESS, PERIRECTAL;  Surgeon: Brayan Spears MD;  Location: [facility] OR;  Service: General;  Laterality: Bilateral;  stirrups    LEFT HEART CATHETERIZATION Left 3/17/2020    Procedure: CATHETERIZATION, HEART, LEFT;  Surgeon: Tyree Walters MD;   Location: [facility] CATH/EP LAB;  Service: Cardiology;  Laterality: Left;    STERNAL WIRES REMOVAL N/A 6/8/2020    Procedure: REMOVAL, STERNAL WIRE;  Surgeon: Doug Solitario MD;  Location: [facility] OR;  Service: Peripheral Vascular;  Laterality: N/A;    ULTRASOUND OF PROSTATE FOR VOLUME DETERMINATION  12/10/2018    Procedure: ULTRASOUND, PROSTATE, FOR VOLUME DETERMINATION;  Surgeon: Khushboo Abreu MD;  Location: [facility] OR;  Service: Urology;;       Review of patient's allergies indicates:   Allergen Reactions    Pesticide Dermatitis and Rash       No current facility-administered medications on file prior to encounter.     Current Outpatient Medications on File Prior to Encounter   Medication Sig    apixaban (ELIQUIS) 5 mg Tab Take 5 mg by mouth 2 (two) times daily.    aspirin 81 MG Chew Take 81 mg by mouth once daily.     cetirizine (ZYRTEC) 10 MG tablet Take 1 tablet (10 mg total) by mouth once daily. (Patient not taking: Reported on 9/8/2023)    clonazePAM (KLONOPIN) 1 MG tablet Take 1 mg by mouth 2 (two) times daily.    cyclobenzaprine (FLEXERIL) 10 MG tablet Take 10 mg by mouth once daily.    furosemide (LASIX) 20 MG tablet Take 20 mg by mouth 2 (two) times daily.    gabapentin (NEURONTIN) 600 MG tablet Take 600 mg by mouth 3 (three) times daily.     HYDROcodone-acetaminophen (NORCO) 5-325 mg per tablet Take 1 tablet by mouth every 6 (six) hours as needed for Pain. (Patient not taking: Reported on 9/8/2023)    isosorbide mononitrate (IMDUR) 60 MG 24 hr tablet Take 1 tablet (60 mg total) by mouth once daily. (Patient not taking: Reported on 9/8/2023)    JARDIANCE 25 mg tablet Take 25 mg by mouth once daily.    lactobacillus acidophilus & bulgar (LACTINEX) 100 million cell packet Take 1 packet (1 each total) by mouth 2 (two) times daily.    LANTUS SOLOSTAR U-100 INSULIN glargine 100 units/mL (3mL) SubQ pen Inject 80 Units into the skin 2 (two) times daily.    metFORMIN (GLUCOPHAGE) 1000 MG tablet Take 1,000  "mg by mouth 2 (two) times daily with meals.     methocarbamoL (ROBAXIN) 500 MG Tab Take 500 mg by mouth every evening.    metoprolol succinate (TOPROL-XL) 100 MG 24 hr tablet Take 100 mg by mouth 2 (two) times daily.    nitroGLYCERIN (NITROSTAT) 0.4 MG SL tablet Place 0.4 mg under the tongue every 5 (five) minutes as needed for Chest pain.    oxyCODONE-acetaminophen (PERCOCET)  mg per tablet Take 1-2 tablets by mouth daily as needed for Pain.    pantoprazole (PROTONIX) 40 MG tablet Take 1 tablet by mouth 2 (two) times a day.    pen needle, diabetic 31 gauge x 1/4" Ndle USE 1 TWICE DAILY FOR BLOOD SUGAR GREATER THAN 200    pioglitazone (ACTOS) 15 MG tablet Take 15 mg by mouth once daily.    ranolazine (RANEXA) 500 MG Tb12 Take 1 tablet (500 mg total) by mouth 2 (two) times daily. (Patient not taking: Reported on 9/8/2023)    rimegepant (NURTEC) 75 mg odt Take 1 tablet by mouth as needed for Migraine.    simvastatin (ZOCOR) 10 MG tablet Take 10 mg by mouth once daily.     Family History       Problem Relation (Age of Onset)    Cancer Father    Diabetes Mother    Heart disease Mother    Hyperlipidemia Father          Tobacco Use    Smoking status: Every Day     Current packs/day: 0.00     Average packs/day: 0.3 packs/day for 30.0 years (9.0 ttl pk-yrs)     Types: Cigarettes     Start date: 4/4/1990     Last attempt to quit: 4/4/2020     Years since quitting: 3.6    Smokeless tobacco: Never    Tobacco comments:     just under a pack a day   Substance and Sexual Activity    Alcohol use: Not Currently    Drug use: Yes     Frequency: 1.0 times per week     Types: Marijuana    Sexual activity: Not Currently     Review of Systems   Constitutional:  Positive for diaphoresis. Negative for activity change, appetite change, chills, fatigue and fever.   HENT:  Negative for congestion, ear discharge, ear pain, postnasal drip, rhinorrhea, sinus pressure and sore throat.    Eyes:  Negative for pain, discharge, redness and " itching.   Respiratory:  Positive for chest tightness and shortness of breath. Negative for cough.    Cardiovascular:  Positive for chest pain (pressure). Negative for leg swelling.   Gastrointestinal:  Negative for abdominal pain, constipation, diarrhea, nausea and vomiting.   Genitourinary:  Negative for dysuria, frequency, hematuria and urgency.   Musculoskeletal:  Negative for back pain.   Skin:  Negative for color change, pallor and rash.   Neurological:  Positive for headaches. Negative for dizziness, syncope, facial asymmetry, weakness and light-headedness.   Psychiatric/Behavioral:  Negative for behavioral problems, confusion and hallucinations.      Objective:     Vital Signs (Most Recent):  Temp: 98 °F (36.7 °C) (12/14/23 1849)  Pulse: 67 (12/14/23 2100)  Resp: 14 (12/14/23 2030)  BP: (!) 150/74 (12/14/23 2100)  SpO2: 97 % (12/14/23 2100) Vital Signs (24h Range):  Temp:  [98 °F (36.7 °C)] 98 °F (36.7 °C)  Pulse:  [64-71] 67  Resp:  [12-19] 14  SpO2:  [97 %] 97 %  BP: (149-168)/(74-91) 150/74     Weight: 122.5 kg (270 lb)  Body mass index is 37.66 kg/m².     Physical Exam  Vitals and nursing note reviewed.   Constitutional:       General: He is not in acute distress.     Appearance: Normal appearance. He is obese. He is not ill-appearing, toxic-appearing or diaphoretic.   HENT:      Head: Normocephalic and atraumatic.      Nose: Nose normal.      Mouth/Throat:      Mouth: Mucous membranes are moist.   Eyes:      Extraocular Movements: Extraocular movements intact.   Cardiovascular:      Rate and Rhythm: Normal rate and regular rhythm.      Heart sounds: Normal heart sounds. No murmur heard.  Pulmonary:      Effort: Pulmonary effort is normal. No respiratory distress.      Breath sounds: Normal breath sounds. No wheezing.   Chest:      Comments: Midline surgical scar   Abdominal:      General: Bowel sounds are normal.      Palpations: Abdomen is soft. There is no mass.      Tenderness: There is no abdominal  tenderness. There is no guarding.      Hernia: No hernia is present.      Comments: protuberant   Musculoskeletal:         General: No swelling, tenderness or deformity. Normal range of motion.      Cervical back: Normal range of motion. No rigidity or tenderness.   Skin:     General: Skin is warm and dry.      Coloration: Skin is not jaundiced.      Findings: No bruising or erythema.   Neurological:      General: No focal deficit present.      Mental Status: He is alert and oriented to person, place, and time. Mental status is at baseline.   Psychiatric:         Mood and Affect: Mood normal.         Behavior: Behavior normal.                Significant Labs: All pertinent labs within the past 24 hours have been reviewed.  CBC:   Recent Labs   Lab 12/14/23 1929   WBC 9.38   HGB 15.4   HCT 43.4        CMP:   Recent Labs   Lab 12/14/23 1929   *   K 3.8      CO2 24   *   BUN 10   CREATININE 0.8   CALCIUM 8.8   PROT 6.8   ALBUMIN 4.0   BILITOT 0.5   ALKPHOS 44*   AST 16   ALT 20   ANIONGAP 7*     Coagulation:   Recent Labs   Lab 12/14/23 1929   APTT 25.9     Lipase:   Recent Labs   Lab 12/14/23 1929   LIPASE 29     Magnesium:   Recent Labs   Lab 12/14/23 1929   MG 1.4*     Troponin:   Recent Labs   Lab 12/14/23 1929   TROPONINIHS 12.8       Significant Imaging: I have reviewed all pertinent imaging results/findings within the past 24 hours.

## 2023-12-15 NOTE — HPI
59 y/o male with history of COPD, CAD s/p CABG, multiple stents and last heart cath 2022, DM, HTN, ROSAMARIA, Atrial fibrillation, several myocardial infarctions, and HLD presents to the ED for pressure like chest pain that has been intermittent for the past week. Patient states that this chest pain is pressure like, occasionally sharp, radiates down his left arm and is associated with diaphoresis and SOB. Patient states that throughout the week he has been able to take 1 nitroglycerin and the pain subsides however he has had 3 episodes of chest pain today since 1000 and has had to take NTG 3 times. Patient says that this is how he feels every time that he has a cardiac event and he knew he needed to come to the ED. Patient denies any sough, fever, chills, sick contacts, nausea, vomiting, or dizziness. Patient has had increased headaches this past week and states that they have been on the top and back of his head instead of behind his eyes like they normally are. Code status was discussed and patient was placed as a full code at this time.     ED: Vitals showed HR 71, /91, RR 19 saturating at 97% on RA. Labs were significant for troponin 12.8, magnesium 1.4, and glucose 162.

## 2023-12-15 NOTE — ED PROVIDER NOTES
"Source of History:  Patient, chart    Chief complaint:  Chest Pain (Pt reports that he has been having chest pain for about a week and usually after "eating" one nitro, he is fine.  Today pt says he "ate" three nitros throughout the day starting at around 10 AM and is still feeling tightness)      HPI:  Magdaleno Cunningham is a 58 y.o. male with medical history of AFib, COPD, CAD, diabetes, CAD presenting with chest pain.  Patient states pain has been intermittent for the past week.  Patient states he typically takes nitro which resolved the pain but had to take 2 nitro yesterday and has already taken 3 nitros today.  Patient describes the pain as a pressure.  Patient states pain is similar to when he had his heart attacks in the past.  Patient does not currently have a cardiologist.    This is the extent to the patients complaints today here in the emergency department.    ROS: As per HPI and below:  Constitutional: No fever.  No chills.  Eyes: No visual changes.  ENT: No sore throat. No ear pain    Cardiovascular: Positive for chest pain.   Respiratory: Positive for SOB.  No cough  GI: No abdominal pain.  No nausea or vomiting.  Genitourinary: No abnormal urination.  Neurologic: No headache. No focal weakness.  No numbness.  MSK: no back pain.  Integument: No rashes or lesions.  Hematologic: No easy bruising.  Endocrine: No excessive thirst or urination.    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    Hypertension     Infected incision     MI (myocardial infarction)     per pt he has " had 4     ROSAMARIA on CPAP     RLS (restless legs syndrome)      Past Surgical History:   Procedure Laterality Date    CORONARY ANGIOGRAPHY INCLUDING BYPASS GRAFTS WITH CATHETERIZATION OF LEFT HEART N/A 9/1/2022    Procedure: ANGIOGRAM, CORONARY, INCLUDING BYPASS GRAFT, WITH LEFT HEART CATHETERIZATION;  Surgeon: Paul Botello MD;  Location: [facility] CATH/EP LAB;  Service: Cardiology;  Laterality: N/A;    CORONARY ARTERY BYPASS GRAFT (CABG) N/A 4/7/2020    Procedure: CORONARY ARTERY BYPASS GRAFT (CABG)- Excision of left atrial appendage;  Surgeon: Doug Solitario MD;  Location: [facility] OR;  Service: Cardiothoracic;  Laterality: N/A;    CORONARY STENT PLACEMENT      WILSON MAZE PROCEDURE N/A 4/7/2020    Procedure: WILSON MAZE PROCEDURE- Attempted;  Surgeon: Doug Solitario MD;  Location: [facility] OR;  Service: Cardiothoracic;  Laterality: N/A;    CYSTOSCOPY N/A 12/10/2018    Procedure: CYSTOSCOPY;  Surgeon: Khushboo Abreu MD;  Location: [facility];  Service: Urology;  Laterality: N/A;    ENDOSCOPIC HARVEST OF VEIN Left 4/7/2020    Procedure: HARVEST-VEIN-ENDOVASCULAR;  Surgeon: Doug Solitario MD;  Location: [facility] OR;  Service: Cardiothoracic;  Laterality: Left;    INCISION OF PERIRECTAL ABSCESS Bilateral 9/1/2023    Procedure: INCISION, ABSCESS, PERIRECTAL;  Surgeon: Brayan Spears MD;  Location: [facility];  Service: General;  Laterality: Bilateral;  stirrups    LEFT HEART CATHETERIZATION Left 3/17/2020    Procedure: CATHETERIZATION, HEART, LEFT;  Surgeon: Tyree Walters MD;  Location: [facility] CATH/EP LAB;  Service: Cardiology;  Laterality: Left;    STERNAL WIRES REMOVAL N/A 6/8/2020    Procedure: REMOVAL, STERNAL WIRE;  Surgeon: Doug Solitario MD;  Location: [facility] OR;  Service: Peripheral Vascular;  Laterality: N/A;    ULTRASOUND OF PROSTATE FOR VOLUME DETERMINATION  12/10/2018    Procedure: ULTRASOUND, PROSTATE, FOR VOLUME DETERMINATION;  Surgeon: Khushboo Abreu MD;  Location: [facility];  Service:  "Urology;;       Social History     Tobacco Use    Smoking status: Every Day     Current packs/day: 0.00     Average packs/day: 0.3 packs/day for 30.0 years (9.0 ttl pk-yrs)     Types: Cigarettes     Start date: 4/4/1990     Last attempt to quit: 4/4/2020     Years since quitting: 3.6    Smokeless tobacco: Never    Tobacco comments:     just under a pack a day   Substance Use Topics    Alcohol use: Not Currently    Drug use: Yes     Frequency: 1.0 times per week     Types: Marijuana       Physical Exam:    BP (!) 168/91   Pulse 71   Temp 98 °F (36.7 °C) (Oral)   Resp 19   Ht 5' 11" (1.803 m)   Wt 122.5 kg (270 lb)   SpO2 97%   BMI 37.66 kg/m²   Nursing note and vital signs reviewed.  Constitutional: No acute distress.  Nontoxic  Eyes: No conjunctival injection.  Extraocular muscles are intact.  ENT: Oropharynx clear.  Cardiovascular:  Regular rate and rhythm no murmurs gallops or rubs  Chest/ Respiratory:  Clear to auscultation bilaterally without wheezing rhonchi or rales  Abdomen: Soft.  Not distended.  Nontender.  No guarding.  No rebound. Non-peritoneal.  Musculoskeletal: Good range of motion all joints.  No deformities.  Neck supple.  No meningismus.  Skin: No rashes seen.  Good turgor.  No abrasions.  No ecchymoses.  Neuro: alert and oriented x3,  no focal neurological deficits.  Psych: Appropriate, conversant    MDM    Emergent evaluation of a 57 yo male presenting with chest pain.  PT states pain started approximately 1 week ago.  Patient states he typically takes nitro and the pain subsides.  Patient states yesterday he would to take 2 doses of nitro.  Patient states today he is taken 3 doses and pain has continued.  Patient describes the pain as a pressure.  Patient states he would similar pain with his MIs in the past.  Patient states he does not currently have a cardiologist who he follows with.  On exam pt is A&Ox3. VSS. Nonfebrile and nontoxic appearing.  Mucous membranes pink and moist. Tonsils " with no redness, erythema or exudates. Breath sounds clear bilaterally.  Abdomen soft and nontender. No rebound or guarding appreciated on exam.   BS WNL.  Pt speaking in full sentences.  Steady gait appreciated. Cap refill < 3 seconds.      Additional MDM:   Heart Score:    History:          Highly suspicious.  ECG:             Normal  Age:               45-65 years  Risk factors: >= 3 risk factors or history of atherosclerotic disease  Troponin:       Less than or equal to normal limit  Heart Score = 5           History Acquisition   Additional history was acquired from other historians.  Chart    The patient's list of active medical problems, social history, medications, and allergies as documented per RN staff has been reviewed.     Differential Diagnoses   Based on available information and the initial assessment, the working differential diagnoses considered during this evaluation include but are not limited to ischemic chest pain, CAD, mi, costochondritis, pneumonia, bronchitis, cardiac spasms, others.    I will get labs, imaging, medicate and reassess.  I discussed case with my supervising physician      LABS     Labs Reviewed   COMPREHENSIVE METABOLIC PANEL - Abnormal; Notable for the following components:       Result Value    Sodium 133 (*)     Glucose 162 (*)     Alkaline Phosphatase 44 (*)     Anion Gap 7 (*)     All other components within normal limits   CBC W/ AUTO DIFFERENTIAL - Abnormal; Notable for the following components:    MCH 32.0 (*)     All other components within normal limits   MAGNESIUM - Abnormal; Notable for the following components:    Magnesium 1.4 (*)     All other components within normal limits   TROPONIN I HIGH SENSITIVITY   LIPASE   APTT         Imaging     Imaging Results              X-Ray Chest AP Portable (In process)                     A radiology report was available for my review at the time of the encounter.    EKG   EKG Readings: (Independently Interpreted)   Initial  Reading: No STEMI. Previous EKG Date: 6/24/23. Rhythm: Normal Sinus Rhythm. Heart Rate: 72. Ectopy: No Ectopy. Conduction: Normal.   My independent interpretation    No STEMI  Normal sinus rhythm  Rate of 72       Additional Consideration   All available testing was considered during the course of this workup.  Comorbidities taken into consideration during the patient's evaluation and treatment include weight, age.    Social determinants of health were taken into consideration during development of our treatment plan.    Medications   aspirin EC tablet 325 mg (325 mg Oral Given 12/14/23 1940)   nitroGLYCERIN 2% TD oint ointment 0.5 inch (0.5 inches Topical (Top) Given 12/14/23 2028)                 CLINICAL IMPRESSION  1. Hypertension, unspecified type    2. Chest pain      This note was created using dictation software.  This program may occasionally mistype words and phrases.          Carla Durand, NP  12/14/23 2045

## 2023-12-15 NOTE — PLAN OF CARE
Discharge orders and chart reviewed with no further post-acute discharge needs identified at this time.  At this time, patient is cleared for discharge from Case Management standpoint.        12/15/23 1520   Final Note   Assessment Type Final Discharge Note   Anticipated Discharge Disposition Home   Post-Acute Status   Post-Acute Authorization Other   Other Status No Post-Acute Service Needs   Discharge Delays None known at this time

## 2023-12-15 NOTE — FIRST PROVIDER EVALUATION
" Emergency Department TeleTriage Encounter Note      CHIEF COMPLAINT    Chief Complaint   Patient presents with    Chest Pain     Pt reports that he has been having chest pain for about a week and usually after "eating" one nitro, he is fine.  Today pt says he "ate" three nitros throughout the day starting at around 10 AM and is still feeling tightness       VITAL SIGNS   Initial Vitals   BP Pulse Resp Temp SpO2   12/14/23 1853 12/14/23 1849 12/14/23 1849 12/14/23 1849 12/14/23 1849   (!) 168/91 71 19 98 °F (36.7 °C) 97 %      MAP       --                   ALLERGIES    Review of patient's allergies indicates:   Allergen Reactions    Pesticide Dermatitis and Rash       PROVIDER TRIAGE NOTE      58 year old male presents to the ED for evaluation of chest pain since this morning.  Took nitro with improvement x3.  Patient did have 3 separate episodes of chest pain.  Does have significant cardiac history including stent placement and CABG.      PE:  Patient alert and oriented. No acute distress    Initial orders will be placed and care will be transferred to an alternate provider when patient is roomed for a full evaluation. Any additional orders and the final disposition will be determined by that provider.        ORDERS  Labs Reviewed   COMPREHENSIVE METABOLIC PANEL   CBC W/ AUTO DIFFERENTIAL   MAGNESIUM   TROPONIN I HIGH SENSITIVITY   LIPASE   APTT       ED Orders (720h ago, onward)      Start Ordered     Status Ordering Provider    12/14/23 1919 12/14/23 1918  APTT  STAT         Ordered GABRIEL LALA    12/14/23 1919 12/14/23 1918  X-Ray Chest AP Portable  1 time imaging         Ordered GABRIEL LALA    12/14/23 1918 12/14/23 1918  Comprehensive metabolic panel  STAT         Ordered GABRIEL LALA    12/14/23 1918 12/14/23 1918  CBC auto differential  STAT         Ordered GABRIEL LALA    12/14/23 1918 12/14/23 1918  Magnesium  STAT         Ordered GABRIEL LALA    12/14/23 1918 12/14/23 1918  " Troponin I High Sensitivity  STAT         Ordered JOHNYKUTTY, GABRIEL    12/14/23 1918 12/14/23 1918  Insert Saline lock IV  Once         Ordered JOHNYKUTTY, GABRIEL    12/14/23 1918 12/14/23 1918  Lipase  STAT         Ordered JOHNYKUTTY, GABRIEL    12/14/23 1918 12/14/23 1918  EKG 12-lead  Once         Ordered JOHNYKUTTY, GABRIEL    12/14/23 1918 12/14/23 1918  Cardiac Monitoring - Adult  Continuous        Comments: Notify Physician If:    Ordered JOHNYKUTTY, GABRIEL    12/14/23 1918 12/14/23 1918  Pulse Oximetry Continuous  Continuous         Ordered JOHNYKUTTY, GABRIEL              Virtual Visit Note: The provider triage portion of this emergency department evaluation and documentation was performed via [employer] a HIPAA-compliant telemedicine application, in concert with a tele-presenter in the room. A face to face patient evaluation with one of my colleagues will occur once the patient is placed in an emergency department room.      DISCLAIMER: This note was prepared with [employer] voice recognition transcription software. Garbled syntax, mangled pronouns, and other bizarre constructions may be attributed to that software system.

## 2023-12-15 NOTE — ASSESSMENT & PLAN NOTE
Patient placed on Diabetic diet   - NPO at midnight  Home insulin regimen ordered   Hold all oral glucose lowering medications  Sliding scale insulin ordered   Continue to monitor blood glucose with POC glucose

## 2023-12-18 LAB
AORTIC ROOT ANNULUS: 3.6 CM
AORTIC VALVE CUSP SEPERATION: 2.1 CM
AV INDEX (PROSTH): 0.83
AV MEAN GRADIENT: 3 MMHG
AV PEAK GRADIENT: 5 MMHG
AV VALVE AREA BY VELOCITY RATIO: 3.21 CM²
AV VALVE AREA: 3.16 CM²
AV VELOCITY RATIO: 0.85
BSA FOR ECHO PROCEDURE: 2.49 M2
CV ECHO LV RWT: 0.39 CM
DOP CALC AO PEAK VEL: 1.1 M/S
DOP CALC AO VTI: 27.5 CM
DOP CALC LVOT AREA: 3.8 CM2
DOP CALC LVOT DIAMETER: 2.2 CM
DOP CALC LVOT PEAK VEL: 0.93 M/S
DOP CALC LVOT STROKE VOLUME: 87.01 CM3
DOP CALC MV VTI: 41.5 CM
DOP CALCLVOT PEAK VEL VTI: 22.9 CM
E WAVE DECELERATION TIME: 176 MSEC
E/A RATIO: 1.3
E/E' RATIO: 9.58 M/S
ECHO LV POSTERIOR WALL: 1.08 CM (ref 0.6–1.1)
FRACTIONAL SHORTENING: 28 % (ref 28–44)
INTERVENTRICULAR SEPTUM: 1.29 CM (ref 0.6–1.1)
IVC DIAMETER: 2.34 CM
LEFT INTERNAL DIMENSION IN SYSTOLE: 4.03 CM (ref 2.1–4)
LEFT VENTRICLE DIASTOLIC VOLUME INDEX: 62.92 ML/M2
LEFT VENTRICLE DIASTOLIC VOLUME: 151 ML
LEFT VENTRICLE MASS INDEX: 114 G/M2
LEFT VENTRICLE SYSTOLIC VOLUME INDEX: 29.7 ML/M2
LEFT VENTRICLE SYSTOLIC VOLUME: 71.3 ML
LEFT VENTRICULAR INTERNAL DIMENSION IN DIASTOLE: 5.56 CM (ref 3.5–6)
LEFT VENTRICULAR MASS: 272.5 G
LV LATERAL E/E' RATIO: 7.58 M/S
LV SEPTAL E/E' RATIO: 13 M/S
LVOT MG: 2 MMHG
LVOT MV: 0.63 CM/S
MV MEAN GRADIENT: 2 MMHG
MV PEAK A VEL: 0.7 M/S
MV PEAK E VEL: 0.91 M/S
MV PEAK GRADIENT: 5 MMHG
MV VALVE AREA BY CONTINUITY EQUATION: 2.1 CM2
PISA MRMAX VEL: 4.39 M/S
PISA TR MAX VEL: 2.17 M/S
PV MV: 0.56 M/S
PV PEAK GRADIENT: 3 MMHG
PV PEAK VELOCITY: 0.81 M/S
RA PRESSURE ESTIMATED: 3 MMHG
RIGHT VENTRICULAR END-DIASTOLIC DIMENSION: 2.69 CM
RV TB RVSP: 5 MMHG
RV TISSUE DOPPLER FREE WALL SYSTOLIC VELOCITY 1 (APICAL 4 CHAMBER VIEW): 8.16 CM/S
TDI LATERAL: 0.12 M/S
TDI SEPTAL: 0.07 M/S
TDI: 0.1 M/S
TR MAX PG: 19 MMHG
TRICUSPID ANNULAR PLANE SYSTOLIC EXCURSION: 1.96 CM
TV REST PULMONARY ARTERY PRESSURE: 22 MMHG
Z-SCORE OF LEFT VENTRICULAR DIMENSION IN END DIASTOLE: -6.76
Z-SCORE OF LEFT VENTRICULAR DIMENSION IN END SYSTOLE: -3.84

## 2023-12-22 ENCOUNTER — HOSPITAL ENCOUNTER (OUTPATIENT)
Facility: HOSPITAL | Age: 58
Discharge: HOME | End: 2023-12-22
Attending: INTERNAL MEDICINE | Admitting: INTERNAL MEDICINE
Payer: MEDICAID

## 2023-12-22 VITALS
TEMPERATURE: 98 F | DIASTOLIC BLOOD PRESSURE: 60 MMHG | RESPIRATION RATE: 22 BRPM | OXYGEN SATURATION: 98 % | SYSTOLIC BLOOD PRESSURE: 115 MMHG | BODY MASS INDEX: 38.22 KG/M2 | HEART RATE: 65 BPM | WEIGHT: 273 LBS | HEIGHT: 71 IN

## 2023-12-22 DIAGNOSIS — I25.10 ATHEROSCLEROSIS OF NATIVE CORONARY ARTERY OF NATIVE HEART WITHOUT ANGINA PECTORIS: ICD-10-CM

## 2023-12-22 DIAGNOSIS — Z95.1 S/P CABG (CORONARY ARTERY BYPASS GRAFT): Primary | ICD-10-CM

## 2023-12-22 DIAGNOSIS — Z01.810 PREOP CARDIOVASCULAR EXAM: ICD-10-CM

## 2023-12-22 LAB
ALBUMIN SERPL BCP-MCNC: 4.1 G/DL (ref 3.5–5.2)
ALP SERPL-CCNC: 47 U/L (ref 55–135)
ALT SERPL W/O P-5'-P-CCNC: 20 U/L (ref 10–44)
ANION GAP SERPL CALC-SCNC: 8 MMOL/L (ref 8–16)
AST SERPL-CCNC: 17 U/L (ref 10–40)
BASOPHILS # BLD AUTO: 0.08 K/UL (ref 0–0.2)
BASOPHILS NFR BLD: 1.1 % (ref 0–1.9)
BILIRUB SERPL-MCNC: 0.4 MG/DL (ref 0.1–1)
BUN SERPL-MCNC: 13 MG/DL (ref 6–20)
CALCIUM SERPL-MCNC: 9.1 MG/DL (ref 8.7–10.5)
CHLORIDE SERPL-SCNC: 101 MMOL/L (ref 95–110)
CO2 SERPL-SCNC: 25 MMOL/L (ref 23–29)
CREAT SERPL-MCNC: 1.3 MG/DL (ref 0.5–1.4)
DIFFERENTIAL METHOD: ABNORMAL
EOSINOPHIL # BLD AUTO: 0.3 K/UL (ref 0–0.5)
EOSINOPHIL NFR BLD: 3.7 % (ref 0–8)
ERYTHROCYTE [DISTWIDTH] IN BLOOD BY AUTOMATED COUNT: 13.1 % (ref 11.5–14.5)
EST. GFR  (NO RACE VARIABLE): >60 ML/MIN/1.73 M^2
GLUCOSE SERPL-MCNC: 203 MG/DL (ref 70–110)
HCT VFR BLD AUTO: 47.3 % (ref 40–54)
HGB BLD-MCNC: 16 G/DL (ref 14–18)
IMM GRANULOCYTES # BLD AUTO: 0.03 K/UL (ref 0–0.04)
IMM GRANULOCYTES NFR BLD AUTO: 0.4 % (ref 0–0.5)
LYMPHOCYTES # BLD AUTO: 2.7 K/UL (ref 1–4.8)
LYMPHOCYTES NFR BLD: 37.5 % (ref 18–48)
MAGNESIUM SERPL-MCNC: 1.8 MG/DL (ref 1.6–2.6)
MCH RBC QN AUTO: 31.4 PG (ref 27–31)
MCHC RBC AUTO-ENTMCNC: 33.8 G/DL (ref 32–36)
MCV RBC AUTO: 93 FL (ref 82–98)
MONOCYTES # BLD AUTO: 0.5 K/UL (ref 0.3–1)
MONOCYTES NFR BLD: 6.2 % (ref 4–15)
NEUTROPHILS # BLD AUTO: 3.7 K/UL (ref 1.8–7.7)
NEUTROPHILS NFR BLD: 51.1 % (ref 38–73)
NRBC BLD-RTO: 0 /100 WBC
PLATELET # BLD AUTO: 222 K/UL (ref 150–450)
PMV BLD AUTO: 9.6 FL (ref 9.2–12.9)
POC ACTIVATED CLOTTING TIME K: 185 SEC (ref 74–137)
POC ACTIVATED CLOTTING TIME K: 250 SEC (ref 74–137)
POTASSIUM SERPL-SCNC: 4.1 MMOL/L (ref 3.5–5.1)
PROT SERPL-MCNC: 6.9 G/DL (ref 6–8.4)
RBC # BLD AUTO: 5.1 M/UL (ref 4.6–6.2)
SAMPLE: ABNORMAL
SAMPLE: ABNORMAL
SODIUM SERPL-SCNC: 134 MMOL/L (ref 136–145)
WBC # BLD AUTO: 7.28 K/UL (ref 3.9–12.7)

## 2023-12-22 PROCEDURE — C1753 CATH, INTRAVAS ULTRASOUND: HCPCS | Performed by: INTERNAL MEDICINE

## 2023-12-22 PROCEDURE — 80053 COMPREHEN METABOLIC PANEL: CPT | Performed by: INTERNAL MEDICINE

## 2023-12-22 PROCEDURE — C1769 GUIDE WIRE: HCPCS | Performed by: INTERNAL MEDICINE

## 2023-12-22 PROCEDURE — C1725 CATH, TRANSLUMIN NON-LASER: HCPCS | Performed by: INTERNAL MEDICINE

## 2023-12-22 PROCEDURE — 83735 ASSAY OF MAGNESIUM: CPT | Performed by: INTERNAL MEDICINE

## 2023-12-22 PROCEDURE — C1874 STENT, COATED/COV W/DEL SYS: HCPCS | Performed by: INTERNAL MEDICINE

## 2023-12-22 PROCEDURE — 99153 MOD SED SAME PHYS/QHP EA: CPT | Performed by: INTERNAL MEDICINE

## 2023-12-22 PROCEDURE — 25000003 PHARM REV CODE 250: Performed by: INTERNAL MEDICINE

## 2023-12-22 PROCEDURE — 93010 ELECTROCARDIOGRAM REPORT: CPT | Mod: ,,, | Performed by: GENERAL PRACTICE

## 2023-12-22 PROCEDURE — C9600 PERC DRUG-EL COR STENT SING: HCPCS | Mod: LC | Performed by: INTERNAL MEDICINE

## 2023-12-22 PROCEDURE — 93459 L HRT ART/GRFT ANGIO: CPT | Mod: 59 | Performed by: INTERNAL MEDICINE

## 2023-12-22 PROCEDURE — 93005 ELECTROCARDIOGRAM TRACING: CPT | Mod: 59 | Performed by: GENERAL PRACTICE

## 2023-12-22 PROCEDURE — 93010 EKG 12-LEAD: ICD-10-PCS | Mod: ,,, | Performed by: GENERAL PRACTICE

## 2023-12-22 PROCEDURE — 99152 MOD SED SAME PHYS/QHP 5/>YRS: CPT | Performed by: INTERNAL MEDICINE

## 2023-12-22 PROCEDURE — 27201423 OPTIME MED/SURG SUP & DEVICES STERILE SUPPLY: Performed by: INTERNAL MEDICINE

## 2023-12-22 PROCEDURE — C1760 CLOSURE DEV, VASC: HCPCS | Performed by: INTERNAL MEDICINE

## 2023-12-22 PROCEDURE — 92978 ENDOLUMINL IVUS OCT C 1ST: CPT | Mod: LC | Performed by: INTERNAL MEDICINE

## 2023-12-22 PROCEDURE — C1887 CATHETER, GUIDING: HCPCS | Performed by: INTERNAL MEDICINE

## 2023-12-22 PROCEDURE — 63600175 PHARM REV CODE 636 W HCPCS: Performed by: INTERNAL MEDICINE

## 2023-12-22 PROCEDURE — 85025 COMPLETE CBC W/AUTO DIFF WBC: CPT | Performed by: INTERNAL MEDICINE

## 2023-12-22 DEVICE — STENT ONYXNG30015UX ONYX 3.00X15RX
Type: IMPLANTABLE DEVICE | Site: CORONARY | Status: FUNCTIONAL
Brand: ONYX FRONTIER™

## 2023-12-22 DEVICE — DEVICE CLOSURE  ANGIO-SEAL 6FR 610130: Type: IMPLANTABLE DEVICE | Site: CORONARY | Status: FUNCTIONAL

## 2023-12-22 DEVICE — STENT ONYXNG35015UX ONYX 3.50X15RX
Type: IMPLANTABLE DEVICE | Site: CORONARY | Status: FUNCTIONAL
Brand: ONYX FRONTIER™

## 2023-12-22 RX ORDER — HEPARIN SODIUM 1000 [USP'U]/ML
INJECTION, SOLUTION INTRAVENOUS; SUBCUTANEOUS
Status: DISCONTINUED | OUTPATIENT
Start: 2023-12-22 | End: 2023-12-22 | Stop reason: HOSPADM

## 2023-12-22 RX ORDER — FENTANYL CITRATE 50 UG/ML
INJECTION, SOLUTION INTRAMUSCULAR; INTRAVENOUS
Status: DISCONTINUED | OUTPATIENT
Start: 2023-12-22 | End: 2023-12-22 | Stop reason: HOSPADM

## 2023-12-22 RX ORDER — LIDOCAINE HYDROCHLORIDE 10 MG/ML
INJECTION, SOLUTION EPIDURAL; INFILTRATION; INTRACAUDAL; PERINEURAL
Status: DISCONTINUED | OUTPATIENT
Start: 2023-12-22 | End: 2023-12-22 | Stop reason: HOSPADM

## 2023-12-22 RX ORDER — NITROGLYCERIN 5 MG/ML
INJECTION, SOLUTION INTRAVENOUS
Status: DISCONTINUED | OUTPATIENT
Start: 2023-12-22 | End: 2023-12-22 | Stop reason: HOSPADM

## 2023-12-22 RX ORDER — ASPIRIN 325 MG
TABLET ORAL
Status: DISCONTINUED | OUTPATIENT
Start: 2023-12-22 | End: 2023-12-22 | Stop reason: HOSPADM

## 2023-12-22 RX ORDER — SODIUM CHLORIDE 9 MG/ML
INJECTION, SOLUTION INTRAVENOUS ONCE
Status: COMPLETED | OUTPATIENT
Start: 2023-12-22 | End: 2023-12-22

## 2023-12-22 RX ORDER — SODIUM CHLORIDE 9 MG/ML
INJECTION, SOLUTION INTRAVENOUS CONTINUOUS
Status: DISCONTINUED | OUTPATIENT
Start: 2023-12-22 | End: 2023-12-22 | Stop reason: HOSPADM

## 2023-12-22 RX ORDER — METFORMIN HYDROCHLORIDE 1000 MG/1
1000 TABLET ORAL 2 TIMES DAILY WITH MEALS
Start: 2023-12-24

## 2023-12-22 RX ORDER — MIDAZOLAM HYDROCHLORIDE 1 MG/ML
INJECTION INTRAMUSCULAR; INTRAVENOUS
Status: DISCONTINUED | OUTPATIENT
Start: 2023-12-22 | End: 2023-12-22 | Stop reason: HOSPADM

## 2023-12-22 RX ADMIN — SODIUM CHLORIDE: 0.9 INJECTION, SOLUTION INTRAVENOUS at 06:12

## 2023-12-22 NOTE — Clinical Note
The catheter was removed from the proximal   circumflex. multiparous mom/difficulty with latch and sleepy.

## 2023-12-22 NOTE — PLAN OF CARE
Bedside hand off report given to VIOLETA Briggs .  Chart given. Left to cath lab via stretcher for scheduled procedure.

## 2023-12-22 NOTE — Clinical Note
The catheter was inserted into the and was inserted over the wire into the ostium   left main. The catheter was unable to engage the area..

## 2023-12-22 NOTE — Clinical Note
An angiography was performed of the left coronary arteries. The angiography was performed via power injection. The injected amount was 8 mL contrast at 4 mL/s.  Post pci/stent

## 2023-12-22 NOTE — PLAN OF CARE
Verbalizes understanding of discharge instructions, angio site care, and follow up care. Belongings gathered and dressed in personal clothing. Left via wheelchair to private auto accompanied by son.

## 2023-12-22 NOTE — PLAN OF CARE
Ambulated around room without difficulty. Returned to bed with no complaints of pain or distress noted. VSS. Dressing to right groin remains CDI. Proceed with discharge as ordered.

## 2023-12-22 NOTE — Clinical Note
The catheter was inserted into the and was inserted over the wire into the ostial SVG graft. An angiography was performed of the graft. The angiography was performed via power injection. The injected amount was 6 mL contrast at 3 mL/s.  OM occluded

## 2023-12-22 NOTE — PLAN OF CARE
Report received from VIOLETA Briggs. Arrived back from cath lab via stretcher. No complaints of pain or distress noted. Dressing to right groin CDI. Post angio instructions given. Resting in bed with call light in reach.

## 2023-12-22 NOTE — Clinical Note
The catheter was repositioned into the ostial LIMA graft. The angiography was performed via power injection. The injected amount was 6 mL contrast at 3 mL/s.  LAD

## 2023-12-22 NOTE — Clinical Note
An angiography was performed of the left coronary arteries. The angiography was performed via power injection. The injected amount was 8 mL contrast at 4 mL/s.  Post angioplasty

## 2023-12-22 NOTE — Clinical Note
The catheter was inserted into the and was inserted over the wire into the left ventricle. Hemodynamics were performed.  and Pullback was recorded.  An angiography was performed of the LV. The angiography was performed via power injection. The injected amount was 15 mL contrast at 15 mL/s.

## 2023-12-22 NOTE — PLAN OF CARE
Ambulated onto unit without difficulty. No complaints of pain or distress noted. Undressed of personal clothing and gown on. Pre-op. Resting in bed with call light in reach.

## 2023-12-25 ENCOUNTER — HOSPITAL ENCOUNTER (INPATIENT)
Facility: HOSPITAL | Age: 58
LOS: 1 days | Discharge: ANOTHER HOSPITAL | DRG: 271 | End: 2023-12-26
Attending: EMERGENCY MEDICINE | Admitting: STUDENT IN AN ORGANIZED HEALTH CARE EDUCATION/TRAINING PROGRAM
Payer: MEDICAID

## 2023-12-25 DIAGNOSIS — I21.9 MYOCARDIAL INFARCTION, UNSPECIFIED MI TYPE, UNSPECIFIED ARTERY: ICD-10-CM

## 2023-12-25 DIAGNOSIS — R07.9 CHEST PAIN: ICD-10-CM

## 2023-12-25 DIAGNOSIS — I21.3 STEMI (ST ELEVATION MYOCARDIAL INFARCTION): Primary | ICD-10-CM

## 2023-12-25 LAB
ALBUMIN SERPL BCP-MCNC: 4.3 G/DL (ref 3.5–5.2)
ALP SERPL-CCNC: 54 U/L (ref 55–135)
ALT SERPL W/O P-5'-P-CCNC: 25 U/L (ref 10–44)
ANION GAP SERPL CALC-SCNC: 10 MMOL/L (ref 8–16)
AST SERPL-CCNC: 44 U/L (ref 10–40)
BASOPHILS # BLD AUTO: 0.08 K/UL (ref 0–0.2)
BASOPHILS NFR BLD: 0.6 % (ref 0–1.9)
BILIRUB SERPL-MCNC: 0.7 MG/DL (ref 0.1–1)
BNP SERPL-MCNC: 211 PG/ML (ref 0–99)
BUN SERPL-MCNC: 16 MG/DL (ref 6–20)
CALCIUM SERPL-MCNC: 9.5 MG/DL (ref 8.7–10.5)
CHLORIDE SERPL-SCNC: 95 MMOL/L (ref 95–110)
CO2 SERPL-SCNC: 23 MMOL/L (ref 23–29)
CREAT SERPL-MCNC: 1.3 MG/DL (ref 0.5–1.4)
DIFFERENTIAL METHOD BLD: ABNORMAL
EOSINOPHIL # BLD AUTO: 0.1 K/UL (ref 0–0.5)
EOSINOPHIL NFR BLD: 1 % (ref 0–8)
ERYTHROCYTE [DISTWIDTH] IN BLOOD BY AUTOMATED COUNT: 13.2 % (ref 11.5–14.5)
EST. GFR  (NO RACE VARIABLE): >60 ML/MIN/1.73 M^2
GLUCOSE SERPL-MCNC: 285 MG/DL (ref 70–110)
GLUCOSE SERPL-MCNC: 288 MG/DL (ref 70–110)
HCT VFR BLD AUTO: 49.1 % (ref 40–54)
HGB BLD-MCNC: 16.8 G/DL (ref 14–18)
IMM GRANULOCYTES # BLD AUTO: 0.04 K/UL (ref 0–0.04)
IMM GRANULOCYTES NFR BLD AUTO: 0.3 % (ref 0–0.5)
INR PPP: 1 (ref 0.8–1.2)
LYMPHOCYTES # BLD AUTO: 3.2 K/UL (ref 1–4.8)
LYMPHOCYTES NFR BLD: 24.8 % (ref 18–48)
MAGNESIUM SERPL-MCNC: 1.8 MG/DL (ref 1.6–2.6)
MCH RBC QN AUTO: 31.1 PG (ref 27–31)
MCHC RBC AUTO-ENTMCNC: 34.2 G/DL (ref 32–36)
MCV RBC AUTO: 91 FL (ref 82–98)
MONOCYTES # BLD AUTO: 0.9 K/UL (ref 0.3–1)
MONOCYTES NFR BLD: 6.7 % (ref 4–15)
NEUTROPHILS # BLD AUTO: 8.5 K/UL (ref 1.8–7.7)
NEUTROPHILS NFR BLD: 66.6 % (ref 38–73)
NRBC BLD-RTO: 0 /100 WBC
PLATELET # BLD AUTO: 239 K/UL (ref 150–450)
PMV BLD AUTO: 10.2 FL (ref 9.2–12.9)
POC ACTIVATED CLOTTING TIME K: 212 SEC (ref 74–137)
POC ACTIVATED CLOTTING TIME K: 223 SEC (ref 74–137)
POC ACTIVATED CLOTTING TIME K: 228 SEC (ref 74–137)
POTASSIUM SERPL-SCNC: 4.1 MMOL/L (ref 3.5–5.1)
PROT SERPL-MCNC: 7.9 G/DL (ref 6–8.4)
PROTHROMBIN TIME: 11.4 SEC (ref 9–12.5)
RBC # BLD AUTO: 5.4 M/UL (ref 4.6–6.2)
SAMPLE: ABNORMAL
SODIUM SERPL-SCNC: 128 MMOL/L (ref 136–145)
TROPONIN I SERPL HS-MCNC: ABNORMAL PG/ML (ref 0–14.9)
WBC # BLD AUTO: 12.71 K/UL (ref 3.9–12.7)

## 2023-12-25 PROCEDURE — 4A023N7 MEASUREMENT OF CARDIAC SAMPLING AND PRESSURE, LEFT HEART, PERCUTANEOUS APPROACH: ICD-10-PCS | Performed by: INTERNAL MEDICINE

## 2023-12-25 PROCEDURE — 93455 CORONARY ART/GRFT ANGIO S&I: CPT | Mod: 26,51,59, | Performed by: INTERNAL MEDICINE

## 2023-12-25 PROCEDURE — C1725 CATH, TRANSLUMIN NON-LASER: HCPCS | Performed by: INTERNAL MEDICINE

## 2023-12-25 PROCEDURE — 92920 PRQ TRLUML C ANGIOP 1ART&/BR: CPT | Mod: 22,LC,, | Performed by: INTERNAL MEDICINE

## 2023-12-25 PROCEDURE — 63600175 PHARM REV CODE 636 W HCPCS: Performed by: INTERNAL MEDICINE

## 2023-12-25 PROCEDURE — 96365 THER/PROPH/DIAG IV INF INIT: CPT

## 2023-12-25 PROCEDURE — 25000003 PHARM REV CODE 250: Performed by: INTERNAL MEDICINE

## 2023-12-25 PROCEDURE — B218YZZ FLUOROSCOPY OF LEFT INTERNAL MAMMARY BYPASS GRAFT USING OTHER CONTRAST: ICD-10-PCS | Performed by: INTERNAL MEDICINE

## 2023-12-25 PROCEDURE — 83880 ASSAY OF NATRIURETIC PEPTIDE: CPT | Performed by: NURSE PRACTITIONER

## 2023-12-25 PROCEDURE — 93455 CORONARY ART/GRFT ANGIO S&I: CPT | Mod: 59 | Performed by: INTERNAL MEDICINE

## 2023-12-25 PROCEDURE — 96366 THER/PROPH/DIAG IV INF ADDON: CPT

## 2023-12-25 PROCEDURE — 99223 1ST HOSP IP/OBS HIGH 75: CPT | Mod: ,,, | Performed by: INTERNAL MEDICINE

## 2023-12-25 PROCEDURE — 85610 PROTHROMBIN TIME: CPT | Performed by: NURSE PRACTITIONER

## 2023-12-25 PROCEDURE — C1894 INTRO/SHEATH, NON-LASER: HCPCS | Performed by: INTERNAL MEDICINE

## 2023-12-25 PROCEDURE — 27201423 OPTIME MED/SURG SUP & DEVICES STERILE SUPPLY: Performed by: INTERNAL MEDICINE

## 2023-12-25 PROCEDURE — 02C03ZZ EXTIRPATION OF MATTER FROM CORONARY ARTERY, ONE ARTERY, PERCUTANEOUS APPROACH: ICD-10-PCS | Performed by: INTERNAL MEDICINE

## 2023-12-25 PROCEDURE — 33967 INSERT I-AORT PERCUT DEVICE: CPT | Performed by: INTERNAL MEDICINE

## 2023-12-25 PROCEDURE — B211YZZ FLUOROSCOPY OF MULTIPLE CORONARY ARTERIES USING OTHER CONTRAST: ICD-10-PCS | Performed by: INTERNAL MEDICINE

## 2023-12-25 PROCEDURE — 63600175 PHARM REV CODE 636 W HCPCS: Performed by: EMERGENCY MEDICINE

## 2023-12-25 PROCEDURE — 92920 PRQ TRLUML C ANGIOP 1ART&/BR: CPT | Mod: LC | Performed by: INTERNAL MEDICINE

## 2023-12-25 PROCEDURE — 83735 ASSAY OF MAGNESIUM: CPT | Performed by: NURSE PRACTITIONER

## 2023-12-25 PROCEDURE — 96374 THER/PROPH/DIAG INJ IV PUSH: CPT | Mod: 59

## 2023-12-25 PROCEDURE — C1769 GUIDE WIRE: HCPCS | Performed by: INTERNAL MEDICINE

## 2023-12-25 PROCEDURE — 80053 COMPREHEN METABOLIC PANEL: CPT | Performed by: NURSE PRACTITIONER

## 2023-12-25 PROCEDURE — 82962 GLUCOSE BLOOD TEST: CPT

## 2023-12-25 PROCEDURE — 84484 ASSAY OF TROPONIN QUANT: CPT | Performed by: NURSE PRACTITIONER

## 2023-12-25 PROCEDURE — 96375 TX/PRO/DX INJ NEW DRUG ADDON: CPT

## 2023-12-25 PROCEDURE — C1887 CATHETER, GUIDING: HCPCS | Performed by: INTERNAL MEDICINE

## 2023-12-25 PROCEDURE — C1751 CATH, INF, PER/CENT/MIDLINE: HCPCS | Performed by: INTERNAL MEDICINE

## 2023-12-25 PROCEDURE — 33967 INSERT I-AORT PERCUT DEVICE: CPT | Mod: 51,,, | Performed by: INTERNAL MEDICINE

## 2023-12-25 PROCEDURE — 25000003 PHARM REV CODE 250: Performed by: EMERGENCY MEDICINE

## 2023-12-25 PROCEDURE — 20000000 HC ICU ROOM

## 2023-12-25 PROCEDURE — 99291 CRITICAL CARE FIRST HOUR: CPT

## 2023-12-25 PROCEDURE — 5A09357 ASSISTANCE WITH RESPIRATORY VENTILATION, LESS THAN 24 CONSECUTIVE HOURS, CONTINUOUS POSITIVE AIRWAY PRESSURE: ICD-10-PCS | Performed by: STUDENT IN AN ORGANIZED HEALTH CARE EDUCATION/TRAINING PROGRAM

## 2023-12-25 PROCEDURE — 93005 ELECTROCARDIOGRAM TRACING: CPT | Performed by: GENERAL PRACTICE

## 2023-12-25 PROCEDURE — 5A02210 ASSISTANCE WITH CARDIAC OUTPUT USING BALLOON PUMP, CONTINUOUS: ICD-10-PCS | Performed by: INTERNAL MEDICINE

## 2023-12-25 PROCEDURE — 85025 COMPLETE CBC W/AUTO DIFF WBC: CPT | Performed by: NURSE PRACTITIONER

## 2023-12-25 PROCEDURE — 02703ZZ DILATION OF CORONARY ARTERY, ONE ARTERY, PERCUTANEOUS APPROACH: ICD-10-PCS | Performed by: INTERNAL MEDICINE

## 2023-12-25 PROCEDURE — B212YZZ FLUOROSCOPY OF SINGLE CORONARY ARTERY BYPASS GRAFT USING OTHER CONTRAST: ICD-10-PCS | Performed by: INTERNAL MEDICINE

## 2023-12-25 PROCEDURE — 93010 ELECTROCARDIOGRAM REPORT: CPT | Mod: 59,,, | Performed by: GENERAL PRACTICE

## 2023-12-25 RX ORDER — MIDAZOLAM HYDROCHLORIDE 1 MG/ML
INJECTION INTRAMUSCULAR; INTRAVENOUS
Status: DISCONTINUED | OUTPATIENT
Start: 2023-12-25 | End: 2023-12-26 | Stop reason: HOSPADM

## 2023-12-25 RX ORDER — HEPARIN SODIUM,PORCINE/D5W 25000/250
12 INTRAVENOUS SOLUTION INTRAVENOUS CONTINUOUS
Status: DISCONTINUED | OUTPATIENT
Start: 2023-12-26 | End: 2023-12-26 | Stop reason: HOSPADM

## 2023-12-25 RX ORDER — HEPARIN SODIUM 10000 [USP'U]/ML
INJECTION, SOLUTION INTRAVENOUS; SUBCUTANEOUS
Status: DISCONTINUED | OUTPATIENT
Start: 2023-12-25 | End: 2023-12-26 | Stop reason: HOSPADM

## 2023-12-25 RX ORDER — HEPARIN SODIUM 10000 [USP'U]/100ML
INJECTION, SOLUTION INTRAVENOUS
Status: DISCONTINUED | OUTPATIENT
Start: 2023-12-25 | End: 2023-12-26 | Stop reason: HOSPADM

## 2023-12-25 RX ORDER — NOREPINEPHRINE BITARTRATE 1 MG/ML
INJECTION, SOLUTION INTRAVENOUS
Status: DISCONTINUED | OUTPATIENT
Start: 2023-12-25 | End: 2023-12-26 | Stop reason: HOSPADM

## 2023-12-25 RX ORDER — PHENYLEPHRINE HYDROCHLORIDE 10 MG/ML
INJECTION INTRAVENOUS
Status: DISCONTINUED | OUTPATIENT
Start: 2023-12-25 | End: 2023-12-26 | Stop reason: HOSPADM

## 2023-12-25 RX ORDER — LIDOCAINE HYDROCHLORIDE 10 MG/ML
INJECTION, SOLUTION EPIDURAL; INFILTRATION; INTRACAUDAL; PERINEURAL
Status: DISCONTINUED | OUTPATIENT
Start: 2023-12-25 | End: 2023-12-26 | Stop reason: HOSPADM

## 2023-12-25 RX ORDER — ATROPINE SULFATE 0.1 MG/ML
INJECTION INTRAVENOUS
Status: DISCONTINUED | OUTPATIENT
Start: 2023-12-25 | End: 2023-12-26 | Stop reason: HOSPADM

## 2023-12-25 RX ORDER — MORPHINE SULFATE 4 MG/ML
4 INJECTION, SOLUTION INTRAMUSCULAR; INTRAVENOUS
Status: COMPLETED | OUTPATIENT
Start: 2023-12-25 | End: 2023-12-25

## 2023-12-25 RX ORDER — ONDANSETRON HYDROCHLORIDE 2 MG/ML
4 INJECTION, SOLUTION INTRAVENOUS
Status: COMPLETED | OUTPATIENT
Start: 2023-12-25 | End: 2023-12-25

## 2023-12-25 RX ORDER — NITROGLYCERIN 20 MG/100ML
0-400 INJECTION INTRAVENOUS CONTINUOUS
Status: DISCONTINUED | OUTPATIENT
Start: 2023-12-25 | End: 2023-12-25

## 2023-12-25 RX ORDER — NAPROXEN SODIUM 220 MG/1
81 TABLET, FILM COATED ORAL DAILY
Status: DISCONTINUED | OUTPATIENT
Start: 2023-12-26 | End: 2023-12-26 | Stop reason: HOSPADM

## 2023-12-25 RX ORDER — MORPHINE SULFATE 2 MG/ML
2 INJECTION, SOLUTION INTRAMUSCULAR; INTRAVENOUS
Status: COMPLETED | OUTPATIENT
Start: 2023-12-25 | End: 2023-12-25

## 2023-12-25 RX ORDER — NAPROXEN SODIUM 220 MG/1
324 TABLET, FILM COATED ORAL
Status: COMPLETED | OUTPATIENT
Start: 2023-12-25 | End: 2023-12-25

## 2023-12-25 RX ORDER — SODIUM CHLORIDE 9 MG/ML
INJECTION, SOLUTION INTRAVENOUS CONTINUOUS
Status: DISCONTINUED | OUTPATIENT
Start: 2023-12-26 | End: 2023-12-26

## 2023-12-25 RX ORDER — ATORVASTATIN CALCIUM 40 MG/1
80 TABLET, FILM COATED ORAL NIGHTLY
Status: DISCONTINUED | OUTPATIENT
Start: 2023-12-26 | End: 2023-12-26 | Stop reason: HOSPADM

## 2023-12-25 RX ORDER — SODIUM CHLORIDE 9 MG/ML
500 INJECTION, SOLUTION INTRAVENOUS
Status: COMPLETED | OUTPATIENT
Start: 2023-12-25 | End: 2023-12-25

## 2023-12-25 RX ORDER — FENTANYL CITRATE 50 UG/ML
INJECTION, SOLUTION INTRAMUSCULAR; INTRAVENOUS
Status: DISCONTINUED | OUTPATIENT
Start: 2023-12-25 | End: 2023-12-26 | Stop reason: HOSPADM

## 2023-12-25 RX ADMIN — SODIUM CHLORIDE 500 ML: 0.9 INJECTION, SOLUTION INTRAVENOUS at 07:12

## 2023-12-25 RX ADMIN — MORPHINE SULFATE 4 MG: 4 INJECTION, SOLUTION INTRAMUSCULAR; INTRAVENOUS at 07:12

## 2023-12-25 RX ADMIN — ASPIRIN 81 MG 324 MG: 81 TABLET ORAL at 06:12

## 2023-12-25 RX ADMIN — MORPHINE SULFATE 2 MG: 2 INJECTION, SOLUTION INTRAMUSCULAR; INTRAVENOUS at 07:12

## 2023-12-25 RX ADMIN — ONDANSETRON 4 MG: 2 INJECTION INTRAMUSCULAR; INTRAVENOUS at 07:12

## 2023-12-25 RX ADMIN — NITROGLYCERIN 50 MCG/MIN: 20 INJECTION INTRAVENOUS at 06:12

## 2023-12-25 NOTE — Clinical Note
The catheter was inserted into the ostial LIMA graft. An angiography was performed of the graft. Multiple views were taken. The angiography was performed via power injection. The injected amount was 6 mL contrast at 3 mL/s.

## 2023-12-25 NOTE — Clinical Note
The transvenous pacing lead was placed and capture was confirmed. The pacer was paced at 60 BPM 5 mA 0 mV.

## 2023-12-25 NOTE — Clinical Note
Dr Hardy attempting to place wire across lesion; Dr. Valverde on phone with Dr. Atwood at [facility]

## 2023-12-25 NOTE — Clinical Note
Restart Eliquis 24-hours after your procedure, on Thursday 5/11/23.    Post-Procedure Discharge Instructions    If you were instructed to stop/hold any medications before the procedure, you may restart them the day after the procedure.   Remove your dressing this evening or tomorrow.  A small bruise and tenderness at the site are normal for a few days.    You may use ice for the first 48 hours.  Apply for 20 minutes every 1-2 hours. Do not use ice on areas of numbness or decreased feeling. DO NOT USE HEAT for 48 hours.  This includes creams, prolonged hot baths, hot tubs, etc.  Do not take a tub bath or soak injection site for 48 hours. You may take a shower 24 hours after injection.   Do not increase your present activity.  Do what is comfortable for you.  If you get tired or have pain- REST.  A temporary increase in pain may occur that should settle down in 1-3 days.  You may experience some numbness in your neck, back, arms, or legs- this is temporary and feeling should return.   Post injection flares (joint swelling and pain) for several hours after the injection is normal.     You received a steroid medication:    You may notice discomfort after the local anesthetic wears off, before the steroid takes full effect.  The steroid may take 3-5 days to start to improve your symptoms.  Steroids may cause your blood sugar to increase temporarily.  If you are diabetic check your blood glucose levels more closely and report any major changes to your primary doctor.    SYMPTOMS TO REPORT- Please report any of these to your physician immediately. Ilene Gutierrez MD  [phone]  If you feel you need immediate attention, please go to the nearest emergency room or call 911.         Excessive bleeding or drainage, swelling at the injection site  Persistent chills or fever greater than 100 degrees  Major change in pain pattern or level  New onset of numbness or weakness  Severe nausea/vomiting  A severe persistent headache,  Universal procedure checklist and airway assessment completed. inability to urinate for 24 hours, loss of bowel or bladder control.     If you received a thoracic injection (the chest area or mid-back) and experience shortness of breath, please go directly to your nearest emergency room or call 911.       FOLLOW-UP: If you were not given an appointment prior to leaving please call the office and follow-up as directed. Ilene Gutierrez MD  [phone]    You had a diagnostic procedure/injection today.     Please keep track of your pain relief for the next 6 hours by completing the pain diary. During this period try to be active and do activities that normally cause you pain.      Follow the instructions on your pain diary to report your results, and please bring the pain diary to your next appointment.     Please refrain from taking any pain medications or using other pain-relieving treatments (ice/heat/topical medication) for the next six hours.

## 2023-12-25 NOTE — Clinical Note
The catheter was inserted into the ostium   circumflex. An angiography was performed of the left coronary arteries. Multiple views were taken. The angiography was performed via power injection.

## 2023-12-25 NOTE — Clinical Note
The catheter was inserted in the right ventricle. The transvenous pacing lead was secured in place in the RV and was inserted into the RV.

## 2023-12-26 ENCOUNTER — ANESTHESIA (OUTPATIENT)
Dept: INTENSIVE CARE | Facility: HOSPITAL | Age: 58
DRG: 271 | End: 2023-12-26
Payer: MEDICAID

## 2023-12-26 ENCOUNTER — ANESTHESIA EVENT (OUTPATIENT)
Dept: INTENSIVE CARE | Facility: HOSPITAL | Age: 58
DRG: 271 | End: 2023-12-26
Payer: MEDICAID

## 2023-12-26 ENCOUNTER — HOSPITAL ENCOUNTER (INPATIENT)
Facility: HOSPITAL | Age: 58
LOS: 9 days | Discharge: HOME | DRG: 276 | End: 2024-01-04
Attending: INTERNAL MEDICINE | Admitting: INTERNAL MEDICINE
Payer: MEDICAID

## 2023-12-26 VITALS
OXYGEN SATURATION: 100 % | RESPIRATION RATE: 28 BRPM | SYSTOLIC BLOOD PRESSURE: 101 MMHG | WEIGHT: 261 LBS | BODY MASS INDEX: 36.54 KG/M2 | TEMPERATURE: 99 F | HEART RATE: 72 BPM | DIASTOLIC BLOOD PRESSURE: 67 MMHG | HEIGHT: 71 IN

## 2023-12-26 DIAGNOSIS — I48.91 ATRIAL FIBRILLATION, UNSPECIFIED TYPE: ICD-10-CM

## 2023-12-26 DIAGNOSIS — I48.92 ATRIAL FLUTTER: ICD-10-CM

## 2023-12-26 DIAGNOSIS — I44.2 COMPLETE HEART BLOCK: ICD-10-CM

## 2023-12-26 DIAGNOSIS — E78.5 HYPERLIPIDEMIA, UNSPECIFIED HYPERLIPIDEMIA TYPE: ICD-10-CM

## 2023-12-26 DIAGNOSIS — Z79.4 TYPE 2 DIABETES MELLITUS WITH DIABETIC NEUROPATHIC ARTHROPATHY, WITH LONG-TERM CURRENT USE OF INSULIN: ICD-10-CM

## 2023-12-26 DIAGNOSIS — I48.91 ATRIAL FIBRILLATION: ICD-10-CM

## 2023-12-26 DIAGNOSIS — Z95.810 ICD (IMPLANTABLE CARDIOVERTER-DEFIBRILLATOR) IN PLACE: Primary | ICD-10-CM

## 2023-12-26 DIAGNOSIS — I49.9 ARRHYTHMIA: ICD-10-CM

## 2023-12-26 DIAGNOSIS — I21.3 STEMI (ST ELEVATION MYOCARDIAL INFARCTION): ICD-10-CM

## 2023-12-26 DIAGNOSIS — E11.610 TYPE 2 DIABETES MELLITUS WITH DIABETIC NEUROPATHIC ARTHROPATHY, WITH LONG-TERM CURRENT USE OF INSULIN: ICD-10-CM

## 2023-12-26 DIAGNOSIS — R57.0 CARDIOGENIC SHOCK: Primary | ICD-10-CM

## 2023-12-26 DIAGNOSIS — I44.2 CHB (COMPLETE HEART BLOCK): ICD-10-CM

## 2023-12-26 PROBLEM — I48.3 TYPICAL ATRIAL FLUTTER: Status: ACTIVE | Noted: 2023-12-26

## 2023-12-26 PROBLEM — I48.0 PAROXYSMAL ATRIAL FIBRILLATION: Status: ACTIVE | Noted: 2023-12-26

## 2023-12-26 LAB
ALBUMIN SERPL BCP-MCNC: 2.9 G/DL (ref 3.5–5.2)
ALBUMIN SERPL BCP-MCNC: 3.5 G/DL (ref 3.5–5.2)
ALLENS TEST: ABNORMAL
ALP SERPL-CCNC: 46 U/L (ref 55–135)
ALP SERPL-CCNC: 49 U/L (ref 55–135)
ALT SERPL W/O P-5'-P-CCNC: 18 U/L (ref 10–44)
ALT SERPL W/O P-5'-P-CCNC: 19 U/L (ref 10–44)
ANION GAP SERPL CALC-SCNC: 14 MMOL/L (ref 8–16)
ANION GAP SERPL CALC-SCNC: 9 MMOL/L (ref 8–16)
APTT PPP: 119.1 SEC (ref 21–32)
APTT PPP: 26.4 SEC (ref 21–32)
ASCENDING AORTA: 3.23 CM
AST SERPL-CCNC: 27 U/L (ref 10–40)
AST SERPL-CCNC: 30 U/L (ref 10–40)
AV INDEX (PROSTH): 0.68
AV MEAN GRADIENT: 2 MMHG
AV PEAK GRADIENT: 4 MMHG
AV VALVE AREA BY VELOCITY RATIO: 3.75 CM²
AV VALVE AREA: 2.97 CM²
AV VELOCITY RATIO: 0.86
BASOPHILS # BLD AUTO: 0.07 K/UL (ref 0–0.2)
BASOPHILS # BLD AUTO: 0.09 K/UL (ref 0–0.2)
BASOPHILS # BLD AUTO: 0.09 K/UL (ref 0–0.2)
BASOPHILS NFR BLD: 0.6 % (ref 0–1.9)
BASOPHILS NFR BLD: 0.6 % (ref 0–1.9)
BASOPHILS NFR BLD: 0.7 % (ref 0–1.9)
BILIRUB SERPL-MCNC: 0.4 MG/DL (ref 0.1–1)
BILIRUB SERPL-MCNC: 0.6 MG/DL (ref 0.1–1)
BSA FOR ECHO PROCEDURE: 2.44 M2
BUN SERPL-MCNC: 19 MG/DL (ref 6–20)
BUN SERPL-MCNC: 25 MG/DL (ref 6–20)
CALCIUM SERPL-MCNC: 8.2 MG/DL (ref 8.7–10.5)
CALCIUM SERPL-MCNC: 8.8 MG/DL (ref 8.7–10.5)
CHLORIDE SERPL-SCNC: 100 MMOL/L (ref 95–110)
CHLORIDE SERPL-SCNC: 102 MMOL/L (ref 95–110)
CO2 SERPL-SCNC: 19 MMOL/L (ref 23–29)
CO2 SERPL-SCNC: 19 MMOL/L (ref 23–29)
CREAT SERPL-MCNC: 1.3 MG/DL (ref 0.5–1.4)
CREAT SERPL-MCNC: 2.1 MG/DL (ref 0.5–1.4)
CV ECHO LV RWT: 0.34 CM
DELSYS: ABNORMAL
DIFFERENTIAL METHOD BLD: ABNORMAL
DOP CALC AO PEAK VEL: 0.98 M/S
DOP CALC AO VTI: 16.67 CM
DOP CALC LVOT AREA: 4.4 CM2
DOP CALC LVOT DIAMETER: 2.36 CM
DOP CALC LVOT PEAK VEL: 0.84 M/S
DOP CALC LVOT STROKE VOLUME: 49.45 CM3
DOP CALCLVOT PEAK VEL VTI: 11.31 CM
E WAVE DECELERATION TIME: 212.57 MSEC
E/A RATIO: 1.41
E/E' RATIO: 12 M/S
ECHO LV POSTERIOR WALL: 1.01 CM (ref 0.6–1.1)
EOSINOPHIL # BLD AUTO: 0.1 K/UL (ref 0–0.5)
EOSINOPHIL # BLD AUTO: 0.2 K/UL (ref 0–0.5)
EOSINOPHIL # BLD AUTO: 0.3 K/UL (ref 0–0.5)
EOSINOPHIL NFR BLD: 1 % (ref 0–8)
EOSINOPHIL NFR BLD: 1.3 % (ref 0–8)
EOSINOPHIL NFR BLD: 1.9 % (ref 0–8)
EP: 6
ERYTHROCYTE [DISTWIDTH] IN BLOOD BY AUTOMATED COUNT: 13.2 % (ref 11.5–14.5)
ERYTHROCYTE [SEDIMENTATION RATE] IN BLOOD BY WESTERGREN METHOD: 10 MM/H
ERYTHROCYTE [SEDIMENTATION RATE] IN BLOOD BY WESTERGREN METHOD: 22 MM/H
ERYTHROCYTE [SEDIMENTATION RATE] IN BLOOD BY WESTERGREN METHOD: 24 MM/H
ERYTHROCYTE [SEDIMENTATION RATE] IN BLOOD BY WESTERGREN METHOD: 26 MM/H
EST. GFR  (NO RACE VARIABLE): 36.3 ML/MIN/1.73 M^2
EST. GFR  (NO RACE VARIABLE): >60 ML/MIN/1.73 M^2
FIO2: 100
FIO2: 80
FLOW: 15
FRACTIONAL SHORTENING: 15 % (ref 28–44)
GLUCOSE SERPL-MCNC: 192 MG/DL (ref 70–110)
GLUCOSE SERPL-MCNC: 199 MG/DL (ref 70–110)
GLUCOSE SERPL-MCNC: 208 MG/DL (ref 70–110)
GLUCOSE SERPL-MCNC: 220 MG/DL (ref 70–110)
GLUCOSE SERPL-MCNC: 226 MG/DL (ref 70–110)
HCO3 UR-SCNC: 15 MMOL/L (ref 24–28)
HCO3 UR-SCNC: 18.7 MMOL/L (ref 24–28)
HCO3 UR-SCNC: 20.5 MMOL/L (ref 24–28)
HCO3 UR-SCNC: 20.6 MMOL/L (ref 24–28)
HCO3 UR-SCNC: 21.9 MMOL/L (ref 24–28)
HCT VFR BLD AUTO: 43.9 % (ref 40–54)
HCT VFR BLD AUTO: 44.2 % (ref 40–54)
HCT VFR BLD AUTO: 47.9 % (ref 40–54)
HCT VFR BLD CALC: 43 %PCV (ref 36–54)
HCT VFR BLD CALC: 48 %PCV (ref 36–54)
HCT VFR BLD CALC: 52 %PCV (ref 36–54)
HGB BLD-MCNC: 15.4 G/DL (ref 14–18)
HGB BLD-MCNC: 15.6 G/DL (ref 14–18)
HGB BLD-MCNC: 16.3 G/DL (ref 14–18)
IMM GRANULOCYTES # BLD AUTO: 0.04 K/UL (ref 0–0.04)
IMM GRANULOCYTES # BLD AUTO: 0.06 K/UL (ref 0–0.04)
IMM GRANULOCYTES # BLD AUTO: 0.08 K/UL (ref 0–0.04)
IMM GRANULOCYTES NFR BLD AUTO: 0.4 % (ref 0–0.5)
IMM GRANULOCYTES NFR BLD AUTO: 0.4 % (ref 0–0.5)
IMM GRANULOCYTES NFR BLD AUTO: 0.6 % (ref 0–0.5)
INR PPP: 1 (ref 0.8–1.2)
INTERVENTRICULAR SEPTUM: 1.16 CM (ref 0.6–1.1)
IP: 12
LA MAJOR: 7.04 CM
LA MINOR: 6.24 CM
LA WIDTH: 3.84 CM
LACTATE SERPL-SCNC: 0.9 MMOL/L (ref 0.5–2.2)
LACTATE SERPL-SCNC: 1.6 MMOL/L (ref 0.5–2.2)
LEFT ATRIUM SIZE: 4.39 CM
LEFT ATRIUM VOLUME INDEX MOD: 22.1 ML/M2
LEFT ATRIUM VOLUME INDEX: 40 ML/M2
LEFT ATRIUM VOLUME MOD: 52.36 CM3
LEFT ATRIUM VOLUME: 94.8 CM3
LEFT INTERNAL DIMENSION IN SYSTOLE: 5.1 CM (ref 2.1–4)
LEFT VENTRICLE DIASTOLIC VOLUME INDEX: 56.63 ML/M2
LEFT VENTRICLE DIASTOLIC VOLUME: 134.21 ML
LEFT VENTRICLE MASS INDEX: 116 G/M2
LEFT VENTRICLE SYSTOLIC VOLUME INDEX: 39.3 ML/M2
LEFT VENTRICLE SYSTOLIC VOLUME: 93.08 ML
LEFT VENTRICULAR INTERNAL DIMENSION IN DIASTOLE: 6 CM (ref 3.5–6)
LEFT VENTRICULAR MASS: 274.61 G
LV LATERAL E/E' RATIO: 10.29 M/S
LV SEPTAL E/E' RATIO: 14.4 M/S
LYMPHOCYTES # BLD AUTO: 1.9 K/UL (ref 1–4.8)
LYMPHOCYTES # BLD AUTO: 2.4 K/UL (ref 1–4.8)
LYMPHOCYTES # BLD AUTO: 3.4 K/UL (ref 1–4.8)
LYMPHOCYTES NFR BLD: 14 % (ref 18–48)
LYMPHOCYTES NFR BLD: 21.5 % (ref 18–48)
LYMPHOCYTES NFR BLD: 24.1 % (ref 18–48)
MAGNESIUM SERPL-MCNC: 1.6 MG/DL (ref 1.6–2.6)
MAGNESIUM SERPL-MCNC: 1.8 MG/DL (ref 1.6–2.6)
MCH RBC QN AUTO: 31.1 PG (ref 27–31)
MCH RBC QN AUTO: 31.3 PG (ref 27–31)
MCH RBC QN AUTO: 32.2 PG (ref 27–31)
MCHC RBC AUTO-ENTMCNC: 34 G/DL (ref 32–36)
MCHC RBC AUTO-ENTMCNC: 34.8 G/DL (ref 32–36)
MCHC RBC AUTO-ENTMCNC: 35.5 G/DL (ref 32–36)
MCV RBC AUTO: 88 FL (ref 82–98)
MCV RBC AUTO: 92 FL (ref 82–98)
MCV RBC AUTO: 92 FL (ref 82–98)
MIN VOL: 10.5
MIN VOL: 12
MIN VOL: 18
MODE: ABNORMAL
MONOCYTES # BLD AUTO: 0.7 K/UL (ref 0.3–1)
MONOCYTES # BLD AUTO: 0.9 K/UL (ref 0.3–1)
MONOCYTES # BLD AUTO: 1 K/UL (ref 0.3–1)
MONOCYTES NFR BLD: 6.1 % (ref 4–15)
MONOCYTES NFR BLD: 6.3 % (ref 4–15)
MONOCYTES NFR BLD: 7.2 % (ref 4–15)
MV PEAK A VEL: 0.51 M/S
MV PEAK E VEL: 0.72 M/S
MV STENOSIS PRESSURE HALF TIME: 61.65 MS
MV VALVE AREA P 1/2 METHOD: 3.57 CM2
NEUTROPHILS # BLD AUTO: 10.2 K/UL (ref 1.8–7.7)
NEUTROPHILS # BLD AUTO: 7.9 K/UL (ref 1.8–7.7)
NEUTROPHILS # BLD AUTO: 9.5 K/UL (ref 1.8–7.7)
NEUTROPHILS NFR BLD: 67.1 % (ref 38–73)
NEUTROPHILS NFR BLD: 70.4 % (ref 38–73)
NEUTROPHILS NFR BLD: 75.8 % (ref 38–73)
NRBC BLD-RTO: 0 /100 WBC
PCO2 BLDA: 26.1 MMHG (ref 35–45)
PCO2 BLDA: 34.2 MMHG (ref 35–45)
PCO2 BLDA: 34.5 MMHG (ref 35–45)
PCO2 BLDA: 44.2 MMHG (ref 35–45)
PCO2 BLDA: 49.4 MMHG (ref 35–45)
PEEP: 10
PEEP: 8
PEEP: 8
PH SMN: 7.23 [PH] (ref 7.35–7.45)
PH SMN: 7.3 [PH] (ref 7.35–7.45)
PH SMN: 7.34 [PH] (ref 7.35–7.45)
PH SMN: 7.37 [PH] (ref 7.35–7.45)
PH SMN: 7.39 [PH] (ref 7.35–7.45)
PHOSPHATE SERPL-MCNC: 4.8 MG/DL (ref 2.7–4.5)
PIP: 21
PIP: 29
PISA MRMAX VEL: 0.03 M/S
PISA TR MAX VEL: 2.41 M/S
PLATELET # BLD AUTO: 220 K/UL (ref 150–450)
PLATELET # BLD AUTO: 243 K/UL (ref 150–450)
PLATELET # BLD AUTO: 246 K/UL (ref 150–450)
PMV BLD AUTO: 10 FL (ref 9.2–12.9)
PMV BLD AUTO: 10.8 FL (ref 9.2–12.9)
PMV BLD AUTO: 9.8 FL (ref 9.2–12.9)
PO2 BLDA: 121 MMHG (ref 80–100)
PO2 BLDA: 295 MMHG (ref 80–100)
PO2 BLDA: 47 MMHG (ref 80–100)
PO2 BLDA: 64 MMHG (ref 80–100)
PO2 BLDA: 78 MMHG (ref 80–100)
POC BE: -10 MMOL/L
POC BE: -4 MMOL/L
POC BE: -5 MMOL/L
POC BE: -7 MMOL/L
POC BE: -7 MMOL/L
POC IONIZED CALCIUM: 1.04 MMOL/L (ref 1.06–1.42)
POC IONIZED CALCIUM: 1.17 MMOL/L (ref 1.06–1.42)
POC IONIZED CALCIUM: 1.2 MMOL/L (ref 1.06–1.42)
POC SATURATED O2: 100 % (ref 95–100)
POC SATURATED O2: 82 % (ref 95–100)
POC SATURATED O2: 87 % (ref 95–100)
POC SATURATED O2: 95 % (ref 95–100)
POC SATURATED O2: 98 % (ref 95–100)
POC TCO2: 16 MMOL/L (ref 23–27)
POC TCO2: 20 MMOL/L (ref 23–27)
POC TCO2: 22 MMOL/L (ref 23–27)
POC TCO2: 22 MMOL/L (ref 23–27)
POC TCO2: 23 MMOL/L (ref 23–27)
POCT GLUCOSE: 167 MG/DL (ref 70–110)
POCT GLUCOSE: 176 MG/DL (ref 70–110)
POCT GLUCOSE: 198 MG/DL (ref 70–110)
POCT GLUCOSE: 216 MG/DL (ref 70–110)
POTASSIUM BLD-SCNC: 3.7 MMOL/L (ref 3.5–5.1)
POTASSIUM BLD-SCNC: 4 MMOL/L (ref 3.5–5.1)
POTASSIUM BLD-SCNC: 4.4 MMOL/L (ref 3.5–5.1)
POTASSIUM SERPL-SCNC: 3.8 MMOL/L (ref 3.5–5.1)
POTASSIUM SERPL-SCNC: 4.4 MMOL/L (ref 3.5–5.1)
PROT SERPL-MCNC: 6.5 G/DL (ref 6–8.4)
PROT SERPL-MCNC: 6.7 G/DL (ref 6–8.4)
PROTHROMBIN TIME: 11.2 SEC (ref 9–12.5)
PS: 10
RA MAJOR: 5.16 CM
RA WIDTH: 4.16 CM
RBC # BLD AUTO: 4.79 M/UL (ref 4.6–6.2)
RBC # BLD AUTO: 5.01 M/UL (ref 4.6–6.2)
RBC # BLD AUTO: 5.2 M/UL (ref 4.6–6.2)
RIGHT VENTRICULAR END-DIASTOLIC DIMENSION: 3.88 CM
SAMPLE: ABNORMAL
SINUS: 3.9 CM
SITE: ABNORMAL
SODIUM BLD-SCNC: 134 MMOL/L (ref 136–145)
SODIUM BLD-SCNC: 135 MMOL/L (ref 136–145)
SODIUM BLD-SCNC: 138 MMOL/L (ref 136–145)
SODIUM SERPL-SCNC: 130 MMOL/L (ref 136–145)
SODIUM SERPL-SCNC: 133 MMOL/L (ref 136–145)
SP02: 100
SP02: 100
SP02: 80
SP02: 92
SP02: 93
SPONT RATE: 32
STJ: 2.86 CM
TDI LATERAL: 0.07 M/S
TDI SEPTAL: 0.05 M/S
TDI: 0.06 M/S
TR MAX PG: 23 MMHG
TRICUSPID ANNULAR PLANE SYSTOLIC EXCURSION: 1.27 CM
TROPONIN I SERPL DL<=0.01 NG/ML-MCNC: 21.98 NG/ML (ref 0–0.03)
TROPONIN I SERPL DL<=0.01 NG/ML-MCNC: 24.45 NG/ML (ref 0–0.03)
TROPONIN I SERPL DL<=0.01 NG/ML-MCNC: 25.81 NG/ML (ref 0–0.03)
VT: 450
VT: 450
VT: 500
WBC # BLD AUTO: 11.2 K/UL (ref 3.9–12.7)
WBC # BLD AUTO: 13.5 K/UL (ref 3.9–12.7)
WBC # BLD AUTO: 14.18 K/UL (ref 3.9–12.7)
Z-SCORE OF LEFT VENTRICULAR DIMENSION IN END DIASTOLE: -5.35
Z-SCORE OF LEFT VENTRICULAR DIMENSION IN END SYSTOLE: -1.48

## 2023-12-26 PROCEDURE — 80053 COMPREHEN METABOLIC PANEL: CPT | Performed by: STUDENT IN AN ORGANIZED HEALTH CARE EDUCATION/TRAINING PROGRAM

## 2023-12-26 PROCEDURE — 82330 ASSAY OF CALCIUM: CPT

## 2023-12-26 PROCEDURE — 84295 ASSAY OF SERUM SODIUM: CPT

## 2023-12-26 PROCEDURE — 82803 BLOOD GASES ANY COMBINATION: CPT

## 2023-12-26 PROCEDURE — 93010 ELECTROCARDIOGRAM REPORT: CPT | Mod: ,,, | Performed by: GENERAL PRACTICE

## 2023-12-26 PROCEDURE — 0BH17EZ INSERTION OF ENDOTRACHEAL AIRWAY INTO TRACHEA, VIA NATURAL OR ARTIFICIAL OPENING: ICD-10-PCS | Performed by: INTERNAL MEDICINE

## 2023-12-26 PROCEDURE — 25000003 PHARM REV CODE 250: Performed by: INTERNAL MEDICINE

## 2023-12-26 PROCEDURE — 99223 1ST HOSP IP/OBS HIGH 75: CPT | Mod: ,,, | Performed by: STUDENT IN AN ORGANIZED HEALTH CARE EDUCATION/TRAINING PROGRAM

## 2023-12-26 PROCEDURE — 80053 COMPREHEN METABOLIC PANEL: CPT | Mod: 91 | Performed by: INTERNAL MEDICINE

## 2023-12-26 PROCEDURE — 36415 COLL VENOUS BLD VENIPUNCTURE: CPT | Performed by: STUDENT IN AN ORGANIZED HEALTH CARE EDUCATION/TRAINING PROGRAM

## 2023-12-26 PROCEDURE — 99900035 HC TECH TIME PER 15 MIN (STAT)

## 2023-12-26 PROCEDURE — 63600175 PHARM REV CODE 636 W HCPCS

## 2023-12-26 PROCEDURE — 63600175 PHARM REV CODE 636 W HCPCS: Performed by: STUDENT IN AN ORGANIZED HEALTH CARE EDUCATION/TRAINING PROGRAM

## 2023-12-26 PROCEDURE — 5A1945Z RESPIRATORY VENTILATION, 24-96 CONSECUTIVE HOURS: ICD-10-PCS | Performed by: INTERNAL MEDICINE

## 2023-12-26 PROCEDURE — 85730 THROMBOPLASTIN TIME PARTIAL: CPT | Mod: 91

## 2023-12-26 PROCEDURE — 27100171 HC OXYGEN HIGH FLOW UP TO 24 HOURS

## 2023-12-26 PROCEDURE — 27100094 HC IABP, SET-UP

## 2023-12-26 PROCEDURE — 27200966 HC CLOSED SUCTION SYSTEM

## 2023-12-26 PROCEDURE — 93010 ELECTROCARDIOGRAM REPORT: CPT | Mod: ,,, | Performed by: INTERNAL MEDICINE

## 2023-12-26 PROCEDURE — 36620 INSERTION CATHETER ARTERY: CPT

## 2023-12-26 PROCEDURE — 94002 VENT MGMT INPAT INIT DAY: CPT

## 2023-12-26 PROCEDURE — 93005 ELECTROCARDIOGRAM TRACING: CPT | Performed by: GENERAL PRACTICE

## 2023-12-26 PROCEDURE — 94761 N-INVAS EAR/PLS OXIMETRY MLT: CPT

## 2023-12-26 PROCEDURE — 85014 HEMATOCRIT: CPT

## 2023-12-26 PROCEDURE — 27000202 HC IABP, ADD'L DAY

## 2023-12-26 PROCEDURE — 99900031 HC PATIENT EDUCATION (STAT)

## 2023-12-26 PROCEDURE — 84100 ASSAY OF PHOSPHORUS: CPT | Performed by: STUDENT IN AN ORGANIZED HEALTH CARE EDUCATION/TRAINING PROGRAM

## 2023-12-26 PROCEDURE — 20000000 HC ICU ROOM

## 2023-12-26 PROCEDURE — C9113 INJ PANTOPRAZOLE SODIUM, VIA: HCPCS | Performed by: STUDENT IN AN ORGANIZED HEALTH CARE EDUCATION/TRAINING PROGRAM

## 2023-12-26 PROCEDURE — 99900026 HC AIRWAY MAINTENANCE (STAT)

## 2023-12-26 PROCEDURE — 31500 INSERT EMERGENCY AIRWAY: CPT | Mod: ,,, | Performed by: ANESTHESIOLOGY

## 2023-12-26 PROCEDURE — 63600175 PHARM REV CODE 636 W HCPCS: Performed by: INTERNAL MEDICINE

## 2023-12-26 PROCEDURE — 99291 CRITICAL CARE FIRST HOUR: CPT | Mod: ,,, | Performed by: INTERNAL MEDICINE

## 2023-12-26 PROCEDURE — 85610 PROTHROMBIN TIME: CPT

## 2023-12-26 PROCEDURE — 51702 INSERT TEMP BLADDER CATH: CPT

## 2023-12-26 PROCEDURE — 31500 PR INSERT, EMERGENCY ENDOTRACH AIRWAY: ICD-10-PCS | Mod: ,,, | Performed by: ANESTHESIOLOGY

## 2023-12-26 PROCEDURE — 85025 COMPLETE CBC W/AUTO DIFF WBC: CPT | Mod: 91 | Performed by: INTERNAL MEDICINE

## 2023-12-26 PROCEDURE — 85730 THROMBOPLASTIN TIME PARTIAL: CPT | Performed by: STUDENT IN AN ORGANIZED HEALTH CARE EDUCATION/TRAINING PROGRAM

## 2023-12-26 PROCEDURE — 85025 COMPLETE CBC W/AUTO DIFF WBC: CPT | Mod: 91

## 2023-12-26 PROCEDURE — 02HV33Z INSERTION OF INFUSION DEVICE INTO SUPERIOR VENA CAVA, PERCUTANEOUS APPROACH: ICD-10-PCS | Performed by: STUDENT IN AN ORGANIZED HEALTH CARE EDUCATION/TRAINING PROGRAM

## 2023-12-26 PROCEDURE — 94799 UNLISTED PULMONARY SVC/PX: CPT

## 2023-12-26 PROCEDURE — 83735 ASSAY OF MAGNESIUM: CPT | Mod: 91 | Performed by: INTERNAL MEDICINE

## 2023-12-26 PROCEDURE — 25000003 PHARM REV CODE 250

## 2023-12-26 PROCEDURE — 84132 ASSAY OF SERUM POTASSIUM: CPT

## 2023-12-26 PROCEDURE — 25500020 PHARM REV CODE 255: Performed by: INTERNAL MEDICINE

## 2023-12-26 PROCEDURE — 84484 ASSAY OF TROPONIN QUANT: CPT | Mod: 91 | Performed by: INTERNAL MEDICINE

## 2023-12-26 PROCEDURE — 83605 ASSAY OF LACTIC ACID: CPT | Mod: 91

## 2023-12-26 PROCEDURE — 83735 ASSAY OF MAGNESIUM: CPT | Performed by: STUDENT IN AN ORGANIZED HEALTH CARE EDUCATION/TRAINING PROGRAM

## 2023-12-26 PROCEDURE — C9113 INJ PANTOPRAZOLE SODIUM, VIA: HCPCS | Performed by: INTERNAL MEDICINE

## 2023-12-26 PROCEDURE — 84484 ASSAY OF TROPONIN QUANT: CPT | Mod: 91

## 2023-12-26 PROCEDURE — 83605 ASSAY OF LACTIC ACID: CPT | Performed by: STUDENT IN AN ORGANIZED HEALTH CARE EDUCATION/TRAINING PROGRAM

## 2023-12-26 PROCEDURE — 93005 ELECTROCARDIOGRAM TRACING: CPT

## 2023-12-26 PROCEDURE — 25000003 PHARM REV CODE 250: Performed by: STUDENT IN AN ORGANIZED HEALTH CARE EDUCATION/TRAINING PROGRAM

## 2023-12-26 PROCEDURE — 37799 UNLISTED PX VASCULAR SURGERY: CPT

## 2023-12-26 PROCEDURE — 27200667 HC PACEMAKER, TEMPORARY MONITORING, PER SHIFT

## 2023-12-26 PROCEDURE — 85025 COMPLETE CBC W/AUTO DIFF WBC: CPT | Performed by: STUDENT IN AN ORGANIZED HEALTH CARE EDUCATION/TRAINING PROGRAM

## 2023-12-26 RX ORDER — INSULIN ASPART 100 [IU]/ML
0-10 INJECTION, SOLUTION INTRAVENOUS; SUBCUTANEOUS EVERY 6 HOURS PRN
Status: DISCONTINUED | OUTPATIENT
Start: 2023-12-26 | End: 2023-12-28

## 2023-12-26 RX ORDER — GUAIFENESIN 100 MG/5ML
81 LIQUID (ML) ORAL DAILY
Status: DISCONTINUED | OUTPATIENT
Start: 2023-12-26 | End: 2023-12-29

## 2023-12-26 RX ORDER — ASPIRIN 81 MG/1
81 TABLET ORAL DAILY
Status: DISCONTINUED | OUTPATIENT
Start: 2023-12-26 | End: 2023-12-26

## 2023-12-26 RX ORDER — FUROSEMIDE 10 MG/ML
60 INJECTION INTRAMUSCULAR; INTRAVENOUS ONCE
Status: COMPLETED | OUTPATIENT
Start: 2023-12-26 | End: 2023-12-26

## 2023-12-26 RX ORDER — FUROSEMIDE 10 MG/ML
80 INJECTION INTRAMUSCULAR; INTRAVENOUS ONCE
Status: COMPLETED | OUTPATIENT
Start: 2023-12-26 | End: 2023-12-26

## 2023-12-26 RX ORDER — PROPOFOL 10 MG/ML
INJECTION, EMULSION INTRAVENOUS
Status: COMPLETED
Start: 2023-12-26 | End: 2023-12-26

## 2023-12-26 RX ORDER — PROPOFOL 10 MG/ML
0-50 INJECTION, EMULSION INTRAVENOUS CONTINUOUS
Status: DISCONTINUED | OUTPATIENT
Start: 2023-12-26 | End: 2023-12-26

## 2023-12-26 RX ORDER — SUCCINYLCHOLINE CHLORIDE 20 MG/ML INJECTION SOLUTION
SOLUTION
Status: DISCONTINUED
Start: 2023-12-26 | End: 2023-12-26 | Stop reason: HOSPADM

## 2023-12-26 RX ORDER — FENTANYL CITRATE-0.9 % NACL/PF 10 MCG/ML
0-250 PLASTIC BAG, INJECTION (ML) INTRAVENOUS CONTINUOUS
Status: DISCONTINUED | OUTPATIENT
Start: 2023-12-26 | End: 2023-12-28

## 2023-12-26 RX ORDER — HEPARIN SODIUM 1000 [USP'U]/ML
5000 INJECTION, SOLUTION INTRAVENOUS; SUBCUTANEOUS 3 TIMES DAILY
Status: DISCONTINUED | OUTPATIENT
Start: 2023-12-26 | End: 2023-12-26

## 2023-12-26 RX ORDER — PANTOPRAZOLE SODIUM 40 MG/10ML
40 INJECTION, POWDER, LYOPHILIZED, FOR SOLUTION INTRAVENOUS 2 TIMES DAILY
Status: DISCONTINUED | OUTPATIENT
Start: 2023-12-26 | End: 2023-12-29

## 2023-12-26 RX ORDER — SODIUM CHLORIDE 9 MG/ML
INJECTION, SOLUTION INTRAVENOUS
Status: DISCONTINUED | OUTPATIENT
Start: 2023-12-26 | End: 2024-01-04 | Stop reason: HOSPADM

## 2023-12-26 RX ORDER — CHLORHEXIDINE GLUCONATE ORAL RINSE 1.2 MG/ML
15 SOLUTION DENTAL 2 TIMES DAILY
Status: DISCONTINUED | OUTPATIENT
Start: 2023-12-26 | End: 2023-12-26 | Stop reason: HOSPADM

## 2023-12-26 RX ORDER — HEPARIN SODIUM 5000 [USP'U]/ML
5000 INJECTION, SOLUTION INTRAVENOUS; SUBCUTANEOUS EVERY 8 HOURS
Status: DISCONTINUED | OUTPATIENT
Start: 2023-12-26 | End: 2023-12-26

## 2023-12-26 RX ORDER — HEPARIN SODIUM,PORCINE/D5W 25000/250
0-40 INTRAVENOUS SOLUTION INTRAVENOUS CONTINUOUS
Status: DISCONTINUED | OUTPATIENT
Start: 2023-12-26 | End: 2023-12-26

## 2023-12-26 RX ORDER — PROPOFOL 10 MG/ML
0-50 INJECTION, EMULSION INTRAVENOUS CONTINUOUS
Status: DISCONTINUED | OUTPATIENT
Start: 2023-12-26 | End: 2023-12-27

## 2023-12-26 RX ORDER — NOREPINEPHRINE BITARTRATE/D5W 4MG/250ML
0-3 PLASTIC BAG, INJECTION (ML) INTRAVENOUS CONTINUOUS
Status: DISCONTINUED | OUTPATIENT
Start: 2023-12-26 | End: 2023-12-29

## 2023-12-26 RX ORDER — PANTOPRAZOLE SODIUM 40 MG/10ML
80 INJECTION, POWDER, LYOPHILIZED, FOR SOLUTION INTRAVENOUS ONCE
Status: COMPLETED | OUTPATIENT
Start: 2023-12-26 | End: 2023-12-26

## 2023-12-26 RX ORDER — SODIUM CHLORIDE 9 MG/ML
INJECTION, SOLUTION INTRAVENOUS CONTINUOUS
Status: DISCONTINUED | OUTPATIENT
Start: 2023-12-26 | End: 2024-01-04 | Stop reason: HOSPADM

## 2023-12-26 RX ORDER — NOREPINEPHRINE BITARTRATE/D5W 4MG/250ML
0-3 PLASTIC BAG, INJECTION (ML) INTRAVENOUS CONTINUOUS
Status: DISCONTINUED | OUTPATIENT
Start: 2023-12-26 | End: 2023-12-26

## 2023-12-26 RX ORDER — NOREPINEPHRINE BITARTRATE/D5W 4MG/250ML
PLASTIC BAG, INJECTION (ML) INTRAVENOUS
Status: COMPLETED
Start: 2023-12-26 | End: 2023-12-26

## 2023-12-26 RX ORDER — NOREPINEPHRINE BITARTRATE/D5W 4MG/250ML
PLASTIC BAG, INJECTION (ML) INTRAVENOUS
Status: DISPENSED
Start: 2023-12-26 | End: 2023-12-26

## 2023-12-26 RX ORDER — ROCURONIUM BROMIDE 10 MG/ML
INJECTION, SOLUTION INTRAVENOUS
Status: DISCONTINUED
Start: 2023-12-26 | End: 2023-12-26 | Stop reason: HOSPADM

## 2023-12-26 RX ORDER — ETOMIDATE 2 MG/ML
INJECTION INTRAVENOUS
Status: DISCONTINUED
Start: 2023-12-26 | End: 2023-12-26 | Stop reason: HOSPADM

## 2023-12-26 RX ORDER — MUPIROCIN 20 MG/G
OINTMENT TOPICAL 2 TIMES DAILY
Status: DISCONTINUED | OUTPATIENT
Start: 2023-12-26 | End: 2023-12-26 | Stop reason: HOSPADM

## 2023-12-26 RX ORDER — PANTOPRAZOLE SODIUM 40 MG/10ML
80 INJECTION, POWDER, LYOPHILIZED, FOR SOLUTION INTRAVENOUS DAILY
Status: DISCONTINUED | OUTPATIENT
Start: 2023-12-26 | End: 2023-12-26

## 2023-12-26 RX ORDER — GLUCAGON 1 MG
1 KIT INJECTION
Status: DISCONTINUED | OUTPATIENT
Start: 2023-12-26 | End: 2023-12-28

## 2023-12-26 RX ORDER — ATORVASTATIN CALCIUM 40 MG/1
40 TABLET, FILM COATED ORAL DAILY
Status: DISCONTINUED | OUTPATIENT
Start: 2023-12-26 | End: 2024-01-04 | Stop reason: HOSPADM

## 2023-12-26 RX ORDER — FUROSEMIDE 10 MG/ML
80 INJECTION INTRAMUSCULAR; INTRAVENOUS ONCE
Status: CANCELLED | OUTPATIENT
Start: 2023-12-26

## 2023-12-26 RX ORDER — HEPARIN SODIUM 5000 [USP'U]/ML
5000 INJECTION, SOLUTION INTRAVENOUS; SUBCUTANEOUS EVERY 8 HOURS
Status: DISCONTINUED | OUTPATIENT
Start: 2023-12-26 | End: 2023-12-27

## 2023-12-26 RX ORDER — SODIUM CHLORIDE 0.9 % (FLUSH) 0.9 %
10 SYRINGE (ML) INJECTION
Status: DISCONTINUED | OUTPATIENT
Start: 2023-12-26 | End: 2024-01-04 | Stop reason: HOSPADM

## 2023-12-26 RX ADMIN — SODIUM CHLORIDE: 9 INJECTION, SOLUTION INTRAVENOUS at 11:12

## 2023-12-26 RX ADMIN — PROPOFOL 45 MCG/KG/MIN: 10 INJECTION, EMULSION INTRAVENOUS at 10:12

## 2023-12-26 RX ADMIN — ASPIRIN 81 MG CHEWABLE TABLET 81 MG: 81 TABLET CHEWABLE at 10:12

## 2023-12-26 RX ADMIN — HEPARIN SODIUM 12 UNITS/KG/HR: 10000 INJECTION, SOLUTION INTRAVENOUS at 12:12

## 2023-12-26 RX ADMIN — HUMAN ALBUMIN MICROSPHERES AND PERFLUTREN 0.11 MG: 10; .22 INJECTION, SOLUTION INTRAVENOUS at 01:12

## 2023-12-26 RX ADMIN — TICAGRELOR 90 MG: 90 TABLET ORAL at 09:12

## 2023-12-26 RX ADMIN — SODIUM CHLORIDE: 0.9 INJECTION, SOLUTION INTRAVENOUS at 12:12

## 2023-12-26 RX ADMIN — PANTOPRAZOLE SODIUM 80 MG: 40 INJECTION, POWDER, FOR SOLUTION INTRAVENOUS at 09:12

## 2023-12-26 RX ADMIN — PROPOFOL 50 MCG/KG/MIN: 10 INJECTION, EMULSION INTRAVENOUS at 10:12

## 2023-12-26 RX ADMIN — ATORVASTATIN CALCIUM 40 MG: 40 TABLET, FILM COATED ORAL at 09:12

## 2023-12-26 RX ADMIN — PROPOFOL INJECTABLE EMULSION 200 MG: 10 INJECTION, EMULSION INTRAVENOUS at 03:12

## 2023-12-26 RX ADMIN — FUROSEMIDE 20 MG/HR: 10 INJECTION, SOLUTION INTRAMUSCULAR; INTRAVENOUS at 09:12

## 2023-12-26 RX ADMIN — NOREPINEPHRINE BITARTRATE 0.05 MCG/KG/MIN: 4 INJECTION, SOLUTION INTRAVENOUS at 05:12

## 2023-12-26 RX ADMIN — INSULIN ASPART 2 UNITS: 100 INJECTION, SOLUTION INTRAVENOUS; SUBCUTANEOUS at 06:12

## 2023-12-26 RX ADMIN — NOREPINEPHRINE BITARTRATE 4 MG: 4 INJECTION, SOLUTION INTRAVENOUS at 03:12

## 2023-12-26 RX ADMIN — PROPOFOL 50 MCG/KG/MIN: 10 INJECTION, EMULSION INTRAVENOUS at 05:12

## 2023-12-26 RX ADMIN — HEPARIN SODIUM 5000 UNITS: 5000 INJECTION INTRAVENOUS; SUBCUTANEOUS at 02:12

## 2023-12-26 RX ADMIN — AZITHROMYCIN MONOHYDRATE 500 MG: 500 INJECTION, POWDER, LYOPHILIZED, FOR SOLUTION INTRAVENOUS at 12:12

## 2023-12-26 RX ADMIN — HEPARIN SODIUM 5000 UNITS: 5000 INJECTION INTRAVENOUS; SUBCUTANEOUS at 09:12

## 2023-12-26 RX ADMIN — FUROSEMIDE 80 MG: 10 INJECTION, SOLUTION INTRAVENOUS at 08:12

## 2023-12-26 RX ADMIN — PROPOFOL 50 MCG/KG/MIN: 10 INJECTION, EMULSION INTRAVENOUS at 07:12

## 2023-12-26 RX ADMIN — INSULIN ASPART 2 UNITS: 100 INJECTION, SOLUTION INTRAVENOUS; SUBCUTANEOUS at 12:12

## 2023-12-26 RX ADMIN — SODIUM CHLORIDE: 9 INJECTION, SOLUTION INTRAVENOUS at 07:12

## 2023-12-26 RX ADMIN — PANTOPRAZOLE SODIUM 40 MG: 40 INJECTION, POWDER, FOR SOLUTION INTRAVENOUS at 09:12

## 2023-12-26 RX ADMIN — CEFTRIAXONE 1 G: 1 INJECTION, POWDER, FOR SOLUTION INTRAMUSCULAR; INTRAVENOUS at 11:12

## 2023-12-26 RX ADMIN — FUROSEMIDE 20 MG/HR: 10 INJECTION, SOLUTION INTRAMUSCULAR; INTRAVENOUS at 07:12

## 2023-12-26 RX ADMIN — INSULIN DETEMIR 15 UNITS: 100 INJECTION, SOLUTION SUBCUTANEOUS at 09:12

## 2023-12-26 RX ADMIN — PROPOFOL 50 MCG/KG/MIN: 10 INJECTION, EMULSION INTRAVENOUS at 02:12

## 2023-12-26 RX ADMIN — Medication 50 MCG/HR: at 08:12

## 2023-12-26 RX ADMIN — FUROSEMIDE 60 MG: 10 INJECTION, SOLUTION INTRAMUSCULAR; INTRAVENOUS at 01:12

## 2023-12-26 RX ADMIN — INSULIN ASPART 2 UNITS: 100 INJECTION, SOLUTION INTRAVENOUS; SUBCUTANEOUS at 08:12

## 2023-12-26 NOTE — PROCEDURES
PROCEDURE SUMMARY - Central Venous Catheter Placement     PROCEDURE:  Right-sided introducer catheter with TVP catheter placement under ultrasound guidance    CONSENT:  The risks and benefits of this procedure were explained to the patient's son. All questions were answered and alternative options explained. Verbal consent was obtained over the telephone with 2 nurse witnesses.    STAFF PHYSICIAN:  Dr. Jarvis    ASSISTING PHYSICIAN:  Charli Pereira    MEDICATIONS USED:  Lidocaine 1% without epinephrine, total quantity 10 mL.    PREOPERATIVE DIAGNOSIS(ES):  Complete heart block    POSTPROCEDURE DIAGNOSIS(ES):  Complete heart block    DESCRIPTION OF PROCEDURE:  Consent was obtained and placed upon the chart. Patient was positively identified and procedure site was marked. Time-out was performed by operating team and patient. Vital sign monitoring was accomplished by noninvasive hemodynamic monitoring, pulse oximetry, and telemetry.     Time-out was performed and consent was verified. The patient was placed in the supine position and the Right neck was prepped and draped in sterile fashion. The internal jugular vein was localized with the ultrasound and the skin was anesthetized with 1% lidocaine without epinephrine. A micropuncture needle was placed into the interal jugular vein with return of dark red non-pulsatile blood on the 1st attempt. A micropuncture wire was then advanced. A micropuncture dilator sheath was placed over the wire and then the wire was removed. A J-wire was advanced through the micropuncture sheath without difficulty. A small incision was made followed by dilation and a cordis catheter insertion. The catheter was sutured in place with 3-0 silk sutures x1. 1 port was withdrawn and flushed without difficulty. TVP was inserted through the cordis and position was confirmed using bedside ultrasound and XR. Capture threshold was 0.8. Rate initially kept at 90. A sterile dressing was applied and  procedure was terminated.    Charli Pereira  12/26/2023  7:48 AM

## 2023-12-26 NOTE — PLAN OF CARE
12/26/23 1000   Readmission   Why were you hospitalized in the last 30 days? chest pain /SOB   Why were you readmitted? Alarmed about signs/symptoms   When you left the hospital how did you feel? Okay as stated per son (patient intubated) but not up to par.   When you left the hospital where did you go? Home with Family   Did patient/caregiver refused recommended DC plan? No   Tell me about what happened between when you left the hospital and the day you returned. on saturday he started to feel bad, day before last he took 3 nitro's and prior to admission he took 5 Nitro's and decided to come in , so , i brought him to the hospital.   When did you start not feeling well? Saturday   Did you try to manage your symptoms your self? Yes   What did you do? see above   Did you call anyone? No   Why? decided to come to hospital   Did you try to see or did see a doctor or nurse before you came? No   Why? see above.   Did you have  a follow-up appointment on discharge? Yes   Did you go? No   Why?   (not due yet, came into hospital (per son))   Was this a planned readmission? No     See DP admission assessment .  Patient intubated and all answers from above obtained from Magdaleno Chi (son) [phone] .    Antoni Jo RN    [phone]

## 2023-12-26 NOTE — ASSESSMENT & PLAN NOTE
Known paroxysmal afib, on eliquis and toprol xl at home  AC on hold currently per primary team due to hemetemesis on 12/26, toprol xl on hold since on pressors  Recommend resuming anticoag once able

## 2023-12-26 NOTE — HPI
57 y/o M with known CAD, CABG x 2 in 2020 (LIMA-LAD, SVG-OM) , PCI to Lcx in 2021, recent PCI to proximal and mid Lcx by Dr. Botello on 12/22/23 for unstable angina who had inferior STEMI on 12/25 for which PCI showed stent thrombosis but it was was unable to be revascularized. Also has h/o COPD, atrial fibrillation on eliquis, diabetes with a1c 8.3, current smoker (reportedly 3PPD), hypertension who presented to [facility] facility with chest pain x 2 days. He was found to have inferior STEMI and new aflutter and was taken to the cath lab directly and was found to have in-stent thrombosis. No revascularization was achieved unfortunately and a IABP was placed in the left femoral artery. During the procedure he had intermittent complete heart block so a left femoral TVP was also placed which was switched to RIJ TVP upon arrival on 12/26. He was requiring BiPAP post-operatively. It was decided to transfer to higher level of care and he was intubated to allow safe transportation.    Currently he is intubated, on levo gtt, on antibiotics for pneumonia and is using IABP 1:1. Also has TVP being used 100% of the time currently. EP was consulted for PPM recs.

## 2023-12-26 NOTE — H&P
[facility] - Cardiac Intensive Care  Cardiology  History and Physical     Patient Name: Magdaleno Cunningham  MRN: [id number]  Admission Date: 12/26/2023  Code Status: Full Code   Attending Provider: Pretty Jarvis MD   Primary Care Physician: Venkata Mclaughlin MD  Principal Problem:Cardiogenic shock    Patient information was obtained from relative(s), past medical records, and ER records.     Subjective:     Chief Complaint:  cardiogenic shock     HPI:  59 y/o M with known  CAD s/p CABG and recent PCI to proximal and mid Lcx by Dr. Botello on 12/22/23, COPD, atrial fibrillation on eliquis, diabetes, current smoker (reportedly 3PPD), hypertension who presented to outlying facility with chest pain which has been present for 2 days. The pain radiated to the left shoulder, was associated with diaphoresis and became severe today prompting ER visit. He was found to have ST elevations and was taken to the cath lab directly and was found to have in-stent thrombosis. Of note he was also in atrial flutter. Procedure note not available but per report no intervention was performed and a IABP was placed in the left femoral artery. During the procedure he had intermittent complete heart block so a left femoral TVP was also placed. He was requiring BiPAP post-operatively. It was decided to transfer to higher level of care and he was intubated to allow safe transportation. He arrived on levophed 0.08 and propofol with IABP 1:1.    Past Medical History:   Diagnosis Date    Anticoagulant long-term use     Atrial fibrillation 2018    BPH (benign prostatic hyperplasia)     Cataract     COPD (chronic obstructive pulmonary disease)     Coronary artery disease     stents    CVD (cardiovascular disease)     Diabetes mellitus     Erythema multiforme     AKA Denton Edmondson Syndrome    Hypertension     Infected incision     MI (myocardial infarction)     per pt he has had 4     ROSAMARIA on CPAP     RLS (restless legs syndrome)      Perez-Denton syndrome        Past Surgical History:   Procedure Laterality Date    CORONARY ANGIOGRAPHY INCLUDING BYPASS GRAFTS WITH CATHETERIZATION OF LEFT HEART N/A 9/1/2022    Procedure: ANGIOGRAM, CORONARY, INCLUDING BYPASS GRAFT, WITH LEFT HEART CATHETERIZATION;  Surgeon: Paul Botello MD;  Location: [facility] CATH/EP LAB;  Service: Cardiology;  Laterality: N/A;    CORONARY ARTERY BYPASS GRAFT (CABG) N/A 4/7/2020    Procedure: CORONARY ARTERY BYPASS GRAFT (CABG)- Excision of left atrial appendage;  Surgeon: Doug Solitario MD;  Location: [facility] OR;  Service: Cardiothoracic;  Laterality: N/A;    CORONARY STENT PLACEMENT      WILSON MAZE PROCEDURE N/A 4/7/2020    Procedure: WILSON MAZE PROCEDURE- Attempted;  Surgeon: Doug Solitario MD;  Location: [facility] OR;  Service: Cardiothoracic;  Laterality: N/A;    CYSTOSCOPY N/A 12/10/2018    Procedure: CYSTOSCOPY;  Surgeon: Khushboo Abreu MD;  Location: [facility];  Service: Urology;  Laterality: N/A;    ENDOSCOPIC HARVEST OF VEIN Left 4/7/2020    Procedure: HARVEST-VEIN-ENDOVASCULAR;  Surgeon: Doug Solitario MD;  Location: [facility] OR;  Service: Cardiothoracic;  Laterality: Left;    INCISION OF PERIRECTAL ABSCESS Bilateral 9/1/2023    Procedure: INCISION, ABSCESS, PERIRECTAL;  Surgeon: Brayan Spears MD;  Location: [facility] OR;  Service: General;  Laterality: Bilateral;  stirrups    LEFT HEART CATHETERIZATION Left 3/17/2020    Procedure: CATHETERIZATION, HEART, LEFT;  Surgeon: Tyree Walters MD;  Location: [facility] CATH/EP LAB;  Service: Cardiology;  Laterality: Left;    STERNAL WIRES REMOVAL N/A 6/8/2020    Procedure: REMOVAL, STERNAL WIRE;  Surgeon: Doug Solitario MD;  Location: [facility] OR;  Service: Peripheral Vascular;  Laterality: N/A;    ULTRASOUND OF PROSTATE FOR VOLUME DETERMINATION  12/10/2018    Procedure: ULTRASOUND, PROSTATE, FOR VOLUME DETERMINATION;  Surgeon: Khushboo Abreu MD;  Location: [facility] OR;  Service: Urology;;       Review of  patient's allergies indicates:   Allergen Reactions    Pesticide Dermatitis and Rash       Current Facility-Administered Medications on File Prior to Encounter   Medication    [COMPLETED] 0.9%  NaCl infusion    [COMPLETED] aspirin chewable tablet 324 mg    [COMPLETED] furosemide injection 60 mg    [COMPLETED] morphine injection 2 mg    [COMPLETED] morphine injection 4 mg    [COMPLETED] NORepinephrine bitartrate-D5W 4 mg/250 mL (16 mcg/mL) infusion Soln    [COMPLETED] ondansetron injection 4 mg    [COMPLETED] propofoL (DIPRIVAN) 10 mg/mL infusion    [DISCONTINUED] 0.9%  NaCl infusion    [DISCONTINUED] aspirin chewable tablet 81 mg    [DISCONTINUED] atorvastatin tablet 80 mg    [DISCONTINUED] atropine injection    [DISCONTINUED] chlorhexidine 0.12 % solution 15 mL    [DISCONTINUED] etomidate (AMIDATE) 2 mg/mL injection    [DISCONTINUED] fentaNYL 50 mcg/mL injection    [DISCONTINUED] heparin (porcine) injection    [DISCONTINUED] heparin (porcine) injection    [DISCONTINUED] heparin (porcine) injection    [DISCONTINUED] heparin (porcine) injection    [DISCONTINUED] heparin 25,000 units in dextrose 5% (100 units/ml) IV bolus from bag - ADDITIONAL PRN BOLUS - 30 units/kg (max bolus 4000 units)    [DISCONTINUED] heparin 25,000 units in dextrose 5% (100 units/ml) IV bolus from bag - ADDITIONAL PRN BOLUS - 60 units/kg (max bolus 4000 units)    [DISCONTINUED] heparin 25,000 units in dextrose 5% 250 mL (100 units/mL) infusion (heparin infusion - NO NOMOGRAM)    [DISCONTINUED] heparin 25,000 units in dextrose 5% 250 mL (100 units/mL) infusion LOW INTENSITY nomogram - [facility]    [DISCONTINUED] LIDOcaine (PF) 10 mg/ml (1%) injection    [DISCONTINUED] midazolam (VERSED) 1 mg/mL injection    [DISCONTINUED] mupirocin 2 % ointment    [DISCONTINUED] nitroGLYCERIN in 5 % dextrose 50 mg/250 mL (200 mcg/mL) infusion    [DISCONTINUED] NORepinephrine injection    [DISCONTINUED] PHENYLephrine injection    [DISCONTINUED] rocuronium 10 mg/mL  "injection    [DISCONTINUED] succinylcholine chloride 200 mg/10 mL (20 mg/mL) IV Syringe    [DISCONTINUED] ticagrelor tablet 90 mg    [DISCONTINUED] ticagrelor tablet    [DISCONTINUED] tirofiban 12.5 mg in NS 12.5 MG/ 250 mL bolus from bag     Current Outpatient Medications on File Prior to Encounter   Medication Sig    apixaban (ELIQUIS) 5 mg Tab Take 1 tablet (5 mg total) by mouth 2 (two) times daily.    clonazePAM (KLONOPIN) 1 MG tablet Take 1 mg by mouth 2 (two) times daily.    cyclobenzaprine (FLEXERIL) 10 MG tablet Take 10 mg by mouth every evening.    gabapentin (NEURONTIN) 600 MG tablet Take 600 mg by mouth As instructed (neuropathic pain). Takes 1 tablet in the morning and 2 tablets at bedtime    isosorbide mononitrate (IMDUR) 30 MG 24 hr tablet Take 1 tablet (30 mg total) by mouth once daily.    JARDIANCE 25 mg tablet Take 25 mg by mouth once daily.    lactobacillus acidophilus & bulgar (LACTINEX) 100 million cell packet Take 1 packet (1 each total) by mouth 2 (two) times daily.    LANTUS SOLOSTAR U-100 INSULIN glargine 100 units/mL (3mL) SubQ pen Inject 80 Units into the skin 2 (two) times daily.    lisinopriL (PRINIVIL,ZESTRIL) 20 MG tablet Take 20 mg by mouth once daily.    metFORMIN (GLUCOPHAGE) 1000 MG tablet Take 1 tablet (1,000 mg total) by mouth 2 (two) times daily with meals.    methocarbamoL (ROBAXIN) 500 MG Tab Take 500 mg by mouth every evening.    metoprolol succinate (TOPROL-XL) 100 MG 24 hr tablet Take 100 mg by mouth 2 (two) times daily.    nitroGLYCERIN (NITROSTAT) 0.4 MG SL tablet Place 0.4 mg under the tongue every 5 (five) minutes as needed for Chest pain.    oxyCODONE-acetaminophen (PERCOCET)  mg per tablet Take 1 tablet by mouth 2 (two) times daily as needed for Pain.    pen needle, diabetic 31 gauge x 1/4" Ndle USE 1 TWICE DAILY FOR BLOOD SUGAR GREATER THAN 200    ranolazine (RANEXA) 1,000 mg Tb12 Take 1 tablet (1,000 mg total) by mouth 2 (two) times daily.    simvastatin " (ZOCOR) 10 MG tablet Take 10 mg by mouth once daily.    ticagrelor (BRILINTA) 90 mg tablet Take 1 tablet (90 mg total) by mouth 2 (two) times daily.     Family History       Problem Relation (Age of Onset)    Cancer Father    Diabetes Mother    Heart disease Mother    Hyperlipidemia Father          Tobacco Use    Smoking status: Every Day     Current packs/day: 0.00     Average packs/day: 3.0 packs/day for 30.0 years (90.0 ttl pk-yrs)     Types: Cigarettes     Start date: 4/4/1990     Last attempt to quit: 4/4/2020     Years since quitting: 3.7    Smokeless tobacco: Never   Substance and Sexual Activity    Alcohol use: Not Currently    Drug use: Yes     Frequency: 1.0 times per week     Types: Marijuana    Sexual activity: Not Currently     Review of Systems   Unable to perform ROS: Intubated     Objective:     Vital Signs (Most Recent):  Temp: 98.4 °F (36.9 °C) (12/26/23 0445)  Pulse: 90 (12/26/23 0742)  Resp: (!) 26 (12/26/23 0742)  BP: (!) 89/50 (12/26/23 0700)  SpO2: 98 % (12/26/23 0742) Vital Signs (24h Range):  Temp:  [97.7 °F (36.5 °C)-98.7 °F (37.1 °C)] 98.4 °F (36.9 °C)  Pulse:  [] 90  Resp:  [6-34] 26  SpO2:  [77 %-100 %] 98 %  BP: ()/(37-71) 89/50  Arterial Line BP: ()/(37-99) 49/43     Weight: 118.9 kg (262 lb 2 oz)  Body mass index is 36.56 kg/m².    SpO2: 98 %       No intake or output data in the 24 hours ending 12/26/23 0800    Lines/Drains/Airways       Central Venous Catheter Line  Duration             Introducer 12/26/23 0630 Internal Jugular Right <1 day              Drain  Duration                  Urethral Catheter 12/26/23 0745 Silicone 16 Fr. <1 day              Airway  Duration                  Airway - Non-Surgical -- days       Airway Anesthesia 12/26/23 <1 day              Line  Duration                  IABP 12/25/23 2340  40 mL <1 day         Pacer Wires 12/26/23 0030 <1 day         Pacer Wires 12/26/23 0652 <1 day         Pacer Wires 12/26/23 0702 <1 day               Peripheral Intravenous Line  Duration                  Peripheral IV - Single Lumen 12/25/23 1830 18 G Left Antecubital <1 day         Peripheral IV - Single Lumen 12/25/23 1856 18 G Anterior;Distal;Right Forearm <1 day         Sheath 12/25/23 2315 Left <1 day         Sheath 12/25/23 2316 Left anterior <1 day                     Physical Exam  Constitutional:       Appearance: He is diaphoretic.      Comments: intubated   Cardiovascular:      Comments: Paced rate. Cold extremities. No JVD.  Pulmonary:      Comments: Mechanically ventilated  Abdominal:      General: Abdomen is flat.      Palpations: Abdomen is soft.   Neurological:      Comments: sedated          Significant Labs: All pertinent lab results from the last 24 hours have been reviewed.      Assessment and Plan:     * Cardiogenic shock  2/2 STEMI/ICM  Mehanical support: IABP @ 1:1  Inotrope/pressor: levophed. Wean to maintain MAP > 60  Adequate urine output  Echo ordered  Monitor lactate  Check hemodynamics    STEMI (ST elevation myocardial infarction)  Resume brillinta and statin    Atrial fibrillation  Currently in CHB    Diabetic ulcer of left foot associated with type 2 diabetes mellitus  A1c 8.6%  Takes lantus 80 bid, metformin, jardiance  Monitor BG, adjust insulin as needed  Consider endo consult if BG labile    Complete heart block  Possibly ischemia induced vs medication induced. Hold home metoprolol.  If CHB persists he will require a permanent pacemaker  Right IJ TVP placed with threshold 0.8. Rate to be set at 70.    HLD (hyperlipidemia)  Continue statin        VTE Risk Mitigation (From admission, onward)           Ordered     heparin 25,000 units in dextrose 5% (100 units/ml) IV bolus from bag - ADDITIONAL PRN BOLUS - 60 units/kg (max bolus 4000 units)  As needed (PRN)        Question:  Heparin Infusion Adjustment (DO NOT MODIFY ANSWER)  Answer:  [email or web address]\epic\Images\Pharmacy\HeparinInfusions\heparin LOW INTENSITY nomogram for [facility]  [id number]    12/26/23 0744     heparin 25,000 units in dextrose 5% (100 units/ml) IV bolus from bag - ADDITIONAL PRN BOLUS - 30 units/kg (max bolus 4000 units)  As needed (PRN)        Question:  Heparin Infusion Adjustment (DO NOT MODIFY ANSWER)  Answer:  [email or web address]\epic\Images\Pharmacy\HeparinInfusions\heparin LOW INTENSITY nomogram for [facility] [id number]    12/26/23 0744     heparin 25,000 units in dextrose 5% 250 mL (100 units/mL) infusion LOW INTENSITY nomogram - [facility]  Continuous        Question:  Begin at (units/kg/hr)  Answer:  12    12/26/23 0744     IP VTE HIGH RISK PATIENT  Once         12/26/23 0541     Place sequential compression device  Until discontinued         12/26/23 0541                    Charli Pereira MD  Cardiology   [facility] - Cardiac Intensive Care

## 2023-12-26 NOTE — ASSESSMENT & PLAN NOTE
Possibly ischemia induced vs medication induced. Hold home metoprolol.  If CHB persists he will require a permanent pacemaker  Right IJ TVP placed with threshold 0.8. Rate to be set at 70.

## 2023-12-26 NOTE — ASSESSMENT & PLAN NOTE
New diagnosis, Found on 12/25 EKG that also showed inferior STEMI  AC on hold currently per primary team due to hemetemesis on 12/26, toprol xl on hold since on pressors  Recommend resuming anticoag once able

## 2023-12-26 NOTE — CONSULTS
[facility] - Card[facility]  Cardiac Electrophysiology  Consult Note    Admission Date: 12/26/2023  Code Status: Full Code   Attending Provider: Pretty Jarvis MD  Consulting Provider: Mehdi Nguyen MD  Principal Problem:Cardiogenic shock    Inpatient consult to Electrophysiology  Consult performed by: Mehdi Nguyen MD  Consult ordered by: Jake Graves MD        Subjective:     Chief Complaint:  CHB     HPI:   59 y/o M with known CAD, CABG x 2 in 2020 (LIMA-LAD, SVG-OM) , PCI to Lcx in 2021, recent PCI to proximal and mid Lcx by Dr. Botello on 12/22/23 for unstable angina who had inferior STEMI on 12/25 for which PCI showed stent thrombosis but it was was unable to be revascularized. Also has h/o COPD, atrial fibrillation on eliquis, diabetes with a1c 8.3, current smoker (reportedly 3PPD), [facility]. He was found to have inferior STEMI and new aflutter and was taken to the cath lab directly and was found to have in-stent thrombosis. No revascularization was achieved unfortunately and a IABP was placed in the left femoral artery. During the procedure he had intermittent complete heart block so a left femoral TVP was also placed which was switched to RIJ TVP upon arrival on 12/26. He was requiring BiPAP post-operatively. It was decided to transfer to higher level of care and he was intubated to allow safe transportation.    Currently he is intubated, on levo gtt, on antibiotics for pneumonia and is using IABP 1:1. Also has TVP being used 100% of the time currently. EP was consulted for PPM recs.    Past Medical History:   Diagnosis Date    Anticoagulant long-term use     Atrial fibrillation 2018    BPH (benign prostatic hyperplasia)     Cataract     COPD (chronic obstructive pulmonary disease)     Coronary artery disease     stents    CVD (cardiovascular disease)     Diabetes mellitus     Erythema multiforme      AKA Denton Edmondson Syndrome    Hypertension     Infected incision     MI (myocardial infarction)     per pt he has had 4     ROSAMARIA on CPAP     RLS (restless legs syndrome)     Perez-Denton syndrome        Past Surgical History:   Procedure Laterality Date    CORONARY ANGIOGRAPHY INCLUDING BYPASS GRAFTS WITH CATHETERIZATION OF LEFT HEART N/A 9/1/2022    Procedure: ANGIOGRAM, CORONARY, INCLUDING BYPASS GRAFT, WITH LEFT HEART CATHETERIZATION;  Surgeon: Paul Botello MD;  Location: [facility] CATH/EP LAB;  Service: Cardiology;  Laterality: N/A;    CORONARY ARTERY BYPASS GRAFT (CABG) N/A 4/7/2020    Procedure: CORONARY ARTERY BYPASS GRAFT (CABG)- Excision of left atrial appendage;  Surgeon: Doug Solitario MD;  [email or web address] OR;  Service: Cardiothoracic;  Laterality: N/A;    CORONARY STENT PLACEMENT      WILSON MAZE PROCEDURE N/A 4/7/2020    Procedure: WILSON MAZE PROCEDURE- Attempted;  Surgeon: Doug Solitario MD;  Location: [email or web address] OR;  Service: Cardiothoracic;  Laterality: N/A;    CYSTOSCOPY N/A 12/10/2018    Procedure: CYSTOSCOPY;  Surgeon: Khushboo Abreu MD;  Location: [facility] OR;  Service: Urology;  Laterality: N/A;    ENDOSCOPIC HARVEST OF VEIN Left 4/7/2020    Procedure: HARVEST-VEIN-ENDOVASCULAR;  Surgeon: Doug Solitario MD;  Location: [email or web address] OR;  Service: Cardiothoracic;  Laterality: Left;    INCISION OF PERIRECTAL ABSCESS Bilateral 9/1/2023    Procedure: INCISION, ABSCESS, PERIRECTAL;  Surgeon: Brayan Spears MD;  Location: [facility] OR;  Service: General;  Laterality: Bilateral;  stirrups    LEFT HEART CATHETERIZATION Left 3/17/2020    Procedure: CATHETERIZATION, HEART, LEFT;  Surgeon: Tyree Walters MD;  Location: [facility] CATH/EP LAB;  Service: Cardiology;  Laterality: Left;    STERNAL WIRES REMOVAL N/A 6/8/2020    Procedure: REMOVAL, STERNAL WIRE;  Surgeon: Doug Solitario MD;  Location: [facility] OR;  Service: Peripheral Vascular;  Laterality: N/A;    ULTRASOUND OF PROSTATE FOR VOLUME  DETERMINATION  12/10/2018    Procedure: ULTRASOUND, PROSTATE, FOR VOLUME DETERMINATION;  Surgeon: Khushboo Abreu MD;  Location: [facility] OR;  Service: Urology;;       Review of patient's allergies indicates:   Allergen Reactions    Pesticide Dermatitis and Rash       Current Facility-Administered Medications on File Prior to Encounter   Medication    [COMPLETED] 0.9%  NaCl infusion    [COMPLETED] aspirin chewable tablet 324 mg    [COMPLETED] furosemide injection 60 mg    [COMPLETED] morphine injection 2 mg    [COMPLETED] morphine injection 4 mg    [COMPLETED] NORepinephrine bitartrate-D5W 4 mg/250 mL (16 mcg/mL) infusion Soln    [COMPLETED] ondansetron injection 4 mg    [COMPLETED] propofoL (DIPRIVAN) 10 mg/mL infusion    [DISCONTINUED] 0.9%  NaCl infusion    [DISCONTINUED] aspirin chewable tablet 81 mg    [DISCONTINUED] atorvastatin tablet 80 mg    [DISCONTINUED] atropine injection    [DISCONTINUED] chlorhexidine 0.12 % solution 15 mL    [DISCONTINUED] etomidate (AMIDATE) 2 mg/mL injection    [DISCONTINUED] fentaNYL 50 mcg/mL injection    [DISCONTINUED] heparin (porcine) injection    [DISCONTINUED] heparin (porcine) injection    [DISCONTINUED] heparin (porcine) injection    [DISCONTINUED] heparin (porcine) injection    [DISCONTINUED] heparin 25,000 units in dextrose 5% (100 units/ml) IV bolus from bag - ADDITIONAL PRN BOLUS - 30 units/kg (max bolus 4000 units)    [DISCONTINUED] heparin 25,000 units in dextrose 5% (100 units/ml) IV bolus from bag - ADDITIONAL PRN BOLUS - 60 units/kg (max bolus 4000 units)    [DISCONTINUED] heparin 25,000 units in dextrose 5% 250 mL (100 units/mL) infusion (heparin infusion - NO NOMOGRAM)    [DISCONTINUED] heparin 25,000 units in dextrose 5% 250 mL (100 units/mL) infusion LOW INTENSITY nomogram - [facility]    [DISCONTINUED] LIDOcaine (PF) 10 mg/ml (1%) injection    [DISCONTINUED] midazolam (VERSED) 1 mg/mL injection    [DISCONTINUED] mupirocin 2 % ointment    [DISCONTINUED]  nitroGLYCERIN in 5 % dextrose 50 mg/250 mL (200 mcg/mL) infusion    [DISCONTINUED] NORepinephrine injection    [DISCONTINUED] PHENYLephrine injection    [DISCONTINUED] rocuronium 10 mg/mL injection    [DISCONTINUED] succinylcholine chloride 200 mg/10 mL (20 mg/mL) IV Syringe    [DISCONTINUED] ticagrelor tablet 90 mg    [DISCONTINUED] ticagrelor tablet    [DISCONTINUED] tirofiban 12.5 mg in NS 12.5 MG/ 250 mL bolus from bag     Current Outpatient Medications on File Prior to Encounter   Medication Sig    apixaban (ELIQUIS) 5 mg Tab Take 1 tablet (5 mg total) by mouth 2 (two) times daily.    clonazePAM (KLONOPIN) 1 MG tablet Take 1 mg by mouth 2 (two) times daily.    cyclobenzaprine (FLEXERIL) 10 MG tablet Take 10 mg by mouth every evening.    gabapentin (NEURONTIN) 600 MG tablet Take 600 mg by mouth As instructed (neuropathic pain). Takes 1 tablet in the morning and 2 tablets at bedtime    isosorbide mononitrate (IMDUR) 30 MG 24 hr tablet Take 1 tablet (30 mg total) by mouth once daily.    JARDIANCE 25 mg tablet Take 25 mg by mouth once daily.    lactobacillus acidophilus & bulgar (LACTINEX) 100 million cell packet Take 1 packet (1 each total) by mouth 2 (two) times daily.    LANTUS SOLOSTAR U-100 INSULIN glargine 100 units/mL (3mL) SubQ pen Inject 80 Units into the skin 2 (two) times daily.    lisinopriL (PRINIVIL,ZESTRIL) 20 MG tablet Take 20 mg by mouth once daily.    metFORMIN (GLUCOPHAGE) 1000 MG tablet Take 1 tablet (1,000 mg total) by mouth 2 (two) times daily with meals.    methocarbamoL (ROBAXIN) 500 MG Tab Take 500 mg by mouth every evening.    metoprolol succinate (TOPROL-XL) 100 MG 24 hr tablet Take 100 mg by mouth 2 (two) times daily.    nitroGLYCERIN (NITROSTAT) 0.4 MG SL tablet Place 0.4 mg under the tongue every 5 (five) minutes as needed for Chest pain.    oxyCODONE-acetaminophen (PERCOCET)  mg per tablet Take 1 tablet by mouth 2 (two) times daily as needed for Pain.    pen needle, diabetic 31  "gauge x 1/4" Ndle USE 1 TWICE DAILY FOR BLOOD SUGAR GREATER THAN 200    ranolazine (RANEXA) 1,000 mg Tb12 Take 1 tablet (1,000 mg total) by mouth 2 (two) times daily.    simvastatin (ZOCOR) 10 MG tablet Take 10 mg by mouth once daily.    ticagrelor (BRILINTA) 90 mg tablet Take 1 tablet (90 mg total) by mouth 2 (two) times daily.     Family History       Problem Relation (Age of Onset)    Cancer Father    Diabetes Mother    Heart disease Mother    Hyperlipidemia Father          Tobacco Use    Smoking status: Every Day     Current packs/day: 0.00     Average packs/day: 3.0 packs/day for 30.0 years (90.0 ttl pk-yrs)     Types: Cigarettes     Start date: 4/4/1990     Last attempt to quit: 4/4/2020     Years since quitting: 3.7    Smokeless tobacco: Never   Substance and Sexual Activity    Alcohol use: Not Currently    Drug use: Yes     Frequency: 1.0 times per week     Types: Marijuana    Sexual activity: Not Currently     Review of Systems   Unable to perform ROS: Intubated     Objective:     Vital Signs (Most Recent):  Temp: 97.9 °F (36.6 °C) (12/26/23 1507)  Pulse: 79 (12/26/23 1507)  Resp: (!) 22 (12/26/23 1507)  BP: 107/62 (12/26/23 1500)  SpO2: 100 % (12/26/23 1507) Vital Signs (24h Range):  Temp:  [95.5 °F (35.3 °C)-98.7 °F (37.1 °C)] 97.9 °F (36.6 °C)  Pulse:  [] 79  Resp:  [6-34] 22  SpO2:  [77 %-100 %] 100 %  BP: ()/(37-71) 107/62  Arterial Line BP: ()/(35-99) 77/49     Weight: 118.8 kg (262 lb)  Body mass index is 36.54 kg/m².    SpO2: 100 %         Intake/Output Summary (Last 24 hours) at 12/26/2023 1557  Last data filed at 12/26/2023 1500  Gross per 24 hour   Intake 898.38 ml   Output 1615 ml   Net -716.62 ml       Lines/Drains/Airways       Central Venous Catheter Line  Duration             Introducer 12/26/23 0630 Internal Jugular Right <1 day              Drain  Duration                  Urethral Catheter 12/26/23 0745 Silicone 16 Fr. <1 day              Airway  Duration                 "  Airway - Non-Surgical -- days       Airway Anesthesia 12/26/23 <1 day              Arterial Line  Duration             Arterial Line 12/26/23 1400 Right Radial <1 day              Line  Duration                  IABP 12/25/23 2340  40 mL <1 day         Pacer Wires 12/26/23 0652 <1 day              Peripheral Intravenous Line  Duration                  Peripheral IV - Single Lumen 12/25/23 1830 18 G Left Antecubital <1 day         Peripheral IV - Single Lumen 12/25/23 1856 18 G Anterior;Distal;Right Forearm <1 day         Peripheral IV - Single Lumen 12/26/23 0845 20 G Anterior;Proximal;Right Forearm <1 day         Peripheral IV - Single Lumen 12/26/23 1107 18 G Right Upper Arm <1 day         Sheath 12/25/23 2316 Left anterior <1 day                     Physical Exam  Constitutional:       Appearance: He is ill-appearing and diaphoretic.      Comments: intubated   Eyes:      Pupils: Pupils are equal, round, and reactive to light.   Cardiovascular:      Comments: RIJ TVP @ VVI 80  IABP present  Pulmonary:      Comments: Mechanically ventilated  Abdominal:      General: Abdomen is flat.      Palpations: Abdomen is soft.   Neurological:      Comments: sedated          Significant Labs: All pertinent lab results from the last 24 hours have been reviewed.                Assessment and Plan:     * Cardiogenic shock  #CHB with wide fractionated ventricular escape likely from non revascularizable STEMI    Left dominant coronary system  Inferior STEMI with CW a flutter 12/25, LIMA-LAD and SVG -OM are down as well, Lcx couldn't be revasuclarized  Subsequently developed CHB  EF 20-25%    Plan  -will need CRT-D vs CRT-P before discharge (CRT because EF < 40% and charlotteley will pace 100% of the time)  -too sick to go to the EP lab at this time, please re contact EP doctors once off antibiotics, off MCS and pressors and extubated  -continue TVP meanwhile with daily threshold checks    Typical atrial flutter  New diagnosis, Found on  12/25 EKG that also showed inferior STEMI  AC on hold currently per primary team due to hemetemesis on 12/26, toprol xl on hold since on pressors  Recommend resuming anticoag once able      Paroxysmal atrial fibrillation  Known paroxysmal afib, on eliquis and toprol xl at home  AC on hold currently per primary team due to hemetemesis on 12/26, toprol xl on hold since on pressors  Recommend resuming anticoag once able      Thank you for your consult.     Mehdi Nguyen MD  Cardiac Electrophysiology  [facility] - Cardiac Int[facility]

## 2023-12-26 NOTE — RESPIRATORY THERAPY
ABG PH 7.36/ PCO2 26.1/ PAO2 47/ HCO3 15.0/ BE-10  notified and orders to place on bipap.     12/26/23 0014   PRE-TX-O2   SpO2 (!) 81 %   Pulse (!) 167   Resp (!) 31   Labs   $ Was an ABG obtained? Arterial Blood Draw from Existing Line;ISTAT - Blood gas;ISTAT - Calcium;ISTAT - Hematocrit;ISTAT - PH, Blood;ISTAT - Potassium;ISTAT - Sodium   $ Labs Tech Time 15 min   Critical Value Communication   Date Result Received 12/26/23   Time Result Received 0014   Resulting Department of Critical Value resp   Who communicated critical value from resulting department? Roseanne Jacinto RRT   Critical Test #1 pco2   Critical Test #1 Result 26.1   Critical Test #2 po2   Critical Test #2 Result 47   Name of Notified Physician/Designee Abram CEJA MD   Date Notified 12/26/23   Time Notified 0014   Read Back Verification Yes   Physician Directive place pt on bipap

## 2023-12-26 NOTE — SUBJECTIVE & OBJECTIVE
Past Medical History:   Diagnosis Date    Anticoagulant long-term use     Atrial fibrillation 2018    BPH (benign prostatic hyperplasia)     Cataract     COPD (chronic obstructive pulmonary disease)     Coronary artery disease     stents    CVD (cardiovascular disease)     Diabetes mellitus     Erythema multiforme     AKA Denton Edmondson Syndrome    Hypertension     Infected incision     MI (myocardial infarction)     per pt he has had 4     ROSAMARIA on CPAP     RLS (restless legs syndrome)     Perez-Denton syndrome        Past Surgical History:   Procedure Laterality Date    CORONARY ANGIOGRAPHY INCLUDING BYPASS GRAFTS WITH CATHETERIZATION OF LEFT HEART N/A 9/1/2022    Procedure: ANGIOGRAM, CORONARY, INCLUDING BYPASS GRAFT, WITH LEFT HEART CATHETERIZATION;  Surgeon: Paul Botello MD;  Location: [facility] CATH/EP LAB;  Service: Cardiology;  Laterality: N/A;    CORONARY ARTERY BYPASS GRAFT (CABG) N/A 4/7/2020    Procedure: CORONARY ARTERY BYPASS GRAFT (CABG)- Excision of left atrial appendage;  Surgeon: Doug Solitario MD;  Location: [facility] OR;  Service: Cardiothoracic;  Laterality: N/A;    CORONARY STENT PLACEMENT      WILSON MAZE PROCEDURE N/A 4/7/2020    Procedure: WILSON MAZE PROCEDURE- Attempted;  Surgeon: Doug Solitario MD;  Location: [facility] OR;  Service: Cardiothoracic;  Laterality: N/A;    CYSTOSCOPY N/A 12/10/2018    Procedure: CYSTOSCOPY;  Surgeon: Khushboo Abreu MD;  Location: [facility] OR;  Service: Urology;  Laterality: N/A;    ENDOSCOPIC HARVEST OF VEIN Left 4/7/2020    Procedure: HARVEST-VEIN-ENDOVASCULAR;  Surgeon: Doug Solitario MD;  Location: [facility] OR;  Service: Cardiothoracic;  Laterality: Left;    INCISION OF PERIRECTAL ABSCESS Bilateral 9/1/2023    Procedure: INCISION, ABSCESS, PERIRECTAL;  Surgeon: Brayan Spears MD;  Location: [facility] OR;  Service: General;  Laterality: Bilateral;  stirrups    LEFT HEART CATHETERIZATION Left 3/17/2020    Procedure: CATHETERIZATION, HEART, LEFT;   Surgeon: Tyree Walters MD;  Location: [facility] CATH/EP LAB;  Service: Cardiology;  Laterality: Left;    STERNAL WIRES REMOVAL N/A 6/8/2020    Procedure: REMOVAL, STERNAL WIRE;  Surgeon: Doug Solitario MD;  Location: [facility] OR;  Service: Peripheral Vascular;  Laterality: N/A;    ULTRASOUND OF PROSTATE FOR VOLUME DETERMINATION  12/10/2018    Procedure: ULTRASOUND, PROSTATE, FOR VOLUME DETERMINATION;  Surgeon: Khushboo Abreu MD;  Location: [facility] OR;  Service: Urology;;       Review of patient's allergies indicates:   Allergen Reactions    Pesticide Dermatitis and Rash       Current Facility-Administered Medications on File Prior to Encounter   Medication    [COMPLETED] 0.9%  NaCl infusion    [COMPLETED] aspirin chewable tablet 324 mg    [COMPLETED] furosemide injection 60 mg    [COMPLETED] morphine injection 2 mg    [COMPLETED] morphine injection 4 mg    [COMPLETED] NORepinephrine bitartrate-D5W 4 mg/250 mL (16 mcg/mL) infusion Soln    [COMPLETED] ondansetron injection 4 mg    [COMPLETED] propofoL (DIPRIVAN) 10 mg/mL infusion    [DISCONTINUED] 0.9%  NaCl infusion    [DISCONTINUED] aspirin chewable tablet 81 mg    [DISCONTINUED] atorvastatin tablet 80 mg    [DISCONTINUED] atropine injection    [DISCONTINUED] chlorhexidine 0.12 % solution 15 mL    [DISCONTINUED] etomidate (AMIDATE) 2 mg/mL injection    [DISCONTINUED] fentaNYL 50 mcg/mL injection    [DISCONTINUED] heparin (porcine) injection    [DISCONTINUED] heparin (porcine) injection    [DISCONTINUED] heparin (porcine) injection    [DISCONTINUED] heparin (porcine) injection    [DISCONTINUED] heparin 25,000 units in dextrose 5% (100 units/ml) IV bolus from bag - ADDITIONAL PRN BOLUS - 30 units/kg (max bolus 4000 units)    [DISCONTINUED] heparin 25,000 units in dextrose 5% (100 units/ml) IV bolus from bag - ADDITIONAL PRN BOLUS - 60 units/kg (max bolus 4000 units)    [DISCONTINUED] heparin 25,000 units in dextrose 5% 250 mL (100 units/mL) infusion (heparin  infusion - NO NOMOGRAM)    [DISCONTINUED] heparin 25,000 units in dextrose 5% 250 mL (100 units/mL) infusion LOW INTENSITY nomogram - [facility]    [DISCONTINUED] LIDOcaine (PF) 10 mg/ml (1%) injection    [DISCONTINUED] midazolam (VERSED) 1 mg/mL injection    [DISCONTINUED] mupirocin 2 % ointment    [DISCONTINUED] nitroGLYCERIN in 5 % dextrose 50 mg/250 mL (200 mcg/mL) infusion    [DISCONTINUED] NORepinephrine injection    [DISCONTINUED] PHENYLephrine injection    [DISCONTINUED] rocuronium 10 mg/mL injection    [DISCONTINUED] succinylcholine chloride 200 mg/10 mL (20 mg/mL) IV Syringe    [DISCONTINUED] ticagrelor tablet 90 mg    [DISCONTINUED] ticagrelor tablet    [DISCONTINUED] tirofiban 12.5 mg in NS 12.5 MG/ 250 mL bolus from bag     Current Outpatient Medications on File Prior to Encounter   Medication Sig    apixaban (ELIQUIS) 5 mg Tab Take 1 tablet (5 mg total) by mouth 2 (two) times daily.    clonazePAM (KLONOPIN) 1 MG tablet Take 1 mg by mouth 2 (two) times daily.    cyclobenzaprine (FLEXERIL) 10 MG tablet Take 10 mg by mouth every evening.    gabapentin (NEURONTIN) 600 MG tablet Take 600 mg by mouth As instructed (neuropathic pain). Takes 1 tablet in the morning and 2 tablets at bedtime    isosorbide mononitrate (IMDUR) 30 MG 24 hr tablet Take 1 tablet (30 mg total) by mouth once daily.    JARDIANCE 25 mg tablet Take 25 mg by mouth once daily.    lactobacillus acidophilus & bulgar (LACTINEX) 100 million cell packet Take 1 packet (1 each total) by mouth 2 (two) times daily.    LANTUS SOLOSTAR U-100 INSULIN glargine 100 units/mL (3mL) SubQ pen Inject 80 Units into the skin 2 (two) times daily.    lisinopriL (PRINIVIL,ZESTRIL) 20 MG tablet Take 20 mg by mouth once daily.    metFORMIN (GLUCOPHAGE) 1000 MG tablet Take 1 tablet (1,000 mg total) by mouth 2 (two) times daily with meals.    methocarbamoL (ROBAXIN) 500 MG Tab Take 500 mg by mouth every evening.    metoprolol succinate (TOPROL-XL) 100 MG 24 hr tablet Take  "100 mg by mouth 2 (two) times daily.    nitroGLYCERIN (NITROSTAT) 0.4 MG SL tablet Place 0.4 mg under the tongue every 5 (five) minutes as needed for Chest pain.    oxyCODONE-acetaminophen (PERCOCET)  mg per tablet Take 1 tablet by mouth 2 (two) times daily as needed for Pain.    pen needle, diabetic 31 gauge x 1/4" Ndle USE 1 TWICE DAILY FOR BLOOD SUGAR GREATER THAN 200    ranolazine (RANEXA) 1,000 mg Tb12 Take 1 tablet (1,000 mg total) by mouth 2 (two) times daily.    simvastatin (ZOCOR) 10 MG tablet Take 10 mg by mouth once daily.    ticagrelor (BRILINTA) 90 mg tablet Take 1 tablet (90 mg total) by mouth 2 (two) times daily.     Family History       Problem Relation (Age of Onset)    Cancer Father    Diabetes Mother    Heart disease Mother    Hyperlipidemia Father          Tobacco Use    Smoking status: Every Day     Current packs/day: 0.00     Average packs/day: 3.0 packs/day for 30.0 years (90.0 ttl pk-yrs)     Types: Cigarettes     Start date: 4/4/1990     Last attempt to quit: 4/4/2020     Years since quitting: 3.7    Smokeless tobacco: Never   Substance and Sexual Activity    Alcohol use: Not Currently    Drug use: Yes     Frequency: 1.0 times per week     Types: Marijuana    Sexual activity: Not Currently     Review of Systems   Unable to perform ROS: Intubated     Objective:     Vital Signs (Most Recent):  Temp: 97.9 °F (36.6 °C) (12/26/23 1507)  Pulse: 79 (12/26/23 1507)  Resp: (!) 22 (12/26/23 1507)  BP: 107/62 (12/26/23 1500)  SpO2: 100 % (12/26/23 1507) Vital Signs (24h Range):  Temp:  [95.5 °F (35.3 °C)-98.7 °F (37.1 °C)] 97.9 °F (36.6 °C)  Pulse:  [] 79  Resp:  [6-34] 22  SpO2:  [77 %-100 %] 100 %  BP: ()/(37-71) 107/62  Arterial Line BP: ()/(35-99) 77/49     Weight: 118.8 kg (262 lb)  Body mass index is 36.54 kg/m².    SpO2: 100 %         Intake/Output Summary (Last 24 hours) at 12/26/2023 1557  Last data filed at 12/26/2023 1500  Gross per 24 hour   Intake 898.38 ml   Output " 1615 ml   Net -716.62 ml       Lines/Drains/Airways       Central Venous Catheter Line  Duration             Introducer 12/26/23 0630 Internal Jugular Right <1 day              Drain  Duration                  Urethral Catheter 12/26/23 0745 Silicone 16 Fr. <1 day              Airway  Duration                  Airway - Non-Surgical -- days       Airway Anesthesia 12/26/23 <1 day              Arterial Line  Duration             Arterial Line 12/26/23 1400 Right Radial <1 day              Line  Duration                  IABP 12/25/23 2340  40 mL <1 day         Pacer Wires 12/26/23 0652 <1 day              Peripheral Intravenous Line  Duration                  Peripheral IV - Single Lumen 12/25/23 1830 18 G Left Antecubital <1 day         Peripheral IV - Single Lumen 12/25/23 1856 18 G Anterior;Distal;Right Forearm <1 day         Peripheral IV - Single Lumen 12/26/23 0845 20 G Anterior;Proximal;Right Forearm <1 day         Peripheral IV - Single Lumen 12/26/23 1107 18 G Right Upper Arm <1 day         Sheath 12/25/23 2316 Left anterior <1 day                     Physical Exam  Constitutional:       Appearance: He is ill-appearing and diaphoretic.      Comments: intubated   Eyes:      Pupils: Pupils are equal, round, and reactive to light.   Cardiovascular:      Comments: RIJ TVP @ VVI 80  IABP present  Pulmonary:      Comments: Mechanically ventilated  Abdominal:      General: Abdomen is flat.      Palpations: Abdomen is soft.   Neurological:      Comments: sedated          Significant Labs: All pertinent lab results from the last 24 hours have been reviewed.

## 2023-12-26 NOTE — Clinical Note
Pt's ICD interrogated with , pt has a HR 82, NSR, A-sensed and V-sensed on , Dr Simeon notified, Cardioversion/ILAN cancelled and pt sent back to room 0675

## 2023-12-26 NOTE — EICU
Intervention Initiated From:  [facility] / EICU    Shahrzad intervened regarding:  Documentation    Nurse Notified:  Yes    Doctor Notified: Dr. Cortney Porras  Yes  Comments: Entered unit following time out completed by FANNY Barton, RN  0703 Advancing TVP through cordis, patient having episodes of asystole  0705 [employer] pacer set at rate of Ventricular paced at 100 mA 5. TVP advanced to 40 cm. Stat portable CXR ordered.  0715 TVP rate decreased to 90. Call Xray for portable CXR on route. Dr. Pereira informed  0723 Procedure complet. Patient stable with TVP

## 2023-12-26 NOTE — ANESTHESIA PROCEDURE NOTES
Ad Hoc Intubation    Date/Time: 12/26/2023 2:22 AM    Performed by: Magdy Garcia MD  Authorized by: Magdy Garcia MD    Indications:  Respiratory distress  Diagnosis:  MI  Patient Location:  ICU  Timeout:  12/26/2023 2:21 AM  Procedure Start Time:  12/26/2023 2:21 AM  Procedure End Time:  12/26/2023 2:20 AM  Staff:     Other Anesthesia Staff:  Kaiser Caldwell CRNA    Anesthesiologist Present: Yes    Intubation:     Induction:  Intravenous    Intubated:  Postinduction    Mask Ventilation:  Easy mask    Attempts:  1    Attempted By:  CRNA    Method of Intubation:  Video laryngoscopy    Blade:  Huynh 4    Laryngeal View Grade: Grade I - full view of chords      Difficult Airway Encountered?: No      Complications:  None    Airway Device:  Oral endotracheal tube    Airway Device Size:  8.0    Inflation Amount (mL):  5    Tube secured:  24    Secured at:  The teeth    Placement Verified By:  Capnometry, Chest x-ray and Colorimetric ETCO2 device    Complicating Factors:  None    Findings Post-Intubation:  BS equal bilateral and atraumatic/condition of teeth unchanged

## 2023-12-26 NOTE — ASSESSMENT & PLAN NOTE
2/2 STEMI/ICM  Mehanical support: IABP @ 1:1  Inotrope/pressor: levophed. Wean to maintain MAP > 60  Adequate urine output  Echo ordered  Monitor lactate  Check hemodynamics

## 2023-12-26 NOTE — NURSING
PATIENT SATURATION 88-90 PRIOR TO LEAVING CATH LAB ON NRB, SATURATION PROBE ON PATIENT EAR. SATURATION VERIFICATION BY CATH LAB RN X2 AND CHARGE ICU RN PRIOR TO LEAVING CATH LAB. ON ARRIVAL TO ICU PATIENT SATURATION 80. MD CALLED AND UPDATED ON PATIENT CONDITION - PATIENT STATES HE IS NOT HAVING A HARD TIME BREATHING AT THIS TIME. MD STATES HE WANTS AN ORDER FOR CHEST X RAY AND BI PAP PLACEMENT, VIOLETA AGUILAR ICU AWARE.

## 2023-12-26 NOTE — ED NOTES
Encounter Date: 2023  [facility] TRANSPORT NOTE     Date of Transport: 2023  : 1965  Age: 58 y.o.  Medication Dosing Weight: 118kg  Sex: male  MRN: [id number]  CSN:  Time Of Patient Handoff: 0435    ASSESSMENT/INTERVENTIONS     This patient was transported by [facility] from the ICU of [facility] by Ground. The patient's overall condition remained unchanged throughout transport, with all vital signs remaining stable per the patient's current baseline. All lines, tubes, and devices remained patent and intact. [facility] transition[facility] to bedside RN,    without incident. The patient's Personal Belongings and OSH Chart and Diagnostic Disk(s) were left with receiving clinician.   Propofol Infusion @ 30mcg/kg/min  Levophed 0.1 mcg/kg/min  IABP 1:1 fiberoptic with augmentation in 90s-dressing intact to left groin  Vitals 90/60 P 70-paced on vent SIMV rate of 26, , Peep 8, 100%  Son Hector updated on transfer via cell# [phone]      FOLLOW-UP     Call [facility], Esteban Gautam RN at [phone] (adult team) or [phone] (pediatric team) for additional questions or concerns.                 Procedures

## 2023-12-26 NOTE — CONSULTS
[facility]  Department of Cardiology  Consult Note      PATIENT NAME: Magdaleno Cunningham    MRN: [id number]  TODAY'S DATE: 12/25/2023  ADMIT DATE: 12/25/2023                          CONSULT REQUESTED BY: Magdaleno Juarez,*    SUBJECTIVE     PRINCIPAL PROBLEM:       REASON FOR CONSULT:  ACS       HPI:    58-year-old male with past medical history of CAD status post CABG and recent PCI to proximal and mid circumflex by Dr. Botello on 12/22/23, COPD, atrial fibrillation on anticoagulation, diabetes, hypertension presented to [facility] with chest pain.  Per patient's son, patient had chest pain for last 2 days which was progressively getting worse on 12/25/23 and presented to the emergency room.  He reports compliance with medications.  Last Eliquis use on the morning of 12/25/2023.       Review of patient's allergies indicates:   Allergen Reactions    Pesticide Dermatitis and Rash       Past Medical History:   Diagnosis Date    Anticoagulant long-term use     Atrial fibrillation 2018    BPH (benign prostatic hyperplasia)     Cataract     COPD (chronic obstructive pulmonary disease)     Coronary artery disease     stents    CVD (cardiovascular disease)     Diabetes mellitus     Erythema multiforme     AKA Denton Edmondson Syndrome    Hypertension     Infected incision     MI (myocardial infarction)     per pt he has had 4     ROSAMARIA on CPAP     RLS (restless legs syndrome)     Perez-Denton syndrome      Past Surgical History:   Procedure Laterality Date    CORONARY ANGIOGRAPHY INCLUDING BYPASS GRAFTS WITH CATHETERIZATION OF LEFT HEART N/A 9/1/2022    Procedure: ANGIOGRAM, CORONARY, INCLUDING BYPASS GRAFT, WITH LEFT HEART CATHETERIZATION;  Surgeon: Paul Botello MD;  Location: [facility] CATH/EP LAB;  Service: Cardiology;  Laterality: N/A;    CORONARY ARTERY BYPASS GRAFT (CABG) N/A 4/7/2020    Procedure: CORONARY ARTERY BYPASS GRAFT (CABG)- Excision of left atrial appendage;  Surgeon:  Doug Solitario MD;  Location: [facility] OR;  Service: Cardiothoracic;  Laterality: N/A;    CORONARY STENT PLACEMENT      WILSON MAZE PROCEDURE N/A 4/7/2020    Procedure: WILSON MAZE PROCEDURE- Attempted;  Surgeon: Doug Solitario MD;  Location: [facility] OR;  Service: Cardiothoracic;  Laterality: N/A;    CYSTOSCOPY N/A 12/10/2018    Procedure: CYSTOSCOPY;  Surgeon: Khushboo Abreu MD;  Location: [facility];  Service: Urology;  Laterality: N/A;    ENDOSCOPIC HARVEST OF VEIN Left 4/7/2020    Procedure: HARVEST-VEIN-ENDOVASCULAR;  Surgeon: Doug Solitario MD;  Location: [facility] OR;  Service: Cardiothoracic;  Laterality: Left;    INCISION OF PERIRECTAL ABSCESS Bilateral 9/1/2023    Procedure: INCISION, ABSCESS, PERIRECTAL;  Surgeon: Brayan Spears MD;  [facility];  Service: General;  Laterality: Bilateral;  stirrups    LEFT HEART CATHETERIZATION Left 3/17/2020    Procedure: CATHETERIZATION, HEART, LEFT;  Surgeon: Tyree Walters MD;  Location: [facility] CATH/EP LAB;  Service: Cardiology;  Laterality: Left;    STERNAL WIRES REMOVAL N/A 6/8/2020    Procedure: REMOVAL, STERNAL WIRE;  Surgeon: Doug Solitario MD;  Location: [facility] OR;  Service: Peripheral Vascular;  Laterality: N/A;    ULTRASOUND OF PROSTATE FOR VOLUME DETERMINATION  12/10/2018    Procedure: ULTRASOUND, PROSTATE, FOR VOLUME DETERMINATION;  Surgeon: Khushboo Abreu MD;  Location: [facility];  Service: Urology;;     Social History     Tobacco Use    Smoking status: Every Day     Current packs/day: 0.00     Average packs/day: 3.0 packs/day for 30.0 years (90.0 ttl pk-yrs)     Types: Cigarettes     Start date: 4/4/1990     Last attempt to quit: 4/4/2020     Years since quitting: 3.7    Smokeless tobacco: Never   Substance Use Topics    Alcohol use: Not Currently    Drug use: Yes     Frequency: 1.0 times per week     Types: Marijuana        REVIEW OF SYSTEMS  All other systems negative except as mentioned in HPI.     OBJECTIVE     VITAL SIGNS  (Most Recent)  Temp: 97.7 °F (36.5 °C) (12/25/23 1836)  Pulse: (!) 47 (12/25/23 2000)  Resp: (!) 23 (12/25/23 2000)  BP: (!) 97/57 (12/25/23 2000)  SpO2: (!) 94 % (12/25/23 2000)    VENTILATION STATUS  Resp: (!) 23 (12/25/23 2000)  SpO2: (!) 94 % (12/25/23 2000)           I & O (Last 24H):No intake or output data in the 24 hours ending 12/25/23 2357    WEIGHTS  Wt Readings from Last 1 Encounters:   12/25/23 1836 118.4 kg (261 lb)       PHYSICAL EXAM  GENERAL: c/o chest pain   HEENT: Normocephalic. No pallor/icterus.   NECK: No JVD  CARDIAC: irregular rate and rhythm. S1 is normal.S2 is normal.   LUNGS:  CTA b/l  ABDOMEN: Soft  EXTREMITIES:  No edema   CNS:  AAO x3  PSYCH: normal affect    HOME MEDICATIONS:  No current facility-administered medications on file prior to encounter.     Current Outpatient Medications on File Prior to Encounter   Medication Sig Dispense Refill    apixaban (ELIQUIS) 5 mg Tab Take 1 tablet (5 mg total) by mouth 2 (two) times daily.      clonazePAM (KLONOPIN) 1 MG tablet Take 1 mg by mouth 2 (two) times daily.      cyclobenzaprine (FLEXERIL) 10 MG tablet Take 10 mg by mouth every evening.      gabapentin (NEURONTIN) 600 MG tablet Take 600 mg by mouth As instructed (neuropathic pain). Takes 1 tablet in the morning and 2 tablets at bedtime      isosorbide mononitrate (IMDUR) 30 MG 24 hr tablet Take 1 tablet (30 mg total) by mouth once daily. 30 tablet 11    JARDIANCE 25 mg tablet Take 25 mg by mouth once daily.      lactobacillus acidophilus & bulgar (LACTINEX) 100 million cell packet Take 1 packet (1 each total) by mouth 2 (two) times daily. 30 packet 0    LANTUS SOLOSTAR U-100 INSULIN glargine 100 units/mL (3mL) SubQ pen Inject 80 Units into the skin 2 (two) times daily.      lisinopriL (PRINIVIL,ZESTRIL) 20 MG tablet Take 20 mg by mouth once daily.      metFORMIN (GLUCOPHAGE) 1000 MG tablet Take 1 tablet (1,000 mg total) by mouth 2 (two) times daily with meals.      methocarbamoL (ROBAXIN)  "500 MG Tab Take 500 mg by mouth every evening.      metoprolol succinate (TOPROL-XL) 100 MG 24 hr tablet Take 100 mg by mouth 2 (two) times daily.      nitroGLYCERIN (NITROSTAT) 0.4 MG SL tablet Place 0.4 mg under the tongue every 5 (five) minutes as needed for Chest pain.      oxyCODONE-acetaminophen (PERCOCET)  mg per tablet Take 1 tablet by mouth 2 (two) times daily as needed for Pain.      pen needle, diabetic 31 gauge x 1/4" Ndle USE 1 TWICE DAILY FOR BLOOD SUGAR GREATER THAN 200      ranolazine (RANEXA) 1,000 mg Tb12 Take 1 tablet (1,000 mg total) by mouth 2 (two) times daily. 60 tablet 11    simvastatin (ZOCOR) 10 MG tablet Take 10 mg by mouth once daily.      ticagrelor (BRILINTA) 90 mg tablet Take 1 tablet (90 mg total) by mouth 2 (two) times daily. 60 tablet 2       SCHEDULED MEDS:   [START ON 12/26/2023] aspirin  81 mg Oral Daily    [START ON 12/26/2023] atorvastatin  80 mg Oral QHS    [START ON 12/26/2023] ticagrelor  90 mg Oral BID       CONTINUOUS INFUSIONS:   [START ON 12/26/2023] sodium chloride 0.9%      heparin (porcine) in 5 % dex 12 Units/kg/hr (12/25/23 2312)    [START ON 12/26/2023] heparin (porcine) in D5W      NORepinephrine 0.05 mcg/kg/min (12/25/23 2326)       PRN MEDS:atropine, fentaNYL, heparin (porcine), heparin (porcine), heparin (porcine), heparin (porcine), [START ON 12/26/2023] heparin (PORCINE), [START ON 12/26/2023] heparin (PORCINE), heparin (porcine) in 5 % dex, LIDOcaine (PF) 10 mg/ml (1%), midazolam, NORepinephrine, PHENYLephrine, ticagrelor, tirofiban-0.9% sodium chloride 12.5 mg/250ml    LABS AND DIAGNOSTICS     CBC LAST 3 DAYS  Recent Labs   Lab 12/22/23  0549 12/25/23  1855   WBC 7.28 12.71*   RBC 5.10 5.40   HGB 16.0 16.8   HCT 47.3 49.1   MCV 93 91   MCH 31.4* 31.1*   MCHC 33.8 34.2   RDW 13.1 13.2    239   MPV 9.6 10.2   GRAN 51.1  3.7 66.6  8.5*   LYMPH 37.5  2.7 24.8  3.2   MONO 6.2  0.5 6.7  0.9   BASO 0.08 0.08   NRBC 0 0       COAGULATION LAST 3 " "DAYS  Recent Labs   Lab 12/25/23  1855   INR 1.0       CHEMISTRY LAST 3 DAYS  Recent Labs   Lab 12/22/23  0549 12/25/23  1855   * 128*   K 4.1 4.1    95   CO2 25 23   ANIONGAP 8 10   BUN 13 16   CREATININE 1.3 1.3   * 288*   CALCIUM 9.1 9.5   MG 1.8 1.8   ALBUMIN 4.1 4.3   PROT 6.9 7.9   ALKPHOS 47* 54*   ALT 20 25   AST 17 44*   BILITOT 0.4 0.7       CARDIAC PROFILE LAST 3 DAYS  Recent Labs   Lab 12/25/23  1855   *   TROPONINIHS 55972.2*       ENDOCRINE LAST 3 DAYS  No results for input(s): "TSH", "PROCAL" in the last 168 hours.    LAST ARTERIAL BLOOD GAS  ABG  No results for input(s): "PH", "PO2", "PCO2", "HCO3", "BE" in the last 168 hours.    LAST 7 DAYS MICROBIOLOGY   Microbiology Results (last 7 days)       ** No results found for the last 168 hours. **            MOST RECENT IMAGING  X-Ray Chest AP Portable  EXAM: XR CHEST AP PORTABLE    HISTORY: chest pain    COMPARISON:Chest x-ray dated 12/22/2023    FINDINGS: The lungs are fairly well-expanded and appear free of focal infiltrates. There are no pleural effusions. The heart is borderline enlarged. There are stigmata of previous coronary bypass procedure.    IMPRESSION:   No evidence of acute disease within the chest.    Electronically signed by:  Francois Nichols MD  12/25/2023 07:23 PM CST Workstation: [id number]      ECHOCARDIOGRAM RESULTS (last 5)  Results for orders placed during the hospital encounter of 12/14/23    Echo    Interpretation Summary    Left Ventricle: Normal left ventricular filling pressure.  Ejection fraction estimated at 60%    Left Atrium: Left atrium is dilated.    Aortic Valve: There is aortic valve sclerosis.    Mitral Valve: There is mild regurgitation.    Tricuspid Valve: There is mild regurgitation.    IVC/SVC: Normal venous pressure at 3 mmHg.  Pulmonary artery systolic pressure less than 25 mmHg    Mild aortic leaflet sclerosis      Results for orders placed during the hospital encounter of " 06/23/23    Echo    Interpretation Summary  · The left ventricle is normal in size with concentric remodeling and normal systolic function.  · The estimated ejection fraction is 60%.  · Normal left ventricular diastolic function.  · Normal right ventricular size with normal right ventricular systolic function.  · Mild mitral regurgitation.  · Mild tricuspid regurgitation.      Results for orders placed during the hospital encounter of 01/09/22    STRESS TEST REPORT      Echo    Interpretation Summary  · The left ventricle is normal in size with mild concentric hypertrophy and low normal systolic function.  · The estimated ejection fraction is 54%.  · Normal left ventricular diastolic function.  · Mild right ventricular enlargement with normal right ventricular systolic function.  · Mild left atrial enlargement.  · Mild right atrial enlargement.  · There is no evidence of intracardiac shunting.  · Mild mitral regurgitation.  · Normal central venous pressure (3 mmHg).  · The estimated PA systolic pressure is 31 mmHg.  · Mild tricuspid regurgitation.      Results for orders placed in visit on 05/19/20    Echo Color Flow Doppler? Yes    Interpretation Summary  · Mild concentric left ventricular hypertrophy.  · Low normal left ventricular systolic function. The estimated ejection fraction is 50-55%.  · Indeterminate left ventricular diastolic function.  · Normal right ventricular systolic function.  · TDS      CURRENT/PREVIOUS VISIT EKG  Results for orders placed or performed during the hospital encounter of 12/22/23   EKG 12-lead    Collection Time: 12/22/23  5:58 AM    Narrative    Test Reason : Z01.810,    Vent. 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  Thrombotic occlusion of proximal circumflex at the proximal edge of stent.  Unchanged appearance of remainder of coronary arteries and LIMA graft.    -PCI of circumflex was attempted.  Wiring the circumflex was very difficult.  Multiple attempts to cross the proximal circumflex stent were unsuccessful initially (wires were going behind stent struts at the proximal edge; probable 2-3 layers of stents).  Probable stent deformation at the proximal edge of the proximal circumflex stent precluding the wiring.  Multiple wires were used including runthrough, Fielder FC, Fielder XT, also attempted parallel wiring and wiring with the balloon support which were unsuccessful.  Eventually  50 wire was able to cross the true lumen of the stents.    -following the wiring, balloon angioplasty was performed with a 1.0, 2.0 compliant balloons with no improvement in flow.  High pressure multiple balloon inflations were performed with 2.5 and 3.0 noncompliant balloons to treat under expanded stents in the proximal and mid circumflex with no improvement in flow.  Subsequently aspiration thrombectomy was also performed in the proximal circumflex (thrombectomy catheter could not be advanced into the mid circumflex through stents).  Intracoronary nitroglycerin was also administered.      -Despite multiple attempts flow could not be restored into the circumflex due to extensive thrombus burden which is likely organized thrombus given delayed presentation.  Patient had chest pain for last 2 days and initial troponin was 10,000.     -the procedure was assisted by Dr. Hardy.  case discussed with interventionalist on-call at [facility] Dr. Atwood during the case and also  after the case.    -due to the ongoing chest pain and low blood pressure, intra-aortic balloon pump was placed via left femoral access.     -patient also had intermittent episodes of complete heart block during the procedure.  A transvenous pacemaker was placed via left femoral vein access.     -transfer initiated to [facility] cardiac ICU for further monitoring    -continue dual antiplatelet therapy with aspirin and Brilinta.  Start IV heparin for balloon pump.     -patient had hypoxia after the procedure and was placed on BiPAP.  Chest x-ray showed pulmonary edema and IV Lasix was given.  IV fluids were stopped.    -echocardiogram.  Levophed as needed.  ICU close monitoring      Ashley Valverde MD  [facility]  Department of Cardiology  Date of Service: 12/25/2023

## 2023-12-26 NOTE — ASSESSMENT & PLAN NOTE
A1c 8.6%  Takes lantus 80 bid, metformin, jardiance  Monitor BG, adjust insulin as needed  Consider endo consult if BG labile

## 2023-12-26 NOTE — ED PROVIDER NOTES
Encounter Date: 12/25/2023       History     Chief Complaint   Patient presents with    Chest Pain     Pt has left chest and left arm pain he gets pain in the middle then he panics and gets sob. Pt had two stints placed on Friday.      Patient with extensive cardiac history and past including multiple coronary artery stents, coronary artery bypass grafting.  Patient had 2 coronary stents 3 days ago.  He reports for the last 2 days he has been having chest pain.  He has been taking a lot of sublingual nitroglycerin with improvement.  Since this morning patient reports sublingual nitroglycerin not helping.  He reports chest heaviness radiating to left shoulder arm associated with shortness of breath and diaphoresis.  This is worse than previous myocardial infarction.  No fever chills.      Review of patient's allergies indicates:   Allergen Reactions    Pesticide Dermatitis and Rash     Past Medical History:   Diagnosis Date    Anticoagulant long-term use     Atrial fibrillation 2018    BPH (benign prostatic hyperplasia)     Cataract     COPD (chronic obstructive pulmonary disease)     Coronary artery disease     stents    CVD (cardiovascular disease)     Diabetes mellitus     Erythema multiforme     AKA Denton Edmondson Syndrome    Hypertension     Infected incision     MI (myocardial infarction)     per pt he has had 4     ROSAMARIA on CPAP     RLS (restless legs syndrome)     Perez-Denton syndrome      Past Surgical History:   Procedure Laterality Date    CORONARY ANGIOGRAPHY INCLUDING BYPASS GRAFTS WITH CATHETERIZATION OF LEFT HEART N/A 9/1/2022    Procedure: ANGIOGRAM, CORONARY, INCLUDING BYPASS GRAFT, WITH LEFT HEART CATHETERIZATION;  Surgeon: Paul Botello MD;  Location: [facility] CATH/EP LAB;  Service: Cardiology;  Laterality: N/A;    CORONARY ARTERY BYPASS GRAFT (CABG) N/A 4/7/2020    Procedure: CORONARY ARTERY BYPASS GRAFT (CABG)- Excision of left atrial appendage;  Surgeon: Doug Solitario MD;  Location:  [facility] OR;  Service: Cardiothoracic;  Laterality: N/A;    CORONARY STENT PLACEMENT      WILSON MAZE PROCEDURE N/A 4/7/2020    Procedure: WILSON MAZE PROCEDURE- Attempted;  Surgeon: Doug Solitario MD;  Location: [facility] OR;  Service: Cardiothoracic;  Laterality: N/A;    CYSTOSCOPY N/A 12/10/2018    Procedure: CYSTOSCOPY;  Surgeon: Khushboo Abreu MD;  Location: [facility] OR;  Service: Urology;  Laterality: N/A;    ENDOSCOPIC HARVEST OF VEIN Left 4/7/2020    Procedure: HARVEST-VEIN-ENDOVASCULAR;  Surgeon: Doug Solitario MD;  Location: [facility] OR;  Service: Cardiothoracic;  Laterality: Left;    INCISION OF PERIRECTAL ABSCESS Bilateral 9/1/2023    Procedure: INCISION, ABSCESS, PERIRECTAL;  Surgeon: Brayan Spears MD;  Location: [facility] OR;  Service: General;  Laterality: Bilateral;  stirrups    LEFT HEART CATHETERIZATION Left 3/17/2020    Procedure: CATHETERIZATION, HEART, LEFT;  Surgeon: Tyree Walters MD;  Location: [facility] CATH/EP LAB;  Service: Cardiology;  Laterality: Left;    STERNAL WIRES REMOVAL N/A 6/8/2020    Procedure: REMOVAL, STERNAL WIRE;  Surgeon: Doug Solitario MD;  Location: [facility] OR;  Service: Peripheral Vascular;  Laterality: N/A;    ULTRASOUND OF PROSTATE FOR VOLUME DETERMINATION  12/10/2018    Procedure: ULTRASOUND, PROSTATE, FOR VOLUME DETERMINATION;  Surgeon: Khushboo Abreu MD;  Location: [facility] OR;  Service: Urology;;     Family History   Problem Relation Age of Onset    Heart disease Mother     Diabetes Mother     Hyperlipidemia Father     Cancer Father      Social History     Tobacco Use    Smoking status: Every Day     Current packs/day: 0.00     Average packs/day: 3.0 packs/day for 30.0 years (90.0 ttl pk-yrs)     Types: Cigarettes     Start date: 4/4/1990     Last attempt to quit: 4/4/2020     Years since quitting: 3.7    Smokeless tobacco: Never   Substance Use Topics    Alcohol use: Not Currently    Drug use: Yes     Frequency: 1.0 times per week     Types: Marijuana      Review of Systems   Constitutional:  Negative for chills and fever.   HENT:  Negative for sore throat.    Eyes:  Negative for photophobia and visual disturbance.   Respiratory:  Positive for shortness of breath.    Cardiovascular:  Positive for chest pain.   Gastrointestinal:  Negative for abdominal pain and vomiting.   Genitourinary:  Negative for dysuria.   Musculoskeletal:  Negative for joint swelling.   Skin:  Negative for rash.   Neurological:  Negative for weakness and headaches.   Psychiatric/Behavioral:  Negative for confusion.        Physical Exam     Initial Vitals [12/25/23 1836]   BP Pulse Resp Temp SpO2   (!) 157/71 (!) 53 (!) 22 97.7 °F (36.5 °C) (!) 94 %      MAP       --         Physical Exam    Nursing note and vitals reviewed.  Constitutional: He is not diaphoretic. No distress.   HENT:   Head: Normocephalic and atraumatic.   Eyes: Conjunctivae are normal.   Neck:   Normal range of motion.  Cardiovascular:  Regular rhythm.           Pulmonary/Chest: Breath sounds normal.   Abdominal: Abdomen is soft. There is no abdominal tenderness.   Musculoskeletal:         General: Normal range of motion.      Cervical back: Normal range of motion.     Neurological: He is alert. He has normal strength. No cranial nerve deficit or sensory deficit.   Skin: No rash noted.   Psychiatric:   Alert, anxious         ED Course   Critical Care    Date/Time: 12/25/2023 8:02 PM    Performed by: Mike Mckeon MD  Authorized by: Mike Mckeon MD  Direct patient critical care time: 15 minutes  Additional history critical care time: 5 minutes  Ordering / reviewing critical care time: 5 minutes  Documentation critical care time: 5 minutes  Consulting other physicians critical care time: 5 minutes  Total critical care time (exclusive of procedural time) : 35 minutes        Labs Reviewed   CBC W/ AUTO DIFFERENTIAL - Abnormal; Notable for the following components:       Result Value    WBC 12.71 (*)     MCH 31.1  (*)     Gran # (ANC) 8.5 (*)     All other components within normal limits   COMPREHENSIVE METABOLIC PANEL - Abnormal; Notable for the following components:    Sodium 128 (*)     Glucose 288 (*)     Alkaline Phosphatase 54 (*)     AST 44 (*)     All other components within normal limits   TROPONIN I HIGH SENSITIVITY - Abnormal; Notable for the following components:    Troponin I High Sensitivity 72855.2 (*)     All other components within normal limits   POCT GLUCOSE - Abnormal; Notable for the following components:    POC Glucose 285 (*)     All other components within normal limits   MAGNESIUM   PROTIME-INR   B-TYPE NATRIURETIC PEPTIDE   TROPONIN I HIGH SENSITIVITY          Imaging Results              X-Ray Chest AP Portable (Final result)  Result time 12/25/23 19:23:46      Final result by Francois Nichols MD (12/25/23 19:23:46)                   Narrative:      EXAM: XR CHEST AP PORTABLE    HISTORY: chest pain    COMPARISON:Chest x-ray dated 12/22/2023    FINDINGS: The lungs are fairly well-expanded and appear free of focal infiltrates. There are no pleural effusions. The heart is borderline enlarged. There are stigmata of previous coronary bypass procedure.    IMPRESSION:   No evidence of acute disease within the chest.    Electronically signed by:  Francois Nichols MD  12/25/2023 07:23 PM CST Workstation: [id number]                                     Medications   nitroGLYCERIN in 5 % dextrose 50 mg/250 mL (200 mcg/mL) infusion (50 mcg/min Intravenous New Bag 12/25/23 1857)   aspirin chewable tablet 324 mg (324 mg Oral Given 12/25/23 1852)   morphine injection 4 mg (4 mg Intravenous Given 12/25/23 1905)   ondansetron injection 4 mg (4 mg Intravenous Given 12/25/23 1900)   morphine injection 2 mg (2 mg Intravenous Given 12/25/23 1956)   0.9%  NaCl infusion (500 mLs Intravenous New Bag 12/25/23 1957)     Medical Decision Making  Patient presents with chest pain.  Long history of cardiac disease.  Symptoms  consistent with myocardial ischemia.  EKG obtained.  Patient does have ST elevation in lead III with no definite elevation greater than 1 mm in contiguous leads.  Chest x-ray with no acute cardiopulmonary process.  Repeat EKG with similar appearance.  Discussed with on-call cardiology with Dr. Botello, .  He requested to consult on-call interventionalist from other group.  Dr. Valverde immediately consulted.  He reviewed case and EKG.  Given no clear ST elevation in contiguous leads in the setting of atrial flutter, he request symptomatic treatment and obtain troponin.  Patient started on nitroglycerin and morphine.  There was improvement in the pain.  Troponin returns at over 10,000.  Patient reports his pain is returning.  Giving ongoing pain and elevated troponin Dr. Valverde called.  He will take for left heart catheterization.  Last dose of Eliquis was this morning.    Amount and/or Complexity of Data Reviewed  Labs:  Decision-making details documented in ED Course.  Radiology:  Decision-making details documented in ED Course.  ECG/medicine tests: independent interpretation performed.     Details: Multiple EKGs obtained.  Patient does have atrial flutter.  Patient does have ST depression in lateral leads.  Concerning is apparent 1-2 mm ST-elevation in lead III.  There is no other ST elevation greater than 1 mm in leads II or aVF.    Risk  OTC drugs.  Prescription drug management.                                      Clinical Impression:  Final diagnoses:  [R07.9] Chest pain  [I21.9] Myocardial infarction, unspecified MI type, unspecified artery          ED Disposition Condition    Admit Stable                Mike Mckeon MD  12/25/23 2003

## 2023-12-26 NOTE — ASSESSMENT & PLAN NOTE
2/2 STEMI/ICM  Mehanical support: IABP @ 1:1  Inotrope/pressor: levophed. Wean to maintain MAP > 60  Adequate urine output  Check hemodynamics    - on propofol  - on lasix gtt 20  - on levophed 0.01  - on fentanyl gtt  - started on precedex    - start heparin gtt   - continue diuresing  - Pending echo   - central line. Monitor hemodynamics   - EP evaluated patient and will consider implantation of CRT-D once off pressors and off IABP, as well as extubation

## 2023-12-26 NOTE — PLAN OF CARE
[facility] - Cardiac Intensive Care  Initial Discharge Assessment       Primary Care Provider: Venkata Mclaughlin MD    Admission Diagnosis: STEMI (ST elevation myocardial infarction) [I21.3]    Admission Date: 12/26/2023  Expected Discharge Date: 12/30/2023    Transition of Care Barriers: Underinsured    Payor: MEDICAID / Plan: [employer] [facility] ([employer]) / Product Type: Managed Medicaid /     Extended Emergency Contact Information  Primary Emergency Contact: TIAGO BAY  Mobile Phone: [phone]  Relation: Sister  Secondary Emergency Contact: Magdaleno Chi  Home Phone: [phone]  Mobile Phone: [phone]  Relation: Son   needed? No    Discharge Plan A: Home with family  Discharge Plan B: Rehab      [facility] - [place] - [address]  [address] [address]  Phone: [phone] Fax: [phone]      Initial Assessment (most recent)       Adult Discharge Assessment - 12/26/23 0931          Discharge Assessment    Assessment Type Discharge Planning Assessment     Confirmed/corrected address, phone number and insurance Yes     Confirmed Demographics Correct on Facesheet     Source of Information family     If unable to respond/provide information was family/caregiver contacted? Yes     Contact Name/Number Magdaelno Chi (son) [phone]     Communicated PEDRO with patient/caregiver Date not available/Unable to determine     Reason For Admission STEMI     People in Home child(tonya), adult     Facility Arrived From: [facility]     Do you expect to return to your current living situation? Yes     Do you have help at home or someone to help you manage your care at home? Yes     Who are your caregiver(s) and their phone number(s)? Magdaleno Chi (son) [phone]     Prior to hospitilization cognitive status: Unable to Assess   Patient intubated    Current cognitive status: Unable to Assess   Patient intubated    Equipment Currently Used  at Home blood pressure machine;CPAP;glucometer     Readmission within 30 days? Yes     Patient currently being followed by outpatient case management? No     Do you currently have service(s) that help you manage your care at home? No     Do you take prescription medications? Yes     Do you have prescription coverage? Yes     Coverage [facility] ([employer])     Do you have any problems affording any of your prescribed medications? No     Is the patient taking medications as prescribed? yes     Who is going to help you get home at discharge? Magdaleno Chi (son) [phone]     How do you get to doctors appointments? family or friend will provide     Are you on dialysis? No     Do you take coumadin? No     Discharge Plan A Home with family     Discharge Plan B Rehab     DME Needed Upon Discharge  other (see comments)   tbd    Discharge Plan discussed with: Adult children     Transition of Care Barriers Underinsured        Physical Activity    On average, how many days per week do you engage in moderate to strenuous exercise (like a brisk walk)? Patient unable to answer     On average, how many minutes do you engage in exercise at this level? Patient unable to answer        Financial Resource Strain    How hard is it for you to pay for the very basics like food, housing, medical care, and heating? Not hard at all   patient intubated and son answered some of these questions.       Housing Stability    In the last 12 months, was there a time when you were not able to pay the mortgage or rent on time? No     In the last 12 months, how many places have you lived? 1     In the last 12 months, was there a time when you did not have a steady place to sleep or slept in a shelter (including now)? No        Transportation Needs    In the past 12 months, has lack of transportation kept you from medical appointments or from getting medications? No     In the past 12 months, has lack of transportation kept you  from meetings, work, or from getting things needed for daily living? No        Food Insecurity    Within the past 12 months, you worried that your food would run out before you got the money to buy more. Never true     Within the past 12 months, the food you bought just didn't last and you didn't have money to get more. Never true        Stress    Do you feel stress - tense, restless, nervous, or anxious, or unable to sleep at night because your mind is troubled all the time - these days? Patient unable to answer   patient intubated       Social Connections    In a typical week, how many times do you talk on the phone with family, friends, or neighbors? More than three times a week     How often do you get together with friends or relatives? More than three times a week     How often do you attend Taoist or Sabianism services? More than 4 times per year     Do you belong to any clubs or organizations such as Taoist groups, unions, fraternal or athletic groups, or school groups? Yes     How often do you attend meetings of the clubs or organizations you belong to? Patient unable to answer     Are you , , , , never , or living with a partner?         Alcohol Use    Q1: How often do you have a drink containing alcohol? Never     Q2: How many drinks containing alcohol do you have on a typical day when you are drinking? Patient does not drink     Q3: How often do you have six or more drinks on one occasion? Never        OTHER    Name(s) of People in Home Magdaleno Chi (son) [phone]                      CM met with the patient at the bedside, patient intubated and called son todiscussed the discharge plan. Placed  the discharge booklet at the bedside and placed contact numbers on the white board in the room.   Patient's (Magdaleno Chi (son) [phone] ) verified his name , , PCP, Insurance and Pharmacy . Stated he lives with Magdaleno Chi (son)  [phone]) in a single story house and has just the threshold to point of entry . He is not on coumadin nor is he a dialysis patient . DME's include:a CPAP , Glucometer and a blood pressure cuff.  Son stated he takes  medication as prescribed and has no problems getting  medication.  Son request BSD for any new medications.       Discharge Plan A and Plan B have been determined by review of patient's clinical status, future medical and therapeutic needs, and coverage/benefits for post-acute care in coordination with multidisciplinary team members.      Antoni Jo RN         [phone]

## 2023-12-26 NOTE — SUBJECTIVE & OBJECTIVE
Past Medical History:   Diagnosis Date    Anticoagulant long-term use     Atrial fibrillation 2018    BPH (benign prostatic hyperplasia)     Cataract     COPD (chronic obstructive pulmonary disease)     Coronary artery disease     stents    CVD (cardiovascular disease)     Diabetes mellitus     Erythema multiforme     AKA Denton Edmondson Syndrome    Hypertension     Infected incision     MI (myocardial infarction)     per pt he has had 4     ROSAMARIA on CPAP     RLS (restless legs syndrome)     Perez-Denton syndrome        Past Surgical History:   Procedure Laterality Date    CORONARY ANGIOGRAPHY INCLUDING BYPASS GRAFTS WITH CATHETERIZATION OF LEFT HEART N/A 9/1/2022    Procedure: ANGIOGRAM, CORONARY, INCLUDING BYPASS GRAFT, WITH LEFT HEART CATHETERIZATION;  Surgeon: Paul Botello MD;  Location: [facility] CATH/EP LAB;  Service: Cardiology;  Laterality: N/A;    CORONARY ARTERY BYPASS GRAFT (CABG) N/A 4/7/2020    Procedure: CORONARY ARTERY BYPASS GRAFT (CABG)- Excision of left atrial appendage;  Surgeon: Doug Solitario MD;  Location: [facility] OR;  Service: Cardiothoracic;  Laterality: N/A;    CORONARY STENT PLACEMENT      WILSON MAZE PROCEDURE N/A 4/7/2020    Procedure: WILSON MAZE PROCEDURE- Attempted;  Surgeon: Doug Solitario MD;  Location: [facility] OR;  Service: Cardiothoracic;  Laterality: N/A;    CYSTOSCOPY N/A 12/10/2018    Procedure: CYSTOSCOPY;  Surgeon: Khushboo Abreu MD;  Location: [facility] OR;  Service: Urology;  Laterality: N/A;    ENDOSCOPIC HARVEST OF VEIN Left 4/7/2020    Procedure: HARVEST-VEIN-ENDOVASCULAR;  Surgeon: Doug Solitario MD;  Location: [facility] OR;  Service: Cardiothoracic;  Laterality: Left;    INCISION OF PERIRECTAL ABSCESS Bilateral 9/1/2023    Procedure: INCISION, ABSCESS, PERIRECTAL;  Surgeon: Brayan Spears MD;  Location: [facility] OR;  Service: General;  Laterality: Bilateral;  stirrups    LEFT HEART CATHETERIZATION Left 3/17/2020    Procedure: CATHETERIZATION, HEART, LEFT;   Surgeon: Tyree Walters MD;  Location: [facility] CATH/EP LAB;  Service: Cardiology;  Laterality: Left;    STERNAL WIRES REMOVAL N/A 6/8/2020    Procedure: REMOVAL, STERNAL WIRE;  Surgeon: Doug Solitario MD;  Location: [facility] OR;  Service: Peripheral Vascular;  Laterality: N/A;    ULTRASOUND OF PROSTATE FOR VOLUME DETERMINATION  12/10/2018    Procedure: ULTRASOUND, PROSTATE, FOR VOLUME DETERMINATION;  Surgeon: Khushboo Abreu MD;  Location: [facility] OR;  Service: Urology;;       Review of patient's allergies indicates:   Allergen Reactions    Pesticide Dermatitis and Rash       Current Facility-Administered Medications on File Prior to Encounter   Medication    [COMPLETED] 0.9%  NaCl infusion    [COMPLETED] aspirin chewable tablet 324 mg    [COMPLETED] furosemide injection 60 mg    [COMPLETED] morphine injection 2 mg    [COMPLETED] morphine injection 4 mg    [COMPLETED] NORepinephrine bitartrate-D5W 4 mg/250 mL (16 mcg/mL) infusion Soln    [COMPLETED] ondansetron injection 4 mg    [COMPLETED] propofoL (DIPRIVAN) 10 mg/mL infusion    [DISCONTINUED] 0.9%  NaCl infusion    [DISCONTINUED] aspirin chewable tablet 81 mg    [DISCONTINUED] atorvastatin tablet 80 mg    [DISCONTINUED] atropine injection    [DISCONTINUED] chlorhexidine 0.12 % solution 15 mL    [DISCONTINUED] etomidate (AMIDATE) 2 mg/mL injection    [DISCONTINUED] fentaNYL 50 mcg/mL injection    [DISCONTINUED] heparin (porcine) injection    [DISCONTINUED] heparin (porcine) injection    [DISCONTINUED] heparin (porcine) injection    [DISCONTINUED] heparin (porcine) injection    [DISCONTINUED] heparin 25,000 units in dextrose 5% (100 units/ml) IV bolus from bag - ADDITIONAL PRN BOLUS - 30 units/kg (max bolus 4000 units)    [DISCONTINUED] heparin 25,000 units in dextrose 5% (100 units/ml) IV bolus from bag - ADDITIONAL PRN BOLUS - 60 units/kg (max bolus 4000 units)    [DISCONTINUED] heparin 25,000 units in dextrose 5% 250 mL (100 units/mL) infusion (heparin  infusion - NO NOMOGRAM)    [DISCONTINUED] heparin 25,000 units in dextrose 5% 250 mL (100 units/mL) infusion LOW INTENSITY nomogram - [facility]    [DISCONTINUED] LIDOcaine (PF) 10 mg/ml (1%) injection    [DISCONTINUED] midazolam (VERSED) 1 mg/mL injection    [DISCONTINUED] mupirocin 2 % ointment    [DISCONTINUED] nitroGLYCERIN in 5 % dextrose 50 mg/250 mL (200 mcg/mL) infusion    [DISCONTINUED] NORepinephrine injection    [DISCONTINUED] PHENYLephrine injection    [DISCONTINUED] rocuronium 10 mg/mL injection    [DISCONTINUED] succinylcholine chloride 200 mg/10 mL (20 mg/mL) IV Syringe    [DISCONTINUED] ticagrelor tablet 90 mg    [DISCONTINUED] ticagrelor tablet    [DISCONTINUED] tirofiban 12.5 mg in NS 12.5 MG/ 250 mL bolus from bag     Current Outpatient Medications on File Prior to Encounter   Medication Sig    apixaban (ELIQUIS) 5 mg Tab Take 1 tablet (5 mg total) by mouth 2 (two) times daily.    clonazePAM (KLONOPIN) 1 MG tablet Take 1 mg by mouth 2 (two) times daily.    cyclobenzaprine (FLEXERIL) 10 MG tablet Take 10 mg by mouth every evening.    gabapentin (NEURONTIN) 600 MG tablet Take 600 mg by mouth As instructed (neuropathic pain). Takes 1 tablet in the morning and 2 tablets at bedtime    isosorbide mononitrate (IMDUR) 30 MG 24 hr tablet Take 1 tablet (30 mg total) by mouth once daily.    JARDIANCE 25 mg tablet Take 25 mg by mouth once daily.    lactobacillus acidophilus & bulgar (LACTINEX) 100 million cell packet Take 1 packet (1 each total) by mouth 2 (two) times daily.    LANTUS SOLOSTAR U-100 INSULIN glargine 100 units/mL (3mL) SubQ pen Inject 80 Units into the skin 2 (two) times daily.    lisinopriL (PRINIVIL,ZESTRIL) 20 MG tablet Take 20 mg by mouth once daily.    metFORMIN (GLUCOPHAGE) 1000 MG tablet Take 1 tablet (1,000 mg total) by mouth 2 (two) times daily with meals.    methocarbamoL (ROBAXIN) 500 MG Tab Take 500 mg by mouth every evening.    metoprolol succinate (TOPROL-XL) 100 MG 24 hr tablet Take  "100 mg by mouth 2 (two) times daily.    nitroGLYCERIN (NITROSTAT) 0.4 MG SL tablet Place 0.4 mg under the tongue every 5 (five) minutes as needed for Chest pain.    oxyCODONE-acetaminophen (PERCOCET)  mg per tablet Take 1 tablet by mouth 2 (two) times daily as needed for Pain.    pen needle, diabetic 31 gauge x 1/4" Ndle USE 1 TWICE DAILY FOR BLOOD SUGAR GREATER THAN 200    ranolazine (RANEXA) 1,000 mg Tb12 Take 1 tablet (1,000 mg total) by mouth 2 (two) times daily.    simvastatin (ZOCOR) 10 MG tablet Take 10 mg by mouth once daily.    ticagrelor (BRILINTA) 90 mg tablet Take 1 tablet (90 mg total) by mouth 2 (two) times daily.     Family History       Problem Relation (Age of Onset)    Cancer Father    Diabetes Mother    Heart disease Mother    Hyperlipidemia Father          Tobacco Use    Smoking status: Every Day     Current packs/day: 0.00     Average packs/day: 3.0 packs/day for 30.0 years (90.0 ttl pk-yrs)     Types: Cigarettes     Start date: 4/4/1990     Last attempt to quit: 4/4/2020     Years since quitting: 3.7    Smokeless tobacco: Never   Substance and Sexual Activity    Alcohol use: Not Currently    Drug use: Yes     Frequency: 1.0 times per week     Types: Marijuana    Sexual activity: Not Currently     Review of Systems   Unable to perform ROS: Intubated     Objective:     Vital Signs (Most Recent):  Temp: 98.4 °F (36.9 °C) (12/26/23 0445)  Pulse: 90 (12/26/23 0742)  Resp: (!) 26 (12/26/23 0742)  BP: (!) 89/50 (12/26/23 0700)  SpO2: 98 % (12/26/23 0742) Vital Signs (24h Range):  Temp:  [97.7 °F (36.5 °C)-98.7 °F (37.1 °C)] 98.4 °F (36.9 °C)  Pulse:  [] 90  Resp:  [6-34] 26  SpO2:  [77 %-100 %] 98 %  BP: ()/(37-71) 89/50  Arterial Line BP: ()/(37-99) 49/43     Weight: 118.9 kg (262 lb 2 oz)  Body mass index is 36.56 kg/m².    SpO2: 98 %       No intake or output data in the 24 hours ending 12/26/23 0800    Lines/Drains/Airways       Central Venous Catheter Line  Duration        "      Introducer 12/26/23 0630 Internal Jugular Right <1 day              Drain  Duration                  Urethral Catheter 12/26/23 0745 Silicone 16 Fr. <1 day              Airway  Duration                  Airway - Non-Surgical -- days       Airway Anesthesia 12/26/23 <1 day              Line  Duration                  IABP 12/25/23 2340  40 mL <1 day         Pacer Wires 12/26/23 0030 <1 day         Pacer Wires 12/26/23 0652 <1 day         Pacer Wires 12/26/23 0702 <1 day              Peripheral Intravenous Line  Duration                  Peripheral IV - Single Lumen 12/25/23 1830 18 G Left Antecubital <1 day         Peripheral IV - Single Lumen 12/25/23 1856 18 G Anterior;Distal;Right Forearm <1 day         Sheath 12/25/23 2315 Left <1 day         Sheath 12/25/23 2316 Left anterior <1 day                     Physical Exam  Constitutional:       Appearance: He is diaphoretic.      Comments: intubated   Cardiovascular:      Comments: Paced rate. Cold extremities. No JVD.  Pulmonary:      Comments: Mechanically ventilated  Abdominal:      General: Abdomen is flat.      Palpations: Abdomen is soft.   Neurological:      Comments: sedated          Significant Labs: All pertinent lab results from the last 24 hours have been reviewed.

## 2023-12-26 NOTE — NURSING
Spoke with FANNY Valverde regarding  intubating patient for transport per accepting MD request. Orders received to consult anesthesia to intubate prior to transfer.  Transfer center notified of status change and pending intubation.

## 2023-12-26 NOTE — PLAN OF CARE
57 yo patient with CAD s/p CABG and recent PCI to proximal and mid LCX, COPD, Afib on eliquis, diabetes, current smoker, HTN. Admitted to CCU for higher level of care. Presented with 2-day chest pain and diaphoresis.     ST elevations and found to have direct in-stent thrombosis while at the cath lab and simultaneous atrial flutter. IABP placed in the left femoral artery. While on IABP placement had a complete heart block with left femoral TVP placed. Required BiPAP post-operatively and was intubated.    Relevant labs on admission, troponin 21.984, lactate 1.6, EKG with sinus bradycardia with 1st degree A-V block. CXR with bilateral lung opacities. ABG PH 7.38, PCO2 34, HCO3 20.6, BE -4, FiO2.       Plan   - on fentanyl gtt   - azithromycin 500 daily   - ceftriaxone 1g daily  - heparin 5000 U Q8H  - pending echo   - place a-line  - decreased TVP pace to 20   - EP consulted for PPM   - consider extubation on 12/27

## 2023-12-26 NOTE — ASSESSMENT & PLAN NOTE
#CHB with wide fractionated ventricular escape likely from non revascularizable STEMI    Left dominant coronary system  Inferior STEMI with CW a flutter 12/25, LIMA-LAD and SVG -OM are down as well, Lcx couldn't be revasuclarized  Subsequently developed CHB  EF 20-25%    Plan  -will need CRT-D vs CRT-P before discharge (CRT because EF < 40% and charlotteley will pace 100% of the time)  -too sick to go to the EP lab at this time, please re contact EP doctors once off antibiotics, off MCS and pressors and extubated  -continue TVP meanwhile with daily threshold checks

## 2023-12-26 NOTE — NURSING
Pt hypotensive with MAPs 55-58, MD Pereira at bedside, instructed not to titrate levo at this time. Will continue to monitor.

## 2023-12-26 NOTE — PLAN OF CARE
No acute events throughout day, VS and assessment per flow sheet, see below for updates:    Pulmonary: Patient remains intubated, settings as follows: SIMV 450/22/80%/8+. Lungs coarse/diminished.    Cardiovascular: Patient 100% V Paced. HR @ 80. BP labile, levo titrated to maintain MAP >65 per Dr. Jarvis. IABP left fem 1:1 EKG Auto. No alarms.     Neurological: Responds to pain. Pupils 2mm brisk    Gastrointestinal: No BM. OGT to LIWS, 400mL of coffee ground/bile output. BID protonix added.    Genitourinary: Cruz exchanged. UO  mL/hr.    Endocrine: Accu checks per order    Skin/Bath: See flowsheets for chairting   Date of last CHG bath given: 12/26/23 @ 0800    Infusions: Prop @ 50, Fent @ 75, Levo @ 0.06, Lasix @ 20    Patient progressing towards goals as tolerated, plan of care communicated and reviewed with Magdaleno Cunningham and family, all concerns addressed, will continue to monitor.     Micheline Chambers RN

## 2023-12-26 NOTE — NURSING
Pt arrived via transport from OSH. Pt on ventilator, VSS. Balloon pump, left fem, 1:1 in place, sluggish with dampened waveform. TVP rate of 70 with levo going to sheath. Pt appears slightly sedated. Follows commands. Cruz not exchanged due to previous history of BPH, MD Pereira approved. Will continue to monitor.

## 2023-12-26 NOTE — HPI
57 y/o M with known  CAD s/p CABG and recent PCI to proximal and mid Lcx by Dr. Botello on 12/22/23, COPD, atrial fibrillation on eliquis, diabetes, current smoker (reportedly 3PPD), hypertension who presented to [facility] facility with chest pain which has been present for 2 days. The pain radiated to the left shoulder, was associated with diaphoresis and became severe today prompting ER visit. He was found to have ST elevations and was taken to the cath lab directly and was found to have in-stent thrombosis. Of note he was also in atrial flutter. Procedure note not available but per report no intervention was performed and a IABP was placed in the left femoral artery. During the procedure he had intermittent complete heart block so a left femoral TVP was also placed. He was requiring BiPAP post-operatively. It was decided to transfer to higher level of care and he was intubated to allow safe transportation. He arrived on levophed 0.08 and propofol with IABP 1:1.

## 2023-12-26 NOTE — NURSING
Patient arrived from Cath lab via bed, cath lab team at bedside S/P (L) heart cath, IABP placement and pacemake. Patient on NRB with sats of 78 % on arrival, confirmed with ABG results of Pao2 of 42. BIPAP ordered and CXR completed.  Mentation intact and   requesting to see son. 7.0  FR 40 cc balloon pump patent to left groin and augmentation 100 %.  Hemodynamics as charted. Arterial and venous sheath patent to femoral.  Venous sheath with levophed  and arterial sheath transduced zeroed and square wave form confirmed. Doppler pulses patent to left foot at this time. Patient awaiting transfer to [facility] due to deterioration of cardiogenic status.     0050- Transfer center called and updated with room available. Report called to Julius and status updated with son.     0145- Transfer center called and notified [facility] staff that receiving MD would like patient to be intubated for transfer. Dr. Her notified of request. Dr. Valverde spoke to receiving MS and decided it was in patient's best interest to transfer patient via ground intubated and sedated. Anesthesia provider notified.    0220- Anesthesia here and patient intubated with # 8.0 24 cm@ lip secured with commercial tube schneider. CXR confirmed placement. Flight team at bedside  and status given.    0320- Updated receiving nurse of intubation and Dr. Cortes request to keep heparin gtt off. Patient transferred to [facility] with balloon pump at 1:1 triggering off ECG leads and sedation per Dr. Cortes;s request. Son at bedside and status given prior to transfer.

## 2023-12-26 NOTE — H&P
Patient Name: Magdaleno Cunningham  MRN: [id number]  Patient Class: IP- Inpatient   Admission Date: 12/25/2023  6:34 PM  Attending Physician: Magdaleno Juarez MD  Primary Care Provider: Venkata Mclaughlin MD  Face-to-Face encounter date: 12/26/2023  Code Status: Full  MPOA:  Chief Complaint: Chest Pain (Pt has left chest and left arm pain he gets pain in the middle then he panics and gets sob. Pt had two stints placed on Friday. )        HISTORY OF PRESENT ILLNESS:     Magdaleno Cunningham is a 58 y.o.  male with known history of CAD status post CABG and recent PCI to proximal and mid circumflex by Dr. Botello on 12/22/23, COPD, atrial fibrillation on anticoagulation, diabetes, hypertension  who came in with chest pain that has been present intermittently since discharge, was relieved by nitro which he has been taking regularly for the last couple of days.  The pain radiated to left shoulder, was associated with diaphoresis and became severe today prompting ER visit.  Patient was taken to cath lab directly from ER before I was able to see him. No further hx of ROS obtained.     REVIEW OF SYSTEMS:     10 Point Review of System was performed and was found to be negative except for that mentioned already in the HPI above.     PAST MEDICAL HISTORY:     Past Medical History:   Diagnosis Date    Anticoagulant long-term use     Atrial fibrillation 2018    BPH (benign prostatic hyperplasia)     Cataract     COPD (chronic obstructive pulmonary disease)     Coronary artery disease     stents    CVD (cardiovascular disease)     Diabetes mellitus     Erythema multiforme     AKA Denton Edmondson Syndrome    Hypertension     Infected incision     MI (myocardial infarction)     per pt he has had 4     ROSAMARIA on CPAP     RLS (restless legs syndrome)     Perez-Denton syndrome        PAST SURGICAL HISTORY:     Past Surgical History:   Procedure Laterality Date    CORONARY ANGIOGRAPHY INCLUDING BYPASS GRAFTS WITH CATHETERIZATION OF  LEFT HEART N/A 9/1/2022    Procedure: ANGIOGRAM, CORONARY, INCLUDING BYPASS GRAFT, WITH LEFT HEART CATHETERIZATION;  Surgeon: Paul Botello MD;  Location: [facility] CATH/EP LAB;  Service: Cardiology;  Laterality: N/A;    CORONARY ARTERY BYPASS GRAFT (CABG) N/A 4/7/2020    Procedure: CORONARY ARTERY BYPASS GRAFT (CABG)- Excision of left atrial appendage;  Surgeon: Doug Solitario MD;  Location: [facility] OR;  Service: Cardiothoracic;  Laterality: N/A;    CORONARY STENT PLACEMENT      WILSON MAZE PROCEDURE N/A 4/7/2020    Procedure: WILSON MAZE PROCEDURE- Attempted;  Surgeon: Doug Solitario MD;  Location: [facility] OR;  Service: Cardiothoracic;  Laterality: N/A;    CYSTOSCOPY N/A 12/10/2018    Procedure: CYSTOSCOPY;  Surgeon: Khushboo Abreu MD;  Location: [facility];  Service: Urology;  Laterality: N/A;    ENDOSCOPIC HARVEST OF VEIN Left 4/7/2020    Procedure: HARVEST-VEIN-ENDOVASCULAR;  Surgeon: Doug Solitario MD;  Location: [facility];  Service: Cardiothoracic;  Laterality: Left;    INCISION OF PERIRECTAL ABSCESS Bilateral 9/1/2023    Procedure: INCISION, ABSCESS, PERIRECTAL;  Surgeon: Brayan Spears MD;  Location: [facility] OR;  Service: General;  Laterality: Bilateral;  stirrups    LEFT HEART CATHETERIZATION Left 3/17/2020    Procedure: CATHETERIZATION, HEART, LEFT;  Surgeon: Tyree Walters MD;  Location: [facility] CATH/EP LAB;  Service: Cardiology;  Laterality: Left;    STERNAL WIRES REMOVAL N/A 6/8/2020    Procedure: REMOVAL, STERNAL WIRE;  Surgeon: Doug Solitario MD;  Location: [facility] OR;  Service: Peripheral Vascular;  Laterality: N/A;    ULTRASOUND OF PROSTATE FOR VOLUME DETERMINATION  12/10/2018    Procedure: ULTRASOUND, PROSTATE, FOR VOLUME DETERMINATION;  Surgeon: Khushboo Abreu MD;  Location: [facility];  Service: Urology;;       ALLERGIES:   Pesticide    FAMILY HISTORY:     Family History   Problem Relation Age of Onset    Heart disease Mother     Diabetes Mother     Hyperlipidemia Father      Cancer Father        SOCIAL HISTORY:     Social History     Tobacco Use    Smoking status: Every Day     Current packs/day: 0.00     Average packs/day: 3.0 packs/day for 30.0 years (90.0 ttl pk-yrs)     Types: Cigarettes     Start date: 4/4/1990     Last attempt to quit: 4/4/2020     Years since quitting: 3.7    Smokeless tobacco: Never   Substance Use Topics    Alcohol use: Not Currently        Social History     Substance and Sexual Activity   Sexual Activity Not Currently        HOME MEDICATIONS:     Prior to Admission medications    Medication Sig Start Date End Date Taking? Authorizing Provider   apixaban (ELIQUIS) 5 mg Tab Take 1 tablet (5 mg total) by mouth 2 (two) times daily. 12/24/23   Paul Botello MD   clonazePAM (KLONOPIN) 1 MG tablet Take 1 mg by mouth 2 (two) times daily.    Provider, Historical   cyclobenzaprine (FLEXERIL) 10 MG tablet Take 10 mg by mouth every evening. 8/15/22   Provider, Historical   gabapentin (NEURONTIN) 600 MG tablet Take 600 mg by mouth As instructed (neuropathic pain). Takes 1 tablet in the morning and 2 tablets at bedtime    Provider, Historical   isosorbide mononitrate (IMDUR) 30 MG 24 hr tablet Take 1 tablet (30 mg total) by mouth once daily. 12/16/23 12/15/24  Christopher Hastings MD   JARDIANCE 25 mg tablet Take 25 mg by mouth once daily. 6/25/21   Provider, Historical   lactobacillus acidophilus & bulgar (LACTINEX) 100 million cell packet Take 1 packet (1 each total) by mouth 2 (two) times daily. 9/11/23   Sourav Owens MD   LANTUS SOLOSTAR U-100 INSULIN glargine 100 units/mL (3mL) SubQ pen Inject 80 Units into the skin 2 (two) times daily. 3/6/20   Provider, Historical   lisinopriL (PRINIVIL,ZESTRIL) 20 MG tablet Take 20 mg by mouth once daily.    Provider, Historical   metFORMIN (GLUCOPHAGE) 1000 MG tablet Take 1 tablet (1,000 mg total) by mouth 2 (two) times daily with meals. 12/24/23   Paul Botello MD   methocarbamoL (ROBAXIN) 500 MG Tab Take 500 mg by mouth every  "evening. 6/19/23   Provider, Historical   metoprolol succinate (TOPROL-XL) 100 MG 24 hr tablet Take 100 mg by mouth 2 (two) times daily.    Provider, Historical   nitroGLYCERIN (NITROSTAT) 0.4 MG SL tablet Place 0.4 mg under the tongue every 5 (five) minutes as needed for Chest pain.    Provider, Historical   oxyCODONE-acetaminophen (PERCOCET)  mg per tablet Take 1 tablet by mouth 2 (two) times daily as needed for Pain. 8/13/22   Provider, Historical   pen needle, diabetic 31 gauge x 1/4" Ndle USE 1 TWICE DAILY FOR BLOOD SUGAR GREATER THAN 200 4/1/20   Provider, Historical   ranolazine (RANEXA) 1,000 mg Tb12 Take 1 tablet (1,000 mg total) by mouth 2 (two) times daily. 12/15/23 12/14/24  Christopher Hastings MD   simvastatin (ZOCOR) 10 MG tablet Take 10 mg by mouth once daily. 8/17/22   Provider, Historical   ticagrelor (BRILINTA) 90 mg tablet Take 1 tablet (90 mg total) by mouth 2 (two) times daily. 12/15/23 3/14/24  Christopher Hastings MD         PHYSICAL EXAM:     /67 (BP Location: Right arm)   Pulse 98   Temp 97.7 °F (36.5 °C) (Oral)   Resp (!) 24   Ht 5' 11" (1.803 m)   Wt 118.4 kg (261 lb)   SpO2 (!) 89%   BMI 36.40 kg/m²   Vitals Reviewed        EMERGENCY DEPARTMENT LABS AND IMAGING:     Labs Reviewed   CBC W/ AUTO DIFFERENTIAL - Abnormal; Notable for the following components:       Result Value    WBC 12.71 (*)     MCH 31.1 (*)     Gran # (ANC) 8.5 (*)     All other components within normal limits   COMPREHENSIVE METABOLIC PANEL - Abnormal; Notable for the following components:    Sodium 128 (*)     Glucose 288 (*)     Alkaline Phosphatase 54 (*)     AST 44 (*)     All other components within normal limits   TROPONIN I HIGH SENSITIVITY - Abnormal; Notable for the following components:    Troponin I High Sensitivity 34582.2 (*)     All other components within normal limits   POCT GLUCOSE - Abnormal; Notable for the following components:    POC Glucose 285 (*)     All other components within normal limits "   MAGNESIUM   PROTIME-INR       X-Ray Chest AP Portable   Final Result      X-Ray Chest AP Portable    (Results Pending)   X-Ray Chest AP Portable    (Results Pending)       ASSESSMENT & PLAN:     Magdaleno Cunningham is a 58 y.o. male admitted for    Active Hospital Problems    Diagnosis    *STEMI (ST elevation myocardial infarction)        Plan    STEMI, hx CABG  PCI on 12/22/23 with 2 stents placed  EKG with ST elevation in lead III, depression in lateral leads  Trop >10,000  Chest pain was not relieved with nitro infusion and morphine and he was taken to Cath lab by Dr. Valverde      Diet: NPO    DVT Prophylaxis: heparin and Encourage ambulation. OOB as tolerated.         This patient is high risk for life-threatening deterioration and death secondary to above comorbidities and need for IV treatment. This patient meets inpatient criteria.   _______________________________________________________________________________________________________________________________________________________________    INPATIENT LIST OF MEDICATIONS     Current Facility-Administered Medications:     aspirin chewable tablet 81 mg, 81 mg, Oral, Daily, Ashley Valverde MD    atorvastatin tablet 80 mg, 80 mg, Oral, QHS, Ashley Valverde MD    chlorhexidine 0.12 % solution 15 mL, 15 mL, Mouth/Throat, BID, Ashley Valverde MD    etomidate (AMIDATE) 2 mg/mL injection, , , ,     heparin 25,000 units in dextrose 5% (100 units/ml) IV bolus from bag - ADDITIONAL PRN BOLUS - 30 units/kg (max bolus 4000 units), 30 Units/kg (Adjusted), Intravenous, PRN, Ashley Valverde MD    heparin 25,000 units in dextrose 5% (100 units/ml) IV bolus from bag - ADDITIONAL PRN BOLUS - 60 units/kg (max bolus 4000 units), 43.2 Units/kg (Adjusted), Intravenous, PRN, Ashley Valverde MD    heparin 25,000 units in dextrose 5% 250 mL (100 units/mL) infusion (heparin infusion - NO NOMOGRAM), , , Continuous PRN, Ashley Valverde MD, Last  Rate: 14.2 mL/hr at 12/25/23 2312, 12 Units/kg/hr at 12/25/23 2312    heparin 25,000 units in dextrose 5% 250 mL (100 units/mL) infusion LOW INTENSITY nomogram - [facility], 12 Units/kg/hr (Adjusted), Intravenous, Continuous, Ashley Valverde MD, Stopped at 12/26/23 0141    mupirocin 2 % ointment, , Nasal, BID, Magdaleno Juarez MD    NORepinephrine injection, , , Continuous PRN, Ashley Valverde MD, Stopped at 12/26/23 0131    rocuronium 10 mg/mL injection, , , ,     succinylcholine chloride 200 mg/10 mL (20 mg/mL) IV Syringe, , , ,     ticagrelor tablet 90 mg, 90 mg, Oral, BID, Ashley Valverde MD      Scheduled Meds:   aspirin  81 mg Oral Daily    atorvastatin  80 mg Oral QHS    chlorhexidine  15 mL Mouth/Throat BID    etomidate        mupirocin   Nasal BID    rocuronium        succinylcholine chloride        ticagrelor  90 mg Oral BID     Continuous Infusions:   heparin (porcine) in 5 % dex 12 Units/kg/hr (12/25/23 2312)    heparin (porcine) in D5W Stopped (12/26/23 0141)    NORepinephrine Stopped (12/26/23 0131)     PRN Meds:.etomidate, heparin (PORCINE), heparin (PORCINE), heparin (porcine) in 5 % dex, NORepinephrine, rocuronium, succinylcholine chloride      Magdaleno Juarez  [facility] Hospitalist  12/26/2023

## 2023-12-27 PROBLEM — J18.9 PNEUMONIA: Status: ACTIVE | Noted: 2023-12-27

## 2023-12-27 LAB
ALBUMIN SERPL BCP-MCNC: 2.9 G/DL (ref 3.5–5.2)
ALBUMIN SERPL BCP-MCNC: 3 G/DL (ref 3.5–5.2)
ALLENS TEST: ABNORMAL
ALP SERPL-CCNC: 54 U/L (ref 55–135)
ALP SERPL-CCNC: 56 U/L (ref 55–135)
ALT SERPL W/O P-5'-P-CCNC: 17 U/L (ref 10–44)
ALT SERPL W/O P-5'-P-CCNC: 20 U/L (ref 10–44)
ANION GAP SERPL CALC-SCNC: 16 MMOL/L (ref 8–16)
ANION GAP SERPL CALC-SCNC: 16 MMOL/L (ref 8–16)
ANION GAP SERPL CALC-SCNC: 18 MMOL/L (ref 8–16)
APTT PPP: 25.4 SEC (ref 21–32)
APTT PPP: 25.8 SEC (ref 21–32)
AST SERPL-CCNC: 26 U/L (ref 10–40)
AST SERPL-CCNC: 29 U/L (ref 10–40)
BASOPHILS # BLD AUTO: 0.06 K/UL (ref 0–0.2)
BASOPHILS # BLD AUTO: 0.07 K/UL (ref 0–0.2)
BASOPHILS # BLD AUTO: 0.08 K/UL (ref 0–0.2)
BASOPHILS NFR BLD: 0.6 % (ref 0–1.9)
BASOPHILS NFR BLD: 0.6 % (ref 0–1.9)
BASOPHILS NFR BLD: 0.7 % (ref 0–1.9)
BILIRUB SERPL-MCNC: 0.5 MG/DL (ref 0.1–1)
BILIRUB SERPL-MCNC: 0.5 MG/DL (ref 0.1–1)
BNP SERPL-MCNC: 131 PG/ML (ref 0–99)
BUN SERPL-MCNC: 29 MG/DL (ref 6–20)
BUN SERPL-MCNC: 29 MG/DL (ref 6–20)
BUN SERPL-MCNC: 30 MG/DL (ref 6–20)
CALCIUM SERPL-MCNC: 8.8 MG/DL (ref 8.7–10.5)
CALCIUM SERPL-MCNC: 8.8 MG/DL (ref 8.7–10.5)
CALCIUM SERPL-MCNC: 9 MG/DL (ref 8.7–10.5)
CATH EF ESTIMATED: 45 %
CHLORIDE SERPL-SCNC: 98 MMOL/L (ref 95–110)
CHLORIDE SERPL-SCNC: 99 MMOL/L (ref 95–110)
CHLORIDE SERPL-SCNC: 99 MMOL/L (ref 95–110)
CO2 SERPL-SCNC: 18 MMOL/L (ref 23–29)
CO2 SERPL-SCNC: 19 MMOL/L (ref 23–29)
CO2 SERPL-SCNC: 19 MMOL/L (ref 23–29)
CREAT SERPL-MCNC: 2.4 MG/DL (ref 0.5–1.4)
CREAT SERPL-MCNC: 2.4 MG/DL (ref 0.5–1.4)
CREAT SERPL-MCNC: 2.9 MG/DL (ref 0.5–1.4)
DELSYS: ABNORMAL
DIFFERENTIAL METHOD BLD: ABNORMAL
EOSINOPHIL # BLD AUTO: 0.2 K/UL (ref 0–0.5)
EOSINOPHIL NFR BLD: 1.6 % (ref 0–8)
EOSINOPHIL NFR BLD: 1.8 % (ref 0–8)
EOSINOPHIL NFR BLD: 2.2 % (ref 0–8)
ERYTHROCYTE [DISTWIDTH] IN BLOOD BY AUTOMATED COUNT: 12.8 % (ref 11.5–14.5)
ERYTHROCYTE [DISTWIDTH] IN BLOOD BY AUTOMATED COUNT: 12.8 % (ref 11.5–14.5)
ERYTHROCYTE [DISTWIDTH] IN BLOOD BY AUTOMATED COUNT: 13.1 % (ref 11.5–14.5)
ERYTHROCYTE [SEDIMENTATION RATE] IN BLOOD BY WESTERGREN METHOD: 22 MM/H
EST. GFR  (NO RACE VARIABLE): 24.6 ML/MIN/1.73 M^2
EST. GFR  (NO RACE VARIABLE): 30.9 ML/MIN/1.73 M^2
EST. GFR  (NO RACE VARIABLE): 30.9 ML/MIN/1.73 M^2
FIO2: 50
FIO2: 55
GLUCOSE SERPL-MCNC: 230 MG/DL (ref 70–110)
GLUCOSE SERPL-MCNC: 249 MG/DL (ref 70–110)
GLUCOSE SERPL-MCNC: 249 MG/DL (ref 70–110)
HCO3 UR-SCNC: 21.3 MMOL/L (ref 24–28)
HCO3 UR-SCNC: 22 MMOL/L (ref 24–28)
HCO3 UR-SCNC: 23.6 MMOL/L (ref 24–28)
HCO3 UR-SCNC: 24 MMOL/L (ref 24–28)
HCT VFR BLD AUTO: 45 % (ref 40–54)
HCT VFR BLD AUTO: 45.1 % (ref 40–54)
HCT VFR BLD AUTO: 45.1 % (ref 40–54)
HGB BLD-MCNC: 15.5 G/DL (ref 14–18)
HGB BLD-MCNC: 15.6 G/DL (ref 14–18)
HGB BLD-MCNC: 16.1 G/DL (ref 14–18)
IMM GRANULOCYTES # BLD AUTO: 0.04 K/UL (ref 0–0.04)
IMM GRANULOCYTES # BLD AUTO: 0.04 K/UL (ref 0–0.04)
IMM GRANULOCYTES # BLD AUTO: 0.08 K/UL (ref 0–0.04)
IMM GRANULOCYTES NFR BLD AUTO: 0.4 % (ref 0–0.5)
IMM GRANULOCYTES NFR BLD AUTO: 0.4 % (ref 0–0.5)
IMM GRANULOCYTES NFR BLD AUTO: 0.7 % (ref 0–0.5)
INR PPP: 1 (ref 0.8–1.2)
LACTATE SERPL-SCNC: 1.1 MMOL/L (ref 0.5–2.2)
LYMPHOCYTES # BLD AUTO: 2 K/UL (ref 1–4.8)
LYMPHOCYTES # BLD AUTO: 2 K/UL (ref 1–4.8)
LYMPHOCYTES # BLD AUTO: 2.3 K/UL (ref 1–4.8)
LYMPHOCYTES NFR BLD: 17.6 % (ref 18–48)
LYMPHOCYTES NFR BLD: 18.1 % (ref 18–48)
LYMPHOCYTES NFR BLD: 21.1 % (ref 18–48)
MAGNESIUM SERPL-MCNC: 1.8 MG/DL (ref 1.6–2.6)
MAGNESIUM SERPL-MCNC: 2 MG/DL (ref 1.6–2.6)
MCH RBC QN AUTO: 30.9 PG (ref 27–31)
MCH RBC QN AUTO: 31.6 PG (ref 27–31)
MCH RBC QN AUTO: 32.1 PG (ref 27–31)
MCHC RBC AUTO-ENTMCNC: 34.4 G/DL (ref 32–36)
MCHC RBC AUTO-ENTMCNC: 34.7 G/DL (ref 32–36)
MCHC RBC AUTO-ENTMCNC: 35.7 G/DL (ref 32–36)
MCV RBC AUTO: 90 FL (ref 82–98)
MCV RBC AUTO: 90 FL (ref 82–98)
MCV RBC AUTO: 91 FL (ref 82–98)
MIN VOL: 10.6
MODE: ABNORMAL
MONOCYTES # BLD AUTO: 0.7 K/UL (ref 0.3–1)
MONOCYTES # BLD AUTO: 0.8 K/UL (ref 0.3–1)
MONOCYTES # BLD AUTO: 0.8 K/UL (ref 0.3–1)
MONOCYTES NFR BLD: 6.6 % (ref 4–15)
MONOCYTES NFR BLD: 7.4 % (ref 4–15)
MONOCYTES NFR BLD: 7.4 % (ref 4–15)
NEUTROPHILS # BLD AUTO: 7.4 K/UL (ref 1.8–7.7)
NEUTROPHILS # BLD AUTO: 7.7 K/UL (ref 1.8–7.7)
NEUTROPHILS # BLD AUTO: 8.2 K/UL (ref 1.8–7.7)
NEUTROPHILS NFR BLD: 68.7 % (ref 38–73)
NEUTROPHILS NFR BLD: 71 % (ref 38–73)
NEUTROPHILS NFR BLD: 73.1 % (ref 38–73)
NRBC BLD-RTO: 0 /100 WBC
PCO2 BLDA: 40.9 MMHG (ref 35–45)
PCO2 BLDA: 42 MMHG (ref 35–45)
PCO2 BLDA: 48.6 MMHG (ref 35–45)
PCO2 BLDA: 49.1 MMHG (ref 35–45)
PEEP: 8
PH SMN: 7.29 [PH] (ref 7.35–7.45)
PH SMN: 7.3 [PH] (ref 7.35–7.45)
PH SMN: 7.33 [PH] (ref 7.35–7.45)
PH SMN: 7.33 [PH] (ref 7.35–7.45)
PHOSPHATE SERPL-MCNC: 7.2 MG/DL (ref 2.7–4.5)
PIP: 23
PLATELET # BLD AUTO: 247 K/UL (ref 150–450)
PLATELET # BLD AUTO: 254 K/UL (ref 150–450)
PLATELET # BLD AUTO: 259 K/UL (ref 150–450)
PMV BLD AUTO: 10 FL (ref 9.2–12.9)
PMV BLD AUTO: 10.1 FL (ref 9.2–12.9)
PMV BLD AUTO: 9.9 FL (ref 9.2–12.9)
PO2 BLDA: 121 MMHG (ref 80–100)
PO2 BLDA: 159 MMHG (ref 80–100)
PO2 BLDA: 43 MMHG (ref 40–60)
PO2 BLDA: 45 MMHG (ref 40–60)
POC BE: -2 MMOL/L
POC BE: -3 MMOL/L
POC BE: -4 MMOL/L
POC BE: -5 MMOL/L
POC SATURATED O2: 73 % (ref 95–100)
POC SATURATED O2: 75 % (ref 95–100)
POC SATURATED O2: 98 % (ref 95–100)
POC SATURATED O2: 99 % (ref 95–100)
POC TCO2: 23 MMOL/L (ref 23–27)
POC TCO2: 23 MMOL/L (ref 23–27)
POC TCO2: 25 MMOL/L (ref 24–29)
POC TCO2: 25 MMOL/L (ref 24–29)
POCT GLUCOSE: 214 MG/DL (ref 70–110)
POCT GLUCOSE: 214 MG/DL (ref 70–110)
POCT GLUCOSE: 230 MG/DL (ref 70–110)
POCT GLUCOSE: 248 MG/DL (ref 70–110)
POTASSIUM SERPL-SCNC: 3.6 MMOL/L (ref 3.5–5.1)
POTASSIUM SERPL-SCNC: 3.8 MMOL/L (ref 3.5–5.1)
POTASSIUM SERPL-SCNC: 3.8 MMOL/L (ref 3.5–5.1)
PROT SERPL-MCNC: 7.1 G/DL (ref 6–8.4)
PROT SERPL-MCNC: 7.3 G/DL (ref 6–8.4)
PROTHROMBIN TIME: 11.2 SEC (ref 9–12.5)
PS: 10
PS: 10
RBC # BLD AUTO: 4.93 M/UL (ref 4.6–6.2)
RBC # BLD AUTO: 5.01 M/UL (ref 4.6–6.2)
RBC # BLD AUTO: 5.01 M/UL (ref 4.6–6.2)
SAMPLE: ABNORMAL
SITE: ABNORMAL
SODIUM SERPL-SCNC: 134 MMOL/L (ref 136–145)
SP02: 90
SP02: 94
SP02: 99
SP02: 99
VT: 450
WBC # BLD AUTO: 10.81 K/UL (ref 3.9–12.7)
WBC # BLD AUTO: 10.85 K/UL (ref 3.9–12.7)
WBC # BLD AUTO: 11.22 K/UL (ref 3.9–12.7)

## 2023-12-27 PROCEDURE — 83735 ASSAY OF MAGNESIUM: CPT | Performed by: STUDENT IN AN ORGANIZED HEALTH CARE EDUCATION/TRAINING PROGRAM

## 2023-12-27 PROCEDURE — 94761 N-INVAS EAR/PLS OXIMETRY MLT: CPT

## 2023-12-27 PROCEDURE — 63600175 PHARM REV CODE 636 W HCPCS

## 2023-12-27 PROCEDURE — 99900035 HC TECH TIME PER 15 MIN (STAT)

## 2023-12-27 PROCEDURE — 83605 ASSAY OF LACTIC ACID: CPT | Performed by: INTERNAL MEDICINE

## 2023-12-27 PROCEDURE — 25000003 PHARM REV CODE 250: Performed by: STUDENT IN AN ORGANIZED HEALTH CARE EDUCATION/TRAINING PROGRAM

## 2023-12-27 PROCEDURE — 85730 THROMBOPLASTIN TIME PARTIAL: CPT | Mod: 91 | Performed by: INTERNAL MEDICINE

## 2023-12-27 PROCEDURE — 85730 THROMBOPLASTIN TIME PARTIAL: CPT

## 2023-12-27 PROCEDURE — 82803 BLOOD GASES ANY COMBINATION: CPT

## 2023-12-27 PROCEDURE — C9113 INJ PANTOPRAZOLE SODIUM, VIA: HCPCS | Performed by: STUDENT IN AN ORGANIZED HEALTH CARE EDUCATION/TRAINING PROGRAM

## 2023-12-27 PROCEDURE — 25000003 PHARM REV CODE 250: Performed by: INTERNAL MEDICINE

## 2023-12-27 PROCEDURE — 27100171 HC OXYGEN HIGH FLOW UP TO 24 HOURS

## 2023-12-27 PROCEDURE — 85025 COMPLETE CBC W/AUTO DIFF WBC: CPT | Mod: 91 | Performed by: INTERNAL MEDICINE

## 2023-12-27 PROCEDURE — 25000003 PHARM REV CODE 250

## 2023-12-27 PROCEDURE — 99291 CRITICAL CARE FIRST HOUR: CPT | Mod: ,,, | Performed by: INTERNAL MEDICINE

## 2023-12-27 PROCEDURE — 63600175 PHARM REV CODE 636 W HCPCS: Performed by: STUDENT IN AN ORGANIZED HEALTH CARE EDUCATION/TRAINING PROGRAM

## 2023-12-27 PROCEDURE — 85025 COMPLETE CBC W/AUTO DIFF WBC: CPT | Mod: 91

## 2023-12-27 PROCEDURE — 94003 VENT MGMT INPAT SUBQ DAY: CPT

## 2023-12-27 PROCEDURE — 83735 ASSAY OF MAGNESIUM: CPT | Mod: 91 | Performed by: INTERNAL MEDICINE

## 2023-12-27 PROCEDURE — 84100 ASSAY OF PHOSPHORUS: CPT | Performed by: INTERNAL MEDICINE

## 2023-12-27 PROCEDURE — 83880 ASSAY OF NATRIURETIC PEPTIDE: CPT

## 2023-12-27 PROCEDURE — 85025 COMPLETE CBC W/AUTO DIFF WBC: CPT | Performed by: STUDENT IN AN ORGANIZED HEALTH CARE EDUCATION/TRAINING PROGRAM

## 2023-12-27 PROCEDURE — 63600175 PHARM REV CODE 636 W HCPCS: Performed by: INTERNAL MEDICINE

## 2023-12-27 PROCEDURE — C1751 CATH, INF, PER/CENT/MIDLINE: HCPCS

## 2023-12-27 PROCEDURE — 80053 COMPREHEN METABOLIC PANEL: CPT | Mod: 91 | Performed by: INTERNAL MEDICINE

## 2023-12-27 PROCEDURE — 27000202 HC IABP, ADD'L DAY

## 2023-12-27 PROCEDURE — 37799 UNLISTED PX VASCULAR SURGERY: CPT

## 2023-12-27 PROCEDURE — 80053 COMPREHEN METABOLIC PANEL: CPT | Performed by: STUDENT IN AN ORGANIZED HEALTH CARE EDUCATION/TRAINING PROGRAM

## 2023-12-27 PROCEDURE — 85610 PROTHROMBIN TIME: CPT

## 2023-12-27 PROCEDURE — 99900026 HC AIRWAY MAINTENANCE (STAT)

## 2023-12-27 PROCEDURE — 20000000 HC ICU ROOM

## 2023-12-27 RX ORDER — MUPIROCIN 20 MG/G
OINTMENT TOPICAL 2 TIMES DAILY
Status: COMPLETED | OUTPATIENT
Start: 2023-12-27 | End: 2023-12-31

## 2023-12-27 RX ORDER — FENTANYL CITRATE 50 UG/ML
50 INJECTION, SOLUTION INTRAMUSCULAR; INTRAVENOUS
Status: DISCONTINUED | OUTPATIENT
Start: 2023-12-27 | End: 2023-12-30

## 2023-12-27 RX ORDER — DEXMEDETOMIDINE HYDROCHLORIDE 4 UG/ML
0-1.4 INJECTION, SOLUTION INTRAVENOUS CONTINUOUS
Status: DISCONTINUED | OUTPATIENT
Start: 2023-12-27 | End: 2023-12-30

## 2023-12-27 RX ORDER — DEXMEDETOMIDINE HYDROCHLORIDE 4 UG/ML
0-1.4 INJECTION, SOLUTION INTRAVENOUS CONTINUOUS
Status: CANCELLED | OUTPATIENT
Start: 2023-12-27

## 2023-12-27 RX ORDER — HEPARIN SODIUM,PORCINE/D5W 25000/250
0-40 INTRAVENOUS SOLUTION INTRAVENOUS CONTINUOUS
Status: DISCONTINUED | OUTPATIENT
Start: 2023-12-27 | End: 2023-12-28

## 2023-12-27 RX ORDER — FENTANYL CITRATE 50 UG/ML
50 INJECTION, SOLUTION INTRAMUSCULAR; INTRAVENOUS
Status: ACTIVE | OUTPATIENT
Start: 2023-12-27 | End: 2023-12-27

## 2023-12-27 RX ORDER — FENTANYL CITRATE 50 UG/ML
50 INJECTION, SOLUTION INTRAMUSCULAR; INTRAVENOUS ONCE
Status: COMPLETED | OUTPATIENT
Start: 2023-12-27 | End: 2023-12-27

## 2023-12-27 RX ORDER — PROPOFOL 10 MG/ML
0-50 INJECTION, EMULSION INTRAVENOUS CONTINUOUS
Status: DISCONTINUED | OUTPATIENT
Start: 2023-12-27 | End: 2023-12-28

## 2023-12-27 RX ADMIN — PANTOPRAZOLE SODIUM 40 MG: 40 INJECTION, POWDER, FOR SOLUTION INTRAVENOUS at 09:12

## 2023-12-27 RX ADMIN — PROPOFOL 40 MCG/KG/MIN: 10 INJECTION, EMULSION INTRAVENOUS at 09:12

## 2023-12-27 RX ADMIN — INSULIN ASPART 2 UNITS: 100 INJECTION, SOLUTION INTRAVENOUS; SUBCUTANEOUS at 12:12

## 2023-12-27 RX ADMIN — ATORVASTATIN CALCIUM 40 MG: 40 TABLET, FILM COATED ORAL at 08:12

## 2023-12-27 RX ADMIN — DEXMEDETOMIDINE HYDROCHLORIDE 0.7 MCG/KG/HR: 4 INJECTION INTRAVENOUS at 05:12

## 2023-12-27 RX ADMIN — MUPIROCIN: 20 OINTMENT TOPICAL at 10:12

## 2023-12-27 RX ADMIN — PROPOFOL 15 MCG/KG/MIN: 10 INJECTION, EMULSION INTRAVENOUS at 05:12

## 2023-12-27 RX ADMIN — NOREPINEPHRINE BITARTRATE 0.04 MCG/KG/MIN: 4 INJECTION, SOLUTION INTRAVENOUS at 01:12

## 2023-12-27 RX ADMIN — NOREPINEPHRINE BITARTRATE 0.06 MCG/KG/MIN: 4 INJECTION, SOLUTION INTRAVENOUS at 10:12

## 2023-12-27 RX ADMIN — DEXMEDETOMIDINE HYDROCHLORIDE 0.7 MCG/KG/HR: 4 INJECTION INTRAVENOUS at 08:12

## 2023-12-27 RX ADMIN — CEFTRIAXONE 1 G: 1 INJECTION, POWDER, FOR SOLUTION INTRAMUSCULAR; INTRAVENOUS at 10:12

## 2023-12-27 RX ADMIN — PROPOFOL 40 MCG/KG/MIN: 10 INJECTION, EMULSION INTRAVENOUS at 04:12

## 2023-12-27 RX ADMIN — PANTOPRAZOLE SODIUM 40 MG: 40 INJECTION, POWDER, FOR SOLUTION INTRAVENOUS at 08:12

## 2023-12-27 RX ADMIN — INSULIN DETEMIR 15 UNITS: 100 INJECTION, SOLUTION SUBCUTANEOUS at 09:12

## 2023-12-27 RX ADMIN — HEPARIN SODIUM 5000 UNITS: 5000 INJECTION INTRAVENOUS; SUBCUTANEOUS at 01:12

## 2023-12-27 RX ADMIN — PROPOFOL 15 MCG/KG/MIN: 10 INJECTION, EMULSION INTRAVENOUS at 10:12

## 2023-12-27 RX ADMIN — PROPOFOL 45 MCG/KG/MIN: 10 INJECTION, EMULSION INTRAVENOUS at 08:12

## 2023-12-27 RX ADMIN — FENTANYL CITRATE 50 MCG: 50 INJECTION, SOLUTION INTRAMUSCULAR; INTRAVENOUS at 02:12

## 2023-12-27 RX ADMIN — Medication 75 MCG/HR: at 05:12

## 2023-12-27 RX ADMIN — HEPARIN SODIUM 12 UNITS/KG/HR: 10000 INJECTION, SOLUTION INTRAVENOUS at 03:12

## 2023-12-27 RX ADMIN — INSULIN ASPART 4 UNITS: 100 INJECTION, SOLUTION INTRAVENOUS; SUBCUTANEOUS at 05:12

## 2023-12-27 RX ADMIN — PROPOFOL 40 MCG/KG/MIN: 10 INJECTION, EMULSION INTRAVENOUS at 01:12

## 2023-12-27 RX ADMIN — AZITHROMYCIN MONOHYDRATE 500 MG: 500 INJECTION, POWDER, LYOPHILIZED, FOR SOLUTION INTRAVENOUS at 12:12

## 2023-12-27 RX ADMIN — DEXMEDETOMIDINE HYDROCHLORIDE 0.2 MCG/KG/HR: 4 INJECTION INTRAVENOUS at 10:12

## 2023-12-27 RX ADMIN — TICAGRELOR 90 MG: 90 TABLET ORAL at 08:12

## 2023-12-27 RX ADMIN — ASPIRIN 81 MG CHEWABLE TABLET 81 MG: 81 TABLET CHEWABLE at 08:12

## 2023-12-27 RX ADMIN — NOREPINEPHRINE BITARTRATE 0.06 MCG/KG/MIN: 4 INJECTION, SOLUTION INTRAVENOUS at 05:12

## 2023-12-27 RX ADMIN — TICAGRELOR 90 MG: 90 TABLET ORAL at 09:12

## 2023-12-27 RX ADMIN — HEPARIN SODIUM 5000 UNITS: 5000 INJECTION INTRAVENOUS; SUBCUTANEOUS at 06:12

## 2023-12-27 RX ADMIN — INSULIN ASPART 4 UNITS: 100 INJECTION, SOLUTION INTRAVENOUS; SUBCUTANEOUS at 06:12

## 2023-12-27 RX ADMIN — FUROSEMIDE 20 MG/HR: 10 INJECTION, SOLUTION INTRAMUSCULAR; INTRAVENOUS at 03:12

## 2023-12-27 RX ADMIN — MUPIROCIN: 20 OINTMENT TOPICAL at 09:12

## 2023-12-27 RX ADMIN — INSULIN ASPART 4 UNITS: 100 INJECTION, SOLUTION INTRAVENOUS; SUBCUTANEOUS at 12:12

## 2023-12-27 NOTE — PROGRESS NOTES
[facility] - Cardiac Intensive Care  Cardiology  Progress Note    Patient Name: Magdaleno Cunningham  MRN: [id number]  Admission Date: 12/26/2023  Hospital Length of Stay: 1 days  Code Status: Full Code   Attending Physician: Pretty Jarvis MD   Primary Care Physician: Venkata Mclaughlin MD  Expected Discharge Date: 1/5/2024  Principal Problem:Cardiogenic shock    Subjective:     Hospital Course:   On fentanyl gtt and pressors with stable MAPs, UOP 4.3L on lasix gtt. Restarted propofol protocol.started on precedex. EP evaluated patient and consider implantation CRT-D once patient off pressors and off IABP and extubation. EKG on sinus rhythm with CHB. Will place central line. Pending ABG. Continue diuresing. Monitor creatinine.     Interval History: EKG on sinus rhythm with complete heart block. started on propofol and precedex. EP evaluated and considering CRT-D implantation once patient off pressors and IABP removed and extubated. Continue diuresing. Restarted heparin gtt.     Review of Systems   Unable to perform ROS: Intubated     Objective:     Vital Signs (Most Recent):  Temp: 98.4 °F (36.9 °C) (12/27/23 1000)  Pulse: 79 (12/27/23 1000)  Resp: (!) 22 (12/27/23 1000)  BP: (!) 100/55 (12/27/23 0400)  SpO2: 96 % (12/27/23 1000) Vital Signs (24h Range):  Temp:  [97.5 °F (36.4 °C)-100 °F (37.8 °C)] 98.4 °F (36.9 °C)  Pulse:  [79-90] 79  Resp:  [22-30] 22  SpO2:  [96 %-100 %] 96 %  BP: ()/(50-72) 100/55  Arterial Line BP: (49-98)/(31-68) 82/52     Weight: 117 kg (257 lb 15 oz)  Body mass index is 35.98 kg/m².     SpO2: 96 %         Intake/Output Summary (Last 24 hours) at 12/27/2023 1113  Last data filed at 12/27/2023 1000  Gross per 24 hour   Intake 2376.81 ml   Output 4560 ml   Net -2183.19 ml       Lines/Drains/Airways       Central Venous Catheter Line  Duration             Introducer 12/26/23 0630 Internal Jugular Right 1 day              Drain  Duration                  NG/OG Tube 12/26/23 Center mouth 1  day         Urethral Catheter 12/26/23 0745 Silicone 16 Fr. 1 day              Airway  Duration                  Airway - Non-Surgical -- days       Airway Anesthesia 12/26/23 1 day              Arterial Line  Duration             Arterial Line 12/26/23 1400 Right Radial <1 day              Line  Duration                  IABP 12/25/23 2340  40 mL 1 day         Pacer Wires 12/26/23 0652 1 day              Peripheral Intravenous Line  Duration                  Sheath 12/25/23 Left 2 days         Peripheral IV - Single Lumen 12/25/23 1830 18 G Left Antecubital 1 day         Peripheral IV - Single Lumen 12/25/23 1856 18 G Anterior;Distal;Right Forearm 1 day         Peripheral IV - Single Lumen 12/26/23 0845 20 G Anterior;Proximal;Right Forearm 1 day         Peripheral IV - Single Lumen 12/26/23 1107 18 G Right Upper Arm 1 day         Sheath 12/25/23 2316 Left anterior 1 day                       Physical Exam  Constitutional:       Comments: intubated   Cardiovascular:      Comments: Paced rate. Cold extremities. No JVD.  Pulmonary:      Comments: Mechanically ventilated  Abdominal:      General: Abdomen is flat.      Palpations: Abdomen is soft.   Neurological:      Comments: sedated          Significant Labs: All pertinent lab results from the last 24 hours have been reviewed.    Significant Imaging: CXR with IABP on place.   Assessment and Plan:     * Cardiogenic shock  2/2 STEMI/ICM  Mehanical support: IABP @ 1:1  Inotrope/pressor: levophed. Wean to maintain MAP > 60  Adequate urine output  Check hemodynamics    - on propofol  - on lasix gtt 20  - on levophed 0.01  - on fentanyl gtt  - started on precedex    - start heparin gtt   - continue diuresing  - Pending echo   - central line. Monitor hemodynamics   - EP evaluated patient and will consider implantation of CRT-D once off pressors and off IABP, as well as extubation    Pneumonia  - on azithromycin 500 IV daily  - on CTX 2g daily     Complete heart  block  Possibly ischemia induced vs medication induced. Hold home metoprolol.  If CHB persists he will require a permanent pacemaker  Right IJ TVP placed with threshold 0.8. Rate to be set at 70.    STEMI (ST elevation myocardial infarction)  Continue brilinta and statin    HLD (hyperlipidemia)  Continue statin    Atrial fibrillation  EKG with sinus rhythm on CHB. Pacemaker dependant.     Diabetic ulcer of left foot associated with type 2 diabetes mellitus  A1c 8.6%  Takes lantus 80 bid, metformin, jardiance  Monitor BG, adjust insulin as needed  Consider endo consult if BG labile        VTE Risk Mitigation (From admission, onward)           Ordered     heparin 25,000 units in dextrose 5% (100 units/ml) IV bolus from bag - ADDITIONAL PRN BOLUS - 60 units/kg (max bolus 4000 units)  As needed (PRN)        Question:  Heparin Infusion Adjustment (DO NOT MODIFY ANSWER)  Answer:  \[email or web address]\Images\Pharmacy\HeparinInfusions\heparin LOW INTENSITY nomogram for [facility] [id number]    12/27/23 1313     heparin 25,000 units in dextrose 5% (100 units/ml) IV bolus from bag - ADDITIONAL PRN BOLUS - 30 units/kg (max bolus 4000 units)  As needed (PRN)        Question:  Heparin Infusion Adjustment (DO NOT MODIFY ANSWER)  Answer:  \[email or web address]\Images\Pharmacy\HeparinInfusions\heparin LOW INTENSITY nomogram for [facility] [id number]    12/27/23 1313     heparin 25,000 units in dextrose 5% (100 units/ml) IV bolus from bag INITIAL BOLUS (max bolus 4000 units)  Once        Question:  Heparin Infusion Adjustment (DO NOT MODIFY ANSWER)  Answer:  \[email or web address]\Images\Pharmacy\HeparinInfusions\heparin LOW INTENSITY nomogram for [facility] [id number]    12/27/23 1313     heparin 25,000 units in dextrose 5% 250 mL (100 units/mL) infusion LOW INTENSITY nomogram - [facility]  Continuous        Question:  Begin at (units/kg/hr)  Answer:  12    12/27/23 1313     IP VTE HIGH RISK PATIENT  Once         12/26/23 0541     Place sequential compression device   Until discontinued         12/26/23 0541                    Jake Graves MD  Cardiology  [facility] - Cardiac Int[facility]

## 2023-12-27 NOTE — EICU
Intervention Initiated From:  [facility] / EICU    Shahrzad intervened regarding:  Documentation and Time-Out    Nurse Notified:  No    Doctor Notified: Dr. Efren Aguirre privileges verified  Yes    Comments: Left side of neck prepped with Chlora prep  1432 Sterile drape placed over patient  1434 Using live US to locate left IJ. Administered Lidocaine 1% no epiphrine subqutaneously  1435 Using real time US, located left IJ. Inserted needle with negative pressure on syringe. Blood in syringe, syringe removed.  1436 Guide wire inserted, needle removed. Holding guide wire secure, inserted cannula, guide wire removed. Tubing attached to cannula. Manometry done, positive for venous blood. Tubing removed. Guide wire inserted through cannula, cannula removed, wire verified with real time US  1437 Guide wire inserted. Small incision made at entry site. Holding guide wire secure, inserted dilator then removed. Several beats VT 4-6 beat run, resolved when wire with drawn.   1439 TLC 7F/20 cm inserted, guide wire removed thought distal port, intact. Portable CXR ordered.   1441 Aspirated all ports, blood then flushed with sterile saline.   1442 Sutured TLC down  1443 Biopatch applied to entry site, then covered with central line dressing. Patient tolerated well.

## 2023-12-27 NOTE — HOSPITAL COURSE
On arrival from [place], patient was started on levophed with balloon pump for cardiogenic shock. Balloon pump was weaned. He had TVP removed after CHB resolved and intrinsic rhythm atrial fibrillation with controlled heart rate.TTE revealed severely reduced systolic function EF 20-25% with global hypokinesis and mildly dilated L atrium. Electrophysiology was consulted for CRTD followed by ILAN/DCCV which was performed on 1/2 and 1/4 respectively. Patient tolerated procedure well. Patient in and out of atrial fibrillation through hospital stay. Patient was taken down for cardioversion by EP for afib, however, patient was found to be in sinus rhythm on arrival to lab. EKG was performed to confirm. CCU team felt patient was stable for discharge home with close follow up with Cardiology and his PCP.  Patient was counseld on the importance of smoking cessation and compliance with all medications. He voiced understanding of the plan. His medications were reviewed during hospitalization and chart updated.

## 2023-12-27 NOTE — PLAN OF CARE
POC reviewed with Magdaleno Cunningham and family. Questions and concerns addressed. No acute events throughout the night. Tried weaning prop and fent off, but patient did not tolerate it well and was sitting up and bending legs. MD Janes notified and aware. See below and flowsheets for full assessment and VS info.       Neuro:  Val Coma Scale  Best Eye Response: 2-->(E2) to pain  Best Motor Response: 4-->(M4) withdraws from pain  Best Verbal Response: 1-->(V1) none (pt intubated and sedated)  Val Coma Scale Score: 7  Assessment Qualifiers: patient intubated, patient chemically sedated or paralyzed  Pupil PERRLA: yes    24 hr Temp:  [95.5 °F (35.3 °C)-100 °F (37.8 °C)]     CV:  Rhythm: paced rhythm   DVT prophylaxis: VTE Required Core Measure: Pharmacological prophylaxis initiated/maintained                 IABP Mode: Auto  IABP Rate: 1:1  IABP Trigger: ECG  IABP Augmented Diastolic Pressure: 94          Pulses  Right Radial Pulse: 1+ (weak)  Left Radial Pulse: 1+ (weak)  Right Dorsalis Pedis Pulse: Doppler  Left Dorsalis Pedis Pulse: Doppler  Right Posterior Tibial Pulse: Doppler  Left Posterior Tibial Pulse: Doppler    Resp:     Vent Mode: SIMV  Set Rate: 22 BPM  Oxygen Concentration (%): 55  Vt Set: 450 mL  PEEP/CPAP: 8 cmH20  Pressure Support: 10 cmH20    GI/:  GI prophylaxis: yes  Diet/Nutrition Received: NPO  Last Bowel Movement: 12/25/23  Voiding Characteristics: urethral catheter (bladder)       Urethral Catheter 12/26/23 0745 Silicone 16 Fr.-Reason for Continuing Urinary Catheterization: Critically ill in ICU and requiring hourly monitoring of intake/output   Intake/Output Summary (Last 24 hours) at 12/27/2023 0712  Last data filed at 12/27/2023 0700  Gross per 24 hour   Intake 2280.38 ml   Output 4575 ml   Net -2294.62 ml            Labs/Accuchecks:  Recent Labs   Lab 12/26/23  0823 12/26/23  2130 12/27/23  0317   WBC 11.20 13.50* 11.22   RBC 5.01 4.79 5.01   HGB 15.6 15.4 15.5   HCT 43.9  44.2 45.1    246 259      Recent Labs   Lab 12/25/23  1855 12/26/23  0017 12/26/23  0823   INR 1.0  --  1.0   APTT  --  119.1* 26.4      Recent Labs     12/27/23  0317   *  134*   K 3.8  3.8   CO2 19*  19*   CL 99  99   BUN 29*  29*   CREATININE 2.4*  2.4*   ALKPHOS 54*   ALT 20   AST 29   BILITOT 0.5       Recent Labs   Lab 12/26/23  1001 12/26/23  1430 12/26/23  2130   TROPONINI 21.984* 25.809* 24.449*      Recent Labs     12/26/23  0244 12/26/23  1255 12/26/23  2129   PH 7.227* 7.389 7.302*   PCO2 49.4* 34.2* 44.2   PO2 64* 295* 121*   HCO3 20.5* 20.6* 21.9*   POCSATURATED 87 100 98   BE -7* -4* -5*       Electrolytes: No replacement orders  Accuchecks: Q6H    Gtts/LDAs:   sodium chloride 0.9% 10 mL/hr at 12/27/23 0700    fentanyl 75 mcg/hr (12/27/23 0700)    furosemide (LASIX) 500 mg in 50 mL infusion (conc: 10 mg/mL) 20 mg/hr (12/27/23 0700)    NORepinephrine bitartrate-D5W 0.06 mcg/kg/min (12/27/23 0700)    propofoL 45 mcg/kg/min (12/27/23 0700)    vasopressin         Lines/Drains/Airways       Central Venous Catheter Line  Duration             Introducer 12/26/23 0630 Internal Jugular Right 1 day              Drain  Duration                  NG/OG Tube 12/26/23 Center mouth 1 day         Urethral Catheter 12/26/23 0745 Silicone 16 Fr. <1 day              Airway  Duration                  Airway - Non-Surgical -- days       Airway Anesthesia 12/26/23 1 day              Arterial Line  Duration             Arterial Line 12/26/23 1400 Right Radial <1 day              Line  Duration                  IABP 12/25/23 2340  40 mL 1 day         Pacer Wires 12/26/23 0652 1 day              Peripheral Intravenous Line  Duration                  Sheath 12/25/23 Left 2 days         Peripheral IV - Single Lumen 12/25/23 1830 18 G Left Antecubital 1 day         Peripheral IV - Single Lumen 12/25/23 1856 18 G Anterior;Distal;Right Forearm 1 day         Sheath 12/25/23 2316 Left anterior 1 day          Peripheral IV - Single Lumen 12/26/23 0845 20 G Anterior;Proximal;Right Forearm <1 day         Peripheral IV - Single Lumen 12/26/23 1107 18 G Right Upper Arm <1 day                    Skin/Wounds  Bathing/Skin Care: dressed/undressed;bath, partial (12/27/23 0500)  Wounds: Yes  Wound care consulted: Yes

## 2023-12-27 NOTE — SUBJECTIVE & OBJECTIVE
Interval History: EKG on sinus rhythm with complete heart block. started on propofol and precedex. EP evaluated and considering CRT-D implantation once patient off pressors and IABP removed and extubated. Continue diuresing. Restarted heparin gtt.     Review of Systems   Unable to perform ROS: Intubated     Objective:     Vital Signs (Most Recent):  Temp: 98.4 °F (36.9 °C) (12/27/23 1000)  Pulse: 79 (12/27/23 1000)  Resp: (!) 22 (12/27/23 1000)  BP: (!) 100/55 (12/27/23 0400)  SpO2: 96 % (12/27/23 1000) Vital Signs (24h Range):  Temp:  [97.5 °F (36.4 °C)-100 °F (37.8 °C)] 98.4 °F (36.9 °C)  Pulse:  [79-90] 79  Resp:  [22-30] 22  SpO2:  [96 %-100 %] 96 %  BP: ()/(50-72) 100/55  Arterial Line BP: (49-98)/(31-68) 82/52     Weight: 117 kg (257 lb 15 oz)  Body mass index is 35.98 kg/m².     SpO2: 96 %         Intake/Output Summary (Last 24 hours) at 12/27/2023 1113  Last data filed at 12/27/2023 1000  Gross per 24 hour   Intake 2376.81 ml   Output 4560 ml   Net -2183.19 ml       Lines/Drains/Airways       Central Venous Catheter Line  Duration             Introducer 12/26/23 0630 Internal Jugular Right 1 day              Drain  Duration                  NG/OG Tube 12/26/23 Center mouth 1 day         Urethral Catheter 12/26/23 0745 Silicone 16 Fr. 1 day              Airway  Duration                  Airway - Non-Surgical -- days       Airway Anesthesia 12/26/23 1 day              Arterial Line  Duration             Arterial Line 12/26/23 1400 Right Radial <1 day              Line  Duration                  IABP 12/25/23 2340  40 mL 1 day         Pacer Wires 12/26/23 0652 1 day              Peripheral Intravenous Line  Duration                  Sheath 12/25/23 Left 2 days         Peripheral IV - Single Lumen 12/25/23 1830 18 G Left Antecubital 1 day         Peripheral IV - Single Lumen 12/25/23 1856 18 G Anterior;Distal;Right Forearm 1 day         Peripheral IV - Single Lumen 12/26/23 0845 20 G  Anterior;Proximal;Right Forearm 1 day         Peripheral IV - Single Lumen 12/26/23 1107 18 G Right Upper Arm 1 day         Sheath 12/25/23 2316 Left anterior 1 day                       Physical Exam  Constitutional:       Comments: intubated   Cardiovascular:      Comments: Paced rate. Cold extremities. No JVD.  Pulmonary:      Comments: Mechanically ventilated  Abdominal:      General: Abdomen is flat.      Palpations: Abdomen is soft.   Neurological:      Comments: sedated          Significant Labs: All pertinent lab results from the last 24 hours have been reviewed.    Significant Imaging: CXR with IABP on place.

## 2023-12-28 LAB
ALBUMIN SERPL BCP-MCNC: 2.9 G/DL (ref 3.5–5.2)
ALLENS TEST: ABNORMAL
ALP SERPL-CCNC: 59 U/L (ref 55–135)
ALT SERPL W/O P-5'-P-CCNC: 16 U/L (ref 10–44)
ANION GAP SERPL CALC-SCNC: 13 MMOL/L (ref 8–16)
ANION GAP SERPL CALC-SCNC: 15 MMOL/L (ref 8–16)
ANION GAP SERPL CALC-SCNC: 17 MMOL/L (ref 8–16)
ANION GAP SERPL CALC-SCNC: 17 MMOL/L (ref 8–16)
ANION GAP SERPL CALC-SCNC: 23 MMOL/L (ref 8–16)
ANION GAP SERPL CALC-SCNC: 23 MMOL/L (ref 8–16)
APTT PPP: 26.6 SEC (ref 21–32)
APTT PPP: 26.9 SEC (ref 21–32)
APTT PPP: 30.9 SEC (ref 21–32)
AST SERPL-CCNC: 22 U/L (ref 10–40)
B-OH-BUTYR BLD STRIP-SCNC: 2.4 MMOL/L (ref 0–0.5)
BACTERIA #/AREA URNS AUTO: ABNORMAL /HPF
BACTERIA #/AREA URNS AUTO: ABNORMAL /HPF
BASOPHILS # BLD AUTO: 0.06 K/UL (ref 0–0.2)
BASOPHILS # BLD AUTO: 0.07 K/UL (ref 0–0.2)
BASOPHILS # BLD AUTO: 0.07 K/UL (ref 0–0.2)
BASOPHILS NFR BLD: 0.6 % (ref 0–1.9)
BASOPHILS NFR BLD: 0.7 % (ref 0–1.9)
BASOPHILS NFR BLD: 0.7 % (ref 0–1.9)
BILIRUB SERPL-MCNC: 0.6 MG/DL (ref 0.1–1)
BILIRUB UR QL STRIP: NEGATIVE
BILIRUB UR QL STRIP: NEGATIVE
BUN SERPL-MCNC: 33 MG/DL (ref 6–20)
BUN SERPL-MCNC: 33 MG/DL (ref 6–20)
BUN SERPL-MCNC: 34 MG/DL (ref 6–20)
BUN SERPL-MCNC: 34 MG/DL (ref 6–20)
BUN SERPL-MCNC: 35 MG/DL (ref 6–20)
BUN SERPL-MCNC: 36 MG/DL (ref 6–20)
CALCIUM SERPL-MCNC: 9 MG/DL (ref 8.7–10.5)
CALCIUM SERPL-MCNC: 9.1 MG/DL (ref 8.7–10.5)
CALCIUM SERPL-MCNC: 9.2 MG/DL (ref 8.7–10.5)
CALCIUM SERPL-MCNC: 9.4 MG/DL (ref 8.7–10.5)
CHLORIDE SERPL-SCNC: 100 MMOL/L (ref 95–110)
CHLORIDE SERPL-SCNC: 101 MMOL/L (ref 95–110)
CHLORIDE SERPL-SCNC: 103 MMOL/L (ref 95–110)
CHLORIDE SERPL-SCNC: 103 MMOL/L (ref 95–110)
CHLORIDE SERPL-SCNC: 97 MMOL/L (ref 95–110)
CHLORIDE SERPL-SCNC: 97 MMOL/L (ref 95–110)
CLARITY UR REFRACT.AUTO: ABNORMAL
CLARITY UR REFRACT.AUTO: CLEAR
CO2 SERPL-SCNC: 16 MMOL/L (ref 23–29)
CO2 SERPL-SCNC: 16 MMOL/L (ref 23–29)
CO2 SERPL-SCNC: 19 MMOL/L (ref 23–29)
CO2 SERPL-SCNC: 19 MMOL/L (ref 23–29)
CO2 SERPL-SCNC: 20 MMOL/L (ref 23–29)
CO2 SERPL-SCNC: 21 MMOL/L (ref 23–29)
COLOR UR AUTO: YELLOW
COLOR UR AUTO: YELLOW
CREAT SERPL-MCNC: 2.3 MG/DL (ref 0.5–1.4)
CREAT SERPL-MCNC: 2.5 MG/DL (ref 0.5–1.4)
CREAT SERPL-MCNC: 2.9 MG/DL (ref 0.5–1.4)
CREAT SERPL-MCNC: 3 MG/DL (ref 0.5–1.4)
CREAT SERPL-MCNC: 3 MG/DL (ref 0.5–1.4)
CREAT SERPL-MCNC: 3.1 MG/DL (ref 0.5–1.4)
DELSYS: ABNORMAL
DIFFERENTIAL METHOD BLD: ABNORMAL
EOSINOPHIL # BLD AUTO: 0.2 K/UL (ref 0–0.5)
EOSINOPHIL NFR BLD: 1.4 % (ref 0–8)
EOSINOPHIL NFR BLD: 2.4 % (ref 0–8)
EOSINOPHIL NFR BLD: 2.4 % (ref 0–8)
ERYTHROCYTE [DISTWIDTH] IN BLOOD BY AUTOMATED COUNT: 12.6 % (ref 11.5–14.5)
ERYTHROCYTE [SEDIMENTATION RATE] IN BLOOD BY WESTERGREN METHOD: 22 MM/H
ERYTHROCYTE [SEDIMENTATION RATE] IN BLOOD BY WESTERGREN METHOD: 22 MM/H
EST. GFR  (NO RACE VARIABLE): 22.7 ML/MIN/1.73 M^2
EST. GFR  (NO RACE VARIABLE): 23.6 ML/MIN/1.73 M^2
EST. GFR  (NO RACE VARIABLE): 23.6 ML/MIN/1.73 M^2
EST. GFR  (NO RACE VARIABLE): 24.6 ML/MIN/1.73 M^2
EST. GFR  (NO RACE VARIABLE): 29.4 ML/MIN/1.73 M^2
EST. GFR  (NO RACE VARIABLE): 32.5 ML/MIN/1.73 M^2
FIO2: 30
FLOW: 3
GLUCOSE SERPL-MCNC: 163 MG/DL (ref 70–110)
GLUCOSE SERPL-MCNC: 172 MG/DL (ref 70–110)
GLUCOSE SERPL-MCNC: 179 MG/DL (ref 70–110)
GLUCOSE SERPL-MCNC: 212 MG/DL (ref 70–110)
GLUCOSE SERPL-MCNC: 254 MG/DL (ref 70–110)
GLUCOSE SERPL-MCNC: 254 MG/DL (ref 70–110)
GLUCOSE UR QL STRIP: ABNORMAL
GLUCOSE UR QL STRIP: ABNORMAL
HCO3 UR-SCNC: 19.4 MMOL/L (ref 24–28)
HCO3 UR-SCNC: 23.4 MMOL/L (ref 24–28)
HCT VFR BLD AUTO: 43.7 % (ref 40–54)
HCT VFR BLD AUTO: 45.3 % (ref 40–54)
HCT VFR BLD AUTO: 45.3 % (ref 40–54)
HGB BLD-MCNC: 15.8 G/DL (ref 14–18)
HGB BLD-MCNC: 16.2 G/DL (ref 14–18)
HGB BLD-MCNC: 16.2 G/DL (ref 14–18)
HGB UR QL STRIP: ABNORMAL
HGB UR QL STRIP: ABNORMAL
HYALINE CASTS UR QL AUTO: 0 /LPF
IMM GRANULOCYTES # BLD AUTO: 0.04 K/UL (ref 0–0.04)
IMM GRANULOCYTES # BLD AUTO: 0.04 K/UL (ref 0–0.04)
IMM GRANULOCYTES # BLD AUTO: 0.06 K/UL (ref 0–0.04)
IMM GRANULOCYTES NFR BLD AUTO: 0.4 % (ref 0–0.5)
IMM GRANULOCYTES NFR BLD AUTO: 0.4 % (ref 0–0.5)
IMM GRANULOCYTES NFR BLD AUTO: 0.6 % (ref 0–0.5)
INR PPP: 1.1 (ref 0.8–1.2)
KETONES UR QL STRIP: ABNORMAL
KETONES UR QL STRIP: ABNORMAL
LACTATE SERPL-SCNC: 0.8 MMOL/L (ref 0.5–2.2)
LEUKOCYTE ESTERASE UR QL STRIP: ABNORMAL
LEUKOCYTE ESTERASE UR QL STRIP: ABNORMAL
LYMPHOCYTES # BLD AUTO: 1.3 K/UL (ref 1–4.8)
LYMPHOCYTES # BLD AUTO: 1.7 K/UL (ref 1–4.8)
LYMPHOCYTES # BLD AUTO: 1.7 K/UL (ref 1–4.8)
LYMPHOCYTES NFR BLD: 11.6 % (ref 18–48)
LYMPHOCYTES NFR BLD: 16.9 % (ref 18–48)
LYMPHOCYTES NFR BLD: 16.9 % (ref 18–48)
MAGNESIUM SERPL-MCNC: 1.9 MG/DL (ref 1.6–2.6)
MCH RBC QN AUTO: 30.9 PG (ref 27–31)
MCH RBC QN AUTO: 32.1 PG (ref 27–31)
MCH RBC QN AUTO: 32.1 PG (ref 27–31)
MCHC RBC AUTO-ENTMCNC: 35.8 G/DL (ref 32–36)
MCHC RBC AUTO-ENTMCNC: 35.8 G/DL (ref 32–36)
MCHC RBC AUTO-ENTMCNC: 36.2 G/DL (ref 32–36)
MCV RBC AUTO: 85 FL (ref 82–98)
MCV RBC AUTO: 90 FL (ref 82–98)
MCV RBC AUTO: 90 FL (ref 82–98)
MICROSCOPIC COMMENT: ABNORMAL
MICROSCOPIC COMMENT: ABNORMAL
MODE: ABNORMAL
MONOCYTES # BLD AUTO: 0.7 K/UL (ref 0.3–1)
MONOCYTES # BLD AUTO: 0.8 K/UL (ref 0.3–1)
MONOCYTES # BLD AUTO: 0.8 K/UL (ref 0.3–1)
MONOCYTES NFR BLD: 6.1 % (ref 4–15)
MONOCYTES NFR BLD: 7.7 % (ref 4–15)
MONOCYTES NFR BLD: 7.7 % (ref 4–15)
NEUTROPHILS # BLD AUTO: 7.2 K/UL (ref 1.8–7.7)
NEUTROPHILS # BLD AUTO: 7.2 K/UL (ref 1.8–7.7)
NEUTROPHILS # BLD AUTO: 8.6 K/UL (ref 1.8–7.7)
NEUTROPHILS NFR BLD: 71.9 % (ref 38–73)
NEUTROPHILS NFR BLD: 71.9 % (ref 38–73)
NEUTROPHILS NFR BLD: 79.7 % (ref 38–73)
NITRITE UR QL STRIP: NEGATIVE
NITRITE UR QL STRIP: NEGATIVE
NRBC BLD-RTO: 0 /100 WBC
PCO2 BLDA: 35.3 MMHG (ref 35–45)
PCO2 BLDA: 38.5 MMHG (ref 35–45)
PEEP: 5
PH SMN: 7.35 [PH] (ref 7.35–7.45)
PH SMN: 7.39 [PH] (ref 7.35–7.45)
PH UR STRIP: 5 [PH] (ref 5–8)
PH UR STRIP: 5 [PH] (ref 5–8)
PHOSPHATE SERPL-MCNC: 6.4 MG/DL (ref 2.7–4.5)
PLATELET # BLD AUTO: 242 K/UL (ref 150–450)
PLATELET # BLD AUTO: 261 K/UL (ref 150–450)
PLATELET # BLD AUTO: 261 K/UL (ref 150–450)
PMV BLD AUTO: 10 FL (ref 9.2–12.9)
PMV BLD AUTO: 10 FL (ref 9.2–12.9)
PMV BLD AUTO: 9.6 FL (ref 9.2–12.9)
PO2 BLDA: 32 MMHG (ref 40–60)
PO2 BLDA: 38 MMHG (ref 40–60)
PO2 BLDA: 38 MMHG (ref 40–60)
PO2 BLDA: 87 MMHG (ref 80–100)
POC BE: -2 MMOL/L
POC BE: -6 MMOL/L
POC SATURATED O2: 62 % (ref 95–100)
POC SATURATED O2: 69 % (ref 95–100)
POC SATURATED O2: 69 % (ref 95–100)
POC SATURATED O2: 96 % (ref 95–100)
POC TCO2: 20 MMOL/L (ref 23–27)
POC TCO2: 25 MMOL/L (ref 24–29)
POCT GLUCOSE: 123 MG/DL (ref 70–110)
POCT GLUCOSE: 124 MG/DL (ref 70–110)
POCT GLUCOSE: 141 MG/DL (ref 70–110)
POCT GLUCOSE: 146 MG/DL (ref 70–110)
POCT GLUCOSE: 148 MG/DL (ref 70–110)
POCT GLUCOSE: 159 MG/DL (ref 70–110)
POCT GLUCOSE: 161 MG/DL (ref 70–110)
POCT GLUCOSE: 168 MG/DL (ref 70–110)
POCT GLUCOSE: 169 MG/DL (ref 70–110)
POCT GLUCOSE: 172 MG/DL (ref 70–110)
POCT GLUCOSE: 174 MG/DL (ref 70–110)
POCT GLUCOSE: 183 MG/DL (ref 70–110)
POCT GLUCOSE: 185 MG/DL (ref 70–110)
POCT GLUCOSE: 185 MG/DL (ref 70–110)
POCT GLUCOSE: 187 MG/DL (ref 70–110)
POCT GLUCOSE: 213 MG/DL (ref 70–110)
POCT GLUCOSE: 218 MG/DL (ref 70–110)
POCT GLUCOSE: 225 MG/DL (ref 70–110)
POCT GLUCOSE: 228 MG/DL (ref 70–110)
POCT GLUCOSE: 233 MG/DL (ref 70–110)
POTASSIUM SERPL-SCNC: 3.5 MMOL/L (ref 3.5–5.1)
POTASSIUM SERPL-SCNC: 3.6 MMOL/L (ref 3.5–5.1)
POTASSIUM SERPL-SCNC: 3.6 MMOL/L (ref 3.5–5.1)
POTASSIUM SERPL-SCNC: 3.8 MMOL/L (ref 3.5–5.1)
POTASSIUM SERPL-SCNC: 3.8 MMOL/L (ref 3.5–5.1)
POTASSIUM SERPL-SCNC: 4 MMOL/L (ref 3.5–5.1)
PROCALCITONIN SERPL IA-MCNC: 0.33 NG/ML
PROT SERPL-MCNC: 7.5 G/DL (ref 6–8.4)
PROT UR QL STRIP: ABNORMAL
PROT UR QL STRIP: NEGATIVE
PROTHROMBIN TIME: 11.4 SEC (ref 9–12.5)
PS: 10
PS: 10
PS: 8
RBC # BLD AUTO: 5.05 M/UL (ref 4.6–6.2)
RBC # BLD AUTO: 5.05 M/UL (ref 4.6–6.2)
RBC # BLD AUTO: 5.12 M/UL (ref 4.6–6.2)
RBC #/AREA URNS AUTO: 64 /HPF (ref 0–4)
RBC #/AREA URNS AUTO: >100 /HPF (ref 0–4)
SAMPLE: ABNORMAL
SITE: ABNORMAL
SODIUM SERPL-SCNC: 136 MMOL/L (ref 136–145)
SODIUM SERPL-SCNC: 137 MMOL/L (ref 136–145)
SODIUM SERPL-SCNC: 139 MMOL/L (ref 136–145)
SP GR UR STRIP: 1.01 (ref 1–1.03)
SP GR UR STRIP: 1.01 (ref 1–1.03)
SP02: 97
SP02: 98
URN SPEC COLLECT METH UR: ABNORMAL
URN SPEC COLLECT METH UR: ABNORMAL
VT: 450
VT: 450
WBC # BLD AUTO: 10.74 K/UL (ref 3.9–12.7)
WBC # BLD AUTO: 9.96 K/UL (ref 3.9–12.7)
WBC # BLD AUTO: 9.96 K/UL (ref 3.9–12.7)
WBC #/AREA URNS AUTO: 11 /HPF (ref 0–5)
WBC #/AREA URNS AUTO: 25 /HPF (ref 0–5)
YEAST UR QL AUTO: ABNORMAL
YEAST UR QL AUTO: ABNORMAL

## 2023-12-28 PROCEDURE — 25000003 PHARM REV CODE 250: Performed by: INTERNAL MEDICINE

## 2023-12-28 PROCEDURE — 94150 VITAL CAPACITY TEST: CPT

## 2023-12-28 PROCEDURE — 99233 SBSQ HOSP IP/OBS HIGH 50: CPT | Mod: ,,, | Performed by: STUDENT IN AN ORGANIZED HEALTH CARE EDUCATION/TRAINING PROGRAM

## 2023-12-28 PROCEDURE — 82803 BLOOD GASES ANY COMBINATION: CPT

## 2023-12-28 PROCEDURE — 27200667 HC PACEMAKER, TEMPORARY MONITORING, PER SHIFT

## 2023-12-28 PROCEDURE — 94010 BREATHING CAPACITY TEST: CPT

## 2023-12-28 PROCEDURE — 20000000 HC ICU ROOM

## 2023-12-28 PROCEDURE — 25000003 PHARM REV CODE 250: Performed by: STUDENT IN AN ORGANIZED HEALTH CARE EDUCATION/TRAINING PROGRAM

## 2023-12-28 PROCEDURE — 83735 ASSAY OF MAGNESIUM: CPT | Performed by: STUDENT IN AN ORGANIZED HEALTH CARE EDUCATION/TRAINING PROGRAM

## 2023-12-28 PROCEDURE — 85025 COMPLETE CBC W/AUTO DIFF WBC: CPT | Performed by: STUDENT IN AN ORGANIZED HEALTH CARE EDUCATION/TRAINING PROGRAM

## 2023-12-28 PROCEDURE — 27000202 HC IABP, ADD'L DAY

## 2023-12-28 PROCEDURE — 87086 URINE CULTURE/COLONY COUNT: CPT | Mod: 59 | Performed by: STUDENT IN AN ORGANIZED HEALTH CARE EDUCATION/TRAINING PROGRAM

## 2023-12-28 PROCEDURE — 63600175 PHARM REV CODE 636 W HCPCS

## 2023-12-28 PROCEDURE — 85025 COMPLETE CBC W/AUTO DIFF WBC: CPT | Mod: 91

## 2023-12-28 PROCEDURE — 25000003 PHARM REV CODE 250

## 2023-12-28 PROCEDURE — 84100 ASSAY OF PHOSPHORUS: CPT | Performed by: STUDENT IN AN ORGANIZED HEALTH CARE EDUCATION/TRAINING PROGRAM

## 2023-12-28 PROCEDURE — 94003 VENT MGMT INPAT SUBQ DAY: CPT

## 2023-12-28 PROCEDURE — 80048 BASIC METABOLIC PNL TOTAL CA: CPT | Mod: XB | Performed by: INTERNAL MEDICINE

## 2023-12-28 PROCEDURE — 85730 THROMBOPLASTIN TIME PARTIAL: CPT | Mod: 91

## 2023-12-28 PROCEDURE — 94761 N-INVAS EAR/PLS OXIMETRY MLT: CPT | Mod: XB

## 2023-12-28 PROCEDURE — 81001 URINALYSIS AUTO W/SCOPE: CPT | Performed by: STUDENT IN AN ORGANIZED HEALTH CARE EDUCATION/TRAINING PROGRAM

## 2023-12-28 PROCEDURE — 87086 URINE CULTURE/COLONY COUNT: CPT | Performed by: STUDENT IN AN ORGANIZED HEALTH CARE EDUCATION/TRAINING PROGRAM

## 2023-12-28 PROCEDURE — 81001 URINALYSIS AUTO W/SCOPE: CPT | Mod: 91 | Performed by: STUDENT IN AN ORGANIZED HEALTH CARE EDUCATION/TRAINING PROGRAM

## 2023-12-28 PROCEDURE — 83605 ASSAY OF LACTIC ACID: CPT | Performed by: INTERNAL MEDICINE

## 2023-12-28 PROCEDURE — C9113 INJ PANTOPRAZOLE SODIUM, VIA: HCPCS | Performed by: STUDENT IN AN ORGANIZED HEALTH CARE EDUCATION/TRAINING PROGRAM

## 2023-12-28 PROCEDURE — 82010 KETONE BODYS QUAN: CPT | Performed by: STUDENT IN AN ORGANIZED HEALTH CARE EDUCATION/TRAINING PROGRAM

## 2023-12-28 PROCEDURE — 87040 BLOOD CULTURE FOR BACTERIA: CPT | Mod: 59 | Performed by: STUDENT IN AN ORGANIZED HEALTH CARE EDUCATION/TRAINING PROGRAM

## 2023-12-28 PROCEDURE — 99900035 HC TECH TIME PER 15 MIN (STAT)

## 2023-12-28 PROCEDURE — 63600175 PHARM REV CODE 636 W HCPCS: Performed by: STUDENT IN AN ORGANIZED HEALTH CARE EDUCATION/TRAINING PROGRAM

## 2023-12-28 PROCEDURE — 99900026 HC AIRWAY MAINTENANCE (STAT)

## 2023-12-28 PROCEDURE — 99900017 HC EXTUBATION W/PARAMETERS (STAT)

## 2023-12-28 PROCEDURE — 99291 CRITICAL CARE FIRST HOUR: CPT | Mod: ,,, | Performed by: INTERNAL MEDICINE

## 2023-12-28 PROCEDURE — 84145 PROCALCITONIN (PCT): CPT | Performed by: STUDENT IN AN ORGANIZED HEALTH CARE EDUCATION/TRAINING PROGRAM

## 2023-12-28 PROCEDURE — 85730 THROMBOPLASTIN TIME PARTIAL: CPT | Mod: 91 | Performed by: INTERNAL MEDICINE

## 2023-12-28 PROCEDURE — 80053 COMPREHEN METABOLIC PANEL: CPT | Performed by: STUDENT IN AN ORGANIZED HEALTH CARE EDUCATION/TRAINING PROGRAM

## 2023-12-28 PROCEDURE — 85610 PROTHROMBIN TIME: CPT

## 2023-12-28 PROCEDURE — 37799 UNLISTED PX VASCULAR SURGERY: CPT

## 2023-12-28 PROCEDURE — 27100171 HC OXYGEN HIGH FLOW UP TO 24 HOURS

## 2023-12-28 RX ORDER — FUROSEMIDE 10 MG/ML
80 INJECTION INTRAMUSCULAR; INTRAVENOUS 2 TIMES DAILY
Status: DISCONTINUED | OUTPATIENT
Start: 2023-12-28 | End: 2023-12-28

## 2023-12-28 RX ORDER — POTASSIUM CHLORIDE 29.8 MG/ML
40 INJECTION INTRAVENOUS ONCE
Status: COMPLETED | OUTPATIENT
Start: 2023-12-29 | End: 2023-12-29

## 2023-12-28 RX ORDER — FUROSEMIDE 10 MG/ML
80 INJECTION INTRAMUSCULAR; INTRAVENOUS
Status: DISCONTINUED | OUTPATIENT
Start: 2023-12-28 | End: 2023-12-29

## 2023-12-28 RX ORDER — POTASSIUM CHLORIDE 29.8 MG/ML
40 INJECTION INTRAVENOUS ONCE
Status: COMPLETED | OUTPATIENT
Start: 2023-12-28 | End: 2023-12-28

## 2023-12-28 RX ORDER — HEPARIN SODIUM,PORCINE/D5W 25000/250
0-40 INTRAVENOUS SOLUTION INTRAVENOUS CONTINUOUS
Status: DISCONTINUED | OUTPATIENT
Start: 2023-12-28 | End: 2024-01-02

## 2023-12-28 RX ADMIN — DEXMEDETOMIDINE HYDROCHLORIDE 1.2 MCG/KG/HR: 4 INJECTION INTRAVENOUS at 10:12

## 2023-12-28 RX ADMIN — TICAGRELOR 90 MG: 90 TABLET ORAL at 09:12

## 2023-12-28 RX ADMIN — MUPIROCIN: 20 OINTMENT TOPICAL at 09:12

## 2023-12-28 RX ADMIN — DEXMEDETOMIDINE HYDROCHLORIDE 1.2 MCG/KG/HR: 4 INJECTION INTRAVENOUS at 07:12

## 2023-12-28 RX ADMIN — DEXMEDETOMIDINE HYDROCHLORIDE 0.7 MCG/KG/HR: 4 INJECTION INTRAVENOUS at 01:12

## 2023-12-28 RX ADMIN — PANTOPRAZOLE SODIUM 40 MG: 40 INJECTION, POWDER, FOR SOLUTION INTRAVENOUS at 08:12

## 2023-12-28 RX ADMIN — DEXMEDETOMIDINE HYDROCHLORIDE 1.1 MCG/KG/HR: 4 INJECTION INTRAVENOUS at 04:12

## 2023-12-28 RX ADMIN — HEPARIN SODIUM 12 UNITS/KG/HR: 10000 INJECTION, SOLUTION INTRAVENOUS at 01:12

## 2023-12-28 RX ADMIN — DEXMEDETOMIDINE HYDROCHLORIDE 1.4 MCG/KG/HR: 4 INJECTION INTRAVENOUS at 03:12

## 2023-12-28 RX ADMIN — ASPIRIN 81 MG CHEWABLE TABLET 81 MG: 81 TABLET CHEWABLE at 09:12

## 2023-12-28 RX ADMIN — DEXMEDETOMIDINE HYDROCHLORIDE 1.3 MCG/KG/HR: 4 INJECTION INTRAVENOUS at 07:12

## 2023-12-28 RX ADMIN — PROPOFOL 20 MCG/KG/MIN: 10 INJECTION, EMULSION INTRAVENOUS at 03:12

## 2023-12-28 RX ADMIN — DEXMEDETOMIDINE HYDROCHLORIDE 1.4 MCG/KG/HR: 4 INJECTION INTRAVENOUS at 10:12

## 2023-12-28 RX ADMIN — POTASSIUM CHLORIDE 40 MEQ: 29.8 INJECTION, SOLUTION INTRAVENOUS at 11:12

## 2023-12-28 RX ADMIN — NOREPINEPHRINE BITARTRATE 0.06 MCG/KG/MIN: 4 INJECTION, SOLUTION INTRAVENOUS at 12:12

## 2023-12-28 RX ADMIN — DEXMEDETOMIDINE HYDROCHLORIDE 1.4 MCG/KG/HR: 4 INJECTION INTRAVENOUS at 12:12

## 2023-12-28 RX ADMIN — AZITHROMYCIN MONOHYDRATE 500 MG: 500 INJECTION, POWDER, LYOPHILIZED, FOR SOLUTION INTRAVENOUS at 11:12

## 2023-12-28 RX ADMIN — INSULIN HUMAN 1.5 UNITS/HR: 1 INJECTION, SOLUTION INTRAVENOUS at 04:12

## 2023-12-28 RX ADMIN — ATORVASTATIN CALCIUM 40 MG: 40 TABLET, FILM COATED ORAL at 09:12

## 2023-12-28 RX ADMIN — INSULIN ASPART 2 UNITS: 100 INJECTION, SOLUTION INTRAVENOUS; SUBCUTANEOUS at 12:12

## 2023-12-28 RX ADMIN — SODIUM CHLORIDE: 9 INJECTION, SOLUTION INTRAVENOUS at 03:12

## 2023-12-28 RX ADMIN — CEFTRIAXONE 1 G: 1 INJECTION, POWDER, FOR SOLUTION INTRAMUSCULAR; INTRAVENOUS at 11:12

## 2023-12-28 RX ADMIN — POTASSIUM CHLORIDE 40 MEQ: 29.8 INJECTION, SOLUTION INTRAVENOUS at 03:12

## 2023-12-28 RX ADMIN — PANTOPRAZOLE SODIUM 40 MG: 40 INJECTION, POWDER, FOR SOLUTION INTRAVENOUS at 09:12

## 2023-12-28 RX ADMIN — MUPIROCIN: 20 OINTMENT TOPICAL at 08:12

## 2023-12-28 NOTE — PROGRESS NOTES
12/28/2023  Kevin Gambino    Current provider:  Pretty Jarvis MD    Device interrogation:       No data to display                   Rounded on Magdaleno Cunningham to ensure all mechanical assist device settings (IABP or VAD) were appropriate and all parameters were within limits.  I was able to ensure all back up equipment was present, the staff had no issues, and the Perfusion Department daily rounding was complete.      For implantable VADs: Interrogation of Ventricular assist device was performed with analysis of device parameters and review of device function. I have personally reviewed the interrogation findings and agree with findings as stated.     In emergency, the nursing units have been notified to contact the perfusion department either by:  Calling [id number] from 630am to 4pm Mon thru Fri, utilizing the On-Call Finder functionality of Epic and searching for Perfusion, or by contacting the hospital  from 4pm to 630am and on weekends and asking to speak with the perfusionist on call.    10:12 AM

## 2023-12-28 NOTE — RESPIRATORY THERAPY
1413 - Extubated pt with parameters to 3L MD MUKUND at bedside. Pt tolerated well and all vitals are stable.

## 2023-12-28 NOTE — ASSESSMENT & PLAN NOTE
2/2 STEMI/ICM  Mehanical support: IABP @ 1:1  Inotrope/pressor: levophed. Wean to maintain MAP > 60  Adequate urine output  Check hemodynamics    -SBT   -off dobutamine   - balloon pump on 1:1   -lasix 80 BID IV. off lasix gtt.   -restarted heparin gtt   - on propofol  - on levophed 0.01  - on fentanyl gtt  - started on precedex    - EP evaluated patient and will consider implantation of CRT-D once off pressors and off IABP, as well as extubation

## 2023-12-28 NOTE — ASSESSMENT & PLAN NOTE
- on azithromycin 500 IV daily  - on CTX 2g daily    [Normal Development] : development [None] : No medical problems [Normal Growth] : growth [No Skin Concerns] : skin [No Elimination Concerns] : elimination [No Feeding Concerns] : feeding [Normal Sleep Pattern] : sleep [Parental Well-Being] : parental well-being [Family Adjustment] : family adjustment [Safety] : safety [Feeding Routines] : feeding routines [Infant Adjustment] : infant adjustment [Mother] : mother [No Medications] : ~He/She~ is not on any medications [] : The components of the vaccine(s) to be administered today are listed in the plan of care. The disease(s) for which the vaccine(s) are intended to prevent and the risks have been discussed with the caretaker.  The risks are also included in the appropriate vaccination information statements which have been provided to the patient's caregiver.  The caregiver has given consent to vaccinate. [FreeTextEntry1] : \par 1 month old F, well baby\par Recommend exclusive breastfeeding, 8-12 feedings per day. Mother should continue prenatal vitamins and avoid alcohol. If formula is needed, recommend iron-fortified formulations, 2-4 oz every 3-4 hrs. When in car, patient should be in rear-facing car seat in back seat. Put baby to sleep on back, in own crib with no loose or soft bedding. Help baby to develop sleep and feeding routines. May offer pacifier if needed. Start tummy time when awake. Limit baby's exposure to others, especially those with fever or unknown vaccine status. Parents counseled to call if rectal temperature >100.4 degrees F. Start multivitamins with iron 1 ml daily.\par  Hep B vaccine given today, SE, risks and benefits explained, cool compresses as needed.\par \par Advised to apply barrier cream and Nystatin ointment 2-3 times daily to neck\par Advised to apply mupirocin to the small papule on left neck\par Keep area clean and dry, use barrier cream, air ventilation to area\par RTC if s/s worsen \par

## 2023-12-28 NOTE — CARE UPDATE
CCU Care Update Note    10PM     Hemodynamics    CVP:  9   SCVO2:  75   Cardiac Output: 8.52   Cardiac Index:  3.52   SVR:  620       I/Os    I/O this shift:  In: 329.1 [I.V.:329.1]  Out: 730 [Urine:730]      Intake/Output Summary (Last 24 hours) at 12/27/2023 2334  Last data filed at 12/27/2023 2300  Gross per 24 hour   Intake 2457.16 ml   Output 5235 ml   Net -2777.84 ml       Net IO Since Admission: -4,198.69 mL [12/27/23 2334]    Diuretics: Furosemide    Continuous Infusions:      sodium chloride 0.9% 10 mL/hr at 12/27/23 2300    dexmedeTOMIDine (Precedex) infusion (titrating) 0.7 mcg/kg/hr (12/27/23 2300)    fentanyl 75 mcg/hr (12/27/23 2300)    furosemide (LASIX) 500 mg in 50 mL infusion (conc: 10 mg/mL) 20 mg/hr (12/27/23 2300)    heparin (porcine) in D5W Stopped (12/27/23 1617)    NORepinephrine bitartrate-D5W 0.06 mcg/kg/min (12/27/23 2300)    propofoL 15 mcg/kg/min (12/27/23 2300)    vasopressin         Mechanical support: IABP 1:1 and TVP    Plan:  - Recheck ABG and change vent settings accordingly  - Attempt weaning off sedation  - Monitor CVP, goal of ~6-7, might need diuretics if UOP decreases  - Plan discussed with attending     2AM     Patient with elevated Anion Gap, and hyperglycemia, UA with trace ketones and positive for beta hydroxybutyrate. Will start insulin gtt and assess response.    Octavio Doran MD  Cardiovascular Disease PGY-IV  [facility]

## 2023-12-28 NOTE — PLAN OF CARE
No acute events throughout day, VS and assessment per flow sheet, see below for updates:     Pulmonary: Patient remains intubated, settings as follows: SIMV 450/22/55%/8+. Lungs coarse/diminished.     Cardiovascular: Patient 100% V Paced. HR @ 80. BP labile, levo titrated to maintain MAP >65. IABP left fem 1:1 EKG Auto. No alarms.      Neurological: Responds to voice, follows commands. Pupils 2mm brisk     Gastrointestinal: No BM. OGT to LIWS, BRB output after heparin bolus     Genitourinary: Cruz intact. UO > 125 mL/hr     Endocrine: Accu checks per order, CBG high, MD FARHAN Aguirre aware     Skin/Bath: See flowsheets for chairting                    Date of last CHG bath given: 12/27/23 @ 0500     Infusions: Prop @ 15, Fent @ 75, Levo @ 0.06, Lasix @ 20, precedex @ 0.7     Patient progressing towards goals as tolerated, plan of care communicated and reviewed with Magdaleno Cunningham and family, all concerns addressed, will continue to monitor.      Micheline Chambers RN

## 2023-12-28 NOTE — PLAN OF CARE
POC reviewed with Magdaleno Cunningham and family. Questions and concerns addressed. CVP: 9,7,6 Svo2: 75. No acute events throughout the night. Insulin drip started around 0500. Plan for today is to continue to wean prop and fent off.  See below and flowsheets for full assessment and VS info.       Neuro:  Val Coma Scale  Best Eye Response: 3-->(E3) to speech  Best Motor Response: 6-->(M6) obeys commands  Best Verbal Response: 1-->(V1) none (pt intubated and sedated)  Val Coma Scale Score: 10  Assessment Qualifiers: patient intubated, patient chemically sedated or paralyzed  Pupil PERRLA: yes    24 hr Temp:  [98.4 °F (36.9 °C)-100.2 °F (37.9 °C)]     CV:  Rhythm: paced rhythm   DVT prophylaxis: VTE Required Core Measure: Pharmacological prophylaxis initiated/maintained    CVP (mean): 6 mmHg (12/28/23 0300)       SVO2 (%): 73 % (12/27/23 1800)    IABP Mode: Auto  IABP Rate: 1:1  IABP Trigger: ECG  IABP Augmented Diastolic Pressure: 90          Pulses  Right Radial Pulse: 1+ (weak)  Left Radial Pulse: 1+ (weak)  Right Dorsalis Pedis Pulse: Doppler  Left Dorsalis Pedis Pulse: Doppler  Right Posterior Tibial Pulse: Doppler  Left Posterior Tibial Pulse: Doppler    Resp:     Vent Mode: SIMV  Set Rate: 22 BPM  Oxygen Concentration (%): 30  Vt Set: 450 mL  PEEP/CPAP: 5 cmH20  Pressure Support: 10 cmH20    GI/:  GI prophylaxis: yes  Diet/Nutrition Received: NPO  Last Bowel Movement: 12/25/23  Voiding Characteristics: urethral catheter (bladder)       Urethral Catheter 12/26/23 0745 Silicone 16 Fr.-Reason for Continuing Urinary Catheterization: Critically ill in ICU and requiring hourly monitoring of intake/output   Intake/Output Summary (Last 24 hours) at 12/28/2023 0651  Last data filed at 12/28/2023 0600  Gross per 24 hour   Intake 2592.44 ml   Output 5215 ml   Net -2622.56 ml          Labs/Accuchecks:  Recent Labs   Lab 12/27/23  1322 12/27/23  2124 12/28/23  0241   WBC 10.81 10.85 9.96  9.96   RBC 4.93 5.01  5.05  5.05   HGB 15.6 16.1 16.2  16.2   HCT 45.0 45.1 45.3  45.3    254 261  261      Recent Labs   Lab 12/25/23  1855 12/26/23  0017 12/26/23  0823 12/27/23  1322 12/27/23  2124 12/28/23  0241   INR 1.0  --  1.0 1.0  --   --    APTT  --    < > 26.4 25.4 25.8 26.9    < > = values in this interval not displayed.      Recent Labs     12/28/23  0241     136   K 3.6  3.6   CO2 16*  16*   CL 97  97   BUN 33*  33*   CREATININE 3.0*  3.0*   ALKPHOS 59   ALT 16   AST 22   BILITOT 0.6       Recent Labs   Lab 12/26/23  1001 12/26/23  1430 12/26/23  2130   TROPONINI 21.984* 25.809* 24.449*      Recent Labs     12/27/23  1920 12/27/23  2238 12/28/23  0246   PH 7.297* 7.325* 7.349*   PCO2 49.1* 40.9 35.3   PO2 45 121* 87   HCO3 24.0 21.3* 19.4*   POCSATURATED 75 98 96   BE -2 -5* -6*       Electrolytes: Electrolytes replaced  Accuchecks: Q1H    Gtts/LDAs:   sodium chloride 0.9% 10 mL/hr at 12/28/23 0600    dexmedeTOMIDine (Precedex) infusion (titrating) 1.1 mcg/kg/hr (12/28/23 0600)    fentanyl 75 mcg/hr (12/28/23 0600)    furosemide (LASIX) 500 mg in 50 mL infusion (conc: 10 mg/mL) 20 mg/hr (12/28/23 0600)    heparin (porcine) in D5W Stopped (12/27/23 1617)    insulin regular 1 units/mL infusion orderable (DKA) 2.1 Units/hr (12/28/23 0611)    NORepinephrine bitartrate-D5W 0.04 mcg/kg/min (12/28/23 0600)    propofoL 20 mcg/kg/min (12/28/23 0600)    vasopressin         Lines/Drains/Airways       Central Venous Catheter Line  Duration             Introducer 12/26/23 0630 Internal Jugular Right 2 days    Percutaneous Central Line Insertion/Assessment - Triple Lumen  12/27/23 1437 Internal Jugular Left <1 day              Drain  Duration                  NG/OG Tube 12/26/23 Center mouth 2 days         Urethral Catheter 12/26/23 0745 Silicone 16 Fr. 1 day              Airway  Duration                  Airway - Non-Surgical -- days       Airway Anesthesia 12/26/23 2 days              Arterial Line  Duration              Arterial Line 12/26/23 1400 Right Radial 1 day              Line  Duration                  IABP 12/25/23 2340  40 mL 2 days         Pacer Wires 12/26/23 0652 1 day              Peripheral Intravenous Line  Duration                  Sheath 12/25/23 Left 3 days         Peripheral IV - Single Lumen 12/25/23 1830 18 G Left Antecubital 2 days         Peripheral IV - Single Lumen 12/25/23 1856 18 G Anterior;Distal;Right Forearm 2 days         Sheath 12/25/23 2316 Left anterior 2 days         Peripheral IV - Single Lumen 12/26/23 0845 20 G Anterior;Proximal;Right Forearm 1 day         Peripheral IV - Single Lumen 12/26/23 1107 18 G Right Upper Arm 1 day                    Skin/Wounds  Bathing/Skin Care: dressed/undressed;bath, partial (12/27/23 0500)  Wounds: Yes  Wound care consulted: Yes

## 2023-12-28 NOTE — PROGRESS NOTES
[facility]  Cardiology  Progress Note    Patient Name: Magdaleno Cunningham  MRN: [id number]  Admission Date: 12/26/2023  Hospital Length of Stay: 2 days  Code Status: Full Code   Attending Physician: Pretty Jarvis MD   Primary Care Physician: Venkata Mclaughlin MD  Expected Discharge Date: 1/5/2024  Principal Problem:Cardiogenic shock    Subjective:     Hospital Course:   On fentanyl gtt and pressors with stable MAPs, UOP 4.3L on lasix gtt. Restarted propofol protocol.started on precedex. EP evaluated patient and consider implantation CRT-D once patient off pressors and off IABP and extubation. EKG on sinus rhythm with CHB. Will place central line. Pending ABG. Continue diuresing. Monitor creatinine. extubated. off dobutamine. balloon pump on 1:1 (don't stop balloon pump). lasix 80 BID IV. off lasix gtt. restarted heparin gtt       Interval History: -lasix 80 BID IV. off lasix gtt. restarted heparin gtt. Successfully extubated. off dobutamine       Review of Systems   Unable to perform ROS: Intubated     Objective:     Vital Signs (Most Recent):  Temp: 100.2 °F (37.9 °C) (12/28/23 1205)  Pulse: 79 (12/28/23 1205)  Resp: (!) 26 (12/28/23 1205)  BP: (!) 102/58 (12/27/23 1200)  SpO2: (!) 92 % (12/28/23 1205) Vital Signs (24h Range):  Temp:  [99.1 °F (37.3 °C)-100.6 °F (38.1 °C)] 100.2 °F (37.9 °C)  Pulse:  [79] 79  Resp:  [22-28] 26  SpO2:  [92 %-100 %] 92 %  Arterial Line BP: (66-95)/(55-76) 85/57     Weight: 107 kg (235 lb 14.3 oz)  Body mass index is 32.9 kg/m².     SpO2: (!) 92 %         Intake/Output Summary (Last 24 hours) at 12/28/2023 1336  Last data filed at 12/28/2023 1205  Gross per 24 hour   Intake 2448.18 ml   Output 4720 ml   Net -2271.82 ml       Lines/Drains/Airways       Central Venous Catheter Line  Duration             Introducer 12/26/23 0630 Internal Jugular Right 2 days    Percutaneous Central Line Insertion/Assessment - Triple Lumen  12/27/23 1437 Internal Jugular Left  <1 day              Drain  Duration                  NG/OG Tube 12/26/23 Center mouth 2 days         Urethral Catheter 12/28/23 0857 Silicone 16 Fr. <1 day              Airway  Duration                  Airway - Non-Surgical -- days       Airway Anesthesia 12/26/23 2 days              Arterial Line  Duration             Arterial Line 12/26/23 1400 Right Radial 1 day              Line  Duration                  IABP 12/25/23 2340  40 mL 2 days         Pacer Wires 12/26/23 0652 2 days              Peripheral Intravenous Line  Duration                  Sheath 12/25/23 Left 3 days         Peripheral IV - Single Lumen 12/25/23 1830 18 G Left Antecubital 2 days         Peripheral IV - Single Lumen 12/25/23 1856 18 G Anterior;Distal;Right Forearm 2 days         Peripheral IV - Single Lumen 12/26/23 0845 20 G Anterior;Proximal;Right Forearm 2 days         Peripheral IV - Single Lumen 12/26/23 1107 18 G Right Upper Arm 2 days         Sheath 12/25/23 2316 Left anterior 2 days                       Physical Exam  Constitutional:       Comments: intubated   Cardiovascular:      Comments: Paced rate. Cold extremities. No JVD.  Pulmonary:      Comments: Mechanically ventilated  Abdominal:      General: Abdomen is flat.      Palpations: Abdomen is soft.   Neurological:      Comments: sedated            Significant Labs: All pertinent lab results from the last 24 hours have been reviewed.    Significant Imaging:  CXR with bilateral pulmonary congestion.  Assessment and Plan:       * Cardiogenic shock  2/2 STEMI/ICM  Mehanical support: IABP @ 1:1  Inotrope/pressor: levophed. Wean to maintain MAP > 60  Adequate urine output  Check hemodynamics    -successfully extubated  -off dobutamine   - balloon pump on 1:1   -lasix 80 BID IV. off lasix gtt.   -restarted heparin gtt   - on propofol  - on levophed 0.01  - on fentanyl gtt  - started on precedex    - EP evaluated patient and will consider implantation of CRT-D once off pressors and  off IABP, as well as extubation    Pneumonia  - on azithromycin 500 IV daily  - on CTX 2g daily     Complete heart block  Possibly ischemia induced vs medication induced. Hold home metoprolol.  If CHB persists he will require a permanent pacemaker  Right IJ TVP placed with threshold 0.8. Rate to be set at 70.    STEMI (ST elevation myocardial infarction)  Continue brilinta and statin    HLD (hyperlipidemia)  Continue statin    Atrial fibrillation  EKG with sinus rhythm on CHB. Pacemaker dependant.     Diabetic ulcer of left foot associated with type 2 diabetes mellitus  A1c 8.6%  Takes lantus 80 bid, metformin, jardiance  Monitor BG, adjust insulin as needed  Consider endo consult if BG labile        VTE Risk Mitigation (From admission, onward)           Ordered     heparin 25,000 units in dextrose 5% 250 mL (100 units/mL) infusion LOW INTENSITY nomogram - [facility]  Continuous        Question:  Begin at (units/kg/hr)  Answer:  12    12/28/23 1247     heparin 25,000 units in dextrose 5% (100 units/ml) IV bolus from bag - ADDITIONAL PRN BOLUS - 60 units/kg (max bolus 4000 units)  As needed (PRN)        Question:  Heparin Infusion Adjustment (DO NOT MODIFY ANSWER)  Answer:  [email or web address]\Images\[email or web address]\HeparinInfusions\heparin LOW INTENSITY nomogram for [facility] [id number]    12/27/23 1313     heparin 25,000 units in dextrose 5% (100 units/ml) IV bolus from bag - ADDITIONAL PRN BOLUS - 30 units/kg (max bolus 4000 units)  As needed (PRN)        Question:  Heparin Infusion Adjustment (DO NOT MODIFY ANSWER)  Answer:  \[email or web address]\Images\Pharmacy\HeparinInfusions\heparin LOW INTENSITY nomogram for [facility] [id number]    12/27/23 1313     heparin 25,000 units in dextrose 5% 250 mL (100 units/mL) infusion LOW INTENSITY nomogram - [facility]  Continuous        Question:  Begin at (units/kg/hr)  Answer:  12    12/27/23 1313     IP VTE HIGH RISK PATIENT  Once         12/26/23 0541     Place sequential compression device  Until  discontinued         12/26/23 0541                    Jake Graves MD  Cardiology  [facility] - [facility]

## 2023-12-28 NOTE — SUBJECTIVE & OBJECTIVE
Interval History: -lasix 80 BID IV. off lasix gtt. restarted heparin gtt. SBT, will try to extubate. off dobutamine       Review of Systems   Unable to perform ROS: Intubated     Objective:     Vital Signs (Most Recent):  Temp: 100.2 °F (37.9 °C) (12/28/23 1205)  Pulse: 79 (12/28/23 1205)  Resp: (!) 26 (12/28/23 1205)  BP: (!) 102/58 (12/27/23 1200)  SpO2: (!) 92 % (12/28/23 1205) Vital Signs (24h Range):  Temp:  [99.1 °F (37.3 °C)-100.6 °F (38.1 °C)] 100.2 °F (37.9 °C)  Pulse:  [79] 79  Resp:  [22-28] 26  SpO2:  [92 %-100 %] 92 %  Arterial Line BP: (66-95)/(55-76) 85/57     Weight: 107 kg (235 lb 14.3 oz)  Body mass index is 32.9 kg/m².     SpO2: (!) 92 %         Intake/Output Summary (Last 24 hours) at 12/28/2023 1336  Last data filed at 12/28/2023 1205  Gross per 24 hour   Intake 2448.18 ml   Output 4720 ml   Net -2271.82 ml       Lines/Drains/Airways       Central Venous Catheter Line  Duration             Introducer 12/26/23 0630 Internal Jugular Right 2 days    Percutaneous Central Line Insertion/Assessment - Triple Lumen  12/27/23 1437 Internal Jugular Left <1 day              Drain  Duration                  NG/OG Tube 12/26/23 Center mouth 2 days         Urethral Catheter 12/28/23 0857 Silicone 16 Fr. <1 day              Airway  Duration                  Airway - Non-Surgical -- days       Airway Anesthesia 12/26/23 2 days              Arterial Line  Duration             Arterial Line 12/26/23 1400 Right Radial 1 day              Line  Duration                  IABP 12/25/23 2340  40 mL 2 days         Pacer Wires 12/26/23 0652 2 days              Peripheral Intravenous Line  Duration                  Sheath 12/25/23 Left 3 days         Peripheral IV - Single Lumen 12/25/23 1830 18 G Left Antecubital 2 days         Peripheral IV - Single Lumen 12/25/23 1856 18 G Anterior;Distal;Right Forearm 2 days         Peripheral IV - Single Lumen 12/26/23 0845 20 G Anterior;Proximal;Right Forearm 2 days          Peripheral IV - Single Lumen 12/26/23 1107 18 G Right Upper Arm 2 days         Sheath 12/25/23 2316 Left anterior 2 days                       Physical Exam  Constitutional:       Comments: intubated   Cardiovascular:      Comments: Paced rate. Cold extremities. No JVD.  Pulmonary:      Comments: Mechanically ventilated  Abdominal:      General: Abdomen is flat.      Palpations: Abdomen is soft.   Neurological:      Comments: sedated            Significant Labs: All pertinent lab results from the last 24 hours have been reviewed.    Significant Imaging:  CXR with bilateral pulmonary congestion.

## 2023-12-29 LAB
ALBUMIN SERPL BCP-MCNC: 2.7 G/DL (ref 3.5–5.2)
ALLENS TEST: ABNORMAL
ALP SERPL-CCNC: 55 U/L (ref 55–135)
ALT SERPL W/O P-5'-P-CCNC: 14 U/L (ref 10–44)
ANION GAP SERPL CALC-SCNC: 12 MMOL/L (ref 8–16)
ANION GAP SERPL CALC-SCNC: 13 MMOL/L (ref 8–16)
ANION GAP SERPL CALC-SCNC: 15 MMOL/L (ref 8–16)
ANION GAP SERPL CALC-SCNC: 15 MMOL/L (ref 8–16)
ANION GAP SERPL CALC-SCNC: 18 MMOL/L (ref 8–16)
APTT PPP: 32.8 SEC (ref 21–32)
APTT PPP: 33 SEC (ref 21–32)
APTT PPP: 42.5 SEC (ref 21–32)
AST SERPL-CCNC: 22 U/L (ref 10–40)
BACTERIA UR CULT: NO GROWTH
BACTERIA UR CULT: NO GROWTH
BASOPHILS # BLD AUTO: 0.09 K/UL (ref 0–0.2)
BASOPHILS # BLD AUTO: 0.09 K/UL (ref 0–0.2)
BASOPHILS NFR BLD: 0.7 % (ref 0–1.9)
BASOPHILS NFR BLD: 0.7 % (ref 0–1.9)
BILIRUB SERPL-MCNC: 0.7 MG/DL (ref 0.1–1)
BNP SERPL-MCNC: 395 PG/ML (ref 0–99)
BUN SERPL-MCNC: 36 MG/DL (ref 6–20)
BUN SERPL-MCNC: 37 MG/DL (ref 6–20)
BUN SERPL-MCNC: 39 MG/DL (ref 6–20)
CALCIUM SERPL-MCNC: 9.1 MG/DL (ref 8.7–10.5)
CALCIUM SERPL-MCNC: 9.2 MG/DL (ref 8.7–10.5)
CALCIUM SERPL-MCNC: 9.5 MG/DL (ref 8.7–10.5)
CHLORIDE SERPL-SCNC: 100 MMOL/L (ref 95–110)
CHLORIDE SERPL-SCNC: 100 MMOL/L (ref 95–110)
CHLORIDE SERPL-SCNC: 102 MMOL/L (ref 95–110)
CHLORIDE SERPL-SCNC: 104 MMOL/L (ref 95–110)
CHLORIDE SERPL-SCNC: 99 MMOL/L (ref 95–110)
CO2 SERPL-SCNC: 18 MMOL/L (ref 23–29)
CO2 SERPL-SCNC: 20 MMOL/L (ref 23–29)
CREAT SERPL-MCNC: 1.6 MG/DL (ref 0.5–1.4)
CREAT SERPL-MCNC: 1.7 MG/DL (ref 0.5–1.4)
CREAT SERPL-MCNC: 1.8 MG/DL (ref 0.5–1.4)
CREAT SERPL-MCNC: 2 MG/DL (ref 0.5–1.4)
CREAT SERPL-MCNC: 2 MG/DL (ref 0.5–1.4)
DELSYS: ABNORMAL
DELSYS: ABNORMAL
DIFFERENTIAL METHOD BLD: ABNORMAL
DIFFERENTIAL METHOD BLD: ABNORMAL
EOSINOPHIL # BLD AUTO: 0.3 K/UL (ref 0–0.5)
EOSINOPHIL # BLD AUTO: 0.3 K/UL (ref 0–0.5)
EOSINOPHIL NFR BLD: 2.3 % (ref 0–8)
EOSINOPHIL NFR BLD: 2.3 % (ref 0–8)
ERYTHROCYTE [DISTWIDTH] IN BLOOD BY AUTOMATED COUNT: 12.5 % (ref 11.5–14.5)
ERYTHROCYTE [DISTWIDTH] IN BLOOD BY AUTOMATED COUNT: 12.5 % (ref 11.5–14.5)
EST. GFR  (NO RACE VARIABLE): 38.4 ML/MIN/1.73 M^2
EST. GFR  (NO RACE VARIABLE): 38.4 ML/MIN/1.73 M^2
EST. GFR  (NO RACE VARIABLE): 43.6 ML/MIN/1.73 M^2
EST. GFR  (NO RACE VARIABLE): 46.7 ML/MIN/1.73 M^2
EST. GFR  (NO RACE VARIABLE): 50.3 ML/MIN/1.73 M^2
FLOW: 3
GLUCOSE SERPL-MCNC: 169 MG/DL (ref 70–110)
GLUCOSE SERPL-MCNC: 173 MG/DL (ref 70–110)
GLUCOSE SERPL-MCNC: 177 MG/DL (ref 70–110)
GLUCOSE SERPL-MCNC: 177 MG/DL (ref 70–110)
GLUCOSE SERPL-MCNC: 179 MG/DL (ref 70–110)
HCO3 UR-SCNC: 22.6 MMOL/L (ref 24–28)
HCO3 UR-SCNC: 22.8 MMOL/L (ref 24–28)
HCO3 UR-SCNC: 23.6 MMOL/L (ref 24–28)
HCO3 UR-SCNC: 24.5 MMOL/L (ref 24–28)
HCT VFR BLD AUTO: 44.1 % (ref 40–54)
HCT VFR BLD AUTO: 44.1 % (ref 40–54)
HCT VFR BLD CALC: 45 %PCV (ref 36–54)
HGB BLD-MCNC: 15.8 G/DL (ref 14–18)
HGB BLD-MCNC: 15.8 G/DL (ref 14–18)
IMM GRANULOCYTES # BLD AUTO: 0.06 K/UL (ref 0–0.04)
IMM GRANULOCYTES # BLD AUTO: 0.06 K/UL (ref 0–0.04)
IMM GRANULOCYTES NFR BLD AUTO: 0.5 % (ref 0–0.5)
IMM GRANULOCYTES NFR BLD AUTO: 0.5 % (ref 0–0.5)
LYMPHOCYTES # BLD AUTO: 2.1 K/UL (ref 1–4.8)
LYMPHOCYTES # BLD AUTO: 2.1 K/UL (ref 1–4.8)
LYMPHOCYTES NFR BLD: 17.5 % (ref 18–48)
LYMPHOCYTES NFR BLD: 17.5 % (ref 18–48)
MAGNESIUM SERPL-MCNC: 1.8 MG/DL (ref 1.6–2.6)
MAGNESIUM SERPL-MCNC: 1.9 MG/DL (ref 1.6–2.6)
MCH RBC QN AUTO: 31.7 PG (ref 27–31)
MCH RBC QN AUTO: 31.7 PG (ref 27–31)
MCHC RBC AUTO-ENTMCNC: 35.8 G/DL (ref 32–36)
MCHC RBC AUTO-ENTMCNC: 35.8 G/DL (ref 32–36)
MCV RBC AUTO: 89 FL (ref 82–98)
MCV RBC AUTO: 89 FL (ref 82–98)
MODE: ABNORMAL
MONOCYTES # BLD AUTO: 0.6 K/UL (ref 0.3–1)
MONOCYTES # BLD AUTO: 0.6 K/UL (ref 0.3–1)
MONOCYTES NFR BLD: 5 % (ref 4–15)
MONOCYTES NFR BLD: 5 % (ref 4–15)
NEUTROPHILS # BLD AUTO: 9 K/UL (ref 1.8–7.7)
NEUTROPHILS # BLD AUTO: 9 K/UL (ref 1.8–7.7)
NEUTROPHILS NFR BLD: 74 % (ref 38–73)
NEUTROPHILS NFR BLD: 74 % (ref 38–73)
NRBC BLD-RTO: 0 /100 WBC
NRBC BLD-RTO: 0 /100 WBC
PCO2 BLDA: 35.6 MMHG (ref 35–45)
PCO2 BLDA: 36.7 MMHG (ref 35–45)
PCO2 BLDA: 39.1 MMHG (ref 35–45)
PCO2 BLDA: 42.6 MMHG (ref 35–45)
PH SMN: 7.37 [PH] (ref 7.35–7.45)
PH SMN: 7.39 [PH] (ref 7.35–7.45)
PH SMN: 7.4 [PH] (ref 7.35–7.45)
PH SMN: 7.41 [PH] (ref 7.35–7.45)
PLATELET # BLD AUTO: 243 K/UL (ref 150–450)
PLATELET # BLD AUTO: 243 K/UL (ref 150–450)
PMV BLD AUTO: 9.8 FL (ref 9.2–12.9)
PMV BLD AUTO: 9.8 FL (ref 9.2–12.9)
PO2 BLDA: 153 MMHG (ref 80–100)
PO2 BLDA: 32 MMHG (ref 40–60)
PO2 BLDA: 33 MMHG (ref 40–60)
PO2 BLDA: 94 MMHG (ref 80–100)
POC BE: -1 MMOL/L
POC BE: -1 MMOL/L
POC BE: -2 MMOL/L
POC BE: -2 MMOL/L
POC IONIZED CALCIUM: 1.23 MMOL/L (ref 1.06–1.42)
POC SATURATED O2: 62 % (ref 95–100)
POC SATURATED O2: 62 % (ref 95–100)
POC SATURATED O2: 97 % (ref 95–100)
POC SATURATED O2: 99 % (ref 95–100)
POC TCO2: 24 MMOL/L (ref 23–27)
POC TCO2: 24 MMOL/L (ref 24–29)
POC TCO2: 25 MMOL/L (ref 23–27)
POC TCO2: 26 MMOL/L (ref 24–29)
POCT GLUCOSE: 124 MG/DL (ref 70–110)
POCT GLUCOSE: 131 MG/DL (ref 70–110)
POCT GLUCOSE: 148 MG/DL (ref 70–110)
POCT GLUCOSE: 149 MG/DL (ref 70–110)
POCT GLUCOSE: 152 MG/DL (ref 70–110)
POCT GLUCOSE: 158 MG/DL (ref 70–110)
POCT GLUCOSE: 159 MG/DL (ref 70–110)
POCT GLUCOSE: 161 MG/DL (ref 70–110)
POCT GLUCOSE: 163 MG/DL (ref 70–110)
POCT GLUCOSE: 165 MG/DL (ref 70–110)
POCT GLUCOSE: 170 MG/DL (ref 70–110)
POCT GLUCOSE: 180 MG/DL (ref 70–110)
POCT GLUCOSE: 180 MG/DL (ref 70–110)
POCT GLUCOSE: 182 MG/DL (ref 70–110)
POCT GLUCOSE: 192 MG/DL (ref 70–110)
POTASSIUM BLD-SCNC: 3.5 MMOL/L (ref 3.5–5.1)
POTASSIUM SERPL-SCNC: 3.8 MMOL/L (ref 3.5–5.1)
POTASSIUM SERPL-SCNC: 4 MMOL/L (ref 3.5–5.1)
POTASSIUM SERPL-SCNC: 4.3 MMOL/L (ref 3.5–5.1)
PROT SERPL-MCNC: 7.1 G/DL (ref 6–8.4)
RBC # BLD AUTO: 4.98 M/UL (ref 4.6–6.2)
RBC # BLD AUTO: 4.98 M/UL (ref 4.6–6.2)
SAMPLE: ABNORMAL
SITE: ABNORMAL
SODIUM BLD-SCNC: 136 MMOL/L (ref 136–145)
SODIUM SERPL-SCNC: 134 MMOL/L (ref 136–145)
SODIUM SERPL-SCNC: 135 MMOL/L (ref 136–145)
SODIUM SERPL-SCNC: 137 MMOL/L (ref 136–145)
SP02: 100
WBC # BLD AUTO: 12.12 K/UL (ref 3.9–12.7)
WBC # BLD AUTO: 12.12 K/UL (ref 3.9–12.7)

## 2023-12-29 PROCEDURE — 99291 CRITICAL CARE FIRST HOUR: CPT | Mod: ,,, | Performed by: INTERNAL MEDICINE

## 2023-12-29 PROCEDURE — 99900035 HC TECH TIME PER 15 MIN (STAT)

## 2023-12-29 PROCEDURE — 25000003 PHARM REV CODE 250

## 2023-12-29 PROCEDURE — 80048 BASIC METABOLIC PNL TOTAL CA: CPT | Mod: 91,XB | Performed by: INTERNAL MEDICINE

## 2023-12-29 PROCEDURE — 85730 THROMBOPLASTIN TIME PARTIAL: CPT

## 2023-12-29 PROCEDURE — 25000003 PHARM REV CODE 250: Performed by: STUDENT IN AN ORGANIZED HEALTH CARE EDUCATION/TRAINING PROGRAM

## 2023-12-29 PROCEDURE — 92610 EVALUATE SWALLOWING FUNCTION: CPT

## 2023-12-29 PROCEDURE — 37799 UNLISTED PX VASCULAR SURGERY: CPT

## 2023-12-29 PROCEDURE — 94761 N-INVAS EAR/PLS OXIMETRY MLT: CPT | Mod: XB

## 2023-12-29 PROCEDURE — 63600175 PHARM REV CODE 636 W HCPCS: Performed by: INTERNAL MEDICINE

## 2023-12-29 PROCEDURE — 85025 COMPLETE CBC W/AUTO DIFF WBC: CPT | Performed by: STUDENT IN AN ORGANIZED HEALTH CARE EDUCATION/TRAINING PROGRAM

## 2023-12-29 PROCEDURE — 63600175 PHARM REV CODE 636 W HCPCS: Performed by: STUDENT IN AN ORGANIZED HEALTH CARE EDUCATION/TRAINING PROGRAM

## 2023-12-29 PROCEDURE — 63600175 PHARM REV CODE 636 W HCPCS

## 2023-12-29 PROCEDURE — 82803 BLOOD GASES ANY COMBINATION: CPT

## 2023-12-29 PROCEDURE — 83880 ASSAY OF NATRIURETIC PEPTIDE: CPT

## 2023-12-29 PROCEDURE — 20000000 HC ICU ROOM

## 2023-12-29 PROCEDURE — 80053 COMPREHEN METABOLIC PANEL: CPT | Performed by: STUDENT IN AN ORGANIZED HEALTH CARE EDUCATION/TRAINING PROGRAM

## 2023-12-29 PROCEDURE — 27000202 HC IABP, ADD'L DAY

## 2023-12-29 PROCEDURE — 25000003 PHARM REV CODE 250: Performed by: NURSE PRACTITIONER

## 2023-12-29 PROCEDURE — C9113 INJ PANTOPRAZOLE SODIUM, VIA: HCPCS | Performed by: STUDENT IN AN ORGANIZED HEALTH CARE EDUCATION/TRAINING PROGRAM

## 2023-12-29 PROCEDURE — 99223 1ST HOSP IP/OBS HIGH 75: CPT | Mod: ,,, | Performed by: NURSE PRACTITIONER

## 2023-12-29 PROCEDURE — 27200667 HC PACEMAKER, TEMPORARY MONITORING, PER SHIFT

## 2023-12-29 PROCEDURE — 85730 THROMBOPLASTIN TIME PARTIAL: CPT | Mod: 91 | Performed by: INTERNAL MEDICINE

## 2023-12-29 PROCEDURE — 83735 ASSAY OF MAGNESIUM: CPT | Mod: 91 | Performed by: INTERNAL MEDICINE

## 2023-12-29 PROCEDURE — 27000221 HC OXYGEN, UP TO 24 HOURS

## 2023-12-29 PROCEDURE — 25000003 PHARM REV CODE 250: Performed by: INTERNAL MEDICINE

## 2023-12-29 PROCEDURE — 83735 ASSAY OF MAGNESIUM: CPT | Performed by: STUDENT IN AN ORGANIZED HEALTH CARE EDUCATION/TRAINING PROGRAM

## 2023-12-29 RX ORDER — IBUPROFEN 200 MG
24 TABLET ORAL
Status: DISCONTINUED | OUTPATIENT
Start: 2023-12-29 | End: 2024-01-03

## 2023-12-29 RX ORDER — FUROSEMIDE 10 MG/ML
80 INJECTION INTRAMUSCULAR; INTRAVENOUS EVERY 8 HOURS
Status: DISCONTINUED | OUTPATIENT
Start: 2023-12-29 | End: 2023-12-30

## 2023-12-29 RX ORDER — POTASSIUM CHLORIDE 750 MG/1
50 CAPSULE, EXTENDED RELEASE ORAL ONCE
Status: COMPLETED | OUTPATIENT
Start: 2023-12-29 | End: 2023-12-29

## 2023-12-29 RX ORDER — IBUPROFEN 200 MG
16 TABLET ORAL
Status: DISCONTINUED | OUTPATIENT
Start: 2023-12-29 | End: 2024-01-03

## 2023-12-29 RX ORDER — FUROSEMIDE 10 MG/ML
80 INJECTION INTRAMUSCULAR; INTRAVENOUS ONCE
Status: DISCONTINUED | OUTPATIENT
Start: 2023-12-29 | End: 2023-12-29

## 2023-12-29 RX ORDER — POTASSIUM CHLORIDE 14.9 MG/ML
20 INJECTION INTRAVENOUS ONCE
Status: COMPLETED | OUTPATIENT
Start: 2023-12-29 | End: 2023-12-29

## 2023-12-29 RX ORDER — GUAIFENESIN 100 MG/5ML
81 LIQUID (ML) ORAL DAILY
Status: DISCONTINUED | OUTPATIENT
Start: 2023-12-29 | End: 2024-01-04 | Stop reason: HOSPADM

## 2023-12-29 RX ORDER — INSULIN ASPART 100 [IU]/ML
0-10 INJECTION, SOLUTION INTRAVENOUS; SUBCUTANEOUS
Status: DISCONTINUED | OUTPATIENT
Start: 2023-12-29 | End: 2024-01-02

## 2023-12-29 RX ORDER — FUROSEMIDE 10 MG/ML
80 INJECTION INTRAMUSCULAR; INTRAVENOUS EVERY 8 HOURS
Status: DISCONTINUED | OUTPATIENT
Start: 2023-12-29 | End: 2023-12-29

## 2023-12-29 RX ORDER — GLUCAGON 1 MG
1 KIT INJECTION
Status: DISCONTINUED | OUTPATIENT
Start: 2023-12-29 | End: 2024-01-03

## 2023-12-29 RX ORDER — PANTOPRAZOLE SODIUM 40 MG/1
40 FOR SUSPENSION ORAL DAILY
Status: DISCONTINUED | OUTPATIENT
Start: 2023-12-30 | End: 2024-01-04 | Stop reason: HOSPADM

## 2023-12-29 RX ORDER — GLUCAGON 1 MG
1 KIT INJECTION
Status: DISCONTINUED | OUTPATIENT
Start: 2023-12-29 | End: 2023-12-29

## 2023-12-29 RX ORDER — MAGNESIUM SULFATE HEPTAHYDRATE 40 MG/ML
2 INJECTION, SOLUTION INTRAVENOUS ONCE
Status: COMPLETED | OUTPATIENT
Start: 2023-12-29 | End: 2023-12-29

## 2023-12-29 RX ORDER — INSULIN ASPART 100 [IU]/ML
0-5 INJECTION, SOLUTION INTRAVENOUS; SUBCUTANEOUS EVERY 6 HOURS PRN
Status: DISCONTINUED | OUTPATIENT
Start: 2023-12-29 | End: 2023-12-29

## 2023-12-29 RX ADMIN — INSULIN HUMAN 1.6 UNITS/HR: 1 INJECTION, SOLUTION INTRAVENOUS at 06:12

## 2023-12-29 RX ADMIN — MUPIROCIN: 20 OINTMENT TOPICAL at 08:12

## 2023-12-29 RX ADMIN — FUROSEMIDE 80 MG: 10 INJECTION, SOLUTION INTRAVENOUS at 10:12

## 2023-12-29 RX ADMIN — POTASSIUM CHLORIDE 50 MEQ: 10 CAPSULE, COATED, EXTENDED RELEASE ORAL at 10:12

## 2023-12-29 RX ADMIN — TICAGRELOR 90 MG: 90 TABLET ORAL at 10:12

## 2023-12-29 RX ADMIN — DEXMEDETOMIDINE HYDROCHLORIDE 1.3 MCG/KG/HR: 4 INJECTION INTRAVENOUS at 10:12

## 2023-12-29 RX ADMIN — POTASSIUM CHLORIDE 20 MEQ: 200 INJECTION, SOLUTION INTRAVENOUS at 06:12

## 2023-12-29 RX ADMIN — FENTANYL CITRATE 50 MCG: 50 INJECTION INTRAMUSCULAR; INTRAVENOUS at 10:12

## 2023-12-29 RX ADMIN — MUPIROCIN: 20 OINTMENT TOPICAL at 10:12

## 2023-12-29 RX ADMIN — INSULIN HUMAN 1.8 UNITS/HR: 1 INJECTION, SOLUTION INTRAVENOUS at 05:12

## 2023-12-29 RX ADMIN — VALSARTAN 20 MG: 40 TABLET, FILM COATED ORAL at 01:12

## 2023-12-29 RX ADMIN — FENTANYL CITRATE 50 MCG: 50 INJECTION INTRAMUSCULAR; INTRAVENOUS at 08:12

## 2023-12-29 RX ADMIN — CEFTRIAXONE 1 G: 1 INJECTION, POWDER, FOR SOLUTION INTRAMUSCULAR; INTRAVENOUS at 10:12

## 2023-12-29 RX ADMIN — HEPARIN SODIUM 17 UNITS/KG/HR: 10000 INJECTION, SOLUTION INTRAVENOUS at 06:12

## 2023-12-29 RX ADMIN — DEXMEDETOMIDINE HYDROCHLORIDE 1.4 MCG/KG/HR: 4 INJECTION INTRAVENOUS at 02:12

## 2023-12-29 RX ADMIN — FUROSEMIDE 80 MG: 10 INJECTION, SOLUTION INTRAVENOUS at 01:12

## 2023-12-29 RX ADMIN — VALSARTAN 20 MG: 40 TABLET, FILM COATED ORAL at 10:12

## 2023-12-29 RX ADMIN — ATORVASTATIN CALCIUM 40 MG: 40 TABLET, FILM COATED ORAL at 08:12

## 2023-12-29 RX ADMIN — TICAGRELOR 90 MG: 90 TABLET ORAL at 08:12

## 2023-12-29 RX ADMIN — MAGNESIUM SULFATE HEPTAHYDRATE 2 G: 40 INJECTION, SOLUTION INTRAVENOUS at 06:12

## 2023-12-29 RX ADMIN — DEXMEDETOMIDINE HYDROCHLORIDE 1.3 MCG/KG/HR: 4 INJECTION INTRAVENOUS at 07:12

## 2023-12-29 RX ADMIN — HEPARIN SODIUM 19 UNITS/KG/HR: 10000 INJECTION, SOLUTION INTRAVENOUS at 10:12

## 2023-12-29 RX ADMIN — ASPIRIN 81 MG CHEWABLE TABLET 81 MG: 81 TABLET CHEWABLE at 08:12

## 2023-12-29 RX ADMIN — DEXMEDETOMIDINE HYDROCHLORIDE 1.3 MCG/KG/HR: 4 INJECTION INTRAVENOUS at 05:12

## 2023-12-29 RX ADMIN — DEXMEDETOMIDINE HYDROCHLORIDE 1.4 MCG/KG/HR: 4 INJECTION INTRAVENOUS at 12:12

## 2023-12-29 RX ADMIN — PANTOPRAZOLE SODIUM 40 MG: 40 INJECTION, POWDER, FOR SOLUTION INTRAVENOUS at 08:12

## 2023-12-29 RX ADMIN — DEXMEDETOMIDINE HYDROCHLORIDE 1.1 MCG/KG/HR: 4 INJECTION INTRAVENOUS at 12:12

## 2023-12-29 RX ADMIN — QUETIAPINE FUMARATE 25 MG: 25 TABLET ORAL at 08:12

## 2023-12-29 NOTE — CARE UPDATE
CCU Care Update Note:    Hemodynamics:  CVP:4  SVO2: 62      Continuous Infusions:   sodium chloride 0.9% 10 mL/hr at 12/29/23 0100    dexmedeTOMIDine (Precedex) infusion (titrating) 1.4 mcg/kg/hr (12/29/23 0250)    heparin (porcine) in D5W 15 Units/kg/hr (12/29/23 0100)    insulin regular 1 units/mL infusion orderable (DKA) 1.8 Units/hr (12/29/23 0234)    NORepinephrine bitartrate-D5W Stopped (12/28/23 1430)       Intake/Output Summary (Last 24 hours) at 12/29/2023 0327  Last data filed at 12/29/2023 0100  Gross per 24 hour   Intake 2198.47 ml   Output 2825 ml   Net -626.53 ml           Plan:  No changes continue current management       Discussed with CCU Attending      Viviane BULL MD  Cardiovascular Disease PGY IV  [facility]

## 2023-12-29 NOTE — ASSESSMENT & PLAN NOTE
#CHB with wide fractionated ventricular escape likely from non revascularizable STEMI    Left dominant coronary system  Inferior STEMI with CW a flutter 12/25, LIMA-LAD and SVG -OM are down as well, Lcx couldn't be revasuclarized  Subsequently developed CHB  EF 20-25%  Now sinus, 1st degree AV block, iRBBB.   Not pacer dependent at this time, so TVP set to backup rate    Plan  -will continue to discuss type of device needed as he recovers. Currently still IABP dependent  -continue TVP meanwhile with daily threshold checks  -Current settings: rate 50, 25mA output (threshold 15)

## 2023-12-29 NOTE — PLAN OF CARE
POC reviewed with Magdaleno Cunningham and family. Questions and concerns addressed. CVP: 5, 4 and 4.  Svo2: 69%. Pt extubated, maintained on nasal canula 4l/m. Pt with IABP at 1:1. Heparin gtt started. Pt oriented X3, confused to situation.  See below and flowsheets for full assessment and VS info.       Neuro:  Val Coma Scale  Best Eye Response: 3-->(E3) to speech  Best Motor Response: 6-->(M6) obeys commands  Best Verbal Response: 4-->(V4) confused  [place] Coma Scale Score: 13  Assessment Qualifiers: patient intubated, patient chemically sedated or paralyzed  Pupil PERRLA: yes    24 hr Temp:  [97.7 °F (36.5 °C)-100.6 °F (38.1 °C)]     CV:  Rhythm: paced rhythm   DVT prophylaxis: VTE Required Core Measure: Pharmacological prophylaxis initiated/maintained    CVP (mean): 4 mmHg (12/28/23 1505)       SVO2 (%): 73 % (12/27/23 1800)    IABP Mode: Auto  IABP Rate: 1:1  IABP Trigger: ECG  IABP Augmented Diastolic Pressure: 87          Pulses  Right Radial Pulse: 1+ (weak)  Left Radial Pulse: 1+ (weak)  Right Dorsalis Pedis Pulse: Doppler  Left Dorsalis Pedis Pulse: Doppler  Right Posterior Tibial Pulse: Doppler  Left Posterior Tibial Pulse: Doppler    Resp:  Flow (L/min): 4  Vent Mode: Spont  Set Rate: 22 BPM  Oxygen Concentration (%): 30  Vt Set: 450 mL  PEEP/CPAP: 5 cmH20  Pressure Support: 8 cmH20    GI/:  GI prophylaxis: no  Diet/Nutrition Received: NPO  Last Bowel Movement: 12/25/23  Voiding Characteristics: urethral catheter (bladder)   Intake/Output Summary (Last 24 hours) at 12/28/2023 1820  Last data filed at 12/28/2023 1805  Gross per 24 hour   Intake 2522.7 ml   Output 4160 ml   Net -1637.3 ml            Labs/Accuchecks:  Recent Labs   Lab 12/27/23  2124 12/28/23  0241 12/28/23  1302   WBC 10.85 9.96  9.96 10.74   RBC 5.01 5.05  5.05 5.12   HGB 16.1 16.2  16.2 15.8   HCT 45.1 45.3  45.3 43.7    261  261 242      Recent Labs   Lab 12/26/23  0823 12/27/23  1322 12/27/23 2124 12/28/23  024  12/28/23  1302   INR 1.0 1.0  --   --  1.1   APTT 26.4 25.4 25.8 26.9 26.6      Recent Labs     12/28/23  0241 12/28/23  0853 12/28/23  1706     136   < > 137   K 3.6  3.6   < > 3.8   CO2 16*  16*   < > 19*   CL 97  97   < > 103   BUN 33*  33*   < > 35*   CREATININE 3.0*  3.0*   < > 2.5*   ALKPHOS 59  --   --    ALT 16  --   --    AST 22  --   --    BILITOT 0.6  --   --     < > = values in this interval not displayed.       Recent Labs   Lab 12/26/23  1001 12/26/23  1430 12/26/23  2130   TROPONINI 21.984* 25.809* 24.449*      Recent Labs     12/27/23  1920 12/27/23  2238 12/28/23  0246 12/28/23  0738 12/28/23  1122   PH 7.297* 7.325* 7.349*  --   --    PCO2 49.1* 40.9 35.3  --   --    PO2 45 121* 87 38* 38*   HCO3 24.0 21.3* 19.4*  --   --    POCSATURATED 75 98 96 69 69   BE -2 -5* -6*  --   --        Electrolytes: N/A - electrolytes WDL  Accuchecks: Q1H    Gtts/LDAs:   sodium chloride 0.9% 10 mL/hr at 12/28/23 1805    dexmedeTOMIDine (Precedex) infusion (titrating) 1.4 mcg/kg/hr (12/28/23 1805)    heparin (porcine) in D5W 12 Units/kg/hr (12/28/23 1805)    insulin regular 1 units/mL infusion orderable (DKA) 1.4 Units/hr (12/28/23 1805)    NORepinephrine bitartrate-D5W Stopped (12/28/23 1430)       Lines/Drains/Airways       Central Venous Catheter Line  Duration             Introducer 12/26/23 0630 Internal Jugular Right 2 days    Percutaneous Central Line Insertion/Assessment - Triple Lumen  12/27/23 1437 Internal Jugular Left 1 day              Drain  Duration                  Urethral Catheter 12/28/23 0857 Silicone 16 Fr. <1 day              Arterial Line  Duration             Arterial Line 12/26/23 1400 Right Radial 2 days              Line  Duration                  IABP 12/25/23 2340  40 mL 2 days         Pacer Wires 12/26/23 0652 2 days              Peripheral Intravenous Line  Duration                  Sheath 12/25/23 Left 3 days         Peripheral IV - Single Lumen 12/25/23 1830 18 G Left  Antecubital 2 days         Peripheral IV - Single Lumen 12/25/23 1856 18 G Anterior;Distal;Right Forearm 2 days         Peripheral IV - Single Lumen 12/26/23 0845 20 G Anterior;Proximal;Right Forearm 2 days         Peripheral IV - Single Lumen 12/26/23 1107 18 G Right Upper Arm 2 days         Sheath 12/25/23 2316 Left anterior 2 days                    Skin/Wounds  Bathing/Skin Care: dressed/undressed;bath, partial (12/27/23 0500)  Wounds: Yes  Wound care consulted: Yes

## 2023-12-29 NOTE — CONSULTS
[facility] - Cardiac Intensive Care  Endocrinology  Diabetes Consult Note    Consult Requested by: Pretty Jarvis MD   Reason for admit: Cardiogenic shock    HISTORY OF PRESENT ILLNESS:  Reason for Consult: Management of T2DM, Hyperglycemia     Diabetes diagnosis year: >3 years ago    Home Diabetes Medications:  Jardiance 25 mg, Lantus 80 u BID, Metformin 1000 mg BID    How often checking glucose at home? 1-3 x day   BG readings on regimen: 150-200's (patient is confused)  Hypoglycemia on the regimen?  Yes  Missed doses on regimen?  No    Diabetes Complications include:     Hyperglycemia    Complicating diabetes co morbidities:   History of MI      HPI:   Patient is a 58 y.o. male with CAD s/p CABG and recent PCI to proximal and mid Lcx by Dr. Botello on 12/22/23, COPD, atrial fibrillation on eliquis, diabetes, current smoker (reportedly 3PPD), hypertension who presented to [facility] facility with chest pain which has been present for 2 days.  He was found to have ST elevations and was taken to the cath lab directly and was found to have in-stent thrombosis. Of note he was also in atrial flutter. Procedure note not available but per report no intervention was performed and a IABP was placed in the left femoral artery. During the procedure he had intermittent complete heart block so a left femoral TVP was also placed. He was requiring BiPAP post-operatively. It was decided to transfer to higher level of care and he was intubated to allow safe transportation. Endocrine consulted for bg management        Interval HPI:   No acute events overnight. Patient in room [id number] A. Blood glucose stable. BG at goal on current insulin regimen (IIP). Steroid use- None .      Renal function- Abnormal    Vasopressors-  None     No diet orders on file     Eating:   NPO  Nausea: No  Hypoglycemia and intervention: No  Fever: No  TPN and/or TF: No    PMH, PSH, FH, SH updated and reviewed     ROS:  Review of Systems  CHRIS  patient is too fatigued and sedated to participated in ROS.  Current Medications and/or Treatments Impacting Glycemic Control  Immunotherapy:    Immunosuppressants       None          Steroids:   Hormones (From admission, onward)      None          Pressors:    Autonomic Drugs (From admission, onward)      None          Hyperglycemia/Diabetes Medications:   Antihyperglycemics (From admission, onward)      Start     Stop Route Frequency Ordered    12/29/23 2100  insulin detemir U-100 (Levemir) pen 20 Units         -- SubQ Nightly 12/29/23 1513    12/29/23 1611  insulin aspart U-100 pen 0-5 Units         -- SubQ Every 6 hours PRN 12/29/23 1513             PHYSICAL EXAMINATION:  Vitals:    12/29/23 1505   BP: 126/73   Pulse: 63   Resp: (!) 22   Temp:      Body mass index is 31.98 kg/m².     Constitutional: Well developed, well nourished, NAD.  ENT: External ears no masses with nose patent; normal hearing.  Neck: Supple; trachea midline.  Cardiovascular: Normal heart sounds, no LE edema. DP +2 bilaterally.  Lungs: Normal effort; lungs anterior bilaterally clear to auscultation.  Abdomen: Soft, no masses, no hernias.  MS: No clubbing or cyanosis of nails noted; unable to assess gait.  Skin: No rashes, lesions, or ulcers; no nodules. Injection sites are ok. No lipo hypertropthy or atrophy.  Psychiatric: CHRIS  Neurological: CHRIS  Foot: Nails in good condition, no amputations noted.        Labs Reviewed and Include   Recent Labs   Lab 12/29/23  0440 12/29/23  0839 12/29/23  1156   *  177*   < > 179*   CALCIUM 9.1  9.1   < > 9.1   ALBUMIN 2.7*  --   --    PROT 7.1  --   --    *  135*   < > 135*   K 3.8  3.8   < > 4.0   CO2 20*  20*   < > 18*     100   < > 104   BUN 36*  36*   < > 39*   CREATININE 2.0*  2.0*   < > 1.7*   ALKPHOS 55  --   --    ALT 14  --   --    AST 22  --   --    BILITOT 0.7  --   --     < > = values in this interval not displayed.     Lab Results   Component Value Date    WBC 12.12  "12/29/2023    WBC 12.12 12/29/2023    HGB 15.8 12/29/2023    HGB 15.8 12/29/2023    HCT 44.1 12/29/2023    HCT 44.1 12/29/2023    MCV 89 12/29/2023    MCV 89 12/29/2023     12/29/2023     12/29/2023     No results for input(s): "TSH", "FREET4" in the last 168 hours.  Lab Results   Component Value Date    HGBA1C 8.3 (H) 12/14/2023    HGBA1C 8.3 (H) 12/14/2023       Nutritional status:   Body mass index is 31.98 kg/m².  Lab Results   Component Value Date    ALBUMIN 2.7 (L) 12/29/2023    ALBUMIN 2.9 (L) 12/28/2023    ALBUMIN 2.9 (L) 12/27/2023     No results found for: "PREALBUMIN"    Estimated Creatinine Clearance: 59.6 mL/min (A) (based on SCr of 1.7 mg/dL (H)).    Accu-Checks  Recent Labs     12/29/23  0540 12/29/23  0635 12/29/23  0746 12/29/23  0850 12/29/23  0954 12/29/23  1056 12/29/23  1200 12/29/23  1247 12/29/23  1402 12/29/23  1514   POCTGLUCOSE 170* 165* 148* 149* 159* 192* 159* 131* 152* 161*        ASSESSMENT and PLAN    Cardiac/Vascular  * Cardiogenic shock  Managed per primary team  Optimize bg control        HLD (hyperlipidemia)  Managed per primary.   On statin per ADA        Endocrine  Type 2 diabetes mellitus with diabetic arthropathy  Endocrinology consulted for BG management.   BG goal 140-180     - Transition drip at 1.6 units/hr with step-down parameters. (Based on needs while on IIP).  - Novolog (aspart) insulin prn for BG excursions MDC SSI (150/25).  - BG checks q4hr  - Hypoglycemia protocol in place    ** Please notify Endocrine for any change and/or advance in diet**  ** Please call Endocrine for any BG related issues **    Discharge Planning:   TBD. Please notify endocrinology prior to discharge.         Plan discussed with patient, family, and RN at bedside.        Dell Zavaleta, DWAINE, FNP  Endocrinology  [facility] - Cardiac Intensive Care  "

## 2023-12-29 NOTE — ASSESSMENT & PLAN NOTE
2/2 STEMI/ICM  Mehanical support: IABP @ 1:1  Inotrope/pressor: levophed. Wean to maintain MAP > 60  Adequate urine output  Check hemodynamics    - extubated on 12/28/23  - CVP 4, SVO2 62, CO: 3.7, CI 1.6  - balloon pump on 1:1   - increase diuresis   - 80 lasix bolus. Continue with 80 TID   - start valsartan 20 BID    -restarted heparin gtt on minimal intensity  - EP evaluated patient and will consider implantation of CRT-D once off pressors and off IABP.

## 2023-12-29 NOTE — ASSESSMENT & PLAN NOTE
Known paroxysmal afib, on eliquis and toprol xl at home  AC was on hold due to hematemesis which has resolved  Toprol held while unstable  Continue therapeutic anticoagulation

## 2023-12-29 NOTE — PLAN OF CARE
Problem: SLP  Goal: SLP Goal  Description: Speech Language Pathology Goals  Goals expected to be met by 1/5/23    1. Pt will participate in ongoing assessment of swallow function to determine safest, least restrictive means of nutrition/hydration  2. Educate Pt and family on aspiration precautions and SLP POC    Outcome: Ongoing, Progressing     SLP Bedside Swallow Evaluation initiated. Patient presents with Mild Dysphagia and Dysphonia s/p extubation. Patient presents with enhanced risk of aspiration due to waxing/waning alertness, decreased endurance and HOB restrictions.   REC: cautiously continue NPO except medications crushed in puree and should Patient wish to continue PO for quality of life/comfort, aware of aspiration risk, safest textures would be full liquids via single, tsp sized bites/sips sparingly provided Pt is alert, attentive, 1:1 Assistance with all PO, monitor for oral holding and/or S/S aspiration. Continue to monitor for signs and symptoms of aspiration and discontinue oral feeding should you notice any of the following: watery eyes, reddened facial area, wet vocal quality, increased work of breathing, change in respiratory status, increased congestion, coughing, fever and/or change in level of alertness.  ST to continue to follow for ongoing swallow assessment as alertness improves.      12/29/2023

## 2023-12-29 NOTE — HPI
Reason for Consult: Management of T2DM, Hyperglycemia     Diabetes diagnosis year: >3 years ago    Home Diabetes Medications:  Jardiance 25 mg, Lantus 80 u BID, Metformin 1000 mg BID    How often checking glucose at home? 1-3 x day   BG readings on regimen: 150-200's (patient is confused)  Hypoglycemia on the regimen?  Yes  Missed doses on regimen?  No    Diabetes Complications include:     Hyperglycemia    Complicating diabetes co morbidities:   History of MI      HPI:   Patient is a 58 y.o. male with CAD s/p CABG and recent PCI to proximal and mid Lcx by Dr. Botello on 12/22/23, COPD, atrial fibrillation on eliquis, diabetes, current smoker (reportedly 3PPD), hypertension who presented to [facility] facility with chest pain which has been present for 2 days.  He was found to have ST elevations and was taken to the cath lab directly and was found to have in-stent thrombosis. Of note he was also in atrial flutter. Procedure note not available but per report no intervention was performed and a IABP was placed in the left femoral artery. During the procedure he had intermittent complete heart block so a left femoral TVP was also placed. He was requiring BiPAP post-operatively. It was decided to transfer to higher level of care and he was intubated to allow safe transportation. Endocrine consulted for bg management

## 2023-12-29 NOTE — PT/OT/SLP EVAL
Speech Language Pathology Evaluation  Bedside Swallow    Patient Name:  Magdaleno Cunningham   MRN:  [id number]  Admitting Diagnosis: Cardiogenic shock    Recommendations:                 General Recommendations:  Dysphagia therapy  Diet recommendations:  NPO, Other (Comment) should PO medications be required, safest means would be crushed in puree provided strict aspiration precautions. Should Pt wish to continue PO for quality of life/comfort, aware of aspiration risk, safest textures would be full liquids via small, single (teaspoon sized) bites with strict aspiration precautions   Aspiration Precautions:  only when vital signs stable, only when awake/alert/attentive and accepting of PO, 1:1 assistance required with all PO for safety, 1 bite/sip at a time, Avoid talking while eating, Frequent oral care, Meds crushed in puree, Small bites/sips, and Strict aspiration precautions Continue to monitor for signs and symptoms of aspiration and discontinue oral feeding should you notice any of the following: watery eyes, reddened facial area, wet vocal quality, increased work of breathing, change in respiratory status, increased congestion, coughing, fever and/or change in level of alertness  General Precautions: Standard, aspiration, fall, IABP, respiratory  Communication strategies:  provide increased time to answer and go to room if call light pushed    Assessment:     Magdaleno Cunningham is a 58 y.o. male with an SLP diagnosis of mild Dysphagia and Dysphonia s/p extubation.  He presents with enhanced risk of aspiration due to waxing/waning alertness, decreased endurance and HOB restrictions. ST To continue to follow.     History:     Past Medical History:   Diagnosis Date    Anticoagulant long-term use     Atrial fibrillation 2018    BPH (benign prostatic hyperplasia)     Cataract     COPD (chronic obstructive pulmonary disease)     Coronary artery disease     stents    CVD (cardiovascular disease)     Diabetes mellitus      Erythema multiforme     AKA Denton Edmondson Syndrome    Hypertension     Infected incision     MI (myocardial infarction)     per pt he has had 4     ROSAMARIA on CPAP     RLS (restless legs syndrome)     Perez-Denton syndrome        Past Surgical History:   Procedure Laterality Date    CORONARY ANGIOGRAPHY INCLUDING BYPASS GRAFTS WITH CATHETERIZATION OF LEFT HEART N/A 9/1/2022    Procedure: ANGIOGRAM, CORONARY, INCLUDING BYPASS GRAFT, WITH LEFT HEART CATHETERIZATION;  Surgeon: Paul Botello MD;  Location: [facility] CATH/EP LAB;  Service: Cardiology;  Laterality: N/A;    CORONARY ANGIOGRAPHY INCLUDING BYPASS GRAFTS WITH CATHETERIZATION OF LEFT HEART Left 12/22/2023    Procedure: ANGIOGRAM, CORONARY, INCLUDING BYPASS GRAFT, WITH LEFT HEART CATHETERIZATION;  Surgeon: Paul Botello MD;  Location: [facility] CATH/EP LAB;  Service: Cardiology;  Laterality: Left;    CORONARY ARTERY BYPASS GRAFT (CABG) N/A 4/7/2020    Procedure: CORONARY ARTERY BYPASS GRAFT (CABG)- Excision of left atrial appendage;  Surgeon: Doug Solitario MD;  Location: [facility] OR;  Service: Cardiothoracic;  Laterality: N/A;    CORONARY BYPASS GRAFT ANGIOGRAPHY  12/25/2023    Procedure: Bypass graft study;  Surgeon: Ashley Valverde MD;  Location: [facility] CATH/EP LAB;  Service: Cardiology;;    CORONARY STENT PLACEMENT      CORONARY STENT PLACEMENT N/A 12/22/2023    Procedure: INSERTION, STENT, CORONARY ARTERY;  Surgeon: Paul Botello MD;  Location: [facility] CATH/EP LAB;  Service: Cardiology;  Laterality: N/A;    WILSON MAZE PROCEDURE N/A 4/7/2020    Procedure: WILSON MAZE PROCEDURE- Attempted;  Surgeon: Doug Solitario MD;  Location: [facility] OR;  Service: Cardiothoracic;  Laterality: N/A;    CYSTOSCOPY N/A 12/10/2018    Procedure: CYSTOSCOPY;  Surgeon: Khushboo Abreu MD;  [facility]  Service: Urology;  Laterality: N/A;    ENDOSCOPIC HARVEST OF VEIN Left 4/7/2020    Procedure: HARVEST-VEIN-ENDOVASCULAR;  Surgeon: Doug Solitario MD;   [email or web address] OR;  Service: Cardiothoracic;  Laterality: Left;    INCISION OF PERIRECTAL ABSCESS Bilateral 9/1/2023    Procedure: INCISION, ABSCESS, PERIRECTAL;  Surgeon: Brayan Spears MD;  Location: [facility] OR;  Service: General;  Laterality: Bilateral;  stirrups    INSERTION OF INTRA-AORTIC BALLOON ASSIST DEVICE  12/25/2023    Procedure: INSERTION, INTRA-AORTIC BALLOON PUMP;  Surgeon: Ashley Valverde MD;  Location: [facility] CATH/EP LAB;  Service: Cardiology;;    INSERTION OF TEMPORARY PACEMAKER N/A 12/25/2023    Procedure: INSERTION, PACEMAKER, TEMPORARY;  Surgeon: Ashley Valverde MD;  Location: [facility] CATH/EP LAB;  Service: Cardiology;  Laterality: N/A;    IVUS, CORONARY  12/22/2023    Procedure: IVUS, Coronary;  Surgeon: Paul Botello MD;  Location: [facility] CATH/EP LAB;  Service: Cardiology;;    LEFT HEART CATHETERIZATION Left 3/17/2020    Procedure: CATHETERIZATION, HEART, LEFT;  Surgeon: Tyree Walters MD;  Location: [facility] CATH/EP LAB;  Service: Cardiology;  Laterality: Left;    PERCUTANEOUS CORONARY INTERVENTION, ARTERY N/A 12/22/2023    Procedure: Percutaneous coronary intervention;  Surgeon: Paul Botello MD;  Location: [facility] CATH/EP LAB;  Service: Cardiology;  Laterality: N/A;    PERCUTANEOUS CORONARY INTERVENTION, ARTERY N/A 12/25/2023    Procedure: Percutaneous coronary intervention;  Surgeon: Ashley Valverde MD;  Location: [facility] CATH/EP LAB;  Service: Cardiology;  Laterality: N/A;    STERNAL WIRES REMOVAL N/A 6/8/2020    Procedure: REMOVAL, STERNAL WIRE;  Surgeon: Doug Solitario MD;  Location: [facility] OR;  Service: Peripheral Vascular;  Laterality: N/A;    ULTRASOUND OF PROSTATE FOR VOLUME DETERMINATION  12/10/2018    Procedure: ULTRASOUND, PROSTATE, FOR VOLUME DETERMINATION;  Surgeon: Khushboo Abreu MD;  Location: [facility] OR;  Service: Urology;;       Social/Occupational History: Limited due to Patient with altered mental status and no family present     Prior Intubation  "HX:  12/26//23 - 12/28/23    Modified Barium Swallow: none prior at this facility    Chest X-Rays: 12/29/23: No significant interval change in the appearance of the chest since 12/28/2023 is appreciated.     Prior diet: regular, thin per Patient .    Subjective     SLP reviewed Pt with RN, RN explained Pt drinking water, sometimes coughing, prior to ST assessment.  RN confirmed Pt with HOB restrictions 2/2 IABP  Pt presents lethargic  He explains, "Something like jello or pudding would be great"     Pain/Comfort:  Pain Rating 1: 7/10  Location - Side 1: Left  Location 1: shoulder  Pain Addressed 1: Pre-medicate for activity, Nurse notified  Pain Rating Post-Intervention 1: other (see comments) (did not rate)    Respiratory Status: Nasal cannula, flow 3 L/min    Objective:     Oral Musculature Evaluation  Oral Musculature: general weakness  Dentition: partials, rarely or never uses dentures to eat  Secretion Management: adequate  Mucosal Quality: dry  Oral Labial Strength and Mobility: functional seal  Lingual Strength and Mobility: functional protrusion  Volitional Cough: present  Volitional Swallow: elicited  Voice Prior to PO Intake: mildly hoarse, adequate intensity    Bedside Swallow Eval:   Consistencies Assessed:  Thin liquids ice chip x1, tsp sip x1, straw sips x4  Puree 1/2 tsp bites x2, full tsp bite x1      Oral Phase:   Dry mouth    Pharyngeal Phase:   no overt clinical signs/symptoms of aspiration    Compensatory Strategies  Patient cued to take single small sips though continued to take cyclical straw sips     Treatment: Pt found awake in bed with arterial line, central line, peripheral IV, oxygen and IABP in place, feeding himself sips of thin liquids via straw. Pt seen in position as found due to HOB restrictions. He was oriented to person and place. He followed simple commands provided occasional cues for clarification.  No overt S/S aspiration with self-fed sip thin liquids observed upon entrance to " room though Patient noted to be impulsive with size of sips taken from cup. No overt S/S aspiration with subsequent single sip trials of thin liquids or bites of puree presented by SLP.  SLP unable to r/o silent aspiration at the bedside .  Patient with decreased eye contact as assessment progressed. Additional PO trials deferred due to Patient with increased lethargy and decreased sustained alertness as assessment progressed. RN notified and explained Pt  recently medicated. Pt was educated on SLP Role, definition and risk of aspiration, aspiration precautions, swallow anatomy and ongoing SLP POC. Pt with minimal demonstration of understanding. No family present. He remained in position as found upon LSP exit/handoff with RN.     Goals:   Multidisciplinary Problems       SLP Goals          Problem: SLP    Goal Priority Disciplines Outcome   SLP Goal     SLP Ongoing, Progressing   Description: Speech Language Pathology Goals  Goals expected to be met by 1/5/23    1. Pt will participate in ongoing assessment of swallow function to determine safest, least restrictive means of nutrition/hydration  2. Educate Pt and family on aspiration precautions and SLP POC                         Plan:     Patient to be seen:  4 x/week   Plan of Care expires:  01/28/24  Plan of Care reviewed with:  patient   SLP Follow-Up:  Yes       Discharge recommendations:      Barriers to Discharge:   NPO    Time Tracking:     SLP Treatment Date:   12/29/23  Speech Start Time:  0921  Speech Stop Time:  0934     Speech Total Time (min):  13 min    Billable Minutes: Eval Swallow and Oral Function 10    12/29/2023

## 2023-12-29 NOTE — PLAN OF CARE
POC reviewed with Magdaleno Cunningham and family. Questions and concerns addressed. CVP: 4,5,5 Svo2: 62. No acute events throughout the night. Insulin drip continues. Balloon pump continues at 1:1 EKG auto. See below and flowsheets for full assessment and VS info.       Neuro:  [place] Coma Scale  Best Eye Response: 4-->(E4) spontaneous  Best Motor Response: 6-->(M6) obeys commands  Best Verbal Response: 4-->(V4) confused  [place] Coma Scale Score: 14  Assessment Qualifiers: patient not sedated/intubated  Pupil PERRLA: yes    24 hr Temp:  [97.7 °F (36.5 °C)-100.6 °F (38.1 °C)]     CV:  Rhythm: paced rhythm   DVT prophylaxis: VTE Required Core Measure: Pharmacological prophylaxis initiated/maintained    CVP (mean): 5 mmHg (12/29/23 0400)       SVO2 (%): 73 % (12/27/23 1800)    IABP Mode: Auto  IABP Rate: 1:1  IABP Trigger: ECG  IABP Augmented Diastolic Pressure: 114          Pulses  Right Radial Pulse: 1+ (weak)  Left Radial Pulse: 1+ (weak)  Right Dorsalis Pedis Pulse: Doppler  Left Dorsalis Pedis Pulse: Doppler  Right Posterior Tibial Pulse: Doppler  Left Posterior Tibial Pulse: Doppler    Resp:  Flow (L/min): 3  Vent Mode: Spont  Set Rate: 22 BPM  Oxygen Concentration (%): 30  Vt Set: 450 mL  PEEP/CPAP: 5 cmH20  Pressure Support: 8 cmH20    GI/:  GI prophylaxis: yes  Diet/Nutrition Received: NPO  Last Bowel Movement: 12/25/23  Voiding Characteristics: urethral catheter (bladder)  [REMOVED]      Urethral Catheter 12/26/23 0745 Silicone 16 Fr.-Reason for Continuing Urinary Catheterization: Critically ill in ICU and requiring hourly monitoring of intake/output       Urethral Catheter 12/28/23 0857 Silicone 16 Fr.-Reason for Continuing Urinary Catheterization: Critically ill in ICU and requiring hourly monitoring of intake/output   Intake/Output Summary (Last 24 hours) at 12/29/2023 0654  Last data filed at 12/29/2023 0600  Gross per 24 hour   Intake 3504.05 ml   Output 2720 ml   Net 784.05 ml           Labs/Accuchecks:  Recent Labs   Lab 12/28/23  0241 12/28/23  1302 12/29/23  0440   WBC 9.96  9.96 10.74 12.12  12.12   RBC 5.05  5.05 5.12 4.98  4.98   HGB 16.2  16.2 15.8 15.8  15.8   HCT 45.3  45.3 43.7 44.1  44.1     261 242 243  243      Recent Labs   Lab 12/26/23  0823 12/27/23  1322 12/27/23  2124 12/28/23  1302 12/28/23  1927 12/29/23  0440   INR 1.0 1.0  --  1.1  --   --    APTT 26.4 25.4   < > 26.6 30.9 33.0*    < > = values in this interval not displayed.      Recent Labs     12/29/23  0440   *  135*   K 3.8  3.8   CO2 20*  20*     100   BUN 36*  36*   CREATININE 2.0*  2.0*   ALKPHOS 55   ALT 14   AST 22   BILITOT 0.7       Recent Labs   Lab 12/26/23  1001 12/26/23  1430 12/26/23  2130   TROPONINI 21.984* 25.809* 24.449*      Recent Labs     12/28/23  0246 12/28/23  0738 12/28/23  1122 12/28/23  2042 12/29/23  0644   PH 7.349*  --   --  7.392 7.413   PCO2 35.3  --   --  38.5 35.6   PO2 87   < > 38* 32* 94   HCO3 19.4*  --   --  23.4* 22.8*   POCSATURATED 96   < > 69 62 97   BE -6*  --   --  -2 -2    < > = values in this interval not displayed.       Electrolytes: Electrolytes replaced  Accuchecks: Q1H    Gtts/LDAs:   sodium chloride 0.9% 10 mL/hr at 12/29/23 0600    dexmedeTOMIDine (Precedex) infusion (titrating) 1.3 mcg/kg/hr (12/29/23 0600)    heparin (porcine) in D5W 17 Units/kg/hr (12/29/23 0603)    insulin regular 1 units/mL infusion orderable (DKA) 1.8 Units/hr (12/29/23 0600)    NORepinephrine bitartrate-D5W Stopped (12/28/23 1430)       Lines/Drains/Airways       Central Venous Catheter Line  Duration             Introducer 12/26/23 0630 Internal Jugular Right 3 days    Percutaneous Central Line Insertion/Assessment - Triple Lumen  12/27/23 1437 Internal Jugular Left 1 day              Drain  Duration                  Urethral Catheter 12/28/23 0857 Silicone 16 Fr. <1 day              Arterial Line  Duration             Arterial Line 12/26/23 1400 Right Radial  2 days              Line  Duration                  IABP 12/25/23 2340  40 mL 3 days         Pacer Wires 12/26/23 0652 3 days              Peripheral Intravenous Line  Duration                  Sheath 12/25/23 Left 4 days         Peripheral IV - Single Lumen 12/25/23 1830 18 G Left Antecubital 3 days         Peripheral IV - Single Lumen 12/25/23 1856 18 G Anterior;Distal;Right Forearm 3 days         Sheath 12/25/23 2316 Left anterior 3 days         Peripheral IV - Single Lumen 12/26/23 0845 20 G Anterior;Proximal;Right Forearm 2 days         Peripheral IV - Single Lumen 12/26/23 1107 18 G Right Upper Arm 2 days                    Skin/Wounds  Bathing/Skin Care: patient refused (12/28/23 1900)  Wounds: Yes  Wound care consulted: Yes

## 2023-12-29 NOTE — SUBJECTIVE & OBJECTIVE
Interval HPI:   No acute events overnight. Patient in room [id number] A. Blood glucose stable. BG at goal on current insulin regimen (IIP). Steroid use- None .      Renal function- Abnormal    Vasopressors-  None     No diet orders on file     Eating:   NPO  Nausea: No  Hypoglycemia and intervention: No  Fever: No  TPN and/or TF: No    PMH, PSH, FH, SH updated and reviewed     ROS:  Review of Systems  CHRIS patient is too fatigued and sedated to participated in ROS.  Current Medications and/or Treatments Impacting Glycemic Control  Immunotherapy:    Immunosuppressants       None          Steroids:   Hormones (From admission, onward)      None          Pressors:    Autonomic Drugs (From admission, onward)      None          Hyperglycemia/Diabetes Medications:   Antihyperglycemics (From admission, onward)      Start     Stop Route Frequency Ordered    12/29/23 2100  insulin detemir U-100 (Levemir) pen 20 Units         -- SubQ Nightly 12/29/23 1513    12/29/23 1611  insulin aspart U-100 pen 0-5 Units         -- SubQ Every 6 hours PRN 12/29/23 1513             PHYSICAL EXAMINATION:  Vitals:    12/29/23 1505   BP: 126/73   Pulse: 63   Resp: (!) 22   Temp:      Body mass index is 31.98 kg/m².     Constitutional: Well developed, well nourished, NAD.  ENT: External ears no masses with nose patent; normal hearing.  Neck: Supple; trachea midline.  Cardiovascular: Normal heart sounds, no LE edema. DP +2 bilaterally.  Lungs: Normal effort; lungs anterior bilaterally clear to auscultation.  Abdomen: Soft, no masses, no hernias.  MS: No clubbing or cyanosis of nails noted; unable to assess gait.  Skin: No rashes, lesions, or ulcers; no nodules. Injection sites are ok. No lipo hypertropthy or atrophy.  Psychiatric: CHRIS  Neurological: CHRIS  Foot: Nails in good condition, no amputations noted.

## 2023-12-29 NOTE — PROGRESS NOTES
[facility]  Cardiac Electrophysiology  Progress Note    Admission Date: 12/26/2023  Code Status: Full Code   Attending Physician: Pretty Jarvis MD   Expected Discharge Date: 1/5/2024  Principal Problem:Cardiogenic shock    Subjective:     Interval History: No events. Pacing with TVP.     Review of Systems   Unable to perform ROS: Intubated     Objective:     Vital Signs (Most Recent):  Temp: 98.1 °F (36.7 °C) (12/28/23 1900)  Pulse: 79 (12/28/23 1805)  Resp: (!) 29 (12/28/23 1805)  BP: (!) 102/58 (12/27/23 1200)  SpO2: 96 % (12/28/23 1805) Vital Signs (24h Range):  Temp:  [97.7 °F (36.5 °C)-100.6 °F (38.1 °C)] 98.1 °F (36.7 °C)  Pulse:  [79-95] 79  Resp:  [22-34] 29  SpO2:  [92 %-100 %] 96 %  Arterial Line BP: ()/(56-71) 104/64     Weight: 107 kg (235 lb 14.3 oz)  Body mass index is 32.9 kg/m².     SpO2: 96 %        Physical Exam  Constitutional:       General: He is not in acute distress.     Appearance: Normal appearance. He is well-developed.   HENT:      Mouth/Throat:      Mouth: Mucous membranes are moist.      Pharynx: Oropharynx is clear.   Cardiovascular:      Rate and Rhythm: Normal rate and regular rhythm.      Heart sounds: Normal heart sounds. No murmur heard.  Pulmonary:      Comments: Intubated  Abdominal:      Palpations: Abdomen is soft.      Tenderness: There is no abdominal tenderness. There is no guarding.   Musculoskeletal:         General: No swelling. Normal range of motion.      Right lower leg: No edema.      Left lower leg: No edema.   Skin:     General: Skin is warm and dry.   Neurological:      Mental Status: He is alert.      Comments: Sedated            Significant Labs: All pertinent lab results from the last 24 hours have been reviewed.    Significant Imaging:  Reviewed  Assessment and Plan:     * Cardiogenic shock  #CHB with wide fractionated ventricular escape likely from non revascularizable STEMI    Left dominant coronary system  Inferior STEMI  with CW a flutter 12/25, LIMA-LAD and SVG -OM are down as well, Lcx couldn't be revasuclarized  Subsequently developed CHB  EF 20-25%    Plan  -will need CRT-D vs CRT-P before discharge (CRT because EF < 40% and charlotteley will pace 100% of the time)  -too sick to go to the EP lab at this time, please re contact EP doctors once off antibiotics, off MCS and pressors and extubated  -continue TVP meanwhile with daily threshold checks    Typical atrial flutter  New diagnosis, Found on 12/25 EKG that also showed inferior STEMI  Continue AC      Paroxysmal atrial fibrillation  Known paroxysmal afib, on eliquis and toprol xl at home  AC was on hold due to hematemesis which has resolved  Toprol held while unstable  Continue therapeutic anticoagulation        Connor M Gillies, MD  Cardiac Electrophysiology  [facility]

## 2023-12-29 NOTE — ASSESSMENT & PLAN NOTE
Endocrinology consulted for BG management.   BG goal 140-180     - Transition drip at 1.6 units/hr with step-down parameters.   - Novolog (aspart) insulin prn for BG excursions [facility] (150/25).  - BG checks q4hr  - Hypoglycemia protocol in place    ** Please notify Endocrine for any change and/or advance in diet**  ** Please call Endocrine for any BG related issues **    Discharge Planning:   TBD. Please notify endocrinology prior to discharge.

## 2023-12-29 NOTE — NURSING
Pt failing bedside swallow test. Coughing aggressively after drinking water with HOB elevated at 15 degrees. SLP consult placed. Pt becoming increasingly restless/agitated due to restricting PO intake.

## 2023-12-29 NOTE — CONSULTS
Wound care consulted to the left upper arm. Wound care noted intact dried exudate. Wound care to sign-off at this time, please re consult for any further needs.

## 2023-12-29 NOTE — PROGRESS NOTES
[facility]  Cardiology  Progress Note    Patient Name: Magdaleno Cunningham  MRN: [id number]  Admission Date: 12/26/2023  Hospital Length of Stay: 3 days  Code Status: Full Code   Attending Physician: Pretty Jarvis MD   Primary Care Physician: Venkata Mclaughlin MD  Expected Discharge Date: 1/5/2024  Principal Problem:Cardiogenic shock    Subjective:     Hospital Course:   On fentanyl gtt and pressors with stable MAPs, UOP 4.3L on lasix gtt. Restarted propofol protocol.started on precedex. EP evaluated patient and consider implantation CRT-D once patient off pressors and off IABP and extubation. EKG on sinus rhythm with CHB. Will place central line. Pending ABG. Continue diuresing. Monitor creatinine. off dobutamine. balloon pump on 1:1 (don't stop balloon pump). off lasix gtt. restarted heparin gtt. Extubated. CO: 3.7, CI 1.6, increased diuresis with 80 lasix bolus now and continue lasix 80 TID. Given BP, started on  valsartan 20 BID and monitor Cr.       Interval History: CVP 4, SVO2 62, CO: 3.7, CI 1.6, optimized diuresis and BP. started on ARBs and monitor Cr.     Review of Systems   Constitutional: Negative for fever.   Cardiovascular:  Negative for chest pain, dyspnea on exertion and palpitations.   Respiratory:  Negative for shortness of breath.      Objective:     Vital Signs (Most Recent):  Temp: 97.7 °F (36.5 °C) (12/29/23 0705)  Pulse: 79 (12/29/23 1105)  Resp: (!) 22 (12/29/23 1105)  BP: 138/74 (12/29/23 1105)  SpO2: 100 % (12/29/23 1105) Vital Signs (24h Range):  Temp:  [97.7 °F (36.5 °C)-100 °F (37.8 °C)] 97.7 °F (36.5 °C)  Pulse:  [79-95] 79  Resp:  [18-34] 22  SpO2:  [95 %-100 %] 100 %  BP: (102-147)/(73-82) 138/74  Arterial Line BP: ()/(58-90) 107/75     Weight: 104 kg (229 lb 4.5 oz)  Body mass index is 31.98 kg/m².     SpO2: 100 %         Intake/Output Summary (Last 24 hours) at 12/29/2023 1227  Last data filed at 12/29/2023 0905  Gross per 24 hour   Intake 3150.67  ml   Output 2390 ml   Net 760.67 ml       Lines/Drains/Airways       Central Venous Catheter Line  Duration             Introducer 12/26/23 0630 Internal Jugular Right 3 days    Percutaneous Central Line Insertion/Assessment - Triple Lumen  12/27/23 1437 Internal Jugular Left 1 day              Drain  Duration                  Urethral Catheter 12/28/23 0857 Silicone 16 Fr. 1 day              Arterial Line  Duration             Arterial Line 12/26/23 1400 Right Radial 2 days              Line  Duration                  IABP 12/25/23 2340  40 mL 3 days         Pacer Wires 12/26/23 0652 3 days              Peripheral Intravenous Line  Duration                  Sheath 12/25/23 Left 4 days         Peripheral IV - Single Lumen 12/25/23 1830 18 G Left Antecubital 3 days         Peripheral IV - Single Lumen 12/25/23 1856 18 G Anterior;Distal;Right Forearm 3 days         Peripheral IV - Single Lumen 12/26/23 0845 20 G Anterior;Proximal;Right Forearm 3 days         Peripheral IV - Single Lumen 12/26/23 1107 18 G Right Upper Arm 3 days         Sheath 12/25/23 2316 Left anterior 3 days                       Physical Exam  Constitutional:       General: He is not in acute distress.     Appearance: Normal appearance. He is well-developed.   HENT:      Mouth/Throat:      Mouth: Mucous membranes are moist.      Pharynx: Oropharynx is clear.   Cardiovascular:      Rate and Rhythm: Normal rate and regular rhythm.      Heart sounds: Normal heart sounds. No murmur heard.  Pulmonary:      Comments: Extubated. On 3L NC.   Abdominal:      Palpations: Abdomen is soft.      Tenderness: There is no abdominal tenderness. There is no guarding.   Musculoskeletal:         General: No swelling. Normal range of motion.      Right lower leg: No edema.      Left lower leg: No edema.   Skin:     General: Skin is warm and dry.   Neurological:      Mental Status: He is alert.            Significant Labs: All pertinent lab results from the last 24  hours have been reviewed.    Significant Imaging:  CXR bilateral pulmonary congestion.  Assessment and Plan:     * Cardiogenic shock  2/2 STEMI/ICM  Mehanical support: IABP @ 1:1  Inotrope/pressor: levophed. Wean to maintain MAP > 60  Adequate urine output  Check hemodynamics    - extubated on 12/28/23  - CVP 4, SVO2 62, CO: 3.7, CI 1.6  - balloon pump on 1:1   - increase diuresis   - 80 lasix bolus. Continue with 80 TID   - start valsartan 20 BID    -restarted heparin gtt on minimal intensity  - EP evaluated patient and will consider implantation of CRT-D once off pressors and off IABP.    Pneumonia  - on CTX 2g daily     Complete heart block  Possibly ischemia induced vs medication induced. Hold home metoprolol.  If CHB persists he will require a permanent pacemaker  Right IJ TVP placed with threshold 0.8. Rate to be set at 70.    STEMI (ST elevation myocardial infarction)  Continue brilinta and statin    HLD (hyperlipidemia)  Continue statin    Atrial fibrillation  EKG with sinus rhythm on CHB. Pacemaker dependant.     Diabetic ulcer of left foot associated with type 2 diabetes mellitus  A1c 8.6%  Takes lantus 80 bid, metformin, jardiance  Monitor BG, adjust insulin as needed  Consider endo consult if BG labile        VTE Risk Mitigation (From admission, onward)           Ordered     heparin 25,000 units in dextrose 5% 250 mL (100 units/mL) infusion LOW INTENSITY nomogram - [facility]  Continuous        Question:  Begin at (units/kg/hr)  Answer:  12    12/28/23 1247     IP VTE HIGH RISK PATIENT  Once         12/26/23 0541     Place sequential compression device  Until discontinued         12/26/23 0541                    Jake Graves MD  Cardiology  [facility]

## 2023-12-29 NOTE — PROGRESS NOTES
[facility]  Cardiac Electrophysiology  Progress Note    Admission Date: 12/26/2023  Code Status: Full Code   Attending Physician: Pretty Jarvis MD   Expected Discharge Date: 1/5/2024  Principal Problem:Cardiogenic shock    Subjective:     Interval History: Pacing with TVP. This morning around 8:30AM, his TVP threshold emerson from less than 4mA (was capturing 100% of the time at 4mA) to around 15mA. His native rhythm is sinus with an incomplete RBBB and a rate in the upper 60s. New CXR showed slight pullback of the TVP. Patient without hemodynamic changes and rhythm confirmed with new EKG. He is intermittently confused but oriented while I was there. No complaints.    Threshold previously set to 4 mA and rate to 80 bpm (intermittent capture with rate in 60s).    New threshold: 15mA  New backup rate set to 50 bpm  Patient with intrinsic conduction, rate currently in the 60s    Review of Systems   Constitutional: Negative for diaphoresis and fever.   Cardiovascular:  Negative for chest pain, dyspnea on exertion, leg swelling, near-syncope, orthopnea, palpitations, paroxysmal nocturnal dyspnea and syncope.   Respiratory:  Negative for cough and shortness of breath.    Gastrointestinal:  Negative for abdominal pain, diarrhea, nausea and vomiting.   Neurological:  Negative for light-headedness.   Psychiatric/Behavioral:  Negative for altered mental status and substance abuse.      Objective:     Vital Signs (Most Recent):  Temp: 97.7 °F (36.5 °C) (12/29/23 0705)  Pulse: 79 (12/29/23 1105)  Resp: (!) 22 (12/29/23 1105)  BP: 138/74 (12/29/23 1105)  SpO2: 100 % (12/29/23 1105) Vital Signs (24h Range):  Temp:  [97.7 °F (36.5 °C)-100 °F (37.8 °C)] 97.7 °F (36.5 °C)  Pulse:  [79-95] 79  Resp:  [18-34] 22  SpO2:  [95 %-100 %] 100 %  BP: (102-147)/(73-82) 138/74  Arterial Line BP: ()/(58-90) 107/75     Weight: 104 kg (229 lb 4.5 oz)  Body mass index is 31.98 kg/m².     SpO2: 100 %         Physical Exam  Constitutional:       General: He is not in acute distress.     Appearance: Normal appearance. He is well-developed.   HENT:      Mouth/Throat:      Mouth: Mucous membranes are moist.      Pharynx: Oropharynx is clear.   Cardiovascular:      Rate and Rhythm: Normal rate and regular rhythm.      Heart sounds: Normal heart sounds. No murmur heard.     Comments: IABP in place  Abdominal:      Palpations: Abdomen is soft.      Tenderness: There is no abdominal tenderness. There is no guarding.   Musculoskeletal:         General: No swelling. Normal range of motion.      Right lower leg: No edema.      Left lower leg: No edema.   Skin:     General: Skin is warm and dry.   Neurological:      General: No focal deficit present.      Mental Status: He is alert and oriented to person, place, and time.            Significant Labs: All pertinent lab results from the last 24 hours have been reviewed.    Significant Imaging:  Reviewed  Assessment and Plan:     * Cardiogenic shock  #CHB with wide fractionated ventricular escape likely from non revascularizable STEMI    Left dominant coronary system  Inferior STEMI with CW a flutter 12/25, LIMA-LAD and SVG -OM are down as well, Lcx couldn't be revasuclarized  Subsequently developed CHB  EF 20-25%  Now sinus, 1st degree AV block, iRBBB.   Not pacer dependent at this time, so TVP set to backup rate    Plan  -will continue to discuss type of device needed as he recovers. Currently still IABP dependent  -continue TVP meanwhile with daily threshold checks  -Current settings: rate 50, 25mA output (threshold 15)    Typical atrial flutter  New diagnosis, Found on 12/25 EKG that also showed inferior STEMI  Continue AC      Paroxysmal atrial fibrillation  Known paroxysmal afib, on eliquis and toprol xl at home  AC was on hold due to hematemesis which has resolved  Toprol held while unstable  Continue therapeutic anticoagulation        Connor M Gillies, MD  Cardiac  Electrophysiology  [facility]

## 2023-12-29 NOTE — PROGRESS NOTES
12/29/2023  Chelsy Duff    Current provider:  Pretty Jarvis MD    Device interrogation:       No data to display                   Rounded on Magadleno Cunningham to ensure all mechanical assist device settings (IABP or VAD) were appropriate and all parameters were within limits.  I was able to ensure all back up equipment was present, the staff had no issues, and the Perfusion Department daily rounding was complete.      For implantable VADs: Interrogation of Ventricular assist device was performed with analysis of device parameters and review of device function. I have personally reviewed the interrogation findings and agree with findings as stated.     In emergency, the nursing units have been notified to contact the perfusion department either by:  Calling [id number] from 630am to 4pm Mon thru Fri, utilizing the On-Call Finder functionality of Epic and searching for Perfusion, or by contacting the hospital  from 4pm to 630am and on weekends and asking to speak with the perfusionist on call.    7:00 AM

## 2023-12-29 NOTE — SUBJECTIVE & OBJECTIVE
Interval History: Pacing with TVP. This morning around 8:30AM, his TVP threshold emerson from less than 4mA (was capturing 100% of the time at 4mA) to around 15mA. His native rhythm is sinus with an incomplete RBBB and a rate in the upper 60s. New CXR showed slight pullback of the TVP. Patient without hemodynamic changes and rhythm confirmed with new EKG. He is intermittently confused but oriented while I was there. No complaints.    Threshold previously set to 4 mA and rate to 80 bpm (intermittent capture with rate in 60s).    New threshold: 15mA  New backup rate set to 50 bpm  Patient with intrinsic conduction, rate currently in the 60s    Review of Systems   Constitutional: Negative for diaphoresis and fever.   Cardiovascular:  Negative for chest pain, dyspnea on exertion, leg swelling, near-syncope, orthopnea, palpitations, paroxysmal nocturnal dyspnea and syncope.   Respiratory:  Negative for cough and shortness of breath.    Gastrointestinal:  Negative for abdominal pain, diarrhea, nausea and vomiting.   Neurological:  Negative for light-headedness.   Psychiatric/Behavioral:  Negative for altered mental status and substance abuse.      Objective:     Vital Signs (Most Recent):  Temp: 97.7 °F (36.5 °C) (12/29/23 0705)  Pulse: 79 (12/29/23 1105)  Resp: (!) 22 (12/29/23 1105)  BP: 138/74 (12/29/23 1105)  SpO2: 100 % (12/29/23 1105) Vital Signs (24h Range):  Temp:  [97.7 °F (36.5 °C)-100 °F (37.8 °C)] 97.7 °F (36.5 °C)  Pulse:  [79-95] 79  Resp:  [18-34] 22  SpO2:  [95 %-100 %] 100 %  BP: (102-147)/(73-82) 138/74  Arterial Line BP: ()/(58-90) 107/75     Weight: 104 kg (229 lb 4.5 oz)  Body mass index is 31.98 kg/m².     SpO2: 100 %        Physical Exam  Constitutional:       General: He is not in acute distress.     Appearance: Normal appearance. He is well-developed.   HENT:      Mouth/Throat:      Mouth: Mucous membranes are moist.      Pharynx: Oropharynx is clear.   Cardiovascular:      Rate and Rhythm:  Normal rate and regular rhythm.      Heart sounds: Normal heart sounds. No murmur heard.     Comments: IABP in place  Abdominal:      Palpations: Abdomen is soft.      Tenderness: There is no abdominal tenderness. There is no guarding.   Musculoskeletal:         General: No swelling. Normal range of motion.      Right lower leg: No edema.      Left lower leg: No edema.   Skin:     General: Skin is warm and dry.   Neurological:      General: No focal deficit present.      Mental Status: He is alert and oriented to person, place, and time.            Significant Labs: All pertinent lab results from the last 24 hours have been reviewed.    Significant Imaging:  Reviewed

## 2023-12-29 NOTE — SUBJECTIVE & OBJECTIVE
Interval History: CVP 4, SVO2 62, CO: 3.7, CI 1.6, optimized diuresis and BP. started on ARBs and monitor Cr.     Review of Systems   Constitutional: Negative for fever.   Cardiovascular:  Negative for chest pain, dyspnea on exertion and palpitations.   Respiratory:  Negative for shortness of breath.      Objective:     Vital Signs (Most Recent):  Temp: 97.7 °F (36.5 °C) (12/29/23 0705)  Pulse: 79 (12/29/23 1105)  Resp: (!) 22 (12/29/23 1105)  BP: 138/74 (12/29/23 1105)  SpO2: 100 % (12/29/23 1105) Vital Signs (24h Range):  Temp:  [97.7 °F (36.5 °C)-100 °F (37.8 °C)] 97.7 °F (36.5 °C)  Pulse:  [79-95] 79  Resp:  [18-34] 22  SpO2:  [95 %-100 %] 100 %  BP: (102-147)/(73-82) 138/74  Arterial Line BP: ()/(58-90) 107/75     Weight: 104 kg (229 lb 4.5 oz)  Body mass index is 31.98 kg/m².     SpO2: 100 %         Intake/Output Summary (Last 24 hours) at 12/29/2023 1227  Last data filed at 12/29/2023 0905  Gross per 24 hour   Intake 3150.67 ml   Output 2390 ml   Net 760.67 ml       Lines/Drains/Airways       Central Venous Catheter Line  Duration             Introducer 12/26/23 0630 Internal Jugular Right 3 days    Percutaneous Central Line Insertion/Assessment - Triple Lumen  12/27/23 1437 Internal Jugular Left 1 day              Drain  Duration                  Urethral Catheter 12/28/23 0857 Silicone 16 Fr. 1 day              Arterial Line  Duration             Arterial Line 12/26/23 1400 Right Radial 2 days              Line  Duration                  IABP 12/25/23 2340  40 mL 3 days         Pacer Wires 12/26/23 0652 3 days              Peripheral Intravenous Line  Duration                  Sheath 12/25/23 Left 4 days         Peripheral IV - Single Lumen 12/25/23 1830 18 G Left Antecubital 3 days         Peripheral IV - Single Lumen 12/25/23 1856 18 G Anterior;Distal;Right Forearm 3 days         Peripheral IV - Single Lumen 12/26/23 0845 20 G Anterior;Proximal;Right Forearm 3 days         Peripheral IV - Single  Lumen 12/26/23 1107 18 G Right Upper Arm 3 days         Sheath 12/25/23 2316 Left anterior 3 days                       Physical Exam  Constitutional:       General: He is not in acute distress.     Appearance: Normal appearance. He is well-developed.   HENT:      Mouth/Throat:      Mouth: Mucous membranes are moist.      Pharynx: Oropharynx is clear.   Cardiovascular:      Rate and Rhythm: Normal rate and regular rhythm.      Heart sounds: Normal heart sounds. No murmur heard.  Pulmonary:      Comments: Extubated. On 3L NC.   Abdominal:      Palpations: Abdomen is soft.      Tenderness: There is no abdominal tenderness. There is no guarding.   Musculoskeletal:         General: No swelling. Normal range of motion.      Right lower leg: No edema.      Left lower leg: No edema.   Skin:     General: Skin is warm and dry.   Neurological:      Mental Status: He is alert.            Significant Labs: All pertinent lab results from the last 24 hours have been reviewed.    Significant Imaging:  CXR bilateral pulmonary congestion.

## 2023-12-29 NOTE — SUBJECTIVE & OBJECTIVE
Interval History: No events. Pacing with TVP.     Review of Systems   Unable to perform ROS: Intubated     Objective:     Vital Signs (Most Recent):  Temp: 98.1 °F (36.7 °C) (12/28/23 1900)  Pulse: 79 (12/28/23 1805)  Resp: (!) 29 (12/28/23 1805)  BP: (!) 102/58 (12/27/23 1200)  SpO2: 96 % (12/28/23 1805) Vital Signs (24h Range):  Temp:  [97.7 °F (36.5 °C)-100.6 °F (38.1 °C)] 98.1 °F (36.7 °C)  Pulse:  [79-95] 79  Resp:  [22-34] 29  SpO2:  [92 %-100 %] 96 %  Arterial Line BP: ()/(56-71) 104/64     Weight: 107 kg (235 lb 14.3 oz)  Body mass index is 32.9 kg/m².     SpO2: 96 %        Physical Exam  Constitutional:       General: He is not in acute distress.     Appearance: Normal appearance. He is well-developed.   HENT:      Mouth/Throat:      Mouth: Mucous membranes are moist.      Pharynx: Oropharynx is clear.   Cardiovascular:      Rate and Rhythm: Normal rate and regular rhythm.      Heart sounds: Normal heart sounds. No murmur heard.  Pulmonary:      Comments: Intubated  Abdominal:      Palpations: Abdomen is soft.      Tenderness: There is no abdominal tenderness. There is no guarding.   Musculoskeletal:         General: No swelling. Normal range of motion.      Right lower leg: No edema.      Left lower leg: No edema.   Skin:     General: Skin is warm and dry.   Neurological:      Mental Status: He is alert.      Comments: Sedated            Significant Labs: All pertinent lab results from the last 24 hours have been reviewed.    Significant Imaging:  Reviewed

## 2023-12-30 LAB
ALBUMIN SERPL BCP-MCNC: 2.8 G/DL (ref 3.5–5.2)
ALBUMIN SERPL BCP-MCNC: 2.9 G/DL (ref 3.5–5.2)
ALLENS TEST: ABNORMAL
ALP SERPL-CCNC: 63 U/L (ref 55–135)
ALT SERPL W/O P-5'-P-CCNC: 17 U/L (ref 10–44)
ANION GAP SERPL CALC-SCNC: 16 MMOL/L (ref 8–16)
ANION GAP SERPL CALC-SCNC: 19 MMOL/L (ref 8–16)
ANION GAP SERPL CALC-SCNC: 19 MMOL/L (ref 8–16)
APTT PPP: 40.4 SEC (ref 21–32)
AST SERPL-CCNC: 23 U/L (ref 10–40)
BASOPHILS # BLD AUTO: 0.09 K/UL (ref 0–0.2)
BASOPHILS # BLD AUTO: 0.09 K/UL (ref 0–0.2)
BASOPHILS NFR BLD: 0.9 % (ref 0–1.9)
BASOPHILS NFR BLD: 0.9 % (ref 0–1.9)
BILIRUB SERPL-MCNC: 0.6 MG/DL (ref 0.1–1)
BUN SERPL-MCNC: 36 MG/DL (ref 6–20)
BUN SERPL-MCNC: 36 MG/DL (ref 6–20)
BUN SERPL-MCNC: 45 MG/DL (ref 6–20)
CALCIUM SERPL-MCNC: 9.4 MG/DL (ref 8.7–10.5)
CALCIUM SERPL-MCNC: 9.4 MG/DL (ref 8.7–10.5)
CALCIUM SERPL-MCNC: 9.5 MG/DL (ref 8.7–10.5)
CHLORIDE SERPL-SCNC: 100 MMOL/L (ref 95–110)
CHLORIDE SERPL-SCNC: 100 MMOL/L (ref 95–110)
CHLORIDE SERPL-SCNC: 99 MMOL/L (ref 95–110)
CO2 SERPL-SCNC: 18 MMOL/L (ref 23–29)
CO2 SERPL-SCNC: 18 MMOL/L (ref 23–29)
CO2 SERPL-SCNC: 20 MMOL/L (ref 23–29)
CREAT SERPL-MCNC: 1.7 MG/DL (ref 0.5–1.4)
DELSYS: ABNORMAL
DIFFERENTIAL METHOD BLD: ABNORMAL
DIFFERENTIAL METHOD BLD: ABNORMAL
EOSINOPHIL # BLD AUTO: 0.2 K/UL (ref 0–0.5)
EOSINOPHIL # BLD AUTO: 0.2 K/UL (ref 0–0.5)
EOSINOPHIL NFR BLD: 1.8 % (ref 0–8)
EOSINOPHIL NFR BLD: 1.8 % (ref 0–8)
ERYTHROCYTE [DISTWIDTH] IN BLOOD BY AUTOMATED COUNT: 12.5 % (ref 11.5–14.5)
ERYTHROCYTE [DISTWIDTH] IN BLOOD BY AUTOMATED COUNT: 12.5 % (ref 11.5–14.5)
EST. GFR  (NO RACE VARIABLE): 46.7 ML/MIN/1.73 M^2
GLUCOSE SERPL-MCNC: 166 MG/DL (ref 70–110)
GLUCOSE SERPL-MCNC: 166 MG/DL (ref 70–110)
GLUCOSE SERPL-MCNC: 169 MG/DL (ref 70–110)
HCO3 UR-SCNC: 19.2 MMOL/L (ref 24–28)
HCO3 UR-SCNC: 22.3 MMOL/L (ref 24–28)
HCO3 UR-SCNC: 23 MMOL/L (ref 24–28)
HCT VFR BLD AUTO: 46.5 % (ref 40–54)
HCT VFR BLD AUTO: 46.5 % (ref 40–54)
HGB BLD-MCNC: 16.4 G/DL (ref 14–18)
HGB BLD-MCNC: 16.4 G/DL (ref 14–18)
IMM GRANULOCYTES # BLD AUTO: 0.05 K/UL (ref 0–0.04)
IMM GRANULOCYTES # BLD AUTO: 0.05 K/UL (ref 0–0.04)
IMM GRANULOCYTES NFR BLD AUTO: 0.5 % (ref 0–0.5)
IMM GRANULOCYTES NFR BLD AUTO: 0.5 % (ref 0–0.5)
LYMPHOCYTES # BLD AUTO: 1.4 K/UL (ref 1–4.8)
LYMPHOCYTES # BLD AUTO: 1.4 K/UL (ref 1–4.8)
LYMPHOCYTES NFR BLD: 14 % (ref 18–48)
LYMPHOCYTES NFR BLD: 14 % (ref 18–48)
MAGNESIUM SERPL-MCNC: 2.2 MG/DL (ref 1.6–2.6)
MCH RBC QN AUTO: 31.6 PG (ref 27–31)
MCH RBC QN AUTO: 31.6 PG (ref 27–31)
MCHC RBC AUTO-ENTMCNC: 35.3 G/DL (ref 32–36)
MCHC RBC AUTO-ENTMCNC: 35.3 G/DL (ref 32–36)
MCV RBC AUTO: 90 FL (ref 82–98)
MCV RBC AUTO: 90 FL (ref 82–98)
MODE: ABNORMAL
MONOCYTES # BLD AUTO: 0.8 K/UL (ref 0.3–1)
MONOCYTES # BLD AUTO: 0.8 K/UL (ref 0.3–1)
MONOCYTES NFR BLD: 7.5 % (ref 4–15)
MONOCYTES NFR BLD: 7.5 % (ref 4–15)
NEUTROPHILS # BLD AUTO: 7.6 K/UL (ref 1.8–7.7)
NEUTROPHILS # BLD AUTO: 7.6 K/UL (ref 1.8–7.7)
NEUTROPHILS NFR BLD: 75.3 % (ref 38–73)
NEUTROPHILS NFR BLD: 75.3 % (ref 38–73)
NRBC BLD-RTO: 0 /100 WBC
NRBC BLD-RTO: 0 /100 WBC
PCO2 BLDA: 28.5 MMHG (ref 35–45)
PCO2 BLDA: 33.5 MMHG (ref 35–45)
PCO2 BLDA: 36.5 MMHG (ref 35–45)
PH SMN: 7.41 [PH] (ref 7.35–7.45)
PH SMN: 7.43 [PH] (ref 7.35–7.45)
PH SMN: 7.44 [PH] (ref 7.35–7.45)
PHOSPHATE SERPL-MCNC: 4.1 MG/DL (ref 2.7–4.5)
PHOSPHATE SERPL-MCNC: 4.9 MG/DL (ref 2.7–4.5)
PLATELET # BLD AUTO: 257 K/UL (ref 150–450)
PLATELET # BLD AUTO: 257 K/UL (ref 150–450)
PMV BLD AUTO: 9.8 FL (ref 9.2–12.9)
PMV BLD AUTO: 9.8 FL (ref 9.2–12.9)
PO2 BLDA: 33 MMHG (ref 40–60)
PO2 BLDA: 34 MMHG (ref 40–60)
PO2 BLDA: 35 MMHG (ref 40–60)
POC BE: -2 MMOL/L
POC BE: -2 MMOL/L
POC BE: -5 MMOL/L
POC SATURATED O2: 66 % (ref 95–100)
POC SATURATED O2: 67 % (ref 95–100)
POC SATURATED O2: 70 % (ref 95–100)
POC TCO2: 20 MMOL/L (ref 24–29)
POC TCO2: 23 MMOL/L (ref 24–29)
POC TCO2: 24 MMOL/L (ref 24–29)
POCT GLUCOSE: 147 MG/DL (ref 70–110)
POCT GLUCOSE: 154 MG/DL (ref 70–110)
POCT GLUCOSE: 157 MG/DL (ref 70–110)
POCT GLUCOSE: 172 MG/DL (ref 70–110)
POCT GLUCOSE: 189 MG/DL (ref 70–110)
POTASSIUM SERPL-SCNC: 3.7 MMOL/L (ref 3.5–5.1)
POTASSIUM SERPL-SCNC: 4.1 MMOL/L (ref 3.5–5.1)
POTASSIUM SERPL-SCNC: 4.1 MMOL/L (ref 3.5–5.1)
PROT SERPL-MCNC: 7.6 G/DL (ref 6–8.4)
RBC # BLD AUTO: 5.19 M/UL (ref 4.6–6.2)
RBC # BLD AUTO: 5.19 M/UL (ref 4.6–6.2)
SAMPLE: ABNORMAL
SITE: ABNORMAL
SODIUM SERPL-SCNC: 135 MMOL/L (ref 136–145)
SODIUM SERPL-SCNC: 137 MMOL/L (ref 136–145)
SODIUM SERPL-SCNC: 137 MMOL/L (ref 136–145)
SP02: 98
WBC # BLD AUTO: 10.07 K/UL (ref 3.9–12.7)
WBC # BLD AUTO: 10.07 K/UL (ref 3.9–12.7)

## 2023-12-30 PROCEDURE — 93010 ELECTROCARDIOGRAM REPORT: CPT | Mod: ,,, | Performed by: INTERNAL MEDICINE

## 2023-12-30 PROCEDURE — 63600175 PHARM REV CODE 636 W HCPCS: Performed by: INTERNAL MEDICINE

## 2023-12-30 PROCEDURE — 85025 COMPLETE CBC W/AUTO DIFF WBC: CPT | Performed by: STUDENT IN AN ORGANIZED HEALTH CARE EDUCATION/TRAINING PROGRAM

## 2023-12-30 PROCEDURE — 20000000 HC ICU ROOM

## 2023-12-30 PROCEDURE — 25000003 PHARM REV CODE 250: Performed by: INTERNAL MEDICINE

## 2023-12-30 PROCEDURE — 99291 CRITICAL CARE FIRST HOUR: CPT | Mod: ,,, | Performed by: INTERNAL MEDICINE

## 2023-12-30 PROCEDURE — 25000003 PHARM REV CODE 250

## 2023-12-30 PROCEDURE — 25000003 PHARM REV CODE 250: Performed by: STUDENT IN AN ORGANIZED HEALTH CARE EDUCATION/TRAINING PROGRAM

## 2023-12-30 PROCEDURE — 82803 BLOOD GASES ANY COMBINATION: CPT

## 2023-12-30 PROCEDURE — 93005 ELECTROCARDIOGRAM TRACING: CPT | Performed by: INTERNAL MEDICINE

## 2023-12-30 PROCEDURE — 80069 RENAL FUNCTION PANEL: CPT | Performed by: INTERNAL MEDICINE

## 2023-12-30 PROCEDURE — 84100 ASSAY OF PHOSPHORUS: CPT | Performed by: STUDENT IN AN ORGANIZED HEALTH CARE EDUCATION/TRAINING PROGRAM

## 2023-12-30 PROCEDURE — 85730 THROMBOPLASTIN TIME PARTIAL: CPT

## 2023-12-30 PROCEDURE — 63600175 PHARM REV CODE 636 W HCPCS: Performed by: STUDENT IN AN ORGANIZED HEALTH CARE EDUCATION/TRAINING PROGRAM

## 2023-12-30 PROCEDURE — 80053 COMPREHEN METABOLIC PANEL: CPT | Performed by: STUDENT IN AN ORGANIZED HEALTH CARE EDUCATION/TRAINING PROGRAM

## 2023-12-30 PROCEDURE — 63600175 PHARM REV CODE 636 W HCPCS

## 2023-12-30 PROCEDURE — 83735 ASSAY OF MAGNESIUM: CPT | Performed by: STUDENT IN AN ORGANIZED HEALTH CARE EDUCATION/TRAINING PROGRAM

## 2023-12-30 PROCEDURE — 25000003 PHARM REV CODE 250: Performed by: NURSE PRACTITIONER

## 2023-12-30 PROCEDURE — 99900035 HC TECH TIME PER 15 MIN (STAT)

## 2023-12-30 PROCEDURE — 94761 N-INVAS EAR/PLS OXIMETRY MLT: CPT | Mod: XB

## 2023-12-30 PROCEDURE — 99232 SBSQ HOSP IP/OBS MODERATE 35: CPT | Mod: ,,,

## 2023-12-30 PROCEDURE — 27000202 HC IABP, ADD'L DAY

## 2023-12-30 PROCEDURE — 63600175 PHARM REV CODE 636 W HCPCS: Performed by: NURSE PRACTITIONER

## 2023-12-30 PROCEDURE — 93005 ELECTROCARDIOGRAM TRACING: CPT

## 2023-12-30 RX ORDER — OXYCODONE HYDROCHLORIDE 5 MG/1
5 TABLET ORAL ONCE
Status: COMPLETED | OUTPATIENT
Start: 2023-12-30 | End: 2023-12-30

## 2023-12-30 RX ORDER — HYDRALAZINE HYDROCHLORIDE 10 MG/1
10 TABLET, FILM COATED ORAL ONCE
Status: COMPLETED | OUTPATIENT
Start: 2023-12-30 | End: 2023-12-30

## 2023-12-30 RX ORDER — ACETAMINOPHEN 325 MG/1
650 TABLET ORAL ONCE
Status: COMPLETED | OUTPATIENT
Start: 2023-12-30 | End: 2023-12-30

## 2023-12-30 RX ORDER — HYDRALAZINE HYDROCHLORIDE 20 MG/ML
10 INJECTION INTRAMUSCULAR; INTRAVENOUS ONCE
Status: COMPLETED | OUTPATIENT
Start: 2023-12-30 | End: 2023-12-30

## 2023-12-30 RX ORDER — GABAPENTIN 100 MG/1
200 CAPSULE ORAL ONCE
Status: COMPLETED | OUTPATIENT
Start: 2023-12-30 | End: 2023-12-30

## 2023-12-30 RX ORDER — POTASSIUM CHLORIDE 14.9 MG/ML
20 INJECTION INTRAVENOUS ONCE
Status: COMPLETED | OUTPATIENT
Start: 2023-12-30 | End: 2023-12-30

## 2023-12-30 RX ORDER — HYDRALAZINE HYDROCHLORIDE 25 MG/1
25 TABLET, FILM COATED ORAL EVERY 8 HOURS
Status: DISCONTINUED | OUTPATIENT
Start: 2023-12-30 | End: 2024-01-01

## 2023-12-30 RX ORDER — ISOSORBIDE DINITRATE 20 MG/1
20 TABLET ORAL 3 TIMES DAILY
Status: DISCONTINUED | OUTPATIENT
Start: 2023-12-30 | End: 2023-12-31

## 2023-12-30 RX ORDER — FUROSEMIDE 10 MG/ML
80 INJECTION INTRAMUSCULAR; INTRAVENOUS DAILY
Status: DISCONTINUED | OUTPATIENT
Start: 2023-12-31 | End: 2023-12-31

## 2023-12-30 RX ORDER — HYDROXYZINE PAMOATE 25 MG/1
25 CAPSULE ORAL EVERY 8 HOURS PRN
Status: DISCONTINUED | OUTPATIENT
Start: 2023-12-30 | End: 2024-01-04 | Stop reason: HOSPADM

## 2023-12-30 RX ORDER — OXYCODONE HYDROCHLORIDE 5 MG/1
5 TABLET ORAL EVERY 6 HOURS PRN
Status: DISCONTINUED | OUTPATIENT
Start: 2023-12-30 | End: 2024-01-04 | Stop reason: HOSPADM

## 2023-12-30 RX ADMIN — CEFTRIAXONE 1 G: 1 INJECTION, POWDER, FOR SOLUTION INTRAMUSCULAR; INTRAVENOUS at 10:12

## 2023-12-30 RX ADMIN — ACETAMINOPHEN 650 MG: 325 TABLET ORAL at 11:12

## 2023-12-30 RX ADMIN — ISOSORBIDE DINITRATE 20 MG: 20 TABLET ORAL at 04:12

## 2023-12-30 RX ADMIN — HEPARIN SODIUM 19 UNITS/KG/HR: 10000 INJECTION, SOLUTION INTRAVENOUS at 01:12

## 2023-12-30 RX ADMIN — VALSARTAN 20 MG: 40 TABLET, FILM COATED ORAL at 09:12

## 2023-12-30 RX ADMIN — PANTOPRAZOLE SODIUM 40 MG: 40 GRANULE, DELAYED RELEASE ORAL at 08:12

## 2023-12-30 RX ADMIN — FUROSEMIDE 80 MG: 10 INJECTION, SOLUTION INTRAVENOUS at 06:12

## 2023-12-30 RX ADMIN — OXYCODONE HYDROCHLORIDE 5 MG: 5 TABLET ORAL at 07:12

## 2023-12-30 RX ADMIN — INSULIN HUMAN 1.4 UNITS/HR: 1 INJECTION, SOLUTION INTRAVENOUS at 06:12

## 2023-12-30 RX ADMIN — HYDRALAZINE HYDROCHLORIDE 25 MG: 25 TABLET, FILM COATED ORAL at 09:12

## 2023-12-30 RX ADMIN — INSULIN ASPART 2 UNITS: 100 INJECTION, SOLUTION INTRAVENOUS; SUBCUTANEOUS at 02:12

## 2023-12-30 RX ADMIN — MUPIROCIN: 20 OINTMENT TOPICAL at 09:12

## 2023-12-30 RX ADMIN — POTASSIUM CHLORIDE 20 MEQ: 200 INJECTION, SOLUTION INTRAVENOUS at 04:12

## 2023-12-30 RX ADMIN — TICAGRELOR 90 MG: 90 TABLET ORAL at 09:12

## 2023-12-30 RX ADMIN — VALSARTAN 20 MG: 40 TABLET, FILM COATED ORAL at 08:12

## 2023-12-30 RX ADMIN — INSULIN ASPART 2 UNITS: 100 INJECTION, SOLUTION INTRAVENOUS; SUBCUTANEOUS at 06:12

## 2023-12-30 RX ADMIN — INSULIN HUMAN 1.4 UNITS/HR: 1 INJECTION, SOLUTION INTRAVENOUS at 08:12

## 2023-12-30 RX ADMIN — TICAGRELOR 90 MG: 90 TABLET ORAL at 08:12

## 2023-12-30 RX ADMIN — HYDRALAZINE HYDROCHLORIDE 10 MG: 20 INJECTION, SOLUTION INTRAMUSCULAR; INTRAVENOUS at 03:12

## 2023-12-30 RX ADMIN — HYDRALAZINE HYDROCHLORIDE 10 MG: 10 TABLET, FILM COATED ORAL at 02:12

## 2023-12-30 RX ADMIN — ISOSORBIDE DINITRATE 20 MG: 20 TABLET ORAL at 09:12

## 2023-12-30 RX ADMIN — MUPIROCIN: 20 OINTMENT TOPICAL at 08:12

## 2023-12-30 RX ADMIN — ASPIRIN 81 MG CHEWABLE TABLET 81 MG: 81 TABLET CHEWABLE at 08:12

## 2023-12-30 RX ADMIN — HYDROXYZINE PAMOATE 25 MG: 25 CAPSULE ORAL at 04:12

## 2023-12-30 RX ADMIN — HYDRALAZINE HYDROCHLORIDE 10 MG: 10 TABLET, FILM COATED ORAL at 10:12

## 2023-12-30 RX ADMIN — OXYCODONE HYDROCHLORIDE 5 MG: 5 TABLET ORAL at 04:12

## 2023-12-30 RX ADMIN — GABAPENTIN 200 MG: 100 CAPSULE ORAL at 07:12

## 2023-12-30 RX ADMIN — INSULIN ASPART 2 UNITS: 100 INJECTION, SOLUTION INTRAVENOUS; SUBCUTANEOUS at 11:12

## 2023-12-30 RX ADMIN — HEPARIN SODIUM 19 UNITS/KG/HR: 10000 INJECTION, SOLUTION INTRAVENOUS at 06:12

## 2023-12-30 RX ADMIN — OXYCODONE HYDROCHLORIDE 5 MG: 5 TABLET ORAL at 12:12

## 2023-12-30 RX ADMIN — ATORVASTATIN CALCIUM 40 MG: 40 TABLET, FILM COATED ORAL at 08:12

## 2023-12-30 NOTE — SUBJECTIVE & OBJECTIVE
Interval History: Patient complaining of back and leg pain. Patient with hypertension started on afterload reduction with valsartan, hydralazine and isosorbide. Weaning IABP with plans of removal today     Review of Systems   Musculoskeletal:  Positive for muscle cramps.   Neurological:  Positive for headaches and numbness.     Objective:     Vital Signs (Most Recent):  Temp: 98.1 °F (36.7 °C) (12/30/23 1105)  Pulse: 92 (12/30/23 1500)  Resp: (!) 25 (12/30/23 1500)  BP: (!) 170/75 (12/30/23 1500)  SpO2: 97 % (12/30/23 1500) Vital Signs (24h Range):  Temp:  [97.7 °F (36.5 °C)-98.2 °F (36.8 °C)] 98.1 °F (36.7 °C)  Pulse:  [50-92] 92  Resp:  [18-25] 25  SpO2:  [93 %-100 %] 97 %  BP: (105-184)/() 170/75  Arterial Line BP: ()/(52-98) 117/96     Weight: 103.2 kg (227 lb 8.2 oz)  Body mass index is 31.73 kg/m².     SpO2: 97 %         Intake/Output Summary (Last 24 hours) at 12/30/2023 1547  Last data filed at 12/30/2023 1405  Gross per 24 hour   Intake 1461.61 ml   Output 3655 ml   Net -2193.39 ml       Lines/Drains/Airways       Central Venous Catheter Line  Duration             Introducer 12/26/23 0630 Internal Jugular Right 4 days    Percutaneous Central Line Insertion/Assessment - Triple Lumen  12/27/23 1437 Internal Jugular Left 3 days              Drain  Duration                  Urethral Catheter 12/28/23 0857 Silicone 16 Fr. 2 days              Arterial Line  Duration             Arterial Line 12/26/23 1400 Right Radial 4 days              Line  Duration                  IABP 12/25/23 2340  40 mL 4 days         Pacer Wires 12/26/23 0652 4 days              Peripheral Intravenous Line  Duration                  Sheath 12/25/23 Left 5 days         Peripheral IV - Single Lumen 12/26/23 0845 20 G Anterior;Proximal;Right Forearm 4 days         Peripheral IV - Single Lumen 12/26/23 1107 18 G Right Upper Arm 4 days         Sheath 12/25/23 2316 Left anterior 4 days                       Physical  Exam  Constitutional:       Appearance: Normal appearance.   Cardiovascular:      Rate and Rhythm: Normal rate. Rhythm irregular.   Pulmonary:      Effort: Pulmonary effort is normal.      Breath sounds: Normal breath sounds.   Abdominal:      Palpations: Abdomen is soft.   Musculoskeletal:      Right lower leg: No edema.      Left lower leg: No edema.   Skin:     General: Skin is warm.   Neurological:      General: No focal deficit present.      Mental Status: He is alert and oriented to person, place, and time.   Psychiatric:         Mood and Affect: Mood normal.         Behavior: Behavior normal.            Significant Labs: All pertinent lab results from the last 24 hours have been reviewed.

## 2023-12-30 NOTE — ASSESSMENT & PLAN NOTE
A1c 8.6%  Takes lantus 80 bid, metformin, jardiance  Monitor BG, adjust insulin as needed  - Transition drip at 1.4 units/hr with step-down parameters.   - Novolog (aspart) insulin prn for BG excursions [facility] [place]

## 2023-12-30 NOTE — ASSESSMENT & PLAN NOTE
BG goal 140-180     - Transition drip at 1.4 units/hr with step-down parameters.   - Novolog (aspart) insulin prn for BG excursions [facility] [place]SI (150/25).  - BG checks q4hr  - Hypoglycemia protocol in place    ** Please notify Endocrine for any change and/or advance in diet**  ** Please call Endocrine for any BG related issues **    Discharge Planning:   TBD. Please notify endocrinology prior to discharge.

## 2023-12-30 NOTE — ASSESSMENT & PLAN NOTE
#CHB with wide fractionated ventricular escape likely from non revascularizable STEMI    Left dominant coronary system  Inferior STEMI with CW a flutter 12/25, LIMA-LAD and SVG -OM are down as well, Lcx couldn't be revasuclarized  Subsequently developed CHB  EF 20-25%  Atrial flutter last night with atrial fibrillation as well overnight and this morning. Using native AV tamika conduction, only TVP paced intermittently. Rate controlled off AVN blocking agents.   Not pacer dependent at this time, so TVP set to backup rate of 50.    Plan  -Likely CRT-D once off IABP for 24 hours  -continue TVP meanwhile with daily threshold checks  -Current TVP settings: rate 50, 20mA output

## 2023-12-30 NOTE — ASSESSMENT & PLAN NOTE
2/2 STEMI/ICM  Mehanical support: IABP @ 1:1  Inotrope/pressor: levophed. Wean to maintain MAP > 60  Adequate urine output  Check hemodynamics    - extubated on 12/28/23  - CVP 4, SVO2 62, CO: 3.7, CI 1.6  - balloon pump on 1:1   - 80 lasix IV daily   -  valsartan 20 BID , hydral 25mg tid and isosorbide 20mg tid   - restarted heparin gtt on minimal intensity  - EP evaluated patient and will consider implantation of CRT-D once off pressors and off IABP.

## 2023-12-30 NOTE — PROGRESS NOTES
"[facility] - [facility]  Endocrinology  Progress Note    Admit Date: 12/26/2023     Reason for Consult: Management of T2DM, Hyperglycemia     Diabetes diagnosis year: >3 years ago    Home Diabetes Medications:  Jardiance 25 mg, Lantus 80 u BID, Metformin 1000 mg BID    How often checking glucose at home? 1-3 x day   BG readings on regimen: 150-200's (patient is confused)  Hypoglycemia on the regimen?  Yes  Missed doses on regimen?  No    Diabetes Complications include:     Hyperglycemia    Complicating diabetes co morbidities:   History of MI      HPI:   Patient is a 58 y.o. male with CAD s/p CABG and recent PCI to proximal and mid Lcx by Dr. Botello on 12/22/23, COPD, atrial fibrillation on eliquis, diabetes, current smoker (reportedly 3PPD), hypertension who presented to [facility] facility with chest pain which has been present for 2 days.  He was found to have ST elevations and was taken to the cath lab directly and was found to have in-stent thrombosis. Of note he was also in atrial flutter. Procedure note not available but per report no intervention was performed and a IABP was placed in the left femoral artery. During the procedure he had intermittent complete heart block so a left femoral TVP was also placed. He was requiring BiPAP post-operatively. It was decided to transfer to higher level of care and he was intubated to allow safe transportation. Endocrine consulted for bg management        Interval HPI:   No acute events overnight. Patient in room [id number] A. Blood glucose stable. BG at goal on current insulin regimen (Transition Insulin Drip). Steroid use- None.      Renal function- Abnormal - cr 1.7    Vasopressors-  None     No diet orders on file     Eating:   NPO  Nausea: No  Hypoglycemia and intervention: No  Fever: No  TPN and/or TF: No    BP (!) 165/114 (BP Location: Left forearm, Patient Position: Lying)   Pulse 70   Temp 97.8 °F (36.6 °C) (Oral)   Resp 19   Ht 5' 11" " "(1.803 m)   Wt 103.2 kg (227 lb 8.2 oz)   SpO2 97%   BMI 31.73 kg/m²     Labs Reviewed and Include    Recent Labs   Lab 12/30/23 0212   *  166*   CALCIUM 9.4  9.4   ALBUMIN 2.9*   PROT 7.6     137   K 4.1  4.1   CO2 18*  18*     100   BUN 36*  36*   CREATININE 1.7*  1.7*   ALKPHOS 63   ALT 17   AST 23   BILITOT 0.6     Lab Results   Component Value Date    WBC 10.07 12/30/2023    WBC 10.07 12/30/2023    HGB 16.4 12/30/2023    HGB 16.4 12/30/2023    HCT 46.5 12/30/2023    HCT 46.5 12/30/2023    MCV 90 12/30/2023    MCV 90 12/30/2023     12/30/2023     12/30/2023     No results for input(s): "TSH", "FREET4" in the last 168 hours.  Lab Results   Component Value Date    HGBA1C 8.3 (H) 12/14/2023    HGBA1C 8.3 (H) 12/14/2023       Nutritional status:   Body mass index is 31.73 kg/m².  Lab Results   Component Value Date    ALBUMIN 2.9 (L) 12/30/2023    ALBUMIN 2.7 (L) 12/29/2023    ALBUMIN 2.9 (L) 12/28/2023     No results found for: "PREALBUMIN"    Estimated Creatinine Clearance: 59.4 mL/min (A) (based on SCr of 1.7 mg/dL (H)).    Accu-Checks  Recent Labs     12/29/23  0954 12/29/23  1056 12/29/23  1200 12/29/23  1247 12/29/23  1402 12/29/23  1514 12/29/23  1802 12/29/23  2248 12/30/23  0211 12/30/23  0621   POCTGLUCOSE 159* 192* 159* 131* 152* 161* 159* 124* 157* 154*       Current Medications and/or Treatments Impacting Glycemic Control  Immunotherapy:    Immunosuppressants       None          Steroids:   Hormones (From admission, onward)      None          Pressors:    Autonomic Drugs (From admission, onward)      None          Hyperglycemia/Diabetes Medications:   Antihyperglycemics (From admission, onward)      Start     Stop Route Frequency Ordered    12/30/23 0745  insulin regular in 0.9 % NaCl 100 unit/100 mL (1 unit/mL) infusion        Question:  Enter initial dose (Units/hr):  Answer:  1.4    -- IV Continuous 12/30/23 0735    12/29/23 1826  insulin aspart U-100 pen " 0-10 Units         -- SubQ As needed (PRN) 12/29/23 1726            ASSESSMENT and PLAN    Cardiac/Vascular  * Cardiogenic shock  Managed per primary team  Optimize bg control        HLD (hyperlipidemia)  Managed per primary.   On statin per ADA        Endocrine  Type 2 diabetes mellitus with diabetic arthropathy    BG goal 140-180     - Transition drip at 1.4 units/hr with step-down parameters.   - Novolog (aspart) insulin prn for [place] [facility] [place]  - BG checks q4hr  - Hypoglycemia protocol in place    ** Please notify Endocrine for any change and/or advance in diet**  ** Please call Endocrine for any BG related issues **    Discharge Planning:   TBD. Please notify endocrinology prior to discharge.         Brigitte Champion PA-C  Endocrinology  [facility] - [facility]

## 2023-12-30 NOTE — ASSESSMENT & PLAN NOTE
Possibly ischemia induced vs medication induced. Hold home metoprolol.  If CHB persists he will require a permanent pacemaker  Right IJ TVP placed with tRate to be set at 50.   currently SR with normal conduction yesterday but now AF with controlled V rate. Pending CRTD once off pump

## 2023-12-30 NOTE — PLAN OF CARE
POC reviewed with Magdaleno Cunningham and family. Questions and concerns addressed. CVP: 5, 4 and 4. Svo2: 62%. Pacing continued at backup rate of 50/min. Pt off precedex and oriented X4. See below and flowsheets for full assessment and VS info.       Neuro:  [place] Coma Scale  Best Eye Response: 4-->(E4) spontaneous  Best Motor Response: 6-->(M6) obeys commands  Best Verbal Response: 4-->(V4) confused  [place] Coma Scale Score: 14  Assessment Qualifiers: patient not sedated/intubated  Pupil PERRLA: yes    24 hr Temp:  [97.7 °F (36.5 °C)-98.5 °F (36.9 °C)]     CV:  Rhythm: paced rhythm   DVT prophylaxis: VTE Required Core Measure: Pharmacological prophylaxis initiated/maintained    CVP (mean): 4 mmHg (12/29/23 1505)       SVO2 (%): 73 % (12/27/23 1800)    IABP Mode: Auto  IABP Rate: 1:1  IABP Trigger: ECG  IABP Augmented Diastolic Pressure: 94          Pulses  Right Radial Pulse: 1+ (weak)  Left Radial Pulse: 1+ (weak)  Right Dorsalis Pedis Pulse: Doppler  Left Dorsalis Pedis Pulse: Doppler  Right Posterior Tibial Pulse: Doppler  Left Posterior Tibial Pulse: Doppler    Resp:  Flow (L/min): 1  Vent Mode: Spont  Set Rate: 22 BPM  Oxygen Concentration (%): 30  Vt Set: 450 mL  PEEP/CPAP: 5 cmH20  Pressure Support: 8 cmH20    GI/:  GI prophylaxis: no  Diet/Nutrition Received: NPO  Last Bowel Movement: 12/25/23  Voiding Characteristics: urethral catheter (bladder)   Intake/Output Summary (Last 24 hours) at 12/29/2023 1821  Last data filed at 12/29/2023 1805  Gross per 24 hour   Intake 2992.04 ml   Output 3025 ml   Net -32.96 ml          Labs/Accuchecks:  Recent Labs   Lab 12/28/23  0241 12/28/23  1302 12/29/23  0440   WBC 9.96  9.96 10.74 12.12  12.12   RBC 5.05  5.05 5.12 4.98  4.98   HGB 16.2  16.2 15.8 15.8  15.8   HCT 45.3  45.3 43.7 44.1  44.1     261 242 243  243      Recent Labs   Lab 12/26/23  0823 12/27/23  1322 12/27/23  2124 12/28/23  1302 12/28/23  1927 12/29/23  0440 12/29/23  1156   INR  1.0 1.0  --  1.1  --   --   --    APTT 26.4 25.4   < > 26.6 30.9 33.0* 32.8*    < > = values in this interval not displayed.      Recent Labs     12/29/23  0440 12/29/23  0839 12/29/23  1156   *  135*   < > 135*   K 3.8  3.8   < > 4.0   CO2 20*  20*   < > 18*     100   < > 104   BUN 36*  36*   < > 39*   CREATININE 2.0*  2.0*   < > 1.7*   ALKPHOS 55  --   --    ALT 14  --   --    AST 22  --   --    BILITOT 0.7  --   --     < > = values in this interval not displayed.       Recent Labs   Lab 12/26/23  1001 12/26/23  1430 12/26/23  2130   TROPONINI 21.984* 25.809* 24.449*      Recent Labs     12/29/23  0644 12/29/23  0829 12/29/23  1245   PH 7.413 7.368 7.389   PCO2 35.6 42.6 39.1   PO2 94 33* 153*   HCO3 22.8* 24.5 23.6*   POCSATURATED 97 62 99   BE -2 -1 -1       Electrolytes: N/A - electrolytes WDL  Accuchecks: Q6H    Gtts/LDAs:   sodium chloride 0.9% 10 mL/hr at 12/29/23 1805    dexmedeTOMIDine (Precedex) infusion (titrating) Stopped (12/29/23 1630)    heparin (porcine) in D5W 19 Units/kg/hr (12/29/23 1805)    insulin regular 1 units/mL infusion orderable (TRANSFER) 1.6 Units/hr (12/29/23 1811)       Lines/Drains/Airways       Central Venous Catheter Line  Duration             Introducer 12/26/23 0630 Internal Jugular Right 3 days    Percutaneous Central Line Insertion/Assessment - Triple Lumen  12/27/23 1437 Internal Jugular Left 2 days              Drain  Duration                  Urethral Catheter 12/28/23 0857 Silicone 16 Fr. 1 day              Arterial Line  Duration             Arterial Line 12/26/23 1400 Right Radial 3 days              Line  Duration                  IABP 12/25/23 2340  40 mL 3 days         Pacer Wires 12/26/23 0652 3 days              Peripheral Intravenous Line  Duration                  Sheath 12/25/23 Left 4 days         Peripheral IV - Single Lumen 12/25/23 1830 18 G Left Antecubital 3 days         Peripheral IV - Single Lumen 12/26/23 0845 20 G  Anterior;Proximal;Right Forearm 3 days         Peripheral IV - Single Lumen 12/26/23 1107 18 G Right Upper Arm 3 days         Sheath 12/25/23 2316 Left anterior 3 days                    Skin/Wounds  Bathing/Skin Care: bath, complete;linen changed;dressed/undressed (12/29/23 1705)  Wounds: Yes  Wound care consulted: No

## 2023-12-30 NOTE — SUBJECTIVE & OBJECTIVE
"Interval HPI:   No acute events overnight. Patient in room [id number] A. Blood glucose stable. BG at goal on current insulin regimen (Transition Insulin Drip). Steroid use- None.      Renal function- Abnormal - cr 1.7    Vasopressors-  None     No diet orders on file     Eating:   NPO  Nausea: No  Hypoglycemia and intervention: No  Fever: No  TPN and/or TF: No    BP (!) 165/114 (BP Location: Left forearm, Patient Position: Lying)   Pulse 70   Temp 97.8 °F (36.6 °C) (Oral)   Resp 19   Ht 5' 11" (1.803 m)   Wt 103.2 kg (227 lb 8.2 oz)   SpO2 97%   BMI 31.73 kg/m²     Labs Reviewed and Include    Recent Labs   Lab 12/30/23 0212   *  166*   CALCIUM 9.4  9.4   ALBUMIN 2.9*   PROT 7.6     137   K 4.1  4.1   CO2 18*  18*     100   BUN 36*  36*   CREATININE 1.7*  1.7*   ALKPHOS 63   ALT 17   AST 23   BILITOT 0.6     Lab Results   Component Value Date    WBC 10.07 12/30/2023    WBC 10.07 12/30/2023    HGB 16.4 12/30/2023    HGB 16.4 12/30/2023    HCT 46.5 12/30/2023    HCT 46.5 12/30/2023    MCV 90 12/30/2023    MCV 90 12/30/2023     12/30/2023     12/30/2023     No results for input(s): "TSH", "FREET4" in the last 168 hours.  Lab Results   Component Value Date    HGBA1C 8.3 (H) 12/14/2023    HGBA1C 8.3 (H) 12/14/2023       Nutritional status:   Body mass index is 31.73 kg/m².  Lab Results   Component Value Date    ALBUMIN 2.9 (L) 12/30/2023    ALBUMIN 2.7 (L) 12/29/2023    ALBUMIN 2.9 (L) 12/28/2023     No results found for: "PREALBUMIN"    Estimated Creatinine Clearance: 59.4 mL/min (A) (based on SCr of 1.7 mg/dL (H)).    Accu-Checks  Recent Labs     12/29/23  0954 12/29/23  1056 12/29/23  1200 12/29/23  1247 12/29/23  1402 12/29/23  1514 12/29/23  1802 12/29/23  2248 12/30/23  0211 12/30/23  0621   POCTGLUCOSE 159* 192* 159* 131* 152* 161* 159* 124* 157* 154*       Current Medications and/or Treatments Impacting Glycemic Control  Immunotherapy:    Immunosuppressants  "      None          Steroids:   Hormones (From admission, onward)      None          Pressors:    Autonomic Drugs (From admission, onward)      None          Hyperglycemia/Diabetes Medications:   Antihyperglycemics (From admission, onward)      Start     Stop Route Frequency Ordered    12/30/23 0745  insulin regular in 0.9 % NaCl 100 unit/100 mL (1 unit/mL) infusion        Question:  Enter initial dose (Units/hr):  Answer:  1.4    -- IV Continuous 12/30/23 0735    12/29/23 1822  insulin aspart U-100 pen 0-10 Units         -- SubQ As needed (PRN) 12/29/23 1088

## 2023-12-30 NOTE — PROGRESS NOTES
[facility]  Cardiology  Progress Note    Patient Name: Magdaleno Cunningham  MRN: [id number]  Admission Date: 12/26/2023  Hospital Length of Stay: 4 days  Code Status: Full Code   Attending Physician: Larry Alvarez MD   Primary Care Physician: Venkata Mclaughlin MD  Expected Discharge Date: 1/5/2024  Principal Problem:Cardiogenic shock    Subjective:     Hospital Course:   On fentanyl gtt and pressors with stable MAPs, UOP 4.3L on lasix gtt. Restarted propofol protocol.started on precedex. EP evaluated patient and consider implantation CRT-D once patient off pressors and off IABP and extubation. EKG on sinus rhythm with CHB. Will place central line. Pending ABG. Continue diuresing. Monitor creatinine. off dobutamine. balloon pump on 1:1 (don't stop balloon pump). off lasix gtt. restarted heparin gtt. Extubated. CO: 3.7, CI 1.6, increased diuresis with 80 tid bolus now and continue lasix 80 TID. Given BP, started on  valsartan 20 BID and monitor Cr.       Interval History: Patient complaining of back and leg pain. Patient with hypertension started on afterload reduction with valsartan, hydralazine and isosorbide. Weaning IABP with plans of removal today     Review of Systems   Musculoskeletal:  Positive for muscle cramps.   Neurological:  Positive for headaches and numbness.     Objective:     Vital Signs (Most Recent):  Temp: 98.1 °F (36.7 °C) (12/30/23 1105)  Pulse: 92 (12/30/23 1500)  Resp: (!) 25 (12/30/23 1500)  BP: (!) 170/75 (12/30/23 1500)  SpO2: 97 % (12/30/23 1500) Vital Signs (24h Range):  Temp:  [97.7 °F (36.5 °C)-98.2 °F (36.8 °C)] 98.1 °F (36.7 °C)  Pulse:  [50-92] 92  Resp:  [18-25] 25  SpO2:  [93 %-100 %] 97 %  BP: (105-184)/() 170/75  Arterial Line BP: ()/(52-98) 117/96     Weight: 103.2 kg (227 lb 8.2 oz)  Body mass index is 31.73 kg/m².     SpO2: 97 %         Intake/Output Summary (Last 24 hours) at 12/30/2023 1547  Last data filed at 12/30/2023 1405  Gross per  24 hour   Intake 1461.61 ml   Output 3655 ml   Net -2193.39 ml       Lines/Drains/Airways       Central Venous Catheter Line  Duration             Introducer 12/26/23 0630 Internal Jugular Right 4 days    Percutaneous Central Line Insertion/Assessment - Triple Lumen  12/27/23 1437 Internal Jugular Left 3 days              Drain  Duration                  Urethral Catheter 12/28/23 0857 Silicone 16 Fr. 2 days              Arterial Line  Duration             Arterial Line 12/26/23 1400 Right Radial 4 days              Line  Duration                  IABP 12/25/23 2340  40 mL 4 days         Pacer Wires 12/26/23 0652 4 days              Peripheral Intravenous Line  Duration                  Sheath 12/25/23 Left 5 days         Peripheral IV - Single Lumen 12/26/23 0845 20 G Anterior;Proximal;Right Forearm 4 days         Peripheral IV - Single Lumen 12/26/23 1107 18 G Right Upper Arm 4 days         Sheath 12/25/23 2316 Left anterior 4 days                       Physical Exam  Constitutional:       Appearance: Normal appearance.   Cardiovascular:      Rate and Rhythm: Normal rate. Rhythm irregular.   Pulmonary:      Effort: Pulmonary effort is normal.      Breath sounds: Normal breath sounds.   Abdominal:      Palpations: Abdomen is soft.   Musculoskeletal:      Right lower leg: No edema.      Left lower leg: No edema.   Skin:     General: Skin is warm.   Neurological:      General: No focal deficit present.      Mental Status: He is alert and oriented to person, place, and time.   Psychiatric:         Mood and Affect: Mood normal.         Behavior: Behavior normal.            Significant Labs: All pertinent lab results from the last 24 hours have been reviewed.      Assessment and Plan:       * Cardiogenic shock  2/2 STEMI/ICM  Mehanical support: IABP @ 1:1  Inotrope/pressor: levophed. Wean to maintain MAP > 60  Adequate urine output  Check hemodynamics    - extubated on 12/28/23  - CVP 4, SVO2 62, CO: 3.7, CI 1.6  -  balloon pump on 1:1   - 80 lasix IV daily   -  valsartan 20 BID , hydral 25mg tid and isosorbide 20mg tid   - restarted heparin gtt on minimal intensity  - EP evaluated patient and will consider implantation of CRT-D once off pressors and off IABP.    Pneumonia  - on CTX 2g daily last day today     Paroxysmal atrial fibrillation  On heparin ggt    Complete heart block  Possibly ischemia induced vs medication induced. Hold home metoprolol.  If CHB persists he will require a permanent pacemaker  Right IJ TVP placed with tRate to be set at 50.   currently SR with normal conduction yesterday but now AF with controlled V rate. Pending CRTD once off pump     STEMI (ST elevation myocardial infarction)  Continue brilinta and statin    HLD (hyperlipidemia)  Continue statin    Atrial fibrillation  EKG with sinus rhythm on CHB. Pacemaker dependant.     Diabetic ulcer of left foot associated with type 2 diabetes mellitus  A1c 8.6%  Takes lantus 80 bid, metformin, jardiance  Monitor BG, adjust insulin as needed  - Transition drip at 1.4 units/hr with step-down parameters.   - Novolog (aspart) insulin prn for BG excursions MDC SSI (150/25).        VTE Risk Mitigation (From admission, onward)           Ordered     heparin 25,000 units in dextrose 5% 250 mL (100 units/mL) infusion LOW INTENSITY nomogram - [facility]  Continuous        Question:  Begin at (units/kg/hr)  Answer:  12    12/28/23 1247     IP VTE HIGH RISK PATIENT  Once         12/26/23 0541     Place sequential compression device  Until discontinued         12/26/23 0541                    Viviane Curran MD  Cardiology  [facility]

## 2023-12-30 NOTE — SUBJECTIVE & OBJECTIVE
Interval History: Pacing intrinsically. TVP set at backup rate of 50 bpm with 20mA output. Threshold better today, 4.5mA. He has been in Afib/AFL overnight and this morning, but all intrinsic AV tamika conduction. Off precedex as well, so only gtts currently are heparin and insulin. Still on IABP 1:1 but CCU planning to wean soon. He is completely lucid.    Review of Systems   Constitutional: Negative for diaphoresis and fever.   Cardiovascular:  Negative for chest pain, dyspnea on exertion, leg swelling, near-syncope, orthopnea, palpitations, paroxysmal nocturnal dyspnea and syncope.   Respiratory:  Negative for cough and shortness of breath.    Gastrointestinal:  Negative for abdominal pain, diarrhea, nausea and vomiting.   Neurological:  Negative for light-headedness.   Psychiatric/Behavioral:  Negative for altered mental status and substance abuse.      Objective:     Vital Signs (Most Recent):  Temp: 98.1 °F (36.7 °C) (12/30/23 1105)  Pulse: 69 (12/30/23 1105)  Resp: 18 (12/30/23 1105)  BP: (!) 137/90 (12/30/23 1105)  SpO2: 98 % (12/30/23 1105) Vital Signs (24h Range):  Temp:  [97.7 °F (36.5 °C)-98.5 °F (36.9 °C)] 98.1 °F (36.7 °C)  Pulse:  [50-80] 69  Resp:  [18-25] 18  SpO2:  [93 %-100 %] 98 %  BP: (105-166)/() 137/90  Arterial Line BP: ()/(52-98) 127/90     Weight: 103.2 kg (227 lb 8.2 oz)  Body mass index is 31.73 kg/m².     SpO2: 98 %        Physical Exam  Constitutional:       General: He is not in acute distress.     Appearance: Normal appearance. He is well-developed.   HENT:      Mouth/Throat:      Mouth: Mucous membranes are moist.      Pharynx: Oropharynx is clear.   Cardiovascular:      Rate and Rhythm: Normal rate and regular rhythm.      Heart sounds: Normal heart sounds. No murmur heard.     Comments: IABP in place  Abdominal:      Palpations: Abdomen is soft.      Tenderness: There is no abdominal tenderness. There is no guarding.   Musculoskeletal:         General: No swelling.  Normal range of motion.      Right lower leg: No edema.      Left lower leg: No edema.   Skin:     General: Skin is warm and dry.   Neurological:      General: No focal deficit present.      Mental Status: He is alert and oriented to person, place, and time.            Significant Labs: All pertinent lab results from the last 24 hours have been reviewed.    Significant Imaging:  Reviewed

## 2023-12-30 NOTE — PROGRESS NOTES
[facility]  Cardiac Electrophysiology  Progress Note    Admission Date: 12/26/2023  Code Status: Full Code   Attending Physician: Pretty Jarvis MD   Expected Discharge Date: 1/5/2024  Principal Problem:Cardiogenic shock    Subjective:     Interval History: Pacing intrinsically. TVP set at backup rate of 50 bpm with 20mA output. Threshold better today, 4.5mA. He has been in Afib/AFL overnight and this morning, but all intrinsic AV tamika conduction. Off precedex as well, so only gtts currently are heparin and insulin. Still on IABP 1:1 but CCU planning to wean soon. He is completely lucid.    Review of Systems   Constitutional: Negative for diaphoresis and fever.   Cardiovascular:  Negative for chest pain, dyspnea on exertion, leg swelling, near-syncope, orthopnea, palpitations, paroxysmal nocturnal dyspnea and syncope.   Respiratory:  Negative for cough and shortness of breath.    Gastrointestinal:  Negative for abdominal pain, diarrhea, nausea and vomiting.   Neurological:  Negative for light-headedness.   Psychiatric/Behavioral:  Negative for altered mental status and substance abuse.      Objective:     Vital Signs (Most Recent):  Temp: 98.1 °F (36.7 °C) (12/30/23 1105)  Pulse: 69 (12/30/23 1105)  Resp: 18 (12/30/23 1105)  BP: (!) 137/90 (12/30/23 1105)  SpO2: 98 % (12/30/23 1105) Vital Signs (24h Range):  Temp:  [97.7 °F (36.5 °C)-98.5 °F (36.9 °C)] 98.1 °F (36.7 °C)  Pulse:  [50-80] 69  Resp:  [18-25] 18  SpO2:  [93 %-100 %] 98 %  BP: (105-166)/() 137/90  Arterial Line BP: ()/(52-98) 127/90     Weight: 103.2 kg (227 lb 8.2 oz)  Body mass index is 31.73 kg/m².     SpO2: 98 %        Physical Exam  Constitutional:       General: He is not in acute distress.     Appearance: Normal appearance. He is well-developed.   HENT:      Mouth/Throat:      Mouth: Mucous membranes are moist.      Pharynx: Oropharynx is clear.   Cardiovascular:      Rate and Rhythm: Normal rate and  regular rhythm.      Heart sounds: Normal heart sounds. No murmur heard.     Comments: IABP in place  Abdominal:      Palpations: Abdomen is soft.      Tenderness: There is no abdominal tenderness. There is no guarding.   Musculoskeletal:         General: No swelling. Normal range of motion.      Right lower leg: No edema.      Left lower leg: No edema.   Skin:     General: Skin is warm and dry.   Neurological:      General: No focal deficit present.      Mental Status: He is alert and oriented to person, place, and time.            Significant Labs: All pertinent lab results from the last 24 hours have been reviewed.    Significant Imaging:  Reviewed  Assessment and Plan:     * Cardiogenic shock  #CHB with wide fractionated ventricular escape likely from non revascularizable STEMI    Left dominant coronary system  Inferior STEMI with CW a flutter 12/25, LIMA-LAD and SVG -OM are down as well, Lcx couldn't be revasuclarized  Subsequently developed CHB  EF 20-25%  Atrial flutter last night with atrial fibrillation as well overnight and this morning. Using native AV tamika conduction, only TVP paced intermittently. Rate controlled off AVN blocking agents.   Not pacer dependent at this time, so TVP set to backup rate of 50.    Plan  -Likely CRT-D once off IABP for 24 hours  -continue TVP meanwhile with daily threshold checks  -Current TVP settings: rate 50, 20mA output    Typical atrial flutter  New diagnosis, Found on 12/25 EKG that also showed inferior STEMI  Continue AC      Paroxysmal atrial fibrillation  Known paroxysmal afib, on eliquis and toprol xl at home  AC was on hold due to hematemesis which has resolved  Toprol held while unstable  Continue therapeutic anticoagulation        Connor M Gillies, MD  Cardiac Electrophysiology  [facility]

## 2023-12-30 NOTE — NURSING
Primary team discussed comfort care process with family. Family wishing to initiate process. Awaiting  per family request prior to initiated comfort measures only.

## 2023-12-31 LAB
ALBUMIN SERPL BCP-MCNC: 3 G/DL (ref 3.5–5.2)
ALLENS TEST: ABNORMAL
ALP SERPL-CCNC: 57 U/L (ref 55–135)
ALT SERPL W/O P-5'-P-CCNC: 18 U/L (ref 10–44)
ANION GAP SERPL CALC-SCNC: 12 MMOL/L (ref 8–16)
ANION GAP SERPL CALC-SCNC: 15 MMOL/L (ref 8–16)
ANION GAP SERPL CALC-SCNC: 17 MMOL/L (ref 8–16)
ANION GAP SERPL CALC-SCNC: 17 MMOL/L (ref 8–16)
APTT PPP: 35 SEC (ref 21–32)
APTT PPP: 42.4 SEC (ref 21–32)
APTT PPP: 44.6 SEC (ref 21–32)
AST SERPL-CCNC: 18 U/L (ref 10–40)
BASOPHILS # BLD AUTO: 0.1 K/UL (ref 0–0.2)
BASOPHILS # BLD AUTO: 0.1 K/UL (ref 0–0.2)
BASOPHILS NFR BLD: 0.9 % (ref 0–1.9)
BASOPHILS NFR BLD: 0.9 % (ref 0–1.9)
BILIRUB SERPL-MCNC: 0.7 MG/DL (ref 0.1–1)
BUN SERPL-MCNC: 38 MG/DL (ref 6–20)
BUN SERPL-MCNC: 38 MG/DL (ref 6–20)
BUN SERPL-MCNC: 41 MG/DL (ref 6–20)
BUN SERPL-MCNC: 44 MG/DL (ref 6–20)
CALCIUM SERPL-MCNC: 9.3 MG/DL (ref 8.7–10.5)
CALCIUM SERPL-MCNC: 9.4 MG/DL (ref 8.7–10.5)
CALCIUM SERPL-MCNC: 9.4 MG/DL (ref 8.7–10.5)
CALCIUM SERPL-MCNC: 9.5 MG/DL (ref 8.7–10.5)
CHLORIDE SERPL-SCNC: 101 MMOL/L (ref 95–110)
CHLORIDE SERPL-SCNC: 101 MMOL/L (ref 95–110)
CHLORIDE SERPL-SCNC: 102 MMOL/L (ref 95–110)
CHLORIDE SERPL-SCNC: 103 MMOL/L (ref 95–110)
CO2 SERPL-SCNC: 18 MMOL/L (ref 23–29)
CO2 SERPL-SCNC: 19 MMOL/L (ref 23–29)
CO2 SERPL-SCNC: 19 MMOL/L (ref 23–29)
CO2 SERPL-SCNC: 21 MMOL/L (ref 23–29)
CREAT SERPL-MCNC: 1.4 MG/DL (ref 0.5–1.4)
CREAT SERPL-MCNC: 1.5 MG/DL (ref 0.5–1.4)
DIFFERENTIAL METHOD BLD: ABNORMAL
DIFFERENTIAL METHOD BLD: ABNORMAL
EOSINOPHIL # BLD AUTO: 0.2 K/UL (ref 0–0.5)
EOSINOPHIL # BLD AUTO: 0.2 K/UL (ref 0–0.5)
EOSINOPHIL NFR BLD: 1.7 % (ref 0–8)
EOSINOPHIL NFR BLD: 1.7 % (ref 0–8)
ERYTHROCYTE [DISTWIDTH] IN BLOOD BY AUTOMATED COUNT: 12.8 % (ref 11.5–14.5)
ERYTHROCYTE [DISTWIDTH] IN BLOOD BY AUTOMATED COUNT: 12.8 % (ref 11.5–14.5)
EST. GFR  (NO RACE VARIABLE): 54.3 ML/MIN/1.73 M^2
EST. GFR  (NO RACE VARIABLE): 59 ML/MIN/1.73 M^2
GLUCOSE SERPL-MCNC: 183 MG/DL (ref 70–110)
GLUCOSE SERPL-MCNC: 194 MG/DL (ref 70–110)
GLUCOSE SERPL-MCNC: 194 MG/DL (ref 70–110)
GLUCOSE SERPL-MCNC: 215 MG/DL (ref 70–110)
HCO3 UR-SCNC: 20.9 MMOL/L (ref 24–28)
HCT VFR BLD AUTO: 45.2 % (ref 40–54)
HCT VFR BLD AUTO: 45.2 % (ref 40–54)
HGB BLD-MCNC: 15.9 G/DL (ref 14–18)
HGB BLD-MCNC: 15.9 G/DL (ref 14–18)
IMM GRANULOCYTES # BLD AUTO: 0.07 K/UL (ref 0–0.04)
IMM GRANULOCYTES # BLD AUTO: 0.07 K/UL (ref 0–0.04)
IMM GRANULOCYTES NFR BLD AUTO: 0.6 % (ref 0–0.5)
IMM GRANULOCYTES NFR BLD AUTO: 0.6 % (ref 0–0.5)
LACTATE SERPL-SCNC: 1.3 MMOL/L (ref 0.5–2.2)
LYMPHOCYTES # BLD AUTO: 2 K/UL (ref 1–4.8)
LYMPHOCYTES # BLD AUTO: 2 K/UL (ref 1–4.8)
LYMPHOCYTES NFR BLD: 18 % (ref 18–48)
LYMPHOCYTES NFR BLD: 18 % (ref 18–48)
MAGNESIUM SERPL-MCNC: 2.3 MG/DL (ref 1.6–2.6)
MCH RBC QN AUTO: 31.2 PG (ref 27–31)
MCH RBC QN AUTO: 31.2 PG (ref 27–31)
MCHC RBC AUTO-ENTMCNC: 35.2 G/DL (ref 32–36)
MCHC RBC AUTO-ENTMCNC: 35.2 G/DL (ref 32–36)
MCV RBC AUTO: 89 FL (ref 82–98)
MCV RBC AUTO: 89 FL (ref 82–98)
MONOCYTES # BLD AUTO: 0.9 K/UL (ref 0.3–1)
MONOCYTES # BLD AUTO: 0.9 K/UL (ref 0.3–1)
MONOCYTES NFR BLD: 7.8 % (ref 4–15)
MONOCYTES NFR BLD: 7.8 % (ref 4–15)
NEUTROPHILS # BLD AUTO: 7.7 K/UL (ref 1.8–7.7)
NEUTROPHILS # BLD AUTO: 7.7 K/UL (ref 1.8–7.7)
NEUTROPHILS NFR BLD: 71 % (ref 38–73)
NEUTROPHILS NFR BLD: 71 % (ref 38–73)
NRBC BLD-RTO: 0 /100 WBC
NRBC BLD-RTO: 0 /100 WBC
PCO2 BLDA: 31.5 MMHG (ref 35–45)
PH SMN: 7.43 [PH] (ref 7.35–7.45)
PHOSPHATE SERPL-MCNC: 3.7 MG/DL (ref 2.7–4.5)
PLATELET # BLD AUTO: 297 K/UL (ref 150–450)
PLATELET # BLD AUTO: 297 K/UL (ref 150–450)
PMV BLD AUTO: 9.8 FL (ref 9.2–12.9)
PMV BLD AUTO: 9.8 FL (ref 9.2–12.9)
PO2 BLDA: 37 MMHG (ref 40–60)
POC BE: -3 MMOL/L
POC SATURATED O2: 74 % (ref 95–100)
POC TCO2: 22 MMOL/L (ref 24–29)
POCT GLUCOSE: 123 MG/DL (ref 70–110)
POCT GLUCOSE: 175 MG/DL (ref 70–110)
POCT GLUCOSE: 181 MG/DL (ref 70–110)
POCT GLUCOSE: 195 MG/DL (ref 70–110)
POCT GLUCOSE: 221 MG/DL (ref 70–110)
POCT GLUCOSE: 303 MG/DL (ref 70–110)
POTASSIUM SERPL-SCNC: 3.8 MMOL/L (ref 3.5–5.1)
POTASSIUM SERPL-SCNC: 3.9 MMOL/L (ref 3.5–5.1)
PROT SERPL-MCNC: 7.4 G/DL (ref 6–8.4)
RBC # BLD AUTO: 5.09 M/UL (ref 4.6–6.2)
RBC # BLD AUTO: 5.09 M/UL (ref 4.6–6.2)
SAMPLE: ABNORMAL
SITE: ABNORMAL
SODIUM SERPL-SCNC: 135 MMOL/L (ref 136–145)
SODIUM SERPL-SCNC: 136 MMOL/L (ref 136–145)
SODIUM SERPL-SCNC: 137 MMOL/L (ref 136–145)
SODIUM SERPL-SCNC: 137 MMOL/L (ref 136–145)
WBC # BLD AUTO: 10.89 K/UL (ref 3.9–12.7)
WBC # BLD AUTO: 10.89 K/UL (ref 3.9–12.7)

## 2023-12-31 PROCEDURE — 20000000 HC ICU ROOM

## 2023-12-31 PROCEDURE — 85730 THROMBOPLASTIN TIME PARTIAL: CPT | Mod: 91

## 2023-12-31 PROCEDURE — 99291 CRITICAL CARE FIRST HOUR: CPT | Mod: ,,, | Performed by: INTERNAL MEDICINE

## 2023-12-31 PROCEDURE — 25000003 PHARM REV CODE 250: Performed by: STUDENT IN AN ORGANIZED HEALTH CARE EDUCATION/TRAINING PROGRAM

## 2023-12-31 PROCEDURE — 80053 COMPREHEN METABOLIC PANEL: CPT | Performed by: STUDENT IN AN ORGANIZED HEALTH CARE EDUCATION/TRAINING PROGRAM

## 2023-12-31 PROCEDURE — 94761 N-INVAS EAR/PLS OXIMETRY MLT: CPT | Mod: XB

## 2023-12-31 PROCEDURE — 82803 BLOOD GASES ANY COMBINATION: CPT

## 2023-12-31 PROCEDURE — 63600175 PHARM REV CODE 636 W HCPCS

## 2023-12-31 PROCEDURE — 99900035 HC TECH TIME PER 15 MIN (STAT)

## 2023-12-31 PROCEDURE — 83605 ASSAY OF LACTIC ACID: CPT | Performed by: STUDENT IN AN ORGANIZED HEALTH CARE EDUCATION/TRAINING PROGRAM

## 2023-12-31 PROCEDURE — 99232 SBSQ HOSP IP/OBS MODERATE 35: CPT | Mod: ,,,

## 2023-12-31 PROCEDURE — 84100 ASSAY OF PHOSPHORUS: CPT | Performed by: STUDENT IN AN ORGANIZED HEALTH CARE EDUCATION/TRAINING PROGRAM

## 2023-12-31 PROCEDURE — 85025 COMPLETE CBC W/AUTO DIFF WBC: CPT | Performed by: STUDENT IN AN ORGANIZED HEALTH CARE EDUCATION/TRAINING PROGRAM

## 2023-12-31 PROCEDURE — 25000003 PHARM REV CODE 250: Performed by: INTERNAL MEDICINE

## 2023-12-31 PROCEDURE — 25000003 PHARM REV CODE 250

## 2023-12-31 PROCEDURE — 83735 ASSAY OF MAGNESIUM: CPT | Performed by: STUDENT IN AN ORGANIZED HEALTH CARE EDUCATION/TRAINING PROGRAM

## 2023-12-31 PROCEDURE — 85730 THROMBOPLASTIN TIME PARTIAL: CPT | Performed by: INTERNAL MEDICINE

## 2023-12-31 PROCEDURE — 85730 THROMBOPLASTIN TIME PARTIAL: CPT | Mod: 91 | Performed by: INTERNAL MEDICINE

## 2023-12-31 PROCEDURE — 80048 BASIC METABOLIC PNL TOTAL CA: CPT | Mod: XB | Performed by: INTERNAL MEDICINE

## 2023-12-31 RX ORDER — INSULIN ASPART 100 [IU]/ML
10 INJECTION, SOLUTION INTRAVENOUS; SUBCUTANEOUS
Status: DISCONTINUED | OUTPATIENT
Start: 2024-01-01 | End: 2024-01-01

## 2023-12-31 RX ORDER — POTASSIUM CHLORIDE 20 MEQ/1
40 TABLET, EXTENDED RELEASE ORAL ONCE
Status: COMPLETED | OUTPATIENT
Start: 2023-12-31 | End: 2023-12-31

## 2023-12-31 RX ORDER — GABAPENTIN 300 MG/1
600 CAPSULE ORAL ONCE
Status: COMPLETED | OUTPATIENT
Start: 2024-01-01 | End: 2024-01-01

## 2023-12-31 RX ORDER — POTASSIUM CHLORIDE 750 MG/1
30 CAPSULE, EXTENDED RELEASE ORAL ONCE
Status: COMPLETED | OUTPATIENT
Start: 2023-12-31 | End: 2023-12-31

## 2023-12-31 RX ORDER — INSULIN ASPART 100 [IU]/ML
6-8 INJECTION, SOLUTION INTRAVENOUS; SUBCUTANEOUS
Status: DISCONTINUED | OUTPATIENT
Start: 2023-12-31 | End: 2023-12-31

## 2023-12-31 RX ORDER — FUROSEMIDE 40 MG/1
40 TABLET ORAL DAILY
Status: DISCONTINUED | OUTPATIENT
Start: 2023-12-31 | End: 2024-01-04 | Stop reason: HOSPADM

## 2023-12-31 RX ORDER — ISOSORBIDE DINITRATE 10 MG/1
10 TABLET ORAL 3 TIMES DAILY
Status: DISCONTINUED | OUTPATIENT
Start: 2023-12-31 | End: 2024-01-01

## 2023-12-31 RX ORDER — POLYETHYLENE GLYCOL 3350 17 G/17G
17 POWDER, FOR SOLUTION ORAL DAILY
Status: DISCONTINUED | OUTPATIENT
Start: 2023-12-31 | End: 2024-01-04 | Stop reason: HOSPADM

## 2023-12-31 RX ORDER — SENNOSIDES 8.6 MG/1
8.6 TABLET ORAL DAILY
Status: DISCONTINUED | OUTPATIENT
Start: 2023-12-31 | End: 2024-01-01

## 2023-12-31 RX ORDER — POTASSIUM CHLORIDE 20 MEQ/1
20 TABLET, EXTENDED RELEASE ORAL ONCE
Status: COMPLETED | OUTPATIENT
Start: 2023-12-31 | End: 2023-12-31

## 2023-12-31 RX ADMIN — HYDROXYZINE PAMOATE 25 MG: 25 CAPSULE ORAL at 09:12

## 2023-12-31 RX ADMIN — INSULIN ASPART 2 UNITS: 100 INJECTION, SOLUTION INTRAVENOUS; SUBCUTANEOUS at 04:12

## 2023-12-31 RX ADMIN — INSULIN HUMAN 1.4 UNITS/HR: 1 INJECTION, SOLUTION INTRAVENOUS at 06:12

## 2023-12-31 RX ADMIN — ISOSORBIDE DINITRATE 10 MG: 10 TABLET ORAL at 10:12

## 2023-12-31 RX ADMIN — INSULIN ASPART 6 UNITS: 100 INJECTION, SOLUTION INTRAVENOUS; SUBCUTANEOUS at 01:12

## 2023-12-31 RX ADMIN — HEPARIN SODIUM 21 UNITS/KG/HR: 10000 INJECTION, SOLUTION INTRAVENOUS at 06:12

## 2023-12-31 RX ADMIN — PANTOPRAZOLE SODIUM 40 MG: 40 GRANULE, DELAYED RELEASE ORAL at 08:12

## 2023-12-31 RX ADMIN — HYDRALAZINE HYDROCHLORIDE 25 MG: 25 TABLET, FILM COATED ORAL at 06:12

## 2023-12-31 RX ADMIN — VALSARTAN 20 MG: 40 TABLET, FILM COATED ORAL at 08:12

## 2023-12-31 RX ADMIN — POTASSIUM CHLORIDE 30 MEQ: 10 CAPSULE, COATED, EXTENDED RELEASE ORAL at 09:12

## 2023-12-31 RX ADMIN — INSULIN ASPART 6 UNITS: 100 INJECTION, SOLUTION INTRAVENOUS; SUBCUTANEOUS at 04:12

## 2023-12-31 RX ADMIN — HEPARIN SODIUM 21 UNITS/KG/HR: 10000 INJECTION, SOLUTION INTRAVENOUS at 08:12

## 2023-12-31 RX ADMIN — INSULIN ASPART 8 UNITS: 100 INJECTION, SOLUTION INTRAVENOUS; SUBCUTANEOUS at 04:12

## 2023-12-31 RX ADMIN — TICAGRELOR 90 MG: 90 TABLET ORAL at 10:12

## 2023-12-31 RX ADMIN — MUPIROCIN: 20 OINTMENT TOPICAL at 09:12

## 2023-12-31 RX ADMIN — ISOSORBIDE DINITRATE 10 MG: 10 TABLET ORAL at 04:12

## 2023-12-31 RX ADMIN — INSULIN HUMAN 1.5 UNITS/HR: 1 INJECTION, SOLUTION INTRAVENOUS at 11:12

## 2023-12-31 RX ADMIN — INSULIN ASPART 4 UNITS: 100 INJECTION, SOLUTION INTRAVENOUS; SUBCUTANEOUS at 01:12

## 2023-12-31 RX ADMIN — ATORVASTATIN CALCIUM 40 MG: 40 TABLET, FILM COATED ORAL at 08:12

## 2023-12-31 RX ADMIN — HYDRALAZINE HYDROCHLORIDE 25 MG: 25 TABLET, FILM COATED ORAL at 01:12

## 2023-12-31 RX ADMIN — HYDROXYZINE PAMOATE 25 MG: 25 CAPSULE ORAL at 06:12

## 2023-12-31 RX ADMIN — POTASSIUM CHLORIDE 20 MEQ: 1500 TABLET, EXTENDED RELEASE ORAL at 06:12

## 2023-12-31 RX ADMIN — MUPIROCIN: 20 OINTMENT TOPICAL at 08:12

## 2023-12-31 RX ADMIN — POTASSIUM CHLORIDE 40 MEQ: 1500 TABLET, EXTENDED RELEASE ORAL at 01:12

## 2023-12-31 RX ADMIN — ASPIRIN 81 MG CHEWABLE TABLET 81 MG: 81 TABLET CHEWABLE at 08:12

## 2023-12-31 RX ADMIN — INSULIN ASPART 2 UNITS: 100 INJECTION, SOLUTION INTRAVENOUS; SUBCUTANEOUS at 12:12

## 2023-12-31 RX ADMIN — SENNOSIDES 8.6 MG: 8.6 TABLET, FILM COATED ORAL at 11:12

## 2023-12-31 RX ADMIN — TICAGRELOR 90 MG: 90 TABLET ORAL at 08:12

## 2023-12-31 RX ADMIN — ISOSORBIDE DINITRATE 10 MG: 10 TABLET ORAL at 08:12

## 2023-12-31 RX ADMIN — POLYETHYLENE GLYCOL 3350 17 G: 17 POWDER, FOR SOLUTION ORAL at 11:12

## 2023-12-31 RX ADMIN — FUROSEMIDE 40 MG: 40 TABLET ORAL at 11:12

## 2023-12-31 RX ADMIN — INSULIN ASPART 2 UNITS: 100 INJECTION, SOLUTION INTRAVENOUS; SUBCUTANEOUS at 08:12

## 2023-12-31 NOTE — PLAN OF CARE
CICU Care Plan    POC reviewed with Magdaleno Cunningham and family at 1900. Pt verbalized understanding. Questions and concerns addressed. No acute events noted this shift. Pt progressing toward goals. Security measures in place, plan of care to continue. See below and flowsheets for full assessment and VS info.     CVP: 1, 1, 1  SvO2: 67  Gtts:  Heparin @ 21units/kg/hr  Transitional Insulin @ 1.4 units/hr    Neuro:  Lesage Coma Scale  Best Eye Response: 4-->(E4) spontaneous  Best Motor Response: 6-->(M6) obeys commands  Best Verbal Response: 5-->(V5) oriented  Val Coma Scale Score: 15  Assessment Qualifiers: patient not sedated/intubated, no eye obstruction present  Pupil PERRLA: yes     24 hr Temp:  [97.7 °F (36.5 °C)-98.6 °F (37 °C)]     CV:   Rhythm: normal sinus rhythm  BP goals:   SBP < 160  MAP > 65    Resp:   Plan: RA    GI/:  Diet/Nutrition Received: ice chips, sips of water  Last Bowel Movement: 12/25/23  Voiding Characteristics: urethral catheter (bladder)  Intake/Output Summary (Last 24 hours) at 12/31/2023 0645  Last data filed at 12/31/2023 0601  Gross per 24 hour   Intake 1249.16 ml   Output 2605 ml   Net -1355.84 ml     Labs/Accuchecks:  Recent Labs   Lab 12/31/23  0324   WBC 10.89  10.89   RBC 5.09  5.09   HGB 15.9  15.9   HCT 45.2  45.2     297      Recent Labs   Lab 12/31/23  0324     137   K 3.8  3.8   CO2 19*  19*     101   BUN 38*  38*   CREATININE 1.5*  1.5*   ALKPHOS 57   ALT 18   AST 18   BILITOT 0.7      Recent Labs   Lab 12/28/23  1302 12/28/23  1927 12/31/23  0605   INR 1.1  --   --    APTT 26.6   < > 44.6*    < > = values in this interval not displayed.      Recent Labs   Lab 12/26/23  2130   TROPONINI 24.449*       Electrolytes: Electrolytes replaced  Accuchecks: Q4H    Gtts:   sodium chloride 0.9% 10 mL/hr at 12/31/23 0601    heparin (porcine) in D5W 21 Units/kg/hr (12/31/23 0637)    insulin regular 1 units/mL infusion orderable (TRANSFER) 1.4  Units/hr (12/31/23 0635)       LDA/Wounds:  Lines/Drains/Airways       Central Venous Catheter Line  Duration             Introducer 12/26/23 0630 Internal Jugular Right 5 days    Percutaneous Central Line Insertion/Assessment - Triple Lumen  12/27/23 1437 Internal Jugular Left 3 days              Drain  Duration                  Urethral Catheter 12/28/23 0857 Silicone 16 Fr. 2 days              Line  Duration                  Pacer Wires 12/26/23 0652 4 days              Peripheral Intravenous Line  Duration                  Sheath 12/25/23 2316 Left anterior 5 days                  Wounds: No  Wound care consulted: No    Problem: Adult Inpatient Plan of Care  Goal: Plan of Care Review  Outcome: Ongoing, Progressing  Goal: Patient-Specific Goal (Individualized)  Outcome: Ongoing, Progressing  Goal: Absence of Hospital-Acquired Illness or Injury  Outcome: Ongoing, Progressing  Goal: Optimal Comfort and Wellbeing  Outcome: Ongoing, Progressing  Goal: Readiness for Transition of Care  Outcome: Ongoing, Progressing     Problem: Diabetes Comorbidity  Goal: Blood Glucose Level Within Targeted Range  Outcome: Ongoing, Progressing     Problem: Infection  Goal: Absence of Infection Signs and Symptoms  Outcome: Ongoing, Progressing     Problem: Fall Injury Risk  Goal: Absence of Fall and Fall-Related Injury  Outcome: Ongoing, Progressing     Problem: Restraint, Nonbehavioral (Nonviolent)  Goal: Absence of Harm or Injury  Outcome: Ongoing, Progressing     Problem: Skin Injury Risk Increased  Goal: Skin Health and Integrity  Outcome: Ongoing, Progressing     Problem: Communication Impairment (Mechanical Ventilation, Invasive)  Goal: Effective Communication  Outcome: Ongoing, Progressing     Problem: Device-Related Complication Risk (Mechanical Ventilation, Invasive)  Goal: Optimal Device Function  Outcome: Ongoing, Progressing     Problem: Inability to Wean (Mechanical Ventilation, Invasive)  Goal: Mechanical Ventilation  Liberation  Outcome: Ongoing, Progressing     Problem: Nutrition Impairment (Mechanical Ventilation, Invasive)  Goal: Optimal Nutrition Delivery  Outcome: Ongoing, Progressing     Problem: Skin and Tissue Injury (Mechanical Ventilation, Invasive)  Goal: Absence of Device-Related Skin and Tissue Injury  Outcome: Ongoing, Progressing     Problem: Ventilator-Induced Lung Injury (Mechanical Ventilation, Invasive)  Goal: Absence of Ventilator-Induced Lung Injury  Outcome: Ongoing, Progressing     Problem: Communication Impairment (Artificial Airway)  Goal: Effective Communication  Outcome: Ongoing, Progressing     Problem: Device-Related Complication Risk (Artificial Airway)  Goal: Optimal Device Function  Outcome: Ongoing, Progressing     Problem: Skin and Tissue Injury (Artificial Airway)  Goal: Absence of Device-Related Skin or Tissue Injury  Outcome: Ongoing, Progressing     Problem: Impaired Wound Healing  Goal: Optimal Wound Healing  Outcome: Ongoing, Progressing     Problem: Fluid Imbalance (Pneumonia)  Goal: Fluid Balance  Outcome: Ongoing, Progressing     Problem: Infection (Pneumonia)  Goal: Resolution of Infection Signs and Symptoms  Outcome: Ongoing, Progressing     Problem: Respiratory Compromise (Pneumonia)  Goal: Effective Oxygenation and Ventilation  Outcome: Ongoing, Progressing

## 2023-12-31 NOTE — ASSESSMENT & PLAN NOTE
#CHB with wide fractionated ventricular escape likely from non revascularizable STEMI    Left dominant coronary system  Inferior STEMI with CW a flutter 12/25, LIMA-LAD and SVG -OM are down as well, Lcx couldn't be revasuclarized  Subsequently developed CHB  EF 20-25%  Atrial flutter last night with atrial fibrillation as well overnight and this morning. Using native AV tamika conduction, only TVP paced intermittently. Rate controlled off AVN blocking agents.   Not pacer dependent at this time, so TVP set to backup rate of 50.  Threshold 3.5mA    Plan  -IABP out. Likely can plan for CRT-D on Tuesday  -After CRT-D (maybe 24-48hr later) can cardiovert him for AF/AFL and he can be on AC at that time  -TVP in place still. Can likely remove tomorrow if still not dependent today/overnight  -If TVP out tomorrow can likely plan to have CVC out as well later tomorrow for infection risk reduction prior to device

## 2023-12-31 NOTE — SUBJECTIVE & OBJECTIVE
Interval History: Off IABP, lucid and oriented. Cruz was removed. On tele he has been in some AFL and some AF but rate controlled. Still conducting intrinsically through his AV node without any pacing needs for about 48 hours.     Review of Systems   Constitutional: Negative for diaphoresis and fever.   Cardiovascular:  Negative for chest pain, dyspnea on exertion, leg swelling, near-syncope, orthopnea, palpitations, paroxysmal nocturnal dyspnea and syncope.   Respiratory:  Negative for cough and shortness of breath.    Gastrointestinal:  Negative for abdominal pain, diarrhea, nausea and vomiting.   Neurological:  Negative for light-headedness.   Psychiatric/Behavioral:  Negative for altered mental status and substance abuse.      Objective:     Vital Signs (Most Recent):  Temp: 98.1 °F (36.7 °C) (12/31/23 0705)  Pulse: 95 (12/31/23 1023)  Resp: (!) 27 (12/31/23 0905)  BP: 109/65 (12/31/23 0905)  SpO2: 96 % (12/31/23 0905) Vital Signs (24h Range):  Temp:  [97.7 °F (36.5 °C)-98.6 °F (37 °C)] 98.1 °F (36.7 °C)  Pulse:  [] 95  Resp:  [18-30] 27  SpO2:  [96 %-99 %] 96 %  BP: (103-184)/(55-90) 109/65  Arterial Line BP: (117-127)/(90-96) 117/96     Weight: 103.2 kg (227 lb 8.2 oz)  Body mass index is 31.73 kg/m².     SpO2: 96 %        Physical Exam  Constitutional:       General: He is not in acute distress.     Appearance: Normal appearance. He is well-developed.   HENT:      Mouth/Throat:      Mouth: Mucous membranes are moist.      Pharynx: Oropharynx is clear.   Cardiovascular:      Rate and Rhythm: Normal rate and regular rhythm.      Heart sounds: Normal heart sounds. No murmur heard.  Abdominal:      Palpations: Abdomen is soft.      Tenderness: There is no abdominal tenderness. There is no guarding.   Musculoskeletal:         General: No swelling. Normal range of motion.      Right lower leg: No edema.      Left lower leg: No edema.   Skin:     General: Skin is warm and dry.   Neurological:      General: No  focal deficit present.      Mental Status: He is alert and oriented to person, place, and time.            Significant Labs: All pertinent lab results from the last 24 hours have been reviewed.    Significant Imaging:  Reviewed

## 2023-12-31 NOTE — PROGRESS NOTES
[facility]  Cardiology  Progress Note    Patient Name: Magdaleno Cunningham  MRN: [id number]  Admission Date: 12/26/2023  Hospital Length of Stay: 5 days  Code Status: Full Code   Attending Physician: Larry Alvarez MD   Primary Care Physician: Venkata Mclaughlin MD  Expected Discharge Date: 1/5/2024  Principal Problem:Cardiogenic shock    Subjective:     Hospital Course:   On fentanyl gtt and pressors with stable MAPs, UOP 4.3L on lasix gtt. Restarted propofol protocol.started on precedex. EP evaluated patient and consider implantation CRT-D once patient off pressors and off IABP and extubation. EKG on sinus rhythm with CHB. Will place central line. Pending ABG. Continue diuresing. Monitor creatinine. off dobutamine. balloon pump on 1:1 (don't stop balloon pump). off lasix gtt. restarted heparin gtt. Extubated. CO: 3.7, CI 1.6, increased diuresis with 80 tid bolus now and continue lasix 80 TID. Given BP, started on  valsartan 20 BID and monitor Cr.       Interval History: IABP removed yesterday. Patient is doing well. Tolerating afterload reduction agents. Patient with TVP but has not needed to be paced     Review of Systems   Constitutional: Negative for chills, decreased appetite, diaphoresis and fever.   Cardiovascular:  Negative for chest pain, claudication, cyanosis, dyspnea on exertion and irregular heartbeat.   Respiratory:  Negative for shortness of breath.    Musculoskeletal:  Negative for arthritis.   All other systems reviewed and are negative.    Objective:     Vital Signs (Most Recent):  Temp: 98.8 °F (37.1 °C) (12/31/23 1305)  Pulse: 95 (12/31/23 1605)  Resp: (!) 23 (12/31/23 1605)  BP: 127/68 (12/31/23 1605)  SpO2: 95 % (12/31/23 1605) Vital Signs (24h Range):  Temp:  [97.7 °F (36.5 °C)-98.8 °F (37.1 °C)] 98.8 °F (37.1 °C)  Pulse:  [] 95  Resp:  [19-30] 23  SpO2:  [95 %-98 %] 95 %  BP: ()/(55-75) 127/68     Weight: 103.2 kg (227 lb 8.2 oz)  Body mass index is  31.73 kg/m².     SpO2: 95 %         Intake/Output Summary (Last 24 hours) at 12/31/2023 1615  Last data filed at 12/31/2023 1405  Gross per 24 hour   Intake 1120.05 ml   Output 1370 ml   Net -249.95 ml       Lines/Drains/Airways       Central Venous Catheter Line  Duration             Introducer 12/26/23 0630 Internal Jugular Right 5 days              Line  Duration                  Pacer Wires 12/26/23 0652 5 days              Peripheral Intravenous Line  Duration                  Peripheral IV - Single Lumen 12/31/23 1143 20 G Anterior;Right Forearm <1 day         Peripheral IV - Single Lumen 12/31/23 1144 20 G Anterior;Left Forearm <1 day                       Physical Exam  Constitutional:       Appearance: Normal appearance.   Cardiovascular:      Rate and Rhythm: Normal rate. Rhythm irregular.   Pulmonary:      Effort: Pulmonary effort is normal.      Breath sounds: Normal breath sounds.   Abdominal:      General: Abdomen is flat.      Palpations: Abdomen is soft.   Musculoskeletal:      Right lower leg: No edema.      Left lower leg: No edema.   Skin:     General: Skin is warm.   Neurological:      General: No focal deficit present.      Mental Status: He is alert and oriented to person, place, and time.            Significant Labs: All pertinent lab results from the last 24 hours have been reviewed.      Assessment and Plan:         * Cardiogenic shock  2/2 STEMI/ICM  Mehanical support: IABP @ 1:1 initially with pressor support patient weaned off successfully     - extubated on 12/28/23  - balloon pump removed 12/30  - 80 lasix IV daily   -  valsartan 20 BID , hydral 25mg tid and isosorbide 10mg tid   - restarted heparin gtt on minimal intensity  - EP evaluated patient and will consider implantation of CRT-D once off pressors and off IABP.      Paroxysmal atrial fibrillation  On heparin ggt    Complete heart block  Possibly ischemia induced vs medication induced. Hold home metoprolol.  If CHB persists he  will require a permanent pacemaker  Right IJ TVP placed with tRate to be set at 50.   currently SR with normal conduction yesterday but now AF with controlled V rate.   EP recs bellow  -Likely can plan for CRT-D on Tuesday  -After CRT-D (maybe 24-48hr later) can cardiovert him for AF/AFL and he can be on AC at that time  -TVP in place still. Can likely remove tomorrow if still not dependent today/overnight  -If TVP out tomorrow can likely plan to have CVC out as well later tomorrow for infection risk reduction prior to device  -Hold heparin 6 hours prior to device      STEMI (ST elevation myocardial infarction)  Continue brilinta and statin    HLD (hyperlipidemia)  Continue statin    Atrial fibrillation  EKG with sinus rhythm on CHB initially but now back to afib. Not pacing for more than 24 hrs    Diabetic ulcer of left foot associated with type 2 diabetes mellitus  A1c 8.6%  Takes lantus 80 bid, metformin, jardiance  Monitor BG, adjust insulin as needed  - Transition drip at 1.4 units/hr with step-down parameters.   - Novolog (aspart) insulin prn for BG excursions [facility] SSI (150/25).      Pneumonia  - Completed treatment       VTE Risk Mitigation (From admission, onward)           Ordered     heparin 25,000 units in dextrose 5% 250 mL (100 units/mL) infusion LOW INTENSITY nomogram - [facility]  Continuous        Question:  Begin at (units/kg/hr)  Answer:  12    12/28/23 1247     IP VTE HIGH RISK PATIENT  Once         12/26/23 0541     Place sequential compression device  Until discontinued         12/26/23 0541                    Viviane Curran MD  Cardiology  [facility] - Cardiac Intensive [facility]

## 2023-12-31 NOTE — PROGRESS NOTES
[facility]  Cardiac Electrophysiology  Progress Note    Admission Date: 12/26/2023  Code Status: Full Code   Attending Physician: Larry Alvarez MD   Expected Discharge Date: 1/5/2024  Principal Problem:Cardiogenic shock    Subjective:     Interval History: Off IABP, lucid and oriented. Cruz was removed. On tele he has been in some AFL and some AF but rate controlled. Still conducting intrinsically through his AV node without any pacing needs for about 48 hours.     Review of Systems   Constitutional: Negative for diaphoresis and fever.   Cardiovascular:  Negative for chest pain, dyspnea on exertion, leg swelling, near-syncope, orthopnea, palpitations, paroxysmal nocturnal dyspnea and syncope.   Respiratory:  Negative for cough and shortness of breath.    Gastrointestinal:  Negative for abdominal pain, diarrhea, nausea and vomiting.   Neurological:  Negative for light-headedness.   Psychiatric/Behavioral:  Negative for altered mental status and substance abuse.      Objective:     Vital Signs (Most Recent):  Temp: 98.1 °F (36.7 °C) (12/31/23 0705)  Pulse: 95 (12/31/23 1023)  Resp: (!) 27 (12/31/23 0905)  BP: 109/65 (12/31/23 0905)  SpO2: 96 % (12/31/23 0905) Vital Signs (24h Range):  Temp:  [97.7 °F (36.5 °C)-98.6 °F (37 °C)] 98.1 °F (36.7 °C)  Pulse:  [] 95  Resp:  [18-30] 27  SpO2:  [96 %-99 %] 96 %  BP: (103-184)/(55-90) 109/65  Arterial Line BP: (117-127)/(90-96) 117/96     Weight: 103.2 kg (227 lb 8.2 oz)  Body mass index is 31.73 kg/m².     SpO2: 96 %        Physical Exam  Constitutional:       General: He is not in acute distress.     Appearance: Normal appearance. He is well-developed.   HENT:      Mouth/Throat:      Mouth: Mucous membranes are moist.      Pharynx: Oropharynx is clear.   Cardiovascular:      Rate and Rhythm: Normal rate and regular rhythm.      Heart sounds: Normal heart sounds. No murmur heard.  Abdominal:      Palpations: Abdomen is soft.       Tenderness: There is no abdominal tenderness. There is no guarding.   Musculoskeletal:         General: No swelling. Normal range of motion.      Right lower leg: No edema.      Left lower leg: No edema.   Skin:     General: Skin is warm and dry.   Neurological:      General: No focal deficit present.      Mental Status: He is alert and oriented to person, place, and time.            Significant Labs: All pertinent lab results from the last 24 hours have been reviewed.    Significant Imaging:  Reviewed  Assessment and Plan:     * Cardiogenic shock  #CHB with wide fractionated ventricular escape likely from non revascularizable STEMI    Left dominant coronary system  Inferior STEMI with CW a flutter 12/25, LIMA-LAD and SVG -OM are down as well, Lcx couldn't be revasuclarized  Subsequently developed CHB  EF 20-25%  Atrial flutter last night with atrial fibrillation as well overnight and this morning. Using native AV tamika conduction, only TVP paced intermittently. Rate controlled off AVN blocking agents.   Not pacer dependent at this time, so TVP set to backup rate of 50.  Threshold 3.5mA    Plan  -IABP out. Likely can plan for CRT-D on Tuesday  -After CRT-D (maybe 24-48hr later) can cardiovert him for AF/AFL and he can be on AC at that time  -TVP in place still. Can likely remove tomorrow if still not dependent today/overnight  -If TVP out tomorrow can likely plan to have CVC out as well later tomorrow for infection risk reduction prior to device    Typical atrial flutter  New diagnosis, Found on 12/25 EKG that also showed inferior STEMI  Continue AC      Paroxysmal atrial fibrillation  Known paroxysmal afib, on eliquis and toprol xl at home  AC was on hold due to hematemesis which has resolved  Toprol held while unstable  Continue therapeutic anticoagulation        Connor M Gillies, MD  Cardiac Electrophysiology  [facility]

## 2023-12-31 NOTE — PROGRESS NOTES
[facility]  Endocrinology  Progress Note    Admit Date: 12/26/2023     Reason for Consult: Management of T2DM, Hyperglycemia     Diabetes diagnosis year: >3 years ago    Home Diabetes Medications:  Jardiance 25 mg, Lantus 80 u BID, Metformin 1000 mg BID    How often checking glucose at home? 1-3 x day   BG readings on regimen: 150-200's (patient is confused)  Hypoglycemia on the regimen?  Yes  Missed doses on regimen?  No    Diabetes Complications include:     Hyperglycemia    Complicating diabetes co morbidities:   History of MI      HPI:   Patient is a 58 y.o. male with CAD s/p CABG and recent PCI to proximal and mid Lcx by Dr. Botello on 12/22/23, COPD, atrial fibrillation on eliquis, diabetes, current smoker (reportedly 3PPD), hypertension [facility] which has been present for 2 days.  He was found to have ST elevations and was taken to the cath lab directly and was found to have in-stent thrombosis. Of note he was also in atrial flutter. Procedure note not available but per report no intervention was performed and a IABP was placed in the left femoral artery. During the procedure he had intermittent complete heart block so a left femoral TVP was also placed. He was requiring BiPAP post-operatively. It was decided to transfer to higher level of care and he was intubated to allow safe transportation. Endocrine consulted for bg management        Interval HPI:   No acute events overnight. Patient in room [id number] A. Blood glucose stable. BG at and above goal on current insulin regimen (Transition Insulin Drip). Steroid use-  none     Renal function- Abnormal - 1.5 cr    Vasopressors-  None     Diet Cardiac     Eating:   Diet advanced this AM  Nausea: No  Hypoglycemia and intervention: No  Fever: No  TPN and/or TF: No    /69 (BP Location: Right arm, Patient Position: Lying)   Pulse 95   Temp 98.1 °F (36.7 °C) (Oral)   Resp (!) 25    "Ht 5' 11" (1.803 m)   Wt 103.2 kg (227 lb 8.2 oz)   SpO2 97%   BMI 31.73 kg/m²     Labs Reviewed and Include    Recent Labs   Lab 12/31/23  0324   *  194*   CALCIUM 9.4  9.4   ALBUMIN 3.0*   PROT 7.4     137   K 3.8  3.8   CO2 19*  19*     101   BUN 38*  38*   CREATININE 1.5*  1.5*   ALKPHOS 57   ALT 18   AST 18   BILITOT 0.7     Lab Results   Component Value Date    WBC 10.89 12/31/2023    WBC 10.89 12/31/2023    HGB 15.9 12/31/2023    HGB 15.9 12/31/2023    HCT 45.2 12/31/2023    HCT 45.2 12/31/2023    MCV 89 12/31/2023    MCV 89 12/31/2023     12/31/2023     12/31/2023     No results for input(s): "TSH", "FREET4" in the last 168 hours.  Lab Results   Component Value Date    HGBA1C 8.3 (H) 12/14/2023    HGBA1C 8.3 (H) 12/14/2023       Nutritional status:   Body mass index is 31.73 kg/m².  Lab Results   Component Value Date    ALBUMIN 3.0 (L) 12/31/2023    ALBUMIN 2.8 (L) 12/30/2023    ALBUMIN 2.9 (L) 12/30/2023     No results found for: "PREALBUMIN"    Estimated Creatinine Clearance: 67.3 mL/min (A) (based on SCr of 1.5 mg/dL (H)).    Accu-Checks  Recent Labs     12/29/23  1802 12/29/23  2248 12/30/23  0211 12/30/23  0621 12/30/23  1112 12/30/23  1451 12/30/23  1831 12/31/23  0008 12/31/23  0447 12/31/23  0840   POCTGLUCOSE 159* 124* 157* 154* 189* 147* 172* 195* 175* 181*       Current Medications and/or Treatments Impacting Glycemic Control  Immunotherapy:    Immunosuppressants       None          Steroids:   Hormones (From admission, onward)      None          Pressors:    Autonomic Drugs (From admission, onward)      None          Hyperglycemia/Diabetes Medications:   Antihyperglycemics (From admission, onward)      Start     Stop Route Frequency Ordered    12/31/23 1115  insulin aspart U-100 pen 6-8 Units         -- SubQ 3 times daily with meals 12/31/23 1106    12/31/23 1115  insulin regular in 0.9 % NaCl 100 unit/100 mL (1 unit/mL) infusion        Question:  Enter " initial dose (Units/hr):  Answer:  1.5    -- IV Continuous 12/31/23 1106    12/29/23 1826  insulin aspart U-100 pen 0-10 Units         -- SubQ As needed (PRN) 12/29/23 1726            ASSESSMENT and PLAN    Cardiac/Vascular  * Cardiogenic shock  Managed per primary team  Optimize bg control        HLD (hyperlipidemia)  Managed per primary.   On statin per ADA        Endocrine  Type 2 diabetes mellitus with diabetic arthropathy    BG goal 140-180     - Transition drip at 1.5 units/hr with step-down parameters.   - Start Novolog 6-8 units TIDWM as diet has been advanced (0.3-0.5 u/kg/day WBD) (Administer    6    units if patient eats 25-50% of meal, administer    8    units if patient eats > 50% of meal.)  - Novolog (aspart) insulin prn for BG excursions [facility] SSI (150/25).  - BG checks q4hr  - Hypoglycemia protocol in place    ** Please notify Endocrine for any change and/or advance in diet**  ** Please call Endocrine for any BG related issues **    Discharge Planning:   TBD. Please notify endocrinology prior to discharge.         Brigitte Champion PA-C  Endocrinology  [facility]

## 2023-12-31 NOTE — PLAN OF CARE
POC reviewed with Magdaleno Cunningham and family. Questions and concerns addressed. CVP: 5, 4 and 4. Svo2: 70%. IABP out today. Pt hypertensive throughout the shift. MD Viviane informed about pt condition throughout the shift.  See below and flowsheets for full assessment and VS info.       Neuro:  [place] Coma Scale  Best Eye Response: 4-->(E4) spontaneous  Best Motor Response: 6-->(M6) obeys commands  Best Verbal Response: 5-->(V5) oriented  [place] Coma Scale Score: 15  Assessment Qualifiers: patient not sedated/intubated  Pupil PERRLA: yes    24 hr Temp:  [97.7 °F (36.5 °C)-98.6 °F (37 °C)]     CV:  Rhythm: paced rhythm   DVT prophylaxis: VTE Required Core Measure: Pharmacological prophylaxis initiated/maintained    CVP (mean): 322 mmHg (12/30/23 0600)       SVO2 (%): 62 % (12/29/23 2000)    IABP Mode: Auto  IABP Rate: 1:1  IABP Trigger: ECG  IABP Augmented Diastolic Pressure: 112          Pulses  Right Radial Pulse: 1+ (weak)  Left Radial Pulse: 1+ (weak)  Right Dorsalis Pedis Pulse: Doppler  Left Dorsalis Pedis Pulse: Doppler  Right Posterior Tibial Pulse: Doppler  Left Posterior Tibial Pulse: Doppler    Resp:  Flow (L/min): 1  Vent Mode: Spont  Set Rate: 22 BPM  Oxygen Concentration (%): 30  Vt Set: 450 mL  PEEP/CPAP: 5 cmH20  Pressure Support: 8 cmH20    GI/:  GDiet/Nutrition Received: ice chips, sips of water  Last Bowel Movement: 12/25/23  Voiding Characteristics: urethral catheter (bladder)   Intake/Output Summary (Last 24 hours) at 12/30/2023 1839  Last data filed at 12/30/2023 1805  Gross per 24 hour   Intake 1464.81 ml   Output 3755 ml   Net -2290.19 ml          Labs/Accuchecks:  Recent Labs   Lab 12/28/23  1302 12/29/23  0440 12/29/23  1943 12/30/23  0212   WBC 10.74 12.12  12.12  --  10.07  10.07   RBC 5.12 4.98  4.98  --  5.19  5.19   HGB 15.8 15.8  15.8  --  16.4  16.4   HCT 43.7 44.1  44.1 45 46.5  46.5    243  243  --  257  257      Recent Labs   Lab 12/26/23  3633  12/27/23  1322 12/27/23  2124 12/28/23  1302 12/28/23  1927 12/29/23  1156 12/29/23  1759 12/30/23  0212   INR 1.0 1.0  --  1.1  --   --   --   --    APTT 26.4 25.4   < > 26.6   < > 32.8* 42.5* 40.4*    < > = values in this interval not displayed.      Recent Labs     12/30/23  0212 12/30/23  1412     137 135*   K 4.1  4.1 3.7   CO2 18*  18* 20*     100 99   BUN 36*  36* 45*   CREATININE 1.7*  1.7* 1.7*   ALKPHOS 63  --    ALT 17  --    AST 23  --    BILITOT 0.6  --        Recent Labs   Lab 12/26/23  1001 12/26/23  1430 12/26/23  2130   TROPONINI 21.984* 25.809* 24.449*      Recent Labs     12/29/23  1943 12/30/23  0804 12/30/23  1358   PH 7.397 7.407 7.431   PCO2 36.7 36.5 33.5*   PO2 32* 34* 35*   HCO3 22.6* 23.0* 22.3*   POCSATURATED 62 66 70   BE -2 -2 -2       Electrolytes: Electrolytes replaced  Accuchecks: Q4H    Gtts/LDAs:   sodium chloride 0.9% 10 mL/hr at 12/30/23 1805    heparin (porcine) in D5W 19 Units/kg/hr (12/30/23 1825)    insulin regular 1 units/mL infusion orderable (TRANSFER) 1.4 Units/hr (12/30/23 1805)       Lines/Drains/Airways       Central Venous Catheter Line  Duration             Introducer 12/26/23 0630 Internal Jugular Right 4 days    Percutaneous Central Line Insertion/Assessment - Triple Lumen  12/27/23 1437 Internal Jugular Left 3 days              Drain  Duration                  Urethral Catheter 12/28/23 0857 Silicone 16 Fr. 2 days              Line  Duration                  Pacer Wires 12/26/23 0652 4 days              Peripheral Intravenous Line  Duration                  Sheath 12/25/23 Left 5 days         Sheath 12/25/23 2316 Left anterior 4 days                    Skin/Wounds  Bathing/Skin Care: bath, complete;linen changed;dressed/undressed (12/29/23 5840)  Wounds: No  Wound care consulted: No

## 2023-12-31 NOTE — SUBJECTIVE & OBJECTIVE
Interval History: IABP removed yesterday. Patient is doing well. Tolerating afterload reduction agents. Patient with TVP but has not needed to be paced     Review of Systems   Constitutional: Negative for chills, decreased appetite, diaphoresis and fever.   Cardiovascular:  Negative for chest pain, claudication, cyanosis, dyspnea on exertion and irregular heartbeat.   Respiratory:  Negative for shortness of breath.    Musculoskeletal:  Negative for arthritis.   All other systems reviewed and are negative.    Objective:     Vital Signs (Most Recent):  Temp: 98.8 °F (37.1 °C) (12/31/23 1305)  Pulse: 95 (12/31/23 1605)  Resp: (!) 23 (12/31/23 1605)  BP: 127/68 (12/31/23 1605)  SpO2: 95 % (12/31/23 1605) Vital Signs (24h Range):  Temp:  [97.7 °F (36.5 °C)-98.8 °F (37.1 °C)] 98.8 °F (37.1 °C)  Pulse:  [] 95  Resp:  [19-30] 23  SpO2:  [95 %-98 %] 95 %  BP: ()/(55-75) 127/68     Weight: 103.2 kg (227 lb 8.2 oz)  Body mass index is 31.73 kg/m².     SpO2: 95 %         Intake/Output Summary (Last 24 hours) at 12/31/2023 1615  Last data filed at 12/31/2023 1405  Gross per 24 hour   Intake 1120.05 ml   Output 1370 ml   Net -249.95 ml       Lines/Drains/Airways       Central Venous Catheter Line  Duration             Introducer 12/26/23 0630 Internal Jugular Right 5 days              Line  Duration                  Pacer Wires 12/26/23 0652 5 days              Peripheral Intravenous Line  Duration                  Peripheral IV - Single Lumen 12/31/23 1143 20 G Anterior;Right Forearm <1 day         Peripheral IV - Single Lumen 12/31/23 1144 20 G Anterior;Left Forearm <1 day                       Physical Exam  Constitutional:       Appearance: Normal appearance.   Cardiovascular:      Rate and Rhythm: Normal rate. Rhythm irregular.   Pulmonary:      Effort: Pulmonary effort is normal.      Breath sounds: Normal breath sounds.   Abdominal:      General: Abdomen is flat.      Palpations: Abdomen is soft.    Musculoskeletal:      Right lower leg: No edema.      Left lower leg: No edema.   Skin:     General: Skin is warm.   Neurological:      General: No focal deficit present.      Mental Status: He is alert and oriented to person, place, and time.            Significant Labs: All pertinent lab results from the last 24 hours have been reviewed.

## 2023-12-31 NOTE — PROGRESS NOTES
12/30/2023  Kevin Gambino    Current provider:  Larry Alvarez MD    Device interrogation:       No data to display                   Rounded on Magdaleno Cunningham to ensure all mechanical assist device settings (IABP or VAD) were appropriate and all parameters were within limits.  I was able to ensure all back up equipment was present, the staff had no issues, and the Perfusion Department daily rounding was complete.      For implantable VADs: Interrogation of Ventricular assist device was performed with analysis of device parameters and review of device function. I have personally reviewed the interrogation findings and agree with findings as stated.     In emergency, the nursing units have been notified to contact the perfusion department either by:  Calling [phone] from 630am to 4pm Mon thru Fri, utilizing the On-Call Finder functionality of Epic and searching for Perfusion, or by contacting the hospital  from 4pm to 630am and on weekends and asking to speak with the perfusionist on call.    10:02 PM

## 2023-12-31 NOTE — SUBJECTIVE & OBJECTIVE
"Interval HPI:   No acute events overnight. Patient in room [id number] A. Blood glucose stable. BG at and above goal on current insulin regimen (Transition Insulin Drip). Steroid use-  none     Renal function- Abnormal - 1.5 cr    Vasopressors-  None     Diet Cardiac     Eating:   Diet advanced this AM  Nausea: No  Hypoglycemia and intervention: No  Fever: No  TPN and/or TF: No    /69 (BP Location: Right arm, Patient Position: Lying)   Pulse 95   Temp 98.1 °F (36.7 °C) (Oral)   Resp (!) 25   Ht 5' 11" (1.803 m)   Wt 103.2 kg (227 lb 8.2 oz)   SpO2 97%   BMI 31.73 kg/m²     Labs Reviewed and Include    Recent Labs   Lab 12/31/23  0324   *  194*   CALCIUM 9.4  9.4   ALBUMIN 3.0*   PROT 7.4     137   K 3.8  3.8   CO2 19*  19*     101   BUN 38*  38*   CREATININE 1.5*  1.5*   ALKPHOS 57   ALT 18   AST 18   BILITOT 0.7     Lab Results   Component Value Date    WBC 10.89 12/31/2023    WBC 10.89 12/31/2023    HGB 15.9 12/31/2023    HGB 15.9 12/31/2023    HCT 45.2 12/31/2023    HCT 45.2 12/31/2023    MCV 89 12/31/2023    MCV 89 12/31/2023     12/31/2023     12/31/2023     No results for input(s): "TSH", "FREET4" in the last 168 hours.  Lab Results   Component Value Date    HGBA1C 8.3 (H) 12/14/2023    HGBA1C 8.3 (H) 12/14/2023       Nutritional status:   Body mass index is 31.73 kg/m².  Lab Results   Component Value Date    ALBUMIN 3.0 (L) 12/31/2023    ALBUMIN 2.8 (L) 12/30/2023    ALBUMIN 2.9 (L) 12/30/2023     No results found for: "PREALBUMIN"    Estimated Creatinine Clearance: 67.3 mL/min (A) (based on SCr of 1.5 mg/dL (H)).    Accu-Checks  Recent Labs     12/29/23  1802 12/29/23  2248 12/30/23  0211 12/30/23  0621 12/30/23  1112 12/30/23  1451 12/30/23  1831 12/31/23  0008 12/31/23  0447 12/31/23  0840   POCTGLUCOSE 159* 124* 157* 154* 189* 147* 172* 195* 175* 181*       Current Medications and/or Treatments Impacting Glycemic Control  Immunotherapy:  "   Immunosuppressants       None          Steroids:   Hormones (From admission, onward)      None          Pressors:    Autonomic Drugs (From admission, onward)      None          Hyperglycemia/Diabetes Medications:   Antihyperglycemics (From admission, onward)      Start     Stop Route Frequency Ordered    12/31/23 1115  insulin aspart U-100 pen 6-8 Units         -- SubQ 3 times daily with meals 12/31/23 1106    12/31/23 1115  insulin regular in 0.9 % NaCl 100 unit/100 mL (1 unit/mL) infusion        Question:  Enter initial dose (Units/hr):  Answer:  1.5    -- IV Continuous 12/31/23 1106    12/29/23 1826  insulin aspart U-100 pen 0-10 Units         -- SubQ As needed (PRN) 12/29/23 8190

## 2023-12-31 NOTE — ASSESSMENT & PLAN NOTE
BG goal 140-180     - Transition drip at 1.5 units/hr with step-down parameters.   - Start Novolog 6-8 units TIDWM as diet has been advanced (0.3-0.5 u/kg/day WBD)  - Novolog (aspart) insulin prn for BG excursions [facility] (150/25).  - BG checks q4hr  - Hypoglycemia protocol in place    ** Please notify Endocrine for any change and/or advance in diet**  ** Please call Endocrine for any BG related issues **    Discharge Planning:   TBD. Please notify endocrinology prior to discharge.

## 2023-12-31 NOTE — PLAN OF CARE
CCU Care Update Note:    Hemodynamics:  CVP: 3  SVO2: 67  CO: 5.10  CI: 2.24  SVR: 1,411    Wt: 103.2, BSA: 2.27, Hb: 16.4      I/O's (12h-shift):  - Total: 1.78L  - Net: -1.2L    Plan:  - S/p IABP removal, will continue to monitor closely    Discussed with CCU Attending      Giselle Multani MD  Cardiovascular Disease PGY IV  [facility]

## 2023-12-31 NOTE — PLAN OF CARE
POC reviewed with Magdaleno Cunningham and family. Questions and concerns addressed. Svo2: 74%. TLC removed. Heparin gtt continued, insulin gtt continued. Pt had BM X2. No any acute events throughout the shift.  See below and flowsheets for full assessment and VS info.       Neuro:  Val Coma Scale  Best Eye Response: 4-->(E4) spontaneous  Best Motor Response: 6-->(M6) obeys commands  Best Verbal Response: 5-->(V5) oriented  Val Coma Scale Score: 15  Assessment Qualifiers: patient not sedated/intubated, no eye obstruction present  Pupil PERRLA: yes    24 hr Temp:  [97.7 °F (36.5 °C)-98.8 °F (37.1 °C)]     CV:  Rhythm: paced rhythm   DVT prophylaxis: VTE Required Core Measure: Pharmacological prophylaxis initiated/maintained    CVP (mean): 1 mmHg (12/31/23 0705)       SVO2 (%): 62 % (12/29/23 2000)    IABP Mode: Auto  IABP Rate: 1:1  IABP Trigger: ECG  IABP Augmented Diastolic Pressure: 112          Pulses  Right Radial Pulse: 2+ (normal)  Left Radial Pulse: 2+ (normal)  Right Dorsalis Pedis Pulse: 1+ (weak)  Left Dorsalis Pedis Pulse: 1+ (weak)  Right Posterior Tibial Pulse: 1+ (weak)  Left Posterior Tibial Pulse: 1+ (weak)    Resp:  Flow (L/min): 1  Vent Mode: Spont  Set Rate: 22 BPM  Oxygen Concentration (%): 30  Vt Set: 450 mL  PEEP/CPAP: 5 cmH20  Pressure Support: 8 cmH20    GI/:  GI prophylaxis: yes  Diet/Nutrition Received: 2 gram sodium, low saturated fat/low cholesterol  Last Bowel Movement: 12/31/23  Voiding Characteristics: urethral catheter (bladder)  [REMOVED]      Urethral Catheter 12/28/23 0857 Silicone 16 Fr.-Reason for Continuing Urinary Catheterization: Critically ill in ICU and requiring hourly monitoring of intake/output   Intake/Output Summary (Last 24 hours) at 12/31/2023 1716  Last data filed at 12/31/2023 1700  Gross per 24 hour   Intake 1114.64 ml   Output 1495 ml   Net -380.36 ml            Labs/Accuchecks:  Recent Labs   Lab 12/29/23  0440 12/29/23  1943 12/30/23  0212  12/31/23  0324   WBC 12.12  12.12  --  10.07  10.07 10.89  10.89   RBC 4.98  4.98  --  5.19  5.19 5.09  5.09   HGB 15.8  15.8  --  16.4  16.4 15.9  15.9   HCT 44.1  44.1 45 46.5  46.5 45.2  45.2     243  --  257  257 297  297      Recent Labs   Lab 12/26/23  0823 12/27/23  1322 12/27/23  2124 12/28/23  1302 12/28/23  1927 12/31/23  0011 12/31/23  0605 12/31/23  1106   INR 1.0 1.0  --  1.1  --   --   --   --    APTT 26.4 25.4   < > 26.6   < > 35.0* 44.6* 42.4*    < > = values in this interval not displayed.      Recent Labs     12/31/23  0324 12/31/23  1106     137 136   K 3.8  3.8 3.8   CO2 19*  19* 18*     101 103   BUN 38*  38* 41*   CREATININE 1.5*  1.5* 1.4   ALKPHOS 57  --    ALT 18  --    AST 18  --    BILITOT 0.7  --        Recent Labs   Lab 12/26/23  1001 12/26/23  1430 12/26/23  2130   TROPONINI 21.984* 25.809* 24.449*      Recent Labs     12/30/23  1358 12/30/23  1952 12/31/23  0822   PH 7.431 7.436 7.431   PCO2 33.5* 28.5* 31.5*   PO2 35* 33* 37*   HCO3 22.3* 19.2* 20.9*   POCSATURATED 70 67 74   BE -2 -5* -3*       Electrolytes: Electrolytes replaced  Accuchecks: ACHS    Gtts/LDAs:   sodium chloride 0.9% 10 mL/hr at 12/31/23 1700    heparin (porcine) in D5W 21 Units/kg/hr (12/31/23 1700)    insulin regular 1 units/mL infusion orderable (TRANSFER) 1.5 Units/hr (12/31/23 1700)       Lines/Drains/Airways       Central Venous Catheter Line  Duration             Introducer 12/26/23 0630 Internal Jugular Right 5 days              Line  Duration                  Pacer Wires 12/26/23 0652 5 days              Peripheral Intravenous Line  Duration                  Peripheral IV - Single Lumen 12/31/23 1143 20 G Anterior;Right Forearm <1 day         Peripheral IV - Single Lumen 12/31/23 1144 20 G Anterior;Left Forearm <1 day                    Skin/Wounds  Bathing/Skin Care: linen changed (12/31/23 1505)  Wounds: No  Wound care consulted: No

## 2023-12-31 NOTE — ASSESSMENT & PLAN NOTE
2/2 STEMI/ICM  Mehanical support: IABP @ 1:1 initially with pressor support patient weaned off successfully     - extubated on 12/28/23  - balloon pump removed 12/30  - 80 lasix IV daily   -  valsartan 20 BID , hydral 25mg tid and isosorbide 10mg tid   - restarted heparin gtt on minimal intensity  - EP evaluated patient and will consider implantation of CRT-D once off pressors and off IABP.

## 2023-12-31 NOTE — ASSESSMENT & PLAN NOTE
Possibly ischemia induced vs medication induced. Hold home metoprolol.  If CHB persists he will require a permanent pacemaker  Right IJ TVP placed with tRate to be set at 50.   currently SR with normal conduction yesterday but now AF with controlled V rate.   EP recs bellow  -Likely can plan for CRT-D on Tuesday  -After CRT-D (maybe 24-48hr later) can cardiovert him for AF/AFL and he can be on AC at that time  -TVP in place still. Can likely remove tomorrow if still not dependent today/overnight  -If TVP out tomorrow can likely plan to have CVC out as well later tomorrow for infection risk reduction prior to device  -Hold heparin 6 hours prior to device

## 2024-01-01 LAB
ALBUMIN SERPL BCP-MCNC: 3 G/DL (ref 3.5–5.2)
ALP SERPL-CCNC: 59 U/L (ref 55–135)
ALT SERPL W/O P-5'-P-CCNC: 20 U/L (ref 10–44)
ANION GAP SERPL CALC-SCNC: 14 MMOL/L (ref 8–16)
ANION GAP SERPL CALC-SCNC: 14 MMOL/L (ref 8–16)
APTT PPP: 63.8 SEC (ref 21–32)
AST SERPL-CCNC: 29 U/L (ref 10–40)
BASOPHILS # BLD AUTO: 0.12 K/UL (ref 0–0.2)
BASOPHILS NFR BLD: 1.3 % (ref 0–1.9)
BILIRUB SERPL-MCNC: 0.7 MG/DL (ref 0.1–1)
BUN SERPL-MCNC: 39 MG/DL (ref 6–20)
BUN SERPL-MCNC: 39 MG/DL (ref 6–20)
CALCIUM SERPL-MCNC: 9.4 MG/DL (ref 8.7–10.5)
CALCIUM SERPL-MCNC: 9.4 MG/DL (ref 8.7–10.5)
CHLORIDE SERPL-SCNC: 104 MMOL/L (ref 95–110)
CHLORIDE SERPL-SCNC: 104 MMOL/L (ref 95–110)
CO2 SERPL-SCNC: 18 MMOL/L (ref 23–29)
CO2 SERPL-SCNC: 18 MMOL/L (ref 23–29)
CREAT SERPL-MCNC: 1.6 MG/DL (ref 0.5–1.4)
CREAT SERPL-MCNC: 1.6 MG/DL (ref 0.5–1.4)
DIFFERENTIAL METHOD BLD: ABNORMAL
EOSINOPHIL # BLD AUTO: 0.4 K/UL (ref 0–0.5)
EOSINOPHIL NFR BLD: 4.5 % (ref 0–8)
ERYTHROCYTE [DISTWIDTH] IN BLOOD BY AUTOMATED COUNT: 12.9 % (ref 11.5–14.5)
EST. GFR  (NO RACE VARIABLE): 50.3 ML/MIN/1.73 M^2
EST. GFR  (NO RACE VARIABLE): 50.3 ML/MIN/1.73 M^2
GLUCOSE SERPL-MCNC: 180 MG/DL (ref 70–110)
GLUCOSE SERPL-MCNC: 180 MG/DL (ref 70–110)
HCT VFR BLD AUTO: 43 % (ref 40–54)
HGB BLD-MCNC: 15.1 G/DL (ref 14–18)
IMM GRANULOCYTES # BLD AUTO: 0.05 K/UL (ref 0–0.04)
IMM GRANULOCYTES NFR BLD AUTO: 0.5 % (ref 0–0.5)
LYMPHOCYTES # BLD AUTO: 2.8 K/UL (ref 1–4.8)
LYMPHOCYTES NFR BLD: 30.2 % (ref 18–48)
MAGNESIUM SERPL-MCNC: 2.2 MG/DL (ref 1.6–2.6)
MCH RBC QN AUTO: 31.1 PG (ref 27–31)
MCHC RBC AUTO-ENTMCNC: 35.1 G/DL (ref 32–36)
MCV RBC AUTO: 89 FL (ref 82–98)
MONOCYTES # BLD AUTO: 0.8 K/UL (ref 0.3–1)
MONOCYTES NFR BLD: 8.5 % (ref 4–15)
NEUTROPHILS # BLD AUTO: 5.1 K/UL (ref 1.8–7.7)
NEUTROPHILS NFR BLD: 55 % (ref 38–73)
NRBC BLD-RTO: 0 /100 WBC
PHOSPHATE SERPL-MCNC: 2.5 MG/DL (ref 2.7–4.5)
PLATELET # BLD AUTO: 262 K/UL (ref 150–450)
PMV BLD AUTO: 9.3 FL (ref 9.2–12.9)
POCT GLUCOSE: 146 MG/DL (ref 70–110)
POCT GLUCOSE: 173 MG/DL (ref 70–110)
POCT GLUCOSE: 223 MG/DL (ref 70–110)
POCT GLUCOSE: 238 MG/DL (ref 70–110)
POTASSIUM SERPL-SCNC: 3.8 MMOL/L (ref 3.5–5.1)
POTASSIUM SERPL-SCNC: 3.8 MMOL/L (ref 3.5–5.1)
PROT SERPL-MCNC: 7.2 G/DL (ref 6–8.4)
RBC # BLD AUTO: 4.86 M/UL (ref 4.6–6.2)
SODIUM SERPL-SCNC: 136 MMOL/L (ref 136–145)
SODIUM SERPL-SCNC: 136 MMOL/L (ref 136–145)
WBC # BLD AUTO: 9.33 K/UL (ref 3.9–12.7)

## 2024-01-01 PROCEDURE — 97530 THERAPEUTIC ACTIVITIES: CPT

## 2024-01-01 PROCEDURE — 85730 THROMBOPLASTIN TIME PARTIAL: CPT

## 2024-01-01 PROCEDURE — 80053 COMPREHEN METABOLIC PANEL: CPT | Performed by: STUDENT IN AN ORGANIZED HEALTH CARE EDUCATION/TRAINING PROGRAM

## 2024-01-01 PROCEDURE — 25000003 PHARM REV CODE 250

## 2024-01-01 PROCEDURE — 94761 N-INVAS EAR/PLS OXIMETRY MLT: CPT

## 2024-01-01 PROCEDURE — 97161 PT EVAL LOW COMPLEX 20 MIN: CPT

## 2024-01-01 PROCEDURE — 84100 ASSAY OF PHOSPHORUS: CPT | Performed by: STUDENT IN AN ORGANIZED HEALTH CARE EDUCATION/TRAINING PROGRAM

## 2024-01-01 PROCEDURE — 63600175 PHARM REV CODE 636 W HCPCS

## 2024-01-01 PROCEDURE — 97166 OT EVAL MOD COMPLEX 45 MIN: CPT

## 2024-01-01 PROCEDURE — 25000003 PHARM REV CODE 250: Performed by: INTERNAL MEDICINE

## 2024-01-01 PROCEDURE — 83735 ASSAY OF MAGNESIUM: CPT | Performed by: STUDENT IN AN ORGANIZED HEALTH CARE EDUCATION/TRAINING PROGRAM

## 2024-01-01 PROCEDURE — 85025 COMPLETE CBC W/AUTO DIFF WBC: CPT | Performed by: STUDENT IN AN ORGANIZED HEALTH CARE EDUCATION/TRAINING PROGRAM

## 2024-01-01 PROCEDURE — 97535 SELF CARE MNGMENT TRAINING: CPT

## 2024-01-01 PROCEDURE — 25000003 PHARM REV CODE 250: Performed by: STUDENT IN AN ORGANIZED HEALTH CARE EDUCATION/TRAINING PROGRAM

## 2024-01-01 PROCEDURE — 99233 SBSQ HOSP IP/OBS HIGH 50: CPT | Mod: ,,, | Performed by: INTERNAL MEDICINE

## 2024-01-01 PROCEDURE — 20000000 HC ICU ROOM

## 2024-01-01 PROCEDURE — 99232 SBSQ HOSP IP/OBS MODERATE 35: CPT | Mod: ,,,

## 2024-01-01 RX ORDER — POTASSIUM CHLORIDE 20 MEQ/1
20 TABLET, EXTENDED RELEASE ORAL ONCE
Status: COMPLETED | OUTPATIENT
Start: 2024-01-01 | End: 2024-01-01

## 2024-01-01 RX ORDER — INSULIN ASPART 100 [IU]/ML
12-14 INJECTION, SOLUTION INTRAVENOUS; SUBCUTANEOUS
Status: DISCONTINUED | OUTPATIENT
Start: 2024-01-01 | End: 2024-01-02

## 2024-01-01 RX ORDER — INSULIN ASPART 100 [IU]/ML
14 INJECTION, SOLUTION INTRAVENOUS; SUBCUTANEOUS
Status: DISCONTINUED | OUTPATIENT
Start: 2024-01-01 | End: 2024-01-01

## 2024-01-01 RX ADMIN — GABAPENTIN 600 MG: 300 CAPSULE ORAL at 12:01

## 2024-01-01 RX ADMIN — POTASSIUM CHLORIDE 20 MEQ: 1500 TABLET, EXTENDED RELEASE ORAL at 07:01

## 2024-01-01 RX ADMIN — INSULIN HUMAN 1.1 UNITS/HR: 1 INJECTION, SOLUTION INTRAVENOUS at 10:01

## 2024-01-01 RX ADMIN — TICAGRELOR 90 MG: 90 TABLET ORAL at 09:01

## 2024-01-01 RX ADMIN — INSULIN ASPART 2 UNITS: 100 INJECTION, SOLUTION INTRAVENOUS; SUBCUTANEOUS at 04:01

## 2024-01-01 RX ADMIN — INSULIN ASPART 4 UNITS: 100 INJECTION, SOLUTION INTRAVENOUS; SUBCUTANEOUS at 11:01

## 2024-01-01 RX ADMIN — FUROSEMIDE 40 MG: 40 TABLET ORAL at 10:01

## 2024-01-01 RX ADMIN — VALSARTAN 20 MG: 40 TABLET, FILM COATED ORAL at 10:01

## 2024-01-01 RX ADMIN — PANTOPRAZOLE SODIUM 40 MG: 40 GRANULE, DELAYED RELEASE ORAL at 10:01

## 2024-01-01 RX ADMIN — ATORVASTATIN CALCIUM 40 MG: 40 TABLET, FILM COATED ORAL at 10:01

## 2024-01-01 RX ADMIN — TICAGRELOR 90 MG: 90 TABLET ORAL at 10:01

## 2024-01-01 RX ADMIN — ASPIRIN 81 MG CHEWABLE TABLET 81 MG: 81 TABLET CHEWABLE at 10:01

## 2024-01-01 RX ADMIN — INSULIN ASPART 10 UNITS: 100 INJECTION, SOLUTION INTRAVENOUS; SUBCUTANEOUS at 11:01

## 2024-01-01 RX ADMIN — INSULIN ASPART 10 UNITS: 100 INJECTION, SOLUTION INTRAVENOUS; SUBCUTANEOUS at 08:01

## 2024-01-01 RX ADMIN — INSULIN ASPART 4 UNITS: 100 INJECTION, SOLUTION INTRAVENOUS; SUBCUTANEOUS at 08:01

## 2024-01-01 RX ADMIN — VALSARTAN 20 MG: 40 TABLET, FILM COATED ORAL at 09:01

## 2024-01-01 RX ADMIN — HYDRALAZINE HYDROCHLORIDE 25 MG: 25 TABLET, FILM COATED ORAL at 07:01

## 2024-01-01 RX ADMIN — SODIUM CHLORIDE: 9 INJECTION, SOLUTION INTRAVENOUS at 03:01

## 2024-01-01 RX ADMIN — HEPARIN SODIUM 21 UNITS/KG/HR: 10000 INJECTION, SOLUTION INTRAVENOUS at 11:01

## 2024-01-01 RX ADMIN — INSULIN ASPART 14 UNITS: 100 INJECTION, SOLUTION INTRAVENOUS; SUBCUTANEOUS at 04:01

## 2024-01-01 NOTE — SUBJECTIVE & OBJECTIVE
Interval History: Off IABP for last 2 days, lucid and oriented. On tele continues to have some AFL and some AF but rate controlled. Still conducting intrinsically through his AV node without any pacing needs for about 48 hours.     Review of Systems   Constitutional: Negative for diaphoresis and fever.   Cardiovascular:  Negative for chest pain, dyspnea on exertion, leg swelling, near-syncope, orthopnea, palpitations, paroxysmal nocturnal dyspnea and syncope.   Respiratory:  Negative for cough and shortness of breath.    Gastrointestinal:  Negative for abdominal pain, diarrhea, nausea and vomiting.   Neurological:  Negative for light-headedness.   Psychiatric/Behavioral:  Negative for altered mental status and substance abuse.        Current Facility-Administered Medications:     0.9%  NaCl infusion, , Intravenous, Continuous, Pretty Jarvis MD, Last Rate: 10 mL/hr at 01/01/24 0600, Rate Verify at 01/01/24 0600    0.9%  NaCl infusion, , Intravenous, PRN, Pretty Jarvis MD    0.9%  NaCl infusion, , Intravenous, PRN, Pretty Jarvis MD, Stopped at 12/31/23 1115    aspirin chewable tablet 81 mg, 81 mg, Oral, Daily, Pretty Jarvis MD, 81 mg at 12/31/23 0830    atorvastatin tablet 40 mg, 40 mg, Oral, Daily, Charli Pereira MD, 40 mg at 12/31/23 0830    dextrose 10% bolus 125 mL 125 mL, 12.5 g, Intravenous, PRNJanes Ruben, MD    dextrose 10% bolus 125 mL 125 mL, 12.5 g, Intravenous, PRNRandall Ahmad O., MD    dextrose 10% bolus 125 mL 125 mL, 12.5 g, Intravenous, PRNMeghann Andrew C., DNP, SEANP    dextrose 10% bolus 250 mL 250 mL, 25 g, Intravenous, Janes WATKINS Ruben, MD    dextrose 10% bolus 250 mL 250 mL, 25 g, Intravenous, PRNRandall Ahmad O., MD    dextrose 10% bolus 250 mL 250 mL, 25 g, Intravenous, PRNMeghann Andrew C., DNP, FNP    furosemide tablet 40 mg, 40 mg, Oral, Daily, Viviane Hernandez MD, 40 mg at 12/31/23 1156    glucagon  (human recombinant) injection 1 mg, 1 mg, Intramuscular, PRN, Dell Zavaleta, DWAINE, FNP    glucose chewable tablet 16 g, 16 g, Oral, PRN, Dell Zavaleta DNP, ALTAF    glucose chewable tablet 24 g, 24 g, Oral, PRN, Dell Zavaleta, DWAINE, FNP    heparin 25,000 units in dextrose 5% 250 mL (100 units/mL) infusion LOW INTENSITY nomogram - [facility], 0-40 Units/kg/hr (Adjusted), Intravenous, Continuous, Jake Graves MD, Last Rate: 18.5 mL/hr at 01/01/24 0600, 21 Units/kg/hr at 01/01/24 0600    hydrOXYzine pamoate capsule 25 mg, 25 mg, Oral, Q8H PRN, Viviane Hernandez MD, 25 mg at 12/31/23 2120    insulin aspart U-100 pen 0-10 Units, 0-10 Units, Subcutaneous, PRN, Dell Zavaleta DNP, ALTAF, 8 Units at 12/31/23 1638    insulin aspart U-100 pen 10 Units, 10 Units, Subcutaneous, TIDWM, Brigitte Champion PA-C    insulin regular in 0.9 % NaCl 100 unit/100 mL (1 unit/mL) infusion, 1.5 Units/hr, Intravenous, Continuous, Brigitte Champion PA-C, Last Rate: 1.3 mL/hr at 01/01/24 0600, 1.3 Units/hr at 01/01/24 0600    oxyCODONE immediate release tablet 5 mg, 5 mg, Oral, Q6H PRN, Viviane Hernandez MD, 5 mg at 12/30/23 1944    pantoprazole suspension 40 mg, 40 mg, Oral, Daily, Pretty Jarvis MD, 40 mg at 12/31/23 0830    polyethylene glycol packet 17 g, 17 g, Oral, Daily, Viviane Hernandez MD, 17 g at 12/31/23 1157    senna tablet 8.6 mg, 8.6 mg, Oral, Daily, Viviane Hernandez MD, 8.6 mg at 12/31/23 1156    sodium chloride 0.9% flush 10 mL, 10 mL, Intravenous, PRN, Charli Pereira MD    ticagrelor tablet 90 mg, 90 mg, Oral, BID, Charli Pereira MD, 90 mg at 12/31/23 2200    valsartan split tablet 20 mg, 20 mg, Oral, BID, Jake Graves MD, 20 mg at 12/31/23 0830      sodium chloride 0.9%, sodium chloride 0.9%, dextrose 10%, dextrose 10%, dextrose 10%, dextrose 10%, dextrose 10%, dextrose 10%, glucagon (human recombinant), glucose, glucose, hydrOXYzine pamoate,  insulin aspart U-100, oxyCODONE, sodium chloride 0.9%      Objective:     Vital Signs (Most Recent):  Temp: 98.9 °F (37.2 °C) (01/01/24 0300)  Pulse: 63 (01/01/24 0630)  Resp: 17 (01/01/24 0630)  BP: (!) 118/55 (01/01/24 0630)  SpO2: 96 % (01/01/24 0630) Vital Signs (24h Range):  Temp:  [98.2 °F (36.8 °C)-98.9 °F (37.2 °C)] 98.9 °F (37.2 °C)  Pulse:  [] 63  Resp:  [17-30] 17  SpO2:  [92 %-99 %] 96 %  BP: ()/(46-88) 118/55     Weight: 103.2 kg (227 lb 8.2 oz)  Body mass index is 31.73 kg/m².     SpO2: 96 %        Telemetry: Aflutter and Afib rate controlled, HR<110 overnight     Physical Exam  Constitutional:       General: He is not in acute distress.     Appearance: Normal appearance. He is well-developed.   HENT:      Mouth/Throat:      Mouth: Mucous membranes are moist.      Pharynx: Oropharynx is clear.   Cardiovascular:      Rate and Rhythm: Normal rate and regular rhythm.      Heart sounds: Normal heart sounds. No murmur heard.  Abdominal:      Palpations: Abdomen is soft.      Tenderness: There is no abdominal tenderness. There is no guarding.   Musculoskeletal:         General: No swelling. Normal range of motion.      Right lower leg: No edema.      Left lower leg: No edema.   Skin:     General: Skin is warm and dry.   Neurological:      General: No focal deficit present.      Mental Status: He is alert and oriented to person, place, and time.            Significant Labs:     Recent Labs   Lab 12/30/23  0212 12/31/23  0324 01/01/24  0310   WBC 10.07  10.07 10.89  10.89 9.33   HGB 16.4  16.4 15.9  15.9 15.1   HCT 46.5  46.5 45.2  45.2 43.0     257 297  297 262       Recent Labs   Lab 12/30/23  1412 12/31/23  0324 12/31/23  1106 12/31/23  1709 01/01/24  0310   * 137  137 136 135* 136  136   K 3.7 3.8  3.8 3.8 3.9 3.8  3.8   CL 99 101  101 103 102 104  104   CO2 20* 19*  19* 18* 21* 18*  18*   BUN 45* 38*  38* 41* 44* 39*  39*   CREATININE 1.7* 1.5*  1.5* 1.4 1.5*  1.6*  1.6*   CALCIUM 9.5 9.4  9.4 9.5 9.3 9.4  9.4   PHOS 4.1 3.7  --   --  2.5*       Recent Labs   Lab 12/30/23  0212 12/31/23  0324 01/01/24  0310   ALKPHOS 63 57 59   BILITOT 0.6 0.7 0.7   PROT 7.6 7.4 7.2   ALT 17 18 20   AST 23 18 29     Recent Labs   Lab 12/26/23  1001 12/26/23  1430 12/26/23  2130   TROPONINI 21.984* 25.809* 24.449*         Significant Imaging:     TTE 12/26/23    Left Ventricle: The left ventricle is mildly dilated. Normal wall thickness. Global hypokinesis present. There is severely reduced systolic function with a visually estimated ejection fraction of 20 - 25%. There is diastolic dysfunction.    Right Ventricle: Normal right ventricular cavity size. Wall thickness is normal. Right ventricle wall motion  is normal. Systolic function is moderately reduced. Pacemaker lead present in the ventricle.    Left Atrium: Left atrium is mildly dilated.    IVC/SVC: Patient is ventilated, cannot use IVC diameter to estimate right atrial pressure.

## 2024-01-01 NOTE — PLAN OF CARE
PT evaluation completed- see note for details. PT POC and goals established.    Problem: Physical Therapy  Goal: Physical Therapy Goal  Description: Goals to be met by: 24     Patient will increase functional independence with mobility by performin. Supine to sit with [place]  2. Sit to stand transfer with [place]  3. Bed to chair transfer with [place] using LRAD  4. Gait  x 300 feet with [place] using LRAD.   5. Lower extremity exercise program x30 reps per handout, with independence    Outcome: Ongoing, Progressing

## 2024-01-01 NOTE — ASSESSMENT & PLAN NOTE
Known paroxysmal afib, on eliquis and toprol xl at home  AC was on hold due to hematemesis which has resolved  Toprol held while unstable  Continue therapeutic anticoagulation  Heparin off at 8am on 01/02/24 for afternoon procedure

## 2024-01-01 NOTE — ASSESSMENT & PLAN NOTE
BG goal 140-180     - Transition drip at 1.3 units/hr with step-down parameters.   - Start Novolog 10 TIDWM as diet has been advanced (increased based on SSI requirements)   - Novolog (aspart) insulin prn for BG excursions [facility] SSI (150/25).  - BG checks /hs/0200  - Hypoglycemia protocol in place    ** Please notify Endocrine for any change and/or advance in diet**  ** Please call Endocrine for any BG related issues **    Discharge Planning:   TBD. Please notify endocrinology prior to discharge.

## 2024-01-01 NOTE — PLAN OF CARE
"Dx: Cardiogenic shock    Shift Events: NAEON    Gtts: Insulin and Heparin    Neuro: AAO x4, Follows Commands, and Moves All Extremities    Cards: Afib/aflutter, TVP back up rate @ 50    Respiratory: Room Air    GI: Cardiac Diet, 2 BMs over night    Urine Output: Voids Spontaneously 820 cc/shift     Labs/Accuchecks: Daily labs. Q4 accuchecks. Daily pTT therapeutic this morning.    Skin: No new skin break down noted.  Weight shift assistance provided Q2hrs, patient turns independently. Foams on heels and sacrum.     Vital Signs: /60   Pulse 84   Temp 98.9 °F (37.2 °C) (Oral)   Resp (!) 24   Ht 5' 11" (1.803 m)   Wt 103.2 kg (227 lb 8.2 oz)   SpO2 95%   BMI 31.73 kg/m²            "

## 2024-01-01 NOTE — PT/OT/SLP EVAL
"Occupational Therapy   Co-Evaluation & Co-Treatment    Name: Magdaleno Cunningham  MRN: [id number]  Admitting Diagnosis: Cardiogenic shock  Recent Surgery: * No surgery found *      Recommendations:     Discharge Recommendations: No Therapy Indicated  Discharge Equipment Recommendations:  none  Barriers to discharge:  None    Assessment:     Magdaleno Cunningham is a 58 y.o. male with a medical diagnosis of Cardiogenic shock.  He presents with good activity tolerance, completing all activities with stand by assistance at this time. VSS during session, tachycardiac but HR>120. Performance deficits affecting function: impaired endurance, impaired self care skills.  Patient with no post-acute therapy needs at this time.    Rehab Prognosis: Good; patient would benefit from acute skilled OT services to address these deficits and reach maximum level of function.       Plan:     Patient to be seen 2 x/week to address the above listed problems via self-care/home management, therapeutic activities, therapeutic exercises, neuromuscular re-education  Plan of Care Expires: 02/01/24  Plan of Care Reviewed with: patient    Subjective     Chief Complaint: none verbalized   Patient/Family Comments/goals: get better and go home    Occupational Profile:  Living Environment: Patient lives with son in [facility] with threshold step; utilizing walk in shower.   Previous level of function: Patient reports they were independent with ADLs and IADLs prior to hospitalization; active ; not working  Roles and Routines: "darmaribel reyes" enjoys motor bikes   Equipment Used at Home: none  Assistance upon Discharge: son and nephew     Pain/Comfort:  Pain Rating 1: 0/10  Pain Rating Post-Intervention 1: 0/10    Patients cultural, spiritual, Advent conflicts given the current situation: no    Objective:   Co-evaluation and treatment necessary due for safe and accurate assessment of patient's activity tolerance due to patient's medical " complexity.    Communicated with: nursing prior to session.  Patient found up in chair with blood pressure cuff, peripheral IV, telemetry, pulse ox (continuous) (TVP) upon OT entry to room.    General Precautions: Standard, aspiration, fall  Orthopedic Precautions: N/A  Braces: N/A  Respiratory Status: Room air    Occupational Performance:    Functional Mobility/Transfers:  Patient completed Sit <> Stand Transfer with stand by assistance  with  no assistive device   Functional Mobility: patient ambulated to/from sink with SBA and no AD    Activities of Daily Living:  Grooming: stand by assistance to wash face and comb hair while standing at sink  Upper Body Dressing: contact guard assistance to don gown like jacket    Cognitive/Visual Perceptual:  Cognitive/Psychosocial Skills:  -       Oriented to: Person, Place, Time, and Situation   -       Follows Commands/attention:Follows multistep  commands  -       Communication: clear/fluent  -       Memory: No Deficits noted  -       Safety awareness/insight to disability: intact   -       Mood/Affect/Coping skills/emotional control: Appropriate to situation    Physical Exam:  Sensation:    -       Intact  Dominant hand:    -       R  Upper Extremity Range of Motion:  -       Right Upper Extremity: WFL  -       Left Upper Extremity: WFL  Upper Extremity Strength:    -       Right Upper Extremity: WFL  -       Left Upper Extremity: WFL   Strength: -       Right Upper Extremity: WFL  -       Left Upper Extremity: WFL    AMPAC 6 Click ADL:  AMPAC Total Score: 21    Treatment & Education:    Patient educated on:   -purpose of OT and OT POC  -facilitation and education on proper body mechanics, energy conservation, and safety  -importance of early mobility and out of bed activities with staff assist  -overall benefits of therapy     All questions answered within OT scope and to patient's satisfaction    Patient left up in chair with all lines intact, call button in reach,  and nursing notified    GOALS:   Multidisciplinary Problems       Occupational Therapy Goals          Problem: Occupational Therapy    Goal Priority Disciplines Outcome Interventions   Occupational Therapy Goal     OT, PT/OT Ongoing, Progressing    Description: Goals to be met by: 2/1/24     Patient will increase functional independence with ADLs by performing:    LE Dressing with Supervision.  Grooming while standing at sink with Supervision.  Toileting from toilet with Supervision for hygiene and clothing management.   Supine to sit with Supervision.  [place] with BUE HEP to improve activity tolerance to complete ADLs and IADLs  Within home ambulation distances with supervision and LRAD necessary to complete ADLs.                           History:     Past Medical History:   Diagnosis Date    Anticoagulant long-term use     Atrial fibrillation 2018    BPH (benign prostatic hyperplasia)     Cataract     COPD (chronic obstructive pulmonary disease)     Coronary artery disease     stents    CVD (cardiovascular disease)     Diabetes mellitus     Erythema multiforme     AKA Denton Edmondson Syndrome    Hypertension     Infected incision     MI (myocardial infarction)     per pt he has had 4     ROSAMARIA on CPAP     RLS (restless legs syndrome)     Perez-Denton syndrome          Past Surgical History:   Procedure Laterality Date    CORONARY ANGIOGRAPHY INCLUDING BYPASS GRAFTS WITH CATHETERIZATION OF LEFT HEART N/A 9/1/2022    Procedure: ANGIOGRAM, CORONARY, INCLUDING BYPASS GRAFT, WITH LEFT HEART CATHETERIZATION;  Surgeon: Paul Botello MD;  Location: [facility] CATH/EP LAB;  Service: Cardiology;  Laterality: N/A;    CORONARY ANGIOGRAPHY INCLUDING BYPASS GRAFTS WITH CATHETERIZATION OF LEFT HEART Left 12/22/2023    Procedure: ANGIOGRAM, CORONARY, INCLUDING BYPASS GRAFT, WITH LEFT HEART CATHETERIZATION;  Surgeon: Paul Botello MD;  Location: [facility] CATH/EP LAB;  Service: Cardiology;  Laterality: Left;    CORONARY ARTERY  BYPASS GRAFT (CABG) N/A 4/7/2020    Procedure: CORONARY ARTERY BYPASS GRAFT (CABG)- Excision of left atrial appendage;  Surgeon: Doug Solitario MD;  Location: [facility] OR;  Service: Cardiothoracic;  Laterality: N/A;    CORONARY BYPASS GRAFT ANGIOGRAPHY  12/25/2023    Procedure: Bypass graft study;  Surgeon: Ashley Vlaverde MD;  Location: [facility] CATH/EP LAB;  Service: Cardiology;;    CORONARY STENT PLACEMENT      CORONARY STENT PLACEMENT N/A 12/22/2023    Procedure: INSERTION, STENT, CORONARY ARTERY;  Surgeon: Paul Botello MD;  Location: [facility] CATH/EP LAB;  Service: Cardiology;  Laterality: N/A;    WILSON MAZE PROCEDURE N/A 4/7/2020    Procedure: WILSON MAZE PROCEDURE- Attempted;  Surgeon: Doug Solitario MD;  Location: [facility] OR;  Service: Cardiothoracic;  Laterality: N/A;    CYSTOSCOPY N/A 12/10/2018    Procedure: CYSTOSCOPY;  Surgeon: Khushboo Abreu MD;  [facility]: [facility] OR;  Service: Urology;  Laterality: N/A;    ENDOSCOPIC HARVEST OF VEIN Left 4/7/2020    Procedure: HARVEST-VEIN-ENDOVASCULAR;  Surgeon: Doug Solitario MD;  Location: [facility] OR;  Service: Cardiothoracic;  Laterality: Left;    INCISION OF PERIRECTAL ABSCESS Bilateral 9/1/2023    Procedure: INCISION, ABSCESS, PERIRECTAL;  Surgeon: Brayan Spears MD;  Location: [facility] OR;  Service: General;  Laterality: Bilateral;  stirrups    INSERTION OF INTRA-AORTIC BALLOON ASSIST DEVICE  12/25/2023    Procedure: INSERTION, INTRA-AORTIC BALLOON PUMP;  Surgeon: Ashley Valverde MD;  Location: [facility] CATH/EP LAB;  Service: Cardiology;;    INSERTION OF TEMPORARY PACEMAKER N/A 12/25/2023    Procedure: INSERTION, PACEMAKER, TEMPORARY;  Surgeon: Ashley Valverde MD;  Location: [facility] CATH/EP LAB;  Service: Cardiology;  Laterality: N/A;    IVUS, CORONARY  12/22/2023    Procedure: IVUS, Coronary;  Surgeon: Paul Botello MD;  Location: [facility] CATH/EP LAB;  Service: Cardiology;;    LEFT HEART CATHETERIZATION Left 3/17/2020    Procedure:  CATHETERIZATION, HEART, LEFT;  Surgeon: Tyree Walters MD;  Location: [facility] CATH/EP LAB;  Service: Cardiology;  Laterality: Left;    PERCUTANEOUS CORONARY INTERVENTION, ARTERY N/A 12/22/2023    Procedure: Percutaneous coronary intervention;  Surgeon: Paul Botello MD;  Location: [facility] CATH/EP LAB;  Service: Cardiology;  Laterality: N/A;    PERCUTANEOUS CORONARY INTERVENTION, ARTERY N/A 12/25/2023    Procedure: Percutaneous coronary intervention;  Surgeon: Ashley Valverde MD;  Location: [facility] CATH/EP LAB;  Service: Cardiology;  Laterality: N/A;    STERNAL WIRES REMOVAL N/A 6/8/2020    Procedure: REMOVAL, STERNAL WIRE;  Surgeon: Doug Solitario MD;  Location: [facility] OR;  Service: Peripheral Vascular;  Laterality: N/A;    ULTRASOUND OF PROSTATE FOR VOLUME DETERMINATION  12/10/2018    Procedure: ULTRASOUND, PROSTATE, FOR VOLUME DETERMINATION;  Surgeon: Khushboo Abreu MD;  Location: [facility];  Service: Urology;;       Time Tracking:     OT Date of Treatment: 01/01/24  OT Start Time: 1312  OT Stop Time: 1331  OT Total Time (min): 19 min    Billable Minutes:Evaluation 10  Self Care/Home Management 9    1/1/2024

## 2024-01-01 NOTE — PT/OT/SLP EVAL
Physical Therapy Co-Evaluation and Co-Treatment    Patient Name:  Magdaleno Cunningham   MRN:  [id number]  Admit Date: 12/26/2023  Admitting Diagnosis:  Cardiogenic shock   Length of Stay: 6 days  Recent Surgery: * No surgery found *      Recommendations:     Discharge Recommendations:    No Therapy Indicated  Discharge Equipment Recommendations: none   Barriers to discharge: None      Plan:     During this hospitalization, patient to be seen 2 x/week to address the identified rehab impairments via gait training, therapeutic activities, therapeutic exercises, neuromuscular re-education and progress towards the established goals.  Plan of Care Expires:  02/01/24  Plan of Care Reviewed with: patient    Assessment:     Magdaleno Cunningham is a 58 y.o. male admitted with a medical diagnosis of Cardiogenic shock. Pt agreeable to therapy evaluation. Pt with good participation and strength evident through session but limited by decreased endurance at this time. RN with HR goal <140 bpm during session and pt with HR <118 bpm throughout session. Patient would benefit from skilled therapy services to maximize safety and independence, increase activity tolerance, decrease fall risk, decrease caregiver burden, improve QOL, improve patient's functional mobility, and decrease risk of contractures and pressure sores.      Problem List: impaired cardiopulmonary response to activity, impaired endurance, impaired functional mobility, impaired self care skills.  Rehab Prognosis: Good; patient would benefit from acute skilled PT services to address these deficits and reach maximum level of function.      Goals:   Multidisciplinary Problems       Physical Therapy Goals          Problem: Physical Therapy    Goal Priority Disciplines Outcome Goal Variances Interventions   Physical Therapy Goal     PT, PT/OT Ongoing, Progressing     Description: Goals to be met by: 2/1/24     Patient will increase functional independence with mobility by  "performin. Supine to sit with [place]  2. Sit to stand transfer with [place]  3. Bed to chair transfer with [place] using LRAD  4. Gait  x 300 feet with [place] using LRAD.   5. Lower extremity exercise program x30 reps per handout, with independence                         Subjective     RN notified prior to session. None present upon PT entrance into room. Patient agreeable to PT evaluation.    Chief Complaint: "I get SOB bc I used to smoke but I dont anymore"  Patient/Family Comments/goals: go home  Pain/Comfort:  Pain Rating 1: 0/10  Pain Rating Post-Intervention 1: 0/10    Social History:  Residence: lives with their son 1-story house with threshold LOIS. Pt's bathroom has a WIS.  Support available: family  Equipment Owned (not using): none  Equipment Used: None  Prior level of function: independent      Patient reports they will have assistance from family upon discharge.    Objective:     Additional staff present: OT; OT for co-evaluation due to suspected patient need for two skilled therapists to appropriately assess patient's functional deficits as well as ensure patient safety, accommodate for limited activity tolerance, and provide appropriate, skilled assistance to maximize functional potential during evaluation.    Patient found up in chair with: blood pressure cuff, telemetry, pulse ox (continuous), Other (comments), peripheral IV (TVP)     General Precautions: Standard, Cardiac aspiration, fall   Orthopedic Precautions:N/A   Braces: N/A   Body mass index is 31.73 kg/m².  Oxygen Device: Room Air  Vitals: BP (!) 96/57 (BP Location: Right arm, Patient Position: Sitting)   Pulse 93   Temp 98 °F (36.7 °C) (Oral)   Resp (!) 22   Ht 5' 11" (1.803 m)   Wt 103.2 kg (227 lb 8.2 oz)   SpO2 (!) 94%   BMI 31.73 kg/m²       Exams:    Cognition:  Alert and Cooperative  Command following: Follows multistep verbal commands  Communication: clear/fluent    Sensation:   Light touch " sensation: Pt reported generalized numbness/tingling of B feet    Gross Motor Coordination: No deficits noted during functional mobility tasks     Edema/Skin Integrity: None noted; Visible skin intact    Postural examination/scapula alignment: Rounded shoulder    Lower Extremity Range of Motion:  Right Lower Extremity: WFL  Left Lower Extremity: WFL    Lower Extremity Strength:  Right Lower Extremity: WFL  Left Lower Extremity: WFL      Functional Mobility:    Bed Mobility:  Pt found/returned to bedside chair    Transfers:   Sit <> Stand Transfer: Stand-by Assistance x 1 trials from chair with no AD              Gait:  Distance: 10' to sink for standing ADLs + 10' back to chair  Assistance level: Stand-by Assistance  Assistive Device: none  Gait Deviation(s): steady gait, decreased step length, flexed posture, and decreased khadar  all lines remained intact throughout ambulation trial  Comments: Pt steady with no LOB but reporting SOB after trial. Oxygen saturation >90% throughout and after gait trial.       Balance:  Standing:  Static: stand by assistance to complete ADLs at sink with OT for ~5 minutes  Dynamic: stand by assistance    Outcome Measures:  AM-PAC 6 CLICK MOBILITY  Turning over in bed (including adjusting bedclothes, sheets and blankets)?: 3  Sitting down on and standing up from a chair with arms (e.g., wheelchair, bedside commode, etc.): 3  Moving from lying on back to sitting on the side of the bed?: 3  Moving to and from a bed to a chair (including a wheelchair)?: 3  Need to walk in hospital room?: 3  Climbing 3-5 steps with a railing?: 3  Basic Mobility Total Score: 18     Therapeutic Exercises:   Patient performed 1 set(s) of 5 repetitions of  the following standing exercises: squats  for bilateral LE. Patient required skilled PT for instruction of exercises and appropriate cues to perform exercises safely and appropriately.    Education:  Time provided for education, counseling and discussion of  health disposition in regards to patient's current status  All questions answered within PT scope of practice and to patient's satisfaction  PT role in POC to address current functional deficits  Pt educated on proper body mechanics, safety techniques, and energy conservation with PT facilitation and cueing throughout session  Pt encouraged to ambulate in hallway with nsg     Patient left up in chair with all lines intact, call button in reach, and RN notified.      History:     Past Medical History:   Diagnosis Date    Anticoagulant long-term use     Atrial fibrillation 2018    BPH (benign prostatic hyperplasia)     Cataract     COPD (chronic obstructive pulmonary disease)     Coronary artery disease     stents    CVD (cardiovascular disease)     Diabetes mellitus     Erythema multiforme     AKA Denton Edmondson Syndrome    Hypertension     Infected incision     MI (myocardial infarction)     per pt he has had 4     ROSAMARIA on CPAP     RLS (restless legs syndrome)     Perez-Denton syndrome        Past Surgical History:   Procedure Laterality Date    CORONARY ANGIOGRAPHY INCLUDING BYPASS GRAFTS WITH CATHETERIZATION OF LEFT HEART N/A 9/1/2022    Procedure: ANGIOGRAM, CORONARY, INCLUDING BYPASS GRAFT, WITH LEFT HEART CATHETERIZATION;  Surgeon: Paul Botlelo MD;  Location: [facility] CATH/EP LAB;  Service: Cardiology;  Laterality: N/A;    CORONARY ANGIOGRAPHY INCLUDING BYPASS GRAFTS WITH CATHETERIZATION OF LEFT HEART Left 12/22/2023    Procedure: ANGIOGRAM, CORONARY, INCLUDING BYPASS GRAFT, WITH LEFT HEART CATHETERIZATION;  Surgeon: Paul Botello MD;  Location: [facility] CATH/EP LAB;  Service: Cardiology;  Laterality: Left;    CORONARY ARTERY BYPASS GRAFT (CABG) N/A 4/7/2020    Procedure: CORONARY ARTERY BYPASS GRAFT (CABG)- Excision of left atrial appendage;  Surgeon: Doug Solitario MD;  Location: [facility];  Service: Cardiothoracic;  Laterality: N/A;    CORONARY BYPASS GRAFT ANGIOGRAPHY  12/25/2023    Procedure: Bypass  graft study;  Surgeon: Ashley Valverde MD;  Location: [facility] CATH/EP LAB;  Service: Cardiology;;    CORONARY STENT PLACEMENT      CORONARY STENT PLACEMENT N/A 12/22/2023    Procedure: INSERTION, STENT, CORONARY ARTERY;  Surgeon: Paul Botello MD;  Location: [facility] CATH/EP LAB;  Service: Cardiology;  Laterality: N/A;    WILSON MAZE PROCEDURE N/A 4/7/2020    Procedure: WILSON MAZE PROCEDURE- Attempted;  Surgeon: Doug Solitario MD;  Location: [facility] OR;  Service: Cardiothoracic;  Laterality: N/A;    CYSTOSCOPY N/A 12/10/2018    Procedure: CYSTOSCOPY;  Surgeon: Khushboo Abreu MD;  Location: [facility] OR;  Service: Urology;  Laterality: N/A;    ENDOSCOPIC HARVEST OF VEIN Left 4/7/2020    Procedure: HARVEST-VEIN-ENDOVASCULAR;  Surgeon: Doug Solitario MD;  Location: [facility] OR;  Service: Cardiothoracic;  Laterality: Left;    INCISION OF PERIRECTAL ABSCESS Bilateral 9/1/2023    Procedure: INCISION, ABSCESS, PERIRECTAL;  Surgeon: Brayan Spears MD;  Location: [facility] OR;  Service: General;  Laterality: Bilateral;  stirrups    INSERTION OF INTRA-AORTIC BALLOON ASSIST DEVICE  12/25/2023    Procedure: INSERTION, INTRA-AORTIC BALLOON PUMP;  Surgeon: Ashley Valverde MD;  Location: [facility] CATH/EP LAB;  Service: Cardiology;;    INSERTION OF TEMPORARY PACEMAKER N/A 12/25/2023    Procedure: INSERTION, PACEMAKER, TEMPORARY;  Surgeon: Ashley Valverde MD;  Location: [facility] CATH/EP LAB;  Service: Cardiology;  Laterality: N/A;    IVUS, CORONARY  12/22/2023    Procedure: IVUS, Coronary;  Surgeon: Paul Botello MD;  Location: [facility] CATH/EP LAB;  Service: Cardiology;;    LEFT HEART CATHETERIZATION Left 3/17/2020    Procedure: CATHETERIZATION, HEART, LEFT;  Surgeon: Tyree Walters MD;  Location: [facility] CATH/EP LAB;  Service: Cardiology;  Laterality: Left;    PERCUTANEOUS CORONARY INTERVENTION, ARTERY N/A 12/22/2023    Procedure: Percutaneous coronary intervention;  Surgeon: Paul Botello MD;  Location: [facility]  CATH/EP LAB;  Service: Cardiology;  Laterality: N/A;    PERCUTANEOUS CORONARY INTERVENTION, ARTERY N/A 12/25/2023    Procedure: Percutaneous coronary intervention;  Surgeon: Ashley Valverde MD;  Location: [facility] CATH/EP LAB;  Service: Cardiology;  Laterality: N/A;    STERNAL WIRES REMOVAL N/A 6/8/2020    Procedure: REMOVAL, STERNAL WIRE;  Surgeon: Doug Solitario MD;  Location: [facility] OR;  Service: Peripheral Vascular;  Laterality: N/A;    ULTRASOUND OF PROSTATE FOR VOLUME DETERMINATION  12/10/2018    Procedure: ULTRASOUND, PROSTATE, FOR VOLUME DETERMINATION;  Surgeon: Khushboo Abreu MD;  Location: [facility] OR;  Service: Urology;;       Family History   Problem Relation Age of Onset    Heart disease Mother     Diabetes Mother     Hyperlipidemia Father     Cancer Father        Social History     Socioeconomic History    Marital status:    Tobacco Use    Smoking status: Every Day     Current packs/day: 0.00     Average packs/day: 3.0 packs/day for 30.0 years (90.0 ttl pk-yrs)     Types: Cigarettes     Start date: 4/4/1990     Last attempt to quit: 4/4/2020     Years since quitting: 3.7    Smokeless tobacco: Never   Substance and Sexual Activity    Alcohol use: Not Currently    Drug use: Yes     Frequency: 1.0 times per week     Types: Marijuana    Sexual activity: Not Currently   Social History Narrative    ** Merged History Encounter **          Social Determinants of Health     Financial Resource Strain: Low Risk  (12/26/2023)    Overall Financial Resource Strain (CARDIA)     Difficulty of Paying Living Expenses: Not hard at all   Food Insecurity: No Food Insecurity (12/26/2023)    Hunger Vital Sign     Worried About Running Out of Food in the Last Year: Never true     Ran Out of Food in the Last Year: Never true   Transportation Needs: No Transportation Needs (12/26/2023)    PRAPARE - Transportation     Lack of Transportation (Medical): No     Lack of Transportation (Non-Medical): No    Physical Activity: Patient Unable To Answer (12/26/2023)    Exercise Vital Sign     Days of Exercise per Week: Patient unable to answer     Minutes of Exercise per Session: Patient unable to answer   Stress: Patient Unable To Answer (12/26/2023)    [place] [employer] - Occupational Stress Questionnaire     Feeling of Stress : Patient unable to answer   Social Connections: Moderately Integrated (12/26/2023)    Social Connection and Isolation Panel [NHANES]     Frequency of Communication with Friends and Family: More than three times a week     Frequency of Social Gatherings with Friends and Family: More than three times a week     Attends Worship Services: More than 4 times per year     Active Member of Clubs or Organizations: Yes     Attends Club or Organization Meetings: Patient unable to answer     Marital Status:    Housing Stability: Low Risk  (12/26/2023)    Housing Stability Vital Sign     Unable to Pay for Housing in the Last Year: No     Number of Places Lived in the Last Year: 1     Unstable Housing in the Last Year: No       Time Tracking:     PT Received On: 01/01/24  PT Start Time: 1311     PT Stop Time: 1330  PT Total Time (min): 19 min     Billable Minutes: Evaluation 10 minutes and Therapeutic Activity 9 minutes    1/1/2024

## 2024-01-01 NOTE — ASSESSMENT & PLAN NOTE
#CHB with wide fractionated ventricular escape likely from non revascularizable STEMI    Left dominant coronary system  Inferior STEMI with CW a flutter 12/25, LIMA-LAD and SVG -OM are down as well, Lcx couldn't be revasuclarized  Subsequently developed CHB  EF 20-25%  Atrial flutter last night with atrial fibrillation as well overnight and this morning. Using native AV tamika conduction, only TVP paced intermittently. Rate controlled off AVN blocking agents.   Not pacer dependent at this time, so TVP set to backup rate of 50 yesterday, threshold 3.5mA  IABP out.     Plan  -Likely can plan for CRT-D on Tuesday and after CRT-D (maybe 24-48hr later) can cardiovert him for AF/AFL and he can be on AC at that time  -TVP in place still. Can likely remove as no pacing dependancy  -If TVP out tomorrow can likely plan to have CVC out as well later tomorrow for infection risk reduction prior to device

## 2024-01-01 NOTE — PROGRESS NOTES
"[facility]  Endocrinology  Progress Note    Admit Date: 2023     Reason for Consult: Management of T2DM, Hyperglycemia     Diabetes diagnosis year: >3 years ago    Home Diabetes Medications:  Jardiance 25 mg, Lantus 80 u BID, Metformin 1000 mg BID    How often checking glucose at home? 1-3 x day   BG readings on regimen: 150-200's (patient is confused)  Hypoglycemia on the regimen?  Yes  Missed doses on regimen?  No    Diabetes Complications include:     Hyperglycemia    Complicating diabetes co morbidities:   History of MI      HPI:   Patient is a 58 y.o. male with CAD s/p CABG and recent PCI to proximal and mid Lcx by Dr. Botello on 23, COPD, atrial fibrillation on eliquis, diabetes, current smoker (reportedly 3PPD), hypertension who presented to [facility] facility with chest pain which has been present for 2 days.  He was found to have ST elevations and was taken to the cath lab directly and was found to have in-stent thrombosis. Of note he was also in atrial flutter. Procedure note not available but per report no intervention was performed and a IABP was placed in the left femoral artery. During the procedure he had intermittent complete heart block so a left femoral TVP was also placed. He was requiring BiPAP post-operatively. It was decided to transfer to higher level of care and he was intubated to allow safe transportation. Endocrine consulted for bg management        Interval HPI:   No acute events overnight. Patient in room [id number] A. Blood glucose stable. BG at and above goal on current insulin regimen (Transition Insulin Drip). Steroid use- None.      Renal function- Abnormal - 1.6 cr    Vasopressors-  None     Diet Cardiac     Eatin%  Nausea: No  Hypoglycemia and intervention: No  Fever: No  TPN and/or TF: No  BP (!) 118/55   Pulse 63   Temp 98.9 °F (37.2 °C) (Oral)   Resp 17   Ht 5' 11" (1.803 m)   Wt 103.2 kg (227 lb 8.2 oz)   SpO2 96%   BMI " "31.73 kg/m²     Labs Reviewed and Include    Recent Labs   Lab 01/01/24  0310   *  180*   CALCIUM 9.4  9.4   ALBUMIN 3.0*   PROT 7.2     136   K 3.8  3.8   CO2 18*  18*     104   BUN 39*  39*   CREATININE 1.6*  1.6*   ALKPHOS 59   ALT 20   AST 29   BILITOT 0.7     Lab Results   Component Value Date    WBC 9.33 01/01/2024    HGB 15.1 01/01/2024    HCT 43.0 01/01/2024    MCV 89 01/01/2024     01/01/2024     No results for input(s): "TSH", "FREET4" in the last 168 hours.  Lab Results   Component Value Date    HGBA1C 8.3 (H) 12/14/2023    HGBA1C 8.3 (H) 12/14/2023       Nutritional status:   Body mass index is 31.73 kg/m².  Lab Results   Component Value Date    ALBUMIN 3.0 (L) 01/01/2024    ALBUMIN 3.0 (L) 12/31/2023    ALBUMIN 2.8 (L) 12/30/2023     No results found for: "PREALBUMIN"    Estimated Creatinine Clearance: 63.1 mL/min (A) (based on SCr of 1.6 mg/dL (H)).    Accu-Checks  Recent Labs     12/30/23  1112 12/30/23  1451 12/30/23  1831 12/31/23  0008 12/31/23  0447 12/31/23  0840 12/31/23  1340 12/31/23  1637 12/31/23  2105 01/01/24  0301   POCTGLUCOSE 189* 147* 172* 195* 175* 181* 221* 303* 123* 146*       Current Medications and/or Treatments Impacting Glycemic Control  Immunotherapy:    Immunosuppressants       None          Steroids:   Hormones (From admission, onward)      None          Pressors:    Autonomic Drugs (From admission, onward)      None          Hyperglycemia/Diabetes Medications:   Antihyperglycemics (From admission, onward)      Start     Stop Route Frequency Ordered    01/01/24 0715  insulin aspart U-100 pen 10 Units         -- SubQ 3 times daily with meals 12/31/23 1803    12/31/23 1115  insulin regular in 0.9 % NaCl 100 unit/100 mL (1 unit/mL) infusion        Question:  Enter initial dose (Units/hr):  Answer:  1.5    -- IV Continuous 12/31/23 1106    12/29/23 5096  insulin aspart U-100 pen 0-10 Units         -- SubQ As needed (PRN) 12/29/23 7046      "       ASSESSMENT and PLAN    Cardiac/Vascular  * Cardiogenic shock  Managed per primary team  Optimize bg control        HLD (hyperlipidemia)  Managed per primary.   On statin per ADA        Endocrine  Type 2 diabetes mellitus with diabetic arthropathy  BG goal 140-180     - Transition drip at 1.3 units/hr with step-down parameters.   -  Novolog 12-14 TIDWM as pt states his appetite is inconistent (increased based on SSI requirements) Administer    12   units if patient eats 25-50% of meal, administer    14    units if patient eats > 50% of meal.  - Novolog (aspart) insulin prn for BG excursions [facility] SSI (150/25).  - BG checks ac/hs/0200  - Hypoglycemia protocol in place    ** Please notify Endocrine for any change and/or advance in diet**  ** Please call Endocrine for any BG related issues **    Discharge Planning:   TBD. Please notify endocrinology prior to discharge.         Brigitte Champion PA-C  Endocrinology  [facility]

## 2024-01-01 NOTE — ASSESSMENT & PLAN NOTE
New diagnosis, Found on 12/25 EKG that also showed inferior STEMI  Continue AC  Heparin off at 8am on 01/02/24 for procedure

## 2024-01-01 NOTE — PLAN OF CARE
Problem: Occupational Therapy  Goal: Occupational Therapy Goal  Description: Goals to be met by: 2/1/24     Patient will increase functional independence with ADLs by performing:    LE Dressing with Supervision.  Grooming while standing at sink with Supervision.  Toileting from toilet with Supervision for hygiene and clothing management.   Supine to sit with Supervision.  [place] with BUE HEP to improve activity tolerance to complete ADLs and IADLs  Within home ambulation distances with supervision and LRAD necessary to complete ADLs.      Outcome: Ongoing, Progressing   Patient tolerated OT eval. Goals and POC established.

## 2024-01-01 NOTE — SUBJECTIVE & OBJECTIVE
"Interval HPI:   No acute events overnight. Patient in room [id number] A. Blood glucose stable. BG at and above goal on current insulin regimen (Transition Insulin Drip). Steroid use- None.      Renal function- Abnormal - 1.6 cr    Vasopressors-  None     Diet Cardiac     Eatin%  Nausea: No  Hypoglycemia and intervention: No  Fever: No  TPN and/or TF: No  BP (!) 118/55   Pulse 63   Temp 98.9 °F (37.2 °C) (Oral)   Resp 17   Ht 5' 11" (1.803 m)   Wt 103.2 kg (227 lb 8.2 oz)   SpO2 96%   BMI 31.73 kg/m²     Labs Reviewed and Include    Recent Labs   Lab 24  0310   *  180*   CALCIUM 9.4  9.4   ALBUMIN 3.0*   PROT 7.2     136   K 3.8  3.8   CO2 18*  18*     104   BUN 39*  39*   CREATININE 1.6*  1.6*   ALKPHOS 59   ALT 20   AST 29   BILITOT 0.7     Lab Results   Component Value Date    WBC 9.33 2024    HGB 15.1 2024    HCT 43.0 2024    MCV 89 2024     2024     No results for input(s): "TSH", "FREET4" in the last 168 hours.  Lab Results   Component Value Date    HGBA1C 8.3 (H) 2023    HGBA1C 8.3 (H) 2023       Nutritional status:   Body mass index is 31.73 kg/m².  Lab Results   Component Value Date    ALBUMIN 3.0 (L) 2024    ALBUMIN 3.0 (L) 2023    ALBUMIN 2.8 (L) 2023     No results found for: "PREALBUMIN"    Estimated Creatinine Clearance: 63.1 mL/min (A) (based on SCr of 1.6 mg/dL (H)).    Accu-Checks  Recent Labs     23  1112 23  1451 23  1831 23  0008 23  0447 23  0840 23  1340 23  1637 12/24  0301   POCTGLUCOSE 189* 147* 172* 195* 175* 181* 221* 303* 123* 146*       Current Medications and/or Treatments Impacting Glycemic Control  Immunotherapy:    Immunosuppressants       None          Steroids:   Hormones (From admission, onward)      None          Pressors:    Autonomic Drugs (From admission, onward)      None      "     Hyperglycemia/Diabetes Medications:   Antihyperglycemics (From admission, onward)      Start     Stop Route Frequency Ordered    01/01/24 0715  insulin aspart U-100 pen 10 Units         -- SubQ 3 times daily with meals 12/31/23 1803    12/31/23 1115  insulin regular in 0.9 % NaCl 100 unit/100 mL (1 unit/mL) infusion        Question:  Enter initial dose (Units/hr):  Answer:  1.5    -- IV Continuous 12/31/23 1106    12/29/23 1826  insulin aspart U-100 pen 0-10 Units         -- SubQ As needed (PRN) 12/29/23 8120

## 2024-01-01 NOTE — PROGRESS NOTES
MD notified of patients BP 92/55, orders to hold Valsartan 20mg PO and Hydralazine 25mg PO.  OK to give Isosorbide 10mg PO per Dr. Pereira.

## 2024-01-01 NOTE — PROGRESS NOTES
[facility]  Cardiac Electrophysiology  Progress Note    Admission Date: 12/26/2023  Code Status: Full Code   Attending Physician: Larry Alvarez MD   Expected Discharge Date: 1/5/2024  Principal Problem:Cardiogenic shock    Subjective:     Interval History: Off IABP for last 2 days, lucid and oriented. On tele continues to have some AFL and some AF but rate controlled. Still conducting intrinsically through his AV node without any pacing needs for about 48 hours.     Review of Systems   Constitutional: Negative for diaphoresis and fever.   Cardiovascular:  Negative for chest pain, dyspnea on exertion, leg swelling, near-syncope, orthopnea, palpitations, paroxysmal nocturnal dyspnea and syncope.   Respiratory:  Negative for cough and shortness of breath.    Gastrointestinal:  Negative for abdominal pain, diarrhea, nausea and vomiting.   Neurological:  Negative for light-headedness.   Psychiatric/Behavioral:  Negative for altered mental status and substance abuse.        Current Facility-Administered Medications:     0.9%  NaCl infusion, , Intravenous, Continuous, Pretty Jarvis MD, Last Rate: 10 mL/hr at 01/01/24 0600, Rate Verify at 01/01/24 0600    0.9%  NaCl infusion, , Intravenous, PRN, Pretty Jarvis MD    0.9%  NaCl infusion, , Intravenous, PRN, Pretty Jarvis MD, Stopped at 12/31/23 1115    aspirin chewable tablet 81 mg, 81 mg, Oral, Daily, Pretty Jarvis MD, 81 mg at 12/31/23 0830    atorvastatin tablet 40 mg, 40 mg, Oral, Daily, Charli Pereira MD, 40 mg at 12/31/23 0830    dextrose 10% bolus 125 mL 125 mL, 12.5 g, Intravenous, PRN, Octavio Eng MD    dextrose 10% bolus 125 mL 125 mL, 12.5 g, Intravenous, PRN, Charli Pereira MD    dextrose 10% bolus 125 mL 125 mL, 12.5 g, Intravenous, PRN, Dell Zavaleta DNP, FNP    dextrose 10% bolus 250 mL 250 mL, 25 g, Intravenous, PRN, Octavio Eng MD    dextrose 10%  bolus 250 mL 250 mL, 25 g, Intravenous, PRN, Charli Pereira MD    dextrose 10% bolus 250 mL 250 mL, 25 g, Intravenous, PRN, Dell Zavaleta DNP, ALTAF    furosemide tablet 40 mg, 40 mg, Oral, Daily, Viviane Hernandez MD, 40 mg at 12/31/23 1156    glucagon (human recombinant) injection 1 mg, 1 mg, Intramuscular, PRN, Dell Zavaleta DNP, ALTAF    glucose chewable tablet 16 g, 16 g, Oral, PRN, Dell Zavaleta DNP, ALTAF    glucose chewable tablet 24 g, 24 g, Oral, PRN, Dell Zavaleta DNP, ALTAF    heparin 25,000 units in dextrose 5% 250 mL (100 units/mL) infusion LOW INTENSITY nomogram - [facility], 0-40 Units/kg/hr (Adjusted), Intravenous, Continuous, Jake Graves MD, Last Rate: 18.5 mL/hr at 01/01/24 0600, 21 Units/kg/hr at 01/01/24 0600    hydrOXYzine pamoate capsule 25 mg, 25 mg, Oral, Q8H PRN, Viviane Hernandez MD, 25 mg at 12/31/23 2120    insulin aspart U-100 pen 0-10 Units, 0-10 Units, Subcutaneous, PRN, Dell Zavaleta DNP, ALTAF, 8 Units at 12/31/23 1638    insulin aspart U-100 pen 10 Units, 10 Units, Subcutaneous, TIDWM, Brigitte Champion PA-C    insulin regular in 0.9 % NaCl 100 unit/100 mL (1 unit/mL) infusion, 1.5 Units/hr, Intravenous, Continuous, Brigitte Champion PA-C, Last Rate: 1.3 mL/hr at 01/01/24 0600, 1.3 Units/hr at 01/01/24 0600    oxyCODONE immediate release tablet 5 mg, 5 mg, Oral, Q6H PRN, Viviane Hernandez MD, 5 mg at 12/30/23 1944    pantoprazole suspension 40 mg, 40 mg, Oral, Daily, Pretty Jarvis MD, 40 mg at 12/31/23 0830    polyethylene glycol packet 17 g, 17 g, Oral, Daily, Viviane Hernandez MD, 17 g at 12/31/23 1157    senna tablet 8.6 mg, 8.6 mg, Oral, Daily, Viviane Hernandez MD, 8.6 mg at 12/31/23 1156    sodium chloride 0.9% flush 10 mL, 10 mL, Intravenous, PRN, Charli Pereira MD    ticagrelor tablet 90 mg, 90 mg, Oral, BID, Charli Pereira MD, 90 mg at 12/31/23 2200    valsartan split tablet 20  mg, 20 mg, Oral, BID, Jake Graves MD, 20 mg at 12/31/23 0830      sodium chloride 0.9%, sodium chloride 0.9%, dextrose 10%, dextrose 10%, dextrose 10%, dextrose 10%, dextrose 10%, dextrose 10%, glucagon (human recombinant), glucose, glucose, hydrOXYzine pamoate, insulin aspart U-100, oxyCODONE, sodium chloride 0.9%      Objective:     Vital Signs (Most Recent):  Temp: 98.9 °F (37.2 °C) (01/01/24 0300)  Pulse: 63 (01/01/24 0630)  Resp: 17 (01/01/24 0630)  BP: (!) 118/55 (01/01/24 0630)  SpO2: 96 % (01/01/24 0630) Vital Signs (24h Range):  Temp:  [98.2 °F (36.8 °C)-98.9 °F (37.2 °C)] 98.9 °F (37.2 °C)  Pulse:  [] 63  Resp:  [17-30] 17  SpO2:  [92 %-99 %] 96 %  BP: ()/(46-88) 118/55     Weight: 103.2 kg (227 lb 8.2 oz)  Body mass index is 31.73 kg/m².     SpO2: 96 %        Telemetry: Aflutter and Afib rate controlled, HR<110 overnight     Physical Exam  Constitutional:       General: He is not in acute distress.     Appearance: Normal appearance. He is well-developed.   HENT:      Mouth/Throat:      Mouth: Mucous membranes are moist.      Pharynx: Oropharynx is clear.   Cardiovascular:      Rate and Rhythm: Normal rate and regular rhythm.      Heart sounds: Normal heart sounds. No murmur heard.  Abdominal:      Palpations: Abdomen is soft.      Tenderness: There is no abdominal tenderness. There is no guarding.   Musculoskeletal:         General: No swelling. Normal range of motion.      Right lower leg: No edema.      Left lower leg: No edema.   Skin:     General: Skin is warm and dry.   Neurological:      General: No focal deficit present.      Mental Status: He is alert and oriented to person, place, and time.            Significant Labs:     Recent Labs   Lab 12/30/23  0212 12/31/23  0324 01/01/24  0310   WBC 10.07  10.07 10.89  10.89 9.33   HGB 16.4  16.4 15.9  15.9 15.1   HCT 46.5  46.5 45.2  45.2 43.0     257 297  297 262       Recent Labs   Lab 12/30/23  1412 12/31/23  4792  12/31/23  1106 12/31/23  1709 01/01/24  0310   * 137  137 136 135* 136  136   K 3.7 3.8  3.8 3.8 3.9 3.8  3.8   CL 99 101  101 103 102 104  104   CO2 20* 19*  19* 18* 21* 18*  18*   BUN 45* 38*  38* 41* 44* 39*  39*   CREATININE 1.7* 1.5*  1.5* 1.4 1.5* 1.6*  1.6*   CALCIUM 9.5 9.4  9.4 9.5 9.3 9.4  9.4   PHOS 4.1 3.7  --   --  2.5*       Recent Labs   Lab 12/30/23  0212 12/31/23  0324 01/01/24  0310   ALKPHOS 63 57 59   BILITOT 0.6 0.7 0.7   PROT 7.6 7.4 7.2   ALT 17 18 20   AST 23 18 29     Recent Labs   Lab 12/26/23  1001 12/26/23  1430 12/26/23  2130   TROPONINI 21.984* 25.809* 24.449*         Significant Imaging:     TTE 12/26/23    Left Ventricle: The left ventricle is mildly dilated. Normal wall thickness. Global hypokinesis present. There is severely reduced systolic function with a visually estimated ejection fraction of 20 - 25%. There is diastolic dysfunction.    Right Ventricle: Normal right ventricular cavity size. Wall thickness is normal. Right ventricle wall motion  is normal. Systolic function is moderately reduced. Pacemaker lead present in the ventricle.    Left Atrium: Left atrium is mildly dilated.    IVC/SVC: Patient is ventilated, cannot use IVC diameter to estimate right atrial pressure.  Assessment and Plan:     * Cardiogenic shock  #CHB with wide fractionated ventricular escape likely from non revascularizable STEMI    Left dominant coronary system  Inferior STEMI with CW a flutter 12/25, LIMA-LAD and SVG -OM are down as well, Lcx couldn't be revasuclarized  Subsequently developed CHB  EF 20-25%  Atrial flutter last night with atrial fibrillation as well overnight and this morning. Using native AV tamika conduction, only TVP paced intermittently. Rate controlled off AVN blocking agents.   Not pacer dependent at this time, so TVP set to backup rate of 50 yesterday, threshold 3.5mA  IABP out.     Plan  -Likely can plan for CRT-D on Tuesday and after CRT-D (maybe 24-48hr  later) can cardiovert him for AF/AFL and he can be on AC at that time  -TVP in place still. Remove as no pacing dependency  -If TVP out tomorrow can likely plan to have CVC out as well later tomorrow for infection risk reduction prior to device    Typical atrial flutter  New diagnosis, Found on 12/25 EKG that also showed inferior STEMI  Continue AC  Heparin off at 8am on 01/02/24 for procedure      Paroxysmal atrial fibrillation  Known paroxysmal afib, on eliquis and toprol xl at home  AC was on hold due to hematemesis which has resolved  Toprol held while unstable  Continue therapeutic anticoagulation  Heparin off at 8am on 01/02/24 for procedure        Octavio Doran MD  Cardiac Electrophysiology  [facility]

## 2024-01-02 ENCOUNTER — ANESTHESIA EVENT (OUTPATIENT)
Dept: MEDSURG UNIT | Facility: HOSPITAL | Age: 59
DRG: 276 | End: 2024-01-02
Payer: MEDICAID

## 2024-01-02 ENCOUNTER — ANESTHESIA (OUTPATIENT)
Dept: MEDSURG UNIT | Facility: HOSPITAL | Age: 59
DRG: 276 | End: 2024-01-02
Payer: MEDICAID

## 2024-01-02 LAB
ABO + RH BLD: NORMAL
ALBUMIN SERPL BCP-MCNC: 3.1 G/DL (ref 3.5–5.2)
ALP SERPL-CCNC: 55 U/L (ref 55–135)
ALT SERPL W/O P-5'-P-CCNC: 26 U/L (ref 10–44)
ANION GAP SERPL CALC-SCNC: 12 MMOL/L (ref 8–16)
ANION GAP SERPL CALC-SCNC: 14 MMOL/L (ref 8–16)
ANION GAP SERPL CALC-SCNC: 14 MMOL/L (ref 8–16)
APTT PPP: 54.5 SEC (ref 21–32)
AST SERPL-CCNC: 30 U/L (ref 10–40)
BACTERIA BLD CULT: NORMAL
BACTERIA BLD CULT: NORMAL
BASOPHILS # BLD AUTO: 0.13 K/UL (ref 0–0.2)
BASOPHILS NFR BLD: 1.3 % (ref 0–1.9)
BILIRUB SERPL-MCNC: 0.5 MG/DL (ref 0.1–1)
BLD GP AB SCN CELLS X3 SERPL QL: NORMAL
BUN SERPL-MCNC: 27 MG/DL (ref 6–20)
BUN SERPL-MCNC: 34 MG/DL (ref 6–20)
BUN SERPL-MCNC: 34 MG/DL (ref 6–20)
CALCIUM SERPL-MCNC: 9.2 MG/DL (ref 8.7–10.5)
CALCIUM SERPL-MCNC: 9.2 MG/DL (ref 8.7–10.5)
CALCIUM SERPL-MCNC: 9.5 MG/DL (ref 8.7–10.5)
CHLORIDE SERPL-SCNC: 105 MMOL/L (ref 95–110)
CHLORIDE SERPL-SCNC: 105 MMOL/L (ref 95–110)
CHLORIDE SERPL-SCNC: 106 MMOL/L (ref 95–110)
CO2 SERPL-SCNC: 20 MMOL/L (ref 23–29)
CO2 SERPL-SCNC: 20 MMOL/L (ref 23–29)
CO2 SERPL-SCNC: 21 MMOL/L (ref 23–29)
CREAT SERPL-MCNC: 1.3 MG/DL (ref 0.5–1.4)
CREAT SERPL-MCNC: 1.3 MG/DL (ref 0.5–1.4)
CREAT SERPL-MCNC: 1.4 MG/DL (ref 0.5–1.4)
DIFFERENTIAL METHOD BLD: ABNORMAL
EOSINOPHIL # BLD AUTO: 0.5 K/UL (ref 0–0.5)
EOSINOPHIL NFR BLD: 4.8 % (ref 0–8)
ERYTHROCYTE [DISTWIDTH] IN BLOOD BY AUTOMATED COUNT: 12.9 % (ref 11.5–14.5)
EST. GFR  (NO RACE VARIABLE): 59 ML/MIN/1.73 M^2
EST. GFR  (NO RACE VARIABLE): >60 ML/MIN/1.73 M^2
EST. GFR  (NO RACE VARIABLE): >60 ML/MIN/1.73 M^2
GLUCOSE SERPL-MCNC: 122 MG/DL (ref 70–110)
GLUCOSE SERPL-MCNC: 122 MG/DL (ref 70–110)
GLUCOSE SERPL-MCNC: 141 MG/DL (ref 70–110)
HCT VFR BLD AUTO: 43.9 % (ref 40–54)
HGB BLD-MCNC: 14.8 G/DL (ref 14–18)
IMM GRANULOCYTES # BLD AUTO: 0.05 K/UL (ref 0–0.04)
IMM GRANULOCYTES NFR BLD AUTO: 0.5 % (ref 0–0.5)
LYMPHOCYTES # BLD AUTO: 3 K/UL (ref 1–4.8)
LYMPHOCYTES NFR BLD: 31.2 % (ref 18–48)
MAGNESIUM SERPL-MCNC: 2 MG/DL (ref 1.6–2.6)
MCH RBC QN AUTO: 30.5 PG (ref 27–31)
MCHC RBC AUTO-ENTMCNC: 33.7 G/DL (ref 32–36)
MCV RBC AUTO: 91 FL (ref 82–98)
MONOCYTES # BLD AUTO: 0.8 K/UL (ref 0.3–1)
MONOCYTES NFR BLD: 7.7 % (ref 4–15)
NEUTROPHILS # BLD AUTO: 5.3 K/UL (ref 1.8–7.7)
NEUTROPHILS NFR BLD: 54.5 % (ref 38–73)
NRBC BLD-RTO: 0 /100 WBC
PLATELET # BLD AUTO: 285 K/UL (ref 150–450)
PMV BLD AUTO: 9.7 FL (ref 9.2–12.9)
POCT GLUCOSE: 120 MG/DL (ref 70–110)
POCT GLUCOSE: 124 MG/DL (ref 70–110)
POCT GLUCOSE: 134 MG/DL (ref 70–110)
POCT GLUCOSE: 138 MG/DL (ref 70–110)
POCT GLUCOSE: 160 MG/DL (ref 70–110)
POCT GLUCOSE: 244 MG/DL (ref 70–110)
POTASSIUM SERPL-SCNC: 3.8 MMOL/L (ref 3.5–5.1)
POTASSIUM SERPL-SCNC: 3.8 MMOL/L (ref 3.5–5.1)
POTASSIUM SERPL-SCNC: 4.7 MMOL/L (ref 3.5–5.1)
PROT SERPL-MCNC: 7.2 G/DL (ref 6–8.4)
RBC # BLD AUTO: 4.85 M/UL (ref 4.6–6.2)
SODIUM SERPL-SCNC: 139 MMOL/L (ref 136–145)
SPECIMEN OUTDATE: NORMAL
WBC # BLD AUTO: 9.74 K/UL (ref 3.9–12.7)

## 2024-01-02 PROCEDURE — 25000003 PHARM REV CODE 250: Performed by: STUDENT IN AN ORGANIZED HEALTH CARE EDUCATION/TRAINING PROGRAM

## 2024-01-02 PROCEDURE — 94761 N-INVAS EAR/PLS OXIMETRY MLT: CPT

## 2024-01-02 PROCEDURE — 93005 ELECTROCARDIOGRAM TRACING: CPT

## 2024-01-02 PROCEDURE — 63600175 PHARM REV CODE 636 W HCPCS: Performed by: INTERNAL MEDICINE

## 2024-01-02 PROCEDURE — 86901 BLOOD TYPING SEROLOGIC RH(D): CPT | Performed by: INTERNAL MEDICINE

## 2024-01-02 PROCEDURE — 25000003 PHARM REV CODE 250

## 2024-01-02 PROCEDURE — 99233 SBSQ HOSP IP/OBS HIGH 50: CPT | Mod: ,,, | Performed by: INTERNAL MEDICINE

## 2024-01-02 PROCEDURE — 20000000 HC ICU ROOM

## 2024-01-02 PROCEDURE — C1721 AICD, DUAL CHAMBER: HCPCS | Performed by: INTERNAL MEDICINE

## 2024-01-02 PROCEDURE — C1777 LEAD, AICD, ENDO SINGLE COIL: HCPCS | Performed by: INTERNAL MEDICINE

## 2024-01-02 PROCEDURE — 63600175 PHARM REV CODE 636 W HCPCS

## 2024-01-02 PROCEDURE — 02HK3KZ INSERTION OF DEFIBRILLATOR LEAD INTO RIGHT VENTRICLE, PERCUTANEOUS APPROACH: ICD-10-PCS | Performed by: INTERNAL MEDICINE

## 2024-01-02 PROCEDURE — 02H63KZ INSERTION OF DEFIBRILLATOR LEAD INTO RIGHT ATRIUM, PERCUTANEOUS APPROACH: ICD-10-PCS | Performed by: INTERNAL MEDICINE

## 2024-01-02 PROCEDURE — 0JH609Z INSERTION OF CARDIAC RESYNCHRONIZATION DEFIBRILLATOR PULSE GENERATOR INTO CHEST SUBCUTANEOUS TISSUE AND FASCIA, OPEN APPROACH: ICD-10-PCS | Performed by: INTERNAL MEDICINE

## 2024-01-02 PROCEDURE — 33249 INSJ/RPLCMT DEFIB W/LEAD(S): CPT | Performed by: INTERNAL MEDICINE

## 2024-01-02 PROCEDURE — 93010 ELECTROCARDIOGRAM REPORT: CPT | Mod: ,,, | Performed by: INTERNAL MEDICINE

## 2024-01-02 PROCEDURE — 37000009 HC ANESTHESIA EA ADD 15 MINS: Performed by: INTERNAL MEDICINE

## 2024-01-02 PROCEDURE — 63600175 PHARM REV CODE 636 W HCPCS: Performed by: NURSE ANESTHETIST, CERTIFIED REGISTERED

## 2024-01-02 PROCEDURE — 83735 ASSAY OF MAGNESIUM: CPT | Performed by: STUDENT IN AN ORGANIZED HEALTH CARE EDUCATION/TRAINING PROGRAM

## 2024-01-02 PROCEDURE — 80053 COMPREHEN METABOLIC PANEL: CPT | Performed by: STUDENT IN AN ORGANIZED HEALTH CARE EDUCATION/TRAINING PROGRAM

## 2024-01-02 PROCEDURE — 99291 CRITICAL CARE FIRST HOUR: CPT | Mod: ,,, | Performed by: INTERNAL MEDICINE

## 2024-01-02 PROCEDURE — 85730 THROMBOPLASTIN TIME PARTIAL: CPT

## 2024-01-02 PROCEDURE — 37000008 HC ANESTHESIA 1ST 15 MINUTES: Performed by: INTERNAL MEDICINE

## 2024-01-02 PROCEDURE — 85025 COMPLETE CBC W/AUTO DIFF WBC: CPT | Performed by: STUDENT IN AN ORGANIZED HEALTH CARE EDUCATION/TRAINING PROGRAM

## 2024-01-02 PROCEDURE — 25000003 PHARM REV CODE 250: Performed by: NURSE ANESTHETIST, CERTIFIED REGISTERED

## 2024-01-02 PROCEDURE — D9220A PRA ANESTHESIA: Mod: CRNA,,, | Performed by: NURSE ANESTHETIST, CERTIFIED REGISTERED

## 2024-01-02 PROCEDURE — 33249 INSJ/RPLCMT DEFIB W/LEAD(S): CPT | Mod: ,,, | Performed by: INTERNAL MEDICINE

## 2024-01-02 PROCEDURE — 25000003 PHARM REV CODE 250: Performed by: INTERNAL MEDICINE

## 2024-01-02 PROCEDURE — C1898 LEAD, PMKR, OTHER THAN TRANS: HCPCS | Performed by: INTERNAL MEDICINE

## 2024-01-02 PROCEDURE — C1894 INTRO/SHEATH, NON-LASER: HCPCS | Performed by: INTERNAL MEDICINE

## 2024-01-02 PROCEDURE — 80048 BASIC METABOLIC PNL TOTAL CA: CPT | Mod: XB | Performed by: INTERNAL MEDICINE

## 2024-01-02 PROCEDURE — D9220A PRA ANESTHESIA: Mod: ANES,,, | Performed by: ANESTHESIOLOGY

## 2024-01-02 DEVICE — IMPLANTABLE CARDIOVERTER DEFIBRILLATOR
Type: IMPLANTABLE DEVICE | Site: CHEST | Status: FUNCTIONAL
Brand: GALLANT™

## 2024-01-02 DEVICE — PACING LEAD
Type: IMPLANTABLE DEVICE | Site: HEART | Status: FUNCTIONAL
Brand: TENDRIL™

## 2024-01-02 DEVICE — DEFIBRILLATION LEAD
Type: IMPLANTABLE DEVICE | Site: HEART | Status: FUNCTIONAL
Brand: DURATA™

## 2024-01-02 RX ORDER — LIDOCAINE HYDROCHLORIDE 20 MG/ML
INJECTION, SOLUTION INFILTRATION; PERINEURAL
Status: DISCONTINUED | OUTPATIENT
Start: 2024-01-02 | End: 2024-01-04 | Stop reason: HOSPADM

## 2024-01-02 RX ORDER — INSULIN ASPART 100 [IU]/ML
12-14 INJECTION, SOLUTION INTRAVENOUS; SUBCUTANEOUS
Status: DISCONTINUED | OUTPATIENT
Start: 2024-01-02 | End: 2024-01-03

## 2024-01-02 RX ORDER — PROPOFOL 10 MG/ML
VIAL (ML) INTRAVENOUS CONTINUOUS PRN
Status: DISCONTINUED | OUTPATIENT
Start: 2024-01-02 | End: 2024-01-02

## 2024-01-02 RX ORDER — INSULIN ASPART 100 [IU]/ML
0-10 INJECTION, SOLUTION INTRAVENOUS; SUBCUTANEOUS
Status: DISCONTINUED | OUTPATIENT
Start: 2024-01-02 | End: 2024-01-03

## 2024-01-02 RX ORDER — GABAPENTIN 300 MG/1
300 CAPSULE ORAL 2 TIMES DAILY
Status: DISCONTINUED | OUTPATIENT
Start: 2024-01-02 | End: 2024-01-04 | Stop reason: HOSPADM

## 2024-01-02 RX ORDER — DOXYCYCLINE HYCLATE 100 MG
100 TABLET ORAL EVERY 12 HOURS
Status: DISCONTINUED | OUTPATIENT
Start: 2024-01-02 | End: 2024-01-04 | Stop reason: HOSPADM

## 2024-01-02 RX ORDER — HYDRALAZINE HYDROCHLORIDE 10 MG/1
10 TABLET, FILM COATED ORAL EVERY 8 HOURS
Status: DISCONTINUED | OUTPATIENT
Start: 2024-01-02 | End: 2024-01-04 | Stop reason: HOSPADM

## 2024-01-02 RX ORDER — MIDAZOLAM HYDROCHLORIDE 1 MG/ML
INJECTION, SOLUTION INTRAMUSCULAR; INTRAVENOUS
Status: DISCONTINUED | OUTPATIENT
Start: 2024-01-02 | End: 2024-01-02

## 2024-01-02 RX ORDER — CEFAZOLIN SODIUM 1 G/3ML
INJECTION, POWDER, FOR SOLUTION INTRAMUSCULAR; INTRAVENOUS
Status: DISCONTINUED | OUTPATIENT
Start: 2024-01-02 | End: 2024-01-02

## 2024-01-02 RX ORDER — GABAPENTIN 300 MG/1
300 CAPSULE ORAL ONCE
Status: COMPLETED | OUTPATIENT
Start: 2024-01-02 | End: 2024-01-02

## 2024-01-02 RX ORDER — INSULIN ASPART 100 [IU]/ML
0-10 INJECTION, SOLUTION INTRAVENOUS; SUBCUTANEOUS EVERY 4 HOURS PRN
Status: DISCONTINUED | OUTPATIENT
Start: 2024-01-02 | End: 2024-01-02

## 2024-01-02 RX ORDER — VANCOMYCIN HYDROCHLORIDE 1 G/20ML
INJECTION, POWDER, LYOPHILIZED, FOR SOLUTION INTRAVENOUS
Status: DISCONTINUED | OUTPATIENT
Start: 2024-01-02 | End: 2024-01-04 | Stop reason: HOSPADM

## 2024-01-02 RX ORDER — SODIUM CHLORIDE 0.9 G/100ML
IRRIGANT IRRIGATION
Status: DISCONTINUED | OUTPATIENT
Start: 2024-01-02 | End: 2024-01-04 | Stop reason: HOSPADM

## 2024-01-02 RX ORDER — ONDANSETRON 2 MG/ML
INJECTION INTRAMUSCULAR; INTRAVENOUS
Status: DISCONTINUED | OUTPATIENT
Start: 2024-01-02 | End: 2024-01-02

## 2024-01-02 RX ORDER — FENTANYL CITRATE 50 UG/ML
INJECTION, SOLUTION INTRAMUSCULAR; INTRAVENOUS
Status: DISCONTINUED | OUTPATIENT
Start: 2024-01-02 | End: 2024-01-02

## 2024-01-02 RX ORDER — POTASSIUM CHLORIDE 20 MEQ/1
40 TABLET, EXTENDED RELEASE ORAL ONCE
Status: COMPLETED | OUTPATIENT
Start: 2024-01-02 | End: 2024-01-02

## 2024-01-02 RX ORDER — BUPIVACAINE HYDROCHLORIDE 2.5 MG/ML
INJECTION, SOLUTION EPIDURAL; INFILTRATION; INTRACAUDAL
Status: DISCONTINUED | OUTPATIENT
Start: 2024-01-02 | End: 2024-01-04 | Stop reason: HOSPADM

## 2024-01-02 RX ORDER — KETAMINE HCL IN 0.9 % NACL 50 MG/5 ML
SYRINGE (ML) INTRAVENOUS
Status: DISCONTINUED | OUTPATIENT
Start: 2024-01-02 | End: 2024-01-02

## 2024-01-02 RX ADMIN — SACUBITRIL AND VALSARTAN 1 TABLET: 24; 26 TABLET, FILM COATED ORAL at 09:01

## 2024-01-02 RX ADMIN — ATORVASTATIN CALCIUM 40 MG: 40 TABLET, FILM COATED ORAL at 08:01

## 2024-01-02 RX ADMIN — POTASSIUM CHLORIDE 40 MEQ: 1500 TABLET, EXTENDED RELEASE ORAL at 10:01

## 2024-01-02 RX ADMIN — FUROSEMIDE 40 MG: 40 TABLET ORAL at 08:01

## 2024-01-02 RX ADMIN — PANTOPRAZOLE SODIUM 40 MG: 40 GRANULE, DELAYED RELEASE ORAL at 08:01

## 2024-01-02 RX ADMIN — INSULIN HUMAN 1.1 UNITS/HR: 1 INJECTION, SOLUTION INTRAVENOUS at 10:01

## 2024-01-02 RX ADMIN — VALSARTAN 20 MG: 40 TABLET, FILM COATED ORAL at 08:01

## 2024-01-02 RX ADMIN — INSULIN ASPART 2 UNITS: 100 INJECTION, SOLUTION INTRAVENOUS; SUBCUTANEOUS at 11:01

## 2024-01-02 RX ADMIN — INSULIN ASPART 12 UNITS: 100 INJECTION, SOLUTION INTRAVENOUS; SUBCUTANEOUS at 04:01

## 2024-01-02 RX ADMIN — HYDRALAZINE HYDROCHLORIDE 10 MG: 10 TABLET, FILM COATED ORAL at 09:01

## 2024-01-02 RX ADMIN — ONDANSETRON 4 MG: 2 INJECTION INTRAMUSCULAR; INTRAVENOUS at 03:01

## 2024-01-02 RX ADMIN — HYDRALAZINE HYDROCHLORIDE 10 MG: 10 TABLET, FILM COATED ORAL at 04:01

## 2024-01-02 RX ADMIN — MIDAZOLAM HYDROCHLORIDE 2 MG: 1 INJECTION, SOLUTION INTRAMUSCULAR; INTRAVENOUS at 01:01

## 2024-01-02 RX ADMIN — INSULIN ASPART 4 UNITS: 100 INJECTION, SOLUTION INTRAVENOUS; SUBCUTANEOUS at 09:01

## 2024-01-02 RX ADMIN — Medication 10 MG: at 01:01

## 2024-01-02 RX ADMIN — GABAPENTIN 300 MG: 300 CAPSULE ORAL at 11:01

## 2024-01-02 RX ADMIN — ASPIRIN 81 MG CHEWABLE TABLET 81 MG: 81 TABLET CHEWABLE at 08:01

## 2024-01-02 RX ADMIN — HEPARIN SODIUM 21 UNITS/KG/HR: 10000 INJECTION, SOLUTION INTRAVENOUS at 12:01

## 2024-01-02 RX ADMIN — TICAGRELOR 90 MG: 90 TABLET ORAL at 09:01

## 2024-01-02 RX ADMIN — CEFAZOLIN 2 G: 330 INJECTION, POWDER, FOR SOLUTION INTRAMUSCULAR; INTRAVENOUS at 02:01

## 2024-01-02 RX ADMIN — TICAGRELOR 90 MG: 90 TABLET ORAL at 08:01

## 2024-01-02 RX ADMIN — FENTANYL CITRATE 50 MCG: 50 INJECTION, SOLUTION INTRAMUSCULAR; INTRAVENOUS at 02:01

## 2024-01-02 RX ADMIN — GABAPENTIN 300 MG: 300 CAPSULE ORAL at 09:01

## 2024-01-02 RX ADMIN — SODIUM CHLORIDE: 9 INJECTION, SOLUTION INTRAVENOUS at 01:01

## 2024-01-02 RX ADMIN — Medication 10 MG: at 02:01

## 2024-01-02 RX ADMIN — PROPOFOL 50 MCG/KG/MIN: 10 INJECTION, EMULSION INTRAVENOUS at 01:01

## 2024-01-02 RX ADMIN — FENTANYL CITRATE 50 MCG: 50 INJECTION, SOLUTION INTRAMUSCULAR; INTRAVENOUS at 01:01

## 2024-01-02 RX ADMIN — GABAPENTIN 300 MG: 300 CAPSULE ORAL at 03:01

## 2024-01-02 RX ADMIN — DOXYCYCLINE HYCLATE 100 MG: 100 TABLET, FILM COATED ORAL at 09:01

## 2024-01-02 RX ADMIN — INSULIN HUMAN 1.1 UNITS/HR: 1 INJECTION, SOLUTION INTRAVENOUS at 09:01

## 2024-01-02 NOTE — ASSESSMENT & PLAN NOTE
BG goal 140-180     - d/c transition drip as FBG stable WNL on fixed gtt rate   - Start levemir 22 units (0.4 u/kg/day WBD)   - Continue Novolog 12 units TIDWM   - Novolog (aspart) insulin prn for BG excursions [facility] SSI (150/25) ac/hs/0200   - BG checks ac/hs/0200   - Hypoglycemia protocol in place    ** Please notify Endocrine for any change and/or advance in diet**  ** Please call Endocrine for any BG related issues **    Discharge Planning:   TBD. Please notify endocrinology prior to discharge.

## 2024-01-02 NOTE — PROGRESS NOTES
[facility]  Cardiology  Progress Note    Patient Name: Magdaleno Cunningham  MRN: [id number]  Admission Date: 12/26/2023  Hospital Length of Stay: 7 days  Code Status: Full Code   Attending Physician: Larry Alvarez MD   Primary Care Physician: Venkata Mclaughlin MD  Expected Discharge Date: 1/5/2024  Principal Problem:Cardiogenic shock    Subjective:     Hospital Course:   Patient weaned off balloon pump. He had TVP removed after CHB resolved and intrinsic rhythm atrial fibrillation with controlled heart rate. Patient pending CRTD followed by ILAN/DCCV      Interval History: NAEON. Patient reports doing well this morning. Heparin gtt paused in expectation of CRTD today. Can possibly stepdown today post procedure.     Review of Systems   All other systems reviewed and are negative.    Objective:     Vital Signs (Most Recent):  Temp: 98.5 °F (36.9 °C) (01/02/24 1105)  Pulse: 91 (01/02/24 1305)  Resp: 20 (01/02/24 1305)  BP: 114/72 (01/02/24 1305)  SpO2: 97 % (01/02/24 1305) Vital Signs (24h Range):  Temp:  [98.4 °F (36.9 °C)-98.6 °F (37 °C)] 98.5 °F (36.9 °C)  Pulse:  [] 91  Resp:  [4-29] 20  SpO2:  [92 %-98 %] 97 %  BP: ()/(49-84) 114/72     Weight: 103.2 kg (227 lb 8.2 oz)  Body mass index is 31.73 kg/m².     SpO2: 97 %         Intake/Output Summary (Last 24 hours) at 1/2/2024 1501  Last data filed at 1/2/2024 1105  Gross per 24 hour   Intake 771.16 ml   Output 1695 ml   Net -923.84 ml         Lines/Drains/Airways       Central Venous Catheter Line  Duration             Introducer 12/26/23 0630 Internal Jugular Right 7 days              Peripheral Intravenous Line  Duration                  Peripheral IV - Single Lumen 12/31/23 1143 20 G Anterior;Right Forearm 2 days         Peripheral IV - Single Lumen 12/31/23 1144 20 G Anterior;Left Forearm 2 days                       Physical Exam  Constitutional:       Appearance: Normal appearance.   Cardiovascular:      Rate and Rhythm: Normal rate.  Rhythm irregular.      Pulses: Normal pulses.      Heart sounds: Normal heart sounds.   Pulmonary:      Effort: Pulmonary effort is normal.      Breath sounds: Normal breath sounds.   Musculoskeletal:      Right lower leg: No edema.      Left lower leg: No edema.   Skin:     General: Skin is warm.   Neurological:      General: No focal deficit present.      Mental Status: He is oriented to person, place, and time.            Significant Labs: All pertinent lab results from the last 24 hours have been reviewed.      Assessment and Plan:       * Cardiogenic shock  2/2 STEMI/ICM  Mehanical support: IABP @ 1:1 initially with pressor support patient weaned off successfully     - extubated on 12/28/23  - balloon pump removed 12/30  - 80 lasix IV daily   -  valsartan 20 BID           Pneumonia  - Completed treatment with Ceftriaxone     Paroxysmal atrial fibrillation  On heparin ggt    Complete heart block  Possibly ischemia induced vs medication induced. Hold home metoprolol.Had TVP placed and after no longer needing was removed on 1/1/2024   currently SR with normal conduction yesterday but now AF with controlled V rate.   EP recs bellow  -plan for CRT-D today; holding heparin  -After CRT-D (maybe 24-48hr later) can cardiovert him for AF/AFL and he can be on AC at that time        STEMI (ST elevation myocardial infarction)  Continue brilinta and statin    HLD (hyperlipidemia)  Continue home statin    Atrial fibrillation  EKG with sinus rhythm on CHB initially but now back to afib. Not pacing for more than 24 hrs    Diabetic ulcer of left foot associated with type 2 diabetes mellitus  A1c 8.6%  Takes lantus 80 bid, metformin, jardiance  Monitor BG, adjust insulin as needed  Endocrinology managing         VTE Risk Mitigation (From admission, onward)           Ordered     IP VTE HIGH RISK PATIENT  Once         12/26/23 0541     Place sequential compression device  Until discontinued         12/26/23 0541                     Anamaria Martines MD  Cardiology  [facility]

## 2024-01-02 NOTE — PROGRESS NOTES
[facility] - [facility]  Cardiac Electrophysiology  Progress Note    Admission Date: 12/26/2023  Code Status: Full Code   Attending Physician: Larry Alvarez MD   Expected Discharge Date: 1/5/2024  Principal Problem:Cardiogenic shock    Subjective:     Interval History: Still hemodynamically stable off IABP. Mentating well and no complaints. On tele continues to have some AFL and some AF but rate controlled.     Review of Systems   Constitutional: Negative for diaphoresis and fever.   Cardiovascular:  Negative for chest pain, dyspnea on exertion, leg swelling, near-syncope, orthopnea, palpitations, paroxysmal nocturnal dyspnea and syncope.   Respiratory:  Negative for cough and shortness of breath.    Gastrointestinal:  Negative for abdominal pain, diarrhea, nausea and vomiting.   Neurological:  Negative for light-headedness.   Psychiatric/Behavioral:  Negative for altered mental status and substance abuse.        Current Facility-Administered Medications:     0.9%  NaCl infusion, , Intravenous, Continuous, Pretty Jarvis MD, Last Rate: 10 mL/hr at 01/02/24 0805, Rate Verify at 01/02/24 0805    0.9%  NaCl infusion, , Intravenous, PRN, Pretty Jarvis MD    0.9%  NaCl infusion, , Intravenous, PRN, Pretty Jarvis MD, Stopped at 12/31/23 1115    aspirin chewable tablet 81 mg, 81 mg, Oral, Daily, Pretty Jarvis MD, 81 mg at 01/02/24 0813    atorvastatin tablet 40 mg, 40 mg, Oral, Daily, Charli Pereira MD, 40 mg at 01/02/24 0812    dextrose 10% bolus 125 mL 125 mL, 12.5 g, Intravenous, PRNJanes Ruben, MD    dextrose 10% bolus 125 mL 125 mL, 12.5 g, Intravenous, Randall WATKINS Ahmad O., MD    dextrose 10% bolus 125 mL 125 mL, 12.5 g, Intravenous, PRN, Dell Zavaleta DNP, FNP    dextrose 10% bolus 250 mL 250 mL, 25 g, Intravenous, PRNJanes Ruben, MD    dextrose 10% bolus 250 mL 250 mL, 25 g, Intravenous, PRN, Charli Pereira MD     dextrose 10% bolus 250 mL 250 mL, 25 g, Intravenous, PRN, Dell Zavaleta DNP, ALTAF    doxycycline tablet 100 mg, 100 mg, Oral, Q12H, Mayo Mcdonald MD    furosemide tablet 40 mg, 40 mg, Oral, Daily, Viviane Hernandez MD, 40 mg at 01/02/24 0812    glucagon (human recombinant) injection 1 mg, 1 mg, Intramuscular, PRN, Dell Zavaleta DNP, ALTAF    glucose chewable tablet 16 g, 16 g, Oral, PRN, Dell Zavaleta DNP, ALTAF    glucose chewable tablet 24 g, 24 g, Oral, PRN, Dell Zavaleta DNP, ALTAF    hydrOXYzine pamoate capsule 25 mg, 25 mg, Oral, Q8H PRN, Viviane Hernandez MD, 25 mg at 12/31/23 2120    insulin aspart U-100 pen 0-10 Units, 0-10 Units, Subcutaneous, Q4H PRN, Brigitte Champion PA-C    insulin regular in 0.9 % NaCl 100 unit/100 mL (1 unit/mL) infusion, 1.1 Units/hr, Intravenous, Continuous, Brigitte Champion PA-C    oxyCODONE immediate release tablet 5 mg, 5 mg, Oral, Q6H PRN, Viviane Hernandez MD, 5 mg at 12/30/23 1944    pantoprazole suspension 40 mg, 40 mg, Oral, Daily, Pretty Jarvis MD, 40 mg at 01/02/24 0813    polyethylene glycol packet 17 g, 17 g, Oral, Daily, Viviane Hernandez MD, 17 g at 12/31/23 1157    potassium chloride SA CR tablet 40 mEq, 40 mEq, Oral, Once, Viviane Hernandez MD    sodium chloride 0.9% flush 10 mL, 10 mL, Intravenous, PRN, Charli Pereira MD    ticagrelor tablet 90 mg, 90 mg, Oral, BID, Charli Pereira MD, 90 mg at 01/02/24 0812    valsartan split tablet 20 mg, 20 mg, Oral, BID, Jake Graves MD, 20 mg at 01/02/24 0812      sodium chloride 0.9%, sodium chloride 0.9%, dextrose 10%, dextrose 10%, dextrose 10%, dextrose 10%, dextrose 10%, dextrose 10%, glucagon (human recombinant), glucose, glucose, hydrOXYzine pamoate, insulin aspart U-100, oxyCODONE, sodium chloride 0.9%      Objective:     Vital Signs (Most Recent):  Temp: 98.4 °F (36.9 °C) (01/02/24 0705)  Pulse: 80 (01/02/24  0905)  Resp: 14 (01/02/24 0905)  BP: 132/71 (01/02/24 0905)  SpO2: 96 % (01/02/24 0905) Vital Signs (24h Range):  Temp:  [98 °F (36.7 °C)-98.6 °F (37 °C)] 98.4 °F (36.9 °C)  Pulse:  [] 80  Resp:  [4-29] 14  SpO2:  [92 %-98 %] 96 %  BP: ()/(49-83) 132/71     Weight: 103.2 kg (227 lb 8.2 oz)  Body mass index is 31.73 kg/m².     SpO2: 96 %        Telemetry: Aflutter and Afib rate controlled, HR<110 overnight     Physical Exam  Constitutional:       General: He is not in acute distress.     Appearance: Normal appearance. He is well-developed.   HENT:      Mouth/Throat:      Mouth: Mucous membranes are moist.      Pharynx: Oropharynx is clear.   Cardiovascular:      Rate and Rhythm: Normal rate and regular rhythm.      Heart sounds: Normal heart sounds. No murmur heard.  Abdominal:      Palpations: Abdomen is soft.      Tenderness: There is no abdominal tenderness. There is no guarding.   Musculoskeletal:         General: No swelling. Normal range of motion.      Right lower leg: No edema.      Left lower leg: No edema.   Skin:     General: Skin is warm and dry.   Neurological:      General: No focal deficit present.      Mental Status: He is alert and oriented to person, place, and time.            Significant Labs:     Recent Labs   Lab 12/31/23  0324 01/01/24  0310 01/02/24  0215   WBC 10.89  10.89 9.33 9.74   HGB 15.9  15.9 15.1 14.8   HCT 45.2  45.2 43.0 43.9     297 262 285         Recent Labs   Lab 12/30/23  1412 12/31/23  0324 12/31/23  1106 12/31/23  1709 01/01/24  0310 01/02/24  0215   * 137  137   < > 135* 136  136 139  139   K 3.7 3.8  3.8   < > 3.9 3.8  3.8 3.8  3.8   CL 99 101  101   < > 102 104  104 105  105   CO2 20* 19*  19*   < > 21* 18*  18* 20*  20*   BUN 45* 38*  38*   < > 44* 39*  39* 34*  34*   CREATININE 1.7* 1.5*  1.5*   < > 1.5* 1.6*  1.6* 1.3  1.3   CALCIUM 9.5 9.4  9.4   < > 9.3 9.4  9.4 9.2  9.2   PHOS 4.1 3.7  --   --  2.5*  --     < > =  values in this interval not displayed.         Recent Labs   Lab 12/31/23  0324 01/01/24  0310 01/02/24  0215   ALKPHOS 57 59 55   BILITOT 0.7 0.7 0.5   PROT 7.4 7.2 7.2   ALT 18 20 26   AST 18 29 30       Recent Labs   Lab 12/26/23  1430 12/26/23  2130   TROPONINI 25.809* 24.449*           Significant Imaging:     TTE 12/26/23    Left Ventricle: The left ventricle is mildly dilated. Normal wall thickness. Global hypokinesis present. There is severely reduced systolic function with a visually estimated ejection fraction of 20 - 25%. There is diastolic dysfunction.    Right Ventricle: Normal right ventricular cavity size. Wall thickness is normal. Right ventricle wall motion  is normal. Systolic function is moderately reduced. Pacemaker lead present in the ventricle.    Left Atrium: Left atrium is mildly dilated.    IVC/SVC: Patient is ventilated, cannot use IVC diameter to estimate right atrial pressure.  Assessment and Plan:     * Cardiogenic shock  #CHB with wide fractionated ventricular escape likely from non revascularizable STEMI    Left dominant coronary system  Inferior STEMI with CW a flutter 12/25, LIMA-LAD and SVG -OM are down as well, Lcx couldn't be revasuclarized  Subsequently developed CHB  EF 20-25%  Atrial flutter last night with atrial fibrillation as well overnight and this morning. Using native AV tamika conduction, only TVP paced intermittently. Rate controlled off AVN blocking agents.   Not pacer dependent at this time, so TVP set to backup rate of 50 yesterday, threshold 3.5mA  IABP out  TVP out 1/1/24    Plan  -Likely CRT-D today  -After device placement, will likely plan for cardioversion this week as well once he can be anticoagulated    Typical atrial flutter  New diagnosis, Found on 12/25 EKG that also showed inferior STEMI  Continue AC  Heparin off at 8am on 01/02/24 for procedure    Paroxysmal atrial fibrillation  Known paroxysmal afib, on eliquis and toprol xl at home  AC was on hold due  to hematemesis which has resolved  Toprol held while unstable  Continue therapeutic anticoagulation  Heparin off at 8am on 01/02/24 for afternoon procedure        Connor M Gillies, MD  Cardiac Electrophysiology  [facility]

## 2024-01-02 NOTE — ASSESSMENT & PLAN NOTE
Possibly ischemia induced vs medication induced. Hold home metoprolol.Had TVP placed and after no longer needing was removed on 1/1/2024   currently SR with normal conduction yesterday but now AF with controlled V rate.   EP recs bellow  -plan for CRT-D today; holding heparin  -After CRT-D (maybe 24-48hr later) can cardiovert him for AF/AFL and he can be on AC at that time

## 2024-01-02 NOTE — ASSESSMENT & PLAN NOTE
2/2 STEMI/ICM  Mehanical support: IABP @ 1:1 initially with pressor support patient weaned off successfully     - extubated on 12/28/23  - balloon pump removed 12/30  - 80 lasix IV daily   -  valsartan 20 BID

## 2024-01-02 NOTE — SUBJECTIVE & OBJECTIVE
Interval History: Still hemodynamically stable off IABP. Mentating well and no complaints. On tele continues to have some AFL and some AF but rate controlled.     Review of Systems   Constitutional: Negative for diaphoresis and fever.   Cardiovascular:  Negative for chest pain, dyspnea on exertion, leg swelling, near-syncope, orthopnea, palpitations, paroxysmal nocturnal dyspnea and syncope.   Respiratory:  Negative for cough and shortness of breath.    Gastrointestinal:  Negative for abdominal pain, diarrhea, nausea and vomiting.   Neurological:  Negative for light-headedness.   Psychiatric/Behavioral:  Negative for altered mental status and substance abuse.        Current Facility-Administered Medications:     0.9%  NaCl infusion, , Intravenous, Continuous, Pretty Jarvis MD, Last Rate: 10 mL/hr at 01/02/24 0805, Rate Verify at 01/02/24 0805    0.9%  NaCl infusion, , Intravenous, PRN, Pretty Jarvis MD    0.9%  NaCl infusion, , Intravenous, PRN, Pretty Jarvis MD, Stopped at 12/31/23 1115    aspirin chewable tablet 81 mg, 81 mg, Oral, Daily, Pretty Jarvis MD, 81 mg at 01/02/24 0813    atorvastatin tablet 40 mg, 40 mg, Oral, Daily, Charli Pereira MD, 40 mg at 01/02/24 0812    dextrose 10% bolus 125 mL 125 mL, 12.5 g, Intravenous, PRN, Octavio Eng MD    dextrose 10% bolus 125 mL 125 mL, 12.5 g, Intravenous, PRNRandall Ahmad O., MD    dextrose 10% bolus 125 mL 125 mL, 12.5 g, Intravenous, PRN, Dell Zavaleta DNP, SEANP    dextrose 10% bolus 250 mL 250 mL, 25 g, Intravenous, Janes WATKINS Ruben, MD    dextrose 10% bolus 250 mL 250 mL, 25 g, Intravenous, PRNRandall Ahmad O., MD    dextrose 10% bolus 250 mL 250 mL, 25 g, Intravenous, PRN, Dell Zavaleta DNP, FNP    doxycycline tablet 100 mg, 100 mg, Oral, Q12H, Mayo Mcdonald MD    furosemide tablet 40 mg, 40 mg, Oral, Daily, Viviane Hernandez MD, 40 mg at 01/02/24 0812     glucagon (human recombinant) injection 1 mg, 1 mg, Intramuscular, PRN, Dell Zavaleta, DWAINE, FNP    glucose chewable tablet 16 g, 16 g, Oral, PRN, Dell Zavaleta DNP, ALTAF    glucose chewable tablet 24 g, 24 g, Oral, PRN, Dell Zavaleta, DWAINE, FNP    hydrOXYzine pamoate capsule 25 mg, 25 mg, Oral, Q8H PRN, Viviane Hernandez MD, 25 mg at 12/31/23 2120    insulin aspart U-100 pen 0-10 Units, 0-10 Units, Subcutaneous, Q4H PRN, Brigitte Champion PA-C    insulin regular in 0.9 % NaCl 100 unit/100 mL (1 unit/mL) infusion, 1.1 Units/hr, Intravenous, Continuous, Brigitte Champion PA-C    oxyCODONE immediate release tablet 5 mg, 5 mg, Oral, Q6H PRN, Viviane Hernandez MD, 5 mg at 12/30/23 1944    pantoprazole suspension 40 mg, 40 mg, Oral, Daily, Pretty Jarvis MD, 40 mg at 01/02/24 0813    polyethylene glycol packet 17 g, 17 g, Oral, Daily, Viviane Hernandez MD, 17 g at 12/31/23 1157    potassium chloride SA CR tablet 40 mEq, 40 mEq, Oral, Once, Viviane Hernandez MD    sodium chloride 0.9% flush 10 mL, 10 mL, Intravenous, PRN, Charli Pereira MD    ticagrelor tablet 90 mg, 90 mg, Oral, BID, Charli Pereira MD, 90 mg at 01/02/24 0812    valsartan split tablet 20 mg, 20 mg, Oral, BID, Jake Graves MD, 20 mg at 01/02/24 0812      sodium chloride 0.9%, sodium chloride 0.9%, dextrose 10%, dextrose 10%, dextrose 10%, dextrose 10%, dextrose 10%, dextrose 10%, glucagon (human recombinant), glucose, glucose, hydrOXYzine pamoate, insulin aspart U-100, oxyCODONE, sodium chloride 0.9%      Objective:     Vital Signs (Most Recent):  Temp: 98.4 °F (36.9 °C) (01/02/24 0705)  Pulse: 80 (01/02/24 0905)  Resp: 14 (01/02/24 0905)  BP: 132/71 (01/02/24 0905)  SpO2: 96 % (01/02/24 0905) Vital Signs (24h Range):  Temp:  [98 °F (36.7 °C)-98.6 °F (37 °C)] 98.4 °F (36.9 °C)  Pulse:  [] 80  Resp:  [4-29] 14  SpO2:  [92 %-98 %] 96 %  BP: ()/(49-83) 132/71     Weight:  103.2 kg (227 lb 8.2 oz)  Body mass index is 31.73 kg/m².     SpO2: 96 %        Telemetry: Aflutter and Afib rate controlled, HR<110 overnight     Physical Exam  Constitutional:       General: He is not in acute distress.     Appearance: Normal appearance. He is well-developed.   HENT:      Mouth/Throat:      Mouth: Mucous membranes are moist.      Pharynx: Oropharynx is clear.   Cardiovascular:      Rate and Rhythm: Normal rate and regular rhythm.      Heart sounds: Normal heart sounds. No murmur heard.  Abdominal:      Palpations: Abdomen is soft.      Tenderness: There is no abdominal tenderness. There is no guarding.   Musculoskeletal:         General: No swelling. Normal range of motion.      Right lower leg: No edema.      Left lower leg: No edema.   Skin:     General: Skin is warm and dry.   Neurological:      General: No focal deficit present.      Mental Status: He is alert and oriented to person, place, and time.            Significant Labs:     Recent Labs   Lab 12/31/23  0324 01/01/24  0310 01/02/24  0215   WBC 10.89  10.89 9.33 9.74   HGB 15.9  15.9 15.1 14.8   HCT 45.2  45.2 43.0 43.9     297 262 285         Recent Labs   Lab 12/30/23  1412 12/31/23  0324 12/31/23  1106 12/31/23  1709 01/01/24  0310 01/02/24  0215   * 137  137   < > 135* 136  136 139  139   K 3.7 3.8  3.8   < > 3.9 3.8  3.8 3.8  3.8   CL 99 101  101   < > 102 104  104 105  105   CO2 20* 19*  19*   < > 21* 18*  18* 20*  20*   BUN 45* 38*  38*   < > 44* 39*  39* 34*  34*   CREATININE 1.7* 1.5*  1.5*   < > 1.5* 1.6*  1.6* 1.3  1.3   CALCIUM 9.5 9.4  9.4   < > 9.3 9.4  9.4 9.2  9.2   PHOS 4.1 3.7  --   --  2.5*  --     < > = values in this interval not displayed.         Recent Labs   Lab 12/31/23  0324 01/01/24  0310 01/02/24  0215   ALKPHOS 57 59 55   BILITOT 0.7 0.7 0.5   PROT 7.4 7.2 7.2   ALT 18 20 26   AST 18 29 30       Recent Labs   Lab 12/26/23  1430 12/26/23  2130   TROPONINI 25.809* 24.449*            Significant Imaging:     TTE 12/26/23    Left Ventricle: The left ventricle is mildly dilated. Normal wall thickness. Global hypokinesis present. There is severely reduced systolic function with a visually estimated ejection fraction of 20 - 25%. There is diastolic dysfunction.    Right Ventricle: Normal right ventricular cavity size. Wall thickness is normal. Right ventricle wall motion  is normal. Systolic function is moderately reduced. Pacemaker lead present in the ventricle.    Left Atrium: Left atrium is mildly dilated.    IVC/SVC: Patient is ventilated, cannot use IVC diameter to estimate right atrial pressure.

## 2024-01-02 NOTE — ASSESSMENT & PLAN NOTE
A1c 8.6%  Takes lantus 80 bid, metformin, jardiance  Monitor BG, adjust insulin as needed  Endocrinology managing

## 2024-01-02 NOTE — SUBJECTIVE & OBJECTIVE
Interval History: Patient doing well he has no complains. Patient is pending CRTD placement tomorrow. DDC Bidil given lower BP     Review of Systems   All other systems reviewed and are negative.    Objective:     Vital Signs (Most Recent):  Temp: 98.5 °F (36.9 °C) (01/01/24 1505)  Pulse: 76 (01/01/24 1805)  Resp: (!) 25 (01/01/24 1805)  BP: 127/76 (01/01/24 1805)  SpO2: 98 % (01/01/24 1805) Vital Signs (24h Range):  Temp:  [98 °F (36.7 °C)-98.9 °F (37.2 °C)] 98.5 °F (36.9 °C)  Pulse:  [] 76  Resp:  [15-30] 25  SpO2:  [92 %-99 %] 98 %  BP: ()/(46-88) 127/76     Weight: 103.2 kg (227 lb 8.2 oz)  Body mass index is 31.73 kg/m².     SpO2: 98 %         Intake/Output Summary (Last 24 hours) at 1/1/2024 1929  Last data filed at 1/1/2024 1805  Gross per 24 hour   Intake 1683.53 ml   Output 1620 ml   Net 63.53 ml       Lines/Drains/Airways       Central Venous Catheter Line  Duration             Introducer 12/26/23 0630 Internal Jugular Right 6 days              Peripheral Intravenous Line  Duration                  Peripheral IV - Single Lumen 12/31/23 1143 20 G Anterior;Right Forearm 1 day         Peripheral IV - Single Lumen 12/31/23 1144 20 G Anterior;Left Forearm 1 day                       Physical Exam  Constitutional:       Appearance: Normal appearance.   Cardiovascular:      Rate and Rhythm: Normal rate. Rhythm irregular.      Pulses: Normal pulses.      Heart sounds: Normal heart sounds.   Pulmonary:      Effort: Pulmonary effort is normal.      Breath sounds: Normal breath sounds.   Musculoskeletal:      Right lower leg: No edema.      Left lower leg: No edema.   Skin:     General: Skin is warm.   Neurological:      General: No focal deficit present.      Mental Status: He is oriented to person, place, and time.            Significant Labs: All pertinent lab results from the last 24 hours have been reviewed.

## 2024-01-02 NOTE — PLAN OF CARE
[facility] Care Plan    POC reviewed with Magdaleno Cunningham and family at 0300. Pt verbalized understanding. Questions and concerns addressed. No acute events overnight. Pt progressing toward goals. Will continue to monitor. See below and flowsheets for full assessment and VS info.     -Plan for CRT-D placement  -Heparin gtt will be paused @0800  -insulin gtt  -CHG bed bath and linen change complete      Is this a stroke patient? no    Neuro:  Val Coma Scale  Best Eye Response: 4-->(E4) spontaneous  Best Motor Response: 6-->(M6) obeys commands  Best Verbal Response: 5-->(V5) oriented  Val Coma Scale Score: 15  Assessment Qualifiers: patient not sedated/intubated, no eye obstruction present  Pupil PERRLA: yes     24hr Temp:  [98 °F (36.7 °C)-98.8 °F (37.1 °C)]     CV:   Rhythm: atrial rhythm  BP goals:   SBP < 180  MAP > 65    Resp:      Vent Mode: Spont  Set Rate: 22 BPM  Oxygen Concentration (%): 30  Vt Set: 450 mL  PEEP/CPAP: 5 cmH20  Pressure Support: 8 cmH20    Plan: N/A    GI/:     Diet/Nutrition Received: 2 gram sodium, low saturated fat/low cholesterol  Last Bowel Movement: 01/01/24  Voiding Characteristics: voids spontaneously without difficulty    Intake/Output Summary (Last 24 hours) at 1/2/2024 0343  Last data filed at 1/2/2024 0240  Gross per 24 hour   Intake 1535.13 ml   Output 1580 ml   Net -44.87 ml     Unmeasured Output  Stool Occurrence: 1    Labs/Accuchecks:  Recent Labs   Lab 01/02/24 0215   WBC 9.74   RBC 4.85   HGB 14.8   HCT 43.9         Recent Labs   Lab 01/02/24  0215     139   K 3.8  3.8   CO2 20*  20*     105   BUN 34*  34*   CREATININE 1.3  1.3   ALKPHOS 55   ALT 26   AST 30   BILITOT 0.5      Recent Labs   Lab 12/28/23  1302 12/28/23  1927 01/02/24  0215   INR 1.1  --   --    APTT 26.6   < > 54.5*    < > = values in this interval not displayed.      Recent Labs   Lab 12/26/23 2130   TROPONINI 24.449*       Electrolytes: N/A - electrolytes WDL  Accuchecks:  Q4H    Gtts:   sodium chloride 0.9% 10 mL/hr at 01/02/24 0240    heparin (porcine) in D5W 21 Units/kg/hr (01/02/24 0240)    insulin regular 1 units/mL infusion orderable (TRANSFER) 1.1 Units/hr (01/01/24 2205)       LDA/Wounds:  Lines/Drains/Airways       Central Venous Catheter Line  Duration             Introducer 12/26/23 0630 Internal Jugular Right 6 days              Peripheral Intravenous Line  Duration                  Peripheral IV - Single Lumen 12/31/23 1143 20 G Anterior;Right Forearm 1 day         Peripheral IV - Single Lumen 12/31/23 1144 20 G Anterior;Left Forearm 1 day                  Wounds: Yes  Wound care consulted: No

## 2024-01-02 NOTE — DISCHARGE INSTRUCTIONS
Medication instructions:    Complete your 5 days of antibiotics  If you are on a blood thinner (examples: apixiban [Eliquis], rivaroxaban [Xarelto], dabigatran [Pradaxa], or enoxaparin [Lovenox]), do not take your blood thinner for the next 5 days after your procedure. You can resume after 5 days post-procedure (i.e., when you complete your antibiotics). If you are on Coumadin you can continue to take it the day of your procedure  Pain control: you can take up to Tylenol 1000 mg every 6 hours as needed or (if you do not have kidney disease) ibuprofen 600 mg every 6 hours as needed as needed for pain control (if you do not have kidney disease). If unsure, please contact your primary care physician for recommendations.      Activity restrictions & precautions:    Sling & arm instructions:  Wear your sling for 48 hours. After 48 hours, you can take your arm out of your sling.   You must wear your sling at night when you sleep for 6 weeks after your procedure  Do not lift >5 lbs for 2 weeks.  Do not raise your elbow above your shoulder on the same side as your device for 6 weeks.     Surgical site   Dressing (see images below)  **Note: these are general rules to follow unless instructed otherwise** - see images below  If you have a brown rectangular gel bandage (A.K.A. Aquacel Ag dressing) over your surgical incision do not remove it. This will be removed at your device clinic follow up appointment in 1 week.  If you have a square light brown bandage (A.K.A Mepilex dressing) you should remove in 48 hours after your procedure.  If you have a pressure dressing (white elastic tape with gauze underneath or a very large bandage that covers your left chest and is shaped like a triangle) over your surgical site, this should be removed the morning after your procedure unless instructed otherwise.  Do not place any creams or ointments over the surgical site. This could effect the integrity of the Dermabond glue.  You can shower  after 24 hours post-procedure, but do not let the jet directly hit your pocket site for at least 2 weeks. Recommend showering with your back to the shower head.   Do not reach your arm (on the same side as your device) around your back during showering for 6 weeks.  Do not submerge your surgical incision site under water (swimming, bathing if you submerge the actual surgical site) for at least 6 week. It is imperative that the surgical site is completely healed prior to doing so    Follow up in device clinic in 1 week to check your incision and device function  Please contact the electrophysiology clinic if you have any questions or if you experience: potential surgical/pocket site complications (pain, swelling, bleeding, drainage), fevers, chest pain/shortness of breath, or for any other concerns.

## 2024-01-02 NOTE — PT/OT/SLP PROGRESS
Speech Language Pathology      Magdaleno Cunningham  MRN: [id number]    Patient not seen today secondary to (pt NPO pending procedure). SLP will follow as able and appropriate.  1/2/2024

## 2024-01-02 NOTE — ANESTHESIA PREPROCEDURE EVALUATION
01/02/2024  Pre-operative evaluation for Procedure(s) (LRB):  Insertion, ICD, Dual Chamber (N/A)    Magdaleno Cunningham is a 58 y.o. male       Left Ventricle: The left ventricle is mildly dilated. Normal wall thickness. Global hypokinesis present. There is severely reduced systolic function with a visually estimated ejection fraction of 20 - 25%. There is diastolic dysfunction.    Right Ventricle: Normal right ventricular cavity size. Wall thickness is normal. Right ventricle wall motion  is normal. Systolic function is moderately reduced. Pacemaker lead present in the ventricle.    Left Atrium: Left atrium is mildly dilated.    IVC/SVC: Patient is ventilated, cannot use IVC diameter to estimate right atrial pressure.    Patient Active Problem List   Diagnosis    Chest pain    Atherosclerosis of native coronary artery of native heart with angina pectoris    COPD (chronic obstructive pulmonary disease)    Type 2 diabetes mellitus with diabetic arthropathy    Hypertension associated with diabetes    ROSAMARIA (obstructive sleep apnea)    Diabetic ulcer of left foot associated with type 2 diabetes mellitus    Smoker 1-2 packs per day since 14 years old    Medical non-compliance    Left foot pain    Generalized anxiety disorder    Denton-Perez erythema multiforme exudativum    Allergic reaction to chemical substance    Prostate enlargement    Bladder mass    Coronary artery disease involving native coronary artery of native heart    Atrial fibrillation    S/P CABG (coronary artery bypass graft)    Protruding sternal wires    Ingrowing toenail    Onychomycosis    Polyneuropathy    Cellulitis of gluteal region    Chronic migraine    Obesity (BMI 30-39.9)    HLD (hyperlipidemia)    Hyponatremia    Malnutrition of moderate degree    Acute pain    Open wound of buttock with complication    Abscess, perineum     Streptococcal infection    Hypomagnesemia    STEMI (ST elevation myocardial infarction)    Cardiogenic shock    Complete heart block    Paroxysmal atrial fibrillation    Typical atrial flutter    Pneumonia       Review of patient's allergies indicates:   Allergen Reactions    Pesticide Dermatitis and Rash       No current facility-administered medications on file prior to encounter.     Current Outpatient Medications on File Prior to Encounter   Medication Sig Dispense Refill    apixaban (ELIQUIS) 5 mg Tab Take 1 tablet (5 mg total) by mouth 2 (two) times daily.      clonazePAM (KLONOPIN) 1 MG tablet Take 1 mg by mouth 2 (two) times daily.      cyclobenzaprine (FLEXERIL) 10 MG tablet Take 10 mg by mouth every evening.      gabapentin (NEURONTIN) 600 MG tablet Take 600 mg by mouth As instructed (neuropathic pain). Takes 1 tablet in the morning and 2 tablets at bedtime      isosorbide mononitrate (IMDUR) 30 MG 24 hr tablet Take 1 tablet (30 mg total) by mouth once daily. 30 tablet 11    JARDIANCE 25 mg tablet Take 25 mg by mouth once daily.      lactobacillus acidophilus & bulgar (LACTINEX) 100 million cell packet Take 1 packet (1 each total) by mouth 2 (two) times daily. 30 packet 0    LANTUS SOLOSTAR U-100 INSULIN glargine 100 units/mL (3mL) SubQ pen Inject 80 Units into the skin 2 (two) times daily.      lisinopriL (PRINIVIL,ZESTRIL) 20 MG tablet Take 20 mg by mouth once daily.      metFORMIN (GLUCOPHAGE) 1000 MG tablet Take 1 tablet (1,000 mg total) by mouth 2 (two) times daily with meals.      methocarbamoL (ROBAXIN) 500 MG Tab Take 500 mg by mouth every evening.      metoprolol succinate (TOPROL-XL) 100 MG 24 hr tablet Take 100 mg by mouth 2 (two) times daily.      nitroGLYCERIN (NITROSTAT) 0.4 MG SL tablet Place 0.4 mg under the tongue every 5 (five) minutes as needed for Chest pain.      oxyCODONE-acetaminophen (PERCOCET)  mg per tablet Take 1 tablet by mouth 2 (two) times daily as needed for Pain.       "pen needle, diabetic 31 gauge x 1/4" Ndle USE 1 TWICE DAILY FOR BLOOD SUGAR GREATER THAN 200      ranolazine (RANEXA) 1,000 mg Tb12 Take 1 tablet (1,000 mg total) by mouth 2 (two) times daily. 60 tablet 11    simvastatin (ZOCOR) 10 MG tablet Take 10 mg by mouth once daily.      ticagrelor (BRILINTA) 90 mg tablet Take 1 tablet (90 mg total) by mouth 2 (two) times daily. 60 tablet 2       Past Surgical History:   Procedure Laterality Date    CORONARY ANGIOGRAPHY INCLUDING BYPASS GRAFTS WITH CATHETERIZATION OF LEFT HEART N/A 9/1/2022    Procedure: ANGIOGRAM, CORONARY, INCLUDING BYPASS GRAFT, WITH LEFT HEART CATHETERIZATION;  Surgeon: Paul Botello MD;  Location: [facility] CATH/EP LAB;  Service: Cardiology;  Laterality: N/A;    CORONARY ANGIOGRAPHY INCLUDING BYPASS GRAFTS WITH CATHETERIZATION OF LEFT HEART Left 12/22/2023    Procedure: ANGIOGRAM, CORONARY, INCLUDING BYPASS GRAFT, WITH LEFT HEART CATHETERIZATION;  Surgeon: Paul Botello MD;  Location: [facility] CATH/EP LAB;  Service: Cardiology;  Laterality: Left;    CORONARY ARTERY BYPASS GRAFT (CABG) N/A 4/7/2020    Procedure: CORONARY ARTERY BYPASS GRAFT (CABG)- Excision of left atrial appendage;  Surgeon: Doug Solitario MD;  Location: [facility] OR;  Service: Cardiothoracic;  Laterality: N/A;    CORONARY BYPASS GRAFT ANGIOGRAPHY  12/25/2023    Procedure: Bypass graft study;  Surgeon: Ashley Valverde MD;  Location: [facility] CATH/EP LAB;  Service: Cardiology;;    CORONARY STENT PLACEMENT      CORONARY STENT PLACEMENT N/A 12/22/2023    Procedure: INSERTION, STENT, CORONARY ARTERY;  Surgeon: Paul Botello MD;  Location: [facility] CATH/EP LAB;  Service: Cardiology;  Laterality: N/A;    WILSON MAZE PROCEDURE N/A 4/7/2020    Procedure: WILSON MAZE PROCEDURE- Attempted;  Surgeon: Doug Solitario MD;  Location: [facility] OR;  Service: Cardiothoracic;  Laterality: N/A;    CYSTOSCOPY N/A 12/10/2018    Procedure: CYSTOSCOPY;  Surgeon: Khushboo Abreu MD;  Location: [facility] OR;  " Service: Urology;  Laterality: N/A;    ENDOSCOPIC HARVEST OF VEIN Left 4/7/2020    Procedure: HARVEST-VEIN-ENDOVASCULAR;  Surgeon: Doug Solitario MD;  Location: [facility] OR;  Service: Cardiothoracic;  Laterality: Left;    INCISION OF PERIRECTAL ABSCESS Bilateral 9/1/2023    Procedure: INCISION, ABSCESS, PERIRECTAL;  Surgeon: Brayan Spears MD;  Location: [facility];  Service: General;  Laterality: Bilateral;  stirrups    INSERTION OF INTRA-AORTIC BALLOON ASSIST DEVICE  12/25/2023    Procedure: INSERTION, INTRA-AORTIC BALLOON PUMP;  Surgeon: Ashley Valverde MD;  Location: [facility] CATH/EP LAB;  Service: Cardiology;;    INSERTION OF TEMPORARY PACEMAKER N/A 12/25/2023    Procedure: INSERTION, PACEMAKER, TEMPORARY;  Surgeon: Ashley Valverde MD;  Location: [facility] CATH/EP LAB;  Service: Cardiology;  Laterality: N/A;    IVUS, CORONARY  12/22/2023    Procedure: IVUS, Coronary;  Surgeon: Paul Botello MD;  Location: [facility] CATH/EP LAB;  Service: Cardiology;;    LEFT HEART CATHETERIZATION Left 3/17/2020    Procedure: CATHETERIZATION, HEART, LEFT;  Surgeon: Tyree Walters MD;  Location: [facility] CATH/EP LAB;  Service: Cardiology;  Laterality: Left;    PERCUTANEOUS CORONARY INTERVENTION, ARTERY N/A 12/22/2023    Procedure: Percutaneous coronary intervention;  Surgeon: Paul Botello MD;  Location: [facility] CATH/EP LAB;  Service: Cardiology;  Laterality: N/A;    PERCUTANEOUS CORONARY INTERVENTION, ARTERY N/A 12/25/2023    Procedure: Percutaneous coronary intervention;  Surgeon: Ashley Valverde MD;  Location: [facility] CATH/EP LAB;  Service: Cardiology;  Laterality: N/A;    STERNAL WIRES REMOVAL N/A 6/8/2020    Procedure: REMOVAL, STERNAL WIRE;  Surgeon: Doug Solitario MD;  Location: [facility] OR;  Service: Peripheral Vascular;  Laterality: N/A;    ULTRASOUND OF PROSTATE FOR VOLUME DETERMINATION  12/10/2018    Procedure: ULTRASOUND, PROSTATE, FOR VOLUME DETERMINATION;  Surgeon: Khushboo Abreu MD;  Location:  [facility]s include [facility] beds at the [facility] OR;  Service: Urology;;       Social History     Socioeconomic History    Marital status:    Tobacco Use    Smoking status: Every Day     Current packs/day: 0.00     Average packs/day: 3.0 packs/day for 30.0 years (90.0 ttl pk-yrs)     Types: Cigarettes     Start date: 4/4/1990     Last attempt to quit: 4/4/2020     Years since quitting: 3.7    Smokeless tobacco: Never   Substance and Sexual Activity    Alcohol use: Not Currently    Drug use: Yes     Frequency: 1.0 times per week     Types: Marijuana    Sexual activity: Not Currently   Social History Narrative    ** Merged History Encounter **          Social Determinants of Health     Financial Resource Strain: Low Risk  (12/26/2023)    Overall Financial Resource Strain (CARDIA)     Difficulty of Paying Living Expenses: Not hard at all   Food Insecurity: No Food Insecurity (12/26/2023)    Hunger Vital Sign     Worried About Running Out of Food in the Last Year: Never true     Ran Out of Food in the Last Year: Never true   Transportation Needs: No Transportation Needs (12/26/2023)    PRAPARE - Transportation     Lack of Transportation (Medical): No     Lack of Transportation (Non-Medical): No   Physical Activity: Patient Unable To Answer (12/26/2023)    Exercise Vital Sign     Days of Exercise per Week: Patient unable to answer     Minutes of Exercise per Session: Patient unable to answer   Stress: Patient Unable To Answer (12/26/2023)    [employer] [place] [employer] - Occupational Stress Questionnaire     Feeling of Stress : Patient unable to answer   Social Connections: Moderately Integrated (12/26/2023)    Social Connection and Isolation Panel [NHANES]     Frequency of Communication with Friends and Family: More than three times a week     Frequency of Social Gatherings with Friends and Family: More than three times a week     Attends Hoahaoism Services: More than 4 times per year     Active Member of Clubs or Organizations: Yes      Attends Club or Organization Meetings: Patient unable to answer     Marital Status:    Housing Stability: Low Risk  (2023)    Housing Stability Vital Sign     Unable to Pay for Housing in the Last Year: No     Number of Places Lived in the Last Year: 1     Unstable Housing in the Last Year: No         CBC:   Recent Labs     24  0310 24   WBC 9.33 9.74   RBC 4.86 4.85   HGB 15.1 14.8   HCT 43.0 43.9    285   MCV 89 91   MCH 31.1* 30.5   MCHC 35.1 33.7       CMP:   Recent Labs     23  0324 23  1106 240 24     137   < > 136  136 139  139   K 3.8  3.8   < > 3.8  3.8 3.8  3.8     101   < > 104  104 105  105   CO2 19*  19*   < > 18*  18* 20*  20*   BUN 38*  38*   < > 39*  39* 34*  34*   CREATININE 1.5*  1.5*   < > 1.6*  1.6* 1.3  1.3   *  194*   < > 180*  180* 122*  122*   MG 2.3  --  2.2 2.0   PHOS 3.7  --  2.5*  --    CALCIUM 9.4  9.4   < > 9.4  9.4 9.2  9.2   ALBUMIN 3.0*  --  3.0* 3.1*   PROT 7.4  --  7.2 7.2   ALKPHOS 57  --  59 55   ALT 18  --  20 26   AST 18  --  29 30   BILITOT 0.7  --  0.7 0.5    < > = values in this interval not displayed.       INR  Recent Labs     23  1106 24  0310 24   APTT 42.4* 63.8* 54.5*           Diagnostic Studies:      EKD Echo:  No results found for this or any previous visit.        Pre-op Assessment    I have reviewed the Patient Summary Reports.     I have reviewed the Nursing Notes. I have reviewed the NPO Status.   I have reviewed the Medications.     Review of Systems  Anesthesia Hx:  No problems with previous Anesthesia   History of prior surgery of interest to airway management or planning:          Denies Family Hx of Anesthesia complications.    Denies Personal Hx of Anesthesia complications.                    Hematology/Oncology:       -- Anemia:                                  Cardiovascular:  Exercise tolerance: poor    Hypertension  Past MI CAD   CABG/stent Dysrhythmias  Angina        ECG has been reviewed.                          Pulmonary:   COPD     Sleep Apnea                Renal/:   Denies Chronic Renal Disease.                Hepatic/GI:      Denies GERD.             Neurological:    Denies CVA.    Denies Seizures.                                Endocrine:  Diabetes               Physical Exam  General: Well nourished, Cooperative, Alert and Oriented    Airway:  Mallampati: III / II  Mouth Opening: Normal  TM Distance: Normal  Tongue: Normal  Neck ROM: Normal ROM    Dental:  Periodontal disease    Chest/Lungs:  Normal Respiratory Rate    Heart:  Rate: Normal        Anesthesia Plan  Type of Anesthesia, risks & benefits discussed:    Anesthesia Type: Gen Natural Airway  Intra-op Monitoring Plan: Standard ASA Monitors  Post Op Pain Control Plan: multimodal analgesia  Induction:  IV  Informed Consent: Informed consent signed with the Patient and all parties understand the risks and agree with anesthesia plan.  All questions answered.   ASA Score: 4  Day of Surgery Review of History & Physical: H&P Update referred to the surgeon/provider.    Ready For Surgery From Anesthesia Perspective.     .

## 2024-01-02 NOTE — SUBJECTIVE & OBJECTIVE
Interval History: NAEON. Patient reports doing well this morning. Heparin gtt paused in expectation of CRTD today. Can possibly stepdown today post procedure.     Review of Systems   All other systems reviewed and are negative.    Objective:     Vital Signs (Most Recent):  Temp: 98.5 °F (36.9 °C) (01/02/24 1105)  Pulse: 91 (01/02/24 1305)  Resp: 20 (01/02/24 1305)  BP: 114/72 (01/02/24 1305)  SpO2: 97 % (01/02/24 1305) Vital Signs (24h Range):  Temp:  [98.4 °F (36.9 °C)-98.6 °F (37 °C)] 98.5 °F (36.9 °C)  Pulse:  [] 91  Resp:  [4-29] 20  SpO2:  [92 %-98 %] 97 %  BP: ()/(49-84) 114/72     Weight: 103.2 kg (227 lb 8.2 oz)  Body mass index is 31.73 kg/m².     SpO2: 97 %         Intake/Output Summary (Last 24 hours) at 1/2/2024 1501  Last data filed at 1/2/2024 1105  Gross per 24 hour   Intake 771.16 ml   Output 1695 ml   Net -923.84 ml         Lines/Drains/Airways       Central Venous Catheter Line  Duration             Introducer 12/26/23 0630 Internal Jugular Right 7 days              Peripheral Intravenous Line  Duration                  Peripheral IV - Single Lumen 12/31/23 1143 20 G Anterior;Right Forearm 2 days         Peripheral IV - Single Lumen 12/31/23 1144 20 G Anterior;Left Forearm 2 days                       Physical Exam  Constitutional:       Appearance: Normal appearance.   Cardiovascular:      Rate and Rhythm: Normal rate. Rhythm irregular.      Pulses: Normal pulses.      Heart sounds: Normal heart sounds.   Pulmonary:      Effort: Pulmonary effort is normal.      Breath sounds: Normal breath sounds.   Musculoskeletal:      Right lower leg: No edema.      Left lower leg: No edema.   Skin:     General: Skin is warm.   Neurological:      General: No focal deficit present.      Mental Status: He is oriented to person, place, and time.            Significant Labs: All pertinent lab results from the last 24 hours have been reviewed.

## 2024-01-02 NOTE — ASSESSMENT & PLAN NOTE
Possibly ischemia induced vs medication induced. Hold home metoprolol.Had TVP placed and after no longer needing was removed on 1/1/202/    currently SR with normal conduction yesterday but now AF with controlled V rate.   EP recs bellow  -plan for CRT-D tomorrow  -After CRT-D (maybe 24-48hr later) can cardiovert him for AF/AFL and he can be on AC at that time  -Hold heparin 6 hours prior to device

## 2024-01-02 NOTE — ASSESSMENT & PLAN NOTE
#CHB with wide fractionated ventricular escape likely from non revascularizable STEMI    Left dominant coronary system  Inferior STEMI with CW a flutter 12/25, LIMA-LAD and SVG -OM are down as well, Lcx couldn't be revasuclarized  Subsequently developed CHB  EF 20-25%  Atrial flutter last night with atrial fibrillation as well overnight and this morning. Using native AV tamika conduction, only TVP paced intermittently. Rate controlled off AVN blocking agents.   Not pacer dependent at this time, so TVP set to backup rate of 50 yesterday, threshold 3.5mA  IABP out  TVP out 1/1/24    Plan  -Likely CRT-D today  -After device placement, will likely plan for cardioversion this week as well once he can be anticoagulated

## 2024-01-02 NOTE — PROGRESS NOTES
[facility] - Cardiac Intensive Care  Cardiology  Progress Note    Patient Name: Magdaleno Cunningham  MRN: [id number]  Admission Date: 12/26/2023  Hospital Length of Stay: 6 days  Code Status: Full Code   Attending Physician: Larry Alvarez MD   Primary Care Physician: Venkata Mclaughlin MD  Expected Discharge Date: 1/5/2024  Principal Problem:Cardiogenic shock    Subjective:     Hospital Course:   Patient weaned off balloon pump. He had TVP removed after CHB resolved and intrinsic rhythm atrial fibrillation with controlled heart rate. Patient pending CRTD followed by ILAN/DCCV      Interval History: Patient doing well he has no complains. Patient is pending CRTD placement tomorrow. DDC Bidil given lower BP     Review of Systems   All other systems reviewed and are negative.    Objective:     Vital Signs (Most Recent):  Temp: 98.5 °F (36.9 °C) (01/01/24 1505)  Pulse: 76 (01/01/24 1805)  Resp: (!) 25 (01/01/24 1805)  BP: 127/76 (01/01/24 1805)  SpO2: 98 % (01/01/24 1805) Vital Signs (24h Range):  Temp:  [98 °F (36.7 °C)-98.9 °F (37.2 °C)] 98.5 °F (36.9 °C)  Pulse:  [] 76  Resp:  [15-30] 25  SpO2:  [92 %-99 %] 98 %  BP: ()/(46-88) 127/76     Weight: 103.2 kg (227 lb 8.2 oz)  Body mass index is 31.73 kg/m².     SpO2: 98 %         Intake/Output Summary (Last 24 hours) at 1/1/2024 1929  Last data filed at 1/1/2024 1805  Gross per 24 hour   Intake 1683.53 ml   Output 1620 ml   Net 63.53 ml       Lines/Drains/Airways       Central Venous Catheter Line  Duration             Introducer 12/26/23 0630 Internal Jugular Right 6 days              Peripheral Intravenous Line  Duration                  Peripheral IV - Single Lumen 12/31/23 1143 20 G Anterior;Right Forearm 1 day         Peripheral IV - Single Lumen 12/31/23 1144 20 G Anterior;Left Forearm 1 day                       Physical Exam  Constitutional:       Appearance: Normal appearance.   Cardiovascular:      Rate and Rhythm: Normal rate. Rhythm irregular.       Pulses: Normal pulses.      Heart sounds: Normal heart sounds.   Pulmonary:      Effort: Pulmonary effort is normal.      Breath sounds: Normal breath sounds.   Musculoskeletal:      Right lower leg: No edema.      Left lower leg: No edema.   Skin:     General: Skin is warm.   Neurological:      General: No focal deficit present.      Mental Status: He is oriented to person, place, and time.            Significant Labs: All pertinent lab results from the last 24 hours have been reviewed.      Assessment and Plan:       * Cardiogenic shock  2/2 STEMI/ICM  Mehanical support: IABP @ 1:1 initially with pressor support patient weaned off successfully     - extubated on 12/28/23  - balloon pump removed 12/30  - 80 lasix IV daily   -  valsartan 20 BID           Pneumonia  - Completed treatment with Ceftriaxone     Paroxysmal atrial fibrillation  On heparin ggt    Complete heart block  Possibly ischemia induced vs medication induced. Hold home metoprolol.Had TVP placed and after no longer needing was removed on 1/1/202/    currently SR with normal conduction yesterday but now AF with controlled V rate.   EP recs bellow  -plan for CRT-D tomorrow  -After CRT-D (maybe 24-48hr later) can cardiovert him for AF/AFL and he can be on AC at that time  -Hold heparin 6 hours prior to device      STEMI (ST elevation myocardial infarction)  Continue brilinta and statin    HLD (hyperlipidemia)  Continue statin    Atrial fibrillation  EKG with sinus rhythm on CHB initially but now back to afib. Not pacing for more than 24 hrs    Diabetic ulcer of left foot associated with type 2 diabetes mellitus  A1c 8.6%  Takes lantus 80 bid, metformin, jardiance  Monitor BG, adjust insulin as needed  Endocrinology managing         VTE Risk Mitigation (From admission, onward)           Ordered     heparin 25,000 units in dextrose 5% 250 mL (100 units/mL) infusion LOW INTENSITY nomogram - [facility]  Continuous        Question:  Begin at (units/kg/hr)   Answer:  12    12/28/23 1247     IP VTE HIGH RISK PATIENT  Once         12/26/23 0541     Place sequential compression device  Until discontinued         12/26/23 0541                    Viviane Curran MD  Cardiology  [facility] - Cardiac Intensive Care

## 2024-01-02 NOTE — SUBJECTIVE & OBJECTIVE
"Interval HPI:   No acute events overnight. Patient in room [id number] A. Blood glucose stable. BG at and below goal on current insulin regimen (Transition Insulin Drip). Steroid use- None .   Day of Surgery  Renal function- Normal   Vasopressors-  None     Diet NPO     Eating:   NPO  Nausea: No  Hypoglycemia and intervention: No  Fever: No  TPN and/or TF: No    /71 (BP Location: Right arm, Patient Position: Sitting)   Pulse 80   Temp 98.4 °F (36.9 °C) (Oral)   Resp 14   Ht 5' 11" (1.803 m)   Wt 103.2 kg (227 lb 8.2 oz)   SpO2 96%   BMI 31.73 kg/m²     Labs Reviewed and Include    Recent Labs   Lab 01/02/24 0215   *  122*   CALCIUM 9.2  9.2   ALBUMIN 3.1*   PROT 7.2     139   K 3.8  3.8   CO2 20*  20*     105   BUN 34*  34*   CREATININE 1.3  1.3   ALKPHOS 55   ALT 26   AST 30   BILITOT 0.5     Lab Results   Component Value Date    WBC 9.74 01/02/2024    HGB 14.8 01/02/2024    HCT 43.9 01/02/2024    MCV 91 01/02/2024     01/02/2024     No results for input(s): "TSH", "FREET4" in the last 168 hours.  Lab Results   Component Value Date    HGBA1C 8.3 (H) 12/14/2023    HGBA1C 8.3 (H) 12/14/2023       Nutritional status:   Body mass index is 31.73 kg/m².  Lab Results   Component Value Date    ALBUMIN 3.1 (L) 01/02/2024    ALBUMIN 3.0 (L) 01/01/2024    ALBUMIN 3.0 (L) 12/31/2023     No results found for: "PREALBUMIN"    Estimated Creatinine Clearance: 77.6 mL/min (based on SCr of 1.3 mg/dL).    Accu-Checks  Recent Labs     12/31/23  1340 12/31/23  1637 12/31/23  2105 01/01/24  0301 01/01/24  0832 01/01/24  1140 01/01/24  1636 01/01/24  2200 01/02/24  0213 01/02/24  0742   POCTGLUCOSE 221* 303* 123* 146* 238* 223* 173* 120* 124* 138*       Current Medications and/or Treatments Impacting Glycemic Control  Immunotherapy:    Immunosuppressants       None          Steroids:   Hormones (From admission, onward)      None          Pressors:    Autonomic Drugs (From admission, " onward)      None          Hyperglycemia/Diabetes Medications:   Antihyperglycemics (From admission, onward)      Start     Stop Route Frequency Ordered    01/02/24 1000  insulin aspart U-100 pen 0-10 Units         -- SubQ Every 4 hours PRN 01/02/24 0848    01/02/24 0900  insulin regular in 0.9 % NaCl 100 unit/100 mL (1 unit/mL) infusion        Question:  Enter initial dose (Units/hr):  Answer:  1.1    -- IV Continuous 01/02/24 0847

## 2024-01-02 NOTE — CARE UPDATE
-Glucose Goal 140-180      Pt off the floor today for CRTD procedure. NPO. BG stable WNL on transition drip at 1.1 u/hr with stepdown parameters and POCT glucose checks q 4 hr.     -A1C:   Hemoglobin A1C   Date Value Ref Range Status   12/14/2023 8.3 (H) 4.5 - 6.2 % Final     Comment:     According to ADA guidelines, hemoglobin A1C <7.0% represents  optimal control in non-pregnant diabetic patients.  Different  metrics may apply to specific populations.   Standards of Medical Care in Diabetes - 2016.    For the purpose of screening for the presence of diabetes:  <5.7%     Consistent with the absence of diabetes  5.7-6.4%  Consistent with increasing risk for diabetes   (prediabetes)  >or=6.5%  Consistent with diabetes    Currently no consensus exists for use of hemoglobin A1C  for diagnosis of diabetes for children.     12/14/2023 8.3 (H) 4.5 - 6.2 % Final     Comment:     According to ADA guidelines, hemoglobin A1C <7.0% represents  optimal control in non-pregnant diabetic patients.  Different  metrics may apply to specific populations.   Standards of Medical Care in Diabetes - 2016.    For the purpose of screening for the presence of diabetes:  <5.7%     Consistent with the absence of diabetes  5.7-6.4%  Consistent with increasing risk for diabetes   (prediabetes)  >or=6.5%  Consistent with diabetes    Currently no consensus exists for use of hemoglobin A1C  for diagnosis of diabetes for children.           -HOME REGIMEN: Jardiance 25 mg, Lantus 80 u BID, Metformin 1000 mg BID     -GLUCOSE TREND FOR THE PAST 24HRS:   Recent Labs   Lab 01/01/24  1140 01/01/24  1636 01/01/24  2200 01/02/24  0213 01/02/24  0742 01/02/24  1148   POCTGLUCOSE 223* 173* 120* 124* 138* 160*         -NO HYPOGYCEMIAS NOTED     - Diet  Diet NPO for procedure         Plan:   - Transition drip at 1.1 units/hr with step-down parameters. Will likely transition to SQ basal if BG remains stable WNL at fixed rate   - d/c scheduled meal time novolog  as patient NPO today for procedure (will restart at 12-14 units TIDWM once diet advanced)   - Novolog (aspart) insulin prn for BG excursions [facility] SSI (150/25).  - BG checks q 4 hr while NPO   - Hypoglycemia protocol in place     ** Please notify Endocrine for any change and/or advance in diet**  ** Please call Endocrine for any BG related issues **     Discharge Planning:   TBD. Please notify endocrinology prior to discharge.

## 2024-01-02 NOTE — TRANSFER OF CARE
"Anesthesia Transfer of Care Note    Patient: Magdaleno Cunningham    Procedure(s) Performed: Procedure(s) (LRB):  Insertion, ICD, Dual Chamber (N/A)    Patient location: ICU    Anesthesia Type: MAC    Transport from OR: Transported from OR on 6-10 L/min O2 by face mask with adequate spontaneous ventilation    Post pain: adequate analgesia    Post assessment: no apparent anesthetic complications and tolerated procedure well    Post vital signs: stable    Level of consciousness: awake    Nausea/Vomiting: no nausea/vomiting    Complications: none    Transfer of care protocol was followed    Last vitals: Visit Vitals  /71 (BP Location: Right arm, Patient Position: Lying)   Pulse 74   Temp 36.9 °C (98.5 °F) (Oral)   Resp 14   Ht 5' 11" (1.803 m)   Wt 103.2 kg (227 lb 8.2 oz)   SpO2 100%   BMI 31.73 kg/m²     "

## 2024-01-03 LAB
ALBUMIN SERPL BCP-MCNC: 3.2 G/DL (ref 3.5–5.2)
ALP SERPL-CCNC: 58 U/L (ref 55–135)
ALT SERPL W/O P-5'-P-CCNC: 25 U/L (ref 10–44)
ANION GAP SERPL CALC-SCNC: 12 MMOL/L (ref 8–16)
ANION GAP SERPL CALC-SCNC: 12 MMOL/L (ref 8–16)
AST SERPL-CCNC: 29 U/L (ref 10–40)
BASOPHILS # BLD AUTO: 0.1 K/UL (ref 0–0.2)
BASOPHILS NFR BLD: 1.2 % (ref 0–1.9)
BILIRUB SERPL-MCNC: 0.5 MG/DL (ref 0.1–1)
BUN SERPL-MCNC: 23 MG/DL (ref 6–20)
BUN SERPL-MCNC: 23 MG/DL (ref 6–20)
CALCIUM SERPL-MCNC: 9.4 MG/DL (ref 8.7–10.5)
CALCIUM SERPL-MCNC: 9.4 MG/DL (ref 8.7–10.5)
CHLORIDE SERPL-SCNC: 106 MMOL/L (ref 95–110)
CHLORIDE SERPL-SCNC: 106 MMOL/L (ref 95–110)
CO2 SERPL-SCNC: 20 MMOL/L (ref 23–29)
CO2 SERPL-SCNC: 20 MMOL/L (ref 23–29)
CREAT SERPL-MCNC: 1.1 MG/DL (ref 0.5–1.4)
CREAT SERPL-MCNC: 1.1 MG/DL (ref 0.5–1.4)
DIFFERENTIAL METHOD BLD: ABNORMAL
EOSINOPHIL # BLD AUTO: 0.4 K/UL (ref 0–0.5)
EOSINOPHIL NFR BLD: 4.4 % (ref 0–8)
ERYTHROCYTE [DISTWIDTH] IN BLOOD BY AUTOMATED COUNT: 12.8 % (ref 11.5–14.5)
EST. GFR  (NO RACE VARIABLE): >60 ML/MIN/1.73 M^2
EST. GFR  (NO RACE VARIABLE): >60 ML/MIN/1.73 M^2
GLUCOSE SERPL-MCNC: 131 MG/DL (ref 70–110)
GLUCOSE SERPL-MCNC: 131 MG/DL (ref 70–110)
HCT VFR BLD AUTO: 44.5 % (ref 40–54)
HGB BLD-MCNC: 15.2 G/DL (ref 14–18)
IMM GRANULOCYTES # BLD AUTO: 0.06 K/UL (ref 0–0.04)
IMM GRANULOCYTES NFR BLD AUTO: 0.7 % (ref 0–0.5)
LYMPHOCYTES # BLD AUTO: 2.1 K/UL (ref 1–4.8)
LYMPHOCYTES NFR BLD: 24.7 % (ref 18–48)
MAGNESIUM SERPL-MCNC: 1.8 MG/DL (ref 1.6–2.6)
MCH RBC QN AUTO: 31.4 PG (ref 27–31)
MCHC RBC AUTO-ENTMCNC: 34.2 G/DL (ref 32–36)
MCV RBC AUTO: 92 FL (ref 82–98)
MONOCYTES # BLD AUTO: 0.7 K/UL (ref 0.3–1)
MONOCYTES NFR BLD: 7.9 % (ref 4–15)
NEUTROPHILS # BLD AUTO: 5.2 K/UL (ref 1.8–7.7)
NEUTROPHILS NFR BLD: 61.1 % (ref 38–73)
NRBC BLD-RTO: 0 /100 WBC
PHOSPHATE SERPL-MCNC: 3.3 MG/DL (ref 2.7–4.5)
PLATELET # BLD AUTO: 286 K/UL (ref 150–450)
PMV BLD AUTO: 9.9 FL (ref 9.2–12.9)
POCT GLUCOSE: 114 MG/DL (ref 70–110)
POCT GLUCOSE: 117 MG/DL (ref 70–110)
POCT GLUCOSE: 169 MG/DL (ref 70–110)
POCT GLUCOSE: 173 MG/DL (ref 70–110)
POCT GLUCOSE: 232 MG/DL (ref 70–110)
POTASSIUM SERPL-SCNC: 4.3 MMOL/L (ref 3.5–5.1)
POTASSIUM SERPL-SCNC: 4.3 MMOL/L (ref 3.5–5.1)
PROT SERPL-MCNC: 7.4 G/DL (ref 6–8.4)
RBC # BLD AUTO: 4.84 M/UL (ref 4.6–6.2)
SODIUM SERPL-SCNC: 138 MMOL/L (ref 136–145)
SODIUM SERPL-SCNC: 138 MMOL/L (ref 136–145)
WBC # BLD AUTO: 8.46 K/UL (ref 3.9–12.7)

## 2024-01-03 PROCEDURE — 85025 COMPLETE CBC W/AUTO DIFF WBC: CPT | Performed by: STUDENT IN AN ORGANIZED HEALTH CARE EDUCATION/TRAINING PROGRAM

## 2024-01-03 PROCEDURE — 83735 ASSAY OF MAGNESIUM: CPT | Performed by: STUDENT IN AN ORGANIZED HEALTH CARE EDUCATION/TRAINING PROGRAM

## 2024-01-03 PROCEDURE — 92526 ORAL FUNCTION THERAPY: CPT

## 2024-01-03 PROCEDURE — 25000003 PHARM REV CODE 250: Performed by: STUDENT IN AN ORGANIZED HEALTH CARE EDUCATION/TRAINING PROGRAM

## 2024-01-03 PROCEDURE — 99232 SBSQ HOSP IP/OBS MODERATE 35: CPT | Mod: ,,,

## 2024-01-03 PROCEDURE — 25000003 PHARM REV CODE 250: Performed by: INTERNAL MEDICINE

## 2024-01-03 PROCEDURE — 63600175 PHARM REV CODE 636 W HCPCS

## 2024-01-03 PROCEDURE — 20000000 HC ICU ROOM

## 2024-01-03 PROCEDURE — 97530 THERAPEUTIC ACTIVITIES: CPT

## 2024-01-03 PROCEDURE — 99233 SBSQ HOSP IP/OBS HIGH 50: CPT | Mod: ,,, | Performed by: INTERNAL MEDICINE

## 2024-01-03 PROCEDURE — 25000003 PHARM REV CODE 250

## 2024-01-03 PROCEDURE — 80053 COMPREHEN METABOLIC PANEL: CPT | Performed by: STUDENT IN AN ORGANIZED HEALTH CARE EDUCATION/TRAINING PROGRAM

## 2024-01-03 PROCEDURE — 94761 N-INVAS EAR/PLS OXIMETRY MLT: CPT

## 2024-01-03 PROCEDURE — 63600175 PHARM REV CODE 636 W HCPCS: Performed by: STUDENT IN AN ORGANIZED HEALTH CARE EDUCATION/TRAINING PROGRAM

## 2024-01-03 PROCEDURE — 84100 ASSAY OF PHOSPHORUS: CPT | Performed by: STUDENT IN AN ORGANIZED HEALTH CARE EDUCATION/TRAINING PROGRAM

## 2024-01-03 RX ORDER — IBUPROFEN 200 MG
24 TABLET ORAL
Status: DISCONTINUED | OUTPATIENT
Start: 2024-01-03 | End: 2024-01-04 | Stop reason: HOSPADM

## 2024-01-03 RX ORDER — IBUPROFEN 200 MG
16 TABLET ORAL
Status: DISCONTINUED | OUTPATIENT
Start: 2024-01-03 | End: 2024-01-04 | Stop reason: HOSPADM

## 2024-01-03 RX ORDER — GLUCAGON 1 MG
1 KIT INJECTION
Status: DISCONTINUED | OUTPATIENT
Start: 2024-01-03 | End: 2024-01-04 | Stop reason: HOSPADM

## 2024-01-03 RX ORDER — INSULIN ASPART 100 [IU]/ML
9 INJECTION, SOLUTION INTRAVENOUS; SUBCUTANEOUS
Status: DISCONTINUED | OUTPATIENT
Start: 2024-01-04 | End: 2024-01-04 | Stop reason: HOSPADM

## 2024-01-03 RX ORDER — METOPROLOL SUCCINATE 25 MG/1
25 TABLET, EXTENDED RELEASE ORAL DAILY
Status: DISCONTINUED | OUTPATIENT
Start: 2024-01-03 | End: 2024-01-04 | Stop reason: HOSPADM

## 2024-01-03 RX ORDER — SPIRONOLACTONE 25 MG/1
25 TABLET ORAL DAILY
Status: DISCONTINUED | OUTPATIENT
Start: 2024-01-03 | End: 2024-01-04 | Stop reason: HOSPADM

## 2024-01-03 RX ORDER — INSULIN ASPART 100 [IU]/ML
0-10 INJECTION, SOLUTION INTRAVENOUS; SUBCUTANEOUS
Status: DISCONTINUED | OUTPATIENT
Start: 2024-01-03 | End: 2024-01-04

## 2024-01-03 RX ORDER — MAGNESIUM SULFATE HEPTAHYDRATE 40 MG/ML
2 INJECTION, SOLUTION INTRAVENOUS ONCE
Status: COMPLETED | OUTPATIENT
Start: 2024-01-03 | End: 2024-01-03

## 2024-01-03 RX ORDER — INSULIN ASPART 100 [IU]/ML
12 INJECTION, SOLUTION INTRAVENOUS; SUBCUTANEOUS
Status: DISCONTINUED | OUTPATIENT
Start: 2024-01-03 | End: 2024-01-03

## 2024-01-03 RX ADMIN — INSULIN ASPART 12 UNITS: 100 INJECTION, SOLUTION INTRAVENOUS; SUBCUTANEOUS at 11:01

## 2024-01-03 RX ADMIN — SACUBITRIL AND VALSARTAN 1 TABLET: 24; 26 TABLET, FILM COATED ORAL at 09:01

## 2024-01-03 RX ADMIN — HYDRALAZINE HYDROCHLORIDE 10 MG: 10 TABLET, FILM COATED ORAL at 02:01

## 2024-01-03 RX ADMIN — TICAGRELOR 90 MG: 90 TABLET ORAL at 08:01

## 2024-01-03 RX ADMIN — HYDRALAZINE HYDROCHLORIDE 10 MG: 10 TABLET, FILM COATED ORAL at 05:01

## 2024-01-03 RX ADMIN — SACUBITRIL AND VALSARTAN 1 TABLET: 24; 26 TABLET, FILM COATED ORAL at 08:01

## 2024-01-03 RX ADMIN — DOXYCYCLINE HYCLATE 100 MG: 100 TABLET, FILM COATED ORAL at 08:01

## 2024-01-03 RX ADMIN — HYDRALAZINE HYDROCHLORIDE 10 MG: 10 TABLET, FILM COATED ORAL at 09:01

## 2024-01-03 RX ADMIN — INSULIN DETEMIR 22 UNITS: 100 INJECTION, SOLUTION SUBCUTANEOUS at 11:01

## 2024-01-03 RX ADMIN — FUROSEMIDE 40 MG: 40 TABLET ORAL at 09:01

## 2024-01-03 RX ADMIN — INSULIN ASPART 12 UNITS: 100 INJECTION, SOLUTION INTRAVENOUS; SUBCUTANEOUS at 04:01

## 2024-01-03 RX ADMIN — METOPROLOL SUCCINATE 25 MG: 25 TABLET, EXTENDED RELEASE ORAL at 11:01

## 2024-01-03 RX ADMIN — ATORVASTATIN CALCIUM 40 MG: 40 TABLET, FILM COATED ORAL at 09:01

## 2024-01-03 RX ADMIN — DOXYCYCLINE HYCLATE 100 MG: 100 TABLET, FILM COATED ORAL at 09:01

## 2024-01-03 RX ADMIN — GABAPENTIN 300 MG: 300 CAPSULE ORAL at 09:01

## 2024-01-03 RX ADMIN — OXYCODONE HYDROCHLORIDE 5 MG: 5 TABLET ORAL at 09:01

## 2024-01-03 RX ADMIN — GABAPENTIN 300 MG: 300 CAPSULE ORAL at 08:01

## 2024-01-03 RX ADMIN — ASPIRIN 81 MG CHEWABLE TABLET 81 MG: 81 TABLET CHEWABLE at 09:01

## 2024-01-03 RX ADMIN — TICAGRELOR 90 MG: 90 TABLET ORAL at 09:01

## 2024-01-03 RX ADMIN — MAGNESIUM SULFATE HEPTAHYDRATE 2 G: 40 INJECTION, SOLUTION INTRAVENOUS at 09:01

## 2024-01-03 NOTE — ANESTHESIA POSTPROCEDURE EVALUATION
Anesthesia Post Evaluation    Patient: Magdaleno Cunningham    Procedure(s) Performed: Procedure(s) (LRB):  Insertion, ICD, Dual Chamber (N/A)    Final Anesthesia Type: general      Patient location during evaluation: ICU  Patient participation: Yes- Able to Participate  Level of consciousness: awake and alert and oriented  Post-procedure vital signs: reviewed and stable  Pain management: adequate  Airway patency: patent    PONV status at discharge: No PONV  Anesthetic complications: no      Cardiovascular status: blood pressure returned to baseline, hemodynamically stable and stable  Respiratory status: unassisted, room air and spontaneous ventilation  Hydration status: euvolemic  Follow-up not needed.              Vitals Value Taken Time   /89 01/03/24 0617   Temp 36.8 °C (98.3 °F) 01/03/24 0301   Pulse 82 01/03/24 0628   Resp 15 01/03/24 0628   SpO2 97 % 01/03/24 0628   Vitals shown include unvalidated device data.      No case tracking events are documented in the log.      Pain/Breonna Score: No data recorded

## 2024-01-03 NOTE — SUBJECTIVE & OBJECTIVE
Interval History: NAEON. Patient reports doing well this morning. No acute complaints. CRTD yesterday with EP and tolerated well. Still in Afib. Plan for cardioversion tomorrow.     Review of Systems   All other systems reviewed and are negative.    Objective:     Vital Signs (Most Recent):  Temp: 98.7 °F (37.1 °C) (01/03/24 1105)  Pulse: 88 (01/03/24 1400)  Resp: (!) 21 (01/03/24 1400)  BP: 115/85 (01/03/24 1400)  SpO2: 98 % (01/03/24 1400) Vital Signs (24h Range):  Temp:  [98 °F (36.7 °C)-98.8 °F (37.1 °C)] 98.7 °F (37.1 °C)  Pulse:  [] 88  Resp:  [12-32] 21  SpO2:  [96 %-100 %] 98 %  BP: (100-138)/() 115/85     Weight: 114.6 kg (252 lb 10.4 oz)  Body mass index is 35.24 kg/m².     SpO2: 98 %         Intake/Output Summary (Last 24 hours) at 1/3/2024 1506  Last data filed at 1/3/2024 1410  Gross per 24 hour   Intake 2424.38 ml   Output 1850 ml   Net 574.38 ml         Lines/Drains/Airways       Peripheral Intravenous Line  Duration                  Peripheral IV - Single Lumen 12/31/23 1143 20 G Anterior;Right Forearm 3 days         Peripheral IV - Single Lumen 12/31/23 1144 20 G Anterior;Left Forearm 3 days                       Physical Exam  Constitutional:       Appearance: Normal appearance.   Cardiovascular:      Rate and Rhythm: Normal rate. Rhythm irregular.      Pulses: Normal pulses.      Heart sounds: Normal heart sounds.   Pulmonary:      Effort: Pulmonary effort is normal.      Breath sounds: Normal breath sounds.   Musculoskeletal:      Right lower leg: No edema.      Left lower leg: No edema.   Skin:     General: Skin is warm.   Neurological:      General: No focal deficit present.      Mental Status: He is oriented to person, place, and time.            Significant Labs: All pertinent lab results from the last 24 hours have been reviewed.

## 2024-01-03 NOTE — PROGRESS NOTES
[facility]  Cardiology  Progress Note    Patient Name: Magdaleno Cunningham  MRN: [id number]  Admission Date: 12/26/2023  Hospital Length of Stay: 8 days  Code Status: Full Code   Attending Physician: Larry Alvarez MD   Primary Care Physician: Venkata Mclaughlin MD  Expected Discharge Date: 1/5/2024  Principal Problem:Cardiogenic shock    Subjective:     Hospital Course:   Patient weaned off balloon pump. He had TVP removed after CHB resolved and intrinsic rhythm atrial fibrillation with controlled heart rate. Patient pending CRTD followed by ILAN/DCCV      Interval History: NAEON. Patient reports doing well this morning. No acute complaints. CRTD yesterday with EP and tolerated well. Still in Afib. Plan for cardioversion tomorrow.     Review of Systems   All other systems reviewed and are negative.    Objective:     Vital Signs (Most Recent):  Temp: 98.7 °F (37.1 °C) (01/03/24 1105)  Pulse: 88 (01/03/24 1400)  Resp: (!) 21 (01/03/24 1400)  BP: 115/85 (01/03/24 1400)  SpO2: 98 % (01/03/24 1400) Vital Signs (24h Range):  Temp:  [98 °F (36.7 °C)-98.8 °F (37.1 °C)] 98.7 °F (37.1 °C)  Pulse:  [] 88  Resp:  [12-32] 21  SpO2:  [96 %-100 %] 98 %  BP: (100-138)/() 115/85     Weight: 114.6 kg (252 lb 10.4 oz)  Body mass index is 35.24 kg/m².     SpO2: 98 %         Intake/Output Summary (Last 24 hours) at 1/3/2024 1506  Last data filed at 1/3/2024 1410  Gross per 24 hour   Intake 2424.38 ml   Output 1850 ml   Net 574.38 ml         Lines/Drains/Airways       Peripheral Intravenous Line  Duration                  Peripheral IV - Single Lumen 12/31/23 1143 20 G Anterior;Right Forearm 3 days         Peripheral IV - Single Lumen 12/31/23 1144 20 G Anterior;Left Forearm 3 days                       Physical Exam  Constitutional:       Appearance: Normal appearance.   Cardiovascular:      Rate and Rhythm: Normal rate. Rhythm irregular.      Pulses: Normal pulses.      Heart sounds: Normal heart  sounds.   Pulmonary:      Effort: Pulmonary effort is normal.      Breath sounds: Normal breath sounds.   Musculoskeletal:      Right lower leg: No edema.      Left lower leg: No edema.   Skin:     General: Skin is warm.   Neurological:      General: No focal deficit present.      Mental Status: He is oriented to person, place, and time.            Significant Labs: All pertinent lab results from the last 24 hours have been reviewed.      Assessment and Plan:       * Cardiogenic shock  2/2 STEMI/ICM  Mehanical support: IABP @ 1:1 initially with pressor support patient weaned off successfully     - extubated on 12/28/23  - balloon pump removed 12/30  - 80 lasix IV daily   -  valsartan 20 BID           Pneumonia  - Completed treatment with Ceftriaxone     Typical atrial flutter  Newly diagnosed. Seen on EKG 12/25 with inferior STEMI    - Plan for ILAN DCCV    Paroxysmal atrial fibrillation  See Afib    Complete heart block  Possibly ischemia induced vs medication induced. Hold home metoprolol.Had TVP placed and after no longer needing was removed on 1/1/2024   currently SR with normal conduction yesterday but now AF with controlled V rate.   EP recs bellow  -After CRT-D (maybe 24-48hr later) can cardiovert him for AF/AFL and he can be on AC at that time        STEMI (ST elevation myocardial infarction)  Continue brilinta and statin    HLD (hyperlipidemia)  Continue home statin    Atrial fibrillation  EKG with sinus rhythm on CHB initially but now back to afib. Not pacing for more than 24 hrs    - Plan for Cardioversion tomorrow with EP   - Starting back Eliquis tomorrow morning    Diabetic ulcer of left foot associated with type 2 diabetes mellitus  A1c 8.6%  Takes lantus 80 bid, metformin, jardiance  Monitor BG, adjust insulin as needed  Endocrinology managing         VTE Risk Mitigation (From admission, onward)           Ordered     IP VTE HIGH RISK PATIENT  Once         12/26/23 0541     Place sequential compression  device  Until discontinued         12/26/23 0541                    Anamaria Martines MD  Cardiology  [facility] - [facility]

## 2024-01-03 NOTE — PLAN OF CARE
"CICU Care Plan    POC reviewed with Magdaleno Cunningham at 1901. Pt verbalized understanding. Questions and concerns addressed. No acute events noted this shift. Patient NPO since midnight, CHG and pre-op checklist completed. Pt progressing toward goals. Security measures in place, plan of care to continue. See below and flowsheets for full assessment and VS info.     Neuro:  Val Coma Scale  Best Eye Response: 4-->(E4) spontaneous  Best Motor Response: 6-->(M6) obeys commands  Best Verbal Response: 5-->(V5) oriented  Val Coma Scale Score: 15  Assessment Qualifiers: patient not sedated/intubated, no eye obstruction present  Pupil PERRLA: yes     24 hr Temp:  [98.3 °F (36.8 °C)-98.8 °F (37.1 °C)]     CV:   Rhythm: atrial rhythm  BP goals:   SBP < 160  MAP > 65    Resp:   Plan: N/A    GI/:     Diet/Nutrition Received: 2 gram sodium, low saturated fat/low cholesterol  Last Bowel Movement: 01/01/24  Voiding Characteristics: voids spontaneously without difficulty    Intake/Output Summary (Last 24 hours) at 1/3/2024 0646  Last data filed at 1/3/2024 0501  Gross per 24 hour   Intake 1768.53 ml   Output 1845 ml   Net -76.47 ml     Unmeasured Output  Stool Occurrence: 1  Nutritional Supplement Intake: Quantity 0, Type:     Labs/Accuchecks:  Recent Labs   Lab 01/03/24  0326   WBC 8.46   RBC 4.84   HGB 15.2   HCT 44.5         Recent Labs   Lab 01/03/24  0326     138   K 4.3  4.3   CO2 20*  20*     106   BUN 23*  23*   CREATININE 1.1  1.1   ALKPHOS 58   ALT 25   AST 29   BILITOT 0.5      Recent Labs   Lab 12/28/23  1302 12/28/23  1927 01/02/24  0215   INR 1.1  --   --    APTT 26.6   < > 54.5*    < > = values in this interval not displayed.    No results for input(s): "CPK", "CPKMB", "TROPONINI", "MB" in the last 168 hours.    Electrolytes: WDL  Accuchecks: ACHS    Gtts:   sodium chloride 0.9% Stopped (01/02/24 0949)    insulin regular 1 units/mL infusion orderable (TRANSFER) 1.1 Units/hr " (01/03/24 0401)       LDA/Wounds:  Lines/Drains/Airways       Peripheral Intravenous Line  Duration                  Peripheral IV - Single Lumen 12/31/23 1143 20 G Anterior;Right Forearm 2 days         Peripheral IV - Single Lumen 12/31/23 1144 20 G Anterior;Left Forearm 2 days                  Wounds: No  Wound care consulted: No    Problem: Adult Inpatient Plan of Care  Goal: Plan of Care Review  Outcome: Ongoing, Progressing  Goal: Patient-Specific Goal (Individualized)  Outcome: Ongoing, Progressing  Goal: Absence of Hospital-Acquired Illness or Injury  Outcome: Ongoing, Progressing  Goal: Optimal Comfort and Wellbeing  Outcome: Ongoing, Progressing  Goal: Readiness for Transition of Care  Outcome: Ongoing, Progressing     Problem: Diabetes Comorbidity  Goal: Blood Glucose Level Within Targeted Range  Outcome: Ongoing, Progressing     Problem: Infection  Goal: Absence of Infection Signs and Symptoms  Outcome: Ongoing, Progressing     Problem: Fall Injury Risk  Goal: Absence of Fall and Fall-Related Injury  Outcome: Ongoing, Progressing     Problem: Restraint, Nonbehavioral (Nonviolent)  Goal: Absence of Harm or Injury  Outcome: Ongoing, Progressing     Problem: Skin Injury Risk Increased  Goal: Skin Health and Integrity  Outcome: Ongoing, Progressing     Problem: Communication Impairment (Mechanical Ventilation, Invasive)  Goal: Effective Communication  Outcome: Ongoing, Progressing     Problem: Device-Related Complication Risk (Mechanical Ventilation, Invasive)  Goal: Optimal Device Function  Outcome: Ongoing, Progressing     Problem: Inability to Wean (Mechanical Ventilation, Invasive)  Goal: Mechanical Ventilation Liberation  Outcome: Ongoing, Progressing     Problem: Nutrition Impairment (Mechanical Ventilation, Invasive)  Goal: Optimal Nutrition Delivery  Outcome: Ongoing, Progressing     Problem: Skin and Tissue Injury (Mechanical Ventilation, Invasive)  Goal: Absence of Device-Related Skin and Tissue  Injury  Outcome: Ongoing, Progressing     Problem: Ventilator-Induced Lung Injury (Mechanical Ventilation, Invasive)  Goal: Absence of Ventilator-Induced Lung Injury  Outcome: Ongoing, Progressing     Problem: Communication Impairment (Artificial Airway)  Goal: Effective Communication  Outcome: Ongoing, Progressing     Problem: Device-Related Complication Risk (Artificial Airway)  Goal: Optimal Device Function  Outcome: Ongoing, Progressing     Problem: Skin and Tissue Injury (Artificial Airway)  Goal: Absence of Device-Related Skin or Tissue Injury  Outcome: Ongoing, Progressing     Problem: Impaired Wound Healing  Goal: Optimal Wound Healing  Outcome: Ongoing, Progressing     Problem: Fluid Imbalance (Pneumonia)  Goal: Fluid Balance  Outcome: Ongoing, Progressing     Problem: Infection (Pneumonia)  Goal: Resolution of Infection Signs and Symptoms  Outcome: Ongoing, Progressing     Problem: Respiratory Compromise (Pneumonia)  Goal: Effective Oxygenation and Ventilation  Outcome: Ongoing, Progressing

## 2024-01-03 NOTE — ASSESSMENT & PLAN NOTE
Known paroxysmal afib, on eliquis and toprol xl at home  Plan for DCCV tomorrow and can anticoagulate tomorrow morning with DOAC

## 2024-01-03 NOTE — PT/OT/SLP PROGRESS
Occupational Therapy   Treatment    Name: Magdaleno Cunningham  MRN: [id number]  Admitting Diagnosis:  Cardiogenic shock  1 Day Post-Op    Recommendations:     Discharge Recommendations: No Therapy Indicated  Discharge Equipment Recommendations:  none  Barriers to discharge:  None    Assessment:     Magdaleno Cunningham is a 58 y.o. male with a medical diagnosis of Cardiogenic shock.  He was educated on pacemaker precautions, sling and swathe wear schedule and verbalized and demonstrated understanding throughout session. Performance deficits affecting function are impaired endurance, impaired cardiopulmonary response to activity, impaired self care skills, impaired functional mobility.     Rehab Prognosis:  Good; patient would benefit from acute skilled OT services to address these deficits and reach maximum level of function.       Plan:     Patient to be seen 2 x/week to address the above listed problems via self-care/home management, therapeutic activities, therapeutic exercises, neuromuscular re-education  Plan of Care Expires: 02/01/24  Plan of Care Reviewed with: patient, son    Subjective     Chief Complaint: none verbalized   Patient/Family Comments/goals: get better and go hoem   Pain/Comfort:  Pain Rating 1: 0/10  Pain Rating Post-Intervention 1: 0/10    Objective:     Communicated with: vonda prior to session.  Patient found up in chair with blood pressure cuff, telemetry, pulse ox (continuous), peripheral IV upon OT entry to room.Patient's son present in room during session.     General Precautions: Standard, aspiration, fall    Orthopedic Precautions: (pacemaker)  Braces: Sling and swathe  Respiratory Status: Room air     Occupational Performance:     Functional Mobility/Transfers:  Patient completed Sit <> Stand Transfer with supervision  with  no assistive device x2 reps during session; no LUE use  Functional Mobility: patient ambulated community level distances (~300 ft) with SBA and no AD  RN and  portable monitor present to assess within [facility]    Activities of Daily Living:  Upper Body Dressing: maximal assistance to don sling/swathe; educated and properly positioned for patient and son to understand donning; verbalized understanding      [facility] 6 Click ADL: 24    Treatment & Education:  Patient educated on pacemaker  precautions including no lifting LUE above 90* shoulder flexion, no pushing/pulling, or lifitng>5lbs. Patient with good understanding of sling and swathe wear schedule.     1x10 LUE hand pumps and elbow flexion/extension due to noted stiffness secondary to recent immobilization. Now >24hrs after PPM placement.      Patient educated on:   -purpose of OT and OT POC  -facilitation and education on proper body mechanics, energy conservation, and safety  -importance of early mobility and out of bed activities with staff assist  -overall benefits of therapy     All questions answered within OT scope and to patient's satisfaction    Patient left up in chair with all lines intact, call button in reach, and nrusing notified; son present    GOALS:   Multidisciplinary Problems       Occupational Therapy Goals          Problem: Occupational Therapy    Goal Priority Disciplines Outcome Interventions   Occupational Therapy Goal     OT, PT/OT Ongoing, Progressing    Description: Goals to be met by: 2/1/24     Patient will increase functional independence with ADLs by performing:    LE Dressing with Supervision.  Grooming while standing at sink with Supervision.  Toileting from toilet with Supervision for hygiene and clothing management.   Supine to sit with Supervision.  [place] with BUE HEP to improve activity tolerance to complete ADLs and IADLs  Within home ambulation distances with supervision and LRAD necessary to complete ADLs.                           Time Tracking:     OT Date of Treatment: 01/03/24  OT Start Time: 1437  OT Stop Time: 1501  OT Total Time (min): 24 min    Billable  Minutes:Therapeutic Activity 24    OT/ANDREW: OT          1/3/2024

## 2024-01-03 NOTE — PLAN OF CARE
POC reviewed with Magdaleno Cunningham and family. Questions and concerns addressed. Pt got CRT-D today. VSS. Insulin gtt continued. Heparin gtt off before procedure. See below and flowsheets for full assessment and VS info.       Neuro:  Val Coma Scale  Best Eye Response: 4-->(E4) spontaneous  Best Motor Response: 6-->(M6) obeys commands  Best Verbal Response: 5-->(V5) oriented  [place] Coma Scale Score: 15  Assessment Qualifiers: patient not sedated/intubated, no eye obstruction present  Pupil PERRLA: yes    24 hr Temp:  [98.4 °F (36.9 °C)-98.6 °F (37 °C)]     CV:  Rhythm: atrial rhythm   DVT prophylaxis: VTE Required Core Measure: Pharmacological prophylaxis initiated/maintained    CVP (mean): 1 mmHg (12/31/23 0705)       SVO2 (%): 62 % (12/29/23 2000)    IABP Mode: Auto  IABP Rate: 1:1  IABP Trigger: ECG  IABP Augmented Diastolic Pressure: 112          Pulses  Right Radial Pulse: 2+ (normal)  Left Radial Pulse: 2+ (normal)  Right Dorsalis Pedis Pulse: 1+ (weak)  Left Dorsalis Pedis Pulse: 1+ (weak)  Right Posterior Tibial Pulse: 1+ (weak)  Left Posterior Tibial Pulse: 1+ (weak)    Resp:  Flow (L/min): 1  Vent Mode: Spont  Set Rate: 22 BPM  Oxygen Concentration (%): 30  Vt Set: 450 mL  PEEP/CPAP: 5 cmH20  Pressure Support: 8 cmH20    GI/:  GI prophylaxis: no  Diet/Nutrition Received: NPO  Last Bowel Movement: 01/01/24  Voiding Characteristics: voids spontaneously without difficulty  [REMOVED]      Urethral Catheter 12/28/23 0857 Silicone 16 Fr.-Reason for Continuing Urinary Catheterization: Critically ill in ICU and requiring hourly monitoring of intake/output   Intake/Output Summary (Last 24 hours) at 1/2/2024 1831  Last data filed at 1/2/2024 1528  Gross per 24 hour   Intake 1112.14 ml   Output 1370 ml   Net -257.86 ml          Recent Labs   Lab 12/31/23  0324 01/01/24  0310 01/02/24  0215   WBC 10.89  10.89 9.33 9.74   RBC 5.09  5.09 4.86 4.85   HGB 15.9  15.9 15.1 14.8   HCT 45.2  45.2 43.0 43.9      297 262 285      Recent Labs   Lab 12/27/23  1322 12/27/23  2124 12/28/23  1302 12/28/23  1927 12/31/23  1106 01/01/24  0310 01/02/24  0215   INR 1.0  --  1.1  --   --   --   --    APTT 25.4   < > 26.6   < > 42.4* 63.8* 54.5*    < > = values in this interval not displayed.      Recent Labs     01/02/24  0215 01/02/24  1639     139 139   K 3.8  3.8 4.7   CO2 20*  20* 21*     105 106   BUN 34*  34* 27*   CREATININE 1.3  1.3 1.4   ALKPHOS 55  --    ALT 26  --    AST 30  --    BILITOT 0.5  --        Recent Labs   Lab 12/26/23  2130   TROPONINI 24.449*      Recent Labs     12/30/23  1952 12/31/23  0822   PH 7.436 7.431   PCO2 28.5* 31.5*   PO2 33* 37*   HCO3 19.2* 20.9*   POCSATURATED 67 74   BE -5* -3*       Electrolytes: Electrolytes replaced  Accuchecks: Q4H    Gtts/LDAs:   sodium chloride 0.9% 10 mL/hr at 01/02/24 0805    insulin regular 1 units/mL infusion orderable (TRANSFER) 1.1 Units/hr (01/02/24 1019)       Lines/Drains/Airways       Peripheral Intravenous Line  Duration                  Peripheral IV - Single Lumen 12/31/23 1143 20 G Anterior;Right Forearm 2 days         Peripheral IV - Single Lumen 12/31/23 1144 20 G Anterior;Left Forearm 2 days                    Skin/Wounds  Bathing/Skin Care: bath, complete;electrode patches/site rotation;incontinence care;linen changed (01/02/24 0232)  Wounds: Yes  Wound care consulted: No

## 2024-01-03 NOTE — ASSESSMENT & PLAN NOTE
#CHB with wide fractionated ventricular escape likely from non revascularizable STEMI    Left dominant coronary system  Inferior STEMI with CW a flutter 12/25, LIMA-LAD and SVG -OM are down as well, Lcx couldn't be revasuclarized  Subsequently developed CHB  EF 20-25%  Atrial flutter last night with atrial fibrillation as well overnight and this morning. Using native AV tamika conduction, only TVP paced intermittently. Rate controlled off AVN blocking agents.   Not pacer dependent at this time, so TVP set to backup rate of 50 yesterday, threshold 3.5mA  IABP out  TVP out 1/1/24    Plan  -CRT-D placed 1/2/24, doing well  -Plan for Eliquis starting tomorrow morning and ILAN DCCV tomorrow

## 2024-01-03 NOTE — ASSESSMENT & PLAN NOTE
EKG with sinus rhythm on CHB initially but now back to afib. Not pacing for more than 24 hrs    - Plan for Cardioversion tomorrow with EP   - Starting back Eliquis tomorrow morning

## 2024-01-03 NOTE — ASSESSMENT & PLAN NOTE
Possibly ischemia induced vs medication induced. Hold home metoprolol.Had TVP placed and after no longer needing was removed on 1/1/2024   currently SR with normal conduction yesterday but now AF with controlled V rate.   EP recs bellow  -After CRT-D (maybe 24-48hr later) can cardiovert him for AF/AFL and he can be on AC at that time

## 2024-01-03 NOTE — PT/OT/SLP PROGRESS
"Speech Language Pathology Treatment    Patient Name:  Magdaleno Cunningham   MRN:  [id number]  Admitting Diagnosis: Cardiogenic shock    Recommendations:                 General Recommendations:   ongoing swallowing assessment  Diet recommendations:  SLP unable to make diet recommendations based off of limited PO trials as pt was NPO except for PO medications due to possible procedure on this service date  Aspiration Precautions: HOB to 90 degrees, Monitor for s/s of aspiration, and Standard aspiration precautions   General Precautions: Standard, aspiration, fall  Communication strategies:  none    Assessment:     Magdaleno Cunningham is a 58 y.o. male.  Swallowing abilities found to be WFL for thin liquids and PO medication. However, unable to assess pt's tolerance of solids due to NPO status on this service date.     Subjective     "I'm on a cardiac diet." Pt reported being placed on a cardiac diet over the weekend. Pt NPO on this service date for possible procedure.     Pain/Comfort:  Pain Rating 1: 0/10    Respiratory Status: Room air    Objective:     Has the patient been evaluated by SLP for swallowing?   Yes  Keep patient NPO? No   Current Respiratory Status:        RN gave clearance for SLP to assess tolerance of water and PO meds as pt is NPO on this service date for possible procedure.  Pt sitting upright in bedside chair, fully awake/alert and interacting appropriately.  Pt observed tolerating thin water via cup edge and straw. He also tolerated multiple pills at one time with straw sips of water.  No overt s/s of aspiration were observed.  Pt reports already tolerating a cardiac diet he was placed on over the weekend.  SLP services have not been able to re-assess for tolerance of solids over past 2 attempts due to NPO status.  SLP will attempt to assess tolerance of solids on next service date.     Goals:   Multidisciplinary Problems       SLP Goals          Problem: SLP    Goal Priority Disciplines Outcome "   SLP Goal     SLP Ongoing, Progressing   Description: Speech Language Pathology Goals  Goals expected to be met by 1/5/23    1. Pt will participate in ongoing assessment of swallow function to determine safest, least restrictive means of nutrition/hydration  2. Educate Pt and family on aspiration precautions and SLP POC                         Plan:     Patient to be seen:  4 x/week   Plan of Care expires:  01/28/24  Plan of Care reviewed with:  patient   SLP Follow-Up:  Yes       Discharge recommendations:   (likely no SLP needs post d/c)     Time Tracking:     SLP Treatment Date:   01/03/24  Speech Start Time:  0854  Speech Stop Time:  0903     Speech Total Time (min):  9 min    Billable Minutes: Treatment Swallowing Dysfunction 9    01/03/2024

## 2024-01-03 NOTE — SUBJECTIVE & OBJECTIVE
Interval History: Doing well post device placement. In atrial fib on telemetry, rate control.     Review of Systems   Constitutional: Negative for diaphoresis and fever.   Cardiovascular:  Negative for chest pain, dyspnea on exertion, leg swelling, near-syncope, orthopnea, palpitations, paroxysmal nocturnal dyspnea and syncope.   Respiratory:  Negative for cough and shortness of breath.    Gastrointestinal:  Negative for abdominal pain, diarrhea, nausea and vomiting.   Neurological:  Negative for light-headedness.   Psychiatric/Behavioral:  Negative for altered mental status and substance abuse.        Current Facility-Administered Medications:     0.9%  NaCl infusion, , Intravenous, Continuous, Pretty Jarvis MD, Stopped at 01/02/24 0949    0.9%  NaCl infusion, , Intravenous, PRN, Pretty Jarvis MD    0.9%  NaCl infusion, , Intravenous, PRN, Pretty Jarvis MD, Stopped at 12/31/23 1115    aspirin chewable tablet 81 mg, 81 mg, Oral, Daily, Pretty Jarvis MD, 81 mg at 01/03/24 0901    atorvastatin tablet 40 mg, 40 mg, Oral, Daily, Charli Pereira MD, 40 mg at 01/03/24 0901    BUPivacaine (PF) 0.25% (2.5 mg/ml) injection, , , PRN, Antonio Callejas MD, 10 mL at 01/02/24 1428    dextrose 10% bolus 125 mL 125 mL, 12.5 g, Intravenous, PRN, Octavio Eng MD    dextrose 10% bolus 125 mL 125 mL, 12.5 g, Intravenous, PRN, Charli Pereira MD    dextrose 10% bolus 125 mL 125 mL, 12.5 g, Intravenous, PRN, Dell Zavaleta DNP, SEANP    dextrose 10% bolus 125 mL 125 mL, 12.5 g, Intravenous, PRN, Brigitte Champion PA-C    dextrose 10% bolus 250 mL 250 mL, 25 g, Intravenous, PRN, Octavio Eng MD    dextrose 10% bolus 250 mL 250 mL, 25 g, Intravenous, PRN, Charli Pereira MD    dextrose 10% bolus 250 mL 250 mL, 25 g, Intravenous, PRN, Dell Zavaleta, DNP, FNP    dextrose 10% bolus 250 mL 250 mL, 25 g, Intravenous, PRN, Brigitte Champion PA-C    doxycycline  tablet 100 mg, 100 mg, Oral, Q12H, Mayo Mcdonald MD, 100 mg at 01/03/24 0901    furosemide tablet 40 mg, 40 mg, Oral, Daily, Viviane Hernandez MD, 40 mg at 01/03/24 0901    gabapentin capsule 300 mg, 300 mg, Oral, BID, Viviane Hernandez MD, 300 mg at 01/03/24 0901    glucagon (human recombinant) injection 1 mg, 1 mg, Intramuscular, PRN, Brigitte Champion PA-C    glucose chewable tablet 16 g, 16 g, Oral, PRN, Brigitte Champion PA-C    glucose chewable tablet 24 g, 24 g, Oral, PRN, Brigitte Champion PA-C    hydrALAZINE tablet 10 mg, 10 mg, Oral, Q8H, Viviane Hernandez MD, 10 mg at 01/03/24 0546    hydrOXYzine pamoate capsule 25 mg, 25 mg, Oral, Q8H PRN, Viviane Hernandez MD, 25 mg at 12/31/23 2120    insulin aspart U-100 pen 0-10 Units, 0-10 Units, Subcutaneous, AC + HS + 0200 PRN, Brigitte Champion PA-C    insulin aspart U-100 pen 12 Units, 12 Units, Subcutaneous, TIDWM, Brigitte Champion PA-C, 12 Units at 01/03/24 1114    insulin detemir U-100 (Levemir) pen 22 Units, 22 Units, Subcutaneous, Daily, Brigitte Champion PA-C, 22 Units at 01/03/24 1114    LIDOcaine HCL 20 mg/ml (2%) injection, , , PRN, Antonio Callejas MD, 10 mL at 01/02/24 1429    metoprolol succinate (TOPROL-XL) 24 hr tablet 25 mg, 25 mg, Oral, Daily, Viviane Hernandez MD, 25 mg at 01/03/24 1104    oxyCODONE immediate release tablet 5 mg, 5 mg, Oral, Q6H PRN, Viviane Hernandez MD, 5 mg at 12/30/23 1944    pantoprazole suspension 40 mg, 40 mg, Oral, Daily, Pretty Jarvis MD, 40 mg at 01/02/24 0813    polyethylene glycol packet 17 g, 17 g, Oral, Daily, Viviane Hernandez MD, 17 g at 12/31/23 1157    sacubitriL-valsartan 24-26 mg per tablet 1 tablet, 1 tablet, Oral, BID, Viviane Hernandez MD, 1 tablet at 01/03/24 0901    sodium chloride 0.9% flush 10 mL, 10 mL, Intravenous, PRN, Charli Pereira MD    sodium chloride 0.9% irrigation, , , PRN, Júnior,  Antonio BRENNER MD, 1,000 mL at 01/02/24 1411    spironolactone tablet 25 mg, 25 mg, Oral, Daily, Viviane Hernandez MD    ticagrelor tablet 90 mg, 90 mg, Oral, BID, Charli Pereira MD, 90 mg at 01/03/24 0901    vancomycin injection, , , PRN, Antonio Callejas MD, 1,000 mg at 01/02/24 1410      sodium chloride 0.9%, sodium chloride 0.9%, BUPivacaine (PF) 0.25% (2.5 mg/ml), dextrose 10%, dextrose 10%, dextrose 10%, dextrose 10%, dextrose 10%, dextrose 10%, dextrose 10%, dextrose 10%, glucagon (human recombinant), glucose, glucose, hydrOXYzine pamoate, insulin aspart U-100, LIDOcaine HCL 20 mg/ml (2%), oxyCODONE, sodium chloride 0.9%, sodium chloride 0.9%, vancomycin      Objective:     Vital Signs (Most Recent):  Temp: 98 °F (36.7 °C) (01/03/24 0701)  Pulse: 94 (01/03/24 1000)  Resp: 14 (01/03/24 1000)  BP: 126/85 (01/03/24 1000)  SpO2: 99 % (01/03/24 1000) Vital Signs (24h Range):  Temp:  [98 °F (36.7 °C)-98.8 °F (37.1 °C)] 98 °F (36.7 °C)  Pulse:  [71-99] 94  Resp:  [14-32] 14  SpO2:  [96 %-100 %] 99 %  BP: (100-138)/() 126/85     Weight: 114.6 kg (252 lb 10.4 oz)  Body mass index is 35.24 kg/m².     SpO2: 99 %        Telemetry: Aflutter and Afib rate controlled, HR<110 overnight     Physical Exam  Constitutional:       General: He is not in acute distress.     Appearance: Normal appearance. He is well-developed.   HENT:      Mouth/Throat:      Mouth: Mucous membranes are moist.      Pharynx: Oropharynx is clear.   Cardiovascular:      Rate and Rhythm: Normal rate and regular rhythm.      Heart sounds: Normal heart sounds. No murmur heard.  Abdominal:      Palpations: Abdomen is soft.      Tenderness: There is no abdominal tenderness. There is no guarding.   Musculoskeletal:         General: No swelling. Normal range of motion.      Right lower leg: No edema.      Left lower leg: No edema.   Skin:     General: Skin is warm and dry.   Neurological:      General: No focal deficit present.       "Mental Status: He is alert and oriented to person, place, and time.            Significant Labs:     Recent Labs   Lab 01/01/24 0310 01/02/24 0215 01/03/24  0326   WBC 9.33 9.74 8.46   HGB 15.1 14.8 15.2   HCT 43.0 43.9 44.5    285 286         Recent Labs   Lab 12/31/23  0324 12/31/23  1106 01/01/24  0310 01/02/24  0215 01/02/24  1639 01/03/24  0326     137   < > 136  136 139  139 139 138  138   K 3.8  3.8   < > 3.8  3.8 3.8  3.8 4.7 4.3  4.3     101   < > 104  104 105  105 106 106  106   CO2 19*  19*   < > 18*  18* 20*  20* 21* 20*  20*   BUN 38*  38*   < > 39*  39* 34*  34* 27* 23*  23*   CREATININE 1.5*  1.5*   < > 1.6*  1.6* 1.3  1.3 1.4 1.1  1.1   CALCIUM 9.4  9.4   < > 9.4  9.4 9.2  9.2 9.5 9.4  9.4   PHOS 3.7  --  2.5*  --   --  3.3    < > = values in this interval not displayed.         Recent Labs   Lab 01/01/24 0310 01/02/24 0215 01/03/24 0326   ALKPHOS 59 55 58   BILITOT 0.7 0.5 0.5   PROT 7.2 7.2 7.4   ALT 20 26 25   AST 29 30 29       No results for input(s): "CKTOTAL", "CPK", "TROPONINI", "TROPONINT", "TROPTSTAT", "CPKMB", "MB" in the last 168 hours.        Significant Imaging:     TTE 12/26/23    Left Ventricle: The left ventricle is mildly dilated. Normal wall thickness. Global hypokinesis present. There is severely reduced systolic function with a visually estimated ejection fraction of 20 - 25%. There is diastolic dysfunction.    Right Ventricle: Normal right ventricular cavity size. Wall thickness is normal. Right ventricle wall motion  is normal. Systolic function is moderately reduced. Pacemaker lead present in the ventricle.    Left Atrium: Left atrium is mildly dilated.    IVC/SVC: Patient is ventilated, cannot use IVC diameter to estimate right atrial pressure.  "

## 2024-01-03 NOTE — PROGRESS NOTES
"[facility] - [facility]  Endocrinology  Progress Note    Admit Date: 2023     Reason for Consult: Management of T2DM, Hyperglycemia     Diabetes diagnosis year: >3 years ago    Home Diabetes Medications:  Jardiance 25 mg, Lantus 80 u BID, Metformin 1000 mg BID    How often checking glucose at home? 1-3 x day   BG readings on regimen: 150-200's (patient is confused)  Hypoglycemia on the regimen?  Yes  Missed doses on regimen?  No    Diabetes Complications include:     Hyperglycemia    Complicating diabetes co morbidities:   History of MI      HPI:   Patient is a 58 y.o. male with CAD s/p CABG and recent PCI to proximal and mid Lcx by Dr. Botello on 23, COPD, atrial fibrillation on eliquis, diabetes, current smoker (reportedly 3PPD), hypertension who presented to [facility] facility with chest pain which has been present for 2 days.  He was found to have ST elevations and was taken to the cath lab directly and was found to have in-stent thrombosis. Of note he was also in atrial flutter. Procedure note not available but per report no intervention was performed and a IABP was placed in the left femoral artery. During the procedure he had intermittent complete heart block so a left femoral TVP was also placed. He was requiring BiPAP post-operatively. It was decided to transfer to higher level of care and he was intubated to allow safe transportation. Endocrine consulted for bg management        Interval HPI:   No acute events overnight. Patient in room SALY8431/JCIA8532 A. Blood glucose stable. BG at and below goal on current insulin regimen (Transition Insulin Drip). Steroid use- None .   Day of Surgery  Renal function- Normal   Vasopressors-  None     Diet Cardiac     Eatin-50%   Nausea: No  Hypoglycemia and intervention: No  Fever: No  TPN and/or TF: No    /71 (BP Location: Right arm, Patient Position: Sitting)   Pulse 80   Temp 98.4 °F (36.9 °C) (Oral)   Resp 14   Ht 5' 11" " "(1.803 m)   Wt 103.2 kg (227 lb 8.2 oz)   SpO2 96%   BMI 31.73 kg/m²     Labs Reviewed and Include    Recent Labs   Lab 01/02/24 0215   *  122*   CALCIUM 9.2  9.2   ALBUMIN 3.1*   PROT 7.2     139   K 3.8  3.8   CO2 20*  20*     105   BUN 34*  34*   CREATININE 1.3  1.3   ALKPHOS 55   ALT 26   AST 30   BILITOT 0.5     Lab Results   Component Value Date    WBC 9.74 01/02/2024    HGB 14.8 01/02/2024    HCT 43.9 01/02/2024    MCV 91 01/02/2024     01/02/2024     No results for input(s): "TSH", "FREET4" in the last 168 hours.  Lab Results   Component Value Date    HGBA1C 8.3 (H) 12/14/2023    HGBA1C 8.3 (H) 12/14/2023       Nutritional status:   Body mass index is 31.73 kg/m².  Lab Results   Component Value Date    ALBUMIN 3.1 (L) 01/02/2024    ALBUMIN 3.0 (L) 01/01/2024    ALBUMIN 3.0 (L) 12/31/2023     No results found for: "PREALBUMIN"    Estimated Creatinine Clearance: 77.6 mL/min (based on SCr of 1.3 mg/dL).    Accu-Checks  Recent Labs     12/31/23  1340 12/31/23  1637 12/31/23  2105 01/01/24  0301 01/01/24  0832 01/01/24  1140 01/01/24  1636 01/01/24  2200 01/02/24  0213 01/02/24  0742   POCTGLUCOSE 221* 303* 123* 146* 238* 223* 173* 120* 124* 138*       Current Medications and/or Treatments Impacting Glycemic Control  Immunotherapy:    Immunosuppressants       None          Steroids:   Hormones (From admission, onward)      None          Pressors:    Autonomic Drugs (From admission, onward)      None          Hyperglycemia/Diabetes Medications:   Antihyperglycemics (From admission, onward)      Start     Stop Route Frequency Ordered    01/02/24 1000  insulin aspart U-100 pen 0-10 Units         -- SubQ Every 4 hours PRN 01/02/24 0848    01/02/24 0900  insulin regular in 0.9 % NaCl 100 unit/100 mL (1 unit/mL) infusion        Question:  Enter initial dose (Units/hr):  Answer:  1.1    -- IV Continuous 01/02/24 0847            ASSESSMENT and PLAN    Cardiac/Vascular  * Cardiogenic " shock  Managed per primary team  Optimize bg control        HLD (hyperlipidemia)  Managed per primary.   On statin per ADA        Endocrine  Type 2 diabetes mellitus with diabetic arthropathy  BG goal 140-180     - d/c transition drip as FBG stable WNL on fixed gtt rate   - Start levemir 22 units (0.4 u/kg/day WBD)   - Continue Novolog 12 units TIDWM   - Novolog (aspart) insulin prn for BG excursions [facility] SSI (150/25) ac/hs/0200   - BG checks ac/hs/0200   - Hypoglycemia protocol in place    ** Please notify Endocrine for any change and/or advance in diet**  ** Please call Endocrine for any BG related issues **    Discharge Planning:   TBD. Please notify endocrinology prior to discharge.         Brigitte Champion PA-C  Endocrinology  [facility] - Card[facility]

## 2024-01-03 NOTE — PROGRESS NOTES
[facility] - Cardiac [facility]  Cardiac Electrophysiology  Progress Note    Admission Date: 12/26/2023  Code Status: Full Code   Attending Physician: Larry Alvarez MD   Expected Discharge Date: 1/5/2024  Principal Problem:Cardiogenic shock    Subjective:     Interval History: Doing well post device placement. In atrial fib on telemetry, rate control.     Review of Systems   Constitutional: Negative for diaphoresis and fever.   Cardiovascular:  Negative for chest pain, dyspnea on exertion, leg swelling, near-syncope, orthopnea, palpitations, paroxysmal nocturnal dyspnea and syncope.   Respiratory:  Negative for cough and shortness of breath.    Gastrointestinal:  Negative for abdominal pain, diarrhea, nausea and vomiting.   Neurological:  Negative for light-headedness.   Psychiatric/Behavioral:  Negative for altered mental status and substance abuse.        Current Facility-Administered Medications:     0.9%  NaCl infusion, , Intravenous, Continuous, Pretty Jarvis MD, Stopped at 01/02/24 0949    0.9%  NaCl infusion, , Intravenous, PRN, Pretty Jarvis MD    0.9%  NaCl infusion, , Intravenous, PRN, Pretty Jarvis MD, Stopped at 12/31/23 1115    aspirin chewable tablet 81 mg, 81 mg, Oral, Daily, Pretty Jarvis MD, 81 mg at 01/03/24 0901    atorvastatin tablet 40 mg, 40 mg, Oral, Daily, Charli Pereira MD, 40 mg at 01/03/24 0901    BUPivacaine (PF) 0.25% (2.5 mg/ml) injection, , , PRN, Antonio Callejas MD, 10 mL at 01/02/24 1428    dextrose 10% bolus 125 mL 125 mL, 12.5 g, Intravenous, PRN, Octavio Eng MD    dextrose 10% bolus 125 mL 125 mL, 12.5 g, Intravenous, PRN, Charli Pereira MD    dextrose 10% bolus 125 mL 125 mL, 12.5 g, Intravenous, PRN, Dell Zavaleta DNP, FNP    dextrose 10% bolus 125 mL 125 mL, 12.5 g, Intravenous, PRN, Brigitte Champion PA-C    dextrose 10% bolus 250 mL 250 mL, 25 g, Intravenous, PRN, Octavio Eng MD     dextrose 10% bolus 250 mL 250 mL, 25 g, Intravenous, PRN, Charli Pereira MD    dextrose 10% bolus 250 mL 250 mL, 25 g, Intravenous, PRN, Dell Zavaleta DNP, FNP    dextrose 10% bolus 250 mL 250 mL, 25 g, Intravenous, PRN, Brigitte Champion PA-C    doxycycline tablet 100 mg, 100 mg, Oral, Q12H, Mayo Mcdonald MD, 100 mg at 01/03/24 0901    furosemide tablet 40 mg, 40 mg, Oral, Daily, Viviane Hernandez MD, 40 mg at 01/03/24 0901    gabapentin capsule 300 mg, 300 mg, Oral, BID, Viviane Hernandez MD, 300 mg at 01/03/24 0901    glucagon (human recombinant) injection 1 mg, 1 mg, Intramuscular, PRN, Brigitte Champion PA-C    glucose chewable tablet 16 g, 16 g, Oral, PRN, Brigitte Champion PA-C    glucose chewable tablet 24 g, 24 g, Oral, PRN, Brigitte Champion PA-C    hydrALAZINE tablet 10 mg, 10 mg, Oral, Q8H, Viviane Hernandez MD, 10 mg at 01/03/24 0546    hydrOXYzine pamoate capsule 25 mg, 25 mg, Oral, Q8H PRN, Viviane Hernandez MD, 25 mg at 12/31/23 2120    insulin aspart U-100 pen 0-10 Units, 0-10 Units, Subcutaneous, AC + HS + 0200 PRN, Brigitte Champion PA-C    insulin aspart U-100 pen 12 Units, 12 Units, Subcutaneous, TIDWM, Brigitte Champion PA-C, 12 Units at 01/03/24 1114    insulin detemir U-100 (Levemir) pen 22 Units, 22 Units, Subcutaneous, Daily, Brigitte Champion PA-C, 22 Units at 01/03/24 1114    LIDOcaine HCL 20 mg/ml (2%) injection, , , PRN, Antonio Callejas MD, 10 mL at 01/02/24 1429    metoprolol succinate (TOPROL-XL) 24 hr tablet 25 mg, 25 mg, Oral, Daily, Viviane Hernandez MD, 25 mg at 01/03/24 1104    oxyCODONE immediate release tablet 5 mg, 5 mg, Oral, Q6H PRN, Viviane Hernandez MD, 5 mg at 12/30/23 1944    pantoprazole suspension 40 mg, 40 mg, Oral, Daily, Pretty Jarvis MD, 40 mg at 01/02/24 0813    polyethylene glycol packet 17 g, 17 g, Oral, Daily, Viviane Hernandez MD, 17 g at 12/31/23 1155     sacubitriL-valsartan 24-26 mg per tablet 1 tablet, 1 tablet, Oral, BID, Viviane Hernandez MD, 1 tablet at 01/03/24 0901    sodium chloride 0.9% flush 10 mL, 10 mL, Intravenous, PRN, Charli Pereira MD    sodium chloride 0.9% irrigation, , , PRN, Antonio Callejas MD, 1,000 mL at 01/02/24 1411    spironolactone tablet 25 mg, 25 mg, Oral, Daily, Viviane Hernandez MD    ticagrelor tablet 90 mg, 90 mg, Oral, BID, Charli Pereira MD, 90 mg at 01/03/24 0901    vancomycin injection, , , PRN, Antonio Callejas MD, 1,000 mg at 01/02/24 1410      sodium chloride 0.9%, sodium chloride 0.9%, BUPivacaine (PF) 0.25% (2.5 mg/ml), dextrose 10%, dextrose 10%, dextrose 10%, dextrose 10%, dextrose 10%, dextrose 10%, dextrose 10%, dextrose 10%, glucagon (human recombinant), glucose, glucose, hydrOXYzine pamoate, insulin aspart U-100, LIDOcaine HCL 20 mg/ml (2%), oxyCODONE, sodium chloride 0.9%, sodium chloride 0.9%, vancomycin      Objective:     Vital Signs (Most Recent):  Temp: 98 °F (36.7 °C) (01/03/24 0701)  Pulse: 94 (01/03/24 1000)  Resp: 14 (01/03/24 1000)  BP: 126/85 (01/03/24 1000)  SpO2: 99 % (01/03/24 1000) Vital Signs (24h Range):  Temp:  [98 °F (36.7 °C)-98.8 °F (37.1 °C)] 98 °F (36.7 °C)  Pulse:  [71-99] 94  Resp:  [14-32] 14  SpO2:  [96 %-100 %] 99 %  BP: (100-138)/() 126/85     Weight: 114.6 kg (252 lb 10.4 oz)  Body mass index is 35.24 kg/m².     SpO2: 99 %        Telemetry: Aflutter and Afib rate controlled, HR<110 overnight     Physical Exam  Constitutional:       General: He is not in acute distress.     Appearance: Normal appearance. He is well-developed.   HENT:      Mouth/Throat:      Mouth: Mucous membranes are moist.      Pharynx: Oropharynx is clear.   Cardiovascular:      Rate and Rhythm: Normal rate and regular rhythm.      Heart sounds: Normal heart sounds. No murmur heard.  Abdominal:      Palpations: Abdomen is soft.      Tenderness: There is no abdominal  "tenderness. There is no guarding.   Musculoskeletal:         General: No swelling. Normal range of motion.      Right lower leg: No edema.      Left lower leg: No edema.   Skin:     General: Skin is warm and dry.   Neurological:      General: No focal deficit present.      Mental Status: He is alert and oriented to person, place, and time.            Significant Labs:     Recent Labs   Lab 01/01/24 0310 01/02/24 0215 01/03/24  0326   WBC 9.33 9.74 8.46   HGB 15.1 14.8 15.2   HCT 43.0 43.9 44.5    285 286         Recent Labs   Lab 12/31/23  0324 12/31/23  1106 01/01/24  0310 01/02/24  0215 01/02/24  1639 01/03/24  0326     137   < > 136  136 139  139 139 138  138   K 3.8  3.8   < > 3.8  3.8 3.8  3.8 4.7 4.3  4.3     101   < > 104  104 105  105 106 106  106   CO2 19*  19*   < > 18*  18* 20*  20* 21* 20*  20*   BUN 38*  38*   < > 39*  39* 34*  34* 27* 23*  23*   CREATININE 1.5*  1.5*   < > 1.6*  1.6* 1.3  1.3 1.4 1.1  1.1   CALCIUM 9.4  9.4   < > 9.4  9.4 9.2  9.2 9.5 9.4  9.4   PHOS 3.7  --  2.5*  --   --  3.3    < > = values in this interval not displayed.         Recent Labs   Lab 01/01/24 0310 01/02/24 0215 01/03/24  0326   ALKPHOS 59 55 58   BILITOT 0.7 0.5 0.5   PROT 7.2 7.2 7.4   ALT 20 26 25   AST 29 30 29       No results for input(s): "CKTOTAL", "CPK", "TROPONINI", "TROPONINT", "TROPTSTAT", "CPKMB", "MB" in the last 168 hours.        Significant Imaging:     TTE 12/26/23    Left Ventricle: The left ventricle is mildly dilated. Normal wall thickness. Global hypokinesis present. There is severely reduced systolic function with a visually estimated ejection fraction of 20 - 25%. There is diastolic dysfunction.    Right Ventricle: Normal right ventricular cavity size. Wall thickness is normal. Right ventricle wall motion  is normal. Systolic function is moderately reduced. Pacemaker lead present in the ventricle.    Left Atrium: Left atrium is mildly dilated.    " IVC/SVC: Patient is ventilated, cannot use IVC diameter to estimate right atrial pressure.  Assessment and Plan:     * Cardiogenic shock  #CHB with wide fractionated ventricular escape likely from non revascularizable STEMI    Left dominant coronary system  Inferior STEMI with CW a flutter 12/25, LIMA-LAD and SVG -OM are down as well, Lcx couldn't be revasuclarized  Subsequently developed CHB  EF 20-25%  Atrial flutter last night with atrial fibrillation as well overnight and this morning. Using native AV tamika conduction, only TVP paced intermittently. Rate controlled off AVN blocking agents.   Not pacer dependent at this time, so TVP set to backup rate of 50 yesterday, threshold 3.5mA  IABP out  TVP out 1/1/24    Plan  -CRT-D placed 1/2/24, doing well  -Plan for Eliquis starting tomorrow morning and ILAN DCCV tomorrow    Typical atrial flutter  New diagnosis, Found on 12/25 EKG that also showed inferior STEMI  DOAC, DCCV tomorrow    Paroxysmal atrial fibrillation  Known paroxysmal afib, on eliquis and toprol xl at home  Plan for DCCV tomorrow and can anticoagulate tomorrow morning with DOAC        Connor M Gillies, MD  Cardiac Electrophysiology  [facility]

## 2024-01-04 ENCOUNTER — PATIENT MESSAGE (OUTPATIENT)
Dept: ENDOCRINOLOGY | Facility: HOSPITAL | Age: 59
End: 2024-01-04
Payer: MEDICAID

## 2024-01-04 ENCOUNTER — ANESTHESIA (OUTPATIENT)
Dept: MEDSURG UNIT | Facility: HOSPITAL | Age: 59
DRG: 276 | End: 2024-01-04
Payer: MEDICAID

## 2024-01-04 ENCOUNTER — ANESTHESIA EVENT (OUTPATIENT)
Dept: MEDSURG UNIT | Facility: HOSPITAL | Age: 59
DRG: 276 | End: 2024-01-04
Payer: MEDICAID

## 2024-01-04 VITALS
HEIGHT: 71 IN | TEMPERATURE: 98 F | WEIGHT: 238.75 LBS | BODY MASS INDEX: 33.43 KG/M2 | SYSTOLIC BLOOD PRESSURE: 116 MMHG | DIASTOLIC BLOOD PRESSURE: 73 MMHG | OXYGEN SATURATION: 98 % | HEART RATE: 99 BPM | RESPIRATION RATE: 25 BRPM

## 2024-01-04 LAB
ALBUMIN SERPL BCP-MCNC: 3.3 G/DL (ref 3.5–5.2)
ALP SERPL-CCNC: 64 U/L (ref 55–135)
ALT SERPL W/O P-5'-P-CCNC: 21 U/L (ref 10–44)
ANION GAP SERPL CALC-SCNC: 10 MMOL/L (ref 8–16)
ANION GAP SERPL CALC-SCNC: 10 MMOL/L (ref 8–16)
AST SERPL-CCNC: 23 U/L (ref 10–40)
BASOPHILS # BLD AUTO: 0.11 K/UL (ref 0–0.2)
BASOPHILS NFR BLD: 1.1 % (ref 0–1.9)
BILIRUB SERPL-MCNC: 0.7 MG/DL (ref 0.1–1)
BUN SERPL-MCNC: 21 MG/DL (ref 6–20)
BUN SERPL-MCNC: 21 MG/DL (ref 6–20)
CALCIUM SERPL-MCNC: 9.4 MG/DL (ref 8.7–10.5)
CALCIUM SERPL-MCNC: 9.4 MG/DL (ref 8.7–10.5)
CHLORIDE SERPL-SCNC: 102 MMOL/L (ref 95–110)
CHLORIDE SERPL-SCNC: 102 MMOL/L (ref 95–110)
CO2 SERPL-SCNC: 23 MMOL/L (ref 23–29)
CO2 SERPL-SCNC: 23 MMOL/L (ref 23–29)
CREAT SERPL-MCNC: 1.1 MG/DL (ref 0.5–1.4)
CREAT SERPL-MCNC: 1.1 MG/DL (ref 0.5–1.4)
DIFFERENTIAL METHOD BLD: ABNORMAL
EOSINOPHIL # BLD AUTO: 0.3 K/UL (ref 0–0.5)
EOSINOPHIL NFR BLD: 3.4 % (ref 0–8)
ERYTHROCYTE [DISTWIDTH] IN BLOOD BY AUTOMATED COUNT: 12.8 % (ref 11.5–14.5)
EST. GFR  (NO RACE VARIABLE): >60 ML/MIN/1.73 M^2
EST. GFR  (NO RACE VARIABLE): >60 ML/MIN/1.73 M^2
GLUCOSE SERPL-MCNC: 163 MG/DL (ref 70–110)
GLUCOSE SERPL-MCNC: 163 MG/DL (ref 70–110)
HCT VFR BLD AUTO: 45.7 % (ref 40–54)
HGB BLD-MCNC: 15.6 G/DL (ref 14–18)
IMM GRANULOCYTES # BLD AUTO: 0.06 K/UL (ref 0–0.04)
IMM GRANULOCYTES NFR BLD AUTO: 0.6 % (ref 0–0.5)
LYMPHOCYTES # BLD AUTO: 2.4 K/UL (ref 1–4.8)
LYMPHOCYTES NFR BLD: 23.9 % (ref 18–48)
MAGNESIUM SERPL-MCNC: 1.7 MG/DL (ref 1.6–2.6)
MCH RBC QN AUTO: 31.6 PG (ref 27–31)
MCHC RBC AUTO-ENTMCNC: 34.1 G/DL (ref 32–36)
MCV RBC AUTO: 93 FL (ref 82–98)
MONOCYTES # BLD AUTO: 0.9 K/UL (ref 0.3–1)
MONOCYTES NFR BLD: 9 % (ref 4–15)
NEUTROPHILS # BLD AUTO: 6.1 K/UL (ref 1.8–7.7)
NEUTROPHILS NFR BLD: 62 % (ref 38–73)
NRBC BLD-RTO: 0 /100 WBC
PLATELET # BLD AUTO: 266 K/UL (ref 150–450)
PMV BLD AUTO: 9.8 FL (ref 9.2–12.9)
POCT GLUCOSE: 153 MG/DL (ref 70–110)
POCT GLUCOSE: 156 MG/DL (ref 70–110)
POCT GLUCOSE: 159 MG/DL (ref 70–110)
POTASSIUM SERPL-SCNC: 4.4 MMOL/L (ref 3.5–5.1)
POTASSIUM SERPL-SCNC: 4.4 MMOL/L (ref 3.5–5.1)
PROT SERPL-MCNC: 7.6 G/DL (ref 6–8.4)
RBC # BLD AUTO: 4.93 M/UL (ref 4.6–6.2)
SODIUM SERPL-SCNC: 135 MMOL/L (ref 136–145)
SODIUM SERPL-SCNC: 135 MMOL/L (ref 136–145)
WBC # BLD AUTO: 9.87 K/UL (ref 3.9–12.7)

## 2024-01-04 PROCEDURE — 25000003 PHARM REV CODE 250: Performed by: INTERNAL MEDICINE

## 2024-01-04 PROCEDURE — 99232 SBSQ HOSP IP/OBS MODERATE 35: CPT | Mod: ,,,

## 2024-01-04 PROCEDURE — 97110 THERAPEUTIC EXERCISES: CPT

## 2024-01-04 PROCEDURE — 85025 COMPLETE CBC W/AUTO DIFF WBC: CPT | Performed by: STUDENT IN AN ORGANIZED HEALTH CARE EDUCATION/TRAINING PROGRAM

## 2024-01-04 PROCEDURE — 99499 UNLISTED E&M SERVICE: CPT | Mod: ,,, | Performed by: STUDENT IN AN ORGANIZED HEALTH CARE EDUCATION/TRAINING PROGRAM

## 2024-01-04 PROCEDURE — 25000003 PHARM REV CODE 250: Performed by: STUDENT IN AN ORGANIZED HEALTH CARE EDUCATION/TRAINING PROGRAM

## 2024-01-04 PROCEDURE — 63600175 PHARM REV CODE 636 W HCPCS: Performed by: STUDENT IN AN ORGANIZED HEALTH CARE EDUCATION/TRAINING PROGRAM

## 2024-01-04 PROCEDURE — 99233 SBSQ HOSP IP/OBS HIGH 50: CPT | Mod: ,,, | Performed by: STUDENT IN AN ORGANIZED HEALTH CARE EDUCATION/TRAINING PROGRAM

## 2024-01-04 PROCEDURE — 99239 HOSP IP/OBS DSCHRG MGMT >30: CPT | Mod: ,,, | Performed by: INTERNAL MEDICINE

## 2024-01-04 PROCEDURE — 80053 COMPREHEN METABOLIC PANEL: CPT | Performed by: STUDENT IN AN ORGANIZED HEALTH CARE EDUCATION/TRAINING PROGRAM

## 2024-01-04 PROCEDURE — 63600175 PHARM REV CODE 636 W HCPCS

## 2024-01-04 PROCEDURE — 83735 ASSAY OF MAGNESIUM: CPT | Performed by: STUDENT IN AN ORGANIZED HEALTH CARE EDUCATION/TRAINING PROGRAM

## 2024-01-04 RX ORDER — EMPAGLIFLOZIN 25 MG/1
25 TABLET, FILM COATED ORAL DAILY
Qty: 90 TABLET | Refills: 0 | Status: SHIPPED | OUTPATIENT
Start: 2024-01-04 | End: 2024-04-03

## 2024-01-04 RX ORDER — INSULIN ASPART 100 [IU]/ML
0-10 INJECTION, SOLUTION INTRAVENOUS; SUBCUTANEOUS EVERY 4 HOURS PRN
Status: DISCONTINUED | OUTPATIENT
Start: 2024-01-04 | End: 2024-01-04 | Stop reason: HOSPADM

## 2024-01-04 RX ORDER — ATORVASTATIN CALCIUM 40 MG/1
40 TABLET, FILM COATED ORAL DAILY
Qty: 90 TABLET | Refills: 3 | OUTPATIENT
Start: 2024-01-05 | End: 2025-01-04

## 2024-01-04 RX ORDER — GUAIFENESIN 100 MG/5ML
81 LIQUID (ML) ORAL DAILY
Qty: 30 TABLET | Refills: 0 | OUTPATIENT
Start: 2024-01-05 | End: 2025-01-04

## 2024-01-04 RX ORDER — INSULIN ASPART 100 [IU]/ML
0-10 INJECTION, SOLUTION INTRAVENOUS; SUBCUTANEOUS
Status: DISCONTINUED | OUTPATIENT
Start: 2024-01-04 | End: 2024-01-04

## 2024-01-04 RX ORDER — DOXYCYCLINE HYCLATE 100 MG
100 TABLET ORAL EVERY 12 HOURS
Qty: 8 TABLET | Refills: 0 | Status: SHIPPED | OUTPATIENT
Start: 2024-01-04 | End: 2024-01-08

## 2024-01-04 RX ORDER — HYDROMORPHONE HYDROCHLORIDE 1 MG/ML
0.2 INJECTION, SOLUTION INTRAMUSCULAR; INTRAVENOUS; SUBCUTANEOUS EVERY 5 MIN PRN
Status: CANCELLED | OUTPATIENT
Start: 2024-01-04

## 2024-01-04 RX ORDER — ISOSORBIDE MONONITRATE 30 MG/1
30 TABLET, EXTENDED RELEASE ORAL DAILY
Qty: 30 TABLET | Refills: 11 | Status: SHIPPED | OUTPATIENT
Start: 2024-01-04 | End: 2025-01-03

## 2024-01-04 RX ORDER — PANTOPRAZOLE SODIUM 40 MG/1
40 TABLET, DELAYED RELEASE ORAL DAILY
Qty: 30 TABLET | Refills: 11 | Status: SHIPPED | OUTPATIENT
Start: 2024-01-05 | End: 2025-01-04

## 2024-01-04 RX ORDER — FUROSEMIDE 40 MG/1
40 TABLET ORAL DAILY
Qty: 30 TABLET | Refills: 11 | OUTPATIENT
Start: 2024-01-05 | End: 2025-01-04

## 2024-01-04 RX ORDER — ATORVASTATIN CALCIUM 40 MG/1
40 TABLET, FILM COATED ORAL DAILY
Qty: 90 TABLET | Refills: 3 | Status: SHIPPED | OUTPATIENT
Start: 2024-01-05 | End: 2025-01-04

## 2024-01-04 RX ORDER — DIPHENHYDRAMINE HYDROCHLORIDE 50 MG/ML
25 INJECTION, SOLUTION INTRAMUSCULAR; INTRAVENOUS EVERY 6 HOURS PRN
Status: CANCELLED | OUTPATIENT
Start: 2024-01-04

## 2024-01-04 RX ORDER — SPIRONOLACTONE 25 MG/1
25 TABLET ORAL DAILY
Qty: 30 TABLET | Refills: 11 | OUTPATIENT
Start: 2024-01-05 | End: 2025-01-04

## 2024-01-04 RX ORDER — HYDRALAZINE HYDROCHLORIDE 10 MG/1
10 TABLET, FILM COATED ORAL EVERY 8 HOURS
Qty: 90 TABLET | Refills: 11 | OUTPATIENT
Start: 2024-01-04 | End: 2025-01-03

## 2024-01-04 RX ORDER — HYDRALAZINE HYDROCHLORIDE 10 MG/1
10 TABLET, FILM COATED ORAL EVERY 8 HOURS
Qty: 90 TABLET | Refills: 11 | Status: SHIPPED | OUTPATIENT
Start: 2024-01-04 | End: 2025-01-03

## 2024-01-04 RX ORDER — DOXYCYCLINE HYCLATE 100 MG
100 TABLET ORAL EVERY 12 HOURS
Qty: 6 TABLET | Refills: 0 | OUTPATIENT
Start: 2024-01-04 | End: 2024-01-07

## 2024-01-04 RX ORDER — FENTANYL CITRATE 50 UG/ML
25 INJECTION, SOLUTION INTRAMUSCULAR; INTRAVENOUS EVERY 5 MIN PRN
Status: CANCELLED | OUTPATIENT
Start: 2024-01-04

## 2024-01-04 RX ORDER — INSULIN GLARGINE 100 [IU]/ML
22 INJECTION, SOLUTION SUBCUTANEOUS DAILY
Qty: 6 ML | Refills: 0 | Status: SHIPPED | OUTPATIENT
Start: 2024-01-04 | End: 2024-02-05

## 2024-01-04 RX ORDER — MAGNESIUM SULFATE HEPTAHYDRATE 40 MG/ML
2 INJECTION, SOLUTION INTRAVENOUS ONCE
Status: COMPLETED | OUTPATIENT
Start: 2024-01-04 | End: 2024-01-04

## 2024-01-04 RX ORDER — METOPROLOL SUCCINATE 50 MG/1
50 TABLET, EXTENDED RELEASE ORAL DAILY
Qty: 30 TABLET | Refills: 11 | Status: SHIPPED | OUTPATIENT
Start: 2024-01-05 | End: 2025-01-04

## 2024-01-04 RX ORDER — DEXTROSE 4 G
TABLET,CHEWABLE ORAL
Qty: 1 EACH | Refills: 1 | Status: SHIPPED | OUTPATIENT
Start: 2024-01-04

## 2024-01-04 RX ORDER — SPIRONOLACTONE 25 MG/1
25 TABLET ORAL DAILY
Qty: 30 TABLET | Refills: 11 | Status: SHIPPED | OUTPATIENT
Start: 2024-01-05 | End: 2025-01-04

## 2024-01-04 RX ORDER — INSULIN LISPRO 100 [IU]/ML
10 INJECTION, SOLUTION INTRAVENOUS; SUBCUTANEOUS 3 TIMES DAILY
Qty: 9 ML | Refills: 3 | Status: SHIPPED | OUTPATIENT
Start: 2024-01-04 | End: 2025-01-03

## 2024-01-04 RX ORDER — ONDANSETRON 2 MG/ML
4 INJECTION INTRAMUSCULAR; INTRAVENOUS ONCE AS NEEDED
Status: CANCELLED | OUTPATIENT
Start: 2024-01-04 | End: 2035-06-02

## 2024-01-04 RX ORDER — METOPROLOL SUCCINATE 50 MG/1
100 TABLET, EXTENDED RELEASE ORAL DAILY
Qty: 30 TABLET | Refills: 3 | OUTPATIENT
Start: 2024-01-04

## 2024-01-04 RX ORDER — ASPIRIN 81 MG/1
81 TABLET ORAL DAILY
Qty: 21 TABLET | Refills: 0 | Status: SHIPPED | OUTPATIENT
Start: 2024-01-04 | End: 2024-01-25

## 2024-01-04 RX ORDER — FUROSEMIDE 40 MG/1
40 TABLET ORAL DAILY
Qty: 30 TABLET | Refills: 11 | Status: SHIPPED | OUTPATIENT
Start: 2024-01-05 | End: 2025-01-04

## 2024-01-04 RX ADMIN — GABAPENTIN 300 MG: 300 CAPSULE ORAL at 09:01

## 2024-01-04 RX ADMIN — SACUBITRIL AND VALSARTAN 1 TABLET: 24; 26 TABLET, FILM COATED ORAL at 09:01

## 2024-01-04 RX ADMIN — METOPROLOL SUCCINATE 25 MG: 25 TABLET, EXTENDED RELEASE ORAL at 09:01

## 2024-01-04 RX ADMIN — INSULIN ASPART 2 UNITS: 100 INJECTION, SOLUTION INTRAVENOUS; SUBCUTANEOUS at 08:01

## 2024-01-04 RX ADMIN — DOXYCYCLINE HYCLATE 100 MG: 100 TABLET, FILM COATED ORAL at 09:01

## 2024-01-04 RX ADMIN — TICAGRELOR 90 MG: 90 TABLET ORAL at 09:01

## 2024-01-04 RX ADMIN — APIXABAN 5 MG: 5 TABLET, FILM COATED ORAL at 09:01

## 2024-01-04 RX ADMIN — MAGNESIUM SULFATE HEPTAHYDRATE 2 G: 40 INJECTION, SOLUTION INTRAVENOUS at 09:01

## 2024-01-04 RX ADMIN — ATORVASTATIN CALCIUM 40 MG: 40 TABLET, FILM COATED ORAL at 09:01

## 2024-01-04 RX ADMIN — PANTOPRAZOLE SODIUM 40 MG: 40 GRANULE, DELAYED RELEASE ORAL at 09:01

## 2024-01-04 RX ADMIN — INSULIN DETEMIR 22 UNITS: 100 INJECTION, SOLUTION SUBCUTANEOUS at 09:01

## 2024-01-04 RX ADMIN — HYDRALAZINE HYDROCHLORIDE 10 MG: 10 TABLET, FILM COATED ORAL at 05:01

## 2024-01-04 RX ADMIN — ASPIRIN 81 MG CHEWABLE TABLET 81 MG: 81 TABLET CHEWABLE at 09:01

## 2024-01-04 RX ADMIN — SPIRONOLACTONE 25 MG: 25 TABLET ORAL at 09:01

## 2024-01-04 NOTE — PLAN OF CARE
Patient hospital f/u schedulled 1/16 at 10:45am    Above information added to [facility]ASK[facility]  Case Management   [id number]

## 2024-01-04 NOTE — PT/OT/SLP PROGRESS
Physical Therapy Treatment and Discharge Summary    Patient Name:  Magdaleno Cunningham   MRN:  [id number]  Admitting Diagnosis:  Cardiogenic shock   Recent Surgery: Procedure(s) (LRB):  Insertion, ICD, Dual Chamber (N/A) 2 Days Post-Op  Admit Date: 2023  Length of Stay: 9 days    Recommendations:     Discharge Recommendations:    No Therapy Indicated  Discharge Equipment Recommendations: none   Barriers to discharge: None    Appropriate transfer level with nursing staff: Independent with ambulation    Plan:     Patient discharged from acute PT services. Pt ambulated loop of entire unit with independence. Pt denied mobility concerns after discharge. Please re-consult if change in functional status.   Plan of Care Reviewed with: patient    Assessment:     Magdaleno Cunningham is a 58 y.o. male admitted with a medical diagnosis of Cardiogenic shock. Pt demo'd improvements as he increased gait distance and level of assistance required. Pt able to recall 4/4 pacemaker precautions. Pt d/c acute PT.     Problem List: impaired cardiopulmonary response to activity.  Rehab Prognosis: Good; patient would benefit from acute skilled PT services to address these deficits and reach maximum level of function.      Goals:   Multidisciplinary Problems       Physical Therapy Goals          Problem: Physical Therapy    Goal Priority Disciplines Outcome Goal Variances Interventions   Physical Therapy Goal     PT, PT/OT Ongoing, Progressing     Description: Goals to be met by: 24     Patient will increase functional independence with mobility by performin. Supine to sit with [place]  2. Sit to stand transfer with [place]- goal met 24  3. Bed to chair transfer with [place] using LRAD  4. Gait  x 300 feet with [place] using LRAD- goal met 24  5. Lower extremity exercise program x30 reps per handout, with independence                         Subjective     RN notified prior to session. No one present  "upon PT entrance into room. Patient agreeable to PT treatment session.    Chief Complaint: "I've always been a fast walker like this"  Patient/Family Comments/goals: go home  Pain/Comfort:  Pain Rating 1: 0/10  Pain Rating Post-Intervention 1: 0/10      Objective:     Additional staff present: RN    Patient found up in chair with: blood pressure cuff, telemetry, pulse ox (continuous)   Cognition:   Alert and Cooperative  Patient is oriented to Person, Place, Time, Situation  General Precautions: Standard, Cardiac fall, aspiration   Orthopedic Precautions:N/A (pacemaker)   Braces: N/A   Body mass index is 33.3 kg/m².  Oxygen Device: Room Air  Vitals: /71   Pulse 86   Temp 98 °F (36.7 °C) (Oral)   Resp (!) 21   Ht 5' 11" (1.803 m)   Wt 108.3 kg (238 lb 12.1 oz)   SpO2 96%   BMI 33.30 kg/m²     Outcome Measures:  AM-PAC 6 CLICK MOBILITY  Turning over in bed (including adjusting bedclothes, sheets and blankets)?: 4  Sitting down on and standing up from a chair with arms (e.g., wheelchair, bedside commode, etc.): 4  Moving from lying on back to sitting on the side of the bed?: 4  Moving to and from a bed to a chair (including a wheelchair)?: 4  Need to walk in hospital room?: 4  Climbing 3-5 steps with a railing?: 3  Basic Mobility Total Score: 23     Functional Mobility:    Bed Mobility:   Pt found/returned to bedside chair    Transfers:   Sit <> Stand Transfer: independence with no assistive device     Balance:  Standing:  Static: independence  Dynamic: independence      Gait:  Patient ambulated: loop of entire unit (>500')   Patient required: independent  Patient used:  no assistive device   Gait Deviation(s): steady gait  Portable monitor utilized  all lines remained intact throughout ambulation trial  Nurse present for vital sign monitoring  Comments: Patient steady with no LOB and with fast khadar.     Stairs:  Pt reported no stairs in the home      Education:  Time provided for education, counseling " and discussion of health disposition in regards to patient's current status  All questions answered within PT scope of practice and to patient's satisfaction  PT role in POC to address current functional deficits    Patient left up in chair with all lines intact, call button in reach, and RN present.    Time Tracking:     PT Received On: 01/04/24  PT Start Time: 0929     PT Stop Time: 0937  PT Total Time (min): 8 min     Billable Minutes:   Therapeutic Exercise 8 minutes    Treatment Type: Treatment  PT/PTA: PT       1/4/2024

## 2024-01-04 NOTE — ASSESSMENT & PLAN NOTE
#CHB with wide fractionated ventricular escape likely from non revascularizable STEMI    Left dominant coronary system  Inferior STEMI with CW a flutter 12/25, LIMA-LAD and SVG -OM are down as well, Lcx couldn't be revasuclarized  Subsequently developed CHB  EF 20-25%  Atrial flutter last night with atrial fibrillation as well overnight and this morning. Using native AV tamika conduction, only TVP paced intermittently. Rate controlled off AVN blocking agents.   Not pacer dependent at this time, so TVP set to backup rate of 50 yesterday, threshold 3.5mA  IABP out  TVP out 1/1/24    Plan  -CRT-D placed 1/2/24, doing well  -Eliquis begun this morning  -ILAN DCCV today

## 2024-01-04 NOTE — PLAN OF CARE
POC reviewed with Magdaleno Cunningham and family. Questions and concerns addressed. Insulin drip d/c, Patient NPO after midnight. Patient ambulated in the [place] x2. No acute events. See below and flowsheets for full assessment and VS info.       Neuro:  [place] Coma Scale  Best Eye Response: 4-->(E4) spontaneous  Best Motor Response: 6-->(M6) obeys commands  Best Verbal Response: 5-->(V5) oriented  Val Coma Scale Score: 15  Assessment Qualifiers: patient not sedated/intubated  Pupil PERRLA: yes    24 hr Temp:  [98 °F (36.7 °C)-98.8 °F (37.1 °C)]     CV:  Rhythm: atrial rhythm   DVT prophylaxis: VTE Required Core Measure: Pharmacological prophylaxis initiated/maintained    CVP (mean): 1 mmHg (12/31/23 0705)       SVO2 (%): 62 % (12/29/23 2000)    IABP Mode: Auto  IABP Rate: 1:1  IABP Trigger: ECG  IABP Augmented Diastolic Pressure: 112          Pulses  Right Radial Pulse: 2+ (normal), palpation  Left Radial Pulse: 2+ (normal), palpation  Right Dorsalis Pedis Pulse: 1+ (weak), palpation  Left Dorsalis Pedis Pulse: 1+ (weak), palpation  Right Posterior Tibial Pulse: 1+ (weak), palpation  Left Posterior Tibial Pulse: 1+ (weak), palpation    Resp:  Flow (L/min): 1  Vent Mode: Spont  Set Rate: 22 BPM  Oxygen Concentration (%): 30  Vt Set: 450 mL  PEEP/CPAP: 5 cmH20  Pressure Support: 8 cmH20    GI/:  GI prophylaxis: no  Diet/Nutrition Received: low saturated fat/low cholesterol, 2 gram sodium  Last Bowel Movement: 01/02/24  Voiding Characteristics: voids spontaneously without difficulty   Intake/Output Summary (Last 24 hours) at 1/3/2024 1821  Last data filed at 1/3/2024 1800  Gross per 24 hour   Intake 2659.35 ml   Output 1850 ml   Net 809.35 ml          Labs/Accuchecks:  Recent Labs   Lab 01/01/24  0310 01/02/24  0215 01/03/24  0326   WBC 9.33 9.74 8.46   RBC 4.86 4.85 4.84   HGB 15.1 14.8 15.2   HCT 43.0 43.9 44.5    285 286      Recent Labs   Lab 12/28/23  1302 12/28/23  1927 12/31/23  1106 01/01/24  0310  "01/02/24  0215   INR 1.1  --   --   --   --    APTT 26.6   < > 42.4* 63.8* 54.5*    < > = values in this interval not displayed.      Recent Labs     01/03/24  0326     138   K 4.3  4.3   CO2 20*  20*     106   BUN 23*  23*   CREATININE 1.1  1.1   ALKPHOS 58   ALT 25   AST 29   BILITOT 0.5     No results for input(s): "CPK", "CPKMB", "MB", "TROPONINI" in the last 168 hours. No results for input(s): "PH", "PCO2", "PO2", "HCO3", "POCSATURATED", "BE" in the last 72 hours.    Electrolytes: Electrolytes replaced  Accuchecks: ACHS    Gtts/LDAs:   sodium chloride 0.9% Stopped (01/02/24 0949)       Lines/Drains/Airways       Peripheral Intravenous Line  Duration                  Peripheral IV - Single Lumen 12/31/23 1143 20 G Anterior;Right Forearm 3 days         Peripheral IV - Single Lumen 12/31/23 1144 20 G Anterior;Left Forearm 3 days                    Skin/Wounds  Bathing/Skin Care: bath, complete;back care;dressed/undressed;electrode patches/site rotation;linen changed (01/03/24 1501)  Wounds: No  Wound care consulted: No    "

## 2024-01-04 NOTE — NURSING
Patient arrived to procedural area in SR. Patient's pacemaker interrogated for confirmation. Dr. Alvarez notified. Patient brought back to his room via bed. Nurse, Deepthi, at bedside and given report.

## 2024-01-04 NOTE — DISCHARGE SUMMARY
[facility]  Cardiology  Discharge Summary      Patient Name: Magdaleno Cunningham  MRN: [id number]  Admission Date: 12/26/2023  Hospital Length of Stay: 9 days  Discharge Date and Time:  01/04/2024 3:52 PM  Attending Physician: Larry Alvarez MD    Discharging Provider: Viviane Curran MD  Primary Care Physician: Venkata Mclaughlin MD    HPI:   59 y/o M with known  CAD s/p CABG and recent PCI to proximal and mid Lcx by Dr. Botello on 12/22/23, COPD, atrial fibrillation on eliquis, diabetes, current smoker (reportedly 3PPD), hypertension who presented to [facility] facility with chest pain which has been present for 2 days. The pain radiated to the left shoulder, was associated with diaphoresis and became severe today prompting ER visit. He was found to have ST elevations and was taken to the cath lab directly and was found to have in-stent thrombosis. Of note he was also in atrial flutter. Procedure note not available but per report no intervention was performed and a IABP was placed in the left femoral artery. During the procedure he had intermittent complete heart block so a left femoral TVP was also placed. He was requiring BiPAP post-operatively. It was decided to transfer to higher level of care and he was intubated to allow safe transportation. He arrived on levophed 0.08 and propofol with IABP 1:1.    Procedure(s) (LRB):  Cardioversion or Defibrillation (N/A)  Transesophageal echo (ILAN) intra-procedure log documentation (N/A)     Indwelling Lines/Drains at time of discharge:  Lines/Drains/Airways       None                   Hospital Course:  On arrival from [place], patient was started on levophed with balloon pump for cardiogenic shock. Balloon pump was weaned. He had TVP removed after CHB resolved and intrinsic rhythm atrial fibrillation with controlled heart rate.TTE revealed severely reduced systolic function EF 20-25% with global hypokinesis and mildly dilated L  atrium. Electrophysiology was consulted for CRTD followed by ILAN/DCCV which was performed on 1/2 and 1/4 respectively. Patient tolerated procedure well. Patient in and out of atrial fibrillation through hospital stay. Patient was taken down for cardioversion by EP for afib, however, patient was found to be in sinus rhythm on arrival to lab. EKG was performed to confirm. CCU team felt patient was stable for discharge home with close follow up with Cardiology and his PCP.  Patient was counseld on the importance of smoking cessation and compliance with all medications. He voiced understanding of the plan. His medications were reviewed during hospitalization and chart updated.            Goals of Care Treatment Preferences:  Code Status: Full Code      Consults:   Consults (From admission, onward)          Status Ordering Provider     Inpatient consult to Endocrinology  Once        Provider:  (Not yet assigned)    Completed SOPHIE GABRIEL     Inpatient consult to Electrophysiology  Once        Provider:  (Not yet assigned)    Completed SOPHIE GABRIEL            Significant Diagnostic Studies: Labs: All labs within the past 24 hours have been reviewed  Cardiac Graphics: Echocardiogram: Transthoracic echo (TTE) complete (Cupid Only):   Results for orders placed or performed during the hospital encounter of 12/26/23   Echo   Result Value Ref Range    RA Width 4.16 cm    LA volume (mod) 52.36 cm3    Left Atrium Major Axis 7.04 cm    Left Atrium Minor Axis 6.24 cm    RA Major Axis 5.16 cm    LV Diastolic Volume 134.21 mL    LV Systolic Volume 93.08 mL    MV Peak A Abisai 0.51 m/s    MV stenosis pressure 1/2 time 61.65 ms    TR Max Abisai 2.41 m/s    MV Peak E Abisai 0.72 m/s    Mr max abisai 0.03 m/s    Ao VTI 16.67 cm    Ao peak abisai 0.98 m/s    LVOT peak VTI 11.31 cm    LVOT peak abisai 0.84 m/s    LVOT diameter 2.36 cm    E wave deceleration time 212.57 msec    AV mean gradient 2 mmHg    TAPSE 1.27 cm    RVDD 3.88 cm    LA size  4.39 cm    Ascending aorta 3.23 cm    STJ 2.86 cm    Sinus 3.90 cm    LVIDs 5.1 (A) 2.1 - 4.0 cm    Posterior Wall 1.01 0.6 - 1.1 cm    IVS 1.16 (A) 0.6 - 1.1 cm    LVIDd 6.0 3.5 - 6.0 cm    TDI LATERAL 0.07 m/s    LA WIDTH 3.84 cm    TDI SEPTAL 0.05 m/s    LV LATERAL E/E' RATIO 10.29 m/s    LV SEPTAL E/E' RATIO 14.40 m/s    FS 15 (A) 28 - 44 %    LA volume 94.80 cm3    LV mass 274.61 g    ZLVIDD -5.35     ZLVIDS -1.48     Left Ventricle Relative Wall Thickness 0.34 cm    AV valve area 2.97 cm²    AV Velocity Ratio 0.86     AV index (prosthetic) 0.68     MV valve area p 1/2 method 3.57 cm2    E/A ratio 1.41     Mean e' 0.06 m/s    LVOT area 4.4 cm2    LVOT stroke volume 49.45 cm3    AV peak gradient 4 mmHg    E/E' ratio 12.00 m/s    LV Systolic Volume Index 39.3 mL/m2    LV Diastolic Volume Index 56.63 mL/m2    LA Volume Index 40.0 mL/m2    LV Mass Index 116 g/m2    Triscuspid Valve Regurgitation Peak Gradient 23 mmHg    LA Volume Index (Mod) 22.1 mL/m2    SATYA by Velocity Ratio 3.75 cm²    BSA 2.44 m2    Narrative      Left Ventricle: The left ventricle is mildly dilated. Normal wall   thickness. Global hypokinesis present. There is severely reduced systolic   function with a visually estimated ejection fraction of 20 - 25%. There is   diastolic dysfunction.    Right Ventricle: Normal right ventricular cavity size. Wall thickness   is normal. Right ventricle wall motion  is normal. Systolic function is   moderately reduced. Pacemaker lead present in the ventricle.    Left Atrium: Left atrium is mildly dilated.    IVC/SVC: Patient is ventilated, cannot use IVC diameter to estimate   right atrial pressure.         Pending Diagnostic Studies:       Procedure Component Value Units Date/Time    EKG 12-lead [[id number]]     Order Status: Sent Lab Status: No result             Final Active Diagnoses:    Diagnosis Date Noted POA    PRINCIPAL PROBLEM:  Cardiogenic shock [R57.0] 12/26/2023 Yes    Pneumonia [J18.9] 12/27/2023  Unknown    STEMI (ST elevation myocardial infarction) [I21.3] 12/26/2023 Yes    Complete heart block [I44.2] 12/26/2023 Yes    Paroxysmal atrial fibrillation [I48.0] 12/26/2023 Yes    Typical atrial flutter [I48.3] 12/26/2023 Unknown    HLD (hyperlipidemia) [E78.5] 09/01/2023 Yes    Atrial fibrillation [I48.91] 03/25/2020 Yes    Diabetic ulcer of left foot associated with type 2 diabetes mellitus [E11.621, L97.529] 07/06/2016 Yes    Medical non-compliance [Z91.199] 07/06/2016 Not Applicable    COPD (chronic obstructive pulmonary disease) [J44.9]  Yes    Type 2 diabetes mellitus with diabetic arthropathy [E11.618]  Yes      Problems Resolved During this Admission:     No new Assessment & Plan notes have been filed under this hospital service since the last note was generated.  Service: Cardiology      Discharged Condition: stable    Disposition: Home or Self Care    Follow Up:   Follow-up Information       [facility] Follow up in 1 week(s).    Why: For wound re-check             Antonio Callejas MD Follow up in 3 month(s).    Specialties: Electrophysiology, Cardiovascular Disease, Cardiology  Contact information:  [address] [place] [address]  [phone]               Venkata Mclaughlin MD. Go on 1/16/2024.    Specialty: Internal Medicine  Why: hospital f/u at 10:45am  Contact information:  [address] [place] [address] [address]  [facility] [id number] [phone]                           Patient Instructions:   No discharge procedures on file.  Medications:  Reconciled Home Medications:      Medication List        START taking these medications      aspirin 81 MG EC tablet  Commonly known as: ECOTRIN  Take 1 tablet (81 mg total) by mouth once daily. for 21 days     doxycycline 100 MG tablet  Commonly known as: VIBRA-TABS  Take 1 tablet (100 mg total) by mouth every 12 (twelve) hours. for 4 days            ASK your doctor about these medications      apixaban 5 mg Tab  Commonly known as:  "ELIQUIS  Take 1 tablet (5 mg total) by mouth 2 (two) times daily.     clonazePAM 1 MG tablet  Commonly known as: KlonoPIN  Take 1 mg by mouth 2 (two) times daily.     cyclobenzaprine 10 MG tablet  Commonly known as: FLEXERIL  Take 10 mg by mouth every evening.     gabapentin 600 MG tablet  Commonly known as: NEURONTIN  Take 600 mg by mouth As instructed (neuropathic pain). Takes 1 tablet in the morning and 2 tablets at bedtime     isosorbide mononitrate 30 MG 24 hr tablet  Commonly known as: IMDUR  Take 1 tablet (30 mg total) by mouth once daily.     JARDIANCE 25 mg tablet  Generic drug: empagliflozin  Take 25 mg by mouth once daily.     lactobacillus acidophilus & bulgar 100 million cell packet  Commonly known as: LACTINEX  Take 1 packet (1 each total) by mouth 2 (two) times daily.     LANTUS SOLOSTAR U-100 INSULIN glargine 100 units/mL SubQ pen  Generic drug: insulin  Inject 80 Units into the skin 2 (two) times daily.     lisinopriL 20 MG tablet  Commonly known as: PRINIVIL,ZESTRIL  Take 20 mg by mouth once daily.     metFORMIN 1000 MG tablet  Commonly known as: GLUCOPHAGE  Take 1 tablet (1,000 mg total) by mouth 2 (two) times daily with meals.     methocarbamoL 500 MG Tab  Commonly known as: ROBAXIN  Take 500 mg by mouth every evening.     metoprolol succinate 100 MG 24 hr tablet  Commonly known as: TOPROL-XL  Take 100 mg by mouth 2 (two) times daily.     nitroGLYCERIN 0.4 MG SL tablet  Commonly known as: NITROSTAT  Place 0.4 mg under the tongue every 5 (five) minutes as needed for Chest pain.     oxyCODONE-acetaminophen  mg per tablet  Commonly known as: PERCOCET  Take 1 tablet by mouth 2 (two) times daily as needed for Pain.     pen needle, diabetic 31 gauge x 1/4" Ndle  USE 1 TWICE DAILY FOR BLOOD SUGAR GREATER THAN 200     ranolazine 1,000 mg Tb12  Commonly known as: RANEXA  Take 1 tablet (1,000 mg total) by mouth 2 (two) times daily.     simvastatin 10 MG tablet  Commonly known as: ZOCOR  Take 10 mg by " mouth once daily.     ticagrelor 90 mg tablet  Commonly known as: BRILINTA  Take 1 tablet (90 mg total) by mouth 2 (two) times daily.              Time spent on the discharge of patient: 20 minutes    Viviane Curran MD  Cardiology  [facility] - [facility]

## 2024-01-04 NOTE — ANESTHESIA PREPROCEDURE EVALUATION
[facility]  Anesthesia Pre-Operative Evaluation   01/04/2024        Magdaleno Cunningham, 1965  [id number]  Procedure(s) (LRB):  Cardioversion or Defibrillation (N/A)  Transesophageal echo (ILAN) intra-procedure log documentation (N/A)    Subjective    Magdaleno Cunningham is a 58 y.o. male w/ pAfib, HTN, DM2, COPD, tobacco abuse, obesity, and CAD with unstable angina s/p 2-vessel CABG (2020), recent PCI of LCx on 12/22/2023, and an inferior STEMI on 12/25/2023 with acute in-stent thrombosis. He was taken to cath lab, but was unable to be revascularized, had an IABP placed due to hypotension, and developed complete heart block.  He underwent successful temporary pacing wire placement. His systolic function is noted to be reduced following his recent STEMI, with LVEF most recently 20-25%. Had ICD placed 1/2/24. IABP has now been removed, off pressors.       TTE 12/26/23    Left Ventricle: The left ventricle is mildly dilated. Normal wall thickness. Global hypokinesis present. There is severely reduced systolic function with a visually estimated ejection fraction of 20 - 25%. There is diastolic dysfunction.    Right Ventricle: Normal right ventricular cavity size. Wall thickness is normal. Right ventricle wall motion  is normal. Systolic function is moderately reduced. Pacemaker lead present in the ventricle.    Left Atrium: Left atrium is mildly dilated.    IVC/SVC: Patient is ventilated, cannot use IVC diameter to estimate right atrial pressure.    Prev Airway:   Date Department Mask Airway Size Difficult Intubation Attempts   09/01/23 [facility] not attempted 7.5 No 1       LDA:        Peripheral IV - Single Lumen 12/31/23 1143 20 G Anterior;Right Forearm (Active)   Site Assessment Clean;Dry;Intact;No redness;No swelling;No warmth;No drainage 01/04/24 1101   Extremity Assessment Distal to IV No abnormal discoloration;No redness;No swelling;No warmth 01/04/24 1101    Line Status Flushed;Saline locked 01/04/24 1101   Dressing Status Clean;Dry;Intact 01/04/24 1101   Dressing Intervention Integrity maintained 01/04/24 1101   Dressing Change Due 01/04/24 01/04/24 1101   Site Change Due 01/04/24 01/04/24 0701   Reason Not Rotated Anticipated discharge 01/04/24 1101   Number of days: 4            Peripheral IV - Single Lumen 12/31/23 1144 20 G Anterior;Left Forearm (Active)   Site Assessment Clean;Dry;Intact;No redness;No swelling;No warmth;No drainage 01/04/24 1101   Extremity Assessment Distal to IV No abnormal discoloration;No swelling;No redness;No warmth 01/04/24 1101   Line Status Blood return noted;Flushed;Saline locked 01/04/24 1101   Dressing Status Clean;Dry;Intact 01/04/24 1101   Dressing Intervention Integrity maintained 01/04/24 1101   Dressing Change Due 01/04/24 01/04/24 1101   Site Change Due 01/04/24 01/04/24 0701   Reason Not Rotated Anticipated discharge 01/04/24 1101   Number of days: 4       Drips:    sodium chloride 0.9% Stopped (01/02/24 0949)       Patient Active Problem List   Diagnosis    Chest pain    Atherosclerosis of native coronary artery of native heart with angina pectoris    COPD (chronic obstructive pulmonary disease)    Type 2 diabetes mellitus with diabetic arthropathy    Hypertension associated with diabetes    ROSAMARIA (obstructive sleep apnea)    Diabetic ulcer of left foot associated with type 2 diabetes mellitus    Smoker 1-2 packs per day since 14 years old    Medical non-compliance    Left foot pain    Generalized anxiety disorder    Denton-Perez erythema multiforme exudativum    Allergic reaction to chemical substance    Prostate enlargement    Bladder mass    Coronary artery disease involving native coronary artery of native heart    Atrial fibrillation    S/P CABG (coronary artery bypass graft)    Protruding sternal wires    Ingrowing toenail    Onychomycosis    Polyneuropathy    Cellulitis of gluteal region    Chronic migraine    Obesity  (BMI 30-39.9)    HLD (hyperlipidemia)    Hyponatremia    Malnutrition of moderate degree    Acute pain    Open wound of buttock with complication    Abscess, perineum    Streptococcal infection    Hypomagnesemia    STEMI (ST elevation myocardial infarction)    Cardiogenic shock    Complete heart block    Paroxysmal atrial fibrillation    Typical atrial flutter    Pneumonia       Review of patient's allergies indicates:   Allergen Reactions    Pesticide Dermatitis and Rash       Current Inpatient Medications:    apixaban  5 mg Oral BID    aspirin  81 mg Oral Daily    atorvastatin  40 mg Oral Daily    doxycycline  100 mg Oral Q12H    furosemide  40 mg Oral Daily    gabapentin  300 mg Oral BID    hydrALAZINE  10 mg Oral Q8H    insulin aspart U-100  9 Units Subcutaneous TIDWM    insulin detemir U-100  22 Units Subcutaneous Daily    metoprolol succinate  25 mg Oral Daily    pantoprazole  40 mg Oral Daily    polyethylene glycol  17 g Oral Daily    sacubitriL-valsartan  1 tablet Oral BID    spironolactone  25 mg Oral Daily    ticagrelor  90 mg Oral BID       No current facility-administered medications on file prior to encounter.     Current Outpatient Medications on File Prior to Encounter   Medication Sig Dispense Refill    apixaban (ELIQUIS) 5 mg Tab Take 1 tablet (5 mg total) by mouth 2 (two) times daily.      clonazePAM (KLONOPIN) 1 MG tablet Take 1 mg by mouth 2 (two) times daily.      cyclobenzaprine (FLEXERIL) 10 MG tablet Take 10 mg by mouth every evening.      gabapentin (NEURONTIN) 600 MG tablet Take 600 mg by mouth As instructed (neuropathic pain). Takes 1 tablet in the morning and 2 tablets at bedtime      isosorbide mononitrate (IMDUR) 30 MG 24 hr tablet Take 1 tablet (30 mg total) by mouth once daily. 30 tablet 11    JARDIANCE 25 mg tablet Take 25 mg by mouth once daily.      lactobacillus acidophilus & bulgar (LACTINEX) 100 million cell packet Take 1 packet (1 each total) by mouth 2 (two) times daily. 30  "packet 0    LANTUS SOLOSTAR U-100 INSULIN glargine 100 units/mL (3mL) SubQ pen Inject 80 Units into the skin 2 (two) times daily.      lisinopriL (PRINIVIL,ZESTRIL) 20 MG tablet Take 20 mg by mouth once daily.      metFORMIN (GLUCOPHAGE) 1000 MG tablet Take 1 tablet (1,000 mg total) by mouth 2 (two) times daily with meals.      methocarbamoL (ROBAXIN) 500 MG Tab Take 500 mg by mouth every evening.      metoprolol succinate (TOPROL-XL) 100 MG 24 hr tablet Take 100 mg by mouth 2 (two) times daily.      nitroGLYCERIN (NITROSTAT) 0.4 MG SL tablet Place 0.4 mg under the tongue every 5 (five) minutes as needed for Chest pain.      oxyCODONE-acetaminophen (PERCOCET)  mg per tablet Take 1 tablet by mouth 2 (two) times daily as needed for Pain.      pen needle, diabetic 31 gauge x 1/4" Ndle USE 1 TWICE DAILY FOR BLOOD SUGAR GREATER THAN 200      ranolazine (RANEXA) 1,000 mg Tb12 Take 1 tablet (1,000 mg total) by mouth 2 (two) times daily. 60 tablet 11    simvastatin (ZOCOR) 10 MG tablet Take 10 mg by mouth once daily.      ticagrelor (BRILINTA) 90 mg tablet Take 1 tablet (90 mg total) by mouth 2 (two) times daily. 60 tablet 2       Past Surgical History:   Procedure Laterality Date    CORONARY ANGIOGRAPHY INCLUDING BYPASS GRAFTS WITH CATHETERIZATION OF LEFT HEART N/A 9/1/2022    Procedure: ANGIOGRAM, CORONARY, INCLUDING BYPASS GRAFT, WITH LEFT HEART CATHETERIZATION;  Surgeon: Paul Botello MD;  Location: [facility] CATH/EP LAB;  Service: Cardiology;  Laterality: N/A;    CORONARY ANGIOGRAPHY INCLUDING BYPASS GRAFTS WITH CATHETERIZATION OF LEFT HEART Left 12/22/2023    Procedure: ANGIOGRAM, CORONARY, INCLUDING BYPASS GRAFT, WITH LEFT HEART CATHETERIZATION;  Surgeon: Paul Botello MD;  Location: [facility] CATH/EP LAB;  Service: Cardiology;  Laterality: Left;    CORONARY ARTERY BYPASS GRAFT (CABG) N/A 4/7/2020    Procedure: CORONARY ARTERY BYPASS GRAFT (CABG)- Excision of left atrial appendage;  Surgeon: Doug Solitario MD; "  Location: [facility] OR;  Service: Cardiothoracic;  Laterality: N/A;    CORONARY BYPASS GRAFT ANGIOGRAPHY  12/25/2023    Procedure: Bypass graft study;  Surgeon: Ashley Valverde MD;  Location: [facility] CATH/EP LAB;  Service: Cardiology;;    CORONARY STENT PLACEMENT      CORONARY STENT PLACEMENT N/A 12/22/2023    Procedure: INSERTION, STENT, CORONARY ARTERY;  Surgeon: Paul Botello MD;  Location: [facility] CATH/EP LAB;  Service: Cardiology;  Laterality: N/A;    WILSON MAZE PROCEDURE N/A 4/7/2020    Procedure: WILSON MAZE PROCEDURE- Attempted;  Surgeon: Doug Solitario MD;  Location: [facility] OR;  Service: Cardiothoracic;  Laterality: N/A;    CYSTOSCOPY N/A 12/10/2018    Procedure: CYSTOSCOPY;  Surgeon: Khushboo Abreu MD;  Location: [facility] OR;  Service: Urology;  Laterality: N/A;    ENDOSCOPIC HARVEST OF VEIN Left 4/7/2020    Procedure: HARVEST-VEIN-ENDOVASCULAR;  Surgeon: Doug Solitario MD;  Location: [facility] OR;  Service: Cardiothoracic;  Laterality: Left;    INCISION OF PERIRECTAL ABSCESS Bilateral 9/1/2023    Procedure: INCISION, ABSCESS, PERIRECTAL;  Surgeon: Brayan Spears MD;  Location: [facility] OR;  Service: General;  Laterality: Bilateral;  stirrups    INSERTION OF INTRA-AORTIC BALLOON ASSIST DEVICE  12/25/2023    Procedure: INSERTION, INTRA-AORTIC BALLOON PUMP;  Surgeon: Ashley Valverde MD;  Location: [facility] CATH/EP LAB;  Service: Cardiology;;    INSERTION OF TEMPORARY PACEMAKER N/A 12/25/2023    Procedure: INSERTION, PACEMAKER, TEMPORARY;  Surgeon: Ashley Valverde MD;  Location: [facility] CATH/EP LAB;  Service: Cardiology;  Laterality: N/A;    INSERTION, ICD, DUAL CHAMBER N/A 1/2/2024    Procedure: Insertion, ICD, Dual Chamber;  Surgeon: Antonio Callejas MD;  Location: [facility] EP LAB;  Service: Cardiology;  Laterality: N/A;  CM, DUAL ICD, ANES, SJM, MB, 3079    IVUS, CORONARY  12/22/2023    Procedure: IVUS, Coronary;  Surgeon: Paul Botello MD;  Location: [facility] CATH/EP LAB;  Service:  Cardiology;;    LEFT HEART CATHETERIZATION Left 3/17/2020    Procedure: CATHETERIZATION, HEART, LEFT;  Surgeon: Tyree Walters MD;  Location: [facility] CATH/EP LAB;  Service: Cardiology;  Laterality: Left;    PERCUTANEOUS CORONARY INTERVENTION, ARTERY N/A 12/22/2023    Procedure: Percutaneous coronary intervention;  Surgeon: Paul Botello MD;  Location: [facility] CATH/EP LAB;  Service: Cardiology;  Laterality: N/A;    PERCUTANEOUS CORONARY INTERVENTION, ARTERY N/A 12/25/2023    Procedure: Percutaneous coronary intervention;  Surgeon: Ashley Valverde MD;  Location: [facility] CATH/EP LAB;  Service: Cardiology;  Laterality: N/A;    PLACEMENT, IABP  12/25/2023    Procedure: Placement, IABP;  Surgeon: Ashley Valverde MD;  Location: [facility] CATH/EP LAB;  Service: Cardiology;;    STERNAL WIRES REMOVAL N/A 6/8/2020    Procedure: REMOVAL, STERNAL WIRE;  Surgeon: Doug Solitario MD;  Location: [facility] OR;  Service: Peripheral Vascular;  Laterality: N/A;    ULTRASOUND OF PROSTATE FOR VOLUME DETERMINATION  12/10/2018    Procedure: ULTRASOUND, PROSTATE, FOR VOLUME DETERMINATION;  Surgeon: Khushboo Abreu MD;  Location: [facility] OR;  Service: Urology;;       Social History:  Tobacco Use: High Risk (1/3/2024)    Patient History     Smoking Tobacco Use: Every Day     Smokeless Tobacco Use: Never     Passive Exposure: Not on file       Alcohol Use: Not At Risk (12/26/2023)    AUDIT-C     Frequency of Alcohol Consumption: Never     Average Number of Drinks: Patient does not drink     Frequency of Binge Drinking: Never       Objective    Vital Signs Range:  BMI Readings from Last 1 Encounters:   01/04/24 33.30 kg/m²       Temp:  [36.7 °C (98 °F)-36.9 °C (98.5 °F)]   Pulse:  [72-94]   Resp:  [14-37]   BP: ()/(63-75)   SpO2:  [94 %-98 %]        Significant Labs:        Component Value Date/Time    WBC 9.87 01/04/2024 0405    HGB 15.6 01/04/2024 0405    HCT 45.7 01/04/2024 0405    HCT 45 12/29/2023 1943      01/04/2024 0405     (L) 01/04/2024 0405     (L) 01/04/2024 0405    K 4.4 01/04/2024 0405    K 4.4 01/04/2024 0405     01/04/2024 0405     01/04/2024 0405    CO2 23 01/04/2024 0405    CO2 23 01/04/2024 0405     (H) 01/04/2024 0405     (H) 01/04/2024 0405    BUN 21 (H) 01/04/2024 0405    BUN 21 (H) 01/04/2024 0405    CREATININE 1.1 01/04/2024 0405    CREATININE 1.1 01/04/2024 0405    MG 1.7 01/04/2024 0405    PHOS 3.3 01/03/2024 0326    CALCIUM 9.4 01/04/2024 0405    CALCIUM 9.4 01/04/2024 0405    ALBUMIN 3.3 (L) 01/04/2024 0405    PROT 7.6 01/04/2024 0405    ALKPHOS 64 01/04/2024 0405    BILITOT 0.7 01/04/2024 0405    AST 23 01/04/2024 0405    ALT 21 01/04/2024 0405    INR 1.1 12/28/2023 1302    HGBA1C 8.3 (H) 12/14/2023 2150    HGBA1C 8.3 (H) 12/14/2023 2150        Please see Results Review for additional labs.     Diagnostic Studies: All relevant studies, reviewed.      EKG:   Results for orders placed or performed during the hospital encounter of 12/26/23   EKG 12-lead    Collection Time: 01/02/24  4:03 PM    Narrative    Test Reason : I49.9,    Vent. Rate : 074 BPM     Atrial Rate : 312 BPM     P-R Int : 000 ms          QRS Dur : 092 ms      QT Int : 454 ms       P-R-T Axes : 000 037 233 degrees     QTc Int : 503 ms    Atrial fibrillation with occasional ventricular-paced complexes  Low voltage QRS  Incomplete right bundle branch block  Cannot rule out Anterior infarct ,age undetermined  ST and T wave abnormality, consider inferolateral ischemia  Prolonged QT  Abnormal ECG  When compared with ECG of 30-DEC-2023 09:29,  Vent. rate has increased BY   3 BPM  Confirmed by LAN LUIS MD ([id number]) on 1/2/2024 7:03:59 PM    Referred By: VIVIANA PERRY           Confirmed By:LAN LUIS MD       ECHO:  Results for orders placed during the hospital encounter of 12/26/23    Echo    Interpretation Summary    Left Ventricle: The left ventricle is mildly dilated. Normal wall thickness.  Global hypokinesis present. There is severely reduced systolic function with a visually estimated ejection fraction of 20 - 25%. There is diastolic dysfunction.    Right Ventricle: Normal right ventricular cavity size. Wall thickness is normal. Right ventricle wall motion  is normal. Systolic function is moderately reduced. Pacemaker lead present in the ventricle.    Left Atrium: Left atrium is mildly dilated.    IVC/SVC: Patient is ventilated, cannot use IVC diameter to estimate right atrial pressure.        Pre-op Assessment    I have reviewed the Patient Summary Reports.     I have reviewed the Nursing Notes. I have reviewed the NPO Status.   I have reviewed the Medications.     Review of Systems  Anesthesia Hx:  No problems with previous Anesthesia   History of prior surgery of interest to airway management or planning:          Denies Family Hx of Anesthesia complications.    Denies Personal Hx of Anesthesia complications.                    Hematology/Oncology:       -- Anemia:                                  Cardiovascular:  Exercise tolerance: poor   Hypertension  Past MI CAD   CABG/stent Dysrhythmias  Angina        ECG has been reviewed.                          Pulmonary:   COPD     Sleep Apnea                Renal/:   Denies Chronic Renal Disease.                Hepatic/GI:      Denies GERD.             Neurological:    Denies CVA.    Denies Seizures.                                Endocrine:  Diabetes               Physical Exam  General: Well nourished, Cooperative, Alert and Oriented    Airway:  Mallampati: III / II  Mouth Opening: Normal  TM Distance: Normal  Tongue: Normal  Neck ROM: Normal ROM    Dental:  Periodontal disease    Heart:  Rhythm: Irregularly Irregular        Anesthesia Plan  Type of Anesthesia, risks & benefits discussed:    Anesthesia Type: Gen Natural Airway, Gen ETT, MAC  Intra-op Monitoring Plan: Standard ASA Monitors  Post Op Pain Control Plan: multimodal analgesia  Induction:   IV  Informed Consent: Informed consent signed with the Patient and all parties understand the risks and agree with anesthesia plan.  All questions answered.   ASA Score: 4  Day of Surgery Review of History & Physical: H&P Update referred to the surgeon/provider.    Ready For Surgery From Anesthesia Perspective.     .

## 2024-01-04 NOTE — PROGRESS NOTES
"[facility] - [facility]  Endocrinology  Progress Note    Admit Date: 12/26/2023     Reason for Consult: Management of T2DM, Hyperglycemia     Diabetes diagnosis year: >3 years ago    Home Diabetes Medications:  Jardiance 25 mg, Lantus 80 u BID, Metformin 1000 mg BID    How often checking glucose at home? 1-3 x day   BG readings on regimen: 150-200's (patient is confused)  Hypoglycemia on the regimen?  Yes  Missed doses on regimen?  No    Diabetes Complications include:     Hyperglycemia    Complicating diabetes co morbidities:   History of MI      HPI:   Patient is a 58 y.o. male with CAD s/p CABG and recent PCI to proximal and mid Lcx by Dr. Botello on 12/22/23, COPD, atrial fibrillation on eliquis, diabetes, current smoker (reportedly 3PPD), hypertension who presented to [facility] facility with chest pain which has been present for 2 days.  He was found to have ST elevations and was taken to the cath lab directly and was found to have in-stent thrombosis. Of note he was also in atrial flutter. Procedure note not available but per report no intervention was performed and a IABP was placed in the left femoral artery. During the procedure he had intermittent complete heart block so a left femoral TVP was also placed. He was requiring BiPAP post-operatively. It was decided to transfer to higher level of care and he was intubated to allow safe transportation. Endocrine consulted for bg management        Interval HPI:   No acute events overnight. Patient in room [id number]/[id number] A. Blood glucose stable. BG at goal on current insulin regimen (SSI, prandial, and basal insulin ). Steroid use- None.   2 Days Post-Op  Renal function- Normal   Vasopressors-  None     Diet NPO     Eating:   NPO  Nausea: No  Hypoglycemia and intervention: No  Fever: No  TPN and/or TF: No    /71   Pulse 86   Temp 98 °F (36.7 °C) (Oral)   Resp (!) 21   Ht 5' 11" (1.803 m)   Wt 108.3 kg (238 lb 12.1 oz)   SpO2 96%   BMI " "33.30 kg/m²     Labs Reviewed and Include    Recent Labs   Lab 01/04/24  0405   *  163*   CALCIUM 9.4  9.4   ALBUMIN 3.3*   PROT 7.6   *  135*   K 4.4  4.4   CO2 23  23     102   BUN 21*  21*   CREATININE 1.1  1.1   ALKPHOS 64   ALT 21   AST 23   BILITOT 0.7     Lab Results   Component Value Date    WBC 9.87 01/04/2024    HGB 15.6 01/04/2024    HCT 45.7 01/04/2024    MCV 93 01/04/2024     01/04/2024     No results for input(s): "TSH", "FREET4" in the last 168 hours.  Lab Results   Component Value Date    HGBA1C 8.3 (H) 12/14/2023    HGBA1C 8.3 (H) 12/14/2023       Nutritional status:   Body mass index is 33.3 kg/m².  Lab Results   Component Value Date    ALBUMIN 3.3 (L) 01/04/2024    ALBUMIN 3.2 (L) 01/03/2024    ALBUMIN 3.1 (L) 01/02/2024     No results found for: "PREALBUMIN"    Estimated Creatinine Clearance: 93.9 mL/min (based on SCr of 1.1 mg/dL).    Accu-Checks  Recent Labs     01/02/24  1148 01/02/24  1638 01/02/24  2127 01/03/24  0145 01/03/24  0711 01/03/24  1111 01/03/24  1635 01/03/24  2120 01/04/24  0505 01/04/24  0808   POCTGLUCOSE 160* 134* 244* 114* 169* 173* 232* 117* 153* 156*       Current Medications and/or Treatments Impacting Glycemic Control  Immunotherapy:    Immunosuppressants       None          Steroids:   Hormones (From admission, onward)      None          Pressors:    Autonomic Drugs (From admission, onward)      None          Hyperglycemia/Diabetes Medications:   Antihyperglycemics (From admission, onward)      Start     Stop Route Frequency Ordered    01/04/24 0715  insulin aspart U-100 pen 9 Units         -- SubQ 3 times daily with meals 01/03/24 2155    01/03/24 1134  insulin aspart U-100 pen 0-10 Units         -- SubQ Before meals, nightly and at 0200 PRN 01/03/24 1034    01/03/24 1045  insulin detemir U-100 (Levemir) pen 22 Units         -- SubQ Daily 01/03/24 1034            ASSESSMENT and PLAN    Cardiac/Vascular  * Cardiogenic shock  Managed per " primary team  Optimize bg control        HLD (hyperlipidemia)  Managed per primary.   On statin per ADA        Endocrine  Type 2 diabetes mellitus with diabetic arthropathy  BG goal 140-180     - Levemir 22 units QD (0.4 u/kg/day WBD)   -  Novolog 9 units TIDWM (20% reduction due to post-prandial BG below goal) -- HOLD AS PT NPO THIS AM FOR CARDIOVERSION   - Novolog (aspart) insulin prn for BG excursions [facility] SSI (150/25) ac/hs  - BG checks ac/hs   - Hypoglycemia protocol in place    ** Please notify Endocrine for any change and/or advance in diet**  ** Please call Endocrine for any BG related issues **    Discharge Planning:   Discharge Planning:   - Levemir 22 units qd (Eat a snack before bed if BG is < 100 mg/dl)  - Novolog 10 units TIDWM (Hold if BG < 100 mg/dL)  - Novolog SSI for BG excursions:  Add correction scale if needed.  Blood sugar 150 to 200 add 2 units  Blood sugar 201 to 250 add 4 units  Blood sugar 251 to 300 add 6 units  Blood sugar 301 to 350 add 8 units  Blood sugar greater than 350 add 10 units  - Insurance approved diabetes testing supplies to check blood sugar 4 times a day (Before meals and at bedtime) and as needed.  - BD pen needles   - Send BG logs in 4 days  - Follow-up in discharge clinic in 2 weeks.   - hx of pancreatitis with GLP1RA       Reviewed topics related to DM including: the need for insulin, how insulin works, what makes it a high risk medication, the importance of immediate follow up with either PCP or endocrine provider, importance of and how to check BG, how to record BG on logs, how to administer insulin, appropriate insulin administration sites, importance of rotating injection sites, hyper/hypoglycemia, how and when to treat hypoglycemia, when to hold insulin, how the correction scale works, importance of storing unused insulin in the refrigerator, and when to seek medical attention.  Patient verbalized understanding, answered all questions to patient's satisfaction.  Blood sugar logs given to patient.                Brigitte Champion PA-C  Endocrinology  [facility] - Cardiac Int[facility]

## 2024-01-04 NOTE — PT/OT/SLP PROGRESS
Speech Language Pathology      Magdaleno Cunningham  MRN: [id number]    Patient not seen today secondary to NPO for procedure scheduled for 1300. Will follow-up per SLP POC.      1/4/2024

## 2024-01-04 NOTE — CONSULTS
[facility], [facility]  ILAN Consult      Magdaleno Cunningham  YOB: 1965  Medical Record Number:  [id number]  Attending Physician:  Larry Alvarez MD   Date of Admission: 12/26/2023       Hospital Day:  9  Current Principal Problem:  Cardiogenic shock      History     Cc: atrial flutter    HPI  57 y/o M with known  CAD s/p CABG and recent PCI to proximal and mid Lcx by Dr. Botello on 12/22/23, COPD, atrial fibrillation on eliquis, diabetes, current smoker (reportedly 3PPD), [facility] which has been present for 2 days. The pain radiated to the left shoulder, was associated with diaphoresis and became severe today prompting ER visit. He was found to have ST elevations and was taken to the cath lab directly and was found to have in-stent thrombosis. Of note he was also in atrial flutter. Procedure note not available but per report no intervention was performed and a IABP was placed in the left femoral artery. During the procedure he had intermittent complete heart block so a left femoral TVP was also placed. He was requiring BiPAP post-operatively. It was decided to transfer to higher level of care and he was intubated to allow safe transportation. He arrived on levophed 0.08 and propofol with IABP 1:1.     Currently, s/p CRT-D two days ago. He is in aflutter and getting ILAN/DCCV today, Consent obtained from patient.     Anticoagulant/antiplatelets:   ECG:      Dysphagia or odynophagia:  no  Liver Disease, esophageal disease, or known varices:  no  Upper GI Bleeding: no  Snoring:  no  Sleep Apnea:  no  Prior neck surgery or radiation:  no  History of anesthetic difficulties:  no  Family history of anesthetic difficulties:  no  Last oral intake: last pm   Able to move neck in all directions: yes  Platelet count: 266  INR: 1.1        Medications - Outpatient  Prior to Admission medications    Medication Sig Start Date End Date  "Taking? Authorizing Provider   apixaban (ELIQUIS) 5 mg Tab Take 1 tablet (5 mg total) by mouth 2 (two) times daily. 12/24/23   Pual Botello MD   clonazePAM (KLONOPIN) 1 MG tablet Take 1 mg by mouth 2 (two) times daily.    Provider, Historical   cyclobenzaprine (FLEXERIL) 10 MG tablet Take 10 mg by mouth every evening. 8/15/22   Provider, Historical   gabapentin (NEURONTIN) 600 MG tablet Take 600 mg by mouth As instructed (neuropathic pain). Takes 1 tablet in the morning and 2 tablets at bedtime    Provider, Historical   isosorbide mononitrate (IMDUR) 30 MG 24 hr tablet Take 1 tablet (30 mg total) by mouth once daily. 12/16/23 12/15/24  Christopher Hastings MD   JARDIANCE 25 mg tablet Take 25 mg by mouth once daily. 6/25/21   Provider, Historical   lactobacillus acidophilus & bulgar (LACTINEX) 100 million cell packet Take 1 packet (1 each total) by mouth 2 (two) times daily. 9/11/23   Sourav Owens MD   LANTUS SOLOSTAR U-100 INSULIN glargine 100 units/mL (3mL) SubQ pen Inject 80 Units into the skin 2 (two) times daily. 3/6/20   Provider, Historical   lisinopriL (PRINIVIL,ZESTRIL) 20 MG tablet Take 20 mg by mouth once daily.    Provider, Historical   metFORMIN (GLUCOPHAGE) 1000 MG tablet Take 1 tablet (1,000 mg total) by mouth 2 (two) times daily with meals. 12/24/23   Paul Botello MD   methocarbamoL (ROBAXIN) 500 MG Tab Take 500 mg by mouth every evening. 6/19/23   Provider, Historical   metoprolol succinate (TOPROL-XL) 100 MG 24 hr tablet Take 100 mg by mouth 2 (two) times daily.    Provider, Historical   nitroGLYCERIN (NITROSTAT) 0.4 MG SL tablet Place 0.4 mg under the tongue every 5 (five) minutes as needed for Chest pain.    Provider, Historical   oxyCODONE-acetaminophen (PERCOCET)  mg per tablet Take 1 tablet by mouth 2 (two) times daily as needed for Pain. 8/13/22   Provider, Historical   pen needle, diabetic 31 gauge x 1/4" Ndle USE 1 TWICE DAILY FOR BLOOD SUGAR GREATER THAN 200 4/1/20   Provider, " Historical   ranolazine (RANEXA) 1,000 mg Tb12 Take 1 tablet (1,000 mg total) by mouth 2 (two) times daily. 12/15/23 12/14/24  Christopher Hastings MD   simvastatin (ZOCOR) 10 MG tablet Take 10 mg by mouth once daily. 8/17/22   Provider, Historical   ticagrelor (BRILINTA) 90 mg tablet Take 1 tablet (90 mg total) by mouth 2 (two) times daily. 12/15/23 3/14/24  Christopher Hastings MD         Medications - Current  Scheduled Meds:   apixaban  5 mg Oral BID    aspirin  81 mg Oral Daily    atorvastatin  40 mg Oral Daily    doxycycline  100 mg Oral Q12H    furosemide  40 mg Oral Daily    gabapentin  300 mg Oral BID    hydrALAZINE  10 mg Oral Q8H    insulin aspart U-100  9 Units Subcutaneous TIDWM    insulin detemir U-100  22 Units Subcutaneous Daily    magnesium sulfate IVPB  2 g Intravenous Once    metoprolol succinate  25 mg Oral Daily    pantoprazole  40 mg Oral Daily    polyethylene glycol  17 g Oral Daily    sacubitriL-valsartan  1 tablet Oral BID    spironolactone  25 mg Oral Daily    ticagrelor  90 mg Oral BID     Continuous Infusions:   sodium chloride 0.9% Stopped (01/02/24 0949)     PRN Meds:.sodium chloride 0.9%, sodium chloride 0.9%, BUPivacaine (PF) 0.25% (2.5 mg/ml), dextrose 10%, dextrose 10%, dextrose 10%, dextrose 10%, dextrose 10%, dextrose 10%, dextrose 10%, dextrose 10%, glucagon (human recombinant), glucose, glucose, hydrOXYzine pamoate, insulin aspart U-100, LIDOcaine HCL 20 mg/ml (2%), oxyCODONE, sodium chloride 0.9%, sodium chloride 0.9%, vancomycin      Allergies  Review of patient's allergies indicates:   Allergen Reactions    Pesticide Dermatitis and Rash         Past Medical History  Past Medical History:   Diagnosis Date    Anticoagulant long-term use     Atrial fibrillation 2018    BPH (benign prostatic hyperplasia)     Cataract     COPD (chronic obstructive pulmonary disease)     Coronary artery disease     stents    CVD (cardiovascular disease)     Diabetes mellitus     Erythema multiforme     AKA  Denton Edmondson Syndrome    Hypertension     Infected incision     MI (myocardial infarction)     per pt he has had 4     ROSAMARIA on CPAP     RLS (restless legs syndrome)     Perez-Denton syndrome          Past Surgical History  Past Surgical History:   Procedure Laterality Date    CORONARY ANGIOGRAPHY INCLUDING BYPASS GRAFTS WITH CATHETERIZATION OF LEFT HEART N/A 9/1/2022    Procedure: ANGIOGRAM, CORONARY, INCLUDING BYPASS GRAFT, WITH LEFT HEART CATHETERIZATION;  Surgeon: Paul Botello MD;  Location: [facility] CATH/EP LAB;  Service: Cardiology;  Laterality: N/A;    CORONARY ANGIOGRAPHY INCLUDING BYPASS GRAFTS WITH CATHETERIZATION OF LEFT HEART Left 12/22/2023    Procedure: ANGIOGRAM, CORONARY, INCLUDING BYPASS GRAFT, WITH LEFT HEART CATHETERIZATION;  Surgeon: Paul Botello MD;  Location: [facility] CATH/EP LAB;  Service: Cardiology;  Laterality: Left;    CORONARY ARTERY BYPASS GRAFT (CABG) N/A 4/7/2020    Procedure: CORONARY ARTERY BYPASS GRAFT (CABG)- Excision of left atrial appendage;  Surgeon: Doug Solitario MD;  Location: [facility] OR;  Service: Cardiothoracic;  Laterality: N/A;    CORONARY BYPASS GRAFT ANGIOGRAPHY  12/25/2023    Procedure: Bypass graft study;  Surgeon: Ashley Valverde MD;  Location: [facility] CATH/EP LAB;  Service: Cardiology;;    CORONARY STENT PLACEMENT      CORONARY STENT PLACEMENT N/A 12/22/2023    Procedure: INSERTION, STENT, CORONARY ARTERY;  Surgeon: Paul Botello MD;  Location: [facility] CATH/EP LAB;  Service: Cardiology;  Laterality: N/A;    WILSON MAZE PROCEDURE N/A 4/7/2020    Procedure: WILSON MAZE PROCEDURE- Attempted;  Surgeon: Doug Solitario MD;  Location: [facility] OR;  Service: Cardiothoracic;  Laterality: N/A;    CYSTOSCOPY N/A 12/10/2018    Procedure: CYSTOSCOPY;  Surgeon: Khushboo Abreu MD;  Location: [facility] OR;  Service: Urology;  Laterality: N/A;    ENDOSCOPIC HARVEST OF VEIN Left 4/7/2020    Procedure: HARVEST-VEIN-ENDOVASCULAR;  Surgeon: Doug Solitario MD;   Location: [facility] OR;  Service: Cardiothoracic;  Laterality: Left;    INCISION OF PERIRECTAL ABSCESS Bilateral 9/1/2023    Procedure: INCISION, ABSCESS, PERIRECTAL;  Surgeon: Brayan Spears MD;  Location: [facility] OR;  Service: General;  Laterality: Bilateral;  stirrups    INSERTION OF INTRA-AORTIC BALLOON ASSIST DEVICE  12/25/2023    Procedure: INSERTION, INTRA-AORTIC BALLOON PUMP;  Surgeon: Ashley Valverde MD;  Location: [facility] CATH/EP LAB;  Service: Cardiology;;    INSERTION OF TEMPORARY PACEMAKER N/A 12/25/2023    Procedure: INSERTION, PACEMAKER, TEMPORARY;  Surgeon: Ashley Valverde MD;  Location: [facility] CATH/EP LAB;  Service: Cardiology;  Laterality: N/A;    INSERTION, ICD, DUAL CHAMBER N/A 1/2/2024    Procedure: Insertion, ICD, Dual Chamber;  Surgeon: Antonio Callejas MD;  Location: [facility] EP LAB;  Service: Cardiology;  Laterality: N/A;  CM, DUAL ICD, ANES, RAYNEM, MB, 3079    IVUS, CORONARY  12/22/2023    Procedure: IVUS, Coronary;  Surgeon: Paul Botello MD;  Location: [facility] CATH/EP LAB;  Service: Cardiology;;    LEFT HEART CATHETERIZATION Left 3/17/2020    Procedure: CATHETERIZATION, HEART, LEFT;  Surgeon: Tyree Walters MD;  Location: [facility] CATH/EP LAB;  Service: Cardiology;  Laterality: Left;    PERCUTANEOUS CORONARY INTERVENTION, ARTERY N/A 12/22/2023    Procedure: Percutaneous coronary intervention;  Surgeon: Paul Botello MD;  Location: [facility] CATH/EP LAB;  Service: Cardiology;  Laterality: N/A;    PERCUTANEOUS CORONARY INTERVENTION, ARTERY N/A 12/25/2023    Procedure: Percutaneous coronary intervention;  Surgeon: Ashley Valverde MD;  Location: [facility] CATH/EP LAB;  Service: Cardiology;  Laterality: N/A;    PLACEMENT, IABP  12/25/2023    Procedure: Placement, IABP;  Surgeon: Ashley Valverde MD;  Location: [facility] CATH/EP LAB;  Service: Cardiology;;    STERNAL WIRES REMOVAL N/A 6/8/2020    Procedure: REMOVAL, STERNAL WIRE;  Surgeon: Doug Solitario MD;  Location: [facility] OR;   Service: Peripheral Vascular;  Laterality: N/A;    ULTRASOUND OF PROSTATE FOR VOLUME DETERMINATION  12/10/2018    Procedure: ULTRASOUND, PROSTATE, FOR VOLUME DETERMINATION;  Surgeon: Khushboo Abreu MD;  Location: [facility] OR;  Service: Urology;;         Social History  Social History     Socioeconomic History    Marital status:    Tobacco Use    Smoking status: Every Day     Current packs/day: 0.00     Average packs/day: 3.0 packs/day for 30.0 years (90.0 ttl pk-yrs)     Types: Cigarettes     Start date: 4/4/1990     Last attempt to quit: 4/4/2020     Years since quitting: 3.7    Smokeless tobacco: Never   Substance and Sexual Activity    Alcohol use: Not Currently    Drug use: Yes     Frequency: 1.0 times per week     Types: Marijuana    Sexual activity: Not Currently   Social History Narrative    ** Merged History Encounter **          Social Determinants of Health     Financial Resource Strain: Low Risk  (12/26/2023)    Overall Financial Resource Strain (CARDIA)     Difficulty of Paying Living Expenses: Not hard at all   Food Insecurity: No Food Insecurity (12/26/2023)    Hunger Vital Sign     Worried About Running Out of Food in the Last Year: Never true     Ran Out of Food in the Last Year: Never true   Transportation Needs: No Transportation Needs (12/26/2023)    PRAPARE - Transportation     Lack of Transportation (Medical): No     Lack of Transportation (Non-Medical): No   Physical Activity: Patient Unable To Answer (12/26/2023)    Exercise Vital Sign     Days of Exercise per Week: Patient unable to answer     Minutes of Exercise per Session: Patient unable to answer   Stress: Patient Unable To Answer (12/26/2023)    [employer] [place] [employer] - Occupational Stress Questionnaire     Feeling of Stress : Patient unable to answer   Social Connections: Moderately Integrated (12/26/2023)    Social Connection and Isolation Panel [NHANES]     Frequency of Communication with Friends and  Family: More than three times a week     Frequency of Social Gatherings with Friends and Family: More than three times a week     Attends [employer] Services: More than 4 times per year     Active Member of Clubs or Organizations: Yes     Attends Club or Organization Meetings: Patient unable to answer     Marital Status:    Housing Stability: Low Risk  (12/26/2023)    Housing Stability Vital Sign     Unable to Pay for Housing in the Last Year: No     Number of Places Lived in the Last Year: 1     Unstable Housing in the Last Year: No         ROS  10 point ROS performed and negative except as stated in HPI     Physical Examination         Vital Signs  Vitals  Temp: 98.5 °F (36.9 °C)  Temp Source: Oral  Pulse: 90  Heart Rate Source: Monitor, Continuous  Resp: (!) 23  SpO2: 96 %  Pulse Oximetry Type: Continuous  Flow (L/min): 1  Oxygen Concentration (%): 30  BP: (!) 144/71  MAP (mmHg): 89  BP Location: Right arm  BP Method: Automatic  Patient Position: Sitting    Invasive Hemodynamic Monitoring  CVP (mean): 1 mmHg  SVO2 (%): 62 %  IABP Assisted Systolic/Diastolic: 128/83  IABP Unassisted Systolic/Diastolic: 133/83  IABP Mean: 103  IABP Augmented Diastolic Pressure: 112     24 Hour VS Range    Temp:  [97.8 °F (36.6 °C)-98.7 °F (37.1 °C)]   Pulse:  []   Resp:  [12-37]   BP: ()/(58-86)   SpO2:  [94 %-99 %]     Intake/Output Summary (Last 24 hours) at 1/4/2024 1043  Last data filed at 1/4/2024 1000  Gross per 24 hour   Intake 1820.71 ml   Output 2325 ml   Net -504.29 ml         Physical Exam:   Constitutional: no acute distress  HEENT: NCAT, EOMI, no scleral icterus  Cardiovascular: Regular rate and rhythm  Pulmonary: Normal respiratory effort   Abdomen: nontender, non-distended   Neuro: alert and oriented, no focal deficits  Extremities: warm, no edema   MSK: no deformities  Integument: intact, no rashes           Data       Recent Labs   Lab 12/31/23  0324 01/01/24  0310 01/02/24  0215 01/03/24  0326  "01/04/24  0405   WBC 10.89  10.89 9.33 9.74 8.46 9.87   HGB 15.9  15.9 15.1 14.8 15.2 15.6   HCT 45.2  45.2 43.0 43.9 44.5 45.7     297 262 285 286 266        Recent Labs   Lab 12/28/23  1302   INR 1.1        Recent Labs   Lab 01/01/24  0310 01/02/24  0215 01/02/24  1639 01/03/24  0326 01/04/24  0405     136 139  139 139 138  138 135*  135*   K 3.8  3.8 3.8  3.8 4.7 4.3  4.3 4.4  4.4     104 105  105 106 106  106 102  102   CO2 18*  18* 20*  20* 21* 20*  20* 23  23   BUN 39*  39* 34*  34* 27* 23*  23* 21*  21*   CREATININE 1.6*  1.6* 1.3  1.3 1.4 1.1  1.1 1.1  1.1   ANIONGAP 14  14 14  14 12 12  12 10  10   CALCIUM 9.4  9.4 9.2  9.2 9.5 9.4  9.4 9.4  9.4        Recent Labs   Lab 12/31/23  0324 01/01/24  0310 01/02/24  0215 01/03/24  0326 01/04/24  0405   PROT 7.4 7.2 7.2 7.4 7.6   ALBUMIN 3.0* 3.0* 3.1* 3.2* 3.3*   BILITOT 0.7 0.7 0.5 0.5 0.7   ALKPHOS 57 59 55 58 64   AST 18 29 30 29 23   ALT 18 20 26 25 21        No results for input(s): "TROPONINI" in the last 168 hours.     BNP (pg/mL)   Date Value   12/29/2023 395 (H)   12/27/2023 131 (H)   12/25/2023 211 (H)   09/01/2023 43   06/23/2023 33       No results for input(s): "LABBLOO" in the last 168 hours.         Assessment & Plan     #Atrial Flutter  -ILAN/DCCV today, patient in ICU but stable    TTE 1/4/23    Left Ventricle: The left ventricle is mildly dilated. Normal wall thickness. Global hypokinesis present. There is severely reduced systolic function with a visually estimated ejection fraction of 20 - 25%. There is diastolic dysfunction.    Right Ventricle: Normal right ventricular cavity size. Wall thickness is normal. Right ventricle wall motion  is normal. Systolic function is moderately reduced. Pacemaker lead present in the ventricle.    Left Atrium: Left atrium is mildly dilated.    IVC/SVC: Patient is ventilated, cannot use IVC diameter to estimate right atrial pressure.     -No absolute " contraindications of esophageal stricture, tumor, perforation, laceration,or diverticulum and/or active GI bleed  -The risks, benefits & alternatives of the procedure were explained to the patient.   -The risks of transesophageal echo include but are not limited to:  Dental trauma, esophageal trauma/perforation, bleeding, laryngospasm/brochospasm, aspiration, sore throat/hoarseness, & dislodgement of the endotracheal tube/nasogastric tube (where applicable).    -The risks of moderate sedation include hypotension, respiratory depression, arrhythmias, bronchospasm, & death.    -Informed consent was obtained. The patient is agreeable to proceed with the procedure and all questions and concerns addressed    Mehdi Nguyen MD  [facility]   Cardiovascular Disease PGY-V

## 2024-01-04 NOTE — SUBJECTIVE & OBJECTIVE
Interval History: No events overnight. In atrial fibrillation on telemetry, rate controlled.     Review of Systems   Constitutional: Negative for diaphoresis and fever.   Cardiovascular:  Negative for chest pain, dyspnea on exertion, leg swelling, near-syncope, orthopnea, palpitations, paroxysmal nocturnal dyspnea and syncope.   Respiratory:  Negative for cough and shortness of breath.    Gastrointestinal:  Negative for abdominal pain, diarrhea, nausea and vomiting.   Neurological:  Negative for light-headedness.   Psychiatric/Behavioral:  Negative for altered mental status and substance abuse.        Objective:     Vital Signs (Most Recent):  Temp: 98.5 °F (36.9 °C) (01/04/24 0701)  Pulse: 90 (01/04/24 1027)  Resp: (!) 23 (01/04/24 1000)  BP: (!) 144/71 (01/04/24 1000)  SpO2: 96 % (01/04/24 1000) Vital Signs (24h Range):  Temp:  [97.8 °F (36.6 °C)-98.5 °F (36.9 °C)] 98.5 °F (36.9 °C)  Pulse:  [] 90  Resp:  [12-37] 23  SpO2:  [94 %-99 %] 96 %  BP: ()/(58-86) 144/71     Weight: 108.3 kg (238 lb 12.1 oz)  Body mass index is 33.3 kg/m².     SpO2: 96 %          Physical Exam  Constitutional:       General: He is not in acute distress.     Appearance: Normal appearance. He is well-developed.   HENT:      Mouth/Throat:      Mouth: Mucous membranes are moist.      Pharynx: Oropharynx is clear.   Cardiovascular:      Rate and Rhythm: Normal rate and regular rhythm.      Heart sounds: Normal heart sounds. No murmur heard.  Abdominal:      Palpations: Abdomen is soft.      Tenderness: There is no abdominal tenderness. There is no guarding.   Musculoskeletal:         General: No swelling. Normal range of motion.      Right lower leg: No edema.      Left lower leg: No edema.   Skin:     General: Skin is warm and dry.   Neurological:      General: No focal deficit present.      Mental Status: He is alert and oriented to person, place, and time.            Significant Labs:     Recent Labs   Lab 01/02/24  2614  "01/03/24 0326 01/04/24  0405   WBC 9.74 8.46 9.87   HGB 14.8 15.2 15.6   HCT 43.9 44.5 45.7    286 266         Recent Labs   Lab 12/31/23 0324 12/31/23  1106 01/01/24  0310 01/02/24  0215 01/02/24  1639 01/03/24 0326 01/04/24  0405     137   < > 136  136   < > 139 138  138 135*  135*   K 3.8  3.8   < > 3.8  3.8   < > 4.7 4.3  4.3 4.4  4.4     101   < > 104  104   < > 106 106  106 102  102   CO2 19*  19*   < > 18*  18*   < > 21* 20*  20* 23  23   BUN 38*  38*   < > 39*  39*   < > 27* 23*  23* 21*  21*   CREATININE 1.5*  1.5*   < > 1.6*  1.6*   < > 1.4 1.1  1.1 1.1  1.1   CALCIUM 9.4  9.4   < > 9.4  9.4   < > 9.5 9.4  9.4 9.4  9.4   PHOS 3.7  --  2.5*  --   --  3.3  --     < > = values in this interval not displayed.         Recent Labs   Lab 01/02/24 0215 01/03/24 0326 01/04/24  0405   ALKPHOS 55 58 64   BILITOT 0.5 0.5 0.7   PROT 7.2 7.4 7.6   ALT 26 25 21   AST 30 29 23       No results for input(s): "CKTOTAL", "CPK", "TROPONINI", "TROPONINT", "TROPTSTAT", "CPKMB", "MB" in the last 168 hours.        Significant Imaging:     TTE 12/26/23    Left Ventricle: The left ventricle is mildly dilated. Normal wall thickness. Global hypokinesis present. There is severely reduced systolic function with a visually estimated ejection fraction of 20 - 25%. There is diastolic dysfunction.    Right Ventricle: Normal right ventricular cavity size. Wall thickness is normal. Right ventricle wall motion  is normal. Systolic function is moderately reduced. Pacemaker lead present in the ventricle.    Left Atrium: Left atrium is mildly dilated.    IVC/SVC: Patient is ventilated, cannot use IVC diameter to estimate right atrial pressure.  "

## 2024-01-04 NOTE — PLAN OF CARE
[facility] - Cardiac Intensive Care  Discharge Final Note    Primary Care Provider: Venkata Mclaughlin MD    Expected Discharge Date: 1/4/2024    Final Discharge Note (most recent)       Final Note - 01/04/24 1607          Final Note    Assessment Type Final Discharge Note     Anticipated Discharge Disposition Home or Self Care     Hospital Resources/Appts/Education Provided Appointments scheduled and added to AVS                   Khushboo Rothman LMSW  [facility]  [id number]    Future Appointments   Date Time Provider Department Center   1/23/2024  9:20 AM Dante Chavira MD [facility]           Contact Info       Device Check Clinic        Next Steps: Follow up in 1 week(s)    Instructions: For wound re-check    Antonio Callejas MD   Specialty: Electrophysiology, Cardiovascular Disease, Cardiology    [address] [facility] [address]   Phone: [phone]       Next Steps: Follow up in 3 month(s)    Venkata Mclaughlin MD   Specialty: Internal Medicine   Relationship: PCP - [address] [place]  [facility] [address]   Phone: [phone]       Next Steps: Go on 1/16/2024    Instructions: hospital f/u at 10:45am

## 2024-01-04 NOTE — PROGRESS NOTES
[facility] - Cardiac Int[facility]  Cardiac Electrophysiology  Progress Note    Admission Date: 12/26/2023  Code Status: Full Code   Attending Physician: Larry Alvarez MD   Expected Discharge Date: 1/5/2024  Principal Problem:Cardiogenic shock    Subjective:     Interval History: No events overnight. In atrial fibrillation on telemetry, rate controlled.     Review of Systems   Constitutional: Negative for diaphoresis and fever.   Cardiovascular:  Negative for chest pain, dyspnea on exertion, leg swelling, near-syncope, orthopnea, palpitations, paroxysmal nocturnal dyspnea and syncope.   Respiratory:  Negative for cough and shortness of breath.    Gastrointestinal:  Negative for abdominal pain, diarrhea, nausea and vomiting.   Neurological:  Negative for light-headedness.   Psychiatric/Behavioral:  Negative for altered mental status and substance abuse.        Objective:     Vital Signs (Most Recent):  Temp: 98.5 °F (36.9 °C) (01/04/24 0701)  Pulse: 90 (01/04/24 1027)  Resp: (!) 23 (01/04/24 1000)  BP: (!) 144/71 (01/04/24 1000)  SpO2: 96 % (01/04/24 1000) Vital Signs (24h Range):  Temp:  [97.8 °F (36.6 °C)-98.5 °F (36.9 °C)] 98.5 °F (36.9 °C)  Pulse:  [] 90  Resp:  [12-37] 23  SpO2:  [94 %-99 %] 96 %  BP: ()/(58-86) 144/71     Weight: 108.3 kg (238 lb 12.1 oz)  Body mass index is 33.3 kg/m².     SpO2: 96 %          Physical Exam  Constitutional:       General: He is not in acute distress.     Appearance: Normal appearance. He is well-developed.   HENT:      Mouth/Throat:      Mouth: Mucous membranes are moist.      Pharynx: Oropharynx is clear.   Cardiovascular:      Rate and Rhythm: Normal rate and regular rhythm.      Heart sounds: Normal heart sounds. No murmur heard.  Abdominal:      Palpations: Abdomen is soft.      Tenderness: There is no abdominal tenderness. There is no guarding.   Musculoskeletal:         General: No swelling. Normal range of motion.      Right lower leg: No edema.       "Left lower leg: No edema.   Skin:     General: Skin is warm and dry.   Neurological:      General: No focal deficit present.      Mental Status: He is alert and oriented to person, place, and time.            Significant Labs:     Recent Labs   Lab 01/02/24 0215 01/03/24 0326 01/04/24  0405   WBC 9.74 8.46 9.87   HGB 14.8 15.2 15.6   HCT 43.9 44.5 45.7    286 266         Recent Labs   Lab 12/31/23  0324 12/31/23  1106 01/01/24  0310 01/02/24  0215 01/02/24  1639 01/03/24 0326 01/04/24  0405     137   < > 136  136   < > 139 138  138 135*  135*   K 3.8  3.8   < > 3.8  3.8   < > 4.7 4.3  4.3 4.4  4.4     101   < > 104  104   < > 106 106  106 102  102   CO2 19*  19*   < > 18*  18*   < > 21* 20*  20* 23  23   BUN 38*  38*   < > 39*  39*   < > 27* 23*  23* 21*  21*   CREATININE 1.5*  1.5*   < > 1.6*  1.6*   < > 1.4 1.1  1.1 1.1  1.1   CALCIUM 9.4  9.4   < > 9.4  9.4   < > 9.5 9.4  9.4 9.4  9.4   PHOS 3.7  --  2.5*  --   --  3.3  --     < > = values in this interval not displayed.         Recent Labs   Lab 01/02/24 0215 01/03/24 0326 01/04/24  0405   ALKPHOS 55 58 64   BILITOT 0.5 0.5 0.7   PROT 7.2 7.4 7.6   ALT 26 25 21   AST 30 29 23       No results for input(s): "CKTOTAL", "CPK", "TROPONINI", "TROPONINT", "TROPTSTAT", "CPKMB", "MB" in the last 168 hours.        Significant Imaging:     TTE 12/26/23    Left Ventricle: The left ventricle is mildly dilated. Normal wall thickness. Global hypokinesis present. There is severely reduced systolic function with a visually estimated ejection fraction of 20 - 25%. There is diastolic dysfunction.    Right Ventricle: Normal right ventricular cavity size. Wall thickness is normal. Right ventricle wall motion  is normal. Systolic function is moderately reduced. Pacemaker lead present in the ventricle.    Left Atrium: Left atrium is mildly dilated.    IVC/SVC: Patient is ventilated, cannot use IVC diameter to estimate right atrial " pressure.  Assessment and Plan:     * Cardiogenic shock  #CHB with wide fractionated ventricular escape likely from non revascularizable STEMI    Left dominant coronary system  Inferior STEMI with CW a flutter 12/25, LIMA-LAD and SVG -OM are down as well, Lcx couldn't be revasuclarized  Subsequently developed CHB  EF 20-25%  Atrial flutter last night with atrial fibrillation as well overnight and this morning. Using native AV tamika conduction, only TVP paced intermittently. Rate controlled off AVN blocking agents.   Not pacer dependent at this time, so TVP set to backup rate of 50 yesterday, threshold 3.5mA  IABP out  TVP out 1/1/24    Plan  -CRT-D placed 1/2/24, doing well  -Eliquis begun this morning  -ILAN DCCV today    Typical atrial flutter  New diagnosis, Found on 12/25 EKG that also showed inferior STEMI  Eliquis, Toprol  DCCV today    Paroxysmal atrial fibrillation  Known paroxysmal afib, on eliquis and toprol xl at home  Eliquis, Toprol  DCCV today        Connor M Gillies, MD  Cardiac Electrophysiology  [facility]

## 2024-01-04 NOTE — PROGRESS NOTES
Patient AAOx4, Discharge paperwork given to patient and discharge instructions explained to the patient. Patient does not have any other questions at this time. PIVS removed and patient disconnected from the cardiac monitor. Patient is ready for discharge.

## 2024-01-04 NOTE — SUBJECTIVE & OBJECTIVE
"Interval HPI:   No acute events overnight. Patient in room [id number] A. Blood glucose stable. BG at goal on current insulin regimen (SSI, prandial, and basal insulin ). Steroid use- None.   2 Days Post-Op  Renal function- Normal   Vasopressors-  None     Diet NPO     Eating:   NPO  Nausea: No  Hypoglycemia and intervention: No  Fever: No  TPN and/or TF: No    /71   Pulse 86   Temp 98 °F (36.7 °C) (Oral)   Resp (!) 21   Ht 5' 11" (1.803 m)   Wt 108.3 kg (238 lb 12.1 oz)   SpO2 96%   BMI 33.30 kg/m²     Labs Reviewed and Include    Recent Labs   Lab 01/04/24  0405   *  163*   CALCIUM 9.4  9.4   ALBUMIN 3.3*   PROT 7.6   *  135*   K 4.4  4.4   CO2 23  23     102   BUN 21*  21*   CREATININE 1.1  1.1   ALKPHOS 64   ALT 21   AST 23   BILITOT 0.7     Lab Results   Component Value Date    WBC 9.87 01/04/2024    HGB 15.6 01/04/2024    HCT 45.7 01/04/2024    MCV 93 01/04/2024     01/04/2024     No results for input(s): "TSH", "FREET4" in the last 168 hours.  Lab Results   Component Value Date    HGBA1C 8.3 (H) 12/14/2023    HGBA1C 8.3 (H) 12/14/2023       Nutritional status:   Body mass index is 33.3 kg/m².  Lab Results   Component Value Date    ALBUMIN 3.3 (L) 01/04/2024    ALBUMIN 3.2 (L) 01/03/2024    ALBUMIN 3.1 (L) 01/02/2024     No results found for: "PREALBUMIN"    Estimated Creatinine Clearance: 93.9 mL/min (based on SCr of 1.1 mg/dL).    Accu-Checks  Recent Labs     01/02/24  1148 01/02/24  1638 01/02/24  2127 01/03/24  0145 01/03/24  0711 01/03/24  1111 01/03/24  1635 01/03/24  2120 01/04/24  0505 01/04/24  0808   POCTGLUCOSE 160* 134* 244* 114* 169* 173* 232* 117* 153* 156*       Current Medications and/or Treatments Impacting Glycemic Control  Immunotherapy:    Immunosuppressants       None          Steroids:   Hormones (From admission, onward)      None          Pressors:    Autonomic Drugs (From admission, onward)      None          Hyperglycemia/Diabetes " Medications:   Antihyperglycemics (From admission, onward)      Start     Stop Route Frequency Ordered    01/04/24 0715  insulin aspart U-100 pen 9 Units         -- SubQ 3 times daily with meals 01/03/24 2155    01/03/24 1134  insulin aspart U-100 pen 0-10 Units         -- SubQ Before meals, nightly and at 0200 PRN 01/03/24 1034    01/03/24 1045  insulin detemir U-100 (Levemir) pen 22 Units         -- SubQ Daily 01/03/24 1034

## 2024-01-04 NOTE — ASSESSMENT & PLAN NOTE
BG goal 140-180     - Levemir 22 units QD (0.4 u/kg/day WBD)   -  Novolog 9 units TIDWM (20% reduction due to post-prandial BG below goal) -- HOLD AS PT NPO THIS AM FOR CARDIOVERSION   - Novolog (aspart) insulin prn for BG excursions [facility] SSI (150/25) ac/hs  - BG checks ac/hs   - Hypoglycemia protocol in place    ** Please notify Endocrine for any change and/or advance in diet**  ** Please call Endocrine for any BG related issues **    Discharge Planning:   Discharge Planning:   - Levemir 22 units qd (Eat a snack before bed if BG is < 100 mg/dl)  - Novolog 10 units TIDWM (Hold if BG < 100 mg/dL)  - Novolog SSI for BG excursions:  Add correction scale if needed.  Blood sugar 150 to 200 add 2 units  Blood sugar 201 to 250 add 4 units  Blood sugar 251 to 300 add 6 units  Blood sugar 301 to 350 add 8 units  Blood sugar greater than 350 add 10 units  - Insurance approved diabetes testing supplies to check blood sugar 4 times a day (Before meals and at bedtime) and as needed.  - BD pen needles   - Send BG logs in 4 days  - Follow-up in discharge clinic in 2 weeks.     Discharge Planning:   - Levemir 20 units qd (Eat a snack before bed if BG is < 100 mg/dl)  - Novolog 10 units TIDWM (Hold if BG < 100 mg/dL)  - Novolog SSI for BG excursions:  180 - 230 + 2 unit  231- 280  + 4 units  281 - 330 + 6 units  331 - 380 + 8 units      > 380   + 10 units  - Insurance approved diabetes testing supplies to check blood sugar 4 times a day (Before meals and at bedtime) and as needed.  - BD pen needles   - Insurance approved glucagon for extreme hypoglycemia (Baqsimi or Zegalogue if insurance approved)  - Send BG logs in 4 days  - Follow-up in discharge clinic in 2 weeks.     Reviewed topics related to DM including: the need for insulin, how insulin works, what makes it a high risk medication, the importance of immediate follow up with either PCP or endocrine provider, importance of and how to check BG, how to record BG on logs, how to  administer insulin, appropriate insulin administration sites, importance of rotating injection sites, hyper/hypoglycemia, how and when to treat hypoglycemia, when to hold insulin, how the correction scale works, importance of storing unused insulin in the refrigerator, and when to seek medical attention.  Patient verbalized understanding, answered all questions to patient's satisfaction. Blood sugar logs given to patient.

## 2024-01-06 NOTE — DISCHARGE SUMMARY
[facility]  Discharge Summary  Patient Name: Magdaleno Cunningham MRN: [id number]   Patient Class: IP- Inpatient  Length of Stay: 1   Admission Date: 12/25/2023  6:34 PM Attending Physician: Magdaleno Juarez MD   Primary Care Provider: Venkata Mclaughlin MD Face-to-Face encounter date: 12/26/23   Chief Complaint: Chest Pain (Pt has left chest and left arm pain he gets pain in the middle then he panics and gets sob. Pt had two stints placed on Friday. )    Date of Discharge: 12/26/23  Discharge Disposition:Short Term Hospital   Condition: Stable       Reason for Hospitalization     Active Hospital Problems    Diagnosis    *STEMI (ST elevation myocardial infarction)         Brief History of Present Illness      Magdaleno Cunningham is a 58 y.o.  male with known history of CAD status post CABG and recent PCI to proximal and mid circumflex by Dr. Botello on 12/22/23, COPD, atrial fibrillation on anticoagulation, diabetes, hypertension  who came in with chest pain that has been present intermittently since discharge, was relieved by nitro which he has been taking regularly for the last couple of days.  The pain radiated to left shoulder, was associated with diaphoresis and became severe today prompting ER visit.  Patient was taken to cath lab directly from ER before I was able to see him. No further hx of ROS obtained.       For the full HPI please refer to the History & Physical from this admission.    Hospital Course By Problem with Pertinent Findings       STEMI, hx CABG  PCI on 12/22/23 with 2 stents placed  EKG with ST elevation in lead III, depression in lateral leads  Trop >10,000  Chest pain was not relieved with nitro infusion and morphine and he was taken to Cath lab by Dr. Valverde  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   Transferred to [facility] for possible intervention       Patient was seen and examined on the date of  "discharge and determined to be suitable for discharge.    Physical Exam  /67 (BP Location: Right arm)   Pulse 72   Temp 98.7 °F (37.1 °C) (Oral)   Resp (!) 28   Ht 5' 11" (1.803 m)   Wt 118.4 kg (261 lb)   SpO2 100%   BMI 36.40 kg/m²   Vitals reviewed.      Following labs were Reviewed   No results for input(s): "WBC", "HGB", "HCT", "PLT", "GLUCOSE", "CALCIUM", "ALBUMIN", "PROT", "NA", "K", "CO2", "CL", "BUN", "CREATININE", "ALKPHOS", "ALT", "AST", "BILITOT" in the last 24 hours.  POCT Glucose   Date Value Ref Range Status   01/04/2024 159 (H) 70 - 110 mg/dL Final   01/04/2024 156 (H) 70 - 110 mg/dL Final   01/04/2024 153 (H) 70 - 110 mg/dL Final   01/03/2024 117 (H) 70 - 110 mg/dL Final   01/03/2024 232 (H) 70 - 110 mg/dL Final   01/03/2024 173 (H) 70 - 110 mg/dL Final   01/03/2024 169 (H) 70 - 110 mg/dL Final        All labs within the past 24 hours have been reviewed    Microbiology Results (last 7 days)       ** No results found for the last 168 hours. **          X-Ray Chest AP Portable   Final Result      X-Ray Chest AP Portable   Final Result      X-Ray Chest AP Portable   Final Result          No results found for this or any previous visit.      Consultants and Procedures   Consultants:  cardiology     Procedures:   Procedure(s) (LRB):  INSERTION, INTRA-AORTIC BALLOON PUMP  Percutaneous coronary intervention (N/A)  INSERTION, PACEMAKER, TEMPORARY (N/A)  Bypass graft study  Placement, IABP             Discharge Medications:     Medication List        ASK your doctor about these medications      gabapentin 600 MG tablet  Commonly known as: NEURONTIN     lactobacillus acidophilus & bulgar 100 million cell packet  Commonly known as: LACTINEX  Take 1 packet (1 each total) by mouth 2 (two) times daily.     metFORMIN 1000 MG tablet  Commonly known as: GLUCOPHAGE  Take 1 tablet (1,000 mg total) by mouth 2 (two) times daily with meals.     methocarbamoL 500 MG Tab  Commonly known as: ROBAXIN   " "  nitroGLYCERIN 0.4 MG SL tablet  Commonly known as: NITROSTAT     oxyCODONE-acetaminophen  mg per tablet  Commonly known as: PERCOCET     pen needle, diabetic 31 gauge x 1/4" Ndle     ranolazine 1,000 mg Tb12  Commonly known as: RANEXA  Take 1 tablet (1,000 mg total) by mouth 2 (two) times daily.                I spent 33 minutes preparing the discharge for this patient including reviewing records from previous encounters, preparation of discharge summary, assessing and final examination of the patient, discharge medicine reconciliation, discussing plan of care, follow up and education and prescriptions.       Magdaleno Juarez  [facility] Hospitalist             "

## 2024-01-19 DIAGNOSIS — I48.91 ATRIAL FIBRILLATION, UNSPECIFIED TYPE: ICD-10-CM

## 2024-01-19 DIAGNOSIS — I44.2 CHB (COMPLETE HEART BLOCK): ICD-10-CM

## 2024-01-19 DIAGNOSIS — Z95.810 ICD (IMPLANTABLE CARDIOVERTER-DEFIBRILLATOR) IN PLACE: Primary | ICD-10-CM

## 2024-01-23 ENCOUNTER — OFFICE VISIT (OUTPATIENT)
Dept: CARDIOLOGY | Facility: CLINIC | Age: 59
End: 2024-01-23
Payer: MEDICAID

## 2024-01-23 VITALS
WEIGHT: 264.31 LBS | SYSTOLIC BLOOD PRESSURE: 128 MMHG | HEART RATE: 95 BPM | OXYGEN SATURATION: 98 % | DIASTOLIC BLOOD PRESSURE: 82 MMHG | BODY MASS INDEX: 36.87 KG/M2

## 2024-01-23 DIAGNOSIS — I25.119 ATHEROSCLEROSIS OF NATIVE CORONARY ARTERY OF NATIVE HEART WITH ANGINA PECTORIS: Primary | ICD-10-CM

## 2024-01-23 DIAGNOSIS — I49.8 OTHER CARDIAC ARRHYTHMIA: Primary | ICD-10-CM

## 2024-01-23 DIAGNOSIS — F17.200 SMOKER: ICD-10-CM

## 2024-01-23 DIAGNOSIS — Z95.1 S/P CABG (CORONARY ARTERY BYPASS GRAFT): ICD-10-CM

## 2024-01-23 DIAGNOSIS — I21.21 ST ELEVATION MYOCARDIAL INFARCTION INVOLVING LEFT CIRCUMFLEX CORONARY ARTERY: ICD-10-CM

## 2024-01-23 DIAGNOSIS — G47.33 OSA (OBSTRUCTIVE SLEEP APNEA): ICD-10-CM

## 2024-01-23 PROCEDURE — 3074F SYST BP LT 130 MM HG: CPT | Mod: CPTII,,, | Performed by: INTERNAL MEDICINE

## 2024-01-23 PROCEDURE — 1159F MED LIST DOCD IN RCRD: CPT | Mod: CPTII,,, | Performed by: INTERNAL MEDICINE

## 2024-01-23 PROCEDURE — 99215 OFFICE O/P EST HI 40 MIN: CPT | Mod: S$PBB,,, | Performed by: INTERNAL MEDICINE

## 2024-01-23 PROCEDURE — 4010F ACE/ARB THERAPY RXD/TAKEN: CPT | Mod: CPTII,,, | Performed by: INTERNAL MEDICINE

## 2024-01-23 PROCEDURE — 99999 PR PBB SHADOW E&M-EST. PATIENT-LVL IV: CPT | Mod: PBBFAC,,, | Performed by: INTERNAL MEDICINE

## 2024-01-23 PROCEDURE — 99214 OFFICE O/P EST MOD 30 MIN: CPT | Mod: PBBFAC,PN | Performed by: INTERNAL MEDICINE

## 2024-01-23 PROCEDURE — 3008F BODY MASS INDEX DOCD: CPT | Mod: CPTII,,, | Performed by: INTERNAL MEDICINE

## 2024-01-23 PROCEDURE — 3079F DIAST BP 80-89 MM HG: CPT | Mod: CPTII,,, | Performed by: INTERNAL MEDICINE

## 2024-01-23 PROCEDURE — 1111F DSCHRG MED/CURRENT MED MERGE: CPT | Mod: CPTII,,, | Performed by: INTERNAL MEDICINE

## 2024-01-23 NOTE — PROGRESS NOTES
Cardiology    1/23/2024  9:10 AM    Problem list  Patient Active Problem List   Diagnosis    Chest pain    Atherosclerosis of native coronary artery of native heart with angina pectoris    COPD (chronic obstructive pulmonary disease)    Type 2 diabetes mellitus with diabetic arthropathy    Hypertension associated with diabetes    ROSAMARIA (obstructive sleep apnea)    Diabetic ulcer of left foot associated with type 2 diabetes mellitus    Smoker 1-2 packs per day since 14 years old    Medical non-compliance    Left foot pain    Generalized anxiety disorder    Denton-Perez erythema multiforme exudativum    Allergic reaction to chemical substance    Prostate enlargement    Bladder mass    Coronary artery disease involving native coronary artery of native heart    Atrial fibrillation    S/P CABG (coronary artery bypass graft)    Protruding sternal wires    Ingrowing toenail    Onychomycosis    Polyneuropathy    Cellulitis of gluteal region    Chronic migraine    Obesity (BMI 30-39.9)    HLD (hyperlipidemia)    Hyponatremia    Malnutrition of moderate degree    Acute pain    Open wound of buttock with complication    Abscess, perineum    Streptococcal infection    Hypomagnesemia    STEMI (ST elevation myocardial infarction)    Cardiogenic shock    Complete heart block    Paroxysmal atrial fibrillation    Typical atrial flutter    Pneumonia       CC:  Hospital discharge follow-up    HPI:  Patient is new to me.  He has extensive and complicated medical history.  Patient has a history of CABG 2020, atrial fibrillation, hypertension, diabetes, heavy smoker (3ppd) who presents to [facility] with chest pain on December 25, 2023 with acute coronary syndrome/inferior wall myocardial infarction due to subacute stent thrombosis.  On December 22, 2023, he underwent PCI of the proximal mid left circumflex by Dr. Botello.  Prior to this presentation, his ejection fraction was reportedly 60% on echocardiogram.   During this hospitalization [facility], he underwent unsuccessful PCI of the thrombotic left circumflex.  He developed complete heart block and hypotension and received transvenous pacemaker and intra-aortic balloon pump for cardiogenic shock and subsequently transferred to [facility] where he was hospitalized for 9 days and was discharged on January 4th.  At [facility], his complete heart block was resolved and transvenous pacemaker was removed.  His echocardiogram showed ejection fraction of 20-25% with global hypokinesis.  Electrophysiology service was consulted and CR TD was implanted.  ILAN guided cardioversion was scheduled but was aborted as patient was in back in sinus rhythm.  He stated that he does not remember the events and when he woke up, he was in the ICU at [facility].  He was motivated and started walking to get his strength back.  Since his hospitalization he has not smoked.    He states that he feels great.  [place]  He denies any exertional symptoms.  He denies any chest pain, shortness of breath, palpitation or syncope.  He denies any fever chills.  He denies any ICD discharge.  He has been compliant with medication.  He monitor his blood pressure daily and he states his systolic blood pressure is 120 range.    Medications  Current Outpatient Medications   Medication Sig Dispense Refill    apixaban (ELIQUIS) 5 mg Tab Take 1 tablet (5 mg total) by mouth 2 (two) times daily. 60 tablet 3    aspirin (ECOTRIN) 81 MG EC tablet Take 1 tablet (81 mg total) by mouth once daily. for 21 days 21 tablet 0    atorvastatin (LIPITOR) 40 MG tablet Take 1 tablet (40 mg total) by mouth once daily. 90 tablet 3    blood sugar diagnostic Strp TEST 3 TIMES DAILY 100 each 0    blood-glucose meter Misc TEST 3 TIMES DAILY 1 each 1    furosemide (LASIX) 40 MG tablet Take 1 tablet (40 mg total) by mouth once daily. 30 tablet 11    gabapentin (NEURONTIN) 600 MG tablet Take  "600 mg by mouth As instructed (neuropathic pain). Takes 1 tablet in the morning and 2 tablets at bedtime      hydrALAZINE (APRESOLINE) 10 MG tablet Take 1 tablet (10 mg total) by mouth every 8 (eight) hours. 90 tablet 11    insulin lispro (HUMALOG KWIKPEN INSULIN) 100 unit/mL pen Inject 10 Units into the skin 3 (three) times daily. 9 mL 3    isosorbide mononitrate (IMDUR) 30 MG 24 hr tablet Take 1 tablet (30 mg total) by mouth once daily. 30 tablet 11    JARDIANCE 25 mg tablet Take 1 tablet (25 mg total) by mouth once daily. 90 tablet 0    lactobacillus acidophilus & bulgar (LACTINEX) 100 million cell packet Take 1 packet (1 each total) by mouth 2 (two) times daily. 30 packet 0    lancets 33 gauge Misc TEST 3 TIMES DAILY 100 each 0    LANTUS SOLOSTAR U-100 INSULIN glargine 100 units/mL SubQ pen Inject 22 Units into the skin once daily. 6 mL 0    metFORMIN (GLUCOPHAGE) 1000 MG tablet Take 1 tablet (1,000 mg total) by mouth 2 (two) times daily with meals.      methocarbamoL (ROBAXIN) 500 MG Tab Take 500 mg by mouth every evening.      metoprolol succinate (TOPROL-XL) 50 MG 24 hr tablet Take 1 tablet (50 mg total) by mouth once daily. 30 tablet 11    nitroGLYCERIN (NITROSTAT) 0.4 MG SL tablet Place 0.4 mg under the tongue every 5 (five) minutes as needed for Chest pain.      oxyCODONE-acetaminophen (PERCOCET)  mg per tablet Take 1 tablet by mouth 2 (two) times daily as needed for Pain.      pantoprazole (PROTONIX) 40 MG tablet Take 1 tablet (40 mg total) by mouth once daily. 30 tablet 11    pen needle, diabetic 29 gauge x 1/2" Ndle Use to inject insulin into the skin. 100 each 0    pen needle, diabetic 31 gauge x 1/4" Ndle USE 1 TWICE DAILY FOR BLOOD SUGAR GREATER THAN 200      ranolazine (RANEXA) 1,000 mg Tb12 Take 1 tablet (1,000 mg total) by mouth 2 (two) times daily. 60 tablet 11    sacubitriL-valsartan (ENTRESTO) 24-26 mg per tablet Take 1 tablet by mouth 2 (two) times daily. 180 tablet 0    spironolactone " (ALDACTONE) 25 MG tablet Take 1 tablet (25 mg total) by mouth once daily. 30 tablet 11    ticagrelor (BRILINTA) 90 mg tablet Take 1 tablet (90 mg total) by mouth 2 (two) times daily. 60 tablet 2     No current facility-administered medications for this visit.      Prior to Admission medications    Medication Sig Start Date End Date Taking? Authorizing Provider   apixaban (ELIQUIS) 5 mg Tab Take 1 tablet (5 mg total) by mouth 2 (two) times daily. 1/4/24 4/3/24 Yes Viviane Hernandez MD   aspirin (ECOTRIN) 81 MG EC tablet Take 1 tablet (81 mg total) by mouth once daily. for 21 days 1/4/24 1/25/24 Yes Viviane Hernandez MD   atorvastatin (LIPITOR) 40 MG tablet Take 1 tablet (40 mg total) by mouth once daily. 1/5/24 1/4/25 Yes Viviane Hernandez MD   blood sugar diagnostic Strp TEST 3 TIMES DAILY 1/4/24  Yes Tiff Frias MD   blood-glucose meter Misc TEST 3 TIMES DAILY 1/4/24  Yes Tiff Frias MD   furosemide (LASIX) 40 MG tablet Take 1 tablet (40 mg total) by mouth once daily. 1/5/24 1/4/25 Yes Viviane Hernandez MD   gabapentin (NEURONTIN) 600 MG tablet Take 600 mg by mouth As instructed (neuropathic pain). Takes 1 tablet in the morning and 2 tablets at bedtime   Yes Provider, Historical   hydrALAZINE (APRESOLINE) 10 MG tablet Take 1 tablet (10 mg total) by mouth every 8 (eight) hours. 1/4/24 1/3/25 Yes Viviane Hernandez MD   insulin lispro (HUMALOG KWIKPEN INSULIN) 100 unit/mL pen Inject 10 Units into the skin 3 (three) times daily. 1/4/24 1/3/25 Yes Viviane Hernandez MD   isosorbide mononitrate (IMDUR) 30 MG 24 hr tablet Take 1 tablet (30 mg total) by mouth once daily. 1/4/24 1/3/25 Yes Viviane Hernandez MD   JARDIANCE 25 mg tablet Take 1 tablet (25 mg total) by mouth once daily. 1/4/24 4/3/24 Yes Viviane Hernandez MD   lactobacillus acidophilus & bulgar (LACTINEX) 100 million cell packet Take 1 packet  "(1 each total) by mouth 2 (two) times daily. 9/11/23  Yes Sourav Owens MD   lancets 33 gauge Misc TEST 3 TIMES DAILY 1/4/24  Yes Tiff Frias MD LANTUS SOLOSTAR U-100 INSULIN glargine 100 units/mL SubQ pen Inject 22 Units into the skin once daily. 1/4/24 2/3/24 Yes Viviane Hernandez MD   metFORMIN (GLUCOPHAGE) 1000 MG tablet Take 1 tablet (1,000 mg total) by mouth 2 (two) times daily with meals. 12/24/23  Yes Paul Botello MD   methocarbamoL (ROBAXIN) 500 MG Tab Take 500 mg by mouth every evening. 6/19/23  Yes Provider, Historical   metoprolol succinate (TOPROL-XL) 50 MG 24 hr tablet Take 1 tablet (50 mg total) by mouth once daily. 1/5/24 1/4/25 Yes Viviane Hernandez MD   nitroGLYCERIN (NITROSTAT) 0.4 MG SL tablet Place 0.4 mg under the tongue every 5 (five) minutes as needed for Chest pain.   Yes Provider, Historical   oxyCODONE-acetaminophen (PERCOCET)  mg per tablet Take 1 tablet by mouth 2 (two) times daily as needed for Pain. 8/13/22  Yes Provider, Historical   pantoprazole (PROTONIX) 40 MG tablet Take 1 tablet (40 mg total) by mouth once daily. 1/5/24 1/4/25 Yes Viviane Hernandez MD   pen needle, diabetic 29 gauge x 1/2" Ndle Use to inject insulin into the skin. 1/4/24  Yes Viviane Hernandez MD   pen needle, diabetic 31 gauge x 1/4" Ndle USE 1 TWICE DAILY FOR BLOOD SUGAR GREATER THAN 200 4/1/20  Yes Provider, Historical   ranolazine (RANEXA) 1,000 mg Tb12 Take 1 tablet (1,000 mg total) by mouth 2 (two) times daily. 12/15/23 12/14/24 Yes Christopher Hastings MD   sacubitriL-valsartan (ENTRESTO) 24-26 mg per tablet Take 1 tablet by mouth 2 (two) times daily. 1/4/24 4/3/24 Yes Viviane Hernandez MD   spironolactone (ALDACTONE) 25 MG tablet Take 1 tablet (25 mg total) by mouth once daily. 1/5/24 1/4/25 Yes Viviane Hernandez MD   ticagrelor (BRILINTA) 90 mg tablet Take 1 tablet (90 mg total) by mouth 2 (two) times daily. 1/4/24 " 4/3/24 Yes Viviane Hernandez MD         History  Past Medical History:   Diagnosis Date    Anticoagulant long-term use     Atrial fibrillation 2018    BPH (benign prostatic hyperplasia)     Cataract     COPD (chronic obstructive pulmonary disease)     Coronary artery disease     stents    CVD (cardiovascular disease)     Diabetes mellitus     Erythema multiforme     AKA Denton Edmondson Syndrome    Hypertension     Infected incision     MI (myocardial infarction)     per pt he has had 4     ROSAMARIA on CPAP     RLS (restless legs syndrome)     Perez-Denton syndrome      Past Surgical History:   Procedure Laterality Date    CORONARY ANGIOGRAPHY INCLUDING BYPASS GRAFTS WITH CATHETERIZATION OF LEFT HEART N/A 9/1/2022    Procedure: ANGIOGRAM, CORONARY, INCLUDING BYPASS GRAFT, WITH LEFT HEART CATHETERIZATION;  Surgeon: Paul Botello MD;  Location: [facility] CATH/EP LAB;  Service: Cardiology;  Laterality: N/A;    CORONARY ANGIOGRAPHY INCLUDING BYPASS GRAFTS WITH CATHETERIZATION OF LEFT HEART Left 12/22/2023    Procedure: ANGIOGRAM, CORONARY, INCLUDING BYPASS GRAFT, WITH LEFT HEART CATHETERIZATION;  Surgeon: Paul Botello MD;  Location: [facility] CATH/EP LAB;  Service: Cardiology;  Laterality: Left;    CORONARY ARTERY BYPASS GRAFT (CABG) N/A 4/7/2020    Procedure: CORONARY ARTERY BYPASS GRAFT (CABG)- Excision of left atrial appendage;  Surgeon: Doug Solitario MD;  Location: [facility] OR;  Service: Cardiothoracic;  Laterality: N/A;    CORONARY BYPASS GRAFT ANGIOGRAPHY  12/25/2023    Procedure: Bypass graft study;  Surgeon: Ashley Valverde MD;  Location: [facility] CATH/EP LAB;  Service: Cardiology;;    CORONARY STENT PLACEMENT      CORONARY STENT PLACEMENT N/A 12/22/2023    Procedure: INSERTION, STENT, CORONARY ARTERY;  Surgeon: Paul Botello MD;  Location: [facility] CATH/EP LAB;  Service: Cardiology;  Laterality: N/A;    WILSON MAZE PROCEDURE N/A 4/7/2020    Procedure: WILSON MAZE PROCEDURE- Attempted;  Surgeon: Doug LOWE  MD Kassi;  Location: [facility] OR;  Service: Cardiothoracic;  Laterality: N/A;    CYSTOSCOPY N/A 12/10/2018    Procedure: CYSTOSCOPY;  Surgeon: Khushboo Abreu MD;  Location: [facility] OR;  Service: Urology;  Laterality: N/A;    ENDOSCOPIC HARVEST OF VEIN Left 4/7/2020    Procedure: HARVEST-VEIN-ENDOVASCULAR;  Surgeon: Doug Solitario MD;  Location: [facility] OR;  Service: Cardiothoracic;  Laterality: Left;    INCISION OF PERIRECTAL ABSCESS Bilateral 9/1/2023    Procedure: INCISION, ABSCESS, PERIRECTAL;  Surgeon: Brayan Spears MD;  Location: [facility] OR;  Service: General;  Laterality: Bilateral;  stirrups    INSERTION OF INTRA-AORTIC BALLOON ASSIST DEVICE  12/25/2023    Procedure: INSERTION, INTRA-AORTIC BALLOON PUMP;  Surgeon: Ashley Valverde MD;  Location: [facility] CATH/EP LAB;  Service: Cardiology;;    INSERTION OF TEMPORARY PACEMAKER N/A 12/25/2023    Procedure: INSERTION, PACEMAKER, TEMPORARY;  Surgeon: Ashley Valverde MD;  Location: [facility] CATH/EP LAB;  Service: Cardiology;  Laterality: N/A;    INSERTION, ICD, DUAL CHAMBER N/A 1/2/2024    Procedure: Insertion, ICD, Dual Chamber;  Surgeon: Antonio Callejas MD;  Location: [facility] EP LAB;  Service: Cardiology;  Laterality: N/A;  CM, DUAL ICD, ANES, SJM, MB, 3079    IVUS, CORONARY  12/22/2023    Procedure: IVUS, Coronary;  Surgeon: Paul Botello MD;  Location: [facility] CATH/EP LAB;  Service: Cardiology;;    LEFT HEART CATHETERIZATION Left 3/17/2020    Procedure: CATHETERIZATION, HEART, LEFT;  Surgeon: Tyree Walters MD;  Location: [facility] CATH/EP LAB;  Service: Cardiology;  Laterality: Left;    PERCUTANEOUS CORONARY INTERVENTION, ARTERY N/A 12/22/2023    Procedure: Percutaneous coronary intervention;  Surgeon: Paul Botello MD;  Location: [facility] CATH/EP LAB;  Service: Cardiology;  Laterality: N/A;    PERCUTANEOUS CORONARY INTERVENTION, ARTERY N/A 12/25/2023    Procedure: Percutaneous coronary intervention;  Surgeon: Ashley Valverde MD;   Location: [facility] CATH/EP LAB;  Service: Cardiology;  Laterality: N/A;    PLACEMENT, IABP  12/25/2023    Procedure: Placement, IABP;  Surgeon: Ashley Valverde MD;  Location: [facility] CATH/EP LAB;  Service: Cardiology;;    STERNAL WIRES REMOVAL N/A 6/8/2020    Procedure: REMOVAL, STERNAL WIRE;  Surgeon: Doug Solitario MD;  Location: [facility] OR;  Service: Peripheral Vascular;  Laterality: N/A;    ULTRASOUND OF PROSTATE FOR VOLUME DETERMINATION  12/10/2018    Procedure: ULTRASOUND, PROSTATE, FOR VOLUME DETERMINATION;  Surgeon: Khushboo Abreu MD;  [facility];  Service: Urology;;     Social History     Socioeconomic History    Marital status:    Tobacco Use    Smoking status: Every Day     Current packs/day: 0.00     Average packs/day: 3.0 packs/day for 30.0 years (90.0 ttl pk-yrs)     Types: Cigarettes     Start date: 4/4/1990     Last attempt to quit: 4/4/2020     Years since quitting: 3.8    Smokeless tobacco: Never   Substance and Sexual Activity    Alcohol use: Not Currently    Drug use: Yes     Frequency: 1.0 times per week     Types: Marijuana    Sexual activity: Not Currently   Social History Narrative    ** Merged History Encounter **          Social Determinants of Health     Financial Resource Strain: Low Risk  (12/26/2023)    Overall Financial Resource Strain (CARDIA)     Difficulty of Paying Living Expenses: Not hard at all   Food Insecurity: No Food Insecurity (12/26/2023)    Hunger Vital Sign     Worried About Running Out of Food in the Last Year: Never true     Ran Out of Food in the Last Year: Never true   Transportation Needs: No Transportation Needs (12/26/2023)    PRAPARE - Transportation     Lack of Transportation (Medical): No     Lack of Transportation (Non-Medical): No   Physical Activity: Patient Unable To Answer (12/26/2023)    Exercise Vital Sign     Days of Exercise per Week: Patient unable to answer     Minutes of Exercise per Session: Patient unable to answer    Stress: Patient Unable To Answer (12/26/2023)    [employer] [place] [employer] - Occupational Stress Questionnaire     Feeling of Stress : Patient unable to answer   Social Connections: Moderately Integrated (12/26/2023)    Social Connection and Isolation Panel [NHANES]     Frequency of Communication with Friends and Family: More than three times a week     Frequency of Social Gatherings with Friends and Family: More than three times a week     Attends Yarsani Services: More than 4 times per year     Active Member of Clubs or Organizations: Yes     Attends Club or Organization Meetings: Patient unable to answer     Marital Status:    Housing Stability: Low Risk  (12/26/2023)    Housing Stability Vital Sign     Unable to Pay for Housing in the Last Year: No     Number of Places Lived in the Last Year: 1     Unstable Housing in the Last Year: No         Allergies  Review of patient's allergies indicates:   Allergen Reactions    Pesticide Dermatitis and Rash         Review of Systems   Review of Systems   Cardiovascular: Negative.    Respiratory: Negative.           Physical Exam  Wt Readings from Last 1 Encounters:   01/23/24 119.9 kg (264 lb 5.3 oz)     BP Readings from Last 3 Encounters:   01/04/24 116/73   12/26/23 101/67   12/22/23 115/60     Pulse Readings from Last 1 Encounters:   01/04/24 99     Body mass index is 36.87 kg/m².    Physical Exam  Vitals reviewed.   Constitutional:       Appearance: He is obese.   Cardiovascular:      Rate and Rhythm: Normal rate and regular rhythm.      Heart sounds: Normal heart sounds.      Comments: L ICD site healed.  Sternal CABG scar  Pulmonary:      Breath sounds: Normal breath sounds and air entry.   Musculoskeletal:      Right lower leg: No edema.      Left lower leg: No edema.   Neurological:      Mental Status: He is alert.             Assessment  1. Atherosclerosis of native coronary artery of native heart with angina pectoris  Stable    2. S/P  CABG (coronary artery bypass graft)  Unchanged    3. ST elevation myocardial infarction involving left circumflex coronary artery  Resolved    4. Smoker 1-2 packs per day since 14 years old  Resolved.  Quit smoking    5. ROSAMARIA (obstructive sleep apnea)  Unchanged        Plan and Discussion  Reviewed and discussed his extensive hospitalization record from December and January.  Currently, he is doing well with medical therapy.  Recommend to continue with current medications, heart healthy diet and exercise at least 30 minutes daily by walking.  Will set him up with [facility] for his device check.  Will get an echocardiogram to assess his ejection fraction now that he is on goal-directed medical therapy.    Follow Up  3 months with echo      Dante Chavira MD, F.A.C.C, F.S.C.A.I.        45 minutes were spent in chart review, documentation and review of results, and evaluation, treatment, and counseling of patient on the same day of service.    Disclaimer: This document was created using voice recognition software (Assignment Editor*[employer] Direct). Although it may be edited, this document may contain errors related to incorrect recognition of the spoken word. Please call the physician if clarification is needed.

## 2024-01-29 RX ORDER — LANCETS 33 GAUGE
EACH MISCELLANEOUS
Qty: 100 EACH | Refills: 0 | OUTPATIENT
Start: 2024-01-29

## 2024-01-29 NOTE — PROGRESS NOTES
Subjective:      Patient ID: Magdaleno Cunningham is a 58 y.o. male.    Chief Complaint:  No chief complaint on file.    History of Present Illness  Magdaleno Cunningham is here for follow up of DM.  Previously seen by inpatient endocrine team.  Last seen 12/2023.  This is their first visit with me.      This is a MyChart video visit.    The patient location is: [place]  The chief complaint leading to consultation is: DM  Visit type: Virtual visit with synchronous audio and video  Total time spent with patient: see below  Each patient to whom he or she provides medical services by telemedicine is:  (1) informed of the relationship between the physician and patient and the respective role of any other health care provider with respect to management of the patient; and (2) notified that he or she may decline to receive medical services by telemedicine and may withdraw from such care at any time.    Reason for admit: Cardiogenic shock  Home Diabetes Medications:  Jardiance 25 mg, Lantus 80 u BID, Metformin 1000 mg BID    With regards to diabetes:    Diagnosed: ~2020  Previously following with PCP.  DE: 2022  Known complications:  DKA Denies  RN Denies  Eye Exam: > 1 year   PN Yes   Gabapentin   Tried Qutenza - did not tolerate due to the pain  Podiatry: None   Nephropathy Denies  CAD +  Reports  hx of pancreatitis with GLP1RA    Denies personal/family history of medullary thyroid cancer.     Recent illness, injury, steroids: current URI.     Diet/Exercise: he does report to be eating small, frequent meals   Eats 3 meals a day.   Snacks : yes - see above.   Drinks : coffee (splenda, creamer), apple juice, water, gatorade zero, reduced to 1 coke a day (was drinking coke all day).   Current Weight: 254lbs   Exercise - tries to stay active    Current Regimen:  Metformin 1000mg twice daily  Jardiance 25mg daily   Lantus 22 units daily  Humalog 10 units plus correction scale before meals  Add correction scale if needed.  Blood  sugar 150 to 200 add 2 units  Blood sugar 201 to 250 add 4 units  Blood sugar 251 to 300 add 6 units  Blood sugar 301 to 350 add 8 units  Blood sugar greater than 350 add 10 units      Reports compliance.     Other medications tried:  Trulicity - pancreatitis     Glucose Monitor: SMBG checks   4 times a day testing  Log reviewed: oral recall  Ranges 110-200  Fastin-210  2 hours after dinner: 105-130    Hypoglycemia:  Denies  Knows how to correct with 15 grams of carbs- juice, coke, or a peppermint.       Diabetes Management Status    Hemoglobin A1C   Date Value Ref Range Status   2023 8.3 (H) 4.5 - 6.2 % Final     Comment:     According to ADA guidelines, hemoglobin A1C <7.0% represents  optimal control in non-pregnant diabetic patients.  Different  metrics may apply to specific populations.   Standards of Medical Care in Diabetes - 2016.    For the purpose of screening for the presence of diabetes:  <5.7%     Consistent with the absence of diabetes  5.7-6.4%  Consistent with increasing risk for diabetes   (prediabetes)  >or=6.5%  Consistent with diabetes    Currently no consensus exists for use of hemoglobin A1C  for diagnosis of diabetes for children.     2023 8.3 (H) 4.5 - 6.2 % Final     Comment:     According to ADA guidelines, hemoglobin A1C <7.0% represents  optimal control in non-pregnant diabetic patients.  Different  metrics may apply to specific populations.   Standards of Medical Care in Diabetes - 2016.    For the purpose of screening for the presence of diabetes:  <5.7%     Consistent with the absence of diabetes  5.7-6.4%  Consistent with increasing risk for diabetes   (prediabetes)  >or=6.5%  Consistent with diabetes    Currently no consensus exists for use of hemoglobin A1C  for diagnosis of diabetes for children.     2022 8.8 (H) 4.5 - 6.2 % Final     Comment:     According to ADA guidelines, hemoglobin A1C <7.0% represents  optimal control in non-pregnant diabetic patients.  " Different  metrics may apply to specific populations.   Standards of Medical Care in Diabetes - 2016.    For the purpose of screening for the presence of diabetes:  <5.7%     Consistent with the absence of diabetes  5.7-6.4%  Consistent with increasing risk for diabetes   (prediabetes)  >or=6.5%  Consistent with diabetes    Currently no consensus exists for use of hemoglobin A1C  for diagnosis of diabetes for children.         Review of Systems  As above      There were no vitals taken for this visit.    There is no height or weight on file to calculate BMI.    Lab Review:   Lab Results   Component Value Date    HGBA1C 8.3 (H) 12/14/2023    HGBA1C 8.3 (H) 12/14/2023    HGBA1C 8.8 (H) 08/31/2022       Lab Results   Component Value Date    CHOL 147 12/14/2023    HDL 32 (L) 12/14/2023    LDLCALC 78.6 12/14/2023    TRIG 182 (H) 12/14/2023    CHOLHDL 21.8 12/14/2023     Lab Results   Component Value Date     (L) 02/01/2024    K 3.7 02/01/2024    CL 97 02/01/2024    CO2 20 (L) 02/01/2024     (H) 02/01/2024    BUN 17 02/01/2024    CREATININE 1.2 02/01/2024    CALCIUM 9.3 02/01/2024    PROT 7.6 02/01/2024    ALBUMIN 4.2 02/01/2024    BILITOT 0.6 02/01/2024    ALKPHOS 70 02/01/2024    AST 21 02/01/2024    ALT 25 02/01/2024    ANIONGAP 17 (H) 02/01/2024    ESTGFRAFRICA >60 01/11/2022    EGFRNONAA >60 01/11/2022    TSH 1.921 12/14/2023     No results found for: "[id number]"  Assessment and Plan     1. Type 2 diabetes mellitus with diabetic neuropathic arthropathy, with long-term current use of insulin  Ambulatory referral/consult to Ophthalmology    blood-glucose meter,continuous (DEXCOM G7 ) Misc    blood-glucose sensor (DEXCOM G7 SENSOR) Courtney    LANTUS SOLOSTAR U-100 INSULIN glargine 100 units/mL SubQ pen    Hemoglobin A1C    Comprehensive Metabolic Panel          Type 2 diabetes mellitus with diabetic arthropathy  -- Sugar clinic in 6 weeks. VV in 12 weeks with labs prior.  -- A1c goal <7%.  -- " Medications discussed:  MFM   GLP1-DPP4 - GLP1 induced pancreatitis   JOHNSTON   SGLT2   Insulin   -- Reviewed logs/CGM:  Fasting hyperglycemia.  Submit Dexcom G7 to pharmacy.  -- Medication Changes:   Metformin 1000mg twice daily  Jardiance 25mg daily   Lantus 26 units daily  Humalog 10 units plus correction scale before meals  Add correction scale if needed.  Blood sugar 150 to 200 add 2 units  Blood sugar 201 to 250 add 4 units  Blood sugar 251 to 300 add 6 units  Blood sugar 301 to 350 add 8 units  Blood sugar greater than 350 add 10 units  -- Reviewed goals of therapy are to get the best control we can without hypoglycemia.  -- Reviewed patient's current insulin regimen. Clarified proper insulin dose and timing in relation to meals, etc. Insulin injection sites and proper rotation instructed.    -- Advised frequent self blood glucose monitoring.  Patient encouraged to document glucose results and bring them to every clinic visit.  -- Hypoglycemia precautions discussed. Instructed on precautions before driving.    -- Call for Bg repeatedly < 90 or > 180.   -- Close adherence to lifestyle changes recommended.   -- Periodic follow ups for eye evaluations, foot care and dental care suggested.      Follow up in about 6 weeks (around 3/18/2024).      I spent 20 minutes face-to-face with the patient, over half of the visit was spent on counseling and/or coordinating the care of the patient.    Counseling includes:  Diagnostic results, impressions, recommendations   Prognosis   Risk and benefits of management/treatment options   Instructions for management treatment and or follow-up   Importance of compliance with management   Risk factor reduction   Patient education

## 2024-01-30 ENCOUNTER — CLINICAL SUPPORT (OUTPATIENT)
Dept: CARDIOLOGY | Facility: HOSPITAL | Age: 59
End: 2024-01-30
Attending: INTERNAL MEDICINE
Payer: MEDICAID

## 2024-01-30 DIAGNOSIS — I44.2 CHB (COMPLETE HEART BLOCK): ICD-10-CM

## 2024-01-30 DIAGNOSIS — I48.91 ATRIAL FIBRILLATION, UNSPECIFIED TYPE: ICD-10-CM

## 2024-01-30 DIAGNOSIS — Z95.810 ICD (IMPLANTABLE CARDIOVERTER-DEFIBRILLATOR) IN PLACE: ICD-10-CM

## 2024-01-30 DIAGNOSIS — I44.2 ATRIOVENTRICULAR BLOCK, COMPLETE: ICD-10-CM

## 2024-01-30 PROCEDURE — 93283 PRGRMG EVAL IMPLANTABLE DFB: CPT | Mod: 26,,, | Performed by: INTERNAL MEDICINE

## 2024-01-30 PROCEDURE — 93283 PRGRMG EVAL IMPLANTABLE DFB: CPT

## 2024-01-30 RX ORDER — LANCETS 33 GAUGE
EACH MISCELLANEOUS
Qty: 100 EACH | Refills: 0 | OUTPATIENT
Start: 2024-01-30

## 2024-01-31 RX ORDER — LANCETS 33 GAUGE
EACH MISCELLANEOUS
Qty: 100 EACH | Refills: 0 | OUTPATIENT
Start: 2024-01-31

## 2024-02-01 ENCOUNTER — CLINICAL SUPPORT (OUTPATIENT)
Dept: CARDIOLOGY | Facility: HOSPITAL | Age: 59
End: 2024-02-01
Attending: INTERNAL MEDICINE
Payer: MEDICAID

## 2024-02-01 ENCOUNTER — NURSE TRIAGE (OUTPATIENT)
Dept: ADMINISTRATIVE | Facility: CLINIC | Age: 59
End: 2024-02-01
Payer: MEDICAID

## 2024-02-01 ENCOUNTER — HOSPITAL ENCOUNTER (EMERGENCY)
Facility: HOSPITAL | Age: 59
Discharge: LEFT BEFORE DISCHARGE | End: 2024-02-01
Payer: MEDICAID

## 2024-02-01 VITALS
SYSTOLIC BLOOD PRESSURE: 139 MMHG | DIASTOLIC BLOOD PRESSURE: 72 MMHG | TEMPERATURE: 98 F | HEIGHT: 71 IN | BODY MASS INDEX: 35.56 KG/M2 | WEIGHT: 254 LBS | HEART RATE: 91 BPM | OXYGEN SATURATION: 100 % | RESPIRATION RATE: 18 BRPM

## 2024-02-01 DIAGNOSIS — I44.2 ATRIOVENTRICULAR BLOCK, COMPLETE: ICD-10-CM

## 2024-02-01 DIAGNOSIS — R07.9 CHEST PAIN: ICD-10-CM

## 2024-02-01 LAB
ALBUMIN SERPL BCP-MCNC: 4.2 G/DL (ref 3.5–5.2)
ALP SERPL-CCNC: 70 U/L (ref 55–135)
ALT SERPL W/O P-5'-P-CCNC: 25 U/L (ref 10–44)
ANION GAP SERPL CALC-SCNC: 17 MMOL/L (ref 8–16)
AST SERPL-CCNC: 21 U/L (ref 10–40)
BASOPHILS # BLD AUTO: 0.08 K/UL (ref 0–0.2)
BASOPHILS NFR BLD: 0.9 % (ref 0–1.9)
BILIRUB SERPL-MCNC: 0.6 MG/DL (ref 0.1–1)
BUN SERPL-MCNC: 17 MG/DL (ref 6–20)
CALCIUM SERPL-MCNC: 9.3 MG/DL (ref 8.7–10.5)
CHLORIDE SERPL-SCNC: 97 MMOL/L (ref 95–110)
CO2 SERPL-SCNC: 20 MMOL/L (ref 23–29)
CREAT SERPL-MCNC: 1.2 MG/DL (ref 0.5–1.4)
DIFFERENTIAL METHOD BLD: NORMAL
EOSINOPHIL # BLD AUTO: 0.4 K/UL (ref 0–0.5)
EOSINOPHIL NFR BLD: 4.8 % (ref 0–8)
ERYTHROCYTE [DISTWIDTH] IN BLOOD BY AUTOMATED COUNT: 13.4 % (ref 11.5–14.5)
EST. GFR  (NO RACE VARIABLE): >60 ML/MIN/1.73 M^2
GLUCOSE SERPL-MCNC: 283 MG/DL (ref 70–110)
HCT VFR BLD AUTO: 45.3 % (ref 40–54)
HGB BLD-MCNC: 15.5 G/DL (ref 14–18)
IMM GRANULOCYTES # BLD AUTO: 0.04 K/UL (ref 0–0.04)
IMM GRANULOCYTES NFR BLD AUTO: 0.5 % (ref 0–0.5)
LYMPHOCYTES # BLD AUTO: 2.4 K/UL (ref 1–4.8)
LYMPHOCYTES NFR BLD: 26.8 % (ref 18–48)
MAGNESIUM SERPL-MCNC: 1.6 MG/DL (ref 1.6–2.6)
MCH RBC QN AUTO: 30.9 PG (ref 27–31)
MCHC RBC AUTO-ENTMCNC: 34.2 G/DL (ref 32–36)
MCV RBC AUTO: 90 FL (ref 82–98)
MONOCYTES # BLD AUTO: 0.5 K/UL (ref 0.3–1)
MONOCYTES NFR BLD: 5.9 % (ref 4–15)
NEUTROPHILS # BLD AUTO: 5.4 K/UL (ref 1.8–7.7)
NEUTROPHILS NFR BLD: 61.1 % (ref 38–73)
NRBC BLD-RTO: 0 /100 WBC
PLATELET # BLD AUTO: 276 K/UL (ref 150–450)
PMV BLD AUTO: 9.5 FL (ref 9.2–12.9)
POTASSIUM SERPL-SCNC: 3.7 MMOL/L (ref 3.5–5.1)
PROT SERPL-MCNC: 7.6 G/DL (ref 6–8.4)
RBC # BLD AUTO: 5.01 M/UL (ref 4.6–6.2)
SODIUM SERPL-SCNC: 134 MMOL/L (ref 136–145)
TROPONIN I SERPL HS-MCNC: 10.5 PG/ML (ref 0–14.9)
WBC # BLD AUTO: 8.76 K/UL (ref 3.9–12.7)

## 2024-02-01 PROCEDURE — 85025 COMPLETE CBC W/AUTO DIFF WBC: CPT | Performed by: PHYSICIAN ASSISTANT

## 2024-02-01 PROCEDURE — 84484 ASSAY OF TROPONIN QUANT: CPT | Performed by: PHYSICIAN ASSISTANT

## 2024-02-01 PROCEDURE — 93295 DEV INTERROG REMOTE 1/2/MLT: CPT | Mod: ,,, | Performed by: INTERNAL MEDICINE

## 2024-02-01 PROCEDURE — 93005 ELECTROCARDIOGRAM TRACING: CPT | Performed by: GENERAL PRACTICE

## 2024-02-01 PROCEDURE — 99285 EMERGENCY DEPT VISIT HI MDM: CPT | Mod: 25

## 2024-02-01 PROCEDURE — 93296 REM INTERROG EVL PM/IDS: CPT | Performed by: INTERNAL MEDICINE

## 2024-02-01 PROCEDURE — 93010 ELECTROCARDIOGRAM REPORT: CPT | Mod: ,,, | Performed by: GENERAL PRACTICE

## 2024-02-01 PROCEDURE — 80053 COMPREHEN METABOLIC PANEL: CPT | Performed by: PHYSICIAN ASSISTANT

## 2024-02-01 PROCEDURE — 83735 ASSAY OF MAGNESIUM: CPT | Performed by: PHYSICIAN ASSISTANT

## 2024-02-01 NOTE — FIRST PROVIDER EVALUATION
Emergency Department TeleTriage Encounter Note      CHIEF COMPLAINT    Chief Complaint   Patient presents with    Chest Pain     ONSET LASTNITE. PACEMAKER PLACED DEC 28, 2023, STATES HE FELL ASLEEP WITHOUT SLING AND FEELS LIKE SOMETHING MAY HAVE PULLED       VITAL SIGNS   Initial Vitals [02/01/24 1201]   BP Pulse Resp Temp SpO2   139/72 91 18 98 °F (36.7 °C) 100 %      MAP       --            ALLERGIES    Review of patient's allergies indicates:   Allergen Reactions    Pesticide Dermatitis and Rash       PROVIDER TRIAGE NOTE  This is a teletriage evaluation of a 58 y.o. male presenting to the ED complaining of chest pain. Patient reports pain to lower left pectoral muscle since waking this morning. He fell asleep without sling on last night and is afraid he may have pulled a wire out of place on his pacemaker. He denies shortness of breath.    Patient is alert and oriented. He speaks in complete sentences. He is sitting upright in the chair in no distress.     Initial orders will be placed and care will be transferred to an alternate provider when patient is roomed for a full evaluation. Any additional orders and the final disposition will be determined by that provider.         ORDERS  Labs Reviewed   MAGNESIUM   CBC W/ AUTO DIFFERENTIAL   COMPREHENSIVE METABOLIC PANEL   TROPONIN I HIGH SENSITIVITY       ED Orders (720h ago, onward)      Start Ordered     Status Ordering Provider    02/01/24 1214 02/01/24 1214  Magnesium  STAT         Ordered CHET GREENBERG    02/01/24 1214 02/01/24 1214  Vital signs  Every 15 min         Ordered CHET GREENBERG    02/01/24 1214 02/01/24 1214  Cardiac Monitoring - Adult  Continuous        Comments: Notify Physician If:    Ordered CHET GREENBERG    02/01/24 1214 02/01/24 1214  Pulse Oximetry Continuous  Continuous         Ordered CHET GREENBERG    02/01/24 1214 02/01/24 1214  Diet NPO  Diet effective now         Ordered CHET GREENBERG    02/01/24 1214 02/01/24 1214  Saline lock  IV  Once         Ordered CHET GREENBERG    02/01/24 1214 02/01/24 1214  EKG 12-lead  Once        Comments: Do not perform if previously done during this visit/ triage    Ordered CHET GREENBERG.    02/01/24 1214 02/01/24 1214  CBC auto differential  STAT         Ordered CHET GREENBERG.    02/01/24 1214 02/01/24 1214  Comprehensive metabolic panel  STAT         Ordered CHET GREENBERG.    02/01/24 1214 02/01/24 1214  Troponin I High Sensitivity #1  STAT         Ordered CHET GREENBERG.    02/01/24 1214 02/01/24 1214  X-Ray Chest AP Portable  1 time imaging         Ordered CHET GREENBERG              Virtual Visit Note: The provider triage portion of this emergency department evaluation and documentation was performed via [employer], a HIPAA-compliant telemedicine application, in concert with a tele-presenter in the room. A face to face patient evaluation with one of my colleagues will occur once the patient is placed in an emergency department room.      DISCLAIMER: This note was prepared with [employer] voice recognition transcription software. Garbled syntax, mangled pronouns, and other bizarre constructions may be attributed to that software system.

## 2024-02-01 NOTE — ED NOTES
Called in the waiting room for the pt and could not find him. Contacted the pt on the phone who states he left. When asked about his IV pt states an ED personnel removed his IV prior to leaving. Pt refused to return to ED.

## 2024-02-01 NOTE — TELEPHONE ENCOUNTER
Pt c/o intermittent, sharp chest pains 8/10 that last a few seconds since last night. Pt states that he recently had a pacemaker placed and denies feeling shocks. Pt currently having intermittent chest pains at this time. Advised to go to ED now and to call 911 if immediate transportation isn't available. VU. Encounter routed to provider.      Reason for Disposition   SEVERE chest pain    Additional Information   Negative: SEVERE difficulty breathing (e.g., struggling for each breath, speaks in single words)   Negative: Difficult to awaken or acting confused (e.g., disoriented, slurred speech)   Negative: Shock suspected (e.g., cold/pale/clammy skin, too weak to stand, low BP, rapid pulse)   Negative: Passed out (i.e., lost consciousness, collapsed and was not responding)   Negative: Chest pain lasting longer than 5 minutes and over 44 years old   Negative: Chest pain lasting longer than 5 minutes, over 30 years old, and at least one cardiac risk factor (e.g., diabetes mellitus, high blood pressure, high cholesterol, smoker, or strong family history of heart disease)   Negative: Chest pain lasting longer than 5 minutes and history of heart disease (i.e., angina, heart attack, heart failure, bypass surgery, takes nitroglycerin)   Negative: Chest pain lasting longer than 5 minutes and pain is crushing, pressure-like, or heavy   Negative: Heart beating < 50 beats per minute OR > 140 beats per minute   Negative: Visible sweat on face or sweat dripping down face   Negative: Sounds like a life-threatening emergency to the triager    Protocols used: Chest Pain-A-[place]

## 2024-02-02 RX ORDER — LANCETS 33 GAUGE
EACH MISCELLANEOUS
Qty: 100 EACH | Refills: 0 | OUTPATIENT
Start: 2024-02-02

## 2024-02-02 NOTE — ED PROVIDER NOTES
Encounter Date: 2/1/2024       History     Chief Complaint   Patient presents with    Chest Pain     ONSET LASTNITE. PACEMAKER PLACED DEC 28, 2023, STATES HE FELL ASLEEP WITHOUT SLING AND FEELS LIKE SOMETHING MAY HAVE PULLED     Mr. Cunningham is a 58 year old man with a         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    Hypertension     Infected incision     MI (myocardial infarction)     per pt he has had 4     ROSAMARIA on CPAP     RLS (restless legs syndrome)     Perez-Denton syndrome      Past Surgical History:   Procedure Laterality Date    CORONARY ANGIOGRAPHY INCLUDING BYPASS GRAFTS WITH CATHETERIZATION OF LEFT HEART N/A 9/1/2022    Procedure: ANGIOGRAM, CORONARY, INCLUDING BYPASS GRAFT, WITH LEFT HEART CATHETERIZATION;  Surgeon: Paul Botello MD;  Location: [facility] CATH/EP LAB;  Service: Cardiology;  Laterality: N/A;    CORONARY ANGIOGRAPHY INCLUDING BYPASS GRAFTS WITH CATHETERIZATION OF LEFT HEART Left 12/22/2023    Procedure: ANGIOGRAM, CORONARY, INCLUDING BYPASS GRAFT, WITH LEFT HEART CATHETERIZATION;  Surgeon: Paul Botello MD;  Location: [facility] CATH/EP LAB;  Service: Cardiology;  Laterality: Left;    CORONARY ARTERY BYPASS GRAFT (CABG) N/A 4/7/2020    Procedure: CORONARY ARTERY BYPASS GRAFT (CABG)- Excision of left atrial appendage;  Surgeon: Doug Solitario MD;  Location: [facility] OR;  Service: Cardiothoracic;  Laterality: N/A;    CORONARY BYPASS GRAFT ANGIOGRAPHY  12/25/2023    Procedure: Bypass graft study;  Surgeon: Ashley Valverde MD;  Location: [facility] CATH/EP LAB;  Service:  Cardiology;;    CORONARY STENT PLACEMENT      CORONARY STENT PLACEMENT N/A 12/22/2023    Procedure: INSERTION, STENT, CORONARY ARTERY;  Surgeon: Paul Botello MD;  Location: [facility] CATH/EP LAB;  Service: Cardiology;  Laterality: N/A;    WILSON MAZE PROCEDURE N/A 4/7/2020    Procedure: WILSON MAZE PROCEDURE- Attempted;  Surgeon: Doug Solitario MD;  Location: [facility] OR;  Service: Cardiothoracic;  Laterality: N/A;    CYSTOSCOPY N/A 12/10/2018    Procedure: CYSTOSCOPY;  Surgeon: Khushboo Abreu MD;  Location: [facility] OR;  Service: Urology;  Laterality: N/A;    ENDOSCOPIC HARVEST OF VEIN Left 4/7/2020    Procedure: HARVEST-VEIN-ENDOVASCULAR;  Surgeon: Doug Solitario MD;  Location: [facility] OR;  Service: Cardiothoracic;  Laterality: Left;    INCISION OF PERIRECTAL ABSCESS Bilateral 9/1/2023    Procedure: INCISION, ABSCESS, PERIRECTAL;  Surgeon: Brayan Spears MD;  Location: [facility] OR;  Service: General;  Laterality: Bilateral;  stirrups    INSERTION OF INTRA-AORTIC BALLOON ASSIST DEVICE  12/25/2023    Procedure: INSERTION, INTRA-AORTIC BALLOON PUMP;  Surgeon: Ashley Valverde MD;  Location: [facility] CATH/EP LAB;  Service: Cardiology;;    INSERTION OF TEMPORARY PACEMAKER N/A 12/25/2023    Procedure: INSERTION, PACEMAKER, TEMPORARY;  Surgeon: Ashley Valverde MD;  Location: [facility] CATH/EP LAB;  Service: Cardiology;  Laterality: N/A;    INSERTION, ICD, DUAL CHAMBER N/A 1/2/2024    Procedure: Insertion, ICD, Dual Chamber;  Surgeon: Antonio Callejas MD;  Location: [facility] EP LAB;  Service: Cardiology;  Laterality: N/A;  CM, DUAL ICD, ANES, URIEL JOHN, 3079    IVUS, CORONARY  12/22/2023    Procedure: IVUS, Coronary;  Surgeon: Paul Botello MD;  Location: [facility] CATH/EP LAB;  Service: Cardiology;;    LEFT HEART CATHETERIZATION Left 3/17/2020    Procedure: CATHETERIZATION, HEART, LEFT;  Surgeon: Tyree Walters MD;  Location: [facility] CATH/EP LAB;  Service: Cardiology;  Laterality: Left;    PERCUTANEOUS  CORONARY INTERVENTION, ARTERY N/A 12/22/2023    Procedure: Percutaneous coronary intervention;  Surgeon: Paul Botello MD;  Location: [facility] CATH/EP LAB;  Service: Cardiology;  Laterality: N/A;    PERCUTANEOUS CORONARY INTERVENTION, ARTERY N/A 12/25/2023    Procedure: Percutaneous coronary intervention;  Surgeon: Ashley Valverde MD;  Location: [facility] CATH/EP LAB;  Service: Cardiology;  Laterality: N/A;    PLACEMENT, IABP  12/25/2023    Procedure: Placement, IABP;  Surgeon: Ashley Valverde MD;  Location: [facility] CATH/EP LAB;  Service: Cardiology;;    STERNAL WIRES REMOVAL N/A 6/8/2020    Procedure: REMOVAL, STERNAL WIRE;  Surgeon: Doug Solitario MD;  Location: [facility] OR;  Service: Peripheral Vascular;  Laterality: N/A;    ULTRASOUND OF PROSTATE FOR VOLUME DETERMINATION  12/10/2018    Procedure: ULTRASOUND, PROSTATE, FOR VOLUME DETERMINATION;  Surgeon: Khushboo Abreu MD;  Location: [facility] OR;  Service: Urology;;     Family History   Problem Relation Age of Onset    Heart disease Mother     Diabetes Mother     Hyperlipidemia Father     Cancer Father      Social History     Tobacco Use    Smoking status: Former     Current packs/day: 0.00     Average packs/day: 3.0 packs/day for 30.0 years (90.0 ttl pk-yrs)     Types: Cigarettes     Start date: 4/4/1990     Quit date: 4/4/2020     Years since quitting: 3.8    Smokeless tobacco: Never   Substance Use Topics    Alcohol use: Not Currently    Drug use: Yes     Frequency: 1.0 times per week     Types: Marijuana     Review of Systems    Physical Exam     Initial Vitals [02/01/24 1201]   BP Pulse Resp Temp SpO2   139/72 91 18 98 °F (36.7 °C) 100 %      MAP       --         Physical Exam    ED Course   Procedures  Labs Reviewed   MAGNESIUM   CBC W/ AUTO DIFFERENTIAL   COMPREHENSIVE METABOLIC PANEL   TROPONIN I HIGH SENSITIVITY        ECG Results              EKG 12-lead (In process)  Result time 02/01/24 12:38:01      In process by Interface,  Lab In [facility] (02/01/24 12:38:01)                   Narrative:    Test Reason : R07.9,    Vent. Rate : 090 BPM     Atrial Rate : 090 BPM     P-R Int : 156 ms          QRS Dur : 100 ms      QT Int : 398 ms       P-R-T Axes : 052 -39 041 degrees     QTc Int : 486 ms    Sinus rhythm with Premature atrial complexes with Aberrant conduction  Left axis deviation  Nonspecific ST abnormality  Prolonged QT  Abnormal ECG  When compared with ECG of 02-JAN-2024 16:03,  Sinus rhythm has replaced Electronic ventricular pacemaker    Referred By: [email or web address]   SELF           Confirmed By:                                   Imaging Results    None          Medications - No data to display  Medical Decision Making  Amount and/or Complexity of Data Reviewed  Labs:  Decision-making details documented in ED Course.  Radiology:  Decision-making details documented in ED Course.               ED Course as of 02/01/24 1450   Thu Feb 01, 2024   1341 X-Ray Chest AP Portable  Prior median sternotomy with no acute pulmonary process. Left subclavian vein pacemaker in stable position   [EL]   1449 WBC: 8.76 [EL]   1449 Hemoglobin: 15.5 [EL]   1449 Platelet Count: 276 [EL]   1449 Creatinine: 1.2 [EL]   1449 Troponin I High Sensitivity: 10.5 [EL]      ED Course User Index  [EL] Deanne Kumar MD                           Clinical Impression:  Final diagnoses:  [R07.9] Chest pain

## 2024-02-05 ENCOUNTER — CLINICAL SUPPORT (OUTPATIENT)
Dept: ENDOCRINOLOGY | Facility: CLINIC | Age: 59
End: 2024-02-05
Payer: MEDICAID

## 2024-02-05 DIAGNOSIS — Z79.4 TYPE 2 DIABETES MELLITUS WITH DIABETIC NEUROPATHIC ARTHROPATHY, WITH LONG-TERM CURRENT USE OF INSULIN: Primary | ICD-10-CM

## 2024-02-05 DIAGNOSIS — E11.610 TYPE 2 DIABETES MELLITUS WITH DIABETIC NEUROPATHIC ARTHROPATHY, WITH LONG-TERM CURRENT USE OF INSULIN: Primary | ICD-10-CM

## 2024-02-05 PROCEDURE — 99214 OFFICE O/P EST MOD 30 MIN: CPT | Mod: 95,,, | Performed by: INTERNAL MEDICINE

## 2024-02-05 RX ORDER — BLOOD-GLUCOSE,RECEIVER,CONT
EACH MISCELLANEOUS
Qty: 1 EACH | Refills: 0 | Status: SHIPPED | OUTPATIENT
Start: 2024-02-05

## 2024-02-05 RX ORDER — BLOOD-GLUCOSE SENSOR
EACH MISCELLANEOUS
Qty: 3 EACH | Refills: 11 | Status: SHIPPED | OUTPATIENT
Start: 2024-02-05

## 2024-02-05 RX ORDER — INSULIN GLARGINE 100 [IU]/ML
26 INJECTION, SOLUTION SUBCUTANEOUS DAILY
Qty: 30 ML | Refills: 3 | Status: SHIPPED | OUTPATIENT
Start: 2024-02-05 | End: 2024-03-25

## 2024-02-05 NOTE — ASSESSMENT & PLAN NOTE
-- Sugar clinic in 6 weeks. VV in 12 weeks with labs prior.  -- A1c goal <7%.  -- Medications discussed:  MFM   GLP1-DPP4 - GLP1 induced pancreatitis   JOHN[place]ON   SGLT2   Insulin   -- Reviewed logs/CGM:  Fasting hyperglycemia.  Submit Dexcom G7 to pharmacy.  -- Medication Changes:   Metformin 1000mg twice daily  Jardiance 25mg daily   Lantus 26 units daily  Humalog 10 units plus correction scale before meals  Add correction scale if needed.  Blood sugar 150 to 200 add 2 units  Blood sugar 201 to 250 add 4 units  Blood sugar 251 to 300 add 6 units  Blood sugar 301 to 350 add 8 units  Blood sugar greater than 350 add 10 units  -- Reviewed goals of therapy are to get the best control we can without hypoglycemia.  -- Reviewed patient's current insulin regimen. Clarified proper insulin dose and timing in relation to meals, etc. Insulin injection sites and proper rotation instructed.    -- Advised frequent self blood glucose monitoring.  Patient encouraged to document glucose results and bring them to every clinic visit.  -- Hypoglycemia precautions discussed. Instructed on precautions before driving.    -- Call for Bg repeatedly < 90 or > 180.   -- Close adherence to lifestyle changes recommended.   -- Periodic follow ups for eye evaluations, foot care and dental care suggested.

## 2024-02-05 NOTE — Clinical Note
Sugar clinic in 6 weeks VV in 12 weeks with labs prior Orders Placed This Encounter     Hemoglobin A1C     Comprehensive Metabolic Panel     Ambulatory referral/consult to Ophthalmology

## 2024-02-07 ENCOUNTER — PATIENT MESSAGE (OUTPATIENT)
Dept: ENDOCRINOLOGY | Facility: CLINIC | Age: 59
End: 2024-02-07
Payer: MEDICAID

## 2024-02-07 ENCOUNTER — PATIENT MESSAGE (OUTPATIENT)
Dept: OPTOMETRY | Facility: CLINIC | Age: 59
End: 2024-02-07
Payer: MEDICAID

## 2024-02-07 DIAGNOSIS — Z79.4 TYPE 2 DIABETES MELLITUS WITH DIABETIC NEUROPATHIC ARTHROPATHY, WITH LONG-TERM CURRENT USE OF INSULIN: Primary | ICD-10-CM

## 2024-02-07 DIAGNOSIS — E11.610 TYPE 2 DIABETES MELLITUS WITH DIABETIC NEUROPATHIC ARTHROPATHY, WITH LONG-TERM CURRENT USE OF INSULIN: Primary | ICD-10-CM

## 2024-02-14 RX ORDER — LANCETS 33 GAUGE
EACH MISCELLANEOUS
Qty: 100 EACH | Refills: 0 | OUTPATIENT
Start: 2024-02-14

## 2024-02-19 ENCOUNTER — NUTRITION (OUTPATIENT)
Dept: DIABETES | Facility: CLINIC | Age: 59
End: 2024-02-19
Payer: MEDICAID

## 2024-02-19 VITALS — HEIGHT: 71 IN | WEIGHT: 270.63 LBS | BODY MASS INDEX: 37.89 KG/M2

## 2024-02-19 DIAGNOSIS — Z79.4 TYPE 2 DIABETES MELLITUS WITH DIABETIC NEUROPATHIC ARTHROPATHY, WITH LONG-TERM CURRENT USE OF INSULIN: ICD-10-CM

## 2024-02-19 DIAGNOSIS — E11.610 TYPE 2 DIABETES MELLITUS WITH DIABETIC NEUROPATHIC ARTHROPATHY, WITH LONG-TERM CURRENT USE OF INSULIN: ICD-10-CM

## 2024-02-19 PROCEDURE — 99212 OFFICE O/P EST SF 10 MIN: CPT | Mod: PBBFAC,PO | Performed by: DIETITIAN, REGISTERED

## 2024-02-19 PROCEDURE — G0108 DIAB MANAGE TRN  PER INDIV: HCPCS | Mod: PBBFAC,PO | Performed by: DIETITIAN, REGISTERED

## 2024-02-19 PROCEDURE — 99999PBSHW PR PBB SHADOW TECHNICAL ONLY FILED TO HB: Mod: PBBFAC,,,

## 2024-02-19 PROCEDURE — 99999 PR PBB SHADOW E&M-EST. PATIENT-LVL II: CPT | Mod: PBBFAC,,, | Performed by: DIETITIAN, REGISTERED

## 2024-02-20 NOTE — PROGRESS NOTES
"Diabetes Care Specialist Progress Note  Author: Elma Khoury RD  Date: 2/19/2024    Program Intake  Reason for Diabetes Program Visit:: Initial Diabetes Assessment  Current diabetes risk level:: high (per longitudinal plan of care)  In the last 12 months, have you:: been admitted to a hospital  Was the ER or hospital admission related to diabetes?: No (cardiogenic shock, STEMI)  Permission to speak with others about care:: no  Continuous Glucose Monitoring  Patient has CGM: No (Here today to learn to use Dexcom G7)    Lab Results   Component Value Date    HGBA1C 8.3 (H) 12/14/2023    HGBA1C 8.3 (H) 12/14/2023       Clinical    Weight: 122.7 kg (270 lb 9.8 oz)   Height: 5' 11" (180.3 cm)   Body mass index is 37.74 kg/m².    Problem Review  Reviewed Problem List with Patient: yes  Active comorbidities affecting diabetes self-care.: yes  Comorbidities: Cardiovascular Disease, Hypertension, Heart Attack, Diabetic Ulcer/wound  Reviewed health maintenance: yes    Clinical Assessment  Current Diabetes Treatment: Oral Medication, Insulin, Diet (Jardiance 25 mg, Lantus 26 units, Metformin 1000 mg BID, Humalog 10 units with correction of 150/25.)  Have you ever experienced hypoglycemia (low blood sugar)?: no  Are you able to tell when your blood sugar is high?: Yes  What are your symptoms?: blurry vision, dizziness  Have you ever been hospitalized because your blood sugar was high?: no    Medication Information  How many days a week do you miss your medications?: Never  Do you sometimes have difficulty refilling your medications?: No  Medication adherence impacting ability to self-manage diabetes?: No    Labs  Do you have regular lab work to monitor your medications?: Yes  Type of Regular Lab Work: A1c  Where do you get your labs drawn?: [facility]  Lab Compliance Barriers: No    Nutritional Status  Diet: Regular  Meal Plan 24 Hour Recall: Breakfast, Lunch, Dinner, Snack  Meal Plan 24 Hour Recall - Breakfast: biscuit + " gravy, raisin bran, 4 slices toast, packet of flavored oatmeal  Meal Plan 24 Hour Recall - Lunch: 1/2 sandwich, salad, Healthy Choice Meal  Meal Plan 24 Hour Recall - Dinner: ribs, steamed broccoli  Meal Plan 24 Hour Recall - Snack: salad, hot pocket, two granola bars  Change in appetite?: No (Patient eating small meals 6 times a day.)  Recent Changes in Weight:  (fluctuations. No pattern currently)  Current nutritional status an area of need that is impacting patient's ability to self-manage diabetes?: No    Additional Social History    Support  Does anyone support you with your diabetes care?: yes  Who supports you?: self  Who takes you to your medical appointments?: self  Does the current support meet the patient's needs?: Yes  Is Support an area impacting ability to self-manage diabetes?: No    Access to Mass Media & Technology  Does the patient have access to any of the following devices or technologies?: Smart phone, Internet Access  Media or technology needs impacting ability to self-manage diabetes?: Yes    Cognitive/Behavioral Health  Alert and Oriented: Yes  Difficulty Thinking: No  Requires Prompting: No  Requires assistance for routine expression?: No  Cognitive or behavioral barriers impacting ability to self-manage diabetes?: No    Culture/Advent  Culture or Jehovah's witness beliefs that may impact ability to access healthcare: No    Communication  Language preference: English  Hearing Problems: No  Vision Problems: Yes  Vision problem type:: Decreased Vision  Vision Assistance: Glasses  Communication needs impacting ability to self-manage diabetes?: No    Health Literacy  Preferred Learning Method: Face to Face, Demonstration, Hands On, Reading Materials  How often do you need to have someone help you read instructions, pamphlets, or written material from your doctor or pharmacy?: Never  Health literacy needs impacting ability to self-manage diabetes?: No      Diabetes Self-Management Skills  Assessment    Diabetes Disease Process/Treatment Options  Patient/caregiver able to state what happens when someone has diabetes.: yes  Patient/caregiver knows what type of diabetes they have.: yes  Diabetes Type : Type II  Patient/caregiver able to identify at least three signs and symptoms of diabetes.: yes  Identified signs and symptoms:: blurred vision, fatigue  Patient able to identify at least three risk factors for diabetes.: yes  Identified risk factors:: being overweight, age over 40, family history  Diabetes Disease Process/Treatment Options: Skills Assessment Completed: Yes  Assessment indicates:: Adequate understanding  Area of need?: No    Nutrition/Healthy Eating  Challenges to healthy eating:: snacking between meals and at night, other (see comments) (He is eating 6 small meals daily and he is not hungry at meals or only eating low carbohydrate meals of salad or protein vegetables at meals when he takes Humalog.)  Method of carbohydrate measurement:: eyeballing/guessing  Patient can identify foods that impact blood sugar.: yes  Patient-identified foods:: soda, sweets  Nutrition/Healthy Eating Skills Assessment Completed:: Yes  Assessment indicates:: Instruction Needed, Knowledge deficit  Area of need?: Yes    Physical Activity/Exercise  Patient's daily activity level:: lightly active  Patient formally exercises outside of work.: yes  Exercise Type: walking  Frequency: four or more times a week  Duration: 30 min  Patient can identify forms of physical activity.: yes  Stated forms of physical activity:: any movement performed by muscles that uses energy  Patient can identify reasons why exercise/physical activity is important in diabetes management.: no  Physical Activity/Exercise Skills Assessment Completed: : Yes  Assessment indicates:: Adequate understanding, Other (comment)  Area of need?: No (PLease contact cardiologist to report new exercise routine for approval.)    Medications  Patient is able  to describe current diabetes management routine.: yes  Diabetes management routine:: insulin, oral medications (Jardiance, Lantus, Metformin, Humalog)  Patient is able to identify current diabetes medications, dosages, and appropriate timing of medications.: yes  Patient understands the purpose of the medications taken for diabetes.: yes  Patient reports problems or concerns with current medication regimen.: no  Medication Skills Assessment Completed:: Yes  Assessment indicates:: Adequate understanding  Area of need?: No    Home Blood Glucose Monitoring  Patient states that blood sugar is checked at home daily.: no  Home Blood Glucose Monitoring Skills Assessment Completed: : Yes  Assessment indicates:: Instruction Needed, Knowledge deficit  Area of need?: Yes (Here to learn how to use Dexcom G7 to monitor glucose readings.)    Acute Complications  Acute Complications Skills Assessment Completed: : No  Deffered due to:: Time    Chronic Complications  Chronic Complications Skills Assessment Completed: : No  Deferred due to:: Time    Psychosocial/Coping  Patient can identify ways of coping with chronic disease.: yes  Patient-stated ways of coping with chronic disease:: support from loved ones  Psychosocial/Coping Skills Assessment Completed: : Yes  Assessment indicates:: Adequate understanding  Area of need?: No      Assessment Summary and Plan    Based on today's diabetes care assessment, the following areas of need were identified:          2/19/2024    12:01 AM   Social   Support No   Access to Mass Media/Tech Yes   Cognitive/Behavioral Health No   Culture/Latter day No   Communication No   Health Literacy No            2/19/2024    12:01 AM   Clinical   Medication Adherence No   Lab Compliance No   Nutritional Status No            2/19/2024    12:01 AM   Diabetes Self-Management Skills   Diabetes Disease Process/Treatment Options Explained basic disease process of diabetes, symptoms of diabetes/hyperglycemia.    Nutrition/Healthy Eating Yes, see care plan   Physical Activity/Exercise Patient needs exercise plan approved with cardiology. Explained how PA helps manage BG.   Medication Patient taking as prescribed.   Home Blood Glucose Monitoring Presents today to learn to use Dexcom G7 to monitor glucose levels.   Psychosocial/Coping No          Today's interventions were provided through individual discussion, instruction, and written materials were provided.      Patient verbalized understanding of instruction and written materials.  Pt was able to return back demonstration of instructions today. Patient understood key points, needs reinforcement and further instruction.     Diabetes Self-Management Care Plan:    Today's Diabetes Self-Management Care Plan was developed with Magdaleno's input. Magdaleno has agreed to work toward the following goal(s) to improve his/her overall diabetes control.      Care Plan: Diabetes Management   Updates made since 1/20/2024 12:00 AM        Problem: Healthy Eating         Goal: Eat 3 meals daily with 30-60g/2-3 servings of Carbohydrate per meal. Have protein or vegetable based snacks in between meals to help reduce portion sizes.    Start Date: 2/19/2024   Expected End Date: 3/4/2024   Priority: High   Barriers: No Barriers Identified   Note:    Patient trying his best to manage BG eating 6 small meals daily, but does not know what effects BG other than sweets and sodas.  Discussed choosing carbohydrates at meals since having Humalog at meals to reduce post prandial spike.  He enjoys salads and steam vegetables as some of his favorite foods.  Recommended these, peanuts, nuts, and cheese in between meals.  Stated goal to not have carbohydrates in between meals so BG has a chance to go decrease.       Task: Reviewed the sources and role of Carbohydrate, Protein, and Fat and how each nutrient impacts blood sugar. Completed 2/19/2024        Task: Provided visual examples using dry  measuring cups, food models, and other familiar objects such as computer mouse, deck or cards, tennis ball etc. to help with visualization of portions. Completed 2/19/2024        Task: Explained how to count carbohydrates using the food label and the use of dry measuring cups for accurate carb counting.         Task: Discussed strategies for choosing healthier menu options when dining out.         Task: Recommended replacing beverages containing high sugar content with noncaloric/sugar free options and/or water. Completed 2/19/2024        Task: Review the importance of balancing carbohydrates with each meal using portion control techniques to count servings of carbohydrate and label reading to identify serving size and amount of total carbs per serving.         Task: Provided Sample plate method and reviewed the use of the plate to estimate amounts of carbohydrate per meal. Completed 2/19/2024        Problem: Blood Glucose Self-Monitoring         Goal: Patient agrees to use Dexcom G7 to monitor interstitial fluid glucose levels.    Start Date: 2/19/2024   Expected End Date: 3/4/2024   Priority: Medium   Barriers: No Barriers Identified   Note:    PLACEMENT OF DEXCOM G7 PERSONAL CONTINOUS GLUCOSE MONITORING SYSTEM (CGMS)     REFERRING PROVIDER: Funmilayo Merritt NP    Explained to patient the difference between sensor glucose and blood (capillary) glucose and how these 2 readings may not be the same.     Patient is here in clinic today for placement of personal continuous glucose monitoring system (CGMS).   Patient has Dexcom G7 , Sensors, & Transmitter. Patient will use Android eran to obtain continuous glucose readings.  Patient verbalizes understanding to change sensor every 10 days. He is also aware that each time a new sensor is placed there is a 30 minute warm up period. No calibration is necessary however patient can calibrate if he desires.  How ever, if patient calibrates Dexcom sensor, it is recommended  "to only calibrate once during the 10-day sensor wear as sensor is factory calibrated.  Patient understands to avoid calibration when glucose levels changing rapidly.      Discussed Dexcom G7 supplies with patient--company/pharmacy to reorder items and who to call (Dexcom technical support) if a sensor/transmitter fails.  If using Dexcom G7 mobile phone eran, patient educated to leave eran running in the background (do not swipe off completely) to prevent gaps in CGMS tracings.       A detailed explanation of Dexcom G7 Continuous Glucose Monitoring System was provided. Reviewed the Dexcom "Getting Started Guide" with patient and he has a written copy for home use.    Patient was allowed to practice, and time allowed to ask questions. Patient will notify Endocrinology if glucose levels are above 250 mg/dL consistently or if episode of glucose less than 70 mg/dL presents.  Patient verbalizes understanding.         High alert set at 350 mg/dL, snooze off  Low alert set at 80 mg/dL, snooze 15 minutes  High rising alert: off  Low dropping alert: off  Signal Loss: On  Brief Sensor Issue: ON    Instructed patient on how to enter insulin doses using Dexcom G7 to better understand timing and amount of insulin being administered and missed doses of insulin.       An appropriate site was selected and prepared. Patient inserted sensor into back of right upper arm. Sensor will be changed by patient every 10 days.   Patient has set up personal Dexcom account, and linked data sharing to [facility] if using Dexcom mobile eran.  All questions answered.       Dexcom  Username: [phone]  Password: [email or web address]  Sharing code (good for 12 months): [id number]       Task: Provided patient with a meter today and sent Rx request to provider to send to patients pharmacy.         Task: Reviewed the importance of self-monitoring blood glucose and keeping logs.         Task: Instructed on how to self-monitor blood glucose " "using a home glucometer, how to properly dispose of used strips and lancets after use, and how to appropriately store meter and supplies.         Task: Provided patient with blood glucose logs, reviewed appropriate timing and frequency to SMBG, education on parameters on when to notify provider and advised patient to bring logs to all appts with PCP/Endocrinologist/Diabetes Care Specialist.         Task: Taught patient difference between using BG and fluid glucose readings to monitor and when BG is always used. Completed 2/19/2024      Dexcom  Username: [phone]  Password: [email or web address]  Sharing code (good for 12 months): [id number]  Sharing data with [facility], [facility] (for diabetes education)  Dexcom Clarity eran downloaded on phone    Follow Up Plan     Follow up in about 2 weeks (around 3/4/2024). Patient presents today to learn how to use Dexcom G7.  He is using correction scale correctly and is taking all medication as prescribed including correct timing.       Patient is trying his best to control his BG with diet by eating 6 smaller meals daily.  Explained to patient we want to focus the carbohydrates at the three main meals when he takes Humalog.  Explained the Humalog is to cover for the post prandial glucose spikes.  Patient enjoys salads and steamed vegetables and now understands snacks of real food in between meals should be vegetable or protein based.  Has significantly decreased soda intake to one fountain drinking daily from intake all day long.  He promises to stop fruit juice today.  Provided sample meal and snack options.  Patient is very motivated "to get this right."      Patient has joined [employer] and has started walking daily on treadmill.  Stated importance of getting all new exercise routines approved with cardiology. Patient has pacemaker.       Patient has 3/25/24 sugar clinic follow up.  Patient has 5/3/24 follow up with REYNOLD Merritt NP.    Today's care plan " and follow up schedule was discussed with patient.  Magdaleno verbalized understanding of the care plan, goals, and agrees to follow up plan.        The patient was encouraged to communicate with his/her health care provider/physician and care team regarding his/her condition(s) and treatment.  I provided the patient with my contact information today and encouraged to contact me via phone or [facility] as needed.     Length of Visit   Total Time: 60 Minutes

## 2024-02-23 LAB
OHS CV AF BURDEN PERCENT: 7.2
OHS CV AF BURDEN PERCENT: < 1
OHS CV DC REMOTE DEVICE TYPE: NORMAL
OHS CV DC REMOTE DEVICE TYPE: NORMAL
OHS CV RV PACING PERCENT: 1 %
OHS CV RV PACING PERCENT: 1 %

## 2024-03-04 ENCOUNTER — PATIENT MESSAGE (OUTPATIENT)
Dept: DIABETES | Facility: CLINIC | Age: 59
End: 2024-03-04

## 2024-03-04 ENCOUNTER — NUTRITION (OUTPATIENT)
Dept: DIABETES | Facility: CLINIC | Age: 59
End: 2024-03-04
Payer: MEDICAID

## 2024-03-04 ENCOUNTER — TELEPHONE (OUTPATIENT)
Dept: DIABETES | Facility: CLINIC | Age: 59
End: 2024-03-04

## 2024-03-04 VITALS — BODY MASS INDEX: 37.53 KG/M2 | WEIGHT: 268.06 LBS | HEIGHT: 71 IN

## 2024-03-04 DIAGNOSIS — E11.610 TYPE 2 DIABETES MELLITUS WITH DIABETIC NEUROPATHIC ARTHROPATHY, WITH LONG-TERM CURRENT USE OF INSULIN: Primary | ICD-10-CM

## 2024-03-04 DIAGNOSIS — Z79.4 TYPE 2 DIABETES MELLITUS WITH DIABETIC NEUROPATHIC ARTHROPATHY, WITH LONG-TERM CURRENT USE OF INSULIN: Primary | ICD-10-CM

## 2024-03-04 PROCEDURE — 99212 OFFICE O/P EST SF 10 MIN: CPT | Mod: PBBFAC,PO | Performed by: DIETITIAN, REGISTERED

## 2024-03-04 PROCEDURE — G0108 DIAB MANAGE TRN  PER INDIV: HCPCS | Mod: PBBFAC,PO | Performed by: DIETITIAN, REGISTERED

## 2024-03-04 PROCEDURE — 99999 PR PBB SHADOW E&M-EST. PATIENT-LVL II: CPT | Mod: PBBFAC,,, | Performed by: DIETITIAN, REGISTERED

## 2024-03-04 PROCEDURE — 99999PBSHW PR PBB SHADOW TECHNICAL ONLY FILED TO HB: Mod: PBBFAC,,,

## 2024-03-04 NOTE — PROGRESS NOTES
"Diabetes Care Specialist Progress Note  Author: Elma Khoury RD  Date: 3/4/2024    Program Intake  Reason for Diabetes Program Visit:: Intervention  Type of Intervention:: Individual  Individual: Education  Education: Pattern Management, Nutrition and Meal Planning  Current diabetes risk level:: high  In the last 12 months, have you:: been admitted to a hospital  Was the ER or hospital admission related to diabetes?: No  Permission to speak with others about care:: no  Continuous Glucose Monitoring  Patient has CGM: Yes  Personal CGM type:: Dexcom G7    Lab Results   Component Value Date    HGBA1C 8.3 (H) 12/14/2023    HGBA1C 8.3 (H) 12/14/2023       Clinical    Weight: 121.6 kg (268 lb 1.3 oz)   Height: 5' 11" (180.3 cm)   Body mass index is 37.39 kg/m².    Clinical Assessment  Current Diabetes Treatment: Oral Medication, Diet, Exercise, Insulin (Jardiance, Lantus, Metformin, Humalog. Stopped Metformin due to overnight lows.)  Have you ever experienced hypoglycemia (low blood sugar)?: yes  In the last month, how often have you experienced low blood sugar?: more than once a week  Are you able to tell when your blood sugar is low?: Yes  What symptoms do you experience?: Shaky  Have you ever been hospitalized because your blood sugar was too low?: no  Have you ever experienced hyperglycemia (high blood sugar)?: yes  In the last month, how often have you experienced high blood sugar?: other (see comments) (none within the past two weeks.)  Are you able to tell when your blood sugar is high?: Yes  What are your symptoms?: blurry vision, dizziness  Have you ever been hospitalized because your blood sugar was high?: no    Medication Information  How many days a week do you miss your medications?: 3 or more (Skipping Metformin nightly dose to stop hypoglycemia.)  Do you sometimes have difficulty refilling your medications?: No  Medication adherence impacting ability to self-manage diabetes?: No    Nutritional " Status  Diet: Regular  Meal Plan 24 Hour Recall: Breakfast  Meal Plan 24 Hour Recall - Breakfast: biscuit + gravy, raisin bran with 1/2 banana, packet of flavored oatmeal. All are paired with peanut butter  Meal Plan 24 Hour Recall - Lunch: salad with 1/2 banana, Healthy Choice Meal  Meal Plan 24 Hour Recall - Dinner: red beans and rice with deer sausage  Meal Plan 24 Hour Recall - Snack: cheeese and crackers  Change in appetite?: No  Dentation:: Intact  Recent Changes in Weight: No Recent Weight Change  Current nutritional status an area of need that is impacting patient's ability to self-manage diabetes?: No    Additional Social History  Cognitive/Behavioral Health  Alert and Oriented: Yes  Difficulty Thinking: No  Requires Prompting: No  Requires assistance for routine expression?: No  Cognitive or behavioral barriers impacting ability to self-manage diabetes?: No    Culture/Hinduism  Culture or Baptist beliefs that may impact ability to access healthcare: No    Communication  Language preference: English  Hearing Problems: No  Vision Problems: Yes  Vision problem type:: Decreased Vision  Vision Assistance: Glasses      Diabetes Self-Management Skills Assessment  Nutrition/Healthy Eating  Challenges to healthy eating:: snacking between meals and at night, other (see comments)  Method of carbohydrate measurement:: eyeballing/guessing  Patient can identify foods that impact blood sugar.: yes  Patient-identified foods:: starches (bread, pasta, rice, cereal), sweets, soda, fruit/fruit juice  Nutrition/Healthy Eating Skills Assessment Completed:: Yes  Assessment indicates:: Instruction Needed, Knowledge deficit  Area of need?: Yes    Medications  Patient is able to describe current diabetes management routine.: yes  Diabetes management routine:: insulin, oral medications (Jardiance, Lantus, Metformin, Humalog)  Patient is able to identify current diabetes medications, dosages, and appropriate timing of medications.:  yes  Patient understands the purpose of the medications taken for diabetes.: yes  Patient reports problems or concerns with current medication regimen.: yes  Medication regimen problems/concerns:: concerned about side effects (hypoglycemia)  Medication Skills Assessment Completed:: Yes  Assessment indicates:: Adequate understanding, Instruction Needed  Area of need?: No    Home Blood Glucose Monitoring  Patient states that blood sugar is checked at home daily.: yes  Monitoring Method:: personal continuous glucose monitor  Personal CGM type:: Dexcom G7  Patient is able to use personal CGM appropriately.: yes  CGM Report reviewed?: yes  Home Blood Glucose Monitoring Skills Assessment Completed: : Yes  Assessment indicates:: Adequate understanding  Area of need?: No    Expected post prandial glucose excursions after meals.  Overnight hypoglycemia.    Acute Complications  Patient is able to identify types of acute complications: Yes  Patient Identified:: Hypoglycemia  Able to state the blood sugar range for hypoglycemia?: yes  Patient can identify general symptoms of hypoglycemia: yes    Assessment Summary and Plan    Based on today's diabetes care assessment, the following areas of need were identified:          3/4/2024    12:01 AM   Social   Cognitive/Behavioral Health No   Culture/[employer]aritan No            3/4/2024    12:01 AM   Clinical   Medication Adherence No   Nutritional Status No            3/4/2024    12:01 AM   Diabetes Self-Management Skills   Nutrition/Healthy Eating Yes, see care plan   Medication No   Home Blood Glucose Monitoring No          Today's interventions were provided through individual discussion, instruction, and written materials were provided.      Patient verbalized understanding of instruction and written materials.  Pt was able to return back demonstration of instructions today. Patient understood key points, needs reinforcement and further instruction.     Diabetes Self-Management Care  Plan:    Today's Diabetes Self-Management Care Plan was developed with Magdaleno's input. Magdaleno has agreed to work toward the following goal(s) to improve his/her overall diabetes control.      Care Plan: Diabetes Management   Updates made since 2/3/2024 12:00 AM        Problem: Healthy Eating         Goal: Eat 3 meals daily with 30-60g/2-3 servings of Carbohydrate per meal. Have protein or vegetable based snacks in between meals to help reduce portion sizes.    Start Date: 2/19/2024   Expected End Date: 6/4/2024   Priority: High   Barriers: No Barriers Identified   Note:    3/4/24:  Reviewed which foods effect BG.  He is still snacking on cheese and crackers in between meals.  Stopped fruit juice. Still significant improvements to diet.    2/19/24: Patient trying his best to manage BG eating 6 small meals daily, but does not know what effects BG other than sweets and sodas.  Discussed choosing carbohydrates at meals since having Humalog at meals to reduce post prandial spike.  He enjoys salads and steam vegetables as some of his favorite foods.  Recommended these, peanuts, nuts, and cheese in between meals.  Stated goal to not have carbohydrates in between meals so BG has a chance to go decrease.       Task: Reviewed the sources and role of Carbohydrate, Protein, and Fat and how each nutrient impacts blood sugar. Completed 2/19/2024        Task: Provided visual examples using dry measuring cups, food models, and other familiar objects such as computer mouse, deck or cards, tennis ball etc. to help with visualization of portions. Completed 2/19/2024        Task: Explained how to count carbohydrates using the food label and the use of dry measuring cups for accurate carb counting.         Task: Discussed strategies for choosing healthier menu options when dining out.         Task: Recommended replacing beverages containing high sugar content with noncaloric/sugar free options and/or water. Completed 2/19/2024         Task: Review the importance of balancing carbohydrates with each meal using portion control techniques to count servings of carbohydrate and label reading to identify serving size and amount of total carbs per serving.         Task: Provided Sample plate method and reviewed the use of the plate to estimate amounts of carbohydrate per meal. Completed 2/19/2024        Problem: Blood Glucose Self-Monitoring         Goal: Patient agrees to use Dexcom G7 to monitor interstitial fluid glucose levels.    Start Date: 2/19/2024   Expected End Date: 6/4/2024   Priority: Medium   Barriers: No Barriers Identified   Note:    3/4/24: Patient was able to successfully change sensors, but the sensor failed.  Provided contact information to Dex[employer] for customer service for sensor replacement. Patient is aware that he has to keep sensor inserters to answer questions for the company.  We reviewed he needs refills he will contact pharmacy. Contact Dex[employer] for sensor failure.     PLACEMENT OF DEXCOM G7 PERSONAL CONTINOUS GLUCOSE MONITORING SYSTEM (CGMS)     REFERRING PROVIDER: Funmilayo Merritt NP    Explained to patient the difference between sensor glucose and blood (capillary) glucose and how these 2 readings may not be the same.     Patient is here in clinic today for placement of personal continuous glucose monitoring system (CGMS).   Patient has Dexcom G7 , Sensors, & Transmitter. Patient will use Android eran to obtain continuous glucose readings.  Patient verbalizes understanding to change sensor every 10 days. He is also aware that each time a new sensor is placed there is a 30 minute warm up period. No calibration is necessary however patient can calibrate if he desires.  How ever, if patient calibrates Dexcom sensor, it is recommended to only calibrate once during the 10-day sensor wear as sensor is factory calibrated.  Patient understands to avoid calibration when glucose levels changing rapidly.      Discussed  "Dexcom Covacsis supplies with patient--company/pharmacy to reorder items and who to call (Dex[employer] technical support) if a sensor/transmitter fails.  If using Dexcom G7 mobile phone eran, patient educated to leave eran running in the background (do not swipe off completely) to prevent gaps in CGMS tracings.       A detailed explanation of Dexcom G7 Continuous Glucose Monitoring System was provided. Reviewed the Dexcom "Getting Started Guide" with patient and he has a written copy for home use.    Patient was allowed to practice, and time allowed to ask questions. Patient will notify Endocrinology if glucose levels are above 250 mg/dL consistently or if episode of glucose less than 70 mg/dL presents.  Patient verbalizes understanding.         High alert set at 350 mg/dL, snooze off  Low alert set at 80 mg/dL, snooze 15 minutes  High rising alert: off  Low dropping alert: off  Signal Loss: On  Brief Sensor Issue: ON    Instructed patient on how to enter insulin doses using Dexcom G7 to better understand timing and amount of insulin being administered and missed doses of insulin.       An appropriate site was selected and prepared. Patient inserted sensor into back of right upper arm. Sensor will be changed by patient every 10 days.   Patient has set up personal Dexcom account, and linked data sharing to [facility] if using Dexcom mobile eran.  All questions answered.       Dexcom  Username: [phone]  Password: [email or web address]  Sharing code (good for 12 months): [id number]       Task: Provided patient with a meter today and sent Rx request to provider to send to patients pharmacy.         Task: Reviewed the importance of self-monitoring blood glucose and keeping logs.         Task: Instructed on how to self-monitor blood glucose using a home glucometer, how to properly dispose of used strips and lancets after use, and how to appropriately store meter and supplies.         Task: Provided patient with blood " glucose logs, reviewed appropriate timing and frequency to SMBG, education on parameters on when to notify provider and advised patient to bring logs to all appts with PCP/Endocrinologist/Diabetes Care Specialist.         Task: Taught patient difference between using BG and fluid glucose readings to monitor and when BG is always used. Completed 2/19/2024        Problem: Acute Complications         Goal: Patient agrees to identify and manage signs and symptoms of low blood sugar (hypoglycemia) by keeping a log of events and using proper treatment.    Start Date: 3/4/2024   Expected End Date: 6/4/2024   Priority: High   Barriers: No Barriers Identified   Note:    Reviewed rule of 15/15 with patient to treat hypoglycemia.       Task: Reviewed proper treatment of hypoglycemia with the rule of 15--patient to eat 15g simple carbohydrate (4 glucose tablets, 1 glucose gel, 5 pieces hard candy, ½ cup fruit juice, ½ can regular soda, etc) and wait 15 minutes and recheck home glucose. Completed 3/4/2024        Task: Reviewed common causes and precautions to help prevent hypoglycemic events. Completed 3/4/2024        Task: Reviewed signs and symptoms of hypoglycemia, what range is considered to be hyper/hypoglycemia, and when to seek further medical attention. Completed 3/4/2024        Task: Discussed, sick day planning, natural disaster planning, and/or travel planning to prevent hyper/hypoglycemia.         Task: Discussed risk factors for developing diabetic ketoacidosis (DKA), strategies for reducing risk, testing with ketone test strips if BG is >240mg/dl, basic protocol for managing DKA, and when to seek further medical attention.           Follow Up Plan     Follow up in about 3 months (around 6/4/2024).  Patient returns today successful in changing sensor, but this one failed.  Reviewed directions on how to get replacement.  He placed new sensor while here without issue.  Reviewed again which foods effect BG and which  do not.        Patient having issues with overnight hypoglycemia.  Taught rule of 15/15 for successful treatment in hypoglycemia.  Patient reports he has stopped the hypoglycemia by stopping the Metformin at night.  Will send Dexcom Clarity report to provider for adjustment.    Today's care plan and follow up schedule was discussed with patient.  Magdaleno verbalized understanding of the care plan, goals, and agrees to follow up plan.        The patient was encouraged to communicate with his/her health care provider/physician and care team regarding his/her condition(s) and treatment.  I provided the patient with my contact information today and encouraged to contact me via phone or [facility] as needed.     Length of Visit   Total Time: 30 Minutes

## 2024-03-04 NOTE — TELEPHONE ENCOUNTER
Called patient and reviewed medication changes per REYNOLD Merritt NP    Metformin 1000mg twice daily   Jardiance 25mg daily   Lantus 22 units daily   Humalog 8 units plus correction scale before meals   Add correction scale as needed.   Blood sugar 150 to 200 add 1 units   Blood sugar 201 to 250 add 2 units   Blood sugar 251 to 300 add 3 units   Blood sugar 301 to 350 add 4 units   Blood sugar greater than 350 add 5 units       Patient verbalized he understands the changes.  Will send a [facility] message as well so patient can see it written.

## 2024-03-04 NOTE — TELEPHONE ENCOUNTER
Hello, this patient is having overnight hypoglycemia.  He has stopped his evening dose of Metformin in an effort to stop the hypoglycemia.  His current medication management regimen is as follows    Lantus 26 units  Metformin 1000 mg BID  Humalog 10 units TID AC with correction above 150 with ISF 25.  Jardiance 25 mg        He takes the medication correctly. I have attached a Clarity report to this phone message for you to review.  Would you like to make any medication changes until his 3/25/24 appointment for sugar clinic?      Elma, diabetes education

## 2024-03-19 NOTE — PROGRESS NOTES
Subjective:      Patient ID: Magdaleno Cunningham is a 58 y.o. male.    Chief Complaint:  No chief complaint on file.    History of Present Illness  Magdaleno Cunningham is here for follow up of DM.  Previously seen by me 2/2024.    This is a MyChart video visit.    The patient location is: [place]  The chief complaint leading to consultation is: DM  Visit type: Virtual visit with synchronous audio and video  Total time spent with patient: see below  Each patient to whom he or she provides medical services by telemedicine is:  (1) informed of the relationship between the physician and patient and the respective role of any other health care provider with respect to management of the patient; and (2) notified that he or she may decline to receive medical services by telemedicine and may withdraw from such care at any time.    Reason for admit: Cardiogenic shock  Home Diabetes Medications:  Jardiance 25 mg, Lantus 80 u BID, Metformin 1000 mg BID    With regards to diabetes:    Diagnosed: ~2020  Previously following with PCP.  DE: 2022  Known complications:  DKA Denies  RN Denies  Eye Exam: > 1 year   PN Yes   Gabapentin   Tried Qutenza - did not tolerate due to the pain  Podiatry: None   Nephropathy Denies  CAD +  Reports  hx of pancreatitis with GLP1RA    Denies personal/family history of medullary thyroid cancer.     Recent illness, injury, steroids: Denies    Diet/Exercise: he does report to be eating small, frequent meals   Eats 3 meals a day.   Snacks : yes - see above.   Drinks : coffee (splenda, creamer), apple juice, water, gatorade zero, reduced to 1 coke a day (was drinking coke all day).   Exercise - tries to stay active    Current Regimen:  Metformin 1000mg twice daily   Jardiance 25mg daily   Lantus 24 units daily   Humalog 10 units plus correction scale before meals   Add correction scale as needed.   Blood sugar 150 to 200 add 1 units   Blood sugar 201 to 250 add 2 units   Blood sugar 251 to 300 add 3 units    Blood sugar 301 to 350 add 4 units   Blood sugar greater than 350 add 5 units     Reports compliance.     Other medications tried:  Trulicity - pancreatitis     Glucose Monitor: Dexcom G7  4 times a day testing  Log reviewed: sensor downloaded and reviewed    Hypoglycemia:  Denies  Knows how to correct with 15 grams of carbs- juice, coke, or a peppermint.       Diabetes Management Status    Hemoglobin A1C   Date Value Ref Range Status   12/14/2023 8.3 (H) 4.5 - 6.2 % Final     Comment:     According to ADA guidelines, hemoglobin A1C <7.0% represents  optimal control in non-pregnant diabetic patients.  Different  metrics may apply to specific populations.   Standards of Medical Care in Diabetes - 2016.    For the purpose of screening for the presence of diabetes:  <5.7%     Consistent with the absence of diabetes  5.7-6.4%  Consistent with increasing risk for diabetes   (prediabetes)  >or=6.5%  Consistent with diabetes    Currently no consensus exists for use of hemoglobin A1C  for diagnosis of diabetes for children.     12/14/2023 8.3 (H) 4.5 - 6.2 % Final     Comment:     According to ADA guidelines, hemoglobin A1C <7.0% represents  optimal control in non-pregnant diabetic patients.  Different  metrics may apply to specific populations.   Standards of Medical Care in Diabetes - 2016.    For the purpose of screening for the presence of diabetes:  <5.7%     Consistent with the absence of diabetes  5.7-6.4%  Consistent with increasing risk for diabetes   (prediabetes)  >or=6.5%  Consistent with diabetes    Currently no consensus exists for use of hemoglobin A1C  for diagnosis of diabetes for children.     08/31/2022 8.8 (H) 4.5 - 6.2 % Final     Comment:     According to ADA guidelines, hemoglobin A1C <7.0% represents  optimal control in non-pregnant diabetic patients.  Different  metrics may apply to specific populations.   Standards of Medical Care in Diabetes - 2016.    For the purpose of screening for the  "presence of diabetes:  <5.7%     Consistent with the absence of diabetes  5.7-6.4%  Consistent with increasing risk for diabetes   (prediabetes)  >or=6.5%  Consistent with diabetes    Currently no consensus exists for use of hemoglobin A1C  for diagnosis of diabetes for children.         Review of Systems  As above      There were no vitals taken for this visit.    There is no height or weight on file to calculate BMI.    Lab Review:   Lab Results   Component Value Date    HGBA1C 8.3 (H) 12/14/2023    HGBA1C 8.3 (H) 12/14/2023    HGBA1C 8.8 (H) 08/31/2022       Lab Results   Component Value Date    CHOL 147 12/14/2023    HDL 32 (L) 12/14/2023    LDLCALC 78.6 12/14/2023    TRIG 182 (H) 12/14/2023    CHOLHDL 21.8 12/14/2023     Lab Results   Component Value Date     (L) 02/01/2024    K 3.7 02/01/2024    CL 97 02/01/2024    CO2 20 (L) 02/01/2024     (H) 02/01/2024    BUN 17 02/01/2024    CREATININE 1.2 02/01/2024    CALCIUM 9.3 02/01/2024    PROT 7.6 02/01/2024    ALBUMIN 4.2 02/01/2024    BILITOT 0.6 02/01/2024    ALKPHOS 70 02/01/2024    AST 21 02/01/2024    ALT 25 02/01/2024    ANIONGAP 17 (H) 02/01/2024    ESTGFRAFRICA >60 01/11/2022    EGFRNONAA >60 01/11/2022    TSH 1.921 12/14/2023     No results found for: "[id number]"  Assessment and Plan     1. Type 2 diabetes mellitus with diabetic neuropathic arthropathy, with long-term current use of insulin  pen needle, diabetic 29 gauge x 1/2" Ndle    LANTUS SOLOSTAR U-100 INSULIN glargine 100 units/mL SubQ pen          Type 2 diabetes mellitus with diabetic arthropathy  -- Sugar clinic in 3 weeks. VV in 6 weeks with labs prior.  -- A1c goal <7%.  -- Medications discussed:  MFM   GLP1-DPP4 - GLP1 induced pancreatitis   [place]   SGLT2   Insulin   -- Reviewed logs/CGM:  Recent hyperglycemia - unclear etiology.  Hypoglycemia resolved.  Reach out to me sooner for any glucose <70 or consistently >180.  -- Medication Changes:   Metformin 1000mg twice daily "   Jardiance 25mg daily   Lantus 28 units daily   Humalog 10 units plus correction scale before meals   Add correction scale as needed.   Blood sugar 150 to 200 add 1 units   Blood sugar 201 to 250 add 2 units   Blood sugar 251 to 300 add 3 units   Blood sugar 301 to 350 add 4 units   Blood sugar greater than 350 add 5 units   -- Reviewed goals of therapy are to get the best control we can without hypoglycemia.  -- Reviewed patient's current insulin regimen. Clarified proper insulin dose and timing in relation to meals, etc. Insulin injection sites and proper rotation instructed.    -- Advised frequent self blood glucose monitoring.  Patient encouraged to document glucose results and bring them to every clinic visit.  -- Hypoglycemia precautions discussed. Instructed on precautions before driving.    -- Call for Bg repeatedly < 90 or > 180.   -- Close adherence to lifestyle changes recommended.   -- Periodic follow ups for eye evaluations, foot care and dental care suggested.        Follow up in about 3 weeks (around 4/15/2024).      I spent 20 minutes face-to-face with the patient, over half of the visit was spent on counseling and/or coordinating the care of the patient.    Counseling includes:  Diagnostic results, impressions, recommendations   Prognosis   Risk and benefits of management/treatment options   Instructions for management treatment and or follow-up   Importance of compliance with management   Risk factor reduction   Patient education

## 2024-03-25 ENCOUNTER — OFFICE VISIT (OUTPATIENT)
Dept: ENDOCRINOLOGY | Facility: CLINIC | Age: 59
End: 2024-03-25
Payer: MEDICAID

## 2024-03-25 DIAGNOSIS — E11.610 TYPE 2 DIABETES MELLITUS WITH DIABETIC NEUROPATHIC ARTHROPATHY, WITH LONG-TERM CURRENT USE OF INSULIN: Primary | ICD-10-CM

## 2024-03-25 DIAGNOSIS — Z79.4 TYPE 2 DIABETES MELLITUS WITH DIABETIC NEUROPATHIC ARTHROPATHY, WITH LONG-TERM CURRENT USE OF INSULIN: Primary | ICD-10-CM

## 2024-03-25 PROCEDURE — 1160F RVW MEDS BY RX/DR IN RCRD: CPT | Mod: CPTII,95,, | Performed by: INTERNAL MEDICINE

## 2024-03-25 PROCEDURE — 95251 CONT GLUC MNTR ANALYSIS I&R: CPT | Mod: S$PBB,NDTC,, | Performed by: NURSE PRACTITIONER

## 2024-03-25 PROCEDURE — 1159F MED LIST DOCD IN RCRD: CPT | Mod: CPTII,95,, | Performed by: INTERNAL MEDICINE

## 2024-03-25 PROCEDURE — 4010F ACE/ARB THERAPY RXD/TAKEN: CPT | Mod: CPTII,95,, | Performed by: INTERNAL MEDICINE

## 2024-03-25 PROCEDURE — 99214 OFFICE O/P EST MOD 30 MIN: CPT | Mod: 95,,, | Performed by: INTERNAL MEDICINE

## 2024-03-25 RX ORDER — PEN NEEDLE, DIABETIC 29 G X1/2"
NEEDLE, DISPOSABLE MISCELLANEOUS
Qty: 360 EACH | Refills: 3 | Status: SHIPPED | OUTPATIENT
Start: 2024-03-25

## 2024-03-25 RX ORDER — INSULIN GLARGINE 100 [IU]/ML
28 INJECTION, SOLUTION SUBCUTANEOUS DAILY
Qty: 30 ML | Refills: 3 | Status: SHIPPED | OUTPATIENT
Start: 2024-03-25 | End: 2025-03-25

## 2024-03-25 NOTE — ASSESSMENT & PLAN NOTE
-- Sugar clinic in 3 weeks. VV in 6 weeks with labs prior.  -- A1c goal <7%.  -- Medications discussed:  MFM   GLP1-DPP4 - GLP1 induced pancreatitis   JOHN[place]   SGLT2   Insulin   -- Reviewed logs/CGM:  Recent hyperglycemia - unclear etiology.  Hypoglycemia resolved.  Reach out to me sooner for any glucose <70 or consistently >180.  -- Medication Changes:   Metformin 1000mg twice daily   Jardiance 25mg daily   Lantus 28 units daily   Humalog 10 units plus correction scale before meals   Add correction scale as needed.   Blood sugar 150 to 200 add 1 units   Blood sugar 201 to 250 add 2 units   Blood sugar 251 to 300 add 3 units   Blood sugar 301 to 350 add 4 units   Blood sugar greater than 350 add 5 units   -- Reviewed goals of therapy are to get the best control we can without hypoglycemia.  -- Reviewed patient's current insulin regimen. Clarified proper insulin dose and timing in relation to meals, etc. Insulin injection sites and proper rotation instructed.    -- Advised frequent self blood glucose monitoring.  Patient encouraged to document glucose results and bring them to every clinic visit.  -- Hypoglycemia precautions discussed. Instructed on precautions before driving.    -- Call for Bg repeatedly < 90 or > 180.   -- Close adherence to lifestyle changes recommended.   -- Periodic follow ups for eye evaluations, foot care and dental care suggested.

## 2024-04-01 PROBLEM — J18.9 PNEUMONIA: Status: RESOLVED | Noted: 2023-12-27 | Resolved: 2024-04-01

## 2024-04-01 PROBLEM — I21.3 STEMI (ST ELEVATION MYOCARDIAL INFARCTION): Status: RESOLVED | Noted: 2023-12-26 | Resolved: 2024-04-01

## 2024-04-08 ENCOUNTER — OFFICE VISIT (OUTPATIENT)
Dept: ENDOCRINOLOGY | Facility: CLINIC | Age: 59
End: 2024-04-08
Payer: MEDICAID

## 2024-04-08 DIAGNOSIS — Z79.4 TYPE 2 DIABETES MELLITUS WITH DIABETIC NEUROPATHIC ARTHROPATHY, WITH LONG-TERM CURRENT USE OF INSULIN: Primary | ICD-10-CM

## 2024-04-08 DIAGNOSIS — E11.610 TYPE 2 DIABETES MELLITUS WITH DIABETIC NEUROPATHIC ARTHROPATHY, WITH LONG-TERM CURRENT USE OF INSULIN: Primary | ICD-10-CM

## 2024-04-08 PROCEDURE — 4010F ACE/ARB THERAPY RXD/TAKEN: CPT | Mod: CPTII,95,, | Performed by: INTERNAL MEDICINE

## 2024-04-08 PROCEDURE — 99214 OFFICE O/P EST MOD 30 MIN: CPT | Mod: 95,,, | Performed by: INTERNAL MEDICINE

## 2024-04-08 PROCEDURE — 1159F MED LIST DOCD IN RCRD: CPT | Mod: CPTII,95,, | Performed by: INTERNAL MEDICINE

## 2024-04-08 PROCEDURE — 95251 CONT GLUC MNTR ANALYSIS I&R: CPT | Mod: S$PBB,NDTC,, | Performed by: NURSE PRACTITIONER

## 2024-04-08 PROCEDURE — 1160F RVW MEDS BY RX/DR IN RCRD: CPT | Mod: CPTII,95,, | Performed by: INTERNAL MEDICINE

## 2024-04-08 NOTE — ASSESSMENT & PLAN NOTE
-- Sugar clinic in 2 weeks. VV in 4 weeks with labs prior.  -- A1c goal <7%.  -- Medications discussed:  MFM   GLP1-DPP4 - GLP1 induced pancreatitis   [place]   SGLT2   Insulin   -- Reviewed logs/CGM:  Hypoglycemia variable - encouraged to confirm with SMBG check.  Reach out to me sooner for any glucose <70 or consistently >180.  -- Log insulin administration on Dexcom.  -- Medication Changes:   Metformin 1000mg twice daily   Jardiance 25mg daily   Lantus 20 units daily   Humalog 8 units plus correction scale before meals   Add correction scale as needed.   Blood sugar 150 to 200 add 1 units   Blood sugar 201 to 250 add 2 units   Blood sugar 251 to 300 add 3 units   Blood sugar 301 to 350 add 4 units   Blood sugar greater than 350 add 5 units   -- Reviewed goals of therapy are to get the best control we can without hypoglycemia.  -- Reviewed patient's current insulin regimen. Clarified proper insulin dose and timing in relation to meals, etc. Insulin injection sites and proper rotation instructed.    -- Advised frequent self blood glucose monitoring.  Patient encouraged to document glucose results and bring them to every clinic visit.  -- Hypoglycemia precautions discussed. Instructed on precautions before driving.    -- Call for Bg repeatedly < 90 or > 180.   -- Close adherence to lifestyle changes recommended.   -- Periodic follow ups for eye evaluations, foot care and dental care suggested.

## 2024-04-15 ENCOUNTER — HOSPITAL ENCOUNTER (OUTPATIENT)
Dept: CARDIOLOGY | Facility: HOSPITAL | Age: 59
Discharge: HOME | End: 2024-04-15
Attending: INTERNAL MEDICINE
Payer: MEDICAID

## 2024-04-15 VITALS — WEIGHT: 268.06 LBS | BODY MASS INDEX: 37.53 KG/M2 | HEIGHT: 71 IN

## 2024-04-15 DIAGNOSIS — Z95.1 S/P CABG (CORONARY ARTERY BYPASS GRAFT): ICD-10-CM

## 2024-04-15 PROCEDURE — 93306 TTE W/DOPPLER COMPLETE: CPT

## 2024-04-15 PROCEDURE — 93306 TTE W/DOPPLER COMPLETE: CPT | Mod: 26,,, | Performed by: INTERNAL MEDICINE

## 2024-04-17 LAB
AORTIC ROOT ANNULUS: 4 CM
AORTIC VALVE CUSP SEPERATION: 2.5 CM
AV INDEX (PROSTH): 0.74
AV MEAN GRADIENT: 3 MMHG
AV PEAK GRADIENT: 6 MMHG
AV VALVE AREA BY VELOCITY RATIO: 2.99 CM²
AV VALVE AREA: 2.82 CM²
AV VELOCITY RATIO: 0.79
BSA FOR ECHO PROCEDURE: 2.47 M2
CV ECHO LV RWT: 0.51 CM
DOP CALC AO PEAK VEL: 1.22 M/S
DOP CALC AO VTI: 27.1 CM
DOP CALC LVOT AREA: 3.8 CM2
DOP CALC LVOT DIAMETER: 2.2 CM
DOP CALC LVOT PEAK VEL: 0.96 M/S
DOP CALC LVOT STROKE VOLUME: 76.37 CM3
DOP CALC MV VTI: 29.4 CM
DOP CALCLVOT PEAK VEL VTI: 20.1 CM
E WAVE DECELERATION TIME: 190 MSEC
E/A RATIO: 1.41
E/E' RATIO: 8.74 M/S
ECHO LV POSTERIOR WALL: 1.39 CM (ref 0.6–1.1)
FRACTIONAL SHORTENING: 17 % (ref 28–44)
INTERVENTRICULAR SEPTUM: 1.39 CM (ref 0.6–1.1)
IVC DIAMETER: 1.83 CM
IVRT: 60 MSEC
LEFT ATRIUM SIZE: 4.5 CM
LEFT ATRIUM VOLUME INDEX MOD: 34.7 ML/M2
LEFT ATRIUM VOLUME MOD: 82.9 CM3
LEFT INTERNAL DIMENSION IN SYSTOLE: 4.49 CM (ref 2.1–4)
LEFT VENTRICLE DIASTOLIC VOLUME INDEX: 59 ML/M2
LEFT VENTRICLE DIASTOLIC VOLUME: 141 ML
LEFT VENTRICLE MASS INDEX: 136 G/M2
LEFT VENTRICLE SYSTOLIC VOLUME INDEX: 38.5 ML/M2
LEFT VENTRICLE SYSTOLIC VOLUME: 92 ML
LEFT VENTRICULAR INTERNAL DIMENSION IN DIASTOLE: 5.4 CM (ref 3.5–6)
LEFT VENTRICULAR MASS: 324.98 G
LV LATERAL E/E' RATIO: 6.92 M/S
LV SEPTAL E/E' RATIO: 11.86 M/S
LVOT MG: 2 MMHG
LVOT MV: 0.62 CM/S
MV MEAN GRADIENT: 2 MMHG
MV PEAK A VEL: 0.59 M/S
MV PEAK E VEL: 0.83 M/S
MV PEAK GRADIENT: 4 MMHG
MV VALVE AREA BY CONTINUITY EQUATION: 2.6 CM2
OHS CV RV/LV RATIO: 0.46 CM
OHS LV EJECTION FRACTION SIMPSONS BIPLANE MOD: 40 %
PISA MRMAX VEL: 4.84 M/S
PISA TR MAX VEL: 2.28 M/S
PV MV: 0.54 M/S
PV PEAK GRADIENT: 3 MMHG
PV PEAK VELOCITY: 0.8 M/S
RA PRESSURE ESTIMATED: 3 MMHG
RIGHT VENTRICULAR END-DIASTOLIC DIMENSION: 2.48 CM
RV TB RVSP: 5 MMHG
RV TISSUE DOPPLER FREE WALL SYSTOLIC VELOCITY 1 (APICAL 4 CHAMBER VIEW): 12 CM/S
TDI LATERAL: 0.12 M/S
TDI SEPTAL: 0.07 M/S
TDI: 0.1 M/S
TR MAX PG: 21 MMHG
TRICUSPID ANNULAR PLANE SYSTOLIC EXCURSION: 1.56 CM
TV REST PULMONARY ARTERY PRESSURE: 24 MMHG
Z-SCORE OF LEFT VENTRICULAR DIMENSION IN END DIASTOLE: -6.84
Z-SCORE OF LEFT VENTRICULAR DIMENSION IN END SYSTOLE: -2.81

## 2024-04-23 ENCOUNTER — PATIENT MESSAGE (OUTPATIENT)
Dept: CARDIOLOGY | Facility: CLINIC | Age: 59
End: 2024-04-23
Payer: MEDICAID

## 2024-04-23 ENCOUNTER — OFFICE VISIT (OUTPATIENT)
Dept: CARDIOLOGY | Facility: CLINIC | Age: 59
End: 2024-04-23
Payer: MEDICAID

## 2024-04-23 VITALS
SYSTOLIC BLOOD PRESSURE: 148 MMHG | OXYGEN SATURATION: 98 % | WEIGHT: 278.69 LBS | DIASTOLIC BLOOD PRESSURE: 92 MMHG | HEART RATE: 70 BPM | BODY MASS INDEX: 38.87 KG/M2

## 2024-04-23 DIAGNOSIS — E78.5 HYPERLIPIDEMIA LDL GOAL <70: ICD-10-CM

## 2024-04-23 DIAGNOSIS — I25.119 ATHEROSCLEROSIS OF NATIVE CORONARY ARTERY OF NATIVE HEART WITH ANGINA PECTORIS: Primary | ICD-10-CM

## 2024-04-23 DIAGNOSIS — R57.0 CARDIOGENIC SHOCK: ICD-10-CM

## 2024-04-23 DIAGNOSIS — I48.0 PAROXYSMAL ATRIAL FIBRILLATION: ICD-10-CM

## 2024-04-23 DIAGNOSIS — Z95.1 S/P CABG (CORONARY ARTERY BYPASS GRAFT): ICD-10-CM

## 2024-04-23 DIAGNOSIS — E66.09 CLASS 2 OBESITY DUE TO EXCESS CALORIES WITH BODY MASS INDEX (BMI) OF 38.0 TO 38.9 IN ADULT, UNSPECIFIED WHETHER SERIOUS COMORBIDITY PRESENT: ICD-10-CM

## 2024-04-23 PROCEDURE — 99214 OFFICE O/P EST MOD 30 MIN: CPT | Mod: PBBFAC,PN | Performed by: INTERNAL MEDICINE

## 2024-04-23 PROCEDURE — 99215 OFFICE O/P EST HI 40 MIN: CPT | Mod: S$PBB,,, | Performed by: INTERNAL MEDICINE

## 2024-04-23 PROCEDURE — 1159F MED LIST DOCD IN RCRD: CPT | Mod: CPTII,,, | Performed by: INTERNAL MEDICINE

## 2024-04-23 PROCEDURE — 99999 PR PBB SHADOW E&M-EST. PATIENT-LVL IV: CPT | Mod: PBBFAC,,, | Performed by: INTERNAL MEDICINE

## 2024-04-23 PROCEDURE — 3077F SYST BP >= 140 MM HG: CPT | Mod: CPTII,,, | Performed by: INTERNAL MEDICINE

## 2024-04-23 PROCEDURE — 4010F ACE/ARB THERAPY RXD/TAKEN: CPT | Mod: CPTII,,, | Performed by: INTERNAL MEDICINE

## 2024-04-23 PROCEDURE — 3080F DIAST BP >= 90 MM HG: CPT | Mod: CPTII,,, | Performed by: INTERNAL MEDICINE

## 2024-04-23 PROCEDURE — 3008F BODY MASS INDEX DOCD: CPT | Mod: CPTII,,, | Performed by: INTERNAL MEDICINE

## 2024-04-23 RX ORDER — SILDENAFIL 100 MG/1
100 TABLET, FILM COATED ORAL DAILY PRN
Qty: 5 TABLET | Refills: 3 | Status: SHIPPED | OUTPATIENT
Start: 2024-04-23 | End: 2025-04-23

## 2024-04-23 RX ORDER — CARVEDILOL 6.25 MG/1
6.25 TABLET ORAL 2 TIMES DAILY WITH MEALS
Qty: 180 TABLET | Refills: 3 | Status: SHIPPED | OUTPATIENT
Start: 2024-04-23 | End: 2025-04-23

## 2024-04-23 NOTE — PATIENT INSTRUCTIONS
Send me a list of all your mediations when you get home.  Always bring your medication bottles at every doctor visit.  Stop metoprolol.  Start carvedilol 6.25mg twice daily.

## 2024-04-23 NOTE — PROGRESS NOTES
Cardiology    4/23/2024  10:47 AM    Problem list  Patient Active Problem List   Diagnosis    Chest pain    Atherosclerosis of native coronary artery of native heart with angina pectoris    COPD (chronic obstructive pulmonary disease)    Type 2 diabetes mellitus with diabetic arthropathy    Hypertension associated with diabetes    ROSAMARIA (obstructive sleep apnea)    Diabetic ulcer of left foot associated with type 2 diabetes mellitus    Smoker 1-2 packs per day since 14 years old    Medical non-compliance    Left foot pain    Generalized anxiety disorder    Denton-Perez erythema multiforme exudativum    Allergic reaction to chemical substance    Prostate enlargement    Bladder mass    Coronary artery disease involving native coronary artery of native heart    Atrial fibrillation    S/P CABG (coronary artery bypass graft)    Protruding sternal wires    Ingrowing toenail    Onychomycosis    Polyneuropathy    Cellulitis of gluteal region    Chronic migraine    Obesity (BMI 30-39.9)    Hyperlipidemia LDL goal <70    Hyponatremia    Malnutrition of moderate degree    Acute pain    Open wound of buttock with complication    Abscess, perineum    Streptococcal infection    Hypomagnesemia    Cardiogenic shock    Complete heart block    Paroxysmal atrial fibrillation    Typical atrial flutter       CC:  Follow-up    HPI:  He is doing well from a cardiac standpoint.  He denies any angina, dyspnea exertion, palpitation or syncope.  He complains of erectile dysfunction.  He also complains of pulling sensation in his left chest when he tries to sleep on his stomach with his left arm raised.  He has not sure about his medications.  The Entresto was not on his list today as is the Jardiance.  He states that he is taking Jardiance but is unsure about the Entresto.    Medications  Current Outpatient Medications   Medication Sig Dispense Refill    atorvastatin (LIPITOR) 40 MG tablet Take 1 tablet (40 mg total) by mouth once  "daily. 90 tablet 3    blood sugar diagnostic Strp TEST 3 TIMES DAILY 100 each 0    blood-glucose meter Misc TEST 3 TIMES DAILY 1 each 1    blood-glucose meter,continuous (DEXCOM G7 ) Misc Use as directed. 1 each 0    blood-glucose sensor (DEXCOM G7 SENSOR) [place] Change every 10 days. 3 each 11    furosemide (LASIX) 40 MG tablet Take 1 tablet (40 mg total) by mouth once daily. 30 tablet 11    gabapentin (NEURONTIN) 600 MG tablet Take 600 mg by mouth As instructed (neuropathic pain). Takes 1 tablet in the morning and 2 tablets at bedtime      hydrALAZINE (APRESOLINE) 10 MG tablet Take 1 tablet (10 mg total) by mouth every 8 (eight) hours. 90 tablet 11    insulin lispro (HUMALOG KWIKPEN INSULIN) 100 unit/mL pen Inject 10 Units into the skin 3 (three) times daily. 9 mL 3    isosorbide mononitrate (IMDUR) 30 MG 24 hr tablet Take 1 tablet (30 mg total) by mouth once daily. 30 tablet 11    LANTUS SOLOSTAR U-100 INSULIN glargine 100 units/mL SubQ pen Inject 28 Units into the skin once daily. 30 mL 3    metFORMIN (GLUCOPHAGE) 1000 MG tablet Take 1 tablet (1,000 mg total) by mouth 2 (two) times daily with meals.      methocarbamoL (ROBAXIN) 500 MG Tab Take 500 mg by mouth every evening.      nitroGLYCERIN (NITROSTAT) 0.4 MG SL tablet Place 0.4 mg under the tongue every 5 (five) minutes as needed for Chest pain.      oxyCODONE-acetaminophen (PERCOCET)  mg per tablet Take 1 tablet by mouth 2 (two) times daily as needed for Pain.      pantoprazole (PROTONIX) 40 MG tablet Take 1 tablet (40 mg total) by mouth once daily. 30 tablet 11    pen needle, diabetic 29 gauge x 1/2" Ndle Use with insulin 4 times a day. 360 each 3    ranolazine (RANEXA) 1,000 mg Tb12 Take 1 tablet (1,000 mg total) by mouth 2 (two) times daily. 60 tablet 11    spironolactone (ALDACTONE) 25 MG tablet Take 1 tablet (25 mg total) by mouth once daily. 30 tablet 11    aspirin (ECOTRIN) 81 MG EC tablet Take 1 tablet (81 mg total) by mouth once daily. " for 21 days 21 tablet 0    carvediloL (COREG) 6.25 MG tablet Take 1 tablet (6.25 mg total) by mouth 2 (two) times daily with meals. 180 tablet 3    lactobacillus acidophilus & bulgar (LACTINEX) 100 million cell packet Take 1 packet (1 each total) by mouth 2 (two) times daily. (Patient not taking: Reported on 4/23/2024) 30 packet 0    lancets 33 gauge Misc TEST 3 TIMES DAILY 100 each 0    sildenafiL (VIAGRA) 100 MG tablet Take 1 tablet (100 mg total) by mouth daily as needed for Erectile Dysfunction. 5 tablet 3    ticagrelor (BRILINTA) 90 mg tablet Take 1 tablet (90 mg total) by mouth 2 (two) times daily. 60 tablet 2     No current facility-administered medications for this visit.      Prior to Admission medications    Medication Sig Start Date End Date Taking? Authorizing Provider   atorvastatin (LIPITOR) 40 MG tablet Take 1 tablet (40 mg total) by mouth once daily. 1/5/24 1/4/25 Yes Viviane Hernandez MD   blood sugar diagnostic Strp TEST 3 TIMES DAILY 1/4/24  Yes Tiff Frias MD   blood-glucose meter Misc TEST 3 TIMES DAILY 1/4/24  Yes Tiff Frias MD   blood-glucose meter,continuous (DEXCOM G7 ) Misc Use as directed. 2/5/24  Yes Funmilayo Merritt NP   blood-glucose sensor (DEXCOM G7 SENSOR) [place] Change every 10 days. 2/5/24  Yes Funmilayo Merritt NP   furosemide (LASIX) 40 MG tablet Take 1 tablet (40 mg total) by mouth once daily. 1/5/24 1/4/25 Yes Viviane Hernandez MD   gabapentin (NEURONTIN) 600 MG tablet Take 600 mg by mouth As instructed (neuropathic pain). Takes 1 tablet in the morning and 2 tablets at bedtime   Yes Provider, Historical   hydrALAZINE (APRESOLINE) 10 MG tablet Take 1 tablet (10 mg total) by mouth every 8 (eight) hours. 1/4/24 1/3/25 Yes Viviane Hernandez MD   insulin lispro (HUMALOG KWIKPEN INSULIN) 100 unit/mL pen Inject 10 Units into the skin 3 (three) times daily. 1/4/24 1/3/25 Yes Viviane Hernandez MD   isosorbide  "mononitrate (IMDUR) 30 MG 24 hr tablet Take 1 tablet (30 mg total) by mouth once daily. 1/4/24 1/3/25 Yes Viviane Hernandez MD   LANTUS SOLOSTAR U-100 INSULIN glargine 100 units/mL SubQ pen Inject 28 Units into the skin once daily. 3/25/24 3/25/25 Yes Funmilayo Merritt NP   metFORMIN (GLUCOPHAGE) 1000 MG tablet Take 1 tablet (1,000 mg total) by mouth 2 (two) times daily with meals. 12/24/23  Yes Paul Botello MD   methocarbamoL (ROBAXIN) 500 MG Tab Take 500 mg by mouth every evening. 6/19/23  Yes Provider, Historical   nitroGLYCERIN (NITROSTAT) 0.4 MG SL tablet Place 0.4 mg under the tongue every 5 (five) minutes as needed for Chest pain.   Yes Provider, Historical   oxyCODONE-acetaminophen (PERCOCET)  mg per tablet Take 1 tablet by mouth 2 (two) times daily as needed for Pain. 8/13/22  Yes Provider, Historical   pantoprazole (PROTONIX) 40 MG tablet Take 1 tablet (40 mg total) by mouth once daily. 1/5/24 1/4/25 Yes Viviane Hernandez MD   pen needle, diabetic 29 gauge x 1/2" Ndle Use with insulin 4 times a day. 3/25/24  Yes Funmilayo Merritt NP   ranolazine (RANEXA) 1,000 mg Tb12 Take 1 tablet (1,000 mg total) by mouth 2 (two) times daily. 12/15/23 12/14/24 Yes Christopher Hastings MD   spironolactone (ALDACTONE) 25 MG tablet Take 1 tablet (25 mg total) by mouth once daily. 1/5/24 1/4/25 Yes Viviane Hernandez MD   metoprolol succinate (TOPROL-XL) 50 MG 24 hr tablet Take 1 tablet (50 mg total) by mouth once daily. 1/5/24 4/23/24 Yes Viviane Hernandez MD   aspirin (ECOTRIN) 81 MG EC tablet Take 1 tablet (81 mg total) by mouth once daily. for 21 days 1/4/24 1/25/24  Viviane Hernandez MD   carvediloL (COREG) 6.25 MG tablet Take 1 tablet (6.25 mg total) by mouth 2 (two) times daily with meals. 4/23/24 4/23/25  Dante Chavira MD   lactobacillus acidophilus & bulgar (LACTINEX) 100 million cell packet Take 1 packet (1 each total) by mouth 2 (two) times " daily.  Patient not taking: Reported on 4/23/2024 9/11/23   Sourav Owens MD   lancets 33 gauge Misc TEST 3 TIMES DAILY 1/4/24   Tiff Frias MD   sildenafiL (VIAGRA) 100 MG tablet Take 1 tablet (100 mg total) by mouth daily as needed for Erectile Dysfunction. 4/23/24 4/23/25  Dante Chavira MD   ticagrelor (BRILINTA) 90 mg tablet Take 1 tablet (90 mg total) by mouth 2 (two) times daily. 1/4/24 4/3/24  Viviane Hernandez MD         History  Past Medical History:   Diagnosis Date    Anticoagulant long-term use     Atrial fibrillation 2018    BPH (benign prostatic hyperplasia)     Cataract     COPD (chronic obstructive pulmonary disease)     Coronary artery disease     stents    CVD (cardiovascular disease)     Diabetes mellitus     Erythema multiforme     AKA Denton Edmondson Syndrome    Hypertension     Infected incision     MI (myocardial infarction)     per pt he has had 4     ROSAMARIA on CPAP     RLS (restless legs syndrome)     Perez-Denton syndrome      Past Surgical History:   Procedure Laterality Date    CORONARY ANGIOGRAPHY INCLUDING BYPASS GRAFTS WITH CATHETERIZATION OF LEFT HEART N/A 9/1/2022    Procedure: ANGIOGRAM, CORONARY, INCLUDING BYPASS GRAFT, WITH LEFT HEART CATHETERIZATION;  Surgeon: Paul Botello MD;  Location: [facility] CATH/EP LAB;  Service: Cardiology;  Laterality: N/A;    CORONARY ANGIOGRAPHY INCLUDING BYPASS GRAFTS WITH CATHETERIZATION OF LEFT HEART Left 12/22/2023    Procedure: ANGIOGRAM, CORONARY, INCLUDING BYPASS GRAFT, WITH LEFT HEART CATHETERIZATION;  Surgeon: Paul Botello MD;  Location: [facility] CATH/EP LAB;  Service: Cardiology;  Laterality: Left;    CORONARY ARTERY BYPASS GRAFT (CABG) N/A 4/7/2020    Procedure: CORONARY ARTERY BYPASS GRAFT (CABG)- Excision of left atrial appendage;  Surgeon: Doug Solitario MD;  [email or web address] OR;  Service: Cardiothoracic;  Laterality: N/A;    CORONARY BYPASS GRAFT ANGIOGRAPHY  12/25/2023    Procedure: Bypass graft study;   Surgeon: Ashley Valverde MD;  Location: [facility] CATH/EP LAB;  Service: Cardiology;;    CORONARY STENT PLACEMENT      CORONARY STENT PLACEMENT N/A 12/22/2023    Procedure: INSERTION, STENT, CORONARY ARTERY;  Surgeon: Paul Botello MD;  Location: [facility] CATH/EP LAB;  Service: Cardiology;  Laterality: N/A;    WILSON MAZE PROCEDURE N/A 4/7/2020    Procedure: WILSON MAZE PROCEDURE- Attempted;  Surgeon: Doug Solitario MD;  Location: [facility] OR;  Service: Cardiothoracic;  Laterality: N/A;    CYSTOSCOPY N/A 12/10/2018    Procedure: CYSTOSCOPY;  Surgeon: Khushboo Abreu MD;  Location: [facility] OR;  Service: Urology;  Laterality: N/A;    ENDOSCOPIC HARVEST OF VEIN Left 4/7/2020    Procedure: HARVEST-VEIN-ENDOVASCULAR;  Surgeon: Doug Solitario MD;  Location: [facility] OR;  Service: Cardiothoracic;  Laterality: Left;    INCISION OF PERIRECTAL ABSCESS Bilateral 9/1/2023    Procedure: INCISION, ABSCESS, PERIRECTAL;  Surgeon: Brayan Spears MD;  Location[facility] OR;  Service: General;  Laterality: Bilateral;  stirrups    INSERTION OF INTRA-AORTIC BALLOON ASSIST DEVICE  12/25/2023    Procedure: INSERTION, INTRA-AORTIC BALLOON PUMP;  Surgeon: Ashley Valverde MD;  Location: [facility] CATH/EP LAB;  Service: Cardiology;;    INSERTION OF TEMPORARY PACEMAKER N/A 12/25/2023    Procedure: INSERTION, PACEMAKER, TEMPORARY;  Surgeon: Ashley Valverde MD;  Location: [facility] CATH/EP LAB;  Service: Cardiology;  Laterality: N/A;    INSERTION, ICD, DUAL CHAMBER N/A 1/2/2024    Procedure: Insertion, ICD, Dual Chamber;  Surgeon: Antonio Callejas MD;  Location: [facility] EP LAB;  Service: Cardiology;  Laterality: N/A;  CM, DUAL ICD, ANES, SJM MB, 3079    IVUS, CORONARY  12/22/2023    Procedure: IVUS, Coronary;  Surgeon: Paul Botello MD;  Location: [facility] CATH/EP LAB;  Service: Cardiology;;    LEFT HEART CATHETERIZATION Left 3/17/2020    Procedure: CATHETERIZATION, HEART, LEFT;  Surgeon: Tyree Walters MD;  Location: [facility] CATH/EP  LAB;  Service: Cardiology;  Laterality: Left;    PERCUTANEOUS CORONARY INTERVENTION, ARTERY N/A 2023    Procedure: Percutaneous coronary intervention;  Surgeon: Paul Botello MD;  Location: [facility] CATH/EP LAB;  Service: Cardiology;  Laterality: N/A;    PERCUTANEOUS CORONARY INTERVENTION, ARTERY N/A 2023    Procedure: Percutaneous coronary intervention;  Surgeon: Ashley Valverde MD;  Location: [facility] CATH/EP LAB;  Service: Cardiology;  Laterality: N/A;    PLACEMENT, IABP  2023    Procedure: Placement, IABP;  Surgeon: Ashley Valverde MD;  Location: [facility] CATH/EP LAB;  Service: Cardiology;;    STERNAL WIRES REMOVAL N/A 2020    Procedure: REMOVAL, STERNAL WIRE;  Surgeon: Doug Solitario MD;  Location: [facility] OR;  Service: Peripheral Vascular;  Laterality: N/A;    ULTRASOUND OF PROSTATE FOR VOLUME DETERMINATION  12/10/2018    Procedure: ULTRASOUND, PROSTATE, FOR VOLUME DETERMINATION;  Surgeon: Khushboo Abreu MD;  Location: [facility] OR;  Service: Urology;;     Social History     Socioeconomic History    Marital status:    Tobacco Use    Smoking status: Every Day     Current packs/day: 0.00     Average packs/day: 3.0 packs/day for 30.0 years (90.0 ttl pk-yrs)     Types: Cigarettes     Start date: 1990     Last attempt to quit: 2020     Years since quittin.0    Smokeless tobacco: Never   Substance and Sexual Activity    Alcohol use: Not Currently    Drug use: Yes     Frequency: 1.0 times per week     Types: Marijuana    Sexual activity: Not Currently   Social History Narrative    ** Merged History Encounter **          Social Determinants of Health     Financial Resource Strain: Medium Risk (2024)    Overall Financial Resource Strain (CARDIA)     Difficulty of Paying Living Expenses: Somewhat hard   Food Insecurity: Food Insecurity Present (2024)    Hunger Vital Sign     Worried About Running Out of Food in the Last Year: Sometimes true     Ran Out of  Food in the Last Year: Sometimes true   Transportation Needs: No Transportation Needs (2/5/2024)    PRAPARE - Transportation     Lack of Transportation (Medical): No     Lack of Transportation (Non-Medical): No   Physical Activity: Inactive (2/5/2024)    Exercise Vital Sign     Days of Exercise per Week: 3 days     Minutes of Exercise per Session: 0 min   Stress: Stress Concern Present (2/5/2024)    [employer] [place] [employer] - Occupational Stress Questionnaire     Feeling of Stress : To some extent   Social Connections: Moderately Integrated (2/5/2024)    Social Connection and Isolation Panel [NHANES]     Frequency of Communication with Friends and Family: More than three times a week     Frequency of Social Gatherings with Friends and Family: Once a week     Attends Mandaeism Services: More than 4 times per year     Active Member of Clubs or Organizations: Yes     Attends Club or Organization Meetings: More than 4 times per year     Marital Status:    Housing Stability: High Risk (2/5/2024)    Housing Stability Vital Sign     Unable to Pay for Housing in the Last Year: Yes     Number of Places Lived in the Last Year: 1     Unstable Housing in the Last Year: No         Allergies  Review of patient's allergies indicates:   Allergen Reactions    Pesticide Dermatitis and Rash         Review of Systems   Review of Systems   Cardiovascular: Negative.    Respiratory: Negative.           Physical Exam  Wt Readings from Last 1 Encounters:   04/23/24 126.4 kg (278 lb 10.6 oz)     BP Readings from Last 3 Encounters:   04/23/24 (!) 148/92   02/01/24 139/72   01/23/24 128/82     Pulse Readings from Last 1 Encounters:   04/23/24 70     Body mass index is 38.87 kg/m².    Physical Exam  Vitals reviewed.   Constitutional:       Appearance: He is obese.   Cardiovascular:      Rate and Rhythm: Normal rate and regular rhythm.      Heart sounds: Normal heart sounds.      Comments: L ICD site healed.  Sternal  CABG scar  Pulmonary:      Breath sounds: Normal breath sounds and air entry.   Musculoskeletal:      Right lower leg: No edema.      Left lower leg: No edema.   Neurological:      Mental Status: He is alert.             Assessment  1. Atherosclerosis of native coronary artery of native heart with angina pectoris  Stable no angina, continue monitor and treatment with aspirin and Brilinta    2. S/P CABG (coronary artery bypass graft)  Stable no angina    3. Paroxysmal atrial fibrillation  Stable    4. Cardiogenic shock  Resolved    5. Hyperlipidemia LDL goal <70  On statin    6. Class 2 obesity due to excess calories with body mass index (BMI) of 38.0 to 38.9 in adult, unspecified whether serious comorbidity present  unchanged       Plan and Discussion  he is doing well from a cardiac standpoint.  Discussed his echo which showed improvement in ejection fraction to 30-35% from 20-25% with goal-directed medical therapy.  He is unsure of his medications today.  He was on Entresto and Jardiance on the last visit.  Patient will send us a list when he gets home.  Encouraged patient to bring all his medications in at every visit.  Will change his metoprolol to carvedilol 6.25 mg twice daily and titrate up accordingly given his heart failure with reduced ejection fraction.  Trial of Viagra for ED.    Follow Up  1 month w eduardo.      Dante Chavira MD, F.A.C.C, F.S.C.A.I.      Total professional time spent for the encounter: 40 minutes  Time was spent preparing to see the patient, reviewing results of prior testing, obtaining and/or reviewing separately obtained history, performing a medically appropriate examination and interview, counseling and educating the patient/family, ordering medications/tests/procedures, referring and communicating with other health care professionals, documenting clinical information in the electronic health record, and independently interpreting results.    Disclaimer: This document was created  using voice recognition software (M*Modal Fluency Direct). Although it may be edited, this document may contain errors related to incorrect recognition of the spoken word. Please call the physician if clarification is needed.

## 2024-04-26 ENCOUNTER — LAB VISIT (OUTPATIENT)
Dept: LAB | Facility: HOSPITAL | Age: 59
End: 2024-04-26
Payer: MEDICAID

## 2024-04-26 DIAGNOSIS — E11.610 TYPE 2 DIABETES MELLITUS WITH DIABETIC NEUROPATHIC ARTHROPATHY, WITH LONG-TERM CURRENT USE OF INSULIN: ICD-10-CM

## 2024-04-26 DIAGNOSIS — Z79.4 TYPE 2 DIABETES MELLITUS WITH DIABETIC NEUROPATHIC ARTHROPATHY, WITH LONG-TERM CURRENT USE OF INSULIN: ICD-10-CM

## 2024-04-26 DIAGNOSIS — E78.5 HYPERLIPIDEMIA LDL GOAL <70: ICD-10-CM

## 2024-04-26 LAB
ALBUMIN SERPL BCP-MCNC: 3.9 G/DL (ref 3.5–5.2)
ALP SERPL-CCNC: 70 U/L (ref 55–135)
ALT SERPL W/O P-5'-P-CCNC: 27 U/L (ref 10–44)
ANION GAP SERPL CALC-SCNC: 12 MMOL/L (ref 8–16)
AST SERPL-CCNC: 18 U/L (ref 10–40)
BILIRUB SERPL-MCNC: 0.4 MG/DL (ref 0.1–1)
BUN SERPL-MCNC: 12 MG/DL (ref 6–20)
CALCIUM SERPL-MCNC: 10.1 MG/DL (ref 8.7–10.5)
CHLORIDE SERPL-SCNC: 100 MMOL/L (ref 95–110)
CHOLEST SERPL-MCNC: 134 MG/DL (ref 120–199)
CHOLEST/HDLC SERPL: 4.8 {RATIO} (ref 2–5)
CO2 SERPL-SCNC: 25 MMOL/L (ref 23–29)
CREAT SERPL-MCNC: 1.2 MG/DL (ref 0.5–1.4)
EST. GFR  (NO RACE VARIABLE): >60 ML/MIN/1.73 M^2
ESTIMATED AVG GLUCOSE: 160 MG/DL (ref 68–131)
GLUCOSE SERPL-MCNC: 207 MG/DL (ref 70–110)
HBA1C MFR BLD: 7.2 % (ref 4–5.6)
HDLC SERPL-MCNC: 28 MG/DL (ref 40–75)
HDLC SERPL: 20.9 % (ref 20–50)
LDLC SERPL CALC-MCNC: 69.8 MG/DL (ref 63–159)
NONHDLC SERPL-MCNC: 106 MG/DL
POTASSIUM SERPL-SCNC: 4.1 MMOL/L (ref 3.5–5.1)
PROT SERPL-MCNC: 7.8 G/DL (ref 6–8.4)
SODIUM SERPL-SCNC: 137 MMOL/L (ref 136–145)
TRIGL SERPL-MCNC: 181 MG/DL (ref 30–150)

## 2024-04-26 PROCEDURE — 83036 HEMOGLOBIN GLYCOSYLATED A1C: CPT | Performed by: NURSE PRACTITIONER

## 2024-04-26 PROCEDURE — 80061 LIPID PANEL: CPT | Performed by: INTERNAL MEDICINE

## 2024-04-26 PROCEDURE — 36415 COLL VENOUS BLD VENIPUNCTURE: CPT | Mod: PO | Performed by: NURSE PRACTITIONER

## 2024-04-26 PROCEDURE — 80053 COMPREHEN METABOLIC PANEL: CPT | Performed by: NURSE PRACTITIONER

## 2024-05-03 ENCOUNTER — CLINICAL SUPPORT (OUTPATIENT)
Dept: CARDIOLOGY | Facility: HOSPITAL | Age: 59
End: 2024-05-03
Attending: INTERNAL MEDICINE
Payer: MEDICAID

## 2024-05-03 ENCOUNTER — CLINICAL SUPPORT (OUTPATIENT)
Dept: CARDIOLOGY | Facility: HOSPITAL | Age: 59
End: 2024-05-03
Payer: MEDICAID

## 2024-05-03 DIAGNOSIS — I44.2 ATRIOVENTRICULAR BLOCK, COMPLETE: ICD-10-CM

## 2024-05-03 PROCEDURE — 93296 REM INTERROG EVL PM/IDS: CPT | Performed by: INTERNAL MEDICINE

## 2024-05-03 PROCEDURE — 93295 DEV INTERROG REMOTE 1/2/MLT: CPT | Mod: ,,, | Performed by: INTERNAL MEDICINE

## 2024-05-06 DIAGNOSIS — M54.2 NECK PAIN: Primary | ICD-10-CM

## 2024-05-06 DIAGNOSIS — M47.896 OTHER SPONDYLOSIS, LUMBAR REGION: ICD-10-CM

## 2024-05-08 ENCOUNTER — CLINICAL SUPPORT (OUTPATIENT)
Dept: REHABILITATION | Facility: HOSPITAL | Age: 59
End: 2024-05-08
Payer: MEDICAID

## 2024-05-08 DIAGNOSIS — M47.896 OTHER SPONDYLOSIS, LUMBAR REGION: ICD-10-CM

## 2024-05-08 DIAGNOSIS — M54.2 NECK PAIN: Primary | ICD-10-CM

## 2024-05-08 PROCEDURE — 97161 PT EVAL LOW COMPLEX 20 MIN: CPT | Mod: PO

## 2024-05-08 NOTE — PLAN OF CARE
"[facility]  Physical Therapy Neurological Rehabilitation Initial Evaluation     Name: Magdaleno Cunningham  Clinic Number: [id number]    Therapy Diagnosis:   Encounter Diagnoses   Name Primary?    Neck pain Yes    Other spondylosis, lumbar region      Physician: Dariel Maya MD    Physician Orders: PT Eval and Treat   Medical Diagnosis from Referral:   M54.2 (ICD-10-CM) - Neck pain   M47.896 (ICD-10-CM) - Other spondylosis, lumbar region     Evaluation Date: 5/8/2024  Authorization Period Expiration: 5/6/25  Plan of Care Expiration: 6/26/24    Visit # / Visits authorized: 1/ 1  FOTO: 54/ 100    Precautions: Standard, Fall, and pacemaker    Time In: 1430  Time Out: 1515  Total Billable Time: 45 minutes    Subjective      Date of onset: 5/6/24    (referral)    History of current condition - Hector reports: that since the pacemaker implant 6 months ago the left arm hurts. Chronic pain to neck and low back . Pain to the low back blamed on many falls and hard work in the past. Hx of neuropathy to both feet. C/o multiple areas of pain including neck, lowback, left arm, both feet. Apprehensive with movements secondary to pain.     Imaging, none recent    Prior Therapy: yes.  Social History: lives with son   Falls: none recent   DME: walking stick/cane    Home Environment: tub shower   Exercise Routine / History: none   Occupation: used to be an  since  6 year ago.  Prior Level of Function: Independent  Current Level of Function: difficulty walking long distance, no heavy lifting; movements hurt.    Pain:  Current 5/10, worst 8/10, best 2/10   Location: neck, low back, left arm/shoulder, both feet   Description: Sharp stabbing pain to neck and low back, burning to both feet  Aggravating Factors: Sitting, Standing, Bending, Walking, and Lifting  Easing Factors: pain medication and lying down    Patient's goals: "To get out of pain."    Medical History:   Past Medical History:   Diagnosis " Date    Anticoagulant long-term use     Atrial fibrillation 2018    BPH (benign prostatic hyperplasia)     Cataract     COPD (chronic obstructive pulmonary disease)     Coronary artery disease     stents    CVD (cardiovascular disease)     Diabetes mellitus     Erythema multiforme     AKA Denton Edmondson Syndrome    Hypertension     Infected incision     MI (myocardial infarction)     per pt he has had 4     ROSAMARIA on CPAP     RLS (restless legs syndrome)     Perez-Denton syndrome        Surgical History:   Magdaleno Cunningham  has a past surgical history that includes Coronary stent placement; Cystoscopy (N/A, 12/10/2018); Ultrasound of prostate for volume determination (12/10/2018); Left heart catheterization (Left, 3/17/2020); Coronary Artery Bypass Graft (CABG) (N/A, 4/7/2020); Endoscopic harvest of vein (Left, 4/7/2020); Garcia maze procedure (N/A, 4/7/2020); Sternal wires removal (N/A, 6/8/2020); Coronary angiography including bypass grafts with catheterization of left heart (N/A, 9/1/2022); Incision of perirectal abscess (Bilateral, 9/1/2023); Coronary angiography including bypass grafts with catheterization of left heart (Left, 12/22/2023); ivus, coronary (12/22/2023); percutaneous coronary intervention, artery (N/A, 12/22/2023); Coronary stent placement (N/A, 12/22/2023); Insertion of intra-aortic balloon assist device (12/25/2023); percutaneous coronary intervention, artery (N/A, 12/25/2023); Insertion of temporary pacemaker (N/A, 12/25/2023); Coronary bypass graft angiography (12/25/2023); placement, iabp (12/25/2023); and insertion, icd, dual chamber (N/A, 1/2/2024).    Medications:   Magdaleno has a current medication list which includes the following prescription(s): aspirin, atorvastatin, blood sugar diagnostic, blood-glucose meter, dexcom g7 , dexcom g7 sensor, carvedilol, furosemide, gabapentin, hydralazine, insulin lispro, isosorbide mononitrate, lactobacillus acidophilus & bulgar,  lancets, lantus solostar u-100 insulin, metformin, methocarbamol, nitroglycerin, oxycodone-acetaminophen, pantoprazole, pen needle, diabetic, ranolazine, sildenafil, spironolactone, and ticagrelor.    Allergies:   Review of patient's allergies indicates:   Allergen Reactions    Pesticide Dermatitis and Rash        Objective      Posture: rounded shoulder anterior head list to right; shoulder hiked up.  Palpation: tenderness to right trapezius muscle belly on deep pressure.  Sensation: reports numbness and pins and needles to both feet and fingers on right hand.  DTRs:  Range of Motion/Strength:   Cervical ROM Pain/Dysfunction with Movement:   Flexion   10   - 35 deg   On forward moment.   Extension          30    Rigid/guarde    Right side bending          30  Pain end range   Left side bending          30    Pain end rnage   Right rotation          55    Rigid pain end rnage   Left rotation          45   Rigid guarde pain end range      Thoracic/Lumbar  ROM Pain/Dysfunction with Movement:   Flexion          45  Pain on reverse movement.   Extension           5  rigid   Right side bending          20    Painful arc and end range     Left side bending          20   Painful arc and end range   Right rotation          20     Painful guarding   Left rotation          15   Painful guarding     Flexibility: SLR    55 deg bilateral rigid/guarded  Gait: Without AD  Analysis: antalgic gait   Bed Mobility: modified independent  Transfers: Independent  Special Tests: SLR (+) bilat  IVONE (-)   Other: Spurling's (+) bilat    Intake Outcome Measure for FOTO lumbar spine Survey    Therapist reviewed FOTO scores for Magdaleno Cunningham on 5/8/2024.   FOTO report - see Media section or FOTO account episode details.    Intake Score: 54%       Treatment     Total Treatment time separate from Evaluation: 15 minutes    Hector received the treatments listed below:      therapeutic exercises to develop flexibility and establish home  program for 15 minutes including:  Shoulder shrug  Scapular retraction  Shoulder circles  Neck lateral tilt R/L  Neck rotation R/L  Chin tuck  Knee to chest single/double  Posterior pelvic tilt    Patient Education and Home Exercises     Education provided:   - Discussed Plan of care: Home Exercise Instructions.    Written Home Exercises Provided: yes.  Exercises were reviewed and Hector was able to demonstrate them prior to the end of the session.  Hector demonstrated fair  understanding of the education provided.     See EMR under Patient Instructions for exercises provided 5/8/2024.    Assessment     Magdaleno is a 58 y.o. male referred to outpatient Physical Therapy with a medical diagnosis of neck pain; spondylosis lumbar region. Patient presents with severe protective guarding with left arm use; rigid and painful arc of motion to neck and low back with limitations. Poor postural alignment; increased muscle tone to the paracervicals> paralumbars.    Patient prognosis is Good.   Patient will benefit from skilled outpatient Physical Therapy to address the deficits stated above and in the chart below, provide patient /family education, and to maximize patient's level of independence.     Plan of care discussed with patient: Yes  Patient's spiritual, cultural and educational needs considered and patient is agreeable to the plan of care and goals as stated below:     Anticipated Barriers for therapy: attitudes; chronicity of the condition    Medical Necessity is demonstrated by the following  History  Co-morbidities and personal factors that may impact the plan of care [] LOW: no personal factors / co-morbidities  [x] MODERATE: 1-2 personal factors / co-morbidities  [] HIGH: 3+ personal factors / co-morbidities    Moderate / High Support Documentation:   Co-morbidities affecting plan of care: Hx of sternal surgery    Personal Factors:   coping style  attitudes     Examination  Body Structures and Functions, activity  limitations and participation restrictions that may impact the plan of care [x] LOW: addressing 1-2 elements  [] MODERATE: 3+ elements  [] HIGH: 4+ elements (please support below)    Moderate / High Support Documentation: no deficits     Clinical Presentation [x] LOW: stable  [] MODERATE: Evolving  [] HIGH: Unstable     Decision Making/ Complexity Score: low       Goals:  Short Term Goals: 2 weeks   Patient will report compliance to home exercise program.  Patient pain level 3/10 max intensity 80% of the time.  Patient will tolerate movements all 4's on Nustep 10'    Long Term Goals: 6 weeks   Patient will have pain-free mobility 0/10 50% of the time.  Patient will approximate 90% full ROM to cervical  and thoraco-lumbar spine.  Patient will demonstrate 80 deg SLR both LE's.  Patient will improve FOTO score 58/100 or better.  Plan     Plan of care Certification: 5/8/2024 to 6/26/24.    Outpatient Physical Therapy 2 times weekly for 6 weeks to include the following interventions: Cervical/Lumbar Traction, Manual Therapy, Moist Heat/ Ice, Neuromuscular Re-ed, Therapeutic Activities, and Therapeutic Exercise.     Jose Sandra PT        Physician's Signature: _________________________________________ Date: ________________

## 2024-05-08 NOTE — PATIENT INSTRUCTIONS
Shoulder Shrug        Bring shoulders up toward ears. Hold ____ seconds. Relax.  Repeat ____ times. Do ____ sessions per day.     [email or web address]     Copyright © [employer]. All rights reserved.      Scapular Retraction (Standing)        With arms at sides, pinch shoulder blades together.  Repeat ____ times per set. Do ____ sets per session. Do ____ sessions per day.     [email or web address]     Copyright © [employer]. All rights reserved.      Shoulder [place]        Roll shoulders forward making small circles for ____ repetitions. Then roll shoulders backward for ____ repetitions.  Do ____ sessions per day.    Copyright © [employer]. All rights reserved.      Neck: Lateral Tilt        Support child's trunk and shoulder. Start with head in center.  Child gently tilts toward right shoulder. Keep chin tucked.  Do not allow trunk or shoulders to move.  Child may assist.  Hold ____ seconds. Repeat ____ times. Do ____ sessions per day.  CAUTION: Movement should be gentle, steady and slow.    Copyright © [employer]. All rights reserved.      Neck: Rotation        Support child's trunk and shoulder. Start with head in center.  Child gently turns chin toward right shoulder. Do not allow trunk or shoulder to move.  Child may assist.  Hold ____ seconds. Repeat ____ times. Do ____ sessions per day.  CAUTION: Movement should be gentle, steady and slow.    Copyright © [employer]. All rights reserved.      Upper Cervical Flexion Mobilization         Flexibility: Neck Retraction        Pull head straight back, keeping eyes and jaw level.  Repeat ____ times per set. Do ____ sets per session. Do ____ sessions per day.     [email or web address]     Copyright © [employer]. All rights reserved.      PELVIC TILT: Posterior        Tighten abdominals, flatten low back. ___ reps per set, ___ sets per day, ___ days per week      Copyright © [employer]. All rights reserved.      Knee-to-Chest Stretch: Unilateral        With hand behind right knee, pull knee in to chest until  a comfortable stretch is felt in lower back and buttocks. Keep back relaxed. Hold ____ seconds.  Repeat ____ times per set. Do ____ sets per session. Do ____ sessions per day.     [email or web address]     Copyright © [employer]. All rights reserved.      Knee-to-Chest Stretch: Bilateral        With hands behind knees, pull both knees in to chest until a comfortable stretch is felt in lower back and buttocks. Keep back relaxed. Hold ____ seconds.  Repeat ____ times per set. Do ____ sets per session. Do ____ sessions per day.     [email or web address]

## 2024-05-17 DIAGNOSIS — M54.2 NECK PAIN: Primary | ICD-10-CM

## 2024-05-17 DIAGNOSIS — M47.896 OTHER SPONDYLOSIS, LUMBAR REGION: ICD-10-CM

## 2024-05-26 ENCOUNTER — NURSE TRIAGE (OUTPATIENT)
Dept: ADMINISTRATIVE | Facility: CLINIC | Age: 59
End: 2024-05-26
Payer: MEDICAID

## 2024-05-26 NOTE — TELEPHONE ENCOUNTER
Currently in [place]. Currently c/o left arm numbness, weakness, and tingling with onset of 2 hours ago. Advised per protocol to call 911. Patient verbalizes understanding.          Reason for Disposition   [1] Weakness (i.e., paralysis, loss of muscle strength) of the face, arm / hand, or leg / foot on one side of the body AND [2] sudden onset AND [3] present now  (Exception: Bell's palsy suspected [i.e., weakness only on one side of the face, developing over hours to days, no other symptoms].)    Additional Information   Negative: [1] SEVERE weakness (i.e., unable to walk or barely able to walk, requires support) AND [2] new-onset or worsening    Protocols used: Neurologic Deficit-A-AH

## 2024-06-25 ENCOUNTER — OFFICE VISIT (OUTPATIENT)
Dept: CARDIOLOGY | Facility: CLINIC | Age: 59
End: 2024-06-25
Payer: MEDICAID

## 2024-06-25 VITALS
WEIGHT: 274.56 LBS | OXYGEN SATURATION: 98 % | DIASTOLIC BLOOD PRESSURE: 80 MMHG | BODY MASS INDEX: 38.3 KG/M2 | HEART RATE: 75 BPM | SYSTOLIC BLOOD PRESSURE: 120 MMHG

## 2024-06-25 DIAGNOSIS — E78.5 HYPERLIPIDEMIA LDL GOAL <70: ICD-10-CM

## 2024-06-25 DIAGNOSIS — E11.59 HYPERTENSION ASSOCIATED WITH DIABETES: Primary | ICD-10-CM

## 2024-06-25 DIAGNOSIS — I15.2 HYPERTENSION ASSOCIATED WITH DIABETES: Primary | ICD-10-CM

## 2024-06-25 DIAGNOSIS — Z95.1 S/P CABG (CORONARY ARTERY BYPASS GRAFT): ICD-10-CM

## 2024-06-25 PROCEDURE — 99999 PR PBB SHADOW E&M-EST. PATIENT-LVL IV: CPT | Mod: PBBFAC,,, | Performed by: INTERNAL MEDICINE

## 2024-06-25 PROCEDURE — 3008F BODY MASS INDEX DOCD: CPT | Mod: CPTII,,, | Performed by: INTERNAL MEDICINE

## 2024-06-25 PROCEDURE — 3079F DIAST BP 80-89 MM HG: CPT | Mod: CPTII,,, | Performed by: INTERNAL MEDICINE

## 2024-06-25 PROCEDURE — 1159F MED LIST DOCD IN RCRD: CPT | Mod: CPTII,,, | Performed by: INTERNAL MEDICINE

## 2024-06-25 PROCEDURE — 3074F SYST BP LT 130 MM HG: CPT | Mod: CPTII,,, | Performed by: INTERNAL MEDICINE

## 2024-06-25 PROCEDURE — 3051F HG A1C>EQUAL 7.0%<8.0%: CPT | Mod: CPTII,,, | Performed by: INTERNAL MEDICINE

## 2024-06-25 PROCEDURE — 99214 OFFICE O/P EST MOD 30 MIN: CPT | Mod: PBBFAC,PN | Performed by: INTERNAL MEDICINE

## 2024-06-25 PROCEDURE — 4010F ACE/ARB THERAPY RXD/TAKEN: CPT | Mod: CPTII,,, | Performed by: INTERNAL MEDICINE

## 2024-06-25 PROCEDURE — 99214 OFFICE O/P EST MOD 30 MIN: CPT | Mod: S$PBB,,, | Performed by: INTERNAL MEDICINE

## 2024-06-25 NOTE — PROGRESS NOTES
Cardiology    6/25/2024  1:38 PM    Problem list  Patient Active Problem List   Diagnosis    Chest pain    Atherosclerosis of native coronary artery of native heart with angina pectoris    COPD (chronic obstructive pulmonary disease)    Type 2 diabetes mellitus with diabetic arthropathy    Hypertension associated with diabetes    ROSAMARIA (obstructive sleep apnea)    Diabetic ulcer of left foot associated with type 2 diabetes mellitus    Smoker 1-2 packs per day since 14 years old    Medical non-compliance    Left foot pain    Generalized anxiety disorder    Denton-Perez erythema multiforme exudativum    Allergic reaction to chemical substance    Prostate enlargement    Bladder mass    Coronary artery disease involving native coronary artery of native heart    Atrial fibrillation    S/P CABG (coronary artery bypass graft)    Protruding sternal wires    Ingrowing toenail    Onychomycosis    Polyneuropathy    Cellulitis of gluteal region    Chronic migraine    Obesity (BMI 30-39.9)    Hyperlipidemia LDL goal <70    Hyponatremia    Malnutrition of moderate degree    Acute pain    Open wound of buttock with complication    Abscess, perineum    Streptococcal infection    Hypomagnesemia    Cardiogenic shock    Complete heart block    Paroxysmal atrial fibrillation    Typical atrial flutter    Neck pain    Other spondylosis, lumbar region       CC:  F/u    HPI:  He is here for follow-up.  He missed his last appointment.  He also missed his appointment with EP physician for his ICD follow-up.  He has been doing well.  He denies any angina, dyspnea on exertion, palpitation or syncope.  He denies any ICD discharge.    Medications  Current Outpatient Medications   Medication Sig Dispense Refill    atorvastatin (LIPITOR) 40 MG tablet Take 1 tablet (40 mg total) by mouth once daily. 90 tablet 3    blood sugar diagnostic Strp TEST 3 TIMES DAILY 100 each 0    blood-glucose meter Misc TEST 3 TIMES DAILY 1 each 1     "blood-glucose meter,continuous (DEXCOM G7 ) Misc Use as directed. 1 each 0    blood-glucose sensor (DEXCOM G7 SENSOR) Courtney Change every 10 days. 3 each 11    carvediloL (COREG) 6.25 MG tablet Take 1 tablet (6.25 mg total) by mouth 2 (two) times daily with meals. 180 tablet 3    furosemide (LASIX) 40 MG tablet Take 1 tablet (40 mg total) by mouth once daily. 30 tablet 11    gabapentin (NEURONTIN) 600 MG tablet Take 600 mg by mouth As instructed (neuropathic pain). Takes 1 tablet in the morning and 2 tablets at bedtime      hydrALAZINE (APRESOLINE) 10 MG tablet Take 1 tablet (10 mg total) by mouth every 8 (eight) hours. 90 tablet 11    insulin lispro (HUMALOG KWIKPEN INSULIN) 100 unit/mL pen Inject 10 Units into the skin 3 (three) times daily. 9 mL 3    isosorbide mononitrate (IMDUR) 30 MG 24 hr tablet Take 1 tablet (30 mg total) by mouth once daily. 30 tablet 11    lactobacillus acidophilus & bulgar (LACTINEX) 100 million cell packet Take 1 packet (1 each total) by mouth 2 (two) times daily. 30 packet 0    lancets 33 gauge Misc TEST 3 TIMES DAILY 100 each 0    LANTUS SOLOSTAR U-100 INSULIN glargine 100 units/mL SubQ pen Inject 28 Units into the skin once daily. 30 mL 3    metFORMIN (GLUCOPHAGE) 1000 MG tablet Take 1 tablet (1,000 mg total) by mouth 2 (two) times daily with meals.      methocarbamoL (ROBAXIN) 500 MG Tab Take 500 mg by mouth every evening.      nitroGLYCERIN (NITROSTAT) 0.4 MG SL tablet Place 0.4 mg under the tongue every 5 (five) minutes as needed for Chest pain.      oxyCODONE-acetaminophen (PERCOCET)  mg per tablet Take 1 tablet by mouth 2 (two) times daily as needed for Pain.      pantoprazole (PROTONIX) 40 MG tablet Take 1 tablet (40 mg total) by mouth once daily. 30 tablet 11    pen needle, diabetic 29 gauge x 1/2" Ndle Use with insulin 4 times a day. 360 each 3    ranolazine (RANEXA) 1,000 mg Tb12 Take 1 tablet (1,000 mg total) by mouth 2 (two) times daily. 60 tablet 11    " sildenafiL (VIAGRA) 100 MG tablet Take 1 tablet (100 mg total) by mouth daily as needed for Erectile Dysfunction. 5 tablet 3    spironolactone (ALDACTONE) 25 MG tablet Take 1 tablet (25 mg total) by mouth once daily. 30 tablet 11    aspirin (ECOTRIN) 81 MG EC tablet Take 1 tablet (81 mg total) by mouth once daily. for 21 days 21 tablet 0    ticagrelor (BRILINTA) 90 mg tablet Take 1 tablet (90 mg total) by mouth 2 (two) times daily. 60 tablet 2     No current facility-administered medications for this visit.      Prior to Admission medications    Medication Sig Start Date End Date Taking? Authorizing Provider   atorvastatin (LIPITOR) 40 MG tablet Take 1 tablet (40 mg total) by mouth once daily. 1/5/24 1/4/25 Yes Viviane Hernandez MD   blood sugar diagnostic Strp TEST 3 TIMES DAILY 1/4/24  Yes Tiff Frias MD   blood-glucose meter Misc TEST 3 TIMES DAILY 1/4/24  Yes Tiff Frias MD   blood-glucose meter,continuous (DEXCOM G7 ) Misc Use as directed. 2/5/24  Yes Funmilayo Merritt NP   blood-glucose sensor (DEXCOM G7 SENSOR) Courtney Change every 10 days. 2/5/24  Yes Funmilayo Merritt NP   carvediloL (COREG) 6.25 MG tablet Take 1 tablet (6.25 mg total) by mouth 2 (two) times daily with meals. 4/23/24 4/23/25 Yes Dante Chavira MD   furosemide (LASIX) 40 MG tablet Take 1 tablet (40 mg total) by mouth once daily. 1/5/24 1/4/25 Yes Viviane Hernandez MD   gabapentin (NEURONTIN) 600 MG tablet Take 600 mg by mouth As instructed (neuropathic pain). Takes 1 tablet in the morning and 2 tablets at bedtime   Yes Provider, Historical   hydrALAZINE (APRESOLINE) 10 MG tablet Take 1 tablet (10 mg total) by mouth every 8 (eight) hours. 1/4/24 1/3/25 Yes Viviane Hernandez MD   insulin lispro (HUMALOG KWIKPEN INSULIN) 100 unit/mL pen Inject 10 Units into the skin 3 (three) times daily. 1/4/24 1/3/25 Yes Viviane Hernandez MD   isosorbide mononitrate (IMDUR) 30 MG 24  "hr tablet Take 1 tablet (30 mg total) by mouth once daily. 1/4/24 1/3/25 Yes Viviane Hernandez MD   lactobacillus acidophilus & bulgar (LACTINEX) 100 million cell packet Take 1 packet (1 each total) by mouth 2 (two) times daily. 9/11/23  Yes Sourav Owens MD   lancets 33 gauge Misc TEST 3 TIMES DAILY 1/4/24  Yes Tiff Frias MD   LANTUS SOLOSTAR U-100 INSULIN glargine 100 units/mL SubQ pen Inject 28 Units into the skin once daily. 3/25/24 3/25/25 Yes Funmilayo Merritt NP   metFORMIN (GLUCOPHAGE) 1000 MG tablet Take 1 tablet (1,000 mg total) by mouth 2 (two) times daily with meals. 12/24/23  Yes Paul Botello MD   methocarbamoL (ROBAXIN) 500 MG Tab Take 500 mg by mouth every evening. 6/19/23  Yes Provider, Historical   nitroGLYCERIN (NITROSTAT) 0.4 MG SL tablet Place 0.4 mg under the tongue every 5 (five) minutes as needed for Chest pain.   Yes Provider, Historical   oxyCODONE-acetaminophen (PERCOCET)  mg per tablet Take 1 tablet by mouth 2 (two) times daily as needed for Pain. 8/13/22  Yes Provider, Historical   pantoprazole (PROTONIX) 40 MG tablet Take 1 tablet (40 mg total) by mouth once daily. 1/5/24 1/4/25 Yes Viviane Hernandez MD   pen needle, diabetic 29 gauge x 1/2" Ndle Use with insulin 4 times a day. 3/25/24  Yes Funmilayo Merritt NP   ranolazine (RANEXA) 1,000 mg Tb12 Take 1 tablet (1,000 mg total) by mouth 2 (two) times daily. 12/15/23 12/14/24 Yes Christopher Hastings MD   sildenafiL (VIAGRA) 100 MG tablet Take 1 tablet (100 mg total) by mouth daily as needed for Erectile Dysfunction. 4/23/24 4/23/25 Yes Dante Chavira MD   spironolactone (ALDACTONE) 25 MG tablet Take 1 tablet (25 mg total) by mouth once daily. 1/5/24 1/4/25 Yes Viviane Hernandez MD   aspirin (ECOTRIN) 81 MG EC tablet Take 1 tablet (81 mg total) by mouth once daily. for 21 days 1/4/24 1/25/24  Viviane Hernandez MD   ticagrelor (BRILINTA) 90 mg tablet Take 1 tablet (90 mg total) " by mouth 2 (two) times daily. 1/4/24 4/3/24  Viviane Hernandez MD         History  Past Medical History:   Diagnosis Date    Anticoagulant long-term use     Atrial fibrillation 2018    BPH (benign prostatic hyperplasia)     Cataract     COPD (chronic obstructive pulmonary disease)     Coronary artery disease     stents    CVD (cardiovascular disease)     Diabetes mellitus     Erythema multiforme     AKA Denton Edmondson Syndrome    Hypertension     Infected incision     MI (myocardial infarction)     per pt he has had 4     ROSAMARIA on CPAP     RLS (restless legs syndrome)     Perez-Denton syndrome      Past Surgical History:   Procedure Laterality Date    CORONARY ANGIOGRAPHY INCLUDING BYPASS GRAFTS WITH CATHETERIZATION OF LEFT HEART N/A 9/1/2022    Procedure: ANGIOGRAM, CORONARY, INCLUDING BYPASS GRAFT, WITH LEFT HEART CATHETERIZATION;  Surgeon: Paul Botello MD;  Location: [facility] CATH/EP LAB;  Service: Cardiology;  Laterality: N/A;    CORONARY ANGIOGRAPHY INCLUDING BYPASS GRAFTS WITH CATHETERIZATION OF LEFT HEART Left 12/22/2023    Procedure: ANGIOGRAM, CORONARY, INCLUDING BYPASS GRAFT, WITH LEFT HEART CATHETERIZATION;  Surgeon: Paul Botello MD;  Location: [facility] CATH/EP LAB;  Service: Cardiology;  Laterality: Left;    CORONARY ARTERY BYPASS GRAFT (CABG) N/A 4/7/2020    Procedure: CORONARY ARTERY BYPASS GRAFT (CABG)- Excision of left atrial appendage;  Surgeon: Doug Solitario MD;  Location: [facility] OR;  Service: Cardiothoracic;  Laterality: N/A;    CORONARY BYPASS GRAFT ANGIOGRAPHY  12/25/2023    Procedure: Bypass graft study;  Surgeon: Ashley Valverde MD;  Location: [facility] CATH/EP LAB;  Service: Cardiology;;    CORONARY STENT PLACEMENT      CORONARY STENT PLACEMENT N/A 12/22/2023    Procedure: INSERTION, STENT, CORONARY ARTERY;  Surgeon: Paul Botello MD;  Location: [facility] CATH/EP LAB;  Service: Cardiology;  Laterality: N/A;    WILSON MAZE PROCEDURE N/A 4/7/2020    Procedure: WILSON MAZE  PROCEDURE- Attempted;  Surgeon: Doug Solitario MD;  Location: [facility] OR;  Service: Cardiothoracic;  Laterality: N/A;    CYSTOSCOPY N/A 12/10/2018    Procedure: CYSTOSCOPY;  Surgeon: Khushboo Abreu MD;  Location: [facility] OR;  Service: Urology;  Laterality: N/A;    ENDOSCOPIC HARVEST OF VEIN Left 4/7/2020    Procedure: HARVEST-VEIN-ENDOVASCULAR;  Surgeon: Doug Solitario MD;  Location: [facility] OR;  Service: Cardiothoracic;  Laterality: Left;    INCISION OF PERIRECTAL ABSCESS Bilateral 9/1/2023    Procedure: INCISION, ABSCESS, PERIRECTAL;  Surgeon: Brayan Spears MD;  Location: [facility] OR;  Service: General;  Laterality: Bilateral;  stirrups    INSERTION OF INTRA-AORTIC BALLOON ASSIST DEVICE  12/25/2023    Procedure: INSERTION, INTRA-AORTIC BALLOON PUMP;  Surgeon: Ashley Valverde MD;  Location: [facility] CATH/EP LAB;  Service: Cardiology;;    INSERTION OF TEMPORARY PACEMAKER N/A 12/25/2023    Procedure: INSERTION, PACEMAKER, TEMPORARY;  Surgeon: Ashley Valverde MD;  Location: [facility] CATH/EP LAB;  Service: Cardiology;  Laterality: N/A;    INSERTION, ICD, DUAL CHAMBER N/A 1/2/2024    Procedure: Insertion, ICD, Dual Chamber;  Surgeon: Antonio Callejas MD;  Location: [facility] EP LAB;  Service: Cardiology;  Laterality: N/A;  CM, DUAL ICD, ANES, SJM, MB, 3079    IVUS, CORONARY  12/22/2023    Procedure: IVUS, Coronary;  Surgeon: Paul Botello MD;  Location: [facility] CATH/EP LAB;  Service: Cardiology;;    LEFT HEART CATHETERIZATION Left 3/17/2020    Procedure: CATHETERIZATION, HEART, LEFT;  Surgeon: Tyree Walters MD;  Location: [facility] CATH/EP LAB;  Service: Cardiology;  Laterality: Left;    PERCUTANEOUS CORONARY INTERVENTION, ARTERY N/A 12/22/2023    Procedure: Percutaneous coronary intervention;  Surgeon: Paul Botello MD;  Location: [facility] CATH/EP LAB;  Service: Cardiology;  Laterality: N/A;    PERCUTANEOUS CORONARY INTERVENTION, ARTERY N/A 12/25/2023    Procedure: Percutaneous coronary intervention;   Surgeon: Ashley Valverde MD;  Location: [facility] CATH/EP LAB;  Service: Cardiology;  Laterality: N/A;    PLACEMENT, IABP  2023    Procedure: Placement, IABP;  Surgeon: Ashley Valverde MD;  Location: [facility] CATH/EP LAB;  Service: Cardiology;;    STERNAL WIRES REMOVAL N/A 2020    Procedure: REMOVAL, STERNAL WIRE;  Surgeon: Doug Solitario MD;  Location: [facility] OR;  Service: Peripheral Vascular;  Laterality: N/A;    ULTRASOUND OF PROSTATE FOR VOLUME DETERMINATION  12/10/2018    Procedure: ULTRASOUND, PROSTATE, FOR VOLUME DETERMINATION;  Surgeon: Khushboo Abreu MD;  Location: [facility] OR;  Service: Urology;;     Social History     Socioeconomic History    Marital status:    Tobacco Use    Smoking status: Every Day     Current packs/day: 0.00     Average packs/day: 3.0 packs/day for 30.0 years (90.0 ttl pk-yrs)     Types: Cigarettes     Start date: 1990     Last attempt to quit: 2020     Years since quittin.2    Smokeless tobacco: Never   Substance and Sexual Activity    Alcohol use: Not Currently    Drug use: Yes     Frequency: 1.0 times per week     Types: Marijuana    Sexual activity: Not Currently   Social History Narrative    ** Merged History Encounter **          Social Determinants of Health     Financial Resource Strain: Medium Risk (2024)    Overall Financial Resource Strain (CARDIA)     Difficulty of Paying Living Expenses: Somewhat hard   Food Insecurity: Food Insecurity Present (2024)    Hunger Vital Sign     Worried About Running Out of Food in the Last Year: Sometimes true     Ran Out of Food in the Last Year: Sometimes true   Transportation Needs: No Transportation Needs (2024)    PRAPARE - Transportation     Lack of Transportation (Medical): No     Lack of Transportation (Non-Medical): No   Physical Activity: Inactive (2024)    Exercise Vital Sign     Days of Exercise per Week: 3 days     Minutes of Exercise per Session: 0 min   Stress:  Stress Concern Present (2/5/2024)    [employer] [place] [employer] - Occupational Stress Questionnaire     Feeling of Stress : To some extent   Housing Stability: High Risk (2/5/2024)    Housing Stability Vital Sign     Unable to Pay for Housing in the Last Year: Yes     Number of Places Lived in the Last Year: 1     Unstable Housing in the Last Year: No         Allergies  Review of patient's allergies indicates:   Allergen Reactions    Pesticide Dermatitis and Rash         Review of Systems   Review of Systems   Cardiovascular: Negative.    Respiratory: Negative.           Physical Exam  Wt Readings from Last 1 Encounters:   06/25/24 124.6 kg (274 lb 9.3 oz)     BP Readings from Last 3 Encounters:   06/25/24 120/80   04/23/24 (!) 148/92   02/01/24 139/72     Pulse Readings from Last 1 Encounters:   06/25/24 75     Body mass index is 38.3 kg/m².    Physical Exam  Vitals reviewed.   Constitutional:       Appearance: He is obese.   Cardiovascular:      Rate and Rhythm: Normal rate and regular rhythm.      Heart sounds: Normal heart sounds.      Comments: L ICD site healed.  Sternal CABG scar  Pulmonary:      Breath sounds: Normal breath sounds and air entry.   Musculoskeletal:      Right lower leg: No edema.      Left lower leg: No edema.   Neurological:      Mental Status: He is alert.             Assessment  1. Hypertension associated with diabetes  Controlled.  Continue current medications and monitor    2. Hyperlipidemia LDL goal <70  At goal with the LDL of 69.  Continue current statin dose and monitor.  - Comprehensive Metabolic Panel; Future  - Lipid Panel; Future    3. S/P CABG (coronary artery bypass graft)  Stable.  Continue current treatment and monitor for anginal symptoms        Plan and Discussion  Reviewed and discussed his lipid profile in April which showed LDL of 69.  His HDL is 28.  His triglycerides 181.  Encouraged to follow up with EP Clinic for device check.  Recommend to continue  current medications.  Encourage to exercise at least 30 minutes per day, 5 days per week.      Follow Up  6 months with labs      Dante Chavira MD, F.A.C.C, F.S.C.A.I.      Total professional time spent for the encounter: 30 minutes  Time was spent preparing to see the patient, reviewing results of prior testing, obtaining and/or reviewing separately obtained history, performing a medically appropriate examination and interview, counseling and educating the patient/family, ordering medications/tests/procedures, referring and communicating with other health care professionals, documenting clinical information in the electronic health record, and independently interpreting results.    Disclaimer: This document was created using voice recognition software (M*Modal Fluency Direct). Although it may be edited, this document may contain errors related to incorrect recognition of the spoken word. Please call the physician if clarification is needed.

## 2024-08-02 ENCOUNTER — CLINICAL SUPPORT (OUTPATIENT)
Dept: CARDIOLOGY | Facility: HOSPITAL | Age: 59
End: 2024-08-02
Attending: INTERNAL MEDICINE
Payer: MEDICAID

## 2024-08-02 ENCOUNTER — CLINICAL SUPPORT (OUTPATIENT)
Dept: CARDIOLOGY | Facility: HOSPITAL | Age: 59
End: 2024-08-02
Payer: MEDICAID

## 2024-08-02 DIAGNOSIS — I44.2 ATRIOVENTRICULAR BLOCK, COMPLETE: ICD-10-CM

## 2024-08-02 PROCEDURE — 93295 DEV INTERROG REMOTE 1/2/MLT: CPT | Mod: ,,, | Performed by: INTERNAL MEDICINE

## 2024-08-02 PROCEDURE — 93296 REM INTERROG EVL PM/IDS: CPT | Performed by: INTERNAL MEDICINE

## 2024-08-14 ENCOUNTER — DOCUMENTATION ONLY (OUTPATIENT)
Dept: CARDIOLOGY | Facility: HOSPITAL | Age: 59
End: 2024-08-14
Payer: MEDICAID

## 2024-08-14 NOTE — PROGRESS NOTES
Received a call/message from DIS in the MRI Department in relation to this patient needing to be scheduled for a MRI and has a [employer] [place] ICD/Pacemaker.  Patient's Device is MRI compatible/conditional.  To meet protocol the Pacemaker/ICD must have been implanted no less than 6 weeks prior to scheduled date of Scan.  ICD/PPM and leads must be from same .  MRI informed ordering MD must input a Cardiac Device Check in clinic and hospital order, patient must have an xray within 6 months or less prior to MRI reprogramming.  Chest xray to be reviewed by Radiologist.

## 2024-08-27 LAB
OHS CV AF BURDEN PERCENT: < 1
OHS CV DC REMOTE DEVICE TYPE: NORMAL
OHS CV ICD SHOCK: NO
OHS CV RV PACING PERCENT: 1 %

## 2024-11-01 ENCOUNTER — CLINICAL SUPPORT (OUTPATIENT)
Dept: CARDIOLOGY | Facility: HOSPITAL | Age: 59
End: 2024-11-01
Attending: INTERNAL MEDICINE
Payer: MEDICAID

## 2024-11-01 ENCOUNTER — CLINICAL SUPPORT (OUTPATIENT)
Dept: CARDIOLOGY | Facility: HOSPITAL | Age: 59
End: 2024-11-01
Payer: MEDICAID

## 2024-11-01 DIAGNOSIS — I44.2 ATRIOVENTRICULAR BLOCK, COMPLETE: ICD-10-CM

## 2024-11-01 PROCEDURE — 93296 REM INTERROG EVL PM/IDS: CPT | Performed by: INTERNAL MEDICINE

## 2024-11-01 PROCEDURE — 93295 DEV INTERROG REMOTE 1/2/MLT: CPT | Mod: ,,, | Performed by: INTERNAL MEDICINE
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
## 2024-12-03 ENCOUNTER — OFFICE VISIT (OUTPATIENT)
Dept: CARDIOLOGY | Facility: CLINIC | Age: 59
End: 2024-12-03
Payer: MEDICAID

## 2024-12-03 VITALS
HEART RATE: 70 BPM | SYSTOLIC BLOOD PRESSURE: 152 MMHG | BODY MASS INDEX: 37.1 KG/M2 | OXYGEN SATURATION: 98 % | DIASTOLIC BLOOD PRESSURE: 94 MMHG | WEIGHT: 266 LBS

## 2024-12-03 DIAGNOSIS — E78.5 HYPERLIPIDEMIA LDL GOAL <70: ICD-10-CM

## 2024-12-03 DIAGNOSIS — E66.9 OBESITY (BMI 30-39.9): ICD-10-CM

## 2024-12-03 DIAGNOSIS — Z95.1 S/P CABG (CORONARY ARTERY BYPASS GRAFT): Primary | ICD-10-CM

## 2024-12-03 DIAGNOSIS — I48.0 PAROXYSMAL ATRIAL FIBRILLATION: ICD-10-CM

## 2024-12-03 DIAGNOSIS — I25.10 CORONARY ARTERY DISEASE INVOLVING NATIVE CORONARY ARTERY OF NATIVE HEART WITHOUT ANGINA PECTORIS: ICD-10-CM

## 2024-12-03 PROCEDURE — 3077F SYST BP >= 140 MM HG: CPT | Mod: CPTII,,, | Performed by: INTERNAL MEDICINE

## 2024-12-03 PROCEDURE — 3008F BODY MASS INDEX DOCD: CPT | Mod: CPTII,,, | Performed by: INTERNAL MEDICINE

## 2024-12-03 PROCEDURE — 1159F MED LIST DOCD IN RCRD: CPT | Mod: CPTII,,, | Performed by: INTERNAL MEDICINE

## 2024-12-03 PROCEDURE — 4010F ACE/ARB THERAPY RXD/TAKEN: CPT | Mod: CPTII,,, | Performed by: INTERNAL MEDICINE

## 2024-12-03 PROCEDURE — 99999 PR PBB SHADOW E&M-EST. PATIENT-LVL IV: CPT | Mod: PBBFAC,,, | Performed by: INTERNAL MEDICINE

## 2024-12-03 PROCEDURE — 3051F HG A1C>EQUAL 7.0%<8.0%: CPT | Mod: CPTII,,, | Performed by: INTERNAL MEDICINE

## 2024-12-03 PROCEDURE — 3080F DIAST BP >= 90 MM HG: CPT | Mod: CPTII,,, | Performed by: INTERNAL MEDICINE

## 2024-12-03 PROCEDURE — 99214 OFFICE O/P EST MOD 30 MIN: CPT | Mod: S$PBB,,, | Performed by: INTERNAL MEDICINE

## 2024-12-03 PROCEDURE — 99214 OFFICE O/P EST MOD 30 MIN: CPT | Mod: PBBFAC,PN | Performed by: INTERNAL MEDICINE

## 2024-12-03 NOTE — PROGRESS NOTES
Cardiology    12/3/2024  2:04 PM    Problem list  Patient Active Problem List   Diagnosis    Chest pain    Atherosclerosis of native coronary artery of native heart with angina pectoris    COPD (chronic obstructive pulmonary disease)    Type 2 diabetes mellitus with diabetic arthropathy    Hypertension associated with diabetes    ROSAMARIA (obstructive sleep apnea)    Diabetic ulcer of left foot associated with type 2 diabetes mellitus    Smoker 1-2 packs per day since 14 years old    Medical non-compliance    Left foot pain    Generalized anxiety disorder    Denton-Perez erythema multiforme exudativum    Allergic reaction to chemical substance    Prostate enlargement    Bladder mass    Coronary artery disease involving native coronary artery of native heart    Atrial fibrillation    S/P CABG (coronary artery bypass graft)    Protruding sternal wires    Ingrowing toenail    Onychomycosis    Polyneuropathy    Cellulitis of gluteal region    Chronic migraine    Obesity (BMI 30-39.9)    Hyperlipidemia LDL goal <70    Hyponatremia    Malnutrition of moderate degree    Acute pain    Open wound of buttock with complication    Abscess, perineum    Streptococcal infection    Hypomagnesemia    Cardiogenic shock    Complete heart block    Paroxysmal atrial fibrillation    Typical atrial flutter    Neck pain    Other spondylosis, lumbar region       CC:  Follow-up    HPI:  Patient denies any cardiac complaints.  He had missed many doctor appointments due to his 19 y/o son who passed away in August from motorcycle accident.  He is still having a hard time.  He saw a psychiatrist who wanted to start medication but he does not want to take any medication.  He is looking to counseling to help with grieving.    Medications  Current Outpatient Medications   Medication Sig Dispense Refill    aspirin (ECOTRIN) 81 MG EC tablet Take 1 tablet (81 mg total) by mouth once daily. for 21 days 21 tablet 0    atorvastatin (LIPITOR) 40 MG  tablet Take 1 tablet (40 mg total) by mouth once daily. 90 tablet 3    blood sugar diagnostic Strp TEST 3 TIMES DAILY 100 each 0    blood-glucose meter Misc TEST 3 TIMES DAILY 1 each 1    blood-glucose meter,continuous (DEXCOM G7 ) Misc Use as directed. 1 each 0    blood-glucose sensor (DEXCOM G7 SENSOR) Courtney Change every 10 days. 3 each 11    carvediloL (COREG) 6.25 MG tablet Take 1 tablet (6.25 mg total) by mouth 2 (two) times daily with meals. 180 tablet 3    furosemide (LASIX) 40 MG tablet Take 1 tablet (40 mg total) by mouth once daily. 30 tablet 11    gabapentin (NEURONTIN) 600 MG tablet Take 600 mg by mouth As instructed (neuropathic pain). Takes 1 tablet in the morning and 2 tablets at bedtime      hydrALAZINE (APRESOLINE) 10 MG tablet Take 1 tablet (10 mg total) by mouth every 8 (eight) hours. 90 tablet 11    insulin lispro (HUMALOG KWIKPEN INSULIN) 100 unit/mL pen Inject 10 Units into the skin 3 (three) times daily. 9 mL 3    isosorbide mononitrate (IMDUR) 30 MG 24 hr tablet Take 1 tablet (30 mg total) by mouth once daily. 30 tablet 11    lactobacillus acidophilus & bulgar (LACTINEX) 100 million cell packet Take 1 packet (1 each total) by mouth 2 (two) times daily. 30 packet 0    lancets 33 gauge Misc TEST 3 TIMES DAILY 100 each 0    LANTUS SOLOSTAR U-100 INSULIN glargine 100 units/mL SubQ pen Inject 28 Units into the skin once daily. 30 mL 3    metFORMIN (GLUCOPHAGE) 1000 MG tablet Take 1 tablet (1,000 mg total) by mouth 2 (two) times daily with meals.      methocarbamoL (ROBAXIN) 500 MG Tab Take 500 mg by mouth every evening.      nitroGLYCERIN (NITROSTAT) 0.4 MG SL tablet Place 0.4 mg under the tongue every 5 (five) minutes as needed for Chest pain.      oxyCODONE-acetaminophen (PERCOCET)  mg per tablet Take 1 tablet by mouth 2 (two) times daily as needed for Pain.      pantoprazole (PROTONIX) 40 MG tablet Take 1 tablet (40 mg total) by mouth once daily. 30 tablet 11    pen needle,  "diabetic 29 gauge x 1/2" Ndle Use with insulin 4 times a day. 360 each 3    ranolazine (RANEXA) 1,000 mg Tb12 Take 1 tablet (1,000 mg total) by mouth 2 (two) times daily. 60 tablet 11    sildenafiL (VIAGRA) 100 MG tablet Take 1 tablet (100 mg total) by mouth daily as needed for Erectile Dysfunction. 5 tablet 3    spironolactone (ALDACTONE) 25 MG tablet Take 1 tablet (25 mg total) by mouth once daily. 30 tablet 11    ticagrelor (BRILINTA) 90 mg tablet Take 1 tablet (90 mg total) by mouth 2 (two) times daily. 60 tablet 2     No current facility-administered medications for this visit.      Prior to Admission medications    Medication Sig Start Date End Date Taking? Authorizing Provider   aspirin (ECOTRIN) 81 MG EC tablet Take 1 tablet (81 mg total) by mouth once daily. for 21 days 1/4/24 1/25/24  Viviane Hernandez MD   atorvastatin (LIPITOR) 40 MG tablet Take 1 tablet (40 mg total) by mouth once daily. 1/5/24 1/4/25 Yes Viviane Hernandez MD   blood sugar diagnostic Strp TEST 3 TIMES DAILY 1/4/24  Yes Tiff Frias MD   blood-glucose meter Misc TEST 3 TIMES DAILY 1/4/24  Yes Tiff Frias MD   blood-glucose meter,continuous (DEXCOM G7 ) Misc Use as directed. 2/5/24  Yes Funmilayo Merritt NP   blood-glucose sensor (DEXCOM G7 SENSOR) Courtney Change every 10 days. 2/5/24  Yes Funmilayo Merritt NP   carvediloL (COREG) 6.25 MG tablet Take 1 tablet (6.25 mg total) by mouth 2 (two) times daily with meals. 4/23/24 4/23/25 Yes Dante Chavira MD   furosemide (LASIX) 40 MG tablet Take 1 tablet (40 mg total) by mouth once daily. 1/5/24 1/4/25 Yes Viviane Hernandez MD   gabapentin (NEURONTIN) 600 MG tablet Take 600 mg by mouth As instructed (neuropathic pain). Takes 1 tablet in the morning and 2 tablets at bedtime   Yes Provider, Historical   hydrALAZINE (APRESOLINE) 10 MG tablet Take 1 tablet (10 mg total) by mouth every 8 (eight) hours. 1/4/24 1/3/25 Yes Khalif " "Viviane Curran MD   insulin lispro (HUMALOG KWIKPEN INSULIN) 100 unit/mL pen Inject 10 Units into the skin 3 (three) times daily. 1/4/24 1/3/25 Yes Viviane Hernandez MD   isosorbide mononitrate (IMDUR) 30 MG 24 hr tablet Take 1 tablet (30 mg total) by mouth once daily. 1/4/24 1/3/25 Yes Viviane Hernandez MD   lactobacillus acidophilus & bulgar (LACTINEX) 100 million cell packet Take 1 packet (1 each total) by mouth 2 (two) times daily. 9/11/23  Yes Sourav Owens MD   lancets 33 gauge Misc TEST 3 TIMES DAILY 1/4/24  Yes Tiff Frias MD   LANTUS SOLOSTAR U-100 INSULIN glargine 100 units/mL SubQ pen Inject 28 Units into the skin once daily. 3/25/24 3/25/25 Yes Funmilayo Merritt NP   metFORMIN (GLUCOPHAGE) 1000 MG tablet Take 1 tablet (1,000 mg total) by mouth 2 (two) times daily with meals. 12/24/23  Yes Paul Botello MD   methocarbamoL (ROBAXIN) 500 MG Tab Take 500 mg by mouth every evening. 6/19/23  Yes Provider, Historical   nitroGLYCERIN (NITROSTAT) 0.4 MG SL tablet Place 0.4 mg under the tongue every 5 (five) minutes as needed for Chest pain.   Yes Provider, Historical   oxyCODONE-acetaminophen (PERCOCET)  mg per tablet Take 1 tablet by mouth 2 (two) times daily as needed for Pain. 8/13/22  Yes Provider, Historical   pantoprazole (PROTONIX) 40 MG tablet Take 1 tablet (40 mg total) by mouth once daily. 1/5/24 1/4/25 Yes Viviane Hernandez MD   pen needle, diabetic 29 gauge x 1/2" Ndle Use with insulin 4 times a day. 3/25/24  Yes Funmilayo Merritt NP   ranolazine (RANEXA) 1,000 mg Tb12 Take 1 tablet (1,000 mg total) by mouth 2 (two) times daily. 12/15/23 12/14/24 Yes Christopher Hastings MD   sildenafiL (VIAGRA) 100 MG tablet Take 1 tablet (100 mg total) by mouth daily as needed for Erectile Dysfunction. 4/23/24 4/23/25 Yes Dante Chavira MD   spironolactone (ALDACTONE) 25 MG tablet Take 1 tablet (25 mg total) by mouth once daily. 1/5/24 1/4/25 Yes Khalif " Viviane Curran MD   ticagrelor (BRILINTA) 90 mg tablet Take 1 tablet (90 mg total) by mouth 2 (two) times daily. 1/4/24 4/3/24  Viviane Hernandez MD         History  Past Medical History:   Diagnosis Date    Anticoagulant long-term use     Atrial fibrillation 2018    BPH (benign prostatic hyperplasia)     Cataract     COPD (chronic obstructive pulmonary disease)     Coronary artery disease     stents    CVD (cardiovascular disease)     Diabetes mellitus     Erythema multiforme     AKA Denton Edmondson Syndrome    Hypertension     Infected incision     MI (myocardial infarction)     per pt he has had 4     ROSAMARIA on CPAP     RLS (restless legs syndrome)     Perez-Denton syndrome      Past Surgical History:   Procedure Laterality Date    CORONARY ANGIOGRAPHY INCLUDING BYPASS GRAFTS WITH CATHETERIZATION OF LEFT HEART N/A 9/1/2022    Procedure: ANGIOGRAM, CORONARY, INCLUDING BYPASS GRAFT, WITH LEFT HEART CATHETERIZATION;  Surgeon: Paul Botello MD;  Location: [facility] CATH/EP LAB;  Service: Cardiology;  Laterality: N/A;    CORONARY ANGIOGRAPHY INCLUDING BYPASS GRAFTS WITH CATHETERIZATION OF LEFT HEART Left 12/22/2023    Procedure: ANGIOGRAM, CORONARY, INCLUDING BYPASS GRAFT, WITH LEFT HEART CATHETERIZATION;  Surgeon: Paul Botello MD;  Location: [facility] CATH/EP LAB;  Service: Cardiology;  Laterality: Left;    CORONARY ARTERY BYPASS GRAFT (CABG) N/A 4/7/2020    Procedure: CORONARY ARTERY BYPASS GRAFT (CABG)- Excision of left atrial appendage;  Surgeon: Doug Solitario MD;  Location: [facility] OR;  Service: Cardiothoracic;  Laterality: N/A;    CORONARY BYPASS GRAFT ANGIOGRAPHY  12/25/2023    Procedure: Bypass graft study;  Surgeon: Ashley Valverde MD;  Location: [facility] CATH/EP LAB;  Service: Cardiology;;    CORONARY STENT PLACEMENT      CORONARY STENT PLACEMENT N/A 12/22/2023    Procedure: INSERTION, STENT, CORONARY ARTERY;  Surgeon: Paul Botello MD;  Location: [facility] CATH/EP LAB;  Service:  Cardiology;  Laterality: N/A;    WILSON MAZE PROCEDURE N/A 4/7/2020    Procedure: WILSON MAZE PROCEDURE- Attempted;  Surgeon: Doug Solitario MD;  Location: [facility] OR;  Service: Cardiothoracic;  Laterality: N/A;    CYSTOSCOPY N/A 12/10/2018    Procedure: CYSTOSCOPY;  Surgeon: Khushboo Abreu MD;  Location: [facility] OR;  Service: Urology;  Laterality: N/A;    ENDOSCOPIC HARVEST OF VEIN Left 4/7/2020    Procedure: HARVEST-VEIN-ENDOVASCULAR;  Surgeon: Doug Solitario MD;  Location: [facility] OR;  Service: Cardiothoracic;  Laterality: Left;    INCISION OF PERIRECTAL ABSCESS Bilateral 9/1/2023    Procedure: INCISION, ABSCESS, PERIRECTAL;  Surgeon: Brayan Spears MD;  Location: [facility] OR;  Service: General;  Laterality: Bilateral;  stirrups    INSERTION OF INTRA-AORTIC BALLOON ASSIST DEVICE  12/25/2023    Procedure: INSERTION, INTRA-AORTIC BALLOON PUMP;  Surgeon: Ashley Valverde MD;  Location: [facility] CATH/EP LAB;  Service: Cardiology;;    INSERTION OF TEMPORARY PACEMAKER N/A 12/25/2023    Procedure: INSERTION, PACEMAKER, TEMPORARY;  Surgeon: Ashley Valverde MD;  Location: [facility] CATH/EP LAB;  Service: Cardiology;  Laterality: N/A;    INSERTION, ICD, DUAL CHAMBER N/A 1/2/2024    Procedure: Insertion, ICD, Dual Chamber;  Surgeon: Anotnio Callejas MD;  Location: [facility] EP LAB;  Service: Cardiology;  Laterality: N/A;  CM, DUAL ICD, ANES, SJM, MB, 3079    IVUS, CORONARY  12/22/2023    Procedure: IVUS, Coronary;  Surgeon: Paul Botello MD;  Location: [facility] CATH/EP LAB;  Service: Cardiology;;    LEFT HEART CATHETERIZATION Left 3/17/2020    Procedure: CATHETERIZATION, HEART, LEFT;  Surgeon: Tyree Walters MD;  Location: [facility] CATH/EP LAB;  Service: Cardiology;  Laterality: Left;    PERCUTANEOUS CORONARY INTERVENTION, ARTERY N/A 12/22/2023    Procedure: Percutaneous coronary intervention;  Surgeon: Paul Botello MD;  Location: [facility] CATH/EP LAB;  Service: Cardiology;  Laterality: N/A;    PERCUTANEOUS CORONARY  INTERVENTION, ARTERY N/A 2023    Procedure: Percutaneous coronary intervention;  Surgeon: Ashley Valverde MD;  Location: [facility] CATH/EP LAB;  Service: Cardiology;  Laterality: N/A;    PLACEMENT, IABP  2023    Procedure: Placement, IABP;  Surgeon: Ashlye Valverde MD;  Location: [facility] CATH/EP LAB;  Service: Cardiology;;    STERNAL WIRES REMOVAL N/A 2020    Procedure: REMOVAL, STERNAL WIRE;  Surgeon: Doug Solitario MD;  Location: [facility] OR;  Service: Peripheral Vascular;  Laterality: N/A;    ULTRASOUND OF PROSTATE FOR VOLUME DETERMINATION  12/10/2018    Procedure: ULTRASOUND, PROSTATE, FOR VOLUME DETERMINATION;  Surgeon: Khushboo Abreu MD;  Location: [facility] OR;  Service: Urology;;     Social History     Socioeconomic History    Marital status:    Tobacco Use    Smoking status: Every Day     Current packs/day: 0.00     Average packs/day: 3.0 packs/day for 30.0 years (90.0 ttl pk-yrs)     Types: Cigarettes     Start date: 1990     Last attempt to quit: 2020     Years since quittin.6    Smokeless tobacco: Never   Substance and Sexual Activity    Alcohol use: Not Currently    Drug use: Yes     Frequency: 1.0 times per week     Types: Marijuana    Sexual activity: Not Currently   Social History Narrative    ** Merged History Encounter **          Social Drivers of Health     Financial Resource Strain: Medium Risk (2024)    Overall Financial Resource Strain (CARDIA)     Difficulty of Paying Living Expenses: Somewhat hard   Food Insecurity: Food Insecurity Present (2024)    Hunger Vital Sign     Worried About Running Out of Food in the Last Year: Sometimes true     Ran Out of Food in the Last Year: Sometimes true   Transportation Needs: No Transportation Needs (2024)    PRAPARE - Transportation     Lack of Transportation (Medical): No     Lack of Transportation (Non-Medical): No   Physical Activity: Inactive (2024)    Exercise Vital Sign     Days of  Exercise per Week: 3 days     Minutes of Exercise per Session: 0 min   Stress: Stress Concern Present (2/5/2024)    [employer] - Occupational Stress Questionnaire     Feeling of Stress : To some extent   Housing Stability: High Risk (2/5/2024)    Housing Stability Vital Sign     Unable to Pay for Housing in the Last Year: Yes     Number of Places Lived in the Last Year: 1     Unstable Housing in the Last Year: No         Allergies  Review of patient's allergies indicates:   Allergen Reactions    Pesticide Dermatitis and Rash         Review of Systems   Review of Systems   Cardiovascular: Negative.    Respiratory: Negative.           Physical Exam  Wt Readings from Last 1 Encounters:   12/03/24 120.7 kg (265 lb 15.8 oz)     BP Readings from Last 3 Encounters:   12/03/24 (!) 152/94   06/25/24 120/80   04/23/24 (!) 148/92     Pulse Readings from Last 1 Encounters:   12/03/24 70     Body mass index is 37.1 kg/m².    Physical Exam  Vitals reviewed.   Constitutional:       Appearance: He is obese.   Cardiovascular:      Rate and Rhythm: Normal rate and regular rhythm.      Heart sounds: Normal heart sounds.      Comments: L ICD site healed.  Sternal CABG scar  Pulmonary:      Breath sounds: Normal breath sounds and air entry.   Musculoskeletal:      Right lower leg: No edema.      Left lower leg: No edema.   Neurological:      Mental Status: He is alert.             Assessment  1. S/P CABG (coronary artery bypass graft) (Primary)  Stable.  No angina    2. Coronary artery disease involving native coronary artery of native heart without angina pectoris  Stable.  No angina.  Continue current medications and monitor    3. Paroxysmal atrial fibrillation  Resolved    4. Hyperlipidemia LDL goal <70  Stable on statin.  Continue medications and monitor    5. Obesity (BMI 30-39.9)  Unchanged        Plan and Discussion  He has not taken any of his medications yet today.  Continue current cardiac  medications.  Encouraged patient to follow up with EP Clinic for ICD check.  Will get labs at the next visit.    Follow Up  6 months with labs      Dante Chavira MD, F.A.C.C, F.S.C.A.I.      Total professional time spent for the encounter: 30 minutes  Time was spent preparing to see the patient, reviewing results of prior testing, obtaining and/or reviewing separately obtained history, performing a medically appropriate examination and interview, counseling and educating the patient/family, ordering medications/tests/procedures, referring and communicating with other health care professionals, documenting clinical information in the electronic health record, and independently interpreting results.    Disclaimer: This document was created using voice recognition software (M*Modal Fluency Direct). Although it may be edited, this document may contain errors related to incorrect recognition of the spoken word. Please call the physician if clarification is needed.

## 2025-01-10 ENCOUNTER — HOSPITAL ENCOUNTER (OUTPATIENT)
Facility: HOSPITAL | Age: 60
Discharge: HOME | End: 2025-01-12
Attending: EMERGENCY MEDICINE | Admitting: INTERNAL MEDICINE
Payer: MEDICAID

## 2025-01-10 DIAGNOSIS — I25.119 ATHEROSCLEROSIS OF NATIVE CORONARY ARTERY OF NATIVE HEART WITH ANGINA PECTORIS: ICD-10-CM

## 2025-01-10 DIAGNOSIS — R07.9 CHEST PAIN, UNSPECIFIED TYPE: Primary | ICD-10-CM

## 2025-01-10 DIAGNOSIS — R07.9 CHEST PAIN: ICD-10-CM

## 2025-01-10 PROBLEM — I50.9 CHF (CONGESTIVE HEART FAILURE): Status: ACTIVE | Noted: 2025-01-10

## 2025-01-10 LAB
ALBUMIN SERPL BCP-MCNC: 3.9 G/DL (ref 3.5–5.2)
ALP SERPL-CCNC: 63 U/L (ref 55–135)
ALT SERPL W/O P-5'-P-CCNC: 15 U/L (ref 10–44)
ANION GAP SERPL CALC-SCNC: 8 MMOL/L (ref 8–16)
AST SERPL-CCNC: 12 U/L (ref 10–40)
BASOPHILS # BLD AUTO: 0.08 K/UL (ref 0–0.2)
BASOPHILS NFR BLD: 1 % (ref 0–1.9)
BILIRUB SERPL-MCNC: 0.4 MG/DL (ref 0.1–1)
BNP SERPL-MCNC: 185 PG/ML (ref 0–99)
BUN SERPL-MCNC: 11 MG/DL (ref 6–20)
CALCIUM SERPL-MCNC: 8.7 MG/DL (ref 8.7–10.5)
CHLORIDE SERPL-SCNC: 101 MMOL/L (ref 95–110)
CO2 SERPL-SCNC: 23 MMOL/L (ref 23–29)
CREAT SERPL-MCNC: 1 MG/DL (ref 0.5–1.4)
DIFFERENTIAL METHOD BLD: NORMAL
EOSINOPHIL # BLD AUTO: 0.2 K/UL (ref 0–0.5)
EOSINOPHIL NFR BLD: 3 % (ref 0–8)
ERYTHROCYTE [DISTWIDTH] IN BLOOD BY AUTOMATED COUNT: 12.9 % (ref 11.5–14.5)
EST. GFR  (NO RACE VARIABLE): >60 ML/MIN/1.73 M^2
GLUCOSE SERPL-MCNC: 258 MG/DL (ref 70–110)
HCT VFR BLD AUTO: 44.9 % (ref 40–54)
HGB BLD-MCNC: 15.3 G/DL (ref 14–18)
IMM GRANULOCYTES # BLD AUTO: 0.02 K/UL (ref 0–0.04)
IMM GRANULOCYTES NFR BLD AUTO: 0.3 % (ref 0–0.5)
LYMPHOCYTES # BLD AUTO: 2.6 K/UL (ref 1–4.8)
LYMPHOCYTES NFR BLD: 32.9 % (ref 18–48)
MAGNESIUM SERPL-MCNC: 1.4 MG/DL (ref 1.6–2.6)
MCH RBC QN AUTO: 30.4 PG (ref 27–31)
MCHC RBC AUTO-ENTMCNC: 34.1 G/DL (ref 32–36)
MCV RBC AUTO: 89 FL (ref 82–98)
MONOCYTES # BLD AUTO: 0.5 K/UL (ref 0.3–1)
MONOCYTES NFR BLD: 6.6 % (ref 4–15)
NEUTROPHILS # BLD AUTO: 4.5 K/UL (ref 1.8–7.7)
NEUTROPHILS NFR BLD: 56.2 % (ref 38–73)
NRBC BLD-RTO: 0 /100 WBC
PLATELET # BLD AUTO: 245 K/UL (ref 150–450)
PMV BLD AUTO: 9.7 FL (ref 9.2–12.9)
POCT GLUCOSE: 220 MG/DL (ref 70–110)
POTASSIUM SERPL-SCNC: 4.1 MMOL/L (ref 3.5–5.1)
PROT SERPL-MCNC: 7.2 G/DL (ref 6–8.4)
RBC # BLD AUTO: 5.04 M/UL (ref 4.6–6.2)
SODIUM SERPL-SCNC: 132 MMOL/L (ref 136–145)
TROPONIN I SERPL HS-MCNC: 13.4 PG/ML (ref 0–14.9)
TROPONIN I SERPL HS-MCNC: 13.9 PG/ML (ref 0–14.9)
WBC # BLD AUTO: 7.91 K/UL (ref 3.9–12.7)

## 2025-01-10 PROCEDURE — 84484 ASSAY OF TROPONIN QUANT: CPT | Performed by: EMERGENCY MEDICINE

## 2025-01-10 PROCEDURE — G0378 HOSPITAL OBSERVATION PER HR: HCPCS

## 2025-01-10 PROCEDURE — 25000003 PHARM REV CODE 250: Performed by: EMERGENCY MEDICINE

## 2025-01-10 PROCEDURE — 93005 ELECTROCARDIOGRAM TRACING: CPT | Performed by: GENERAL PRACTICE

## 2025-01-10 PROCEDURE — 25000003 PHARM REV CODE 250: Performed by: INTERNAL MEDICINE

## 2025-01-10 PROCEDURE — 83880 ASSAY OF NATRIURETIC PEPTIDE: CPT | Performed by: EMERGENCY MEDICINE

## 2025-01-10 PROCEDURE — 63600175 PHARM REV CODE 636 W HCPCS: Performed by: INTERNAL MEDICINE

## 2025-01-10 PROCEDURE — 80053 COMPREHEN METABOLIC PANEL: CPT | Performed by: EMERGENCY MEDICINE

## 2025-01-10 PROCEDURE — 85025 COMPLETE CBC W/AUTO DIFF WBC: CPT | Performed by: EMERGENCY MEDICINE

## 2025-01-10 PROCEDURE — 96372 THER/PROPH/DIAG INJ SC/IM: CPT | Performed by: INTERNAL MEDICINE

## 2025-01-10 PROCEDURE — 99285 EMERGENCY DEPT VISIT HI MDM: CPT | Mod: 25

## 2025-01-10 PROCEDURE — 83735 ASSAY OF MAGNESIUM: CPT | Performed by: EMERGENCY MEDICINE

## 2025-01-10 PROCEDURE — 93010 ELECTROCARDIOGRAM REPORT: CPT | Mod: ,,, | Performed by: GENERAL PRACTICE

## 2025-01-10 RX ORDER — TALC
6 POWDER (GRAM) TOPICAL NIGHTLY PRN
Status: DISCONTINUED | OUTPATIENT
Start: 2025-01-10 | End: 2025-01-12 | Stop reason: HOSPADM

## 2025-01-10 RX ORDER — ALUMINUM HYDROXIDE, MAGNESIUM HYDROXIDE, AND SIMETHICONE 1200; 120; 1200 MG/30ML; MG/30ML; MG/30ML
30 SUSPENSION ORAL 4 TIMES DAILY PRN
Status: DISCONTINUED | OUTPATIENT
Start: 2025-01-10 | End: 2025-01-12 | Stop reason: HOSPADM

## 2025-01-10 RX ORDER — ESCITALOPRAM OXALATE 10 MG/1
10 TABLET ORAL EVERY MORNING
COMMUNITY

## 2025-01-10 RX ORDER — METOPROLOL SUCCINATE 100 MG/1
100 TABLET, EXTENDED RELEASE ORAL 2 TIMES DAILY
COMMUNITY

## 2025-01-10 RX ORDER — FUROSEMIDE 40 MG/1
40 TABLET ORAL DAILY
Status: DISCONTINUED | OUTPATIENT
Start: 2025-01-11 | End: 2025-01-12 | Stop reason: HOSPADM

## 2025-01-10 RX ORDER — ATORVASTATIN CALCIUM 40 MG/1
40 TABLET, FILM COATED ORAL DAILY
Status: DISCONTINUED | OUTPATIENT
Start: 2025-01-11 | End: 2025-01-12 | Stop reason: HOSPADM

## 2025-01-10 RX ORDER — ISOSORBIDE MONONITRATE 30 MG/1
30 TABLET, EXTENDED RELEASE ORAL DAILY
Status: DISCONTINUED | OUTPATIENT
Start: 2025-01-11 | End: 2025-01-12 | Stop reason: HOSPADM

## 2025-01-10 RX ORDER — ESCITALOPRAM OXALATE 10 MG/1
10 TABLET ORAL EVERY MORNING
Status: DISCONTINUED | OUTPATIENT
Start: 2025-01-11 | End: 2025-01-12 | Stop reason: HOSPADM

## 2025-01-10 RX ORDER — ONDANSETRON HYDROCHLORIDE 2 MG/ML
4 INJECTION, SOLUTION INTRAVENOUS EVERY 6 HOURS PRN
Status: DISCONTINUED | OUTPATIENT
Start: 2025-01-10 | End: 2025-01-12 | Stop reason: HOSPADM

## 2025-01-10 RX ORDER — INSULIN ASPART 100 [IU]/ML
0-10 INJECTION, SOLUTION INTRAVENOUS; SUBCUTANEOUS
Status: DISCONTINUED | OUTPATIENT
Start: 2025-01-10 | End: 2025-01-12 | Stop reason: HOSPADM

## 2025-01-10 RX ORDER — ACETAMINOPHEN 325 MG/1
650 TABLET ORAL EVERY 4 HOURS PRN
Status: DISCONTINUED | OUTPATIENT
Start: 2025-01-10 | End: 2025-01-12 | Stop reason: HOSPADM

## 2025-01-10 RX ORDER — IBUPROFEN 200 MG
16 TABLET ORAL
Status: DISCONTINUED | OUTPATIENT
Start: 2025-01-10 | End: 2025-01-12 | Stop reason: HOSPADM

## 2025-01-10 RX ORDER — HYDROCODONE BITARTRATE AND ACETAMINOPHEN 5; 325 MG/1; MG/1
1 TABLET ORAL EVERY 6 HOURS PRN
Status: DISCONTINUED | OUTPATIENT
Start: 2025-01-10 | End: 2025-01-11

## 2025-01-10 RX ORDER — INSULIN GLARGINE-YFGN 100 [IU]/ML
26 INJECTION, SOLUTION SUBCUTANEOUS NIGHTLY
COMMUNITY

## 2025-01-10 RX ORDER — CARVEDILOL 6.25 MG/1
6.25 TABLET ORAL 2 TIMES DAILY WITH MEALS
Status: DISCONTINUED | OUTPATIENT
Start: 2025-01-10 | End: 2025-01-12 | Stop reason: HOSPADM

## 2025-01-10 RX ORDER — GLUCAGON 1 MG
1 KIT INJECTION
Status: DISCONTINUED | OUTPATIENT
Start: 2025-01-10 | End: 2025-01-12 | Stop reason: HOSPADM

## 2025-01-10 RX ORDER — ACETAMINOPHEN 325 MG/1
650 TABLET ORAL EVERY 8 HOURS PRN
Status: DISCONTINUED | OUTPATIENT
Start: 2025-01-10 | End: 2025-01-12 | Stop reason: HOSPADM

## 2025-01-10 RX ORDER — ENOXAPARIN SODIUM 100 MG/ML
40 INJECTION SUBCUTANEOUS EVERY 24 HOURS
Status: DISCONTINUED | OUTPATIENT
Start: 2025-01-10 | End: 2025-01-12 | Stop reason: HOSPADM

## 2025-01-10 RX ORDER — ASPIRIN 325 MG
325 TABLET ORAL
Status: COMPLETED | OUTPATIENT
Start: 2025-01-10 | End: 2025-01-10

## 2025-01-10 RX ORDER — TRAZODONE HYDROCHLORIDE 100 MG/1
100-200 TABLET ORAL NIGHTLY
COMMUNITY

## 2025-01-10 RX ORDER — LANOLIN ALCOHOL/MO/W.PET/CERES
800 CREAM (GRAM) TOPICAL
Status: DISCONTINUED | OUTPATIENT
Start: 2025-01-10 | End: 2025-01-12 | Stop reason: HOSPADM

## 2025-01-10 RX ORDER — EMPAGLIFLOZIN 25 MG/1
25 TABLET, FILM COATED ORAL DAILY
COMMUNITY

## 2025-01-10 RX ORDER — ALPRAZOLAM 1 MG/1
1 TABLET ORAL NIGHTLY PRN
COMMUNITY

## 2025-01-10 RX ORDER — SODIUM,POTASSIUM PHOSPHATES 280-250MG
2 POWDER IN PACKET (EA) ORAL
Status: DISCONTINUED | OUTPATIENT
Start: 2025-01-10 | End: 2025-01-12 | Stop reason: HOSPADM

## 2025-01-10 RX ORDER — NALOXONE HCL 0.4 MG/ML
0.02 VIAL (ML) INJECTION
Status: DISCONTINUED | OUTPATIENT
Start: 2025-01-10 | End: 2025-01-12 | Stop reason: HOSPADM

## 2025-01-10 RX ORDER — IBUPROFEN 200 MG
24 TABLET ORAL
Status: DISCONTINUED | OUTPATIENT
Start: 2025-01-10 | End: 2025-01-12 | Stop reason: HOSPADM

## 2025-01-10 RX ORDER — INSULIN GLARGINE 100 [IU]/ML
20 INJECTION, SOLUTION SUBCUTANEOUS NIGHTLY
Status: DISCONTINUED | OUTPATIENT
Start: 2025-01-10 | End: 2025-01-12 | Stop reason: HOSPADM

## 2025-01-10 RX ORDER — ASPIRIN 81 MG/1
81 TABLET ORAL DAILY
Status: DISCONTINUED | OUTPATIENT
Start: 2025-01-11 | End: 2025-01-12 | Stop reason: HOSPADM

## 2025-01-10 RX ADMIN — INSULIN GLARGINE 20 UNITS: 100 INJECTION, SOLUTION SUBCUTANEOUS at 09:01

## 2025-01-10 RX ADMIN — HYDROCODONE BITARTRATE AND ACETAMINOPHEN 1 TABLET: 5; 325 TABLET ORAL at 08:01

## 2025-01-10 RX ADMIN — ASPIRIN 325 MG ORAL TABLET 325 MG: 325 PILL ORAL at 06:01

## 2025-01-10 RX ADMIN — ENOXAPARIN SODIUM 40 MG: 40 INJECTION SUBCUTANEOUS at 08:01

## 2025-01-10 RX ADMIN — Medication 800 MG: at 11:01

## 2025-01-10 RX ADMIN — TICAGRELOR 90 MG: 90 TABLET ORAL at 08:01

## 2025-01-10 RX ADMIN — INSULIN ASPART 2 UNITS: 100 INJECTION, SOLUTION INTRAVENOUS; SUBCUTANEOUS at 09:01

## 2025-01-10 RX ADMIN — CARVEDILOL 6.25 MG: 6.25 TABLET, FILM COATED ORAL at 08:01

## 2025-01-10 RX ADMIN — Medication 800 MG: at 08:01

## 2025-01-10 NOTE — FIRST PROVIDER EVALUATION
Medical screening examination initiated.  I have conducted a focused provider triage encounter, findings are as follows:    Brief history of present illness:  cp    There were no vitals filed for this visit.    Pertinent physical exam:  nad    Brief workup plan:  cardiac wkup    Preliminary workup initiated; this workup will be continued and followed by the physician or advanced practice provider that is assigned to the patient when roomed.

## 2025-01-10 NOTE — ED PROVIDER NOTES
Encounter Date: 1/10/2025       History     Chief Complaint   Patient presents with    Chest Pain     X COUPLE DAYS     59-year-old male with a past medical history of CAD, COPD, atrial fibrillation, ROSAMARIA, hypertension and diabetes mellitus presents for evaluation of left-sided chest pain.  He reports that it started 1 day ago and has radiation to the left arm.  He reports some associated shortness of breath with it.  There is no associated nausea/vomiting, abdominal pain, cough, congestion, fever/chills, or diarrhea.  His symptoms are worse with exertion.  There are no alleviating factors.      Review of patient's allergies indicates:   Allergen Reactions    Pesticide Dermatitis and Rash     Past Medical History:   Diagnosis Date    Anticoagulant long-term use     Atrial fibrillation 2018    BPH (benign prostatic hyperplasia)     Cataract     COPD (chronic obstructive pulmonary disease)     Coronary artery disease     stents    CVD (cardiovascular disease)     Diabetes mellitus     Erythema multiforme     AKA Denton Edmondson Syndrome    Hypertension     Infected incision     MI (myocardial infarction)     per pt he has had 4     ROSAMARIA on CPAP     RLS (restless legs syndrome)     Perez-Denton syndrome      Past Surgical History:   Procedure Laterality Date    CORONARY ANGIOGRAPHY INCLUDING BYPASS GRAFTS WITH CATHETERIZATION OF LEFT HEART N/A 9/1/2022    Procedure: ANGIOGRAM, CORONARY, INCLUDING BYPASS GRAFT, WITH LEFT HEART CATHETERIZATION;  Surgeon: Paul Botello MD;  Location: [facility] CATH/EP LAB;  Service: Cardiology;  Laterality: N/A;    CORONARY ANGIOGRAPHY INCLUDING BYPASS GRAFTS WITH CATHETERIZATION OF LEFT HEART Left 12/22/2023    Procedure: ANGIOGRAM, CORONARY, INCLUDING BYPASS GRAFT, WITH LEFT HEART CATHETERIZATION;  Surgeon: Paul Botello MD;  Location: [facility] CATH/EP LAB;  Service: Cardiology;  Laterality: Left;    CORONARY ARTERY BYPASS GRAFT (CABG) N/A 4/7/2020    Procedure: CORONARY ARTERY BYPASS  GRAFT (CABG)- Excision of left atrial appendage;  Surgeon: Doug Solitario MD;  Location: [facility] OR;  Service: Cardiothoracic;  Laterality: N/A;    CORONARY BYPASS GRAFT ANGIOGRAPHY  12/25/2023    Procedure: Bypass graft study;  Surgeon: Ashley Valverde MD;  Location: [facility] CATH/EP LAB;  Service: Cardiology;;    CORONARY STENT PLACEMENT      CORONARY STENT PLACEMENT N/A 12/22/2023    Procedure: INSERTION, STENT, CORONARY ARTERY;  Surgeon: Paul Botello MD;  Location: [facility] CATH/EP LAB;  Service: Cardiology;  Laterality: N/A;    WILSON MAZE PROCEDURE N/A 4/7/2020    Procedure: WILSON MAZE PROCEDURE- Attempted;  Surgeon: Doug Solitario MD;  Location: [facility] OR;  Service: Cardiothoracic;  Laterality: N/A;    CYSTOSCOPY N/A 12/10/2018    Procedure: CYSTOSCOPY;  Surgeon: Khushboo Abreu MD;  Location: [facility] OR;  Service: Urology;  Laterality: N/A;    ENDOSCOPIC HARVEST OF VEIN Left 4/7/2020    Procedure: HARVEST-VEIN-ENDOVASCULAR;  Surgeon: Doug Solitario MD;  Location: [facility] OR;  Service: Cardiothoracic;  Laterality: Left;    INCISION OF PERIRECTAL ABSCESS Bilateral 9/1/2023    Procedure: INCISION, ABSCESS, PERIRECTAL;  Surgeon: Brayan Spears MD;  Location: [facility] OR;  Service: General;  Laterality: Bilateral;  stirrups    INSERTION OF INTRA-AORTIC BALLOON ASSIST DEVICE  12/25/2023    Procedure: INSERTION, INTRA-AORTIC BALLOON PUMP;  Surgeon: Ashley Valverde MD;  Location: [facility] CATH/EP LAB;  Service: Cardiology;;    INSERTION OF TEMPORARY PACEMAKER N/A 12/25/2023    Procedure: INSERTION, PACEMAKER, TEMPORARY;  Surgeon: Ashley Valverde MD;  Location: [facility] CATH/EP LAB;  Service: Cardiology;  Laterality: N/A;    INSERTION, ICD, DUAL CHAMBER N/A 1/2/2024    Procedure: Insertion, ICD, Dual Chamber;  Surgeon: Antonio Callejas MD;  Location: [facility] EP LAB;  Service: Cardiology;  Laterality: N/A;  CM, DUAL ICD, ANES, SJM, MB, 5259    IVUS, CORONARY  12/22/2023    Procedure: IVUS,  Coronary;  Surgeon: Paul Botello MD;  Location: [facility] CATH/EP LAB;  Service: Cardiology;;    LEFT HEART CATHETERIZATION Left 3/17/2020    Procedure: CATHETERIZATION, HEART, LEFT;  Surgeon: Tyree Walters MD;  Location: [facility] CATH/EP LAB;  Service: Cardiology;  Laterality: Left;    PERCUTANEOUS CORONARY INTERVENTION, ARTERY N/A 2023    Procedure: Percutaneous coronary intervention;  Surgeon: Paul Botello MD;  Location: [facility] CATH/EP LAB;  Service: Cardiology;  Laterality: N/A;    PERCUTANEOUS CORONARY INTERVENTION, ARTERY N/A 2023    Procedure: Percutaneous coronary intervention;  Surgeon: Ashley Valverde MD;  Location: [facility] CATH/EP LAB;  Service: Cardiology;  Laterality: N/A;    PLACEMENT, IABP  2023    Procedure: Placement, IABP;  Surgeon: Ashley Valverde MD;  Location: [facility] CATH/EP LAB;  Service: Cardiology;;    STERNAL WIRES REMOVAL N/A 2020    Procedure: REMOVAL, STERNAL WIRE;  Surgeon: Doug Solitario MD;  Location: [facility] OR;  Service: Peripheral Vascular;  Laterality: N/A;    ULTRASOUND OF PROSTATE FOR VOLUME DETERMINATION  12/10/2018    Procedure: ULTRASOUND, PROSTATE, FOR VOLUME DETERMINATION;  Surgeon: Khushboo Abreu MD;  Location: [facility] OR;  Service: Urology;;     Family History   Problem Relation Name Age of Onset    Heart disease Mother      Diabetes Mother      Hyperlipidemia Father      Cancer Father       Social History     Tobacco Use    Smoking status: Every Day     Current packs/day: 0.00     Average packs/day: 3.0 packs/day for 30.0 years (90.0 ttl pk-yrs)     Types: Cigarettes     Start date: 1990     Last attempt to quit: 2020     Years since quittin.7    Smokeless tobacco: Never   Substance Use Topics    Alcohol use: Not Currently    Drug use: Yes     Frequency: 1.0 times per week     Types: Marijuana     Review of Systems   Constitutional:  Negative for chills, diaphoresis, fatigue and fever.   HENT:  Negative for congestion  and rhinorrhea.    Respiratory:  Positive for shortness of breath. Negative for cough.    Cardiovascular:  Positive for chest pain.   Gastrointestinal:  Negative for abdominal pain, diarrhea, nausea and vomiting.   Genitourinary:  Negative for dysuria, frequency and testicular pain.   Musculoskeletal:  Negative for gait problem.   Skin:  Negative for color change.   Neurological:  Negative for dizziness and numbness.   Psychiatric/Behavioral:  Negative for agitation and confusion.        Physical Exam     Initial Vitals [01/10/25 1424]   BP Pulse Resp Temp SpO2   (!) 171/93 70 19 98.9 °F (37.2 °C) 98 %      MAP       --         Physical Exam    Nursing note and vitals reviewed.  Constitutional: He appears well-developed and well-nourished.   HENT:   Head: Normocephalic and atraumatic.   Eyes: EOM are normal. Pupils are equal, round, and reactive to light.   Neck: Neck supple.   Cardiovascular:  Normal rate and regular rhythm.           Pulmonary/Chest: Breath sounds normal.   Abdominal: Abdomen is soft. Bowel sounds are normal. He exhibits no distension. There is no abdominal tenderness. There is no rebound and no guarding.   Musculoskeletal:         General: Normal range of motion.      Cervical back: Neck supple.     Neurological: He is alert and oriented to person, place, and time.   Skin: Skin is warm and dry.   Psychiatric: He has a normal mood and affect.         ED Course   Procedures  Labs Reviewed   MAGNESIUM - Abnormal       Result Value    Magnesium 1.4 (*)    COMPREHENSIVE METABOLIC PANEL - Abnormal    Sodium 132 (*)     Potassium 4.1      Chloride 101      CO2 23      Glucose 258 (*)     BUN 11      Creatinine 1.0      Calcium 8.7      Total Protein 7.2      Albumin 3.9      Total Bilirubin 0.4      Alkaline Phosphatase 63      AST 12      ALT 15      eGFR >60.0      Anion Gap 8     B-TYPE NATRIURETIC PEPTIDE - Abnormal     (*)    CBC W/ AUTO DIFFERENTIAL    WBC 7.91      RBC 5.04      Hemoglobin  15.3      Hematocrit 44.9      MCV 89      MCH 30.4      MCHC 34.1      RDW 12.9      Platelets 245      MPV 9.7      Immature Granulocytes 0.3      Gran # (ANC) 4.5      Immature Grans (Abs) 0.02      Lymph # 2.6      Mono # 0.5      Eos # 0.2      Baso # 0.08      nRBC 0      Gran % 56.2      Lymph % 32.9      Mono % 6.6      Eosinophil % 3.0      Basophil % 1.0      Differential Method Automated     TROPONIN I HIGH SENSITIVITY    Troponin I High Sensitivity 13.4     TROPONIN I HIGH SENSITIVITY    Troponin I High Sensitivity 13.9       EKG Readings: (Independently Interpreted)   Initial Reading: No STEMI. Rhythm: Normal Sinus Rhythm. Heart Rate: 70. Ectopy: No Ectopy. Conduction: Normal. ST Segments: Normal ST Segments. T Waves: Normal. T Waves Flipped: I and AVL. Clinical Impression: Normal Sinus Rhythm     ECG Results              EKG 12-lead (In process)        Collection Time Result Time QRS Duration OHS QTC Calculation    01/10/25 14:19:46 01/10/25 14:36:10 100 475                     In process by Interface, Lab In [facility] (01/10/25 14:36:13)                   Narrative:    Test Reason : R07.9,    Vent. Rate :  70 BPM     Atrial Rate :  70 BPM     P-R Int : 154 ms          QRS Dur : 100 ms      QT Int : 440 ms       P-R-T Axes :  36 -10 110 degrees    QTcB Int : 475 ms    Normal sinus rhythm  T wave abnormality, consider lateral ischemia  Abnormal ECG  When compared with ECG of 01-Feb-2024 12:00,  Aberrant conduction is no longer Present  Nonspecific T wave abnormality no longer evident in Inferior leads  T wave inversion now evident in Lateral leads    Referred By:            Confirmed By:                                   Imaging Results              X-Ray Chest AP Portable (Final result)  Result time 01/10/25 16:18:59      Final result by Javi Teran MD (01/10/25 16:18:59)                   Impression:      No acute radiographic abnormalities.      Electronically signed by: Javi  Parul  Date:    01/10/2025  Time:    16:18               Narrative:    EXAMINATION:  XR CHEST AP PORTABLE    CLINICAL HISTORY:  Chest Pain;    COMPARISON:  February 2024    FINDINGS:  AP view demonstrates normal heart size.  There has been previous median sternotomy and coronary arterial stent placement.  Implantable cardiac device is unchanged in position.  No infiltrates or effusions are identified.  Osseous structures are unremarkable.                                       Medications   aspirin tablet 325 mg (325 mg Oral Given 1/10/25 1814)     Medical Decision Making  59-year-old male presents for chest pain.    Initial differential diagnosis included but not limited to unstable angina, acute MI, and reflux disease.    Risk  OTC drugs.  Risk Details: The patient was emergently evaluated in the emergency department, his evaluation was significant for a middle-age male with a normal lung exam.  The patient's EKG does have new T-wave changes noted per my independent interpretation.  The patient's chest x-ray showed no acute abnormalities per Radiology.  The patient's labs showed no acute abnormalities, including multiple serial negative troponins.  I have significant concern that this patient's chest pain is of a cardiac origin.  I will admit him to the hospitalist service for further care.  The case was discussed with the hospitalist on-call, Dr. Blevins.  He has accepted the patient for admission.                                      Clinical Impression:  Final diagnoses:  [R07.9] Chest pain  [R07.9] Chest pain, unspecified type (Primary)          ED Disposition Condition    Observation Stable                Tony Angela MD  01/10/25 9531

## 2025-01-11 LAB
ALBUMIN SERPL BCP-MCNC: 3.6 G/DL (ref 3.5–5.2)
ALP SERPL-CCNC: 55 U/L (ref 55–135)
ALT SERPL W/O P-5'-P-CCNC: 13 U/L (ref 10–44)
ANION GAP SERPL CALC-SCNC: 7 MMOL/L (ref 8–16)
AST SERPL-CCNC: 12 U/L (ref 10–40)
BASOPHILS # BLD AUTO: 0.09 K/UL (ref 0–0.2)
BASOPHILS NFR BLD: 1.1 % (ref 0–1.9)
BILIRUB SERPL-MCNC: 0.5 MG/DL (ref 0.1–1)
BUN SERPL-MCNC: 13 MG/DL (ref 6–20)
CALCIUM SERPL-MCNC: 8.4 MG/DL (ref 8.7–10.5)
CHLORIDE SERPL-SCNC: 99 MMOL/L (ref 95–110)
CO2 SERPL-SCNC: 29 MMOL/L (ref 23–29)
CREAT SERPL-MCNC: 1.1 MG/DL (ref 0.5–1.4)
DIFFERENTIAL METHOD BLD: NORMAL
EOSINOPHIL # BLD AUTO: 0.2 K/UL (ref 0–0.5)
EOSINOPHIL NFR BLD: 3 % (ref 0–8)
ERYTHROCYTE [DISTWIDTH] IN BLOOD BY AUTOMATED COUNT: 13 % (ref 11.5–14.5)
EST. GFR  (NO RACE VARIABLE): >60 ML/MIN/1.73 M^2
GLUCOSE SERPL-MCNC: 192 MG/DL (ref 70–110)
HCT VFR BLD AUTO: 41.6 % (ref 40–54)
HGB BLD-MCNC: 14.1 G/DL (ref 14–18)
IMM GRANULOCYTES # BLD AUTO: 0.02 K/UL (ref 0–0.04)
IMM GRANULOCYTES NFR BLD AUTO: 0.3 % (ref 0–0.5)
LYMPHOCYTES # BLD AUTO: 3.3 K/UL (ref 1–4.8)
LYMPHOCYTES NFR BLD: 41.2 % (ref 18–48)
MAGNESIUM SERPL-MCNC: 1.6 MG/DL (ref 1.6–2.6)
MCH RBC QN AUTO: 30.3 PG (ref 27–31)
MCHC RBC AUTO-ENTMCNC: 33.9 G/DL (ref 32–36)
MCV RBC AUTO: 90 FL (ref 82–98)
MONOCYTES # BLD AUTO: 0.6 K/UL (ref 0.3–1)
MONOCYTES NFR BLD: 8.1 % (ref 4–15)
NEUTROPHILS # BLD AUTO: 3.6 K/UL (ref 1.8–7.7)
NEUTROPHILS NFR BLD: 46.3 % (ref 38–73)
NRBC BLD-RTO: 0 /100 WBC
OHS QRS DURATION: 100 MS
OHS QTC CALCULATION: 475 MS
PLATELET # BLD AUTO: 207 K/UL (ref 150–450)
PMV BLD AUTO: 9.6 FL (ref 9.2–12.9)
POCT GLUCOSE: 173 MG/DL (ref 70–110)
POCT GLUCOSE: 293 MG/DL (ref 70–110)
POCT GLUCOSE: 293 MG/DL (ref 70–110)
POCT GLUCOSE: 44 MG/DL (ref 70–110)
POTASSIUM SERPL-SCNC: 4.2 MMOL/L (ref 3.5–5.1)
PROT SERPL-MCNC: 6.5 G/DL (ref 6–8.4)
RBC # BLD AUTO: 4.65 M/UL (ref 4.6–6.2)
SODIUM SERPL-SCNC: 135 MMOL/L (ref 136–145)
TROPONIN I SERPL HS-MCNC: 12.2 PG/ML (ref 0–14.9)
TROPONIN I SERPL HS-MCNC: 14.8 PG/ML (ref 0–14.9)
WBC # BLD AUTO: 7.88 K/UL (ref 3.9–12.7)

## 2025-01-11 PROCEDURE — 84484 ASSAY OF TROPONIN QUANT: CPT | Mod: 91 | Performed by: INTERNAL MEDICINE

## 2025-01-11 PROCEDURE — 80053 COMPREHEN METABOLIC PANEL: CPT | Performed by: INTERNAL MEDICINE

## 2025-01-11 PROCEDURE — 25000003 PHARM REV CODE 250: Performed by: STUDENT IN AN ORGANIZED HEALTH CARE EDUCATION/TRAINING PROGRAM

## 2025-01-11 PROCEDURE — G0378 HOSPITAL OBSERVATION PER HR: HCPCS

## 2025-01-11 PROCEDURE — 96372 THER/PROPH/DIAG INJ SC/IM: CPT | Performed by: INTERNAL MEDICINE

## 2025-01-11 PROCEDURE — 36415 COLL VENOUS BLD VENIPUNCTURE: CPT | Performed by: INTERNAL MEDICINE

## 2025-01-11 PROCEDURE — 63600175 PHARM REV CODE 636 W HCPCS: Performed by: INTERNAL MEDICINE

## 2025-01-11 PROCEDURE — 85025 COMPLETE CBC W/AUTO DIFF WBC: CPT | Performed by: INTERNAL MEDICINE

## 2025-01-11 PROCEDURE — 25000003 PHARM REV CODE 250: Performed by: INTERNAL MEDICINE

## 2025-01-11 PROCEDURE — 99214 OFFICE O/P EST MOD 30 MIN: CPT | Mod: ,,, | Performed by: GENERAL PRACTICE

## 2025-01-11 PROCEDURE — 25000003 PHARM REV CODE 250: Performed by: NURSE PRACTITIONER

## 2025-01-11 PROCEDURE — 83735 ASSAY OF MAGNESIUM: CPT | Performed by: INTERNAL MEDICINE

## 2025-01-11 PROCEDURE — 84484 ASSAY OF TROPONIN QUANT: CPT | Performed by: INTERNAL MEDICINE

## 2025-01-11 RX ORDER — AMLODIPINE BESYLATE 5 MG/1
5 TABLET ORAL DAILY
Status: DISCONTINUED | OUTPATIENT
Start: 2025-01-11 | End: 2025-01-12 | Stop reason: HOSPADM

## 2025-01-11 RX ORDER — RANOLAZINE 500 MG/1
500 TABLET, EXTENDED RELEASE ORAL 2 TIMES DAILY
Status: DISCONTINUED | OUTPATIENT
Start: 2025-01-11 | End: 2025-01-12 | Stop reason: HOSPADM

## 2025-01-11 RX ORDER — OXYCODONE AND ACETAMINOPHEN 10; 325 MG/1; MG/1
1 TABLET ORAL EVERY 12 HOURS PRN
Status: DISCONTINUED | OUTPATIENT
Start: 2025-01-11 | End: 2025-01-12 | Stop reason: HOSPADM

## 2025-01-11 RX ADMIN — ASPIRIN 81 MG: 81 TABLET ORAL at 08:01

## 2025-01-11 RX ADMIN — CARVEDILOL 6.25 MG: 6.25 TABLET, FILM COATED ORAL at 04:01

## 2025-01-11 RX ADMIN — INSULIN ASPART 3 UNITS: 100 INJECTION, SOLUTION INTRAVENOUS; SUBCUTANEOUS at 08:01

## 2025-01-11 RX ADMIN — Medication 800 MG: at 02:01

## 2025-01-11 RX ADMIN — OXYCODONE HYDROCHLORIDE AND ACETAMINOPHEN 1 TABLET: 10; 325 TABLET ORAL at 09:01

## 2025-01-11 RX ADMIN — RANOLAZINE 500 MG: 500 TABLET, EXTENDED RELEASE ORAL at 08:01

## 2025-01-11 RX ADMIN — ENOXAPARIN SODIUM 40 MG: 40 INJECTION SUBCUTANEOUS at 04:01

## 2025-01-11 RX ADMIN — FUROSEMIDE 40 MG: 40 TABLET ORAL at 08:01

## 2025-01-11 RX ADMIN — INSULIN GLARGINE 20 UNITS: 100 INJECTION, SOLUTION SUBCUTANEOUS at 08:01

## 2025-01-11 RX ADMIN — ATORVASTATIN CALCIUM 40 MG: 40 TABLET, FILM COATED ORAL at 08:01

## 2025-01-11 RX ADMIN — AMLODIPINE BESYLATE 5 MG: 5 TABLET ORAL at 12:01

## 2025-01-11 RX ADMIN — HYDROCODONE BITARTRATE AND ACETAMINOPHEN 1 TABLET: 5; 325 TABLET ORAL at 02:01

## 2025-01-11 RX ADMIN — HYDROCODONE BITARTRATE AND ACETAMINOPHEN 1 TABLET: 5; 325 TABLET ORAL at 03:01

## 2025-01-11 RX ADMIN — TICAGRELOR 90 MG: 90 TABLET ORAL at 08:01

## 2025-01-11 RX ADMIN — Medication 800 MG: at 06:01

## 2025-01-11 RX ADMIN — RANOLAZINE 500 MG: 500 TABLET, EXTENDED RELEASE ORAL at 12:01

## 2025-01-11 RX ADMIN — ISOSORBIDE MONONITRATE 30 MG: 30 TABLET, EXTENDED RELEASE ORAL at 08:01

## 2025-01-11 RX ADMIN — INSULIN ASPART 6 UNITS: 100 INJECTION, SOLUTION INTRAVENOUS; SUBCUTANEOUS at 04:01

## 2025-01-11 NOTE — CONSULTS
[facility]  Department of Cardiology  Consult Note      PATIENT NAME: Magdaleno Cunningham    MRN: [id number]  TODAY'S DATE: 01/11/2025  ADMIT DATE: 1/10/2025                          CONSULT REQUESTED BY: Gil Panchal MD    SUBJECTIVE     PRINCIPAL PROBLEM: Chest pain    HPI:    Mr. Cunningham is a 59 year old male who presented to the ER with chest pain. He reports having chest pain over the past few days to a week. He reports having left hand tingling, pins and needles, and numbness as well as left arm pain. He states improvement of symptoms with nitro. These symptoms occur at rest. He has known hx of significant heart disease with first heart attack in 2006. He has had multiple stents as well as CABG. His last angiogram was here in December 2023. HS troponin levels are negative thus far. He is noted to be hypertensive with BP in the 160s. He has an ICD in situ. Patient with reduced EF 35-40% on echo in April 2024.       REASON FOR CONSULT:  From Hospitalist H&P: 59 year old pt getting admitted with chest pain  Pt started having chest pain few days ago  Chest pain mainly on L side and first time it happened it awakened him from sleep  Since then pt has been having chest pain/intermittent/radiation to Neck and LUE area  Symptoms went worse and he came to hospital and got admitted  Pt said his Cardiologist is based in [facility] area  Also he had several angiograms as per chart review  Pt said stress test always failed to diagnose his condition  Last LHC was done in December 2023 and as follows        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    Significant triple-vessel coronary artery diseas      Review of patient's allergies indicates:   Allergen Reactions    Pesticide Dermatitis and Rash       Past Medical History:   Diagnosis Date    Anticoagulant long-term use     Atrial fibrillation 2018    BPH (benign prostatic hyperplasia)     Cataract      COPD (chronic obstructive pulmonary disease)     Coronary artery disease     stents    CVD (cardiovascular disease)     Diabetes mellitus     Erythema multiforme     AKA Denton Edmondson Syndrome    Hypertension     Infected incision     MI (myocardial infarction)     per pt he has had 4     ROSAMARIA on CPAP     RLS (restless legs syndrome)     Perez-Denton syndrome      Past Surgical History:   Procedure Laterality Date    CORONARY ANGIOGRAPHY INCLUDING BYPASS GRAFTS WITH CATHETERIZATION OF LEFT HEART N/A 9/1/2022    Procedure: ANGIOGRAM, CORONARY, INCLUDING BYPASS GRAFT, WITH LEFT HEART CATHETERIZATION;  Surgeon: Paul Botello MD;  Location: [facility] [place] CATH/EP LAB;  Service: Cardiology;  Laterality: N/A;    CORONARY ANGIOGRAPHY INCLUDING BYPASS GRAFTS WITH CATHETERIZATION OF LEFT HEART Left 12/22/2023    Procedure: ANGIOGRAM, CORONARY, INCLUDING BYPASS GRAFT, WITH LEFT HEART CATHETERIZATION;  Surgeon: Paul Botello MD;  Location: [facility] CATH/EP LAB;  Service: Cardiology;  Laterality: Left;    CORONARY ARTERY BYPASS GRAFT (CABG) N/A 4/7/2020    Procedure: CORONARY ARTERY BYPASS GRAFT (CABG)- Excision of left atrial appendage;  Surgeon: Doug Solitario MD;  Location: [facility] OR;  Service: Cardiothoracic;  Laterality: N/A;    CORONARY BYPASS GRAFT ANGIOGRAPHY  12/25/2023    Procedure: Bypass graft study;  Surgeon: Ashley Valverde MD;  Location: [facility] [place] CATH/EP LAB;  Service: Cardiology;;    CORONARY STENT PLACEMENT      CORONARY STENT PLACEMENT N/A 12/22/2023    Procedure: INSERTION, STENT, CORONARY ARTERY;  Surgeon: Paul Botello MD;  Location: [facility] [place] CATH/EP LAB;  Service: Cardiology;  Laterality: N/A;    WILSON MAZE PROCEDURE N/A 4/7/2020    Procedure: WILSON MAZE PROCEDURE- Attempted;  Surgeon: Doug Solitario MD;  Location: [facility] OR;  Service: Cardiothoracic;  Laterality: N/A;    CYSTOSCOPY N/A 12/10/2018    Procedure: CYSTOSCOPY;  Surgeon: Khushboo Abreu MD;  [facility];  Service: Urology;   Laterality: N/A;    ENDOSCOPIC HARVEST OF VEIN Left 4/7/2020    Procedure: HARVEST-VEIN-ENDOVASCULAR;  Surgeon: Doug Solitario MD;  Location: [facility] OR;  Service: Cardiothoracic;  Laterality: Left;    INCISION OF PERIRECTAL ABSCESS Bilateral 9/1/2023    Procedure: INCISION, ABSCESS, PERIRECTAL;  Surgeon: Brayan Spears MD;  Location: [facility] OR;  Service: General;  Laterality: Bilateral;  stirrups    INSERTION OF INTRA-AORTIC BALLOON ASSIST DEVICE  12/25/2023    Procedure: INSERTION, INTRA-AORTIC BALLOON PUMP;  Surgeon: Ashley Valverde MD;  Location: [facility] CATH/EP LAB;  Service: Cardiology;;    INSERTION OF TEMPORARY PACEMAKER N/A 12/25/2023    Procedure: INSERTION, PACEMAKER, TEMPORARY;  Surgeon: Ashley Valverde MD;  Location: [facility] CATH/EP LAB;  Service: Cardiology;  Laterality: N/A;    INSERTION, ICD, DUAL CHAMBER N/A 1/2/2024    Procedure: Insertion, ICD, Dual Chamber;  Surgeon: Antonio Callejas MD;  Location: [facility] EP LAB;  Service: Cardiology;  Laterality: N/A;  CM, DUAL ICD, ANES, ALVARO MB, 3079    IVUS, CORONARY  12/22/2023    Procedure: IVUS, Coronary;  Surgeon: Paul Botello MD;  Location: [facility] CATH/EP LAB;  Service: Cardiology;;    LEFT HEART CATHETERIZATION Left 3/17/2020    Procedure: CATHETERIZATION, HEART, LEFT;  Surgeon: Tyree Walters MD;  Location: [facility] CATH/EP LAB;  Service: Cardiology;  Laterality: Left;    PERCUTANEOUS CORONARY INTERVENTION, ARTERY N/A 12/22/2023    Procedure: Percutaneous coronary intervention;  Surgeon: Paul Botello MD;  Location: [facility] CATH/EP LAB;  Service: Cardiology;  Laterality: N/A;    PERCUTANEOUS CORONARY INTERVENTION, ARTERY N/A 12/25/2023    Procedure: Percutaneous coronary intervention;  Surgeon: Ashley Valverde MD;  Location: [facility] CATH/EP LAB;  Service: Cardiology;  Laterality: N/A;    PLACEMENT, IABP  12/25/2023    Procedure: Placement, IABP;  Surgeon: Ashley Valverde MD;  Location: [facility] CATH/EP LAB;  Service: Cardiology;;     STERNAL WIRES REMOVAL N/A 2020    Procedure: REMOVAL, STERNAL WIRE;  Surgeon: Doug Solitario MD;  Location: [facility] OR;  Service: Peripheral Vascular;  Laterality: N/A;    ULTRASOUND OF PROSTATE FOR VOLUME DETERMINATION  12/10/2018    Procedure: ULTRASOUND, PROSTATE, FOR VOLUME DETERMINATION;  Surgeon: Khushboo Abreu MD;  Location: [facility] OR;  Service: Urology;;     Social History     Tobacco Use    Smoking status: Every Day     Current packs/day: 0.00     Average packs/day: 3.0 packs/day for 30.0 years (90.0 ttl pk-yrs)     Types: Cigarettes     Start date: 1990     Last attempt to quit: 2020     Years since quittin.7    Smokeless tobacco: Never   Substance Use Topics    Alcohol use: Not Currently    Drug use: Yes     Frequency: 1.0 times per week     Types: Marijuana        REVIEW OF SYSTEMS  Per HPI    OBJECTIVE     VITAL SIGNS (Most Recent)  Temp: 97.8 °F (36.6 °C) (25 1023)  Pulse: 60 (25 1023)  Resp: 18 (25 1023)  BP: (!) 162/91 (25 1023)  SpO2: 98 % (25 1023)    VENTILATION STATUS  Resp: 18 (25 1023)  SpO2: 98 % (25 1023)           I & O (Last 24H):  Intake/Output Summary (Last 24 hours) at 2025 1032  Last data filed at 2025 0842  Gross per 24 hour   Intake 0 ml   Output --   Net 0 ml       WEIGHTS  Wt Readings from Last 1 Encounters:   01/10/25 1949 120.2 kg (265 lb)   01/10/25 1424 120.2 kg (265 lb)       PHYSICAL EXAM  CONSTITUTIONAL: No fever, no chills  HEENT: Normocephalic, atraumatic,pupils reactive to light                 NECK:  No JVD no carotid bruit  CVS: S1S2+, RRR  LUNGS: Clear  ABDOMEN: Soft, NT, BS+  EXTREMITIES: No cyanosis, edema  : No ritter catheter  NEURO: AAO X 3  PSY: Normal affect      HOME MEDICATIONS:  No current facility-administered medications on file prior to encounter.     Current Outpatient Medications on File Prior to Encounter   Medication Sig Dispense Refill    ALPRAZolam (XANAX) 1 MG tablet  Take 1 mg by mouth nightly as needed for Anxiety.      atorvastatin (LIPITOR) 40 MG tablet Take 1 tablet (40 mg total) by mouth once daily. 90 tablet 3    carvediloL (COREG) 6.25 MG tablet Take 1 tablet (6.25 mg total) by mouth 2 (two) times daily with meals. 180 tablet 3    EScitalopram oxalate (LEXAPRO) 10 MG tablet Take 10 mg by mouth every morning.      gabapentin (NEURONTIN) 600 MG tablet Take 600 mg by mouth As instructed (neuropathic pain). Takes 1 tablet in the morning and 2 tablets at bedtime      hydrALAZINE (APRESOLINE) 10 MG tablet Take 1 tablet (10 mg total) by mouth every 8 (eight) hours. 90 tablet 11    insulin glargine-yfgn 100 unit/mL (3 mL) InPn Inject 26 Units into the skin every evening.      isosorbide mononitrate (IMDUR) 30 MG 24 hr tablet Take 1 tablet (30 mg total) by mouth once daily. 30 tablet 11    JARDIANCE 25 mg tablet Take 25 mg by mouth once daily.      lactobacillus acidophilus & bulgar (LACTINEX) 100 million cell packet Take 1 packet (1 each total) by mouth 2 (two) times daily. 30 packet 0    metFORMIN (GLUCOPHAGE) 1000 MG tablet Take 1 tablet (1,000 mg total) by mouth 2 (two) times daily with meals.      methocarbamoL (ROBAXIN) 500 MG Tab Take 500 mg by mouth every evening.      metoprolol succinate (TOPROL-XL) 100 MG 24 hr tablet Take 100 mg by mouth 2 (two) times daily.      nitroGLYCERIN (NITROSTAT) 0.4 MG SL tablet Place 0.4 mg under the tongue every 5 (five) minutes as needed for Chest pain.      oxyCODONE-acetaminophen (PERCOCET)  mg per tablet Take 1 tablet by mouth 2 (two) times daily as needed for Pain.      pantoprazole (PROTONIX) 40 MG tablet Take 1 tablet (40 mg total) by mouth once daily. 30 tablet 11    sildenafiL (VIAGRA) 100 MG tablet Take 1 tablet (100 mg total) by mouth daily as needed for Erectile Dysfunction. 5 tablet 3    traZODone (DESYREL) 100 MG tablet Take 100-200 mg by mouth every evening.      blood sugar diagnostic Strp TEST 3 TIMES DAILY 100 each 0  "   blood-glucose meter Misc TEST 3 TIMES DAILY 1 each 1    blood-glucose meter,continuous (DEXCOM G7 ) Misc Use as directed. 1 each 0    blood-glucose sensor (DEXCOM G7 SENSOR) Courtney Change every 10 days. 3 each 11    lancets 33 gauge Misc TEST 3 TIMES DAILY 100 each 0    pen needle, diabetic 29 gauge x 1/2" Ndle Use with insulin 4 times a day. 360 each 3       SCHEDULED MEDS:   aspirin  81 mg Oral Daily    atorvastatin  40 mg Oral Daily    carvediloL  6.25 mg Oral BID WM    enoxparin  40 mg Subcutaneous Daily    EScitalopram oxalate  10 mg Oral QAM    furosemide  40 mg Oral Daily    insulin glargine U-100  20 Units Subcutaneous QHS    isosorbide mononitrate  30 mg Oral Daily    ticagrelor  90 mg Oral BID       CONTINUOUS INFUSIONS:    PRN MEDS:  Current Facility-Administered Medications:     acetaminophen, 650 mg, Oral, Q8H PRN    acetaminophen, 650 mg, Oral, Q4H PRN    aluminum-magnesium hydroxide-simethicone, 30 mL, Oral, QID PRN    dextrose 50%, 12.5 g, Intravenous, PRN    dextrose 50%, 25 g, Intravenous, PRN    glucagon (human recombinant), 1 mg, Intramuscular, PRN    glucose, 16 g, Oral, PRN    glucose, 24 g, Oral, PRN    HYDROcodone-acetaminophen, 1 tablet, Oral, Q6H PRN    insulin aspart U-100, 0-10 Units, Subcutaneous, QID (AC + HS) PRN    magnesium oxide, 800 mg, Oral, PRN    magnesium oxide, 800 mg, Oral, PRN    melatonin, 6 mg, Oral, Nightly PRN    naloxone, 0.02 mg, Intravenous, PRN    ondansetron, 4 mg, Intravenous, Q6H PRN    potassium bicarbonate, 35 mEq, Oral, PRN    potassium bicarbonate, 50 mEq, Oral, PRN    potassium bicarbonate, 60 mEq, Oral, PRN    potassium, sodium phosphates, 2 packet, Oral, PRN    potassium, sodium phosphates, 2 packet, Oral, PRN    potassium, sodium phosphates, 2 packet, Oral, PRN    LABS AND DIAGNOSTICS     CBC LAST 3 DAYS  Recent Labs   Lab 01/10/25  1446 01/11/25  0603   WBC 7.91 7.88   RBC 5.04 4.65   HGB 15.3 14.1   HCT 44.9 41.6   MCV 89 90   MCH 30.4 30.3 " "  MCHC 34.1 33.9   RDW 12.9 13.0    207   MPV 9.7 9.6   GRAN 56.2  4.5 46.3  3.6   LYMPH 32.9  2.6 41.2  3.3   MONO 6.6  0.5 8.1  0.6   BASO 0.08 0.09   NRBC 0 0       COAGULATION LAST 3 DAYS  No results for input(s): "LABPT", "INR", "APTT" in the last 168 hours.    CHEMISTRY LAST 3 DAYS  Recent Labs   Lab 01/10/25  1446 01/11/25  0603   * 135*   K 4.1 4.2    99   CO2 23 29   ANIONGAP 8 7*   BUN 11 13   CREATININE 1.0 1.1   * 192*   CALCIUM 8.7 8.4*   MG 1.4* 1.6   ALBUMIN 3.9 3.6   PROT 7.2 6.5   ALKPHOS 63 55   ALT 15 13   AST 12 12   BILITOT 0.4 0.5       CARDIAC PROFILE LAST 3 DAYS  Recent Labs   Lab 01/10/25  1446 01/10/25  1640 01/11/25  0049 01/11/25  0603   *  --   --   --    TROPONINIHS 13.4 13.9 12.2 14.8       ENDOCRINE LAST 3 DAYS  No results for input(s): "TSH", "PROCAL" in the last 168 hours.    LAST ARTERIAL BLOOD GAS  ABG  No results for input(s): "PH", "PO2", "PCO2", "HCO3", "BE" in the last 168 hours.    LAST 7 DAYS MICROBIOLOGY   Microbiology Results (last 7 days)       ** No results found for the last 168 hours. **            MOST RECENT IMAGING  X-Ray Chest AP Portable  Narrative: EXAMINATION:  XR CHEST AP PORTABLE    CLINICAL HISTORY:  Chest Pain;    COMPARISON:  February 2024    FINDINGS:  AP view demonstrates normal heart size.  There has been previous median sternotomy and coronary arterial stent placement.  Implantable cardiac device is unchanged in position.  No infiltrates or effusions are identified.  Osseous structures are unremarkable.  Impression: No acute radiographic abnormalities.    Electronically signed by: Javi Teran  Date:    01/10/2025  Time:    16:18      ECHOCARDIOGRAM RESULTS (last 5)  Results for orders placed during the hospital encounter of 04/15/24    Echo    Interpretation Summary    Left Ventricle: The left ventricle is at upper limits off normal in size. Mildly increased wall thickness. There is mild concentric " hypertrophy. Moderate global hypokinesis present. There is moderately reduced systolic function with a visually estimated ejection fraction of 35 - 40%. There is diastolic dysfunction.    Right Ventricle: Normal right ventricular cavity size. Wall thickness is normal. Right ventricle wall motion  is normal. Systolic function is normal. Pacemaker lead present in the ventricle.    Left Atrium: Left atrium is moderately dilated.    Right Atrium: Multiple leads present in the right atrium.    Aortic Valve: The aortic valve is a trileaflet valve. There is mild aortic valve sclerosis. There is mild annular calcification present.    Mitral Valve: There is mild regurgitation with a centrally directed jet.    Tricuspid Valve: There is mild regurgitation.    IVC/SVC: Normal venous pressure at 3 mmHg.      Results for orders placed during the hospital encounter of 12/26/23    Echo    Interpretation Summary    Left Ventricle: The left ventricle is mildly dilated. Normal wall thickness. Global hypokinesis present. There is severely reduced systolic function with a visually estimated ejection fraction of 20 - 25%. There is diastolic dysfunction.    Right Ventricle: Normal right ventricular cavity size. Wall thickness is normal. Right ventricle wall motion  is normal. Systolic function is moderately reduced. Pacemaker lead present in the ventricle.    Left Atrium: Left atrium is mildly dilated.    IVC/SVC: Patient is ventilated, cannot use IVC diameter to estimate right atrial pressure.      Results for orders placed during the hospital encounter of 12/14/23    Echo    Interpretation Summary    Left Ventricle: Normal left ventricular filling pressure.  Ejection fraction estimated at 60%    Left Atrium: Left atrium is dilated.    Aortic Valve: There is aortic valve sclerosis.    Mitral Valve: There is mild regurgitation.    Tricuspid Valve: There is mild regurgitation.    IVC/SVC: Normal venous pressure at 3 mmHg.  Pulmonary artery  systolic pressure less than 25 mmHg    Mild aortic leaflet sclerosis      Results for orders placed during the hospital encounter of 06/23/23    Echo    Interpretation Summary  · The left ventricle is normal in size with concentric remodeling and normal systolic function.  · The estimated ejection fraction is 60%.  · Normal left ventricular diastolic function.  · Normal right ventricular size with normal right ventricular systolic function.  · Mild mitral regurgitation.  · Mild tricuspid regurgitation.      Results for orders placed during the hospital encounter of 01/09/22    STRESS TEST REPORT      CURRENT/PREVIOUS VISIT EKG  Results for orders placed or performed during the hospital encounter of 01/10/25   EKG 12-lead    Collection Time: 01/10/25  2:19 PM   Result Value Ref Range    QRS Duration 100 ms    [facility] QTC Calculation 475 ms    Narrative    Test Reason : R07.9,    Vent. Rate :  70 BPM     Atrial Rate :  70 BPM     P-R Int : 154 ms          QRS Dur : 100 ms      QT Int : 440 ms       P-R-T Axes :  36 -10 110 degrees    QTcB Int : 475 ms    Normal sinus rhythm  T wave abnormality, consider lateral ischemia  Abnormal ECG  When compared with ECG of 01-Feb-2024 12:00,  Aberrant conduction is no longer Present  Nonspecific T wave abnormality no longer evident in Inferior leads  T wave inversion now evident in Lateral leads    Referred By:            Confirmed By:            ASSESSMENT/PLAN:     Active Hospital Problems    Diagnosis    *Chest pain    CHF (congestive heart failure)    Paroxysmal atrial fibrillation    S/P CABG (coronary artery bypass graft)    Medical non-compliance    Type 2 diabetes mellitus with diabetic arthropathy       ASSESSMENT & PLAN:     Chest pain  CAD with hx of CABG and stents  HTN  Obesity   Type II DM      RECOMMENDATIONS:    In December 2023, he received stents to the prox cx lesion and mid cx lesion which apparently restenosed within a few days noted on repeat cath when he  returned to the Er with STEMI. Attempts to open the instent restenosis was unsuccessful on 12/25/23.   Continue carvedilol 6.25 mg po BID, imdur 30 mg po daily, aspirin 81 mg po daily, and brilinta 90 mg po BID.   He is also on hydralazine at home but unclear about compliance.   Will start amlodipine 5 mg po daily.   Consider angiogram on Monday.   Add ranexa 500 mg po BID.   Keep NPO after midnight Sunday night/Monday morning in case of LHC. Alternately if he feels okay, can plan to dc home tomorrow and followup outpatient.   Please notify us if he has return of chest pain.         Carolyne Baltazar NP  Date of Service: 01/11/2025  10:32 AM    I have personally interviewed and examined the patient, I have reviewed the Nurse Practitioner's history and physical, assessment, and plan. I have personally evaluated the patient at bedside and agree with the findings and made appropriate changes as necessary in recommendations.  PATIENT WITH KNOWN CORONARY DISEASE CORONARY BYPASS X2 WITH OCCLUSION OF LEFT CIRCUMFLEX AND OBTUSE MARGINAL WHICH WAS UNABLE TO BE REVASCULARIZED.  BALLOON PUMP AND AICD WERE PLACED AFTER UNSUCCESSFUL REVASCULARIZATION.  PATIENT CONTINUES TO SMOKE.  NOW ADMITTED WITH CHEST PAIN AND UNCERTAIN WITH HIS ANTIHYPERTENSIVE MEDICATION CHEST PAIN RELIEVED BY NITROGLYCERIN X3    CONTINUE MEDICAL MANAGEMENT ADD RANEXA.  Norm Rodrigeuz MD  Department of Cardiology  [facility]  01/11/2025 12:06 PM

## 2025-01-11 NOTE — HOSPITAL COURSE
Patient was admitted to the hospital chest pain.  Patient was monitored closely throughout his hospital stay.  Cardiology was consulted admission.  Troponins were trended and were negative x4.  Patient was monitored on telemetry.  Blood pressure regime and antianginal medications were adjusted per Cardiology recommendations.  Patient remained chest pain-free for the rest of his hospitalization and elected to discharged home and pursue outpatient angiogram.  Patient was seen on day of discharge.  Patient was cleared for discharge by Cardiology.  Patient was instructed on the importance of following up with Cardiology.  Patient was prescribed Ranexa and Lodine per Cardiology recommendations.  Glucometer prescribed discharge this patient was states he no longer has one.  Stressed importance of cardiac and diabetic diet and tight glucose control.  He states he was all other medications as not need prescriptions for home meds.  Patient was also to follow up with PCP and Endocrinology.  Return precautions discussed and patient was understanding.  All questions answered.

## 2025-01-11 NOTE — ASSESSMENT & PLAN NOTE
Serial cardiac enzymes  Maintain GDMT  NPO From midnight  Consulted Cardiology MD  1/11: cards following; trops negative x 4; hydralazine added to regime; continue telemetry; tentatively angiogram on Monday if patient continues experiencing chest pain versus outpatient if he has rapid improvement with antianginal regime

## 2025-01-11 NOTE — PROGRESS NOTES
[facility]  Progress Note    Patient Name: Magdaleno Cunningham  MRN: [id number]  Patient Class: OP- Observation   Admission Date: 1/10/2025  Length of Stay: 0 days  Attending Physician: Gil Panchal MD  Primary Care Provider: Venkata Mclaughlin MD        Subjective     Principal Problem:Chest pain        HPI:  59 year old pt getting admitted with chest pain  Pt started having chest pain few days ago  Chest pain mainly on L side and first time it happened it awakened him from sleep  Since then pt has been having chest pain/intermittent/radiation to Neck and LUE area  Symptoms went worse and he came to hospital and got admitted  Pt said his Cardiologist is based in [facility] area  Also he had several angiograms as per chart review  Pt said stress test always failed to diagnose his condition  Last LHC was done in December 2023 and as follows      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    Significant triple-vessel coronary artery diseas      Overview/Hospital Course:  No notes on file    Interval History:  Patient was seen and examined.  Overnight notes reviewed.  Patient was lying in bed in no acute distress.  He reports intermittent chest pain.  Denies any currently.  Patient was evaluated by Cardiology who recommended titrating antianginal medications.  Ranolazine was added.  Will continue monitoring on telemetry.  Tentatively may undergo LHC on Monday should he continue experiencing chest pain.    Review of Systems   Respiratory:  Negative for shortness of breath.    Cardiovascular:  Positive for chest pain (intermittent).   Gastrointestinal:  Negative for abdominal pain and nausea.     Objective:     Vital Signs (Most Recent):  Temp: 97.8 °F (36.6 °C) (01/11/25 1023)  Pulse: 60 (01/11/25 1023)  Resp: 18 (01/11/25 1023)  BP: (!) 162/91 (01/11/25 1023)  SpO2: 98 % (01/11/25 1023) Vital Signs (24h Range):  Temp:  [97.7 °F  (36.5 °C)-98.9 °F (37.2 °C)] 97.8 °F (36.6 °C)  Pulse:  [58-77] 60  Resp:  [14-20] 18  SpO2:  [96 %-98 %] 98 %  BP: (138-189)/(82-96) 162/91     Weight: 120.2 kg (265 lb)  Body mass index is 36.96 kg/m².    Intake/Output Summary (Last 24 hours) at 1/11/2025 1328  Last data filed at 1/11/2025 0842  Gross per 24 hour   Intake 0 ml   Output --   Net 0 ml         Physical Exam  Vitals and nursing note reviewed.   Constitutional:       General: He is not in acute distress.     Appearance: Normal appearance. He is obese.   HENT:      Head: Normocephalic and atraumatic.      Mouth/Throat:      Mouth: Mucous membranes are moist.      Pharynx: Oropharynx is clear.   Eyes:      Extraocular Movements: Extraocular movements intact.   Cardiovascular:      Rate and Rhythm: Normal rate and regular rhythm.   Pulmonary:      Effort: Pulmonary effort is normal.      Breath sounds: Normal breath sounds.   Abdominal:      General: Abdomen is flat. Bowel sounds are normal.      Palpations: Abdomen is soft.      Tenderness: There is no abdominal tenderness. There is no guarding or rebound.   Musculoskeletal:      Right lower leg: No edema.      Left lower leg: No edema.   Skin:     General: Skin is warm.   Neurological:      General: No focal deficit present.      Mental Status: He is alert and oriented to person, place, and time. Mental status is at baseline.   Psychiatric:         Mood and Affect: Mood normal.         Behavior: Behavior normal.             Significant Labs: All pertinent labs within the past 24 hours have been reviewed.  CBC:   Recent Labs   Lab 01/10/25  1446 01/11/25  0603   WBC 7.91 7.88   HGB 15.3 14.1   HCT 44.9 41.6    207     CMP:   Recent Labs   Lab 01/10/25  1446 01/11/25  0603   * 135*   K 4.1 4.2    99   CO2 23 29   * 192*   BUN 11 13   CREATININE 1.0 1.1   CALCIUM 8.7 8.4*   PROT 7.2 6.5   ALBUMIN 3.9 3.6   BILITOT 0.4 0.5   ALKPHOS 63 55   AST 12 12   ALT 15 13   ANIONGAP 8 7*      Magnesium:   Recent Labs   Lab 01/10/25  1446 01/11/25  0603   MG 1.4* 1.6     POCT Glucose:   Recent Labs   Lab 01/10/25  2018 01/11/25  0718   POCTGLUCOSE 220* 173*     Troponin:   Recent Labs   Lab 01/10/25  1640 01/11/25  0049 01/11/25  0603   TROPONINIHS 13.9 12.2 14.8       Significant Imaging: I have reviewed all pertinent imaging results/findings within the past 24 hours.    Assessment and Plan     * Chest pain  Serial cardiac enzymes  Maintain GDMT  NPO From midnight  Consulted Cardiology MD  1/11: cards following; trops negative x 4; hydralazine added to regime; continue telemetry; tentatively angiogram on Monday if patient continues experiencing chest pain versus outpatient if he has rapid improvement with antianginal regime      CHF (congestive heart failure)  Maintain PO lasix     Recent Labs     01/10/25  1446   *     Latest ECHO  Results for orders placed during the hospital encounter of 04/15/24    Echo    Interpretation Summary    Left Ventricle: The left ventricle is at upper limits off normal in size. Mildly increased wall thickness. There is mild concentric hypertrophy. Moderate global hypokinesis present. There is moderately reduced systolic function with a visually estimated ejection fraction of 35 - 40%. There is diastolic dysfunction.    Right Ventricle: Normal right ventricular cavity size. Wall thickness is normal. Right ventricle wall motion  is normal. Systolic function is normal. Pacemaker lead present in the ventricle.    Left Atrium: Left atrium is moderately dilated.    Right Atrium: Multiple leads present in the right atrium.    Aortic Valve: The aortic valve is a trileaflet valve. There is mild aortic valve sclerosis. There is mild annular calcification present.    Mitral Valve: There is mild regurgitation with a centrally directed jet.    Tricuspid Valve: There is mild regurgitation.    IVC/SVC: Normal venous pressure at 3 mmHg.    Current Heart Failure Medications  ,  Daily, Oral  , 2 times daily, Oral  carvediloL tablet 6.25 mg, 2 times daily with meals, Oral  furosemide tablet 40 mg, Daily, Oral      Paroxysmal atrial fibrillation      Antiarrhythmics  , 2 times daily, Oral    Anticoagulants  enoxaparin injection 40 mg, Every 24 hours, Subcutaneous    Plan  - Replete lytes with a goal of K>4, Mg >2      S/P CABG (coronary artery bypass graft)  Aware       Medical non-compliance  Per records       Type 2 diabetes mellitus with diabetic arthropathy  Maintain insulin regime ordered         VTE Risk Mitigation (From admission, onward)           Ordered     enoxaparin injection 40 mg  Daily         01/10/25 1830     IP VTE HIGH RISK PATIENT  Once         01/10/25 1830     Place sequential compression device  Until discontinued         01/10/25 1830                    Discharge Planning   PEDRO: 1/12/2025     Code Status: Full Code   Medical Readiness for Discharge Date:                            AISHWARYA MontanoC  Department of Hospital Medicine   Formerly [facility]

## 2025-01-11 NOTE — ASSESSMENT & PLAN NOTE
Antiarrhythmics  , 2 times daily, Oral    Anticoagulants  enoxaparin injection 40 mg, Every 24 hours, Subcutaneous    Plan  - Replete lytes with a goal of K>4, Mg >2

## 2025-01-11 NOTE — SUBJECTIVE & OBJECTIVE
Interval History:  Patient was seen and examined.  Overnight notes reviewed.  Patient was lying in bed in no acute distress.  He reports intermittent chest pain.  Denies any currently.  Patient was evaluated by Cardiology who recommended titrating antianginal medications.  Ranolazine was added.  Will continue monitoring on telemetry.  Tentatively may undergo LHC on Monday should he continue experiencing chest pain.    Review of Systems   Respiratory:  Negative for shortness of breath.    Cardiovascular:  Positive for chest pain (intermittent).   Gastrointestinal:  Negative for abdominal pain and nausea.     Objective:     Vital Signs (Most Recent):  Temp: 97.8 °F (36.6 °C) (01/11/25 1023)  Pulse: 60 (01/11/25 1023)  Resp: 18 (01/11/25 1023)  BP: (!) 162/91 (01/11/25 1023)  SpO2: 98 % (01/11/25 1023) Vital Signs (24h Range):  Temp:  [97.7 °F (36.5 °C)-98.9 °F (37.2 °C)] 97.8 °F (36.6 °C)  Pulse:  [58-77] 60  Resp:  [14-20] 18  SpO2:  [96 %-98 %] 98 %  BP: (138-189)/(82-96) 162/91     Weight: 120.2 kg (265 lb)  Body mass index is 36.96 kg/m².    Intake/Output Summary (Last 24 hours) at 1/11/2025 1328  Last data filed at 1/11/2025 0842  Gross per 24 hour   Intake 0 ml   Output --   Net 0 ml         Physical Exam  Vitals and nursing note reviewed.   Constitutional:       General: He is not in acute distress.     Appearance: Normal appearance. He is obese.   HENT:      Head: Normocephalic and atraumatic.      Mouth/Throat:      Mouth: Mucous membranes are moist.      Pharynx: Oropharynx is clear.   Eyes:      Extraocular Movements: Extraocular movements intact.   Cardiovascular:      Rate and Rhythm: Normal rate and regular rhythm.   Pulmonary:      Effort: Pulmonary effort is normal.      Breath sounds: Normal breath sounds.   Abdominal:      General: Abdomen is flat. Bowel sounds are normal.      Palpations: Abdomen is soft.      Tenderness: There is no abdominal tenderness. There is no guarding or rebound.    Musculoskeletal:      Right lower leg: No edema.      Left lower leg: No edema.   Skin:     General: Skin is warm.   Neurological:      General: No focal deficit present.      Mental Status: He is alert and oriented to person, place, and time. Mental status is at baseline.   Psychiatric:         Mood and Affect: Mood normal.         Behavior: Behavior normal.             Significant Labs: All pertinent labs within the past 24 hours have been reviewed.  CBC:   Recent Labs   Lab 01/10/25  1446 01/11/25  0603   WBC 7.91 7.88   HGB 15.3 14.1   HCT 44.9 41.6    207     CMP:   Recent Labs   Lab 01/10/25  1446 01/11/25  0603   * 135*   K 4.1 4.2    99   CO2 23 29   * 192*   BUN 11 13   CREATININE 1.0 1.1   CALCIUM 8.7 8.4*   PROT 7.2 6.5   ALBUMIN 3.9 3.6   BILITOT 0.4 0.5   ALKPHOS 63 55   AST 12 12   ALT 15 13   ANIONGAP 8 7*     Magnesium:   Recent Labs   Lab 01/10/25  1446 01/11/25  0603   MG 1.4* 1.6     POCT Glucose:   Recent Labs   Lab 01/10/25  2018 01/11/25  0718   POCTGLUCOSE 220* 173*     Troponin:   Recent Labs   Lab 01/10/25  1640 01/11/25  0049 01/11/25  0603   TROPONINIHS 13.9 12.2 14.8       Significant Imaging: I have reviewed all pertinent imaging results/findings within the past 24 hours.

## 2025-01-11 NOTE — PLAN OF CARE
[facility]  Initial Discharge Assessment       Primary Care Provider: Venkata Mclaughlin MD    Admission Diagnosis: Chest pain, unspecified type [R07.9]    Admission Date: 1/10/2025  Expected Discharge Date: 1/12/2025    Transition of Care Barriers: None  SW met with patient at bedside to complete assessment. No living will, POA or advance directive. No HH, HD or Coumadin. See DME. Patient will return home to live with his son when he is discharged. No needs have been identified at this time.    Payor: MEDICAID / Plan: [employer] [facility] / Product Type: Managed Medicaid /     Extended Emergency Contact Information  Primary Emergency Contact: TIAGO BAY  Mobile Phone: [phone]  Relation: Sister  Secondary Emergency Contact: Magdaleno Chi  Home Phone: [phone]  Mobile Phone: [phone]  Relation: Son   needed? No    Discharge Plan A: Home with family  Discharge Plan B: Home with family      [facility] - [place]  [address]  [address] [address]  Phone: [phone] Fax: [phone]      Initial Assessment (most recent)       Adult Discharge Assessment - 01/11/25 1605          Discharge Assessment    Assessment Type Discharge Planning Assessment     Confirmed/corrected address, phone number and insurance Yes     Confirmed Demographics Correct on Facesheet     Source of Information patient     When was your last doctors appointment? --   three to four months ago    Does patient/caregiver understand observation status Yes     Communicated PEDRO with patient/caregiver Yes     Reason For Admission chest pain     People in Home child(tonya), adult     Facility Arrived From: home     Do you expect to return to your current living situation? Yes     Do you have help at home or someone to help you manage your care at home? Yes     Who are your caregiver(s) and their phone number(s)? Kevin Chi/son [phone]      Prior to hospitilization cognitive status: Alert/Oriented;No Deficits     Current cognitive status: Alert/Oriented;No Deficits     Walking or Climbing Stairs Difficulty no     Dressing/Bathing Difficulty no     Equipment Currently Used at Home glucometer     Readmission within 30 days? No     Patient currently being followed by outpatient case management? No     Do you currently have service(s) that help you manage your care at home? No     Do you take prescription medications? Yes     Do you have prescription coverage? Yes     Coverage [facility] Medicaid     Do you have any problems affording any of your prescribed medications? No     Is the patient taking medications as prescribed? yes     Who is going to help you get home at discharge? Kevin Chi/son [phone]     How do you get to doctors appointments? car, drives self     Are you on dialysis? No     Do you take coumadin? No     Discharge Plan A Home with family     Discharge Plan B Home with family     DME Needed Upon Discharge  none     Discharge Plan discussed with: Patient;Adult children     Transition of Care Barriers None

## 2025-01-11 NOTE — SUBJECTIVE & OBJECTIVE
Past Medical History:   Diagnosis Date    Anticoagulant long-term use     Atrial fibrillation 2018    BPH (benign prostatic hyperplasia)     Cataract     COPD (chronic obstructive pulmonary disease)     Coronary artery disease     stents    CVD (cardiovascular disease)     Diabetes mellitus     Erythema multiforme     AKA Denton Edmondson Syndrome    Hypertension     Infected incision     MI (myocardial infarction)     per pt he has had 4     ROSAMARIA on CPAP     RLS (restless legs syndrome)     Perez-Denton syndrome        Past Surgical History:   Procedure Laterality Date    CORONARY ANGIOGRAPHY INCLUDING BYPASS GRAFTS WITH CATHETERIZATION OF LEFT HEART N/A 9/1/2022    Procedure: ANGIOGRAM, CORONARY, INCLUDING BYPASS GRAFT, WITH LEFT HEART CATHETERIZATION;  Surgeon: Paul Botello MD;  Location: [facility] CATH/EP LAB;  Service: Cardiology;  Laterality: N/A;    CORONARY ANGIOGRAPHY INCLUDING BYPASS GRAFTS WITH CATHETERIZATION OF LEFT HEART Left 12/22/2023    Procedure: ANGIOGRAM, CORONARY, INCLUDING BYPASS GRAFT, WITH LEFT HEART CATHETERIZATION;  Surgeon: Paul Botello MD;  Location: [facility] CATH/EP LAB;  Service: Cardiology;  Laterality: Left;    CORONARY ARTERY BYPASS GRAFT (CABG) N/A 4/7/2020    Procedure: CORONARY ARTERY BYPASS GRAFT (CABG)- Excision of left atrial appendage;  Surgeon: Doug Solitario MD;  [email or web address] OR;  Service: Cardiothoracic;  Laterality: N/A;    CORONARY BYPASS GRAFT ANGIOGRAPHY  12/25/2023    Procedure: Bypass graft study;  Surgeon: Ashley Valverde MD;  Location: [facility] CATH/EP LAB;  Service: Cardiology;;    CORONARY STENT PLACEMENT      CORONARY STENT PLACEMENT N/A 12/22/2023    Procedure: INSERTION, STENT, CORONARY ARTERY;  Surgeon: Paul Botello MD;  Location: [facility] CATH/EP LAB;  Service: Cardiology;  Laterality: N/A;    WILSON MAZE PROCEDURE N/A 4/7/2020    Procedure: WILSON MAZE PROCEDURE- Attempted;  Surgeon: Doug Solitario MD;  Location: [email or web address] OR;  Service:  Cardiothoracic;  Laterality: N/A;    CYSTOSCOPY N/A 12/10/2018    Procedure: CYSTOSCOPY;  Surgeon: Khushboo Abreu MD;  Location: [facility] OR;  Service: Urology;  Laterality: N/A;    ENDOSCOPIC HARVEST OF VEIN Left 4/7/2020    Procedure: HARVEST-VEIN-ENDOVASCULAR;  Surgeon: Doug Solitario MD;  Location: [facility] OR;  Service: Cardiothoracic;  Laterality: Left;    INCISION OF PERIRECTAL ABSCESS Bilateral 9/1/2023    Procedure: INCISION, ABSCESS, PERIRECTAL;  Surgeon: Brayan Spears MD;  Location: [facility] OR;  Service: General;  Laterality: Bilateral;  stirrups    INSERTION OF INTRA-AORTIC BALLOON ASSIST DEVICE  12/25/2023    Procedure: INSERTION, INTRA-AORTIC BALLOON PUMP;  Surgeon: Ashley Valverde MD;  Location: [facility] CATH/EP LAB;  Service: Cardiology;;    INSERTION OF TEMPORARY PACEMAKER N/A 12/25/2023    Procedure: INSERTION, PACEMAKER, TEMPORARY;  Surgeon: Ashley Valverde MD;  Location: [facility] CATH/EP LAB;  Service: Cardiology;  Laterality: N/A;    INSERTION, ICD, DUAL CHAMBER N/A 1/2/2024    Procedure: Insertion, ICD, Dual Chamber;  Surgeon: Antonio Callejas MD;  Location: [facility] EP LAB;  Service: Cardiology;  Laterality: N/A;  CM, DUAL ICD, ANES, SJM, MB, 3079    IVUS, CORONARY  12/22/2023    Procedure: IVUS, Coronary;  Surgeon: Paul Botello MD;  Location: [facility] CATH/EP LAB;  Service: Cardiology;;    LEFT HEART CATHETERIZATION Left 3/17/2020    Procedure: CATHETERIZATION, HEART, LEFT;  Surgeon: Tyree Walters MD;  Location: [facility] CATH/EP LAB;  Service: Cardiology;  Laterality: Left;    PERCUTANEOUS CORONARY INTERVENTION, ARTERY N/A 12/22/2023    Procedure: Percutaneous coronary intervention;  Surgeon: Paul Botello MD;  Location: [facility] CATH/EP LAB;  Service: Cardiology;  Laterality: N/A;    PERCUTANEOUS CORONARY INTERVENTION, ARTERY N/A 12/25/2023    Procedure: Percutaneous coronary intervention;  Surgeon: Ashley Valverde MD;  Location: [facility] CATH/EP LAB;  Service: Cardiology;   Laterality: N/A;    PLACEMENT, IABP  12/25/2023    Procedure: Placement, IABP;  Surgeon: Ashley Valverde MD;  Location: [facility] CATH/EP LAB;  Service: Cardiology;;    STERNAL WIRES REMOVAL N/A 6/8/2020    Procedure: REMOVAL, STERNAL WIRE;  Surgeon: Doug Solitario MD;  Location: [facility] OR;  Service: Peripheral Vascular;  Laterality: N/A;    ULTRASOUND OF PROSTATE FOR VOLUME DETERMINATION  12/10/2018    Procedure: ULTRASOUND, PROSTATE, FOR VOLUME DETERMINATION;  Surgeon: Khushboo Abreu MD;  Location: [facility] OR;  Service: Urology;;       Review of patient's allergies indicates:   Allergen Reactions    Pesticide Dermatitis and Rash       No current facility-administered medications on file prior to encounter.     Current Outpatient Medications on File Prior to Encounter   Medication Sig    ALPRAZolam (XANAX) 1 MG tablet Take 1 mg by mouth nightly as needed for Anxiety.    atorvastatin (LIPITOR) 40 MG tablet Take 1 tablet (40 mg total) by mouth once daily.    carvediloL (COREG) 6.25 MG tablet Take 1 tablet (6.25 mg total) by mouth 2 (two) times daily with meals.    EScitalopram oxalate (LEXAPRO) 10 MG tablet Take 10 mg by mouth every morning.    gabapentin (NEURONTIN) 600 MG tablet Take 600 mg by mouth As instructed (neuropathic pain). Takes 1 tablet in the morning and 2 tablets at bedtime    hydrALAZINE (APRESOLINE) 10 MG tablet Take 1 tablet (10 mg total) by mouth every 8 (eight) hours.    insulin glargine-yfgn 100 unit/mL (3 mL) InPn Inject 26 Units into the skin every evening.    isosorbide mononitrate (IMDUR) 30 MG 24 hr tablet Take 1 tablet (30 mg total) by mouth once daily.    JARDIANCE 25 mg tablet Take 25 mg by mouth once daily.    lactobacillus acidophilus & bulgar (LACTINEX) 100 million cell packet Take 1 packet (1 each total) by mouth 2 (two) times daily.    metFORMIN (GLUCOPHAGE) 1000 MG tablet Take 1 tablet (1,000 mg total) by mouth 2 (two) times daily with meals.    methocarbamoL (ROBAXIN)  "500 MG Tab Take 500 mg by mouth every evening.    metoprolol succinate (TOPROL-XL) 100 MG 24 hr tablet Take 100 mg by mouth 2 (two) times daily.    nitroGLYCERIN (NITROSTAT) 0.4 MG SL tablet Place 0.4 mg under the tongue every 5 (five) minutes as needed for Chest pain.    oxyCODONE-acetaminophen (PERCOCET)  mg per tablet Take 1 tablet by mouth 2 (two) times daily as needed for Pain.    pantoprazole (PROTONIX) 40 MG tablet Take 1 tablet (40 mg total) by mouth once daily.    sildenafiL (VIAGRA) 100 MG tablet Take 1 tablet (100 mg total) by mouth daily as needed for Erectile Dysfunction.    traZODone (DESYREL) 100 MG tablet Take 100-200 mg by mouth every evening.    blood sugar diagnostic Strp TEST 3 TIMES DAILY    blood-glucose meter Misc TEST 3 TIMES DAILY    blood-glucose meter,continuous (DEXCOM G7 ) Misc Use as directed.    blood-glucose sensor (DEXCOM G7 SENSOR) Courtney Change every 10 days.    lancets 33 gauge Misc TEST 3 TIMES DAILY    pen needle, diabetic 29 gauge x 1/2" Ndle Use with insulin 4 times a day.    [DISCONTINUED] aspirin (ECOTRIN) 81 MG EC tablet Take 1 tablet (81 mg total) by mouth once daily. for 21 days    [DISCONTINUED] furosemide (LASIX) 40 MG tablet Take 1 tablet (40 mg total) by mouth once daily.    [DISCONTINUED] insulin lispro (HUMALOG KWIKPEN INSULIN) 100 unit/mL pen Inject 10 Units into the skin 3 (three) times daily.    [DISCONTINUED] LANTUS SOLOSTAR U-100 INSULIN glargine 100 units/mL SubQ pen Inject 28 Units into the skin once daily.    [DISCONTINUED] ranolazine (RANEXA) 1,000 mg Tb12 Take 1 tablet (1,000 mg total) by mouth 2 (two) times daily.    [DISCONTINUED] spironolactone (ALDACTONE) 25 MG tablet Take 1 tablet (25 mg total) by mouth once daily.    [DISCONTINUED] ticagrelor (BRILINTA) 90 mg tablet Take 1 tablet (90 mg total) by mouth 2 (two) times daily.     Family History       Problem Relation (Age of Onset)    Cancer Father    Diabetes Mother    Heart disease Mother "    Hyperlipidemia Father          Tobacco Use    Smoking status: Every Day     Current packs/day: 0.00     Average packs/day: 3.0 packs/day for 30.0 years (90.0 ttl pk-yrs)     Types: Cigarettes     Start date: 1990     Last attempt to quit: 2020     Years since quittin.7    Smokeless tobacco: Never   Substance and Sexual Activity    Alcohol use: Not Currently    Drug use: Yes     Frequency: 1.0 times per week     Types: Marijuana    Sexual activity: Not Currently     Review of Systems   Constitutional:  Negative for activity change and appetite change.   HENT:  Negative for congestion and dental problem.    Eyes:  Negative for discharge and itching.   Respiratory:  Negative for shortness of breath.    Cardiovascular:  Positive for chest pain.   Gastrointestinal:  Negative for abdominal distention and abdominal pain.   Endocrine: Negative for cold intolerance.   Genitourinary:  Negative for difficulty urinating and dysuria.   Musculoskeletal:  Negative for arthralgias and back pain.   Skin:  Negative for color change.   Neurological:  Negative for dizziness and facial asymmetry.   Hematological:  Negative for adenopathy.   Psychiatric/Behavioral:  Negative for agitation and behavioral problems.      Objective:     Vital Signs (Most Recent):  Temp: 97.9 °F (36.6 °C) (01/10/25 1949)  Pulse: 64 (01/10/25 1949)  Resp: 18 (01/10/25 2002)  BP: (!) 166/82 (01/10/25 1949)  SpO2: 98 % (01/10/25 1949) Vital Signs (24h Range):  Temp:  [97.9 °F (36.6 °C)-98.9 °F (37.2 °C)] 97.9 °F (36.6 °C)  Pulse:  [64-77] 64  Resp:  [18-20] 18  SpO2:  [96 %-98 %] 98 %  BP: (151-189)/(82-96) 166/82     Weight: 120.2 kg (265 lb)  Body mass index is 36.96 kg/m².     Physical Exam  Vitals and nursing note reviewed.   Constitutional:       Appearance: He is well-developed.   HENT:      Head: Atraumatic.      Right Ear: External ear normal.      Left Ear: External ear normal.      Nose: Nose normal.      Mouth/Throat:      Mouth: Mucous  membranes are moist.   Eyes:      Extraocular Movements: Extraocular movements intact.   Cardiovascular:      Rate and Rhythm: Normal rate.   Pulmonary:      Effort: Pulmonary effort is normal.   Musculoskeletal:         General: Normal range of motion.      Cervical back: Full passive range of motion without pain and normal range of motion.   Skin:     General: Skin is warm.   Neurological:      Mental Status: He is alert and oriented to person, place, and time.   Psychiatric:         Behavior: Behavior normal.                Significant Labs: All pertinent labs within the past 24 hours have been reviewed.  CBC:   Recent Labs   Lab 01/10/25  1446   WBC 7.91   HGB 15.3   HCT 44.9        CMP:   Recent Labs   Lab 01/10/25  1446   *   K 4.1      CO2 23   *   BUN 11   CREATININE 1.0   CALCIUM 8.7   PROT 7.2   ALBUMIN 3.9   BILITOT 0.4   ALKPHOS 63   AST 12   ALT 15   ANIONGAP 8       Significant Imaging: I have reviewed all pertinent imaging results/findings within the past 24 hours.

## 2025-01-11 NOTE — HPI
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    Significant triple-vessel coronary artery diseas

## 2025-01-11 NOTE — ASSESSMENT & PLAN NOTE
Maintain PO lasix     Recent Labs     01/10/25  1446   *     Latest ECHO  Results for orders placed during the hospital encounter of 04/15/24    Echo    Interpretation Summary    Left Ventricle: The left ventricle is at upper limits off normal in size. Mildly increased wall thickness. There is mild concentric hypertrophy. Moderate global hypokinesis present. There is moderately reduced systolic function with a visually estimated ejection fraction of 35 - 40%. There is diastolic dysfunction.    Right Ventricle: Normal right ventricular cavity size. Wall thickness is normal. Right ventricle wall motion  is normal. Systolic function is normal. Pacemaker lead present in the ventricle.    Left Atrium: Left atrium is moderately dilated.    Right Atrium: Multiple leads present in the right atrium.    Aortic Valve: The aortic valve is a trileaflet valve. There is mild aortic valve sclerosis. There is mild annular calcification present.    Mitral Valve: There is mild regurgitation with a centrally directed jet.    Tricuspid Valve: There is mild regurgitation.    IVC/SVC: Normal venous pressure at 3 mmHg.    Current Heart Failure Medications  , Daily, Oral  , 2 times daily, Oral  carvediloL tablet 6.25 mg, 2 times daily with meals, Oral  furosemide tablet 40 mg, Daily, Oral

## 2025-01-11 NOTE — H&P
[facility] - Emergency Dept  Hospital Medicine  History & Physical    Patient Name: Magdaleno Cunningham  MRN: [id number]  Patient Class: OP- Observation  Admission Date: 1/10/2025  Attending Physician: Bipin Blevins MD   Primary Care Provider: Venkata Mclaughlin MD         Patient information was obtained from patient, past medical records, and ER records.     Subjective:     Principal Problem:Chest pain    Chief Complaint:   Chief Complaint   Patient presents with    Chest Pain     X COUPLE DAYS        HPI: 59 year old pt getting admitted with chest pain  Pt started having chest pain few days ago  Chest pain mainly on L side and first time it happened it awakened him from sleep  Since then pt has been having chest pain/intermittent/radiation to Neck and LUE area  Symptoms went worse and he came to hospital and got admitted  Pt said his Cardiologist is based in [facility]  Also he had several angiograms as per chart review  Pt said stress test always failed to diagnose his condition  Last LHC was done in December 2023 and as follows      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    Significant triple-vessel coronary artery diseas      Past Medical History:   Diagnosis Date    Anticoagulant long-term use     Atrial fibrillation 2018    BPH (benign prostatic hyperplasia)     Cataract     COPD (chronic obstructive pulmonary disease)     Coronary artery disease     stents    CVD (cardiovascular disease)     Diabetes mellitus     Erythema multiforme     AKA Denton Edmondson Syndrome    Hypertension     Infected incision     MI (myocardial infarction)     per pt he has had 4     ROSAMARIA on CPAP     RLS (restless legs syndrome)     Perez-Denton syndrome        Past Surgical History:   Procedure Laterality Date    CORONARY ANGIOGRAPHY INCLUDING BYPASS GRAFTS WITH CATHETERIZATION OF LEFT HEART N/A 9/1/2022    Procedure: ANGIOGRAM, CORONARY,  INCLUDING BYPASS GRAFT, WITH LEFT HEART CATHETERIZATION;  Surgeon: Paul Botello MD;  Location: [facility] CATH/EP LAB;  Service: Cardiology;  Laterality: N/A;    CORONARY ANGIOGRAPHY INCLUDING BYPASS GRAFTS WITH CATHETERIZATION OF LEFT HEART Left 12/22/2023    Procedure: ANGIOGRAM, CORONARY, INCLUDING BYPASS GRAFT, WITH LEFT HEART CATHETERIZATION;  Surgeon: Paul Botello MD;  Location: [facility] CATH/EP LAB;  Service: Cardiology;  Laterality: Left;    CORONARY ARTERY BYPASS GRAFT (CABG) N/A 4/7/2020    Procedure: CORONARY ARTERY BYPASS GRAFT (CABG)- Excision of left atrial appendage;  Surgeon: Doug Solitario MD;  Location: [facility];  Service: Cardiothoracic;  Laterality: N/A;    CORONARY BYPASS GRAFT ANGIOGRAPHY  12/25/2023    Procedure: Bypass graft study;  Surgeon: Ashley Valverde MD;  Location: [facility] CATH/EP LAB;  Service: Cardiology;;    CORONARY STENT PLACEMENT      CORONARY STENT PLACEMENT N/A 12/22/2023    Procedure: INSERTION, STENT, CORONARY ARTERY;  Surgeon: Paul Botello MD;  Location: [facility] CATH/EP LAB;  Service: Cardiology;  Laterality: N/A;    WILSON MAZE PROCEDURE N/A 4/7/2020    Procedure: WILSON MAZE PROCEDURE- Attempted;  Surgeon: Doug Solitario MD;  Location: [facility] OR;  Service: Cardiothoracic;  Laterality: N/A;    CYSTOSCOPY N/A 12/10/2018    Procedure: CYSTOSCOPY;  Surgeon: Khushboo Abreu MD;  Location: [facility] OR;  Service: Urology;  Laterality: N/A;    ENDOSCOPIC HARVEST OF VEIN Left 4/7/2020    Procedure: HARVEST-VEIN-ENDOVASCULAR;  Surgeon: Doug Solitario MD;  Location: [facility] OR;  Service: Cardiothoracic;  Laterality: Left;    INCISION OF PERIRECTAL ABSCESS Bilateral 9/1/2023    Procedure: INCISION, ABSCESS, PERIRECTAL;  Surgeon: Brayan Spears MD;  Location: [facility] OR;  Service: General;  Laterality: Bilateral;  stirrups    INSERTION OF INTRA-AORTIC BALLOON ASSIST DEVICE  12/25/2023    Procedure: INSERTION, INTRA-AORTIC BALLOON PUMP;  Surgeon: Ashley Valverde,  MD;  Location: [facility] CATH/EP LAB;  Service: Cardiology;;    INSERTION OF TEMPORARY PACEMAKER N/A 12/25/2023    Procedure: INSERTION, PACEMAKER, TEMPORARY;  Surgeon: Ashley Valverde MD;  Location: [facility] CATH/EP LAB;  Service: Cardiology;  Laterality: N/A;    INSERTION, ICD, DUAL CHAMBER N/A 1/2/2024    Procedure: Insertion, ICD, Dual Chamber;  Surgeon: Antonio Callejas MD;  Location: [facility] EP LAB;  Service: Cardiology;  Laterality: N/A;  CM, DUAL ICD, ANES, SJM, MB, 3079    IVUS, CORONARY  12/22/2023    Procedure: IVUS, Coronary;  Surgeon: Paul Botello MD;  Location: [facility] CATH/EP LAB;  Service: Cardiology;;    LEFT HEART CATHETERIZATION Left 3/17/2020    Procedure: CATHETERIZATION, HEART, LEFT;  Surgeon: Tyree Walters MD;  Location: [facility] CATH/EP LAB;  Service: Cardiology;  Laterality: Left;    PERCUTANEOUS CORONARY INTERVENTION, ARTERY N/A 12/22/2023    Procedure: Percutaneous coronary intervention;  Surgeon: Paul Botello MD;  Location: [facility] CATH/EP LAB;  Service: Cardiology;  Laterality: N/A;    PERCUTANEOUS CORONARY INTERVENTION, ARTERY N/A 12/25/2023    Procedure: Percutaneous coronary intervention;  Surgeon: Ashley Valverde MD;  Location: [facility] CATH/EP LAB;  Service: Cardiology;  Laterality: N/A;    PLACEMENT, IABP  12/25/2023    Procedure: Placement, IABP;  Surgeon: Ashley Valverde MD;  Location: [facility] CATH/EP LAB;  Service: Cardiology;;    STERNAL WIRES REMOVAL N/A 6/8/2020    Procedure: REMOVAL, STERNAL WIRE;  Surgeon: Doug Solitario MD;  Location: [facility] OR;  Service: Peripheral Vascular;  Laterality: N/A;    ULTRASOUND OF PROSTATE FOR VOLUME DETERMINATION  12/10/2018    Procedure: ULTRASOUND, PROSTATE, FOR VOLUME DETERMINATION;  Surgeon: Khushboo Abreu MD;  Location: [facility] OR;  Service: Urology;;       Review of patient's allergies indicates:   Allergen Reactions    Pesticide Dermatitis and Rash       No current facility-administered medications on file prior to  encounter.     Current Outpatient Medications on File Prior to Encounter   Medication Sig    ALPRAZolam (XANAX) 1 MG tablet Take 1 mg by mouth nightly as needed for Anxiety.    atorvastatin (LIPITOR) 40 MG tablet Take 1 tablet (40 mg total) by mouth once daily.    carvediloL (COREG) 6.25 MG tablet Take 1 tablet (6.25 mg total) by mouth 2 (two) times daily with meals.    EScitalopram oxalate (LEXAPRO) 10 MG tablet Take 10 mg by mouth every morning.    gabapentin (NEURONTIN) 600 MG tablet Take 600 mg by mouth As instructed (neuropathic pain). Takes 1 tablet in the morning and 2 tablets at bedtime    hydrALAZINE (APRESOLINE) 10 MG tablet Take 1 tablet (10 mg total) by mouth every 8 (eight) hours.    insulin glargine-yfgn 100 unit/mL (3 mL) InPn Inject 26 Units into the skin every evening.    isosorbide mononitrate (IMDUR) 30 MG 24 hr tablet Take 1 tablet (30 mg total) by mouth once daily.    JARDIANCE 25 mg tablet Take 25 mg by mouth once daily.    lactobacillus acidophilus & bulgar (LACTINEX) 100 million cell packet Take 1 packet (1 each total) by mouth 2 (two) times daily.    metFORMIN (GLUCOPHAGE) 1000 MG tablet Take 1 tablet (1,000 mg total) by mouth 2 (two) times daily with meals.    methocarbamoL (ROBAXIN) 500 MG Tab Take 500 mg by mouth every evening.    metoprolol succinate (TOPROL-XL) 100 MG 24 hr tablet Take 100 mg by mouth 2 (two) times daily.    nitroGLYCERIN (NITROSTAT) 0.4 MG SL tablet Place 0.4 mg under the tongue every 5 (five) minutes as needed for Chest pain.    oxyCODONE-acetaminophen (PERCOCET)  mg per tablet Take 1 tablet by mouth 2 (two) times daily as needed for Pain.    pantoprazole (PROTONIX) 40 MG tablet Take 1 tablet (40 mg total) by mouth once daily.    sildenafiL (VIAGRA) 100 MG tablet Take 1 tablet (100 mg total) by mouth daily as needed for Erectile Dysfunction.    traZODone (DESYREL) 100 MG tablet Take 100-200 mg by mouth every evening.    blood sugar diagnostic Strp TEST 3 TIMES  "DAILY    blood-glucose meter Misc TEST 3 TIMES DAILY    blood-glucose meter,continuous (DEXCOM G7 ) Misc Use as directed.    blood-glucose sensor (DEXCOM G7 SENSOR) Courtney Change every 10 days.    lancets 33 gauge Misc TEST 3 TIMES DAILY    pen needle, diabetic 29 gauge x 1/2" Ndle Use with insulin 4 times a day.    [DISCONTINUED] aspirin (ECOTRIN) 81 MG EC tablet Take 1 tablet (81 mg total) by mouth once daily. for 21 days    [DISCONTINUED] furosemide (LASIX) 40 MG tablet Take 1 tablet (40 mg total) by mouth once daily.    [DISCONTINUED] insulin lispro (HUMALOG KWIKPEN INSULIN) 100 unit/mL pen Inject 10 Units into the skin 3 (three) times daily.    [DISCONTINUED] LANTUS SOLOSTAR U-100 INSULIN glargine 100 units/mL SubQ pen Inject 28 Units into the skin once daily.    [DISCONTINUED] ranolazine (RANEXA) 1,000 mg Tb12 Take 1 tablet (1,000 mg total) by mouth 2 (two) times daily.    [DISCONTINUED] spironolactone (ALDACTONE) 25 MG tablet Take 1 tablet (25 mg total) by mouth once daily.    [DISCONTINUED] ticagrelor (BRILINTA) 90 mg tablet Take 1 tablet (90 mg total) by mouth 2 (two) times daily.     Family History       Problem Relation (Age of Onset)    Cancer Father    Diabetes Mother    Heart disease Mother    Hyperlipidemia Father          Tobacco Use    Smoking status: Every Day     Current packs/day: 0.00     Average packs/day: 3.0 packs/day for 30.0 years (90.0 ttl pk-yrs)     Types: Cigarettes     Start date: 1990     Last attempt to quit: 2020     Years since quittin.7    Smokeless tobacco: Never   Substance and Sexual Activity    Alcohol use: Not Currently    Drug use: Yes     Frequency: 1.0 times per week     Types: Marijuana    Sexual activity: Not Currently     Review of Systems   Constitutional:  Negative for activity change and appetite change.   HENT:  Negative for congestion and dental problem.    Eyes:  Negative for discharge and itching.   Respiratory:  Negative for shortness of " breath.    Cardiovascular:  Positive for chest pain.   Gastrointestinal:  Negative for abdominal distention and abdominal pain.   Endocrine: Negative for cold intolerance.   Genitourinary:  Negative for difficulty urinating and dysuria.   Musculoskeletal:  Negative for arthralgias and back pain.   Skin:  Negative for color change.   Neurological:  Negative for dizziness and facial asymmetry.   Hematological:  Negative for adenopathy.   Psychiatric/Behavioral:  Negative for agitation and behavioral problems.      Objective:     Vital Signs (Most Recent):  Temp: 97.9 °F (36.6 °C) (01/10/25 1949)  Pulse: 64 (01/10/25 1949)  Resp: 18 (01/10/25 2002)  BP: (!) 166/82 (01/10/25 1949)  SpO2: 98 % (01/10/25 1949) Vital Signs (24h Range):  Temp:  [97.9 °F (36.6 °C)-98.9 °F (37.2 °C)] 97.9 °F (36.6 °C)  Pulse:  [64-77] 64  Resp:  [18-20] 18  SpO2:  [96 %-98 %] 98 %  BP: (151-189)/(82-96) 166/82     Weight: 120.2 kg (265 lb)  Body mass index is 36.96 kg/m².     Physical Exam  Vitals and nursing note reviewed.   Constitutional:       Appearance: He is well-developed.   HENT:      Head: Atraumatic.      Right Ear: External ear normal.      Left Ear: External ear normal.      Nose: Nose normal.      Mouth/Throat:      Mouth: Mucous membranes are moist.   Eyes:      Extraocular Movements: Extraocular movements intact.   Cardiovascular:      Rate and Rhythm: Normal rate.   Pulmonary:      Effort: Pulmonary effort is normal.   Musculoskeletal:         General: Normal range of motion.      Cervical back: Full passive range of motion without pain and normal range of motion.   Skin:     General: Skin is warm.   Neurological:      Mental Status: He is alert and oriented to person, place, and time.   Psychiatric:         Behavior: Behavior normal.                Significant Labs: All pertinent labs within the past 24 hours have been reviewed.  CBC:   Recent Labs   Lab 01/10/25  1446   WBC 7.91   HGB 15.3   HCT 44.9        CMP:    Recent Labs   Lab 01/10/25  1446   *   K 4.1      CO2 23   *   BUN 11   CREATININE 1.0   CALCIUM 8.7   PROT 7.2   ALBUMIN 3.9   BILITOT 0.4   ALKPHOS 63   AST 12   ALT 15   ANIONGAP 8       Significant Imaging: I have reviewed all pertinent imaging results/findings within the past 24 hours.    Assessment/Plan:     * Chest pain  Serial cardiac enzymes  Maintain GDMT  NPO From midnight  Consulted Cardiology MD      CHF (congestive heart failure)  Maintain PO lasix     Recent Labs     01/10/25  1446   *     Latest ECHO  Results for orders placed during the hospital encounter of 04/15/24    Echo    Interpretation Summary    Left Ventricle: The left ventricle is at upper limits off normal in size. Mildly increased wall thickness. There is mild concentric hypertrophy. Moderate global hypokinesis present. There is moderately reduced systolic function with a visually estimated ejection fraction of 35 - 40%. There is diastolic dysfunction.    Right Ventricle: Normal right ventricular cavity size. Wall thickness is normal. Right ventricle wall motion  is normal. Systolic function is normal. Pacemaker lead present in the ventricle.    Left Atrium: Left atrium is moderately dilated.    Right Atrium: Multiple leads present in the right atrium.    Aortic Valve: The aortic valve is a trileaflet valve. There is mild aortic valve sclerosis. There is mild annular calcification present.    Mitral Valve: There is mild regurgitation with a centrally directed jet.    Tricuspid Valve: There is mild regurgitation.    IVC/SVC: Normal venous pressure at 3 mmHg.    Current Heart Failure Medications  , Daily, Oral  , 2 times daily, Oral  carvediloL tablet 6.25 mg, 2 times daily with meals, Oral  furosemide tablet 40 mg, Daily, Oral      Paroxysmal atrial fibrillation      Antiarrhythmics  , 2 times daily, Oral    Anticoagulants  enoxaparin injection 40 mg, Every 24 hours, Subcutaneous    Plan  - Replete lytes with a  goal of K>4, Mg >2      S/P CABG (coronary artery bypass graft)  Aware       Medical non-compliance  Per records       Type 2 diabetes mellitus with diabetic arthropathy  Maintain insulin regime ordered         VTE Risk Mitigation (From admission, onward)           Ordered     enoxaparin injection 40 mg  Daily         01/10/25 1830     IP VTE HIGH RISK PATIENT  Once         01/10/25 1830     Place sequential compression device  Until discontinued         01/10/25 1830                       On 01/10/2025, patient should be placed in hospital observation services under my care.             Bipin Blevins MD  Department of Hospital Medicine  [facility] hospital - Emergency Dept

## 2025-01-12 ENCOUNTER — CLINICAL SUPPORT (OUTPATIENT)
Dept: CARDIOLOGY | Facility: HOSPITAL | Age: 60
End: 2025-01-12
Attending: EMERGENCY MEDICINE
Payer: MEDICAID

## 2025-01-12 VITALS
BODY MASS INDEX: 37.1 KG/M2 | HEIGHT: 71 IN | TEMPERATURE: 99 F | SYSTOLIC BLOOD PRESSURE: 153 MMHG | RESPIRATION RATE: 18 BRPM | OXYGEN SATURATION: 96 % | WEIGHT: 265 LBS | DIASTOLIC BLOOD PRESSURE: 83 MMHG | HEART RATE: 69 BPM

## 2025-01-12 VITALS — WEIGHT: 265 LBS | HEIGHT: 71 IN | BODY MASS INDEX: 37.1 KG/M2

## 2025-01-12 LAB
ALBUMIN SERPL BCP-MCNC: 4 G/DL (ref 3.5–5.2)
ALP SERPL-CCNC: 58 U/L (ref 55–135)
ALT SERPL W/O P-5'-P-CCNC: 15 U/L (ref 10–44)
ANION GAP SERPL CALC-SCNC: 7 MMOL/L (ref 8–16)
AORTIC ROOT ANNULUS: 3.7 CM
AORTIC VALVE CUSP SEPERATION: 2.2 CM
APICAL FOUR CHAMBER EJECTION FRACTION: 51 %
APICAL TWO CHAMBER EJECTION FRACTION: 32 %
ASCENDING AORTA: 3 CM
AST SERPL-CCNC: 14 U/L (ref 10–40)
AV INDEX (PROSTH): 1.03
AV MEAN GRADIENT: 2 MMHG
AV PEAK GRADIENT: 4 MMHG
AV VALVE AREA BY VELOCITY RATIO: 4.9 CM²
AV VALVE AREA: 5 CM²
AV VELOCITY RATIO: 1
BASOPHILS # BLD AUTO: 0.08 K/UL (ref 0–0.2)
BASOPHILS NFR BLD: 1.1 % (ref 0–1.9)
BILIRUB SERPL-MCNC: 0.4 MG/DL (ref 0.1–1)
BSA FOR ECHO PROCEDURE: 2.45 M2
BUN SERPL-MCNC: 13 MG/DL (ref 6–20)
CALCIUM SERPL-MCNC: 9 MG/DL (ref 8.7–10.5)
CHLORIDE SERPL-SCNC: 97 MMOL/L (ref 95–110)
CO2 SERPL-SCNC: 28 MMOL/L (ref 23–29)
CREAT SERPL-MCNC: 1 MG/DL (ref 0.5–1.4)
CV ECHO LV RWT: 0.34 CM
DIFFERENTIAL METHOD BLD: ABNORMAL
DOP CALC AO PEAK VEL: 1 M/S
DOP CALC AO VTI: 21.1 CM
DOP CALC LVOT AREA: 4.9 CM2
DOP CALC LVOT DIAMETER: 2.5 CM
DOP CALC LVOT PEAK VEL: 1 M/S
DOP CALC LVOT STROKE VOLUME: 106.5 CM3
DOP CALC MV VTI: 28.3 CM
DOP CALCLVOT PEAK VEL VTI: 21.7 CM
E WAVE DECELERATION TIME: 165 MSEC
E/A RATIO: 1.77
E/E' RATIO: 12 M/S
ECHO LV POSTERIOR WALL: 0.9 CM (ref 0.6–1.1)
EOSINOPHIL # BLD AUTO: 0.2 K/UL (ref 0–0.5)
EOSINOPHIL NFR BLD: 3.1 % (ref 0–8)
ERYTHROCYTE [DISTWIDTH] IN BLOOD BY AUTOMATED COUNT: 12.8 % (ref 11.5–14.5)
EST. GFR  (NO RACE VARIABLE): >60 ML/MIN/1.73 M^2
FRACTIONAL SHORTENING: 22.6 % (ref 28–44)
GLUCOSE SERPL-MCNC: 258 MG/DL (ref 70–110)
HCT VFR BLD AUTO: 43.8 % (ref 40–54)
HGB BLD-MCNC: 14.8 G/DL (ref 14–18)
IMM GRANULOCYTES # BLD AUTO: 0.04 K/UL (ref 0–0.04)
IMM GRANULOCYTES NFR BLD AUTO: 0.6 % (ref 0–0.5)
INTERVENTRICULAR SEPTUM: 1 CM (ref 0.6–1.1)
IVRT: 85 MSEC
LEFT ATRIUM AREA SYSTOLIC (APICAL 2 CHAMBER): 19.3 CM2
LEFT ATRIUM AREA SYSTOLIC (APICAL 4 CHAMBER): 26.6 CM2
LEFT ATRIUM VOLUME INDEX MOD: 32 ML/M2
LEFT ATRIUM VOLUME MOD: 76.1 ML
LEFT INTERNAL DIMENSION IN SYSTOLE: 4.1 CM (ref 2.1–4)
LEFT VENTRICLE DIASTOLIC VOLUME INDEX: 55.88 ML/M2
LEFT VENTRICLE DIASTOLIC VOLUME: 133 ML
LEFT VENTRICLE END DIASTOLIC VOLUME APICAL 2 CHAMBER: 124 ML
LEFT VENTRICLE END DIASTOLIC VOLUME APICAL 4 CHAMBER: 182 ML
LEFT VENTRICLE END SYSTOLIC VOLUME APICAL 2 CHAMBER: 56 ML
LEFT VENTRICLE END SYSTOLIC VOLUME APICAL 4 CHAMBER: 85.7 ML
LEFT VENTRICLE MASS INDEX: 78.7 G/M2
LEFT VENTRICLE SYSTOLIC VOLUME INDEX: 30.6 ML/M2
LEFT VENTRICLE SYSTOLIC VOLUME: 72.9 ML
LEFT VENTRICULAR INTERNAL DIMENSION IN DIASTOLE: 5.3 CM (ref 3.5–6)
LEFT VENTRICULAR MASS: 187.3 G
LV LATERAL E/E' RATIO: 9 M/S
LV SEPTAL E/E' RATIO: 18 M/S
LVED V (TEICH): 133 ML
LVES V (TEICH): 72.9 ML
LVOT MG: 2 MMHG
LVOT MV: 0.7 CM/S
LYMPHOCYTES # BLD AUTO: 2.2 K/UL (ref 1–4.8)
LYMPHOCYTES NFR BLD: 30.8 % (ref 18–48)
MAGNESIUM SERPL-MCNC: 1.8 MG/DL (ref 1.6–2.6)
MCH RBC QN AUTO: 30 PG (ref 27–31)
MCHC RBC AUTO-ENTMCNC: 33.8 G/DL (ref 32–36)
MCV RBC AUTO: 89 FL (ref 82–98)
MONOCYTES # BLD AUTO: 0.5 K/UL (ref 0.3–1)
MONOCYTES NFR BLD: 7.3 % (ref 4–15)
MV MEAN GRADIENT: 1 MMHG
MV PEAK A VEL: 0.61 M/S
MV PEAK E VEL: 1.08 M/S
MV PEAK GRADIENT: 5 MMHG
MV STENOSIS PRESSURE HALF TIME: 59 MS
MV VALVE AREA BY CONTINUITY EQUATION: 3.76 CM2
MV VALVE AREA P 1/2 METHOD: 3.73 CM2
NEUTROPHILS # BLD AUTO: 4.1 K/UL (ref 1.8–7.7)
NEUTROPHILS NFR BLD: 57.1 % (ref 38–73)
NRBC BLD-RTO: 0 /100 WBC
OHS CV RV/LV RATIO: 0.66 CM
OHS LV EJECTION FRACTION SIMPSONS BIPLANE MOD: 43 %
PISA MRMAX VEL: 4.56 M/S
PISA TR MAX VEL: 2.07 M/S
PLATELET # BLD AUTO: 232 K/UL (ref 150–450)
PMV BLD AUTO: 9.4 FL (ref 9.2–12.9)
POCT GLUCOSE: 311 MG/DL (ref 70–110)
POTASSIUM SERPL-SCNC: 3.9 MMOL/L (ref 3.5–5.1)
PROT SERPL-MCNC: 7.2 G/DL (ref 6–8.4)
PV MV: 0.52 M/S
PV PEAK GRADIENT: 2 MMHG
PV PEAK VELOCITY: 0.78 M/S
RA MAJOR: 4.45 CM
RA WIDTH: 3.12 CM
RBC # BLD AUTO: 4.94 M/UL (ref 4.6–6.2)
RIGHT ATRIUM VOLUME AREA LENGTH APICAL 4 CHAMBER: 22.3 ML
RIGHT VENTRICLE DIASTOLIC BASEL DIMENSION: 3.5 CM
RIGHT VENTRICLE DIASTOLIC LENGTH: 6.1 CM
RIGHT VENTRICULAR END-DIASTOLIC DIMENSION: 3.54 CM
RIGHT VENTRICULAR LENGTH IN DIASTOLE (APICAL 4-CHAMBER VIEW): 6.14 CM
RV TISSUE DOPPLER FREE WALL SYSTOLIC VELOCITY 1 (APICAL 4 CHAMBER VIEW): 12 CM/S
SINUS: 3.19 CM
SODIUM SERPL-SCNC: 132 MMOL/L (ref 136–145)
STJ: 3.04 CM
TDI LATERAL: 0.12 M/S
TDI SEPTAL: 0.06 M/S
TDI: 0.09 M/S
TR MAX PG: 17 MMHG
TRICUSPID ANNULAR PLANE SYSTOLIC EXCURSION: 3.1 CM
WBC # BLD AUTO: 7.12 K/UL (ref 3.9–12.7)
Z-SCORE OF LEFT VENTRICULAR DIMENSION IN END DIASTOLE: -6.81
Z-SCORE OF LEFT VENTRICULAR DIMENSION IN END SYSTOLE: -3.37

## 2025-01-12 PROCEDURE — 99214 OFFICE O/P EST MOD 30 MIN: CPT | Mod: 25,,, | Performed by: GENERAL PRACTICE

## 2025-01-12 PROCEDURE — 85025 COMPLETE CBC W/AUTO DIFF WBC: CPT | Performed by: INTERNAL MEDICINE

## 2025-01-12 PROCEDURE — 83735 ASSAY OF MAGNESIUM: CPT | Performed by: INTERNAL MEDICINE

## 2025-01-12 PROCEDURE — 25000003 PHARM REV CODE 250: Performed by: NURSE PRACTITIONER

## 2025-01-12 PROCEDURE — 93306 TTE W/DOPPLER COMPLETE: CPT | Mod: 26,,, | Performed by: GENERAL PRACTICE

## 2025-01-12 PROCEDURE — 36415 COLL VENOUS BLD VENIPUNCTURE: CPT | Performed by: INTERNAL MEDICINE

## 2025-01-12 PROCEDURE — 25000003 PHARM REV CODE 250: Performed by: INTERNAL MEDICINE

## 2025-01-12 PROCEDURE — G0378 HOSPITAL OBSERVATION PER HR: HCPCS

## 2025-01-12 PROCEDURE — 80053 COMPREHEN METABOLIC PANEL: CPT | Performed by: INTERNAL MEDICINE

## 2025-01-12 PROCEDURE — 93306 TTE W/DOPPLER COMPLETE: CPT

## 2025-01-12 RX ORDER — RANOLAZINE 500 MG/1
500 TABLET, EXTENDED RELEASE ORAL 2 TIMES DAILY
Qty: 60 TABLET | Refills: 1 | Status: SHIPPED | OUTPATIENT
Start: 2025-01-12 | End: 2025-01-28 | Stop reason: SDUPTHER

## 2025-01-12 RX ORDER — INSULIN PUMP SYRINGE, 3 ML
EACH MISCELLANEOUS
Qty: 1 EACH | Refills: 0 | Status: SHIPPED | OUTPATIENT
Start: 2025-01-12 | End: 2025-01-12

## 2025-01-12 RX ORDER — LANCETS
EACH MISCELLANEOUS
Qty: 200 EACH | Refills: 0 | Status: SHIPPED | OUTPATIENT
Start: 2025-01-12

## 2025-01-12 RX ORDER — INSULIN PUMP SYRINGE, 3 ML
EACH MISCELLANEOUS
Qty: 1 EACH | Refills: 0 | Status: SHIPPED | OUTPATIENT
Start: 2025-01-12 | End: 2026-01-12

## 2025-01-12 RX ORDER — AMLODIPINE BESYLATE 5 MG/1
5 TABLET ORAL DAILY
Qty: 60 TABLET | Refills: 0 | Status: SHIPPED | OUTPATIENT
Start: 2025-01-13 | End: 2025-01-12

## 2025-01-12 RX ORDER — AMLODIPINE BESYLATE 5 MG/1
5 TABLET ORAL DAILY
Qty: 60 TABLET | Refills: 0 | Status: SHIPPED | OUTPATIENT
Start: 2025-01-13 | End: 2025-03-14

## 2025-01-12 RX ORDER — RANOLAZINE 500 MG/1
500 TABLET, EXTENDED RELEASE ORAL 2 TIMES DAILY
Qty: 60 TABLET | Refills: 1 | Status: SHIPPED | OUTPATIENT
Start: 2025-01-12 | End: 2025-01-12

## 2025-01-12 RX ORDER — LANCETS
EACH MISCELLANEOUS
Qty: 200 EACH | Refills: 0 | Status: SHIPPED | OUTPATIENT
Start: 2025-01-12 | End: 2025-01-12

## 2025-01-12 RX ADMIN — AMLODIPINE BESYLATE 5 MG: 5 TABLET ORAL at 08:01

## 2025-01-12 RX ADMIN — ATORVASTATIN CALCIUM 40 MG: 40 TABLET, FILM COATED ORAL at 08:01

## 2025-01-12 RX ADMIN — ESCITALOPRAM OXALATE 10 MG: 10 TABLET ORAL at 08:01

## 2025-01-12 RX ADMIN — ASPIRIN 81 MG: 81 TABLET ORAL at 08:01

## 2025-01-12 RX ADMIN — TICAGRELOR 90 MG: 90 TABLET ORAL at 08:01

## 2025-01-12 RX ADMIN — ISOSORBIDE MONONITRATE 30 MG: 30 TABLET, EXTENDED RELEASE ORAL at 08:01

## 2025-01-12 RX ADMIN — Medication 800 MG: at 09:01

## 2025-01-12 RX ADMIN — RANOLAZINE 500 MG: 500 TABLET, EXTENDED RELEASE ORAL at 08:01

## 2025-01-12 RX ADMIN — Medication 800 MG: at 03:01

## 2025-01-12 RX ADMIN — FUROSEMIDE 40 MG: 40 TABLET ORAL at 08:01

## 2025-01-12 RX ADMIN — INSULIN ASPART 8 UNITS: 100 INJECTION, SOLUTION INTRAVENOUS; SUBCUTANEOUS at 08:01

## 2025-01-12 RX ADMIN — CARVEDILOL 6.25 MG: 6.25 TABLET, FILM COATED ORAL at 07:01

## 2025-01-12 NOTE — DISCHARGE INSTRUCTIONS
We will need to follow up with Cardiology, PCP and Endocrinology.    Important to follow up with Cardiology as she will need outpatient angiogram.  Returned to the hospital should you have any further chest pain.      Discharge Instructions, [facility]    Thank you for choosing [facility] for your medical care. The primary doctor who is taking care of you at the time of your discharge is Gil Panchal MD.     You were admitted to the hospital with Chest pain.     Please note your discharge instructions, including diet/activity restrictions, follow-up appointments, and medication changes.  If you have any questions about your medical issues, prescriptions, or any other questions, please feel free to contact the [facility] Medicine Dept at [phone] and we will help.    If you are previously with Home health, outpatient PT/OT or under a therapy program, you are cleared to return to those programs.    Please direct all long term medication refills and follow up to your primary care provider, Venkata Mclaughlin MD. Thank you again for letting us take care of your health care needs.    Please note the following discharge instructions per your discharging physician-  Kaity Rios PA-C

## 2025-01-12 NOTE — PLAN OF CARE
Patient cleared for discharge from case management standpoint.    Chart and discharge orders reviewed.  Patient discharged home with no further case management needs.       01/12/25 1011   Final Note   Assessment Type Final Discharge Note   Anticipated Discharge Disposition Home   What phone number can be called within the next 1-3 days to see how you are doing after discharge? [phone]   Post-Acute Status   Discharge Delays None known at this time

## 2025-01-12 NOTE — DISCHARGE SUMMARY
[facility] Medicine  Discharge Summary      Patient Name: Magdaleno Cunningham  MRN: [id number]  TARA: [id number]  Patient Class: OP- Observation  Admission Date: 1/10/2025  Hospital Length of Stay: 0 days  Discharge Date and Time:  01/12/2025 3:13 PM  Attending Physician: Mami att. providers found   Discharging Provider: Kaity Rios PA-C  Primary Care Provider: Venkata Mclaughlin MD    Primary Care Team: Networked reference to record PCT     HPI:   59 year old pt getting admitted with chest pain  Pt started having chest pain few days ago  Chest pain mainly on L side and first time it happened it awakened him from sleep  Since then pt has been having chest pain/intermittent/radiation to Neck and LUE area  Symptoms went worse and he came to hospital and got admitted  Pt said his Cardiologist is based in [facility]  Also he had several angiograms as per chart review  Pt said stress test always failed to diagnose his condition  Last LHC was done in December 2023 and as follows      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    Significant triple-vessel coronary artery diseas      * No surgery found *      Hospital Course:   Patient was admitted to the hospital chest pain.  Patient was monitored closely throughout his hospital stay.  Cardiology was consulted admission.  Troponins were trended and were negative x4.  Patient was monitored on telemetry.  Blood pressure regime and antianginal medications were adjusted per Cardiology recommendations.  Patient remained chest pain-free for the rest of his hospitalization and elected to discharged home and pursue outpatient angiogram.  Patient was seen on day of discharge.  Patient was cleared for discharge by Cardiology.  Patient was instructed on the importance of following up with Cardiology.  Patient was prescribed Ranexa and Lodine per Cardiology recommendations.  Glucometer prescribed  discharge this patient was states he no longer has one.  Stressed importance of cardiac and diabetic diet and tight glucose control.  He states he was all other medications as not need prescriptions for home meds.  Patient was also to follow up with PCP and Endocrinology.  Return precautions discussed and patient was understanding.  All questions answered.     Goals of Care Treatment Preferences:  Code Status: Full Code      SDOH Screening:  The patient was screened for food insecurity, housing instability, transportation needs, utility difficulties, and interpersonal safety. The social determinant(s) of health identified as a concern this admission are:  Housing instability  Food insecurity    Will discuss with case management and/or community health workers.    Social Drivers of Health with Concerns     Food Insecurity: Food Insecurity Present (1/10/2025)   Housing Stability: High Risk (1/10/2025)        Consults:   Consults (From admission, onward)          Status Ordering Provider     Inpatient consult to Cardiology  Once        Provider:  Norm Rodriguez MD    Completed JOSE ALAS            * Chest pain  Serial cardiac enzymes  Maintain GDMT  NPO From midnight  Consulted Cardiology MD  1/11: cards following; trops negative x 4; hydralazine added to regime; continue telemetry; tentatively angiogram on Monday if patient continues experiencing chest pain versus outpatient if he has rapid improvement with antianginal regime      CHF (congestive heart failure)  Maintain PO lasix     Recent Labs     01/10/25  1446   *     Latest ECHO  Results for orders placed during the hospital encounter of 04/15/24    Echo    Interpretation Summary    Left Ventricle: The left ventricle is at upper limits off normal in size. Mildly increased wall thickness. There is mild concentric hypertrophy. Moderate global hypokinesis present. There is moderately reduced systolic function with a visually estimated ejection fraction  of 35 - 40%. There is diastolic dysfunction.    Right Ventricle: Normal right ventricular cavity size. Wall thickness is normal. Right ventricle wall motion  is normal. Systolic function is normal. Pacemaker lead present in the ventricle.    Left Atrium: Left atrium is moderately dilated.    Right Atrium: Multiple leads present in the right atrium.    Aortic Valve: The aortic valve is a trileaflet valve. There is mild aortic valve sclerosis. There is mild annular calcification present.    Mitral Valve: There is mild regurgitation with a centrally directed jet.    Tricuspid Valve: There is mild regurgitation.    IVC/SVC: Normal venous pressure at 3 mmHg.    Current Heart Failure Medications  , Daily, Oral  , 2 times daily, Oral  carvediloL tablet 6.25 mg, 2 times daily with meals, Oral  furosemide tablet 40 mg, Daily, Oral      Paroxysmal atrial fibrillation      Antiarrhythmics  , 2 times daily, Oral    Anticoagulants  enoxaparin injection 40 mg, Every 24 hours, Subcutaneous    Plan  - Replete lytes with a goal of K>4, Mg >2      S/P CABG (coronary artery bypass graft)  Aware       Medical non-compliance  Per records       Type 2 diabetes mellitus with diabetic arthropathy  Maintain insulin regime ordered         Final Active Diagnoses:    Diagnosis Date Noted POA    PRINCIPAL PROBLEM:  Chest pain [R07.9] 10/27/2014 Yes    CHF (congestive heart failure) [I50.9] 01/10/2025 Yes    Paroxysmal atrial fibrillation [I48.0] 12/26/2023 Yes    S/P CABG (coronary artery bypass graft) [Z95.1] 04/14/2020 Not Applicable    Medical non-compliance [Z91.199] 07/06/2016 Not Applicable    Type 2 diabetes mellitus with diabetic arthropathy [E11.618]  Yes      Problems Resolved During this Admission:       Discharged Condition: good    Disposition: Home or Self Care    Follow Up:   Follow-up Information       Venkata Mclaughlin MD Follow up.    Specialty: Internal Medicine  Contact information:  [address] [place] [address] [address]  [place] [address]  [phone]               Carolyne Baltazar NP Follow up.    Specialty: Cardiology  Contact information:  [address]  [address]  [place] [address]  [phone]               René, Funmilayo, NP Follow up.    Specialty: Endocrinology  Contact information:  [address]  [facility] [address]  [phone]                           Patient Instructions:      Notify your health care provider if you experience any of the following:  temperature >100.4     Notify your health care provider if you experience any of the following:  persistent nausea and vomiting or diarrhea     Notify your health care provider if you experience any of the following:  severe uncontrolled pain     Notify your health care provider if you experience any of the following:  difficulty breathing or increased cough     Notify your health care provider if you experience any of the following:  increased confusion or weakness     Activity as tolerated       Significant Diagnostic Studies: Labs: CMP   Recent Labs   Lab 01/11/25  0603 01/12/25  0641   * 132*   K 4.2 3.9   CL 99 97   CO2 29 28   * 258*   BUN 13 13   CREATININE 1.1 1.0   CALCIUM 8.4* 9.0   PROT 6.5 7.2   ALBUMIN 3.6 4.0   BILITOT 0.5 0.4   ALKPHOS 55 58   AST 12 14   ALT 13 15   ANIONGAP 7* 7*    and CBC   Recent Labs   Lab 01/11/25  0603 01/12/25  0641   WBC 7.88 7.12   HGB 14.1 14.8   HCT 41.6 43.8    232       Pending Diagnostic Studies:       None           Medications:  Reconciled Home Medications:      Medication List        START taking these medications      amLODIPine 5 MG tablet  Commonly known as: NORVASC  Take 1 tablet (5 mg total) by mouth once daily.  Start taking on: January 13, 2025     ranolazine 500 MG Tb12  Commonly known as: RANEXA  Take 1 tablet (500 mg total) by mouth 2 (two) times daily.            CHANGE how you take these medications      * blood-glucose meter Misc  TEST 3 TIMES DAILY  What changed: Another medication with  the same name was added. Make sure you understand how and when to take each.     * blood-glucose meter kit  To check BG QID times daily, to use with insurance preferred meter  What changed: You were already taking a medication with the same name, and this prescription was added. Make sure you understand how and when to take each.     * TRUE METRIX GLUCOSE TEST STRIP Strp  Generic drug: blood sugar diagnostic  TEST 3 TIMES DAILY  What changed: Another medication with the same name was added. Make sure you understand how and when to take each.     * blood sugar diagnostic Strp  To check BG QID times daily, to use with insurance preferred meter  What changed: You were already taking a medication with the same name, and this prescription was added. Make sure you understand how and when to take each.     * TRUEPLUS LANCETS 33 gauge Misc  Generic drug: lancets  TEST 3 TIMES DAILY  What changed: Another medication with the same name was added. Make sure you understand how and when to take each.     * lancets Misc  To check BG QID times daily, to use with insurance preferred meter  What changed: You were already taking a medication with the same name, and this prescription was added. Make sure you understand how and when to take each.           * This list has 6 medication(s) that are the same as other medications prescribed for you. Read the directions carefully, and ask your doctor or other care provider to review them with you.                CONTINUE taking these medications      ALPRAZolam 1 MG tablet  Commonly known as: XANAX  Take 1 mg by mouth nightly as needed for Anxiety.     atorvastatin 40 MG tablet  Commonly known as: LIPITOR  Take 1 tablet (40 mg total) by mouth once daily.     carvediloL 6.25 MG tablet  Commonly known as: COREG  Take 1 tablet (6.25 mg total) by mouth 2 (two) times daily with meals.     DEXCOM G7  Misc  Generic drug: blood-glucose meter,continuous  Use as directed.     DEXCOM G7 SENSOR  "Courtney  Generic drug: blood-glucose sensor  Change every 10 days.     EScitalopram oxalate 10 MG tablet  Commonly known as: LEXAPRO  Take 10 mg by mouth every morning.     gabapentin 600 MG tablet  Commonly known as: NEURONTIN  Take 600 mg by mouth As instructed (neuropathic pain). Takes 1 tablet in the morning and 2 tablets at bedtime     hydrALAZINE 10 MG tablet  Commonly known as: APRESOLINE  Take 1 tablet (10 mg total) by mouth every 8 (eight) hours.     insulin glargine-yfgn 100 unit/mL (3 mL) Inpn  Inject 26 Units into the skin every evening.     isosorbide mononitrate 30 MG 24 hr tablet  Commonly known as: IMDUR  Take 1 tablet (30 mg total) by mouth once daily.     JARDIANCE 25 mg tablet  Generic drug: empagliflozin  Take 25 mg by mouth once daily.     lactobacillus acidophilus & bulgar 100 million cell packet  Commonly known as: LACTINEX  Take 1 packet (1 each total) by mouth 2 (two) times daily.     metFORMIN 1000 MG tablet  Commonly known as: GLUCOPHAGE  Take 1 tablet (1,000 mg total) by mouth 2 (two) times daily with meals.     methocarbamoL 500 MG Tab  Commonly known as: ROBAXIN  Take 500 mg by mouth every evening.     metoprolol succinate 100 MG 24 hr tablet  Commonly known as: TOPROL-XL  Take 100 mg by mouth 2 (two) times daily.     nitroGLYCERIN 0.4 MG SL tablet  Commonly known as: NITROSTAT  Place 0.4 mg under the tongue every 5 (five) minutes as needed for Chest pain.     oxyCODONE-acetaminophen  mg per tablet  Commonly known as: PERCOCET  Take 1 tablet by mouth 2 (two) times daily as needed for Pain.     pantoprazole 40 MG tablet  Commonly known as: PROTONIX  Take 1 tablet (40 mg total) by mouth once daily.     pen needle, diabetic 29 gauge x 1/2" Ndle  Use with insulin 4 times a day.     sildenafiL 100 MG tablet  Commonly known as: VIAGRA  Take 1 tablet (100 mg total) by mouth daily as needed for Erectile Dysfunction.     traZODone 100 MG tablet  Commonly known as: DESYREL  Take 100-200 mg by " mouth every evening.              Indwelling Lines/Drains at time of discharge:   Lines/Drains/Airways       None                   Time spent on the discharge of patient: 35 minutes         Kaity Rios PA-C  Department of Hospital Medicine  [facility]

## 2025-01-12 NOTE — PROGRESS NOTES
Replaced by [facility]  Department of Cardiology  Progress Note      PATIENT NAME: Magdaleno Cunningham    MRN: [id number]  TODAY'S DATE: 01/12/2025  ADMIT DATE: 1/10/2025                          CONSULT REQUESTED BY: Gil Panchal MD    SUBJECTIVE     PRINCIPAL PROBLEM: Chest pain    01/12/2025    Patient seen resting in bed with no acute distress noted.  Denies any chest discomfort shortness of breath.  He is feeling better overall would like to be discharged home and follow-up outpatient.    HPI:    Mr. Cunningham is a 59 year old male who presented to the ER with chest pain. He reports having chest pain over the past few days to a week. He reports having left hand tingling, pins and needles, and numbness as well as left arm pain. He states improvement of symptoms with nitro. These symptoms occur at rest. He has known hx of significant heart disease with first heart attack in 2006. He has had multiple stents as well as CABG. His last angiogram was here in December 2023. HS troponin levels are negative thus far. He is noted to be hypertensive with BP in the 160s. He has an ICD in situ. Patient with reduced EF 35-40% on echo in April 2024.       REASON FOR CONSULT:  From Hospitalist H&P: 59 year old pt getting admitted with chest pain  Pt started having chest pain few days ago  Chest pain mainly on L side and first time it happened it awakened him from sleep  Since then pt has been having chest pain/intermittent/radiation to Neck and LUE area  Symptoms went worse and he came to hospital and got admitted  Pt said his Cardiologist is based in [facility] area  Also he had several angiograms as per chart review  Pt said stress test always failed to diagnose his condition  Last LHC was done in December 2023 and as follows        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    Significant triple-vessel coronary artery diseas      Review of patient's allergies  indicates:   Allergen Reactions    Pesticide Dermatitis and Rash       Past Medical History:   Diagnosis Date    Anticoagulant long-term use     Atrial fibrillation 2018    BPH (benign prostatic hyperplasia)     Cataract     COPD (chronic obstructive pulmonary disease)     Coronary artery disease     stents    CVD (cardiovascular disease)     Diabetes mellitus     Erythema multiforme     AKA Denton Edmondson Syndrome    Hypertension     Infected incision     MI (myocardial infarction)     per pt he has had 4     ROSAMARIA on CPAP     RLS (restless legs syndrome)     Perez-Denton syndrome      Past Surgical History:   Procedure Laterality Date    CORONARY ANGIOGRAPHY INCLUDING BYPASS GRAFTS WITH CATHETERIZATION OF LEFT HEART N/A 9/1/2022    Procedure: ANGIOGRAM, CORONARY, INCLUDING BYPASS GRAFT, WITH LEFT HEART CATHETERIZATION;  Surgeon: Paul Botello MD;  Location: [facility] CATH/EP LAB;  Service: Cardiology;  Laterality: N/A;    CORONARY ANGIOGRAPHY INCLUDING BYPASS GRAFTS WITH CATHETERIZATION OF LEFT HEART Left 12/22/2023    Procedure: ANGIOGRAM, CORONARY, INCLUDING BYPASS GRAFT, WITH LEFT HEART CATHETERIZATION;  Surgeon: Paul Botello MD;  Location: [facility] CATH/EP LAB;  Service: Cardiology;  Laterality: Left;    CORONARY ARTERY BYPASS GRAFT (CABG) N/A 4/7/2020    Procedure: CORONARY ARTERY BYPASS GRAFT (CABG)- Excision of left atrial appendage;  Surgeon: Doug Solitario MD;  Location: [facility] OR;  Service: Cardiothoracic;  Laterality: N/A;    CORONARY BYPASS GRAFT ANGIOGRAPHY  12/25/2023    Procedure: Bypass graft study;  Surgeon: Ashley Valverde MD;  Location: [facility] CATH/EP LAB;  Service: Cardiology;;    CORONARY STENT PLACEMENT      CORONARY STENT PLACEMENT N/A 12/22/2023    Procedure: INSERTION, STENT, CORONARY ARTERY;  Surgeon: Paul Botello MD;  Location: [facility] CATH/EP LAB;  Service: Cardiology;  Laterality: N/A;    WILSON MAZE PROCEDURE N/A 4/7/2020    Procedure: WILSON MAZE PROCEDURE- Attempted;  Surgeon:  Doug Solitario MD;  Location: [facility] OR;  Service: Cardiothoracic;  Laterality: N/A;    CYSTOSCOPY N/A 12/10/2018    Procedure: CYSTOSCOPY;  Surgeon: Khushboo Abreu MD;  Location: [facility] OR;  Service: Urology;  Laterality: N/A;    ENDOSCOPIC HARVEST OF VEIN Left 4/7/2020    Procedure: HARVEST-VEIN-ENDOVASCULAR;  Surgeon: Doug Solitario MD;  Location: [facility] OR;  Service: Cardiothoracic;  Laterality: Left;    INCISION OF PERIRECTAL ABSCESS Bilateral 9/1/2023    Procedure: INCISION, ABSCESS, PERIRECTAL;  Surgeon: Brayan Spears MD;  Location: [facility] OR;  Service: General;  Laterality: Bilateral;  stirrups    INSERTION OF INTRA-AORTIC BALLOON ASSIST DEVICE  12/25/2023    Procedure: INSERTION, INTRA-AORTIC BALLOON PUMP;  Surgeon: Ashley Valverde MD;  Location: [facility] CATH/EP LAB;  Service: Cardiology;;    INSERTION OF TEMPORARY PACEMAKER N/A 12/25/2023    Procedure: INSERTION, PACEMAKER, TEMPORARY;  Surgeon: Ashley Valverde MD;  Location: [facility] CATH/EP LAB;  Service: Cardiology;  Laterality: N/A;    INSERTION, ICD, DUAL CHAMBER N/A 1/2/2024    Procedure: Insertion, ICD, Dual Chamber;  Surgeon: Antonio Callejas MD;  Location: [facility] EP LAB;  Service: Cardiology;  Laterality: N/A;  CM, DUAL ICD, ANES, SJM, MB, 3079    IVUS, CORONARY  12/22/2023    Procedure: IVUS, Coronary;  Surgeon: Paul Botello MD;  Location: [facility] CATH/EP LAB;  Service: Cardiology;;    LEFT HEART CATHETERIZATION Left 3/17/2020    Procedure: CATHETERIZATION, HEART, LEFT;  Surgeon: Tyree Walters MD;  Location: [facility] CATH/EP LAB;  Service: Cardiology;  Laterality: Left;    PERCUTANEOUS CORONARY INTERVENTION, ARTERY N/A 12/22/2023    Procedure: Percutaneous coronary intervention;  Surgeon: Paul Botello MD;  Location: [facility] CATH/EP LAB;  Service: Cardiology;  Laterality: N/A;    PERCUTANEOUS CORONARY INTERVENTION, ARTERY N/A 12/25/2023    Procedure: Percutaneous coronary intervention;  Surgeon: Ashley Valverde,  MD;  Location: [facility] CATH/EP LAB;  Service: Cardiology;  Laterality: N/A;    PLACEMENT, IABP  2023    Procedure: Placement, IABP;  Surgeon: Ashley Valverde MD;  Location: [facility] CATH/EP LAB;  Service: Cardiology;;    STERNAL WIRES REMOVAL N/A 2020    Procedure: REMOVAL, STERNAL WIRE;  Surgeon: Doug Solitario MD;  Location: [facility] OR;  Service: Peripheral Vascular;  Laterality: N/A;    ULTRASOUND OF PROSTATE FOR VOLUME DETERMINATION  12/10/2018    Procedure: ULTRASOUND, PROSTATE, FOR VOLUME DETERMINATION;  Surgeon: Khushboo Abreu MD;  Location: [facility] OR;  Service: Urology;;     Social History     Tobacco Use    Smoking status: Every Day     Current packs/day: 0.00     Average packs/day: 3.0 packs/day for 30.0 years (90.0 ttl pk-yrs)     Types: Cigarettes     Start date: 1990     Last attempt to quit: 2020     Years since quittin.7    Smokeless tobacco: Never   Substance Use Topics    Alcohol use: Not Currently    Drug use: Yes     Frequency: 1.0 times per week     Types: Marijuana        REVIEW OF SYSTEMS  Per HPI    OBJECTIVE     VITAL SIGNS (Most Recent)  Temp: 98.4 °F (36.9 °C) (25)  Pulse: 65 (2545)  Resp: 18 (25)  BP: (!) 141/82 (2545)  SpO2: 96 % (25)    VENTILATION STATUS  Resp: 18 (25)  SpO2: 96 % (25)           I & O (Last 24H):  Intake/Output Summary (Last 24 hours) at 2025 1104  Last data filed at 2025 0856  Gross per 24 hour   Intake 476 ml   Output --   Net 476 ml       WEIGHTS  Wt Readings from Last 1 Encounters:   01/10/25 1949 120.2 kg (265 lb)   01/10/25 1424 120.2 kg (265 lb)       PHYSICAL EXAM  CONSTITUTIONAL: No fever, no chills  HEENT: Normocephalic, atraumatic,pupils reactive to light                 NECK:  No JVD no carotid bruit  CVS: S1S2+, RRR  LUNGS: Clear  ABDOMEN: Soft, NT, BS+  EXTREMITIES: No cyanosis, edema  : No ritter catheter  NEURO: AAO X 3  PSY: Normal  affect      HOME MEDICATIONS:  No current facility-administered medications on file prior to encounter.     Current Outpatient Medications on File Prior to Encounter   Medication Sig Dispense Refill    ALPRAZolam (XANAX) 1 MG tablet Take 1 mg by mouth nightly as needed for Anxiety.      atorvastatin (LIPITOR) 40 MG tablet Take 1 tablet (40 mg total) by mouth once daily. 90 tablet 3    carvediloL (COREG) 6.25 MG tablet Take 1 tablet (6.25 mg total) by mouth 2 (two) times daily with meals. 180 tablet 3    EScitalopram oxalate (LEXAPRO) 10 MG tablet Take 10 mg by mouth every morning.      gabapentin (NEURONTIN) 600 MG tablet Take 600 mg by mouth As instructed (neuropathic pain). Takes 1 tablet in the morning and 2 tablets at bedtime      hydrALAZINE (APRESOLINE) 10 MG tablet Take 1 tablet (10 mg total) by mouth every 8 (eight) hours. 90 tablet 11    insulin glargine-yfgn 100 unit/mL (3 mL) InPn Inject 26 Units into the skin every evening.      isosorbide mononitrate (IMDUR) 30 MG 24 hr tablet Take 1 tablet (30 mg total) by mouth once daily. 30 tablet 11    JARDIANCE 25 mg tablet Take 25 mg by mouth once daily.      lactobacillus acidophilus & bulgar (LACTINEX) 100 million cell packet Take 1 packet (1 each total) by mouth 2 (two) times daily. 30 packet 0    metFORMIN (GLUCOPHAGE) 1000 MG tablet Take 1 tablet (1,000 mg total) by mouth 2 (two) times daily with meals.      methocarbamoL (ROBAXIN) 500 MG Tab Take 500 mg by mouth every evening.      metoprolol succinate (TOPROL-XL) 100 MG 24 hr tablet Take 100 mg by mouth 2 (two) times daily.      nitroGLYCERIN (NITROSTAT) 0.4 MG SL tablet Place 0.4 mg under the tongue every 5 (five) minutes as needed for Chest pain.      oxyCODONE-acetaminophen (PERCOCET)  mg per tablet Take 1 tablet by mouth 2 (two) times daily as needed for Pain.      pantoprazole (PROTONIX) 40 MG tablet Take 1 tablet (40 mg total) by mouth once daily. 30 tablet 11    sildenafiL (VIAGRA) 100 MG  "tablet Take 1 tablet (100 mg total) by mouth daily as needed for Erectile Dysfunction. 5 tablet 3    traZODone (DESYREL) 100 MG tablet Take 100-200 mg by mouth every evening.      blood sugar diagnostic Strp TEST 3 TIMES DAILY 100 each 0    blood-glucose meter Misc TEST 3 TIMES DAILY 1 each 1    blood-glucose meter,continuous (DEXCOM G7 ) Misc Use as directed. 1 each 0    blood-glucose sensor (DEXCOM G7 SENSOR) Courtney Change every 10 days. 3 each 11    lancets 33 gauge Misc TEST 3 TIMES DAILY 100 each 0    pen needle, diabetic 29 gauge x 1/2" Ndle Use with insulin 4 times a day. 360 each 3       SCHEDULED MEDS:   amLODIPine  5 mg Oral Daily    aspirin  81 mg Oral Daily    atorvastatin  40 mg Oral Daily    carvediloL  6.25 mg Oral BID WM    enoxparin  40 mg Subcutaneous Daily    EScitalopram oxalate  10 mg Oral QAM    furosemide  40 mg Oral Daily    insulin glargine U-100  20 Units Subcutaneous QHS    isosorbide mononitrate  30 mg Oral Daily    ranolazine  500 mg Oral BID    ticagrelor  90 mg Oral BID       CONTINUOUS INFUSIONS:    PRN MEDS:  Current Facility-Administered Medications:     acetaminophen, 650 mg, Oral, Q8H PRN    acetaminophen, 650 mg, Oral, Q4H PRN    aluminum-magnesium hydroxide-simethicone, 30 mL, Oral, QID PRN    dextrose 50%, 12.5 g, Intravenous, PRN    dextrose 50%, 25 g, Intravenous, PRN    glucagon (human recombinant), 1 mg, Intramuscular, PRN    glucose, 16 g, Oral, PRN    glucose, 24 g, Oral, PRN    insulin aspart U-100, 0-10 Units, Subcutaneous, QID (AC + HS) PRN    magnesium oxide, 800 mg, Oral, PRN    magnesium oxide, 800 mg, Oral, PRN    melatonin, 6 mg, Oral, Nightly PRN    naloxone, 0.02 mg, Intravenous, PRN    ondansetron, 4 mg, Intravenous, Q6H PRN    oxyCODONE-acetaminophen, 1 tablet, Oral, Q12H PRN    potassium bicarbonate, 35 mEq, Oral, PRN    potassium bicarbonate, 50 mEq, Oral, PRN    potassium bicarbonate, 60 mEq, Oral, PRN    potassium, sodium phosphates, 2 packet, Oral, " "PRN    potassium, sodium phosphates, 2 packet, Oral, PRN    potassium, sodium phosphates, 2 packet, Oral, PRN    LABS AND DIAGNOSTICS     CBC LAST 3 DAYS  Recent Labs   Lab 01/10/25  1446 01/11/25  0603 01/12/25  0641   WBC 7.91 7.88 7.12   RBC 5.04 4.65 4.94   HGB 15.3 14.1 14.8   HCT 44.9 41.6 43.8   MCV 89 90 89   MCH 30.4 30.3 30.0   MCHC 34.1 33.9 33.8   RDW 12.9 13.0 12.8    207 232   MPV 9.7 9.6 9.4   GRAN 56.2  4.5 46.3  3.6 57.1  4.1   LYMPH 32.9  2.6 41.2  3.3 30.8  2.2   MONO 6.6  0.5 8.1  0.6 7.3  0.5   BASO 0.08 0.09 0.08   NRBC 0 0 0       COAGULATION LAST 3 DAYS  No results for input(s): "LABPT", "INR", "APTT" in the last 168 hours.    CHEMISTRY LAST 3 DAYS  Recent Labs   Lab 01/10/25  1446 01/11/25  0603 01/12/25  0641   * 135* 132*   K 4.1 4.2 3.9    99 97   CO2 23 29 28   ANIONGAP 8 7* 7*   BUN 11 13 13   CREATININE 1.0 1.1 1.0   * 192* 258*   CALCIUM 8.7 8.4* 9.0   MG 1.4* 1.6 1.8   ALBUMIN 3.9 3.6 4.0   PROT 7.2 6.5 7.2   ALKPHOS 63 55 58   ALT 15 13 15   AST 12 12 14   BILITOT 0.4 0.5 0.4       CARDIAC PROFILE LAST 3 DAYS  Recent Labs   Lab 01/10/25  1446 01/10/25  1640 01/11/25  0049 01/11/25  0603   *  --   --   --    TROPONINIHS 13.4 13.9 12.2 14.8       ENDOCRINE LAST 3 DAYS  No results for input(s): "TSH", "PROCAL" in the last 168 hours.    LAST ARTERIAL BLOOD GAS  ABG  No results for input(s): "PH", "PO2", "PCO2", "HCO3", "BE" in the last 168 hours.    LAST 7 DAYS MICROBIOLOGY   Microbiology Results (last 7 days)       ** No results found for the last 168 hours. **            MOST RECENT IMAGING  X-Ray Chest AP Portable  Narrative: EXAMINATION:  XR CHEST AP PORTABLE    CLINICAL HISTORY:  Chest Pain;    COMPARISON:  February 2024    FINDINGS:  AP view demonstrates normal heart size.  There has been previous median sternotomy and coronary arterial stent placement.  Implantable cardiac device is unchanged in position.  No infiltrates or effusions are " identified.  Osseous structures are unremarkable.  Impression: No acute radiographic abnormalities.    Electronically signed by: Javi Teran  Date:    01/10/2025  Time:    16:18      ECHOCARDIOGRAM RESULTS (last 5)  Results for orders placed during the hospital encounter of 04/15/24    Echo    Interpretation Summary    Left Ventricle: The left ventricle is at upper limits off normal in size. Mildly increased wall thickness. There is mild concentric hypertrophy. Moderate global hypokinesis present. There is moderately reduced systolic function with a visually estimated ejection fraction of 35 - 40%. There is diastolic dysfunction.    Right Ventricle: Normal right ventricular cavity size. Wall thickness is normal. Right ventricle wall motion  is normal. Systolic function is normal. Pacemaker lead present in the ventricle.    Left Atrium: Left atrium is moderately dilated.    Right Atrium: Multiple leads present in the right atrium.    Aortic Valve: The aortic valve is a trileaflet valve. There is mild aortic valve sclerosis. There is mild annular calcification present.    Mitral Valve: There is mild regurgitation with a centrally directed jet.    Tricuspid Valve: There is mild regurgitation.    IVC/SVC: Normal venous pressure at 3 mmHg.      Results for orders placed during the hospital encounter of 12/26/23    Echo    Interpretation Summary    Left Ventricle: The left ventricle is mildly dilated. Normal wall thickness. Global hypokinesis present. There is severely reduced systolic function with a visually estimated ejection fraction of 20 - 25%. There is diastolic dysfunction.    Right Ventricle: Normal right ventricular cavity size. Wall thickness is normal. Right ventricle wall motion  is normal. Systolic function is moderately reduced. Pacemaker lead present in the ventricle.    Left Atrium: Left atrium is mildly dilated.    IVC/SVC: Patient is ventilated, cannot use IVC diameter to estimate right atrial  pressure.      Results for orders placed during the hospital encounter of 12/14/23    Echo    Interpretation Summary    Left Ventricle: Normal left ventricular filling pressure.  Ejection fraction estimated at 60%    Left Atrium: Left atrium is dilated.    Aortic Valve: There is aortic valve sclerosis.    Mitral Valve: There is mild regurgitation.    Tricuspid Valve: There is mild regurgitation.    IVC/SVC: Normal venous pressure at 3 mmHg.  Pulmonary artery systolic pressure less than 25 mmHg    Mild aortic leaflet sclerosis      Results for orders placed during the hospital encounter of 06/23/23    Echo    Interpretation Summary  · The left ventricle is normal in size with concentric remodeling and normal systolic function.  · The estimated ejection fraction is 60%.  · Normal left ventricular diastolic function.  · Normal right ventricular size with normal right ventricular systolic function.  · Mild mitral regurgitation.  · Mild tricuspid regurgitation.      Results for orders placed during the hospital encounter of 01/09/22    STRESS TEST REPORT      CURRENT/PREVIOUS VISIT EKG  Results for orders placed or performed during the hospital encounter of 01/10/25   EKG 12-lead    Collection Time: 01/10/25  2:19 PM   Result Value Ref Range    QRS Duration 100 ms    OHS QTC Calculation 475 ms    Narrative    Test Reason : R07.9,    Vent. Rate :  70 BPM     Atrial Rate :  70 BPM     P-R Int : 154 ms          QRS Dur : 100 ms      QT Int : 440 ms       P-R-T Axes :  36 -10 110 degrees    QTcB Int : 475 ms    Normal sinus rhythm  T wave abnormality, consider lateral ischemia  Abnormal ECG  When compared with ECG of 01-Feb-2024 12:00,  Aberrant conduction is no longer Present  Nonspecific T wave abnormality no longer evident in Inferior leads  T wave inversion now evident in Lateral leads  Confirmed by Norm Rodriguez ([id number]) on 1/11/2025 10:41:06 AM    Referred By:            Confirmed By: Norm Rodriguez            ASSESSMENT/PLAN:     Active Hospital Problems    Diagnosis    *Chest pain    CHF (congestive heart failure)    Paroxysmal atrial fibrillation    S/P CABG (coronary artery bypass graft)    Medical non-compliance    Type 2 diabetes mellitus with diabetic arthropathy       ASSESSMENT & PLAN:     Chest pain  CAD with hx of CABG and stents  HTN  Obesity   Type II DM      RECOMMENDATIONS:    Amlodipine mg p.o. daily, carvedilol 6.25 mg po b.i.d., Imdur 30 daily, and Ranexa 500 mg p.o. b.i.d..  Continue Brilinta 90 mg p.o. b.i.d. and aspirin 81 mg p.o. daily.  Patient is feeling better would like to go home.  No opposition to discharge home today as he had negative troponin levels.  Can follow-up outpatient and discuss further options.      Carolyne Baltazar NP  Date of Service: 01/12/2025  10:32 AM    I have personally interviewed and examined the patient, I have reviewed the Nurse Practitioner's history and physical, assessment, and plan. I have personally evaluated the patient at bedside and agree with the findings and made appropriate changes as necessary in recommendations.    Patient reprts he has been noncompliant with meds  BP CONTROLLED  Echo REVIEWED   RESUME HTN CONTROL  FU AS OP  Norm Rodriguez MD  Department of Cardiology  [facility]  01/12/2025 12:06 PM

## 2025-01-12 NOTE — PLAN OF CARE
Patient cleared for discharge from case management standpoint.    Chart and discharge orders reviewed.  Patient discharged home with no further case management needs.       01/12/25 1126   Final Note   Assessment Type Final Discharge Note   Anticipated Discharge Disposition Home   What phone number can be called within the next 1-3 days to see how you are doing after discharge? [phone]   Post-Acute Status   Discharge Delays None known at this time

## 2025-01-12 NOTE — PLAN OF CARE
Patient cleared for discharge from case management standpoint.    Follow up appointments scheduled and added to AVS.    Chart and discharge orders reviewed.  Patient discharged home with no further case management needs.       01/12/25 1112   Final Note   Assessment Type Final Discharge Note   Anticipated Discharge Disposition Home   What phone number can be called within the next 1-3 days to see how you are doing after discharge? [phone]   Post-Acute Status   Discharge Delays None known at this time

## 2025-01-13 ENCOUNTER — TELEPHONE (OUTPATIENT)
Dept: CARDIOLOGY | Facility: CLINIC | Age: 60
End: 2025-01-13
Payer: MEDICAID

## 2025-01-13 NOTE — TELEPHONE ENCOUNTER
----- Message from Airam sent at 1/13/2025 10:13 AM CST -----  Contact: self  Type:  Hospital F/U    Caller is requesting a hospital f/u.    Name of Caller:pt     When is the first available appointment?n/a     Symptoms:chest pain    Would the patient rather a call back or a response via [facility]? Call back     Best Call Back Number:[phone]      Additional Information:    Please call back to advise. Thanks!

## 2025-01-15 ENCOUNTER — PATIENT OUTREACH (OUTPATIENT)
Dept: ADMINISTRATIVE | Facility: OTHER | Age: 60
End: 2025-01-15
Payer: MEDICAID

## 2025-01-15 NOTE — PROGRESS NOTES
CHW - Initial Contact    This Community Health Worker complete the Social Determinant of Health questionnaire with patient via telephone today.    Pt identified barriers of most importance are: referral for food and housing assistance    Support and Services: A referrals for case management was place for Mr. Dolan for housing and food assistance. Mr. Dolan stated he bond  not need hosing assistance but can use food assistance. He has declined  food mckeon resources due to already knowing where to get a box. He has previously had SNAP benefits and they have stopped. He applied online and stated he needs to apply again. He stated he will apply for himself and agreed to try and apply Tomorrow.. I will follow up in a week for an application status.  Other information discussed the patient needs / wants help with:    Follow up required: Yes  Follow-up Outreach - Due: 1/22/2025

## 2025-01-16 LAB
OHS QRS DURATION: 100 MS
OHS QTC CALCULATION: 475 MS

## 2025-01-21 ENCOUNTER — PATIENT OUTREACH (OUTPATIENT)
Dept: ADMINISTRATIVE | Facility: OTHER | Age: 60
End: 2025-01-21
Payer: MEDICAID

## 2025-01-21 NOTE — PROGRESS NOTES
CHW - Case Closure    This Community Health Worker spoke to patient via telephone today.   Pt/Caregiver reported: Mr. Dolan reported he has not completed his SNAP applications. He went in to the hospital some days ago. He has declined assistance an stated he will complete on his own.   Pt denied any additional needs at this time and agrees with episode closure at this time.  Provided patient with Community Health Worker's contact information and encouraged him/her to contact this Community Health Worker if additional needs arise.

## 2025-01-23 ENCOUNTER — TELEPHONE (OUTPATIENT)
Dept: CARDIOLOGY | Facility: CLINIC | Age: 60
End: 2025-01-23
Payer: MEDICAID

## 2025-01-23 NOTE — TELEPHONE ENCOUNTER
----- Message from Amy sent at 1/22/2025 11:00 AM CST -----  Type:  Sooner Appointment Request    Caller is requesting a sooner appointment.  Caller declined first available appointment listed below.  Caller will not accept being placed on the waitlist and is requesting a message be sent to doctor.    Name of Caller:  pt  When is the first available appointment?  N/A  Symptoms:  Follow up  Would the patient rather a call back or a response via [facility]? Call  Best Call Back Number:  [phone]  Additional Information:  pt wants to be seen in person. Please call back to advise. Thanks!    Type:  Pharmacy Calling to Clarify an RX    Name of Caller:  pt  Ph[facility] Name:  [facility]  Prescription Name:  ranolazine (RANEXA) 500 MG Tb12 and amLODIPine (NORVASC) 5 MG tablet  What do they need to clarify?:  PA is needed for both  Best Call Back Number:  Phone: [phone] Fax: [phone]  Additional Information:  Please call back to advise. Thanks!

## 2025-01-23 NOTE — TELEPHONE ENCOUNTER
----- Message from Shivani sent at 1/21/2025 10:21 AM CST -----   Type:  Needs Medical Advice    Who Called: PT    Would the patient rather a call back or a response via [facility]? Call  Best Call Back Number: [phone]        Additional Information:  states he has a appt on tomorrow 1/22/25 but I dont see one.  Please call back to advise. Thank you!

## 2025-01-28 ENCOUNTER — OFFICE VISIT (OUTPATIENT)
Dept: CARDIOLOGY | Facility: CLINIC | Age: 60
End: 2025-01-28
Payer: MEDICAID

## 2025-01-28 ENCOUNTER — TELEPHONE (OUTPATIENT)
Dept: CARDIOLOGY | Facility: CLINIC | Age: 60
End: 2025-01-28

## 2025-01-28 VITALS
OXYGEN SATURATION: 97 % | SYSTOLIC BLOOD PRESSURE: 130 MMHG | RESPIRATION RATE: 16 BRPM | DIASTOLIC BLOOD PRESSURE: 78 MMHG | HEART RATE: 71 BPM | BODY MASS INDEX: 37.9 KG/M2 | WEIGHT: 270.75 LBS | HEIGHT: 71 IN

## 2025-01-28 DIAGNOSIS — E66.9 OBESITY (BMI 30-39.9): ICD-10-CM

## 2025-01-28 DIAGNOSIS — I25.119 ATHEROSCLEROSIS OF NATIVE CORONARY ARTERY OF NATIVE HEART WITH ANGINA PECTORIS: ICD-10-CM

## 2025-01-28 DIAGNOSIS — E11.610 TYPE 2 DIABETES MELLITUS WITH DIABETIC NEUROPATHIC ARTHROPATHY, WITH LONG-TERM CURRENT USE OF INSULIN: ICD-10-CM

## 2025-01-28 DIAGNOSIS — E78.5 HYPERLIPIDEMIA LDL GOAL <70: ICD-10-CM

## 2025-01-28 DIAGNOSIS — R07.9 CHEST PAIN, UNSPECIFIED TYPE: ICD-10-CM

## 2025-01-28 DIAGNOSIS — F17.200 SMOKER: ICD-10-CM

## 2025-01-28 DIAGNOSIS — I48.0 PAROXYSMAL ATRIAL FIBRILLATION: ICD-10-CM

## 2025-01-28 DIAGNOSIS — R07.2 PRECORDIAL PAIN: Primary | ICD-10-CM

## 2025-01-28 DIAGNOSIS — G47.33 OSA (OBSTRUCTIVE SLEEP APNEA): ICD-10-CM

## 2025-01-28 DIAGNOSIS — Z79.4 TYPE 2 DIABETES MELLITUS WITH DIABETIC NEUROPATHIC ARTHROPATHY, WITH LONG-TERM CURRENT USE OF INSULIN: ICD-10-CM

## 2025-01-28 DIAGNOSIS — Z95.1 S/P CABG (CORONARY ARTERY BYPASS GRAFT): ICD-10-CM

## 2025-01-28 DIAGNOSIS — E11.59 HYPERTENSION ASSOCIATED WITH DIABETES: ICD-10-CM

## 2025-01-28 DIAGNOSIS — I50.42 CHRONIC COMBINED SYSTOLIC AND DIASTOLIC CONGESTIVE HEART FAILURE: ICD-10-CM

## 2025-01-28 DIAGNOSIS — I15.2 HYPERTENSION ASSOCIATED WITH DIABETES: ICD-10-CM

## 2025-01-28 PROCEDURE — 3075F SYST BP GE 130 - 139MM HG: CPT | Mod: CPTII,,,

## 2025-01-28 PROCEDURE — 3078F DIAST BP <80 MM HG: CPT | Mod: CPTII,,,

## 2025-01-28 PROCEDURE — 99999 PR PBB SHADOW E&M-EST. PATIENT-LVL V: CPT | Mod: PBBFAC,,,

## 2025-01-28 PROCEDURE — 99215 OFFICE O/P EST HI 40 MIN: CPT | Mod: PBBFAC,PN

## 2025-01-28 PROCEDURE — 1159F MED LIST DOCD IN RCRD: CPT | Mod: CPTII,,,

## 2025-01-28 PROCEDURE — 99214 OFFICE O/P EST MOD 30 MIN: CPT | Mod: S$PBB,,,

## 2025-01-28 PROCEDURE — 1160F RVW MEDS BY RX/DR IN RCRD: CPT | Mod: CPTII,,,

## 2025-01-28 PROCEDURE — 3008F BODY MASS INDEX DOCD: CPT | Mod: CPTII,,,

## 2025-01-28 RX ORDER — DIPHENHYDRAMINE HCL 50 MG
50 CAPSULE ORAL
OUTPATIENT
Start: 2025-01-28

## 2025-01-28 RX ORDER — SODIUM CHLORIDE 0.9 % (FLUSH) 0.9 %
2 SYRINGE (ML) INJECTION
Status: SHIPPED | OUTPATIENT
Start: 2025-01-28

## 2025-01-28 RX ORDER — ISOSORBIDE MONONITRATE 60 MG/1
60 TABLET, EXTENDED RELEASE ORAL DAILY
Qty: 90 TABLET | Refills: 3 | Status: SHIPPED | OUTPATIENT
Start: 2025-01-28 | End: 2026-01-28

## 2025-01-28 RX ORDER — RANOLAZINE 500 MG/1
500 TABLET, EXTENDED RELEASE ORAL 2 TIMES DAILY
Qty: 180 TABLET | Refills: 3 | Status: SHIPPED | OUTPATIENT
Start: 2025-01-28 | End: 2026-01-23

## 2025-01-28 RX ORDER — FUROSEMIDE 20 MG/1
20 TABLET ORAL DAILY PRN
Qty: 45 TABLET | Refills: 2 | Status: SHIPPED | OUTPATIENT
Start: 2025-01-28 | End: 2026-01-28

## 2025-01-28 RX ORDER — ASPIRIN 81 MG/1
81 TABLET ORAL DAILY
Start: 2025-01-28 | End: 2026-01-28

## 2025-01-28 NOTE — ASSESSMENT & PLAN NOTE
RRR today in office.  Patient reports taking Eliquis 5 mg BID  Continue metoprolol succinate 100 mg BID

## 2025-01-28 NOTE — ASSESSMENT & PLAN NOTE
Low sodium heart healthy diet  Reduce saturated fats  Weight loss recommended  Continue statin therapy

## 2025-01-28 NOTE — H&P (VIEW-ONLY)
Subjective:    Patient ID:  Magdaleno Cunningham is a 59 y.o. male patient here for evaluation Hospital Follow Up      History of Present Illness:       Hospital Follow up.  Recent admission for CP work up. Sees Dr. Dante Chavira in [place], Cardiologist.  He is interested in our group.     CATH 12/25/23    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    Significant triple-vessel coronary artery disease.    Reports intermittent CP since discharge.  Occur randomly at rest and with exertion. Only lasts for a few seconds.  Associated with shortness of breath.    CP was improved when on Ranexa during hospitalization but he has not been able to get outpatient due to pre authorization.    He needs to have an angiogram performed outpatient.       Review of patient's allergies indicates:   Allergen Reactions    Pesticide Dermatitis and Rash       Past Medical History:   Diagnosis Date    Anticoagulant long-term use     Atrial fibrillation 2018    BPH (benign prostatic hyperplasia)     Cataract     COPD (chronic obstructive pulmonary disease)     Coronary artery disease     stents    CVD (cardiovascular disease)     Diabetes mellitus     Erythema multiforme     AKA Denton Edmondson Syndrome    Hypertension     Infected incision     MI (myocardial infarction)     per pt he has had 4     ROSAMARIA on CPAP     RLS (restless legs syndrome)     Perez-Denton syndrome      Past Surgical History:   Procedure Laterality Date    CORONARY ANGIOGRAPHY INCLUDING BYPASS GRAFTS WITH CATHETERIZATION OF LEFT HEART N/A 9/1/2022    Procedure: ANGIOGRAM, CORONARY, INCLUDING BYPASS GRAFT, WITH LEFT HEART CATHETERIZATION;  Surgeon: Paul Botello MD;  Location: [facility] CATH/EP LAB;  Service: Cardiology;  Laterality: N/A;    CORONARY ANGIOGRAPHY INCLUDING BYPASS GRAFTS WITH CATHETERIZATION OF LEFT HEART Left 12/22/2023    Procedure: ANGIOGRAM, CORONARY, INCLUDING BYPASS GRAFT, WITH LEFT  HEART CATHETERIZATION;  Surgeon: Paul Botello MD;  Location: [facility] CATH/EP LAB;  Service: Cardiology;  Laterality: Left;    CORONARY ARTERY BYPASS GRAFT (CABG) N/A 4/7/2020    Procedure: CORONARY ARTERY BYPASS GRAFT (CABG)- Excision of left atrial appendage;  Surgeon: Doug Solitario MD;  Location: [facility] OR;  Service: Cardiothoracic;  Laterality: N/A;    CORONARY BYPASS GRAFT ANGIOGRAPHY  12/25/2023    Procedure: Bypass graft study;  Surgeon: Ashley Valverde MD;  Location: [facility] CATH/EP LAB;  Service: Cardiology;;    CORONARY STENT PLACEMENT      CORONARY STENT PLACEMENT N/A 12/22/2023    Procedure: INSERTION, STENT, CORONARY ARTERY;  Surgeon: Paul Botello MD;  Location: [facility] CATH/EP LAB;  Service: Cardiology;  Laterality: N/A;    WILSON MAZE PROCEDURE N/A 4/7/2020    Procedure: WILSON MAZE PROCEDURE- Attempted;  Surgeon: Doug Solitario MD;  Location: [facility] OR;  Service: Cardiothoracic;  Laterality: N/A;    CYSTOSCOPY N/A 12/10/2018    Procedure: CYSTOSCOPY;  Surgeon: Khushboo Abreu MD;  Location: [facility] OR;  Service: Urology;  Laterality: N/A;    ENDOSCOPIC HARVEST OF VEIN Left 4/7/2020    Procedure: HARVEST-VEIN-ENDOVASCULAR;  Surgeon: Doug Solitario MD;  Location: [facility];  Service: Cardiothoracic;  Laterality: Left;    INCISION OF PERIRECTAL ABSCESS Bilateral 9/1/2023    Procedure: INCISION, ABSCESS, PERIRECTAL;  Surgeon: Brayan Spears MD;  Location: [facility] OR;  Service: General;  Laterality: Bilateral;  stirrups    INSERTION OF INTRA-AORTIC BALLOON ASSIST DEVICE  12/25/2023    Procedure: INSERTION, INTRA-AORTIC BALLOON PUMP;  Surgeon: Ashley Valverde MD;  Location: [facility] CATH/EP LAB;  Service: Cardiology;;    INSERTION OF TEMPORARY PACEMAKER N/A 12/25/2023    Procedure: INSERTION, PACEMAKER, TEMPORARY;  Surgeon: Ashley Valverde MD;  Location: [facility] CATH/EP LAB;  Service: Cardiology;  Laterality: N/A;    INSERTION, ICD, DUAL CHAMBER N/A 1/2/2024    Procedure:  Insertion, ICD, Dual Chamber;  Surgeon: Antonio Callejas MD;  Location: [facility] EP LAB;  Service: Cardiology;  Laterality: N/A;  CM, DUAL ICD, ANES, SJM, MB, 3079    IVUS, CORONARY  2023    Procedure: IVUS, Coronary;  Surgeon: Paul Botello MD;  Location: [facility] CATH/EP LAB;  Service: Cardiology;;    LEFT HEART CATHETERIZATION Left 3/17/2020    Procedure: CATHETERIZATION, HEART, LEFT;  Surgeon: Tyree Walters MD;  Location: [facility] CATH/EP LAB;  Service: Cardiology;  Laterality: Left;    PERCUTANEOUS CORONARY INTERVENTION, ARTERY N/A 2023    Procedure: Percutaneous coronary intervention;  Surgeon: Paul Botello MD;  Location: [facility] CATH/EP LAB;  Service: Cardiology;  Laterality: N/A;    PERCUTANEOUS CORONARY INTERVENTION, ARTERY N/A 2023    Procedure: Percutaneous coronary intervention;  Surgeon: Ashley Valverde MD;  Location: [facility] CATH/EP LAB;  Service: Cardiology;  Laterality: N/A;    PLACEMENT, IABP  2023    Procedure: Placement, IABP;  Surgeon: Ashley Valverde MD;  Location: [facility] CATH/EP LAB;  Service: Cardiology;;    STERNAL WIRES REMOVAL N/A 2020    Procedure: REMOVAL, STERNAL WIRE;  Surgeon: Doug Solitario MD;  Location: [facility] OR;  Service: Peripheral Vascular;  Laterality: N/A;    ULTRASOUND OF PROSTATE FOR VOLUME DETERMINATION  12/10/2018    Procedure: ULTRASOUND, PROSTATE, FOR VOLUME DETERMINATION;  Surgeon: Khushboo Abreu MD;  Location: [facility] OR;  Service: Urology;;     Social History     Tobacco Use    Smoking status: Every Day     Current packs/day: 0.00     Average packs/day: 3.0 packs/day for 30.0 years (90.0 ttl pk-yrs)     Types: Cigarettes     Start date: 1990     Last attempt to quit: 2020     Years since quittin.8    Smokeless tobacco: Never   Substance Use Topics    Alcohol use: Not Currently    Drug use: Yes     Frequency: 1.0 times per week     Types: Marijuana        Review of Systems:    As noted in HPI in addition       REVIEW OF SYSTEMS  CARDIOVASCULAR: No recent chest pain, palpitations, arm, neck, or jaw pain  RESPIRATORY: No recent fever, cough chills, SOB or congestion  : No blood in the urine  GI: No Nausea, vomiting, constipation, diarrhea, blood, or reflux.  MUSCULOSKELETAL: No myalgias  NEURO: No lightheadedness or dizziness  EYES: No Double vision, blurry, vision or headache              Objective        Vitals:    01/28/25 0752   BP: 130/78   Pulse: 71   Resp: 16       LIPIDS - LAST 2   Lab Results   Component Value Date    CHOL 134 04/26/2024    CHOL 147 12/14/2023    HDL 28 (L) 04/26/2024    HDL 32 (L) 12/14/2023    LDLCALC 69.8 04/26/2024    LDLCALC 78.6 12/14/2023    TRIG 181 (H) 04/26/2024    TRIG 182 (H) 12/14/2023    CHOLHDL 20.9 04/26/2024    CHOLHDL 21.8 12/14/2023       CBC - LAST 2  Lab Results   Component Value Date    WBC 7.12 01/12/2025    WBC 7.88 01/11/2025    RBC 4.94 01/12/2025    RBC 4.65 01/11/2025    HGB 14.8 01/12/2025    HGB 14.1 01/11/2025    HCT 43.8 01/12/2025    HCT 41.6 01/11/2025    MCV 89 01/12/2025    MCV 90 01/11/2025    MCH 30.0 01/12/2025    MCH 30.3 01/11/2025    MCHC 33.8 01/12/2025    MCHC 33.9 01/11/2025    RDW 12.8 01/12/2025    RDW 13.0 01/11/2025     01/12/2025     01/11/2025    MPV 9.4 01/12/2025    MPV 9.6 01/11/2025    GRAN 4.1 01/12/2025    GRAN 57.1 01/12/2025    LYMPH 2.2 01/12/2025    LYMPH 30.8 01/12/2025    MONO 0.5 01/12/2025    MONO 7.3 01/12/2025    BASO 0.08 01/12/2025    BASO 0.09 01/11/2025    NRBC 0 01/12/2025    NRBC 0 01/11/2025       CHEMISTRY & LIVER FUNCTION - LAST 2  Lab Results   Component Value Date     (L) 01/12/2025     (L) 01/11/2025    K 3.9 01/12/2025    K 4.2 01/11/2025    CL 97 01/12/2025    CL 99 01/11/2025    CO2 28 01/12/2025    CO2 29 01/11/2025    ANIONGAP 7 (L) 01/12/2025    ANIONGAP 7 (L) 01/11/2025    BUN 13 01/12/2025    BUN 13 01/11/2025    CREATININE 1.0 01/12/2025    CREATININE 1.1 01/11/2025     (H)  01/12/2025     (H) 01/11/2025    CALCIUM 9.0 01/12/2025    CALCIUM 8.4 (L) 01/11/2025    PH 7.431 12/31/2023    PH 7.436 12/30/2023    MG 1.8 01/12/2025    MG 1.6 01/11/2025    ALBUMIN 4.0 01/12/2025    ALBUMIN 3.6 01/11/2025    PROT 7.2 01/12/2025    PROT 6.5 01/11/2025    ALKPHOS 58 01/12/2025    ALKPHOS 55 01/11/2025    ALT 15 01/12/2025    ALT 13 01/11/2025    AST 14 01/12/2025    AST 12 01/11/2025    BILITOT 0.4 01/12/2025    BILITOT 0.5 01/11/2025        CARDIAC PROFILE - LAST 2  Lab Results   Component Value Date     (H) 01/10/2025     (H) 12/29/2023    CPK 76 04/22/2020     04/22/2020    TROPONINI 24.449 (H) 12/26/2023    TROPONINI 25.809 (H) 12/26/2023    TROPONINIHS 14.8 01/11/2025    TROPONINIHS 12.2 01/11/2025        COAGULATION - LAST 2  Lab Results   Component Value Date    LABPT 13.2 08/30/2022    LABPT 15.1 (H) 04/07/2020    INR 1.1 12/28/2023    INR 1.0 12/27/2023    APTT 54.5 (H) 01/02/2024    APTT 63.8 (H) 01/01/2024       ENDOCRINE & PSA - LAST 2  Lab Results   Component Value Date    HGBA1C 7.2 (H) 04/26/2024    HGBA1C 8.3 (H) 12/14/2023    HGBA1C 8.3 (H) 12/14/2023    TSH 1.921 12/14/2023    TSH 0.569 09/01/2023    PROCAL 0.33 (H) 12/28/2023    PROCAL 0.09 04/22/2020    PSA 0.42 12/03/2018        ECHOCARDIOGRAM RESULTS  Results for orders placed during the hospital encounter of 01/10/25    Echo    Interpretation Summary    Left Ventricle: Left ventricle was not well visualized due to poor sonic window. The left ventricle is normal in size. Normal wall thickness. Regional wall motion abnormalities present. Septal motion is consistent with pacing. There is moderately reduced systolic function with a visually estimated ejection fraction of 35 - 40%. Quantitated ejection fraction is 43%. There is normal diastolic function. E/A ratio is 1.77.    Right Ventricle: Normal right ventricular cavity size. Systolic function is normal. Pacemaker lead present in the ventricle.     Left Atrium: Left atrium is moderately dilated.    Aortic Valve: The aortic valve is a trileaflet valve. There is aortic valve sclerosis.    Mitral Valve: There is moderate regurgitation.    Tricuspid Valve: There is mild regurgitation.    Pulmonic Valve: Not well visualized due to poor acoustic window.    He study was difficult due to patient's poor endocardial visualization      CURRENT/PREVIOUS VISIT EKG  Results for orders placed or performed during the hospital encounter of 01/10/25   EKG 12-lead    Collection Time: 01/10/25  2:19 PM   Result Value Ref Range    QRS Duration 100 ms    OHS QTC Calculation 475 ms    Narrative    Test Reason :    Vent. Rate :  70 BPM     Atrial Rate :  70 BPM     P-R Int : 154 ms          QRS Dur : 100 ms      QT Int : 440 ms       P-R-T Axes :  36 -10 110 degrees    QTcB Int : 475 ms    Normal sinus rhythm  T wave abnormality, consider lateral ischemia  Abnormal ECG  When compared with ECG of 10-Morgan-2025 14:19,  No significant change was found  Confirmed by Rico Ramirez ([id number]) on 1/16/2025 2:50:32 PM    Referred By: AAAREFERRAL SELF           Confirmed By: Rico Ramirez     No valid procedures specified.   Results for orders placed during the hospital encounter of 08/30/22    Nuclear Stress Test    Interpretation Summary    The EKG portion of this study is negative for ischemia.    The patient reported no chest pain during the stress test.    There were no arrhythmias during stress.    The nuclear portion of this study will be reported separately.    No valid procedures specified.    PHYSICAL EXAM  CONSTITUTIONAL: Well built, well nourished in no apparent distress  NECK: no carotid bruit, no JVD  LUNGS: CTA  CHEST WALL: no tenderness  HEART: regular rate and rhythm, S1, S2 normal, no murmur, click, rub or gallop   ABDOMEN: soft, non-tender; bowel sounds normal; no masses,  no organomegaly  EXTREMITIES: Extremities normal, no edema, no calf tenderness noted  NEURO: AAO X  "3    I HAVE REVIEWED :    The vital signs, nurses notes, and all the pertinent radiology and labs.        Current Outpatient Medications   Medication Instructions    ALPRAZolam (XANAX) 1 mg, Oral, Nightly PRN    amLODIPine (NORVASC) 5 mg, Oral, Daily    apixaban (ELIQUIS) 5 mg, Oral, 2 times daily    aspirin (ECOTRIN) 81 mg, Oral, Daily    atorvastatin (LIPITOR) 40 mg, Oral, Daily    blood sugar diagnostic Strp TEST 3 TIMES DAILY    blood sugar diagnostic Strp To check BG QID times daily, to use with insurance preferred meter    blood-glucose meter kit To check BG QID times daily, to use with insurance preferred meter    blood-glucose meter Misc TEST 3 TIMES DAILY    blood-glucose meter,continuous (DEXCOM G7 ) Misc Use as directed.    blood-glucose sensor (DEXCOM G7 SENSOR) Courtney Change every 10 days.    EScitalopram oxalate (LEXAPRO) 10 mg, Oral, Every morning    furosemide (LASIX) 20 mg, Oral, Daily PRN    gabapentin (NEURONTIN) 600 mg, See admin instructions    hydrALAZINE (APRESOLINE) 10 mg, Oral, Every 8 hours    insulin glargine-yfgn 26 Units, Subcutaneous, Nightly    isosorbide mononitrate (IMDUR) 60 mg, Oral, Daily    JARDIANCE 25 mg, Oral, Daily    lactobacillus acidophilus & bulgar (LACTINEX) 100 million cell packet 1 each, Oral, 2 times daily    lancets 33 gauge Misc TEST 3 TIMES DAILY    lancets Misc To check BG QID times daily, to use with insurance preferred meter    metFORMIN (GLUCOPHAGE) 1,000 mg, Oral, 2 times daily with meals    methocarbamoL (ROBAXIN) 500 mg, Nightly    metoprolol succinate (TOPROL-XL) 100 mg, 2 times daily    nitroGLYCERIN (NITROSTAT) 0.4 mg, Every 5 min PRN    oxyCODONE-acetaminophen (PERCOCET)  mg per tablet 1 tablet, 2 times daily PRN    pantoprazole (PROTONIX) 40 mg, Oral, Daily    pen needle, diabetic 29 gauge x 1/2" Ndle Use with insulin 4 times a day.    ranolazine (RANEXA) 500 mg, Oral, 2 times daily    traZODone (DESYREL) 100-200 mg, Oral, Nightly    "       Assessment & Plan     Chest pain  Restart Ranexa 500 mg BID  Increase Imdur 60 mg daily  Continue Metoprolol succinate 100 mg BID  Continue amlodipine 5 mg daily  Continue aspirin and statin therapy.   Low sodium heart healthy diet.    Stop smoking. Reduce saturated fats. Weight loss recommended    Atrial fibrillation  RRR today in office.  Patient reports taking Eliquis 5 mg BID  Continue metoprolol succinate 100 mg BID      Atherosclerosis of native coronary artery of native heart with angina pectoris  CP with exertion and at rest  Will plan for angiogram outpatient  Patient is also on Eliquis.  Increase Imdur to 60 mg daily.  Start Ranexa 500 mg b.i.d.    CHF (congestive heart failure)  Low sodium heart healthy diet.  1.5 L fluid restriction. 2 g salt restriction.  Daily weights.  Strict I's and O's.  Continue GDMT- metoprolol succinate 100 mg BID, Jardiance 25 mg daily. Patient reports taking lisinopril at home. Continue the same.  Patient to bring med list to office. Will add Lasix PRN for intermittent edema    ECHO 1/12/2025    Left Ventricle: Left ventricle was not well visualized due to poor sonic window. The left ventricle is normal in size. Normal wall thickness. Regional wall motion abnormalities present. Septal motion is consistent with pacing. There is moderately reduced systolic function with a visually estimated ejection fraction of 35 - 40%. Quantitated ejection fraction is 43%. There is normal diastolic function. E/A ratio is 1.77.    Right Ventricle: Normal right ventricular cavity size. Systolic function is normal. Pacemaker lead present in the ventricle.    Left Atrium: Left atrium is moderately dilated.    Aortic Valve: The aortic valve is a trileaflet valve. There is aortic valve sclerosis.    Mitral Valve: There is moderate regurgitation.    Tricuspid Valve: There is mild regurgitation.    Pulmonic Valve: Not well visualized due to poor acoustic window.    He study was difficult due to  patient's poor endocardial visualization       S/P CABG (coronary artery bypass graft)  Repeat angiogram for further evaluation of chest pain  Increase Imdur 60 mg daily      Hyperlipidemia LDL goal <70  Low sodium heart healthy diet  Reduce saturated fats  Weight loss recommended  Continue statin therapy    Hypertension associated with diabetes  Stable  Continue current medication regimen  Bring list to office to update list  Low sodium heart healthy diet    Obesity (BMI 30-39.9)  Weight loss recommended  Calorie restrictive diet    Type 2 diabetes mellitus with diabetic arthropathy  Managed by PCP  On Metformin and insulin glargine  On Jardiance    ROSAMARIA (obstructive sleep apnea)  Noncompliant with CPAP    Smoker 1-2 packs per day since 14 years old  Trying to stop smoking  Down to 8 cigarettes daily  Continue cessation    Angiogram Feb 13 with Dr. Hardy  Discussed with patient the risks and benefits of the procedure including, but not limited to, the following 1:1000 risk of Heart attack, stroke and death with 3-5% Risk of bleeding, vessel damage, and the need for emergent CABG Surgery.  All questions have been answered to patient's satisfaction.  Informed consent obtained        Follow up in about 1 month (around 2/28/2025).

## 2025-01-28 NOTE — ASSESSMENT & PLAN NOTE
Restart Ranexa 500 mg BID  Increase Imdur 60 mg daily  Continue Metoprolol succinate 100 mg BID  Continue amlodipine 5 mg daily  Continue aspirin and statin therapy.   Low sodium heart healthy diet.    Stop smoking. Reduce saturated fats. Weight loss recommended

## 2025-01-28 NOTE — ASSESSMENT & PLAN NOTE
CP with exertion and at rest  Will plan for angiogram outpatient  Patient is also on Eliquis.  Increase Imdur to 60 mg daily.  Start Ranexa 500 mg b.i.d.

## 2025-01-28 NOTE — ASSESSMENT & PLAN NOTE
Low sodium heart healthy diet.  1.5 L fluid restriction. 2 g salt restriction.  Daily weights.  Strict I's and O's.  Continue GDMT- metoprolol succinate 100 mg BID, Jardiance 25 mg daily. Patient reports taking lisinopril at home. Continue the same.  Patient to bring med list to office.    ECHO 1/12/2025    Left Ventricle: Left ventricle was not well visualized due to poor sonic window. The left ventricle is normal in size. Normal wall thickness. Regional wall motion abnormalities present. Septal motion is consistent with pacing. There is moderately reduced systolic function with a visually estimated ejection fraction of 35 - 40%. Quantitated ejection fraction is 43%. There is normal diastolic function. E/A ratio is 1.77.    Right Ventricle: Normal right ventricular cavity size. Systolic function is normal. Pacemaker lead present in the ventricle.    Left Atrium: Left atrium is moderately dilated.    Aortic Valve: The aortic valve is a trileaflet valve. There is aortic valve sclerosis.    Mitral Valve: There is moderate regurgitation.    Tricuspid Valve: There is mild regurgitation.    Pulmonic Valve: Not well visualized due to poor acoustic window.    He study was difficult due to patient's poor endocardial visualization

## 2025-01-28 NOTE — PROGRESS NOTES
Subjective:    Patient ID:  Magdaleno Cunningham is a 59 y.o. male patient here for evaluation Hospital Follow Up      History of Present Illness:       Hospital Follow up.  Recent admission for CP work up. Sees Dr. Dante Chavira in [place], Cardiologist.  He is interested in our group.     CATH 12/25/23    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    Significant triple-vessel coronary artery disease.    Reports intermittent CP since discharge.  Occur randomly at rest and with exertion. Only lasts for a few seconds.  Associated with shortness of breath.    CP was improved when on Ranexa during hospitalization but he has not been able to get outpatient due to pre authorization.    He needs to have an angiogram performed outpatient.       Review of patient's allergies indicates:   Allergen Reactions    Pesticide Dermatitis and Rash       Past Medical History:   Diagnosis Date    Anticoagulant long-term use     Atrial fibrillation 2018    BPH (benign prostatic hyperplasia)     Cataract     COPD (chronic obstructive pulmonary disease)     Coronary artery disease     stents    CVD (cardiovascular disease)     Diabetes mellitus     Erythema multiforme     AKA Denton Edmondson Syndrome    Hypertension     Infected incision     MI (myocardial infarction)     per pt he has had 4     ROSAMARIA on CPAP     RLS (restless legs syndrome)     Perez-Denton syndrome      Past Surgical History:   Procedure Laterality Date    CORONARY ANGIOGRAPHY INCLUDING BYPASS GRAFTS WITH CATHETERIZATION OF LEFT HEART N/A 9/1/2022    Procedure: ANGIOGRAM, CORONARY, INCLUDING BYPASS GRAFT, WITH LEFT HEART CATHETERIZATION;  Surgeon: Paul Botello MD;  Location: [facility] CATH/EP LAB;  Service: Cardiology;  Laterality: N/A;    CORONARY ANGIOGRAPHY INCLUDING BYPASS GRAFTS WITH CATHETERIZATION OF LEFT HEART Left 12/22/2023    Procedure: ANGIOGRAM, CORONARY, INCLUDING BYPASS GRAFT, WITH LEFT  HEART CATHETERIZATION;  Surgeon: Paul Botello MD;  Location: [facility] CATH/EP LAB;  Service: Cardiology;  Laterality: Left;    CORONARY ARTERY BYPASS GRAFT (CABG) N/A 4/7/2020    Procedure: CORONARY ARTERY BYPASS GRAFT (CABG)- Excision of left atrial appendage;  Surgeon: Doug Solitario MD;  Location: [facility] OR;  Service: Cardiothoracic;  Laterality: N/A;    CORONARY BYPASS GRAFT ANGIOGRAPHY  12/25/2023    Procedure: Bypass graft study;  Surgeon: Ashley Valverde MD;  Location: [facility] CATH/EP LAB;  Service: Cardiology;;    CORONARY STENT PLACEMENT      CORONARY STENT PLACEMENT N/A 12/22/2023    Procedure: INSERTION, STENT, CORONARY ARTERY;  Surgeon: Paul Botello MD;  Location: [facility] CATH/EP LAB;  Service: Cardiology;  Laterality: N/A;    WILSON MAZE PROCEDURE N/A 4/7/2020    Procedure: WILSON MAZE PROCEDURE- Attempted;  Surgeon: Doug Solitario MD;  Location: [facility] OR;  Service: Cardiothoracic;  Laterality: N/A;    CYSTOSCOPY N/A 12/10/2018    Procedure: CYSTOSCOPY;  Surgeon: Khushboo Abreu MD;  Location: [facility] OR;  Service: Urology;  Laterality: N/A;    ENDOSCOPIC HARVEST OF VEIN Left 4/7/2020    Procedure: HARVEST-VEIN-ENDOVASCULAR;  Surgeon: Doug Solitario MD;  Location: [facility];  Service: Cardiothoracic;  Laterality: Left;    INCISION OF PERIRECTAL ABSCESS Bilateral 9/1/2023    Procedure: INCISION, ABSCESS, PERIRECTAL;  Surgeon: Brayan Spears MD;  Location: [facility] OR;  Service: General;  Laterality: Bilateral;  stirrups    INSERTION OF INTRA-AORTIC BALLOON ASSIST DEVICE  12/25/2023    Procedure: INSERTION, INTRA-AORTIC BALLOON PUMP;  Surgeon: Ashley Valverde MD;  Location: [facility] CATH/EP LAB;  Service: Cardiology;;    INSERTION OF TEMPORARY PACEMAKER N/A 12/25/2023    Procedure: INSERTION, PACEMAKER, TEMPORARY;  Surgeon: Ashley Valverde MD;  Location: [facility] CATH/EP LAB;  Service: Cardiology;  Laterality: N/A;    INSERTION, ICD, DUAL CHAMBER N/A 1/2/2024    Procedure:  Insertion, ICD, Dual Chamber;  Surgeon: Antonio Callejas MD;  Location: [facility] EP LAB;  Service: Cardiology;  Laterality: N/A;  CM, DUAL ICD, ANES, SJM, MB, 3079    IVUS, CORONARY  2023    Procedure: IVUS, Coronary;  Surgeon: Paul Botello MD;  Location: [facility] CATH/EP LAB;  Service: Cardiology;;    LEFT HEART CATHETERIZATION Left 3/17/2020    Procedure: CATHETERIZATION, HEART, LEFT;  Surgeon: Tyree Walters MD;  Location: [facility] CATH/EP LAB;  Service: Cardiology;  Laterality: Left;    PERCUTANEOUS CORONARY INTERVENTION, ARTERY N/A 2023    Procedure: Percutaneous coronary intervention;  Surgeon: Paul Botello MD;  Location: [facility] CATH/EP LAB;  Service: Cardiology;  Laterality: N/A;    PERCUTANEOUS CORONARY INTERVENTION, ARTERY N/A 2023    Procedure: Percutaneous coronary intervention;  Surgeon: Ashley Valverde MD;  Location: [facility] CATH/EP LAB;  Service: Cardiology;  Laterality: N/A;    PLACEMENT, IABP  2023    Procedure: Placement, IABP;  Surgeon: Ashley Valverde MD;  Location: [facility] CATH/EP LAB;  Service: Cardiology;;    STERNAL WIRES REMOVAL N/A 2020    Procedure: REMOVAL, STERNAL WIRE;  Surgeon: Doug Solitario MD;  Location: [facility] OR;  Service: Peripheral Vascular;  Laterality: N/A;    ULTRASOUND OF PROSTATE FOR VOLUME DETERMINATION  12/10/2018    Procedure: ULTRASOUND, PROSTATE, FOR VOLUME DETERMINATION;  Surgeon: Khushboo Abreu MD;  Location: [facility] OR;  Service: Urology;;     Social History     Tobacco Use    Smoking status: Every Day     Current packs/day: 0.00     Average packs/day: 3.0 packs/day for 30.0 years (90.0 ttl pk-yrs)     Types: Cigarettes     Start date: 1990     Last attempt to quit: 2020     Years since quittin.8    Smokeless tobacco: Never   Substance Use Topics    Alcohol use: Not Currently    Drug use: Yes     Frequency: 1.0 times per week     Types: Marijuana        Review of Systems:    As noted in HPI in addition       REVIEW OF SYSTEMS  CARDIOVASCULAR: No recent chest pain, palpitations, arm, neck, or jaw pain  RESPIRATORY: No recent fever, cough chills, SOB or congestion  : No blood in the urine  GI: No Nausea, vomiting, constipation, diarrhea, blood, or reflux.  MUSCULOSKELETAL: No myalgias  NEURO: No lightheadedness or dizziness  EYES: No Double vision, blurry, vision or headache              Objective        Vitals:    01/28/25 0752   BP: 130/78   Pulse: 71   Resp: 16       LIPIDS - LAST 2   Lab Results   Component Value Date    CHOL 134 04/26/2024    CHOL 147 12/14/2023    HDL 28 (L) 04/26/2024    HDL 32 (L) 12/14/2023    LDLCALC 69.8 04/26/2024    LDLCALC 78.6 12/14/2023    TRIG 181 (H) 04/26/2024    TRIG 182 (H) 12/14/2023    CHOLHDL 20.9 04/26/2024    CHOLHDL 21.8 12/14/2023       CBC - LAST 2  Lab Results   Component Value Date    WBC 7.12 01/12/2025    WBC 7.88 01/11/2025    RBC 4.94 01/12/2025    RBC 4.65 01/11/2025    HGB 14.8 01/12/2025    HGB 14.1 01/11/2025    HCT 43.8 01/12/2025    HCT 41.6 01/11/2025    MCV 89 01/12/2025    MCV 90 01/11/2025    MCH 30.0 01/12/2025    MCH 30.3 01/11/2025    MCHC 33.8 01/12/2025    MCHC 33.9 01/11/2025    RDW 12.8 01/12/2025    RDW 13.0 01/11/2025     01/12/2025     01/11/2025    MPV 9.4 01/12/2025    MPV 9.6 01/11/2025    GRAN 4.1 01/12/2025    GRAN 57.1 01/12/2025    LYMPH 2.2 01/12/2025    LYMPH 30.8 01/12/2025    MONO 0.5 01/12/2025    MONO 7.3 01/12/2025    BASO 0.08 01/12/2025    BASO 0.09 01/11/2025    NRBC 0 01/12/2025    NRBC 0 01/11/2025       CHEMISTRY & LIVER FUNCTION - LAST 2  Lab Results   Component Value Date     (L) 01/12/2025     (L) 01/11/2025    K 3.9 01/12/2025    K 4.2 01/11/2025    CL 97 01/12/2025    CL 99 01/11/2025    CO2 28 01/12/2025    CO2 29 01/11/2025    ANIONGAP 7 (L) 01/12/2025    ANIONGAP 7 (L) 01/11/2025    BUN 13 01/12/2025    BUN 13 01/11/2025    CREATININE 1.0 01/12/2025    CREATININE 1.1 01/11/2025     (H)  01/12/2025     (H) 01/11/2025    CALCIUM 9.0 01/12/2025    CALCIUM 8.4 (L) 01/11/2025    PH 7.431 12/31/2023    PH 7.436 12/30/2023    MG 1.8 01/12/2025    MG 1.6 01/11/2025    ALBUMIN 4.0 01/12/2025    ALBUMIN 3.6 01/11/2025    PROT 7.2 01/12/2025    PROT 6.5 01/11/2025    ALKPHOS 58 01/12/2025    ALKPHOS 55 01/11/2025    ALT 15 01/12/2025    ALT 13 01/11/2025    AST 14 01/12/2025    AST 12 01/11/2025    BILITOT 0.4 01/12/2025    BILITOT 0.5 01/11/2025        CARDIAC PROFILE - LAST 2  Lab Results   Component Value Date     (H) 01/10/2025     (H) 12/29/2023    CPK 76 04/22/2020     04/22/2020    TROPONINI 24.449 (H) 12/26/2023    TROPONINI 25.809 (H) 12/26/2023    TROPONINIHS 14.8 01/11/2025    TROPONINIHS 12.2 01/11/2025        COAGULATION - LAST 2  Lab Results   Component Value Date    LABPT 13.2 08/30/2022    LABPT 15.1 (H) 04/07/2020    INR 1.1 12/28/2023    INR 1.0 12/27/2023    APTT 54.5 (H) 01/02/2024    APTT 63.8 (H) 01/01/2024       ENDOCRINE & PSA - LAST 2  Lab Results   Component Value Date    HGBA1C 7.2 (H) 04/26/2024    HGBA1C 8.3 (H) 12/14/2023    HGBA1C 8.3 (H) 12/14/2023    TSH 1.921 12/14/2023    TSH 0.569 09/01/2023    PROCAL 0.33 (H) 12/28/2023    PROCAL 0.09 04/22/2020    PSA 0.42 12/03/2018        ECHOCARDIOGRAM RESULTS  Results for orders placed during the hospital encounter of 01/10/25    Echo    Interpretation Summary    Left Ventricle: Left ventricle was not well visualized due to poor sonic window. The left ventricle is normal in size. Normal wall thickness. Regional wall motion abnormalities present. Septal motion is consistent with pacing. There is moderately reduced systolic function with a visually estimated ejection fraction of 35 - 40%. Quantitated ejection fraction is 43%. There is normal diastolic function. E/A ratio is 1.77.    Right Ventricle: Normal right ventricular cavity size. Systolic function is normal. Pacemaker lead present in the ventricle.     Left Atrium: Left atrium is moderately dilated.    Aortic Valve: The aortic valve is a trileaflet valve. There is aortic valve sclerosis.    Mitral Valve: There is moderate regurgitation.    Tricuspid Valve: There is mild regurgitation.    Pulmonic Valve: Not well visualized due to poor acoustic window.    He study was difficult due to patient's poor endocardial visualization      CURRENT/PREVIOUS VISIT EKG  Results for orders placed or performed during the hospital encounter of 01/10/25   EKG 12-lead    Collection Time: 01/10/25  2:19 PM   Result Value Ref Range    QRS Duration 100 ms    OHS QTC Calculation 475 ms    Narrative    Test Reason :    Vent. Rate :  70 BPM     Atrial Rate :  70 BPM     P-R Int : 154 ms          QRS Dur : 100 ms      QT Int : 440 ms       P-R-T Axes :  36 -10 110 degrees    QTcB Int : 475 ms    Normal sinus rhythm  T wave abnormality, consider lateral ischemia  Abnormal ECG  When compared with ECG of 10-Morgan-2025 14:19,  No significant change was found  Confirmed by Rico Ramirez ([id number]) on 1/16/2025 2:50:32 PM    Referred By: AAAREFERRAL SELF           Confirmed By: Rico Ramirez     No valid procedures specified.   Results for orders placed during the hospital encounter of 08/30/22    Nuclear Stress Test    Interpretation Summary    The EKG portion of this study is negative for ischemia.    The patient reported no chest pain during the stress test.    There were no arrhythmias during stress.    The nuclear portion of this study will be reported separately.    No valid procedures specified.    PHYSICAL EXAM  CONSTITUTIONAL: Well built, well nourished in no apparent distress  NECK: no carotid bruit, no JVD  LUNGS: CTA  CHEST WALL: no tenderness  HEART: regular rate and rhythm, S1, S2 normal, no murmur, click, rub or gallop   ABDOMEN: soft, non-tender; bowel sounds normal; no masses,  no organomegaly  EXTREMITIES: Extremities normal, no edema, no calf tenderness noted  NEURO: AAO X  "3    I HAVE REVIEWED :    The vital signs, nurses notes, and all the pertinent radiology and labs.        Current Outpatient Medications   Medication Instructions    ALPRAZolam (XANAX) 1 mg, Oral, Nightly PRN    amLODIPine (NORVASC) 5 mg, Oral, Daily    apixaban (ELIQUIS) 5 mg, Oral, 2 times daily    aspirin (ECOTRIN) 81 mg, Oral, Daily    atorvastatin (LIPITOR) 40 mg, Oral, Daily    blood sugar diagnostic Strp TEST 3 TIMES DAILY    blood sugar diagnostic Strp To check BG QID times daily, to use with insurance preferred meter    blood-glucose meter kit To check BG QID times daily, to use with insurance preferred meter    blood-glucose meter Misc TEST 3 TIMES DAILY    blood-glucose meter,continuous (DEXCOM G7 ) Misc Use as directed.    blood-glucose sensor (DEXCOM G7 SENSOR) Courtney Change every 10 days.    EScitalopram oxalate (LEXAPRO) 10 mg, Oral, Every morning    furosemide (LASIX) 20 mg, Oral, Daily PRN    gabapentin (NEURONTIN) 600 mg, See admin instructions    hydrALAZINE (APRESOLINE) 10 mg, Oral, Every 8 hours    insulin glargine-yfgn 26 Units, Subcutaneous, Nightly    isosorbide mononitrate (IMDUR) 60 mg, Oral, Daily    JARDIANCE 25 mg, Oral, Daily    lactobacillus acidophilus & bulgar (LACTINEX) 100 million cell packet 1 each, Oral, 2 times daily    lancets 33 gauge Misc TEST 3 TIMES DAILY    lancets Misc To check BG QID times daily, to use with insurance preferred meter    metFORMIN (GLUCOPHAGE) 1,000 mg, Oral, 2 times daily with meals    methocarbamoL (ROBAXIN) 500 mg, Nightly    metoprolol succinate (TOPROL-XL) 100 mg, 2 times daily    nitroGLYCERIN (NITROSTAT) 0.4 mg, Every 5 min PRN    oxyCODONE-acetaminophen (PERCOCET)  mg per tablet 1 tablet, 2 times daily PRN    pantoprazole (PROTONIX) 40 mg, Oral, Daily    pen needle, diabetic 29 gauge x 1/2" Ndle Use with insulin 4 times a day.    ranolazine (RANEXA) 500 mg, Oral, 2 times daily    traZODone (DESYREL) 100-200 mg, Oral, Nightly    "       Assessment & Plan     Chest pain  Restart Ranexa 500 mg BID  Increase Imdur 60 mg daily  Continue Metoprolol succinate 100 mg BID  Continue amlodipine 5 mg daily  Continue aspirin and statin therapy.   Low sodium heart healthy diet.    Stop smoking. Reduce saturated fats. Weight loss recommended    Atrial fibrillation  RRR today in office.  Patient reports taking Eliquis 5 mg BID  Continue metoprolol succinate 100 mg BID      Atherosclerosis of native coronary artery of native heart with angina pectoris  CP with exertion and at rest  Will plan for angiogram outpatient  Patient is also on Eliquis.  Increase Imdur to 60 mg daily.  Start Ranexa 500 mg b.i.d.    CHF (congestive heart failure)  Low sodium heart healthy diet.  1.5 L fluid restriction. 2 g salt restriction.  Daily weights.  Strict I's and O's.  Continue GDMT- metoprolol succinate 100 mg BID, Jardiance 25 mg daily. Patient reports taking lisinopril at home. Continue the same.  Patient to bring med list to office. Will add Lasix PRN for intermittent edema    ECHO 1/12/2025    Left Ventricle: Left ventricle was not well visualized due to poor sonic window. The left ventricle is normal in size. Normal wall thickness. Regional wall motion abnormalities present. Septal motion is consistent with pacing. There is moderately reduced systolic function with a visually estimated ejection fraction of 35 - 40%. Quantitated ejection fraction is 43%. There is normal diastolic function. E/A ratio is 1.77.    Right Ventricle: Normal right ventricular cavity size. Systolic function is normal. Pacemaker lead present in the ventricle.    Left Atrium: Left atrium is moderately dilated.    Aortic Valve: The aortic valve is a trileaflet valve. There is aortic valve sclerosis.    Mitral Valve: There is moderate regurgitation.    Tricuspid Valve: There is mild regurgitation.    Pulmonic Valve: Not well visualized due to poor acoustic window.    He study was difficult due to  patient's poor endocardial visualization       S/P CABG (coronary artery bypass graft)  Repeat angiogram for further evaluation of chest pain  Increase Imdur 60 mg daily      Hyperlipidemia LDL goal <70  Low sodium heart healthy diet  Reduce saturated fats  Weight loss recommended  Continue statin therapy    Hypertension associated with diabetes  Stable  Continue current medication regimen  Bring list to office to update list  Low sodium heart healthy diet    Obesity (BMI 30-39.9)  Weight loss recommended  Calorie restrictive diet    Type 2 diabetes mellitus with diabetic arthropathy  Managed by PCP  On Metformin and insulin glargine  On Jardiance    ROSAMARIA (obstructive sleep apnea)  Noncompliant with CPAP    Smoker 1-2 packs per day since 14 years old  Trying to stop smoking  Down to 8 cigarettes daily  Continue cessation    Angiogram Feb 13 with Dr. Hardy  Discussed with patient the risks and benefits of the procedure including, but not limited to, the following 1:1000 risk of Heart attack, stroke and death with 3-5% Risk of bleeding, vessel damage, and the need for emergent CABG Surgery.  All questions have been answered to patient's satisfaction.  Informed consent obtained        Follow up in about 1 month (around 2/28/2025).

## 2025-01-28 NOTE — ASSESSMENT & PLAN NOTE
Stable  Continue current medication regimen  Bring list to office to update list  Low sodium heart healthy diet

## 2025-01-28 NOTE — TELEPHONE ENCOUNTER
----- Message from Edelmira sent at 1/28/2025 10:29 AM CST -----  Contact: [facility]  Type:  Pharmacy Calling to Clarify an RX    Name of Caller:[facility]    Prescription Name:isosorbide mononitrate (IMDUR) 60 MG 24 hr tablet  isosorbide mononitrate (IMDUR) 30 MG 24 hr tablet  What do they need to clarify?: WHICH RX SHOULD BE FILLED OR SHOULD BOTH BE FILLED?  Best Call Back Number: [phone] / FAX # [phone]  Additional Information: THANK YOU

## 2025-01-28 NOTE — TELEPHONE ENCOUNTER
----- Message from Med Assistant Morrell sent at 1/28/2025 11:32 AM CST -----  Contact: [facility]    ----- Message -----  From: Edelmira Hwang  Sent: 1/28/2025  10:34 AM CST  To: Re[facility] Nazario Staff    Type:  Pharmacy Calling to Clarify an RX    Name of Caller:[facility]    Prescription Name:isosorbide mononitrate (IMDUR) 60 MG 24 hr tablet  isosorbide mononitrate (IMDUR) 30 MG 24 hr tablet  What do they need to clarify?: WHICH RX SHOULD BE FILLED OR SHOULD BOTH BE FILLED?  Best Call Back Number: [phone] / FAX # [phone]  Additional Information: THANK YOU

## 2025-01-28 NOTE — ASSESSMENT & PLAN NOTE
Low sodium heart healthy diet.  1.5 L fluid restriction. 2 g salt restriction.  Daily weights.  Strict I's and O's.  Continue GDMT- metoprolol succinate 100 mg BID, Jardiance 25 mg daily. Patient reports taking lisinopril at home. Continue the same.  Patient to bring med list to office. Will add Lasix PRN for intermittent edema    ECHO 1/12/2025    Left Ventricle: Left ventricle was not well visualized due to poor sonic window. The left ventricle is normal in size. Normal wall thickness. Regional wall motion abnormalities present. Septal motion is consistent with pacing. There is moderately reduced systolic function with a visually estimated ejection fraction of 35 - 40%. Quantitated ejection fraction is 43%. There is normal diastolic function. E/A ratio is 1.77.    Right Ventricle: Normal right ventricular cavity size. Systolic function is normal. Pacemaker lead present in the ventricle.    Left Atrium: Left atrium is moderately dilated.    Aortic Valve: The aortic valve is a trileaflet valve. There is aortic valve sclerosis.    Mitral Valve: There is moderate regurgitation.    Tricuspid Valve: There is mild regurgitation.    Pulmonic Valve: Not well visualized due to poor acoustic window.    He study was difficult due to patient's poor endocardial visualization

## 2025-01-31 ENCOUNTER — CLINICAL SUPPORT (OUTPATIENT)
Dept: CARDIOLOGY | Facility: HOSPITAL | Age: 60
End: 2025-01-31
Attending: INTERNAL MEDICINE
Payer: MEDICAID

## 2025-01-31 DIAGNOSIS — Z95.810 PRESENCE OF AUTOMATIC (IMPLANTABLE) CARDIAC DEFIBRILLATOR: ICD-10-CM

## 2025-01-31 DIAGNOSIS — I44.2 ATRIOVENTRICULAR BLOCK, COMPLETE: ICD-10-CM

## 2025-01-31 PROCEDURE — 93296 REM INTERROG EVL PM/IDS: CPT | Performed by: INTERNAL MEDICINE

## 2025-02-10 ENCOUNTER — HOSPITAL ENCOUNTER (OUTPATIENT)
Dept: PREADMISSION TESTING | Facility: HOSPITAL | Age: 60
Discharge: HOME | End: 2025-02-10
Attending: INTERNAL MEDICINE
Payer: MEDICAID

## 2025-02-10 VITALS
DIASTOLIC BLOOD PRESSURE: 82 MMHG | HEART RATE: 78 BPM | TEMPERATURE: 98 F | RESPIRATION RATE: 14 BRPM | SYSTOLIC BLOOD PRESSURE: 129 MMHG

## 2025-02-10 DIAGNOSIS — R07.9 CHEST PAIN, UNSPECIFIED TYPE: ICD-10-CM

## 2025-02-10 DIAGNOSIS — R07.9 CHEST PAIN: ICD-10-CM

## 2025-02-10 DIAGNOSIS — Z95.1 S/P CABG (CORONARY ARTERY BYPASS GRAFT): ICD-10-CM

## 2025-02-10 LAB
ANION GAP SERPL CALC-SCNC: 8 MMOL/L (ref 8–16)
APTT PPP: 26.2 SEC (ref 21–32)
BASOPHILS # BLD AUTO: 0.08 K/UL (ref 0–0.2)
BASOPHILS NFR BLD: 1.1 % (ref 0–1.9)
BUN SERPL-MCNC: 14 MG/DL (ref 6–20)
CALCIUM SERPL-MCNC: 9.6 MG/DL (ref 8.7–10.5)
CHLORIDE SERPL-SCNC: 97 MMOL/L (ref 95–110)
CO2 SERPL-SCNC: 27 MMOL/L (ref 23–29)
CREAT SERPL-MCNC: 1.2 MG/DL (ref 0.5–1.4)
DIFFERENTIAL METHOD BLD: NORMAL
EOSINOPHIL # BLD AUTO: 0.2 K/UL (ref 0–0.5)
EOSINOPHIL NFR BLD: 2.7 % (ref 0–8)
ERYTHROCYTE [DISTWIDTH] IN BLOOD BY AUTOMATED COUNT: 13.2 % (ref 11.5–14.5)
EST. GFR  (NO RACE VARIABLE): >60 ML/MIN/1.73 M^2
GLUCOSE SERPL-MCNC: 281 MG/DL (ref 70–110)
HCT VFR BLD AUTO: 43.8 % (ref 40–54)
HGB BLD-MCNC: 15 G/DL (ref 14–18)
IMM GRANULOCYTES # BLD AUTO: 0.02 K/UL (ref 0–0.04)
IMM GRANULOCYTES NFR BLD AUTO: 0.3 % (ref 0–0.5)
INR PPP: 1 (ref 0.8–1.2)
LYMPHOCYTES # BLD AUTO: 2.6 K/UL (ref 1–4.8)
LYMPHOCYTES NFR BLD: 34.9 % (ref 18–48)
MCH RBC QN AUTO: 30.1 PG (ref 27–31)
MCHC RBC AUTO-ENTMCNC: 34.2 G/DL (ref 32–36)
MCV RBC AUTO: 88 FL (ref 82–98)
MONOCYTES # BLD AUTO: 0.5 K/UL (ref 0.3–1)
MONOCYTES NFR BLD: 7 % (ref 4–15)
NEUTROPHILS # BLD AUTO: 4 K/UL (ref 1.8–7.7)
NEUTROPHILS NFR BLD: 54 % (ref 38–73)
NRBC BLD-RTO: 0 /100 WBC
PLATELET # BLD AUTO: 209 K/UL (ref 150–450)
PMV BLD AUTO: 9.5 FL (ref 9.2–12.9)
POTASSIUM SERPL-SCNC: 4.4 MMOL/L (ref 3.5–5.1)
PROTHROMBIN TIME: 10.7 SEC (ref 9–12.5)
RBC # BLD AUTO: 4.98 M/UL (ref 4.6–6.2)
SODIUM SERPL-SCNC: 132 MMOL/L (ref 136–145)
WBC # BLD AUTO: 7.4 K/UL (ref 3.9–12.7)

## 2025-02-10 PROCEDURE — 85610 PROTHROMBIN TIME: CPT

## 2025-02-10 PROCEDURE — 85025 COMPLETE CBC W/AUTO DIFF WBC: CPT

## 2025-02-10 PROCEDURE — 80048 BASIC METABOLIC PNL TOTAL CA: CPT | Performed by: INTERNAL MEDICINE

## 2025-02-10 PROCEDURE — 93010 ELECTROCARDIOGRAM REPORT: CPT | Mod: ,,, | Performed by: INTERNAL MEDICINE

## 2025-02-10 PROCEDURE — 93005 ELECTROCARDIOGRAM TRACING: CPT | Performed by: INTERNAL MEDICINE

## 2025-02-10 PROCEDURE — 85730 THROMBOPLASTIN TIME PARTIAL: CPT

## 2025-02-10 NOTE — DISCHARGE INSTRUCTIONS
Your procedure is scheduled for:          [facility] thru the [facility] entrance at:    Nothing to eat  after midnight the night before your procedure. You may have clear liquids up to 2 hours before coming in for procedure. This includes water, Gatorade, clear juice  Do not take any medications the morning of your procedure  Bring all your medications with you in the original pill bottles from pharmacy. Please bring your ASPIRIN   If you take blood thinners, ask your doctor if you should stop taking them.  Do not take metformin 24 hours prior to your procedure.  Adjust your insulin or other diabetes medications if needed.   Do your chlorhexidine wash the night before and morning of your procedure.  If you use a CPAP or BiPAP at home, please bring it with you the day of your procedure.  Make arrangements for someone you know to drive you home after your procedure. Taxi and Uber are not acceptable.  Come dressed comfortably          Any questions call the [facility] at [phone]

## 2025-02-10 NOTE — PRE-PROCEDURE INSTRUCTIONS
Your procedure is scheduled for:2/13/25          Arrive thru the [facility] entrance at:0645     Nothing to eat  after midnight the night before your procedure. You may have clear liquids up to 2 hours before coming in for procedure. This includes water, Gatorade, clear juice  Do not take any medications the morning of your procedure  Bring all your medications with you in the original pill bottles from pharmacy. Please bring your ASPIRIN   If you take blood thinners, ask your doctor if you should stop taking them.  Do not take metformin 24 hours prior to your procedure.  Adjust your insulin or other diabetes medications if needed.   Do your chlorhexidine wash the night before and morning of your procedure.  If you use a CPAP or BiPAP at home, please bring it with you the day of your procedure.  Make arrangements for someone you know to drive you home after your procedure. Taxi and Uber are not acceptable.  Come dressed comfortably          Any questions call the [facility] at [phone]

## 2025-02-13 ENCOUNTER — HOSPITAL ENCOUNTER (OUTPATIENT)
Facility: HOSPITAL | Age: 60
Discharge: HOME | End: 2025-02-13
Attending: INTERNAL MEDICINE | Admitting: INTERNAL MEDICINE
Payer: MEDICAID

## 2025-02-13 VITALS
OXYGEN SATURATION: 96 % | HEART RATE: 59 BPM | DIASTOLIC BLOOD PRESSURE: 85 MMHG | RESPIRATION RATE: 16 BRPM | SYSTOLIC BLOOD PRESSURE: 161 MMHG

## 2025-02-13 DIAGNOSIS — I25.10 CAD (CORONARY ARTERY DISEASE): ICD-10-CM

## 2025-02-13 DIAGNOSIS — Z95.1 S/P CABG (CORONARY ARTERY BYPASS GRAFT): ICD-10-CM

## 2025-02-13 DIAGNOSIS — Z95.5 S/P PRIMARY ANGIOPLASTY WITH CORONARY STENT: Primary | ICD-10-CM

## 2025-02-13 DIAGNOSIS — R07.9 CHEST PAIN, UNSPECIFIED TYPE: ICD-10-CM

## 2025-02-13 DIAGNOSIS — I25.119 ATHEROSCLEROSIS OF NATIVE CORONARY ARTERY OF NATIVE HEART WITH ANGINA PECTORIS: ICD-10-CM

## 2025-02-13 DIAGNOSIS — I25.119 ATHEROSCLEROSIS OF NATIVE CORONARY ARTERY OF NATIVE HEART WITH ANGINA PECTORIS: Primary | ICD-10-CM

## 2025-02-13 DIAGNOSIS — Z95.5 STATUS POST INSERTION OF DRUG ELUTING CORONARY ARTERY STENT: ICD-10-CM

## 2025-02-13 LAB
BNP SERPL-MCNC: 72 PG/ML (ref 0–99)
OHS QRS DURATION: 108 MS
OHS QTC CALCULATION: 463 MS

## 2025-02-13 PROCEDURE — C1874 STENT, COATED/COV W/DEL SYS: HCPCS | Performed by: INTERNAL MEDICINE

## 2025-02-13 PROCEDURE — 99153 MOD SED SAME PHYS/QHP EA: CPT | Performed by: INTERNAL MEDICINE

## 2025-02-13 PROCEDURE — C1894 INTRO/SHEATH, NON-LASER: HCPCS | Performed by: INTERNAL MEDICINE

## 2025-02-13 PROCEDURE — 25500020 PHARM REV CODE 255: Performed by: INTERNAL MEDICINE

## 2025-02-13 PROCEDURE — 93005 ELECTROCARDIOGRAM TRACING: CPT | Performed by: GENERAL PRACTICE

## 2025-02-13 PROCEDURE — C1753 CATH, INTRAVAS ULTRASOUND: HCPCS | Performed by: INTERNAL MEDICINE

## 2025-02-13 PROCEDURE — 63600175 PHARM REV CODE 636 W HCPCS: Performed by: INTERNAL MEDICINE

## 2025-02-13 PROCEDURE — 92978 ENDOLUMINL IVUS OCT C 1ST: CPT | Performed by: INTERNAL MEDICINE

## 2025-02-13 PROCEDURE — 93799 UNLISTED CV SVC/PROCEDURE: CPT | Mod: LC | Performed by: INTERNAL MEDICINE

## 2025-02-13 PROCEDURE — C1725 CATH, TRANSLUMIN NON-LASER: HCPCS | Performed by: INTERNAL MEDICINE

## 2025-02-13 PROCEDURE — C1887 CATHETER, GUIDING: HCPCS | Performed by: INTERNAL MEDICINE

## 2025-02-13 PROCEDURE — 25000003 PHARM REV CODE 250: Performed by: INTERNAL MEDICINE

## 2025-02-13 PROCEDURE — C1760 CLOSURE DEV, VASC: HCPCS | Performed by: INTERNAL MEDICINE

## 2025-02-13 PROCEDURE — 83880 ASSAY OF NATRIURETIC PEPTIDE: CPT | Performed by: INTERNAL MEDICINE

## 2025-02-13 PROCEDURE — C9600 PERC DRUG-EL COR STENT SING: HCPCS | Mod: LC | Performed by: INTERNAL MEDICINE

## 2025-02-13 PROCEDURE — C1769 GUIDE WIRE: HCPCS | Performed by: INTERNAL MEDICINE

## 2025-02-13 PROCEDURE — 99152 MOD SED SAME PHYS/QHP 5/>YRS: CPT | Performed by: INTERNAL MEDICINE

## 2025-02-13 PROCEDURE — 25000003 PHARM REV CODE 250

## 2025-02-13 PROCEDURE — 93459 L HRT ART/GRFT ANGIO: CPT | Mod: 59 | Performed by: INTERNAL MEDICINE

## 2025-02-13 DEVICE — ANGIO-SEAL VIP VASCULAR CLOSURE DEVICE
Type: IMPLANTABLE DEVICE | Site: GROIN | Status: FUNCTIONAL
Brand: ANGIO-SEAL

## 2025-02-13 DEVICE — EVEROLIMUS-ELUTING PLATINUM CHROMIUM CORONARY STENT SYSTEM
Type: IMPLANTABLE DEVICE | Site: CORONARY | Status: FUNCTIONAL
Brand: SYNERGY™ XD

## 2025-02-13 RX ORDER — NITROGLYCERIN 0.4 MG/1
0.4 TABLET SUBLINGUAL EVERY 5 MIN PRN
Status: DISCONTINUED | OUTPATIENT
Start: 2025-02-13 | End: 2025-02-13 | Stop reason: HOSPADM

## 2025-02-13 RX ORDER — CLOPIDOGREL BISULFATE 75 MG/1
75 TABLET ORAL DAILY
Qty: 90 TABLET | Refills: 3 | Status: SHIPPED | OUTPATIENT
Start: 2025-02-13 | End: 2026-02-13

## 2025-02-13 RX ORDER — LOSARTAN POTASSIUM 25 MG/1
25 TABLET ORAL DAILY
Qty: 90 TABLET | Refills: 3 | Status: SHIPPED | OUTPATIENT
Start: 2025-02-13 | End: 2026-02-13

## 2025-02-13 RX ORDER — CLOPIDOGREL BISULFATE 75 MG/1
TABLET ORAL
Status: DISCONTINUED | OUTPATIENT
Start: 2025-02-13 | End: 2025-02-13 | Stop reason: HOSPADM

## 2025-02-13 RX ORDER — HEPARIN SODIUM 1000 [USP'U]/ML
INJECTION, SOLUTION INTRAVENOUS; SUBCUTANEOUS
Status: DISCONTINUED | OUTPATIENT
Start: 2025-02-13 | End: 2025-02-13 | Stop reason: HOSPADM

## 2025-02-13 RX ORDER — FENTANYL CITRATE 50 UG/ML
INJECTION, SOLUTION INTRAMUSCULAR; INTRAVENOUS
Status: DISCONTINUED | OUTPATIENT
Start: 2025-02-13 | End: 2025-02-13 | Stop reason: HOSPADM

## 2025-02-13 RX ORDER — DIPHENHYDRAMINE HCL 25 MG
50 CAPSULE ORAL
Status: DISCONTINUED | OUTPATIENT
Start: 2025-02-13 | End: 2025-02-13 | Stop reason: HOSPADM

## 2025-02-13 RX ORDER — IODIXANOL 320 MG/ML
INJECTION, SOLUTION INTRAVASCULAR
Status: DISCONTINUED | OUTPATIENT
Start: 2025-02-13 | End: 2025-02-13 | Stop reason: HOSPADM

## 2025-02-13 RX ORDER — MIDAZOLAM HYDROCHLORIDE 1 MG/ML
INJECTION INTRAMUSCULAR; INTRAVENOUS
Status: DISCONTINUED | OUTPATIENT
Start: 2025-02-13 | End: 2025-02-13 | Stop reason: HOSPADM

## 2025-02-13 RX ORDER — DIPHENHYDRAMINE HCL 25 MG
CAPSULE ORAL
Status: DISCONTINUED
Start: 2025-02-13 | End: 2025-02-13 | Stop reason: HOSPADM

## 2025-02-13 RX ORDER — ONDANSETRON 4 MG/1
8 TABLET, ORALLY DISINTEGRATING ORAL EVERY 8 HOURS PRN
Status: DISCONTINUED | OUTPATIENT
Start: 2025-02-13 | End: 2025-02-13 | Stop reason: HOSPADM

## 2025-02-13 RX ORDER — ASPIRIN 325 MG
TABLET ORAL
Status: DISCONTINUED | OUTPATIENT
Start: 2025-02-13 | End: 2025-02-13 | Stop reason: HOSPADM

## 2025-02-13 RX ORDER — LIDOCAINE HYDROCHLORIDE 10 MG/ML
INJECTION, SOLUTION EPIDURAL; INFILTRATION; INTRACAUDAL; PERINEURAL
Status: DISCONTINUED | OUTPATIENT
Start: 2025-02-13 | End: 2025-02-13 | Stop reason: HOSPADM

## 2025-02-13 RX ORDER — ACETAMINOPHEN 325 MG/1
650 TABLET ORAL EVERY 4 HOURS PRN
Status: DISCONTINUED | OUTPATIENT
Start: 2025-02-13 | End: 2025-02-13 | Stop reason: HOSPADM

## 2025-02-13 RX ORDER — NITROGLYCERIN 5 MG/ML
INJECTION, SOLUTION INTRAVENOUS
Status: DISCONTINUED | OUTPATIENT
Start: 2025-02-13 | End: 2025-02-13 | Stop reason: HOSPADM

## 2025-02-13 RX ADMIN — DIPHENHYDRAMINE HYDROCHLORIDE 50 MG: 25 CAPSULE ORAL at 06:02

## 2025-02-13 NOTE — Clinical Note
300 ml of contrast were injected throughout the case. -150 mL of contrast was the total wasted during the case. 150 mL was the total amount used during the case.
A dressing was applied to the right femoral artery puncture site.
A pulse oximeter was placed on the patient's left index finger and left index finger.
All catheters were removed. 
All wires were removed. 
An angiography of the  right femoral artery was performed to evaluate for the placement of a closure device.
An angiography was performed of the left coronary arteries. Multiple views were taken. The angiography was performed via power injection. The injected amount was 8 mL contrast at 4 mL/s. The PSI from the power injection was 400.
An angiography was performed of the left coronary arteries. The angiography was performed via power injection. The injected amount was 8 mL contrast at 4 mL/s. The PSI from the power injection was 400.
ID band present and verified. Family is in the lobby.
The angiography was performed via power injection. The injected amount was 6 mL contrast at 3 mL/s. The PSI from the power injection was 300.
The catheter was inserted into the and was inserted over the wire into the distal   circumflex.
The catheter was inserted into the and was inserted over the wire into the left ventricle.
The catheter was inserted into the and was inserted over the wire into the ostium   left main.
The catheter was inserted into the and was inserted over the wire into the ostium   left main.
The catheter was inserted into the ostium   left main.
The catheter was inserted over the wire into the left subclavian artery.
The catheter was removed from the aorta.
The catheter was removed from the ostium   left main.
The catheter was removed from the ostium   left main.
The catheter was removed from the ostium   right coronary artery.
The catheter was repositioned into the ostium   right coronary artery. Hemodynamics were performed.  and Pullback was recorded.  An angiography was performed of the right coronary arteries. Multiple views were taken. The angiography was performed via power injection. The injected amount was 6 mL contrast at 3 mL/s. The PSI from the power injection was 300.
The closure device was deployed in the right femoral artery.
The patient's elbows and knees were padded, heels floated, warming blanket given, and safety strap applied.
The procedural consent was signed.
The sheath was exchanged in the right femoral artery.
The sheath was inserted into the right femoral artery.
The sheath was inserted into the right femoral artery.
The sheath was removed from the right femoral artery.
The site was marked. The right groin and right radial was prepped. The site was prepped with ChloraPrep. The site was clipped. The patient was draped.
The wire was inserted into the aorta.
The wire was inserted into the aorta.
The wire was inserted into the distal circumflex artery.
The wire was inserted into the distal circumflex artery.
The wire was removed from the aorta.
The wire was removed from the distal circumflex artery.
The wire was removed from the distal circumflex artery.
The wire was removed from the left ventricle.
The wire was repositioned to the middle left anterior descending artery.
artery was performed. A percutaneous stick to the right groin was performed. Ultrasound guidance was used to obtain access.
dry, intact, no bleeding and no hematoma.
Detail Level: Zone
Detail Level: Simple
Detail Level: Detailed

## 2025-02-16 NOTE — DISCHARGE SUMMARY
Addendum  created 06/17/18 1907 by Grupo Isabel MD    Sign clinical note       Replaced by [facility]  Discharge Note  Short Stay    Procedure(s) (LRB):  Angiogram, Coronary, with Left Heart Cath (N/A)  PTCA, Single Vessel  IVUS, Coronary  Stent, Drug Eluting, Single Vessel, Coronary      OUTCOME: Patient tolerated treatment/procedure well without complication and is now ready for discharge.    DISPOSITION: Home or Self Care    FINAL DIAGNOSIS:  Coronary artery disease    FOLLOWUP: In clinic    DISCHARGE INSTRUCTIONS:    Discharge Procedure Orders   Ambulatory referral/consult to Cardiac Rehab   Standing Status: Future   Referral Priority: Routine Referral Type: Consultation   Referral Reason: Specialty Services Required   Requested Specialty: Cardiac Rehabilitation   Number of Visits Requested: 1     Diet Cardiac     Lifting restrictions   Order Comments: No lifting greater than 10 lbs. for1 week     Remove dressing in 48 hours     Call MD for:  persistent nausea and vomiting     Call MD for:  severe uncontrolled pain     Call MD for:  difficulty breathing, headache or visual disturbances     Call MD for:  redness, tenderness, or signs of infection (pain, swelling, redness, odor or green/yellow discharge around incision site)     Call MD for:  hives     Call MD for:  persistent dizziness or light-headedness     Call MD for:   Order Comments: Temp greater than 101 degrees F        TIME SPENT ON DISCHARGE: 10 minutes

## 2025-02-19 DIAGNOSIS — Z95.810 PRESENCE OF AUTOMATIC (IMPLANTABLE) CARDIAC DEFIBRILLATOR: Primary | ICD-10-CM

## 2025-02-19 LAB
OHS QRS DURATION: 100 MS
OHS QTC CALCULATION: 466 MS

## 2025-02-20 ENCOUNTER — HOSPITAL ENCOUNTER (OUTPATIENT)
Dept: CARDIOLOGY | Facility: CLINIC | Age: 60
Discharge: HOME | End: 2025-02-20
Payer: MEDICAID

## 2025-02-20 DIAGNOSIS — R07.9 CHEST PAIN, UNSPECIFIED TYPE: ICD-10-CM

## 2025-02-20 LAB
POC ACTIVATED CLOTTING TIME K: 210 SEC (ref 74–137)
POC ACTIVATED CLOTTING TIME K: 210 SEC (ref 74–137)
POC ACTIVATED CLOTTING TIME K: 227 SEC (ref 74–137)
POC ACTIVATED CLOTTING TIME K: 245 SEC (ref 74–137)
SAMPLE: ABNORMAL

## 2025-02-21 LAB
CHARGE TIME (SEC): 8.9 SEC
HV IMPEDANCE (OHM): 78 OHM
IMPEDANCE RA LEAD (NATIVE): 660 OHMS
IMPEDANCE RA LEAD: 460 OHMS
P/R-WAVE RA LEAD (NATIVE): >12 MV
P/R-WAVE RA LEAD: 4.7 MV
THRESHOLD MS RA LEAD (NATIVE): 0.5 MS
THRESHOLD MS RA LEAD: 0.5 MS
THRESHOLD V RA LEAD (NATIVE): 0.5 V
THRESHOLD V RA LEAD: 0.75 V

## 2025-02-28 ENCOUNTER — OFFICE VISIT (OUTPATIENT)
Dept: CARDIOLOGY | Facility: CLINIC | Age: 60
End: 2025-02-28
Payer: MEDICAID

## 2025-02-28 VITALS
HEIGHT: 71 IN | BODY MASS INDEX: 37.35 KG/M2 | SYSTOLIC BLOOD PRESSURE: 144 MMHG | HEART RATE: 72 BPM | DIASTOLIC BLOOD PRESSURE: 86 MMHG | WEIGHT: 266.75 LBS

## 2025-02-28 DIAGNOSIS — I48.0 PAROXYSMAL ATRIAL FIBRILLATION: ICD-10-CM

## 2025-02-28 DIAGNOSIS — E78.5 HYPERLIPIDEMIA LDL GOAL <70: ICD-10-CM

## 2025-02-28 DIAGNOSIS — E66.9 OBESITY (BMI 30-39.9): ICD-10-CM

## 2025-02-28 DIAGNOSIS — I25.119 ATHEROSCLEROSIS OF NATIVE CORONARY ARTERY OF NATIVE HEART WITH ANGINA PECTORIS: ICD-10-CM

## 2025-02-28 DIAGNOSIS — F17.200 SMOKER: ICD-10-CM

## 2025-02-28 DIAGNOSIS — G47.33 OSA (OBSTRUCTIVE SLEEP APNEA): ICD-10-CM

## 2025-02-28 DIAGNOSIS — E11.610 TYPE 2 DIABETES MELLITUS WITH DIABETIC NEUROPATHIC ARTHROPATHY, WITH LONG-TERM CURRENT USE OF INSULIN: ICD-10-CM

## 2025-02-28 DIAGNOSIS — Z79.4 TYPE 2 DIABETES MELLITUS WITH DIABETIC NEUROPATHIC ARTHROPATHY, WITH LONG-TERM CURRENT USE OF INSULIN: ICD-10-CM

## 2025-02-28 DIAGNOSIS — Z95.1 S/P CABG (CORONARY ARTERY BYPASS GRAFT): ICD-10-CM

## 2025-02-28 DIAGNOSIS — I50.42 CHRONIC COMBINED SYSTOLIC AND DIASTOLIC CONGESTIVE HEART FAILURE: ICD-10-CM

## 2025-02-28 DIAGNOSIS — E11.59 HYPERTENSION ASSOCIATED WITH DIABETES: Primary | ICD-10-CM

## 2025-02-28 DIAGNOSIS — I15.2 HYPERTENSION ASSOCIATED WITH DIABETES: Primary | ICD-10-CM

## 2025-02-28 PROCEDURE — 99999 PR PBB SHADOW E&M-EST. PATIENT-LVL V: CPT | Mod: PBBFAC,,,

## 2025-02-28 PROCEDURE — 99215 OFFICE O/P EST HI 40 MIN: CPT | Mod: PBBFAC,PN

## 2025-02-28 RX ORDER — RANOLAZINE 500 MG/1
500 TABLET, EXTENDED RELEASE ORAL 2 TIMES DAILY
Qty: 180 TABLET | Refills: 3 | Status: SHIPPED | OUTPATIENT
Start: 2025-02-28 | End: 2026-02-23

## 2025-02-28 NOTE — ASSESSMENT & PLAN NOTE
Low sodium heart healthy diet.  1.5 L fluid restriction. 2 g salt restriction.  Daily weights.  Strict I's and O's.  Continue GDMT- metoprolol succinate 100 mg BID, Jardiance 25 mg daily. Add losartan 25 mg daily. Patient to bring med list to office. Continue Lasix PRN for intermittent edema

## 2025-02-28 NOTE — ASSESSMENT & PLAN NOTE
DAPT and statin therapy. Continue amlodipine 5 mg daily. Not taking Eliquis.  Continue losartan 25 mg daily. Continue Imdur 60 mg daily and Ranexa 500 mg BID, metoprolol succinate 100 mg BID, lasix PRN, Jardiance 25 mg daily.  Low sodium heart healthy diet    Cardiac Cath 2/13/25    The estimated blood loss was <50 mL.    The pre-procedure left ventricular end diastolic pressure was 12.    The Dist Cx lesion was 95% stenosed with 10% stenosis post-intervention.    Dist Cx lesion: A STENT SYNERGY XD [id number] stent was successfully placed.    Angiographically equivocal prox left circumflex InStent restenosis, successfully treated with high-pressure NC balloon angioplasty with normalization in IFR    The Prox Cx lesion was 70% stenosed with 30% stenosis post-intervention.    Ostial left circumflex around 50-60% stenosis, normal IFR in prox left circumflex, non flow limiting stenosis    Ostial LAD stenosis around 50-60%, IFR in mid LAD non flow limiting at 0.95

## 2025-02-28 NOTE — ASSESSMENT & PLAN NOTE
S/p recent PCI 2/13/25 distal cx.  DAPT and statin therapy.  Continue current antihypertensive regimen. Continue GDMT. 1.5 L fluid restriction. 2 g salt restriction. Daily weights.  Continue Imdur to 60 mg daily.  Start Ranexa 500 mg b.i.d.

## 2025-02-28 NOTE — PROGRESS NOTES
Subjective:    Patient ID:  Magdaleno Cunningham is a 59 y.o. male patient here for evaluation Hospital Follow Up, Congestive Heart Failure, Atrial Fibrillation, Hyperlipidemia, and Hypertension      History of Present Illness:       Denies chest pain, shortness of breath, lightheadedness, dizziness, jaw/neck/arm pain, palpitations, orthopnea, PND, edema, or bleeding.      S/p PCI to distal cx on 2/13/25    The estimated blood loss was <50 mL.    The pre-procedure left ventricular end diastolic pressure was 12.    The Dist Cx lesion was 95% stenosed with 10% stenosis post-intervention.    Dist Cx lesion: A STENT SYNERGY XD 2.70L21TP stent was successfully placed.    Angiographically equivocal prox left circumflex InStent restenosis, successfully treated with high-pressure NC balloon angioplasty with normalization in IFR    The Prox Cx lesion was 70% stenosed with 30% stenosis post-intervention.    Ostial left circumflex around 50-60% stenosis, normal IFR in prox left circumflex, non flow limiting stenosis    Ostial LAD stenosis around 50-60%, IFR in mid LAD non flow limiting at 0.95    Hasn't taken BP meds yet this am.  /86.    CARDIAC CATH 2/13/25    The estimated blood loss was <50 mL.    The pre-procedure left ventricular end diastolic pressure was 12.    The Dist Cx lesion was 95% stenosed with 10% stenosis post-intervention.    Dist Cx lesion: A STENT SYNERGY XD 2.60H52NF stent was successfully placed.    Angiographically equivocal prox left circumflex InStent restenosis, successfully treated with high-pressure NC balloon angioplasty with normalization in IFR    The Prox Cx lesion was 70% stenosed with 30% stenosis post-intervention.    Ostial left circumflex around 50-60% stenosis, normal IFR in prox left circumflex, non flow limiting stenosis    Ostial LAD stenosis around 50-60%, IFR in mid LAD non flow limiting at 0.95      Review of patient's allergies indicates:   Allergen Reactions    Pesticide  Dermatitis and Rash       Past Medical History:   Diagnosis Date    Anticoagulant long-term use     Atrial fibrillation 2018    BPH (benign prostatic hyperplasia)     Cataract     COPD (chronic obstructive pulmonary disease)     Coronary artery disease     stents    CVD (cardiovascular disease)     Diabetes mellitus     Erythema multiforme     AKA Denton Edmondson Syndrome    Hypertension     Infected incision     MI (myocardial infarction)     per pt he has had 4     ROSAMARIA on CPAP     RLS (restless legs syndrome)     Perez-Denton syndrome      Past Surgical History:   Procedure Laterality Date    ANGIOGRAM, CORONARY, WITH LEFT HEART CATHETERIZATION N/A 2/13/2025    Procedure: Angiogram, Coronary, with Left Heart Cath;  Surgeon: Ermias Hardy MD;  Location: [facility] CATH/EP LAB;  Service: Cardiology;  Laterality: N/A;    CARDIAC SURGERY      CORONARY ANGIOGRAPHY INCLUDING BYPASS GRAFTS WITH CATHETERIZATION OF LEFT HEART N/A 09/01/2022    Procedure: ANGIOGRAM, CORONARY, INCLUDING BYPASS GRAFT, WITH LEFT HEART CATHETERIZATION;  Surgeon: Paul Botello MD;  Location: [facility] CATH/EP LAB;  Service: Cardiology;  Laterality: N/A;    CORONARY ANGIOGRAPHY INCLUDING BYPASS GRAFTS WITH CATHETERIZATION OF LEFT HEART Left 12/22/2023    Procedure: ANGIOGRAM, CORONARY, INCLUDING BYPASS GRAFT, WITH LEFT HEART CATHETERIZATION;  Surgeon: Paul Botello MD;  Location: [facility] CATH/EP LAB;  Service: Cardiology;  Laterality: Left;    CORONARY ARTERY BYPASS GRAFT (CABG) N/A 04/07/2020    Procedure: CORONARY ARTERY BYPASS GRAFT (CABG)- Excision of left atrial appendage;  Surgeon: Doug Solitario MD;  Location: [facility];  Service: Cardiothoracic;  Laterality: N/A;    CORONARY BYPASS GRAFT ANGIOGRAPHY  12/25/2023    Procedure: Bypass graft study;  Surgeon: Ashley Valverde MD;  Location: [facility] CATH/EP LAB;  Service: Cardiology;;    CORONARY STENT PLACEMENT      CORONARY STENT PLACEMENT N/A 12/22/2023    Procedure: INSERTION, STENT,  CORONARY ARTERY;  Surgeon: Paul Botello MD;  Location: [facility] CATH/EP LAB;  Service: Cardiology;  Laterality: N/A;    WILSON MAZE PROCEDURE N/A 04/07/2020    Procedure: WILSON MAZE PROCEDURE- Attempted;  Surgeon: Doug Solitario MD;  Location: [facility] OR;  Service: Cardiothoracic;  Laterality: N/A;    CYSTOSCOPY N/A 12/10/2018    Procedure: CYSTOSCOPY;  Surgeon: Khushboo Abreu MD;  Location: [facility] OR;  Service: Urology;  Laterality: N/A;    ENDOSCOPIC HARVEST OF VEIN Left 04/07/2020    Procedure: HARVEST-VEIN-ENDOVASCULAR;  Surgeon: Doug Solitario MD;  Location: [facility] OR;  Service: Cardiothoracic;  Laterality: Left;    INCISION OF PERIRECTAL ABSCESS Bilateral 09/01/2023    Procedure: INCISION, ABSCESS, PERIRECTAL;  Surgeon: Brayan Spears MD;  Location: [facility] OR;  Service: General;  Laterality: Bilateral;  stirrups    INSERTION OF INTRA-AORTIC BALLOON ASSIST DEVICE  12/25/2023    Procedure: INSERTION, INTRA-AORTIC BALLOON PUMP;  Surgeon: Ashley Valverde MD;  Location: [facility] CATH/EP LAB;  Service: Cardiology;;    INSERTION OF TEMPORARY PACEMAKER N/A 12/25/2023    Procedure: INSERTION, PACEMAKER, TEMPORARY;  Surgeon: Ashley Valverde MD;  Location: [facility] CATH/EP LAB;  Service: Cardiology;  Laterality: N/A;    INSERTION, ICD, DUAL CHAMBER N/A 01/02/2024    Procedure: Insertion, ICD, Dual Chamber;  Surgeon: Antonio Callejas MD;  Location: [facility] EP LAB;  Service: Cardiology;  Laterality: N/A;  CM, DUAL ICD, ANES, SJM, MB, 3079    IVUS, CORONARY  12/22/2023    Procedure: IVUS, Coronary;  Surgeon: Paul Botello MD;  Location: [facility] CATH/EP LAB;  Service: Cardiology;;    IVUS, CORONARY  2/13/2025    Procedure: IVUS, Coronary;  Surgeon: Ermias Hardy MD;  Location: [facility] CATH/EP LAB;  Service: Cardiology;;    LEFT HEART CATHETERIZATION Left 03/17/2020    Procedure: CATHETERIZATION, HEART, LEFT;  Surgeon: Tyree Walters MD;  Location: [facility] CATH/EP LAB;  Service: Cardiology;  Laterality:  Left;    PERCUTANEOUS CORONARY INTERVENTION, ARTERY N/A 12/22/2023    Procedure: Percutaneous coronary intervention;  Surgeon: Paul Botello MD;  Location: [facility] CATH/EP LAB;  Service: Cardiology;  Laterality: N/A;    PERCUTANEOUS CORONARY INTERVENTION, ARTERY N/A 12/25/2023    Procedure: Percutaneous coronary intervention;  Surgeon: Ashley Valverde MD;  Location: [facility] CATH/EP LAB;  Service: Cardiology;  Laterality: N/A;    PLACEMENT, IABP  12/25/2023    Procedure: Placement, IABP;  Surgeon: Ashley Valverde MD;  Location: [facility] CATH/EP LAB;  Service: Cardiology;;    PTCA, SINGLE VESSEL  2/13/2025    Procedure: PTCA, Single Vessel;  Surgeon: Ermias Hardy MD;  Location: [facility] CATH/EP LAB;  Service: Cardiology;;    STENT, DRUG ELUTING, SINGLE VESSEL, CORONARY  2/13/2025    Procedure: Stent, Drug Eluting, Single Vessel, Coronary;  Surgeon: Ermias Hardy MD;  Location: [facility] CATH/EP LAB;  Service: Cardiology;;    STERNAL WIRES REMOVAL N/A 06/08/2020    Procedure: REMOVAL, STERNAL WIRE;  Surgeon: Doug Solitario MD;  Location: [facility] OR;  Service: Peripheral Vascular;  Laterality: N/A;    ULTRASOUND OF PROSTATE FOR VOLUME DETERMINATION  12/10/2018    Procedure: ULTRASOUND, PROSTATE, FOR VOLUME DETERMINATION;  Surgeon: Khushboo Abreu MD;  Location: [facility] OR;  Service: Urology;;     Social History[1]     Review of Systems:    As noted in HPI in addition      REVIEW OF SYSTEMS  CARDIOVASCULAR: No recent chest pain, palpitations, arm, neck, or jaw pain  RESPIRATORY: No recent fever, cough chills, SOB or congestion  : No blood in the urine  GI: No Nausea, vomiting, constipation, diarrhea, blood, or reflux.  MUSCULOSKELETAL: No myalgias  NEURO: No lightheadedness or dizziness  EYES: No Double vision, blurry, vision or headache              Objective        Vitals:    02/28/25 0754   BP: (!) 144/86   Pulse: 72       LIPIDS - LAST 2   Lab Results   Component Value Date    CHOL 134 04/26/2024     CHOL 147 12/14/2023    HDL 28 (L) 04/26/2024    HDL 32 (L) 12/14/2023    LDLCALC 69.8 04/26/2024    LDLCALC 78.6 12/14/2023    TRIG 181 (H) 04/26/2024    TRIG 182 (H) 12/14/2023    CHOLHDL 20.9 04/26/2024    CHOLHDL 21.8 12/14/2023       CBC - LAST 2  Lab Results   Component Value Date    WBC 7.40 02/10/2025    WBC 7.12 01/12/2025    RBC 4.98 02/10/2025    RBC 4.94 01/12/2025    HGB 15.0 02/10/2025    HGB 14.8 01/12/2025    HCT 43.8 02/10/2025    HCT 43.8 01/12/2025    MCV 88 02/10/2025    MCV 89 01/12/2025    MCH 30.1 02/10/2025    MCH 30.0 01/12/2025    MCHC 34.2 02/10/2025    MCHC 33.8 01/12/2025    RDW 13.2 02/10/2025    RDW 12.8 01/12/2025     02/10/2025     01/12/2025    MPV 9.5 02/10/2025    MPV 9.4 01/12/2025    GRAN 4.0 02/10/2025    GRAN 54.0 02/10/2025    LYMPH 2.6 02/10/2025    LYMPH 34.9 02/10/2025    MONO 0.5 02/10/2025    MONO 7.0 02/10/2025    BASO 0.08 02/10/2025    BASO 0.08 01/12/2025    NRBC 0 02/10/2025    NRBC 0 01/12/2025       CHEMISTRY & LIVER FUNCTION - LAST 2  Lab Results   Component Value Date     (L) 02/10/2025     (L) 01/12/2025    K 4.4 02/10/2025    K 3.9 01/12/2025    CL 97 02/10/2025    CL 97 01/12/2025    CO2 27 02/10/2025    CO2 28 01/12/2025    ANIONGAP 8 02/10/2025    ANIONGAP 7 (L) 01/12/2025    BUN 14 02/10/2025    BUN 13 01/12/2025    CREATININE 1.2 02/10/2025    CREATININE 1.0 01/12/2025     (H) 02/10/2025     (H) 01/12/2025    CALCIUM 9.6 02/10/2025    CALCIUM 9.0 01/12/2025    PH 7.431 12/31/2023    PH 7.436 12/30/2023    MG 1.8 01/12/2025    MG 1.6 01/11/2025    ALBUMIN 4.0 01/12/2025    ALBUMIN 3.6 01/11/2025    PROT 7.2 01/12/2025    PROT 6.5 01/11/2025    ALKPHOS 58 01/12/2025    ALKPHOS 55 01/11/2025    ALT 15 01/12/2025    ALT 13 01/11/2025    AST 14 01/12/2025    AST 12 01/11/2025    BILITOT 0.4 01/12/2025    BILITOT 0.5 01/11/2025        CARDIAC PROFILE - LAST 2  Lab Results   Component Value Date    BNP 72 02/13/2025    BNP  185 (H) 01/10/2025    CPK 76 04/22/2020     04/22/2020    TROPONINI 24.449 (H) 12/26/2023    TROPONINI 25.809 (H) 12/26/2023    TROPONINIHS 14.8 01/11/2025    TROPONINIHS 12.2 01/11/2025        COAGULATION - LAST 2  Lab Results   Component Value Date    LABPT 13.2 08/30/2022    LABPT 15.1 (H) 04/07/2020    INR 1.0 02/10/2025    INR 1.1 12/28/2023    APTT 26.2 02/10/2025    APTT 54.5 (H) 01/02/2024       ENDOCRINE & PSA - LAST 2  Lab Results   Component Value Date    HGBA1C 7.2 (H) 04/26/2024    HGBA1C 8.3 (H) 12/14/2023    HGBA1C 8.3 (H) 12/14/2023    TSH 1.921 12/14/2023    TSH 0.569 09/01/2023    PROCAL 0.33 (H) 12/28/2023    PROCAL 0.09 04/22/2020    PSA 0.42 12/03/2018        ECHOCARDIOGRAM RESULTS  Results for orders placed during the hospital encounter of 01/10/25    Echo    Interpretation Summary    Left Ventricle: Left ventricle was not well visualized due to poor sonic window. The left ventricle is normal in size. Normal wall thickness. Regional wall motion abnormalities present. Septal motion is consistent with pacing. There is moderately reduced systolic function with a visually estimated ejection fraction of 35 - 40%. Quantitated ejection fraction is 43%. There is normal diastolic function. E/A ratio is 1.77.    Right Ventricle: Normal right ventricular cavity size. Systolic function is normal. Pacemaker lead present in the ventricle.    Left Atrium: Left atrium is moderately dilated.    Aortic Valve: The aortic valve is a trileaflet valve. There is aortic valve sclerosis.    Mitral Valve: There is moderate regurgitation.    Tricuspid Valve: There is mild regurgitation.    Pulmonic Valve: Not well visualized due to poor acoustic window.    He study was difficult due to patient's poor endocardial visualization      CURRENT/PREVIOUS VISIT EKG  Results for orders placed or performed during the hospital encounter of 02/13/25   EKG 12-LEAD on arrival to floor    Collection Time: 02/13/25 10:09 AM    Result Value Ref Range    QRS Duration 100 ms    OHS QTC Calculation 466 ms    Narrative    Test Reason : I25.10,    Vent. Rate :  64 BPM     Atrial Rate :  64 BPM     P-R Int : 164 ms          QRS Dur : 100 ms      QT Int : 452 ms       P-R-T Axes :  50 -51 110 degrees    QTcB Int : 466 ms    Program found technically poor ECG  Normal sinus rhythm  Left axis deviation  Nonspecific ST and T wave abnormality  Prolonged QT  Abnormal ECG  When compared with ECG of 10-Feb-2025 08:44,  The axis Shifted left  Confirmed by Norm Rodriguez ([id number]) on 2/19/2025 8:23:38 PM    Referred By: VIKTOR PEREZ           Confirmed By: Norm Rodriguez     No valid procedures specified.   Results for orders placed during the hospital encounter of 08/30/22    Nuclear Stress Test    Interpretation Summary    The EKG portion of this study is negative for ischemia.    The patient reported no chest pain during the stress test.    There were no arrhythmias during stress.    The nuclear portion of this study will be reported separately.    No valid procedures specified.    PHYSICAL EXAM  CONSTITUTIONAL: Well built, well nourished in no apparent distress  NECK: no carotid bruit, no JVD  LUNGS: CTA  CHEST WALL: no tenderness  HEART: regular rate and rhythm, S1, S2 normal, no murmur, click, rub or gallop   ABDOMEN: soft, non-tender; bowel sounds normal; EXTREMITIES: Extremities normal, no edema, no calf tenderness noted  NEURO: AAO X 3    I HAVE REVIEWED :    The vital signs, nurses notes, and all the pertinent radiology and labs.        Current Outpatient Medications   Medication Instructions    ALPRAZolam (XANAX) 1 mg, Nightly PRN    amLODIPine (NORVASC) 5 mg, Oral, Daily    aspirin (ECOTRIN) 81 mg, Oral, Daily    atorvastatin (LIPITOR) 40 mg, Oral, Daily    blood sugar diagnostic Strp TEST 3 TIMES DAILY    blood sugar diagnostic Strp To check BG QID times daily, to use with insurance preferred meter    blood-glucose meter kit To check BG QID  "times daily, to use with insurance preferred meter    blood-glucose meter Misc TEST 3 TIMES DAILY    blood-glucose meter,continuous (DEXCOM G7 ) Misc Use as directed.    blood-glucose sensor (DEXCOM G7 SENSOR) Courtney Change every 10 days.    clopidogreL (PLAVIX) 75 mg, Oral, Daily    EScitalopram oxalate (LEXAPRO) 10 mg, Oral, Every morning    furosemide (LASIX) 20 mg, Oral, Daily PRN    gabapentin (NEURONTIN) 600 mg, See admin instructions    hydrALAZINE (APRESOLINE) 10 mg, Oral, Every 8 hours    insulin glargine-yfgn 26 Units, Subcutaneous, Nightly    isosorbide mononitrate (IMDUR) 60 mg, Oral, Daily    JARDIANCE 25 mg, Daily    lactobacillus acidophilus & bulgar (LACTINEX) 100 million cell packet 1 each, Oral, 2 times daily    lancets 33 gauge Misc TEST 3 TIMES DAILY    lancets Misc To check BG QID times daily, to use with insurance preferred meter    losartan (COZAAR) 25 mg, Oral, Daily    metFORMIN (GLUCOPHAGE) 1,000 mg, Oral, 2 times daily with meals    methocarbamoL (ROBAXIN) 500 mg, Nightly    metoprolol succinate (TOPROL-XL) 100 mg, 2 times daily    nitroGLYCERIN (NITROSTAT) 0.4 mg, Every 5 min PRN    oxyCODONE-acetaminophen (PERCOCET)  mg per tablet 1 tablet, 2 times daily PRN    pantoprazole (PROTONIX) 40 mg, Oral, Daily    pen needle, diabetic 29 gauge x 1/2" Ndle Use with insulin 4 times a day.    ranolazine (RANEXA) 500 mg, Oral, 2 times daily    traZODone (DESYREL) 100-200 mg, Oral, Nightly          Assessment & Plan     Hypertension associated with diabetes  Stable at home. Didn't take BP med this am.  Continue current medication regimen  Low sodium heart healthy diet    Hyperlipidemia LDL goal <70  Low sodium heart healthy diet  Reduce saturated fats  Weight loss recommended  Continue statin therapy       S/P CABG (coronary artery bypass graft)  DAPT and statin therapy. Continue amlodipine 5 mg daily. Not taking Eliquis.  Continue losartan 25 mg daily. Continue Imdur 60 mg daily and " Ranexa 500 mg BID, metoprolol succinate 100 mg BID, lasix PRN, Jardiance 25 mg daily.  Low sodium heart healthy diet    Cardiac Cath 2/13/25    The estimated blood loss was <50 mL.    The pre-procedure left ventricular end diastolic pressure was 12.    The Dist Cx lesion was 95% stenosed with 10% stenosis post-intervention.    Dist Cx lesion: A STENT SYNERGY XD 2.27A49VH stent was successfully placed.    Angiographically equivocal prox left circumflex InStent restenosis, successfully treated with high-pressure NC balloon angioplasty with normalization in IFR    The Prox Cx lesion was 70% stenosed with 30% stenosis post-intervention.    Ostial left circumflex around 50-60% stenosis, normal IFR in prox left circumflex, non flow limiting stenosis    Ostial LAD stenosis around 50-60%, IFR in mid LAD non flow limiting at 0.95    Paroxysmal atrial fibrillation  Not taking Eliquis at this time  On DAPT.  Denies bleeding  NSR today in office    Atherosclerosis of native coronary artery of native heart with angina pectoris  S/p recent PCI 2/13/25 distal cx.  DAPT and statin therapy.  Continue current antihypertensive regimen. Continue GDMT. 1.5 L fluid restriction. 2 g salt restriction. Daily weights.  Continue Imdur to 60 mg daily.  Start Ranexa 500 mg b.i.d.       CHF (congestive heart failure)  Low sodium heart healthy diet.  1.5 L fluid restriction. 2 g salt restriction.  Daily weights.  Strict I's and O's.  Continue GDMT- metoprolol succinate 100 mg BID, Jardiance 25 mg daily. Add losartan 25 mg daily. Patient to bring med list to office. Continue Lasix PRN for intermittent edema     Obesity (BMI 30-39.9)  Weight loss recommended  Calorie restrictive diet    Smoker 1-2 packs per day since 14 years old  Trying to stop smoking.  Strongly encourage cessation       ROSAMARIA (obstructive sleep apnea)  Noncompliant with CPAP    Type 2 diabetes mellitus with diabetic arthropathy  Managed by PCP  On Metformin and insulin  glargine  On Jardiance          Follow up in about 3 months (around 2025).            [1]   Social History  Tobacco Use    Smoking status: Every Day     Current packs/day: 0.00     Average packs/day: 3.0 packs/day for 30.0 years (90.0 ttl pk-yrs)     Types: Cigarettes     Start date: 1990     Last attempt to quit: 2020     Years since quittin.9    Smokeless tobacco: Never   Substance Use Topics    Alcohol use: Not Currently    Drug use: Yes     Frequency: 1.0 times per week     Types: Marijuana

## 2025-02-28 NOTE — ASSESSMENT & PLAN NOTE
Stable at home. Didn't take BP med this am.  Continue current medication regimen  Low sodium heart healthy diet

## 2025-03-14 ENCOUNTER — TELEPHONE (OUTPATIENT)
Dept: CARDIOLOGY | Facility: CLINIC | Age: 60
End: 2025-03-14
Payer: MEDICAID

## 2025-03-14 NOTE — LETTER
.  [facility]  [address]  [address]  [place] [address]  Phone: [phone]  Fax: [phone] Date: 2025    Patient: Magdaleno Cunningham                     MRN#:[id number]  : 1965  Referring Physician: Dr. Christos Hanley             Procedure: Colonoscopy     Current Outpatient Medications   Medication Sig Dispense Refill    ALPRAZolam (XANAX) 1 MG tablet Take 1 mg by mouth nightly as needed for Anxiety.      amLODIPine (NORVASC) 5 MG tablet Take 1 tablet (5 mg total) by mouth once daily. 60 tablet 0    aspirin (ECOTRIN) 81 MG EC tablet Take 1 tablet (81 mg total) by mouth once daily.      atorvastatin (LIPITOR) 40 MG tablet Take 1 tablet (40 mg total) by mouth once daily. 90 tablet 3    blood sugar diagnostic Strp TEST 3 TIMES DAILY 100 each 0    blood sugar diagnostic Strp To check BG QID times daily, to use with insurance preferred meter 200 each 0    blood-glucose meter kit To check BG QID times daily, to use with insurance preferred meter 1 each 0    blood-glucose meter Misc TEST 3 TIMES DAILY 1 each 1    blood-glucose meter,continuous (DEXCOM G7 ) Misc Use as directed. 1 each 0    blood-glucose sensor (DEXCOM G7 SENSOR) Courtney Change every 10 days. 3 each 11    clopidogreL (PLAVIX) 75 mg tablet Take 1 tablet (75 mg total) by mouth once daily. 90 tablet 3    EScitalopram oxalate (LEXAPRO) 10 MG tablet Take 10 mg by mouth every morning.      furosemide (LASIX) 20 MG tablet Take 1 tablet (20 mg total) by mouth daily as needed (for lower extremity edema or 3 lbs weight gain in 24 hours). 45 tablet 2    gabapentin (NEURONTIN) 600 MG tablet Take 600 mg by mouth As instructed (neuropathic pain). Takes 1 tablet in the morning and 2 tablets at bedtime      hydrALAZINE (APRESOLINE) 10 MG tablet Take 1 tablet (10 mg total) by mouth every 8 (eight) hours. 90 tablet 11    insulin glargine-yfgn 100 unit/mL (3 mL) InPn Inject 26  "Units into the skin every evening.      isosorbide mononitrate (IMDUR) 60 MG 24 hr tablet Take 1 tablet (60 mg total) by mouth once daily. 90 tablet 3    JARDIANCE 25 mg tablet Take 25 mg by mouth once daily.      lactobacillus acidophilus & bulgar (LACTINEX) 100 million cell packet Take 1 packet (1 each total) by mouth 2 (two) times daily. 30 packet 0    lancets 33 gauge Misc TEST 3 TIMES DAILY 100 each 0    lancets Misc To check BG QID times daily, to use with insurance preferred meter 200 each 0    losartan (COZAAR) 25 MG tablet Take 1 tablet (25 mg total) by mouth once daily. 90 tablet 3    metFORMIN (GLUCOPHAGE) 1000 MG tablet Take 1 tablet (1,000 mg total) by mouth 2 (two) times daily with meals.      methocarbamoL (ROBAXIN) 500 MG Tab Take 500 mg by mouth every evening.      metoprolol succinate (TOPROL-XL) 100 MG 24 hr tablet Take 100 mg by mouth 2 (two) times daily.      nitroGLYCERIN (NITROSTAT) 0.4 MG SL tablet Place 0.4 mg under the tongue every 5 (five) minutes as needed for Chest pain.      oxyCODONE-acetaminophen (PERCOCET)  mg per tablet Take 1 tablet by mouth 2 (two) times daily as needed for Pain.      pantoprazole (PROTONIX) 40 MG tablet Take 1 tablet (40 mg total) by mouth once daily. 30 tablet 11    pen needle, diabetic 29 gauge x 1/2" Ndle Use with insulin 4 times a day. 360 each 3    ranolazine (RANEXA) 500 MG Tb12 Take 1 tablet (500 mg total) by mouth 2 (two) times daily. 180 tablet 3    traZODone (DESYREL) 100 MG tablet Take 100-200 mg by mouth every evening.       Current Facility-Administered Medications   Medication Dose Route Frequency Provider Last Rate Last Admin    sodium chloride 0.9% flush 2 mL  2 mL Intravenous PRN Mansi Chambers NP           This patient has been assessed for risk factors for clearance of surgery with the following stipulations:    [] No Contraindications.      [x] Recommendations for ASPIRIN: Hold  7 DAYS. And PLAVIX HOLD 3 days    [x] Patient is MODERATE " RISK    [x] Cleared for surgery with the following restrictions:    [] Not Cleared for surgery due to the following reasons:    If you have any questions regarding the above, please contact my office at [phone]    Clearing Clinician: Mansi Chambers NP

## 2025-03-14 NOTE — TELEPHONE ENCOUNTER
Scanned requested Sx Cl document for Dr. Dr Hanley  patient recently had  stent install (01/28/25) the office was informed that the patient would have to wait at least 6 month f/u before Sx clearance can be signed

## 2025-03-17 ENCOUNTER — TELEPHONE (OUTPATIENT)
Dept: CARDIOLOGY | Facility: HOSPITAL | Age: 60
End: 2025-03-17
Payer: MEDICAID

## 2025-03-17 NOTE — TELEPHONE ENCOUNTER
Merlin alert remote home monitor transmission received for: AT/AF burden > threshold    Overall burden:  <1% since 1/30/2024    Max duration and most recent episode seen: 4 hrs 28 mins on 3/16/25    Ventricular rates:   Controlled      Anticoagulation status:  NOT on OACs, taking Plavix 75mg daily only  Reports he was taken off of Eliquis when ICD was inserted due to no longer being in AF.     Patient symptoms: Reports felt HR racing, was having difficulty sleeping.    Will forward to MD for review.

## 2025-03-18 NOTE — TELEPHONE ENCOUNTER
Spoke with the patient this AM and advised of Dr. Callejas's recommendations. Patient was instructed to begin taking Eliquis 5mg BID. Patient was also advised to continue taking the prescribed Plavix for his coronary stent. Informed him that a prescription for Eliquis has been sent to [facility]. Patient states he will call when they open to ensure it is ready for . Understanding was verbalized and he appreciated the call.

## 2025-05-02 ENCOUNTER — CLINICAL SUPPORT (OUTPATIENT)
Dept: CARDIOLOGY | Facility: HOSPITAL | Age: 60
End: 2025-05-02
Attending: INTERNAL MEDICINE
Payer: MEDICAID

## 2025-05-02 ENCOUNTER — CLINICAL SUPPORT (OUTPATIENT)
Dept: CARDIOLOGY | Facility: HOSPITAL | Age: 60
End: 2025-05-02
Payer: MEDICAID

## 2025-05-02 DIAGNOSIS — I44.2 ATRIOVENTRICULAR BLOCK, COMPLETE: ICD-10-CM

## 2025-05-02 DIAGNOSIS — Z95.810 PRESENCE OF AUTOMATIC (IMPLANTABLE) CARDIAC DEFIBRILLATOR: ICD-10-CM

## 2025-05-02 PROCEDURE — 93295 DEV INTERROG REMOTE 1/2/MLT: CPT | Mod: ,,, | Performed by: INTERNAL MEDICINE

## 2025-05-02 PROCEDURE — 93296 REM INTERROG EVL PM/IDS: CPT | Performed by: INTERNAL MEDICINE

## 2025-05-07 LAB
OHS CV AF BURDEN PERCENT: < 1
OHS CV DC REMOTE DEVICE TYPE: NORMAL
OHS CV RV PACING PERCENT: 1 %

## 2025-06-06 NOTE — ASSESSMENT & PLAN NOTE
Body mass index is 39.05 kg/m². Morbid obesity complicates all aspects of disease management from diagnostic modalities to treatment. Weight loss encouraged and health benefits explained to patient.       Age appropriate behavior

## 2025-06-10 ENCOUNTER — OFFICE VISIT (OUTPATIENT)
Dept: CARDIOLOGY | Facility: CLINIC | Age: 60
End: 2025-06-10
Payer: MEDICAID

## 2025-06-10 VITALS
DIASTOLIC BLOOD PRESSURE: 90 MMHG | BODY MASS INDEX: 37.51 KG/M2 | SYSTOLIC BLOOD PRESSURE: 134 MMHG | HEART RATE: 57 BPM | OXYGEN SATURATION: 97 % | WEIGHT: 268.94 LBS

## 2025-06-10 DIAGNOSIS — Z95.1 S/P CABG (CORONARY ARTERY BYPASS GRAFT): ICD-10-CM

## 2025-06-10 DIAGNOSIS — I44.2 COMPLETE HEART BLOCK: ICD-10-CM

## 2025-06-10 DIAGNOSIS — I25.10 CORONARY ARTERY DISEASE INVOLVING NATIVE CORONARY ARTERY OF NATIVE HEART WITHOUT ANGINA PECTORIS: Primary | ICD-10-CM

## 2025-06-10 DIAGNOSIS — E78.5 HYPERLIPIDEMIA LDL GOAL <70: ICD-10-CM

## 2025-06-10 DIAGNOSIS — I25.119 ATHEROSCLEROSIS OF NATIVE CORONARY ARTERY OF NATIVE HEART WITH ANGINA PECTORIS: ICD-10-CM

## 2025-06-10 DIAGNOSIS — Z71.6 TOBACCO ABUSE COUNSELING: ICD-10-CM

## 2025-06-10 PROCEDURE — 4010F ACE/ARB THERAPY RXD/TAKEN: CPT | Mod: CPTII,,, | Performed by: INTERNAL MEDICINE

## 2025-06-10 PROCEDURE — 3075F SYST BP GE 130 - 139MM HG: CPT | Mod: CPTII,,, | Performed by: INTERNAL MEDICINE

## 2025-06-10 PROCEDURE — 99215 OFFICE O/P EST HI 40 MIN: CPT | Mod: PBBFAC,PN | Performed by: INTERNAL MEDICINE

## 2025-06-10 PROCEDURE — 1159F MED LIST DOCD IN RCRD: CPT | Mod: CPTII,,, | Performed by: INTERNAL MEDICINE

## 2025-06-10 PROCEDURE — 99406 BEHAV CHNG SMOKING 3-10 MIN: CPT | Mod: S$PBB,,, | Performed by: INTERNAL MEDICINE

## 2025-06-10 PROCEDURE — 99215 OFFICE O/P EST HI 40 MIN: CPT | Mod: 25,S$PBB,, | Performed by: INTERNAL MEDICINE

## 2025-06-10 PROCEDURE — 99999 PR PBB SHADOW E&M-EST. PATIENT-LVL V: CPT | Mod: PBBFAC,,, | Performed by: INTERNAL MEDICINE

## 2025-06-10 PROCEDURE — 3080F DIAST BP >= 90 MM HG: CPT | Mod: CPTII,,, | Performed by: INTERNAL MEDICINE

## 2025-06-10 PROCEDURE — 3008F BODY MASS INDEX DOCD: CPT | Mod: CPTII,,, | Performed by: INTERNAL MEDICINE

## 2025-06-10 RX ORDER — NITROGLYCERIN 0.4 MG/1
0.4 TABLET SUBLINGUAL EVERY 5 MIN PRN
Qty: 30 TABLET | Refills: 3 | Status: SHIPPED | OUTPATIENT
Start: 2025-06-10

## 2025-06-10 RX ORDER — CLOPIDOGREL BISULFATE 75 MG/1
75 TABLET ORAL DAILY
Qty: 90 TABLET | Refills: 3 | Status: SHIPPED | OUTPATIENT
Start: 2025-06-10 | End: 2026-06-10

## 2025-06-10 NOTE — PROGRESS NOTES
Cardiology    6/10/2025  1:50 PM    Problem list  Problem List[1]    History of Present Illness    Mr. Cunningham presents today for follow up; last seen in December.  He had a hospital visit in January for CP and decided to have outpatient w/u.  He saw cardiology NP in [facility] and an angiogram was done in February which showed instent restenosis requiring PCI. He denies chest pain or dyspnea. He reports unspecified neck and back issues with a scheduled neck injection procedure for July 7th. He is a current smoker, having started at age 13 with periods of cessation in the past.  He takes ASA, clopidogrel, and eliquis.           Medications  Current Medications[2]   Prior to Admission medications    Medication Sig Start Date End Date Taking? Authorizing Provider   ALPRAZolam (XANAX) 1 MG tablet Take 1 mg by mouth nightly as needed for Anxiety.   Yes Provider, Historical   apixaban (ELIQUIS) 5 mg Tab Take 1 tablet (5 mg total) by mouth 2 (two) times daily. 3/17/25  Yes Antonio Callejas MD   aspirin (ECOTRIN) 81 MG EC tablet Take 1 tablet (81 mg total) by mouth once daily. 1/28/25 1/28/26 Yes Mansi Chambers NP   atorvastatin (LIPITOR) 40 MG tablet Take 1 tablet (40 mg total) by mouth once daily. 1/5/24 2/7/31 Yes Viviane Hernandez MD   blood sugar diagnostic Strp TEST 3 TIMES DAILY 1/4/24  Yes Tiff Frias MD   blood sugar diagnostic Strp To check BG QID times daily, to use with insurance preferred meter 1/12/25  Yes Kaity Rios PA-RADHA   blood-glucose meter kit To check BG QID times daily, to use with insurance preferred meter 1/12/25 1/12/26 Yes Kaity Rios PA-RADHA   blood-glucose meter Misc TEST 3 TIMES DAILY 1/4/24  Yes Tiff Frias MD   blood-glucose meter,continuous (DEXCOM G7 ) Misc Use as directed. 2/5/24  Yes Funmilayo Merritt NP   blood-glucose sensor (DEXCOM G7 SENSOR) Courtney Change every 10 days. 2/5/24  Yes Funmilayo Merritt NP   EScitalopram oxalate  (LEXAPRO) 10 MG tablet Take 10 mg by mouth every morning.   Yes Provider, Historical   furosemide (LASIX) 20 MG tablet Take 1 tablet (20 mg total) by mouth daily as needed (for lower extremity edema or 3 lbs weight gain in 24 hours). 1/28/25 1/28/26 Yes Mansi Chambers NP   gabapentin (NEURONTIN) 600 MG tablet Take 600 mg by mouth As instructed (neuropathic pain). Takes 1 tablet in the morning and 2 tablets at bedtime   Yes Provider, Historical   hydrALAZINE (APRESOLINE) 10 MG tablet Take 1 tablet (10 mg total) by mouth every 8 (eight) hours. 1/4/24 2/7/31 Yes Viviane Hernandez MD   insulin glargine-yfgn 100 unit/mL (3 mL) InPn Inject 26 Units into the skin every evening.   Yes Provider, Historical   isosorbide mononitrate (IMDUR) 60 MG 24 hr tablet Take 1 tablet (60 mg total) by mouth once daily. 1/28/25 1/28/26 Yes Mansi Chambers NP   JARDIANCE 25 mg tablet Take 25 mg by mouth once daily.   Yes Provider, Historical   lancets 33 gauge Misc TEST 3 TIMES DAILY 1/4/24  Yes Tiff Frias MD   lancets Misc To check BG QID times daily, to use with insurance preferred meter 1/12/25  Yes Kaity Rios PA-C   losartan (COZAAR) 25 MG tablet Take 1 tablet (25 mg total) by mouth once daily. 2/13/25 2/13/26 Yes Ermias Hardy MD   metFORMIN (GLUCOPHAGE) 1000 MG tablet Take 1 tablet (1,000 mg total) by mouth 2 (two) times daily with meals. 12/24/23  Yes Paul Botello MD   methocarbamoL (ROBAXIN) 500 MG Tab Take 500 mg by mouth every evening.   Yes Provider, Historical   metoprolol succinate (TOPROL-XL) 100 MG 24 hr tablet Take 100 mg by mouth 2 (two) times daily.   Yes Provider, Historical   oxyCODONE-acetaminophen (PERCOCET)  mg per tablet Take 1 tablet by mouth 2 (two) times daily as needed for Pain. 8/13/22  Yes Provider, Historical   pantoprazole (PROTONIX) 40 MG tablet Take 1 tablet (40 mg total) by mouth once daily. 1/5/24 2/7/31 Yes Viviane Hernandez MD pen needle,  "diabetic 29 gauge x 1/2" Ndle Use with insulin 4 times a day. 3/25/24  Yes Funmilayo Merritt NP   ranolazine (RANEXA) 500 MG Tb12 Take 1 tablet (500 mg total) by mouth 2 (two) times daily. 2/28/25 2/23/26 Yes Mansi Chambers NP   traZODone (DESYREL) 100 MG tablet Take 100-200 mg by mouth every evening.   Yes Provider, Historical   clopidogreL (PLAVIX) 75 mg tablet Take 1 tablet (75 mg total) by mouth once daily. 2/13/25 6/10/25 Yes Ermias Hardy MD   nitroGLYCERIN (NITROSTAT) 0.4 MG SL tablet Place 0.4 mg under the tongue every 5 (five) minutes as needed for Chest pain.  6/10/25 Yes Provider, Historical   amLODIPine (NORVASC) 5 MG tablet Take 1 tablet (5 mg total) by mouth once daily. 1/13/25 3/14/25  Kaity Rios PA-C   clopidogreL (PLAVIX) 75 mg tablet Take 1 tablet (75 mg total) by mouth once daily. 6/10/25 6/10/26  Dante Chavira MD   lactobacillus acidophilus & bulgar (LACTINEX) 100 million cell packet Take 1 packet (1 each total) by mouth 2 (two) times daily.  Patient not taking: Reported on 6/10/2025 9/11/23   Sourav Owens MD   nitroGLYCERIN (NITROSTAT) 0.4 MG SL tablet Place 1 tablet (0.4 mg total) under the tongue every 5 (five) minutes as needed for Chest pain. 6/10/25   Dante Chavira MD         History  Past Medical History:   Diagnosis Date    Anticoagulant long-term use     Atrial fibrillation 2018    BPH (benign prostatic hyperplasia)     Cataract     COPD (chronic obstructive pulmonary disease)     Coronary artery disease     stents    CVD (cardiovascular disease)     Diabetes mellitus     Erythema multiforme     AKA Denton Edmondson Syndrome    Hypertension     Infected incision     MI (myocardial infarction)     per pt he has had 4     ROSAMARIA on CPAP     RLS (restless legs syndrome)     Perez-Denton syndrome      Past Surgical History:   Procedure Laterality Date    ANGIOGRAM, CORONARY, WITH LEFT HEART CATHETERIZATION N/A 2/13/2025    Procedure: Angiogram, Coronary, with Left Heart " Cath;  Surgeon: Ermias Hardy MD;  Location: [facility] CATH/EP LAB;  Service: Cardiology;  Laterality: N/A;    CARDIAC SURGERY      CORONARY ANGIOGRAPHY INCLUDING BYPASS GRAFTS WITH CATHETERIZATION OF LEFT HEART N/A 09/01/2022    Procedure: ANGIOGRAM, CORONARY, INCLUDING BYPASS GRAFT, WITH LEFT HEART CATHETERIZATION;  Surgeon: Paul Botello MD;  Location: [facility] CATH/EP LAB;  Service: Cardiology;  Laterality: N/A;    CORONARY ANGIOGRAPHY INCLUDING BYPASS GRAFTS WITH CATHETERIZATION OF LEFT HEART Left 12/22/2023    Procedure: ANGIOGRAM, CORONARY, INCLUDING BYPASS GRAFT, WITH LEFT HEART CATHETERIZATION;  Surgeon: Paul Botello MD;  Location: [facility] CATH/EP LAB;  Service: Cardiology;  Laterality: Left;    CORONARY ARTERY BYPASS GRAFT (CABG) N/A 04/07/2020    Procedure: CORONARY ARTERY BYPASS GRAFT (CABG)- Excision of left atrial appendage;  Surgeon: Doug Solitario MD;  Location: [facility] OR;  Service: Cardiothoracic;  Laterality: N/A;    CORONARY BYPASS GRAFT ANGIOGRAPHY  12/25/2023    Procedure: Bypass graft study;  Surgeon: Ashley Valverde MD;  Location: [facility] CATH/EP LAB;  Service: Cardiology;;    CORONARY STENT PLACEMENT      CORONARY STENT PLACEMENT N/A 12/22/2023    Procedure: INSERTION, STENT, CORONARY ARTERY;  Surgeon: Paul Botello MD;  Location: [facility] CATH/EP LAB;  Service: Cardiology;  Laterality: N/A;    WILSON MAZE PROCEDURE N/A 04/07/2020    Procedure: WILSON MAZE PROCEDURE- Attempted;  Surgeon: Doug Solitario MD;  Location: [facility] OR;  Service: Cardiothoracic;  Laterality: N/A;    CYSTOSCOPY N/A 12/10/2018    Procedure: CYSTOSCOPY;  Surgeon: Khushboo Abreu MD;  Location: [facility] OR;  Service: Urology;  Laterality: N/A;    ENDOSCOPIC HARVEST OF VEIN Left 04/07/2020    Procedure: HARVEST-VEIN-ENDOVASCULAR;  Surgeon: Doug Solitario MD;  Location: [facility] OR;  Service: Cardiothoracic;  Laterality: Left;    INCISION OF PERIRECTAL ABSCESS Bilateral 09/01/2023    Procedure: INCISION, ABSCESS,  PERIRECTAL;  Surgeon: Brayan Spears MD;  Location: [facility] OR;  Service: General;  Laterality: Bilateral;  stirrups    INSERTION OF INTRA-AORTIC BALLOON ASSIST DEVICE  12/25/2023    Procedure: INSERTION, INTRA-AORTIC BALLOON PUMP;  Surgeon: Ashley Valverde MD;  Location: [facility] CATH/EP LAB;  Service: Cardiology;;    INSERTION OF TEMPORARY PACEMAKER N/A 12/25/2023    Procedure: INSERTION, PACEMAKER, TEMPORARY;  Surgeon: Ashley Valverde MD;  Location: [facility] CATH/EP LAB;  Service: Cardiology;  Laterality: N/A;    INSERTION, ICD, DUAL CHAMBER N/A 01/02/2024    Procedure: Insertion, ICD, Dual Chamber;  Surgeon: Antonio Callejas MD;  Location: [facility] EP LAB;  Service: Cardiology;  Laterality: N/A;  CM, DUAL ICD, ANES, SJM, MB, 3079    IVUS, CORONARY  12/22/2023    Procedure: IVUS, Coronary;  Surgeon: Paul Botello MD;  Location: [facility] CATH/EP LAB;  Service: Cardiology;;    IVUS, CORONARY  2/13/2025    Procedure: IVUS, Coronary;  Surgeon: Ermias Hardy MD;  Location: [facility] CATH/EP LAB;  Service: Cardiology;;    LEFT HEART CATHETERIZATION Left 03/17/2020    Procedure: CATHETERIZATION, HEART, LEFT;  Surgeon: Tyree Walters MD;  Location: [facility] CATH/EP LAB;  Service: Cardiology;  Laterality: Left;    PERCUTANEOUS CORONARY INTERVENTION, ARTERY N/A 12/22/2023    Procedure: Percutaneous coronary intervention;  Surgeon: Paul Botello MD;  Location: [facility] CATH/EP LAB;  Service: Cardiology;  Laterality: N/A;    PERCUTANEOUS CORONARY INTERVENTION, ARTERY N/A 12/25/2023    Procedure: Percutaneous coronary intervention;  Surgeon: Ashley Valverde MD;  Location: [facility] CATH/EP LAB;  Service: Cardiology;  Laterality: N/A;    PLACEMENT, IABP  12/25/2023    Procedure: Placement, IABP;  Surgeon: Ashley Valverde MD;  Location: [facility] CATH/EP LAB;  Service: Cardiology;;    PTCA, SINGLE VESSEL  2/13/2025    Procedure: PTCA, Single Vessel;  Surgeon: Ermias Hardy MD;  Location: [facility] CATH/EP LAB;  Service:  Cardiology;;    STENT, DRUG ELUTING, SINGLE VESSEL, CORONARY  2/13/2025    Procedure: Stent, Drug Eluting, Single Vessel, Coronary;  Surgeon: Ermias Hardy MD;  Location: [facility] CATH/EP LAB;  Service: Cardiology;;    STERNAL WIRES REMOVAL N/A 06/08/2020    Procedure: REMOVAL, STERNAL WIRE;  Surgeon: Doug Solitario MD;  Location: [facility] OR;  Service: Peripheral Vascular;  Laterality: N/A;    ULTRASOUND OF PROSTATE FOR VOLUME DETERMINATION  12/10/2018    Procedure: ULTRASOUND, PROSTATE, FOR VOLUME DETERMINATION;  Surgeon: Khushboo Abreu MD;  Location: [facility] OR;  Service: Urology;;     Social History[3]      Allergies  Review of patient's allergies indicates:   Allergen Reactions    Pesticide Dermatitis and Rash         Review of Systems   Review of Systems   Cardiovascular: Negative.    Respiratory: Negative.           Physical Exam  Wt Readings from Last 1 Encounters:   06/10/25 122 kg (268 lb 15.4 oz)     BP Readings from Last 3 Encounters:   06/10/25 (!) 134/90   02/28/25 (!) 144/86   02/13/25 (!) 161/85     Pulse Readings from Last 1 Encounters:   06/10/25 (!) 57     Body mass index is 37.51 kg/m².    Physical Exam  Vitals reviewed.   Constitutional:       Appearance: He is obese.   Cardiovascular:      Rate and Rhythm: Normal rate and regular rhythm.      Heart sounds: Normal heart sounds.      Comments: L ICD in place.  Sternal CABG scar  Pulmonary:      Breath sounds: Normal breath sounds and air entry.   Musculoskeletal:      Right lower leg: No edema.      Left lower leg: No edema.   Neurological:      Mental Status: He is alert.             Assessment  1. Coronary artery disease involving native coronary artery of native heart without angina pectoris (Primary)  Stable, no angina  - clopidogreL (PLAVIX) 75 mg tablet; Take 1 tablet (75 mg total) by mouth once daily.  Dispense: 90 tablet; Refill: 3  - nitroGLYCERIN (NITROSTAT) 0.4 MG SL tablet; Place 1 tablet (0.4 mg total) under the tongue every 5  (five) minutes as needed for Chest pain.  Dispense: 30 tablet; Refill: 3  - Comprehensive Metabolic Panel; Future  - Lipid Panel; Future    2. Tobacco abuse counseling  unchanged  - [86353] [place] TOBACCO USE CESSATION INTERMEDIATE 3-10 MIN    3. Hyperlipidemia LDL goal <70  On statin    4. S/P CABG (coronary artery bypass graft)  stable    5. Atherosclerosis of native coronary artery of native heart with angina pectoris  stable    6. Complete heart block  S/p ICD        Plan and Discussion  Reviewed his chart and personally reviewed his coronary angiogram in February at another hospital.  In January, he presented to the hospital in [place] with angina and he went home.  He had an outpatient appointment with cardiologist NP who scheduled an outpatient coronary angiogram in February.  His coronary angiogram showed in stent restenosis of the left circumflex and he underwent successful PCI.  Since then, he has not had further angina.  He has been compliant with his medication.  However he has resumed smoking to help deal with sudden death of his 20-year-old son last August from a motorcycle accident.  He wants to try and quit smoking again.  Counseled him regarding the importance of stopping smoking.  Will refer to smoking cessation program.  Since it has been 4 months since elective PCI of his left circumflex for InStent restenosis, will stop aspirin.  Continue with clopidogrel and Eliquis.  Will get labs in 6 months.    Follow Up  6 months with labs        Dante Chavira MD, F.A.C.C, F.S.C.A.I.      Total professional time spent for the encounter: 40 minutes  Time was spent preparing to see the patient, reviewing results of prior testing, obtaining and/or reviewing separately obtained history, performing a medically appropriate examination and interview, counseling and educating the patient/family, ordering medications/tests/procedures, referring and communicating with other health care professionals, documenting  clinical information in the electronic health record, and independently interpreting results.    This note was generated with the assistance of ambient listening technology. Verbal consent was obtained by the patient and accompanying visitor(s) for the recording of patient appointment to facilitate this note. I attest to having reviewed and edited the generated note for accuracy, though some syntax or spelling errors may persist. Please contact the author of this note for any clarification.                                                               [1]   Patient Active Problem List  Diagnosis    Chest pain    Atherosclerosis of native coronary artery of native heart with angina pectoris    COPD (chronic obstructive pulmonary disease)    Type 2 diabetes mellitus with diabetic arthropathy    Hypertension associated with diabetes    ROSAMARIA (obstructive sleep apnea)    Diabetic ulcer of left foot associated with type 2 diabetes mellitus    Smoker 1-2 packs per day since 14 years old    Medical non-compliance    Left foot pain    Generalized anxiety disorder    Denton-Perez erythema multiforme exudativum    Allergic reaction to chemical substance    Prostate enlargement    Bladder mass    Coronary artery disease involving native coronary artery of native heart    Atrial fibrillation    S/P CABG (coronary artery bypass graft)    Protruding sternal wires    Ingrowing toenail    Onychomycosis    Polyneuropathy    Cellulitis of gluteal region    Chronic migraine    Obesity (BMI 30-39.9)    Hyperlipidemia LDL goal <70    Hyponatremia    Malnutrition of moderate degree    Acute pain    Open wound of buttock with complication    Abscess, perineum    Streptococcal infection    Hypomagnesemia    Cardiogenic shock    Complete heart block    Paroxysmal atrial fibrillation    Typical atrial flutter    Neck pain    Other spondylosis, lumbar region    CHF (congestive heart failure)   [2]   Current Outpatient Medications    Medication Sig Dispense Refill    ALPRAZolam (XANAX) 1 MG tablet Take 1 mg by mouth nightly as needed for Anxiety.      apixaban (ELIQUIS) 5 mg Tab Take 1 tablet (5 mg total) by mouth 2 (two) times daily. 180 tablet 3    aspirin (ECOTRIN) 81 MG EC tablet Take 1 tablet (81 mg total) by mouth once daily.      atorvastatin (LIPITOR) 40 MG tablet Take 1 tablet (40 mg total) by mouth once daily. 90 tablet 3    blood sugar diagnostic Strp TEST 3 TIMES DAILY 100 each 0    blood sugar diagnostic Strp To check BG QID times daily, to use with insurance preferred meter 200 each 0    blood-glucose meter kit To check BG QID times daily, to use with insurance preferred meter 1 each 0    blood-glucose meter Misc TEST 3 TIMES DAILY 1 each 1    blood-glucose meter,continuous (DEXCOM G7 ) Misc Use as directed. 1 each 0    blood-glucose sensor (DEXCOM G7 SENSOR) Courtney Change every 10 days. 3 each 11    EScitalopram oxalate (LEXAPRO) 10 MG tablet Take 10 mg by mouth every morning.      furosemide (LASIX) 20 MG tablet Take 1 tablet (20 mg total) by mouth daily as needed (for lower extremity edema or 3 lbs weight gain in 24 hours). 45 tablet 2    gabapentin (NEURONTIN) 600 MG tablet Take 600 mg by mouth As instructed (neuropathic pain). Takes 1 tablet in the morning and 2 tablets at bedtime      hydrALAZINE (APRESOLINE) 10 MG tablet Take 1 tablet (10 mg total) by mouth every 8 (eight) hours. 90 tablet 11    insulin glargine-yfgn 100 unit/mL (3 mL) InPn Inject 26 Units into the skin every evening.      isosorbide mononitrate (IMDUR) 60 MG 24 hr tablet Take 1 tablet (60 mg total) by mouth once daily. 90 tablet 3    JARDIANCE 25 mg tablet Take 25 mg by mouth once daily.      lancets 33 gauge Misc TEST 3 TIMES DAILY 100 each 0    lancets Misc To check BG QID times daily, to use with insurance preferred meter 200 each 0    losartan (COZAAR) 25 MG tablet Take 1 tablet (25 mg total) by mouth once daily. 90 tablet 3    metFORMIN  "(GLUCOPHAGE) 1000 MG tablet Take 1 tablet (1,000 mg total) by mouth 2 (two) times daily with meals.      methocarbamoL (ROBAXIN) 500 MG Tab Take 500 mg by mouth every evening.      metoprolol succinate (TOPROL-XL) 100 MG 24 hr tablet Take 100 mg by mouth 2 (two) times daily.      oxyCODONE-acetaminophen (PERCOCET)  mg per tablet Take 1 tablet by mouth 2 (two) times daily as needed for Pain.      pantoprazole (PROTONIX) 40 MG tablet Take 1 tablet (40 mg total) by mouth once daily. 30 tablet 11    pen needle, diabetic 29 gauge x 1/2" Ndle Use with insulin 4 times a day. 360 each 3    ranolazine (RANEXA) 500 MG Tb12 Take 1 tablet (500 mg total) by mouth 2 (two) times daily. 180 tablet 3    traZODone (DESYREL) 100 MG tablet Take 100-200 mg by mouth every evening.      amLODIPine (NORVASC) 5 MG tablet Take 1 tablet (5 mg total) by mouth once daily. 60 tablet 0    clopidogreL (PLAVIX) 75 mg tablet Take 1 tablet (75 mg total) by mouth once daily. 90 tablet 3    lactobacillus acidophilus & bulgar (LACTINEX) 100 million cell packet Take 1 packet (1 each total) by mouth 2 (two) times daily. (Patient not taking: Reported on 6/10/2025) 30 packet 0    nitroGLYCERIN (NITROSTAT) 0.4 MG SL tablet Place 1 tablet (0.4 mg total) under the tongue every 5 (five) minutes as needed for Chest pain. 30 tablet 3     Current Facility-Administered Medications   Medication Dose Route Frequency Provider Last Rate Last Admin    sodium chloride 0.9% flush 2 mL  2 mL Intravenous PRN Mansi Chambers NP       [3]   Social History  Socioeconomic History    Marital status:    Tobacco Use    Smoking status: Every Day     Current packs/day: 0.00     Average packs/day: 3.0 packs/day for 30.0 years (90.0 ttl pk-yrs)     Types: Cigarettes     Start date: 1990     Last attempt to quit: 2020     Years since quittin.1    Smokeless tobacco: Never   Substance and Sexual Activity    Alcohol use: Not Currently    Drug use: Yes     " Frequency: 1.0 times per week     Types: Marijuana    Sexual activity: Not Currently   Social History Narrative    ** Merged History Encounter **          Social Drivers of Health     Financial Resource Strain: High Risk (1/15/2025)    Overall Financial Resource Strain (CARDIA)     Difficulty of Paying Living Expenses: Very hard   Food Insecurity: Food Insecurity Present (1/15/2025)    Hunger Vital Sign     Worried About Running Out of Food in the Last Year: Often true     Ran Out of Food in the Last Year: Often true   Transportation Needs: No Transportation Needs (1/15/2025)    PRAPARE - Transportation     Lack of Transportation (Medical): No     Lack of Transportation (Non-Medical): No   Physical Activity: Insufficiently Active (1/15/2025)    Exercise Vital Sign     Days of Exercise per Week: 1 day     Minutes of Exercise per Session: 20 min   Stress: Stress Concern Present (1/15/2025)    [employer] [place] [employer] - Occupational Stress Questionnaire     Feeling of Stress : To some extent   Housing Stability: High Risk (1/15/2025)    Housing Stability Vital Sign     Unable to Pay for Housing in the Last Year: Yes     Homeless in the Last Year: No

## 2025-08-01 ENCOUNTER — CLINICAL SUPPORT (OUTPATIENT)
Dept: CARDIOLOGY | Facility: HOSPITAL | Age: 60
End: 2025-08-01
Attending: INTERNAL MEDICINE
Payer: MEDICAID

## 2025-08-01 ENCOUNTER — CLINICAL SUPPORT (OUTPATIENT)
Dept: CARDIOLOGY | Facility: HOSPITAL | Age: 60
End: 2025-08-01
Payer: MEDICAID

## 2025-08-01 DIAGNOSIS — Z95.810 PRESENCE OF AUTOMATIC (IMPLANTABLE) CARDIAC DEFIBRILLATOR: ICD-10-CM

## 2025-08-01 DIAGNOSIS — I44.2 ATRIOVENTRICULAR BLOCK, COMPLETE: ICD-10-CM

## 2025-08-01 PROCEDURE — 93295 DEV INTERROG REMOTE 1/2/MLT: CPT | Mod: ,,, | Performed by: INTERNAL MEDICINE

## 2025-08-01 PROCEDURE — 93296 REM INTERROG EVL PM/IDS: CPT | Performed by: INTERNAL MEDICINE

## 2025-08-21 ENCOUNTER — HOSPITAL ENCOUNTER (OUTPATIENT)
Dept: CARDIOLOGY | Facility: CLINIC | Age: 60
Discharge: HOME | End: 2025-08-21
Attending: INTERNAL MEDICINE
Payer: MEDICAID

## 2025-08-21 DIAGNOSIS — Z95.810 PRESENCE OF AUTOMATIC (IMPLANTABLE) CARDIAC DEFIBRILLATOR: Primary | ICD-10-CM

## 2025-08-21 DIAGNOSIS — Z95.810 PRESENCE OF AUTOMATIC (IMPLANTABLE) CARDIAC DEFIBRILLATOR: ICD-10-CM

## 2025-08-22 LAB
HV IMPEDANCE (OHM): 69 OHM
IMPEDANCE RA LEAD (NATIVE): 640 OHMS
IMPEDANCE RA LEAD: 390 OHMS
P/R-WAVE RA LEAD (NATIVE): >12 MV
P/R-WAVE RA LEAD: 4.5 MV
THRESHOLD MS RA LEAD (NATIVE): 0.5 MS
THRESHOLD MS RA LEAD: 0.5 MS
THRESHOLD V RA LEAD (NATIVE): 0.5 V
THRESHOLD V RA LEAD: 0.5 V

## 2025-09-04 ENCOUNTER — OFFICE VISIT (OUTPATIENT)
Dept: CARDIOLOGY | Facility: CLINIC | Age: 60
End: 2025-09-04
Payer: MEDICAID

## 2025-09-04 VITALS
SYSTOLIC BLOOD PRESSURE: 149 MMHG | WEIGHT: 273.38 LBS | HEART RATE: 81 BPM | BODY MASS INDEX: 38.27 KG/M2 | HEIGHT: 71 IN | DIASTOLIC BLOOD PRESSURE: 105 MMHG

## 2025-09-04 DIAGNOSIS — F17.200 SMOKER: ICD-10-CM

## 2025-09-04 DIAGNOSIS — Z79.4 TYPE 2 DIABETES MELLITUS WITH DIABETIC NEUROPATHIC ARTHROPATHY, WITH LONG-TERM CURRENT USE OF INSULIN: ICD-10-CM

## 2025-09-04 DIAGNOSIS — E11.610 TYPE 2 DIABETES MELLITUS WITH DIABETIC NEUROPATHIC ARTHROPATHY, WITH LONG-TERM CURRENT USE OF INSULIN: ICD-10-CM

## 2025-09-04 DIAGNOSIS — E11.59 HYPERTENSION ASSOCIATED WITH DIABETES: ICD-10-CM

## 2025-09-04 DIAGNOSIS — E66.9 OBESITY (BMI 30-39.9): ICD-10-CM

## 2025-09-04 DIAGNOSIS — I50.42 CHRONIC COMBINED SYSTOLIC AND DIASTOLIC CONGESTIVE HEART FAILURE: ICD-10-CM

## 2025-09-04 DIAGNOSIS — I48.0 PAROXYSMAL ATRIAL FIBRILLATION: ICD-10-CM

## 2025-09-04 DIAGNOSIS — G47.33 OSA (OBSTRUCTIVE SLEEP APNEA): ICD-10-CM

## 2025-09-04 DIAGNOSIS — I25.119 ATHEROSCLEROSIS OF NATIVE CORONARY ARTERY OF NATIVE HEART WITH ANGINA PECTORIS: ICD-10-CM

## 2025-09-04 DIAGNOSIS — J44.9 CHRONIC OBSTRUCTIVE PULMONARY DISEASE, UNSPECIFIED COPD TYPE: Primary | ICD-10-CM

## 2025-09-04 DIAGNOSIS — Z95.1 S/P CABG (CORONARY ARTERY BYPASS GRAFT): ICD-10-CM

## 2025-09-04 DIAGNOSIS — I15.2 HYPERTENSION ASSOCIATED WITH DIABETES: ICD-10-CM

## 2025-09-04 DIAGNOSIS — E78.5 HYPERLIPIDEMIA LDL GOAL <70: ICD-10-CM

## 2025-09-04 PROCEDURE — 3077F SYST BP >= 140 MM HG: CPT | Mod: CPTII,,,

## 2025-09-04 PROCEDURE — 99214 OFFICE O/P EST MOD 30 MIN: CPT | Mod: S$PBB,,,

## 2025-09-04 PROCEDURE — 1160F RVW MEDS BY RX/DR IN RCRD: CPT | Mod: CPTII,,,

## 2025-09-04 PROCEDURE — 99999 PR PBB SHADOW E&M-EST. PATIENT-LVL V: CPT | Mod: PBBFAC,,,

## 2025-09-04 PROCEDURE — 99215 OFFICE O/P EST HI 40 MIN: CPT | Mod: PBBFAC,PN

## 2025-09-04 PROCEDURE — 4010F ACE/ARB THERAPY RXD/TAKEN: CPT | Mod: CPTII,,,

## 2025-09-04 PROCEDURE — 3080F DIAST BP >= 90 MM HG: CPT | Mod: CPTII,,,

## 2025-09-04 PROCEDURE — 1159F MED LIST DOCD IN RCRD: CPT | Mod: CPTII,,,

## 2025-09-04 PROCEDURE — 3008F BODY MASS INDEX DOCD: CPT | Mod: CPTII,,,

## 2025-09-04 RX ORDER — AMLODIPINE BESYLATE 5 MG/1
5 TABLET ORAL DAILY
Qty: 90 TABLET | Refills: 3 | Status: SHIPPED | OUTPATIENT
Start: 2025-09-04 | End: 2025-11-03

## (undated) DEVICE — Device

## (undated) DEVICE — DRAPE LAP CHOLE 89223

## (undated) DEVICE — SLEEVE SCD EXPRESS KNEE MEDIUM

## (undated) DEVICE — GUIDEWIRE DOUBLE ENDED .035 DIA. 150CML

## (undated) DEVICE — COVER INSTR ELASTIC BAND 40X20

## (undated) DEVICE — GUIDEWIRE INQWIRE SE 3MM JTIP

## (undated) DEVICE — CATHETER GUIDE LAUNCHER EBU3.5 6X110

## (undated) DEVICE — CATH EMERGE MR 15 X 2.00

## (undated) DEVICE — SHEATH INTRODUCER 6FR 11CM

## (undated) DEVICE — SHEET DRAPE MEDIUM

## (undated) DEVICE — STENT RONYX30015UX RESOLUTE ONYX 3.00X15
Type: IMPLANTABLE DEVICE | Site: CHEST | Status: NON-FUNCTIONAL
Brand: RESOLUTE ONYX™

## (undated) DEVICE — ADHESIVE DERMABOND ADVANCED

## (undated) DEVICE — CATHETER GUIDE LAUNCHER EBU4 6X110

## (undated) DEVICE — ELECTRODE REM PLYHSV RETURN 9

## (undated) DEVICE — SUCTION YANKAUER 34970

## (undated) DEVICE — SHEATH INTRODUCER 5FR 10CM

## (undated) DEVICE — SHEATH PINNACLE 8FRX10CM W/GUIDEWIRE

## (undated) DEVICE — PACKING STRIP IDOFORM 1X5YD

## (undated) DEVICE — GUIDEWIRE J 3MM .035IN 150

## (undated) DEVICE — ANGIO-SEAL VIP VASCULAR CLOSURE DEVICE
Type: IMPLANTABLE DEVICE | Site: CHEST | Status: NON-FUNCTIONAL
Brand: ANGIO-SEAL

## (undated) DEVICE — DRESSING AQUACEL FOAM 5 X 5

## (undated) DEVICE — JELLY LUBRICANT STERILE 4 OZ

## (undated) DEVICE — SCRUB 10% POVIDONE IODINE 4OZ

## (undated) DEVICE — GUIDEWIRE CHOICE PT 0.014X182CM

## (undated) DEVICE — TOWEL OR DISP STRL BLUE 4/PK

## (undated) DEVICE — CATHETER DIAGNOSTIC DXTERITY 6FR JR 4.0

## (undated) DEVICE — TIP YANKAUERS BULB NO VENT

## (undated) DEVICE — PAD ABDOMINAL STERILE 8X10IN

## (undated) DEVICE — GUIDEWIRE RUNTHROUGH 180CM

## (undated) DEVICE — SHEATH INTRODUCER PRECISION ACCESS 6FX10

## (undated) DEVICE — STRAP OR TABLE 5IN X 72IN

## (undated) DEVICE — KIT MICROINTRODUCE MINI 5X10CM

## (undated) DEVICE — BLLN SAPPHIRE NC3.5 X 18

## (undated) DEVICE — SHEATH PINNACLE 6FRX10CM W/GUIDEWIRE

## (undated) DEVICE — SPONGE BULKEE II ABSRB 6X6.75

## (undated) DEVICE — SLING SWATHE UNIVERSAL FOAM

## (undated) DEVICE — CATHETER BALLOON INTRA  AORTIC 7FX40CC

## (undated) DEVICE — DRESSING POST OP MEPILEX AG 4X8

## (undated) DEVICE — CATHETER DIAGNOSTIC DXTERITY 6FR IMA

## (undated) DEVICE — NDL SURE-SNAP HYPO SAF 21GX1.5

## (undated) DEVICE — DRAPE IOBAN 23X33 6651EZ

## (undated) DEVICE — GUIDEWIRE J TIP BMW .014X190

## (undated) DEVICE — GLOVE SURG ULTRA TOUCH 7.5

## (undated) DEVICE — KIT INFLATION MERIT (IN8152, HP9200E)

## (undated) DEVICE — SOL NACL IRR 1000ML BTL

## (undated) DEVICE — STAPLER SKIN NON ROTATE PXW35

## (undated) DEVICE — COVER LIGHT HANDLE LB53

## (undated) DEVICE — SET BASIN O R 31451126

## (undated) DEVICE — BLLN NC EUPHORA 3.0MMX15MMX142CM

## (undated) DEVICE — CONTRAST OMNIPAQUE 240 150ML

## (undated) DEVICE — CATHETER BALLOON EUPHORA 2.00X20MM

## (undated) DEVICE — UNDERGLOVES BIOGEL PI SZ 6 LF

## (undated) DEVICE — PACK CUSTOM UNIV BASIN SLI

## (undated) DEVICE — CATHETER DIAGNOSTIC DXTERITY 6FR JL 4

## (undated) DEVICE — WATER STERILE INJ 500ML BAG

## (undated) DEVICE — UNDERGLOVE BIOGEL PI MICRO BLUE SZ 6.5

## (undated) DEVICE — TRAY SKIN PREP DRY

## (undated) DEVICE — SUTURE MONOCRYL 3-0 27 PS-1 MCP936H

## (undated) DEVICE — CONTAINER SPECIMEN STRL 4OZ

## (undated) DEVICE — SUTURE MONOCRYL 0 CT-1 36 Y946H

## (undated) DEVICE — DRAPE INCISE IOBAN 2 23X17IN

## (undated) DEVICE — PACK BASIC V 88151

## (undated) DEVICE — GUIDE LAUNCHER 6FR EBU 4.0

## (undated) DEVICE — SOL POVIDONE SCRUB IODINE 4 OZ

## (undated) DEVICE — TOWEL OR BLUE      MDT2168284

## (undated) DEVICE — CATH JR4 5FR

## (undated) DEVICE — CATH NC EMERGE MR 3X12MM

## (undated) DEVICE — PAD DEFIB CADENCE ADULT R2

## (undated) DEVICE — SOLUTION IRRI NS BOTTLE 1000ML R5200-01

## (undated) DEVICE — INTRODUCER KIT MICRO MINI STICK II 4FR

## (undated) DEVICE — CATH JL3.5 5FR

## (undated) DEVICE — SYR 30CC LUER LOCK

## (undated) DEVICE — CATHETER BALLOON NC EUPHORA 2.5X12MM

## (undated) DEVICE — CATHETER BIPOLAR PACING 5FR J-TIP

## (undated) DEVICE — APRON DISPOSP PLASTIC 24INX42

## (undated) DEVICE — SOLUTION PREP IODINE 4OZ

## (undated) DEVICE — GLOVE #7.5 BIOGEL 30475

## (undated) DEVICE — TIP BOVIE TEFLON E1450X

## (undated) DEVICE — GOWN SMART LRG 044673

## (undated) DEVICE — CATH NC EMERGE MR 2.5X20MM

## (undated) DEVICE — GUIDEWIRE PTCA FIELDER XT STR TIP 190CM

## (undated) DEVICE — SHEATH SAFESHEATH II ULTRA 6FR

## (undated) DEVICE — CATH EAGLE EYE PLATINUM

## (undated) DEVICE — CATHETER EAGLE EYE 5FR 85900P

## (undated) DEVICE — TAPE MEDIPORE 4IN X 2YDS

## (undated) DEVICE — SOLN BETADINE SWAB AIDS

## (undated) DEVICE — SEE MEDLINE ITEM 152651

## (undated) DEVICE — PACK PACER PERMANENT OMC

## (undated) DEVICE — CATHETER DIAGNOSTIC DXTERITY 5FR JR4.0

## (undated) DEVICE — TRAY DRY SPONGE SCRUB W FOAM

## (undated) DEVICE — GUIDE LAUNCHER 6FR EBU 3.5

## (undated) DEVICE — CATH ANGIO GUIDEZILLA 6FR

## (undated) DEVICE — GUIDEWIRE RUNTHROUGH EF 180CM

## (undated) DEVICE — GUIDEWIRE OMNI STR TIP 185CM

## (undated) DEVICE — PAD BOVIE ADULT

## (undated) DEVICE — DRAPE ABDOMINAL TIBURON 14X11

## (undated) DEVICE — PREP CHLORA 26ML

## (undated) DEVICE — GUIDEWIRE PTCA FIELDER XT STR TIP 300CM

## (undated) DEVICE — SAFESHEATH II 8FR 13CM

## (undated) DEVICE — SET CYSTO IRRIGATION UNIV SPIK

## (undated) DEVICE — SUTURE MONOCRYL 2-0 CT-1 MCP945H

## (undated) DEVICE — GUIDEWIRE J TIP EXCHANGE FIXED CORE 260

## (undated) DEVICE — SOLUTION SCRUB IODINE 4OZ

## (undated) DEVICE — GOWN POLY REINF BRTH SLV XL